# Patient Record
Sex: MALE | Race: WHITE | NOT HISPANIC OR LATINO | Employment: OTHER | URBAN - METROPOLITAN AREA
[De-identification: names, ages, dates, MRNs, and addresses within clinical notes are randomized per-mention and may not be internally consistent; named-entity substitution may affect disease eponyms.]

---

## 2017-05-05 ENCOUNTER — PRE VISIT (OUTPATIENT)
Dept: GASTROENTEROLOGY | Facility: CLINIC | Age: 67
End: 2017-05-05

## 2017-05-05 NOTE — TELEPHONE ENCOUNTER
PREVISIT INFORMATION                                                    Delbert Tilley scheduled for future visit at [facility] specialty clinics.    Patient is scheduled to see Dr. Moyer on 5/12/17  Reason for visit: sudden weight loss   [facility]   Has patient seen previous specialist? No  [facility]     REVIEW                                                      New patient packet mailed to patient: N/A  Medication reconciliation complete: No      PLAN/FOLLOW-UP NEEDED                                                      Patient Reminders Given:    Informed patient to bring an updated list of allergies, medications, pharmacy details and insurance information. Directed patient to come to the 2nd floor, check-in #4 for their appointment. Informed patient to call back if appointment needs to be cancelled or rescheduled at [phone].    Reminded patient to bring any outside records regarding this appointment or have them faxed to clinic at [phone].

## 2017-05-09 ENCOUNTER — OFFICE VISIT (OUTPATIENT)
Dept: SURGERY | Facility: CLINIC | Age: 67
End: 2017-05-09
Payer: COMMERCIAL

## 2017-05-09 VITALS — TEMPERATURE: 97.8 F | SYSTOLIC BLOOD PRESSURE: 119 MMHG | DIASTOLIC BLOOD PRESSURE: 67 MMHG | HEART RATE: 95 BPM

## 2017-05-09 DIAGNOSIS — I65.22 CAROTID STENOSIS, LEFT: Primary | ICD-10-CM

## 2017-05-09 PROCEDURE — 99212 OFFICE O/P EST SF 10 MIN: CPT | Performed by: SURGERY

## 2017-05-09 NOTE — NURSING NOTE
Initial /67 (BP Location: Right arm, Patient Position: Chair, Cuff Size: Adult Regular)  Pulse 95  Temp 97.8  F (36.6  C) Estimated body mass index is 22.78 kg/(m^2) as calculated from the following:    Height as of 3/30/16: 1.829 m (6').    Weight as of 3/30/16: 76.2 kg (168 lb). .

## 2017-05-09 NOTE — PROGRESS NOTES
Here to schedule operation left carotid.  Discussed risks goals and alternatives in detail he appears to understand and wishes to proceed.  Face to face time 10 minutes greater than 50% in consultation.

## 2017-05-12 ENCOUNTER — OFFICE VISIT (OUTPATIENT)
Dept: GASTROENTEROLOGY | Facility: CLINIC | Age: 67
End: 2017-05-12
Payer: COMMERCIAL

## 2017-05-12 VITALS
WEIGHT: 158.7 LBS | HEART RATE: 96 BPM | BODY MASS INDEX: 21.5 KG/M2 | OXYGEN SATURATION: 97 % | HEIGHT: 72 IN | DIASTOLIC BLOOD PRESSURE: 71 MMHG | SYSTOLIC BLOOD PRESSURE: 119 MMHG

## 2017-05-12 DIAGNOSIS — Z87.19 HISTORY OF PANCREATITIS: Primary | ICD-10-CM

## 2017-05-12 PROCEDURE — 99204 OFFICE O/P NEW MOD 45 MIN: CPT | Performed by: INTERNAL MEDICINE

## 2017-05-12 ASSESSMENT — PAIN SCALES - GENERAL: PAINLEVEL: NO PAIN (0)

## 2017-05-12 NOTE — PATIENT INSTRUCTIONS
It was great meeting you today.    Please try taking 1 tablespoon of metamucil daily    Eat smaller, more frequent low fat meals throughout the day    Continue your medications including the pancreas enzymes. Take the pancreas enzymes 2 pills with meals three times per day    Follow up in 6 months or sooner if needs.    Call with questions

## 2017-05-12 NOTE — PROGRESS NOTES
Note dictated. Job code [id number].    Romario Moyer MD    [facility]  Division of Gastroenterology, Hepatology and Nutrition

## 2017-05-12 NOTE — NURSING NOTE
Delbert Tilley's goals for this visit include:   Chief Complaint   Patient presents with     Consult     weight loss, diarrhea, left sided abdominal pain with eating       He requests these members of his care team be copied on today's visit information: YES - PCP     Initial /71 (BP Location: Left arm, Patient Position: Chair, Cuff Size: Adult Regular)  Pulse 96  Ht 1.829 m (6')  Wt 72 kg (158 lb 11.2 oz)  SpO2 97%  BMI 21.52 kg/m2 Estimated body mass index is 21.52 kg/(m^2) as calculated from the following:    Height as of this encounter: 1.829 m (6').    Weight as of this encounter: 72 kg (158 lb 11.2 oz).  BP completed using cuff size: regular

## 2017-05-12 NOTE — MR AVS SNAPSHOT
After Visit Summary   5/12/2017    Delbert Tilley    MRN: [id number]           Patient Information     Date Of Birth          1950        Visit Information        Provider Department      5/12/2017 2:30 PM Romario Moyer MD [facility]        Today's Diagnoses     History of pancreatitis    -  1      Care Instructions    It was great meeting you today.    Please try taking 1 tablespoon of metamucil daily    Eat smaller, more frequent low fat meals throughout the day    Continue your medications including the pancreas enzymes. Take the pancreas enzymes 2 pills with meals three times per day    Follow up in 6 months or sooner if needs.    Call with questions            Follow-ups after your visit        Your next 10 appointments already scheduled     Aug 18, 2017 10:30 AM CDT   [facility] OR with João Turner MD   [facility]    [facility])   [phone]            Aug 18, 2017   Procedure with João Turner MD   [facility] (--)    [address] [address] [phone]            Nov 17, 2017  4:30 PM CST   Return Visit with Romario Moyer MD   [facility] ([facility])    [address] [place] [address] [phone]              Who to contact     If you have questions or need follow up information about today's clinic visit or your schedule please contact [facility] directly at [phone].  Normal or non-critical lab and imaging results will be communicated to you by MyChart, letter or phone within 4 business days after the clinic has received the results. If you do not hear from us within 7 days, please contact the clinic through MyChart or phone. If you have a critical or abnormal lab result, we will  notify you by phone as soon as possible.  Submit refill requests through Datah[employer] or call your pharmacy and they will forward the refill request to us. Please allow 3 business days for your refill to be completed.          Additional Information About Your Visit        Datahug Information     Datahug is an electronic gateway that provides easy, online access to your medical records. With Datahug, you can request a clinic appointment, read your test results, renew a prescription or communicate with your care team.     To sign up for Datahug visit the website at [email or web address]   You will be asked to enter the access code listed below, as well as some personal information. Please follow the directions to create your username and password.     Your access code is: [email or web address]  Expires: 2017  9:55 AM     Your access code will  in 90 days. If you need help or a new code, please contact your [facility] or call [phone] for assistance.        Care EveryWhere ID     This is your Care EveryWhere ID. This could be used by other organizations to access your [place] medical records  [id number]        Your Vitals Were     Pulse Height Pulse Oximetry BMI (Body Mass Index)          96 1.829 m (6') 97% 21.52 kg/m2         Blood Pressure from Last 3 Encounters:   17 119/71   17 119/67   16 (!) 78/55    Weight from Last 3 Encounters:   17 72 kg (158 lb 11.2 oz)   16 76.2 kg (168 lb)   12/08/15 81.6 kg (180 lb)              Today, you had the following     No orders found for display         Today's Medication Changes          These changes are accurate as of: 17  3:06 PM.  If you have any questions, ask your nurse or doctor.               These medicines have changed or have updated prescriptions.        Dose/Directions    amylase-lipase-protease 57971 UNITS Cpep   Commonly known as:  ZENPEP   This may have changed:    - how much to take  -  when to take this   Used for:  History of pancreatitis   Changed by:  Romario Moyer MD        Dose:  2 capsule   Take 2 capsules by mouth 3 times daily (with meals)   Quantity:  270 capsule   Refills:  3            Where to get your medicines      Some of these will need a paper prescription and others can be bought over the counter.  Ask your nurse if you have questions.     Bring a paper prescription for each of these medications     amylase-lipase-protease 89956 UNITS Cpep                Primary Care Provider Fax #    [facility] [phone]       [facility] [address]        Thank you!     Thank you for choosing [facility]  for your care. Our goal is always to provide you with excellent care. Hearing back from our patients is one way we can continue to improve our services. Please take a few minutes to complete the written survey that you may receive in the mail after your visit with us. Thank you!             Your Updated Medication List - Protect others around you: Learn how to safely use, store and throw away your medicines at www.disposemymeds.org.          This list is accurate as of: 5/12/17  3:06 PM.  Always use your most recent med list.                   Brand Name Dispense Instructions for use    amylase-lipase-protease 54411 UNITS Cpep    ZENPEP    270 capsule    Take 2 capsules by mouth 3 times daily (with meals)       aspirin 81 MG EC tablet     90 tablet    Take 81 mg by mouth daily Reported on 5/9/2017       glipiZIDE 10 MG tablet    GLUCOTROL     ONE TABLET DAILY 30 MINUTES BEFORE MEALS       HYDROcodone-acetaminophen 5-325 MG per tablet    NORCO    40 tablet    1-2 tabs PO q 4-6 hour prn pain       insulin glargine 100 UNIT/ML injection    LANTUS     Inject 27 Units Subcutaneous every morning       LIPITOR 80 MG tablet   Generic drug:  atorvastatin     30 tablet    Take 1 tablet by mouth At Bedtime.       lisinopril 5 MG tablet     PRINIVIL/ZESTRIL    30 tablet    Take 0.5 tablets (2.5 mg) by mouth daily       metFORMIN 500 MG 24 hr tablet    GLUCOPHAGE-XR    30 tablet    Take 2 tablets by mouth 2 times daily (with meals).       metoprolol 25 MG 24 hr tablet    TOPROL-XL    90 tablet    Take 0.5 tablets (12.5 mg) by mouth daily       nitroglycerin 0.4 MG sublingual tablet    NITROSTAT    25 tablet    Place 1 tablet (0.4 mg) under the tongue every 5 minutes as needed for chest pain       PROTONIX 40 MG EC tablet   Generic drug:  pantoprazole     90 tablet    Take 40 mg by mouth every morning Take 30-60 minutes before a meal.

## 2017-05-13 NOTE — PROGRESS NOTES
OUTPATIENT GI CONSULT      REFERRING PROVIDER:  The patient's primary care provider, Augustus Mix PA-C, at the [facility].      REASON FOR CONSULTATION:  Weight loss.       CHIEF COMPLAINT:  No complaints, other than occasional bloating with eating.      HISTORY OF PRESENT ILLNESS:  Delbert Tliley is a pleasant 58-year-old gentleman with a history of tobacco abuse, as well as alcohol-induced pancreatitis, who is here today to be seen in GI Clinic.  The patient [facility] seen at the [facility], [facility] [place]  The patient has a long history of smoking and alcohol abuse.  He last drank alcohol in 2010.  Prior to quitting, he did have episodes of recurrent pancreatitis and required hospitalization.  He was referred to the GI Clinic today because many months ago he had weight loss.  He tells me that over the course of several years he was losing weight.  He was seen by his primary care provider, and they got labs that were unremarkable.  They also obtained a CT of the chest and abdomen, which showed some coronary calcifications and a distended stomach with food contents, but otherwise was unremarkable.  The patient was referred to GI for further evaluation.  He could not be seen at the [facility], and so he was sent here.      The patient states that since the referral was placed in November, actually his weight has stabilized.  He reports that his weight has been stable now for the last 8 months, and he is doing quite well.  His only complaint is that after eating fatty or greasy foods, he feels a little bloated and passes some gas.  Usually, this bloating discomfort passes within a few minutes of stopping eating.  He has noted that if he avoids greasy foods, he does quite better.  Also, if he eats smaller foods, he does better as well.  He does have some irregular bowel habits, and he goes from having some looser stools to being more constipated.  He will usually have about 2  stools a day.  He does take his Zenpep 10,000 units once a day, and he maybe take some extra pills if he eats more greasy food.  If he takes these medications more regularly, he does not get that bloating sensation.  He has no blood in his stool.  He has no black or tarry stool.  He has no reflux symptoms.  No odynophagia, no dysphagia, and no vomiting.      REVIEW OF SYSTEMS:  A complete review of systems is performed.  Pertinent positives and negatives are as stated above in the HPI.  The remainder of a complete review of systems is unremarkable.      PAST MEDICAL HISTORY:   1.  Cataracts.   2.  Presbyopia.    3.  Hearing loss.   4.  Tinnitus.   5.  Cardiomyopathy.   6.  Peripheral neuropathy.   7.  History of GERD with Martinez's esophagus.  We do not have details about this.   8.  Atrial flutter.   9.  History of alcohol abuse.   10.  History of non-ST elevation myocardial infarction.   11.  History of retinal detachment.   12.  History of alcohol-induced pancreatitis, per the patient.      PAST SURGICAL HISTORY:   1.  Status post carpal tunnel release.   2.  Status post CABG x4.   3.  Pacer placement.   4.  Tonsillectomy.      ALLERGIES:  Fish.      SOCIAL HISTORY:  He is single.  He lives with his 2 children.  Tobacco history:  He smokes 2 packs per day with a 100+ pack-year history.  History of alcohol abuse, but none since 07/2010.      FAMILY HISTORY:  He is adopted.      MEDICATIONS:   1.  Zenpep 10,000 units once a day to twice a day.   2.  Norco (he is not taking).   3.  Lantus 25 units every morning.   4.  Lisinopril 2.5 mg daily.   5.  Metoprolol 12.5 mg daily.   6.  Nitroglycerin SL.   7.  Atorvastatin.   8.  Metformin.   9.  Pantoprazole 40 mg daily.   10.  Aspirin.   11.  Glipizide 10 mg.      PHYSICAL EXAMINATION:   GENERAL:  He is pleasant, in no acute distress.   VITAL SIGNS:  Reviewed in Epic, stable.  Weight is 158 pounds.  BMI is 21.  Blood pressure 119/71 and pulse is 96.   GENERAL:  He is  pleasant, in no acute distress.   HEENT:  Head is atraumatic, normocephalic.  Sclerae are anicteric without injection.  Oropharynx is clear with moist mucous membranes.   NECK:  Supple, with no lymphadenopathy.   HEART:  Normal rate.   RESPIRATIONS:  Breathing comfortably.   ABDOMEN:  Soft, nontender and nondistended, no rebound or guarding.  No palpable masses.  No hepatosplenomegaly.   EXTREMITIES:  No clubbing, cyanosis or edema.   SKIN:  No evidence of rash.   JOINTS:  No evidence of synovitis.   NEUROLOGIC:  Awake, alert and oriented x3 with no focal deficits.      LABORATORY DATA:  [facility]  He had a normal hemoglobin and white count.  Platelets were mildly decreased at 128.  Albumin was normal at 3.8.  Creatinine was normal.  Glucose elevated at 187.  LFTs were normal.      IMAGING:  CT scan reviewed as above.      ASSESSMENT AND PLAN:  Delbert Tilley is a very pleasant 66-year-old gentleman who is here today to be seen in the GI Clinic.  [facility]     1.  Weight loss.  His weight loss appears to have resolved, and he says it is stable over the last 8 months.  He has multiple reasons that he could have potentially had weight loss.  I recommend that his primary care provider ensure that he is up-to-date on all of his cancer screening.  His recent CT of the chest and abdomen did not show anything concerning.  Certainly, he is at risk for pancreas insufficiency related to chronic pancreatitis in the setting of his extensive smoking and alcohol use history, and his history of recurrent acute pancreatitis (related to alcohol).  He seems to be managing his symptoms well with pancreas enzymes.  I did increase his pancreas enzymes up to 2 pills 3 times a day with eating.  Certainly, he is also at risk for some mesenteric ischemia related to his chronic tobacco use.  He does not have any food fear, and his weight has stabilized, so I do not think we need to do  any further evaluation for this.  There has been no evidence of pancreas cancer on his CT scan.  Given the fact that he has stabilized and he is doing well, I do not think we need to do any further evaluation for this at this time.     2.  Probable pancreas insufficiency with some underlying chronic pancreatitis.  I would like to get [facility] to see if there is any indication of chronic pancreatitis.  He did not have calcific pancreatitis on the CT scan, but given his tobacco history and alcohol history with history of recurrent pancreatitis previously, he certainly could have an element of this.  He should continue the pancreas enzymes as outlined above, and we will get his outside records.     3.  History of Martinez's per primary care note.  He has not had an endoscopy in several years.  We will [facility] to see if they confirm a history of Martinez's.  If they have a history of Martinez's, then we will repeat an EGD.  He can continue his PPI now.     4.  Colon cancer surveillance.  He reports that about 5-7 years ago he had [facility].  He was told to come back in 10 years.  We will get these records, and we will help determine when he should have his followup colonoscopy.      Thank you very much for the opportunity to take part in the care of this patient.  Please do not hesitate to call with questions.      cc: TONJA Hull MD             D: 2017 15:35   T: 2017 10:14   MT: SHA      Name:     CARLIN QUEEN   MRN:      [id number]        Account:      [id number]   :      1950           Service Date: 2017      Document: [id number]

## 2017-05-18 ENCOUNTER — TELEPHONE (OUTPATIENT)
Dept: GASTROENTEROLOGY | Facility: CLINIC | Age: 67
End: 2017-05-18

## 2017-05-18 DIAGNOSIS — K86.89 PANCREATIC INSUFFICIENCY: Primary | ICD-10-CM

## 2017-05-18 NOTE — TELEPHONE ENCOUNTER
Pharmacy called with updates on changes with medication: Amylase-lipas-protease (ZENPEP) 28361 Units CPEP.  Pharmacy switched from ZENPEP to Creon.  Pharmacy needing approval from Dr. Moyer.  If Dr. Moyer okay with switch to Creon, will need a new order supporting this and faxed to pharmacy.      [facility] Fax:  [phone]      Will route to Dr. Moyer to review and advise.

## 2017-05-19 NOTE — TELEPHONE ENCOUNTER
Ok to switch. Order placed.    Romario Moyer MD    [facility]  Division of Gastroenterology, Hepatology and Nutrition

## 2017-05-19 NOTE — TELEPHONE ENCOUNTER
Yanira ordered by Dr. Moyer and faxed to the [facility] at [phone].    Kiley Beckett CMA

## 2017-08-11 ENCOUNTER — OFFICE VISIT (OUTPATIENT)
Dept: FAMILY MEDICINE | Facility: CLINIC | Age: 67
End: 2017-08-11
Payer: COMMERCIAL

## 2017-08-11 VITALS
TEMPERATURE: 96.4 F | WEIGHT: 154.1 LBS | SYSTOLIC BLOOD PRESSURE: 116 MMHG | BODY MASS INDEX: 20.87 KG/M2 | HEIGHT: 72 IN | HEART RATE: 66 BPM | DIASTOLIC BLOOD PRESSURE: 62 MMHG

## 2017-08-11 DIAGNOSIS — Z79.4 TYPE 2 DIABETES MELLITUS WITH COMPLICATION, WITH LONG-TERM CURRENT USE OF INSULIN (H): ICD-10-CM

## 2017-08-11 DIAGNOSIS — R94.31 ABNORMAL ELECTROCARDIOGRAM: ICD-10-CM

## 2017-08-11 DIAGNOSIS — Z01.818 PREOP GENERAL PHYSICAL EXAM: Primary | ICD-10-CM

## 2017-08-11 DIAGNOSIS — E11.8 TYPE 2 DIABETES MELLITUS WITH COMPLICATION, WITH LONG-TERM CURRENT USE OF INSULIN (H): ICD-10-CM

## 2017-08-11 LAB
ANION GAP SERPL CALCULATED.3IONS-SCNC: 3 MMOL/L (ref 3–14)
BASOPHILS # BLD AUTO: 0 10E9/L (ref 0–0.2)
BASOPHILS NFR BLD AUTO: 0.3 %
BUN SERPL-MCNC: 19 MG/DL (ref 7–30)
CALCIUM SERPL-MCNC: 8.8 MG/DL (ref 8.5–10.1)
CHLORIDE SERPL-SCNC: 105 MMOL/L (ref 94–109)
CO2 SERPL-SCNC: 29 MMOL/L (ref 20–32)
CREAT SERPL-MCNC: 0.84 MG/DL (ref 0.66–1.25)
CREAT UR-MCNC: 86 MG/DL
DIFFERENTIAL METHOD BLD: ABNORMAL
EOSINOPHIL # BLD AUTO: 0.3 10E9/L (ref 0–0.7)
EOSINOPHIL NFR BLD AUTO: 3.5 %
ERYTHROCYTE [DISTWIDTH] IN BLOOD BY AUTOMATED COUNT: 13 % (ref 10–15)
GFR SERPL CREATININE-BSD FRML MDRD: ABNORMAL ML/MIN/1.7M2
GLUCOSE SERPL-MCNC: 189 MG/DL (ref 70–99)
HBA1C MFR BLD: 9.9 % (ref 4.3–6)
HCT VFR BLD AUTO: 43.8 % (ref 40–53)
HGB BLD-MCNC: 14.4 G/DL (ref 13.3–17.7)
LYMPHOCYTES # BLD AUTO: 1.7 10E9/L (ref 0.8–5.3)
LYMPHOCYTES NFR BLD AUTO: 18.2 %
MCH RBC QN AUTO: 30.4 PG (ref 26.5–33)
MCHC RBC AUTO-ENTMCNC: 32.9 G/DL (ref 31.5–36.5)
MCV RBC AUTO: 93 FL (ref 78–100)
MICROALBUMIN UR-MCNC: 1420 MG/L
MICROALBUMIN/CREAT UR: 1660.82 MG/G CR (ref 0–17)
MONOCYTES # BLD AUTO: 0.7 10E9/L (ref 0–1.3)
MONOCYTES NFR BLD AUTO: 7.6 %
NEUTROPHILS # BLD AUTO: 6.6 10E9/L (ref 1.6–8.3)
NEUTROPHILS NFR BLD AUTO: 70.4 %
PLATELET # BLD AUTO: 117 10E9/L (ref 150–450)
POTASSIUM SERPL-SCNC: 4.4 MMOL/L (ref 3.4–5.3)
RBC # BLD AUTO: 4.73 10E12/L (ref 4.4–5.9)
SODIUM SERPL-SCNC: 137 MMOL/L (ref 133–144)
WBC # BLD AUTO: 9.3 10E9/L (ref 4–11)

## 2017-08-11 PROCEDURE — 85025 COMPLETE CBC W/AUTO DIFF WBC: CPT | Performed by: NURSE PRACTITIONER

## 2017-08-11 PROCEDURE — 93000 ELECTROCARDIOGRAM COMPLETE: CPT | Performed by: NURSE PRACTITIONER

## 2017-08-11 PROCEDURE — 99215 OFFICE O/P EST HI 40 MIN: CPT | Performed by: NURSE PRACTITIONER

## 2017-08-11 PROCEDURE — 82043 UR ALBUMIN QUANTITATIVE: CPT | Performed by: NURSE PRACTITIONER

## 2017-08-11 PROCEDURE — 80048 BASIC METABOLIC PNL TOTAL CA: CPT | Performed by: NURSE PRACTITIONER

## 2017-08-11 PROCEDURE — 83036 HEMOGLOBIN GLYCOSYLATED A1C: CPT | Performed by: NURSE PRACTITIONER

## 2017-08-11 PROCEDURE — 36415 COLL VENOUS BLD VENIPUNCTURE: CPT | Performed by: NURSE PRACTITIONER

## 2017-08-11 RX ORDER — GLIPIZIDE 10 MG/1
10 TABLET ORAL
Qty: 180 TABLET | Refills: 0 | Status: SHIPPED | OUTPATIENT
Start: 2017-08-11 | End: 2017-08-15

## 2017-08-11 RX ORDER — INSULIN GLARGINE 100 [IU]/ML
INJECTION, SOLUTION SUBCUTANEOUS
Qty: 1 VIAL | Refills: 1 | Status: ON HOLD | OUTPATIENT
Start: 2017-08-11 | End: 2017-09-08

## 2017-08-11 NOTE — LETTER
Delbert SORIA Jarek  [address] [address] [facility] [address]  [place] [address]        August 11, 2017          Dear ,    We are writing to inform you of your test results.  Kidney function and electrolytes are normal, glucose elevated at 189, due to uncontrolled diabetes, recommend to schedule with diabetic educator     Resulted Orders   Hemoglobin A1c   Result Value Ref Range    Hemoglobin A1C 9.9 (H) 4.3 - 6.0 %   Basic metabolic panel   Result Value Ref Range    Sodium 137 133 - 144 mmol/L    Potassium 4.4 3.4 - 5.3 mmol/L    Chloride 105 94 - 109 mmol/L    Carbon Dioxide 29 20 - 32 mmol/L    Anion Gap 3 3 - 14 mmol/L    Glucose 189 (H) 70 - 99 mg/dL    Urea Nitrogen 19 7 - 30 mg/dL    Creatinine 0.84 0.66 - 1.25 mg/dL    GFR Estimate >90  Non  GFR Calc   >60 mL/min/1.7m2    GFR Estimate If Black >90   GFR Calc   >60 mL/min/1.7m2    Calcium 8.8 8.5 - 10.1 mg/dL   CBC with platelets differential   Result Value Ref Range    WBC 9.3 4.0 - 11.0 10e9/L    RBC Count 4.73 4.4 - 5.9 10e12/L    Hemoglobin 14.4 13.3 - 17.7 g/dL    Hematocrit 43.8 40.0 - 53.0 %    MCV 93 78 - 100 fl    MCH 30.4 26.5 - 33.0 pg    MCHC 32.9 31.5 - 36.5 g/dL    RDW 13.0 10.0 - 15.0 %    Platelet Count 117 (L) 150 - 450 10e9/L    Diff Method Automated Method     % Neutrophils 70.4 %    % Lymphocytes 18.2 %    % Monocytes 7.6 %    % Eosinophils 3.5 %    % Basophils 0.3 %    Absolute Neutrophil 6.6 1.6 - 8.3 10e9/L    Absolute Lymphocytes 1.7 0.8 - 5.3 10e9/L    Absolute Monocytes 0.7 0.0 - 1.3 10e9/L    Absolute Eosinophils 0.3 0.0 - 0.7 10e9/L    Absolute Basophils 0.0 0.0 - 0.2 10e9/L       If you have any questions or concerns, please call the clinic at the number listed above.       Sincerely,        Sharee Mckoy, ANA CNP

## 2017-08-11 NOTE — LETTER
Delbert Tilley  [address] [address] [place] [address] [address]  [facility] [address]        August 14, 2017          Dear ,    We are writing to inform you of your test results.    Urine micro albumin level is elevated, this is due to uncontrolled diabetes, recommend to increase Glipizide to 10 mg twice daily, continue Metformin 1000 mg twice daily and increase Lantus to 27 units daily, plus continue to increase by 2 units every 3-4 days until fasting blood sugars will be less then 140 consistently. Schedule with diabetic educator.   Take Lisinopril 2.5 mg daily, this will help to protect kidneys        If you have any questions or concerns, please call the clinic at the number listed above.       Sincerely,        ANA Jane CNP

## 2017-08-11 NOTE — NURSING NOTE
Chief Complaint   Patient presents with     Pre-Op Exam     8/18       Initial /62  Pulse 66  Temp 96.4  F (35.8  C) (Tympanic)  Ht 6' (1.829 m)  Wt 154 lb 1.6 oz (69.9 kg)  BMI 20.9 kg/m2 Estimated body mass index is 20.9 kg/(m^2) as calculated from the following:    Height as of this encounter: 6' (1.829 m).    Weight as of this encounter: 154 lb 1.6 oz (69.9 kg).  Medication Reconciliation: complete

## 2017-08-11 NOTE — LETTER
Delbert SORIA Jarek  [address] [address] [place] [address] [address]  [facility] [address]        August 11, 2017          Dear ,    We are writing to inform you of your test results.    CBC is normal, except slightly low platelets, this is stable   A1C 9.9%, his diabetes is uncontrolled   Recommend increase Lantus inject 27 Units Subcutaneous every morning, increase by 2 units every 3-4 days until fasting blood sugars less then 140 consistently   Increase Glipizide to 10 mg twice daily with meals   [facility]   Schedule with diabetic educator   Reschedule surgery   Recheck A1C in 1 month   Kidney function and electrolytes are normal, glucose elevated at 189, due to uncontrolled diabetes, recommend to schedule with diabetic educator     Resulted Orders   Hemoglobin A1c   Result Value Ref Range    Hemoglobin A1C 9.9 (H) 4.3 - 6.0 %   Basic metabolic panel   Result Value Ref Range    Sodium 137 133 - 144 mmol/L    Potassium 4.4 3.4 - 5.3 mmol/L    Chloride 105 94 - 109 mmol/L    Carbon Dioxide 29 20 - 32 mmol/L    Anion Gap 3 3 - 14 mmol/L    Glucose 189 (H) 70 - 99 mg/dL    Urea Nitrogen 19 7 - 30 mg/dL    Creatinine 0.84 0.66 - 1.25 mg/dL    GFR Estimate >90  Non  GFR Calc   >60 mL/min/1.7m2    GFR Estimate If Black >90   GFR Calc   >60 mL/min/1.7m2    Calcium 8.8 8.5 - 10.1 mg/dL   CBC with platelets differential   Result Value Ref Range    WBC 9.3 4.0 - 11.0 10e9/L    RBC Count 4.73 4.4 - 5.9 10e12/L    Hemoglobin 14.4 13.3 - 17.7 g/dL    Hematocrit 43.8 40.0 - 53.0 %    MCV 93 78 - 100 fl    MCH 30.4 26.5 - 33.0 pg    MCHC 32.9 31.5 - 36.5 g/dL    RDW 13.0 10.0 - 15.0 %    Platelet Count 117 (L) 150 - 450 10e9/L    Diff Method Automated Method     % Neutrophils 70.4 %    % Lymphocytes 18.2 %    % Monocytes 7.6 %    % Eosinophils 3.5 %    % Basophils 0.3 %    Absolute Neutrophil 6.6 1.6 - 8.3 10e9/L    Absolute Lymphocytes 1.7 0.8 - 5.3 10e9/L    Absolute Monocytes 0.7  0.0 - 1.3 10e9/L    Absolute Eosinophils 0.3 0.0 - 0.7 10e9/L    Absolute Basophils 0.0 0.0 - 0.2 10e9/L       If you have any questions or concerns, please call the clinic at the number listed above.       Sincerely,        ANA Jane CNP

## 2017-08-11 NOTE — PATIENT INSTRUCTIONS
Before Your Surgery      Call your surgeon if there is any change in your health. This includes signs of a cold or flu (such as a sore throat, runny nose, cough, rash or fever).    Do not smoke, drink alcohol or take over the counter medicine (unless your surgeon or primary care doctor tells you to) for the 24 hours before and after surgery.    If you take prescribed drugs: Follow your doctor s orders about which medicines to take and which to stop until after surgery.    Eating and drinking prior to surgery: follow the instructions from your surgeon    Take a shower or bath the night before surgery. Use the soap your surgeon gave you to gently clean your skin. If you do not have soap from your surgeon, use your regular soap. Do not shave or scrub the surgery site.  Wear clean pajamas and have clean sheets on your bed.   EKG abnormal  Schedule stress test and with cardiologist   Labs to check diabetes  Reschedule surgery

## 2017-08-11 NOTE — MR AVS SNAPSHOT
After Visit Summary   8/11/2017    Delbert Tilley    MRN: [id number]           Patient Information     Date Of Birth          1950        Visit Information        Provider Department      8/11/2017 6:40 AM Sharee Mckoy APRN [facility]        Today's Diagnoses     Preop general physical exam    -  1    Type 2 diabetes mellitus with complication, with long-term current use of insulin (H)        Abnormal electrocardiogram          Care Instructions      Before Your Surgery      Call your surgeon if there is any change in your health. This includes signs of a cold or flu (such as a sore throat, runny nose, cough, rash or fever).    Do not smoke, drink alcohol or take over the counter medicine (unless your surgeon or primary care doctor tells you to) for the 24 hours before and after surgery.    If you take prescribed drugs: Follow your doctor s orders about which medicines to take and which to stop until after surgery.    Eating and drinking prior to surgery: follow the instructions from your surgeon    Take a shower or bath the night before surgery. Use the soap your surgeon gave you to gently clean your skin. If you do not have soap from your surgeon, use your regular soap. Do not shave or scrub the surgery site.  Wear clean pajamas and have clean sheets on your bed.   EKG abnormal  Schedule stress test and with cardiologist   Labs to check diabetes  Reschedule surgery               Follow-ups after your visit        Additional Services     CARDIOLOGY EVAL ADULT REFERRAL       Your provider has referred you to:  [facility]: [facility] [phone]   [email or web address]    Please be aware that coverage of these services is subject to the terms and limitations of your health insurance plan.  Call member services at your health plan with any benefit or coverage questions.      Type of Referral:   Pre-op evaluation, abnormal EKG    Timeframe requested:  1-3 Days    Please bring the following to your appointment:  >>   Any x-rays, CTs or MRIs which have been performed.  Contact the facility where they were done to arrange for  prior to your scheduled appointment.    >>   List of current medications  >>   This referral request   >>   Any documents/labs given to you for this referral                  Your next 10 appointments already scheduled     Aug 18, 2017 10:45 AM CDT   [facility] OR with João Turner MD   [facility] ([facility]    [facility] ([facility])   [phone]            Aug 18, 2017   Procedure with João Turner MD   [facility] (--)    [address]  [facility] [address]   [phone]            Nov 03, 2017  4:30 PM CDT   Return Visit with Romario Moyer MD   [facility] ([facility])    [address] [place] [id number] [phone]              Future tests that were ordered for you today     Open Future Orders        Priority Expected Expires Ordered    NM Lexiscan stress test Routine  8/11/2018 8/11/2017            Who to contact     If you have questions or need follow up information about today's clinic visit or your schedule please contact [facility] directly at [phone].  Normal or non-critical lab and imaging results will be communicated to you by MyChart, letter or phone within 4 business days after the clinic has received the results. If you do not hear from us within 7 days, please contact the clinic through MyChart or phone. If you have a critical or abnormal lab result, we will notify you by phone as soon as possible.  Submit refill requests through [employer] or call your pharmacy and they will forward the refill request to us. Please allow 3  "business days for your refill to be completed.          Additional Information About Your Visit        MyChart Information     [employer] lets you send messages to your doctor, view your test results, renew your prescriptions, schedule appointments and more. To sign up, go to [email or web address] . Click on \"Log in\" on the left side of the screen, which will take you to the Welcome page. Then click on \"Sign up Now\" on the right side of the page.     You will be asked to enter the access code listed below, as well as some personal information. Please follow the directions to create your username and password.     Your access code is: [email or web address]  Expires: 2017  7:24 AM     Your access code will  in 90 days. If you need help or a new code, please call your [place] clinic or [phone].        Care EveryWhere ID     This is your Care EveryWhere ID. This could be used by other organizations to access your [place] medical records  [id number]        Your Vitals Were     Pulse Temperature Height BMI (Body Mass Index)          66 96.4  F (35.8  C) (Tympanic) 6' (1.829 m) 20.9 kg/m2         Blood Pressure from Last 3 Encounters:   17 116/62   17 119/71   17 119/67    Weight from Last 3 Encounters:   17 154 lb 1.6 oz (69.9 kg)   17 158 lb 11.2 oz (72 kg)   16 168 lb (76.2 kg)              We Performed the Following     Albumin Random Urine Quantitative     Basic metabolic panel     CARDIOLOGY EVAL ADULT REFERRAL     CBC with platelets differential     EKG 12-lead complete w/read - Clinics     Hemoglobin A1c        Primary Care Provider    Physician No Ref-Primary       No address on file        Equal Access to Services     MARITA LAMA : Forest Mondragon, waandrews buenrostro, liang kaalmada minal, gwendolyn sykes. So [facility] [phone].    ATENCIÓN: Si habla español, tiene a frey disposición servicios gratuitos de asistencia " lingüísticaGagandeep Suggs al [phone].    We comply with applicable federal civil rights laws and [place] laws. We do not discriminate on the basis of race, color, national origin, age, disability sex, sexual orientation or gender identity.            Thank you!     Thank you for choosing [facility]  for your care. Our goal is always to provide you with excellent care. Hearing back from our patients is one way we can continue to improve our services. Please take a few minutes to complete the written survey that you may receive in the mail after your visit with us. Thank you!             Your Updated Medication List - Protect others around you: Learn how to safely use, store and throw away your medicines at www.disposemymeds.org.          This list is accurate as of: 8/11/17  7:24 AM.  Always use your most recent med list.                   Brand Name Dispense Instructions for use Diagnosis    amylase-lipase-protease 17678 UNITS Cpep    CREON    270 capsule    Take 2 capsules (24,000 Units) by mouth 3 times daily (with meals)    Pancreatic insufficiency       aspirin 81 MG EC tablet     90 tablet    Take 81 mg by mouth daily Reported on 5/9/2017        glipiZIDE 10 MG tablet    GLUCOTROL     ONE TABLET DAILY 30 MINUTES BEFORE MEALS    Cardiomyopathy (H), Diabetes, Atrial fibrillation/flutter, Hyperlipidaemia LDL goal <100       insulin glargine 100 UNIT/ML injection    LANTUS     Inject 27 Units Subcutaneous every morning        LIPITOR 80 MG tablet   Generic drug:  atorvastatin     30 tablet    Take 1 tablet by mouth At Bedtime.        lisinopril 5 MG tablet    PRINIVIL/ZESTRIL    30 tablet    Take 0.5 tablets (2.5 mg) by mouth daily        metFORMIN 500 MG 24 hr tablet    GLUCOPHAGE-XR    30 tablet    Take 2 tablets by mouth 2 times daily (with meals).        metoprolol 25 MG 24 hr tablet    TOPROL-XL    90 tablet    Take 0.5 tablets (12.5 mg) by mouth daily        nitroGLYcerin 0.4 MG sublingual  tablet    NITROSTAT    25 tablet    Place 1 tablet (0.4 mg) under the tongue every 5 minutes as needed for chest pain    CAD (coronary artery disease)       PROTONIX 40 MG EC tablet   Generic drug:  pantoprazole     90 tablet    Take 40 mg by mouth every morning Take 30-60 minutes before a meal.

## 2017-08-11 NOTE — PROGRESS NOTES
[facility]  [address] [place] [address]  [phone]  Dept: [phone]    PRE-OP EVALUATION:  Today's date: 2017    Delbert Tilley (: 1950) presents for pre-operative evaluation assessment as requested by Dr. Turner.  He requires evaluation and anesthesia risk assessment prior to undergoing surgery/procedure for treatment of Left carotid endarterectomy with eeg .     Date of Surgery/ Procedure:   Time of Surgery/ Procedure: 10:45 A.M.  Hospital/Surgical Facility: [facility]   Primary Physician: No Ref-Primary, Physician  Type of Anesthesia Anticipated: General    Patient has a Health Care Directive or Living Will:  YES Has a living will through the [facility]     1. YES - Do you have a history of heart attack, stroke, stent, bypass or surgery on an artery in the head, neck, heart or legs? Had a heart attack ten years ago  2. NO - Do you ever have any pain or discomfort in your chest?  3. NO - Do you have a history of  Heart Failure?  4. NO - Are you troubled by shortness of breath when: walking on the level, up a slight hill or at night?  5. NO - Do you currently have a cold, bronchitis or other respiratory infection?  6. NO - Do you have a cough, shortness of breath or wheezing?  7. YES - Do you sometimes get pains in the calves of your legs when you walk?  8. NO - Do you or anyone in your family have previous history of blood clots?  9. NO - Do you or does anyone in your family have a serious bleeding problem such as prolonged bleeding following surgeries or cuts?  10. NO - Have you ever had problems with anemia or been told to take iron pills?  11. NO - Have you had any abnormal blood loss such as black, tarry or bloody stools, or abnormal vaginal bleeding?  12. NO - Have you ever had a blood transfusion?  13. NO - Have you or any of your relatives ever had problems with anesthesia?  14. NO - Do you have sleep apnea, excessive snoring or daytime  drowsiness?  15. YES - Do you have any prosthetic heart valves? Has a defibulator   16. NO - Do you have prosthetic joints?  17. NO - Is there any chance that you may be pregnant?        HPI:                                                      Brief HPI related to upcoming procedure: the patient has history of bilateral carotid artery stenosis, had Right carotid CEA in 2015, still smoking, scheduled to have left CEA on 8/18.     DIABETES - Patient has a longstanding history of DiabetesType Type II . Patient is being treated with diet, oral agents and insulin injections and denies significant side effects. Control has been poor. Complicating factors include but are not limited to: cardiac. Recent A1C 8.9%, reports BG's ranging from 189 this AM to 300', recommended to use 27 units of Lantus daily, but currently using 24 units daily, takes 10 mg Glipizide daily and Metformin 1000 mg twice daily.                                                                                                        .  HYPERTENSION - Patient has longstanding history of mod-severe HTN , currently denies any symptoms referable to elevated blood pressure. Specifically denies chest pain, palpitations, dyspnea, orthopnea, PND or peripheral edema. Blood pressure readings have been in normal range. Current medication regimen is as listed below. Patient denies any side effects of medication.                                                                                                                                                                                          .  HYPERLIPIDEMIA - Patient has a long history of significant Hyperlipidemia requiring medication for treatment with recent good control. Patient reports no problems or side effects with the medication.                                                                                                                                                       .  CAD - Patient has a  longstanding history of mod-severe CAD. Patient denies recent chest pain or NTG use, denies exercise induced dyspnea or PND. Last Stress test in 2015 was normal.                                                                                                                             .    MEDICAL HISTORY:                                                    Patient Active Problem List    Diagnosis Date Noted     Carpal tunnel syndrome of right wrist 01/10/2016     Priority: Medium     Carotid stenosis 06/12/2015     Priority: Medium     [facility]       CHF (congestive heart failure) (H) 06/08/2015     Priority: Medium     NSTEMI (non-ST elevated myocardial infarction) (H) 10/21/2014     Priority: Medium     ACS (acute coronary syndrome) (H) 10/20/2014     Priority: Medium     Hyperlipidemia with target LDL less than 100 07/08/2013     Priority: Medium     [facility]  Diagnosis updated by automated process. Provider to review and confirm.       TYPE 2 DIABETES, HBA1C GOAL < 8% 10/31/2010     Priority: Medium     CARDIOVASCULAR SCREENING; LDL GOAL LESS THAN 100 10/31/2010     Priority: Medium     CAD (coronary artery disease) 07/07/2010     Priority: Medium     S/p mi  Bypass x 4 --grafting to the LAD obtuse marginal and PDA.   Angio on 6/10 negative          Alcohol abuse 07/07/2010     Priority: Medium     H/o rehab x 5        GERD (gastroesophageal reflux disease) 07/07/2010     Priority: Medium     Pancreatic disease 07/07/2010     Priority: Medium     H/o pancreatitis --on enzymes        Diabetes mellitus, type 2 (H) 07/07/2010     Priority: Medium     [facility]       Cardiomyopathy (H) 06/15/2010     Priority: Medium     EF 15%.--ICD placement        Atrial fibrillation/flutter 06/15/2010     Priority: Medium     S/p ablation 1June 2010.           Past Medical History:   Diagnosis Date     Acute renal failure (H) 8/26/06 Hosp    secondary to dehydration     Arthritis       CAD (coronary artery disease)     LIMA to the LAD, vein graft to OM and a vein graft to the PDA     Carpal tunnel syndrome      Diabetes (H)      Gastro-oesophageal reflux disease      H/O: alcohol abuse      HTN (hypertension)      Hyperlipidaemia      Hyperlipidaemia LDL goal <100 7/8/2013     Hypokalemia      Pacemaker      Pancreatitis 6/26/06 Hosp     Past Surgical History:   Procedure Laterality Date     BYPASS CARDIOPULMONARY       CATARACT IOL, RT/LT      Cataract IOL RT/LT     ENDARTERECTOMY CAROTID Right 6/12/2015    Procedure: ENDARTERECTOMY CAROTID;  Surgeon: João Turner MD;  Location: [facility] OR     IMPLANT PACEMAKER  6/10/2010     RELEASE CARPAL TUNNEL Right 4/12/2016    Procedure: RELEASE CARPAL TUNNEL;  Surgeon: Lissy Leger MD;  Location: [facility] OR     SURGICAL HISTORY OF -   1998    Laminectomy     SURGICAL HISTORY OF -   10/2003    Bypass grafting     TONSILLECTOMY & ADENOIDECTOMY       Current Outpatient Prescriptions   Medication Sig Dispense Refill     insulin glargine (LANTUS) 100 UNIT/ML injection Inject 27 Units Subcutaneous every morning, increase by 2 units every 3-4 days until fasting blood sugars less then 140 consistently 1 vial 1     glipiZIDE (GLUCOTROL) 10 MG tablet Take 1 tablet (10 mg) by mouth 2 times daily (before meals) 180 tablet 0     amylase-lipase-protease (CREON) 47973 UNITS CPEP Take 2 capsules (24,000 Units) by mouth 3 times daily (with meals) 270 capsule 3     lisinopril (PRINIVIL,ZESTRIL) 5 MG tablet Take 0.5 tablets (2.5 mg) by mouth daily 30 tablet 1     atorvastatin (LIPITOR) 80 MG tablet Take 1 tablet by mouth At Bedtime. 30 tablet 1     metFORMIN (GLUCOPHAGE-XR) 500 MG 24 hr tablet Take 2 tablets by mouth 2 times daily (with meals). 30 tablet 1     pantoprazole (PROTONIX) 40 MG enteric coated tablet Take 40 mg by mouth every morning Take 30-60 minutes before a meal. 90 tablet 1     aspirin 81 MG EC tablet Take 81 mg by mouth daily Reported on 5/9/2017  90 tablet 3     [DISCONTINUED] insulin glargine (LANTUS) 100 UNIT/ML vial Inject 27 Units Subcutaneous every morning       metoprolol (TOPROL-XL) 25 MG 24 hr tablet Take 0.5 tablets (12.5 mg) by mouth daily (Patient not taking: Reported on 8/11/2017) 90 tablet 3     nitroglycerin (NITROSTAT) 0.4 MG SL tablet Place 1 tablet (0.4 mg) under the tongue every 5 minutes as needed for chest pain (Patient not taking: Reported on 5/12/2017) 25 tablet 3     [DISCONTINUED] GLIPIZIDE 10 MG PO TABS ONE TABLET DAILY 30 MINUTES BEFORE MEALS  0     OTC products: None, except as noted above    Allergies   Allergen Reactions     Hydrocodone      Upset stomach     Shellfish Allergy Hives and Rash      Latex Allergy: NO    Social History   Substance Use Topics     Smoking status: Current Every Day Smoker     Packs/day: 2.00     Types: Cigarettes     Smokeless tobacco: Never Used     Alcohol use No     History   Drug Use No       REVIEW OF SYSTEMS:                                                    Constitutional, neuro, ENT, endocrine, pulmonary, cardiac, gastrointestinal, genitourinary, musculoskeletal, integument and psychiatric systems are negative, except as otherwise noted.      EXAM:                                                    /62  Pulse 66  Temp 96.4  F (35.8  C) (Tympanic)  Ht 6' (1.829 m)  Wt 154 lb 1.6 oz (69.9 kg)  BMI 20.9 kg/m2    GENERAL APPEARANCE: healthy, alert and no distress     EYES: EOMI,  PERRL     HENT: ear canals and TM's normal and nose and mouth without ulcers or lesions     NECK: no adenopathy, no asymmetry, masses, or scars and thyroid normal to palpation     RESP: lungs clear to auscultation - no rales, rhonchi or wheezes     CV: regular rates and rhythm, normal S1 S2, no S3 or S4 and no murmur, click or rub     MS: extremities normal- no gross deformities noted, no evidence of inflammation in joints, FROM in all extremities.     SKIN: no suspicious lesions or rashes     NEURO: Normal  strength and tone, sensory exam grossly normal, mentation intact and speech normal     PSYCH: mentation appears normal. and affect normal/bright     LYMPHATICS: No axillary, cervical, or supraclavicular nodes    DIAGNOSTICS:                                                    EKG: Normal Sinus Rhythm, negative T waves, possible anterior ischemia, abnormal EKG   Hemoglobin A1C-9.9%    Recent Labs   Lab Test 10/19/16 10/07/16 09/01/16  06/12/15   0839  06/08/15   0731  10/21/14   0650   10/20/14   0640  07/09/10   0830   HGB   --    --    --    --   15.4  15.8   < >  16.7   --    PLT   --    --    --    --   139*  121*   < >  144*   --    INR   --    --    --    --    --    --    --   0.94  1.7*   NA   --    --    --   137   --   138   --   136   --    POTASSIUM  5.0   --   4.9  4.5  4.4  4.3   --   4.3   --    CR  0.9  0.9  0.9  0.81   --   0.83   --   0.92   --    A1C   --    --   8.9*  7.8*   --    --    < >   --    --     < > = values in this interval not displayed.      IMPRESSION:                                                    Reason for surgery/procedure:  Carotid endarterectomy, left  Diagnosis/reason for consult: pre-op evaluation     The proposed surgical procedure is considered HIGH risk.    REVISED CARDIAC RISK INDEX  The patient has the following serious cardiovascular risks for perioperative complications such as (MI, PE, VFib and 3  AV Block):  Diabetes Mellitus (on Insulin), uncontrolled diabetes, abnormal EKG, will schedule stress test and follow up with endocrinologist     The patient has the following additional risks for perioperative complications:  No identified additional risks      ICD-10-CM    1. Preop general physical exam Z01.818 EKG 12-lead complete w/read - Clinics     Basic metabolic panel     CBC with platelets differential     CARDIOLOGY EVAL ADULT REFERRAL   2. Type 2 diabetes mellitus with complication, with long-term current use of insulin (H) E11.8 Hemoglobin A1c    Z79.4 Basic  metabolic panel     Albumin Random Urine Quantitative     insulin glargine (LANTUS) 100 UNIT/ML injection     glipiZIDE (GLUCOTROL) 10 MG tablet     DIABETES EDUCATOR REFERRAL   3. Abnormal electrocardiogram R94.31 NM Lexiscan stress test     CARDIOLOGY EVAL ADULT REFERRAL       RECOMMENDATIONS:                                                        Surgery is NOT recommended due to uncontrolled diabetes (A1C >8.5%, Glucose >200 mg/dl) and abnormal EKG. Stabilization required prior to elective surgery. Referral to Diabetes Educator (Preoperative Intensive Glucose Management) and cardiologist.      Signed Electronically by: ANA Jane CNP    Copy of this evaluation report is provided to requesting physician.    [facility] Preop Guidelines

## 2017-08-14 ENCOUNTER — PRE VISIT (OUTPATIENT)
Dept: CARDIOLOGY | Facility: CLINIC | Age: 67
End: 2017-08-14

## 2017-08-14 ENCOUNTER — TELEPHONE (OUTPATIENT)
Dept: OTHER | Facility: CLINIC | Age: 67
End: 2017-08-14

## 2017-08-14 DIAGNOSIS — I65.23 BILATERAL CAROTID ARTERY STENOSIS: ICD-10-CM

## 2017-08-14 NOTE — TELEPHONE ENCOUNTER
Patient called, did not pass his preop and the NP told him to cancel his surgery.  Stated he has a cardiac stress test next week.  I checked into the preop results, he needs cardiac clearance, and needs to control his blood sugar - referred to 'preop intense glucose mgmt'.      Pt has cardiology appt for final clearance with Dr Michaels on 8/21/17 8:15am in [place].  Pt will contact the preop intense glucose mgmt coordinator Sharyn at [phone] for meeting with diabetes educator in [place].  She states they should be able to get his blood glucose under 200 and A1C to 8.5 by 8/28/17 if he complies with the plan.      We rescheduled his surgery for 8/28/17 at 12:30 with Dr Turner. Routed this to Dr Castañeda RN for FYI. /faby [id number]

## 2017-08-17 ENCOUNTER — HOSPITAL ENCOUNTER (OUTPATIENT)
Dept: NUCLEAR MEDICINE | Facility: CLINIC | Age: 67
Setting detail: NUCLEAR MEDICINE
Discharge: HOME | End: 2017-08-17
Attending: NURSE PRACTITIONER | Admitting: NURSE PRACTITIONER
Payer: MEDICARE

## 2017-08-17 PROCEDURE — 93017 CV STRESS TEST TRACING ONLY: CPT

## 2017-08-17 PROCEDURE — 93016 CV STRESS TEST SUPVJ ONLY: CPT | Performed by: INTERNAL MEDICINE

## 2017-08-17 PROCEDURE — 78452 HT MUSCLE IMAGE SPECT MULT: CPT | Mod: 26 | Performed by: INTERNAL MEDICINE

## 2017-08-17 PROCEDURE — A9502 TC99M TETROFOSMIN: HCPCS | Performed by: INTERNAL MEDICINE

## 2017-08-17 PROCEDURE — 93018 CV STRESS TEST I&R ONLY: CPT | Performed by: INTERNAL MEDICINE

## 2017-08-17 PROCEDURE — 25000128 H RX IP 250 OP 636: Performed by: INTERNAL MEDICINE

## 2017-08-17 PROCEDURE — 78452 HT MUSCLE IMAGE SPECT MULT: CPT

## 2017-08-17 PROCEDURE — 34300033 ZZH RX 343: Performed by: INTERNAL MEDICINE

## 2017-08-17 RX ORDER — REGADENOSON 0.08 MG/ML
0.4 INJECTION, SOLUTION INTRAVENOUS ONCE
Status: COMPLETED | OUTPATIENT
Start: 2017-08-17 | End: 2017-08-17

## 2017-08-17 RX ADMIN — TETROFOSMIN 9.5 MCI.: 1.38 INJECTION, POWDER, LYOPHILIZED, FOR SOLUTION INTRAVENOUS at 09:30

## 2017-08-17 RX ADMIN — REGADENOSON 0.4 MG: 0.08 INJECTION, SOLUTION INTRAVENOUS at 09:48

## 2017-08-17 RX ADMIN — TETROFOSMIN 31.9 MCI.: 1.38 INJECTION, POWDER, LYOPHILIZED, FOR SOLUTION INTRAVENOUS at 11:32

## 2017-08-21 ENCOUNTER — OFFICE VISIT (OUTPATIENT)
Dept: CARDIOLOGY | Facility: CLINIC | Age: 67
End: 2017-08-21
Attending: NURSE PRACTITIONER
Payer: COMMERCIAL

## 2017-08-21 VITALS
BODY MASS INDEX: 20.77 KG/M2 | SYSTOLIC BLOOD PRESSURE: 98 MMHG | WEIGHT: 153.3 LBS | HEART RATE: 88 BPM | HEIGHT: 72 IN | DIASTOLIC BLOOD PRESSURE: 63 MMHG

## 2017-08-21 DIAGNOSIS — I25.10 CORONARY ARTERY DISEASE INVOLVING NATIVE CORONARY ARTERY OF NATIVE HEART WITHOUT ANGINA PECTORIS: Primary | ICD-10-CM

## 2017-08-21 DIAGNOSIS — E78.2 MIXED HYPERLIPIDEMIA: ICD-10-CM

## 2017-08-21 PROCEDURE — 99215 OFFICE O/P EST HI 40 MIN: CPT | Performed by: INTERNAL MEDICINE

## 2017-08-21 RX ORDER — GLIPIZIDE 10 MG/1
10 TABLET ORAL 2 TIMES DAILY
COMMUNITY
End: 2018-08-20

## 2017-08-21 RX ORDER — METOPROLOL SUCCINATE 25 MG/1
12.5 TABLET, EXTENDED RELEASE ORAL DAILY
Status: ON HOLD | COMMUNITY
End: 2017-09-08

## 2017-08-21 RX ORDER — PANTOPRAZOLE SODIUM 40 MG/1
40 TABLET, DELAYED RELEASE ORAL DAILY
COMMUNITY
End: 2018-08-20

## 2017-08-21 RX ORDER — ATORVASTATIN CALCIUM 80 MG/1
80 TABLET, FILM COATED ORAL AT BEDTIME
Status: ON HOLD | COMMUNITY
End: 2023-01-01

## 2017-08-21 NOTE — LETTER
8/21/2017    Shraee Claribel Billykeo, APRN CNP  [address] [place] [address]    RE: Delbert SORIA Jarek       Dear Colleague,    I had the pleasure of seeing Delbert Tilley in the [facility].  HISTORY OF PRESENT ILLNESS:  Mr. Delbert Tilley is now 67 years old and has coronary disease.  He also has bilateral carotid disease.      I last saw Mr. Tilley more than 2 years ago.  He denies any chest, neck, arm or back discomfort.  He has undergone bypass graft surgery with a LIMA to the LAD, a vein graft to the marginal branch and vein graft to the PDA.     Three years ago, he experienced anginal symptoms and underwent repeat coronary angiography at [facility].  The LIMA graft to the LAD was widely patent with a 30%-40% stenosis in the apical LAD while the vein graft to the RCA was patent to the PDA with a 50% mid-graft stenosis.  The vein graft to the third marginal branch was diffusely diseased, requiring the intervention described and following the intervention, the marginal vessel filled well.  The left main coronary artery was widely patent and the LAD had a mid-vessel stenosis of 60%-70% with a patent stent in the mid-LAD just distal to the LIMA graft anastomosis.  Mr. Tilley's left ventricular ejection fraction (LVEF) was 35%-40% with inferior and inferolateral hypokinesis.       Dr. IFEANYI Turner has previously done a right carotid endarterectomy. Dr. Turner had recommended the contralateral side be done, but Mr. Tilley chose to wait.  It was rescheduled and has been cancelled     A Lexiscan stress nuclear study performed this month demonstrated a large transmural inferior and inferoseptal defect without ischemia. The LVEF was estimated at 28%.  A recent echo has not been done. Mr. Tilley has previously undergone implantation of a cardio-defibrillator but it has not been recently interrogated.     He has undergone an atrial flutter ablation and had followed with Dr. Camacho.   "A recent device interrogation is not noted. In the past, Dr. Camacho had noted only a \"couple\" of 30 second atrial fibrillation episodes. His only palpitations are rare and several second episodes of fast heart rate without other symptoms.     PHYSICAL EXAMINATION:   GENERAL:  This is a man in no apparent distress.  He was alert and oriented to person, place and time.   VITAL SIGNS:  The blood pressure was 98/63 mmHg, heart rate 88 beats per minute and regular, and respiratory rate 16-18 per minute.   CHEST:  Clear to auscultation.   CARDIAC:  On cardiac auscultation, there was an S1 and an S2 without extra sounds and only a grade 1/6 early systolic ejection murmur.      In assessment, Mr. Tilley is doing overall well from a cardiovascular standpoint.  With regard to his ischemic cardiomyopathy, he was on vasodilator therapy with lisinopril daily but continues only on metoprolol XL daily.  He would benefit from reinstitution of vasodilator with losartan 25 to 50 mg daily. I would recommend follow up with the [facility] practice provider.    He can proceed with Vascular Surgery with carotid surgery on the left. He is asymptomatic without angina or heart failure symptoms.     With regard to his coronary disease, Mr. Tilley is asymptomatic and on beta-blockade, statin therapy and aspirin 81 mg p.o. daily.  His last lipids were under excellent control.     He should re-establish with the Device Clinic planned for the AM.  An echo also needs to be repeated.    Smoking cessation with Chantix was strongly recommended and he will consider the recommendation.    Thank you for allowing me to participate in the care of your patient.    Sincerely,     Mimi Michaels MD     [facility]    cc:   [address] [address]    "

## 2017-08-21 NOTE — MR AVS SNAPSHOT
After Visit Summary   8/21/2017    Delbert Tilley    MRN: [id number]           Patient Information     Date Of Birth          1950        Visit Information        Provider Department      8/21/2017 8:15 AM Mimi Michaels MD [facility] PHYS[facility] HE[facility] AT [place]        Today's Diagnoses     Coronary artery disease involving native coronary artery of native heart without angina pectoris    -  1    Mixed hyperlipidemia           Follow-ups after your visit        Additional Services     Follow-Up with Device Clinic           Follow-Up with Cardiologist                 Your next 10 appointments already scheduled     Aug 22, 2017  8:00 AM CDT   Pacemaker Check with Wy Device Rn   [facility] ([facility])    [address] [facility] [address]   [phone]            Aug 22, 2017  9:30 AM CDT   Diabetic Education with [place] [facility] ED RESOURCE   [facility])    [address] [facility] [address]   [phone]            Sep 08, 2017 10:15 AM CDT   [facility] Same Day Surgery with João Turner MD   [facility] ([facility])    [facility] ([facility])   [phone]            Sep 08, 2017   Procedure with João Turner MD   [facility] PeriOP Services (--)    [address]  [facility] [address]   [phone]            Nov 03, 2017  4:30 PM CDT   Return Visit with Romario Moyer MD   [facility] ([facility])    [address] [place] [id number] [phone]              Future tests that were ordered for you today     Open Future Orders        Priority Expected Expires Ordered    Follow-Up with Device Clinic Routine 8/22/2017 8/24/2018 8/21/2017    Echocardiogram Routine 9/19/2017 8/21/2018 8/21/2017    Follow-Up with  "Cardiologist Routine 2017            Who to contact     If you have questions or need follow up information about today's clinic visit or your schedule please contact [facility] PHYSICIANS HEART AT [place] directly at [phone].  Normal or non-critical lab and imaging results will be communicated to you by MyChart, letter or phone within 4 business days after the clinic has received the results. If you do not hear from us within 7 days, please contact the clinic through MyChart or phone. If you have a critical or abnormal lab result, we will notify you by phone as soon as possible.  Submit refill requests through [employer] or call your pharmacy and they will forward the refill request to us. Please allow 3 business days for your refill to be completed.          Additional Information About Your Visit        MyChart Information     [employer] lets you send messages to your doctor, view your test results, renew your prescriptions, schedule appointments and more. To sign up, go to [email or web address] . Click on \"Log in\" on the left side of the screen, which will take you to the Welcome page. Then click on \"Sign up Now\" on the right side of the page.     You will be asked to enter the access code listed below, as well as some personal information. Please follow the directions to create your username and password.     Your access code is: [email or web address]  Expires: 2017  7:24 AM     Your access code will  in 90 days. If you need help or a new code, please call your [place] clinic or [phone].        Care EveryWhere ID     This is your Care EveryWhere ID. This could be used by other organizations to access your [place] medical records  [id number]        Your Vitals Were     Pulse Height BMI (Body Mass Index)             88 1.829 m (6') 20.79 kg/m2          Blood Pressure from Last 3 Encounters:   17 98/63   17 116/62   17 119/71    Weight from Last " 3 Encounters:   08/21/17 69.5 kg (153 lb 4.8 oz)   08/11/17 69.9 kg (154 lb 1.6 oz)   05/12/17 72 kg (158 lb 11.2 oz)               Primary Care Provider Fax #    [facility] [phone]       [address] [facility] [address]        Equal Access to Services     MARITA LAMA : Hadii brett ku hadasho Soomaali, waaxda luqadaha, qaybta kaalmada adesarbjityada, gwendolyn ariaskishanebony sykes. So [facility] [phone].    ATENCIÓN: Si habla español, tiene a frey disposición servicios gratuitos de asistencia lingüística. Seamusame al [phone].    We comply with applicable federal civil rights laws and [place] laws. We do not discriminate on the basis of race, color, national origin, age, disability sex, sexual orientation or gender identity.            Thank you!     Thank you for choosing [facility] PHYSICIANS HEART AT [place]  for your care. Our goal is always to provide you with excellent care. Hearing back from our patients is one way we can continue to improve our services. Please take a few minutes to complete the written survey that you may receive in the mail after your visit with us. Thank you!             Your Updated Medication List - Protect others around you: Learn how to safely use, store and throw away your medicines at www.disposemymeds.org.          This list is accurate as of: 8/21/17  8:43 AM.  Always use your most recent med list.                   Brand Name Dispense Instructions for use Diagnosis    amylase-lipase-protease 04696 UNITS Cpep    ZENPEP     Take 1 capsule by mouth 2 times daily        atorvastatin 80 MG tablet    LIPITOR     Take 80 mg by mouth daily        glipiZIDE 10 MG tablet    GLUCOTROL     Take 10 mg by mouth 2 times daily (before meals)        insulin glargine 100 UNIT/ML injection    LANTUS    1 vial    Inject 27 Units Subcutaneous every morning, increase by 2 units every 3-4 days until fasting blood sugars less then 140 consistently    Type 2  diabetes mellitus with complication, with long-term current use of insulin (H)       metFORMIN 500 MG tablet    GLUCOPHAGE     Take 1,000 mg by mouth 2 times daily (with meals)        metoprolol 25 MG 24 hr tablet    TOPROL-XL     Take 12.5 mg by mouth daily        pantoprazole 40 MG EC tablet    PROTONIX     Take 40 mg by mouth daily

## 2017-08-21 NOTE — PROGRESS NOTES
"HISTORY OF PRESENT ILLNESS:  Mr. Delbert Tilley is now 67 years old and has coronary disease.  He also has bilateral carotid disease.      I last saw Mr. Tilley more than 2 years ago.  He denies any chest, neck, arm or back discomfort.  He has undergone bypass graft surgery with a LIMA to the LAD, a vein graft to the marginal branch and vein graft to the PDA.     Three years ago, he experienced anginal symptoms and underwent repeat coronary angiography at [facility].  The LIMA graft to the LAD was widely patent with a 30%-40% stenosis in the apical LAD while the vein graft to the RCA was patent to the PDA with a 50% mid-graft stenosis.  The vein graft to the third marginal branch was diffusely diseased, requiring the intervention described and following the intervention, the marginal vessel filled well.  The left main coronary artery was widely patent and the LAD had a mid-vessel stenosis of 60%-70% with a patent stent in the mid-LAD just distal to the LIMA graft anastomosis.  Mr. Tilley's left ventricular ejection fraction (LVEF) was 35%-40% with inferior and inferolateral hypokinesis.       Dr. IFEANYI Turner has previously done a right carotid endarterectomy. Dr. Turner had recommended the contralateral side be done, but Mr. Tilley chose to wait.  It was rescheduled and has been cancelled     A Lexiscan stress nuclear study performed this month demonstrated a large transmural inferior and inferoseptal defect without ischemia. The LVEF was estimated at 28%.  A recent echo has not been done. Mr. Tilley has previously undergone implantation of a cardio-defibrillator but it has not been recently interrogated.     He has undergone an atrial flutter ablation and had followed with Dr. Camacho.  A recent device interrogation is not noted. In the past, Dr. Camacho had noted only a \"couple\" of 30 second atrial fibrillation episodes. His only palpitations are rare and several second episodes of fast heart rate without " other symptoms.     PHYSICAL EXAMINATION:   GENERAL:  This is a man in no apparent distress.  He was alert and oriented to person, place and time.   VITAL SIGNS:  The blood pressure was 98/63 mmHg, heart rate 88 beats per minute and regular, and respiratory rate 16-18 per minute.   CHEST:  Clear to auscultation.   CARDIAC:  On cardiac auscultation, there was an S1 and an S2 without extra sounds and only a grade 1/6 early systolic ejection murmur.      In assessment, Mr. Tilley is doing overall well from a cardiovascular standpoint.  With regard to his ischemic cardiomyopathy, he was on vasodilator therapy with lisinopril daily but continues only on metoprolol XL daily.  He would benefit from reinstitution of vasodilator with losartan 25 to 50 mg daily. I would recommend follow up with the [facility] practice provider.    He can proceed with Vascular Surgery with carotid surgery on the left. He is asymptomatic without angina or heart failure symptoms.     With regard to his coronary disease, Mr. Tilley is asymptomatic and on beta-blockade, statin therapy and aspirin 81 mg p.o. daily.  His last lipids were under excellent control.     He should re-establish with the Device Clinic planned for the AM.  An echo also needs to be repeated.    Smoking cessation with Chantix was strongly recommended and he will consider the recommendation.

## 2017-08-22 ENCOUNTER — ALLIED HEALTH/NURSE VISIT (OUTPATIENT)
Dept: EDUCATION SERVICES | Facility: CLINIC | Age: 67
End: 2017-08-22
Payer: COMMERCIAL

## 2017-08-22 ENCOUNTER — HOSPITAL ENCOUNTER (OUTPATIENT)
Dept: CARDIOLOGY | Facility: CLINIC | Age: 67
Discharge: HOME | End: 2017-08-22
Attending: INTERNAL MEDICINE | Admitting: INTERNAL MEDICINE
Payer: MEDICARE

## 2017-08-22 ENCOUNTER — DOCUMENTATION ONLY (OUTPATIENT)
Dept: CARDIOLOGY | Facility: CLINIC | Age: 67
End: 2017-08-22

## 2017-08-22 VITALS — BODY MASS INDEX: 20.76 KG/M2 | WEIGHT: 153.1 LBS

## 2017-08-22 DIAGNOSIS — Z79.4 TYPE 2 DIABETES MELLITUS WITH COMPLICATION, WITH LONG-TERM CURRENT USE OF INSULIN (H): Primary | ICD-10-CM

## 2017-08-22 DIAGNOSIS — E11.8 TYPE 2 DIABETES MELLITUS WITH COMPLICATION, WITH LONG-TERM CURRENT USE OF INSULIN (H): Primary | ICD-10-CM

## 2017-08-22 PROCEDURE — 93288 INTERROG EVL PM/LDLS PM IP: CPT

## 2017-08-22 PROCEDURE — 93288 INTERROG EVL PM/LDLS PM IP: CPT | Mod: 26 | Performed by: INTERNAL MEDICINE

## 2017-08-22 PROCEDURE — G0108 DIAB MANAGE TRN  PER INDIV: HCPCS

## 2017-08-22 NOTE — MR AVS SNAPSHOT
After Visit Summary   8/22/2017    Delbert Tilley    MRN: [id number]           Patient Information     Date Of Birth          1950        Visit Information        Provider Department      8/22/2017 9:30 AM [facility] [facility]        Care Instructions    1 rectangle palomo cracker = 11 grams   1 eggo waffle = 14 grams  1/2 cup grapes = 15 grams  1 cup cheerios = 23 grams     Start carbohydrate counting at meals and see if this helps numbers     Call in blood sugars next Monday     Nora Lozano RD, LD  For Diabetes Education Related Questions call: [phone]           Follow-ups after your visit        Your next 10 appointments already scheduled     Sep 08, 2017 10:15 AM CDT   [facility] Same Day Surgery with João Turner MD   [facility] Surgery [facility] ([facility])    [facility] ([facility])   [phone]            Sep 08, 2017   Procedure with João Turner MD   [facility] PeriOP Services (--)    [address] [place] [address] [place], [address]  [facility] [address]   [phone]            Nov 03, 2017  4:30 PM CDT   Return Visit with Romario Moyer MD   [facility] ([facility])    [address] [place] [address] [phone]            Nov 21, 2017  8:00 AM CST   ICD Check with Wy Device Rn   [facility] ([facility])    [address] [place] [id number] [phone]              Future tests that were ordered for you today     Open Future Orders        Priority Expected Expires Ordered    Follow-Up with Cardiac Advanced Practice Provider Routine 9/20/2017 8/21/2018 8/21/2017    Follow-Up with Device Clinic Routine 8/22/2017 8/24/2018 8/21/2017    Echocardiogram Routine 9/19/2017 8/21/2018 8/21/2017    Follow-Up with Cardiologist Routine 12/19/2017 8/21/2018 8/21/2017        "     Who to contact     If you have questions or need follow up information about today's clinic visit or your schedule please contact [facility] directly at [phone].  Normal or non-critical lab and imaging results will be communicated to you by MyChart, letter or phone within 4 business days after the clinic has received the results. If you do not hear from us within 7 days, please contact the clinic through MyChart or phone. If you have a critical or abnormal lab result, we will notify you by phone as soon as possible.  Submit refill requests through [employer] or call your pharmacy and they will forward the refill request to us. Please allow 3 business days for your refill to be completed.          Additional Information About Your Visit        [employer] Information     [employer] lets you send messages to your doctor, view your test results, renew your prescriptions, schedule appointments and more. To sign up, go to [email or web address] . Click on \"Log in\" on the left side of the screen, which will take you to the Welcome page. Then click on \"Sign up Now\" on the right side of the page.     You will be asked to enter the access code listed below, as well as some personal information. Please follow the directions to create your username and password.     Your access code is: [email or web address]  Expires: 2017  7:24 AM     Your access code will  in 90 days. If you need help or a new code, please call your [place] clinic or [phone].        Care EveryWhere ID     This is your Care EveryWhere ID. This could be used by other organizations to access your [place] medical records  [id number]        Your Vitals Were     BMI (Body Mass Index)                   20.76 kg/m2            Blood Pressure from Last 3 Encounters:   17 98/63   17 116/62   17 119/71    Weight from Last 3 Encounters:   17 69.4 kg (153 lb 1.6 oz)   17 69.5 kg (153 lb 4.8 oz)   17 69.9 kg " (154 lb 1.6 oz)              Today, you had the following     No orders found for display       Primary Care Provider Fax #    [facility] [phone]       [address]  [facility] [address]        Equal Access to Services     FERNANDOSTONEY KELBY : Hadii aad ku hadkai Socb, waaxda luqadaha, qaybta kaalmada minal, gwendolyn holland shawna ariaskishanebony sykes. So [facility] [phone].    ATENCIÓN: Si habla español, tiene a frey disposición servicios gratuitos de asistencia lingüística. Llame al [phone].    We comply with applicable federal civil rights laws and [place] laws. We do not discriminate on the basis of race, color, national origin, age, disability sex, sexual orientation or gender identity.            Thank you!     Thank you for choosing [facility]  for your care. Our goal is always to provide you with excellent care. Hearing back from our patients is one way we can continue to improve our services. Please take a few minutes to complete the written survey that you may receive in the mail after your visit with us. Thank you!             Your Updated Medication List - Protect others around you: Learn how to safely use, store and throw away your medicines at www.disposemymeds.org.          This list is accurate as of: 8/22/17 10:14 AM.  Always use your most recent med list.                   Brand Name Dispense Instructions for use Diagnosis    amylase-lipase-protease 88525 UNITS Cpep    ZENPEP     Take 1 capsule by mouth 2 times daily        atorvastatin 80 MG tablet    LIPITOR     Take 80 mg by mouth daily        glipiZIDE 10 MG tablet    GLUCOTROL     Take 10 mg by mouth 2 times daily (before meals)        insulin glargine 100 UNIT/ML injection    LANTUS    1 vial    Inject 27 Units Subcutaneous every morning, increase by 2 units every 3-4 days until fasting blood sugars less then 140 consistently    Type 2 diabetes mellitus with complication, with long-term current use  of insulin (H)       metFORMIN 500 MG tablet    GLUCOPHAGE     Take 1,000 mg by mouth 2 times daily (with meals)        metoprolol 25 MG 24 hr tablet    TOPROL-XL     Take 12.5 mg by mouth daily        pantoprazole 40 MG EC tablet    PROTONIX     Take 40 mg by mouth daily

## 2017-08-22 NOTE — PATIENT INSTRUCTIONS
1 rectangle palomo cracker = 11 grams   1 eggo waffle = 14 grams  1/2 cup grapes = 15 grams  1 cup cheerios = 23 grams     Start carbohydrate counting at meals and see if this helps numbers     Call in blood sugars next Monday     Nora Lozano RD, LD  For Diabetes Education Related Questions call: [phone]

## 2017-08-22 NOTE — PROGRESS NOTES
[employer] [address] DR ICD Device Check/ [facility] has been lost to follow up since 2013  AP: 28.7 % : 0.2 %  Mode: AAIR/DDDR        Underlying Rhythm: SR  Heart Rate: Histogram shows stable HR with adequate variability/ Optivol shows past episodes of brief elevation in baseline; currently no indication of fluid retention  Sensing: Stable    Pacing Threshold: stable    Impedance: WNL  Battery Status: 2.65V (RRT: 2.63V)  Atrial Arrhythmia: 23 SVT/PAT episodes with longest lasting 4 minutes. Rates 150-160 bpm. Tracings show A=V; no onset to view  Ventricular Arrhythmia: 5 NSVT episodes lasting 1-2 seconds, longest 12 seconds. Rate 180-200 bpm. No therapy  ATP: NONE    Shocks: NONE  Setting Change: NONE    Care Plan: RTC in 3 months. Alert tones demonstrated for patient. He is instructed to call the clinic if he hears alerts before next visit. manny

## 2017-08-22 NOTE — Clinical Note
Delbert Werner came in for pre-op blood glucose review.  He just increased his Lantus & Glipizide this past Sunday.  BG are improved. Fasting BGs are 75% at target, but elevated after dinner.  We identified diet changes (he is eating higher carb meals).  He is hesitant to increase the Lantus as he feels hypoglycemic symptoms at 104mg/dL.  He is going to call in his blood sugars in 5 days again.   Thanks!  Goldie Lozano RD, LD  Diabetes Education

## 2017-08-22 NOTE — ADDENDUM NOTE
Encounter addended by: Yamilet Richardson on: 8/22/2017  1:58 PM<BR>     Actions taken:  activity accessed, Charge Capture section accepted

## 2017-08-22 NOTE — PROGRESS NOTES
Diabetes Self Management Training: Individual Review Visit    Delbert Tilley presents today for evaluation of glucose control related to Type 2 diabetes. He is accompanied by self.  Patient's diabetes management related comments/concerns: pre op surgical blood sugar control.  Patient's emotional response to diabetes: expresses readiness to learn    Patient would like this visit to be focused around the following diabetes-related behaviors and goals: Healthy Eating, Monitoring and Taking Medication    ASSESSMENT:  Patient Problem List and Family Medical History reviewed for relevant medical history, current medical status, and diabetes risk factors.    Current Diabetes Management per Patient:  Taking diabetes medications?   yes:     Diabetes Medication(s)     Biguanides Sig    metFORMIN (GLUCOPHAGE) 500 MG tablet Take 1,000 mg by mouth 2 times daily (with meals)    Insulin Sig    insulin glargine (LANTUS) 100 UNIT/ML injection Inject 27 Units Subcutaneous every morning, increase by 2 units every 3-4 days until fasting blood sugars less then 140 consistently    Sulfonylureas Sig    glipiZIDE (GLUCOTROL) 10 MG tablet Take 10 mg by mouth 2 times daily (before meals)      Metformin - no issues, always takes  Glipizide - 10 BID (second one added on 8/20)   Lantus:  (had been at 25 for 6 month) and increased to 27 units on Sunday morning 27-0-0-0     *Abbreviated insulin dose documentation key: Insulin Trade Name ([id number]-[id number]-bedtime) - i.e. Humalog 5-5-5-0 (Humalog 5 units at breakfast, 5 units at lunch, and 5 units at dinner).    Past Diabetes Education: Yes    Patient glucose self monitoring as follows: four times daily.   BG meter: did not bring meter    BG results:   8/20:   6am 106  6:30pm: 228 (before dinner, but with snack possibly)   Midnight: 228 (3-4 hours after dinner)    8/21:   6am: 113  Noon: 230 (30 minutes after lunch)   5:30 before dinner: 104  (felt shaky and ate)   Midnight: 202    8/22:    5am: 150     BG values are: partially in goal     Patient's most recent   Lab Results   Component Value Date    A1C 9.9 08/11/2017    is not meeting goal of <8.0     Lab Results   Component Value Date    A1C 9.9 08/11/2017    A1C 8.9 09/01/2016    A1C 7.8 06/12/2015    A1C 7.9 10/20/2014    A1C 7.8 06/16/2010       Nutrition:  Patient eats 3 meals per day    Breakfast - 2 raisin toast + bran cereal + milk + juice  OR waffles with poached eggs OR [place] toast w. Sugar free syrup.  Coffee with splenda   Lunch: has a meal; protein/vegetables/carb  Dinner - crock pot (meat/potatoes)  OR spaghetti   Snacks - occasionally     Beverages: water, milk, juice    Physical Activity:    Limitations: difficulty walking     Diabetes Risk Factors:  age over 45 years and overweight/obesity    Diabetes Complications:  Acute Complications: At risk for hypoglycemia? yes  Symptoms of low blood sugar? shaking  Frequency of hypoglycemia: nothing below 100 recently, but feels symptomatic at 100  Patient carries a carbohydrate source with them regularly: Yes   Type of carbohydrate: juice/soda    Vitals:  There were no vitals taken for this visit.  Estimated body mass index is 20.79 kg/(m^2) as calculated from the following:    Height as of 8/21/17: 1.829 m (6').    Weight as of 8/21/17: 69.5 kg (153 lb 4.8 oz).   Last 3 BP:   BP Readings from Last 3 Encounters:   08/21/17 98/63   08/11/17 116/62   05/12/17 119/71       History   Smoking Status     Current Every Day Smoker     Packs/day: 2.00     Types: Cigarettes   Smokeless Tobacco     Never Used       Labs:  Lab Results   Component Value Date    A1C 9.9 08/11/2017     Lab Results   Component Value Date     08/11/2017     Lab Results   Component Value Date    LDL 62 06/12/2015     HDL Cholesterol   Date Value Ref Range Status   09/01/2016 22 (A) 40 - 67 mg/dL Final   ]  GFR Estimate   Date Value Ref Range Status   08/11/2017 >90  Non  GFR Calc   >60 mL/min/1.7m2  Final     GFR Estimate If Black   Date Value Ref Range Status   08/11/2017 >90   GFR Calc   >60 mL/min/1.7m2 Final     Lab Results   Component Value Date    CR 0.84 08/11/2017     No results found for: MICROALBUMIN    Socio/Economic Considerations:  Not assessed     Health Beliefs and Attitudes:   Patient Activation Measure Survey Score:  No flowsheet data found.    Stage of Change: ACTION (Actively working towards change)    Diabetes knowledge and skills assessment:   Patient is knowledgeable in diabetes management concepts related to: Healthy Eating, Being Active, Monitoring, Taking Medication and Problem Solving  Patient needs further education on the following diabetes management concepts: Reducing Risks and Healthy Coping  Barriers to Learning Assessment: No Barriers identified  Based on learning assessment above, most appropriate setting for further diabetes education would be: Individual setting.    INTERVENTION:   Patient just adjusted insulin and Glipizide on Sunday 8/20 to the new doses.  He is hesitant to increase Lantus more with feeling hypoglycemic at 104mg/dL this week. His body may need time to adjust to the lower ranges.  Some of the higher numbers caught were likely due to checking right after a meal or snack.  He will start checking 2 hours after the start of a meal.      For meals he is eating > 75gm of carb at times.  Spent time reviewing carb counting and he grasped this well.  He will start watching labels and measuring out foods.  There likely can be some improvement in numbers with adjusting diet.     Encouraged to call in with new blood sugar readings on 8/28.      Education provided today on:  AADE Self-Care Behaviors:  Healthy Eating: carbohydrate counting, consistency in amount, composition, and timing of food intake, portion control, plate planning method and label reading  Monitoring: log and interpret results, individual blood glucose targets and frequency of  monitoring  Taking Medication: action of prescribed medication, side effects of prescribed medications and when to take medications  Problem Solving: low blood glucose - causes, signs/symptoms, treatment and prevention and carrying a carbohydrate source at all times      Opportunities for ongoing education and support in diabetes-self management were discussed.  Pt verbalized understanding of concepts discussed and recommendations provided today.       Education Materials Provided:  [place] Understanding Diabetes Booklet and BG Log Sheet    PLAN:  See Patient Instructions for co-developed, patient-stated behavior change goals.  AVS printed and provided to patient today.    FOLLOW-UP:  Follow-up planned for BG review by phone/e-mail/[facility].   Chart routed to referring provider.    Ongoing plan for education and support: Follow-up with primary care provider    Nora Lozano RD, LD   Diabetes Education    Time Spent: 40 minutes  Encounter Type: Individual    Any diabetes medication dose changes were made via the CDE Protocol and Collaborative Practice Agreement with the patient's referring provider. A copy of this encounter was shared with the provider.

## 2017-08-23 ENCOUNTER — TELEPHONE (OUTPATIENT)
Dept: EDUCATION SERVICES | Facility: CLINIC | Age: 67
End: 2017-08-23

## 2017-08-23 NOTE — TELEPHONE ENCOUNTER
Called out to Delbert Tilley 1:01 PM 8/23/2017.   Dose increase was through [facility] (uns[facility] of who) and asked patient to go through this doctor for updating the prescription.  Cannot [facility] through [facility] so he will contact the doctor.   He still has 15 tabs left.  As a back up plan could possibly have [facility] referring physician send through the prescription or send to the [facility] pharmacy if they are in agreement.  Patient will call back as needed.     Nora Lozano RD, LD  Diabetes Education

## 2017-08-23 NOTE — TELEPHONE ENCOUNTER
Pt is asking if his med Gl[facility] has [facility] in [facility]. He recently had a dose increase.

## 2017-08-28 ENCOUNTER — TELEPHONE (OUTPATIENT)
Dept: EDUCATION SERVICES | Facility: CLINIC | Age: 67
End: 2017-08-28

## 2017-08-28 NOTE — TELEPHONE ENCOUNTER
BG report    Date BB AB BL AL BD AD Overnight  8-22       3am 79; ate  23 158  181  142 144  24 180  153 238 120  3am 111  25 210  132  91  11pm 155  26 103  265  142  1am 164  27 149 315 241    2am 331  28 200

## 2017-08-29 NOTE — TELEPHONE ENCOUNTER
Diabetes Follow-up  Called out to Delbert Tilley 2:25 PM 8/29/2017 and discussed plan below.     Subjective/Objective:  See BG assessment below.  Spoke with patient, [facility] and will ask them for refills of the increased dose of Glipizide. Patient reports he is taking 10 mg Glipizide with breakfast and about 1 hour after dinner.   He eats dinner at 8pm and goes to bed about an hour later.  On 8/22, 8/24, and 8/25 he felt hypo symptoms, as he feels low with any reading below 120.  Pt is unable to recall meal from 8/27 resulting in a high post meal BG.  He is taking 27 units of Lantus and has not increased this dose as prescribed due to the frequent low blood sugars.    Plan/Response:  Educated patient about action of glipizide and why he is having the overnight lows.  Recommended he take the evening dose of glipizide with his supper or 30 minutes prior if he continues having overnight lows.  Discussed with patient that overall his readings are above goal and that if he feels low at 120 he is trending high.  To improve his healing with surgery I recommended he continue to increase his Lantus, but increase 1 unit every 2-3 days for a more gradual BG decrease.  Instructed patient to bring his readings to [facility] provider on [facility] for continued assessment and medication adjustment.  Pt to call [facility] for glipizide refill if unable to get rx [facility].  Would route to referring provider for this.    Pt verbalized understanding of the treatment plan.    Ladonna Carpenter MS, RD, LD, CDE

## 2017-08-29 NOTE — TELEPHONE ENCOUNTER
Diabetes Follow-up    Subjective/Objective:    Delbert Tilley sent in blood glucose log for review. Last date of communication was: 8/23/17.    Diabetes is being managed with:    yes:     Diabetes Medication(s)     Biguanides Sig    metFORMIN (GLUCOPHAGE) 500 MG tablet Take 1,000 mg by mouth 2 times daily (with meals)    Insulin Sig    insulin glargine (LANTUS) 100 UNIT/ML injection Inject 27 Units Subcutaneous every morning, increase by 2 units every 3-4 days until fasting blood sugars less then 140 consistently    Sulfonylureas Sig    glipiZIDE (GLUCOTROL) 10 MG tablet Take 10 mg by mouth 2 times daily (before meals)        BG/Food Log:   See pts log below    Assessment:  Pt reports 1 episode of low blood sugar overnight which he treated, but overall overnight and fasting readings show a pattern of hyperglycemia.    Fasting blood glucose: 16% in target.  After breakfast glucose: 0% in target.  Before lunch glucose: 0% in target.  After lunch glucose: 0% in target.  Before dinner glucose: 25% in target.  After dinner glucose: 100% in target.  Bedtime glucose: 40% in target.    Plan/Response:  Called patient to discuss blood sugars, current medication doses and timing, and if he was able to get an updated Glipizide prescription.  Left voicemail, will call again later.    Ladonna Carpenter MS, RD, LD, CDE      Any diabetes medication dose changes were made via the CDE Protocol and Collaborative Practice Agreement with the patient's referring provider. A copy of this encounter was shared with the provider.

## 2017-09-01 ENCOUNTER — TELEPHONE (OUTPATIENT)
Dept: FAMILY MEDICINE | Facility: CLINIC | Age: 67
End: 2017-09-01

## 2017-09-01 NOTE — TELEPHONE ENCOUNTER
S-(situation): Isa called from [facility] vascular dept, [phone].  Pt is on the schedule next week for left carotid endart with Dr Turner on 9/8/17.  However, pt was not cleared for surgery at his pre-op on 8/11/17 due to uncontrolled diabetes.    Isa asks if pt will be cleared for surgery?    Recent pre-op guidelines have changed and pt will need another pre-op and labs checked.      B-(background): Per above.  Pt has been working on improved diabetic management.    A-(assessment):  Recent history of uncontrolled diabetes and failed pre-op exam.    R-(recommendations): Needs repeat pre-op and lab update.  Pt informed.  Appt made for 9/6/17.  Surgery is pending 9/8/17.  Michelle informed.  Clearance for surgery to be determined.  Tere Bar RN

## 2017-09-06 ENCOUNTER — TRANSFERRED RECORDS (OUTPATIENT)
Dept: HEALTH INFORMATION MANAGEMENT | Facility: CLINIC | Age: 67
End: 2017-09-06

## 2017-09-06 ENCOUNTER — OFFICE VISIT (OUTPATIENT)
Dept: FAMILY MEDICINE | Facility: CLINIC | Age: 67
End: 2017-09-06
Payer: COMMERCIAL

## 2017-09-06 VITALS
BODY MASS INDEX: 21.44 KG/M2 | TEMPERATURE: 97.1 F | WEIGHT: 158.3 LBS | SYSTOLIC BLOOD PRESSURE: 104 MMHG | DIASTOLIC BLOOD PRESSURE: 65 MMHG | HEIGHT: 72 IN | HEART RATE: 70 BPM

## 2017-09-06 DIAGNOSIS — E11.65 TYPE 2 DIABETES MELLITUS WITH HYPERGLYCEMIA, WITH LONG-TERM CURRENT USE OF INSULIN (H): ICD-10-CM

## 2017-09-06 DIAGNOSIS — Z79.4 TYPE 2 DIABETES MELLITUS WITH HYPERGLYCEMIA, WITH LONG-TERM CURRENT USE OF INSULIN (H): ICD-10-CM

## 2017-09-06 DIAGNOSIS — Z01.818 PREOP GENERAL PHYSICAL EXAM: Primary | ICD-10-CM

## 2017-09-06 LAB
ANION GAP SERPL CALCULATED.3IONS-SCNC: 5 MMOL/L (ref 3–14)
BUN SERPL-MCNC: 17 MG/DL (ref 7–30)
CALCIUM SERPL-MCNC: 8.6 MG/DL (ref 8.5–10.1)
CHLORIDE SERPL-SCNC: 107 MMOL/L (ref 94–109)
CO2 SERPL-SCNC: 25 MMOL/L (ref 20–32)
CREAT SERPL-MCNC: 0.79 MG/DL (ref 0.66–1.25)
GFR SERPL CREATININE-BSD FRML MDRD: >90 ML/MIN/1.7M2
GLUCOSE SERPL-MCNC: 150 MG/DL (ref 70–99)
HBA1C MFR BLD: 9.1 % (ref 4.3–6)
POTASSIUM SERPL-SCNC: 4.4 MMOL/L (ref 3.4–5.3)
SODIUM SERPL-SCNC: 137 MMOL/L (ref 133–144)

## 2017-09-06 PROCEDURE — 99214 OFFICE O/P EST MOD 30 MIN: CPT | Performed by: NURSE PRACTITIONER

## 2017-09-06 PROCEDURE — 80048 BASIC METABOLIC PNL TOTAL CA: CPT | Performed by: NURSE PRACTITIONER

## 2017-09-06 PROCEDURE — 83036 HEMOGLOBIN GLYCOSYLATED A1C: CPT | Performed by: NURSE PRACTITIONER

## 2017-09-06 PROCEDURE — 36415 COLL VENOUS BLD VENIPUNCTURE: CPT | Performed by: NURSE PRACTITIONER

## 2017-09-06 NOTE — PROGRESS NOTES
[facility]  [address] [address]  [phone]  Dept: [phone]    PRE-OP EVALUATION:  Today's date: 2017    Delbert Tilley (: 1950) presents for pre-operative evaluation assessment as requested by Dr. Turner.  He requires evaluation and anesthesia risk assessment prior to undergoing surgery/procedure for treatment of  Left carotid endarterectomy with eeg    Date of Surgery/ Procedure: 17  Time of Surgery/ Procedure: 10:30   Hospital/Surgical Facility: [facility]   Primary Physician: [facility]  Type of Anesthesia Anticipated: General    Patient has a Health Care Directive or Living Will:  [facility]    1. YES - Do you have a history of heart attack, stroke, stent, bypass or surgery on an artery in the head, neck, heart or legs? Has had a heart attack  2. NO - Do you ever have any pain or discomfort in your chest?  3. NO - Do you have a history of  Heart Failure?  4. NO - Are you troubled by shortness of breath when: walking on the level, up a slight hill or at night?  5. NO - Do you currently have a cold, bronchitis or other respiratory infection?  6. NO - Do you have a cough, shortness of breath or wheezing?  7. YES - Do you sometimes get pains in the calves of your legs when you walk?  8. NO - Do you or anyone in your family have previous history of blood clots?  9. NO - Do you or does anyone in your family have a serious bleeding problem such as prolonged bleeding following surgeries or cuts?  10. NO - Have you ever had problems with anemia or been told to take iron pills?  11. NO - Have you had any abnormal blood loss such as black, tarry or bloody stools, or abnormal vaginal bleeding?  12. NO - Have you ever had a blood transfusion?  13. NO - Have you or any of your relatives ever had problems with anesthesia?  14. NO - Do you have sleep apnea, excessive snoring or daytime drowsiness?  15. NO - Do you have any prosthetic heart  valves?  16. NO - Do you have prosthetic joints?  17. NO - Is there any chance that you may be pregnant?    HPI:                                                      Brief HPI related to upcoming procedure: left carotid artery stenosis, the patient was seen last month for pre-op evaluation, but had to cancel surgery due to uncontrolled diabetes, he had BG over 300' and his A1C was 9.9%.  The patient rescheduled surgery for left carotid enterectomy on 9/8/2017.    He saw diabetic educator, his diabetic control improved, his morning blood sugars ranging now from 81 to 156, he had couple 200-210 numbers in the afternoon. He is currently using Lantus 27 units daily morning, Metformin 1000 mg twice daily and Glipizide 10 mg twice daily, he is compliant with his medications, following diabetic diet and check BG regularly.      DIABETES - Patient has a longstanding history of DiabetesType Type II . Patient is being treated with diet, oral agents and insulin injections and denies significant side effects. Control has been fair. Complicating factors include but are not limited to: HTN.                                                                                                             .  HYPERTENSION - Patient has longstanding history of mod-severe HTN , currently denies any symptoms referable to elevated blood pressure. Specifically denies chest pain, palpitations, dyspnea, orthopnea, PND or peripheral edema. Blood pressure readings have been in normal range. Current medication regimen is as listed below. Patient denies any side effects of medication.                                                                                                                                                                                          .  HYPERLIPIDEMIA - Patient has a long history of significant Hyperlipidemia requiring medication for treatment with recent good control. Patient reports no problems or side effects with  the medication.    The patient has history of ischemic cardiomyopathy, he is on vasodilator therapy with Lisinopril and on Metoprolol XL daily. He just saw his cardiologist and per cardiology note he can proceed with vascular surgery. He is currently asymptomatic without angina or heart failure symptoms. Smoking cessation with Chantix was strongly recommended and he will consider the recommendation.                                                                    .  MEDICAL HISTORY:                                                    Patient Active Problem List    Diagnosis Date Noted     Carpal tunnel syndrome of right wrist 01/10/2016     Priority: Medium     Carotid stenosis 06/12/2015     Priority: Medium     Bilateral carotid Disease S/P Right carotid endarterectomy.   [facility]       CHF (congestive heart failure) (H) 06/08/2015     Priority: Medium     NSTEMI (non-ST elevated myocardial infarction) (H) 10/21/2014     Priority: Medium     ACS (acute coronary syndrome) (H) 10/20/2014     Priority: Medium     Mixed hyperlipidemia 07/08/2013     Priority: Medium     [facility]  Diagnosis updated by automated process. Provider to review and confirm.       TYPE 2 DIABETES, HBA1C GOAL < 8% 10/31/2010     Priority: Medium     CARDIOVASCULAR SCREENING; LDL GOAL LESS THAN 100 10/31/2010     Priority: Medium     Coronary artery disease involving native coronary artery of native heart without angina pectoris 07/07/2010     Priority: Medium     S/p mi  Bypass x 4 --grafting to the LAD obtuse marginal and PDA.   Angio on 6/10 negative          Alcohol abuse 07/07/2010     Priority: Medium     H/o rehab x 5        GERD (gastroesophageal reflux disease) 07/07/2010     Priority: Medium     Pancreatic disease 07/07/2010     Priority: Medium     H/o pancreatitis --on enzymes        Diabetes mellitus, type 2 (H) 07/07/2010     Priority: Medium     [facility]       Cardiomyopathy (H)  06/15/2010     Priority: Medium     EF 15%.--ICD placement        Atrial fibrillation/flutter 06/15/2010     Priority: Medium     S/p ablation 1June 2010.           Past Medical History:   Diagnosis Date     Acute renal failure (H) 8/26/06 Hosp    secondary to dehydration     Arthritis      CAD (coronary artery disease)     LIMA to the LAD, vein graft to OM and a vein graft to the PDA     Carpal tunnel syndrome      Diabetes (H)      Gastro-oesophageal reflux disease      H/O: alcohol abuse      HTN (hypertension)      Hyperlipidaemia      Hyperlipidaemia LDL goal <100 7/8/2013     Hypokalemia      Pacemaker      Pancreatitis 6/26/06 Hosp     Past Surgical History:   Procedure Laterality Date     BYPASS CARDIOPULMONARY       CATARACT IOL, RT/LT      Cataract IOL RT/LT     ENDARTERECTOMY CAROTID Right 6/12/2015    Procedure: ENDARTERECTOMY CAROTID;  Surgeon: João Turner MD;  Location: [facility] OR     IMPLANT PACEMAKER  6/10/2010     RELEASE CARPAL TUNNEL Right 4/12/2016    Procedure: RELEASE CARPAL TUNNEL;  Surgeon: Lissy Leger MD;  Location: [facility] OR     SURGICAL HISTORY OF -   1998    Laminectomy     SURGICAL HISTORY OF -   10/2003    Bypass grafting     TONSILLECTOMY & ADENOIDECTOMY       Current Outpatient Prescriptions   Medication Sig Dispense Refill     atorvastatin (LIPITOR) 80 MG tablet Take 80 mg by mouth daily       glipiZIDE (GLUCOTROL) 10 MG tablet Take 10 mg by mouth 2 times daily (before meals)       metFORMIN (GLUCOPHAGE) 500 MG tablet Take 1,000 mg by mouth 2 times daily (with meals)       metoprolol (TOPROL-XL) 25 MG 24 hr tablet Take 12.5 mg by mouth daily       amylase-lipase-protease (ZENPEP) 38557 UNITS CPEP Take 1 capsule by mouth 2 times daily       pantoprazole (PROTONIX) 40 MG EC tablet Take 40 mg by mouth daily       insulin glargine (LANTUS) 100 UNIT/ML injection Inject 27 Units Subcutaneous every morning, increase by 2 units every 3-4 days until fasting blood sugars less then  140 consistently 1 vial 1     [DISCONTINUED] Nitroglycerin (NITROSTAT SL) Place 0.4 mg under the tongue every 5 minutes as needed for chest pain       OTC products: None, except as noted above    Allergies   Allergen Reactions     Hydrocodone      Upset stomach     Shellfish Allergy Hives and Rash      Latex Allergy: NO    Social History   Substance Use Topics     Smoking status: Current Every Day Smoker     Packs/day: 2.00     Types: Cigarettes     Smokeless tobacco: Never Used     Alcohol use No     History   Drug Use No       REVIEW OF SYSTEMS:                                                    Constitutional, neuro, ENT, endocrine, pulmonary, cardiac, gastrointestinal, genitourinary, musculoskeletal, integument and psychiatric systems are negative, except as otherwise noted.      EXAM:                                                    /65  Pulse 70  Temp 97.1  F (36.2  C) (Tympanic)  Ht 6' (1.829 m)  Wt 158 lb 4.8 oz (71.8 kg)  BMI 21.47 kg/m2    GENERAL APPEARANCE: healthy, alert and no distress     EYES: EOMI,  PERRL     HENT: ear canals and TM's normal and nose and mouth without ulcers or lesions     RESP: lungs clear to auscultation - no rales, rhonchi or wheezes     CV: regular rates and rhythm, normal S1 S2, no S3 or S4 and no murmur, click or rub     ABDOMEN: soft, nontender     MS: extremities normal- no gross deformities noted, no evidence of inflammation in joints, FROM in all extremities.     SKIN: no suspicious lesions or rashes     NEURO: Normal strength and tone, sensory exam grossly normal, mentation intact and speech normal     PSYCH: mentation appears normal. and affect normal/bright    DIAGNOSTICS:                                                    EKG done on 8/11-NSR, showed possible anterior ischemia   A Lexiscan stress nuclear study performed on 8/17 demonstrated a large transmural inferior and inferoseptal defect without ischemia. The LVEF was estimated at 28%.      Recent Labs    Lab Test  08/11/17   0725 10/19/16  09/01/16  06/12/15   0839  06/08/15   0731   10/20/14   0640  07/09/10   0830   HGB  14.4   --    --    --    --   15.4   < >  16.7   --    PLT  117*   --    --    --    --   139*   < >  144*   --    INR   --    --    --    --    --    --    --   0.94  1.7*   NA  137   --    --    --   137   --    < >  136   --    POTASSIUM  4.4  5.0   --   4.9  4.5  4.4   < >  4.3   --    CR  0.84  0.9   < >  0.9  0.81   --    < >  0.92   --    A1C  9.9*   --    --   8.9*  7.8*   --    < >   --    --     < > = values in this interval not displayed.      IMPRESSION:                                                    A Lexiscan stress nuclear study performed on 8/17 demonstrated a large transmural inferior and inferoseptal defect without ischemia. The LVEF was estimated at 28%.    Mr. Tilley is doing overall well from a cardiovascular standpoint.  With regard to his ischemic cardiomyopathy, he was on vasodilator therapy with lisinopril daily but continues only on metoprolol XL daily.     Per cardiologist he can proceed with Vascular Surgery with carotid surgery on the left. He is asymptomatic without angina or heart failure symptoms.  Diabetis is better controlled now, average BG ranging from 81 to 156, had couple high readings at 200 and 210, but his BG's overall significantly improved after he increased Lantus to 27 units at the end of August  A1C went down from 9.9 % to 9.1%  BMP-pending    The proposed surgical procedure is considered HIGH risk.    REVISED CARDIAC RISK INDEX  The patient has the following serious cardiovascular risks for perioperative complications such as (MI, PE, VFib and 3  AV Block):  Diabetes Mellitus (on Insulin)  INTERPRETATION: 1 risks: Class II (low risk - 0.9% complication rate)    The patient has the following additional risks for perioperative complications:  No identified additional risks      ICD-10-CM    1. Preop general physical exam Z01.818 Basic metabolic  panel   2. Type 2 diabetes mellitus with hyperglycemia, with long-term current use of insulin (H) E11.65 Hemoglobin A1c    Z79.4 Basic metabolic panel       RECOMMENDATIONS:                                                      --Consult hospital rounder / IM to assist post-op medical management    --Patient is to take all scheduled medications on the day of surgery EXCEPT for modifications listed below.    Diabetes Medication Use  -----Hold usual  oral diabetic meds (e.g. Metformin, Actos, Glipizide) while NPO.       APPROVAL GIVEN to proceed with proposed procedure, without further diagnostic evaluation       Signed Electronically by: ANA Jane CNP    Copy of this evaluation report is provided to requesting physician.    [facility] Preop Guidelines

## 2017-09-06 NOTE — NURSING NOTE
Chief Complaint   Patient presents with     Pre-Op Exam     9/8     Diabetes     Recheck        Initial /65  Pulse 70  Temp 97.1  F (36.2  C) (Tympanic)  Ht 6' (1.829 m)  Wt 158 lb 4.8 oz (71.8 kg)  BMI 21.47 kg/m2 Estimated body mass index is 21.47 kg/(m^2) as calculated from the following:    Height as of this encounter: 6' (1.829 m).    Weight as of this encounter: 158 lb 4.8 oz (71.8 kg).  Medication Reconciliation: complete

## 2017-09-06 NOTE — PATIENT INSTRUCTIONS
Before Your Surgery      Call your surgeon if there is any change in your health. This includes signs of a cold or flu (such as a sore throat, runny nose, cough, rash or fever).    Do not smoke, drink alcohol or take over the counter medicine (unless your surgeon or primary care doctor tells you to) for the 24 hours before and after surgery.    If you take prescribed drugs: Follow your doctor s orders about which medicines to take and which to stop until after surgery.    Eating and drinking prior to surgery: follow the instructions from your surgeon    Take a shower or bath the night before surgery. Use the soap your surgeon gave you to gently clean your skin. If you do not have soap from your surgeon, use your regular soap. Do not shave or scrub the surgery site.  Wear clean pajamas and have clean sheets on your bed.     Hold Lantus, Glipizide and Metformin the morning of surgery     Take Metoprolol in the morning of surgery

## 2017-09-06 NOTE — MR AVS SNAPSHOT
After Visit Summary   9/6/2017    Delbert Tilley    MRN: [id number]           Patient Information     Date Of Birth          1950        Visit Information        Provider Department      9/6/2017 7:20 AM Sharee Mckoy APRN [facility]        Today's Diagnoses     Preop general physical exam    -  1    Type 2 diabetes mellitus with hyperglycemia, with long-term current use of insulin (H)          Care Instructions      Before Your Surgery      Call your surgeon if there is any change in your health. This includes signs of a cold or flu (such as a sore throat, runny nose, cough, rash or fever).    Do not smoke, drink alcohol or take over the counter medicine (unless your surgeon or primary care doctor tells you to) for the 24 hours before and after surgery.    If you take prescribed drugs: Follow your doctor s orders about which medicines to take and which to stop until after surgery.    Eating and drinking prior to surgery: follow the instructions from your surgeon    Take a shower or bath the night before surgery. Use the soap your surgeon gave you to gently clean your skin. If you do not have soap from your surgeon, use your regular soap. Do not shave or scrub the surgery site.  Wear clean pajamas and have clean sheets on your bed.     Hold Lantus, Glipizide and Metformin the morning of surgery     Take Metoprolol in the morning of surgery           Follow-ups after your visit        Your next 10 appointments already scheduled     Sep 08, 2017 10:15 AM CDT   [facility] Same Day Surgery with João Turner MD   [facility] ([facility])    [facility] ([facility])   [phone]            Sep 08, 2017   Procedure with João Turner MD   [facility] (--)    [address]  [facility] [address]   [phone]            Nov 03, 2017   "4:30 PM CDT   Return Visit with Romario Moyer MD   [facility] ([facility])    [address] [place] [address] [phone]            2017  8:00 AM CST   ICD Check with Wy Device Rn   [facility] ([facility])    [address] [facility] [address] [phone]              Who to contact     If you have questions or need follow up information about today's clinic visit or your schedule please contact [facility] directly at [phone].  Normal or non-critical lab and imaging results will be communicated to you by MyChart, letter or phone within 4 business days after the clinic has received the results. If you do not hear from us within 7 days, please contact the clinic through MyChart or phone. If you have a critical or abnormal lab result, we will notify you by phone as soon as possible.  Submit refill requests through [employer] or call your pharmacy and they will forward the refill request to us. Please allow 3 business days for your refill to be completed.          Additional Information About Your Visit        MyChart Information     [employer] lets you send messages to your doctor, view your test results, renew your prescriptions, schedule appointments and more. To sign up, go to [email or web address] . Click on \"Log in\" on the left side of the screen, which will take you to the Welcome page. Then click on \"Sign up Now\" on the right side of the page.     You will be asked to enter the access code listed below, as well as some personal information. Please follow the directions to create your username and password.     Your access code is: [email or web address]  Expires: 2017  7:24 AM     Your access code will  in 90 days. If you need help or a new code, please call your [facility] or [phone].        Care EveryWhere ID     This is your Care EveryWhere ID. This could be used by " other organizations to access your [place] medical records  [id number]        Your Vitals Were     Pulse Temperature Height BMI (Body Mass Index)          70 97.1  F (36.2  C) (Tympanic) 6' (1.829 m) 21.47 kg/m2         Blood Pressure from Last 3 Encounters:   09/06/17 104/65   08/21/17 98/63   08/11/17 116/62    Weight from Last 3 Encounters:   09/06/17 158 lb 4.8 oz (71.8 kg)   08/22/17 153 lb 1.6 oz (69.4 kg)   08/21/17 153 lb 4.8 oz (69.5 kg)              We Performed the Following     Basic metabolic panel     Hemoglobin A1c        Primary Care Provider Fax #    [facility] [phone]       [address] [facility] [address]        Equal Access to Services     MARITA LAMA : [employer], waaxda luqadaha, qaybta kaalmada adeegyada, gwendolyn cosby . So [facility] [phone].    ATENCIÓN: Si habla español, tiene a frey disposición servicios gratuitos de asistencia lingüística. Llame al [phone].    We comply with applicable federal civil rights laws and [place] laws. We do not discriminate on the basis of race, color, national origin, age, disability sex, sexual orientation or gender identity.            Thank you!     Thank you for choosing [facility]  for your care. Our goal is always to provide you with excellent care. Hearing back from our patients is one way we can continue to improve our services. Please take a few minutes to complete the written survey that you may receive in the mail after your visit with us. Thank you!             Your Updated Medication List - Protect others around you: Learn how to safely use, store and throw away your medicines at www.disposemymeds.org.          This list is accurate as of: 9/6/17  7:41 AM.  Always use your most recent med list.                   Brand Name Dispense Instructions for use Diagnosis    amylase-lipase-protease 90400 UNITS Cpep    ZENPEP     Take 1 capsule by mouth 2 times  daily        atorvastatin 80 MG tablet    LIPITOR     Take 80 mg by mouth daily        glipiZIDE 10 MG tablet    GLUCOTROL     Take 10 mg by mouth 2 times daily (before meals)        insulin glargine 100 UNIT/ML injection    LANTUS    1 vial    Inject 27 Units Subcutaneous every morning, increase by 2 units every 3-4 days until fasting blood sugars less then 140 consistently    Type 2 diabetes mellitus with complication, with long-term current use of insulin (H)       metFORMIN 500 MG tablet    GLUCOPHAGE     Take 1,000 mg by mouth 2 times daily (with meals)        metoprolol 25 MG 24 hr tablet    TOPROL-XL     Take 12.5 mg by mouth daily        pantoprazole 40 MG EC tablet    PROTONIX     Take 40 mg by mouth daily

## 2017-09-07 NOTE — H&P (VIEW-ONLY)
[facility]  [address] [address]  [phone]  Dept: [phone]    PRE-OP EVALUATION:  Today's date: 2017    Delbert Tilley (: 1950) presents for pre-operative evaluation assessment as requested by Dr. Turner.  He requires evaluation and anesthesia risk assessment prior to undergoing surgery/procedure for treatment of  Left carotid endarterectomy with eeg    Date of Surgery/ Procedure: 17  Time of Surgery/ Procedure: 10:30   Hospital/Surgical Facility: [facility]   Primary Physician: [facility]  Type of Anesthesia Anticipated: General    Patient has a Health Care Directive or Living Will:  [facility]    1. YES - Do you have a history of heart attack, stroke, stent, bypass or surgery on an artery in the head, neck, heart or legs? Has had a heart attack  2. NO - Do you ever have any pain or discomfort in your chest?  3. NO - Do you have a history of  Heart Failure?  4. NO - Are you troubled by shortness of breath when: walking on the level, up a slight hill or at night?  5. NO - Do you currently have a cold, bronchitis or other respiratory infection?  6. NO - Do you have a cough, shortness of breath or wheezing?  7. YES - Do you sometimes get pains in the calves of your legs when you walk?  8. NO - Do you or anyone in your family have previous history of blood clots?  9. NO - Do you or does anyone in your family have a serious bleeding problem such as prolonged bleeding following surgeries or cuts?  10. NO - Have you ever had problems with anemia or been told to take iron pills?  11. NO - Have you had any abnormal blood loss such as black, tarry or bloody stools, or abnormal vaginal bleeding?  12. NO - Have you ever had a blood transfusion?  13. NO - Have you or any of your relatives ever had problems with anesthesia?  14. NO - Do you have sleep apnea, excessive snoring or daytime drowsiness?  15. NO - Do you have any prosthetic heart  valves?  16. NO - Do you have prosthetic joints?  17. NO - Is there any chance that you may be pregnant?    HPI:                                                      Brief HPI related to upcoming procedure: left carotid artery stenosis, the patient was seen last month for pre-op evaluation, but had to cancel surgery due to uncontrolled diabetes, he had BG over 300' and his A1C was 9.9%.  The patient rescheduled surgery for left carotid enterectomy on 9/8/2017.    He saw diabetic educator, his diabetic control improved, his morning blood sugars ranging now from 81 to 156, he had couple 200-210 numbers in the afternoon. He is currently using Lantus 27 units daily morning, Metformin 1000 mg twice daily and Glipizide 10 mg twice daily, he is compliant with his medications, following diabetic diet and check BG regularly.      DIABETES - Patient has a longstanding history of DiabetesType Type II . Patient is being treated with diet, oral agents and insulin injections and denies significant side effects. Control has been fair. Complicating factors include but are not limited to: HTN.                                                                                                             .  HYPERTENSION - Patient has longstanding history of mod-severe HTN , currently denies any symptoms referable to elevated blood pressure. Specifically denies chest pain, palpitations, dyspnea, orthopnea, PND or peripheral edema. Blood pressure readings have been in normal range. Current medication regimen is as listed below. Patient denies any side effects of medication.                                                                                                                                                                                          .  HYPERLIPIDEMIA - Patient has a long history of significant Hyperlipidemia requiring medication for treatment with recent good control. Patient reports no problems or side effects with  the medication.    The patient has history of ischemic cardiomyopathy, he is on vasodilator therapy with Lisinopril and on Metoprolol XL daily. He just saw his cardiologist and per cardiology note he can proceed with vascular surgery. He is currently asymptomatic without angina or heart failure symptoms. Smoking cessation with Chantix was strongly recommended and he will consider the recommendation.                                                                    .  MEDICAL HISTORY:                                                    Patient Active Problem List    Diagnosis Date Noted     Carpal tunnel syndrome of right wrist 01/10/2016     Priority: Medium     Carotid stenosis 06/12/2015     Priority: Medium     Bilateral carotid Disease S/P Right carotid endarterectomy.   [facility]       CHF (congestive heart failure) (H) 06/08/2015     Priority: Medium     NSTEMI (non-ST elevated myocardial infarction) (H) 10/21/2014     Priority: Medium     ACS (acute coronary syndrome) (H) 10/20/2014     Priority: Medium     Mixed hyperlipidemia 07/08/2013     Priority: Medium     [facility]  Diagnosis updated by automated process. Provider to review and confirm.       TYPE 2 DIABETES, HBA1C GOAL < 8% 10/31/2010     Priority: Medium     CARDIOVASCULAR SCREENING; LDL GOAL LESS THAN 100 10/31/2010     Priority: Medium     Coronary artery disease involving native coronary artery of native heart without angina pectoris 07/07/2010     Priority: Medium     S/p mi  Bypass x 4 --grafting to the LAD obtuse marginal and PDA.   Angio on 6/10 negative          Alcohol abuse 07/07/2010     Priority: Medium     H/o rehab x 5        GERD (gastroesophageal reflux disease) 07/07/2010     Priority: Medium     Pancreatic disease 07/07/2010     Priority: Medium     H/o pancreatitis --on enzymes        Diabetes mellitus, type 2 (H) 07/07/2010     Priority: Medium     [facility]       Cardiomyopathy (H)  06/15/2010     Priority: Medium     EF 15%.--ICD placement        Atrial fibrillation/flutter 06/15/2010     Priority: Medium     S/p ablation 1June 2010.           Past Medical History:   Diagnosis Date     Acute renal failure (H) 8/26/06 Hosp    secondary to dehydration     Arthritis      CAD (coronary artery disease)     LIMA to the LAD, vein graft to OM and a vein graft to the PDA     Carpal tunnel syndrome      Diabetes (H)      Gastro-oesophageal reflux disease      H/O: alcohol abuse      HTN (hypertension)      Hyperlipidaemia      Hyperlipidaemia LDL goal <100 7/8/2013     Hypokalemia      Pacemaker      Pancreatitis 6/26/06 Hosp     Past Surgical History:   Procedure Laterality Date     BYPASS CARDIOPULMONARY       CATARACT IOL, RT/LT      Cataract IOL RT/LT     ENDARTERECTOMY CAROTID Right 6/12/2015    Procedure: ENDARTERECTOMY CAROTID;  Surgeon: João Turner MD;  Location: [facility] OR     IMPLANT PACEMAKER  6/10/2010     RELEASE CARPAL TUNNEL Right 4/12/2016    Procedure: RELEASE CARPAL TUNNEL;  Surgeon: Lissy Leger MD;  Location: [facility] OR     SURGICAL HISTORY OF -   1998    Laminectomy     SURGICAL HISTORY OF -   10/2003    Bypass grafting     TONSILLECTOMY & ADENOIDECTOMY       Current Outpatient Prescriptions   Medication Sig Dispense Refill     atorvastatin (LIPITOR) 80 MG tablet Take 80 mg by mouth daily       glipiZIDE (GLUCOTROL) 10 MG tablet Take 10 mg by mouth 2 times daily (before meals)       metFORMIN (GLUCOPHAGE) 500 MG tablet Take 1,000 mg by mouth 2 times daily (with meals)       metoprolol (TOPROL-XL) 25 MG 24 hr tablet Take 12.5 mg by mouth daily       amylase-lipase-protease (ZENPEP) 33360 UNITS CPEP Take 1 capsule by mouth 2 times daily       pantoprazole (PROTONIX) 40 MG EC tablet Take 40 mg by mouth daily       insulin glargine (LANTUS) 100 UNIT/ML injection Inject 27 Units Subcutaneous every morning, increase by 2 units every 3-4 days until fasting blood sugars less then  140 consistently 1 vial 1     [DISCONTINUED] Nitroglycerin (NITROSTAT SL) Place 0.4 mg under the tongue every 5 minutes as needed for chest pain       OTC products: None, except as noted above    Allergies   Allergen Reactions     Hydrocodone      Upset stomach     Shellfish Allergy Hives and Rash      Latex Allergy: NO    Social History   Substance Use Topics     Smoking status: Current Every Day Smoker     Packs/day: 2.00     Types: Cigarettes     Smokeless tobacco: Never Used     Alcohol use No     History   Drug Use No       REVIEW OF SYSTEMS:                                                    Constitutional, neuro, ENT, endocrine, pulmonary, cardiac, gastrointestinal, genitourinary, musculoskeletal, integument and psychiatric systems are negative, except as otherwise noted.      EXAM:                                                    /65  Pulse 70  Temp 97.1  F (36.2  C) (Tympanic)  Ht 6' (1.829 m)  Wt 158 lb 4.8 oz (71.8 kg)  BMI 21.47 kg/m2    GENERAL APPEARANCE: healthy, alert and no distress     EYES: EOMI,  PERRL     HENT: ear canals and TM's normal and nose and mouth without ulcers or lesions     RESP: lungs clear to auscultation - no rales, rhonchi or wheezes     CV: regular rates and rhythm, normal S1 S2, no S3 or S4 and no murmur, click or rub     ABDOMEN: soft, nontender     MS: extremities normal- no gross deformities noted, no evidence of inflammation in joints, FROM in all extremities.     SKIN: no suspicious lesions or rashes     NEURO: Normal strength and tone, sensory exam grossly normal, mentation intact and speech normal     PSYCH: mentation appears normal. and affect normal/bright    DIAGNOSTICS:                                                    EKG done on 8/11-NS[facility], showed possible anterior ischemia   A Lexiscan stress nuclear study performed on 8/17 demonstrated a large transmural inferior and inferoseptal defect without ischemia. The LVEF was estimated at 28%.      Recent Labs    Lab Test  08/11/17   0725 10/19/16  09/01/16  06/12/15   0839  06/08/15   0731   10/20/14   0640  07/09/10   0830   HGB  14.4   --    --    --    --   15.4   < >  16.7   --    PLT  117*   --    --    --    --   139*   < >  144*   --    INR   --    --    --    --    --    --    --   0.94  1.7*   NA  137   --    --    --   137   --    < >  136   --    POTASSIUM  4.4  5.0   --   4.9  4.5  4.4   < >  4.3   --    CR  0.84  0.9   < >  0.9  0.81   --    < >  0.92   --    A1C  9.9*   --    --   8.9*  7.8*   --    < >   --    --     < > = values in this interval not displayed.      IMPRESSION:                                                    A Lexiscan stress nuclear study performed on 8/17 demonstrated a large transmural inferior and inferoseptal defect without ischemia. The LVEF was estimated at 28%.    Mr. Tilley is doing overall well from a cardiovascular standpoint.  With regard to his ischemic cardiomyopathy, he was on vasodilator therapy with lisinopril daily but continues only on metoprolol XL daily.     Per cardiologist he can proceed with Vascular Surgery with carotid surgery on the left. He is asymptomatic without angina or heart failure symptoms.  Diabetis is better controlled now, average BG ranging from 81 to 156, had couple high readings at 200 and 210, but his BG's overall significantly improved after he increased Lantus to 27 units at the end of August  A1C went down from 9.9 % to 9.1%  BMP-pending    The proposed surgical procedure is considered HIGH risk.    REVISED CARDIAC RISK INDEX  The patient has the following serious cardiovascular risks for perioperative complications such as (MI, PE, VFib and 3  AV Block):  Diabetes Mellitus (on Insulin)  INTERPRETATION: 1 risks: Class II (low risk - 0.9% complication rate)    The patient has the following additional risks for perioperative complications:  No identified additional risks      ICD-10-CM    1. Preop general physical exam Z01.818 Basic metabolic  panel   2. Type 2 diabetes mellitus with hyperglycemia, with long-term current use of insulin (H) E11.65 Hemoglobin A1c    Z79.4 Basic metabolic panel       RECOMMENDATIONS:                                                      --Consult hospital rounder / IM to assist post-op medical management    --Patient is to take all scheduled medications on the day of surgery EXCEPT for modifications listed below.    Diabetes Medication Use  -----Hold usual  oral diabetic meds (e.g. Metformin, Actos, Glipizide) while NPO.       APPROVAL GIVEN to proceed with proposed procedure, without further diagnostic evaluation       Signed Electronically by: ANA Jane CNP    Copy of this evaluation report is provided to requesting physician.    [facility] Preop Guidelines

## 2017-09-08 ENCOUNTER — HOSPITAL ENCOUNTER (INPATIENT)
Facility: CLINIC | Age: 67
LOS: 1 days | Discharge: HOME | DRG: 038 | End: 2017-09-09
Attending: SURGERY | Admitting: SURGERY
Payer: MEDICARE

## 2017-09-08 ENCOUNTER — OFFICE VISIT (OUTPATIENT)
Dept: SURGERY | Facility: PHYSICIAN GROUP | Age: 67
End: 2017-09-08
Payer: COMMERCIAL

## 2017-09-08 ENCOUNTER — ANESTHESIA EVENT (OUTPATIENT)
Dept: SURGERY | Facility: CLINIC | Age: 67
DRG: 038 | End: 2017-09-08
Payer: MEDICARE

## 2017-09-08 ENCOUNTER — ANESTHESIA (OUTPATIENT)
Dept: SURGERY | Facility: CLINIC | Age: 67
DRG: 038 | End: 2017-09-08
Payer: MEDICARE

## 2017-09-08 DIAGNOSIS — I65.22 LEFT CAROTID STENOSIS: Primary | ICD-10-CM

## 2017-09-08 DIAGNOSIS — R13.10 DYSPHAGIA, UNSPECIFIED TYPE: ICD-10-CM

## 2017-09-08 LAB
ABO + RH BLD: NORMAL
ABO + RH BLD: NORMAL
ANION GAP SERPL CALCULATED.3IONS-SCNC: 6 MMOL/L (ref 3–14)
BLD GP AB SCN SERPL QL: NORMAL
BLOOD BANK CMNT PATIENT-IMP: NORMAL
BUN SERPL-MCNC: 17 MG/DL (ref 7–30)
CALCIUM SERPL-MCNC: 8.4 MG/DL (ref 8.5–10.1)
CHLORIDE SERPL-SCNC: 107 MMOL/L (ref 94–109)
CHOLEST SERPL-MCNC: 96 MG/DL
CO2 SERPL-SCNC: 26 MMOL/L (ref 20–32)
CREAT SERPL-MCNC: 0.82 MG/DL (ref 0.66–1.25)
GFR SERPL CREATININE-BSD FRML MDRD: >90 ML/MIN/1.7M2
GLUCOSE BLDC GLUCOMTR-MCNC: 135 MG/DL (ref 70–99)
GLUCOSE BLDC GLUCOMTR-MCNC: 147 MG/DL (ref 70–99)
GLUCOSE BLDC GLUCOMTR-MCNC: 150 MG/DL (ref 70–99)
GLUCOSE BLDC GLUCOMTR-MCNC: 151 MG/DL (ref 70–99)
GLUCOSE BLDC GLUCOMTR-MCNC: 160 MG/DL (ref 70–99)
GLUCOSE BLDC GLUCOMTR-MCNC: 185 MG/DL (ref 70–99)
GLUCOSE BLDC GLUCOMTR-MCNC: 224 MG/DL (ref 70–99)
GLUCOSE SERPL-MCNC: 303 MG/DL (ref 70–99)
HBA1C MFR BLD: 8.6 % (ref 4.3–6)
HDLC SERPL-MCNC: 24 MG/DL
INR PPP: 1.03 (ref 0.86–1.14)
LDLC SERPL CALC-MCNC: 45 MG/DL
NONHDLC SERPL-MCNC: 72 MG/DL
POTASSIUM SERPL-SCNC: 4.4 MMOL/L (ref 3.4–5.3)
SODIUM SERPL-SCNC: 139 MMOL/L (ref 133–144)
SPECIMEN EXP DATE BLD: NORMAL
TRIGL SERPL-MCNC: 136 MG/DL

## 2017-09-08 PROCEDURE — 25000128 H RX IP 250 OP 636: Performed by: SURGERY

## 2017-09-08 PROCEDURE — 03UL0JZ SUPPLEMENT LEFT INTERNAL CAROTID ARTERY WITH SYNTHETIC SUBSTITUTE, OPEN APPROACH: ICD-10-PCS | Performed by: SURGERY

## 2017-09-08 PROCEDURE — 27210794 ZZH OR GENERAL SUPPLY STERILE: Performed by: SURGERY

## 2017-09-08 PROCEDURE — 37000008 ZZH ANESTHESIA TECHNICAL FEE, 1ST 30 MIN: Performed by: SURGERY

## 2017-09-08 PROCEDURE — 36000093 ZZH SURGERY LEVEL 4 1ST 30 MIN: Performed by: SURGERY

## 2017-09-08 PROCEDURE — 80061 LIPID PANEL: CPT | Performed by: SURGERY

## 2017-09-08 PROCEDURE — 00000146 ZZHCL STATISTIC GLUCOSE BY METER IP

## 2017-09-08 PROCEDURE — 25000128 H RX IP 250 OP 636: Performed by: ANESTHESIOLOGY

## 2017-09-08 PROCEDURE — 40000885 ZZH STATISTIC STEP DOWN HRS EVENING

## 2017-09-08 PROCEDURE — A9270 NON-COVERED ITEM OR SERVICE: HCPCS | Mod: GY | Performed by: ANESTHESIOLOGY

## 2017-09-08 PROCEDURE — 25000128 H RX IP 250 OP 636: Performed by: PHYSICIAN ASSISTANT

## 2017-09-08 PROCEDURE — 93005 ELECTROCARDIOGRAM TRACING: CPT

## 2017-09-08 PROCEDURE — A9270 NON-COVERED ITEM OR SERVICE: HCPCS | Mod: GY | Performed by: PHYSICIAN ASSISTANT

## 2017-09-08 PROCEDURE — 40000061 ZZH STATISTIC EEG TIME EA 10 MIN

## 2017-09-08 PROCEDURE — 27210995 ZZH RX 272: Performed by: SURGERY

## 2017-09-08 PROCEDURE — 80048 BASIC METABOLIC PNL TOTAL CA: CPT | Performed by: SURGERY

## 2017-09-08 PROCEDURE — 03UJ0JZ SUPPLEMENT LEFT COMMON CAROTID ARTERY WITH SYNTHETIC SUBSTITUTE, OPEN APPROACH: ICD-10-PCS | Performed by: SURGERY

## 2017-09-08 PROCEDURE — 99207 ZZC NO CHARGE VISIT/PATIENT NOT SEEN: CPT | Performed by: PHYSICIAN ASSISTANT

## 2017-09-08 PROCEDURE — 37000009 ZZH ANESTHESIA TECHNICAL FEE, EACH ADDTL 15 MIN: Performed by: SURGERY

## 2017-09-08 PROCEDURE — 83036 HEMOGLOBIN GLYCOSYLATED A1C: CPT | Performed by: SURGERY

## 2017-09-08 PROCEDURE — 36415 COLL VENOUS BLD VENIPUNCTURE: CPT | Performed by: SURGERY

## 2017-09-08 PROCEDURE — 86900 BLOOD TYPING SEROLOGIC ABO: CPT | Performed by: SURGERY

## 2017-09-08 PROCEDURE — 35301 RECHANNELING OF ARTERY: CPT | Mod: LT | Performed by: SURGERY

## 2017-09-08 PROCEDURE — 35301 RECHANNELING OF ARTERY: CPT | Mod: AS | Performed by: PHYSICIAN ASSISTANT

## 2017-09-08 PROCEDURE — 99223 1ST HOSP IP/OBS HIGH 75: CPT | Performed by: INTERNAL MEDICINE

## 2017-09-08 PROCEDURE — 03CJ0Z6: ICD-10-PCS | Performed by: SURGERY

## 2017-09-08 PROCEDURE — 25000132 ZZH RX MED GY IP 250 OP 250 PS 637: Mod: GY | Performed by: PHYSICIAN ASSISTANT

## 2017-09-08 PROCEDURE — 95955 EEG DURING SURGERY: CPT

## 2017-09-08 PROCEDURE — 25000128 H RX IP 250 OP 636: Performed by: NURSE ANESTHETIST, CERTIFIED REGISTERED

## 2017-09-08 PROCEDURE — 71000014 ZZH RECOVERY PHASE 1 LEVEL 2 FIRST HR: Performed by: SURGERY

## 2017-09-08 PROCEDURE — 36000063 ZZH SURGERY LEVEL 4 EA 15 ADDTL MIN: Performed by: SURGERY

## 2017-09-08 PROCEDURE — 25000566 ZZH SEVOFLURANE, EA 15 MIN: Performed by: SURGERY

## 2017-09-08 PROCEDURE — 21400002 ZZH R&B CCU CICU CRITICAL

## 2017-09-08 PROCEDURE — 25000125 ZZHC RX 250: Performed by: NURSE ANESTHETIST, CERTIFIED REGISTERED

## 2017-09-08 PROCEDURE — 93010 ELECTROCARDIOGRAM REPORT: CPT | Performed by: INTERNAL MEDICINE

## 2017-09-08 PROCEDURE — 85610 PROTHROMBIN TIME: CPT | Performed by: SURGERY

## 2017-09-08 PROCEDURE — 03CN0ZZ EXTIRPATION OF MATTER FROM LEFT EXTERNAL CAROTID ARTERY, OPEN APPROACH: ICD-10-PCS | Performed by: SURGERY

## 2017-09-08 PROCEDURE — 40000171 ZZH STATISTIC PRE-PROCEDURE ASSESSMENT III: Performed by: SURGERY

## 2017-09-08 PROCEDURE — P9041 ALBUMIN (HUMAN),5%, 50ML: HCPCS | Performed by: ANESTHESIOLOGY

## 2017-09-08 PROCEDURE — 25000132 ZZH RX MED GY IP 250 OP 250 PS 637: Mod: GY | Performed by: ANESTHESIOLOGY

## 2017-09-08 PROCEDURE — C1768 GRAFT, VASCULAR: HCPCS | Performed by: SURGERY

## 2017-09-08 PROCEDURE — 71000015 ZZH RECOVERY PHASE 1 LEVEL 2 EA ADDTL HR: Performed by: SURGERY

## 2017-09-08 PROCEDURE — 25000131 ZZH RX MED GY IP 250 OP 636 PS 637: Mod: GY | Performed by: ANESTHESIOLOGY

## 2017-09-08 PROCEDURE — 86850 RBC ANTIBODY SCREEN: CPT | Performed by: SURGERY

## 2017-09-08 PROCEDURE — 86901 BLOOD TYPING SEROLOGIC RH(D): CPT | Performed by: SURGERY

## 2017-09-08 PROCEDURE — 03CL0ZZ EXTIRPATION OF MATTER FROM LEFT INTERNAL CAROTID ARTERY, OPEN APPROACH: ICD-10-PCS | Performed by: SURGERY

## 2017-09-08 DEVICE — GRAFT HEMASHIELD 0.3X3" M002000195090: Type: IMPLANTABLE DEVICE | Site: CAROTID | Status: FUNCTIONAL

## 2017-09-08 RX ORDER — NICOTINE POLACRILEX 4 MG
15-30 LOZENGE BUCCAL
Status: DISCONTINUED | OUTPATIENT
Start: 2017-09-08 | End: 2017-09-09 | Stop reason: HOSPADM

## 2017-09-08 RX ORDER — HEPARIN SODIUM 1000 [USP'U]/ML
INJECTION, SOLUTION INTRAVENOUS; SUBCUTANEOUS PRN
Status: DISCONTINUED | OUTPATIENT
Start: 2017-09-08 | End: 2017-09-08

## 2017-09-08 RX ORDER — FENTANYL CITRATE 50 UG/ML
INJECTION, SOLUTION INTRAMUSCULAR; INTRAVENOUS PRN
Status: DISCONTINUED | OUTPATIENT
Start: 2017-09-08 | End: 2017-09-08

## 2017-09-08 RX ORDER — LABETALOL HYDROCHLORIDE 5 MG/ML
10 INJECTION, SOLUTION INTRAVENOUS EVERY 10 MIN PRN
Status: DISCONTINUED | OUTPATIENT
Start: 2017-09-08 | End: 2017-09-09 | Stop reason: HOSPADM

## 2017-09-08 RX ORDER — ALBUMIN, HUMAN INJ 5% 5 %
12.5 SOLUTION INTRAVENOUS ONCE
Status: COMPLETED | OUTPATIENT
Start: 2017-09-08 | End: 2017-09-08

## 2017-09-08 RX ORDER — SODIUM CHLORIDE, SODIUM LACTATE, POTASSIUM CHLORIDE, CALCIUM CHLORIDE 600; 310; 30; 20 MG/100ML; MG/100ML; MG/100ML; MG/100ML
INJECTION, SOLUTION INTRAVENOUS CONTINUOUS PRN
Status: DISCONTINUED | OUTPATIENT
Start: 2017-09-08 | End: 2017-09-08

## 2017-09-08 RX ORDER — NICOTINE 21 MG/24HR
1 PATCH, TRANSDERMAL 24 HOURS TRANSDERMAL DAILY
Status: DISCONTINUED | OUTPATIENT
Start: 2017-09-08 | End: 2017-09-09 | Stop reason: HOSPADM

## 2017-09-08 RX ORDER — PROCHLORPERAZINE MALEATE 5 MG
5 TABLET ORAL EVERY 6 HOURS PRN
Status: DISCONTINUED | OUTPATIENT
Start: 2017-09-08 | End: 2017-09-09 | Stop reason: HOSPADM

## 2017-09-08 RX ORDER — SODIUM CHLORIDE 9 MG/ML
INJECTION, SOLUTION INTRAVENOUS CONTINUOUS
Status: DISCONTINUED | OUTPATIENT
Start: 2017-09-08 | End: 2017-09-09 | Stop reason: HOSPADM

## 2017-09-08 RX ORDER — LIDOCAINE 40 MG/G
CREAM TOPICAL
Status: DISCONTINUED | OUTPATIENT
Start: 2017-09-08 | End: 2017-09-09 | Stop reason: HOSPADM

## 2017-09-08 RX ORDER — ALBUTEROL SULFATE 0.83 MG/ML
2.5 SOLUTION RESPIRATORY (INHALATION) EVERY 4 HOURS PRN
Status: DISCONTINUED | OUTPATIENT
Start: 2017-09-08 | End: 2017-09-08 | Stop reason: HOSPADM

## 2017-09-08 RX ORDER — ONDANSETRON 4 MG/1
4 TABLET, ORALLY DISINTEGRATING ORAL EVERY 6 HOURS PRN
Status: DISCONTINUED | OUTPATIENT
Start: 2017-09-08 | End: 2017-09-09 | Stop reason: HOSPADM

## 2017-09-08 RX ORDER — CEFAZOLIN SODIUM 2 G/100ML
2 INJECTION, SOLUTION INTRAVENOUS
Status: COMPLETED | OUTPATIENT
Start: 2017-09-08 | End: 2017-09-08

## 2017-09-08 RX ORDER — ASPIRIN 600 MG/1
600 SUPPOSITORY RECTAL ONCE
Status: COMPLETED | OUTPATIENT
Start: 2017-09-08 | End: 2017-09-08

## 2017-09-08 RX ORDER — PANTOPRAZOLE SODIUM 40 MG/1
40 TABLET, DELAYED RELEASE ORAL DAILY
Status: DISCONTINUED | OUTPATIENT
Start: 2017-09-09 | End: 2017-09-09 | Stop reason: HOSPADM

## 2017-09-08 RX ORDER — ATORVASTATIN CALCIUM 20 MG/1
80 TABLET, FILM COATED ORAL AT BEDTIME
Status: DISCONTINUED | OUTPATIENT
Start: 2017-09-08 | End: 2017-09-09 | Stop reason: HOSPADM

## 2017-09-08 RX ORDER — PROPOFOL 10 MG/ML
INJECTION, EMULSION INTRAVENOUS PRN
Status: DISCONTINUED | OUTPATIENT
Start: 2017-09-08 | End: 2017-09-08

## 2017-09-08 RX ORDER — LIDOCAINE HYDROCHLORIDE 20 MG/ML
INJECTION, SOLUTION INFILTRATION; PERINEURAL PRN
Status: DISCONTINUED | OUTPATIENT
Start: 2017-09-08 | End: 2017-09-08

## 2017-09-08 RX ORDER — ACETAMINOPHEN 325 MG/1
975 TABLET ORAL EVERY 8 HOURS
Status: DISCONTINUED | OUTPATIENT
Start: 2017-09-08 | End: 2017-09-09 | Stop reason: HOSPADM

## 2017-09-08 RX ORDER — INSULIN GLARGINE 100 [IU]/ML
30 INJECTION, SOLUTION SUBCUTANEOUS EVERY MORNING
COMMUNITY
End: 2021-11-11

## 2017-09-08 RX ORDER — DEXTROSE MONOHYDRATE 25 G/50ML
25-50 INJECTION, SOLUTION INTRAVENOUS
Status: DISCONTINUED | OUTPATIENT
Start: 2017-09-08 | End: 2017-09-09 | Stop reason: HOSPADM

## 2017-09-08 RX ORDER — NALOXONE HYDROCHLORIDE 0.4 MG/ML
.1-.4 INJECTION, SOLUTION INTRAMUSCULAR; INTRAVENOUS; SUBCUTANEOUS
Status: DISCONTINUED | OUTPATIENT
Start: 2017-09-08 | End: 2017-09-09 | Stop reason: HOSPADM

## 2017-09-08 RX ORDER — ACETAMINOPHEN 325 MG/1
650 TABLET ORAL EVERY 4 HOURS PRN
Status: DISCONTINUED | OUTPATIENT
Start: 2017-09-11 | End: 2017-09-09 | Stop reason: HOSPADM

## 2017-09-08 RX ORDER — ONDANSETRON 2 MG/ML
4 INJECTION INTRAMUSCULAR; INTRAVENOUS EVERY 30 MIN PRN
Status: DISCONTINUED | OUTPATIENT
Start: 2017-09-08 | End: 2017-09-08 | Stop reason: HOSPADM

## 2017-09-08 RX ORDER — ONDANSETRON 4 MG/1
4 TABLET, ORALLY DISINTEGRATING ORAL EVERY 30 MIN PRN
Status: DISCONTINUED | OUTPATIENT
Start: 2017-09-08 | End: 2017-09-08 | Stop reason: HOSPADM

## 2017-09-08 RX ORDER — METOPROLOL TARTRATE 1 MG/ML
5 INJECTION, SOLUTION INTRAVENOUS EVERY 6 HOURS
Status: DISCONTINUED | OUTPATIENT
Start: 2017-09-09 | End: 2017-09-08

## 2017-09-08 RX ORDER — ASPIRIN 81 MG/1
81 TABLET ORAL DAILY
Status: DISCONTINUED | OUTPATIENT
Start: 2017-09-09 | End: 2017-09-09 | Stop reason: HOSPADM

## 2017-09-08 RX ORDER — SODIUM CHLORIDE, SODIUM LACTATE, POTASSIUM CHLORIDE, CALCIUM CHLORIDE 600; 310; 30; 20 MG/100ML; MG/100ML; MG/100ML; MG/100ML
INJECTION, SOLUTION INTRAVENOUS CONTINUOUS
Status: DISCONTINUED | OUTPATIENT
Start: 2017-09-08 | End: 2017-09-08 | Stop reason: HOSPADM

## 2017-09-08 RX ORDER — HYDROMORPHONE HYDROCHLORIDE 1 MG/ML
.3-.5 INJECTION, SOLUTION INTRAMUSCULAR; INTRAVENOUS; SUBCUTANEOUS
Status: DISCONTINUED | OUTPATIENT
Start: 2017-09-08 | End: 2017-09-09 | Stop reason: HOSPADM

## 2017-09-08 RX ORDER — HYDRALAZINE HYDROCHLORIDE 20 MG/ML
2.5-5 INJECTION INTRAMUSCULAR; INTRAVENOUS EVERY 10 MIN PRN
Status: DISCONTINUED | OUTPATIENT
Start: 2017-09-08 | End: 2017-09-08 | Stop reason: HOSPADM

## 2017-09-08 RX ORDER — ONDANSETRON 2 MG/ML
4 INJECTION INTRAMUSCULAR; INTRAVENOUS EVERY 6 HOURS PRN
Status: DISCONTINUED | OUTPATIENT
Start: 2017-09-08 | End: 2017-09-09 | Stop reason: HOSPADM

## 2017-09-08 RX ORDER — FENTANYL CITRATE 50 UG/ML
25-50 INJECTION, SOLUTION INTRAMUSCULAR; INTRAVENOUS
Status: DISCONTINUED | OUTPATIENT
Start: 2017-09-08 | End: 2017-09-08 | Stop reason: HOSPADM

## 2017-09-08 RX ORDER — MAGNESIUM HYDROXIDE 1200 MG/15ML
LIQUID ORAL PRN
Status: DISCONTINUED | OUTPATIENT
Start: 2017-09-08 | End: 2017-09-08 | Stop reason: HOSPADM

## 2017-09-08 RX ORDER — ESMOLOL HYDROCHLORIDE 10 MG/ML
INJECTION INTRAVENOUS PRN
Status: DISCONTINUED | OUTPATIENT
Start: 2017-09-08 | End: 2017-09-08

## 2017-09-08 RX ORDER — OXYCODONE HYDROCHLORIDE 5 MG/1
5-10 TABLET ORAL EVERY 4 HOURS PRN
Status: DISCONTINUED | OUTPATIENT
Start: 2017-09-08 | End: 2017-09-09 | Stop reason: HOSPADM

## 2017-09-08 RX ORDER — NITROGLYCERIN 0.4 MG/1
0.4 TABLET SUBLINGUAL EVERY 5 MIN PRN
Status: DISCONTINUED | OUTPATIENT
Start: 2017-09-08 | End: 2017-09-09 | Stop reason: HOSPADM

## 2017-09-08 RX ORDER — LABETALOL HYDROCHLORIDE 5 MG/ML
10 INJECTION, SOLUTION INTRAVENOUS
Status: DISCONTINUED | OUTPATIENT
Start: 2017-09-08 | End: 2017-09-08 | Stop reason: HOSPADM

## 2017-09-08 RX ORDER — ONDANSETRON 2 MG/ML
INJECTION INTRAMUSCULAR; INTRAVENOUS PRN
Status: DISCONTINUED | OUTPATIENT
Start: 2017-09-08 | End: 2017-09-08

## 2017-09-08 RX ORDER — KETOROLAC TROMETHAMINE 30 MG/ML
15 INJECTION, SOLUTION INTRAMUSCULAR; INTRAVENOUS EVERY 6 HOURS PRN
Status: DISCONTINUED | OUTPATIENT
Start: 2017-09-08 | End: 2017-09-08

## 2017-09-08 RX ORDER — NITROGLYCERIN 0.4 MG/1
0.4 TABLET SUBLINGUAL EVERY 5 MIN PRN
Status: ON HOLD | COMMUNITY
End: 2023-01-01

## 2017-09-08 RX ORDER — NEOSTIGMINE METHYLSULFATE 1 MG/ML
VIAL (ML) INJECTION PRN
Status: DISCONTINUED | OUTPATIENT
Start: 2017-09-08 | End: 2017-09-08

## 2017-09-08 RX ORDER — GLYCOPYRROLATE 0.2 MG/ML
INJECTION, SOLUTION INTRAMUSCULAR; INTRAVENOUS PRN
Status: DISCONTINUED | OUTPATIENT
Start: 2017-09-08 | End: 2017-09-08

## 2017-09-08 RX ORDER — HYDROMORPHONE HYDROCHLORIDE 1 MG/ML
.3-.5 INJECTION, SOLUTION INTRAMUSCULAR; INTRAVENOUS; SUBCUTANEOUS EVERY 5 MIN PRN
Status: DISCONTINUED | OUTPATIENT
Start: 2017-09-08 | End: 2017-09-08 | Stop reason: HOSPADM

## 2017-09-08 RX ADMIN — SODIUM CHLORIDE, POTASSIUM CHLORIDE, SODIUM LACTATE AND CALCIUM CHLORIDE: 600; 310; 30; 20 INJECTION, SOLUTION INTRAVENOUS at 10:36

## 2017-09-08 RX ADMIN — PHENYLEPHRINE HYDROCHLORIDE 50 MCG: 10 INJECTION, SOLUTION INTRAMUSCULAR; INTRAVENOUS; SUBCUTANEOUS at 11:49

## 2017-09-08 RX ADMIN — SODIUM CHLORIDE, POTASSIUM CHLORIDE, SODIUM LACTATE AND CALCIUM CHLORIDE: 600; 310; 30; 20 INJECTION, SOLUTION INTRAVENOUS at 09:07

## 2017-09-08 RX ADMIN — NEOSTIGMINE METHYLSULFATE 1 MG: 1 INJECTION INTRAMUSCULAR; INTRAVENOUS; SUBCUTANEOUS at 13:11

## 2017-09-08 RX ADMIN — PHENYLEPHRINE HYDROCHLORIDE 100 MCG: 10 INJECTION, SOLUTION INTRAMUSCULAR; INTRAVENOUS; SUBCUTANEOUS at 11:12

## 2017-09-08 RX ADMIN — LIDOCAINE HYDROCHLORIDE 60 MG: 20 INJECTION, SOLUTION INFILTRATION; PERINEURAL at 10:41

## 2017-09-08 RX ADMIN — Medication 3000 UNITS: at 11:40

## 2017-09-08 RX ADMIN — PHENYLEPHRINE HYDROCHLORIDE 50 MCG: 10 INJECTION, SOLUTION INTRAMUSCULAR; INTRAVENOUS; SUBCUTANEOUS at 11:27

## 2017-09-08 RX ADMIN — PHENYLEPHRINE HYDROCHLORIDE 50 MCG: 10 INJECTION, SOLUTION INTRAMUSCULAR; INTRAVENOUS; SUBCUTANEOUS at 11:36

## 2017-09-08 RX ADMIN — PROPOFOL 50 MG: 10 INJECTION, EMULSION INTRAVENOUS at 10:42

## 2017-09-08 RX ADMIN — SODIUM CHLORIDE: 9 INJECTION, SOLUTION INTRAVENOUS at 17:28

## 2017-09-08 RX ADMIN — ALBUMIN (HUMAN) 12.5 G: 12.5 SOLUTION INTRAVENOUS at 15:21

## 2017-09-08 RX ADMIN — SODIUM CHLORIDE 2 UNITS: 9 INJECTION, SOLUTION INTRAVENOUS at 14:51

## 2017-09-08 RX ADMIN — OXYCODONE HYDROCHLORIDE 10 MG: 5 TABLET ORAL at 20:03

## 2017-09-08 RX ADMIN — PHENYLEPHRINE HYDROCHLORIDE 50 MCG: 10 INJECTION, SOLUTION INTRAMUSCULAR; INTRAVENOUS; SUBCUTANEOUS at 11:47

## 2017-09-08 RX ADMIN — CEFAZOLIN SODIUM 2 G: 2 INJECTION, SOLUTION INTRAVENOUS at 10:50

## 2017-09-08 RX ADMIN — NICOTINE 1 PATCH: 21 PATCH, EXTENDED RELEASE TRANSDERMAL at 17:27

## 2017-09-08 RX ADMIN — ROCURONIUM BROMIDE 30 MG: 10 INJECTION INTRAVENOUS at 10:59

## 2017-09-08 RX ADMIN — PROPOFOL 50 MG: 10 INJECTION, EMULSION INTRAVENOUS at 10:41

## 2017-09-08 RX ADMIN — NEOSTIGMINE METHYLSULFATE 4 MG: 1 INJECTION INTRAMUSCULAR; INTRAVENOUS; SUBCUTANEOUS at 13:09

## 2017-09-08 RX ADMIN — ROCURONIUM BROMIDE 50 MG: 10 INJECTION INTRAVENOUS at 10:41

## 2017-09-08 RX ADMIN — KETOROLAC TROMETHAMINE 15 MG: 30 INJECTION, SOLUTION INTRAMUSCULAR at 14:56

## 2017-09-08 RX ADMIN — PROPOFOL 10 MG: 10 INJECTION, EMULSION INTRAVENOUS at 10:45

## 2017-09-08 RX ADMIN — DEXMEDETOMIDINE HYDROCHLORIDE 0.5 MCG/KG/HR: 4 INJECTION, SOLUTION INTRAVENOUS at 10:41

## 2017-09-08 RX ADMIN — PROPOFOL 20 MG: 10 INJECTION, EMULSION INTRAVENOUS at 10:43

## 2017-09-08 RX ADMIN — PHENYLEPHRINE HYDROCHLORIDE 100 MCG: 10 INJECTION, SOLUTION INTRAMUSCULAR; INTRAVENOUS; SUBCUTANEOUS at 12:40

## 2017-09-08 RX ADMIN — PHENYLEPHRINE HYDROCHLORIDE 50 MCG: 10 INJECTION, SOLUTION INTRAMUSCULAR; INTRAVENOUS; SUBCUTANEOUS at 11:42

## 2017-09-08 RX ADMIN — FENTANYL CITRATE 50 MCG: 50 INJECTION, SOLUTION INTRAMUSCULAR; INTRAVENOUS at 11:01

## 2017-09-08 RX ADMIN — PHENYLEPHRINE HYDROCHLORIDE 50 MCG: 10 INJECTION, SOLUTION INTRAMUSCULAR; INTRAVENOUS; SUBCUTANEOUS at 11:22

## 2017-09-08 RX ADMIN — ACETAMINOPHEN 975 MG: 325 TABLET, FILM COATED ORAL at 21:22

## 2017-09-08 RX ADMIN — PHENYLEPHRINE HYDROCHLORIDE 50 MCG: 10 INJECTION, SOLUTION INTRAMUSCULAR; INTRAVENOUS; SUBCUTANEOUS at 11:31

## 2017-09-08 RX ADMIN — ESMOLOL HYDROCHLORIDE 30 MG: 10 INJECTION, SOLUTION INTRAVENOUS at 13:16

## 2017-09-08 RX ADMIN — PROPOFOL 20 MG: 10 INJECTION, EMULSION INTRAVENOUS at 10:44

## 2017-09-08 RX ADMIN — PHENYLEPHRINE HYDROCHLORIDE 100 MCG: 10 INJECTION, SOLUTION INTRAMUSCULAR; INTRAVENOUS; SUBCUTANEOUS at 12:15

## 2017-09-08 RX ADMIN — Medication 12.5 MG: at 09:32

## 2017-09-08 RX ADMIN — SODIUM CHLORIDE, POTASSIUM CHLORIDE, SODIUM LACTATE AND CALCIUM CHLORIDE: 600; 310; 30; 20 INJECTION, SOLUTION INTRAVENOUS at 11:48

## 2017-09-08 RX ADMIN — ALBUMIN (HUMAN) 12.5 G: 12.5 SOLUTION INTRAVENOUS at 10:07

## 2017-09-08 RX ADMIN — PHENYLEPHRINE HYDROCHLORIDE 50 MCG: 10 INJECTION, SOLUTION INTRAMUSCULAR; INTRAVENOUS; SUBCUTANEOUS at 11:45

## 2017-09-08 RX ADMIN — ASPIRIN 600 MG: 600 SUPPOSITORY RECTAL at 14:20

## 2017-09-08 RX ADMIN — PHENYLEPHRINE HYDROCHLORIDE 50 MCG: 10 INJECTION, SOLUTION INTRAMUSCULAR; INTRAVENOUS; SUBCUTANEOUS at 11:23

## 2017-09-08 RX ADMIN — HYDROMORPHONE HYDROCHLORIDE 0.3 MG: 1 INJECTION, SOLUTION INTRAMUSCULAR; INTRAVENOUS; SUBCUTANEOUS at 18:51

## 2017-09-08 RX ADMIN — ONDANSETRON 4 MG: 2 INJECTION INTRAMUSCULAR; INTRAVENOUS at 12:55

## 2017-09-08 RX ADMIN — FENTANYL CITRATE 50 MCG: 50 INJECTION, SOLUTION INTRAMUSCULAR; INTRAVENOUS at 11:02

## 2017-09-08 RX ADMIN — GLYCOPYRROLATE 0.8 MG: 0.2 INJECTION, SOLUTION INTRAMUSCULAR; INTRAVENOUS at 13:09

## 2017-09-08 RX ADMIN — ATORVASTATIN CALCIUM 80 MG: 20 TABLET, FILM COATED ORAL at 21:22

## 2017-09-08 RX ADMIN — PHENYLEPHRINE HYDROCHLORIDE 100 MCG: 10 INJECTION, SOLUTION INTRAMUSCULAR; INTRAVENOUS; SUBCUTANEOUS at 12:25

## 2017-09-08 RX ADMIN — PHENYLEPHRINE HYDROCHLORIDE 50 MCG: 10 INJECTION, SOLUTION INTRAMUSCULAR; INTRAVENOUS; SUBCUTANEOUS at 11:40

## 2017-09-08 RX ADMIN — PHENYLEPHRINE HYDROCHLORIDE 0.25 MCG/KG/MIN: 10 INJECTION, SOLUTION INTRAMUSCULAR; INTRAVENOUS; SUBCUTANEOUS at 10:42

## 2017-09-08 RX ADMIN — CEFAZOLIN SODIUM 1 G: 2 INJECTION, SOLUTION INTRAVENOUS at 12:50

## 2017-09-08 RX ADMIN — ROCURONIUM BROMIDE 20 MG: 10 INJECTION INTRAVENOUS at 12:00

## 2017-09-08 ASSESSMENT — COPD QUESTIONNAIRES: COPD: 0

## 2017-09-08 ASSESSMENT — ENCOUNTER SYMPTOMS: SEIZURES: 0

## 2017-09-08 ASSESSMENT — LIFESTYLE VARIABLES: TOBACCO_USE: 1

## 2017-09-08 NOTE — OR NURSING
I  Spoke with Arpit Herbert, tech services for [employer]. Verified patient has an ICD/pacemaker. Using magnet will inhibit shock, removing magnet will return ICD to previous settings.

## 2017-09-08 NOTE — PROGRESS NOTES
Admission medication history interview status for the 9/8/2017  admission is complete. See EPIC admission navigator for prior to admission medications     Medication history source reliability:Good    Medication history interview source(s):Patient    Medication history resources (including written lists, pill bottles, clinic record):None    Primary pharmacy.[facility]    Additional medication history information not noted on PTA med list :None    Time spent in this activity: 45 minutes    Prior to Admission medications    Medication Sig Last Dose Taking? Auth Provider   ASPIRIN PO Take 1,000 mg by mouth every morning (Takes 2 x 500 mg = 1000 mg) 9/7/2017 at am Yes Reported, Patient   Acetaminophen (TYLENOL PO) Take 1,000 mg by mouth every morning (Takes 2 x 500 mg = 1000 mg) 9/7/2017 at am Yes Reported, Patient   insulin glargine (LANTUS) 100 UNIT/ML injection Inject 27 Units Subcutaneous every morning 9/7/2017 at am Yes Reported, Patient   Nitroglycerin (NITROSTAT SL) Place 0.4 mg under the tongue every 5 minutes as needed for chest pain never taken at PRN Yes Reported, Patient   METFORMIN HCL PO Take 1,000 mg by mouth 2 times daily (Takes 2 x 500 mg = 1000 mg dose) 9/7/2017 at PM Yes Reported, Patient   METOPROLOL SUCCINATE ER PO Take 12.5 mg by mouth every morning (Takes 0.5 x 25 mg tablet = 12.5 mg) 9/7/2017 at am Yes Reported, Patient   amylase-lipase-protease (CREON 12) 50607 UNITS CPEP Take 1 capsule by mouth daily 9/8/2017 at 0530 Yes Reported, Patient   atorvastatin (LIPITOR) 80 MG tablet Take 80 mg by mouth At Bedtime  9/7/2017 at hs Yes Reported, Patient   glipiZIDE (GLUCOTROL) 10 MG tablet Take 10 mg by mouth 2 times daily  9/7/2017 at pm Yes Reported, Patient   pantoprazole (PROTONIX) 40 MG EC tablet Take 40 mg by mouth daily 9/8/2017 at 0530 Yes Reported, Patient

## 2017-09-08 NOTE — BRIEF OP NOTE
[facility]  Vascular Surgery Brief Operative Note    Pre-operative diagnosis: LEFT CAROTID STENOSIS   Post-operative diagnosis Same   Procedure: Left Carotid Endarterectomy with patch angioplasty  Continuous Intraoperative EEG Monitoring   Surgeon: João Turner MD   Assistants(s): Artie Lozano PA-C   Anesthesia: General    Estimated blood loss: 50cc   Drains: None       Specimens: None   Findings:    Complications: None       Condition: Stable. Moved all 4 extremities on command post-procedure.  EEG remained stable.  Left tongue deviation 2  to distal retraction.           Artie Lozano

## 2017-09-08 NOTE — CONSULTS
[facility]    Vascular Medicine Consultation     Date of Admission:  9/8/2017  Date of Consult (When I saw the patient): 09/08/17         Physician Supervisory Attestation:   I have reviewed and discussed with the physician assistant their history, physical and plan and independently interviewed and examined Delbert Tilley and agree with the plan as stated in the physician assistant note.    Delbert Tilley is a 67 year old male Life time  2 pack daily Smoker  with a history significant for CAD s/p BARRON 2014, severe cardiomyopathy with an EF of 28%, diabetes, and hyperlipidemia who has also had known carotid stenosis. He underwent a right carotid endarterectomy in 2015 for symptomatic carotid stenosis. He has been followed over the years by Dr. Turner with routine imaging of his carotid arteries. He was noted recently to have progressive left carotid stenosis to > 70% and a prophylactic carotid endarterectomy was requested. He remains neurologically intact. He is followed closely by Dr. Michaels of Cardiology given his lengthy cardiac history. He continues to smoke despite being told on numerous occasions and by different providers that he should quit. He presents today for his left carotid endarterectomy.  He was seen before surgery  He also has HX of short distance claudication less than a block  He is not willing to quit smoking.  Bilateral carotid Bruit present  He also has ICD/pacemekaer in place.    Plan:  Post op cares, antiplatelet meds per vasc surgery  [employer] ICD rep to address pre op  magnet and ICD management   Monitor neuro status  Keep SBP under 140  Consider RAAS blocker if BP permits ( he is not on any currently)  Nicotine 21 mg patch while in the hospital. Educated again .  Poorly controlled DM .High resist insulin SS post op. Continue LA insulin.  Atorvastatin 80 mg daily.  [facility]  He will benefit with enrolling in WEL program , arrange at the time of  d/c  Monitor volume status and avoid or minimize IVF given CHF with EF 28%    Thank you for the cosnult !    CC: YENY Turner MD  primary MD      Romain Lowry MD ,[facility]  Vascular Medicine Service.  9/8/2017      Assessment & Plan   1. Asymptomatic high-grade left carotid artery stenosis undergoing a left carotid endarterectomy 9/8/17     Post-operative cares per Vascular Surgery. He did have a right carotid endarterectomy for symptomatic high-grade stenosis on 6/12/15. He has continued to smoke with poor glycemic control of his diabetes and he already has 50-69% stenosis again on that side. Follow-up will be per Dr. Turner.      2. Ongoing tobacco abuse     The patient continues to smoke at least 2 packs per day. He was strongly encouraged to stop smoking, but he is not interested in quitting at this time and therefore desires no assistance in quitting.      3. Type 2 diabetes     He is on lantus, metformin, and glipizide as an outpatient with an A1C of 8.6% today. Given his CAD and carotid disease, tighter control of his diabetes is desired. He will be placed on a moderate consistent carbohydrate diet. His oral agents will be held while in the hospital and insulin will be provided as needed as well as his scheduled lantus. His sugars will be followed closely. Consider increasing his lantus dosing upon discharge.      4. Hyperlipidemia     The patient's lipids are presently well controlled on Atorvastatin 80 mg daily. Continue the same.      5. CAD s/p CABG 2005 and BARRON in 10/2014     The patient is followed closely by Dr. Michaels. His most recent Lexiscan on 8/17/17 was negative for any ischemia per her report. Medical management will be continued.     6. Severe cardiomyopathy with EF 28%     Provide gentle hydration if needed. Monitor closely. Appears to have been stable in recent past.      7. Hypertension    His blood pressure will be monitored closely. He will be continued on his metoprolol. After his  last carotid surgery, he had hypotension. If his blood pressure runs high this admission, consider adding ACE/ARB.    8. PAD with left lower extremity claudication    He has claudication at 1 block in the left lower leg. No non-healing sores or rest pain. His claudication pain does resolve with rest. [facility] He needs to stop smoking and get his diabetes under better control.       Reason for Consult   Reason for consult: Asked by Dr. Turner to evaluate vascular risk factors and assist with medical management in this 67 year old male smoker vasculopath (PAD, carotid disease, CAD) who presents for a left carotid endarterectomy for asymptomatic high grade left carotid stenosis.     Primary Care Physician   [facility]      History of Present Illness   Delbert Tilley is a 67 year old male smoker with a history significant for CAD s/p BARRON 2014, severe cardiomyopathy with an EF of 28%, diabetes, and hyperlipidemia who has also had known carotid stenosis. He underwent a right carotid endarterectomy in 2015 for symptomatic carotid stenosis. He has been followed over the years by Dr. Turner with routine imaging of his carotid arteries. He was noted recently to have progressive left carotid stenosis to > 70% and a prophylactic carotid endarterectomy was requested. He remains neurologically intact. He is followed closely by Dr. Michaels of Cardiology given his lengthy cardiac history. He continues to smoke despite being told on numerous occasions and by different providers that he should quit. He presents today for his left carotid endarterectomy.    Past Medical History   Past Medical History:   Diagnosis Date     Acute renal failure (H) 8/26/06 Hosp    secondary to dehydration     Arthritis      CAD (coronary artery disease)     LIMA to the LAD, vein graft to OM and a vein graft to the PDA     Carpal tunnel syndrome      Diabetes (H)      Gastro-oesophageal reflux disease       H/O: alcohol abuse      HTN (hypertension)      Hyperlipidaemia      Hyperlipidaemia LDL goal <100 7/8/2013     Hypokalemia      Pacemaker      Pancreatitis 6/26/06 Hosp       Past Surgical History   Past Surgical History:   Procedure Laterality Date     BYPASS CARDIOPULMONARY       CATARACT IOL, RT/LT      Cataract IOL RT/LT     ENDARTERECTOMY CAROTID Right 6/12/2015    Procedure: ENDARTERECTOMY CAROTID;  Surgeon: João Turner MD;  Location: [facility] OR     IMPLANT PACEMAKER  6/10/2010     RELEASE CARPAL TUNNEL Right 4/12/2016    Procedure: RELEASE CARPAL TUNNEL;  Surgeon: Lissy Leger MD;  Location: [facility]     SURGICAL HISTORY OF -   1998    Laminectomy     SURGICAL HISTORY OF -   10/2003    Bypass grafting     TONSILLECTOMY & ADENOIDECTOMY         Prior to Admission Medications   Prior to Admission Medications   Prescriptions Last Dose Informant Patient Reported? Taking?   ASPIRIN PO 9/7/2017 at am Self Yes Yes   Sig: Take 1,000 mg by mouth every morning (Takes 2 x 500 mg = 1000 mg)   Acetaminophen (TYLENOL PO) 9/7/2017 at am Self Yes Yes   Sig: Take 1,000 mg by mouth every morning (Takes 2 x 500 mg = 1000 mg)   METFORMIN HCL PO 9/7/2017 at PM Self Yes Yes   Sig: Take 1,000 mg by mouth 2 times daily (Takes 2 x 500 mg = 1000 mg dose)   METOPROLOL SUCCINATE ER PO 9/7/2017 at am Self Yes Yes   Sig: Take 12.5 mg by mouth every morning (Takes 0.5 x 25 mg tablet = 12.5 mg)   Nitroglycerin (NITROSTAT SL) never taken at PRN Self Yes Yes   Sig: Place 0.4 mg under the tongue every 5 minutes as needed for chest pain   amylase-lipase-protease (CREON 12) 76661 UNITS CPEP 9/8/2017 at 0530 Self Yes Yes   Sig: Take 1 capsule by mouth daily   atorvastatin (LIPITOR) 80 MG tablet 9/7/2017 at hs Self Yes Yes   Sig: Take 80 mg by mouth At Bedtime    glipiZIDE (GLUCOTROL) 10 MG tablet 9/7/2017 at pm Self Yes Yes   Sig: Take 10 mg by mouth 2 times daily    insulin glargine (LANTUS) 100 UNIT/ML injection 9/7/2017 at am  Self Yes Yes   Sig: Inject 27 Units Subcutaneous every morning   pantoprazole (PROTONIX) 40 MG EC tablet 9/8/2017 at 0530 Self Yes Yes   Sig: Take 40 mg by mouth daily      Facility-Administered Medications: None     Allergies   Allergies   Allergen Reactions     Hydrocodone      Upset stomach     Shellfish Allergy Hives and Rash       Social History   Delbert Tilley  reports that he has been smoking Cigarettes.  He has been smoking about 2.00 packs per day. He has never used smokeless tobacco. He reports that he does not drink alcohol or use illicit drugs.    Family History   Family History   Problem Relation Age of Onset     Unknown/Adopted No family hx of        Review of Systems   The 10 point Review of Systems is negative other than noted in the HPI or here.     Physical Exam   Temp: 98  F (36.7  C) Temp src: Temporal BP: 128/71   Heart Rate: 63 Resp: 20 SpO2: 99 %      Vital Signs with Ranges  Temp:  [98  F (36.7  C)] 98  F (36.7  C)  Heart Rate:  [63] 63  Resp:  [20] 20  BP: (128)/(71) 128/71  SpO2:  [99 %] 99 %  155 lbs 0 oz    Constitutional: awake, alert, cooperative, no apparent distress, and appears stated age  Eyes: Lids and lashes normal, pupils equal, round and reactive to light, extra ocular muscles intact, sclera clear, conjunctiva normal  ENT: normocepalic, without obvious abnormality, oropharynx pink and moist  Hematologic / Lymphatic: no lymphadenopathy  Respiratory: No increased work of breathing, good air exchange, clear to auscultation bilaterally, no crackles or wheezing  Cardiovascular: regular rate and rhythm, normal S1 and S2 and no murmur noted  GI: Normal bowel sounds, soft, non-distended, non-tender  Skin: no redness, warmth, or swelling, no rashes and no lesions  Musculoskeletal: There is no redness, warmth, or swelling of the joints.  Full range of motion noted.  Motor strength is 5 out of 5 all extremities bilaterally.  Tone is normal.  Neurologic: Awake, alert, oriented to name,  place and time.  Cranial nerves II-XII are grossly intact.  Motor is 5 out of 5 bilaterally.    Neuropsychiatric:  Normal affect, memory, insight.  Pulses: Palpable right pedal pulses. Unable to palpate left pedal pulses. Bilateral carotid bruits appreciated.     Data   Most Recent 3 CBC's:  Recent Labs   Lab Test  08/11/17   0725  06/08/15   0731  10/21/14   0650   WBC  9.3  11.4*  9.7   HGB  14.4  15.4  15.8   MCV  93  90  88   PLT  117*  139*  121*     Most Recent 3 BMP's:  Recent Labs   Lab Test  09/06/17   0742  08/11/17   0725 10/19/16   06/12/15   0839   10/21/14   0650   NA  137  137   --    --   137   --   138   POTASSIUM  4.4  4.4  5.0   < >  4.5   < >  4.3   CHLORIDE  107  105   --    --   107   --   108   CO2  25  29   --    --   26   --   25   BUN  17  19   --    --   16   --   13   CR  0.79  0.84  0.9   < >  0.81   --   0.83   ANIONGAP  5  3   --    --    --    --   5   MARVA  8.6  8.8   --    --   9.0   --   8.8   GLC  150*  189*  297*   < >  199*   --   196*    < > = values in this interval not displayed.     Most Recent 3 INR's:  Recent Labs   Lab Test  10/20/14   0640  07/09/10   0830  07/02/10   1530   INR  0.94  1.7*  1.6*     Most Recent Cholesterol Panel:  Recent Labs   Lab Test 09/01/16  06/12/15   0839   CHOL  93  110   LDL   --   62   HDL  22*  24*   TRIG  122  122     Most Recent Hemoglobin A1c:  Recent Labs   Lab Test  09/06/17   0742   A1C  9.1*

## 2017-09-08 NOTE — IP AVS SNAPSHOT
MRN:[id number]                      After Visit Summary   9/8/2017    Delbert Tilley    MRN: [id number]           Thank you!     Thank you for choosing [facility] for your care. Our goal is always to provide you with excellent care. Hearing back from our patients is one way we can continue to improve our services. Please take a few minutes to complete the written survey that you may receive in the mail after you visit with us. Thank you!        Patient Information     Date Of Birth          1950        Designated Caregiver       Most Recent Value    Caregiver    Will someone help with your care after discharge? yes    Name of designated caregiver mora  [pt states daughter will be at his house ]    Phone number of caregiver     Caregiver address [id number] [place] [address]      About your hospital stay     You were admitted on:  September 8, 2017 You last received care in the:  [place] [address] [facility]    You were discharged on:  September 9, 2017       Who to Call     For medical emergencies, please call 911.  For non-urgent questions about your medical care, please call your primary care provider or clinic, None  For questions related to your surgery, please call your surgery clinic        Attending Provider     Provider Specialty    João Turner MD Surgery       Primary Care Provider Fax #    [facility] [phone]      After Care Instructions     Activity       Your activity upon discharge: activity as tolerated            Diet       Follow this diet upon discharge: Advance to a regular diet as tolerated--Heavy nectar per speech consult                  Follow-up Appointments     Follow-up and recommended labs and tests        Follow up with Dr. Turner , at (location with clinic name or city) [facility], within 2 weeks  to evaluate after surgery. The following labs/tests are recommended: Carotid Duplex in three months.                   Your next 10 appointments already scheduled     Nov 03, 2017  4:30 PM CDT   Return Visit with Romario Moyer MD   [facility]    [address] [place] [id number] [phone]            Nov 21, 2017  8:00 AM CST   ICD Check with Wy Device Rn   [facility] ([facility])    [address] [facility] [address] [phone]              Further instructions from your care team       Carotid Endartectomy  Post-Operative Instructions    Typical length of stay in the hospital is 1-2 days.  On occasion, an evening in the Intensive Care Unit may be required for blood pressure regulation.    Activity    Most people are able to resume normal activities 1-2 weeks after surgery.  The key is to gradually increase your level of activity as you are able.  It is not uncommon to feel easily fatigued - this will improve with time.      You should avoid heavy lifting more than 30 lbs for 1 week.      You may return to work when you feel able - typically 1-2 weeks after surgery, depending on the type of work you do.      You should not drive a car for 1 week after surgery.  In addition,  you can not be taking prescription strength pain medication and you must feel strong enough to drive safely.    Incision Care    You can shower or bathe 2 days after surgery - avoid rubbing and soaking your incision.      You may have strips of white tape called  steri-strips  across your incision; they should stay on for 5-7 days or until the ends start to curl up.  You can then remove the steri-strips, or we will remove them at your post-operative appointment.      Avoid shaving directly over the incision for 2-3 weeks.    Common Concerns    You may notice mild skin numbness on the side of your surgery in your neck, chin, face, and earlobe.  This is due to nerve  irritation  and will gradually resolve over the next several  months.  Call our office if you experience any short-lasting episode of numbness or weakness affecting one side of your body.      Some bruising and firmness around you incision can be expected.  This will soften and resolve over the next few weeks.  You may notice that some discoloration spreads to an area lower than the incision, such as the shoulder, neck or upper chest.  Likewise, swelling can occur near the incision or below the chin.  Call our office if the swelling or bruising worsens, or if you have difficulty swallowing.    Diet    You may resume a regular diet before you leave the hospital.  You may find that it is best to try smaller, more frequent meals until your appetite returns.    Medications    You may be started on a blood thinner after surgery - typically Aspirin and / or Plavix.      You may be given a prescription for pain medication after surgery.  If you need to have this refilled, please call your pharmacy where you had it filled.  They will then call us for approval.  Please do not call after hours for a refill on pain medication.    Post-Operative Appointments    You need to see your surgeon in the clinic approximately 1-2 weeks after surgery.  Post-operative appointment:   Date: _____________________________  Time: ____________________________  Dr. ______________________________      You will have an ultrasound test and evaluation with your surgeon 90 days (3 months) after surgery.      We will notify you by mail when you are due to schedule further ultrasound testing and evaluation; typically this is done annually.     When You Should Call Our Office    Body aches, chills or temperature greater than 101      Incision redness or drainage      Loss of vision in one eye      Loss of strength / weakness in one side of your body      Severe headache      Difficulty with speech      If you will be having an invasive procedure, such as a colonoscopy or dental work within one year of your  "surgery, you may need to take an antibiotic prior to the procedure if you had a Dacron patch with your surgery; please call us so we can assist you with this.    Call our main number, [phone], for assistance with any concerns or questions.     Revised 2014      Pending Results     Date and Time Order Name Status Description    2017 0836 EKG 12-lead, tracing only Preliminary             Statement of Approval     Ordered          17 1356  I have reviewed and agree with all the recommendations and orders detailed in this document.  EFFECTIVE NOW     Approved and electronically signed by:  Hu Monroe MD             Admission Information     Date & Time Provider Department Dept. Phone    2017 João Turner MD [place] [address] [facility] [phone]      Your Vitals Were     Blood Pressure Pulse Temperature Respirations Height Weight    95/55 (BP Location: Right arm) 80 98.3  F (36.8  C) (Oral) 16 1.829 m (6') 71.3 kg (157 lb 3 oz)    Pulse Oximetry BMI (Body Mass Index)                95% 21.32 kg/m2          [employer] Information     [employer] lets you send messages to your doctor, view your test results, renew your prescriptions, schedule appointments and more. To sign up, go to [email or web address] . Click on \"Log in\" on the left side of the screen, which will take you to the Welcome page. Then click on \"Sign up Now\" on the right side of the page.     You will be asked to enter the access code listed below, as well as some personal information. Please follow the directions to create your username and password.     Your access code is: [email or web address]  Expires: 2017  7:24 AM     Your access code will  in 90 days. If you need help or a new code, please call your [facility] clinic or [phone].        Care EveryWhere ID     This is your Care EveryWhere ID. This could be used by other organizations to access your [facility] medical records  [id number]      "   Equal Access to Services     [facility] Center[facility]CIANO : Hadii aad ku hadjuanmj Mondragon, wasamda luqadaha, qaybta leilamengselena fontaine, gwendolyn sykes. So [facility] [phone].    ATENCIÓN: Si habla vivian, tiene a frey disposición servicios gratuitos de asistencia lingüística. Seamusame al [phone].    We comply with applicable federal civil rights laws and [place] laws. We do not discriminate on the basis of race, color, national origin, age, disability sex, sexual orientation or gender identity.               Review of your medicines      CONTINUE these medicines which may have CHANGED, or have new prescriptions. If we are uncertain of the size of tablets/capsules you have at home, strength may be listed as something that might have changed.        Dose / Directions    aspirin 325 MG tablet   This may have changed:    - medication strength  - how much to take        Dose:  1000 mg   Take 3 tablets (975 mg) by mouth every morning (Takes 2 x 500 mg = 1000 mg)   Quantity:  120 tablet   Refills:  1         CONTINUE these medicines which have NOT CHANGED        Dose / Directions    amylase-lipase-protease 70680 UNITS Cpep   Commonly known as:  CREON 12        Dose:  1 capsule   Take 1 capsule by mouth daily   Refills:  0       atorvastatin 80 MG tablet   Commonly known as:  LIPITOR        Dose:  80 mg   Take 80 mg by mouth At Bedtime   Refills:  0       glipiZIDE 10 MG tablet   Commonly known as:  GLUCOTROL        Dose:  10 mg   Take 10 mg by mouth 2 times daily   Refills:  0       insulin glargine 100 UNIT/ML injection   Commonly known as:  LANTUS        Dose:  27 Units   Inject 27 Units Subcutaneous every morning   Refills:  0       METFORMIN HCL PO        Dose:  1000 mg   Take 1,000 mg by mouth 2 times daily (Takes 2 x 500 mg = 1000 mg dose)   Refills:  0       METOPROLOL SUCCINATE ER PO        Dose:  12.5 mg   Take 12.5 mg by mouth every morning (Takes 0.5 x 25 mg tablet = 12.5 mg)   Refills:  0        NITROSTAT SL        Dose:  0.4 mg   Place 0.4 mg under the tongue every 5 minutes as needed for chest pain   Refills:  0       pantoprazole 40 MG EC tablet   Commonly known as:  PROTONIX        Dose:  40 mg   Take 40 mg by mouth daily   Refills:  0       TYLENOL PO        Dose:  1000 mg   Take 1,000 mg by mouth every morning (Takes 2 x 500 mg = 1000 mg)   Refills:  0            Where to get your medicines      Some of these will need a paper prescription and others can be bought over the counter. Ask your nurse if you have questions.     You don't need a prescription for these medications     aspirin 325 MG tablet                Protect others around you: Learn how to safely use, store and throw away your medicines at www.disposemymeds.org.             Medication List: This is a list of all your medications and when to take them. Check marks below indicate your daily home schedule. Keep this list as a reference.      Medications           Morning Afternoon Evening Bedtime As Needed    amylase-lipase-protease 96593 UNITS Cpep   Commonly known as:  CREON 12   Take 1 capsule by mouth daily   Last time this was given:  12,000 Units on 9/9/2017 11:42 AM                                   aspirin 325 MG tablet   Take 3 tablets (975 mg) by mouth every morning (Takes 2 x 500 mg = 1000 mg)                                   atorvastatin 80 MG tablet   Commonly known as:  LIPITOR   Take 80 mg by mouth At Bedtime   Last time this was given:  80 mg on 9/8/2017  9:22 PM                                   glipiZIDE 10 MG tablet   Commonly known as:  GLUCOTROL   Take 10 mg by mouth 2 times daily                                      insulin glargine 100 UNIT/ML injection   Commonly known as:  LANTUS   Inject 27 Units Subcutaneous every morning   Last time this was given:  27 Units on 9/9/2017  9:12 AM                                   METFORMIN HCL PO   Take 1,000 mg by mouth 2 times daily (Takes 2 x 500 mg = 1000 mg dose)                                       METOPROLOL SUCCINATE ER PO   Take 12.5 mg by mouth every morning (Takes 0.5 x 25 mg tablet = 12.5 mg)   Last time this was given:  12.5 mg on 9/8/2017  9:32 AM                                   NITROSTAT SL   Place 0.4 mg under the tongue every 5 minutes as needed for chest pain                                   pantoprazole 40 MG EC tablet   Commonly known as:  PROTONIX   Take 40 mg by mouth daily                                   TYLENOL PO   Take 1,000 mg by mouth every morning (Takes 2 x 500 mg = 1000 mg)   Last time this was given:  975 mg on 9/9/2017  9:24 AM

## 2017-09-08 NOTE — ANESTHESIA POSTPROCEDURE EVALUATION
Patient: Delbert Tilley    Procedure(s):  LEFT CAROTID ENDARTERECTOMY WITH EEG - Wound Class: I-Clean    Diagnosis:LEFT CAROTID STENOSIS  Diagnosis Additional Information: No value filed.    Anesthesia Type:  General, ETT    Note:  Anesthesia Post Evaluation    Patient location during evaluation: PACU  Patient participation: Able to fully participate in evaluation  Level of consciousness: awake and alert  Pain management: satisfactory to patient  Airway patency: patent  Cardiovascular status: acceptable, stable and hypotensive  Respiratory status: acceptable and nasal cannula  Hydration status: acceptable  PONV: none     Anesthetic complications: None    Comments: Hypotension early this am prior to any sedatives given (BP's with systolics in low 80s). Received 250 ml albumin prior to OR. In PACU, BP was again in the low 80's, albumin given with mild increase in BP. Alert and oriented, no dizziness. Will send him to the floor despite relative hypotension. Would recommend holding am BP medications with careful fluid managment rather than pushing more fluids with his chronic systolic heart failure.         Last vitals:  Vitals:    09/08/17 1450 09/08/17 1455 09/08/17 1500   BP: (!) 76/48 (!) 81/44 (!) 82/42   Resp: 10 15 8   Temp:      SpO2: 98% 95% 96%         Electronically Signed By: Kim Che MD  September 8, 2017  3:24 PM

## 2017-09-08 NOTE — ANESTHESIA CARE TRANSFER NOTE
Patient: Delbert Tilley    Procedure(s):  LEFT CAROTID ENDARTERECTOMY WITH EEG - Wound Class: I-Clean    Diagnosis: LEFT CAROTID STENOSIS  Diagnosis Additional Information: No value filed.    Anesthesia Type:   General, ETT     Note:  Airway :Face Mask  Patient transferred to:PACU  Comments: VSS on arrival to PACU. Alert and talking, on 8L SFM 02. Report given to RN before transfer of patient care.      Vitals: (Last set prior to Anesthesia Care Transfer)    CRNA VITALS  9/8/2017 1250 - 9/8/2017 1325      9/8/2017             Pulse: 95    ART BP: 130/61    ART Mean: 87    SpO2: 100 %    Resp Rate (set): 10                Electronically Signed By: ANA Haider CRNA  September 8, 2017  1:25 PM

## 2017-09-08 NOTE — ANESTHESIA PREPROCEDURE EVALUATION
Procedure: Procedure(s):  ENDARTERECTOMY CAROTID  Preop diagnosis: LEFT CAROTID STENOSIS    Allergies   Allergen Reactions     Hydrocodone      Upset stomach     Shellfish Allergy Hives and Rash     Past Medical History:   Diagnosis Date     Acute renal failure (H) 8/26/06 Hosp    secondary to dehydration     Arthritis      CAD (coronary artery disease)     LIMA to the LAD, vein graft to OM and a vein graft to the PDA     Carpal tunnel syndrome      Diabetes (H)      Gastro-oesophageal reflux disease      H/O: alcohol abuse      HTN (hypertension)      Hyperlipidaemia      Hyperlipidaemia LDL goal <100 7/8/2013     Hypokalemia      Pacemaker      Pancreatitis 6/26/06 Hosp     Past Surgical History:   Procedure Laterality Date     BYPASS CARDIOPULMONARY       CATARACT IOL, RT/LT      Cataract IOL RT/LT     ENDARTERECTOMY CAROTID Right 6/12/2015    Procedure: ENDARTERECTOMY CAROTID;  Surgeon: João Turner MD;  Location: [facility]     IMPLANT PACEMAKER  6/10/2010     RELEASE CARPAL TUNNEL Right 4/12/2016    Procedure: RELEASE CARPAL TUNNEL;  Surgeon: Lissy Leger MD;  Location: [facility]     SURGICAL HISTORY OF -   1998    Laminectomy     SURGICAL HISTORY OF -   10/2003    Bypass grafting     TONSILLECTOMY & ADENOIDECTOMY       Prior to Admission medications    Medication Sig Start Date End Date Taking? Authorizing Provider   ASPIRIN PO Take 1,000 mg by mouth every morning (Takes 2 x 500 mg = 1000 mg)   Yes Reported, Patient   Acetaminophen (TYLENOL PO) Take 1,000 mg by mouth every morning (Takes 2 x 500 mg = 1000 mg)   Yes Reported, Patient   insulin glargine (LANTUS) 100 UNIT/ML injection Inject 27 Units Subcutaneous every morning   Yes Reported, Patient   Nitroglycerin (NITROSTAT SL) Place 0.4 mg under the tongue every 5 minutes as needed for chest pain   Yes Reported, Patient   METFORMIN HCL PO Take 1,000 mg by mouth 2 times daily (Takes 2 x 500 mg = 1000 mg dose)   Yes Reported, Patient   METOPROLOL  SUCCINATE ER PO Take 12.5 mg by mouth every morning (Takes 0.5 x 25 mg tablet = 12.5 mg)   Yes Reported, Patient   amylase-lipase-protease (CREON 12) 39300 UNITS CPEP Take 1 capsule by mouth daily   Yes Reported, Patient   atorvastatin (LIPITOR) 80 MG tablet Take 80 mg by mouth At Bedtime    Yes Reported, Patient   glipiZIDE (GLUCOTROL) 10 MG tablet Take 10 mg by mouth 2 times daily    Yes Reported, Patient   pantoprazole (PROTONIX) 40 MG EC tablet Take 40 mg by mouth daily   Yes Reported, Patient     Current Facility-Administered Medications Ordered in Epic   Medication Dose Route Frequency Last Rate Last Dose     ceFAZolin sodium-dextrose (ANCEF) infusion 2 g  2 g Intravenous Pre-Op/Pre-procedure x 1 dose         lidocaine 1 % 1 mL  1 mL Other Q1H PRN         lactated ringers infusion   Intravenous Continuous 25 mL/hr at 09/08/17 0907       metoprolol (TOPROL-XL) half-tab 12.5 mg  12.5 mg Oral Once         No current [facility]-ordered outpatient prescriptions on file.     Wt Readings from Last 1 Encounters:   09/08/17 70.3 kg (155 lb)     Temp Readings from Last 1 Encounters:   09/08/17 36.7  C (98  F) (Temporal)     BP Readings from Last 6 Encounters:   09/08/17 128/71   09/06/17 104/65   08/21/17 98/63   08/11/17 116/62   05/12/17 119/71   05/09/17 119/67     Pulse Readings from Last 4 Encounters:   09/06/17 70   08/21/17 88   08/11/17 66   05/12/17 96     Resp Readings from Last 1 Encounters:   09/08/17 20     SpO2 Readings from Last 1 Encounters:   09/08/17 99%     Recent Labs   Lab Test  09/06/17   0742  08/11/17   0725   NA  137  137   POTASSIUM  4.4  4.4   CHLORIDE  107  105   CO2  25  29   ANIONGAP  5  3   GLC  150*  189*   BUN  17  19   CR  0.79  0.84   MARVA  8.6  8.8     Recent Labs   Lab Test  08/11/17   0725  06/08/15   0731   WBC  9.3  11.4*   HGB  14.4  15.4   PLT  117*  139*     Recent Labs   Lab Test  10/20/14   0640  07/09/10   0830   INR  0.94  1.7*      RECENT LABS:   ECG: atrial paced rhythm, no st  or twave changes from prior.   Stress 2017: Overall, the study quality is adequate . On the stress images, there  is a large defect involving the inferior inferoseptal and inferoapical  walls. There is a complete absence radiotracer uptake. On the rest  images, no significant changes were seen to this defect . Gated images  demonstrated probable akinesis of the defect. The remaining myocardial  segments appear hypokinetic. Left ventricle appears severely enlarged.  The left ventricular ejection fraction was calculated to be 28%. TID       Anesthesia Evaluation     .             ROS/MED HX    ENT/Pulmonary:     (+)tobacco use, Current use , . .   (-) asthma, COPD and sleep apnea   Neurologic:      (-) seizures, CVA and migraines   Cardiovascular:     (+) Dyslipidemia, hypertension-Peripheral Vascular Disease-- Carotid Stenosis, CAD, -past MI,CABG-. : . CHF Last EF: 28 date: 8/2017 . . pacemaker :ICD . .      (-) angina and angina   METS/Exercise Tolerance:  1 - Eating, dressing   Hematologic:        (-) history of blood clots, anemia and other hematologic disorder   Musculoskeletal:        (-) arthritis   GI/Hepatic:     (+) GERD      (-) liver disease   Renal/Genitourinary:      (-) renal disease   Endo:     (+) type II DM .   (-) Type I DM and thyroid disease   Psychiatric:         Infectious Disease:        (-) Recent Fever   Malignancy:         Other:                     Physical Exam  Normal systems: cardiovascular and pulmonary    Airway   Mallampati: II  TM distance: >3 FB  Neck ROM: full    Dental   (+) chipped and missing  Comment: Poor dentition, many decaying teeth    Cardiovascular   Rhythm and rate: regular and normal      Pulmonary    breath sounds clear to auscultation                    Anesthesia Plan      History & Physical Review      ASA Status:  3 .    NPO Status:  > 8 hours    Plan for General and ETT with Propofol induction. Maintenance will be Balanced.    PONV prophylaxis:  Ondansetron (or  other 5HT-3) and Dexamethasone or Solumedrol  Additional equipment: 2nd IV and Arterial Line Precedex infusion  Phenylephrine infusion ready        Postoperative Care  Postoperative pain management:  Multi-modal analgesia.      Consents  Anesthetic plan, risks, benefits and alternatives discussed with:  Patient..                          .

## 2017-09-08 NOTE — IP AVS SNAPSHOT
[facility]    [address] [place] [address]    Phone:  [phone]                                       After Visit Summary   9/8/2017    Delbert Tilley    MRN: [id number]           After Visit Summary Signature Page     I have received my discharge instructions, and my questions have been answered. I have discussed any challenges I see with this plan with the nurse or doctor.    ..........................................................................................................................................  Patient/Patient Representative Signature      ..........................................................................................................................................  Patient Representative Print Name and Relationship to Patient    ..................................................               ................................................  Date                                            Time    ..........................................................................................................................................  Reviewed by Signature/Title    ...................................................              ..............................................  Date                                                            Time

## 2017-09-08 NOTE — OR NURSING
Dr Che notified of BP's and notified of blood sugar of 185.  He's ok with present BP's 80's/40-50's.  Insulin ordered for blood sugar.

## 2017-09-08 NOTE — PROGRESS NOTES
[facility]    Hospitalist Progress Note    At this time the Vascular Medicine team has performed a consult and addressed all medical issues.  Hospitalist service was consulted for after hours medical cross-cover only.  Formal consult will be deferred, hospitalist will chart-check tomorrow.      Assessment & Plan   Delbert Tilley is a 67 year old male with past medical history of CAD, cardiomyopathy (EF 28%), diabetes, right CEA in 2015 and hyperlipidemia who was admitted on 9/8/2017 following a left carotid endarterectomy.    Summary:    1. Asymptomatic high-grade left carotid artery stenosis, POD #0 left carotid endarterectomy: Post-operative cares per Vascular Surgery. He did have a right carotid endarterectomy for symptomatic high-grade stenosis on 6/12/15. He has continued to smoke with poor glycemic control of his diabetes and he already has 50-69% stenosis again on that side. Follow-up will be per Dr. Turner.       2. Ongoing tobacco abuse: The patient continues to smoke at least 2 packs per day. He was strongly encouraged to stop smoking, but he is not interested in quitting at this time and therefore desires no assistance in quitting.       3. Type 2 diabetes: He is on lantus, metformin, and glipizide as an outpatient with an A1C of 8.6% 9/8/2017. Continue moderate consistent carbohydrate diet. His oral agents will be held while in the hospital and insulin will be provided as needed as well as his scheduled lantus. May need to consider Lantus dose increase.      4. Hyperlipidemia: The patient's lipids are presently well controlled. Continue PTA Atorvastatin 80 mg daily.      5. CAD s/p CABG 2005 and BARRON in 10/2014: The patient is followed closely by Dr. Michaels. His most recent Lexiscan on 8/17/17 was negative for any ischemia per her report. Medical management will be continued.      6. Severe cardiomyopathy with EF 28%/Chronic Systolic Heart Failure: Not in an acute exacerbation.       7.  Hypertension: His blood pressure will be monitored closely. He will be continued on his metoprolol. After his last carotid surgery, he had hypotension. If his blood pressure runs high this admission, consider adding ACE/ARB.     8. PAD with left lower extremity claudication: He has claudication at 1 block in the left lower leg. No non-healing sores or rest pain. His claudication pain does resolve with rest. [facility] Risk factor modification recommended.    DVT Prophylaxis: Defer to primary service  Code Status: Prior    Disposition: Per vascular surgery. No charge for chart check. Patient not seen.    Filomena Segal PA-C        -Data reviewed today: I reviewed all new labs and imaging results over the last 24 hours. I personally reviewed no images or EKG's today.    Physical Exam   Temp: 98.1  F (36.7  C) Temp src: Temporal BP: 100/54   Heart Rate: 60 Resp: 14 SpO2: 99 % O2 Device: Nasal cannula Oxygen Delivery: 2 LPM  Vitals:    09/08/17 0859   Weight: 70.3 kg (155 lb)     Vital Signs with Ranges  Temp:  [98  F (36.7  C)-98.3  F (36.8  C)] 98.1  F (36.7  C)  Heart Rate:  [54-85] 60  Resp:  [8-32] 14  BP: ()/(42-89) 100/54  MAP:  [44 mmHg-101 mmHg] 44 mmHg  Arterial Line BP: ()/(36-61) 55/36  SpO2:  [95 %-100 %] 99 %  I/O last 3 completed shifts:  In: 1900 [I.V.:1900]  Out: 50 [Blood:50]    Patient not seen. Chart Check only    Medications     NaCl       nitroPRUsside (NIPRIDE) IV infusion ADULT/PEDS GREATER than or EQUAL to 45 kg std conc         [START ON 9/9/2017] amylase-lipase-protease  1 capsule Oral Daily     atorvastatin  80 mg Oral At Bedtime     [START ON 9/9/2017] insulin glargine  27 Units Subcutaneous QAM AC     [START ON 9/9/2017] metoprolol (TOPROL-XL) half-tab 12.5 mg  12.5 mg Oral QAM     [START ON 9/9/2017] pantoprazole  40 mg Oral Daily     nicotine   Transdermal Q8H     [START ON 9/9/2017] nicotine   Transdermal Daily     nicotine  1  patch Transdermal Daily     insulin aspart  1-7 Units Subcutaneous TID AC     insulin aspart  1-5 Units Subcutaneous At Bedtime     sodium chloride (PF)  3 mL Intracatheter Q8H     [START ON 9/9/2017] aspirin EC  81 mg Oral Daily     acetaminophen  975 mg Oral Q8H       Data     Recent Labs  Lab 09/08/17  1130 09/08/17  0859 09/06/17  0742   INR 1.03  --   --    NA  --  139 137   POTASSIUM  --  4.4 4.4   CHLORIDE  --  107 107   CO2  --  26 25   BUN  --  17 17   CR  --  0.82 0.79   ANIONGAP  --  6 5   MARVA  --  8.4* 8.6   GLC  --  303* 150*       Imaging:  No results found for this or any previous visit (from the past 24 hour(s)).

## 2017-09-08 NOTE — PLAN OF CARE
"Problem: Goal Outcome Summary  Goal: Goal Outcome Summary  Outcome: Improving  VSS, A&O, denies pain, Neuros intact with exception left tongue deviation, slightly garbled speech, unchanged, speech consult pending, NPO except for ice chips, unable to void, Bladder scan in PACU 327 ml, patient refuses straight cath at this time states \" absolutely not, I will pee\" education provided,  Tele SR, hypotension improving, last BP 98/59.       "

## 2017-09-08 NOTE — PROGRESS NOTES
Left carotid endarterectomy with EEG monitoring #[id number], with Dr. Turner.  No EEG changes with clamping.  Pt woke up ok at end  Of surgery and moved all extremities.

## 2017-09-08 NOTE — OR NURSING
[employer] called about interrogation of pacer, it looked fine, active, no observations. Will fax report.

## 2017-09-09 ENCOUNTER — APPOINTMENT (OUTPATIENT)
Dept: SPEECH THERAPY | Facility: CLINIC | Age: 67
DRG: 038 | End: 2017-09-09
Attending: SURGERY
Payer: MEDICARE

## 2017-09-09 ENCOUNTER — APPOINTMENT (OUTPATIENT)
Dept: GENERAL RADIOLOGY | Facility: CLINIC | Age: 67
DRG: 038 | End: 2017-09-09
Attending: PHYSICIAN ASSISTANT
Payer: MEDICARE

## 2017-09-09 ENCOUNTER — APPOINTMENT (OUTPATIENT)
Dept: SPEECH THERAPY | Facility: CLINIC | Age: 67
DRG: 038 | End: 2017-09-09
Attending: PHYSICIAN ASSISTANT
Payer: MEDICARE

## 2017-09-09 ENCOUNTER — APPOINTMENT (OUTPATIENT)
Dept: OCCUPATIONAL THERAPY | Facility: CLINIC | Age: 67
DRG: 038 | End: 2017-09-09
Attending: PHYSICIAN ASSISTANT
Payer: MEDICARE

## 2017-09-09 VITALS
TEMPERATURE: 98.3 F | OXYGEN SATURATION: 95 % | BODY MASS INDEX: 21.29 KG/M2 | RESPIRATION RATE: 16 BRPM | HEART RATE: 80 BPM | HEIGHT: 72 IN | DIASTOLIC BLOOD PRESSURE: 55 MMHG | SYSTOLIC BLOOD PRESSURE: 95 MMHG | WEIGHT: 157.19 LBS

## 2017-09-09 LAB
GLUCOSE BLDC GLUCOMTR-MCNC: 157 MG/DL (ref 70–99)
GLUCOSE BLDC GLUCOMTR-MCNC: 257 MG/DL (ref 70–99)

## 2017-09-09 PROCEDURE — 99406 BEHAV CHNG SMOKING 3-10 MIN: CPT | Performed by: OCCUPATIONAL THERAPIST

## 2017-09-09 PROCEDURE — A9270 NON-COVERED ITEM OR SERVICE: HCPCS | Mod: GY | Performed by: PHYSICIAN ASSISTANT

## 2017-09-09 PROCEDURE — A9270 NON-COVERED ITEM OR SERVICE: HCPCS | Mod: GY | Performed by: INTERNAL MEDICINE

## 2017-09-09 PROCEDURE — 25000131 ZZH RX MED GY IP 250 OP 636 PS 637: Mod: GY | Performed by: PHYSICIAN ASSISTANT

## 2017-09-09 PROCEDURE — 00000146 ZZHCL STATISTIC GLUCOSE BY METER IP

## 2017-09-09 PROCEDURE — 40000225 ZZH STATISTIC SLP WARD VISIT: Performed by: SPEECH-LANGUAGE PATHOLOGIST

## 2017-09-09 PROCEDURE — 25000132 ZZH RX MED GY IP 250 OP 250 PS 637: Mod: GY | Performed by: PHYSICIAN ASSISTANT

## 2017-09-09 PROCEDURE — 25000132 ZZH RX MED GY IP 250 OP 250 PS 637: Mod: GY | Performed by: INTERNAL MEDICINE

## 2017-09-09 PROCEDURE — 92611 MOTION FLUOROSCOPY/SWALLOW: CPT | Mod: GN | Performed by: SPEECH-LANGUAGE PATHOLOGIST

## 2017-09-09 PROCEDURE — 25000128 H RX IP 250 OP 636: Performed by: PHYSICIAN ASSISTANT

## 2017-09-09 PROCEDURE — 92526 ORAL FUNCTION THERAPY: CPT | Mod: GN | Performed by: SPEECH-LANGUAGE PATHOLOGIST

## 2017-09-09 PROCEDURE — 40000133 ZZH STATISTIC OT WARD VISIT: Performed by: OCCUPATIONAL THERAPIST

## 2017-09-09 PROCEDURE — 74230 X-RAY XM SWLNG FUNCJ C+: CPT

## 2017-09-09 PROCEDURE — 40000884 ZZH STATISTIC STEP DOWN HRS NIGHT

## 2017-09-09 RX ORDER — ASPIRIN 325 MG
1000 TABLET ORAL EVERY MORNING
Qty: 120 TABLET | Refills: 1 | COMMUNITY
Start: 2017-09-09 | End: 2020-11-16

## 2017-09-09 RX ORDER — BARIUM SULFATE 400 MG/ML
SUSPENSION ORAL ONCE
Status: COMPLETED | OUTPATIENT
Start: 2017-09-09 | End: 2017-09-09

## 2017-09-09 RX ORDER — LANOLIN ALCOHOL/MO/W.PET/CERES
3 CREAM (GRAM) TOPICAL
Status: DISCONTINUED | OUTPATIENT
Start: 2017-09-09 | End: 2017-09-09 | Stop reason: HOSPADM

## 2017-09-09 RX ADMIN — INSULIN GLARGINE 27 UNITS: 100 INJECTION, SOLUTION SUBCUTANEOUS at 09:12

## 2017-09-09 RX ADMIN — PANCRELIPASE 12000 UNITS: 60000; 12000; 38000 CAPSULE, DELAYED RELEASE PELLETS ORAL at 11:42

## 2017-09-09 RX ADMIN — BARIUM SULFATE 5 ML: 400 SUSPENSION ORAL at 10:17

## 2017-09-09 RX ADMIN — BARIUM SULFATE 10 ML: 400 SUSPENSION ORAL at 10:16

## 2017-09-09 RX ADMIN — NICOTINE 1 PATCH: 21 PATCH, EXTENDED RELEASE TRANSDERMAL at 09:11

## 2017-09-09 RX ADMIN — ACETAMINOPHEN 975 MG: 325 TABLET, FILM COATED ORAL at 09:24

## 2017-09-09 RX ADMIN — SODIUM CHLORIDE: 9 INJECTION, SOLUTION INTRAVENOUS at 05:19

## 2017-09-09 RX ADMIN — MELATONIN 3 MG: 3 TAB ORAL at 01:23

## 2017-09-09 NOTE — PROGRESS NOTES
" 09/09/17 1434   General Information   Onset Date 09/08/17   Start of Care Date 09/09/17   Referring Physician Artie Lozano PA-C   Patient Profile Review/OT: Additional Occupational Profile Info See Profile for full history and prior level of function   Patient/Family Goals Statement to return home   Swallowing Evaluation Videofluoroscopic evaluation   Behaviorial Observations WFL (within functional limits)   Mode of current nutrition Oral diet   Type of oral diet Dysphagia diet level 1;Nectar - thick liquid   Respiratory Status Room air   Comments SLP: Pt POD 1 following left carotid endarterectomy. He did have a right carotid endarterectomy for symptomatic high-grade stenosis on 6/12/15. Per surgery note, \"We did identify the hypoglossal nerve and preserved it. It was necessary to retract significantly to get to mobilize the internal carotid artery as far as we could almost to the base of skull, again using sharp and blunt dissection.\" Following surgery, RN noted left tongue deviation and was placed NPO except ice chips until ST evaluation. Per surgery note, tongue deviation will improve.    Clinical Swallow Evaluation   Oral Musculature other (see comments)  (Please refer to bedside evaluation)   VFSS Eval: Radiology   Radiologist Dr. Zhao   Views Taken left lateral   Physical Location of Procedure Room 5   VFSS Eval: Thin Liquid Texture Trial   Mode of Presentation, Thin Liquid cup   Order of Presentation 1   Preparatory Phase Poor bolus control   Oral Phase, Thin Liquid Poor AP movement;Premature pharyngeal entry   Pharyngeal Phase, Thin Liquid Delayed swallow reflex;Pharyngeal wall coating;Residue in valleculae   Rosenbek's Penetration Aspiration Scale: Thin Liquid Trial Results 8 - contrast passes glottis, visible subglottic residue remains, absent patient response (aspiration)   Response to Aspiration absent response, silent aspiration   Diagnostic Statement Silent aspiration   VFSS Eval: Thurston " Thick Liquid Texture Trial   Mode of Presentation, Nectar cup   Order of Presentation 2, 4   Preparatory Phase Poor bolus control   Oral Phase, Nectar Poor AP movement;Premature pharyngeal entry   Pharyngeal Phase, Nectar Delayed swallow reflex;Pharyngeal wall coating;Residue in valleculae   Rosenbek's Penetration Aspiration Scale: Nectar-Thick Liquid Trial Results 4 - contrast contacts vocal cords, no residue remains (penetration)   Successful Strategies Trialed During Procedure, Nectar (super supraglottic swallow)   Diagnostic Statement Deep penetration; improving to flash penetration with use of super supraglottic swallow   VFSS Eval: Honey Thick Texture Trial   Mode of Presentation, Honey cup   Order of Presentation 3   Preparatory Phase Poor bolus control   Oral Phase, Honey Poor AP movement;Premature pharyngeal entry   Pharyngeal Phase, Honey Delayed swallow reflex;Pharyngeal wall coating;Residue in valleculae;Residue in pyriform sinus   Rosenbek's Penetration Aspiration Scale: Honey Trial Results 8 - contrast passes glottis, visible subglottic residue remains, absent patient response (aspiration)   Response to Aspiration absent response, silent aspiration   Diagnostic Statement Penetration inititally with silent aspiraiton of residuals   VFSS Eval: Puree Solid Texture Trial   Mode of Presentation, Puree spoon   Order of Presentation 5   Preparatory Phase Poor bolus control   Oral Phase, Puree Poor AP movement;Residue in oral cavity;Premature pharyngeal entry   Pharyngeal Phase, Puree Delayed swallow reflex;Pharyngeal wall coating;Residue in valleculae;Residue in pyriform sinus   Rosenbek's Penetration Aspiration Scale: Puree Food Trial Results 1 - no aspiration, contrast does not enter airway   Diagnostic Statement Oral deficits; no penetration; moderate residue   VFSS Eval: Semisolid Texture Trial   Mode of Presentation, Semisolid spoon   Order of Presentation 6   Preparatory Phase Insufficient  mastication;Poor bolus control   Oral Phase, Semisolid Premature pharyngeal entry;Poor AP movement;Residue in oral cavity   Pharyngeal Phase, Semisolid Delayed swallow reflex;Pharyngeal wall coating;Residue in valleculae;Residue in pyriform sinus   Rosenbek's Penetration Aspiration Scale: Semisolid Food Trial Results 1 - no aspiration, contrast does not enter airway   Diagnostic Statement No penetration; moderate residue   VFSS Eval: Solid Food Texture Trial   Mode of Presentation, Solid self-fed   Order of Presentation 7   Preparatory Phase Holding;Insufficient mastication;Poor bolus control   Oral Phase, Solid Poor AP movement;Residue in oral cavity;Premature pharyngeal entry   Pharyngeal Phase, Solid Delayed swallow reflex;Pharyngeal wall coating;Residue in valleculae;Residue in pyriform sinus   Rosenbek's Penetration Aspiration Scale: Solid Food Trial Results 1 - no aspiration, contrast does not enter airway   Diagnostic Statement Oral deficits; pharyngeal residue   Swallow Compensations   Swallow Compensations Super supraglottic swallow   Results (flash penetration)   Esophageal Phase of Swallow   Patient reports or presents with symptoms of esophageal dysphagia No   General Therapy Interventions   Planned Therapy Interventions Dysphagia Treatment   Dysphagia treatment Oropharyngeal exercise training;Modified diet education;Instruction of safe swallow strategies;Compensatory strategies for swallowing   Intervention Comments OP ST pending DC today   Swallow Eval: Clinical Impressions   Skilled Criteria for Therapy Intervention Skilled criteria met.  Treatment indicated.   Functional Assessment Scale (FAS) 3   Dysphagia Outcome Severity Scale (VANDANA) Level 3 - VANDANA   Treatment Diagnosis Moderate oropharyngeal dysphagia   Diet texture recommendations Dysphagia diet level 2;Nectar thick liquids   Recommended Feeding/Eating Techniques alternate between small bites and sips of food/liquid;check mouth frequently for  "oral residue/pocketing;hard swallow w/ each bite or sip;maintain upright posture during/after eating for 30 mins;no straws;small sips/bites  (super supraglottic swallow)   Therapy Frequency daily   Predicted Duration of Therapy Intervention (days/wks) 2 weeks   Anticipated Discharge Disposition home w/ outpatient services   Risks and Benefits of Treatment have been explained. Yes   Patient, family and/or staff in agreement with Plan of Care Yes   Clinical Impression Comments SLP: Improved vocal quality noted during evaluation compared to earlier in the day. Pt reported voice improving, but, \"not quite right.\" Pt presents with moderate dysphagia on video swallow study characterized by poor oral manipulation of liquids and solids, decreased tongue base retraction, decreased pharyngeal contraction and minimal epiglottic movement with incomplete closure for airway protection. These deficits resulted in piecemeal deglutition, oral residue, premature spillage of all trials to pyriforms, silent aspiration of thin liquids, deep penetration of nectar thick liquids, aspiration of residuals of honey thick liquids and moderate pharyngeal residue. Super supraglottic swallow technique used with nectar thick liquids that improved function with flash new penetration that cleared with cough. No penetration or aspiration noted with solids, however, moderate residue noted and oral deficits increased with textures. Recommended: dysphagia diet level 2 with nectar thick liquids. Extensive education and handouts provided on strategies: upright with all intake, small bites and sips, super supgraglottic swallow  (sip, strong swallow, cough, swallow again) with nectar thick liquids, alternate solids and liquids, no straws. Continued ST in OP setting to target deficits and repeat video swallow study to advance diet/liquids as appropriate. Educations with handouts provided on foods to avoid, thickening liquids at home, pharyngeal exercises, " swallow strategies. LSW notified for OP ST recommendations.    Total Evaluation Time   Total Evaluation Time (Minutes) 30

## 2017-09-09 NOTE — PROVIDER NOTIFICATION
MD Notification    Notified Person:  MD    Notified Persons Name:Elijah Espino    Notification Date/Time:9/9/17 01:10    Notification Interaction:  Talked with Physician    Purpose of Notification: Patient requested seep aid for insomnia    Orders Received:Melatonin 3 mg ordered HS PRN    Comments:

## 2017-09-09 NOTE — PROGRESS NOTES
"   09/09/17 0926   General Information   Onset Date 09/08/17   Start of Care Date 09/09/17   Referring Physician Artie Lozano PA-C   Patient Profile Review/OT: Additional Occupational Profile Info See Profile for full history and prior level of function   Patient/Family Goals Statement to return home   Swallowing Evaluation Bedside swallow evaluation   Behaviorial Observations WFL (within functional limits)   Mode of current nutrition NPO   Respiratory Status Room air   Comments SLP: Pt POD 1 following left carotid endarterectomy. He did have a right carotid endarterectomy for symptomatic high-grade stenosis on 6/12/15. Per surgery note, \"We did identify the hypoglossal nerve and preserved it.  It was necessary to retract significantly to get to mobilize the internal carotid artery as far as we could almost to the base of skull, again using sharp and blunt dissection.\" Following surgery, RN noted left tongue deviation and was placed NPO except ice chips until ST evaluation. Per surgery note, tongue deviation will improve.    Clinical Swallow Evaluation   Oral Musculature anomalies present   Structural Abnormalities none present   Dentition (few missing teeth)   Mucosal Quality good   Mandibular Strength and Mobility intact   Oral Labial Strength and Mobility WFL   Lingual Strength and Mobility impaired protrusion;impaired left lateral movement  (left deviation)   Velar Elevation intact   Buccal Strength and Mobility intact   Laryngeal Function Cough;Throat clear;Swallow;Voicing initiated;Dry swallow palpated   Oral Musculature Comments left tongue deviation; hoarse voice   Additional Documentation Yes   Additional evaluation(s) completed today Yes   Rationale for completing additional evaluation overt s/sx of aspiration on bedside   Clinical Swallow Eval: Thin Liquid Texture Trial   Mode of Presentation, Thin Liquids (ice chip)   Volume of Liquid or Food Presented 1 tsp   Oral Phase of Swallow Poor AP " movement;Premature pharyngeal entry   Pharyngeal Phase of Swallow impaired;coughing/choking;wet vocal quality after swallow   Diagnostic Statement Immediate cough with oral management deficits   Clinical Swallow Eval: Nectar Thick Liquid Texture Trial   Mode of Presentation, Nectar self-fed;spoon;cup   Volume of Nectar Presented tsp and cup   Oral Phase, [place] Poor AP movement   Pharyngeal Phase, Nectar impaired;throat clearing   Diagnostic Statement minimal amounts of throat clearing   Clinical Swallow Eval: Honey Thick Liquid Texture Trial   Mode of Presentation, Honey spoon   Oral Phase, Honey WFL   Pharyngeal Phase, Honey intact   Diagnostic Statement improved oral management with no overt s/sx of aspiration   Clinical Swallow Eval: Puree Solid Texture Trial   Mode of Presentation, Puree spoon   Oral Phase, Puree Poor AP movement;Residue in oral cavity   Oral Residue, Puree mid posterior tongue;left anterior lateral sulci   Pharyngeal Phase, Puree impaired;throat clearing   Diagnostic Statement increased throat clearing that improved with use of strategies   Swallow Compensations   Swallow Compensations Alternate viscosity of consistencies;Effortful swallow;Pacing;Reduce amounts;Multiple swallow   Results Other (see comments)  (improved function)   Esophageal Phase of Swallow   Patient reports or presents with symptoms of esophageal dysphagia No   General Therapy Interventions   Planned Therapy Interventions Dysphagia Treatment   Dysphagia treatment (pending video swallow study )   Intervention Comments video    Swallow Eval: Clinical Impressions   Skilled Criteria for Therapy Intervention Skilled criteria met.  Treatment indicated.   Functional Assessment Scale (FAS) 3   Treatment Diagnosis moderate oral dysphagia   Diet texture recommendations Nectar thick liquids;Dysphagia diet level 1   Recommended Feeding/Eating Techniques alternate between small bites and sips of food/liquid;check mouth frequently for  oral residue/pocketing;hard swallow w/ each bite or sip;no straws;maintain upright posture during/after eating for 30 mins;small sips/bites  (double swallow)   Therapy Frequency daily   Predicted Duration of Therapy Intervention (days/wks) 2 weeks   Anticipated Discharge Disposition home w/ outpatient services   Risks and Benefits of Treatment have been explained. Yes   Patient, family and/or staff in agreement with Plan of Care Yes   Clinical Impression Comments SLP: Pt presents with moderate dysphagia on bedside swallow evaluation. Hoarse voice noted in conversation. Oral motor exam revealed left tongue deviation. Oral difficulty managing PO intake noted. Immediate coughing noted with teaspoon of thin liquids. No overt s/sx of aspiration noted with small sips of honey thick liquids. Pt trialed several sips of nectar thick liquids with occasional throat clearing. Vocal quality remained at baseline. Puree solids trialed with increased throat clearing and suspect pharyngeal retention. Pt educated on swallowing mechanism and cranial nerves involved with tongue, larynx and pharynx. Pt verbalized understanding and is agreeable to cautiously initiate oral diet of nectar thick liquids and dysphagia diet level 1 with strict use of safe swallow strategies. Pt agreeable to video swallow study today at 1000 to further assess pharyngeal phase and determine aspiration risk.    Total Evaluation Time   Total Evaluation Time (Minutes) 25

## 2017-09-09 NOTE — PLAN OF CARE
Problem: Goal Outcome Summary  Goal: Goal Outcome Summary  Discharge Planner SLP   Patient plan for discharge: Discharge today back home  Current status: SLP: Pt presents with moderate dysphagia on bedside swallow evaluation. Hoarse voice noted in conversation. Oral motor exam revealed left tongue deviation. Oral difficulty managing PO intake noted. Immediate coughing noted with teaspoon of thin liquids. No overt s/sx of aspiration noted with small sips of honey thick liquids. Pt trialed several sips of nectar thick liquids with occasional throat clearing. Vocal quality remained at baseline. Puree solids trialed with increased throat clearing and suspect pharyngeal retention. Pt educated on swallowing mechanism and cranial nerves involved with tongue, larynx and pharynx. Pt verbalized understanding and is agreeable to cautiously initiate oral diet of nectar thick liquids and dysphagia diet level 1 with strict use of safe swallow strategies. Pt agreeable to video swallow study today at 1000 to further assess pharyngeal phase and determine aspiration risk.   Barriers to return to prior living situation: Dysphagia/overt s/sx of aspiration on bedside eval  Recommendations for discharge: Pending video swallow study today at 1000  Rationale for recommendations: Pending video       Entered by: Parul Galindo 09/09/2017 9:21 AM

## 2017-09-09 NOTE — PLAN OF CARE
"Problem: Goal Outcome Summary  Goal: Goal Outcome Summary  Discharge Planner SLP   Patient plan for discharge: DC home with OP ST  Current status: SLP: Improved vocal quality noted during evaluation compared to earlier in the day. Pt reported voice improving, but, \"not quite right.\" Pt presents with moderate dysphagia on video swallow study characterized by poor oral manipulation of liquids and solids, decreased tongue base retraction, decreased pharyngeal contraction and minimal epiglottic movement with incomplete closure for airway protection. These deficits resulted in piecemeal deglutition, oral residue, premature spillage of all trials to pyriforms, silent aspiration of thin liquids, deep penetration of nectar thick liquids, aspiration of residuals of honey thick liquids and moderate pharyngeal residue. Super supraglottic swallow technique used with nectar thick liquids that improved function with flash new penetration that cleared with cough. No penetration or aspiration noted with solids, however, moderate residue noted and oral deficits increased with textures. Recommended: dysphagia diet level 2 with nectar thick liquids. Extensive education and handouts provided on strategies: upright with all intake, small bites and sips, super supgraglottic swallow  (sip, strong swallow, cough, swallow again) with nectar thick liquids, alternate solids and liquids, no straws. Continued ST in OP setting to target deficits and repeat video swallow study to advance diet/liquids as appropriate. Educations with handouts provided on foods to avoid, thickening liquids at home, pharyngeal exercises, swallow strategies. LSW notified for OP ST recommendations.   Barriers to return to prior living situation: None  Recommendations for discharge: Continued OP ST  Rationale for recommendations: Pharyngeal exercises, safe swallow strategy training; repeat video swallow to advance diet/liquids as appropriate       Entered by: Parul " Mateo 09/09/2017 2:42 PM

## 2017-09-09 NOTE — PROVIDER NOTIFICATION
Brief update:    Paged re: sleep aid request. No history of delirium of cognitive deficit.    Melatonin 3 mg ordered HS PRN    Elijah Espino MD  1:11 AM

## 2017-09-09 NOTE — OP NOTE
DATE OF PROCEDURE:  9/8/2017      PREOPERATIVE DIAGNOSIS:  High grade stenosis left internal carotid artery.      POSTOPERATIVE DIAGNOSIS:  High grade stenosis left internal carotid artery.      PROCEDURE:  Left carotid endarterectomy with Dacron patch angioplasty (D5).      ASSISTANT:   ERWIN MEJIA PA-C      OPERATIVE PROCEDURE:  Under satisfactory general endotracheal anesthesia, Delbert Tilley's left neck was prepped and draped in routine sterile fashion.  An assistant was required in this case for exposure of vessels and aiding in hemostasis as well as aiding in the Dacron patch angioplasty.  A longitudinal incision was made along the anterior border of the left sternocleidomastoid muscle and the platysma was divided using electrocautery.  Dissection was sharply carried down to the common carotid artery at the level of the omohyoid muscle.  The common carotid sheath was opened and common carotid artery was isolated using sharp and blunt dissection.  Dissection in this area and all throughout the carotid was quite difficult due to the inflammatory reaction around the carotid artery from the patient's atherosclerotic disease.  The patient also had multiple branches off of the internal jugular system coursing across the carotid artery.  Dissection was carried up along the anterior surface of the carotid artery using sharp dissection.  Venous tributaries to internal jugular system were clamped, divided and ligated with interrupted ties of 2-0 and 3-0 silk.  The carotid bifurcation was heavily calcified.  The calcifications extended up on the internal carotid artery for approximately 4-5 cm again, making dissection in this area very difficult.  We did identify the hypoglossal nerve and preserved it.  It was necessary to retract significantly to get to mobilize the internal carotid artery as far as we could almost to the base of skull, again using sharp and blunt dissection.  Superior thyroid and external carotid  arteries had been dissected out using sharp and blunt dissection and encircled with vessel loops.  The patient was systemically heparinized with 3000 units of IV heparin.  I did a test clamping of the internal carotid artery, since disease course was so high I was not sure I could actually place a shunt.  There were no EEG changes with occlusion of the internal carotid artery, therefore, the common and external carotid arteries were also occluded with vascular clamps.  The common carotid artery was carefully opened in longitudinal arteriotomy in a longitudinal fashion using the 11 blade.  The arteriotomy was extended up onto the internal carotid artery for approximately 4.5 cm using sharp dissection.  It was heavily calcified throughout up to the level of our vascular clamp where there was one soft area.  The atheromatous plaque was then carefully endarterectomized to the level of the common carotid artery and transected.  The endarterectomy was carried up to the carotid bifurcation.  The external carotid artery was endarterectomized over the course of approximately a cm using eversion technique and plaque was transected.  The endarterectomy was then carried up along the internal carotid artery to a level that the plaque began to thin out somewhat on the medial side.  At this level the intima was sharply divided.  I was able to carefully tease a tongue of calcification that coursed up the posterior lateral side of the artery out using blunt dissection and elevators.  It came out in a nicely feathered distal edge.  At that level, we transected the intima somewhat proximal to this there was an intimal flap, this was tacked down with interrupted sutures of 6-0 Prolene.  The endarterectomized segment was thoroughly washed with dilute heparinized saline solution and all loose debris was removed.  The arteriotomy was then closed with a Dacron patch.  This was fixed in place circumferentially in the internal carotid artery  with running suture of 7-0 Prolene and on the common carotid artery with running suture of 6-0 Prolene.  Prior to completion of the patch angioplasty, the internal carotid, external carotid and common carotid arteries were all flushed.  The endarterectomized segment was thoroughly washed with dilute heparinized saline solution and closure of patch angioplasty was then completed.  Flow was restored up to through external carotid artery for several beats and then up through the internal carotid artery.  The wound was irrigated out with saline solution and checked for hemostasis.  The wound was then closed in layers with interrupted sutures of 3-0 Vicryl.  Skin was closed with 4-0 subcuticular Vicryl stitch.  Sponge and needle counts were correct at the termination of the procedure.  The patient tolerated the procedure in stable condition.  He was able to move all extremities to command at the termination of the procedure, he did undergo intraoperative EEG monitoring throughout the case and no significant changes were noted.         SHYLA FUNES III, MD             D: 2017 13:46   T: 2017 18:28   MT: [id number]      Name:     CARLIN QUEEN   MRN:      [id number]        Account:        [id number]   :      1950           Procedure Date: 2017      Document: [id number]

## 2017-09-09 NOTE — PLAN OF CARE
Problem: Goal Outcome Summary  Goal: Goal Outcome Summary  Outcome: Adequate for Discharge Date Met:  09/09/17  A&O. VSS except for lower BP. CMS intact. Incision with steristrips CDI. Neuros intact except for tongue deviation to the left- MD aware. Pain controlled with Tylenol. Voiding in urinal. Bladder scanned,  mL- MD aware. Discussed d/c instructions with patient including follow up of Outpatient ST at [facility], verbalized understanding. Pt d/c with all belongings. Transportation provided by [employer] and daughter.

## 2017-09-09 NOTE — PLAN OF CARE
"Problem: Goal Outcome Summary  Goal: Goal Outcome Summary  Outcome: No Change  Alert.  Oriented.  Pt can be disagreeable and noncompliant.  Demanding with his cares.  Pt requires immediate expectation otherwise gets easily agitated.   Afebrile.  Hypotensive.  Will continue to monitor.  Charge RN updated and on coming RN.   Pt c/o of headache.  Dilaudid given by previous RN.  Scheduled tylenol and oxycodone prn given by current RN.  Pt little more relaxed.   neuros intact with slight left tongue deviations.  Slight garbled speech.   Due to void.  Pt refusing RN to bladder scan.  \"I will pee eventually\"  \"you will not scan or put a catheter in me\" Charge RN updated.  Also refusing to wear pneumoboots.  Dressing CDI.        "

## 2017-09-09 NOTE — PROGRESS NOTES
Vascular Surgery Progress Note:  POD#1    S:Comfortable.  Wants to go home.        Does have nocturia at home--  Likely BPH    O: Has been NPO since surgery due to traction on CN XII with some slight tongue deviation--awaiting swallow study this AM   Vitals:  BP  Min: 74/46  Max: 143/89  Temp  Av.2  F (36.8  C)  Min: 97.8  F (36.6  C)  Max: 98.5  F (36.9  C)  Pulse  Av  Min: 88  Max: 88  I/O last 3 completed shifts:  In: 2845 [I.V.:2845]  Out: 250 [Urine:200; Blood:50]    Physical Exam: Alert   Comfortable.  Wd=A with no swelling                            Mild left tongue deviation (due to CN XII traction and will                                           Improve)                             Low voiding volumes      Assessment/Plan:  OK after CEA-  Plan swallow study then advance diet.  Likely home later today.                                    Discussed voiding.  Check residual volume with US prior to discharge.      Wm. Mabel MD

## 2017-09-09 NOTE — DISCHARGE INSTRUCTIONS
Carotid Endartectomy  Post-Operative Instructions    Typical length of stay in the hospital is 1-2 days.  On occasion, an evening in the Intensive Care Unit may be required for blood pressure regulation.    Activity    Most people are able to resume normal activities 1-2 weeks after surgery.  The key is to gradually increase your level of activity as you are able.  It is not uncommon to feel easily fatigued - this will improve with time.      You should avoid heavy lifting more than 30 lbs for 1 week.      You may return to work when you feel able - typically 1-2 weeks after surgery, depending on the type of work you do.      You should not drive a car for 1 week after surgery.  In addition,  you can not be taking prescription strength pain medication and you must feel strong enough to drive safely.    Incision Care    You can shower or bathe 2 days after surgery - avoid rubbing and soaking your incision.      You may have strips of white tape called  steri-strips  across your incision; they should stay on for 5-7 days or until the ends start to curl up.  You can then remove the steri-strips, or we will remove them at your post-operative appointment.      Avoid shaving directly over the incision for 2-3 weeks.    Common Concerns    You may notice mild skin numbness on the side of your surgery in your neck, chin, face, and earlobe.  This is due to nerve  irritation  and will gradually resolve over the next several months.  Call our office if you experience any short-lasting episode of numbness or weakness affecting one side of your body.      Some bruising and firmness around you incision can be expected.  This will soften and resolve over the next few weeks.  You may notice that some discoloration spreads to an area lower than the incision, such as the shoulder, neck or upper chest.  Likewise, swelling can occur near the incision or below the chin.  Call our office if the swelling or bruising worsens, or if you have  difficulty swallowing.    Diet    You may resume a regular diet before you leave the hospital.  You may find that it is best to try smaller, more frequent meals until your appetite returns.    Medications    You may be started on a blood thinner after surgery - typically Aspirin and / or Plavix.      You may be given a prescription for pain medication after surgery.  If you need to have this refilled, please call your pharmacy where you had it filled.  They will then call us for approval.  Please do not call after hours for a refill on pain medication.    Post-Operative Appointments    You need to see your surgeon in the clinic approximately 1-2 weeks after surgery.  Post-operative appointment:   Date: _____________________________  Time: ____________________________  Dr. ______________________________      You will have an ultrasound test and evaluation with your surgeon 90 days (3 months) after surgery.      We will notify you by mail when you are due to schedule further ultrasound testing and evaluation; typically this is done annually.     When You Should Call Our Office    Body aches, chills or temperature greater than 101      Incision redness or drainage      Loss of vision in one eye      Loss of strength / weakness in one side of your body      Severe headache      Difficulty with speech      If you will be having an invasive procedure, such as a colonoscopy or dental work within one year of your surgery, you may need to take an antibiotic prior to the procedure if you had a Dacron patch with your surgery; please call us so we can assist you with this.    Call our main number, [phone], for assistance with any concerns or questions.     Revised 5/2014

## 2017-09-09 NOTE — PLAN OF CARE
Problem: Goal Outcome Summary  Goal: Goal Outcome Summary  Outcome: No Change  Patient is alert and orientated X 4, up with stand by assist, vital signs stable except for BP low 79/49; denies dizziness. Last at 06:00 / 62. Neuro intact except, for tongue slightly deviated to the left. Patient is due to void he refused bladder scan and stated that he, will void. Voided 150 ML. NPO for swallow evaluation today.

## 2017-09-11 ENCOUNTER — TELEPHONE (OUTPATIENT)
Dept: FAMILY MEDICINE | Facility: CLINIC | Age: 67
End: 2017-09-11

## 2017-09-11 ENCOUNTER — TELEPHONE (OUTPATIENT)
Dept: OTHER | Facility: CLINIC | Age: 67
End: 2017-09-11

## 2017-09-11 NOTE — PROCEDURES
CAROTID ENDARTERECTOMY ELECTROENCEPHALOGRAPHIC MONITORING      SURGEON:  João Turner performed surgery on  for carotid stenosis.   [facility] EEG #[id number]   The baseline wake recording and anesthetic patterns were symmetric.  There was no change with occlusion or following restoration of flow.       IMPRESSION:  The study indicates that collateral flow is adequate during the acute period of occlusion for left carotid stenosis.        No shunt was placed during surgery.         AIDAN ELLIS MD, PHD, [facility]             D: 2017 12:22   T: 2017 13:03   MT: [id number]      Name:     CARLIN QUEEN   MRN:      [id number]        Account:        [id number]   :      1950           Procedure Date: 2017      Document: [id number]

## 2017-09-12 LAB — INTERPRETATION ECG - MUSE: NORMAL

## 2017-09-12 NOTE — TELEPHONE ENCOUNTER
"Hospital/TCU/ED for chronic condition Discharge Protocol    \"Hi, my name is Katia Chandler, a registered nurse, and I am calling from [facility].  I am calling to follow up and see how things are going for you after your recent emergency visit/hospital/TCU stay.\"    Tell me how you are doing now that you are home?\" voice is a little hoarse      Discharge Instructions    \"Let's review your discharge instructions.  What is/are the follow-up recommendations?  Pt. Response: all are schedule but I am not home    \"Has an appointment with your primary care provider been scheduled?\"   Yes. (confirm)    \"When you see the provider, I would recommend that you bring your medications with you.\"    Medications    \"Tell me what changed about your medicines when you discharged?\"    Changes to chronic meds?    0-1    \"What questions do you have about your medications?\"    None     New diagnoses of heart failure, COPD, diabetes, or MI?    No                  Medication reconciliation completed? Yes  Was MTM referral placed (*Make sure to put transitions as reason for referral)?   No    Call Summary    \"What questions or concerns do you have about your recent visit and your follow-up care?\"     none    \"If you have questions or things don't continue to improve, we encourage you contact us through the main clinic number (give number).  Even if the clinic is not open, triage nurses are available 24/7 to help you.     We would like you to know that our clinic has extended hours (provide information).  We also have urgent care (provide details on closest location and hours/contact info)\"      \"Thank you for your time and take care!\"         "

## 2017-09-15 NOTE — DISCHARGE SUMMARY
DATE OF ADMISSION:  09/08/2017      DATE OF DISCHARGE:  09/09/2017      HISTORY OF PRESENT ILLNESS:  Delbert Tilley is a 67-year-old patient who was found to have a high-grade asymptomatic left carotid stenosis.  It was felt that surgical treatment was indicated.      HOSPITAL COURSE:  The patient was admitted on 09/08/2017.  Dr. Turner performed a left carotid endarterectomy with Dacron patch angioplasty.  Due to the high bifurcation, it was necessary to apply some traction to the hypoglossal nerve.  The patient tolerated the procedure quite well with no neurological problems.      He was observed in our step-down unit post-procedure.  He has some mild weakness of his tongue from the traction that had improved, though not completely resolved by the following morning.  He did see Speech Pathology who recommended he have thicker nectars until this  problem resolved which may take several days, if not slightly longer and he is aware of this.  He has no trouble swallowing his medications.      The patient did have a history of nocturia prior to his admission.  He did have some troubles emptying his bladder on the first postoperative day.  However, at the time of discharge, he was doing this well with only mild residual volumes being noted.      He is ready for discharge to home with no complications except mild weakness of the left tongue.      The patient was seen by Dr. Lowry of our Vascular Medicine Service who made several recommendations as discussed in his summary.  In particular, the patient was encouraged to quit smoking completely and was placed on a nicotine patch and given appropriate education.      DISCHARGE MEDICATIONS:   1.  Aspirin 81 mg daily.   2.  Lipitor 80 mg daily.     3.  Continue with other home meds as documented.      FINAL DIAGNOSES:   1.  High-grade asymptomatic left carotid stenosis.   a.  Status post left carotid endarterectomy with Dacron patch angioplasties.   b.  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   2.  Ongoing tobacco abuse.  The patient is not interested in quitting smoking, but is strongly encouraged to do so.   3.  Coronary disease, status post coronary bypass graft and drug-eluting stent placement.  Cardiomyopathy with ejection fraction of 28%.  He also has a defibrillator.   4.  Hypertension, well controlled with medications.   5.  Peripheral artery disease.   6.  Hyperlipidemia, well controlled with medications.      PLAN:  The patient will be discharged to home.  He will follow up with Dr. Turner in 2 weeks.  Again, strongly encouraged to quit smoking completely, though unlikely to comply.         VANNESA DUPREE MD             D: 09/15/2017 10:30   T: 09/15/2017 10:55   MT: CASSIA      Name:     CARLIN QUEEN   MRN:      [id number]        Account:        [id number]   :      1950           Admit Date:                                       Discharge Date: 2017      Document: [id number]

## 2017-09-26 ENCOUNTER — OFFICE VISIT (OUTPATIENT)
Dept: VASCULAR SURGERY | Facility: CLINIC | Age: 67
End: 2017-09-26
Payer: COMMERCIAL

## 2017-09-26 VITALS
DIASTOLIC BLOOD PRESSURE: 70 MMHG | HEART RATE: 83 BPM | HEIGHT: 72 IN | SYSTOLIC BLOOD PRESSURE: 117 MMHG | BODY MASS INDEX: 21.26 KG/M2 | TEMPERATURE: 98.2 F | WEIGHT: 157 LBS

## 2017-09-26 DIAGNOSIS — Z09 FOLLOW-UP EXAMINATION FOLLOWING SURGERY: Primary | ICD-10-CM

## 2017-09-26 PROCEDURE — 99024 POSTOP FOLLOW-UP VISIT: CPT | Performed by: SURGERY

## 2017-09-26 ASSESSMENT — PAIN SCALES - GENERAL: PAINLEVEL: MILD PAIN (3)

## 2017-09-26 NOTE — MR AVS SNAPSHOT
"              After Visit Summary   9/26/2017    Delbert Tilley    MRN: [id number]           Patient Information     Date Of Birth          1950        Visit Information        Provider Department      9/26/2017 9:00 AM João Turner MD [facility]        Today's Diagnoses     Follow-up examination following surgery    -  1       Follow-ups after your visit        Your next 10 appointments already scheduled     Nov 03, 2017  4:30 PM CDT   Return Visit with Romario Moyer MD   [facility] ([facility])    [address] [place] [id number] [phone]            Nov 21, 2017  8:00 AM CST   ICD Check with Wy Device Rn   [facility] ([facility])    [address] [facility] [address] [phone]              Who to contact     If you have questions or need follow up information about today's clinic visit or your schedule please contact [facility] directly at [phone].  Normal or non-critical lab and imaging results will be communicated to you by MyChart, letter or phone within 4 business days after the clinic has received the results. If you do not hear from us within 7 days, please contact the clinic through [employer] or phone. If you have a critical or abnormal lab result, we will notify you by phone as soon as possible.  Submit refill requests through [employer] or call your pharmacy and they will forward the refill request to us. Please allow 3 business days for your refill to be completed.          Additional Information About Your Visit        MyChart Information     [employer] lets you send messages to your doctor, view your test results, renew your prescriptions, schedule appointments and more. To sign up, go to [email or web address] . Click on \"Log in\" on the left side of the screen, which will take you to the Welcome page. Then click on \"Sign up Now\" on the right side of " the page.     You will be asked to enter the access code listed below, as well as some personal information. Please follow the directions to create your username and password.     Your access code is: [email or web address]  Expires: 2017  7:24 AM     Your access code will  in 90 days. If you need help or a new code, please call your [place] clinic or [phone].        Care EveryWhere ID     This is your Care EveryWhere ID. This could be used by other organizations to access your [facility] medical records  [id number]        Your Vitals Were     Pulse Temperature Height BMI (Body Mass Index)          83 98.2  F (36.8  C) (Oral) 6' (1.829 m) 21.29 kg/m2         Blood Pressure from Last 3 Encounters:   17 117/70   17 95/55   17 104/65    Weight from Last 3 Encounters:   17 157 lb (71.2 kg)   17 157 lb 3 oz (71.3 kg)   17 158 lb 4.8 oz (71.8 kg)              Today, you had the following     No orders found for display       Primary Care Provider Fax #    [facility] [phone] [address] [facility] [address]        Equal Access to Services     MARITA LAMA : Hadii aad ku hadasho Soomaali, waaxda luqadaha, qaybta kaalmada adeegyada, waxay sanaz sykes. So [facility] [phone].    ATENCIÓN: Si habla español, tiene a frey disposición servicios gratuitos de asistencia lingüística. Llame al [phone].    We comply with applicable federal civil rights laws and [place] laws. We do not discriminate on the basis of race, color, national origin, age, disability sex, sexual orientation or gender identity.            Thank you!     Thank you for choosing [facility]  for your care. Our goal is always to provide you with excellent care. Hearing back from our patients is one way we can continue to improve our services. Please take a few minutes to complete the written survey that you may receive in the mail after your visit with  us. Thank you!             Your Updated Medication List - Protect others around you: Learn how to safely use, store and throw away your medicines at www.disposemymeds.org.          This list is accurate as of: 9/26/17  9:37 AM.  Always use your most recent med list.                   Brand Name Dispense Instructions for use Diagnosis    amylase-lipase-protease 59512 UNITS Cpep    CREON 12     Take 1 capsule by mouth daily        aspirin 325 MG tablet     120 tablet    Take 3 tablets (975 mg) by mouth every morning (Takes 2 x 500 mg = 1000 mg)    Left carotid stenosis       atorvastatin 80 MG tablet    LIPITOR     Take 80 mg by mouth At Bedtime        glipiZIDE 10 MG tablet    GLUCOTROL     Take 10 mg by mouth 2 times daily        insulin glargine 100 UNIT/ML injection    LANTUS     Inject 27 Units Subcutaneous every morning        METFORMIN HCL PO      Take 1,000 mg by mouth 2 times daily (Takes 2 x 500 mg = 1000 mg dose)        METOPROLOL SUCCINATE ER PO      Take 12.5 mg by mouth every morning (Takes 0.5 x 25 mg tablet = 12.5 mg)        NITROSTAT SL      Place 0.4 mg under the tongue every 5 minutes as needed for chest pain        pantoprazole 40 MG EC tablet    PROTONIX     Take 40 mg by mouth daily        TYLENOL PO      Take 1,000 mg by mouth every morning (Takes 2 x 500 mg = 1000 mg)

## 2017-09-26 NOTE — PROGRESS NOTES
Wound healing nicely hyperperfision headache slowly improving.  Hoarseness also much better making good progress. F/U as per protocol.

## 2017-09-26 NOTE — NURSING NOTE
Initial /70 (BP Location: Right arm, Patient Position: Sitting, Cuff Size: Adult Regular)  Pulse 83  Temp 98.2  F (36.8  C) (Oral)  Ht 1.829 m (6')  Wt 71.2 kg (157 lb)  BMI 21.29 kg/m2 Estimated body mass index is 21.29 kg/(m^2) as calculated from the following:    Height as of this encounter: 1.829 m (6').    Weight as of this encounter: 71.2 kg (157 lb). .    Zunilda Rosas CMA

## 2017-09-28 DIAGNOSIS — I65.29 CAROTID STENOSIS: Primary | ICD-10-CM

## 2017-10-03 ENCOUNTER — TELEPHONE (OUTPATIENT)
Dept: OTHER | Facility: CLINIC | Age: 67
End: 2017-10-03

## 2017-10-03 NOTE — TELEPHONE ENCOUNTER
Pt is s/p left CEA 9/8/17    Pt had 1st post-op 9/26/17 and  mention the pt's hyperperfusion HA in his note.      Pt LM on triage line stating that he still has a headache and he is getting tired of it.  Pt reports that he has been taking two extra strength asa at least three times a day and he rates his pain a 2-3/10.  Pt reports that the pain is above his left ear and tends to be worse when he is resting than when he is active.    Asked  for recommendation and he recommends pt only take the asa once a day and try taking ibuprofen 600mg TID.    Informed pt of 's recommendation and he verbalized understanding.    Madeline Ospina, BSN, RN

## 2017-10-31 ENCOUNTER — APPOINTMENT (OUTPATIENT)
Dept: GENERAL RADIOLOGY | Facility: CLINIC | Age: 67
End: 2017-10-31
Attending: PHYSICIAN ASSISTANT
Payer: MEDICARE

## 2017-10-31 ENCOUNTER — HOSPITAL ENCOUNTER (EMERGENCY)
Facility: CLINIC | Age: 67
Discharge: HOME | End: 2017-10-31
Attending: PHYSICIAN ASSISTANT | Admitting: PHYSICIAN ASSISTANT
Payer: MEDICARE

## 2017-10-31 VITALS
DIASTOLIC BLOOD PRESSURE: 74 MMHG | SYSTOLIC BLOOD PRESSURE: 127 MMHG | OXYGEN SATURATION: 97 % | HEART RATE: 90 BPM | RESPIRATION RATE: 20 BRPM | TEMPERATURE: 98.2 F

## 2017-10-31 DIAGNOSIS — J01.90 ACUTE SINUSITIS WITH SYMPTOMS > 10 DAYS: ICD-10-CM

## 2017-10-31 DIAGNOSIS — J20.9 ACUTE BRONCHITIS WITH SYMPTOMS > 10 DAYS: Primary | ICD-10-CM

## 2017-10-31 PROCEDURE — 99213 OFFICE O/P EST LOW 20 MIN: CPT | Mod: 25

## 2017-10-31 PROCEDURE — 99213 OFFICE O/P EST LOW 20 MIN: CPT | Performed by: PHYSICIAN ASSISTANT

## 2017-10-31 PROCEDURE — 71020 XR CHEST 2 VW: CPT

## 2017-10-31 RX ORDER — ALBUTEROL SULFATE 90 UG/1
2 AEROSOL, METERED RESPIRATORY (INHALATION) EVERY 6 HOURS PRN
Qty: 1 INHALER | Refills: 0 | Status: SHIPPED | OUTPATIENT
Start: 2017-10-31 | End: 2018-04-10

## 2017-10-31 RX ORDER — DOXYCYCLINE 100 MG/1
100 CAPSULE ORAL 2 TIMES DAILY
Qty: 14 CAPSULE | Refills: 0 | Status: SHIPPED | OUTPATIENT
Start: 2017-10-31 | End: 2017-11-07

## 2017-10-31 ASSESSMENT — ENCOUNTER SYMPTOMS
CHILLS: 0
WHEEZING: 1
CONSTITUTIONAL NEGATIVE: 1
FEVER: 0
SORE THROAT: 1
SINUS PAIN: 1
MUSCULOSKELETAL NEGATIVE: 1
SINUS PRESSURE: 1
SHORTNESS OF BREATH: 1
NEUROLOGICAL NEGATIVE: 1
COUGH: 1
CARDIOVASCULAR NEGATIVE: 1

## 2017-10-31 NOTE — ED AVS SNAPSHOT
[facility]    [address] [address]    Phone:  [phone]    Fax:  [phone]                                       Delbert Tilley   MRN: [id number]    Department:  [facility]   Date of Visit:  10/31/2017           After Visit Summary Signature Page     I have received my discharge instructions, and my questions have been answered. I have discussed any challenges I see with this plan with the nurse or doctor.    ..........................................................................................................................................  Patient/Patient Representative Signature      ..........................................................................................................................................  Patient Representative Print Name and Relationship to Patient    ..................................................               ................................................  Date                                            Time    ..........................................................................................................................................  Reviewed by Signature/Title    ...................................................              ..............................................  Date                                                            Time

## 2017-10-31 NOTE — DISCHARGE INSTRUCTIONS
Bronchitis with Wheezing (Viral or Bacterial: Adult)    Bronchitis is an infection of the air passages. It often occurs during a cold and is usually caused by a virus. Symptoms include cough with mucus (phlegm) and low-grade fever. This illness is contagious during the first few days and is spread through the air by coughing and sneezing, or by direct contact (touching the sick person and then touching your own eyes, nose, or mouth).  If there is a lot of inflammation, air flow is restricted. The air passages may also go into spasm, especially if you have asthma. This causes wheezing and difficulty breathing even in people who do not have asthma.  Bronchitis usually lasts 7 to 14 days. The wheezing should improve with treatment during the first week. An inhaler is often prescribed to relax the air passages and stop wheezing. Antibiotics will be prescribed if your doctor thinks there is also a secondary bacterial infection.  Home care    If symptoms are severe, rest at home for the first 2 to 3 days. When you go back to your usual activities, don't let yourself get too tired.    Do not smoke. Also avoid being exposed to secondhand smoke.    You may use over-the-counter medicine to control fever or pain, unless another medicine was prescribed. Note: If you have chronic liver or kidney disease or have ever had a stomach ulcer or gastrointestinal bleeding, talk with your healthcare provider before using these medicines. Also talk to your provider if you are taking medicine to prevent blood clots.) Aspirin should never be given to anyone younger than 18 years of age who is ill with a viral infection or fever. It may cause severe liver or brain damage.    Your appetite may be poor, so a light diet is fine. Avoid dehydration by drinking 6 to 8 glasses of fluids per day (such as water, soft drinks, sports drinks, juices, tea, or soup). Extra fluids will help loosen secretions in the nose and lungs.    Over-the-counter  cough, cold, and sore-throat medicines will not shorten the length of the illness, but they may be helpful to reduce symptoms. (Note: Do not use decongestants if you have high blood pressure.)    If you were given an inhaler, use it exactly as directed. If you need to use it more often than prescribed, your condition may be worsening. If this happens, contact your healthcare provider.    If prescribed, finish all antibiotic medicine, even if you are feeling better after only a few days.  Follow-up care  Follow up with your healthcare provider, or as advised. If you had an X-ray or ECG (electrocardiogram), a specialist will review it. You will be notified of any new findings that may affect your care.  Note: If you are age 65 or older, or if you have a chronic lung disease or condition that affects your immune system, or you smoke, talk to your healthcare provider about having a pneumococcal vaccinations and a yearly influenza vaccination (flu shot).  When to seek medical advice  Call your healthcare provider right away if any of these occur:    Fever of 100.4 F (38 C) or higher    Coughing up increasing amounts of colored sputum    Weakness, drowsiness, headache, facial pain, ear pain, or a stiff neck  Call 911, or get immediate medical care  Contact emergency services right away if any of these occur.    Coughing up blood    Worsening weakness, drowsiness, headache, or stiff neck    Increased wheezing not helped with medication, shortness of breath, or pain with breathing  Date Last Reviewed: 9/13/2015 2000-2017 [employer]. [address] [place] [address]. All rights reserved. This information is not intended as a substitute for professional medical care. Always follow your healthcare professional's instructions.          Acute Bacterial Rhinosinusitis (ABRS)    Acute bacterial rhinosinusitis (ABRS) is an infection of your nasal cavity and sinuses. It s caused by bacteria. Acute means  that you ve had symptoms for less than 4 weeks, but possibly up to 12 weeks.  Understanding your sinuses  The nasal cavity is the large air-filled space behind your nose. The sinuses are a group of spaces formed by the bones of your face. They connect with your nasal cavity. ABRS causes the tissue lining these spaces to become inflamed. Mucus may not drain normally. This leads to facial pain and other symptoms.  What causes ABRS?  ABRS most often follows an upper respiratory infection caused by a virus. Bacteria then infect the lining of your nasal cavity and sinuses. But you can also get ABRS if you have:    Nasal allergies    Long-term nasal swelling and congestion not caused by allergies    Blockage in the nose  Symptoms of ABRS  The symptoms of ABRS may be different for each person and include:    Nasal congestion or blockage    Pain or pressure in the face    Thick, colored drainage from the nose  Other symptoms may include:    Runny nose    Fluid draining from the nose down the throat (postnasal drip)    Headache    Cough    Pain    Fever  Diagnosing ABRS  ABRS may be diagnosed if you ve had an upper respiratory infection like a cold and cough for 10 or more days without improvement or with worsening symptoms. Your healthcare provider will ask about your symptoms and your medical history. The provider will check your vital signs, including your temperature. You ll have a physical exam. The healthcare provider will check your ears, nose, and throat. You likely won t need any tests. If ABRS comes back, you may have a culture or other tests.  Treatment for ABRS  Treatment may include:    Antibiotic medicine. This is for symptoms that last for at least 10 to 14 days.    Nasal corticosteroid medicine. Drops or spray used in the nose can lessen swelling and congestion.    Over-the-counter pain medicine. This is to lessen sinus pain and pressure.    Nasal decongestant medicine. Spray or drops may help to lessen  congestion. Do not use them for more than a few days.    Salt wash (saline irrigation). This can help to loosen mucus.  Possible complications of ABRS  ABRS may come back or become long-term (chronic). In rare cases, ABRS may cause complications such as:     Inflamed tissue around the brain and spinal cord (meningitis)    Inflamed tissue around the eyes (orbital cellulitis)    Inflamed bones around the sinuses (osteitis)  These problems may need to be treated in a hospital with intravenous (IV) antibiotic medicine or surgery.  When to call the healthcare provider  Call your healthcare provider if you have any of the following:    Symptoms that don t get better, or get worse    Symptoms that don t get better after 3 to 5 days on antibiotics    Trouble seeing    Swelling around your eyes    Confusion or trouble staying awake   Date Last Reviewed: 5/1/2017 2000-2017 [employer]. [address] [place] [address], [place] [address]. All rights reserved. This information is not intended as a substitute for professional medical care. Always follow your healthcare professional's instructions.

## 2017-10-31 NOTE — ED PROVIDER NOTES
"  History     Chief Complaint   Patient presents with     Cough     HPI  Delbert Tilley is a 67 year old male with hx HTN, CAD, DM, CHF, carotid stenosis who presents with complaints of productive cough for the past 3 weeks.  Patient also c/o associated shortness of breath and a \"gurgling\" in the back of his throat.  Patient states he has also been experiencing sinus pressure, post-nasal drainage, sore throat, and nasal congestion for the same amount of time.  He states that the drainage tastes \"salty like sinus.\"  He notes having a history of pneumonia but states this does not feel consistent with that as he is not having any chest pain.  Denies fevers, chills, or leg pain/swelling.  Pt is a smoker.  He denies hx asthma or underlying lung disease or emphysema.  He has not tried taking anything over the counter for his symptoms.    Problem List:    Patient Active Problem List    Diagnosis Date Noted     Left carotid stenosis 09/08/2017     Priority: Medium     Carpal tunnel syndrome of right wrist 01/10/2016     Priority: Medium     Carotid stenosis 06/12/2015     Priority: Medium     Bilateral carotid Disease S/P Right carotid endarterectomy.   [facility]       CHF (congestive heart failure) (H) 06/08/2015     Priority: Medium     NSTEMI (non-ST elevated myocardial infarction) (H) 10/21/2014     Priority: Medium     ACS (acute coronary syndrome) (H) 10/20/2014     Priority: Medium     Mixed hyperlipidemia 07/08/2013     Priority: Medium     [facility]  Diagnosis updated by automated process. Provider to review and confirm.       TYPE 2 DIABETES, HBA1C GOAL < 8% 10/31/2010     Priority: Medium     CARDIOVASCULAR SCREENING; LDL GOAL LESS THAN 100 10/31/2010     Priority: Medium     Coronary artery disease involving native coronary artery of native heart without angina pectoris 07/07/2010     Priority: Medium     S/p mi  Bypass x 4 --grafting to the LAD obtuse marginal and PDA.   Angio on " 6/10 negative          Alcohol abuse 07/07/2010     Priority: Medium     H/o rehab x 5        GERD (gastroesophageal reflux disease) 07/07/2010     Priority: Medium     Pancreatic disease 07/07/2010     Priority: Medium     H/o pancreatitis --on enzymes        Diabetes mellitus, type 2 (H) 07/07/2010     Priority: Medium     [facility]       Cardiomyopathy (H) 06/15/2010     Priority: Medium     EF 15%.--ICD placement        Atrial fibrillation/flutter 06/15/2010     Priority: Medium     S/p ablation 1June 2010.             Past Medical History:    Past Medical History:   Diagnosis Date     Acute renal failure (H) 8/26/06 Hosp     Arthritis      CAD (coronary artery disease)      Carpal tunnel syndrome      Diabetes (H)      Gastro-oesophageal reflux disease      H/O: alcohol abuse      HTN (hypertension)      Hyperlipidaemia      Hyperlipidaemia LDL goal <100 7/8/2013     Hypokalemia      Pacemaker      Pancreatitis 6/26/06 Hosp       Past Surgical History:    Past Surgical History:   Procedure Laterality Date     BYPASS CARDIOPULMONARY       CATARACT IOL, RT/LT      Cataract IOL RT/LT     ENDARTERECTOMY CAROTID Right 6/12/2015    Procedure: ENDARTERECTOMY CAROTID;  Surgeon: João Turner MD;  Location:  OR     ENDARTERECTOMY CAROTID Left 9/8/2017    Procedure: ENDARTERECTOMY CAROTID;  LEFT CAROTID ENDARTERECTOMY WITH EEG;  Surgeon: João Turner MD;  Location:  OR     IMPLANT PACEMAKER  6/10/2010     RELEASE CARPAL TUNNEL Right 4/12/2016    Procedure: RELEASE CARPAL TUNNEL;  Surgeon: Lissy Leger MD;  Location: [facility] OR     SURGICAL HISTORY OF -   1998    Laminectomy     SURGICAL HISTORY OF -   10/2003    Bypass grafting     TONSILLECTOMY & ADENOIDECTOMY         Family History:    Family History   Problem Relation Age of Onset     Unknown/Adopted No family hx of        Social History:  Marital Status:  Single [1]  Social History   Substance Use Topics     Smoking status:  Current Every Day Smoker     Packs/day: 2.00     Types: Cigarettes     Smokeless tobacco: Never Used     Alcohol use No        Medications:      doxycycline (VIBRAMYCIN) 100 MG capsule   albuterol (PROAIR HFA/PROVENTIL HFA/VENTOLIN HFA) 108 (90 BASE) MCG/ACT Inhaler   aspirin 325 MG tablet   Acetaminophen (TYLENOL PO)   insulin glargine (LANTUS) 100 UNIT/ML injection   Nitroglycerin (NITROSTAT SL)   METFORMIN HCL PO   METOPROLOL SUCCINATE ER PO   amylase-lipase-protease (CREON 12) 14939 UNITS CPEP   atorvastatin (LIPITOR) 80 MG tablet   glipiZIDE (GLUCOTROL) 10 MG tablet   pantoprazole (PROTONIX) 40 MG EC tablet         Review of Systems   Constitutional: Negative.  Negative for chills and fever.   HENT: Positive for congestion, postnasal drip, sinus pain, sinus pressure and sore throat.    Respiratory: Positive for cough, shortness of breath and wheezing.    Cardiovascular: Negative.  Negative for chest pain.   Musculoskeletal: Negative.    Skin: Negative.    Neurological: Negative.    All other systems reviewed and are negative.      Physical Exam   BP: 127/74  Pulse: 90  Temp: 98.2  F (36.8  C)  Resp: 20  SpO2: 97 %      Physical Exam   Constitutional: He is oriented to person, place, and time. He appears well-developed and well-nourished. No distress.   HENT:   Head: Normocephalic and atraumatic.   Right Ear: Tympanic membrane, external ear and ear canal normal.   Left Ear: Tympanic membrane, external ear and ear canal normal.   Nose: Mucosal edema and rhinorrhea present.   Mouth/Throat: Uvula is midline and mucous membranes are normal. No uvula swelling. Posterior oropharyngeal erythema present. No oropharyngeal exudate, posterior oropharyngeal edema or tonsillar abscesses.   Eyes: Conjunctivae and EOM are normal. Pupils are equal, round, and reactive to light.   Neck: Normal range of motion and full passive range of motion without pain. Neck supple. No rigidity. Normal range of motion present.  "  Cardiovascular: Normal rate, regular rhythm and normal heart sounds.    Pulmonary/Chest: Effort normal and breath sounds normal. No respiratory distress. He has no wheezes. He has no rales.   Lymphadenopathy:     He has no cervical adenopathy.   Neurological: He is alert and oriented to person, place, and time.   Skin: Skin is warm and dry. No rash noted.       ED Course     ED Course     Procedures    Results for orders placed or performed during the hospital encounter of 10/31/17   XR Chest 2 Views    Narrative    XR CHEST 2 VW 10/31/2017 3:15 PM    COMPARISON: 10/20/2014    HISTORY: Cough.      Impression    IMPRESSION: 2-lead implanted cardiac pacer/defibrillator remains in  place over the left chest. Cardiac silhouette within normal limits.  Calcified pleural plaques are seen in the right base. No focal  airspace disease, pleural effusion or pneumothorax.    VANNA MARTIN MD       Assessments & Plan (with Medical Decision Making)     Pt is a 67 year old male with hx HTN, CAD, DM, CHF, carotid stenosis who presents with complaints of productive cough for the past 3 weeks.  Patient also c/o associated shortness of breath and a \"gurgling\" in the back of his throat.  Patient states he has also been experiencing sinus pressure, post-nasal drainage, sore throat, and nasal congestion for the same amount of time.  He states that the drainage tastes \"salty like sinus.\"  He notes having a history of pneumonia but states this does not feel consistent with that as he is not having any chest pain.  Denies fevers, chills, or leg pain/swelling.  Pt is a smoker.  He denies hx asthma or underlying lung disease or emphysema.  He has not tried taking anything over the counter for his symptoms.  Pt is afebrile on arrival.  Exam as above.  CXR was negative for pneumonia, pleural effusion, or acute pathology.  Discussed results with patient.  Hand-outs provided.    Patient was sent with Doxycycline and Albuterol inhaler and was " instructed to follow-up with PCP if no improvement in 5-7 days for continued care and management or sooner if new or worsening symptoms.  He is to return to the ED for persistent and/or worsening symptoms.  Patient expressed understanding of the diagnosis and plan and was discharged home in good condition.    I have reviewed the nursing notes.    I have reviewed the findings, diagnosis, plan and need for follow up with the patient.    Discharge Medication List as of 10/31/2017  3:42 PM      START taking these medications    Details   doxycycline (VIBRAMYCIN) 100 MG capsule Take 1 capsule (100 mg) by mouth 2 times daily for 7 days, Disp-14 capsule, R-0, E-Prescribe      albuterol (PROAIR HFA/PROVENTIL HFA/VENTOLIN HFA) 108 (90 BASE) MCG/ACT Inhaler Inhale 2 puffs into the lungs every 6 hours as needed for shortness of breath / dyspnea or wheezing, Disp-1 Inhaler, R-0, E-Prescribe             Final diagnoses:   Acute bronchitis with symptoms > 10 days   Acute sinusitis with symptoms > 10 days       10/31/2017   [facility] EMERGENCY DEPARTMENT     Sara Carmona PA-C  10/31/17 1461

## 2017-10-31 NOTE — ED AVS SNAPSHOT
[facility]    [address] [address]    Phone:  [phone]    Fax:  [phone]                                       Delbert Tilley   MRN: [id number]    Department:  [facility]   Date of Visit:  10/31/2017           Patient Information     Date Of Birth          1950        Your diagnoses for this visit were:     Acute bronchitis with symptoms > 10 days     Acute sinusitis with symptoms > 10 days        You were seen by Sara Carmona PA-C.      Follow-up Information     Follow up with [place]. Call in 5 days.    Why:  As needed, For persistent symptoms    Contact information:    [address] [facility] [address]          Follow up with [facility].    Specialty:  EMERGENCY MEDICINE    Why:  As needed, If symptoms worsen    Contact information:    [address] [place] [id number] [phone]    Additional information:    The medical center is located at   [address] ([address] in [place], four miles north   of [place]).        Discharge Instructions         Bronchitis with Wheezing (Viral or Bacterial: Adult)    Bronchitis is an infection of the air passages. It often occurs during a cold and is usually caused by a virus. Symptoms include cough with mucus (phlegm) and low-grade fever. This illness is contagious during the first few days and is spread through the air by coughing and sneezing, or by direct contact (touching the sick person and then touching your own eyes, nose, or mouth).  If there is a lot of inflammation, air flow is restricted. The air passages may also go into spasm, especially if you have asthma. This causes wheezing and difficulty breathing even in people who do not have asthma.  Bronchitis usually lasts 7 to 14 days. The wheezing should improve with treatment during the first week. An inhaler is often prescribed to  relax the air passages and stop wheezing. Antibiotics will be prescribed if your doctor thinks there is also a secondary bacterial infection.  Home care    If symptoms are severe, rest at home for the first 2 to 3 days. When you go back to your usual activities, don't let yourself get too tired.    Do not smoke. Also avoid being exposed to secondhand smoke.    You may use over-the-counter medicine to control fever or pain, unless another medicine was prescribed. Note: If you have chronic liver or kidney disease or have ever had a stomach ulcer or gastrointestinal bleeding, talk with your healthcare provider before using these medicines. Also talk to your provider if you are taking medicine to prevent blood clots.) Aspirin should never be given to anyone younger than 18 years of age who is ill with a viral infection or fever. It may cause severe liver or brain damage.    Your appetite may be poor, so a light diet is fine. Avoid dehydration by drinking 6 to 8 glasses of fluids per day (such as water, soft drinks, sports drinks, juices, tea, or soup). Extra fluids will help loosen secretions in the nose and lungs.    Over-the-counter cough, cold, and sore-throat medicines will not shorten the length of the illness, but they may be helpful to reduce symptoms. (Note: Do not use decongestants if you have high blood pressure.)    If you were given an inhaler, use it exactly as directed. If you need to use it more often than prescribed, your condition may be worsening. If this happens, contact your healthcare provider.    If prescribed, finish all antibiotic medicine, even if you are feeling better after only a few days.  Follow-up care  Follow up with your healthcare provider, or as advised. If you had an X-ray or ECG (electrocardiogram), a specialist will review it. You will be notified of any new findings that may affect your care.  Note: If you are age 65 or older, or if you have a chronic lung disease or condition that  affects your immune system, or you smoke, talk to your healthcare provider about having a pneumococcal vaccinations and a yearly influenza vaccination (flu shot).  When to seek medical advice  Call your healthcare provider right away if any of these occur:    Fever of 100.4 F (38 C) or higher    Coughing up increasing amounts of colored sputum    Weakness, drowsiness, headache, facial pain, ear pain, or a stiff neck  Call 911, or get immediate medical care  Contact emergency services right away if any of these occur.    Coughing up blood    Worsening weakness, drowsiness, headache, or stiff neck    Increased wheezing not helped with medication, shortness of breath, or pain with breathing  Date Last Reviewed: 9/13/2015 2000-2017 [employer]. [address] [place] [address], [place] [address]. All rights reserved. This information is not intended as a substitute for professional medical care. Always follow your healthcare professional's instructions.          Acute Bacterial Rhinosinusitis (ABRS)    Acute bacterial rhinosinusitis (ABRS) is an infection of your nasal cavity and sinuses. It s caused by bacteria. Acute means that you ve had symptoms for less than 4 weeks, but possibly up to 12 weeks.  Understanding your sinuses  The nasal cavity is the large air-filled space behind your nose. The sinuses are a group of spaces formed by the bones of your face. They connect with your nasal cavity. ABRS causes the tissue lining these spaces to become inflamed. Mucus may not drain normally. This leads to facial pain and other symptoms.  What causes ABRS?  ABRS most often follows an upper respiratory infection caused by a virus. Bacteria then infect the lining of your nasal cavity and sinuses. But you can also get ABRS if you have:    Nasal allergies    Long-term nasal swelling and congestion not caused by allergies    Blockage in the nose  Symptoms of ABRS  The symptoms of ABRS may be different for each person and  include:    Nasal congestion or blockage    Pain or pressure in the face    Thick, colored drainage from the nose  Other symptoms may include:    Runny nose    Fluid draining from the nose down the throat (postnasal drip)    Headache    Cough    Pain    Fever  Diagnosing ABRS  ABRS may be diagnosed if you ve had an upper respiratory infection like a cold and cough for 10 or more days without improvement or with worsening symptoms. Your healthcare provider will ask about your symptoms and your medical history. The provider will check your vital signs, including your temperature. You ll have a physical exam. The healthcare provider will check your ears, nose, and throat. You likely won t need any tests. If ABRS comes back, you may have a culture or other tests.  Treatment for ABRS  Treatment may include:    Antibiotic medicine. This is for symptoms that last for at least 10 to 14 days.    Nasal corticosteroid medicine. Drops or spray used in the nose can lessen swelling and congestion.    Over-the-counter pain medicine. This is to lessen sinus pain and pressure.    Nasal decongestant medicine. Spray or drops may help to lessen congestion. Do not use them for more than a few days.    Salt wash (saline irrigation). This can help to loosen mucus.  Possible complications of ABRS  ABRS may come back or become long-term (chronic). In rare cases, ABRS may cause complications such as:     Inflamed tissue around the brain and spinal cord (meningitis)    Inflamed tissue around the eyes (orbital cellulitis)    Inflamed bones around the sinuses (osteitis)  These problems may need to be treated in a hospital with intravenous (IV) antibiotic medicine or surgery.  When to call the healthcare provider  Call your healthcare provider if you have any of the following:    Symptoms that don t get better, or get worse    Symptoms that don t get better after 3 to 5 days on antibiotics    Trouble seeing    Swelling around your  eyes    Confusion or trouble staying awake   Date Last Reviewed: 5/1/2017 2000-2017 [employer]. [address] [place] [address], [place] [address]. All rights reserved. This information is not intended as a substitute for professional medical care. Always follow your healthcare professional's instructions.          Future Appointments        Provider Department Dept Phone Center    11/3/2017 4:30 PM Romario Moyer MD [facility] [phone] [place]    11/21/2017 8:00 AM [place] Device RN [facility] [phone] [facility]      24 Hour Appointment Hotline       To make an appointment at any [facility], call [phone] ([phone]). If you don't have a family doctor or clinic, we will help you find one. [facility] are conveniently located to serve the needs of you and your family.             Review of your medicines      START taking        Dose / Directions Last dose taken    albuterol 108 (90 BASE) MCG/ACT Inhaler   Commonly known as:  PROAIR HFA/PROVENTIL HFA/VENTOLIN HFA   Dose:  2 puff   Quantity:  1 Inhaler        Inhale 2 puffs into the lungs every 6 hours as needed for shortness of breath / dyspnea or wheezing   Refills:  0        doxycycline 100 MG capsule   Commonly known as:  VIBRAMYCIN   Dose:  100 mg   Quantity:  14 capsule        Take 1 capsule (100 mg) by mouth 2 times daily for 7 days   Refills:  0          Our records show that you are taking the medicines listed below. If these are incorrect, please call your family doctor or clinic.        Dose / Directions Last dose taken    amylase-lipase-protease 40623 UNITS Cpep   Commonly known as:  CREON 12   Dose:  1 capsule        Take 1 capsule by mouth daily   Refills:  0        aspirin 325 MG tablet   Dose:  1000 mg   Quantity:  120 tablet        Take 3 tablets (975 mg) by mouth every morning (Takes 2 x 500 mg = 1000 mg)   Refills:  1        atorvastatin 80 MG tablet    Commonly known as:  LIPITOR   Dose:  80 mg        Take 80 mg by mouth At Bedtime   Refills:  0        glipiZIDE 10 MG tablet   Commonly known as:  GLUCOTROL   Dose:  10 mg        Take 10 mg by mouth 2 times daily   Refills:  0        insulin glargine 100 UNIT/ML injection   Commonly known as:  LANTUS   Dose:  27 Units        Inject 27 Units Subcutaneous every morning   Refills:  0        METFORMIN HCL PO   Dose:  1000 mg        Take 1,000 mg by mouth 2 times daily (Takes 2 x 500 mg = 1000 mg dose)   Refills:  0        METOPROLOL SUCCINATE ER PO   Dose:  12.5 mg        Take 12.5 mg by mouth every morning (Takes 0.5 x 25 mg tablet = 12.5 mg)   Refills:  0        NITROSTAT SL   Dose:  0.4 mg        Place 0.4 mg under the tongue every 5 minutes as needed for chest pain   Refills:  0        pantoprazole 40 MG EC tablet   Commonly known as:  PROTONIX   Dose:  40 mg        Take 40 mg by mouth daily   Refills:  0        TYLENOL PO   Dose:  1000 mg        Take 1,000 mg by mouth every morning (Takes 2 x 500 mg = 1000 mg)   Refills:  0                Prescriptions were sent or printed at these locations (2 Prescriptions)                   [facility] - [place] - [address]   [address], [place] [address]    Telephone:  [phone]   Fax:  [phone]   Hours:  [facility]                E-Prescribed (2 of 2)         doxycycline (VIBRAMYCIN) 100 MG capsule               albuterol (PROAIR HFA/PROVENTIL HFA/VENTOLIN HFA) 108 (90 BASE) MCG/ACT Inhaler                Procedures and tests performed during your visit     XR Chest 2 Views      Orders Needing Specimen Collection     None      Pending Results     No orders found from 10/29/2017 to 11/1/2017.            Pending Culture Results     No orders found from 10/29/2017 to 11/1/2017.            Pending Results Instructions     If you had any lab results that were not finalized at the time of your Discharge, you can  "call the ED Lab Result RN at [phone]. You will be contacted by this team for any positive Lab results or changes in treatment. The nurses are available 7 days a week from 10A to 6:30P.  You can leave a message 24 hours per day and they will return your call.        Test Results From Your Hospital Stay        10/31/2017  3:21 PM      Narrative     XR CHEST 2 VW 10/31/2017 3:15 PM    COMPARISON: 10/20/2014    HISTORY: Cough.        Impression     IMPRESSION: 2-lead implanted cardiac pacer/defibrillator remains in  place over the left chest. Cardiac silhouette within normal limits.  Calcified pleural plaques are seen in the right base. No focal  airspace disease, pleural effusion or pneumothorax.    VANNA MARTIN MD                Thank you for choosing [facility]       Thank you for choosing [facility] for your care. Our goal is always to provide you with excellent care. Hearing back from our patients is one way we can continue to improve our services. Please take a few minutes to complete the written survey that you may receive in the mail after you visit with us. Thank you!        [employer] Information     [employer] lets you send messages to your doctor, view your test results, renew your prescriptions, schedule appointments and more. To sign up, go to [email or web address] . Click on \"Log in\" on the left side of the screen, which will take you to the Welcome page. Then click on \"Sign up Now\" on the right side of the page.     You will be asked to enter the access code listed below, as well as some personal information. Please follow the directions to create your username and password.     Your access code is: [email or web address]  Expires: 2017  7:24 AM     Your access code will  in 90 days. If you need help or a new code, please call your [facility] clinic or [phone].        Care EveryWhere ID     This is your Care EveryWhere ID. This could be used by other organizations to access your [facility] medical " records  [id number]        Equal Access to Services     MARITA LAMA : Forest Mondragon, hemant buenrostro, gwendolyn esteves. So [facility] [phone].    ATENCIÓN: Si habla español, tiene a frey disposición servicios gratuitos de asistencia lingüística. Llame al [phone].    We comply with applicable federal civil rights laws and [place] laws. We do not discriminate on the basis of race, color, national origin, age, disability, sex, sexual orientation, or gender identity.            After Visit Summary       This is your record. Keep this with you and show to your community pharmacist(s) and doctor(s) at your next visit.

## 2017-11-09 ENCOUNTER — OFFICE VISIT (OUTPATIENT)
Dept: OTOLARYNGOLOGY | Facility: CLINIC | Age: 67
End: 2017-11-09
Payer: COMMERCIAL

## 2017-11-09 VITALS
HEIGHT: 72 IN | DIASTOLIC BLOOD PRESSURE: 70 MMHG | BODY MASS INDEX: 22.21 KG/M2 | HEART RATE: 83 BPM | WEIGHT: 164 LBS | TEMPERATURE: 97.7 F | SYSTOLIC BLOOD PRESSURE: 108 MMHG

## 2017-11-09 DIAGNOSIS — R49.0 HOARSENESS: Primary | ICD-10-CM

## 2017-11-09 PROCEDURE — 99204 OFFICE O/P NEW MOD 45 MIN: CPT | Mod: 25 | Performed by: OTOLARYNGOLOGY

## 2017-11-09 PROCEDURE — 31575 DIAGNOSTIC LARYNGOSCOPY: CPT | Performed by: OTOLARYNGOLOGY

## 2017-11-09 ASSESSMENT — PAIN SCALES - GENERAL: PAINLEVEL: NO PAIN (0)

## 2017-11-09 NOTE — LETTER
11/9/2017         RE: Delbert Tilley  [id number] [address] [place] [address]  [facility] [address]        Dear Colleague,    Thank you for referring your patient, Delbert Tilley, to the [facility]. Please see a copy of my visit note below.        History of Present Illness - Delbert Tilley is a 67 year old male who presents with hoarseness since a left carotid endarterectomy on 9/8/2017. He has noticed that his tongue does not move properly since surgery. He also feels that his voice changes with surgery, but has been gradually improving over the past few weeks. Patient reports that he has the sensation that he has mucus running down his throat that he frequently has to cough up. Patient is a daily smoker, he states that he is ready to quit smoking. He has a plan to start this in December.      Past Medical History -   Patient Active Problem List   Diagnosis     Cardiomyopathy (H)     Atrial fibrillation/flutter     Coronary artery disease involving native coronary artery of native heart without angina pectoris     Alcohol abuse     GERD (gastroesophageal reflux disease)     Pancreatic disease     Diabetes mellitus, type 2 (H)     TYPE 2 DIABETES, HBA1C GOAL < 8%     CARDIOVASCULAR SCREENING; LDL GOAL LESS THAN 100     Mixed hyperlipidemia     ACS (acute coronary syndrome) (H)     NSTEMI (non-ST elevated myocardial infarction) (H)     CHF (congestive heart failure) (H)     Carotid stenosis     Carpal tunnel syndrome of right wrist     Left carotid stenosis     Current Medications -   Current Outpatient Prescriptions:      albuterol (PROAIR HFA/PROVENTIL HFA/VENTOLIN HFA) 108 (90 BASE) MCG/ACT Inhaler, Inhale 2 puffs into the lungs every 6 hours as needed for shortness of breath / dyspnea or wheezing, Disp: 1 Inhaler, Rfl: 0     aspirin 325 MG tablet, Take 3 tablets (975 mg) by mouth every morning (Takes 2 x 500 mg = 1000 mg), Disp: 120 tablet, Rfl: 1     Acetaminophen (TYLENOL PO), Take 1,000 mg by mouth every  morning (Takes 2 x 500 mg = 1000 mg), Disp: , Rfl:      insulin glargine (LANTUS) 100 UNIT/ML injection, Inject 27 Units Subcutaneous every morning, Disp: , Rfl:      Nitroglycerin (NITROSTAT SL), Place 0.4 mg under the tongue every 5 minutes as needed for chest pain, Disp: , Rfl:      METFORMIN HCL PO, Take 1,000 mg by mouth 2 times daily (Takes 2 x 500 mg = 1000 mg dose), Disp: , Rfl:      METOPROLOL SUCCINATE ER PO, Take 12.5 mg by mouth every morning (Takes 0.5 x 25 mg tablet = 12.5 mg), Disp: , Rfl:      amylase-lipase-protease (CREON 12) 11087 UNITS CPEP, Take 1 capsule by mouth daily, Disp: , Rfl:      atorvastatin (LIPITOR) 80 MG tablet, Take 80 mg by mouth At Bedtime , Disp: , Rfl:      glipiZIDE (GLUCOTROL) 10 MG tablet, Take 10 mg by mouth 2 times daily , Disp: , Rfl:      pantoprazole (PROTONIX) 40 MG EC tablet, Take 40 mg by mouth daily, Disp: , Rfl:     Allergies -   Allergies   Allergen Reactions     Hydrocodone      Upset stomach     Shellfish Allergy Hives and Rash     Social History -   Social History     Social History     Marital status: Single     Spouse name: N/A     Number of children: N/A     Years of education: N/A     Social History Main Topics     Smoking status: Current Every Day Smoker     Packs/day: 2.00     Types: Cigarettes     Smokeless tobacco: Never Used     Alcohol use No     Drug use: No     Sexual activity: Not Currently     Partners: Female     Other Topics Concern     Parent/Sibling W/ Cabg, Mi Or Angioplasty Before 65f 55m? No     Social History Narrative     Family History -   Family History   Problem Relation Age of Onset     Unknown/Adopted No family hx of        Review of Systems - As per HPI and PMHx, otherwise 7 system review of the head and neck negative. 10+ system review negative.      Physical Exam  /70 (BP Location: Right arm, Patient Position: Sitting, Cuff Size: Adult Regular)  Pulse 83  Temp 97.7  F (36.5  C) (Oral)  Ht 1.829 m (6')  Wt 74.4 kg (164  lb)  BMI 22.24 kg/m2  General - The patient is well nourished and well developed, and appears to have good nutritional status.  Alert and oriented to person and place, answers questions and cooperates with examination appropriately.   Head and Face - Normocephalic and atraumatic, with no gross asymmetry noted of the contour of the facial features.  The facial nerve is intact, with strong symmetric movements.  Voice and Breathing - The patient was breathing comfortably without the use of accessory muscles. There was no wheezing, stridor, or stertor.  The patients voice was clear and strong, and had appropriate pitch and quality.  Ears - Bilateral pinna and EACs with normal appearing overlying skin. Tympanic membrane intact with good mobility on pneumatic otoscopy bilaterally. Bony landmarks of the ossicular chain are normal. The tympanic membranes are normal in appearance. No retraction, perforation, or masses.  No fluid or purulence was seen in the external canal or the middle ear.   Eyes - Extraocular movements intact.  Sclera were not icteric or injected, conjunctiva were pink and moist.  Mouth - Examination of the oral cavity showed pink, healthy oral mucosa. No lesions or ulcerations noted.  The tongue was mobile, and the dentition were in good condition. The tongue goes to the left on protrusion.   Throat - The walls of the oropharynx were smooth, pink, moist, symmetric, and had no lesions or ulcerations.  The tonsillar pillars and soft palate were symmetric.  The uvula was midline on elevation.  Neck - Normal midline excursion of the laryngotracheal complex during swallowing.  Full range of motion on passive movement.  Palpation of the occipital, submental, submandibular, internal jugular chain, and supraclavicular nodes did not demonstrate any abnormal lymph nodes or masses.  The carotid pulse was palpable bilaterally.  Palpation of the thyroid was soft and smooth, with no nodules or goiter appreciated.  The  trachea was mobile and midline.  Nose - External contour is symmetric, no gross deflection or scars.  Nasal mucosa is pink and moist with no abnormal mucus.  The septum was midline and non-obstructive, turbinates of normal size and position.  No polyps, masses, or purulence noted on examination.  Heart:  Irregular rate and regular rhythm, no murmurs.  Lungs:  Chest clear to auscultation bilaterally        Procedure: Flexible Endoscopy  Indication: Vocal Hoarseness     Attempts at mirror laryngoscopy were not possible due to gag reflex.  Therefore I proceeded with a fiberoptic examination.  First I sprayed both sides of the nose with a mixture of lidocaine and neosynephrine.  I then passed the scope through the nasal cavity.  The nasal cavity was unremarkable.  The nasopharynx was mucosally covered and symmetric.  The Eustachian tube openings were unobstructed.  Going further down I had a clear view of the base of tongue which had normal appearing lingual tonsillar tissue.  The base of tongue was free of lesions, and the vallecula was open.  The epiglottis was smooth and mucosally covered.  The supraglottic larynx was then clearly visualized.  The vocal cords moved smoothly and symmetrically, they were pearly white and no lesions were seen.  The pyriform sinuses were open, and the limited view of the postcricoid region did not show any lesions.        Assessment - Delbert Tilley is a 67 year old male with concerns of a hoarse voice since his left carotid endarterectomy on 9/8/17. I reassured patient that his vocal cords are working normally, but he is utilizing supraglottic compression during phonation. I advised patient to complete speech therapy to help with his hoarseness. [facility] I discussed options to help quit smoking. Patient again stated that he will talk to his primary care physician during his next appointment.         This document serves as a  record of the services and decisions personally performed and made by Dr. Abimael Nguyen MD. It was created on his behalf by Emily Duarte, a trained medical scribe. The creation of this document is based the provider's statements to the medical scribe.  Emily Duarte 8:43 AM 11/9/2017    Provider:   The information in this document, created by the medical scribe for me, accurately reflects the services I personally performed and the decisions made by me. I have reviewed and approved this document for accuracy prior to leaving the patient care area.  Dr. Abimael Nguyen MD 8:43 AM 11/9/2017    Dr. Abimael Nguyen MD  Otolaryngology  [facility]        This laryngoscopy scope was  used on this patient.    Flexible    Scope Number: Arnoldo Storz Flexible  #[id number]   Adult   Zunilda Rosas CMA        Again, thank you for allowing me to participate in the care of your patient.        Sincerely,        Abimael Nguyen MD

## 2017-11-09 NOTE — PROGRESS NOTES
History of Present Illness - Delbert Tilley is a 67 year old male who presents with hoarseness since a left carotid endarterectomy on 9/8/2017. He has noticed that his tongue does not move properly since surgery. He also feels that his voice changes with surgery, but has been gradually improving over the past few weeks. Patient reports that he has the sensation that he has mucus running down his throat that he frequently has to cough up. Patient is a daily smoker, he states that he is ready to quit smoking. He has a plan to start this in December.      Past Medical History -   Patient Active Problem List   Diagnosis     Cardiomyopathy (H)     Atrial fibrillation/flutter     Coronary artery disease involving native coronary artery of native heart without angina pectoris     Alcohol abuse     GERD (gastroesophageal reflux disease)     Pancreatic disease     Diabetes mellitus, type 2 (H)     TYPE 2 DIABETES, HBA1C GOAL < 8%     CARDIOVASCULAR SCREENING; LDL GOAL LESS THAN 100     Mixed hyperlipidemia     ACS (acute coronary syndrome) (H)     NSTEMI (non-ST elevated myocardial infarction) (H)     CHF (congestive heart failure) (H)     Carotid stenosis     Carpal tunnel syndrome of right wrist     Left carotid stenosis     Current Medications -   Current Outpatient Prescriptions:      albuterol (PROAIR HFA/PROVENTIL HFA/VENTOLIN HFA) 108 (90 BASE) MCG/ACT Inhaler, Inhale 2 puffs into the lungs every 6 hours as needed for shortness of breath / dyspnea or wheezing, Disp: 1 Inhaler, Rfl: 0     aspirin 325 MG tablet, Take 3 tablets (975 mg) by mouth every morning (Takes 2 x 500 mg = 1000 mg), Disp: 120 tablet, Rfl: 1     Acetaminophen (TYLENOL PO), Take 1,000 mg by mouth every morning (Takes 2 x 500 mg = 1000 mg), Disp: , Rfl:      insulin glargine (LANTUS) 100 UNIT/ML injection, Inject 27 Units Subcutaneous every morning, Disp: , Rfl:      Nitroglycerin (NITROSTAT SL), Place 0.4 mg under the tongue every 5 minutes as  needed for chest pain, Disp: , Rfl:      METFORMIN HCL PO, Take 1,000 mg by mouth 2 times daily (Takes 2 x 500 mg = 1000 mg dose), Disp: , Rfl:      METOPROLOL SUCCINATE ER PO, Take 12.5 mg by mouth every morning (Takes 0.5 x 25 mg tablet = 12.5 mg), Disp: , Rfl:      amylase-lipase-protease (CREON 12) 95148 UNITS CPEP, Take 1 capsule by mouth daily, Disp: , Rfl:      atorvastatin (LIPITOR) 80 MG tablet, Take 80 mg by mouth At Bedtime , Disp: , Rfl:      glipiZIDE (GLUCOTROL) 10 MG tablet, Take 10 mg by mouth 2 times daily , Disp: , Rfl:      pantoprazole (PROTONIX) 40 MG EC tablet, Take 40 mg by mouth daily, Disp: , Rfl:     Allergies -   Allergies   Allergen Reactions     Hydrocodone      Upset stomach     Shellfish Allergy Hives and Rash     Social History -   Social History     Social History     Marital status: Single     Spouse name: N/A     Number of children: N/A     Years of education: N/A     Social History Main Topics     Smoking status: Current Every Day Smoker     Packs/day: 2.00     Types: Cigarettes     Smokeless tobacco: Never Used     Alcohol use No     Drug use: No     Sexual activity: Not Currently     Partners: Female     Other Topics Concern     Parent/Sibling W/ Cabg, Mi Or Angioplasty Before 65f 55m? No     Social History Narrative     Family History -   Family History   Problem Relation Age of Onset     Unknown/Adopted No family hx of        Review of Systems - As per HPI and PMHx, otherwise 7 system review of the head and neck negative. 10+ system review negative.      Physical Exam  /70 (BP Location: Right arm, Patient Position: Sitting, Cuff Size: Adult Regular)  Pulse 83  Temp 97.7  F (36.5  C) (Oral)  Ht 1.829 m (6')  Wt 74.4 kg (164 lb)  BMI 22.24 kg/m2  General - The patient is well nourished and well developed, and appears to have good nutritional status.  Alert and oriented to person and place, answers questions and cooperates with examination appropriately.   Head and  Face - Normocephalic and atraumatic, with no gross asymmetry noted of the contour of the facial features.  The facial nerve is intact, with strong symmetric movements.  Voice and Breathing - The patient was breathing comfortably without the use of accessory muscles. There was no wheezing, stridor, or stertor.  The patients voice was clear and strong, and had appropriate pitch and quality.  Ears - Bilateral pinna and EACs with normal appearing overlying skin. Tympanic membrane intact with good mobility on pneumatic otoscopy bilaterally. Bony landmarks of the ossicular chain are normal. The tympanic membranes are normal in appearance. No retraction, perforation, or masses.  No fluid or purulence was seen in the external canal or the middle ear.   Eyes - Extraocular movements intact.  Sclera were not icteric or injected, conjunctiva were pink and moist.  Mouth - Examination of the oral cavity showed pink, healthy oral mucosa. No lesions or ulcerations noted.  The tongue was mobile, and the dentition were in good condition. The tongue goes to the left on protrusion.   Throat - The walls of the oropharynx were smooth, pink, moist, symmetric, and had no lesions or ulcerations.  The tonsillar pillars and soft palate were symmetric.  The uvula was midline on elevation.  Neck - Normal midline excursion of the laryngotracheal complex during swallowing.  Full range of motion on passive movement.  Palpation of the occipital, submental, submandibular, internal jugular chain, and supraclavicular nodes did not demonstrate any abnormal lymph nodes or masses.  The carotid pulse was palpable bilaterally.  Palpation of the thyroid was soft and smooth, with no nodules or goiter appreciated.  The trachea was mobile and midline.  Nose - External contour is symmetric, no gross deflection or scars.  Nasal mucosa is pink and moist with no abnormal mucus.  The septum was midline and non-obstructive, turbinates of normal size and position.   No polyps, masses, or purulence noted on examination.  Heart:  Irregular rate and regular rhythm, no murmurs.  Lungs:  Chest clear to auscultation bilaterally        Procedure: Flexible Endoscopy  Indication: Vocal Hoarseness     Attempts at mirror laryngoscopy were not possible due to gag reflex.  Therefore I proceeded with a fiberoptic examination.  First I sprayed both sides of the nose with a mixture of lidocaine and neosynephrine.  I then passed the scope through the nasal cavity.  The nasal cavity was unremarkable.  The nasopharynx was mucosally covered and symmetric.  The Eustachian tube openings were unobstructed.  Going further down I had a clear view of the base of tongue which had normal appearing lingual tonsillar tissue.  The base of tongue was free of lesions, and the vallecula was open.  The epiglottis was smooth and mucosally covered.  The supraglottic larynx was then clearly visualized.  The vocal cords moved smoothly and symmetrically, they were pearly white and no lesions were seen.  The pyriform sinuses were open, and the limited view of the postcricoid region did not show any lesions.        Assessment - Delbert Tilley is a 67 year old male with concerns of a hoarse voice since his left carotid endarterectomy on 9/8/17. I reassured patient that his vocal cords are working normally, but he is utilizing supraglottic compression during phonation. I advised patient to complete speech therapy to help with his hoarseness. He will [facility] this [facility]. I discussed options to help quit smoking. Patient again stated that he will talk to his primary care physician during his next appointment.         This document serves as a record of the services and decisions personally performed and made by Dr. Abimael Nguyen MD. It was created on his behalf by Emily Duarte, a trained medical scribe. The creation of this document is based the provider's statements to the medical  bucky.  Emily Bauer 8:43 AM 11/9/2017    Provider:   The information in this document, created by the medical scribe for me, accurately reflects the services I personally performed and the decisions made by me. I have reviewed and approved this document for accuracy prior to leaving the patient care area.  Dr. Abimael Nguyen MD 8:43 AM 11/9/2017    Dr. Abimael Nguyen MD  Otolaryngology  [facility]

## 2017-11-09 NOTE — PROGRESS NOTES
This laryngoscopy scope was  used on this patient.    Flexible    Scope Number: Arnoldo Storz Flexible  #[id number]   Adult   Zunilda Rosas CMA

## 2017-11-09 NOTE — MR AVS SNAPSHOT
"              After Visit Summary   11/9/2017    Delbert Tilley    MRN: [id number]           Patient Information     Date Of Birth          1950        Visit Information        Provider Department      11/9/2017 8:30 AM Abimael Nguyen MD [facility]        Today's Diagnoses     Hoarseness    -  1      Care Instructions    Per Physician's instructions            Follow-ups after your visit        Your next 10 appointments already scheduled     Nov 21, 2017  8:00 AM CST   ICD Check with Wy Device Rn   [facility] ([facility])    [address] [facility] [address] [phone]              Who to contact     If you have questions or need follow up information about today's clinic visit or your schedule please contact [facility] directly at [phone].  Normal or non-critical lab and imaging results will be communicated to you by MyChart, letter or phone within 4 business days after the clinic has received the results. If you do not hear from us within 7 days, please contact the clinic through MyChart or phone. If you have a critical or abnormal lab result, we will notify you by phone as soon as possible.  Submit refill requests through [facility] or call your pharmacy and they will forward the refill request to us. Please allow 3 business days for your refill to be completed.          Additional Information About Your Visit        [employer] [employer] Information     [facility] lets you send messages to your doctor, view your test results, renew your prescriptions, schedule appointments and more. To sign up, go to [email or web address] . Click on \"Log in\" on the left side of the screen, which will take you to the Welcome page. Then click on \"Sign up Now\" on the right side of the page.     You will be asked to enter the access code listed below, as well as some personal information. Please follow the directions to create your username and password.     Your " access code is: [email or web address]  Expires: 2018  2:50 PM     Your access code will  in 90 days. If you need help or a new code, please call your [facility] clinic or [phone].        Care EveryWhere ID     This is your Care EveryWhere ID. This could be used by other organizations to access your [facility] medical records  [id number]        Your Vitals Were     Pulse Temperature Height BMI (Body Mass Index)          83 97.7  F (36.5  C) (Oral) 1.829 m (6') 22.24 kg/m2         Blood Pressure from Last 3 Encounters:   17 108/70   10/31/17 127/74   17 117/70    Weight from Last 3 Encounters:   17 74.4 kg (164 lb)   17 71.2 kg (157 lb)   17 71.3 kg (157 lb 3 oz)              We Performed the Following     LARYNGOSCOPY FLEX FIBEROPTIC, DIAGNOSTIC        Primary Care Provider Fax #    [facility] [phone] [address] [facility] [address]        Equal Access to Services     MARITA LAMA : Hadii aad ku hadasho Soomaali, waaxda luqadaha, qaybta kaalmada adezenia, gwendolyn cosby . So [facility] [phone].    ATENCIÓN: Si habla español, tiene a frey disposición servicios gratuitos de asistencia lingüística. Ifeanyi al [phone].    We comply with applicable federal civil rights laws and [place] laws. We do not discriminate on the basis of race, color, national origin, age, disability, sex, sexual orientation, or gender identity.            Thank you!     Thank you for choosing [facility]  for your care. Our goal is always to provide you with excellent care. Hearing back from our patients is one way we can continue to improve our services. Please take a few minutes to complete the written survey that you may receive in the mail after your visit with us. Thank you!             Your Updated Medication List - Protect others around you: Learn how to safely use, store and throw away your medicines at www.disposemymeds.org.           This list is accurate as of: 11/9/17  2:50 PM.  Always use your most recent med list.                   Brand Name Dispense Instructions for use Diagnosis    albuterol 108 (90 BASE) MCG/ACT Inhaler    PROAIR HFA/PROVENTIL HFA/VENTOLIN HFA    1 Inhaler    Inhale 2 puffs into the lungs every 6 hours as needed for shortness of breath / dyspnea or wheezing        amylase-lipase-protease 85205 UNITS Cpep    CREON 12     Take 1 capsule by mouth daily        aspirin 325 MG tablet     120 tablet    Take 3 tablets (975 mg) by mouth every morning (Takes 2 x 500 mg = 1000 mg)    Left carotid stenosis       atorvastatin 80 MG tablet    LIPITOR     Take 80 mg by mouth At Bedtime        glipiZIDE 10 MG tablet    GLUCOTROL     Take 10 mg by mouth 2 times daily        insulin glargine 100 UNIT/ML injection    LANTUS     Inject 27 Units Subcutaneous every morning        METFORMIN HCL PO      Take 1,000 mg by mouth 2 times daily (Takes 2 x 500 mg = 1000 mg dose)        METOPROLOL SUCCINATE ER PO      Take 12.5 mg by mouth every morning (Takes 0.5 x 25 mg tablet = 12.5 mg)        NITROSTAT SL      Place 0.4 mg under the tongue every 5 minutes as needed for chest pain        pantoprazole 40 MG EC tablet    PROTONIX     Take 40 mg by mouth daily        TYLENOL PO      Take 1,000 mg by mouth every morning (Takes 2 x 500 mg = 1000 mg)

## 2017-11-09 NOTE — NURSING NOTE
Initial /70 (BP Location: Right arm, Patient Position: Sitting, Cuff Size: Adult Regular)  Pulse 83  Temp 97.7  F (36.5  C) (Oral)  Ht 1.829 m (6')  Wt 74.4 kg (164 lb)  BMI 22.24 kg/m2 Estimated body mass index is 22.24 kg/(m^2) as calculated from the following:    Height as of this encounter: 1.829 m (6').    Weight as of this encounter: 74.4 kg (164 lb). .    Zunilda Rosas CMA

## 2017-11-21 ENCOUNTER — HOSPITAL ENCOUNTER (OUTPATIENT)
Dept: CARDIOLOGY | Facility: CLINIC | Age: 67
Discharge: HOME | End: 2017-11-21
Attending: INTERNAL MEDICINE | Admitting: INTERNAL MEDICINE
Payer: MEDICARE

## 2017-11-21 ENCOUNTER — DOCUMENTATION ONLY (OUTPATIENT)
Dept: CARDIOLOGY | Facility: CLINIC | Age: 67
End: 2017-11-21

## 2017-11-21 DIAGNOSIS — I25.10 CORONARY ARTERY DISEASE INVOLVING NATIVE CORONARY ARTERY OF NATIVE HEART WITHOUT ANGINA PECTORIS: ICD-10-CM

## 2017-11-21 PROCEDURE — 93289 INTERROG DEVICE EVAL HEART: CPT

## 2017-11-21 PROCEDURE — 93289 INTERROG DEVICE EVAL HEART: CPT | Mod: 26 | Performed by: INTERNAL MEDICINE

## 2017-11-21 NOTE — ADDENDUM NOTE
Encounter addended by: Yamilet Richardson on: 11/21/2017 12:08 PM<BR>     Actions taken:  activity accessed, Charge Capture section accepted

## 2017-11-21 NOTE — PROGRESS NOTES
[employer] Secura DR ICD Device Check/ [place]  AP: 28 % : <1 %  Mode: AAIR/DDDR        Underlying Rhythm: SR  Heart Rate: Histogram is stable with adequate HR  Sensing: stable    Pacing Threshold: stable    Impedance: WNL  Battery Status: 2.64V (RRT: 2.63V)  Tones demonstrated. Pt will call clinic if he hears this before next appointment  Atrial Arrhythmia: NONE  Ventricular Arrhythmia: NONE  ATP: NONE    Shocks: NONE  Setting Change: NONE    Care Plan: RTC in 3 months. Pt will call if he hears the alert tone for RRT before his next visit. manny

## 2017-11-29 ENCOUNTER — HOSPITAL ENCOUNTER (OUTPATIENT)
Dept: CARDIOLOGY | Facility: CLINIC | Age: 67
Discharge: HOME | End: 2017-11-29
Attending: INTERNAL MEDICINE | Admitting: INTERNAL MEDICINE
Payer: MEDICARE

## 2017-11-29 ENCOUNTER — TELEPHONE (OUTPATIENT)
Dept: CARDIOLOGY | Facility: CLINIC | Age: 67
End: 2017-11-29

## 2017-11-29 DIAGNOSIS — I25.10 CORONARY ARTERY DISEASE INVOLVING NATIVE CORONARY ARTERY OF NATIVE HEART WITHOUT ANGINA PECTORIS: ICD-10-CM

## 2017-11-29 PROCEDURE — 93306 TTE W/DOPPLER COMPLETE: CPT | Mod: 26 | Performed by: INTERNAL MEDICINE

## 2017-11-29 PROCEDURE — 93306 TTE W/DOPPLER COMPLETE: CPT

## 2017-11-29 NOTE — TELEPHONE ENCOUNTER
Echocardiogram   Status:  Edited Result - FINAL   Visible to patient:  No (Not Released) Dx:  Coronary artery disease involving camron... Order: [id number]       Notes Recorded by Marisa Tai RN on 2017 at 12:44 PM  EF 20-25% noted. To be discussed at OV 17 with KARIE Mina. Reviewed last cardiology OV note, 17. Known CM with ICD in place. Nuclear stress done 17 revealing drop in EF. This echo done to confirm. Will message Dr. Michaels for any further orders/recommendations for 17 revisit.         Details        Reading Physician Reading Date Result Priority       Grover Medrano MD 2017            Narrative             [id number]  [id number]  [id number]  [id number]^BRUCE^NORMAN^TORIBIO           [facility]  Echocardiography Laboratory  [address] [place] [address]        Name: CARLIN QUEEN  MRN: [id number]  : 1950  Study Date: 2017 07:57 AM  Age: 67 yrs  Gender: Male  Patient Location: [facility]  Reason For Study: CAD  Ordering Physician: NORMAN MICHAELS  Referring Physician: Glen Good  Performed By: Lila Kelley RDCS     BSA: 2.0 m2  Height: 72 in  Weight: 164 lb  HR: 82  BP: 110/70 mmHg  _____________________________________________________________________________  __        Procedure  Complete Echo Adult. Complete Portable Bubble Echo Adult.  _____________________________________________________________________________  __        Interpretation Summary     The left ventricle is mildly dilated.  There is severe global hypokinesia of the left ventricle. Inferior and  inferolateral akinesis.  Left ventricular systolic function is severely reduced.  The visual ejection fraction is estimated at 20-25%.  The transmitral spectral Doppler flow pattern is suggestive of restrictive  physiology.  There is moderate (2+) mitral regurgitation.  There is mild (1+) tricuspid regurgitation.  Right ventricular systolic pressure is elevated, consistent with  moderate  pulmonary hypertension.  Normal IVC (1.5-2.5cm) with >50% respiratory collapse; right atrial pressure  is estimated at 5-10mmHg.     Since the previous study (1/6/15), the LV function has deteriorated  significantly (based on direct comparison of images). LV size, MR, and RVSP  have increased significantly.  _____________________________________________________________________________  __        Left Ventricle  The left ventricle is mildly dilated. There is normal left ventricular wall  thickness. Left ventricular systolic function is severely reduced. The visual  ejection fraction is estimated at 20-25%. E by E prime ratio is greater than  15, that likely suggests increased left ventricular filling pressures. The  transmitral spectral Doppler flow pattern is suggestive of restrictive  physiology. Inferior and inferolateral akinesis. There is severe global  hypokinesia of the left ventricle.     Right Ventricle  The right ventricle is normal size. The right ventricular systolic function is  normal. There is a pacemaker lead in the right ventricle.     Atria  The left atrium is moderately dilated. The right atrium is mildly dilated.  Intact atrial septum.     Mitral Valve  There is moderate (2+) mitral regurgitation.        Tricuspid Valve  There is mild (1+) tricuspid regurgitation. The right ventricular systolic  pressure is approximated at 44.2 mmHg plus the right atrial pressure. Right  ventricular systolic pressure is elevated, consistent with moderate pulmonary  hypertension. Normal IVC (1.5-2.5cm) with >50% respiratory collapse; right  atrial pressure is estimated at 5-10mmHg.     Aortic Valve  The aortic valve is trileaflet with aortic valve sclerosis. No aortic  regurgitation is present. No aortic stenosis is present.     Pulmonic Valve  The pulmonic valve is not well seen, but is grossly normal. There is trace  pulmonic valvular regurgitation.     Vessels  The aortic root is normal size. The IVC  is normal in size and reactivity with  respiration, suggesting normal central venous pressure.     Pericardium  There is no pericardial effusion.        Rhythm  Sinus rhythm was noted.  _____________________________________________________________________________  __  MMode/2D Measurements & Calculations  IVSd: 1.2 cm     LVIDd: 6.2 cm  LVIDs: 5.4 cm  LVPWd: 1.1 cm  FS: 12.1 %  EDV(Teich): 192.5 ml  ESV(Teich): 143.3 ml  LV mass(C)d: 310.8 grams  LV mass(C)dI: 158.7 grams/m2  Ao root diam: 3.3 cm  LA dimension: 4.4 cm  asc Aorta Diam: 3.2 cm  LA/Ao: 1.3  LA Volume (BP): 85.0 ml  LA Volume Index (BP): 43.4 ml/m2  RWT: 0.37           Doppler Measurements & Calculations  MV E max katie: 100.0 cm/sec  MV A max katie: 65.0 cm/sec  MV E/A: 1.5  MV dec time: 0.12 sec  MR ERO: 0.28 cm2  MR volume: 43.4 ml  TR max katie: 332.3 cm/sec  TR max P.2 mmHg  E/E' av.1  Lateral E/e': 17.5  Medial E/e': 26.6           _____________________________________________________________________________  __           Report approved by: Rufus Jenkins 2017 10:30 AM                 Specimen Collected: 17  7:57 AM Last Resulted: 17  7:57 AM

## 2017-12-01 NOTE — TELEPHONE ENCOUNTER
Basilia, sorry but can't find a completed echo report. Thanks. CD    ADDENDUM: Pt to see Yareli Mina NP on 12/4/17. Edited echo report now available in [facility]. Lissett Sandoval RN Cardiology December 4, 2017, 8:33 AM

## 2017-12-04 ENCOUNTER — OFFICE VISIT (OUTPATIENT)
Dept: CARDIOLOGY | Facility: CLINIC | Age: 67
End: 2017-12-04
Attending: INTERNAL MEDICINE
Payer: COMMERCIAL

## 2017-12-04 ENCOUNTER — TELEPHONE (OUTPATIENT)
Dept: CARDIOLOGY | Facility: CLINIC | Age: 67
End: 2017-12-04

## 2017-12-04 VITALS
SYSTOLIC BLOOD PRESSURE: 120 MMHG | OXYGEN SATURATION: 93 % | WEIGHT: 177 LBS | DIASTOLIC BLOOD PRESSURE: 71 MMHG | BODY MASS INDEX: 24.01 KG/M2 | HEART RATE: 85 BPM

## 2017-12-04 DIAGNOSIS — I50.9 CHF (CONGESTIVE HEART FAILURE) (H): ICD-10-CM

## 2017-12-04 DIAGNOSIS — R06.02 SOB (SHORTNESS OF BREATH): ICD-10-CM

## 2017-12-04 DIAGNOSIS — I48.0 PAROXYSMAL ATRIAL FIBRILLATION (H): ICD-10-CM

## 2017-12-04 DIAGNOSIS — I50.9 CHF (CONGESTIVE HEART FAILURE) (H): Primary | ICD-10-CM

## 2017-12-04 DIAGNOSIS — I25.5 ISCHEMIC CARDIOMYOPATHY: Primary | ICD-10-CM

## 2017-12-04 DIAGNOSIS — I73.9 PVD (PERIPHERAL VASCULAR DISEASE) (H): ICD-10-CM

## 2017-12-04 DIAGNOSIS — I25.10 CORONARY ARTERY DISEASE INVOLVING NATIVE CORONARY ARTERY OF NATIVE HEART WITHOUT ANGINA PECTORIS: ICD-10-CM

## 2017-12-04 LAB — NT-PROBNP SERPL-MCNC: ABNORMAL PG/ML (ref 0–125)

## 2017-12-04 PROCEDURE — 99215 OFFICE O/P EST HI 40 MIN: CPT | Mod: 24 | Performed by: NURSE PRACTITIONER

## 2017-12-04 PROCEDURE — 83880 ASSAY OF NATRIURETIC PEPTIDE: CPT | Performed by: NURSE PRACTITIONER

## 2017-12-04 PROCEDURE — 36415 COLL VENOUS BLD VENIPUNCTURE: CPT | Performed by: NURSE PRACTITIONER

## 2017-12-04 RX ORDER — FUROSEMIDE 20 MG
20 TABLET ORAL DAILY
Qty: 30 TABLET | Refills: 11 | Status: SHIPPED | OUTPATIENT
Start: 2017-12-04 | End: 2018-01-17

## 2017-12-04 RX ORDER — LOSARTAN POTASSIUM 25 MG/1
25 TABLET ORAL DAILY
Qty: 30 TABLET | Refills: 11 | Status: SHIPPED | OUTPATIENT
Start: 2017-12-04 | End: 2017-12-04

## 2017-12-04 RX ORDER — LOSARTAN POTASSIUM 25 MG/1
25 TABLET ORAL DAILY
Qty: 30 TABLET | Refills: 11 | Status: SHIPPED | OUTPATIENT
Start: 2017-12-04 | End: 2018-09-05

## 2017-12-04 RX ORDER — FUROSEMIDE 20 MG
20 TABLET ORAL DAILY
Qty: 30 TABLET | Refills: 0 | Status: SHIPPED | OUTPATIENT
Start: 2017-12-04 | End: 2017-12-04

## 2017-12-04 NOTE — TELEPHONE ENCOUNTER
N-terminal proBNP elevated at 19,801.  Please call patient and have him start Lasix 20 mg daily ×1 week.  He is scheduled for follow-up BMP in one week as he was started on losartan today also.  Can we also add an N-terminal proBNP to his lab work in one week.   Yareli Kim, ANA, CNP     ADDENDUM: Pt notified of results and recommendations. Script sent. Orders placed. Lissett Sandoval RN Cardiology December 4, 2017, 1:31 PM

## 2017-12-04 NOTE — PROGRESS NOTES
Cardiology Clinic Progress Note  Delbert Tilley MRN# [id number]   YOB: 1950 Age: 67 year old     Reason For Visit:  three-month follow-up    Primary Cardiologist:   Dr. Michaels          History of Presenting Illness:    Delbert Tilley is a pleasant 67 year old patient with a past cardiac history significant for a couple episodes of 30 seconds of paroxysmal atrial fibrillation not on anticoagulation, CAD status post CABG (LIMA to LAD, SVG to OM, and SVG to PDA) most recent echocardiogram with patent LIMA, 30-40% stenosis of the apical LAD, SVG to RCA patent, PDA with 50% mid graft stenosis, OM three with intervention, left main patent, and the mid LAD 60-70% stenosis, status post right and left carotid endarterectomy, status post flutter ablation, and ischemic cardiomyopathy with previous LVEF 35-40% now decreased to 20-25% status post ICD.    Pt was last seen by Dr. Michaels on 8/21/2017.  It was noted that the patient followed with vascular surgery regarding his carotid disease.  He had a stress test showing a large transmural inferior and inferoseptal defect without ischemia but LVEF was 28% so echocardiogram was recommended to confirm decreased EF.  The patient had previously had an ICD placed but no recent interrogation.  He described rare palpitations and rare episodes of fast heart rate.  It was noted that he was previously on lisinopril but had now been only on metoprolol with recommendations to start losartan.  However, this was not initiated for unknown reasons.  He had no complaints of heart failure or angina.  Smoking cessation was recommended.        Pt presents today for Three month follow-up. Echocardiogram 11/29/2017 showed LVEF decreased to 20-25% with mildly dilated LV, severe global hypokinesia, inferior and inferolateral akinesia, it was suggestive of restrictive physiology, moderate MR, mild TR, moderate pulmonary hypertension, normal IVC, RV normal size and function, and compared to 2015  LVEF was significantly decreased.  Device check 11/21/2017 showed 28% atrial pacing, 1% ventricular pacing, underlying rhythm sinus rhythm, he was noted to be at RRT on his battery life, he had no atrial or ventricular arrhythmias, ATP, or shocks.  These results were reviewed with him today.    It does appear that the patient's weight is up approximately 24 pounds since last August.  The weight is up 13 pounds in the last month.  He does not currently check daily weights.  We will obtain a BNP today.  On exam, he has elevated JVP to the jaw line and bilateral lower extremity edema 1+ pitting.  He denies any orthopnea or PND.  He does have symptoms of PARDO which he states is not as significant as when he previously needed bypass surgery.  He experiences this with walking longer distances and walking up stairs.  He also has experienced having less energy since he had his left carotid endarterectomy in September 2017.  [facility] and plans of starting this at the beginning of the year.  He has not heard any beeping tones on his ICD.  He is scheduled to follow-up with vascular surgery at the end of December with carotid ultrasound.  He does [facility] but does not currently remember [facility].  He will call my nurse with this information is that we may send his losartan prescription to them.      I will discuss this case with Dr. Michaels to see if she recommends undergoing coronary angiogram given his history of CAD with ischemic cardiomyopathy and worsening LVEF. If Pt needs angiogram, he currently takes daily aspirin, is on metformin which we discussed would need to be held and followed-up with PCP prior to restarting after angiogram, no allergies to iodine, and no significant bleeding problems. Patient reports no chest pain, PND, orthopnea, presyncope, syncope, heart racing, or palpitations.    Risks and benefits of left heart  catheterization and coronary angiogram were discussed with the patient in detail. Including death, MI, stroke, hematoma, possible urgent bypass surgery for failed PCI, use of stents, possible peripheral vascular complications, use of FFR in clinical-decision making, arrhythmia, possible percutaneous coronary intervention, and alternative of medical therapy alone. The risks discussed include risk of heart attack or risk bleeding requiring surgery or transfusion of less than 1%. The risk of stroke or needing emergency surgery is less than 1 in 500. We discussed that contrast is used during the procedure which can potentially damage the kidney. Additionally we discussed the risk of contrast induced allergic reaction, renal dysfunction, and vascular complications. I have discussed the need for DAPT if stenting is required and there are no contraindications for this. No history of bleeding problems or current bleeding, and no scheduled surgeries or procedures in the next year. Patient understands and wishes to proceed with it.      Current Cardiac Medications   Aspirin 1000 mg daily  Tylenol 1000 mg daily  Nitroglycerin p.r.n.  Metoprolol XL 12.5 mg daily  Atorvastatin 80 mg daily                    Assessment and Plan:     Plan  1.  Start losartan 25 mg daily for cardiomyopathy  2.  N-terminal proBNP today, will likely need to start Lasix daily  3.  BMP in one week after starting losartan  4.  Check daily weights and call the clinic if your weight has increased more than 2 lbs in one day or 5 lbs in one week.  5.  2000 mg sodium diet  6.  I will discuss his case with Dr. Michaels to see if she recommends coronary angiogram, given his further decrease in LVEF  7.  Follow-up with MARISOL in two weeks to up titrate cardiomyopathy medications      1. CAD    status post CABG (LIMA to LAD, SVG to OM, and SVG to PDA)    PARDO with walking long distances and going up stairs, not as significant as when he needed bypass    Further decrease  in EF    Continue statin, aspirin, beta blocker, and start ARB      2. Ischemic cardiomyopathy    Status post ICD    LVEF 20-25%, previously 35-40%    Heart failure symptoms of PARDO and JVP with lower extremity edema on exam, denies orthopnea or PND    Continue beta blocker and start ARB    Up titrate cardiomyopathy medications and consider addition of spironolactone    If patient develops left bundle branch block, may consider bi-V ICD upgrade in the future      3. Paroxysmal atrial fibrillation/flutter    status post flutter ablation    History of a couple episodes of 30 seconds of atrial fibrillation    No symptoms of atrial fibrillation and no atrial fibrillation seen on device check    Continue rate control with metoprolol and no current anticoagulation      4. PVD    status post right and left carotid endarterectomy    following up with vascular surgery 12/26/2017 with carotid ultrasound prior        More than half of this visit was spent in face-to-face counseling, 40 minutes      Thank you for allowing me to participate in this delightful patient's care.      This note was completed in part using Dragon voice recognition software. Although reviewed after completion, some word and grammatical errors may occur.    Yareli Kim, APRN, CNP           Data:   All laboratory data reviewed

## 2017-12-04 NOTE — LETTER
12/4/2017    [facility]  [address] [address]    RE: Delbert SORIA Jarek       Dear Colleague,    I had the pleasure of seeing Delbert Tilley in the [facility].    Cardiology Clinic Progress Note  Delbert Tilley MRN# [id number]   YOB: 1950 Age: 67 year old     Reason For Visit:  three-month follow-up    Primary Cardiologist:   Dr. Michaels          History of Presenting Illness:    Delbert Tilley is a pleasant 67 year old patient with a past cardiac history significant for a couple episodes of 30 seconds of paroxysmal atrial fibrillation not on anticoagulation, CAD status post CABG (LIMA to LAD, SVG to OM, and SVG to PDA) most recent echocardiogram with patent LIMA, 30-40% stenosis of the apical LAD, SVG to RCA patent, PDA with 50% mid graft stenosis, OM three with intervention, left main patent, and the mid LAD 60-70% stenosis, status post right and left carotid endarterectomy, status post flutter ablation, and ischemic cardiomyopathy with previous LVEF 35-40% now decreased to 20-25% status post ICD.    Pt was last seen by Dr. Michaels on 8/21/2017.  It was noted that the patient followed with vascular surgery regarding his carotid disease.  He had a stress test showing a large transmural inferior and inferoseptal defect without ischemia but LVEF was 28% so echocardiogram was recommended to confirm decreased EF.  The patient had previously had an ICD placed but no recent interrogation.  He described rare palpitations and rare episodes of fast heart rate.  It was noted that he was previously on lisinopril but had now been only on metoprolol with recommendations to start losartan.  However, this was not initiated for unknown reasons.  He had no complaints of heart failure or angina.  Smoking cessation was recommended.        Pt presents today for Three month follow-up. Echocardiogram 11/29/2017 showed LVEF decreased to 20-25% with mildly dilated LV,  severe global hypokinesia, inferior and inferolateral akinesia, it was suggestive of restrictive physiology, moderate MR, mild TR, moderate pulmonary hypertension, normal IVC, RV normal size and function, and compared to 2015 LVEF was significantly decreased.  Device check 11/21/2017 showed 28% atrial pacing, 1% ventricular pacing, underlying rhythm sinus rhythm, he was noted to be at RRT on his battery life, he had no atrial or ventricular arrhythmias, ATP, or shocks.  These results were reviewed with him today.    It does appear that the patient's weight is up approximately 24 pounds since last August.  The weight is up 13 pounds in the last month.  He does not currently check daily weights.  We will obtain a BNP today.  On exam, he has elevated JVP to the jaw line and bilateral lower extremity edema 1+ pitting.  He denies any orthopnea or PND.  He does have symptoms of PARDO which he states is not as significant as when he previously needed bypass surgery.  He experiences this with walking longer distances and walking up stairs.  He also has experienced having less energy since he had his left carotid endarterectomy in September 2017.  [facility] and [facility] of [facility] this at the beginning of the year.  He has not heard any beeping tones on his ICD.  He is scheduled to follow-up with vascular surgery at the end of December with carotid ultrasound.  He [facility] but does not currently remember the [facility] doctors name.  He will call my nurse with this information is that we may send his losartan prescription to them.      I will discuss this case with Dr. Michaels to see if she recommends undergoing coronary angiogram given his history of CAD with ischemic cardiomyopathy and worsening LVEF. If Pt needs angiogram, he currently takes daily aspirin, is on metformin which we discussed would need to be held and followed-up with PCP prior to  restarting after angiogram, no allergies to iodine, and no significant bleeding problems. Patient reports no chest pain, PND, orthopnea, presyncope, syncope, heart racing, or palpitations.    Risks and benefits of left heart catheterization and coronary angiogram were discussed with the patient in detail. Including death, MI, stroke, hematoma, possible urgent bypass surgery for failed PCI, use of stents, possible peripheral vascular complications, use of FFR in clinical-decision making, arrhythmia, possible percutaneous coronary intervention, and alternative of medical therapy alone. The risks discussed include risk of heart attack or risk bleeding requiring surgery or transfusion of less than 1%. The risk of stroke or needing emergency surgery is less than 1 in 500. We discussed that contrast is used during the procedure which can potentially damage the kidney. Additionally we discussed the risk of contrast induced allergic reaction, renal dysfunction, and vascular complications. I have discussed the need for DAPT if stenting is required and there are no contraindications for this. No history of bleeding problems or current bleeding, and no scheduled surgeries or procedures in the next year. Patient understands and wishes to proceed with it.      Current Cardiac Medications   Aspirin 1000 mg daily  Tylenol 1000 mg daily  Nitroglycerin p.r.n.  Metoprolol XL 12.5 mg daily  Atorvastatin 80 mg daily                    Assessment and Plan:     Plan  1.  Start losartan 25 mg daily for cardiomyopathy  2.  N-terminal proBNP today, will likely need to start Lasix daily  3.  BMP in one week after starting losartan  4.  Check daily weights and call the clinic if your weight has increased more than 2 lbs in one day or 5 lbs in one week.  5.  2000 mg sodium diet  6.  I will discuss his case with Dr. Michaels to see if she recommends coronary angiogram, given his further decrease in LVEF  7.  Follow-up with MARISOL in two weeks to up  titrate cardiomyopathy medications      1. CAD    status post CABG (LIMA to LAD, SVG to OM, and SVG to PDA)    PARDO with walking long distances and going up stairs, not as significant as when he needed bypass    Further decrease in EF    Continue statin, aspirin, beta blocker, and start ARB      2. Ischemic cardiomyopathy    Status post ICD    LVEF 20-25%, previously 35-40%    Heart failure symptoms of PARDO and JVP with lower extremity edema on exam, denies orthopnea or PND    Continue beta blocker and start ARB    Up titrate cardiomyopathy medications and consider addition of spironolactone    If patient develops left bundle branch block, may consider bi-V ICD upgrade in the future      3. Paroxysmal atrial fibrillation/flutter    status post flutter ablation    History of a couple episodes of 30 seconds of atrial fibrillation    No symptoms of atrial fibrillation and no atrial fibrillation seen on device check    Continue rate control with metoprolol and no current anticoagulation      4. PVD    status post right and left carotid endarterectomy    following up with vascular surgery 12/26/2017 with carotid ultrasound prior        More than half of this visit was spent in face-to-face counseling, 40 minutes      Thank you for allowing me to participate in this delightful patient's care.      This note was completed in part using Dragon voice recognition software. Although reviewed after completion, some word and grammatical errors may occur.    Yareli Kim, APRN, CNP           Data:   All laboratory data reviewed        HPI and Plan:   See dictation    Orders Placed This Encounter   Procedures     Basic metabolic panel     N terminal pro BNP outpatient     Follow-Up with Cardiac Advanced Practice Provider       Orders Placed This Encounter   Medications     losartan (COZAAR) 25 MG tablet     Sig: Take 1 tablet (25 mg) by mouth daily     Dispense:  30 tablet     Refill:  11       There are no  discontinued medications.      Encounter Diagnoses   Name Primary?     Coronary artery disease involving native coronary artery of native heart without angina pectoris      Ischemic cardiomyopathy Yes     SOB (shortness of breath)      Paroxysmal atrial fibrillation (H)      PVD (peripheral vascular disease) (H)        CURRENT MEDICATIONS:  Current Outpatient Prescriptions   Medication Sig Dispense Refill     losartan (COZAAR) 25 MG tablet Take 1 tablet (25 mg) by mouth daily 30 tablet 11     albuterol (PROAIR HFA/PROVENTIL HFA/VENTOLIN HFA) 108 (90 BASE) MCG/ACT Inhaler Inhale 2 puffs into the lungs every 6 hours as needed for shortness of breath / dyspnea or wheezing 1 Inhaler 0     aspirin 325 MG tablet Take 3 tablets (975 mg) by mouth every morning (Takes 2 x 500 mg = 1000 mg) 120 tablet 1     Acetaminophen (TYLENOL PO) Take 1,000 mg by mouth every morning (Takes 2 x 500 mg = 1000 mg)       insulin glargine (LANTUS) 100 UNIT/ML injection Inject 27 Units Subcutaneous every morning       Nitroglycerin (NITROSTAT SL) Place 0.4 mg under the tongue every 5 minutes as needed for chest pain       METFORMIN HCL PO Take 1,000 mg by mouth 2 times daily (Takes 2 x 500 mg = 1000 mg dose)       METOPROLOL SUCCINATE ER PO Take 12.5 mg by mouth every morning (Takes 0.5 x 25 mg tablet = 12.5 mg)       amylase-lipase-protease (CREON 12) 37827 UNITS CPEP Take 1 capsule by mouth daily       atorvastatin (LIPITOR) 80 MG tablet Take 80 mg by mouth At Bedtime        glipiZIDE (GLUCOTROL) 10 MG tablet Take 10 mg by mouth 2 times daily        pantoprazole (PROTONIX) 40 MG EC tablet Take 40 mg by mouth daily         ALLERGIES     Allergies   Allergen Reactions     Hydrocodone      Upset stomach     Shellfish Allergy Hives and Rash       PAST MEDICAL HISTORY:  Past Medical History:   Diagnosis Date     Acute renal failure (H) 8/26/06 Hosp    secondary to dehydration     Arthritis      CAD (coronary artery disease)     LIMA to the LAD,  vein graft to OM and a vein graft to the PDA     Carpal tunnel syndrome      Diabetes (H)      Gastro-oesophageal reflux disease      H/O: alcohol abuse      HTN (hypertension)      Hyperlipidaemia      Hyperlipidaemia LDL goal <100 7/8/2013     Hypokalemia      Pacemaker      Pancreatitis 6/26/06 Hosp       PAST SURGICAL HISTORY:  Past Surgical History:   Procedure Laterality Date     BYPASS CARDIOPULMONARY       CATARACT IOL, RT/LT      Cataract IOL RT/LT     ENDARTERECTOMY CAROTID Right 6/12/2015    Procedure: ENDARTERECTOMY CAROTID;  Surgeon: João Turner MD;  Location: [facility] OR     ENDARTERECTOMY CAROTID Left 9/8/2017    Procedure: ENDARTERECTOMY CAROTID;  LEFT CAROTID ENDARTERECTOMY WITH EEG;  Surgeon: João Turner MD;  Location: [facility] OR     IMPLANT PACEMAKER  6/10/2010     RELEASE CARPAL TUNNEL Right 4/12/2016    Procedure: RELEASE CARPAL TUNNEL;  Surgeon: Lissy Leger MD;  Location: [facility] OR     SURGICAL HISTORY OF -   1998    Laminectomy     SURGICAL HISTORY OF -   10/2003    Bypass grafting     TONSILLECTOMY & ADENOIDECTOMY         FAMILY HISTORY:  Family History   Problem Relation Age of Onset     Unknown/Adopted No family hx of        SOCIAL HISTORY:  Social History     Social History     Marital status: Single     Spouse name: N/A     Number of children: N/A     Years of education: N/A     Social History Main Topics     Smoking status: Current Every Day Smoker     Packs/day: 2.00     Types: Cigarettes     Smokeless tobacco: Never Used      Comment: wanting to quit     Alcohol use No     Drug use: No     Sexual activity: Not Currently     Partners: Female     Other Topics Concern     Parent/Sibling W/ Cabg, Mi Or Angioplasty Before 65f 55m? No     Social History Narrative       Review of Systems:  Skin:  Negative       Eyes:  Positive for glasses    ENT:  Negative      Respiratory:  Positive for cough;shortness of breath     Cardiovascular:    Positive for;edema;fatigue     Gastroenterology: Negative      Genitourinary:  Negative      Musculoskeletal:  Positive for joint pain    Neurologic:  Negative numbness or tingling of hands    Psychiatric:  Negative      Heme/Lymph/Imm:  Positive for allergies    Endocrine:  Positive for diabetes      Physical Exam:  Vitals: /71  Pulse 85  Wt 80.3 kg (177 lb)  SpO2 93%  BMI 24.01 kg/m2    Constitutional:  cooperative, alert and oriented, well developed, well nourished, in no acute distress        Skin:  warm and dry to the touch          Head:  normocephalic        Eyes:  sclera white        Lymph:      ENT:  no pallor or cyanosis        Neck:  not assessed this visit   healing left carotid scar    Respiratory:    diminished breath sounds bilaterally       Cardiac: regular rhythm, normal S1/S2, no S3 or S4, apical impulse not displaced, no murmurs, gallops or rubs                                                         GI:  abdomen soft        Extremities and Muscular Skeletal:  no deformities, clubbing, cyanosis, erythema observed;no edema   bilateral LE edema;1+;pitting          Neurological:  affect appropriate        Psych:  Alert and Oriented x 3      Thank you for allowing me to participate in the care of your patient.    Sincerely,     ANA [facility]

## 2017-12-04 NOTE — MR AVS SNAPSHOT
After Visit Summary   12/4/2017    Delbert Tilley    MRN: [id number]           Patient Information     Date Of Birth          1950        Visit Information        Provider Department      12/4/2017 8:30 AM Yareli Mina APRN CNP [facility]        Today's Diagnoses     Ischemic cardiomyopathy    -  1    Coronary artery disease involving native coronary artery of native heart without angina pectoris        SOB (shortness of breath)          Care Instructions    Thank you for your [facility] visit today. Your provider has recommended the following:  Medication Changes:  START losartan 25 mg daily   Recommendations:  1. Call my nurse with [facility] so we can send the prescription to them   2. Check daily weights and call the clinic if your weight has increased more than 2 lbs in one day or 5 lbs in one week.  3. 2000 mg sodium diet   4. I will check with Dr. Michaels regarding if you need an angiogram   Follow-up:  Lab today- we will call with result and if any water pill is needed  nonfasting lab work in 1 week   See Yareli TIWARI for cardiology follow up in 2 weeks.  Call [phone] two months prior to request date to schedule any future appointments.  Reminder:  1. Please bring in all current medications, over the counter supplements and vitamin bottles to your next appointment.               [facility]~[address]~[place], [place]~[address]  Questions about your visit today?   Call your Cardiology Clinic RN's-Basilia Tai and/or Lissett Sandoval at [phone].            Follow-ups after your visit        Additional Services     Follow-Up with Cardiac Advanced Practice Provider                 Your next 10 appointments already scheduled     Dec 26, 2017  8:00 AM CST   US CAROTID LEFT with [email or web address]   [facility] ([facility])    [address]  [facility] [address]   [phone]           Please bring a list of your medicines (including vitamins, minerals and over-the-counter drugs). Also, tell your doctor about any allergies you may have. Wear comfortable clothes and leave your valuables at home.  You do not need to do anything special to prepare for your exam.  Please call the Imaging Department at your exam site with any questions.            Dec 26, 2017  9:00 AM CST   [facility] Visit with [facility]arly Beard MD   [facility]    [address] [facility] [address]   [phone]            Feb 27, 2018  8:00 AM CST   ICD Check with Wy Device Rn   [facility] ([facility])    [address] [address]   [phone]              Future tests that were ordered for you today     Open Future Orders        Priority Expected Expires Ordered    Basic metabolic panel Routine 12/11/2017 12/4/2018 12/4/2017    N terminal pro BNP outpatient Routine 12/4/2017 12/4/2018 12/4/2017    Follow-Up with Cardiac Advanced Practice Provider Routine 12/18/2017 12/4/2019 12/4/2017            Who to contact     If you have questions or need follow up information about today's clinic visit or your schedule please contact [facility] directly at [phone].  Normal or non-critical lab and imaging results will be communicated to you by MyChart, letter or phone within 4 business days after the clinic has received the results. If you do not hear from us within 7 days, please contact the clinic through MyChart or phone. If you have a critical or abnormal lab result, we will notify you by phone as soon as possible.  Submit refill requests through [employer] or call your pharmacy and they will forward the refill request to us. Please allow 3 business days for your refill to be completed.          Additional Information About Your Visit    "     [email or web address] Information     [employer] lets you send messages to your doctor, view your test results, renew your prescriptions, schedule appointments and more. To sign up, go to [email or web address] . Click on \"Log in\" on the left side of the screen, which will take you to the Welcome page. Then click on \"Sign up Now\" on the right side of the page.     You will be asked to enter the access code listed below, as well as some personal information. Please follow the directions to create your username and password.     Your access code is: [email or web address]  Expires: 2018  2:50 PM     Your access code will  in 90 days. If you need help or a new code, please call your [facility] clinic or [phone].        Care EveryWhere ID     This is your Care EveryWhere ID. This could be used by other organizations to access your [facility] medical records  [id number]        Your Vitals Were     Pulse Pulse Oximetry BMI (Body Mass Index)             85 93% 24.01 kg/m2          Blood Pressure from Last 3 Encounters:   17 120/71   17 108/70   10/31/17 127/74    Weight from Last 3 Encounters:   17 80.3 kg (177 lb)   17 74.4 kg (164 lb)   17 71.2 kg (157 lb)              We Performed the Following     Follow-Up with Cardiac Advanced Practice Provider          Today's Medication Changes          These changes are accurate as of: 17  9:11 AM.  If you have any questions, ask your nurse or doctor.               Start taking these medicines.        Dose/Directions    losartan 25 MG tablet   Commonly known as:  COZAAR   Used for:  Ischemic cardiomyopathy   Started by:  Yareli Mina APRN CNP        Dose:  25 mg   Take 1 tablet (25 mg) by mouth daily   Quantity:  30 tablet   Refills:  11            Where to get your medicines      These medications were sent to [facility] [place] - [address]  [address], [place] [address]    Hours:  [facility] [place] [facility] [employer] Phone:  [phone]     losartan 25 MG tablet                Primary Care Provider Fax #    [facility] [phone] [address] [address]        Equal Access to Services     MARITA LAMA : Hadii aad ku hadjuano Sograntali, waaxda luqadaha, qaybta kaalmada adezenia, gwendolyn fernandain hayaaping ariaskishanebony sykes. So [facility] [phone].    ATENCIÓN: Si habla español, tiene a frey disposición servicios gratuitos de asistencia lingüística. Ifeanyi al [phone].    We comply with applicable federal civil rights laws and [place] laws. We do not discriminate on the basis of race, color, national origin, age, disability, sex, sexual orientation, or gender identity.            Thank you!     Thank you for choosing [facility]  for your care. Our goal is always to provide you with excellent care. Hearing back from our patients is one way we can continue to improve our services. Please take a few minutes to complete the written survey that you may receive in the mail after your visit with us. Thank you!             Your Updated Medication List - Protect others around you: Learn how to safely use, store and throw away your medicines at www.disposemymeds.org.          This list is accurate as of: 12/4/17  9:11 AM.  Always use your most recent med list.                   Brand Name Dispense Instructions for use Diagnosis    albuterol 108 (90 BASE) MCG/ACT Inhaler    PROAIR HFA/PROVENTIL HFA/VENTOLIN HFA    1 Inhaler    Inhale 2 puffs into the lungs every 6 hours as needed for shortness of breath / dyspnea or wheezing        amylase-lipase-protease 10619 UNITS Cpep    CREON 12     Take 1 capsule by mouth daily        aspirin 325 MG tablet     120 tablet    Take 3 tablets (975 mg) by mouth every morning (Takes 2 x 500 mg = 1000 mg)    Left carotid stenosis       atorvastatin 80 MG tablet    LIPITOR     Take 80 mg by mouth At Bedtime         glipiZIDE 10 MG tablet    GLUCOTROL     Take 10 mg by mouth 2 times daily        insulin glargine 100 UNIT/ML injection    LANTUS     Inject 27 Units Subcutaneous every morning        losartan 25 MG tablet    COZAAR    30 tablet    Take 1 tablet (25 mg) by mouth daily    Ischemic cardiomyopathy       METFORMIN HCL PO      Take 1,000 mg by mouth 2 times daily (Takes 2 x 500 mg = 1000 mg dose)        METOPROLOL SUCCINATE ER PO      Take 12.5 mg by mouth every morning (Takes 0.5 x 25 mg tablet = 12.5 mg)        NITROSTAT SL      Place 0.4 mg under the tongue every 5 minutes as needed for chest pain        pantoprazole 40 MG EC tablet    PROTONIX     Take 40 mg by mouth daily        TYLENOL PO      Take 1,000 mg by mouth every morning (Takes 2 x 500 mg = 1000 mg)

## 2017-12-04 NOTE — PATIENT INSTRUCTIONS
Thank you for your [facility] visit today. Your provider has recommended the following:  Medication Changes:  START losartan 25 mg daily   Recommendations:  1. Call my nurse with [facility] so we can send the prescription to them   2. Check daily weights and call the clinic if your weight has increased more than 2 lbs in one day or 5 lbs in one week.  3. 2000 mg sodium diet   4. I will check with Dr. Michaels regarding if you need an angiogram   Follow-up:  Lab today- we will call with result and if any water pill is needed  nonfasting lab work in 1 week   See Yareli TIWARI for cardiology follow up in 2 weeks.  Call [phone] two months prior to request date to schedule any future appointments.  Reminder:  1. Please bring in all current medications, over the counter supplements and vitamin bottles to your next appointment.               [facility]  [facility]~[address]~[place], [place]~[address]  Questions about your visit today?   Call your Cardiology Clinic RN's-Basilia Tai and/or Lissett Sandoval at [phone].

## 2017-12-04 NOTE — PROGRESS NOTES
HPI and Plan:   See dictation    Orders Placed This Encounter   Procedures     Basic metabolic panel     N terminal pro BNP outpatient     Follow-Up with Cardiac Advanced Practice Provider       Orders Placed This Encounter   Medications     losartan (COZAAR) 25 MG tablet     Sig: Take 1 tablet (25 mg) by mouth daily     Dispense:  30 tablet     Refill:  11       There are no discontinued medications.      Encounter Diagnoses   Name Primary?     Coronary artery disease involving native coronary artery of native heart without angina pectoris      Ischemic cardiomyopathy Yes     SOB (shortness of breath)      Paroxysmal atrial fibrillation (H)      PVD (peripheral vascular disease) (H)        CURRENT MEDICATIONS:  Current Outpatient Prescriptions   Medication Sig Dispense Refill     losartan (COZAAR) 25 MG tablet Take 1 tablet (25 mg) by mouth daily 30 tablet 11     albuterol (PROAIR HFA/PROVENTIL HFA/VENTOLIN HFA) 108 (90 BASE) MCG/ACT Inhaler Inhale 2 puffs into the lungs every 6 hours as needed for shortness of breath / dyspnea or wheezing 1 Inhaler 0     aspirin 325 MG tablet Take 3 tablets (975 mg) by mouth every morning (Takes 2 x 500 mg = 1000 mg) 120 tablet 1     Acetaminophen (TYLENOL PO) Take 1,000 mg by mouth every morning (Takes 2 x 500 mg = 1000 mg)       insulin glargine (LANTUS) 100 UNIT/ML injection Inject 27 Units Subcutaneous every morning       Nitroglycerin (NITROSTAT SL) Place 0.4 mg under the tongue every 5 minutes as needed for chest pain       METFORMIN HCL PO Take 1,000 mg by mouth 2 times daily (Takes 2 x 500 mg = 1000 mg dose)       METOPROLOL SUCCINATE ER PO Take 12.5 mg by mouth every morning (Takes 0.5 x 25 mg tablet = 12.5 mg)       amylase-lipase-protease (CREON 12) 70799 UNITS CPEP Take 1 capsule by mouth daily       atorvastatin (LIPITOR) 80 MG tablet Take 80 mg by mouth At Bedtime        glipiZIDE (GLUCOTROL) 10 MG tablet Take 10 mg by mouth 2 times daily        pantoprazole  (PROTONIX) 40 MG EC tablet Take 40 mg by mouth daily         ALLERGIES     Allergies   Allergen Reactions     Hydrocodone      Upset stomach     Shellfish Allergy Hives and Rash       PAST MEDICAL HISTORY:  Past Medical History:   Diagnosis Date     Acute renal failure (H) 8/26/06 Hosp    secondary to dehydration     Arthritis      CAD (coronary artery disease)     LIMA to the LAD, vein graft to OM and a vein graft to the PDA     Carpal tunnel syndrome      Diabetes (H)      Gastro-oesophageal reflux disease      H/O: alcohol abuse      HTN (hypertension)      Hyperlipidaemia      Hyperlipidaemia LDL goal <100 7/8/2013     Hypokalemia      Pacemaker      Pancreatitis 6/26/06 Hosp       PAST SURGICAL HISTORY:  Past Surgical History:   Procedure Laterality Date     BYPASS CARDIOPULMONARY       CATARACT IOL, RT/LT      Cataract IOL RT/LT     ENDARTERECTOMY CAROTID Right 6/12/2015    Procedure: ENDARTERECTOMY CAROTID;  Surgeon: João Turner MD;  Location: [facility] OR     ENDARTERECTOMY CAROTID Left 9/8/2017    Procedure: ENDARTERECTOMY CAROTID;  LEFT CAROTID ENDARTERECTOMY WITH EEG;  Surgeon: João Turner MD;  Location:  OR     IMPLANT PACEMAKER  6/10/2010     RELEASE CARPAL TUNNEL Right 4/12/2016    Procedure: RELEASE CARPAL TUNNEL;  Surgeon: Lissy Leger MD;  Location: [facility] OR     SURGICAL HISTORY OF -   1998    Laminectomy     SURGICAL HISTORY OF -   10/2003    Bypass grafting     TONSILLECTOMY & ADENOIDECTOMY         FAMILY HISTORY:  Family History   Problem Relation Age of Onset     Unknown/Adopted No family hx of        SOCIAL HISTORY:  Social History     Social History     Marital status: Single     Spouse name: N/A     Number of children: N/A     Years of education: N/A     Social History Main Topics     Smoking status: Current Every Day Smoker     Packs/day: 2.00     Types: Cigarettes     Smokeless tobacco: Never Used      Comment: wanting to quit     Alcohol use No     Drug use: No      Sexual activity: Not Currently     Partners: Female     Other Topics Concern     Parent/Sibling W/ Cabg, Mi Or Angioplasty Before 65f 55m? No     Social History Narrative       Review of Systems:  Skin:  Negative       Eyes:  Positive for glasses    ENT:  Negative      Respiratory:  Positive for cough;shortness of breath     Cardiovascular:    Positive for;edema;fatigue    Gastroenterology: Negative      Genitourinary:  Negative      Musculoskeletal:  Positive for joint pain    Neurologic:  Negative numbness or tingling of hands    Psychiatric:  Negative      Heme/Lymph/Imm:  Positive for allergies    Endocrine:  Positive for diabetes      Physical Exam:  Vitals: /71  Pulse 85  Wt 80.3 kg (177 lb)  SpO2 93%  BMI 24.01 kg/m2    Constitutional:  cooperative, alert and oriented, well developed, well nourished, in no acute distress        Skin:  warm and dry to the touch          Head:  normocephalic        Eyes:  sclera white        Lymph:      ENT:  no pallor or cyanosis        Neck:  not assessed this visit   healing left carotid scar    Respiratory:    diminished breath sounds bilaterally       Cardiac: regular rhythm, normal S1/S2, no S3 or S4, apical impulse not displaced, no murmurs, gallops or rubs                                                         GI:  abdomen soft        Extremities and Muscular Skeletal:  no deformities, clubbing, cyanosis, erythema observed;no edema   bilateral LE edema;1+;pitting          Neurological:  affect appropriate        Psych:  Alert and Oriented x 3        CC  Mimi Michaels MD  [id number]  [place] [id number]

## 2017-12-11 DIAGNOSIS — I25.5 ISCHEMIC CARDIOMYOPATHY: ICD-10-CM

## 2017-12-11 DIAGNOSIS — I50.9 CHF (CONGESTIVE HEART FAILURE) (H): ICD-10-CM

## 2017-12-11 LAB
ANION GAP SERPL CALCULATED.3IONS-SCNC: 4 MMOL/L (ref 3–14)
BUN SERPL-MCNC: 19 MG/DL (ref 7–30)
CALCIUM SERPL-MCNC: 8.5 MG/DL (ref 8.5–10.1)
CHLORIDE SERPL-SCNC: 104 MMOL/L (ref 94–109)
CO2 SERPL-SCNC: 28 MMOL/L (ref 20–32)
CREAT SERPL-MCNC: 1.04 MG/DL (ref 0.66–1.25)
GFR SERPL CREATININE-BSD FRML MDRD: 71 ML/MIN/1.7M2
GLUCOSE SERPL-MCNC: 240 MG/DL (ref 70–99)
NT-PROBNP SERPL-MCNC: ABNORMAL PG/ML (ref 0–125)
POTASSIUM SERPL-SCNC: 4.8 MMOL/L (ref 3.4–5.3)
SODIUM SERPL-SCNC: 136 MMOL/L (ref 133–144)

## 2017-12-11 PROCEDURE — 83880 ASSAY OF NATRIURETIC PEPTIDE: CPT | Performed by: NURSE PRACTITIONER

## 2017-12-11 PROCEDURE — 80048 BASIC METABOLIC PNL TOTAL CA: CPT | Performed by: NURSE PRACTITIONER

## 2017-12-11 PROCEDURE — 36415 COLL VENOUS BLD VENIPUNCTURE: CPT | Performed by: NURSE PRACTITIONER

## 2017-12-14 ENCOUNTER — TELEPHONE (OUTPATIENT)
Dept: CARDIOLOGY | Facility: CLINIC | Age: 67
End: 2017-12-14

## 2017-12-14 DIAGNOSIS — I25.5 ISCHEMIC CARDIOMYOPATHY: Primary | ICD-10-CM

## 2017-12-14 NOTE — TELEPHONE ENCOUNTER
Discussed this Pt with Dr. Michaels who agrees that the Pt should have angiogram to assess for any blockage that may be contributing to his further decreased EF. I have already discussed risks and benefits at his visit on 12/4/17 which may be used as H&P.     He does have a f/u with me on 12/27 which we can hopefully use as a post angio.     Thanks,   Yareli Kim, APRN, CNP    ADDENDUM: LM for patient to call back to discuss recommendation to have coronary angiogram and schedule it if patient is willing. Lissett Sandoval RN Cardiology December 14, 2017, 2:31 PM

## 2017-12-14 NOTE — TELEPHONE ENCOUNTER
Went over angiogram instructions with patient and sent a copy of patient instructions in the mail. He verbalized understanding.

## 2017-12-14 NOTE — PATIENT INSTRUCTIONS
1. Your coronary angiogram is scheduled to be done at [facility] on Friday December 22, 2017. Please arrive at 8:00am.    If you need to contact [facility] for any reason, please call [phone] option #2  2. Keep your follow up with Yareli Mina NP here in [place] on Wednesday December 27, 2017 at 9:00am    ANGIOGRAM  [facility]   [place], [place]   Contact [facility] scheduling if needed at [phone], Option#2 or [phone].      1. In preparation for your procedure, we require that you do the following:    a. Nothing to eat or drink after midnight if your procedure is before 12 noon.    b. Take your usual morning medications with a small sip of water on the day of your procedure unless you are on the following medications:    Aspirin:  o If you currently take Aspirin, continue taking your same dose.    Diabetic:  o If you take Glucophage (metformin) do not take the morning of the procedure or for 2 days after the procedure. Contact your Primary Care MD regarding glucose control for the days you do not take Glucophage. Check with your family doctor regarding when to start metformin again after your angiogram  o If you are on other oral diabetic medications do not take on the morning of the procedure.  o If you take Insulin contact your Primary Care MD for the recommended dosage to take the morning of the procedure. Talk with your family doctor regarding what to do about your insulin prior to your angiogram    Diuretic (Water Pill):  o If you take a diuretic (water pill), do not take the morning of the procedure.    2. You will be unable to drive after your procedure; please arrange to have someone drive you home the day of your procedure. You will also need to make sure that there is a responsible adult with you for 24 hours after your procedure. Your procedure will be cancelled if you do not have transportation home or someone to stay  "with you for 24 hrs.     3. Your procedure will be done at [facility]. Please park in the  [address]amp  on the west side of [facility] on [address]. Take the skyway over [facility] to the hospital. Please check in on the first floor, \"Skyway Welcome Desk\" which is one floor down from the skyway level.     4. If you have any questions about your upcoming procedure, please contact the nurse at [facility] at [phone] or the nurse at [facility] at [phone], option#2.    "

## 2017-12-20 RX ORDER — SODIUM CHLORIDE 9 MG/ML
INJECTION, SOLUTION INTRAVENOUS CONTINUOUS
Status: CANCELLED | OUTPATIENT
Start: 2017-12-22

## 2017-12-20 RX ORDER — LIDOCAINE 40 MG/G
CREAM TOPICAL
Status: CANCELLED | OUTPATIENT
Start: 2017-12-20

## 2017-12-22 ENCOUNTER — APPOINTMENT (OUTPATIENT)
Dept: CARDIOLOGY | Facility: CLINIC | Age: 67
End: 2017-12-22
Attending: NURSE PRACTITIONER
Payer: MEDICARE

## 2017-12-22 ENCOUNTER — HOSPITAL ENCOUNTER (OUTPATIENT)
Facility: CLINIC | Age: 67
Discharge: HOME | End: 2017-12-22
Attending: INTERNAL MEDICINE | Admitting: INTERNAL MEDICINE
Payer: MEDICARE

## 2017-12-22 VITALS
OXYGEN SATURATION: 94 % | HEART RATE: 99 BPM | SYSTOLIC BLOOD PRESSURE: 93 MMHG | WEIGHT: 168.8 LBS | RESPIRATION RATE: 16 BRPM | BODY MASS INDEX: 22.86 KG/M2 | TEMPERATURE: 98.3 F | DIASTOLIC BLOOD PRESSURE: 63 MMHG | HEIGHT: 72 IN

## 2017-12-22 DIAGNOSIS — I25.5 ISCHEMIC CARDIOMYOPATHY: ICD-10-CM

## 2017-12-22 DIAGNOSIS — I25.10 CORONARY ARTERY DISEASE INVOLVING NATIVE CORONARY ARTERY OF NATIVE HEART WITHOUT ANGINA PECTORIS: Primary | ICD-10-CM

## 2017-12-22 LAB
ANION GAP SERPL CALCULATED.3IONS-SCNC: 5 MMOL/L (ref 3–14)
APTT PPP: 31 SEC (ref 22–37)
BUN SERPL-MCNC: 19 MG/DL (ref 7–30)
CALCIUM SERPL-MCNC: 8.5 MG/DL (ref 8.5–10.1)
CHLORIDE SERPL-SCNC: 105 MMOL/L (ref 94–109)
CO2 SERPL-SCNC: 28 MMOL/L (ref 20–32)
CREAT SERPL-MCNC: 1.02 MG/DL (ref 0.66–1.25)
ERYTHROCYTE [DISTWIDTH] IN BLOOD BY AUTOMATED COUNT: 13.8 % (ref 10–15)
GFR SERPL CREATININE-BSD FRML MDRD: 72 ML/MIN/1.7M2
GLUCOSE SERPL-MCNC: 148 MG/DL (ref 70–99)
HCT VFR BLD AUTO: 43.9 % (ref 40–53)
HGB BLD-MCNC: 14.2 G/DL (ref 13.3–17.7)
INR PPP: 1.02 (ref 0.86–1.14)
MCH RBC QN AUTO: 29.2 PG (ref 26.5–33)
MCHC RBC AUTO-ENTMCNC: 32.3 G/DL (ref 31.5–36.5)
MCV RBC AUTO: 90 FL (ref 78–100)
PLATELET # BLD AUTO: 122 10E9/L (ref 150–450)
POTASSIUM SERPL-SCNC: 4.5 MMOL/L (ref 3.4–5.3)
RBC # BLD AUTO: 4.86 10E12/L (ref 4.4–5.9)
SODIUM SERPL-SCNC: 138 MMOL/L (ref 133–144)
WBC # BLD AUTO: 6.8 10E9/L (ref 4–11)

## 2017-12-22 PROCEDURE — 93005 ELECTROCARDIOGRAM TRACING: CPT

## 2017-12-22 PROCEDURE — C1769 GUIDE WIRE: HCPCS

## 2017-12-22 PROCEDURE — 80048 BASIC METABOLIC PNL TOTAL CA: CPT | Performed by: INTERNAL MEDICINE

## 2017-12-22 PROCEDURE — 99152 MOD SED SAME PHYS/QHP 5/>YRS: CPT | Mod: GC | Performed by: INTERNAL MEDICINE

## 2017-12-22 PROCEDURE — 27210795 ZZH PAD DEFIB QUICK CR4

## 2017-12-22 PROCEDURE — 27210787 ZZH MANIFOLD CR2

## 2017-12-22 PROCEDURE — 27210914 ZZH SHEATH CR8

## 2017-12-22 PROCEDURE — 25000125 ZZHC RX 250: Performed by: INTERNAL MEDICINE

## 2017-12-22 PROCEDURE — 93459 L HRT ART/GRFT ANGIO: CPT | Mod: 26 | Performed by: INTERNAL MEDICINE

## 2017-12-22 PROCEDURE — 36415 COLL VENOUS BLD VENIPUNCTURE: CPT | Performed by: INTERNAL MEDICINE

## 2017-12-22 PROCEDURE — 99153 MOD SED SAME PHYS/QHP EA: CPT

## 2017-12-22 PROCEDURE — 99152 MOD SED SAME PHYS/QHP 5/>YRS: CPT

## 2017-12-22 PROCEDURE — 25000128 H RX IP 250 OP 636: Performed by: INTERNAL MEDICINE

## 2017-12-22 PROCEDURE — 27210946 ZZH KIT HC TOTES DISP CR8

## 2017-12-22 PROCEDURE — 85730 THROMBOPLASTIN TIME PARTIAL: CPT | Performed by: INTERNAL MEDICINE

## 2017-12-22 PROCEDURE — 40000235 ZZH STATISTIC TELEMETRY

## 2017-12-22 PROCEDURE — 85610 PROTHROMBIN TIME: CPT | Performed by: INTERNAL MEDICINE

## 2017-12-22 PROCEDURE — 40000065 ZZH STATISTIC EKG NON-CHARGEABLE

## 2017-12-22 PROCEDURE — 27210856 ZZH ACCESS HEART CATH CR2

## 2017-12-22 PROCEDURE — 85027 COMPLETE CBC AUTOMATED: CPT | Performed by: INTERNAL MEDICINE

## 2017-12-22 PROCEDURE — 93010 ELECTROCARDIOGRAM REPORT: CPT | Mod: 59 | Performed by: INTERNAL MEDICINE

## 2017-12-22 PROCEDURE — 25000128 H RX IP 250 OP 636: Performed by: NURSE PRACTITIONER

## 2017-12-22 PROCEDURE — 27211089 ZZH KIT ACIST INJECTOR CR3

## 2017-12-22 PROCEDURE — 40000852 ZZH STATISTIC HEART CATH LAB OR EP LAB

## 2017-12-22 PROCEDURE — 93459 L HRT ART/GRFT ANGIO: CPT

## 2017-12-22 RX ORDER — VERAPAMIL HYDROCHLORIDE 2.5 MG/ML
1-2.5 INJECTION, SOLUTION INTRAVENOUS
Status: DISCONTINUED | OUTPATIENT
Start: 2017-12-22 | End: 2017-12-22 | Stop reason: HOSPADM

## 2017-12-22 RX ORDER — NITROGLYCERIN 5 MG/ML
100-200 VIAL (ML) INTRAVENOUS
Status: DISCONTINUED | OUTPATIENT
Start: 2017-12-22 | End: 2017-12-22 | Stop reason: HOSPADM

## 2017-12-22 RX ORDER — NITROGLYCERIN 5 MG/ML
100-500 VIAL (ML) INTRAVENOUS
Status: DISCONTINUED | OUTPATIENT
Start: 2017-12-22 | End: 2017-12-22 | Stop reason: HOSPADM

## 2017-12-22 RX ORDER — BUPIVACAINE HYDROCHLORIDE 2.5 MG/ML
1-10 INJECTION, SOLUTION EPIDURAL; INFILTRATION; INTRACAUDAL
Status: DISCONTINUED | OUTPATIENT
Start: 2017-12-22 | End: 2017-12-22 | Stop reason: HOSPADM

## 2017-12-22 RX ORDER — IOPAMIDOL 755 MG/ML
150 INJECTION, SOLUTION INTRAVASCULAR ONCE
Status: COMPLETED | OUTPATIENT
Start: 2017-12-22 | End: 2017-12-22

## 2017-12-22 RX ORDER — ATROPINE SULFATE 0.1 MG/ML
.5-1 INJECTION INTRAVENOUS
Status: DISCONTINUED | OUTPATIENT
Start: 2017-12-22 | End: 2017-12-22 | Stop reason: HOSPADM

## 2017-12-22 RX ORDER — POTASSIUM CHLORIDE 29.8 MG/ML
20 INJECTION INTRAVENOUS
Status: DISCONTINUED | OUTPATIENT
Start: 2017-12-22 | End: 2017-12-22 | Stop reason: HOSPADM

## 2017-12-22 RX ORDER — DOBUTAMINE HYDROCHLORIDE 200 MG/100ML
2-20 INJECTION INTRAVENOUS CONTINUOUS PRN
Status: DISCONTINUED | OUTPATIENT
Start: 2017-12-22 | End: 2017-12-22 | Stop reason: HOSPADM

## 2017-12-22 RX ORDER — NALOXONE HYDROCHLORIDE 0.4 MG/ML
.1-.4 INJECTION, SOLUTION INTRAMUSCULAR; INTRAVENOUS; SUBCUTANEOUS
Status: DISCONTINUED | OUTPATIENT
Start: 2017-12-22 | End: 2017-12-22 | Stop reason: HOSPADM

## 2017-12-22 RX ORDER — NIFEDIPINE 10 MG/1
10 CAPSULE ORAL
Status: DISCONTINUED | OUTPATIENT
Start: 2017-12-22 | End: 2017-12-22 | Stop reason: HOSPADM

## 2017-12-22 RX ORDER — HEPARIN SODIUM 1000 [USP'U]/ML
1000-10000 INJECTION, SOLUTION INTRAVENOUS; SUBCUTANEOUS EVERY 5 MIN PRN
Status: DISCONTINUED | OUTPATIENT
Start: 2017-12-22 | End: 2017-12-22 | Stop reason: HOSPADM

## 2017-12-22 RX ORDER — SODIUM NITROPRUSSIDE 25 MG/ML
100-200 INJECTION INTRAVENOUS
Status: DISCONTINUED | OUTPATIENT
Start: 2017-12-22 | End: 2017-12-22 | Stop reason: HOSPADM

## 2017-12-22 RX ORDER — ONDANSETRON 2 MG/ML
4 INJECTION INTRAMUSCULAR; INTRAVENOUS EVERY 4 HOURS PRN
Status: DISCONTINUED | OUTPATIENT
Start: 2017-12-22 | End: 2017-12-22 | Stop reason: HOSPADM

## 2017-12-22 RX ORDER — ASPIRIN 81 MG/1
81-324 TABLET, CHEWABLE ORAL
Status: DISCONTINUED | OUTPATIENT
Start: 2017-12-22 | End: 2017-12-22 | Stop reason: HOSPADM

## 2017-12-22 RX ORDER — PROMETHAZINE HYDROCHLORIDE 25 MG/ML
6.25-25 INJECTION, SOLUTION INTRAMUSCULAR; INTRAVENOUS EVERY 4 HOURS PRN
Status: DISCONTINUED | OUTPATIENT
Start: 2017-12-22 | End: 2017-12-22 | Stop reason: HOSPADM

## 2017-12-22 RX ORDER — METOPROLOL TARTRATE 1 MG/ML
5 INJECTION, SOLUTION INTRAVENOUS EVERY 5 MIN PRN
Status: DISCONTINUED | OUTPATIENT
Start: 2017-12-22 | End: 2017-12-22 | Stop reason: HOSPADM

## 2017-12-22 RX ORDER — EPINEPHRINE 1 MG/ML
0.3 INJECTION, SOLUTION, CONCENTRATE INTRAVENOUS
Status: DISCONTINUED | OUTPATIENT
Start: 2017-12-22 | End: 2017-12-22 | Stop reason: HOSPADM

## 2017-12-22 RX ORDER — FUROSEMIDE 10 MG/ML
20-100 INJECTION INTRAMUSCULAR; INTRAVENOUS
Status: DISCONTINUED | OUTPATIENT
Start: 2017-12-22 | End: 2017-12-22 | Stop reason: HOSPADM

## 2017-12-22 RX ORDER — LIDOCAINE HYDROCHLORIDE 10 MG/ML
1-10 INJECTION, SOLUTION EPIDURAL; INFILTRATION; INTRACAUDAL; PERINEURAL
Status: DISCONTINUED | OUTPATIENT
Start: 2017-12-22 | End: 2017-12-22 | Stop reason: HOSPADM

## 2017-12-22 RX ORDER — ADENOSINE 3 MG/ML
12-12000 INJECTION, SOLUTION INTRAVENOUS
Status: DISCONTINUED | OUTPATIENT
Start: 2017-12-22 | End: 2017-12-22 | Stop reason: HOSPADM

## 2017-12-22 RX ORDER — SODIUM CHLORIDE 9 MG/ML
INJECTION, SOLUTION INTRAVENOUS CONTINUOUS
Status: DISCONTINUED | OUTPATIENT
Start: 2017-12-22 | End: 2017-12-22 | Stop reason: HOSPADM

## 2017-12-22 RX ORDER — METHYLPREDNISOLONE SODIUM SUCCINATE 125 MG/2ML
125 INJECTION, POWDER, LYOPHILIZED, FOR SOLUTION INTRAMUSCULAR; INTRAVENOUS
Status: DISCONTINUED | OUTPATIENT
Start: 2017-12-22 | End: 2017-12-22 | Stop reason: HOSPADM

## 2017-12-22 RX ORDER — FENTANYL CITRATE 50 UG/ML
25-50 INJECTION, SOLUTION INTRAMUSCULAR; INTRAVENOUS
Status: DISCONTINUED | OUTPATIENT
Start: 2017-12-22 | End: 2017-12-22 | Stop reason: HOSPADM

## 2017-12-22 RX ORDER — DEXTROSE MONOHYDRATE 25 G/50ML
12.5-5 INJECTION, SOLUTION INTRAVENOUS EVERY 30 MIN PRN
Status: DISCONTINUED | OUTPATIENT
Start: 2017-12-22 | End: 2017-12-22 | Stop reason: HOSPADM

## 2017-12-22 RX ORDER — ENALAPRILAT 1.25 MG/ML
1.25-2.5 INJECTION INTRAVENOUS
Status: DISCONTINUED | OUTPATIENT
Start: 2017-12-22 | End: 2017-12-22 | Stop reason: HOSPADM

## 2017-12-22 RX ORDER — FLUMAZENIL 0.1 MG/ML
0.2 INJECTION, SOLUTION INTRAVENOUS
Status: DISCONTINUED | OUTPATIENT
Start: 2017-12-22 | End: 2017-12-22 | Stop reason: HOSPADM

## 2017-12-22 RX ORDER — HYDRALAZINE HYDROCHLORIDE 20 MG/ML
10-20 INJECTION INTRAMUSCULAR; INTRAVENOUS
Status: DISCONTINUED | OUTPATIENT
Start: 2017-12-22 | End: 2017-12-22 | Stop reason: HOSPADM

## 2017-12-22 RX ORDER — LORAZEPAM 2 MG/ML
.5-2 INJECTION INTRAMUSCULAR EVERY 4 HOURS PRN
Status: DISCONTINUED | OUTPATIENT
Start: 2017-12-22 | End: 2017-12-22 | Stop reason: HOSPADM

## 2017-12-22 RX ORDER — DIPHENHYDRAMINE HYDROCHLORIDE 50 MG/ML
25-50 INJECTION INTRAMUSCULAR; INTRAVENOUS
Status: DISCONTINUED | OUTPATIENT
Start: 2017-12-22 | End: 2017-12-22 | Stop reason: HOSPADM

## 2017-12-22 RX ORDER — LIDOCAINE 40 MG/G
CREAM TOPICAL
Status: DISCONTINUED | OUTPATIENT
Start: 2017-12-22 | End: 2017-12-22 | Stop reason: HOSPADM

## 2017-12-22 RX ORDER — PROTAMINE SULFATE 10 MG/ML
1-5 INJECTION, SOLUTION INTRAVENOUS
Status: DISCONTINUED | OUTPATIENT
Start: 2017-12-22 | End: 2017-12-22 | Stop reason: HOSPADM

## 2017-12-22 RX ORDER — DOPAMINE HYDROCHLORIDE 160 MG/100ML
2-20 INJECTION, SOLUTION INTRAVENOUS CONTINUOUS PRN
Status: DISCONTINUED | OUTPATIENT
Start: 2017-12-22 | End: 2017-12-22 | Stop reason: HOSPADM

## 2017-12-22 RX ORDER — ASPIRIN 325 MG
325 TABLET ORAL
Status: DISCONTINUED | OUTPATIENT
Start: 2017-12-22 | End: 2017-12-22 | Stop reason: HOSPADM

## 2017-12-22 RX ORDER — CLOPIDOGREL BISULFATE 75 MG/1
75 TABLET ORAL
Status: DISCONTINUED | OUTPATIENT
Start: 2017-12-22 | End: 2017-12-22 | Stop reason: HOSPADM

## 2017-12-22 RX ORDER — NITROGLYCERIN 0.4 MG/1
0.4 TABLET SUBLINGUAL EVERY 5 MIN PRN
Status: DISCONTINUED | OUTPATIENT
Start: 2017-12-22 | End: 2017-12-22 | Stop reason: HOSPADM

## 2017-12-22 RX ORDER — LIDOCAINE HYDROCHLORIDE 10 MG/ML
1-10 INJECTION, SOLUTION EPIDURAL; INFILTRATION; INTRACAUDAL; PERINEURAL
Status: COMPLETED | OUTPATIENT
Start: 2017-12-22 | End: 2017-12-22

## 2017-12-22 RX ORDER — CLOPIDOGREL 300 MG/1
300-600 TABLET, FILM COATED ORAL
Status: DISCONTINUED | OUTPATIENT
Start: 2017-12-22 | End: 2017-12-22 | Stop reason: HOSPADM

## 2017-12-22 RX ORDER — NALOXONE HYDROCHLORIDE 0.4 MG/ML
.2-.4 INJECTION, SOLUTION INTRAMUSCULAR; INTRAVENOUS; SUBCUTANEOUS
Status: DISCONTINUED | OUTPATIENT
Start: 2017-12-22 | End: 2017-12-22 | Stop reason: HOSPADM

## 2017-12-22 RX ORDER — NALOXONE HYDROCHLORIDE 0.4 MG/ML
0.4 INJECTION, SOLUTION INTRAMUSCULAR; INTRAVENOUS; SUBCUTANEOUS EVERY 5 MIN PRN
Status: DISCONTINUED | OUTPATIENT
Start: 2017-12-22 | End: 2017-12-22 | Stop reason: HOSPADM

## 2017-12-22 RX ORDER — HYDROCODONE BITARTRATE AND ACETAMINOPHEN 5; 325 MG/1; MG/1
1-2 TABLET ORAL EVERY 4 HOURS PRN
Status: DISCONTINUED | OUTPATIENT
Start: 2017-12-22 | End: 2017-12-22 | Stop reason: HOSPADM

## 2017-12-22 RX ORDER — NICARDIPINE HYDROCHLORIDE 2.5 MG/ML
100 INJECTION INTRAVENOUS
Status: DISCONTINUED | OUTPATIENT
Start: 2017-12-22 | End: 2017-12-22 | Stop reason: HOSPADM

## 2017-12-22 RX ORDER — PROTAMINE SULFATE 10 MG/ML
25-100 INJECTION, SOLUTION INTRAVENOUS EVERY 5 MIN PRN
Status: DISCONTINUED | OUTPATIENT
Start: 2017-12-22 | End: 2017-12-22 | Stop reason: HOSPADM

## 2017-12-22 RX ORDER — POTASSIUM CHLORIDE 7.45 MG/ML
10 INJECTION INTRAVENOUS
Status: DISCONTINUED | OUTPATIENT
Start: 2017-12-22 | End: 2017-12-22 | Stop reason: HOSPADM

## 2017-12-22 RX ORDER — LIDOCAINE HYDROCHLORIDE 10 MG/ML
30 INJECTION, SOLUTION EPIDURAL; INFILTRATION; INTRACAUDAL; PERINEURAL
Status: DISCONTINUED | OUTPATIENT
Start: 2017-12-22 | End: 2017-12-22 | Stop reason: HOSPADM

## 2017-12-22 RX ORDER — MORPHINE SULFATE 2 MG/ML
1-2 INJECTION, SOLUTION INTRAMUSCULAR; INTRAVENOUS EVERY 5 MIN PRN
Status: DISCONTINUED | OUTPATIENT
Start: 2017-12-22 | End: 2017-12-22 | Stop reason: HOSPADM

## 2017-12-22 RX ORDER — PHENYLEPHRINE HCL IN 0.9% NACL 1 MG/10 ML
20-100 SYRINGE (ML) INTRAVENOUS
Status: DISCONTINUED | OUTPATIENT
Start: 2017-12-22 | End: 2017-12-22 | Stop reason: HOSPADM

## 2017-12-22 RX ORDER — NITROGLYCERIN 20 MG/100ML
.07-2 INJECTION INTRAVENOUS CONTINUOUS PRN
Status: DISCONTINUED | OUTPATIENT
Start: 2017-12-22 | End: 2017-12-22 | Stop reason: HOSPADM

## 2017-12-22 RX ORDER — ACETAMINOPHEN 325 MG/1
325-650 TABLET ORAL EVERY 4 HOURS PRN
Status: DISCONTINUED | OUTPATIENT
Start: 2017-12-22 | End: 2017-12-22 | Stop reason: HOSPADM

## 2017-12-22 RX ORDER — PRASUGREL 10 MG/1
10-60 TABLET, FILM COATED ORAL
Status: DISCONTINUED | OUTPATIENT
Start: 2017-12-22 | End: 2017-12-22 | Stop reason: HOSPADM

## 2017-12-22 RX ORDER — NITROGLYCERIN 5 MG/ML
100-500 VIAL (ML) INTRAVENOUS
Status: COMPLETED | OUTPATIENT
Start: 2017-12-22 | End: 2017-12-22

## 2017-12-22 RX ORDER — ATROPINE SULFATE 0.1 MG/ML
0.5 INJECTION INTRAVENOUS EVERY 5 MIN PRN
Status: DISCONTINUED | OUTPATIENT
Start: 2017-12-22 | End: 2017-12-22 | Stop reason: HOSPADM

## 2017-12-22 RX ORDER — VERAPAMIL HYDROCHLORIDE 2.5 MG/ML
1-2.5 INJECTION, SOLUTION INTRAVENOUS
Status: COMPLETED | OUTPATIENT
Start: 2017-12-22 | End: 2017-12-22

## 2017-12-22 RX ADMIN — LIDOCAINE HYDROCHLORIDE 10 MG: 10 INJECTION, SOLUTION EPIDURAL; INFILTRATION; INTRACAUDAL; PERINEURAL at 10:03

## 2017-12-22 RX ADMIN — FENTANYL CITRATE 100 MCG: 50 INJECTION, SOLUTION INTRAMUSCULAR; INTRAVENOUS at 10:30

## 2017-12-22 RX ADMIN — NITROGLYCERIN 400 MCG: 5 INJECTION, SOLUTION INTRAVENOUS at 10:08

## 2017-12-22 RX ADMIN — SODIUM CHLORIDE: 9 INJECTION, SOLUTION INTRAVENOUS at 08:42

## 2017-12-22 RX ADMIN — VERAPAMIL HYDROCHLORIDE 2.5 MG: 2.5 INJECTION, SOLUTION INTRAVENOUS at 10:09

## 2017-12-22 RX ADMIN — Medication 5000 UNITS: at 10:08

## 2017-12-22 RX ADMIN — MIDAZOLAM 2 MG: 1 INJECTION INTRAMUSCULAR; INTRAVENOUS at 10:30

## 2017-12-22 RX ADMIN — IOPAMIDOL 150 ML: 755 INJECTION, SOLUTION INTRAVASCULAR at 11:15

## 2017-12-22 NOTE — IP AVS SNAPSHOT
[place] [address]    [place] [address]    Phone:  [phone]                                       After Visit Summary   12/22/2017    Delbert Tilley    MRN: [id number]           After Visit Summary Signature Page     I have received my discharge instructions, and my questions have been answered. I have discussed any challenges I see with this plan with the nurse or doctor.    ..........................................................................................................................................  Patient/Patient Representative Signature      ..........................................................................................................................................  Patient Representative Print Name and Relationship to Patient    ..................................................               ................................................  Date                                            Time    ..........................................................................................................................................  Reviewed by Signature/Title    ...................................................              ..............................................  Date                                                            Time

## 2017-12-22 NOTE — DISCHARGE INSTRUCTIONS
Cardiac Angiogram Discharge Instructions - Radial    After you go home:      Have an adult stay with you until tomorrow.    Drink extra fluids for 2 days.    You may resume your normal diet.    No smoking       For 24 hours - due to the sedation you received:    Relax and take it easy.    Do NOT make any important or legal decisions.    Do NOT drive or operate machines at home or at work.    Do NOT drink alcohol.    Care of Wrist Puncture Site:      For the first 24 hrs - check the puncture site every 1-2 hours while awake.    It is normal to have soreness at the puncture site and mild tingling in your hand for up to 3 days.    Remove the bandaid after 24 hours. If there is minor oozing, apply another bandaid and remove it after 12 hours.    You may shower tomorrow.  Do NOT take a bath, or use a hot tub or pool for at least 3 days. Do NOT scrub the site. Do not use lotion or powder near the puncture site.           Activity:        For 2 days:     do not use your hand or arm to support your weight (such as rising from a chair)     do not bend your wrist (such as lifting a garage door).    do not lift more than 5 pounds or exercise your arm (such as tennis, golf or bowling).    Do NOT do any heavy activity such as exercise, lifting, or straining.     Bleeding:      If you start bleeding from the site in your wrist, sit down and press firmly on/above the site for 10 minutes.     Once bleeding stops, keep arm still for 2 hours.     Call [facility] as soon as you can.       Call 911 right away if you have heavy bleeding or bleeding that does not stop.      Medicines:      If you are taking antiplatelet medications such as Plavix, Brilinta or Effient, do not stop taking it until you talk to your cardiologist.        If you are on Metformin (Glucophage), do not restart it until you have blood tests (within 2 to 3 days after discharge).  After you have your blood drawn, you may restart the Metformin.     Take your  medications, including blood thinners, unless your provider tells you not to.  If you take Coumadin (Warfarin), have your INR checked by your provider in  3-5 days. Call your clinic to schedule this.    If you have stopped any medicines, check with your provider about when to restart them.    Follow Up Appointments:      Follow up with [facility] Nurse Practitioner at [facility] of patient preference in 7-10 days.    Call the clinic if:      You have a large or growing hard lump around the site.    The site is red, swollen, hot or tender.    Blood or fluid is draining from the site.    You have chills or a fever greater than 101 F (38 C).    Your arm turns feels numb, cool or changes color.    You have hives, a rash or unusual itching.    Any questions or concerns.          [facility] [place]:    [phone] [facility] (7 days a week)

## 2017-12-22 NOTE — CONSULTS
I have examined the patient, reviewed the history, medications and pre procedural tests 67 year old man with unexplained decline in LVEF on noninvasive imaging in setting of known CAD ( LIMA to LAD, SVG to Cx marginal, SVG to PDA), ICD, PAF and  ASPVDZ. His managing cardiologist has advised CAG, Graft angiography with possible intervention. I have explained to the patient the risks of death, MI, stroke, hematoma, possible urgent bypass surgery for failed PCI, use of stents, thienopyridine agents, possible peripheral vascular complications, arrhythmia, the use of FFR in clinical decision-making and alternative of medical therapy alone in regards to left heart catheterization, left ventriculography, graft angiography, coronary angiography, and possible percutaneous coronary intervention. The patient voiced understanding and wishes to proceed. The patient has a good left radial pulse, normal ulnar pulse and a normal Jose E's sign.

## 2017-12-22 NOTE — PROGRESS NOTES
Care Suites Discharge Summary    Discharge Criteria:   Discharge Criteria met per MD orders: Yes.   Vital signs stable.     Pt demonstrates ability to ambulate safely: Yes.  (See discharge questionnaire for additional information)    Discharge instructions & education:   Discharge instructions reviewed with patient. Patient verbalizes understanding.   Additional patient education provided:  Cardiac angiogram    Medications:   Patient will be discharging on new medications- No. Patient verbalizes reason for use, start date, and side effects NA.    Items returned to patient:   Home and hospital acquired medications returned to patient NA   Listed belongings gathered and returned to patient: Yes    Patient discharged to home with son.     Elijah Viveros

## 2017-12-22 NOTE — IP AVS SNAPSHOT
MRN:[id number]                      After Visit Summary   12/22/2017    Delbert Tilley    MRN: [id number]           Visit Information        Department      12/22/2017  7:49 AM [facility]          Review of your medicines      CONTINUE these medicines which have NOT CHANGED        Dose / Directions    albuterol 108 (90 BASE) MCG/ACT Inhaler   Commonly known as:  PROAIR HFA/PROVENTIL HFA/VENTOLIN HFA        Dose:  2 puff   Inhale 2 puffs into the lungs every 6 hours as needed for shortness of breath / dyspnea or wheezing   Quantity:  1 Inhaler   Refills:  0       amylase-lipase-protease 02411 UNITS Cpep   Commonly known as:  CREON 12        Dose:  1 capsule   Take 1 capsule by mouth daily   Refills:  0       aspirin 325 MG tablet   Used for:  Left carotid stenosis        Dose:  1000 mg   Take 3 tablets (975 mg) by mouth every morning (Takes 2 x 500 mg = 1000 mg)   Quantity:  120 tablet   Refills:  1       atorvastatin 80 MG tablet   Commonly known as:  LIPITOR        Dose:  80 mg   Take 80 mg by mouth At Bedtime   Refills:  0       furosemide 20 MG tablet   Commonly known as:  LASIX   Used for:  CHF (congestive heart failure) (H)        Dose:  20 mg   Take 1 tablet (20 mg) by mouth daily   Quantity:  30 tablet   Refills:  11       glipiZIDE 10 MG tablet   Commonly known as:  GLUCOTROL        Dose:  10 mg   Take 10 mg by mouth 2 times daily   Refills:  0       insulin glargine 100 UNIT/ML injection   Commonly known as:  LANTUS        Dose:  27 Units   Inject 27 Units Subcutaneous every morning   Refills:  0       losartan 25 MG tablet   Commonly known as:  COZAAR   Used for:  Ischemic cardiomyopathy        Dose:  25 mg   Take 1 tablet (25 mg) by mouth daily   Quantity:  30 tablet   Refills:  11       METFORMIN HCL PO        Dose:  1000 mg   Take 1,000 mg by mouth 2 times daily (Takes 2 x 500 mg = 1000 mg dose)   Refills:  0       METOPROLOL SUCCINATE ER PO        Dose:  12.5 mg    Take 12.5 mg by mouth every morning (Takes 0.5 x 25 mg tablet = 12.5 mg)   Refills:  0       NITROSTAT SL        Dose:  0.4 mg   Place 0.4 mg under the tongue every 5 minutes as needed for chest pain   Refills:  0       pantoprazole 40 MG EC tablet   Commonly known as:  PROTONIX        Dose:  40 mg   Take 40 mg by mouth daily   Refills:  0       TYLENOL PO        Dose:  1000 mg   Take 1,000 mg by mouth every morning (Takes 2 x 500 mg = 1000 mg)   Refills:  0                Protect others around you: Learn how to safely use, store and throw away your medicines at www.disposemymeds.org.         Follow-ups after your visit        Your next 10 appointments already scheduled     Dec 27, 2017  9:00 AM CST   Return Visit with ANA Mullins CNP   [facility] ([facility])    [address] [address]   [phone]            Jan 09, 2018  8:00 AM CST   US CAROTID LEFT with [facility] ([facility])    [address] [address]   [phone]           Please bring a list of your medicines (including vitamins, minerals and over-the-counter drugs). Also, tell your doctor about any allergies you may have. Wear comfortable clothes and leave your valuables at home.  You do not need to do anything special to prepare for your exam.  Please call the Imaging Department at your exam site with any questions.            Jan 09, 2018  9:00 AM CST   New Visit with Charly Beard MD   [facility]    [address] [address]   [phone]            Feb 27, 2018  8:00 AM CST   ICD Check with Wy Device Rn   [facility] ([facility])    [address] [address]   [phone]               Care Instructions        After Care Instructions     Discharge Instructions - IF on Metformin  (Glucophage or Glucovance) or Metformin containing medications       IF on Metformin (Glucophage or Glucovance) or Metformin containing medications , schedule a Basic Metabolic Panel at [facility] or Primary Clinic in 48 - 72 hours post procedure and PRIOR TO resuming the Metformin or Metformin containing medications.  Hold Metformin (Glucophage or Glucovance) or Metformin containing medications until after the Basic Metabolic Panel on the 2nd or 3rd day following the procedure.  May resume after blood draw is complete.                  Further instructions from your care team       Cardiac Angiogram Discharge Instructions - Radial    After you go home:      Have an adult stay with you until tomorrow.    Drink extra fluids for 2 days.    You may resume your normal diet.    No smoking       For 24 hours - due to the sedation you received:    Relax and take it easy.    Do NOT make any important or legal decisions.    Do NOT drive or operate machines at home or at work.    Do NOT drink alcohol.    Care of Wrist Puncture Site:      For the first 24 hrs - check the puncture site every 1-2 hours while awake.    It is normal to have soreness at the puncture site and mild tingling in your hand for up to 3 days.    Remove the bandaid after 24 hours. If there is minor oozing, apply another bandaid and remove it after 12 hours.    You may shower tomorrow.  Do NOT take a bath, or use a hot tub or pool for at least 3 days. Do NOT scrub the site. Do not use lotion or powder near the puncture site.           Activity:        For 2 days:     do not use your hand or arm to support your weight (such as rising from a chair)     do not bend your wrist (such as lifting a garage door).    do not lift more than 5 pounds or exercise your arm (such as tennis, golf or bowling).    Do NOT do any heavy activity such as exercise, lifting, or straining.     Bleeding:      If you start bleeding from the site in your wrist, sit down and press  "firmly on/above the site for 10 minutes.     Once bleeding stops, keep arm still for 2 hours.     Call [facility] as soon as you can.       Call 911 right away if you have heavy bleeding or bleeding that does not stop.      Medicines:      If you are taking antiplatelet medications such as Plavix, Brilinta or Effient, do not stop taking it until you talk to your cardiologist.        If you are on Metformin (Glucophage), do not restart it until you have blood tests (within 2 to 3 days after discharge).  After you have your blood drawn, you may restart the Metformin.     Take your medications, including blood thinners, unless your provider tells you not to.  If you take Coumadin (Warfarin), have your INR checked by your provider in  3-5 days. Call your clinic to schedule this.    If you have stopped any medicines, check with your provider about when to restart them.    Follow Up Appointments:      Follow up with [facility] Nurse Practitioner at [facility] of patient preference in 7-10 days.    Call the clinic if:      You have a large or growing hard lump around the site.    The site is red, swollen, hot or tender.    Blood or fluid is draining from the site.    You have chills or a fever greater than 101 F (38 C).    Your arm turns feels numb, cool or changes color.    You have hives, a rash or unusual itching.    Any questions or concerns.          [facility] [place]:    [phone] [facility] (7 days a week)             Additional Information About Your Visit        [employer] Records & Resources Information     [employer] lets you send messages to your doctor, view your test results, renew your prescriptions, schedule appointments and more. To sign up, go to [email or web address] . Click on \"Log in\" on the left side of the screen, which will take you to the Welcome page. Then click on \"Sign up Now\" on the right side of the page.     You will be asked to enter the access code listed below, as well as some " personal information. Please follow the directions to create your username and password.     Your access code is: [email or web address]  Expires: 2018  2:50 PM     Your access code will  in 90 days. If you need help or a new code, please call your [facility] clinic or [phone].        Care EveryWhere ID     This is your Care EveryWhere ID. This could be used by other organizations to access your [facility] medical records  [id number]        Your Vitals Were     Blood Pressure Pulse Temperature Respirations Height Weight    89/58 99 98.3  F (36.8  C) (Oral) 16 1.829 m (6') 76.6 kg (168 lb 12.8 oz)    Pulse Oximetry BMI (Body Mass Index)                97% 22.89 kg/m2           Primary Care Provider Fax #    [facility] [phone]      Equal Access to Services     MARITA LAMA : Hadii brett nice Socb, waaxda luqadaha, qaybta kaalmada adesarbjityaselena, gwendolyn cosby . So [facility] [phone].    ATENCIÓN: Si habla español, tiene a frey disposición servicios gratuitos de asistencia lingüística. Llame al [phone].    We comply with applicable federal civil rights laws and [place] laws. We do not discriminate on the basis of race, color, national origin, age, disability, sex, sexual orientation, or gender identity.            Thank you!     Thank you for choosing [facility] for your care. Our goal is always to provide you with excellent care. Hearing back from our patients is one way we can continue to improve our services. Please take a few minutes to complete the written survey that you may receive in the mail after you visit with us. Thank you!             Medication List: This is a list of all your medications and when to take them. Check marks below indicate your daily home schedule. Keep this list as a reference.      Medications           Morning Afternoon Evening Bedtime As Needed    albuterol 108 (90 BASE) MCG/ACT Inhaler   Commonly known as:  PROAIR HFA/PROVENTIL  HFA/VENTOLIN HFA   Inhale 2 puffs into the lungs every 6 hours as needed for shortness of breath / dyspnea or wheezing                                amylase-lipase-protease 31896 UNITS Cpep   Commonly known as:  CREON 12   Take 1 capsule by mouth daily                                aspirin 325 MG tablet   Take 3 tablets (975 mg) by mouth every morning (Takes 2 x 500 mg = 1000 mg)                                atorvastatin 80 MG tablet   Commonly known as:  LIPITOR   Take 80 mg by mouth At Bedtime                                furosemide 20 MG tablet   Commonly known as:  LASIX   Take 1 tablet (20 mg) by mouth daily                                glipiZIDE 10 MG tablet   Commonly known as:  GLUCOTROL   Take 10 mg by mouth 2 times daily                                insulin glargine 100 UNIT/ML injection   Commonly known as:  LANTUS   Inject 27 Units Subcutaneous every morning                                losartan 25 MG tablet   Commonly known as:  COZAAR   Take 1 tablet (25 mg) by mouth daily                                METFORMIN HCL PO   Take 1,000 mg by mouth 2 times daily (Takes 2 x 500 mg = 1000 mg dose)                                METOPROLOL SUCCINATE ER PO   Take 12.5 mg by mouth every morning (Takes 0.5 x 25 mg tablet = 12.5 mg)                                NITROSTAT SL   Place 0.4 mg under the tongue every 5 minutes as needed for chest pain                                pantoprazole 40 MG EC tablet   Commonly known as:  PROTONIX   Take 40 mg by mouth daily                                TYLENOL PO   Take 1,000 mg by mouth every morning (Takes 2 x 500 mg = 1000 mg)

## 2017-12-24 NOTE — PROGRESS NOTES
Cardiology Clinic Progress Note  Delbert Tilley MRN# [id number]   YOB: 1950 Age: 67 year old     Reason For Visit:  Angiogram follow-up    Primary Cardiologist:   Dr. Michaels          History of Presenting Illness:    Delbert Tilley is a pleasant 67 year old patient with a past cardiac history significant for a couple episodes of 30 seconds of paroxysmal atrial fibrillation not on anticoagulation, CAD status post CABG (LIMA to LAD, SVG to OM, and SVG to PDA), status post right and left carotid endarterectomy, status post flutter ablation, and ischemic combined with nonischemic cardiomyopathy with previous LVEF 35-40% now decreased to 20-25% and status post ICD.    Pt saw Dr. Michaels on 8/21/2017.  It was noted that the patient followed with vascular surgery regarding his carotid disease.  He had a stress test showing a large transmural inferior and inferoseptal defect without ischemia but LVEF was 28% so echocardiogram was recommended to confirm decreased EF.  The patient had previously had an ICD placed but no recent interrogation.  He described rare palpitations and rare episodes of fast heart rate.  It was noted that he was previously on lisinopril but had now been only on metoprolol with recommendations to start losartan.  However, this was not initiated for unknown reasons.  He had no complaints of heart failure or angina.  Smoking cessation was recommended.        Patient was last seen by me on 12/4/2017. Echocardiogram 11/29/2017 showed LVEF decreased to 20-25% with mildly dilated LV, severe global hypokinesia, inferior and inferolateral akinesia, it was suggestive of restrictive physiology, moderate MR, mild TR, moderate pulmonary hypertension, normal IVC, RV normal size and function, and compared to 2015 LVEF was significantly decreased.  Device check 11/21/2017 showed 28% atrial pacing, 1% ventricular pacing, underlying rhythm sinus rhythm, he was noted to be at RRT on his battery life, he had no  atrial or ventricular arrhythmias, ATP, or shocks.  His weight was up 24 pounds in the last 4 months and 13 pounds in the last 1 month.  He presented with elevated JVP to jawline and bilateral lower extremity edema 1+ pitting.  N-terminal proBNP was 19,800 and he was started on Lasix 20 mg daily.  He was also started on losartan 25 mg daily for cardiomyopathy.  His case was discussed with Dr. Michaels who agreed with undergoing coronary angiogram given his further decreased LVEF.  BMP 12/11/2017 after starting losartan and Lasix shows normal potassium and creatinine.  Repeat BNP showed 17,700 and he was continued on Lasix.     Pt presents today for angiogram follow-up.  BMP 12/22/2017 shows normal creatinine and potassium, after continuing on Lasix.  Coronary angiogram 12/22/2017 showed patent LIMA to LAD and SVG to circumflex grafts, prior stenting to circumflex is patent with no restenosis, Proximal occlusion of the SVG to RCA graft with collaterals to the distal RCA and no lesion amenable to PCI.  Per Dr. Cao, although there is a new occlusion, the degree of LV dysfunction is disproportionate to the degree of CAD and he suspects nonischemic cardiomyopathy may also be contributing, given his diabetes.  Recommendation for possible BiV upgrade may be helpful.    Since he was last seen, he has not been taking losartan as he thought he was not supposed to start this yet.  He has been taking the daily Lasix.  He will check when he gets home to confirm that he is not taking the losartan and then call us and let us know and we will set up a lab appointment.  His weight today in the clinic is down 11 pounds and he reports improved lower extremity edema.  His dry weight at home is approximately 168 pounds currently.  His JVP is mildly elevated.  He has been [facility]  He has restarted metformin, although he was told to hold this.  We will recheck lab work today to  assess renal function.  His left radial angiogram site is without ecchymosis, bleeding, hematoma, or bruit.  Patient reports no chest pain, SOB, PND, orthopnea, presyncope, syncope, heart racing, or palpitations.      Current Cardiac Medications   Aspirin 1000 mg daily  Tylenol 1000 mg daily  Nitroglycerin p.r.n.  Metoprolol XL 12.5 mg daily  Atorvastatin 80 mg daily   Losartan 25 mg daily - Pt not taking   Lasix 20 mg daily                   Assessment and Plan:     Plan  1.  Start losartan 25 mg daily for cardiomyopathy, as previously prescribed  2.  Continue lasix  3.  BMP today and in one week after starting losartan  4.  Check daily weights and call the clinic if your weight has increased more than 2 lbs in one day or 5 lbs in one week.  5.  2000 mg sodium diet  6.  Follow-up with MARISOL in two weeks to up titrate cardiomyopathy medications  7.  Follow-up with vascular surgery 1/9/2018  8.  Device check 2/27/2018      1. CAD    status post CABG (LIMA to LAD, SVG to OM, and SVG to PDA)    PARDO with walking long distances and going up stairs, not as significant as when he needed bypass- stable    Further decrease in EF since 2015    Continue statin, aspirin, ARB, beta blocker      2. Ischemic cardiomyopathy    Status post ICD    LVEF 20-25%, previously 35-40%    Heart failure symptoms of PARDO, JVP, and lower extremity edema all improving, denies orthopnea or PND     Continue beta blocker, ARB, lasix     Up titrate cardiomyopathy medications and consider addition of spironolactone    If patient develops left bundle branch block, may consider bi-V ICD upgrade in the future      3. Paroxysmal atrial fibrillation/flutter    status post flutter ablation    History of a couple episodes of 30 seconds of atrial fibrillation    No symptoms of atrial fibrillation and no atrial fibrillation seen on device check    Continue rate control with metoprolol and no current anticoagulation      4. PVD    status post right and left  carotid endarterectomy    following up with vascular surgery        Thank you for allowing me to participate in this delightful patient's care.      This note was completed in part using Dragon voice recognition software. Although reviewed after completion, some word and grammatical errors may occur.    Yareli Kim, ANA, CNP           Data:   All laboratory data reviewed

## 2017-12-27 ENCOUNTER — OFFICE VISIT (OUTPATIENT)
Dept: CARDIOLOGY | Facility: CLINIC | Age: 67
End: 2017-12-27
Attending: NURSE PRACTITIONER
Payer: COMMERCIAL

## 2017-12-27 VITALS
BODY MASS INDEX: 22.51 KG/M2 | SYSTOLIC BLOOD PRESSURE: 117 MMHG | OXYGEN SATURATION: 96 % | DIASTOLIC BLOOD PRESSURE: 69 MMHG | HEART RATE: 88 BPM | WEIGHT: 166 LBS

## 2017-12-27 DIAGNOSIS — I25.810 CORONARY ARTERY DISEASE INVOLVING CORONARY BYPASS GRAFT OF NATIVE HEART WITHOUT ANGINA PECTORIS: Primary | ICD-10-CM

## 2017-12-27 DIAGNOSIS — I25.5 ISCHEMIC CARDIOMYOPATHY: ICD-10-CM

## 2017-12-27 LAB
ANION GAP SERPL CALCULATED.3IONS-SCNC: 4 MMOL/L (ref 3–14)
BUN SERPL-MCNC: 20 MG/DL (ref 7–30)
CALCIUM SERPL-MCNC: 8.4 MG/DL (ref 8.5–10.1)
CHLORIDE SERPL-SCNC: 103 MMOL/L (ref 94–109)
CO2 SERPL-SCNC: 27 MMOL/L (ref 20–32)
CREAT SERPL-MCNC: 1.14 MG/DL (ref 0.66–1.25)
GFR SERPL CREATININE-BSD FRML MDRD: 64 ML/MIN/1.7M2
GLUCOSE SERPL-MCNC: 255 MG/DL (ref 70–99)
HBA1C MFR BLD: 9.1 % (ref 4.3–6)
POTASSIUM SERPL-SCNC: 4.5 MMOL/L (ref 3.4–5.3)
SODIUM SERPL-SCNC: 134 MMOL/L (ref 133–144)

## 2017-12-27 PROCEDURE — 83036 HEMOGLOBIN GLYCOSYLATED A1C: CPT | Performed by: NURSE PRACTITIONER

## 2017-12-27 PROCEDURE — 99214 OFFICE O/P EST MOD 30 MIN: CPT | Performed by: NURSE PRACTITIONER

## 2017-12-27 PROCEDURE — 80048 BASIC METABOLIC PNL TOTAL CA: CPT | Performed by: NURSE PRACTITIONER

## 2017-12-27 PROCEDURE — 36415 COLL VENOUS BLD VENIPUNCTURE: CPT | Performed by: NURSE PRACTITIONER

## 2017-12-27 NOTE — LETTER
12/27/2017    [facility]  [address] [place] [address]    RE: Delbert Tilley       Dear Colleague,    I had the pleasure of seeing Delbert Tilley in the [facility].    Cardiology Clinic Progress Note  Delbert Tilley MRN# [id number]   YOB: 1950 Age: 67 year old     Reason For Visit:  Angiogram follow-up    Primary Cardiologist:   Dr. Michaels          History of Presenting Illness:    Delbert Tilley is a pleasant 67 year old patient with a past cardiac history significant for a couple episodes of 30 seconds of paroxysmal atrial fibrillation not on anticoagulation, CAD status post CABG (LIMA to LAD, SVG to OM, and SVG to PDA), status post right and left carotid endarterectomy, status post flutter ablation, and ischemic combined with nonischemic cardiomyopathy with previous LVEF 35-40% now decreased to 20-25% and status post ICD.    Pt saw Dr. Michaels on 8/21/2017.  It was noted that the patient followed with vascular surgery regarding his carotid disease.  He had a stress test showing a large transmural inferior and inferoseptal defect without ischemia but LVEF was 28% so echocardiogram was recommended to confirm decreased EF.  The patient had previously had an ICD placed but no recent interrogation.  He described rare palpitations and rare episodes of fast heart rate.  It was noted that he was previously on lisinopril but had now been only on metoprolol with recommendations to start losartan.  However, this was not initiated for unknown reasons.  He had no complaints of heart failure or angina.  Smoking cessation was recommended.        Patient was last seen by me on 12/4/2017. Echocardiogram 11/29/2017 showed LVEF decreased to 20-25% with mildly dilated LV, severe global hypokinesia, inferior and inferolateral akinesia, it was suggestive of restrictive physiology, moderate MR, mild TR, moderate pulmonary hypertension, normal IVC, RV normal size and  function, and compared to 2015 LVEF was significantly decreased.  Device check 11/21/2017 showed 28% atrial pacing, 1% ventricular pacing, underlying rhythm sinus rhythm, he was noted to be at RRT on his battery life, he had no atrial or ventricular arrhythmias, ATP, or shocks.  His weight was up 24 pounds in the last 4 months and 13 pounds in the last 1 month.  He presented with elevated JVP to jawline and bilateral lower extremity edema 1+ pitting.  N-terminal proBNP was 19,800 and he was started on Lasix 20 mg daily.  He was also started on losartan 25 mg daily for cardiomyopathy.  His case was discussed with Dr. Michaels who agreed with undergoing coronary angiogram given his further decreased LVEF.  BMP 12/11/2017 after starting losartan and Lasix shows normal potassium and creatinine.  Repeat BNP showed 17,700 and he was continued on Lasix.     Pt presents today for angiogram follow-up.  BMP 12/22/2017 shows normal creatinine and potassium, after continuing on Lasix.  Coronary angiogram 12/22/2017 showed patent LIMA to LAD and SVG to circumflex grafts, prior stenting to circumflex is patent with no restenosis, Proximal occlusion of the SVG to RCA graft with collaterals to the distal RCA and no lesion amenable to PCI.  Per Dr. Cao, although there is a new occlusion, the degree of LV dysfunction is disproportionate to the degree of CAD and he suspects nonischemic cardiomyopathy may also be contributing, given his diabetes.  Recommendation for possible BiV upgrade may be helpful.    Since he was last seen, he has not been taking losartan as he thought he was not supposed to start this yet.  He has been taking the daily Lasix.  He will check when he gets home to confirm that he is not taking the losartan and then call us and let us know and we will set up a lab appointment.  His weight today in the clinic is down 11 pounds and he reports improved lower extremity edema.  His dry weight at home is approximately 168  pounds currently.  His JVP is mildly elevated.  He has been [facility]  He has restarted metformin, although he was told to hold this.  We will recheck lab work today to assess renal function.  His left radial angiogram site is without ecchymosis, bleeding, hematoma, or bruit.  Patient reports no chest pain, SOB, PND, orthopnea, presyncope, syncope, heart racing, or palpitations.      Current Cardiac Medications   Aspirin 1000 mg daily  Tylenol 1000 mg daily  Nitroglycerin p.r.n.  Metoprolol XL 12.5 mg daily  Atorvastatin 80 mg daily   Losartan 25 mg daily - Pt not taking   Lasix 20 mg daily                   Assessment and Plan:     Plan  1.  Start losartan 25 mg daily for cardiomyopathy, as previously prescribed  2.  Continue lasix  3.  BMP today and in one week after starting losartan  4.  Check daily weights and call the clinic if your weight has increased more than 2 lbs in one day or 5 lbs in one week.  5.  2000 mg sodium diet  6.  Follow-up with MARISOL in two weeks to up titrate cardiomyopathy medications  7.  Follow-up with vascular surgery 1/9/2018  8.  Device check 2/27/2018      1. CAD    status post CABG (LIMA to LAD, SVG to OM, and SVG to PDA)    PARDO with walking long distances and going up stairs, not as significant as when he needed bypass- stable    Further decrease in EF since 2015    Continue statin, aspirin, ARB, beta blocker      2. Ischemic cardiomyopathy    Status post ICD    LVEF 20-25%, previously 35-40%    Heart failure symptoms of PARDO, JVP, and lower extremity edema all improving, denies orthopnea or PND     Continue beta blocker, ARB, lasix     Up titrate cardiomyopathy medications and consider addition of spironolactone    If patient develops left bundle branch block, may consider bi-V ICD upgrade in the future      3. Paroxysmal atrial fibrillation/flutter    status post flutter ablation    History of a couple episodes of 30 seconds of  atrial fibrillation    No symptoms of atrial fibrillation and no atrial fibrillation seen on device check    Continue rate control with metoprolol and no current anticoagulation      4. PVD    status post right and left carotid endarterectomy    following up with vascular surgery        Thank you for allowing me to participate in this delightful patient's care.      This note was completed in part using Dragon voice recognition software. Although reviewed after completion, some word and grammatical errors may occur.    Yareli Kim, APRN, CNP           Data:   All laboratory data reviewed        HPI and Plan:   See dictation    Orders Placed This Encounter   Procedures     Basic metabolic panel     Follow-Up with Cardiac Advanced Practice Provider       No orders of the defined types were placed in this encounter.      There are no discontinued medications.      Encounter Diagnoses   Name Primary?     Ischemic cardiomyopathy      Coronary artery disease involving coronary bypass graft of native heart without angina pectoris Yes       CURRENT MEDICATIONS:  Current Outpatient Prescriptions   Medication Sig Dispense Refill     losartan (COZAAR) 25 MG tablet Take 1 tablet (25 mg) by mouth daily 30 tablet 11     furosemide (LASIX) 20 MG tablet Take 1 tablet (20 mg) by mouth daily 30 tablet 11     albuterol (PROAIR HFA/PROVENTIL HFA/VENTOLIN HFA) 108 (90 BASE) MCG/ACT Inhaler Inhale 2 puffs into the lungs every 6 hours as needed for shortness of breath / dyspnea or wheezing 1 Inhaler 0     aspirin 325 MG tablet Take 3 tablets (975 mg) by mouth every morning (Takes 2 x 500 mg = 1000 mg) 120 tablet 1     Acetaminophen (TYLENOL PO) Take 1,000 mg by mouth every morning (Takes 2 x 500 mg = 1000 mg)       insulin glargine (LANTUS) 100 UNIT/ML injection Inject 27 Units Subcutaneous every morning       Nitroglycerin (NITROSTAT SL) Place 0.4 mg under the tongue every 5 minutes as needed for chest pain        METFORMIN HCL PO Take 1,000 mg by mouth 2 times daily (Takes 2 x 500 mg = 1000 mg dose)       METOPROLOL SUCCINATE ER PO Take 12.5 mg by mouth every morning (Takes 0.5 x 25 mg tablet = 12.5 mg)       amylase-lipase-protease (CREON 12) 17236 UNITS CPEP Take 1 capsule by mouth daily       atorvastatin (LIPITOR) 80 MG tablet Take 80 mg by mouth At Bedtime        glipiZIDE (GLUCOTROL) 10 MG tablet Take 10 mg by mouth 2 times daily        pantoprazole (PROTONIX) 40 MG EC tablet Take 40 mg by mouth daily         ALLERGIES     Allergies   Allergen Reactions     Hydrocodone      Upset stomach     Shellfish Allergy Hives and Rash       PAST MEDICAL HISTORY:  Past Medical History:   Diagnosis Date     Acute renal failure (H) 8/26/06 Hosp    secondary to dehydration     Arthritis      CAD (coronary artery disease)     LIMA to the LAD, vein graft to OM and a vein graft to the PDA     Carpal tunnel syndrome      Diabetes (H)      Gastro-oesophageal reflux disease      H/O: alcohol abuse      HTN (hypertension)      Hyperlipidaemia      Hyperlipidaemia LDL goal <100 7/8/2013     Hypokalemia      Pacemaker      Pancreatitis 6/26/06 Hosp       PAST SURGICAL HISTORY:  Past Surgical History:   Procedure Laterality Date     BYPASS CARDIOPULMONARY       CATARACT IOL, RT/LT      Cataract IOL RT/LT     ENDARTERECTOMY CAROTID Right 6/12/2015    Procedure: ENDARTERECTOMY CAROTID;  Surgeon: Jooã Turner MD;  Location:  OR     ENDARTERECTOMY CAROTID Left 9/8/2017    Procedure: ENDARTERECTOMY CAROTID;  LEFT CAROTID ENDARTERECTOMY WITH EEG;  Surgeon: João Turner MD;  Location:  OR     IMPLANT PACEMAKER  6/10/2010     RELEASE CARPAL TUNNEL Right 4/12/2016    Procedure: RELEASE CARPAL TUNNEL;  Surgeon: Lissy Leger MD;  Location: [facility] OR     SURGICAL HISTORY OF -   1998    Laminectomy     SURGICAL HISTORY OF -   10/2003    Bypass grafting     TONSILLECTOMY & ADENOIDECTOMY         FAMILY HISTORY:  Family History    Problem Relation Age of Onset     Unknown/Adopted No family hx of        SOCIAL HISTORY:  Social History     Social History     Marital status: Single     Spouse name: N/A     Number of children: N/A     Years of education: N/A     Social History Main Topics     Smoking status: Current Every Day Smoker     Packs/day: 2.00     Types: Cigarettes     Smokeless tobacco: Never Used      Comment: wanting to quit     Alcohol use No     Drug use: No     Sexual activity: Not Currently     Partners: Female     Other Topics Concern     Parent/Sibling W/ Cabg, Mi Or Angioplasty Before 65f 55m? No     Social History Narrative       Review of Systems:  Skin:  Negative       Eyes:  Positive for glasses    ENT:  Negative      Respiratory:  Positive for cough;shortness of breath     Cardiovascular:    Positive for;lower extremity symptoms;edema;fatigue    Gastroenterology: Negative      Genitourinary:  Negative      Musculoskeletal:  Positive for joint pain    Neurologic:  Negative      Psychiatric:  Negative      Heme/Lymph/Imm:  Positive for allergies    Endocrine:  Positive for diabetes      Physical Exam:  Vitals: /69 (BP Location: Right arm, Patient Position: Sitting, Cuff Size: Adult Regular)  Pulse 88  Wt 75.3 kg (166 lb)  SpO2 96%  BMI 22.51 kg/m2    Constitutional:  cooperative, alert and oriented, well developed, well nourished, in no acute distress        Skin:  warm and dry to the touch          Head:  normocephalic        Eyes:  sclera white        Lymph:      ENT:  no pallor or cyanosis        Neck:    JVP 8-10      Respiratory:  normal breath sounds, clear to auscultation, normal A-P diameter, normal symmetry, normal respiratory excursion, no use of accessory muscles diminished breath sounds bilaterally       Cardiac: regular rhythm, normal S1/S2, no S3 or S4, apical impulse not displaced, no murmurs, gallops or rubs                                                         GI:  abdomen soft         Extremities and Muscular Skeletal:  no deformities, clubbing, cyanosis, erythema observed     RLE edema;1+        Neurological:  affect appropriate        Psych:  Alert and Oriented x 3      Thank you for allowing me to participate in the care of your patient.    Sincerely,     ANA [facility]ivens [facility]

## 2017-12-27 NOTE — PROGRESS NOTES
HPI and Plan:   See dictation    Orders Placed This Encounter   Procedures     Basic metabolic panel     Follow-Up with Cardiac Advanced Practice Provider       No orders of the defined types were placed in this encounter.      There are no discontinued medications.      Encounter Diagnoses   Name Primary?     Ischemic cardiomyopathy      Coronary artery disease involving coronary bypass graft of native heart without angina pectoris Yes       CURRENT MEDICATIONS:  Current Outpatient Prescriptions   Medication Sig Dispense Refill     losartan (COZAAR) 25 MG tablet Take 1 tablet (25 mg) by mouth daily 30 tablet 11     furosemide (LASIX) 20 MG tablet Take 1 tablet (20 mg) by mouth daily 30 tablet 11     albuterol (PROAIR HFA/PROVENTIL HFA/VENTOLIN HFA) 108 (90 BASE) MCG/ACT Inhaler Inhale 2 puffs into the lungs every 6 hours as needed for shortness of breath / dyspnea or wheezing 1 Inhaler 0     aspirin 325 MG tablet Take 3 tablets (975 mg) by mouth every morning (Takes 2 x 500 mg = 1000 mg) 120 tablet 1     Acetaminophen (TYLENOL PO) Take 1,000 mg by mouth every morning (Takes 2 x 500 mg = 1000 mg)       insulin glargine (LANTUS) 100 UNIT/ML injection Inject 27 Units Subcutaneous every morning       Nitroglycerin (NITROSTAT SL) Place 0.4 mg under the tongue every 5 minutes as needed for chest pain       METFORMIN HCL PO Take 1,000 mg by mouth 2 times daily (Takes 2 x 500 mg = 1000 mg dose)       METOPROLOL SUCCINATE ER PO Take 12.5 mg by mouth every morning (Takes 0.5 x 25 mg tablet = 12.5 mg)       amylase-lipase-protease (CREON 12) 90051 UNITS CPEP Take 1 capsule by mouth daily       atorvastatin (LIPITOR) 80 MG tablet Take 80 mg by mouth At Bedtime        glipiZIDE (GLUCOTROL) 10 MG tablet Take 10 mg by mouth 2 times daily        pantoprazole (PROTONIX) 40 MG EC tablet Take 40 mg by mouth daily         ALLERGIES     Allergies   Allergen Reactions     Hydrocodone      Upset stomach     Shellfish Allergy Hives and  Rash       PAST MEDICAL HISTORY:  Past Medical History:   Diagnosis Date     Acute renal failure (H) 8/26/06 Hosp    secondary to dehydration     Arthritis      CAD (coronary artery disease)     LIMA to the LAD, vein graft to OM and a vein graft to the PDA     Carpal tunnel syndrome      Diabetes (H)      Gastro-oesophageal reflux disease      H/O: alcohol abuse      HTN (hypertension)      Hyperlipidaemia      Hyperlipidaemia LDL goal <100 7/8/2013     Hypokalemia      Pacemaker      Pancreatitis 6/26/06 Hosp       PAST SURGICAL HISTORY:  Past Surgical History:   Procedure Laterality Date     BYPASS CARDIOPULMONARY       CATARACT IOL, RT/LT      Cataract IOL RT/LT     ENDARTERECTOMY CAROTID Right 6/12/2015    Procedure: ENDARTERECTOMY CAROTID;  Surgeon: João Turner MD;  Location: [facility] OR     ENDARTERECTOMY CAROTID Left 9/8/2017    Procedure: ENDARTERECTOMY CAROTID;  LEFT CAROTID ENDARTERECTOMY WITH EEG;  Surgeon: João Turner MD;  Location: [facility] OR     IMPLANT PACEMAKER  6/10/2010     RELEASE CARPAL TUNNEL Right 4/12/2016    Procedure: RELEASE CARPAL TUNNEL;  Surgeon: Lissy Leger MD;  Location: [facility] OR     SURGICAL HISTORY OF -   1998    Laminectomy     SURGICAL HISTORY OF -   10/2003    Bypass grafting     TONSILLECTOMY & ADENOIDECTOMY         FAMILY HISTORY:  Family History   Problem Relation Age of Onset     Unknown/Adopted No family hx of        SOCIAL HISTORY:  Social History     Social History     Marital status: Single     Spouse name: N/A     Number of children: N/A     Years of education: N/A     Social History Main Topics     Smoking status: Current Every Day Smoker     Packs/day: 2.00     Types: Cigarettes     Smokeless tobacco: Never Used      Comment: wanting to quit     Alcohol use No     Drug use: No     Sexual activity: Not Currently     Partners: Female     Other Topics Concern     Parent/Sibling W/ Cabg, Mi Or Angioplasty Before 65f 55m? No     Social History Narrative        Review of Systems:  Skin:  Negative       Eyes:  Positive for glasses    ENT:  Negative      Respiratory:  Positive for cough;shortness of breath     Cardiovascular:    Positive for;lower extremity symptoms;edema;fatigue    Gastroenterology: Negative      Genitourinary:  Negative      Musculoskeletal:  Positive for joint pain    Neurologic:  Negative      Psychiatric:  Negative      Heme/Lymph/Imm:  Positive for allergies    Endocrine:  Positive for diabetes      Physical Exam:  Vitals: /69 (BP Location: Right arm, Patient Position: Sitting, Cuff Size: Adult Regular)  Pulse 88  Wt 75.3 kg (166 lb)  SpO2 96%  BMI 22.51 kg/m2    Constitutional:  cooperative, alert and oriented, well developed, well nourished, in no acute distress        Skin:  warm and dry to the touch          Head:  normocephalic        Eyes:  sclera white        Lymph:      ENT:  no pallor or cyanosis        Neck:    JVP 8-10      Respiratory:  normal breath sounds, clear to auscultation, normal A-P diameter, normal symmetry, normal respiratory excursion, no use of accessory muscles diminished breath sounds bilaterally       Cardiac: regular rhythm, normal S1/S2, no S3 or S4, apical impulse not displaced, no murmurs, gallops or rubs                                                         GI:  abdomen soft        Extremities and Muscular Skeletal:  no deformities, clubbing, cyanosis, erythema observed     RLE edema;1+        Neurological:  affect appropriate        Psych:  Alert and Oriented x 3        CC  Yareli Mina, ANA CNP  [address]  [place] [id number]

## 2017-12-27 NOTE — MR AVS SNAPSHOT
After Visit Summary   12/27/2017    Delbert Tilley    MRN: [id number]           Patient Information     Date Of Birth          1950        Visit Information        Provider Department      12/27/2017 9:00 AM Yareli Mina APRN CNP [facility]        Today's Diagnoses     Ischemic cardiomyopathy          Care Instructions    Thank you for your [facility] visit today. Your provider has recommended the following:  Medication Changes:  No changes   Recommendations:  1. Call my nurse and let us know if you are taking losartan. If not, you should start this medication and then we need to recheck lab work in 1 week.   2. Check daily weights and call the clinic if your weight has increased more than 2 lbs in one day or 5 lbs in one week.  Follow-up:   Lab work today- we will call with results to let you know if kidneys are ok for you to keep taking metformin  See NP or PA; for cardiology follow up in 2 weeks.  Call [phone] two months prior to request date to schedule any future appointments.  Reminder:  1. Please bring in all current medications, over the counter supplements and vitamin bottles to your next appointment.               [facility]~[address]~[place], [place]~[address]  Questions about your visit today?   Call your Cardiology Clinic RN's-Basilia Tai and/or Lissett Sandoval at [phone].            Follow-ups after your visit        Your next 10 appointments already scheduled     Jan 09, 2018  8:00 AM CST   US CAROTID LEFT with [id number]   [facility] ([facility])    [address] [place] [id number] [phone]           Please bring a list of your medicines (including vitamins, minerals and over-the-counter drugs). Also, tell your doctor about any allergies you may have. Wear comfortable clothes and leave your valuables at  "home.  You do not need to do anything special to prepare for your exam.  Please call the Imaging Department at your exam site with any questions.            Jan 09, 2018  9:00 AM CST   New Visit with Charly Beard MD   [facility] ([facility])    [address]  [facility] [address]   [phone]            Feb 27, 2018  8:00 AM CST   ICD Check with Wy Device Rn   [facility] ([facility])    [address] [address]   [phone]              Who to contact     If you have questions or need follow up information about today's clinic visit or your schedule please contact [facility] directly at [phone].  Normal or non-critical lab and imaging results will be communicated to you by [employer], letter or phone within 4 business days after the clinic has received the results. If you do not hear from us within 7 days, please contact the clinic through MyChart or phone. If you have a critical or abnormal lab result, we will notify you by phone as soon as possible.  Submit refill requests through [employer] or call your pharmacy and they will forward the refill request to us. Please allow 3 business days for your refill to be completed.          Additional Information About Your Visit        MyChart Information     [employer] lets you send messages to your doctor, view your test results, renew your prescriptions, schedule appointments and more. To sign up, go to [email or web address] . Click on \"Log in\" on the left side of the screen, which will take you to the Welcome page. Then click on \"Sign up Now\" on the right side of the page.     You will be asked to enter the access code listed below, as well as some personal information. Please follow the directions to create your username and password.     Your access code is: [email or web address]  Expires: 2/7/2018  2:50 PM     Your access code will "  in 90 days. If you need help or a new code, please call your [facility] clinic or [phone].        Care EveryWhere ID     This is your Care EveryWhere ID. This could be used by other organizations to access your [facility] medical records  [id number]        Your Vitals Were     Pulse Pulse Oximetry BMI (Body Mass Index)             88 96% 22.51 kg/m2          Blood Pressure from Last 3 Encounters:   17 117/69   17 93/63   17 120/71    Weight from Last 3 Encounters:   17 75.3 kg (166 lb)   17 76.6 kg (168 lb 12.8 oz)   17 80.3 kg (177 lb)              We Performed the Following     Follow-Up with Cardiac Advanced Practice Provider        Primary Care Provider Fax #    [facility] [phone] [address] [address]        Equal Access to Services     MARITA LAMA : Hadii brett kellero Socb, waaxda luqadaha, qaybta kaalmada adesarbjityada, gwendolyn cosby . So [facility] [phone].    ATENCIÓN: Si habla español, tiene a frey disposición servicios gratuitos de asistencia lingüística. Ifeanyi al [phone].    We comply with applicable federal civil rights laws and [place] laws. We do not discriminate on the basis of race, color, national origin, age, disability, sex, sexual orientation, or gender identity.            Thank you!     Thank you for choosing [facility]  for your care. Our goal is always to provide you with excellent care. Hearing back from our patients is one way we can continue to improve our services. Please take a few minutes to complete the written survey that you may receive in the mail after your visit with us. Thank you!             Your Updated Medication List - Protect others around you: Learn how to safely use, store and throw away your medicines at www.disposemymeds.org.          This list is accurate as of: 17  9:23 AM.  Always use your most  recent med list.                   Brand Name Dispense Instructions for use Diagnosis    albuterol 108 (90 BASE) MCG/ACT Inhaler    PROAIR HFA/PROVENTIL HFA/VENTOLIN HFA    1 Inhaler    Inhale 2 puffs into the lungs every 6 hours as needed for shortness of breath / dyspnea or wheezing        amylase-lipase-protease 62454 UNITS Cpep    CREON 12     Take 1 capsule by mouth daily        aspirin 325 MG tablet     120 tablet    Take 3 tablets (975 mg) by mouth every morning (Takes 2 x 500 mg = 1000 mg)    Left carotid stenosis       atorvastatin 80 MG tablet    LIPITOR     Take 80 mg by mouth At Bedtime        furosemide 20 MG tablet    LASIX    30 tablet    Take 1 tablet (20 mg) by mouth daily    CHF (congestive heart failure) (H)       glipiZIDE 10 MG tablet    GLUCOTROL     Take 10 mg by mouth 2 times daily        insulin glargine 100 UNIT/ML injection    LANTUS     Inject 27 Units Subcutaneous every morning        losartan 25 MG tablet    COZAAR    30 tablet    Take 1 tablet (25 mg) by mouth daily    Ischemic cardiomyopathy       METFORMIN HCL PO      Take 1,000 mg by mouth 2 times daily (Takes 2 x 500 mg = 1000 mg dose)        METOPROLOL SUCCINATE ER PO      Take 12.5 mg by mouth every morning (Takes 0.5 x 25 mg tablet = 12.5 mg)        NITROSTAT SL      Place 0.4 mg under the tongue every 5 minutes as needed for chest pain        pantoprazole 40 MG EC tablet    PROTONIX     Take 40 mg by mouth daily        TYLENOL PO      Take 1,000 mg by mouth every morning (Takes 2 x 500 mg = 1000 mg)

## 2017-12-27 NOTE — PATIENT INSTRUCTIONS
Thank you for your [facility] visit today. Your provider has recommended the following:  Medication Changes:  No changes   Recommendations:  1. Call my nurse and let us know if you are taking losartan. If not, you should start this medication and then we need to recheck lab work in 1 week.   2. Check daily weights and call the clinic if your weight has increased more than 2 lbs in one day or 5 lbs in one week.  Follow-up:   Lab work today- we will call with results to let you know if kidneys are ok for you to keep taking metformin  See NP or PA; for cardiology follow up in 2 weeks.  Call [phone] two months prior to request date to schedule any future appointments.  Reminder:  1. Please bring in all current medications, over the counter supplements and vitamin bottles to your next appointment.               [facility]  [facility]~[address]~[place], [place]~[address]  Questions about your visit today?   Call your Cardiology Clinic RN's-Basilia Tai and/or Lissett Sandoval at [phone].

## 2017-12-28 NOTE — ADDENDUM NOTE
Encounter addended by: Pedro Penny on: 12/27/2017  6:19 PM<BR>     Actions taken: Delete clinical note

## 2017-12-30 LAB — INTERPRETATION ECG - MUSE: NORMAL

## 2018-01-03 ENCOUNTER — TELEPHONE (OUTPATIENT)
Dept: CARDIOLOGY | Facility: CLINIC | Age: 68
End: 2018-01-03

## 2018-01-03 DIAGNOSIS — I25.5 ISCHEMIC CARDIOMYOPATHY: ICD-10-CM

## 2018-01-03 PROCEDURE — 36415 COLL VENOUS BLD VENIPUNCTURE: CPT | Performed by: NURSE PRACTITIONER

## 2018-01-03 PROCEDURE — 80048 BASIC METABOLIC PNL TOTAL CA: CPT | Performed by: NURSE PRACTITIONER

## 2018-01-03 NOTE — TELEPHONE ENCOUNTER
Patient had lab appointment Today, started new med and was told to come for lab draw to check kidneys. We have blood in the lab for testing Today  Need orders ASAP.  Thank you!

## 2018-01-04 LAB
ANION GAP SERPL CALCULATED.3IONS-SCNC: 6 MMOL/L (ref 3–14)
BUN SERPL-MCNC: 19 MG/DL (ref 7–30)
CALCIUM SERPL-MCNC: 8.4 MG/DL (ref 8.5–10.1)
CHLORIDE SERPL-SCNC: 104 MMOL/L (ref 94–109)
CO2 SERPL-SCNC: 28 MMOL/L (ref 20–32)
CREAT SERPL-MCNC: 0.94 MG/DL (ref 0.66–1.25)
GFR SERPL CREATININE-BSD FRML MDRD: 80 ML/MIN/1.7M2
GLUCOSE SERPL-MCNC: 203 MG/DL (ref 70–99)
POTASSIUM SERPL-SCNC: 4.9 MMOL/L (ref 3.4–5.3)
SODIUM SERPL-SCNC: 138 MMOL/L (ref 133–144)

## 2018-01-09 ENCOUNTER — HOSPITAL ENCOUNTER (OUTPATIENT)
Dept: ULTRASOUND IMAGING | Facility: CLINIC | Age: 68
Discharge: HOME | End: 2018-01-09
Attending: SURGERY | Admitting: SURGERY
Payer: MEDICARE

## 2018-01-09 DIAGNOSIS — I65.29 CAROTID STENOSIS: ICD-10-CM

## 2018-01-09 PROCEDURE — 93882 EXTRACRANIAL UNI/LTD STUDY: CPT | Mod: LT

## 2018-01-17 ENCOUNTER — OFFICE VISIT (OUTPATIENT)
Dept: CARDIOLOGY | Facility: CLINIC | Age: 68
End: 2018-01-17
Attending: NURSE PRACTITIONER
Payer: COMMERCIAL

## 2018-01-17 VITALS
BODY MASS INDEX: 24.14 KG/M2 | SYSTOLIC BLOOD PRESSURE: 109 MMHG | HEART RATE: 88 BPM | WEIGHT: 178 LBS | OXYGEN SATURATION: 98 % | DIASTOLIC BLOOD PRESSURE: 71 MMHG

## 2018-01-17 DIAGNOSIS — I50.23 ACUTE ON CHRONIC SYSTOLIC CONGESTIVE HEART FAILURE (H): ICD-10-CM

## 2018-01-17 DIAGNOSIS — I25.5 ISCHEMIC CARDIOMYOPATHY: ICD-10-CM

## 2018-01-17 LAB
ANION GAP SERPL CALCULATED.3IONS-SCNC: 3 MMOL/L (ref 3–14)
BUN SERPL-MCNC: 20 MG/DL (ref 7–30)
CALCIUM SERPL-MCNC: 8.4 MG/DL (ref 8.5–10.1)
CHLORIDE SERPL-SCNC: 107 MMOL/L (ref 94–109)
CO2 SERPL-SCNC: 27 MMOL/L (ref 20–32)
CREAT SERPL-MCNC: 0.98 MG/DL (ref 0.66–1.25)
GFR SERPL CREATININE-BSD FRML MDRD: 75 ML/MIN/1.7M2
GLUCOSE SERPL-MCNC: 141 MG/DL (ref 70–99)
NT-PROBNP SERPL-MCNC: ABNORMAL PG/ML (ref 0–125)
POTASSIUM SERPL-SCNC: 4.3 MMOL/L (ref 3.4–5.3)
SODIUM SERPL-SCNC: 137 MMOL/L (ref 133–144)

## 2018-01-17 PROCEDURE — 83880 ASSAY OF NATRIURETIC PEPTIDE: CPT | Performed by: NURSE PRACTITIONER

## 2018-01-17 PROCEDURE — 99214 OFFICE O/P EST MOD 30 MIN: CPT | Performed by: NURSE PRACTITIONER

## 2018-01-17 PROCEDURE — 36415 COLL VENOUS BLD VENIPUNCTURE: CPT | Performed by: NURSE PRACTITIONER

## 2018-01-17 PROCEDURE — 80048 BASIC METABOLIC PNL TOTAL CA: CPT | Performed by: NURSE PRACTITIONER

## 2018-01-17 RX ORDER — FUROSEMIDE 40 MG
40 TABLET ORAL DAILY
Qty: 30 TABLET | Refills: 11 | Status: SHIPPED | OUTPATIENT
Start: 2018-01-17 | End: 2018-02-16

## 2018-01-17 NOTE — LETTER
1/17/2018    [facility]  [address] [address]    RE: Delbert SORIA Jarek       Dear Colleague,    I had the pleasure of seeing Delbert Tilley in the [facility].    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    2. Coronary artery disease    status post CABG (LIMA to LAD, SVG to OM, and SVG to PDA)    Recent coronary angiography revealed patent LIMA to the LAD and SVG to circumflex grafts.  There is noted to be a proximal occlusion of the SVG to the RCA with collaterals from the distal RCA and no lesion amenable to PCI.  There is also patent start circumflex stent.    Continue statin, aspirin, ARB, beta blocker    3. Paroxysmal atrial fibrillation/flutter    Status post atrial flutter ablation    History of 30 seconds of atrial fibrillation    No symptoms of atrial fibrillation and not seen on his recent device check    Not on anticoagulation other than aspirin and continues on metoprolol.    4. Carotid artery disease    Status post bilateral carotid endarterectomies    Follows with Vascular sugery    5. Tobacco abuse    Sensation highly recommended         History of Presenting Illness:    Delbert Tilley is a very pleasant 67 year old patient of Dr. Michaels who presents today for a  follow up. He has a past medical history of a couple episodes of 30 seconds of paroxysmal atrial fibrillation not on anticoagulation, CAD status post CABG (LIMA to LAD, SVG to OM, and SVG to PDA), status post right and left carotid endarterectomy, status post flutter ablation, and ischemic combined with nonischemic cardiomyopathy with previous LVEF 35-40% now decreased to 20-25% and status post ICD.    He had a stress test that noted a  transmural inferior and inferoseptal defect without ischemia but LVEF was 28%.  An echocardiogram was order for further assessment of his LVEF that confirmed the LVEF had decreased to 20-25% with severe global hypokinesia, inferior inferolateral akinesia that was suggestive of restrictive physiology with moderate MR, mild TR and moderate pulmonary hypertension.  His previous LVEF in 2015 was 35-40%.     In the month of November it was noted that his weight had gone up 24 pounds in the last 4 months with a weight gain of 13 pounds in 1 month with elevated JVP and increased lower extremity edema. His BNP was elevated at 19,800 and was started on 20 mg of Lasix daily as well as losartan 25 mg daily.  A repeat BNP showed had decreased to 17,700 on Lasix.. Dr. Michaels recommended undergoing a coronary angiogram to further assess his decreased LVEF.    The coronary angiogram on 12/22/2017 showed a patent LIMA to the LAD and SVG to circumflex axis.  Prior stenting to the circumflex was patent with no restenosis.  There was a proximal occlusion of the SVG to the RCA graft with collaterals to the distal RCA with no lesion amenable to PCI.  The angiogram was performed by Dr. Cao, and he noted that although there was a new occlusion the degree of LV function was disproportionate to the degree of coronary artery disease.  He was suspicious that the patient had a mixed nonischemic and ischemic cardiomyopathy and that possible by the upgrade may be helpful.    Today in clinic the patient states  he has been taking his losartan.  He has noticed a progressive weight gain in the last 2 weeks and per our scale is up 12 pounds.  He does have increased bilateral lower extremity edema 1-2+ of his knees.  He denies any dyspnea on exertion, PND orthopnea.          This note was completed in part using Dragon voice recognition software. Although reviewed after completion, some word and grammatical errors may occur       Review of Systems:   Review of Systems:  Skin:  Negative bruising   Eyes:  Positive for glasses  ENT:  Negative nasal congestion;sinus trouble  Respiratory:  Positive for cough;shortness of breath  Cardiovascular:    Positive for;lower extremity symptoms;edema;fatigue  Gastroenterology: Negative    Genitourinary:  Negative    Musculoskeletal:  Positive for joint pain  Neurologic:  Negative numbness or tingling of hands  Psychiatric:  Negative    Heme/Lymph/Imm:  Positive for allergies  Endocrine:  Positive for diabetes              Physical Exam:     Vitals: /71  Pulse 88  Wt 80.7 kg (178 lb)  SpO2 98%  BMI 24.14 kg/m2  Constitutional:  cooperative, alert and oriented, well developed, well nourished, in no acute distress        Skin:  warm and dry to the touch        Head:  normocephalic        Eyes:  sclera white        ENT:  no pallor or cyanosis        Neck:  not assessed this visit   healing left carotid scar    Chest:    diminished breath sounds bilaterally      Cardiac: regular rhythm, normal S1/S2, no S3 or S4, apical impulse not displaced, no murmurs, gallops or rubs                  Abdomen:  abdomen soft        Extremities and Back:        Bilateral lower extremity edema 1-2+ pitting up to knees    Neurological:  affect appropriate               Medications:     Current Outpatient Prescriptions   Medication Sig Dispense Refill     furosemide (LASIX) 40 MG tablet Take 1 tablet (40 mg) by mouth daily 30 tablet 11     losartan (COZAAR) 25 MG tablet Take 1 tablet (25 mg) by mouth daily  30 tablet 11     albuterol (PROAIR HFA/PROVENTIL HFA/VENTOLIN HFA) 108 (90 BASE) MCG/ACT Inhaler Inhale 2 puffs into the lungs every 6 hours as needed for shortness of breath / dyspnea or wheezing 1 Inhaler 0     aspirin 325 MG tablet Take 3 tablets (975 mg) by mouth every morning (Takes 2 x 500 mg = 1000 mg) 120 tablet 1     Acetaminophen (TYLENOL PO) Take 1,000 mg by mouth every morning (Takes 2 x 500 mg = 1000 mg)       insulin glargine (LANTUS) 100 UNIT/ML injection Inject 27 Units Subcutaneous every morning       Nitroglycerin (NITROSTAT SL) Place 0.4 mg under the tongue every 5 minutes as needed for chest pain       METFORMIN HCL PO Take 1,000 mg by mouth 2 times daily (Takes 2 x 500 mg = 1000 mg dose)       METOPROLOL SUCCINATE ER PO Take 12.5 mg by mouth every morning (Takes 0.5 x 25 mg tablet = 12.5 mg)       amylase-lipase-protease (CREON 12) 92034 UNITS CPEP Take 1 capsule by mouth daily       atorvastatin (LIPITOR) 80 MG tablet Take 80 mg by mouth At Bedtime        glipiZIDE (GLUCOTROL) 10 MG tablet Take 10 mg by mouth 2 times daily        pantoprazole (PROTONIX) 40 MG EC tablet Take 40 mg by mouth daily       [DISCONTINUED] furosemide (LASIX) 20 MG tablet Take 1 tablet (20 mg) by mouth daily 30 tablet 11           Family History   Problem Relation Age of Onset     Unknown/Adopted No family hx of        Social History     Social History     Marital status: Single     Spouse name: N/A     Number of children: N/A     Years of education: N/A     Occupational History     Not on file.     Social History Main Topics     Smoking status: Current Every Day Smoker     Packs/day: 2.00     Types: Cigarettes     Smokeless tobacco: Never Used      Comment: wanting to quit     Alcohol use No     Drug use: No     Sexual activity: Not Currently     Partners: Female     Other Topics Concern     Parent/Sibling W/ Cabg, Mi Or Angioplasty Before 65f 55m? No     Social History Narrative            Past Medical History:      Past Medical History:   Diagnosis Date     Acute renal failure (H) 8/26/06 Hosp    secondary to dehydration     Arthritis      CAD (coronary artery disease)     LIMA to the LAD, vein graft to OM and a vein graft to the PDA     Carpal tunnel syndrome      Diabetes (H)      Gastro-oesophageal reflux disease      H/O: alcohol abuse      HTN (hypertension)      Hyperlipidaemia      Hyperlipidaemia LDL goal <100 7/8/2013     Hypokalemia      Pacemaker      Pancreatitis 6/26/06 Hosp              Past Surgical History:     Past Surgical History:   Procedure Laterality Date     BYPASS CARDIOPULMONARY       CATARACT IOL, RT/LT      Cataract IOL RT/LT     ENDARTERECTOMY CAROTID Right 6/12/2015    Procedure: ENDARTERECTOMY CAROTID;  Surgeon: João Turner MD;  Location: [facility] OR     ENDARTERECTOMY CAROTID Left 9/8/2017    Procedure: ENDARTERECTOMY CAROTID;  LEFT CAROTID ENDARTERECTOMY WITH EEG;  Surgeon: João Turner MD;  Location:  OR     IMPLANT PACEMAKER  6/10/2010     RELEASE CARPAL TUNNEL Right 4/12/2016    Procedure: RELEASE CARPAL TUNNEL;  Surgeon: Lissy Leger MD;  Location: [facility]     SURGICAL HISTORY OF -   1998    Laminectomy     SURGICAL HISTORY OF -   10/2003    Bypass grafting     TONSILLECTOMY & ADENOIDECTOMY                Allergies:   Hydrocodone and Shellfish allergy       Data:   All laboratory data reviewed:    LAST CHOLESTEROL:  Lab Results   Component Value Date    CHOL 96 09/08/2017     Lab Results   Component Value Date    HDL 24 09/08/2017     Lab Results   Component Value Date    LDL 45 09/08/2017     Lab Results   Component Value Date    TRIG 136 09/08/2017     Lab Results   Component Value Date    CHOLHDLRATIO 4.6 06/12/2015       LAST BMP:  Lab Results   Component Value Date     01/03/2018      Lab Results   Component Value Date    POTASSIUM 4.9 01/03/2018     Lab Results   Component Value Date    CHLORIDE 104 01/03/2018     Lab Results   Component Value Date     MARVA 8.4 01/03/2018     Lab Results   Component Value Date    CO2 28 01/03/2018     Lab Results   Component Value Date    BUN 19 01/03/2018     Lab Results   Component Value Date    CR 0.94 01/03/2018     Lab Results   Component Value Date     01/03/2018       LAST CBC:  Lab Results   Component Value Date    WBC 6.8 12/22/2017     Lab Results   Component Value Date    RBC 4.86 12/22/2017     Lab Results   Component Value Date    HGB 14.2 12/22/2017     Lab Results   Component Value Date    HCT 43.9 12/22/2017     Lab Results   Component Value Date    MCV 90 12/22/2017     Lab Results   Component Value Date    MCH 29.2 12/22/2017     Lab Results   Component Value Date    MCHC 32.3 12/22/2017     Lab Results   Component Value Date    RDW 13.8 12/22/2017     Lab Results   Component Value Date     12/22/2017     Thank you for allowing me to participate in the care of your patient.    Sincerely,     ANA HAWK [facility]

## 2018-01-17 NOTE — MR AVS SNAPSHOT
After Visit Summary   1/17/2018    Delbert Tilley    MRN: [id number]           Patient Information     Date Of Birth          1950        Visit Information        Provider Department      1/17/2018 9:00 AM Hattie Fernandez APRN [facility]        Today's Diagnoses     Ischemic cardiomyopathy        Acute on chronic systolic congestive heart failure (H)          Care Instructions    Thank you for your  Heart Care visit today. Your provider has recommended the following:  Medication Changes:  Increase Lasix to 40 mg daily. Take 2 20 mg tablets.  Recommendations:  Lab work today.  Continue daily weights  Follow-up:  See Hattie Fernandez for cardiology follow up in 1 month.  We kindly ask that you call cardiology scheduling at [phone] three months prior to requested revisit date to schedule future cardiology appointments.  Reminder:  1. Please bring in your current medication list or your medication, over the counter supplements and vitamin bottles as we will review these at each office visit.               [facility]~[address]~[place], [place]~[address]  Questions about your visit today?  Call your Cardiology Clinic RN's-Basilia Tai and/or Lissett Sandoval at [phone].                Follow-ups after your visit        Additional Services     Follow-Up with Cardiac Advanced Practice Provider                 Your next 10 appointments already scheduled     Feb 27, 2018  8:00 AM CST   ICD Check with Wy Device Rn   [facility] ([facility])    [address] [address] [phone]              Future tests that were ordered for you today     Open Future Orders        Priority Expected Expires Ordered    Basic metabolic panel Routine 2/16/2018 1/17/2019 1/17/2018    Follow-Up with Cardiac Advanced Practice Provider Routine 2/16/2018 1/17/2019  "2018    N terminal pro BNP outpatient Routine 2018    Basic metabolic panel Routine 2018            Who to contact     If you have questions or need follow up information about today's clinic visit or your schedule please contact [facility] directly at [phone].  Normal or non-critical lab and imaging results will be communicated to you by [employer], letter or phone within 4 business days after the clinic has received the results. If you do not hear from us within 7 days, please contact the clinic through [employer] or phone. If you have a critical or abnormal lab result, we will notify you by phone as soon as possible.  Submit refill requests through [employer] or call your pharmacy and they will forward the refill request to us. Please allow 3 business days for your refill to be completed.          Additional Information About Your Visit        [employer] Information     [employer] lets you send messages to your doctor, view your test results, renew your prescriptions, schedule appointments and more. To sign up, go to [email or web address] . Click on \"Log in\" on the left side of the screen, which will take you to the Welcome page. Then click on \"Sign up Now\" on the right side of the page.     You will be asked to enter the access code listed below, as well as some personal information. Please follow the directions to create your username and password.     Your access code is: [email or web address]  Expires: 2018  2:50 PM     Your access code will  in 90 days. If you need help or a new code, please call your [facility] clinic or [phone].        Care EveryWhere ID     This is your Care EveryWhere ID. This could be used by other organizations to access your [facility] medical records  [id number]        Your Vitals Were     Pulse Pulse Oximetry BMI (Body Mass Index)             88 98% 24.14 kg/m2          Blood Pressure from " Last 3 Encounters:   01/17/18 109/71   12/27/17 117/69   12/22/17 93/63    Weight from Last 3 Encounters:   01/17/18 80.7 kg (178 lb)   12/27/17 75.3 kg (166 lb)   12/22/17 76.6 kg (168 lb 12.8 oz)              We Performed the Following     Follow-Up with Cardiac Advanced Practice Provider          Today's Medication Changes          These changes are accurate as of: 1/17/18  9:11 AM.  If you have any questions, ask your nurse or doctor.               These medicines have changed or have updated prescriptions.        Dose/Directions    furosemide 40 MG tablet   Commonly known as:  LASIX   This may have changed:    - medication strength  - how much to take   Used for:  Acute on chronic systolic congestive heart failure (H)   Changed by:  Hattie Fernandez APRN CNP        Dose:  40 mg   Take 1 tablet (40 mg) by mouth daily   Quantity:  30 tablet   Refills:  11            Where to get your medicines      Some of these will need a paper prescription and others can be bought over the counter.  Ask your nurse if you have questions.     Bring a paper prescription for each of these medications     furosemide 40 MG tablet                Primary Care Provider Fax #    [facility] [phone]       [facility] [id number]        Equal Access to Services     MARITA LAMA : Forest Mondragon, hemant buenrostro, qajosephta kaalmada minal, gwendolyn sykes. So [facility] [place] [facility] [phone].    ATENCIÓN: Si habla español, tiene a frey disposición servicios gratuitos de asistencia lingüística. Llame al [phone].    We comply with applicable federal civil rights laws and [place] laws. We do not discriminate on the basis of race, color, national origin, age, disability, sex, sexual orientation, or gender identity.            Thank you!     Thank you for choosing [facility]  for your care. Our goal is always to provide you with excellent  care. Hearing back from our patients is one way we can continue to improve our services. Please take a few minutes to complete the written survey that you may receive in the mail after your visit with us. Thank you!             Your Updated Medication List - Protect others around you: Learn how to safely use, store and throw away your medicines at www.disposemymeds.org.          This list is accurate as of: 1/17/18  9:11 AM.  Always use your most recent med list.                   Brand Name Dispense Instructions for use Diagnosis    albuterol 108 (90 BASE) MCG/ACT Inhaler    PROAIR HFA/PROVENTIL HFA/VENTOLIN HFA    1 Inhaler    Inhale 2 puffs into the lungs every 6 hours as needed for shortness of breath / dyspnea or wheezing        amylase-lipase-protease 72858 UNITS Cpep    CREON 12     Take 1 capsule by mouth daily        aspirin 325 MG tablet     120 tablet    Take 3 tablets (975 mg) by mouth every morning (Takes 2 x 500 mg = 1000 mg)    Left carotid stenosis       atorvastatin 80 MG tablet    LIPITOR     Take 80 mg by mouth At Bedtime        furosemide 40 MG tablet    LASIX    30 tablet    Take 1 tablet (40 mg) by mouth daily    Acute on chronic systolic congestive heart failure (H)       glipiZIDE 10 MG tablet    GLUCOTROL     Take 10 mg by mouth 2 times daily        insulin glargine 100 UNIT/ML injection    LANTUS     Inject 27 Units Subcutaneous every morning        losartan 25 MG tablet    COZAAR    30 tablet    Take 1 tablet (25 mg) by mouth daily    Ischemic cardiomyopathy       METFORMIN HCL PO      Take 1,000 mg by mouth 2 times daily (Takes 2 x 500 mg = 1000 mg dose)        METOPROLOL SUCCINATE ER PO      Take 12.5 mg by mouth every morning (Takes 0.5 x 25 mg tablet = 12.5 mg)        NITROSTAT SL      Place 0.4 mg under the tongue every 5 minutes as needed for chest pain        pantoprazole 40 MG EC tablet    PROTONIX     Take 40 mg by mouth daily        TYLENOL PO      Take 1,000 mg by mouth every  morning (Takes 2 x 500 mg = 1000 mg)

## 2018-01-17 NOTE — PATIENT INSTRUCTIONS
Thank you for your [facility] visit today. Your provider has recommended the following:  Medication Changes:  Increase Lasix to 40 mg daily. Take 2 20 mg tablets.  Recommendations:  Lab work today.  Continue daily weights  Follow-up:  See Hattie Fernandez for cardiology follow up in 1 month.  We kindly ask that you call cardiology scheduling at [phone] three months prior to requested revisit date to schedule future cardiology appointments.  Reminder:  1. Please bring in your current medication list or your medication, over the counter supplements and vitamin bottles as we will review these at each office visit.               [facility]  [facility]~[address]~[place], [place]~[address]  Questions about your visit today?  Call your Cardiology Clinic RN's-Basilia Tai and/or Lissett Sandoval at [phone].

## 2018-01-17 NOTE — PROGRESS NOTES
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    2. Coronary artery disease    status post CABG (LIMA to LAD, SVG to OM, and SVG to PDA)    Recent coronary angiography revealed patent LIMA to the LAD and SVG to circumflex grafts.  There is noted to be a proximal occlusion of the SVG to the RCA with collaterals from the distal RCA and no lesion amenable to PCI.  There is also patent start circumflex stent.    Continue statin, aspirin, ARB, beta blocker    3. Paroxysmal atrial fibrillation/flutter    Status post atrial flutter ablation    History of 30 seconds of atrial fibrillation    No symptoms of atrial fibrillation and not seen on his recent device check    Not on anticoagulation other than aspirin and continues on metoprolol.    4. Carotid artery disease    Status post bilateral carotid endarterectomies    Follows with Vascular sugery    5. Tobacco abuse    Sensation highly recommended         History of Presenting Illness:    Delbert Tilley is a very pleasant 67 year old patient of Dr. Michaels who presents today for a follow up. He has a past medical history of a couple episodes of 30 seconds of paroxysmal atrial fibrillation not on anticoagulation, CAD status post CABG (LIMA to LAD, SVG to OM, and SVG to PDA), status post right and left  carotid endarterectomy, status post flutter ablation, and ischemic combined with nonischemic cardiomyopathy with previous LVEF 35-40% now decreased to 20-25% and status post ICD.    He had a stress test that noted a  transmural inferior and inferoseptal defect without ischemia but LVEF was 28%.  An echocardiogram was order for further assessment of his LVEF that confirmed the LVEF had decreased to 20-25% with severe global hypokinesia, inferior inferolateral akinesia that was suggestive of restrictive physiology with moderate MR, mild TR and moderate pulmonary hypertension.  His previous LVEF in 2015 was 35-40%.     In the month of November it was noted that his weight had gone up 24 pounds in the last 4 months with a weight gain of 13 pounds in 1 month with elevated JVP and increased lower extremity edema. His BNP was elevated at 19,800 and was started on 20 mg of Lasix daily as well as losartan 25 mg daily.  A repeat BNP showed had decreased to 17,700 on Lasix.. Dr. Michaels recommended undergoing a coronary angiogram to further assess his decreased LVEF.    The coronary angiogram on 12/22/2017 showed a patent LIMA to the LAD and SVG to circumflex axis.  Prior stenting to the circumflex was patent with no restenosis.  There was a proximal occlusion of the SVG to the RCA graft with collaterals to the distal RCA with no lesion amenable to PCI.  The angiogram was performed by Dr. Cao, and he noted that although there was a new occlusion the degree of LV function was disproportionate to the degree of coronary artery disease.  He was suspicious that the patient had a mixed nonischemic and ischemic cardiomyopathy and that possible by the upgrade may be helpful.    Today in clinic the patient states he has been taking his losartan.  He has noticed a progressive weight gain in the last 2 weeks and per our scale is up 12 pounds.  He does have increased bilateral lower extremity edema 1-2+ of his knees.  He denies any  dyspnea on exertion, PND orthopnea.          This note was completed in part using Dragon voice recognition software. Although reviewed after completion, some word and grammatical errors may occur       Review of Systems:   Review of Systems:  Skin:  Negative bruising   Eyes:  Positive for glasses  ENT:  Negative nasal congestion;sinus trouble  Respiratory:  Positive for cough;shortness of breath  Cardiovascular:    Positive for;lower extremity symptoms;edema;fatigue  Gastroenterology: Negative    Genitourinary:  Negative    Musculoskeletal:  Positive for joint pain  Neurologic:  Negative numbness or tingling of hands  Psychiatric:  Negative    Heme/Lymph/Imm:  Positive for allergies  Endocrine:  Positive for diabetes              Physical Exam:     Vitals: /71  Pulse 88  Wt 80.7 kg (178 lb)  SpO2 98%  BMI 24.14 kg/m2  Constitutional:  cooperative, alert and oriented, well developed, well nourished, in no acute distress        Skin:  warm and dry to the touch        Head:  normocephalic        Eyes:  sclera white        ENT:  no pallor or cyanosis        Neck:  not assessed this visit   healing left carotid scar    Chest:    diminished breath sounds bilaterally      Cardiac: regular rhythm, normal S1/S2, no S3 or S4, apical impulse not displaced, no murmurs, gallops or rubs                  Abdomen:  abdomen soft        Extremities and Back:        Bilateral lower extremity edema 1-2+ pitting up to knees    Neurological:  affect appropriate               Medications:     Current Outpatient Prescriptions   Medication Sig Dispense Refill     furosemide (LASIX) 40 MG tablet Take 1 tablet (40 mg) by mouth daily 30 tablet 11     losartan (COZAAR) 25 MG tablet Take 1 tablet (25 mg) by mouth daily 30 tablet 11     albuterol (PROAIR HFA/PROVENTIL HFA/VENTOLIN HFA) 108 (90 BASE) MCG/ACT Inhaler Inhale 2 puffs into the lungs every 6 hours as needed for shortness of breath / dyspnea or wheezing 1 Inhaler 0      aspirin 325 MG tablet Take 3 tablets (975 mg) by mouth every morning (Takes 2 x 500 mg = 1000 mg) 120 tablet 1     Acetaminophen (TYLENOL PO) Take 1,000 mg by mouth every morning (Takes 2 x 500 mg = 1000 mg)       insulin glargine (LANTUS) 100 UNIT/ML injection Inject 27 Units Subcutaneous every morning       Nitroglycerin (NITROSTAT SL) Place 0.4 mg under the tongue every 5 minutes as needed for chest pain       METFORMIN HCL PO Take 1,000 mg by mouth 2 times daily (Takes 2 x 500 mg = 1000 mg dose)       METOPROLOL SUCCINATE ER PO Take 12.5 mg by mouth every morning (Takes 0.5 x 25 mg tablet = 12.5 mg)       amylase-lipase-protease (CREON 12) 85468 UNITS CPEP Take 1 capsule by mouth daily       atorvastatin (LIPITOR) 80 MG tablet Take 80 mg by mouth At Bedtime        glipiZIDE (GLUCOTROL) 10 MG tablet Take 10 mg by mouth 2 times daily        pantoprazole (PROTONIX) 40 MG EC tablet Take 40 mg by mouth daily       [DISCONTINUED] furosemide (LASIX) 20 MG tablet Take 1 tablet (20 mg) by mouth daily 30 tablet 11           Family History   Problem Relation Age of Onset     Unknown/Adopted No family hx of        Social History     Social History     Marital status: Single     Spouse name: N/A     Number of children: N/A     Years of education: N/A     Occupational History     Not on file.     Social History Main Topics     Smoking status: Current Every Day Smoker     Packs/day: 2.00     Types: Cigarettes     Smokeless tobacco: Never Used      Comment: wanting to quit     Alcohol use No     Drug use: No     Sexual activity: Not Currently     Partners: Female     Other Topics Concern     Parent/Sibling W/ Cabg, Mi Or Angioplasty Before 65f 55m? No     Social History Narrative            Past Medical History:     Past Medical History:   Diagnosis Date     Acute renal failure (H) 8/26/06 Hosp    secondary to dehydration     Arthritis      CAD (coronary artery disease)     LIMA to the LAD, vein graft to OM and a vein graft to  the PDA     Carpal tunnel syndrome      Diabetes (H)      Gastro-oesophageal reflux disease      H/O: alcohol abuse      HTN (hypertension)      Hyperlipidaemia      Hyperlipidaemia LDL goal <100 7/8/2013     Hypokalemia      Pacemaker      Pancreatitis 6/26/06 Hosp              Past Surgical History:     Past Surgical History:   Procedure Laterality Date     BYPASS CARDIOPULMONARY       CATARACT IOL, RT/LT      Cataract IOL RT/LT     ENDARTERECTOMY CAROTID Right 6/12/2015    Procedure: ENDARTERECTOMY CAROTID;  Surgeon: João Turner MD;  Location: [facility] OR     ENDARTERECTOMY CAROTID Left 9/8/2017    Procedure: ENDARTERECTOMY CAROTID;  LEFT CAROTID ENDARTERECTOMY WITH EEG;  Surgeon: João Turner MD;  Location: [facility] OR     IMPLANT PACEMAKER  6/10/2010     RELEASE CARPAL TUNNEL Right 4/12/2016    Procedure: RELEASE CARPAL TUNNEL;  Surgeon: Lissy Leger MD;  Location: [facility] OR     SURGICAL HISTORY OF -   1998    Laminectomy     SURGICAL HISTORY OF -   10/2003    Bypass grafting     TONSILLECTOMY & ADENOIDECTOMY                Allergies:   Hydrocodone and Shellfish allergy       Data:   All laboratory data reviewed:    LAST CHOLESTEROL:  Lab Results   Component Value Date    CHOL 96 09/08/2017     Lab Results   Component Value Date    HDL 24 09/08/2017     Lab Results   Component Value Date    LDL 45 09/08/2017     Lab Results   Component Value Date    TRIG 136 09/08/2017     Lab Results   Component Value Date    CHOLHDLRATIO 4.6 06/12/2015       LAST BMP:  Lab Results   Component Value Date     01/03/2018      Lab Results   Component Value Date    POTASSIUM 4.9 01/03/2018     Lab Results   Component Value Date    CHLORIDE 104 01/03/2018     Lab Results   Component Value Date    MARVA 8.4 01/03/2018     Lab Results   Component Value Date    CO2 28 01/03/2018     Lab Results   Component Value Date    BUN 19 01/03/2018     Lab Results   Component Value Date    CR 0.94 01/03/2018     Lab Results    Component Value Date     01/03/2018       LAST CBC:  Lab Results   Component Value Date    WBC 6.8 12/22/2017     Lab Results   Component Value Date    RBC 4.86 12/22/2017     Lab Results   Component Value Date    HGB 14.2 12/22/2017     Lab Results   Component Value Date    HCT 43.9 12/22/2017     Lab Results   Component Value Date    MCV 90 12/22/2017     Lab Results   Component Value Date    MCH 29.2 12/22/2017     Lab Results   Component Value Date    MCHC 32.3 12/22/2017     Lab Results   Component Value Date    RDW 13.8 12/22/2017     Lab Results   Component Value Date     12/22/2017         TITI JARA, APRN, CNP

## 2018-01-18 ENCOUNTER — TELEPHONE (OUTPATIENT)
Dept: CARDIOLOGY | Facility: CLINIC | Age: 68
End: 2018-01-18

## 2018-01-18 DIAGNOSIS — I25.5 ISCHEMIC CARDIOMYOPATHY: ICD-10-CM

## 2018-01-18 DIAGNOSIS — R60.0 EDEMA OF BOTH LEGS: ICD-10-CM

## 2018-01-18 DIAGNOSIS — I50.23 ACUTE ON CHRONIC SYSTOLIC CONGESTIVE HEART FAILURE (H): Primary | ICD-10-CM

## 2018-01-18 NOTE — TELEPHONE ENCOUNTER
Called patient and left a message informing him Hattie would like him to schedule labs before his 18 OV. Left scheduling's number and team 4.     ADDENDUM: This patient seen in [place], [place]. Call back to RN line received. I contacted patient back and transferred call to lab scheduling at [phone]. Adsvised patient to make appt for 2/15/18 so we have results for 18 appt. States understanding. Marisa Tai, RN Cardiology at [facility] 2018, 3:06 PM

## 2018-01-18 NOTE — TELEPHONE ENCOUNTER
Received result of patients BNP from 1/17/18   Component Value Flag Ref Range Units Status Collected Lab   N-Terminal Pro Bnp 77886 (H) 0 - 125 pg/mL Final 01/17/2018  9:20 AM 59     Pt saw Hattie NP 1/17/18 and pts Lasix was increased to 40 mg daily. Hattie requested that patient get BNP with his BMP prior to his OV 2/16/18.  BNP ordered.     Tried to call pt to get him scheduled for BMP and BNP prior to OV. No answer. Left VM to call team 4 back with direct number.

## 2018-01-25 ENCOUNTER — TELEPHONE (OUTPATIENT)
Dept: CARDIOLOGY | Facility: CLINIC | Age: 68
End: 2018-01-25

## 2018-01-25 DIAGNOSIS — R60.9 EDEMA: Primary | ICD-10-CM

## 2018-01-25 DIAGNOSIS — I25.5 ISCHEMIC CARDIOMYOPATHY: ICD-10-CM

## 2018-01-25 NOTE — TELEPHONE ENCOUNTER
Pt calls in to state that he saw Hattie Fernandez NP on 1/17/18 and she increased his Lasix to 40 mg QD. He takes it at 4pm every day and notes increased urination for 2 hours then back to normal. His weight on 1/18/18 was 178# and his weight this morning was the same. His edema and SOB have not improved at all. Follow up not until 2/16/18. Pt feels he needs something done before then. Will discuss with Hattie for her recommendations. Lissett Sandoval RN Cardiology January 25, 2018, 10:04 AM

## 2018-01-28 NOTE — TELEPHONE ENCOUNTER
Lissett-    Please have him take 2.5 mg of metolazone 30 minutes prior to Lasix Monday and Wednesday. Please check BMP on Thursday. Continue daily weights. Thanks Hattie    ADDENDUM: Discussed above with Delbert per phone on 1/29/18 at 1444. We will change dosing to Tuesday and Thursday with repeat BMP on Friday, 2/2/18. Marisa Tai RN Cardiology at [facility] January 29, 2018, 2:45 PM    ADDENDUM: Pt called in to state that his weight 1/29/18 was 178#. 1/30/18 am 171#. States breathing much better and LE edema much better as well. Will continue with Thursday dose metolazone and BMP Friday 2/2/18. Lissett Sandoval RN Cardiology January 30, 2018, 10:17 AM

## 2018-01-29 RX ORDER — METOLAZONE 2.5 MG/1
TABLET ORAL
Qty: 4 TABLET | Refills: 0 | Status: SHIPPED | OUTPATIENT
Start: 2018-01-29 | End: 2019-02-22

## 2018-02-02 DIAGNOSIS — I50.23 ACUTE ON CHRONIC SYSTOLIC CONGESTIVE HEART FAILURE (H): ICD-10-CM

## 2018-02-02 DIAGNOSIS — I25.5 ISCHEMIC CARDIOMYOPATHY: ICD-10-CM

## 2018-02-02 DIAGNOSIS — R60.9 EDEMA: ICD-10-CM

## 2018-02-02 LAB
ANION GAP SERPL CALCULATED.3IONS-SCNC: 5 MMOL/L (ref 3–14)
BUN SERPL-MCNC: 26 MG/DL (ref 7–30)
CALCIUM SERPL-MCNC: 8.1 MG/DL (ref 8.5–10.1)
CHLORIDE SERPL-SCNC: 99 MMOL/L (ref 94–109)
CO2 SERPL-SCNC: 31 MMOL/L (ref 20–32)
CREAT SERPL-MCNC: 1.21 MG/DL (ref 0.66–1.25)
GFR SERPL CREATININE-BSD FRML MDRD: 59 ML/MIN/1.7M2
GLUCOSE SERPL-MCNC: 237 MG/DL (ref 70–99)
NT-PROBNP SERPL-MCNC: ABNORMAL PG/ML (ref 0–125)
POTASSIUM SERPL-SCNC: 4.3 MMOL/L (ref 3.4–5.3)
SODIUM SERPL-SCNC: 135 MMOL/L (ref 133–144)

## 2018-02-02 PROCEDURE — 36415 COLL VENOUS BLD VENIPUNCTURE: CPT | Performed by: NURSE PRACTITIONER

## 2018-02-02 PROCEDURE — 83880 ASSAY OF NATRIURETIC PEPTIDE: CPT | Performed by: NURSE PRACTITIONER

## 2018-02-02 PROCEDURE — 80048 BASIC METABOLIC PNL TOTAL CA: CPT | Performed by: NURSE PRACTITIONER

## 2018-02-15 DIAGNOSIS — I25.5 ISCHEMIC CARDIOMYOPATHY: ICD-10-CM

## 2018-02-15 DIAGNOSIS — I50.23 ACUTE ON CHRONIC SYSTOLIC CONGESTIVE HEART FAILURE (H): ICD-10-CM

## 2018-02-15 LAB
ANION GAP SERPL CALCULATED.3IONS-SCNC: 2 MMOL/L (ref 3–14)
BUN SERPL-MCNC: 29 MG/DL (ref 7–30)
CALCIUM SERPL-MCNC: 9.1 MG/DL (ref 8.5–10.1)
CHLORIDE SERPL-SCNC: 100 MMOL/L (ref 94–109)
CO2 SERPL-SCNC: 30 MMOL/L (ref 20–32)
CREAT SERPL-MCNC: 1.11 MG/DL (ref 0.66–1.25)
GFR SERPL CREATININE-BSD FRML MDRD: 66 ML/MIN/1.7M2
GLUCOSE SERPL-MCNC: 282 MG/DL (ref 70–99)
POTASSIUM SERPL-SCNC: 4.4 MMOL/L (ref 3.4–5.3)
SODIUM SERPL-SCNC: 132 MMOL/L (ref 133–144)

## 2018-02-15 PROCEDURE — 80048 BASIC METABOLIC PNL TOTAL CA: CPT | Performed by: NURSE PRACTITIONER

## 2018-02-15 PROCEDURE — 36415 COLL VENOUS BLD VENIPUNCTURE: CPT | Performed by: NURSE PRACTITIONER

## 2018-02-16 ENCOUNTER — OFFICE VISIT (OUTPATIENT)
Dept: CARDIOLOGY | Facility: CLINIC | Age: 68
End: 2018-02-16
Attending: NURSE PRACTITIONER
Payer: COMMERCIAL

## 2018-02-16 VITALS
HEART RATE: 89 BPM | WEIGHT: 156.6 LBS | DIASTOLIC BLOOD PRESSURE: 89 MMHG | OXYGEN SATURATION: 97 % | SYSTOLIC BLOOD PRESSURE: 120 MMHG | BODY MASS INDEX: 21.24 KG/M2

## 2018-02-16 DIAGNOSIS — I50.23 ACUTE ON CHRONIC SYSTOLIC CONGESTIVE HEART FAILURE (H): ICD-10-CM

## 2018-02-16 DIAGNOSIS — I25.5 ISCHEMIC CARDIOMYOPATHY: ICD-10-CM

## 2018-02-16 PROCEDURE — 99214 OFFICE O/P EST MOD 30 MIN: CPT | Performed by: NURSE PRACTITIONER

## 2018-02-16 RX ORDER — FUROSEMIDE 20 MG
20 TABLET ORAL DAILY
COMMUNITY
Start: 2018-02-16 | End: 2020-03-13

## 2018-02-16 RX ORDER — FUROSEMIDE 40 MG
20 TABLET ORAL DAILY
Qty: 30 TABLET | Refills: 11 | COMMUNITY
Start: 2018-02-16 | End: 2018-02-16

## 2018-02-16 NOTE — MR AVS SNAPSHOT
After Visit Summary   2/16/2018    Delbert Tilley    MRN: [id number]           Patient Information     Date Of Birth          1950        Visit Information        Provider Department      2/16/2018 9:00 AM Hattie Fernandez APRN CNP [facility]        Today's Diagnoses     Ischemic cardiomyopathy        Acute on chronic systolic congestive heart failure (H)          Care Instructions    [facility]~[address]~[place], [place]~[address]  Thank you for your  Heart Care visit today. If you have questions regarding your visit, please contact your cardiology RN's, Basilia Tai or Lissett Sandoval, at [phone]. Your provider has recommended the following:  Medication Changes:  Decrease Lasix back to 20 mg daily  Recommendations:  Continue daily weights. If gain 2-3 lbs in 1 day or 5 lbs in 1 week, please call.  Goal weight is 165 lbs  Follow-up:  See Hattie or Yareli for cardiology follow up in 3-4 months or sooner if needed.  To schedule a future appointment, we kindly ask that you call cardiology scheduling at [phone] three months prior to requested revisit date.               Reminder:  For your safety, we ask that you bring in your current medication(s) or an updated list of your medications with you to EACH office visit. Include the medication name, dose of pill on bottle and how you are taking it. Include over-the-counter medications or supplements. Your provider will review this at each visit and plan your care based on your current information.               Follow-ups after your visit        Additional Services     Follow-Up with Cardiac Advanced Practice Provider                 Your next 10 appointments already scheduled     Feb 27, 2018  8:00 AM CST   ICD Check with Wy Device Rn   [facility] ([facility])    [address] [address]  "  [phone]              Future tests that were ordered for you today     Open Future Orders        Priority Expected Expires Ordered    Follow-Up with Cardiac Advanced Practice Provider Routine 2018    Basic metabolic panel Routine 2018            Who to contact     If you have questions or need follow up information about today's clinic visit or your schedule please contact [facility] directly at [phone].  Normal or non-critical lab and imaging results will be communicated to you by MyChart, letter or phone within 4 business days after the clinic has received the results. If you do not hear from us within 7 days, please contact the clinic through Social IQ (Social Influence Quotient)t or phone. If you have a critical or abnormal lab result, we will notify you by phone as soon as possible.  Submit refill requests through EXPO or call your pharmacy and they will forward the refill request to us. Please allow 3 business days for your refill to be completed.          Additional Information About Your Visit        [employer] Information     EXPO lets you send messages to your doctor, view your test results, renew your prescriptions, schedule appointments and more. To sign up, go to [email or web address] . Click on \"Log in\" on the left side of the screen, which will take you to the Welcome page. Then click on \"Sign up Now\" on the right side of the page.     You will be asked to enter the access code listed below, as well as some personal information. Please follow the directions to create your username and password.     Your access code is: [email or web address]  Expires: 2018  7:21 AM     Your access code will  in 90 days. If you need help or a new code, please call your [facility] clinic or [phone].        Care EveryWhere ID     This is your Care EveryWhere ID. This could be used by other organizations to access your [facility] medical " records  [id number]        Your Vitals Were     Pulse Pulse Oximetry BMI (Body Mass Index)             89 97% 21.24 kg/m2          Blood Pressure from Last 3 Encounters:   02/16/18 120/89   01/17/18 109/71   12/27/17 117/69    Weight from Last 3 Encounters:   02/16/18 71 kg (156 lb 9.6 oz)   01/17/18 80.7 kg (178 lb)   12/27/17 75.3 kg (166 lb)              We Performed the Following     Follow-Up with Cardiac Advanced Practice Provider          Today's Medication Changes          These changes are accurate as of 2/16/18  9:05 AM.  If you have any questions, ask your nurse or doctor.               Start taking these medicines.        Dose/Directions    furosemide 20 MG tablet   Commonly known as:  LASIX   Used for:  Acute on chronic systolic congestive heart failure (H)   Started by:  Hattie Fernandez APRN CNP        Dose:  20 mg   Take 1 tablet (20 mg) by mouth daily   Refills:  0                Primary Care Provider Fax #    [facility] [phone] [address] [address]        Equal Access to Services     MARITA LAMA : Hadmanjinder nice Socb, waaxda luqadaha, qaybta kaalmaselena fontaine, gwendolyn cosby . So [facility] [place] [facility] [phone].    ATENCIÓN: Si habla español, tiene a frey disposición servicios gratuitos de asistencia lingüística. Llame al [phone].    We comply with applicable federal civil rights laws and [place] laws. We do not discriminate on the basis of race, color, national origin, age, disability, sex, sexual orientation, or gender identity.            Thank you!     Thank you for choosing [facility]  for your care. Our goal is always to provide you with excellent care. Hearing back from our patients is one way we can continue to improve our services. Please take a few minutes to complete the written survey that you may receive in the mail after your visit with us. Thank you!             Your  Updated Medication List - Protect others around you: Learn how to safely use, store and throw away your medicines at www.disposemymeds.org.          This list is accurate as of 2/16/18  9:05 AM.  Always use your most recent med list.                   Brand Name Dispense Instructions for use Diagnosis    albuterol 108 (90 BASE) MCG/ACT Inhaler    PROAIR HFA/PROVENTIL HFA/VENTOLIN HFA    1 Inhaler    Inhale 2 puffs into the lungs every 6 hours as needed for shortness of breath / dyspnea or wheezing        amylase-lipase-protease 65681 UNITS Cpep    CREON 12     Take 1 capsule by mouth daily        aspirin 325 MG tablet     120 tablet    Take 3 tablets (975 mg) by mouth every morning (Takes 2 x 500 mg = 1000 mg)    Left carotid stenosis       atorvastatin 80 MG tablet    LIPITOR     Take 80 mg by mouth At Bedtime        furosemide 20 MG tablet    LASIX     Take 1 tablet (20 mg) by mouth daily    Acute on chronic systolic congestive heart failure (H)       glipiZIDE 10 MG tablet    GLUCOTROL     Take 10 mg by mouth 2 times daily        insulin glargine 100 UNIT/ML injection    LANTUS     Inject 27 Units Subcutaneous every morning        losartan 25 MG tablet    COZAAR    30 tablet    Take 1 tablet (25 mg) by mouth daily    Ischemic cardiomyopathy       METFORMIN HCL PO      Take 1,000 mg by mouth 2 times daily (Takes 2 x 500 mg = 1000 mg dose)        metolazone 2.5 MG tablet    ZAROXOLYN    4 tablet    Take 2.5 mg on Tuesday, 1/30/18 and Thursday 2/1/18. Take this 1/2 hour before taking your Lasix (furosemide)    Edema, Ischemic cardiomyopathy       METOPROLOL SUCCINATE ER PO      Take 12.5 mg by mouth every morning (Takes 0.5 x 25 mg tablet = 12.5 mg)        NITROSTAT SL      Place 0.4 mg under the tongue every 5 minutes as needed for chest pain        pantoprazole 40 MG EC tablet    PROTONIX     Take 40 mg by mouth daily        TYLENOL PO      Take 1,000 mg by mouth every morning (Takes 2 x 500 mg = 1000 mg)

## 2018-02-16 NOTE — PROGRESS NOTES
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    Follow up in 3 months or sooner with [facility]    Follow up with Dr. Michaels in August 2018 for an annual visit    2. Coronary artery disease    status post CABG (LIMA to LAD, SVG to OM, and SVG to PDA)    Recent coronary angiography revealed patent LIMA to the LAD and SVG to circumflex grafts.  There is noted to be a proximal occlusion of the SVG to the RCA with collaterals from the distal RCA and no lesion amenable to PCI.  There is also patent start circumflex stent.    Continue statin, aspirin, ARB, beta blocker    3. Paroxysmal atrial fibrillation/flutter    Status post atrial flutter ablation    History of 30 seconds of atrial fibrillation    No symptoms of atrial fibrillation and not seen on his recent device check    Not on anticoagulation other than aspirin and continues on metoprolol.    4. Carotid artery disease    Status post bilateral carotid endarterectomies    Follows with Vascular sugery    5. Tobacco abuse    Sensation highly recommended         History of Presenting Illness:    Delbert Tilley is a very pleasant 67 year old patient of Dr. Michaels who presents today for a follow up. He has a past medical history of  a couple episodes of 30 seconds of paroxysmal atrial fibrillation not on anticoagulation, CAD status post CABG (LIMA to LAD, SVG to OM, and SVG to PDA), status post right and left carotid endarterectomy, status post flutter ablation, and ischemic combined with nonischemic cardiomyopathy with previous LVEF 35-40% now decreased to 20-25% and status post ICD.    He had a stress test that noted a  transmural inferior and inferoseptal defect without ischemia but LVEF was 28%.  An echocardiogram was order for further assessment of his LVEF that confirmed the LVEF had decreased to 20-25% with severe global hypokinesia, inferior inferolateral akinesia that was suggestive of restrictive physiology with moderate MR, mild TR and moderate pulmonary hypertension.  His previous LVEF in 2015 was 35-40%.     In the month of November it was noted that his weight had gone up 24 pounds in the last 4 months with a weight gain of 13 pounds in 1 month with elevated JVP and increased lower extremity edema. His BNP was elevated at 19,800 and was started on 20 mg of Lasix daily as well as losartan 25 mg daily.  A repeat BNP showed had decreased to 17,700 on Lasix.. Dr. Michaels recommended undergoing a coronary angiogram to further assess his decreased LVEF.    The coronary angiogram on 12/22/2017 showed a patent LIMA to the LAD and SVG to circumflex axis.  Prior stenting to the circumflex was patent with no restenosis.  There was a proximal occlusion of the SVG to the RCA graft with collaterals to the distal RCA with no lesion amenable to PCI.  The angiogram was performed by Dr. Cao, and he noted that although there was a new occlusion the degree of LV function was disproportionate to the degree of coronary artery disease.  He was suspicious that the patient had a mixed nonischemic and ischemic cardiomyopathy and that possible by the upgrade may be helpful.    At her last visit on 1/17/2018 the patient noted an increased weight gain of  approximately 12 pounds with bilateral lower extremity pitting edema.  His Lasix was increased from 20 mg daily to 40 mg daily.  The patient called the clinic stating that he had had no response to the additional Lasix and 2.5 mg of metolazone ×2 doses was added.  Since the up titration of the Lasix and the addition of metolazone the patient has lost approximately 19 pounds.  He is 161 on his home scale.  His dry weight is approximately 165-168 on his home scale.  He states that he is occasionally dizzy and lightheaded and actually previously decreased his Lasix from 40 mg daily to 30 mg daily with continued symptoms.  His bilateral lower extremity edema has extremely improved and he denies any changes in his shortness of breath.          This note was completed in part using Dragon voice recognition software. Although reviewed after completion, some word and grammatical errors may occur       Review of Systems:   Review of Systems:  Skin:  Negative     Eyes:  Positive for glasses  ENT:  Negative    Respiratory:  Negative    Cardiovascular:    Positive for;lower extremity symptoms;edema;fatigue  Gastroenterology: Negative    Genitourinary:  Negative    Musculoskeletal:  Positive for joint pain  Neurologic:  Negative    Psychiatric:  Negative    Heme/Lymph/Imm:  Positive for allergies  Endocrine:  Positive for diabetes              Physical Exam:     Vitals: /89 (BP Location: Right arm, Patient Position: Sitting, Cuff Size: Adult Regular)  Pulse 89  Wt 71 kg (156 lb 9.6 oz)  SpO2 97%  BMI 21.24 kg/m2  Constitutional:  cooperative, alert and oriented, well developed, well nourished, in no acute distress        Skin:  warm and dry to the touch        Head:  normocephalic        Eyes:  sclera white        ENT:  no pallor or cyanosis        Neck:  not assessed this visit;JVP normal   healing left carotid scar    Chest:  normal breath sounds, clear to auscultation, normal A-P diameter, normal symmetry, normal  respiratory excursion, no use of accessory muscles diminished breath sounds bilaterally      Cardiac: regular rhythm, normal S1/S2, no S3 or S4, apical impulse not displaced, no murmurs, gallops or rubs                  Abdomen:  abdomen soft        Extremities and Back:  no deformities, clubbing, cyanosis, erythema observed     Bilateral lower extremity edema trace    Neurological:  affect appropriate               Medications:     Current Outpatient Prescriptions   Medication Sig Dispense Refill     furosemide (LASIX) 20 MG tablet Take 1 tablet (20 mg) by mouth daily       metolazone (ZAROXOLYN) 2.5 MG tablet Take 2.5 mg on Tuesday, 1/30/18 and Thursday 2/1/18. Take this 1/2 hour before taking your Lasix (furosemide) 4 tablet 0     losartan (COZAAR) 25 MG tablet Take 1 tablet (25 mg) by mouth daily 30 tablet 11     albuterol (PROAIR HFA/PROVENTIL HFA/VENTOLIN HFA) 108 (90 BASE) MCG/ACT Inhaler Inhale 2 puffs into the lungs every 6 hours as needed for shortness of breath / dyspnea or wheezing 1 Inhaler 0     aspirin 325 MG tablet Take 3 tablets (975 mg) by mouth every morning (Takes 2 x 500 mg = 1000 mg) 120 tablet 1     Acetaminophen (TYLENOL PO) Take 1,000 mg by mouth every morning (Takes 2 x 500 mg = 1000 mg)       insulin glargine (LANTUS) 100 UNIT/ML injection Inject 27 Units Subcutaneous every morning       Nitroglycerin (NITROSTAT SL) Place 0.4 mg under the tongue every 5 minutes as needed for chest pain       METFORMIN HCL PO Take 1,000 mg by mouth 2 times daily (Takes 2 x 500 mg = 1000 mg dose)       METOPROLOL SUCCINATE ER PO Take 12.5 mg by mouth every morning (Takes 0.5 x 25 mg tablet = 12.5 mg)       amylase-lipase-protease (CREON 12) 59092 UNITS CPEP Take 1 capsule by mouth daily       atorvastatin (LIPITOR) 80 MG tablet Take 80 mg by mouth At Bedtime        glipiZIDE (GLUCOTROL) 10 MG tablet Take 10 mg by mouth 2 times daily        pantoprazole (PROTONIX) 40 MG EC tablet Take 40 mg by mouth daily        [DISCONTINUED] furosemide (LASIX) 40 MG tablet Take 0.5 tablets (20 mg) by mouth daily 30 tablet 11     [DISCONTINUED] furosemide (LASIX) 40 MG tablet Take 1 tablet (40 mg) by mouth daily (Patient taking differently: Take 40 mg by mouth daily 30mg daily) 30 tablet 11           Family History   Problem Relation Age of Onset     Unknown/Adopted No family hx of        Social History     Social History     Marital status: Single     Spouse name: N/A     Number of children: N/A     Years of education: N/A     Occupational History     Not on file.     Social History Main Topics     Smoking status: Current Every Day Smoker     Packs/day: 2.00     Types: Cigarettes     Smokeless tobacco: Never Used      Comment: 1 PPD 2/16/18     Alcohol use No     Drug use: No     Sexual activity: Not Currently     Partners: Female     Other Topics Concern     Parent/Sibling W/ Cabg, Mi Or Angioplasty Before 65f 55m? No     Social History Narrative            Past Medical History:     Past Medical History:   Diagnosis Date     Acute renal failure (H) 8/26/06 Hosp    secondary to dehydration     Arthritis      CAD (coronary artery disease)     LIMA to the LAD, vein graft to OM and a vein graft to the PDA     Carpal tunnel syndrome      Diabetes (H)      Gastro-oesophageal reflux disease      H/O: alcohol abuse      HTN (hypertension)      Hyperlipidaemia      Hyperlipidaemia LDL goal <100 7/8/2013     Hypokalemia      Pacemaker      Pancreatitis 6/26/06 Hosp              Past Surgical History:     Past Surgical History:   Procedure Laterality Date     BYPASS CARDIOPULMONARY       CATARACT IOL, RT/LT      Cataract IOL RT/LT     ENDARTERECTOMY CAROTID Right 6/12/2015    Procedure: ENDARTERECTOMY CAROTID;  Surgeon: João Turner MD;  Location:  OR     ENDARTERECTOMY CAROTID Left 9/8/2017    Procedure: ENDARTERECTOMY CAROTID;  LEFT CAROTID ENDARTERECTOMY WITH EEG;  Surgeon: João Turner MD;  Location:  OR     IMPLANT  PACEMAKER  6/10/2010     RELEASE CARPAL TUNNEL Right 4/12/2016    Procedure: RELEASE CARPAL TUNNEL;  Surgeon: Lissy Leger MD;  Location: [facility] OR     SURGICAL HISTORY OF -   1998    Laminectomy     SURGICAL HISTORY OF -   10/2003    Bypass grafting     TONSILLECTOMY & ADENOIDECTOMY                Allergies:   Hydrocodone and Shellfish allergy       Data:   All laboratory data reviewed:    LAST CHOLESTEROL:  Lab Results   Component Value Date    CHOL 96 09/08/2017     Lab Results   Component Value Date    HDL 24 09/08/2017     Lab Results   Component Value Date    LDL 45 09/08/2017     Lab Results   Component Value Date    TRIG 136 09/08/2017     Lab Results   Component Value Date    CHOLHDLRATIO 4.6 06/12/2015     Last Basic Metabolic Panel:  Lab Results   Component Value Date     02/15/2018      Lab Results   Component Value Date    POTASSIUM 4.4 02/15/2018     Lab Results   Component Value Date    CHLORIDE 100 02/15/2018     Lab Results   Component Value Date    MARVA 9.1 02/15/2018     Lab Results   Component Value Date    CO2 30 02/15/2018     Lab Results   Component Value Date    BUN 29 02/15/2018     Lab Results   Component Value Date    CR 1.11 02/15/2018     Lab Results   Component Value Date     02/15/2018         LAST CBC:  Lab Results   Component Value Date    WBC 6.8 12/22/2017     Lab Results   Component Value Date    RBC 4.86 12/22/2017     Lab Results   Component Value Date    HGB 14.2 12/22/2017     Lab Results   Component Value Date    HCT 43.9 12/22/2017     Lab Results   Component Value Date    MCV 90 12/22/2017     Lab Results   Component Value Date    MCH 29.2 12/22/2017     Lab Results   Component Value Date    MCHC 32.3 12/22/2017     Lab Results   Component Value Date    RDW 13.8 12/22/2017     Lab Results   Component Value Date     12/22/2017         TITI JARA, APRN, CNP

## 2018-02-16 NOTE — LETTER
2/16/2018    [facility]  [address] [address]    RE: Delbert Tilley       Dear Colleague,    I had the pleasure of seeing Delbert Tilley in the [facility].    Cardiology Clinic Progress Note  Delbert Tilley MRN# [id number]   YOB: 1950 Age: 67 year old     Reason for visit: Follow up           Assessment and Plan:     1. Mixed non ischemic and ischemic cardiomyopathy    Status ICD and next device check is 12/27/2018    LVEF 20-25% previously 35-40%    Dry weight of 165-168     Home weight of 161 lbs after an increase of Lasix to 40 mg daily and metolazone 2.5 mg x 2 doses    Patient recently cut his dose to 30 mg daily due to continued weight loss    Decrease dose to 20 mg daily    Call if weight gain of 2-3 lbs in 1 day or 5 lbs in 1 week and will adjust diuretics    May need to consider the addition of spironolactone in the future    If develops LBBB consider upgrade to BiV ICD    Continue losartan 25 mg daily and metoprolol 12.5 mg daily.    Follow up in 3 months or sooner with [facility]    Follow up with Dr. Michaels in August 2018 for an annual visit    2. Coronary artery disease    status post CABG (LIMA to LAD, SVG to OM, and SVG to PDA)    Recent coronary angiography revealed patent LIMA to the LAD and SVG to circumflex grafts.  There is noted to be a proximal occlusion of the SVG to the RCA with collaterals from the distal RCA and no lesion amenable to PCI.  There is also patent start circumflex stent.    Continue statin, aspirin, ARB, beta blocker    3. Paroxysmal atrial fibrillation/flutter    Status post atrial flutter ablation    History of 30 seconds of atrial fibrillation    No symptoms of atrial fibrillation and not seen on his recent device check    Not on anticoagulation other than aspirin and continues on metoprolol.    4. Carotid artery disease    Status post bilateral carotid endarterectomies    Follows with Vascular  anne    5. Tobacco abuse    Sensation highly recommended         History of Presenting Illness:    Delbert Tilley is a very pleasant 67 year old patient of Dr. Michaels who presents today for a follow up. He has a past medical history of a couple episodes of 30 seconds of paroxysmal atrial fibrillation not on anticoagulation, CAD status post CABG (LIMA to LAD, SVG to OM, and SVG to PDA), status post right and left carotid endarterectomy, status post flutter ablation, and ischemic combined with nonischemic cardiomyopathy with previous LVEF 35-40% now decreased to 20-25% and status post ICD.    He had a stress test that noted a  transmural inferior and inferoseptal defect without ischemia but LVEF was 28%.  An echocardiogram was order for further assessment of his LVEF that confirmed the LVEF had decreased to 20-25% with severe global hypokinesia, inferior inferolateral akinesia that was suggestive of restrictive physiology with moderate MR, mild TR and moderate pulmonary hypertension.  His previous LVEF in 2015 was 35-40%.     In the month of November it was noted that his weight had gone up 24 pounds in the last 4 months with a weight gain of 13 pounds in 1 month with elevated JVP and increased lower extremity edema. His BNP was elevated at 19,800 and was started on 20 mg of Lasix daily as well as losartan 25 mg daily.  A repeat BNP showed had decreased to 17,700 on Lasix.. Dr. Michaels recommended undergoing a coronary angiogram to further assess his decreased LVEF.    The coronary angiogram on 12/22/2017 showed a patent LIMA to the LAD and SVG to circumflex axis.  Prior stenting to the circumflex was patent with no restenosis.  There was a proximal occlusion of the SVG to the RCA graft with collaterals to the distal RCA with no lesion amenable to PCI.  The angiogram was performed by Dr. Cao, and he noted that although there was a new occlusion the degree of LV function was disproportionate to the degree of coronary  artery disease.  He was suspicious that the patient had a mixed nonischemic and ischemic cardiomyopathy and that possible by the upgrade may be helpful.    At her last visit on 1/17/2018 the patient noted an increased weight gain of approximately 12 pounds with bilateral lower extremity pitting edema.  His Lasix was increased from 20 mg daily to 40 mg daily.  The patient called the clinic stating that he had had no response to the additional Lasix and 2.5 mg of metolazone ×2 doses was added.  Since the up titration of the Lasix and the addition of metolazone the patient has lost approximately 19 pounds.  He is 161 on his home scale.  His dry weight is approximately 165-168 on his home scale.  He states that he is occasionally dizzy and lightheaded and actually previously decreased his Lasix from 40 mg daily to 30 mg daily with continued symptoms.  His bilateral lower extremity edema has extremely improved and he denies any changes in his shortness of breath.          This note was completed in part using Dragon voice recognition software. Although reviewed after completion, some word and grammatical errors may occur       Review of Systems:   Review of Systems:  Skin:  Negative     Eyes:  Positive for glasses  ENT:  Negative    Respiratory:  Negative    Cardiovascular:    Positive for;lower extremity symptoms;edema;fatigue  Gastroenterology: Negative    Genitourinary:  Negative    Musculoskeletal:  Positive for joint pain  Neurologic:  Negative    Psychiatric:  Negative    Heme/Lymph/Imm:  Positive for allergies  Endocrine:  Positive for diabetes              Physical Exam:     Vitals: /89 (BP Location: Right arm, Patient Position: Sitting, Cuff Size: Adult Regular)  Pulse 89  Wt 71 kg (156 lb 9.6 oz)  SpO2 97%  BMI 21.24 kg/m2  Constitutional:  cooperative, alert and oriented, well developed, well nourished, in no acute distress        Skin:  warm and dry to the touch        Head:  normocephalic         Eyes:  sclera white        ENT:  no pallor or cyanosis        Neck:  not assessed this visit;JVP normal   healing left carotid scar    Chest:  normal breath sounds, clear to auscultation, normal A-P diameter, normal symmetry, normal respiratory excursion, no use of accessory muscles diminished breath sounds bilaterally      Cardiac: regular rhythm, normal S1/S2, no S3 or S4, apical impulse not displaced, no murmurs, gallops or rubs                  Abdomen:  abdomen soft        Extremities and Back:  no deformities, clubbing, cyanosis, erythema observed     Bilateral lower extremity edema trace    Neurological:  affect appropriate               Medications:     Current Outpatient Prescriptions   Medication Sig Dispense Refill     furosemide (LASIX) 20 MG tablet Take 1 tablet (20 mg) by mouth daily       metolazone (ZAROXOLYN) 2.5 MG tablet Take 2.5 mg on Tuesday, 1/30/18 and Thursday 2/1/18. Take this 1/2 hour before taking your Lasix (furosemide) 4 tablet 0     losartan (COZAAR) 25 MG tablet Take 1 tablet (25 mg) by mouth daily 30 tablet 11     albuterol (PROAIR HFA/PROVENTIL HFA/VENTOLIN HFA) 108 (90 BASE) MCG/ACT Inhaler Inhale 2 puffs into the lungs every 6 hours as needed for shortness of breath / dyspnea or wheezing 1 Inhaler 0     aspirin 325 MG tablet Take 3 tablets (975 mg) by mouth every morning (Takes 2 x 500 mg = 1000 mg) 120 tablet 1     Acetaminophen (TYLENOL PO) Take 1,000 mg by mouth every morning (Takes 2 x 500 mg = 1000 mg)       insulin glargine (LANTUS) 100 UNIT/ML injection Inject 27 Units Subcutaneous every morning       Nitroglycerin (NITROSTAT SL) Place 0.4 mg under the tongue every 5 minutes as needed for chest pain       METFORMIN HCL PO Take 1,000 mg by mouth 2 times daily (Takes 2 x 500 mg = 1000 mg dose)       METOPROLOL SUCCINATE ER PO Take 12.5 mg by mouth every morning (Takes 0.5 x 25 mg tablet = 12.5 mg)       amylase-lipase-protease (CREON 12) 87964 UNITS CPEP Take 1 capsule by  mouth daily       atorvastatin (LIPITOR) 80 MG tablet Take 80 mg by mouth At Bedtime        glipiZIDE (GLUCOTROL) 10 MG tablet Take 10 mg by mouth 2 times daily        pantoprazole (PROTONIX) 40 MG EC tablet Take 40 mg by mouth daily       [DISCONTINUED] furosemide (LASIX) 40 MG tablet Take 0.5 tablets (20 mg) by mouth daily 30 tablet 11     [DISCONTINUED] furosemide (LASIX) 40 MG tablet Take 1 tablet (40 mg) by mouth daily (Patient taking differently: Take 40 mg by mouth daily 30mg daily) 30 tablet 11           Family History   Problem Relation Age of Onset     Unknown/Adopted No family hx of        Social History     Social History     Marital status: Single     Spouse name: N/A     Number of children: N/A     Years of education: N/A     Occupational History     Not on file.     Social History Main Topics     Smoking status: Current Every Day Smoker     Packs/day: 2.00     Types: Cigarettes     Smokeless tobacco: Never Used      Comment: 1 PPD 2/16/18     Alcohol use No     Drug use: No     Sexual activity: Not Currently     Partners: Female     Other Topics Concern     Parent/Sibling W/ Cabg, Mi Or Angioplasty Before 65f 55m? No     Social History Narrative            Past Medical History:     Past Medical History:   Diagnosis Date     Acute renal failure (H) 8/26/06 Hosp    secondary to dehydration     Arthritis      CAD (coronary artery disease)     LIMA to the LAD, vein graft to OM and a vein graft to the PDA     Carpal tunnel syndrome      Diabetes (H)      Gastro-oesophageal reflux disease      H/O: alcohol abuse      HTN (hypertension)      Hyperlipidaemia      Hyperlipidaemia LDL goal <100 7/8/2013     Hypokalemia      Pacemaker      Pancreatitis 6/26/06 Hosp              Past Surgical History:     Past Surgical History:   Procedure Laterality Date     BYPASS CARDIOPULMONARY       CATARACT IOL, RT/LT      Cataract IOL RT/LT     ENDARTERECTOMY CAROTID Right 6/12/2015    Procedure: ENDARTERECTOMY CAROTID;   Surgeon: João Turner MD;  Location: [facility] OR     ENDARTERECTOMY CAROTID Left 9/8/2017    Procedure: ENDARTERECTOMY CAROTID;  LEFT CAROTID ENDARTERECTOMY WITH EEG;  Surgeon: João Turner MD;  Location: [facility] OR     IMPLANT PACEMAKER  6/10/2010     RELEASE CARPAL TUNNEL Right 4/12/2016    Procedure: RELEASE CARPAL TUNNEL;  Surgeon: Lissy Leger MD;  Location: [facility] OR     SURGICAL HISTORY OF -   1998    Laminectomy     SURGICAL HISTORY OF -   10/2003    Bypass grafting     TONSILLECTOMY & ADENOIDECTOMY                Allergies:   Hydrocodone and Shellfish allergy       Data:   All laboratory data reviewed:    LAST CHOLESTEROL:  Lab Results   Component Value Date    CHOL 96 09/08/2017     Lab Results   Component Value Date    HDL 24 09/08/2017     Lab Results   Component Value Date    LDL 45 09/08/2017     Lab Results   Component Value Date    TRIG 136 09/08/2017     Lab Results   Component Value Date    CHOLHDLRATIO 4.6 06/12/2015     Last Basic Metabolic Panel:  Lab Results   Component Value Date     02/15/2018      Lab Results   Component Value Date    POTASSIUM 4.4 02/15/2018     Lab Results   Component Value Date    CHLORIDE 100 02/15/2018     Lab Results   Component Value Date    MARVA 9.1 02/15/2018     Lab Results   Component Value Date    CO2 30 02/15/2018     Lab Results   Component Value Date    BUN 29 02/15/2018     Lab Results   Component Value Date    CR 1.11 02/15/2018     Lab Results   Component Value Date     02/15/2018         LAST CBC:  Lab Results   Component Value Date    WBC 6.8 12/22/2017     Lab Results   Component Value Date    RBC 4.86 12/22/2017     Lab Results   Component Value Date    HGB 14.2 12/22/2017     Lab Results   Component Value Date    HCT 43.9 12/22/2017     Lab Results   Component Value Date    MCV 90 12/22/2017     Lab Results   Component Value Date    MCH 29.2 12/22/2017     Lab Results   Component Value Date    MCHC 32.3 12/22/2017     Lab  Results   Component Value Date    RDW 13.8 12/22/2017     Lab Results   Component Value Date     12/22/2017         ANA HAWK, CLARY    Thank you for allowing me to participate in the care of your patient.      Sincerely,     ANA HAWK CNP     [facility]    cc:   ANA Echavarria CNP  [facility]  [address]  [place] [address]

## 2018-02-16 NOTE — PATIENT INSTRUCTIONS
[facility]  [facility]~[address]~[place], [place]~[address]  Thank you for your [facility] visit today. If you have questions regarding your visit, please contact your cardiology RN's, Basilia Tai or Lissett Sandoval, at [phone]. Your provider has recommended the following:  Medication Changes:  Decrease Lasix back to 20 mg daily  Recommendations:  Continue daily weights. If gain 2-3 lbs in 1 day or 5 lbs in 1 week, please call.  Goal weight is 165 lbs  Follow-up:  See Hattie or Yareli for cardiology follow up in 3-4 months or sooner if needed.  To schedule a future appointment, we kindly ask that you call cardiology scheduling at [phone] three months prior to requested revisit date.               Reminder:  For your safety, we ask that you bring in your current medication(s) or an updated list of your medications with you to EACH office visit. Include the medication name, dose of pill on bottle and how you are taking it. Include over-the-counter medications or supplements. Your provider will review this at each visit and plan your care based on your current information.

## 2018-02-16 NOTE — LETTER
2/16/2018    [facility]  [address] [address]    RE: Delbert Tilley       Dear Colleague,    I had the pleasure of seeing Delbert Tilley in the [facility].    Cardiology Clinic Progress Note  Delbert Tilley MRN# [id number]   YOB: 1950 Age: 67 year old     Reason for visit: Follow up           Assessment and Plan:     1. Mixed non ischemic and ischemic cardiomyopathy    Status ICD and next device check is 12/27/2018    LVEF 20-25% previously 35-40%    Dry weight of 165-168     Home weight of 161 lbs after an increase of Lasix to 40 mg daily and metolazone 2.5 mg x 2 doses    Patient recently cut his dose to 30 mg daily due to continued weight loss    Decrease dose to 20 mg daily    Call if weight gain of 2-3 lbs in 1 day or 5 lbs in 1 week and will adjust diuretics    May need to consider the addition of spironolactone in the future    If develops LBBB consider upgrade to BiV ICD    Continue losartan 25 mg daily and metoprolol 12.5 mg daily.    Follow up in 3 months or sooner with [facility]    Follow up with Dr. Michaels in August 2018 for an annual visit    2. Coronary artery disease    status post CABG (LIMA to LAD, SVG to OM, and SVG to PDA)    Recent coronary angiography revealed patent LIMA to the LAD and SVG to circumflex grafts.  There is noted to be a proximal occlusion of the SVG to the RCA with collaterals from the distal RCA and no lesion amenable to PCI.  There is also patent start circumflex stent.    Continue statin, aspirin, ARB, beta blocker    3. Paroxysmal atrial fibrillation/flutter    Status post atrial flutter ablation    History of 30 seconds of atrial fibrillation    No symptoms of atrial fibrillation and not seen on his recent device check    Not on anticoagulation other than aspirin and continues on metoprolol.    4. Carotid artery disease    Status post bilateral carotid endarterectomies    Follows with Vascular  anne    5. Tobacco abuse    Sensation highly recommended         History of Presenting Illness:    Delbert Tilley is a very pleasant 67 year old patient of Dr. Michaels who presents today for a follow up. He has a past medical history of a couple episodes of 30 seconds of paroxysmal atrial fibrillation not on anticoagulation, CAD status post CABG (LIMA to LAD, SVG to OM, and SVG to PDA), status post right and left carotid endarterectomy, status post flutter ablation, and ischemic combined with nonischemic cardiomyopathy with previous LVEF 35-40% now decreased to 20-25% and status post ICD.    He had a stress test that noted a  transmural inferior and inferoseptal defect without ischemia but LVEF was 28%.  An echocardiogram was order for further assessment of his LVEF that confirmed the LVEF had decreased to 20-25% with severe global hypokinesia, inferior inferolateral akinesia that was suggestive of restrictive physiology with moderate MR, mild TR and moderate pulmonary hypertension.  His previous LVEF in 2015 was 35-40%.     In the month of November it was noted that his weight had gone up 24 pounds in the last 4 months with a weight gain of 13 pounds in 1 month with elevated JVP and increased lower extremity edema. His BNP was elevated at 19,800 and was started on 20 mg of Lasix daily as well as losartan 25 mg daily.  A repeat BNP showed had decreased to 17,700 on Lasix.. Dr. Michaels recommended undergoing a coronary angiogram to further assess his decreased LVEF.    The coronary angiogram on 12/22/2017 showed a patent LIMA to the LAD and SVG to circumflex axis.  Prior stenting to the circumflex was patent with no restenosis.  There was a proximal occlusion of the SVG to the RCA graft with collaterals to the distal RCA with no lesion amenable to PCI.  The angiogram was performed by Dr. Cao, and he noted that although there was a new occlusion the degree of LV function was disproportionate to the degree of coronary  artery disease.  He was suspicious that the patient had a mixed nonischemic and ischemic cardiomyopathy and that possible by the upgrade may be helpful.    At her last visit on 1/17/2018 the patient noted an increased weight gain of approximately 12 pounds with bilateral lower extremity pitting edema.  His Lasix was increased from 20 mg daily to 40 mg daily.  The patient called the clinic stating that he had had no response to the additional Lasix and 2.5 mg of metolazone ×2 doses was added.  Since the up titration of the Lasix and the addition of metolazone the patient has lost approximately 19 pounds.  He is 161 on his home scale.  His dry weight is approximately 165-168 on his home scale.  He states that he is occasionally dizzy and lightheaded and actually previously decreased his Lasix from 40 mg daily to 30 mg daily with continued symptoms.  His bilateral lower extremity edema has extremely improved and he denies any changes in his shortness of breath.          This note was completed in part using Dragon voice recognition software. Although reviewed after completion, some word and grammatical errors may occur       Review of Systems:   Review of Systems:  Skin:  Negative     Eyes:  Positive for glasses  ENT:  Negative    Respiratory:  Negative    Cardiovascular:    Positive for;lower extremity symptoms;edema;fatigue  Gastroenterology: Negative    Genitourinary:  Negative    Musculoskeletal:  Positive for joint pain  Neurologic:  Negative    Psychiatric:  Negative    Heme/Lymph/Imm:  Positive for allergies  Endocrine:  Positive for diabetes              Physical Exam:     Vitals: /89 (BP Location: Right arm, Patient Position: Sitting, Cuff Size: Adult Regular)  Pulse 89  Wt 71 kg (156 lb 9.6 oz)  SpO2 97%  BMI 21.24 kg/m2  Constitutional:  cooperative, alert and oriented, well developed, well nourished, in no acute distress        Skin:  warm and dry to the touch        Head:  normocephalic         Eyes:  sclera white        ENT:  no pallor or cyanosis        Neck:  not assessed this visit;JVP normal   healing left carotid scar    Chest:  normal breath sounds, clear to auscultation, normal A-P diameter, normal symmetry, normal respiratory excursion, no use of accessory muscles diminished breath sounds bilaterally      Cardiac: regular rhythm, normal S1/S2, no S3 or S4, apical impulse not displaced, no murmurs, gallops or rubs                  Abdomen:  abdomen soft        Extremities and Back:  no deformities, clubbing, cyanosis, erythema observed     Bilateral lower extremity edema trace    Neurological:  affect appropriate               Medications:     Current Outpatient Prescriptions   Medication Sig Dispense Refill     furosemide (LASIX) 20 MG tablet Take 1 tablet (20 mg) by mouth daily       metolazone (ZAROXOLYN) 2.5 MG tablet Take 2.5 mg on Tuesday, 1/30/18 and Thursday 2/1/18. Take this 1/2 hour before taking your Lasix (furosemide) 4 tablet 0     losartan (COZAAR) 25 MG tablet Take 1 tablet (25 mg) by mouth daily 30 tablet 11     albuterol (PROAIR HFA/PROVENTIL HFA/VENTOLIN HFA) 108 (90 BASE) MCG/ACT Inhaler Inhale 2 puffs into the lungs every 6 hours as needed for shortness of breath / dyspnea or wheezing 1 Inhaler 0     aspirin 325 MG tablet Take 3 tablets (975 mg) by mouth every morning (Takes 2 x 500 mg = 1000 mg) 120 tablet 1     Acetaminophen (TYLENOL PO) Take 1,000 mg by mouth every morning (Takes 2 x 500 mg = 1000 mg)       insulin glargine (LANTUS) 100 UNIT/ML injection Inject 27 Units Subcutaneous every morning       Nitroglycerin (NITROSTAT SL) Place 0.4 mg under the tongue every 5 minutes as needed for chest pain       METFORMIN HCL PO Take 1,000 mg by mouth 2 times daily (Takes 2 x 500 mg = 1000 mg dose)       METOPROLOL SUCCINATE ER PO Take 12.5 mg by mouth every morning (Takes 0.5 x 25 mg tablet = 12.5 mg)       amylase-lipase-protease (CREON 12) 13370 UNITS CPEP Take 1 capsule by  mouth daily       atorvastatin (LIPITOR) 80 MG tablet Take 80 mg by mouth At Bedtime        glipiZIDE (GLUCOTROL) 10 MG tablet Take 10 mg by mouth 2 times daily        pantoprazole (PROTONIX) 40 MG EC tablet Take 40 mg by mouth daily       [DISCONTINUED] furosemide (LASIX) 40 MG tablet Take 0.5 tablets (20 mg) by mouth daily 30 tablet 11     [DISCONTINUED] furosemide (LASIX) 40 MG tablet Take 1 tablet (40 mg) by mouth daily (Patient taking differently: Take 40 mg by mouth daily 30mg daily) 30 tablet 11           Family History   Problem Relation Age of Onset     Unknown/Adopted No family hx of        Social History     Social History     Marital status: Single     Spouse name: N/A     Number of children: N/A     Years of education: N/A     Occupational History     Not on file.     Social History Main Topics     Smoking status: Current Every Day Smoker     Packs/day: 2.00     Types: Cigarettes     Smokeless tobacco: Never Used      Comment: 1 PPD 2/16/18     Alcohol use No     Drug use: No     Sexual activity: Not Currently     Partners: Female     Other Topics Concern     Parent/Sibling W/ Cabg, Mi Or Angioplasty Before 65f 55m? No     Social History Narrative            Past Medical History:     Past Medical History:   Diagnosis Date     Acute renal failure (H) 8/26/06 Hosp    secondary to dehydration     Arthritis      CAD (coronary artery disease)     LIMA to the LAD, vein graft to OM and a vein graft to the PDA     Carpal tunnel syndrome      Diabetes (H)      Gastro-oesophageal reflux disease      H/O: alcohol abuse      HTN (hypertension)      Hyperlipidaemia      Hyperlipidaemia LDL goal <100 7/8/2013     Hypokalemia      Pacemaker      Pancreatitis 6/26/06 Hosp              Past Surgical History:     Past Surgical History:   Procedure Laterality Date     BYPASS CARDIOPULMONARY       CATARACT IOL, RT/LT      Cataract IOL RT/LT     ENDARTERECTOMY CAROTID Right 6/12/2015    Procedure: ENDARTERECTOMY CAROTID;   Surgeon: João Turner MD;  Location: [facility] OR     ENDARTERECTOMY CAROTID Left 9/8/2017    Procedure: ENDARTERECTOMY CAROTID;  LEFT CAROTID ENDARTERECTOMY WITH EEG;  Surgeon: João Turner MD;  Location: [facility] OR     IMPLANT PACEMAKER  6/10/2010     RELEASE CARPAL TUNNEL Right 4/12/2016    Procedure: RELEASE CARPAL TUNNEL;  Surgeon: Lissy Leger MD;  Location: [facility] OR     SURGICAL HISTORY OF -   1998    Laminectomy     SURGICAL HISTORY OF -   10/2003    Bypass grafting     TONSILLECTOMY & ADENOIDECTOMY                Allergies:   Hydrocodone and Shellfish allergy       Data:   All laboratory data reviewed:    LAST CHOLESTEROL:  Lab Results   Component Value Date    CHOL 96 09/08/2017     Lab Results   Component Value Date    HDL 24 09/08/2017     Lab Results   Component Value Date    LDL 45 09/08/2017     Lab Results   Component Value Date    TRIG 136 09/08/2017     Lab Results   Component Value Date    CHOLHDLRATIO 4.6 06/12/2015     Last Basic Metabolic Panel:  Lab Results   Component Value Date     02/15/2018      Lab Results   Component Value Date    POTASSIUM 4.4 02/15/2018     Lab Results   Component Value Date    CHLORIDE 100 02/15/2018     Lab Results   Component Value Date    MARVA 9.1 02/15/2018     Lab Results   Component Value Date    CO2 30 02/15/2018     Lab Results   Component Value Date    BUN 29 02/15/2018     Lab Results   Component Value Date    CR 1.11 02/15/2018     Lab Results   Component Value Date     02/15/2018         LAST CBC:  Lab Results   Component Value Date    WBC 6.8 12/22/2017     Lab Results   Component Value Date    RBC 4.86 12/22/2017     Lab Results   Component Value Date    HGB 14.2 12/22/2017     Lab Results   Component Value Date    HCT 43.9 12/22/2017     Lab Results   Component Value Date    MCV 90 12/22/2017     Lab Results   Component Value Date    MCH 29.2 12/22/2017     Lab Results   Component Value Date    MCHC 32.3 12/22/2017     Lab  Results   Component Value Date    RDW 13.8 12/22/2017     Lab Results   Component Value Date     12/22/2017         Thank you for allowing me to participate in the care of your patient.    Sincerely,     ANA HAWK [facility]

## 2018-02-27 ENCOUNTER — HOSPITAL ENCOUNTER (OUTPATIENT)
Dept: CARDIOLOGY | Facility: CLINIC | Age: 68
Discharge: HOME | End: 2018-02-27
Attending: INTERNAL MEDICINE | Admitting: INTERNAL MEDICINE
Payer: MEDICARE

## 2018-02-27 ENCOUNTER — DOCUMENTATION ONLY (OUTPATIENT)
Dept: CARDIOLOGY | Facility: CLINIC | Age: 68
End: 2018-02-27

## 2018-02-27 PROCEDURE — 93289 INTERROG DEVICE EVAL HEART: CPT

## 2018-02-27 PROCEDURE — 93289 INTERROG DEVICE EVAL HEART: CPT | Mod: 26 | Performed by: INTERNAL MEDICINE

## 2018-02-27 NOTE — PROGRESS NOTES
Med[employer]onic Secura DR ICD Device Check/ [place]  AP: 23 % : 0.2 %  Mode: AAIR/DDDR        Underlying Rhythm: SR-SB in the 50's  Heart Rate: Histogram is stable with adequate HR  Sensing: Stable    Pacing Threshold: Stable    Impedance: WNL  Battery Status: 2.63V (RRT: 2.63V) HAS NOT REACHED OFFICIAL EDGAR   Device Site:No Issues  Atrial Arrhythmia: NONE  Ventricular Arrhythmia: NONE  ATP:     Shocks: NONE  Setting Change: NONE    Care Plan: RTC in 3 months. Alert tone for EDGAR demonstrated- pt instructed to call clinic should he hear this prior to next OV. Recent OV with Hattie; Lasix was decreased from 40mg to 20mg. He states he at times is dizzy and unsteady on his feet. No c/o of SOB. Will forward to Hattie bryant

## 2018-02-27 NOTE — ADDENDUM NOTE
Encounter addended by: Yamilet Richardson on: 2/27/2018  2:13 PM<BR>     Actions taken:  activity accessed, Charge Capture section accepted

## 2018-02-27 NOTE — PROGRESS NOTES
Please have Delbert see EP to discuss potential upgrade to a BiV or just a gen change as he will soon reach EDGAR. Thanks Hattie    ADDENDUM: Disc with patient. Pt saw Dr Camacho in 2013 and prefers to see him again. Appointment made for Tuesday 4/10/18 at 3:00pm. Pt verbalized understanding and agreed with plan. Lissett Sandoval RN Cardiology February 27, 2018, 10:46 AM

## 2018-03-13 DIAGNOSIS — K86.9 PANCREATIC DISEASE: Primary | ICD-10-CM

## 2018-04-10 ENCOUNTER — OFFICE VISIT (OUTPATIENT)
Dept: CARDIOLOGY | Facility: CLINIC | Age: 68
End: 2018-04-10
Payer: COMMERCIAL

## 2018-04-10 ENCOUNTER — HOSPITAL ENCOUNTER (OUTPATIENT)
Dept: CARDIOLOGY | Facility: CLINIC | Age: 68
Discharge: HOME | End: 2018-04-10
Attending: INTERNAL MEDICINE | Admitting: INTERNAL MEDICINE
Payer: MEDICARE

## 2018-04-10 VITALS
HEART RATE: 82 BPM | OXYGEN SATURATION: 97 % | WEIGHT: 163 LBS | DIASTOLIC BLOOD PRESSURE: 65 MMHG | BODY MASS INDEX: 22.11 KG/M2 | SYSTOLIC BLOOD PRESSURE: 112 MMHG

## 2018-04-10 DIAGNOSIS — I25.5 ISCHEMIC CARDIOMYOPATHY: ICD-10-CM

## 2018-04-10 DIAGNOSIS — I25.5 ISCHEMIC CARDIOMYOPATHY: Primary | ICD-10-CM

## 2018-04-10 PROCEDURE — 93010 ELECTROCARDIOGRAM REPORT: CPT | Performed by: INTERNAL MEDICINE

## 2018-04-10 PROCEDURE — 99215 OFFICE O/P EST HI 40 MIN: CPT | Performed by: INTERNAL MEDICINE

## 2018-04-10 PROCEDURE — 93005 ELECTROCARDIOGRAM TRACING: CPT

## 2018-04-10 NOTE — LETTER
4/10/2018    [facility]  [address] [address]    RE: Delbert Tilley       Dear Colleague,    I had the pleasure of seeing Delbert Tilley in the [facility].    HPI and Plan:   See dictation  [id number]  Orders Placed This Encounter   Procedures     EKG 12-LEAD CLINIC READ (Aleksander and Mario)- to be scheduled     ICD/Pacemaker/Loop Recorder Procedure       No orders of the defined types were placed in this encounter.      Medications Discontinued During This Encounter   Medication Reason     albuterol (PROAIR HFA/PROVENTIL HFA/VENTOLIN HFA) 108 (90 BASE) MCG/ACT Inhaler Therapy completed         Encounter Diagnosis   Name Primary?     Ischemic cardiomyopathy Yes       CURRENT MEDICATIONS:  Current Outpatient Prescriptions   Medication Sig Dispense Refill     amylase-lipase-protease (CREON 12) 50656 UNITS CPEP Take 1 capsule (12,000 Units) by mouth daily 120 capsule 0     furosemide (LASIX) 20 MG tablet Take 1 tablet (20 mg) by mouth daily       metolazone (ZAROXOLYN) 2.5 MG tablet Take 2.5 mg on Tuesday, 1/30/18 and Thursday 2/1/18. Take this 1/2 hour before taking your Lasix (furosemide) 4 tablet 0     losartan (COZAAR) 25 MG tablet Take 1 tablet (25 mg) by mouth daily 30 tablet 11     aspirin 325 MG tablet Take 3 tablets (975 mg) by mouth every morning (Takes 2 x 500 mg = 1000 mg) 120 tablet 1     Acetaminophen (TYLENOL PO) Take 1,000 mg by mouth every morning (Takes 2 x 500 mg = 1000 mg)       insulin glargine (LANTUS) 100 UNIT/ML injection Inject 27 Units Subcutaneous every morning       Nitroglycerin (NITROSTAT SL) Place 0.4 mg under the tongue every 5 minutes as needed for chest pain       METFORMIN HCL PO Take 1,000 mg by mouth 2 times daily (Takes 2 x 500 mg = 1000 mg dose)       METOPROLOL SUCCINATE ER PO Take 12.5 mg by mouth every morning (Takes 0.5 x 25 mg tablet = 12.5 mg)       atorvastatin (LIPITOR) 80 MG tablet Take 80 mg by mouth At Bedtime         glipiZIDE (GLUCOTROL) 10 MG tablet Take 10 mg by mouth 2 times daily        pantoprazole (PROTONIX) 40 MG EC tablet Take 40 mg by mouth daily         ALLERGIES     Allergies   Allergen Reactions     Hydrocodone      Upset stomach     Shellfish Allergy Hives and Rash       PAST MEDICAL HISTORY:  Past Medical History:   Diagnosis Date     Acute renal failure (H) 8/26/06 Hosp    secondary to dehydration     Arthritis      CAD (coronary artery disease)     LIMA to the LAD, vein graft to OM and a vein graft to the PDA     Carpal tunnel syndrome      Diabetes (H)      Gastro-oesophageal reflux disease      H/O: alcohol abuse      HTN (hypertension)      Hyperlipidaemia      Hyperlipidaemia LDL goal <100 7/8/2013     Hypokalemia      Pacemaker      Pancreatitis 6/26/06 Hosp       PAST SURGICAL HISTORY:  Past Surgical History:   Procedure Laterality Date     BYPASS CARDIOPULMONARY       CATARACT IOL, RT/LT      Cataract IOL RT/LT     ENDARTERECTOMY CAROTID Right 6/12/2015    Procedure: ENDARTERECTOMY CAROTID;  Surgeon: João Turner MD;  Location: [facility] OR     ENDARTERECTOMY CAROTID Left 9/8/2017    Procedure: ENDARTERECTOMY CAROTID;  LEFT CAROTID ENDARTERECTOMY WITH EEG;  Surgeon: João Turner MD;  Location:  OR     IMPLANT PACEMAKER  6/10/2010     RELEASE CARPAL TUNNEL Right 4/12/2016    Procedure: RELEASE CARPAL TUNNEL;  Surgeon: Lissy Leger MD;  Location: [facility] OR     SURGICAL HISTORY OF -   1998    Laminectomy     SURGICAL HISTORY OF -   10/2003    Bypass grafting     TONSILLECTOMY & ADENOIDECTOMY         FAMILY HISTORY:  Family History   Problem Relation Age of Onset     Unknown/Adopted No family hx of        SOCIAL HISTORY:  Social History     Social History     Marital status: Single     Spouse name: N/A     Number of children: N/A     Years of education: N/A     Social History Main Topics     Smoking status: Current Every Day Smoker     Packs/day: 2.00     Types: Cigarettes      Smokeless tobacco: Never Used      Comment: 1 1/2 PPD 2/16/18     Alcohol use No     Drug use: No     Sexual activity: Not Currently     Partners: Female     Other Topics Concern     Parent/Sibling W/ Cabg, Mi Or Angioplasty Before 65f 55m? No     Social History Narrative       Review of Systems:  Skin:  Negative       Eyes:  Positive for glasses    ENT:  Negative      Respiratory:  Positive for shortness of breath     Cardiovascular:    Positive for;lower extremity symptoms;edema    Gastroenterology: Negative      Genitourinary:  Negative      Musculoskeletal:  Positive for joint pain;joint swelling;joint stiffness    Neurologic:  Positive for numbness or tingling of hands;numbness or tingling of feet    Psychiatric:  Negative      Heme/Lymph/Imm:  Positive for allergies    Endocrine:  Positive for diabetes      Physical Exam:  Vitals: /65 (BP Location: Right arm, Patient Position: Sitting, Cuff Size: Adult Regular)  Pulse 82  Wt 73.9 kg (163 lb)  SpO2 97%  BMI 22.11 kg/m2    Constitutional:  cooperative, alert and oriented, well developed, well nourished, in no acute distress        Skin:  warm and dry to the touch, no apparent skin lesions or masses noted          Head:  normocephalic, no masses or lesions        Eyes:  pupils equal and round, conjunctivae and lids unremarkable, sclera white, no xanthalasma, EOMS intact, no nystagmus        Lymph:No Cervical lymphadenopathy present     ENT:  no pallor or cyanosis, dentition good        Neck:  carotid pulses are full and equal bilaterally, JVP normal, no carotid bruit        Respiratory:  normal breath sounds, clear to auscultation, normal A-P diameter, normal symmetry, normal respiratory excursion, no use of accessory muscles         Cardiac: regular rhythm, normal S1/S2, no S3 or S4, apical impulse not displaced, no murmurs, gallops or rubs                pulses full and equal, no bruits auscultated                                        GI:  abdomen  soft, non-tender, BS normoactive, no mass, no HSM, no bruits        Extremities and Muscular Skeletal:  no deformities, clubbing, cyanosis, erythema observed              Neurological:  no gross motor deficits        Psych:  Alert and Oriented x 3        CC  No referring provider defined for this encounter.                Thank you for allowing me to participate in the care of your patient.      Sincerely,     Aren Baxter MD     [facility]    cc:   No referring provider defined for this encounter.

## 2018-04-10 NOTE — MR AVS SNAPSHOT
"              After Visit Summary   4/10/2018    Delbert Tilley    MRN: [id number]           Patient Information     Date Of Birth          1950        Visit Information        Provider Department      4/10/2018 3:00 PM Aren Camacho MD [facility]        Today's Diagnoses     Ischemic cardiomyopathy    -  1       Follow-ups after your visit        Your next 10 appointments already scheduled     May 15, 2018  8:00 AM CDT   ICD Check with Wy Device Rn   [facility] ([facility])    [address] [address]   [phone]              Future tests that were ordered for you today     Open Future Orders        Priority Expected Expires Ordered    ICD/Pacemaker/Loop Recorder Procedure Routine 4/17/2018 4/10/2019 4/10/2018    EKG 12-LEAD CLINIC READ ([facility])- to be scheduled Routine 4/10/2018 4/10/2019 4/10/2018            Who to contact     If you have questions or need follow up information about today's clinic visit or your schedule please contact [facility] directly at [phone].  Normal or non-critical lab and imaging results will be communicated to you by JumpCamhart, letter or phone within 4 business days after the clinic has received the results. If you do not hear from us within 7 days, please contact the clinic through Funambolt or phone. If you have a critical or abnormal lab result, we will notify you by phone as soon as possible.  Submit refill requests through Contentful or call your pharmacy and they will forward the refill request to us. Please allow 3 business days for your refill to be completed.          Additional Information About Your Visit        [employer] Information     Contentful lets you send messages to your doctor, view your test results, renew your prescriptions, schedule appointments and more. To sign up, go to [email or web address] . Click on \"Log " "in\" on the left side of the screen, which will take you to the Welcome page. Then click on \"Sign up Now\" on the right side of the page.     You will be asked to enter the access code listed below, as well as some personal information. Please follow the directions to create your username and password.     Your access code is: [email or web address]  Expires: 2018  8:21 AM     Your access code will  in 90 days. If you need help or a new code, please call your [facility] clinic or [phone].        Care EveryWhere ID     This is your Care EveryWhere ID. This could be used by other organizations to access your [facility] medical records  [id number]        Your Vitals Were     Pulse Pulse Oximetry BMI (Body Mass Index)             82 97% 22.11 kg/m2          Blood Pressure from Last 3 Encounters:   04/10/18 112/65   18 120/89   18 109/71    Weight from Last 3 Encounters:   04/10/18 73.9 kg (163 lb)   18 71 kg (156 lb 9.6 oz)   18 80.7 kg (178 lb)               Primary Care Provider Fax #    [facility] [phone] [address] [address]        Equal Access to Services     MARITA LAMA : Hadii brett ku hadasho Soomaali, waaxda luqadaha, qaybta kaalmada adeegyada, waxay sanaz sykes. So [facility] [phone].    ATENCIÓN: Si habla español, tiene a frey disposición servicios gratuitos de asistencia lingüística. Llame al [phone].    We comply with applicable federal civil rights laws and [place] laws. We do not discriminate on the basis of race, color, national origin, age, disability, sex, sexual orientation, or gender identity.            Thank you!     Thank you for choosing [facility]  for your care. Our goal is always to provide you with excellent care. Hearing back from our patients is one way we can continue to improve our services. Please take a few minutes to complete the written survey that " you may receive in the mail after your visit with us. Thank you!             Your Updated Medication List - Protect others around you: Learn how to safely use, store and throw away your medicines at www.disposemymeds.org.          This list is accurate as of 4/10/18  4:13 PM.  Always use your most recent med list.                   Brand Name Dispense Instructions for use Diagnosis    amylase-lipase-protease 91354 units Cpep    CREON 12    120 capsule    Take 1 capsule (12,000 Units) by mouth daily    Pancreatic disease       aspirin 325 MG tablet     120 tablet    Take 3 tablets (975 mg) by mouth every morning (Takes 2 x 500 mg = 1000 mg)    Left carotid stenosis       atorvastatin 80 MG tablet    LIPITOR     Take 80 mg by mouth At Bedtime        furosemide 20 MG tablet    LASIX     Take 1 tablet (20 mg) by mouth daily    Acute on chronic systolic congestive heart failure (H)       glipiZIDE 10 MG tablet    GLUCOTROL     Take 10 mg by mouth 2 times daily        insulin glargine 100 UNIT/ML injection    LANTUS     Inject 27 Units Subcutaneous every morning        losartan 25 MG tablet    COZAAR    30 tablet    Take 1 tablet (25 mg) by mouth daily    Ischemic cardiomyopathy       METFORMIN HCL PO      Take 1,000 mg by mouth 2 times daily (Takes 2 x 500 mg = 1000 mg dose)        metolazone 2.5 MG tablet    ZAROXOLYN    4 tablet    Take 2.5 mg on Tuesday, 1/30/18 and Thursday 2/1/18. Take this 1/2 hour before taking your Lasix (furosemide)    Edema, Ischemic cardiomyopathy       METOPROLOL SUCCINATE ER PO      Take 12.5 mg by mouth every morning (Takes 0.5 x 25 mg tablet = 12.5 mg)        NITROSTAT SL      Place 0.4 mg under the tongue every 5 minutes as needed for chest pain        pantoprazole 40 MG EC tablet    PROTONIX     Take 40 mg by mouth daily        TYLENOL PO      Take 1,000 mg by mouth every morning (Takes 2 x 500 mg = 1000 mg)

## 2018-04-10 NOTE — LETTER
4/10/2018      [facility]  [address] [address]      RE: Carlin Queen       Dear Colleague,    I had the pleasure of seeing Carlin Queen in the [facility].    Service Date: 04/10/2018      HISTORY OF PRESENT ILLNESS:  It was my pleasure to see Carlin today for an updated history and physical prior to replacing his ICD.  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  As you may recall, the patient had an ICD implanted for primary prevention and had atrial flutter ablation in the past.  We did not detect any evidence of atrial arrhythmias on his device interrogations.  I stopped his warfarin at that time, and the patient doing well from that perspective without any recurrence of his atrial tachyarrhythmias.      His device is at EDGAR, which is almost 12 years old now.  His LV function is reduced down to 20%-25%.  Unfortunately, the patient does not have a widened QRS width, measured at only 122 milliseconds, and therefore CRT is unlikely to be beneficial for the patient.  I would agree with replacing his dual-chamber ICD generator.  However, given that he is at risk for sudden cardiac death still, even though the patient has not had any ICD therapies since the device was implanted.  I went over the procedure in detail with risks including but not limited to vascular injury, infection.  We will contact the patient to schedule this procedure.         JOSEPHINE BRISCOE MD             D: 04/10/2018   T: 2018   MT: JAY      Name:     CARLIN QUEEN   MRN:      [id number]        Account:      [id number]   :      1950           Service Date: 04/10/2018      Document: [id number]         Outpatient Encounter Prescriptions as of 4/10/2018   Medication Sig Dispense Refill     amylase-lipase-protease (CREON 12)  91635 UNITS CPEP Take 1 capsule (12,000 Units) by mouth daily 120 capsule 0     furosemide (LASIX) 20 MG tablet Take 1 tablet (20 mg) by mouth daily       metolazone (ZAROXOLYN) 2.5 MG tablet Take 2.5 mg on Tuesday, 1/30/18 and Thursday 2/1/18. Take this 1/2 hour before taking your Lasix (furosemide) 4 tablet 0     losartan (COZAAR) 25 MG tablet Take 1 tablet (25 mg) by mouth daily 30 tablet 11     aspirin 325 MG tablet Take 3 tablets (975 mg) by mouth every morning (Takes 2 x 500 mg = 1000 mg) 120 tablet 1     Acetaminophen (TYLENOL PO) Take 1,000 mg by mouth every morning (Takes 2 x 500 mg = 1000 mg)       insulin glargine (LANTUS) 100 UNIT/ML injection Inject 27 Units Subcutaneous every morning       Nitroglycerin (NITROSTAT SL) Place 0.4 mg under the tongue every 5 minutes as needed for chest pain       METFORMIN HCL PO Take 1,000 mg by mouth 2 times daily (Takes 2 x 500 mg = 1000 mg dose)       METOPROLOL SUCCINATE ER PO Take 12.5 mg by mouth every morning (Takes 0.5 x 25 mg tablet = 12.5 mg)       atorvastatin (LIPITOR) 80 MG tablet Take 80 mg by mouth At Bedtime        glipiZIDE (GLUCOTROL) 10 MG tablet Take 10 mg by mouth 2 times daily        pantoprazole (PROTONIX) 40 MG EC tablet Take 40 mg by mouth daily       [DISCONTINUED] albuterol (PROAIR HFA/PROVENTIL HFA/VENTOLIN HFA) 108 (90 BASE) MCG/ACT Inhaler Inhale 2 puffs into the lungs every 6 hours as needed for shortness of breath / dyspnea or wheezing 1 Inhaler 0     No facility-administered encounter medications on file as of 4/10/2018.        Again, thank you for allowing me to participate in the care of your patient.      Sincerely,    Aren Baxter MD     [facility]

## 2018-04-10 NOTE — PROGRESS NOTES
HPI and Plan:   See dictation  [id number]  Orders Placed This Encounter   Procedures     EKG 12-LEAD CLINIC READ ([facility])- to be scheduled     ICD/Pacemaker/Loop Recorder Procedure       No orders of the defined types were placed in this encounter.      Medications Discontinued During This Encounter   Medication Reason     albuterol (PROAIR HFA/PROVENTIL HFA/VENTOLIN HFA) 108 (90 BASE) MCG/ACT Inhaler Therapy completed         Encounter Diagnosis   Name Primary?     Ischemic cardiomyopathy Yes       CURRENT MEDICATIONS:  Current Outpatient Prescriptions   Medication Sig Dispense Refill     amylase-lipase-protease (CREON 12) 47588 UNITS CPEP Take 1 capsule (12,000 Units) by mouth daily 120 capsule 0     furosemide (LASIX) 20 MG tablet Take 1 tablet (20 mg) by mouth daily       metolazone (ZAROXOLYN) 2.5 MG tablet Take 2.5 mg on Tuesday, 1/30/18 and Thursday 2/1/18. Take this 1/2 hour before taking your Lasix (furosemide) 4 tablet 0     losartan (COZAAR) 25 MG tablet Take 1 tablet (25 mg) by mouth daily 30 tablet 11     aspirin 325 MG tablet Take 3 tablets (975 mg) by mouth every morning (Takes 2 x 500 mg = 1000 mg) 120 tablet 1     Acetaminophen (TYLENOL PO) Take 1,000 mg by mouth every morning (Takes 2 x 500 mg = 1000 mg)       insulin glargine (LANTUS) 100 UNIT/ML injection Inject 27 Units Subcutaneous every morning       Nitroglycerin (NITROSTAT SL) Place 0.4 mg under the tongue every 5 minutes as needed for chest pain       METFORMIN HCL PO Take 1,000 mg by mouth 2 times daily (Takes 2 x 500 mg = 1000 mg dose)       METOPROLOL SUCCINATE ER PO Take 12.5 mg by mouth every morning (Takes 0.5 x 25 mg tablet = 12.5 mg)       atorvastatin (LIPITOR) 80 MG tablet Take 80 mg by mouth At Bedtime        glipiZIDE (GLUCOTROL) 10 MG tablet Take 10 mg by mouth 2 times daily        pantoprazole (PROTONIX) 40 MG EC tablet Take 40 mg by mouth daily         ALLERGIES     Allergies   Allergen Reactions     Hydrocodone       Upset stomach     Shellfish Allergy Hives and Rash       PAST MEDICAL HISTORY:  Past Medical History:   Diagnosis Date     Acute renal failure (H) 8/26/06 Hosp    secondary to dehydration     Arthritis      CAD (coronary artery disease)     LIMA to the LAD, vein graft to OM and a vein graft to the PDA     Carpal tunnel syndrome      Diabetes (H)      Gastro-oesophageal reflux disease      H/O: alcohol abuse      HTN (hypertension)      Hyperlipidaemia      Hyperlipidaemia LDL goal <100 7/8/2013     Hypokalemia      Pacemaker      Pancreatitis 6/26/06 Hosp       PAST SURGICAL HISTORY:  Past Surgical History:   Procedure Laterality Date     BYPASS CARDIOPULMONARY       CATARACT IOL, RT/LT      Cataract IOL RT/LT     ENDARTERECTOMY CAROTID Right 6/12/2015    Procedure: ENDARTERECTOMY CAROTID;  Surgeon: João Turner MD;  Location: [facility] OR     ENDARTERECTOMY CAROTID Left 9/8/2017    Procedure: ENDARTERECTOMY CAROTID;  LEFT CAROTID ENDARTERECTOMY WITH EEG;  Surgeon: João Turner MD;  Location: [facility] OR     IMPLANT PACEMAKER  6/10/2010     RELEASE CARPAL TUNNEL Right 4/12/2016    Procedure: RELEASE CARPAL TUNNEL;  Surgeon: Lissy Leger MD;  Location: [facility] OR     SURGICAL HISTORY OF -   1998    Laminectomy     SURGICAL HISTORY OF -   10/2003    Bypass grafting     TONSILLECTOMY & ADENOIDECTOMY         FAMILY HISTORY:  Family History   Problem Relation Age of Onset     Unknown/Adopted No family hx of        SOCIAL HISTORY:  Social History     Social History     Marital status: Single     Spouse name: N/A     Number of children: N/A     Years of education: N/A     Social History Main Topics     Smoking status: Current Every Day Smoker     Packs/day: 2.00     Types: Cigarettes     Smokeless tobacco: Never Used      Comment: 1 1/2 PPD 2/16/18     Alcohol use No     Drug use: No     Sexual activity: Not Currently     Partners: Female     Other Topics Concern     Parent/Sibling W/ Cabg, Mi Or Angioplasty  Before 65f 55m? No     Social History Narrative       Review of Systems:  Skin:  Negative       Eyes:  Positive for glasses    ENT:  Negative      Respiratory:  Positive for shortness of breath     Cardiovascular:    Positive for;lower extremity symptoms;edema    Gastroenterology: Negative      Genitourinary:  Negative      Musculoskeletal:  Positive for joint pain;joint swelling;joint stiffness    Neurologic:  Positive for numbness or tingling of hands;numbness or tingling of feet    Psychiatric:  Negative      Heme/Lymph/Imm:  Positive for allergies    Endocrine:  Positive for diabetes      Physical Exam:  Vitals: /65 (BP Location: Right arm, Patient Position: Sitting, Cuff Size: Adult Regular)  Pulse 82  Wt 73.9 kg (163 lb)  SpO2 97%  BMI 22.11 kg/m2    Constitutional:  cooperative, alert and oriented, well developed, well nourished, in no acute distress        Skin:  warm and dry to the touch, no apparent skin lesions or masses noted          Head:  normocephalic, no masses or lesions        Eyes:  pupils equal and round, conjunctivae and lids unremarkable, sclera white, no xanthalasma, EOMS intact, no nystagmus        Lymph:No Cervical lymphadenopathy present     ENT:  no pallor or cyanosis, dentition good        Neck:  carotid pulses are full and equal bilaterally, JVP normal, no carotid bruit        Respiratory:  normal breath sounds, clear to auscultation, normal A-P diameter, normal symmetry, normal respiratory excursion, no use of accessory muscles         Cardiac: regular rhythm, normal S1/S2, no S3 or S4, apical impulse not displaced, no murmurs, gallops or rubs                pulses full and equal, no bruits auscultated                                        GI:  abdomen soft, non-tender, BS normoactive, no mass, no HSM, no bruits        Extremities and Muscular Skeletal:  no deformities, clubbing, cyanosis, erythema observed              Neurological:  no gross motor deficits        Psych:   Alert and Oriented x 3        CC  No referring provider defined for this encounter.

## 2018-04-11 NOTE — PROGRESS NOTES
Service Date: 04/10/2018      HISTORY OF PRESENT ILLNESS:  It was my pleasure to see Carlin today for an updated history and physical prior to replacing his ICD.  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  As you may recall, the patient had an ICD implanted for primary prevention and had atrial flutter ablation in the past.  We did not detect any evidence of atrial arrhythmias on his device interrogations.  I stopped his warfarin at that time, and the patient doing well from that perspective without any recurrence of his atrial tachyarrhythmias.      His device is at EDGAR, which is almost 12 years old now.  His LV function is reduced down to 20%-25%.  Unfortunately, the patient does not have a widened QRS width, measured at only 122 milliseconds, and therefore CRT is unlikely to be beneficial for the patient.  I would agree with replacing his dual-chamber ICD generator.  However, given that he is at risk for sudden cardiac death still, even though the patient has not had any ICD therapies since the device was implanted.  I went over the procedure in detail with risks including but not limited to vascular injury, infection.  We will contact the patient to schedule this procedure.         JOSEPHINE BRISCOE MD             D: 04/10/2018   T: 2018   MT: JAY      Name:     CARLIN QUEEN   MRN:      [id number]        Account:      [id number]   :      1950           Service Date: 04/10/2018      Document: [id number]

## 2018-04-13 ENCOUNTER — TELEPHONE (OUTPATIENT)
Dept: CARDIOLOGY | Facility: CLINIC | Age: 68
End: 2018-04-13

## 2018-04-13 RX ORDER — CEFAZOLIN SODIUM 2 G/100ML
2 INJECTION, SOLUTION INTRAVENOUS
Status: CANCELLED | OUTPATIENT
Start: 2018-04-13

## 2018-04-13 RX ORDER — LIDOCAINE 40 MG/G
CREAM TOPICAL
Status: CANCELLED | OUTPATIENT
Start: 2018-04-13

## 2018-04-13 RX ORDER — SODIUM CHLORIDE 450 MG/100ML
INJECTION, SOLUTION INTRAVENOUS CONTINUOUS
Status: CANCELLED | OUTPATIENT
Start: 2018-04-18

## 2018-04-13 NOTE — PROGRESS NOTES
Writer attempted to call pt with pre-procedural teaching, but no answer. VM left to return our phone call. ELVIN Van RN.

## 2018-04-17 RX ORDER — LIDOCAINE 40 MG/G
CREAM TOPICAL
Status: CANCELLED | OUTPATIENT
Start: 2018-04-17

## 2018-04-17 RX ORDER — SODIUM CHLORIDE 450 MG/100ML
INJECTION, SOLUTION INTRAVENOUS CONTINUOUS
Status: CANCELLED | OUTPATIENT
Start: 2018-04-17

## 2018-04-17 RX ORDER — CEFAZOLIN SODIUM 2 G/100ML
2 INJECTION, SOLUTION INTRAVENOUS
Status: CANCELLED | OUTPATIENT
Start: 2018-04-17

## 2018-04-17 NOTE — TELEPHONE ENCOUNTER
Called patient with pre-procedure instructions for device implant:     Anticoagulation: none   Oral diabetes meds: Hold  Insulin: Hold  Diuretic: Hold  Contrast allergy: none  Pt informed to be NPO at midnight  (if procedure scheduled 1pm or later, may have clear liquid breakfast before 8am)    Pt has post-procedure transportation and 24 hours monitoring set up.   Pt aware of no driving for 24 hours post procedure due to sedation.     Pt aware of arrival time and location. Pt verbalized understanding of instructions. Esperanza

## 2018-04-18 ENCOUNTER — APPOINTMENT (OUTPATIENT)
Dept: CARDIOLOGY | Facility: CLINIC | Age: 68
End: 2018-04-18
Attending: INTERNAL MEDICINE
Payer: MEDICARE

## 2018-04-18 ENCOUNTER — HOSPITAL ENCOUNTER (OUTPATIENT)
Facility: CLINIC | Age: 68
Discharge: HOME | End: 2018-04-18
Attending: INTERNAL MEDICINE | Admitting: INTERNAL MEDICINE
Payer: MEDICARE

## 2018-04-18 VITALS
HEART RATE: 66 BPM | BODY MASS INDEX: 22.08 KG/M2 | HEIGHT: 72 IN | SYSTOLIC BLOOD PRESSURE: 90 MMHG | DIASTOLIC BLOOD PRESSURE: 56 MMHG | WEIGHT: 163 LBS | OXYGEN SATURATION: 96 % | RESPIRATION RATE: 16 BRPM | TEMPERATURE: 98.3 F

## 2018-04-18 DIAGNOSIS — I25.5 ISCHEMIC CARDIOMYOPATHY: ICD-10-CM

## 2018-04-18 LAB
ANION GAP SERPL CALCULATED.3IONS-SCNC: 7 MMOL/L (ref 3–14)
BUN SERPL-MCNC: 25 MG/DL (ref 7–30)
CALCIUM SERPL-MCNC: 8.4 MG/DL (ref 8.5–10.1)
CHLORIDE SERPL-SCNC: 105 MMOL/L (ref 94–109)
CO2 SERPL-SCNC: 25 MMOL/L (ref 20–32)
CREAT SERPL-MCNC: 1.01 MG/DL (ref 0.66–1.25)
ERYTHROCYTE [DISTWIDTH] IN BLOOD BY AUTOMATED COUNT: 15.4 % (ref 10–15)
GFR SERPL CREATININE-BSD FRML MDRD: 73 ML/MIN/1.7M2
GLUCOSE BLDC GLUCOMTR-MCNC: 146 MG/DL (ref 70–99)
GLUCOSE SERPL-MCNC: 165 MG/DL (ref 70–99)
HCT VFR BLD AUTO: 41 % (ref 40–53)
HGB BLD-MCNC: 13.2 G/DL (ref 13.3–17.7)
MCH RBC QN AUTO: 28.6 PG (ref 26.5–33)
MCHC RBC AUTO-ENTMCNC: 32.2 G/DL (ref 31.5–36.5)
MCV RBC AUTO: 89 FL (ref 78–100)
PLATELET # BLD AUTO: 101 10E9/L (ref 150–450)
POTASSIUM SERPL-SCNC: 4.3 MMOL/L (ref 3.4–5.3)
RBC # BLD AUTO: 4.62 10E12/L (ref 4.4–5.9)
SODIUM SERPL-SCNC: 137 MMOL/L (ref 133–144)
WBC # BLD AUTO: 7.2 10E9/L (ref 4–11)

## 2018-04-18 PROCEDURE — 99153 MOD SED SAME PHYS/QHP EA: CPT

## 2018-04-18 PROCEDURE — 25000125 ZZHC RX 250: Performed by: INTERNAL MEDICINE

## 2018-04-18 PROCEDURE — 36415 COLL VENOUS BLD VENIPUNCTURE: CPT | Performed by: INTERNAL MEDICINE

## 2018-04-18 PROCEDURE — C1721 AICD, DUAL CHAMBER: HCPCS

## 2018-04-18 PROCEDURE — 40000852 ZZH STATISTIC HEART CATH LAB OR EP LAB

## 2018-04-18 PROCEDURE — 40000235 ZZH STATISTIC TELEMETRY

## 2018-04-18 PROCEDURE — 93010 ELECTROCARDIOGRAM REPORT: CPT | Performed by: INTERNAL MEDICINE

## 2018-04-18 PROCEDURE — 40000065 ZZH STATISTIC EKG NON-CHARGEABLE

## 2018-04-18 PROCEDURE — 27210784 ZZH KIT PACEMAKER CR8

## 2018-04-18 PROCEDURE — 85027 COMPLETE CBC AUTOMATED: CPT | Performed by: INTERNAL MEDICINE

## 2018-04-18 PROCEDURE — 80048 BASIC METABOLIC PNL TOTAL CA: CPT | Performed by: INTERNAL MEDICINE

## 2018-04-18 PROCEDURE — 99152 MOD SED SAME PHYS/QHP 5/>YRS: CPT

## 2018-04-18 PROCEDURE — 25000128 H RX IP 250 OP 636: Performed by: INTERNAL MEDICINE

## 2018-04-18 PROCEDURE — 82962 GLUCOSE BLOOD TEST: CPT

## 2018-04-18 PROCEDURE — 33263 RMVL & RPLCMT DFB GEN 2 LEAD: CPT

## 2018-04-18 PROCEDURE — 99152 MOD SED SAME PHYS/QHP 5/>YRS: CPT | Performed by: INTERNAL MEDICINE

## 2018-04-18 PROCEDURE — 27210795 ZZH PAD DEFIB QUICK CR4

## 2018-04-18 PROCEDURE — 93005 ELECTROCARDIOGRAM TRACING: CPT

## 2018-04-18 PROCEDURE — 27210995 ZZH RX 272: Performed by: INTERNAL MEDICINE

## 2018-04-18 PROCEDURE — 33263 RMVL & RPLCMT DFB GEN 2 LEAD: CPT | Performed by: INTERNAL MEDICINE

## 2018-04-18 RX ORDER — BUPIVACAINE HYDROCHLORIDE 2.5 MG/ML
10-30 INJECTION, SOLUTION EPIDURAL; INFILTRATION; INTRACAUDAL
Status: COMPLETED | OUTPATIENT
Start: 2018-04-18 | End: 2018-04-18

## 2018-04-18 RX ORDER — IBUTILIDE FUMARATE 1 MG/10ML
1 INJECTION, SOLUTION INTRAVENOUS
Status: DISCONTINUED | OUTPATIENT
Start: 2018-04-18 | End: 2018-04-18 | Stop reason: HOSPADM

## 2018-04-18 RX ORDER — PROTAMINE SULFATE 10 MG/ML
5-40 INJECTION, SOLUTION INTRAVENOUS EVERY 10 MIN PRN
Status: DISCONTINUED | OUTPATIENT
Start: 2018-04-18 | End: 2018-04-18 | Stop reason: HOSPADM

## 2018-04-18 RX ORDER — PROTAMINE SULFATE 10 MG/ML
1-5 INJECTION, SOLUTION INTRAVENOUS
Status: DISCONTINUED | OUTPATIENT
Start: 2018-04-18 | End: 2018-04-18 | Stop reason: HOSPADM

## 2018-04-18 RX ORDER — IBUTILIDE FUMARATE 1 MG/10ML
0.01 INJECTION, SOLUTION INTRAVENOUS
Status: DISCONTINUED | OUTPATIENT
Start: 2018-04-18 | End: 2018-04-18 | Stop reason: HOSPADM

## 2018-04-18 RX ORDER — CEFAZOLIN SODIUM 2 G/100ML
2 INJECTION, SOLUTION INTRAVENOUS
Status: COMPLETED | OUTPATIENT
Start: 2018-04-18 | End: 2018-04-18

## 2018-04-18 RX ORDER — FUROSEMIDE 10 MG/ML
20-100 INJECTION INTRAMUSCULAR; INTRAVENOUS
Status: DISCONTINUED | OUTPATIENT
Start: 2018-04-18 | End: 2018-04-18 | Stop reason: HOSPADM

## 2018-04-18 RX ORDER — FLUMAZENIL 0.1 MG/ML
0.2 INJECTION, SOLUTION INTRAVENOUS
Status: DISCONTINUED | OUTPATIENT
Start: 2018-04-18 | End: 2018-04-18 | Stop reason: HOSPADM

## 2018-04-18 RX ORDER — ACETAMINOPHEN 325 MG/1
650 TABLET ORAL EVERY 4 HOURS PRN
Status: DISCONTINUED | OUTPATIENT
Start: 2018-04-18 | End: 2018-04-18 | Stop reason: HOSPADM

## 2018-04-18 RX ORDER — HEPARIN SODIUM 1000 [USP'U]/ML
1000-10000 INJECTION, SOLUTION INTRAVENOUS; SUBCUTANEOUS EVERY 5 MIN PRN
Status: DISCONTINUED | OUTPATIENT
Start: 2018-04-18 | End: 2018-04-18 | Stop reason: HOSPADM

## 2018-04-18 RX ORDER — LIDOCAINE HYDROCHLORIDE 10 MG/ML
10-30 INJECTION, SOLUTION EPIDURAL; INFILTRATION; INTRACAUDAL; PERINEURAL
Status: DISCONTINUED | OUTPATIENT
Start: 2018-04-18 | End: 2018-04-18 | Stop reason: HOSPADM

## 2018-04-18 RX ORDER — LIDOCAINE 40 MG/G
CREAM TOPICAL
Status: DISCONTINUED | OUTPATIENT
Start: 2018-04-18 | End: 2018-04-18 | Stop reason: HOSPADM

## 2018-04-18 RX ORDER — NALOXONE HYDROCHLORIDE 0.4 MG/ML
.1-.4 INJECTION, SOLUTION INTRAMUSCULAR; INTRAVENOUS; SUBCUTANEOUS
Status: DISCONTINUED | OUTPATIENT
Start: 2018-04-18 | End: 2018-04-18 | Stop reason: HOSPADM

## 2018-04-18 RX ORDER — NALOXONE HYDROCHLORIDE 0.4 MG/ML
0.4 INJECTION, SOLUTION INTRAMUSCULAR; INTRAVENOUS; SUBCUTANEOUS EVERY 5 MIN PRN
Status: DISCONTINUED | OUTPATIENT
Start: 2018-04-18 | End: 2018-04-18 | Stop reason: HOSPADM

## 2018-04-18 RX ORDER — FENTANYL CITRATE 50 UG/ML
25-50 INJECTION, SOLUTION INTRAMUSCULAR; INTRAVENOUS
Status: DISCONTINUED | OUTPATIENT
Start: 2018-04-18 | End: 2018-04-18 | Stop reason: HOSPADM

## 2018-04-18 RX ORDER — KETOROLAC TROMETHAMINE 30 MG/ML
15 INJECTION, SOLUTION INTRAMUSCULAR; INTRAVENOUS
Status: DISCONTINUED | OUTPATIENT
Start: 2018-04-18 | End: 2018-04-18 | Stop reason: HOSPADM

## 2018-04-18 RX ORDER — LIDOCAINE HYDROCHLORIDE AND EPINEPHRINE 10; 10 MG/ML; UG/ML
10-30 INJECTION, SOLUTION INFILTRATION; PERINEURAL
Status: DISCONTINUED | OUTPATIENT
Start: 2018-04-18 | End: 2018-04-18 | Stop reason: HOSPADM

## 2018-04-18 RX ORDER — DOBUTAMINE HYDROCHLORIDE 200 MG/100ML
5-40 INJECTION INTRAVENOUS CONTINUOUS PRN
Status: DISCONTINUED | OUTPATIENT
Start: 2018-04-18 | End: 2018-04-18 | Stop reason: HOSPADM

## 2018-04-18 RX ORDER — ATROPINE SULFATE 0.1 MG/ML
.5-1 INJECTION INTRAVENOUS
Status: DISCONTINUED | OUTPATIENT
Start: 2018-04-18 | End: 2018-04-18 | Stop reason: HOSPADM

## 2018-04-18 RX ORDER — LORAZEPAM 2 MG/ML
.5-2 INJECTION INTRAMUSCULAR EVERY 10 MIN PRN
Status: DISCONTINUED | OUTPATIENT
Start: 2018-04-18 | End: 2018-04-18 | Stop reason: HOSPADM

## 2018-04-18 RX ORDER — MORPHINE SULFATE 2 MG/ML
1-2 INJECTION, SOLUTION INTRAMUSCULAR; INTRAVENOUS EVERY 5 MIN PRN
Status: DISCONTINUED | OUTPATIENT
Start: 2018-04-18 | End: 2018-04-18 | Stop reason: HOSPADM

## 2018-04-18 RX ORDER — PROMETHAZINE HYDROCHLORIDE 25 MG/ML
6.25-25 INJECTION, SOLUTION INTRAMUSCULAR; INTRAVENOUS EVERY 4 HOURS PRN
Status: DISCONTINUED | OUTPATIENT
Start: 2018-04-18 | End: 2018-04-18 | Stop reason: HOSPADM

## 2018-04-18 RX ORDER — DIPHENHYDRAMINE HYDROCHLORIDE 50 MG/ML
25-50 INJECTION INTRAMUSCULAR; INTRAVENOUS
Status: DISCONTINUED | OUTPATIENT
Start: 2018-04-18 | End: 2018-04-18 | Stop reason: HOSPADM

## 2018-04-18 RX ORDER — BUPIVACAINE HYDROCHLORIDE 2.5 MG/ML
10-30 INJECTION, SOLUTION EPIDURAL; INFILTRATION; INTRACAUDAL
Status: DISCONTINUED | OUTPATIENT
Start: 2018-04-18 | End: 2018-04-18 | Stop reason: HOSPADM

## 2018-04-18 RX ORDER — ONDANSETRON 2 MG/ML
4 INJECTION INTRAMUSCULAR; INTRAVENOUS EVERY 4 HOURS PRN
Status: DISCONTINUED | OUTPATIENT
Start: 2018-04-18 | End: 2018-04-18 | Stop reason: HOSPADM

## 2018-04-18 RX ORDER — SODIUM CHLORIDE 450 MG/100ML
INJECTION, SOLUTION INTRAVENOUS CONTINUOUS
Status: DISCONTINUED | OUTPATIENT
Start: 2018-04-18 | End: 2018-04-18 | Stop reason: HOSPADM

## 2018-04-18 RX ORDER — ADENOSINE 3 MG/ML
6-12 INJECTION, SOLUTION INTRAVENOUS EVERY 5 MIN PRN
Status: DISCONTINUED | OUTPATIENT
Start: 2018-04-18 | End: 2018-04-18 | Stop reason: HOSPADM

## 2018-04-18 RX ORDER — LIDOCAINE HYDROCHLORIDE 10 MG/ML
10-30 INJECTION, SOLUTION EPIDURAL; INFILTRATION; INTRACAUDAL; PERINEURAL
Status: COMPLETED | OUTPATIENT
Start: 2018-04-18 | End: 2018-04-18

## 2018-04-18 RX ADMIN — MIDAZOLAM 1 MG: 1 INJECTION INTRAMUSCULAR; INTRAVENOUS at 11:35

## 2018-04-18 RX ADMIN — SODIUM CHLORIDE: 4.5 INJECTION, SOLUTION INTRAVENOUS at 09:25

## 2018-04-18 RX ADMIN — FENTANYL CITRATE 25 MCG: 50 INJECTION, SOLUTION INTRAMUSCULAR; INTRAVENOUS at 11:35

## 2018-04-18 RX ADMIN — BUPIVACAINE HYDROCHLORIDE 25 MG: 2.5 INJECTION, SOLUTION EPIDURAL; INFILTRATION; INTRACAUDAL at 11:35

## 2018-04-18 RX ADMIN — CEFAZOLIN SODIUM 2 G: 2 INJECTION, SOLUTION INTRAVENOUS at 09:25

## 2018-04-18 RX ADMIN — LIDOCAINE HYDROCHLORIDE 10 ML: 10 INJECTION, SOLUTION EPIDURAL; INFILTRATION; INTRACAUDAL; PERINEURAL at 11:35

## 2018-04-18 RX ADMIN — BACITRACIN 25000 UNITS: 5000 INJECTION, POWDER, FOR SOLUTION INTRAMUSCULAR at 11:35

## 2018-04-18 NOTE — PROGRESS NOTES
Pt admitted for ID replacement, pre procedure instructions reviewed with pt , questions answered and pt states understanding.

## 2018-04-18 NOTE — IP AVS SNAPSHOT
[place] [address]    [place] [address]    Phone:  [phone]                                       After Visit Summary   4/18/2018    Delbert Tilley    MRN: [id number]           After Visit Summary Signature Page     I have received my discharge instructions, and my questions have been answered. I have discussed any challenges I see with this plan with the nurse or doctor.    ..........................................................................................................................................  Patient/Patient Representative Signature      ..........................................................................................................................................  Patient Representative Print Name and Relationship to Patient    ..................................................               ................................................  Date                                            Time    ..........................................................................................................................................  Reviewed by Signature/Title    ...................................................              ..............................................  Date                                                            Time

## 2018-04-18 NOTE — DISCHARGE INSTRUCTIONS
Pacemaker/ICD Generator Change Discharge Instructions    After you go home:      Have an adult stay with you until tomorrow.    You may resume your normal diet.       For 24 hours - due to the sedation you received:    Relax and take it easy.    Do NOT make any important or legal decisions.    Do NOT drive or operate machines at home or at work.    Do NOT drink alcohol.    Care of Chest Incision:      Keep the bandage on at least 3 days. You may remove the dressing on Saturday April 21. Change it only if it gets loose or soaked. If you need to change it, use 4x4-inch gauze and a large clear bandage.     If there is a pressure dressing (gauze & tape) - 24 hours after your procedure you may remove ONLY the top dressing. Leave the bottom dressing on.    Leave the strips of tape on. They will fall off on their own, or we will remove them at your first check-up.    Check your wound daily for signs of infection, such as increased redness, severe swelling or draining. Fever may also be a sign of infection. Call us if you see any of these signs.    If there are no signs of infection, you may shower after the bandage comes off in 3 days. If you take a tub bath, keep the wound dry.    No soaking the incision (swimming pool, bathtub, hot tub) for 2 weeks.    You may have mild to medium pain for 3 to 5 days. Take Acetaminophen (Tylenol) or Ibuprofen (Advil) for the pain. If the pain persists or is severe, call us.    Activity:      You can begin to use your arm as it feels comfortable to you.    No driving for one day & limit to necessary driving for one week.    Bleeding:      If you start bleeding from the incision site, sit down and press firmly on the site for 10 minutes.     Once bleeding stops, call [facility] as soon as you can.       Call 911 right away if you have heavy bleeding or bleeding that does not stop.      Medicines:      Take your medications, including blood thinners, unless your provider tells you not  to.    If you have stopped any other medicines, check with your provider about when to restart them.    Follow Up Appointments:      Follow up with Device Clinic at [facility] of patient preference in 7-10 days.    Call the clinic if:      You have a large or growing hard lump around the site.    The site is red, swollen, hot or tender.    Blood or fluid is draining from the site.    You have chills or a fever greater than 101 F (38 C).    You feel dizzy or light-headed.    Questions or concerns.      Telling others about your device:      Before you leave the hospital, you will receive a temporary ID card. A permanent card will be mailed to you about 6 to 8 weeks later. Always carry the ID card with you. It has important details about your device.    You may also get a Medical Alert bracelet or tag that says you have a pacemaker or a defibrillator (ICD).  Go to [email or web address].     Always tell doctors, dentists and other care providers that you have a device implanted in you.    Let us know before you plan any surgeries. Your care team must take special steps to keep you safe during certain procedures. These steps will depend on the type of device you have. Your provider will need to see your ID card. They may need to call us for instructions.    Device Safety:      Please refer to device  s booklet for further information.        [facility] [place]:    [phone] [facility] (7 days a week)

## 2018-04-18 NOTE — PROGRESS NOTES
Uneventful replacement of a dual chamber ICD generator. Home after recovered from conscious sedation.

## 2018-04-18 NOTE — IP AVS SNAPSHOT
MRN:[id number]                      After Visit Summary   4/18/2018    Delbert Tilley    MRN: [id number]           Visit Information        Department      4/18/2018  8:33 AM [facility]          Review of your medicines      CONTINUE these medicines which have NOT CHANGED        Dose / Directions    amylase-lipase-protease 62780 units Cpep   Commonly known as:  CREON 12   Used for:  Pancreatic disease        Dose:  1 capsule   Take 1 capsule (12,000 Units) by mouth daily   Quantity:  120 capsule   Refills:  0       aspirin 325 MG tablet   Used for:  Left carotid stenosis        Dose:  1000 mg   Take 3 tablets (975 mg) by mouth every morning (Takes 2 x 500 mg = 1000 mg)   Quantity:  120 tablet   Refills:  1       atorvastatin 80 MG tablet   Commonly known as:  LIPITOR        Dose:  80 mg   Take 80 mg by mouth At Bedtime   Refills:  0       furosemide 20 MG tablet   Commonly known as:  LASIX   Used for:  Acute on chronic systolic congestive heart failure (H)        Dose:  20 mg   Take 1 tablet (20 mg) by mouth daily   Refills:  0       glipiZIDE 10 MG tablet   Commonly known as:  GLUCOTROL        Dose:  10 mg   Take 10 mg by mouth 2 times daily   Refills:  0       insulin glargine 100 UNIT/ML injection   Commonly known as:  LANTUS        Dose:  27 Units   Inject 27 Units Subcutaneous every morning   Refills:  0       losartan 25 MG tablet   Commonly known as:  COZAAR   Used for:  Ischemic cardiomyopathy        Dose:  25 mg   Take 1 tablet (25 mg) by mouth daily   Quantity:  30 tablet   Refills:  11       METFORMIN HCL PO        Dose:  1000 mg   Take 1,000 mg by mouth 2 times daily (Takes 2 x 500 mg = 1000 mg dose)   Refills:  0       metolazone 2.5 MG tablet   Commonly known as:  ZAROXOLYN   Used for:  Edema, Ischemic cardiomyopathy        Take 2.5 mg on Tuesday, 1/30/18 and Thursday 2/1/18. Take this 1/2 hour before taking your Lasix (furosemide)   Quantity:  4 tablet   Refills:   0       METOPROLOL SUCCINATE ER PO        Dose:  12.5 mg   Take 12.5 mg by mouth every morning (Takes 0.5 x 25 mg tablet = 12.5 mg)   Refills:  0       NITROSTAT SL        Dose:  0.4 mg   Place 0.4 mg under the tongue every 5 minutes as needed for chest pain   Refills:  0       pantoprazole 40 MG EC tablet   Commonly known as:  PROTONIX        Dose:  40 mg   Take 40 mg by mouth daily   Refills:  0       TYLENOL PO        Dose:  1000 mg   Take 1,000 mg by mouth every morning (Takes 2 x 500 mg = 1000 mg)   Refills:  0                Protect others around you: Learn how to safely use, store and throw away your medicines at www.disposemymeds.org.         Follow-ups after your visit        Your next 10 appointments already scheduled     May 15, 2018  8:00 AM CDT   ICD Check with [facility] Device Rn   [facility] ([facility])    [address] [address]   [phone]               Care Instructions        After Care Instructions     Discharge Instructions - Follow up with Device Check RN        Follow up with Device Check RN in 7-10 days.            Discharge Instructions - Keep incision dry for 72 hours       Keep incision dry for 72 hours (unless Derma Hickman was applied)            Discharge Instructions - No soaking incision for 2 weeks       No soaking incision (swimming pool, bathtub, hot tub) for 2 weeks.                  Further instructions from your care team       Pacemaker/ICD Generator Change Discharge Instructions    After you go home:      Have an adult stay with you until tomorrow.    You may resume your normal diet.       For 24 hours - due to the sedation you received:    Relax and take it easy.    Do NOT make any important or legal decisions.    Do NOT drive or operate machines at home or at work.    Do NOT drink alcohol.    Care of Chest Incision:      Keep the bandage on at least 3 days. You may remove the dressing on Saturday April 21. Change it only if it  gets loose or soaked. If you need to change it, use 4x4-inch gauze and a large clear bandage.     If there is a pressure dressing (gauze & tape) - 24 hours after your procedure you may remove ONLY the top dressing. Leave the bottom dressing on.    Leave the strips of tape on. They will fall off on their own, or we will remove them at your first check-up.    Check your wound daily for signs of infection, such as increased redness, severe swelling or draining. Fever may also be a sign of infection. Call us if you see any of these signs.    If there are no signs of infection, you may shower after the bandage comes off in 3 days. If you take a tub bath, keep the wound dry.    No soaking the incision (swimming pool, bathtub, hot tub) for 2 weeks.    You may have mild to medium pain for 3 to 5 days. Take Acetaminophen (Tylenol) or Ibuprofen (Advil) for the pain. If the pain persists or is severe, call us.    Activity:      You can begin to use your arm as it feels comfortable to you.    No driving for one day & limit to necessary driving for one week.    Bleeding:      If you start bleeding from the incision site, sit down and press firmly on the site for 10 minutes.     Once bleeding stops, call [facility] as soon as you can.       Call 911 right away if you have heavy bleeding or bleeding that does not stop.      Medicines:      Take your medications, including blood thinners, unless your provider tells you not to.    If you have stopped any other medicines, check with your provider about when to restart them.    Follow Up Appointments:      Follow up with Device Clinic at [facility] of patient preference in 7-10 days.    Call the clinic if:      You have a large or growing hard lump around the site.    The site is red, swollen, hot or tender.    Blood or fluid is draining from the site.    You have chills or a fever greater than 101 F (38 C).    You feel dizzy or light-headed.    Questions or  "concerns.      Telling others about your device:      Before you leave the hospital, you will receive a temporary ID card. A permanent card will be mailed to you about 6 to 8 weeks later. Always carry the ID card with you. It has important details about your device.    You may also get a Medical Alert bracelet or tag that says you have a pacemaker or a defibrillator (ICD).  Go to [email or web address].     Always tell doctors, dentists and other care providers that you have a device implanted in you.    Let us know before you plan any surgeries. Your care team must take special steps to keep you safe during certain procedures. These steps will depend on the type of device you have. Your provider will need to see your ID card. They may need to call us for instructions.    Device Safety:      Please refer to device  s booklet for further information.        [facility] [place]:    [phone] UMP (7 days a week)                 Additional Information About Your Visit        MyChart Information     [employer] lets you send messages to your doctor, view your test results, renew your prescriptions, schedule appointments and more. To sign up, go to [email or web address] . Click on \"Log in\" on the left side of the screen, which will take you to the Welcome page. Then click on \"Sign up Now\" on the right side of the page.     You will be asked to enter the access code listed below, as well as some personal information. Please follow the directions to create your username and password.     Your access code is: [email or web address]  Expires: 2018  8:21 AM     Your access code will  in 90 days. If you need help or a new code, please call your [place] clinic or [phone].        Care EveryWhere ID     This is your Care EveryWhere ID. This could be used by other organizations to access your [place] medical records  [id number]        Your Vitals Were     Blood Pressure Pulse " Temperature Respirations Height Weight    93/54 94 98.3  F (36.8  C) (Oral) 16 1.829 m (6') 73.9 kg (163 lb)    Pulse Oximetry BMI (Body Mass Index)                93% 22.11 kg/m2           Primary Care Provider Fax #    [facility] [phone]      Equal Access to Services     FERNANDOSTONEY KELBY : Hadii aad ku hadasho Soomaali, waaxda luqadaha, qaybta kaalmada adeegyada, gwendolyn michaelin hayjosén adesarbjit khjosh laJuanitaaan . So [facility] [phone].    ATENCIÓN: Si habla español, tiene a frey disposición servicios gratuitos de asistencia lingüística. Llame al [phone].    We comply with applicable federal civil rights laws and [place] laws. We do not discriminate on the basis of race, color, national origin, age, disability, sex, sexual orientation, or gender identity.            Thank you!     Thank you for choosing [facility] for your care. Our goal is always to provide you with excellent care. Hearing back from our patients is one way we can continue to improve our services. Please take a few minutes to complete the written survey that you may receive in the mail after you visit with us. Thank you!             Medication List: This is a list of all your medications and when to take them. Check marks below indicate your daily home schedule. Keep this list as a reference.      Medications           Morning Afternoon Evening Bedtime As Needed    amylase-lipase-protease 45523 units Cpep   Commonly known as:  CREON 12   Take 1 capsule (12,000 Units) by mouth daily                                aspirin 325 MG tablet   Take 3 tablets (975 mg) by mouth every morning (Takes 2 x 500 mg = 1000 mg)                                atorvastatin 80 MG tablet   Commonly known as:  LIPITOR   Take 80 mg by mouth At Bedtime                                furosemide 20 MG tablet   Commonly known as:  LASIX   Take 1 tablet (20 mg) by mouth daily                                glipiZIDE 10 MG tablet   Commonly known as:  GLUCOTROL    Take 10 mg by mouth 2 times daily                                insulin glargine 100 UNIT/ML injection   Commonly known as:  LANTUS   Inject 27 Units Subcutaneous every morning                                losartan 25 MG tablet   Commonly known as:  COZAAR   Take 1 tablet (25 mg) by mouth daily                                METFORMIN HCL PO   Take 1,000 mg by mouth 2 times daily (Takes 2 x 500 mg = 1000 mg dose)                                metolazone 2.5 MG tablet   Commonly known as:  ZAROXOLYN   Take 2.5 mg on Tuesday, 1/30/18 and Thursday 2/1/18. Take this 1/2 hour before taking your Lasix (furosemide)                                METOPROLOL SUCCINATE ER PO   Take 12.5 mg by mouth every morning (Takes 0.5 x 25 mg tablet = 12.5 mg)                                NITROSTAT SL   Place 0.4 mg under the tongue every 5 minutes as needed for chest pain                                pantoprazole 40 MG EC tablet   Commonly known as:  PROTONIX   Take 40 mg by mouth daily                                TYLENOL PO   Take 1,000 mg by mouth every morning (Takes 2 x 500 mg = 1000 mg)

## 2018-04-18 NOTE — PROGRESS NOTES
Post procedure instructions reviewed with pt, questions answered and pt states understanding. Daughter, Sara called to come  pt. Pacer rep here to see and instruct pt on new monitor equipment, ICD card and booklet given to pt.

## 2018-04-18 NOTE — PROGRESS NOTES
VSS, pt up ambulating in [place] without difficulty, pacer site CDI, denies pain, pt discharged to home with daughter.

## 2018-04-19 ENCOUNTER — TELEPHONE (OUTPATIENT)
Dept: CARDIOLOGY | Facility: CLINIC | Age: 68
End: 2018-04-19

## 2018-04-19 NOTE — TELEPHONE ENCOUNTER
Patient had dual chamber ICD gen change yesterday.     Attempted to call pt with instructions below, no answer, left message to call Device Clinic to review instructions from yesterday. Awaiting return call:    Post device implant discharge phone call.    Reviewed the following:  Remove outer dressing 3 days after implant. May shower after outer dressing removed. Leave steri-strips in place, will be removed at 1 week device check  No driving for: N/A  Watch for redness, drainage, warmth, or fever. Call device clinic if any signs of infection.     1 week device check scheduled: 5/1/2018, 8:00am, [place]    Pt states understanding of all instructions.     ADDENDUM 2:30PM. Pt returned call. Went through all information above with him, he states understanding. He had a ICD check scheduled on 5/15/2018 in [place], changed it to 5/1/2018 at 8am in [place]. Pt aware of this change. Pt states no questions.

## 2018-04-25 LAB — INTERPRETATION ECG - MUSE: NORMAL

## 2018-05-01 ENCOUNTER — HOSPITAL ENCOUNTER (OUTPATIENT)
Dept: CARDIOLOGY | Facility: CLINIC | Age: 68
Discharge: HOME | End: 2018-05-01
Attending: INTERNAL MEDICINE | Admitting: INTERNAL MEDICINE
Payer: MEDICARE

## 2018-05-01 ENCOUNTER — DOCUMENTATION ONLY (OUTPATIENT)
Dept: CARDIOLOGY | Facility: CLINIC | Age: 68
End: 2018-05-01

## 2018-05-01 PROCEDURE — 93289 INTERROG DEVICE EVAL HEART: CPT

## 2018-05-01 PROCEDURE — 93289 INTERROG DEVICE EVAL HEART: CPT | Mod: 26 | Performed by: INTERNAL MEDICINE

## 2018-05-01 NOTE — ADDENDUM NOTE
Encounter addended by: Yamilet Richardson on: 5/1/2018  9:05 AM<BR>     Actions taken:  activity accessed, Charge Capture section accepted

## 2018-05-01 NOTE — PROGRESS NOTES
"Akash Honeycutt DR 7 Day Post ICD Device Check/[place]  AP: 9% % : 0.1 %  Mode: AAIR/DDDR        Underlying Rhythm: SR  Heart Rate: Histogram initializing/ no data  Sensing: Stable    Pacing Threshold: Stable    Impedance: WNL  Battery Status: 10-11 years estimated longevity  Incision/Complications: Pt removed steri strips \"4 days ago- they were falling off\"  Incision is CDI w/ no evidence of hematoma or infection. Skin fairly tight over device as he is quite thin.   Atrial Arrhythmia: NONE  Ventricular Arrhythmia: NONE  ATP: NONE    Shocks: NONE  Setting Change: V output decreased to 2.5V (3.5V on auto cap) Auto cap programmed off    Care Plan: Begin quarterly remote checks. sml            "

## 2018-07-18 ENCOUNTER — APPOINTMENT (OUTPATIENT)
Dept: GENERAL RADIOLOGY | Facility: CLINIC | Age: 68
End: 2018-07-18
Attending: EMERGENCY MEDICINE
Payer: MEDICARE

## 2018-07-18 ENCOUNTER — HOSPITAL ENCOUNTER (EMERGENCY)
Facility: CLINIC | Age: 68
Discharge: HOME | End: 2018-07-18
Attending: EMERGENCY MEDICINE | Admitting: EMERGENCY MEDICINE
Payer: MEDICARE

## 2018-07-18 VITALS
WEIGHT: 160 LBS | HEART RATE: 75 BPM | BODY MASS INDEX: 22.9 KG/M2 | HEIGHT: 70 IN | RESPIRATION RATE: 24 BRPM | DIASTOLIC BLOOD PRESSURE: 76 MMHG | TEMPERATURE: 97.7 F | OXYGEN SATURATION: 97 % | SYSTOLIC BLOOD PRESSURE: 110 MMHG

## 2018-07-18 DIAGNOSIS — J20.9 ACUTE BRONCHITIS, UNSPECIFIED ORGANISM: ICD-10-CM

## 2018-07-18 DIAGNOSIS — R05.9 COUGH: ICD-10-CM

## 2018-07-18 PROCEDURE — 25000125 ZZHC RX 250: Performed by: EMERGENCY MEDICINE

## 2018-07-18 PROCEDURE — 94640 AIRWAY INHALATION TREATMENT: CPT

## 2018-07-18 PROCEDURE — 99284 EMERGENCY DEPT VISIT MOD MDM: CPT | Mod: Z6 | Performed by: EMERGENCY MEDICINE

## 2018-07-18 PROCEDURE — 71046 X-RAY EXAM CHEST 2 VIEWS: CPT

## 2018-07-18 PROCEDURE — 99283 EMERGENCY DEPT VISIT LOW MDM: CPT | Mod: 25 | Performed by: EMERGENCY MEDICINE

## 2018-07-18 RX ORDER — ALBUTEROL SULFATE 90 UG/1
2 AEROSOL, METERED RESPIRATORY (INHALATION) EVERY 4 HOURS PRN
Qty: 1 INHALER | Refills: 0 | Status: SHIPPED | OUTPATIENT
Start: 2018-07-18

## 2018-07-18 RX ORDER — IPRATROPIUM BROMIDE AND ALBUTEROL SULFATE 2.5; .5 MG/3ML; MG/3ML
3 SOLUTION RESPIRATORY (INHALATION) ONCE
Status: COMPLETED | OUTPATIENT
Start: 2018-07-18 | End: 2018-07-18

## 2018-07-18 RX ORDER — DOXYCYCLINE 100 MG/1
100 CAPSULE ORAL 2 TIMES DAILY
Qty: 14 CAPSULE | Refills: 0 | Status: SHIPPED | OUTPATIENT
Start: 2018-07-18 | End: 2018-08-20

## 2018-07-18 RX ADMIN — IPRATROPIUM BROMIDE AND ALBUTEROL SULFATE 3 ML: .5; 3 SOLUTION RESPIRATORY (INHALATION) at 10:11

## 2018-07-18 ASSESSMENT — ENCOUNTER SYMPTOMS
BACK PAIN: 0
FEVER: 0
HEADACHES: 0
NAUSEA: 0
WHEEZING: 1
ABDOMINAL PAIN: 0
SORE THROAT: 0
VOMITING: 0
APPETITE CHANGE: 0
COUGH: 1
STRIDOR: 0
SHORTNESS OF BREATH: 0
PALPITATIONS: 0
CHILLS: 0
SINUS PRESSURE: 0
CHEST TIGHTNESS: 0
WEAKNESS: 0
DIZZINESS: 0
NUMBNESS: 0
ACTIVITY CHANGE: 1
NECK PAIN: 0
DYSURIA: 0
LIGHT-HEADEDNESS: 0

## 2018-07-18 NOTE — ED AVS SNAPSHOT
[facility]    [address] [address]    Phone:  [phone]    Fax:  [phone]                                       Delbert Tilley   MRN: [id number]    Department:  [facility]   Date of Visit:  7/18/2018           After Visit Summary Signature Page     I have received my discharge instructions, and my questions have been answered. I have discussed any challenges I see with this plan with the nurse or doctor.    ..........................................................................................................................................  Patient/Patient Representative Signature      ..........................................................................................................................................  Patient Representative Print Name and Relationship to Patient    ..................................................               ................................................  Date                                            Time    ..........................................................................................................................................  Reviewed by Signature/Title    ...................................................              ..............................................  Date                                                            Time

## 2018-07-18 NOTE — ED AVS SNAPSHOT
[facility] Emergency Department    [address] [facility] [address]    Phone:  [phone]    Fax:  [phone]                                       Delbert Tilley   MRN: [id number]    Department:  [facility] Emergency Department   Date of Visit:  7/18/2018           Patient Information     Date Of Birth          1950        Your diagnoses for this visit were:     Cough     Acute bronchitis, unspecified organism        You were seen by Delbert Chua MD.      Follow-up Information     Follow up with [facility] [place].    Why:  Later this week for recheck    Contact information:    [address] [facility] [address]          Follow up with [facility] Emergency Department.    Specialty:  EMERGENCY MEDICINE    Why:  If symptoms worsen    Contact information:    [address] [place] [address] [phone]    Additional information:    The medical center is located at   [address] [facility] [facility] (between [address] in [place], [place] miles north   of [place]).        Discharge Instructions       Return if symptoms worsen or new symptoms develop.  Follow-up with your primary care later this week for further evaluation and care.  Take antibiotics as directed use inhaler as needed.  If worsening symptoms including shortness of breath worsening cough fevers any chest pain abdominal pain or other symptoms present please return for recheck.  Viral or Bacterial Bronchitis with Wheezing (Adult)    Bronchitis is an infection of the air passages. It often occurs during a cold and is usually caused by a virus. Symptoms include cough with mucus (phlegm) and low-grade fever. This illness is contagious during the first few days and is spread through the air by coughing and sneezing, or by direct contact (touching the sick person and then touching your own eyes, nose, or mouth).  If there is a lot of inflammation, air flow is restricted. The air passages may  also go into spasm, especially if you have asthma. This causes wheezing and difficulty breathing even in people who do not have asthma.  Bronchitis usually lasts 7 to 14 days. The wheezing should improve with treatment during the first week. An inhaler is often prescribed to relax the air passages and stop wheezing. Antibiotics will be prescribed if your doctor thinks there is also a secondary bacterial infection.  Home care    If symptoms are severe, rest at home for the first 2 to 3 days. When you go back to your usual activities, don't let yourself get too tired.    Do not smoke. Also avoid being exposed to secondhand smoke.    You may use over-the-counter medicine to control fever or pain, unless another medicine was prescribed. Note: If you have chronic liver or kidney disease or have ever had a stomach ulcer or gastrointestinal bleeding, talk with your healthcare provider before using these medicines. Also talk to your provider if you are taking medicine to prevent blood clots.) Aspirin should never be given to anyone younger than 18 years of age who is ill with a viral infection or fever. It may cause severe liver or brain damage.    Your appetite may be poor, so a light diet is fine. Avoid dehydration by drinking 6 to 8 glasses of fluids per day (such as water, soft drinks, sports drinks, juices, tea, or soup). Extra fluids will help loosen secretions in the nose and lungs.    Over-the-counter cough, cold, and sore-throat medicines will not shorten the length of the illness, but they may be helpful to reduce symptoms. (Note: Do not use decongestants if you have high blood pressure.)    If you were given an inhaler, use it exactly as directed. If you need to use it more often than prescribed, your condition may be worsening. If this happens, contact your healthcare provider.    If prescribed, finish all antibiotic medicine, even if you are feeling better after only a few days.  Follow-up care  Follow up with  your healthcare provider, or as advised. If you had an X-ray or ECG (electrocardiogram), a specialist will review it. You will be notified of any new findings that may affect your care.  If you are age 65 or older, or if you have a chronic lung disease or condition that affects your immune system, or you smoke, ask your healthcare provider about getting a pneumococcal vaccine and a yearly flu shot (influenza vaccine).  When to seek medical advice  Call your healthcare provider right away if any of these occur:    Fever of 100.4 F (38 C) or higher, or as directed by your healthcare provider    Coughing up increasing amounts of colored sputum    Weakness, drowsiness, headache, facial pain, ear pain, or a stiff neck  Call 911  Call 911 if any of these occur.    Coughing up blood    Worsening weakness, drowsiness, headache, or stiff neck    Increased wheezing not helped with medication, shortness of breath, or pain with breathing  Date Last Reviewed: 9/13/2015 2000-2017 [employer]. [address] [place] [address]. All rights reserved. This information is not intended as a substitute for professional medical care. Always follow your healthcare professional's instructions.          Your next 10 appointments already scheduled     Aug 17, 2018  4:30 PM CDT   LAB with [facility] LAB   [facility]    [id number] [place] [id number] [phone]           Please do not eat 10-12 hours before your appointment if you are coming in fasting for labs on lipids, cholesterol, or glucose (sugar). This does not apply to pregnant women. Water, hot tea and black coffee (with nothing added) are okay. Do not drink other fluids, diet soda or chew gum.            Aug 20, 2018  3:30 PM CDT   Return Visit with Mimi Michaels MD   [facility] ([facility])    [address] [facility] [address]    [phone]              24 Hour Appointment Hotline       To make an appointment at any [facility], call [phone] ([phone]). If you don't have a family doctor or clinic, we will help you find one. [facility] clinics are conveniently located to serve the needs of you and your family.             Review of your medicines      START taking        Dose / Directions Last dose taken    albuterol 108 (90 Base) MCG/ACT Inhaler   Commonly known as:  PROAIR HFA   Dose:  2 puff   Quantity:  1 Inhaler        Inhale 2 puffs into the lungs every 4 hours as needed for shortness of breath / dyspnea   Refills:  0        doxycycline 100 MG capsule   Commonly known as:  VIBRAMYCIN   Dose:  100 mg   Quantity:  14 capsule        Take 1 capsule (100 mg) by mouth 2 times daily   Refills:  0          Our records show that you are taking the medicines listed below. If these are incorrect, please call your family doctor or clinic.        Dose / Directions Last dose taken    amylase-lipase-protease 97334 units Cpep   Commonly known as:  CREON 12   Dose:  1 capsule   Quantity:  120 capsule        Take 1 capsule (12,000 Units) by mouth daily   Refills:  0        aspirin 325 MG tablet   Dose:  1000 mg   Quantity:  120 tablet        Take 3 tablets (975 mg) by mouth every morning (Takes 2 x 500 mg = 1000 mg)   Refills:  1        atorvastatin 80 MG tablet   Commonly known as:  LIPITOR   Dose:  80 mg        Take 80 mg by mouth At Bedtime   Refills:  0        furosemide 20 MG tablet   Commonly known as:  LASIX   Dose:  20 mg        Take 1 tablet (20 mg) by mouth daily   Refills:  0        glipiZIDE 10 MG tablet   Commonly known as:  GLUCOTROL   Dose:  10 mg        Take 10 mg by mouth 2 times daily   Refills:  0        insulin glargine 100 UNIT/ML injection   Commonly known as:  LANTUS   Dose:  27 Units        Inject 27 Units Subcutaneous every morning   Refills:  0        losartan 25 MG tablet   Commonly known as:  COZAAR   Dose:   25 mg   Quantity:  30 tablet        Take 1 tablet (25 mg) by mouth daily   Refills:  11        METFORMIN HCL PO   Dose:  1000 mg        Take 1,000 mg by mouth 2 times daily (Takes 2 x 500 mg = 1000 mg dose)   Refills:  0        metolazone 2.5 MG tablet   Commonly known as:  ZAROXOLYN   Quantity:  4 tablet        Take 2.5 mg on Tuesday, 1/30/18 and Thursday 2/1/18. Take this 1/2 hour before taking your Lasix (furosemide)   Refills:  0        METOPROLOL SUCCINATE ER PO   Dose:  12.5 mg        Take 12.5 mg by mouth every morning (Takes 0.5 x 25 mg tablet = 12.5 mg)   Refills:  0        NITROSTAT SL   Dose:  0.4 mg        Place 0.4 mg under the tongue every 5 minutes as needed for chest pain   Refills:  0        pantoprazole 40 MG EC tablet   Commonly known as:  PROTONIX   Dose:  40 mg        Take 40 mg by mouth daily   Refills:  0        TYLENOL PO   Dose:  1000 mg        Take 1,000 mg by mouth every morning (Takes 2 x 500 mg = 1000 mg)   Refills:  0                Prescriptions were sent or printed at these locations (2 Prescriptions)                   Other Prescriptions                Printed at Department/Unit printer (2 of 2)         albuterol (PROAIR HFA) 108 (90 Base) MCG/ACT Inhaler               doxycycline (VIBRAMYCIN) 100 MG capsule                Procedures and tests performed during your visit     Chest XR,  PA & LAT      Orders Needing Specimen Collection     None      Pending Results     No orders found from 7/16/2018 to 7/19/2018.            Pending Culture Results     No orders found from 7/16/2018 to 7/19/2018.            Pending Results Instructions     If you had any lab results that were not finalized at the time of your Discharge, you can call the ED Lab Result RN at [phone]. You will be contacted by this team for any positive Lab results or changes in treatment. The nurses are available 7 days a week from 10A to 6:30P.  You can leave a message 24 hours per day and they will return your  "call.        Test Results From Your Hospital Stay        2018 10:21 AM      Narrative     XR CHEST 2 VW 2018 10:04 AM    HISTORY: Short of breath.    COMPARISON: 10/31/2017.        Impression     IMPRESSION: 2 views of the chest show a no acute cardiopulmonary  disease. A small amount interstitial scarring at the lung bases and  the transvenous pacer are stable.    KEVEN SMITH MD                Thank you for choosing [facility]       Thank you for choosing [facility] for your care. Our goal is always to provide you with excellent care. Hearing back from our patients is one way we can continue to improve our services. Please take a few minutes to complete the written survey that you may receive in the mail after you visit with us. Thank you!        [facility] Information     [facility] lets you send messages to your doctor, view your test results, renew your prescriptions, schedule appointments and more. To sign up, go to [email or web address] . Click on \"Log in\" on the left side of the screen, which will take you to the Welcome page. Then click on \"Sign up Now\" on the right side of the page.     You will be asked to enter the access code listed below, as well as some personal information. Please follow the directions to create your username and password.     Your access code is: [email or web address]  Expires: 10/16/2018 10:35 AM     Your access code will  in 90 days. If you need help or a new code, please call your [facility] clinic or [phone].        Care EveryWhere ID     This is your Care EveryWhere ID. This could be used by other organizations to access your [facility] medical records  [id number]        Equal Access to Services     [facility]: Hadii brett Mondragon, waaxda luqadaha, qaybta kaalgwendolyn richardson. So [facility] [phone].    ATENCIÓN: Si habla español, tiene a frey disposición servicios gratuitos de asistencia lingüística. Llame al " [phone].    We comply with applicable federal civil rights laws and [place] laws. We do not discriminate on the basis of race, color, national origin, age, disability, sex, sexual orientation, or gender identity.            After Visit Summary       This is your record. Keep this with you and show to your community pharmacist(s) and doctor(s) at your next visit.

## 2018-07-18 NOTE — DISCHARGE INSTRUCTIONS
Return if symptoms worsen or new symptoms develop.  Follow-up with your primary care later this week for further evaluation and care.  Take antibiotics as directed use inhaler as needed.  If worsening symptoms including shortness of breath worsening cough fevers any chest pain abdominal pain or other symptoms present please return for recheck.  Viral or Bacterial Bronchitis with Wheezing (Adult)    Bronchitis is an infection of the air passages. It often occurs during a cold and is usually caused by a virus. Symptoms include cough with mucus (phlegm) and low-grade fever. This illness is contagious during the first few days and is spread through the air by coughing and sneezing, or by direct contact (touching the sick person and then touching your own eyes, nose, or mouth).  If there is a lot of inflammation, air flow is restricted. The air passages may also go into spasm, especially if you have asthma. This causes wheezing and difficulty breathing even in people who do not have asthma.  Bronchitis usually lasts 7 to 14 days. The wheezing should improve with treatment during the first week. An inhaler is often prescribed to relax the air passages and stop wheezing. Antibiotics will be prescribed if your doctor thinks there is also a secondary bacterial infection.  Home care    If symptoms are severe, rest at home for the first 2 to 3 days. When you go back to your usual activities, don't let yourself get too tired.    Do not smoke. Also avoid being exposed to secondhand smoke.    You may use over-the-counter medicine to control fever or pain, unless another medicine was prescribed. Note: If you have chronic liver or kidney disease or have ever had a stomach ulcer or gastrointestinal bleeding, talk with your healthcare provider before using these medicines. Also talk to your provider if you are taking medicine to prevent blood clots.) Aspirin should never be given to anyone younger than 18 years of age who is ill with  a viral infection or fever. It may cause severe liver or brain damage.    Your appetite may be poor, so a light diet is fine. Avoid dehydration by drinking 6 to 8 glasses of fluids per day (such as water, soft drinks, sports drinks, juices, tea, or soup). Extra fluids will help loosen secretions in the nose and lungs.    Over-the-counter cough, cold, and sore-throat medicines will not shorten the length of the illness, but they may be helpful to reduce symptoms. (Note: Do not use decongestants if you have high blood pressure.)    If you were given an inhaler, use it exactly as directed. If you need to use it more often than prescribed, your condition may be worsening. If this happens, contact your healthcare provider.    If prescribed, finish all antibiotic medicine, even if you are feeling better after only a few days.  Follow-up care  Follow up with your healthcare provider, or as advised. If you had an X-ray or ECG (electrocardiogram), a specialist will review it. You will be notified of any new findings that may affect your care.  If you are age 65 or older, or if you have a chronic lung disease or condition that affects your immune system, or you smoke, ask your healthcare provider about getting a pneumococcal vaccine and a yearly flu shot (influenza vaccine).  When to seek medical advice  Call your healthcare provider right away if any of these occur:    Fever of 100.4 F (38 C) or higher, or as directed by your healthcare provider    Coughing up increasing amounts of colored sputum    Weakness, drowsiness, headache, facial pain, ear pain, or a stiff neck  Call 911  Call 911 if any of these occur.    Coughing up blood    Worsening weakness, drowsiness, headache, or stiff neck    Increased wheezing not helped with medication, shortness of breath, or pain with breathing  Date Last Reviewed: 9/13/2015 2000-2017 [employer]. [address] [place] [address], [place] [address]. All rights reserved. This  information is not intended as a substitute for professional medical care. Always follow your healthcare professional's instructions.

## 2018-07-18 NOTE — ED PROVIDER NOTES
History     Chief Complaint   Patient presents with     Cough     x's 10 days     HPI  Delbert Tilley is a 67 year old male with a history of coronary artery disease and hypertension who presents emergency department complaining of a cough which has been going on for 10 days.  Patient states the cough has gotten a bit worse with production of sputum.  He has trouble getting it up at times but states he is not significantly shortness of breath but just wants to get the cough checked out.  States last time he got an inhaler and it helped significantly.  He denies any fevers or chills denies any chest pain.  He denies any abdominal pain back pain or focal numbness weakness any extremity.    Problem List:    Patient Active Problem List    Diagnosis Date Noted     Left carotid stenosis 09/08/2017     Priority: Medium     Carpal tunnel syndrome of right wrist 01/10/2016     Priority: Medium     Carotid stenosis 06/12/2015     Priority: Medium     Bilateral carotid Disease S/P Right carotid endarterectomy.   [facility]       CHF (congestive heart failure) (H) 06/08/2015     Priority: Medium     NSTEMI (non-ST elevated myocardial infarction) (H) 10/21/2014     Priority: Medium     ACS (acute coronary syndrome) (H) 10/20/2014     Priority: Medium     Mixed hyperlipidemia 07/08/2013     Priority: Medium     [facility]  Diagnosis updated by automated process. Provider to review and confirm.       TYPE 2 DIABETES, HBA1C GOAL < 8% 10/31/2010     Priority: Medium     CARDIOVASCULAR SCREENING; LDL GOAL LESS THAN 100 10/31/2010     Priority: Medium     Coronary artery disease involving native coronary artery of native heart without angina pectoris 07/07/2010     Priority: Medium     S/p mi  Bypass x 4 --grafting to the LAD obtuse marginal and PDA.   Angio on 6/10 negative          Alcohol abuse 07/07/2010     Priority: Medium     H/o rehab x 5        GERD (gastroesophageal reflux disease) 07/07/2010      Priority: Medium     Pancreatic disease 07/07/2010     Priority: Medium     H/o pancreatitis --on enzymes        Diabetes mellitus, type 2 (H) 07/07/2010     Priority: Medium     [facility]       Cardiomyopathy (H) 06/15/2010     Priority: Medium     EF 15%.--ICD placement        Atrial fibrillation/flutter 06/15/2010     Priority: Medium     S/p ablation 1June 2010.             Past Medical History:    Past Medical History:   Diagnosis Date     Acute renal failure (H) 8/26/06 Hosp     Arthritis      CAD (coronary artery disease)      Carpal tunnel syndrome      Diabetes (H)      Gastro-oesophageal reflux disease      H/O: alcohol abuse      HTN (hypertension)      Hyperlipidaemia      Hyperlipidaemia LDL goal <100 7/8/2013     Hypokalemia      Pacemaker      Pancreatitis 6/26/06 Hosp       Past Surgical History:    Past Surgical History:   Procedure Laterality Date     BYPASS CARDIOPULMONARY       CATARACT IOL, RT/LT      Cataract IOL RT/LT     ENDARTERECTOMY CAROTID Right 6/12/2015    Procedure: ENDARTERECTOMY CAROTID;  Surgeon: João Turner MD;  Location: [facility]     ENDARTERECTOMY CAROTID Left 9/8/2017    Procedure: ENDARTERECTOMY CAROTID;  LEFT CAROTID ENDARTERECTOMY WITH EEG;  Surgeon: João Turner MD;  Location: [facility]     IMPLANT PACEMAKER  6/10/2010     RELEASE CARPAL TUNNEL Right 4/12/2016    Procedure: RELEASE CARPAL TUNNEL;  Surgeon: Lissy Leger MD;  Location: [facility]     SURGICAL HISTORY OF -   1998    Laminectomy     SURGICAL HISTORY OF -   10/2003    Bypass grafting     TONSILLECTOMY & ADENOIDECTOMY         Family History:    Family History   Problem Relation Age of Onset     Unknown/Adopted No family hx of        Social History:  Marital Status:  Single [1]  Social History   Substance Use Topics     Smoking status: Current Every Day Smoker     Packs/day: 2.00     Types: Cigarettes     Smokeless tobacco: Never Used      Comment: 1 1/2 PPD 2/16/18     Alcohol use No  "       Medications:      Acetaminophen (TYLENOL PO)   amylase-lipase-protease (CREON 12) 87852 UNITS CPEP   aspirin 325 MG tablet   atorvastatin (LIPITOR) 80 MG tablet   furosemide (LASIX) 20 MG tablet   glipiZIDE (GLUCOTROL) 10 MG tablet   insulin glargine (LANTUS) 100 UNIT/ML injection   losartan (COZAAR) 25 MG tablet   METFORMIN HCL PO   metolazone (ZAROXOLYN) 2.5 MG tablet   METOPROLOL SUCCINATE ER PO   Nitroglycerin (NITROSTAT SL)   pantoprazole (PROTONIX) 40 MG EC tablet         Review of Systems   Constitutional: Positive for activity change. Negative for appetite change, chills and fever.   HENT: Positive for congestion and postnasal drip. Negative for sinus pressure and sore throat.    Eyes: Negative for visual disturbance.   Respiratory: Positive for cough and wheezing. Negative for chest tightness, shortness of breath and stridor.    Cardiovascular: Negative for chest pain, palpitations and leg swelling.   Gastrointestinal: Negative for abdominal pain, nausea and vomiting.   Genitourinary: Negative for decreased urine volume and dysuria.   Musculoskeletal: Negative for back pain and neck pain.   Skin: Negative for rash.   Neurological: Negative for dizziness, weakness, light-headedness, numbness and headaches.       Physical Exam   BP: 110/76  Pulse: 75  Temp: 97.7  F (36.5  C)  Resp: 24  Height: 177.8 cm (5' 10\")  Weight: 72.6 kg (160 lb)  SpO2: 97 %      Physical Exam   Constitutional: He is oriented to person, place, and time. He appears well-developed and well-nourished. No distress.   HENT:   Head: Normocephalic.   Mouth/Throat: Oropharynx is clear and moist.   Eyes: Conjunctivae are normal.   Neck: Normal range of motion. Neck supple.   Cardiovascular: Normal rate, regular rhythm, normal heart sounds and intact distal pulses.    No murmur heard.  Pulmonary/Chest:   Breath sounds decreased at the base with faint expiratory wheeze in upper lung fields   Abdominal: Soft. Bowel sounds are normal. There " is no tenderness.   Musculoskeletal: Normal range of motion. He exhibits no tenderness.   Neurological: He is alert and oriented to person, place, and time. He exhibits normal muscle tone.   Skin: Skin is warm and dry. No rash noted.   Psychiatric: He has a normal mood and affect.   Nursing note and vitals reviewed.      ED Course     ED Course     Procedures               Critical Care time:  none               Results for orders placed or performed during the hospital encounter of 07/18/18 (from the past 24 hour(s))   Chest XR,  PA & LAT    Narrative    XR CHEST 2 VW 7/18/2018 10:04 AM    HISTORY: Short of breath.    COMPARISON: 10/31/2017.      Impression    IMPRESSION: 2 views of the chest show a no acute cardiopulmonary  disease. A small amount interstitial scarring at the lung bases and  the transvenous pacer are stable.    KEVEN SMITH MD       Medications   ipratropium - albuterol 0.5 mg/2.5 mg/3 mL (DUONEB) neb solution 3 mL (3 mLs Nebulization Given 7/18/18 1011)       Assessments & Plan (with Medical Decision Making) records were reviewed.  Patient does not feel this is cardiac in nature and does not feel EKG or lab work is needed.  I discussed this with him about possibilities of these occurring but he does not feel this is cardiac.  Patient was given a DuoNeb.  Chest x-ray was obtained and revealed no obvious infiltrate or other significant abnormality.  Findings were discussed with patient.  Patient is feeling better after the nebulizer treatment.  No wheezing is heard at this time I am going to treat him for bronchitis with doxycycline and give him an inhaler to use as needed.  He understands I cannot rule out a cardiac etiology without further workup and he is comfortable with this.  He will return if symptoms worsen or new symptoms develop.     I have reviewed the nursing notes.    I have reviewed the findings, diagnosis, plan and need for follow up with the patient.       New Prescriptions    No  medications on file       Final diagnoses:   Cough   Acute bronchitis, unspecified organism       7/18/2018   [facility] EMERGENCY DEPARTMENT     Delbert Chua MD  07/18/18 1809

## 2018-08-17 DIAGNOSIS — I50.23 ACUTE ON CHRONIC SYSTOLIC CONGESTIVE HEART FAILURE (H): ICD-10-CM

## 2018-08-17 DIAGNOSIS — I25.5 ISCHEMIC CARDIOMYOPATHY: ICD-10-CM

## 2018-08-17 LAB
ANION GAP SERPL CALCULATED.3IONS-SCNC: 7 MMOL/L (ref 3–14)
BUN SERPL-MCNC: 26 MG/DL (ref 7–30)
CALCIUM SERPL-MCNC: 8.4 MG/DL (ref 8.5–10.1)
CHLORIDE SERPL-SCNC: 105 MMOL/L (ref 94–109)
CO2 SERPL-SCNC: 24 MMOL/L (ref 20–32)
CREAT SERPL-MCNC: 1 MG/DL (ref 0.66–1.25)
GFR SERPL CREATININE-BSD FRML MDRD: 74 ML/MIN/1.7M2
GLUCOSE SERPL-MCNC: 133 MG/DL (ref 70–99)
POTASSIUM SERPL-SCNC: 4.4 MMOL/L (ref 3.4–5.3)
SODIUM SERPL-SCNC: 136 MMOL/L (ref 133–144)

## 2018-08-17 PROCEDURE — 36415 COLL VENOUS BLD VENIPUNCTURE: CPT | Performed by: NURSE PRACTITIONER

## 2018-08-17 PROCEDURE — 80048 BASIC METABOLIC PNL TOTAL CA: CPT | Performed by: NURSE PRACTITIONER

## 2018-08-20 ENCOUNTER — OFFICE VISIT (OUTPATIENT)
Dept: CARDIOLOGY | Facility: CLINIC | Age: 68
End: 2018-08-20
Attending: NURSE PRACTITIONER
Payer: COMMERCIAL

## 2018-08-20 VITALS
SYSTOLIC BLOOD PRESSURE: 122 MMHG | HEART RATE: 72 BPM | BODY MASS INDEX: 23.16 KG/M2 | DIASTOLIC BLOOD PRESSURE: 66 MMHG | OXYGEN SATURATION: 98 % | WEIGHT: 161.4 LBS

## 2018-08-20 DIAGNOSIS — I50.23 ACUTE ON CHRONIC SYSTOLIC CONGESTIVE HEART FAILURE (H): ICD-10-CM

## 2018-08-20 DIAGNOSIS — I25.5 ISCHEMIC CARDIOMYOPATHY: ICD-10-CM

## 2018-08-20 PROCEDURE — 99214 OFFICE O/P EST MOD 30 MIN: CPT | Performed by: INTERNAL MEDICINE

## 2018-08-20 RX ORDER — PEN NEEDLE, DIABETIC 30 GX3/16"
NEEDLE, DISPOSABLE MISCELLANEOUS
COMMUNITY

## 2018-08-20 RX ORDER — PANTOPRAZOLE SODIUM 40 MG/1
40 TABLET, DELAYED RELEASE ORAL 2 TIMES DAILY
Qty: 30 TABLET | COMMUNITY
Start: 2018-08-20

## 2018-08-20 RX ORDER — LOPERAMIDE HCL 2 MG
2 CAPSULE ORAL 4 TIMES DAILY PRN
Status: ON HOLD | COMMUNITY
End: 2023-01-01

## 2018-08-20 NOTE — LETTER
8/20/2018    [facility]  [address] [address]    RE: Delbert Tilley       Dear Colleague,    I had the pleasure of seeing Delbert Tilley in the [facility].    HPI and Plan:     Mr. Delbert Tilley is now 68 years old and is seen at [facility]. He has undergone bypass surgery for  coronary artery disease, has an ischemic cardiomyopathy, an implantable cardio-defibrillator and a history of atrial flutter.  He also has bilateral carotid disease, has diabetes and smokes tobacco.     He has undergone bypass graft surgery with a LIMA to the LAD, a vein graft to the marginal branch and vein graft to the PDA. In 2014 he presented with a NSTEMI, was transferred to [facility] and the vein graft to the marginal branch was stented. The vein graft to the RCA and the LIMA to the LAD were patent in 2014. His LV ejection fraction was noted to have declined to 35-40% with severe inferior and inferolateral hypokinesis on echocardiography.  A follow-up stress study several months later in 2015 demonstrated a large transmural inferior and inferoseptal defect without ischemia as well as a small distal anterior and apical defect.    He did well without angina or heart failure symptoms.  Recommended as a pre-operative assessment for endarterectomy last year, a lexiscan stress nuclear study demonstrated a large transmural inferior and inferoseptal defect without ischemia. The LVEF was slightly lower an estimated at 28%.     Later last year, he presented with heart failure symptoms. Mr. Tilley's left ventricular ejection fraction (LVEF) had declined further to 20-25% on echo.  He underwent repeat coronary angiography at [facility].  The LIMA graft to the LAD was widely patent with a 50% stenosis in the apical LAD, the vein graft to the RCA had occluded.  The vein graft to the third marginal branch was patent  The left main coronary artery was widely  patent.    He denies any chest discomfort, angina, exertional intolerance or palpitations.  He has not experienced any near-syncope or syncope.   He notes that he has a peripheral neuropathy.  He has also developed arthritis in his hands.  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  He continues to smoke tobacco.       Mr. Tilley has previously undergone implantation of a cardio-defibrillator and the generator has been replaced. He has also previously undergone an atrial flutter ablation and had followed with Dr. Camacho. His most recent ICD interrogation indicated 9% atrial pacing without atrial or ventricular arrhythmias.    Exam:  This is a man in no apparent distress.  He was alert and oriented to person, place and time.   Blood pressure 122/66, pulse 72, weight 73.2 kg (161 lb 6.4 oz), SpO2 98 %.  HR is regular, and respiratory rate 16-18 per minute.   CHEST:  Clear to auscultation.   CARDIAC:  On cardiac auscultation, there was an S1 and an S2 without extra sounds and only a grade 1/6 early systolic ejection murmur.     Assessment/Plan:   In assessment, Mr. Tilley is doing well from a symptomatic cardiovascular standpoint.  What is concerning is that with minimal to no symptoms, he has sustained repeated infarcts resulting in a progressive decline in his left ventricular performance.    With regard to his ischemic cardiomyopathy, he is on vasodilator therapy with low dose losartan daily and a trivial dose of 12.5 mg metoprolol XL daily.  He would benefit from an increase of first beta blockade to metoprolol XL 25 mg daily and then of vasodilator therapy. In one month, he should increase losartan 25 to 50 mg daily. I would recommend follow up with myself and then, the advance practice provider. His renal function remains normal on lasix 20 mg daily.  He has prn metolazone. At his next appointment in 3 months, spironolactone 12.5 mg will be added for aldosterone antagonism.    His last lipids were under excellent control    [facility]  [facility]  An echo also needs to be repeated in 3 to 6 months.    In the past, smoking cessation with Chantix was strongly recommended and he considered the recommendation. If he will not stop, dual antiplatelet therapy will be discussed on his return 3 months from now.  A BMP will be done at that time as well.    Medication Changes:  1. Increase the metoprolol to one 25 mg daily.  2. In October, increase the losartan to one full 50 mg tablet daily.  Recommendations:  Call us if you do not feel good - light-headedness or not feeling good.  Follow-up:  See Dr. Michaels for cardiology follow up in 3 - 6 months.    Orders Placed This Encounter   Procedures     Follow-Up with Cardiologist       Orders Placed This Encounter   Medications     pantoprazole (PROTONIX) 40 MG EC tablet     Sig: Take 1 tablet (40 mg) by mouth daily     Dispense:  30 tablet     loperamide (IMODIUM) 2 MG capsule     Sig: Take 2 mg by mouth 4 times daily as needed for diarrhea     Insulin Pen Needle (PEN NEEDLES) 32G X 4 MM MISC       Medications Discontinued During This Encounter   Medication Reason     Acetaminophen (TYLENOL PO)      doxycycline (VIBRAMYCIN) 100 MG capsule      glipiZIDE (GLUCOTROL) 10 MG tablet      METFORMIN HCL PO      pantoprazole (PROTONIX) 40 MG EC tablet          Encounter Diagnoses   Name Primary?     Ischemic cardiomyopathy      Acute on chronic systolic congestive heart failure (H)        CURRENT MEDICATIONS:  Current Outpatient Prescriptions   Medication Sig Dispense Refill     albuterol (PROAIR HFA) 108 (90 Base) MCG/ACT Inhaler Inhale 2 puffs into the lungs every 4 hours as needed for shortness of breath / dyspnea 1 Inhaler 0     amylase-lipase-protease (CREON 12) 52527 UNITS CPEP Take 1 capsule (12,000 Units) by mouth daily 120 capsule 0     aspirin 325 MG tablet Take 3 tablets (975 mg) by mouth every morning (Takes 2 x 500 mg = 1000 mg) 120 tablet 1     atorvastatin (LIPITOR) 80 MG tablet Take  80 mg by mouth At Bedtime        furosemide (LASIX) 20 MG tablet Take 1 tablet (20 mg) by mouth daily       insulin glargine (LANTUS) 100 UNIT/ML injection Inject 27 Units Subcutaneous every morning       Insulin Pen Needle (PEN NEEDLES) 32G X 4 MM MISC        loperamide (IMODIUM) 2 MG capsule Take 2 mg by mouth 4 times daily as needed for diarrhea       losartan (COZAAR) 25 MG tablet Take 1 tablet (25 mg) by mouth daily 30 tablet 11     metolazone (ZAROXOLYN) 2.5 MG tablet Take 2.5 mg on Tuesday, 1/30/18 and Thursday 2/1/18. Take this 1/2 hour before taking your Lasix (furosemide) 4 tablet 0     METOPROLOL SUCCINATE ER PO Take 12.5 mg by mouth every morning (Takes 0.5 x 25 mg tablet = 12.5 mg)       Nitroglycerin (NITROSTAT SL) Place 0.4 mg under the tongue every 5 minutes as needed for chest pain       pantoprazole (PROTONIX) 40 MG EC tablet Take 1 tablet (40 mg) by mouth daily 30 tablet        ALLERGIES     Allergies   Allergen Reactions     Hydrocodone      Upset stomach     Shellfish Allergy Hives and Rash       PAST MEDICAL HISTORY:  Past Medical History:   Diagnosis Date     Acute renal failure (H) 8/26/06 Hosp    secondary to dehydration     Arthritis      CAD (coronary artery disease)     LIMA to the LAD, vein graft to OM and a vein graft to the PDA     Carpal tunnel syndrome      Diabetes (H)      Gastro-oesophageal reflux disease      H/O: alcohol abuse      HTN (hypertension)      Hyperlipidaemia      Hyperlipidaemia LDL goal <100 7/8/2013     Hypokalemia      Pacemaker      Pancreatitis 6/26/06 Hosp       PAST SURGICAL HISTORY:  Past Surgical History:   Procedure Laterality Date     BYPASS CARDIOPULMONARY       CATARACT IOL, RT/LT      Cataract IOL RT/LT     ENDARTERECTOMY CAROTID Right 6/12/2015    Procedure: ENDARTERECTOMY CAROTID;  Surgeon: João Turner MD;  Location: [facility]     ENDARTERECTOMY CAROTID Left 9/8/2017    Procedure: ENDARTERECTOMY CAROTID;  LEFT CAROTID ENDARTERECTOMY WITH EEG;   Surgeon: João Turner MD;  Location: [facility]     IMPLANT PACEMAKER  6/10/2010     RELEASE CARPAL TUNNEL Right 4/12/2016    Procedure: RELEASE CARPAL TUNNEL;  Surgeon: Lissy Leger MD;  Location: [facility]     SURGICAL HISTORY OF -   1998    Laminectomy     SURGICAL HISTORY OF -   10/2003    Bypass grafting     TONSILLECTOMY & ADENOIDECTOMY         FAMILY HISTORY:  Family History   Problem Relation Age of Onset     Unknown/Adopted No family hx of        SOCIAL HISTORY:  Social History     Social History     Marital status: Single     Spouse name: N/A     Number of children: N/A     Years of education: N/A     Social History Main Topics     Smoking status: Current Every Day Smoker     Packs/day: 2.00     Types: Cigarettes     Smokeless tobacco: Never Used      Comment: 1 1/2 PPD 2/16/18     Alcohol use No     Drug use: No     Sexual activity: Not Currently     Partners: Female     Other Topics Concern     Parent/Sibling W/ Cabg, Mi Or Angioplasty Before 65f 55m? No     Social History Narrative       Review of Systems:  Skin:  Negative bruising     Eyes:  Positive for glasses    ENT:  Negative nasal congestion;sinus trouble    Respiratory:  Positive for shortness of breath;cough     Cardiovascular:    Positive for;lower extremity symptoms;edema;palpitations;lightheadedness;dizziness    Gastroenterology: Negative      Genitourinary:  Negative      Musculoskeletal:  Positive for joint pain;joint swelling;joint stiffness    Neurologic:  Positive for numbness or tingling of hands;numbness or tingling of feet    Psychiatric:  Negative      Heme/Lymph/Imm:  Positive for allergies    Endocrine:  Positive for diabetes      Physical Exam:  Vitals: /66  Pulse 72  Wt 73.2 kg (161 lb 6.4 oz)  SpO2 98%  BMI 23.16 kg/m2    Constitutional:  cooperative, alert and oriented, well developed, well nourished, in no acute distress        Skin:  warm and dry to the touch          Head:  normocephalic        Eyes:   sclera white        Lymph:      ENT:           Neck:           Respiratory:  normal respiratory excursion diminished breath sounds bilaterally       Cardiac: regular rhythm, normal S1/S2, no S3 or S4, apical impulse not displaced, no murmurs, gallops or rubs                                                         GI:           Extremities and Muscular Skeletal:  no deformities, clubbing, cyanosis, erythema observed              Neurological:  no gross motor deficits;affect appropriate        Psych:  Alert and Oriented x 3          CC  [facility]  [address]  [place] [address]                    Thank you for allowing me to participate in the care of your patient.      Sincerely,     Mimi Michaels MD     [facility]    cc:   [facility]  [address]  [place] [address]

## 2018-08-20 NOTE — PATIENT INSTRUCTIONS
"[facility]  [facility]~[address]~[place], [place]~[address]  Thank you for your [facility] visit today. If you have questions regarding your visit, please contact your cardiology RN's, Basilia Tai or Lissett Sandoval, at [phone]. Your provider has recommended the following:  Medication Changes:  1. Increase the metoprolol to one 25 mg daily.  2. In October, increase the losartan to one full 50 mg tablet daily.  Call if problems: [phone].  Recommendations:  Call us if you do not feel good - light-headedness or not feeling good.  Follow-up:  See Dr. Michaels for cardiology follow up in 6 months.    To schedule a future appointment, we kindly ask that you call cardiology scheduling at [phone] three months prior to requested revisit date.  [facility] cardiology clinic is staffed with \"Advance Practice Providers\". These are our cardiology Physician Assistants and Nurse Practitioners. Please call cardiology scheduling if you feel you need clinical evaluation with them at any time for any cardiac reason.                 Reminder:  For your safety, we ask that you bring in your current medication(s) or an updated list of your medications with you to EACH office visit. Include the medication name, dose of pill on bottle and how you are taking it. Include over-the-counter medications or supplements. Your provider will review this at each visit and plan your care based on your current information.     "

## 2018-08-20 NOTE — LETTER
8/20/2018    [facility]  [address] [address]    RE: Delbert Tilley       Dear Colleague,    I had the pleasure of seeing Delbert Tilley in the [facility].    HPI and Plan:     Mr. Delbert Tilley is now 68 years old and is seen at [facility]. He has undergone bypass surgery for  coronary artery disease, has an ischemic cardiomyopathy, an implantable cardio-defibrillator and a history of atrial flutter.  He also has bilateral carotid disease, has diabetes and smokes tobacco.     He has undergone bypass graft surgery with a LIMA to the LAD, a vein graft to the marginal branch and vein graft to the PDA. In 2014 he presented with a NSTEMI, was transferred to [facility] and the vein graft to the marginal branch was stented. The vein graft to the RCA and the LIMA to the LAD were patent in 2014. His LV ejection fraction was noted to have declined to 35-40% with severe inferior and inferolateral hypokinesis on echocardiography.  A follow-up stress study several months later in 2015 demonstrated a large transmural inferior and inferoseptal defect without ischemia as well as a small distal anterior and apical defect.    He did well without angina or heart failure symptoms.  Recommended as a pre-operative assessment for endarterectomy last year, a lexiscan stress nuclear study demonstrated a large transmural inferior and inferoseptal defect without ischemia. The LVEF was slightly lower an estimated at 28%.     Later last year, he presented with heart failure symptoms. Mr. Tilley's left ventricular ejection fraction (LVEF) had declined further to 20-25% on echo.  He underwent repeat coronary angiography at [facility].  The LIMA graft to the LAD was widely patent with a 50% stenosis in the apical LAD, the vein graft to the RCA had occluded.  The vein graft to the third marginal branch was patent  The left main coronary artery was widely  patent.    He denies any chest discomfort, angina, exertional intolerance or palpitations.  He has not experienced any near-syncope or syncope.   He notes that he has a peripheral neuropathy.  He has also developed arthritis in his hands.  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  He continues to smoke tobacco.       Mr. Tilley has previously undergone implantation of a cardio-defibrillator and the generator has been replaced. He has also previously undergone an atrial flutter ablation and had followed with Dr. Camacho. His most recent ICD interrogation indicated 9% atrial pacing without atrial or ventricular arrhythmias.    Exam:  This is a man in no apparent distress.  He was alert and oriented to person, place and time.   Blood pressure 122/66, pulse 72, weight 73.2 kg (161 lb 6.4 oz), SpO2 98 %.  HR is regular, and respiratory rate 16-18 per minute.   CHEST:  Clear to auscultation.   CARDIAC:  On cardiac auscultation, there was an S1 and an S2 without extra sounds and only a grade 1/6 early systolic ejection murmur.     Assessment/Plan:   In assessment, Mr. Tilley is doing well from a symptomatic cardiovascular standpoint.  What is concerning is that with minimal to no symptoms, he has sustained repeated infarcts resulting in a progressive decline in his left ventricular performance.    With regard to his ischemic cardiomyopathy, he is on vasodilator therapy with low dose losartan daily and a trivial dose of 12.5 mg metoprolol XL daily.  He would benefit from an increase of first beta blockade to metoprolol XL 25 mg daily and then of vasodilator therapy. In one month, he should increase losartan 25 to 50 mg daily. I would recommend follow up with myself and then, the advance practice provider. His renal function remains normal on lasix 20 mg daily.  He has prn metolazone. At his next appointment in 3 months, spironolactone 12.5 mg will be added for aldosterone antagonism.    His last lipids were under excellent control    [facility] is  [facility].  An echo also needs to be repeated in 3 to 6 months.    In the past, smoking cessation with Chantix was strongly recommended and he considered the recommendation. If he will not stop, dual antiplatelet therapy will be discussed on his return 3 months from now.  A BMP will be done at that time as well.    Medication Changes:  1. Increase the metoprolol to one 25 mg daily.  2. In October, increase the losartan to one full 50 mg tablet daily.  Recommendations:  Call us if you do not feel good - light-headedness or not feeling good.  Follow-up:  See Dr. Michaels for cardiology follow up in 3 - 6 months.    Orders Placed This Encounter   Procedures     Follow-Up with Cardiologist       Orders Placed This Encounter   Medications     pantoprazole (PROTONIX) 40 MG EC tablet     Sig: Take 1 tablet (40 mg) by mouth daily     Dispense:  30 tablet     loperamide (IMODIUM) 2 MG capsule     Sig: Take 2 mg by mouth 4 times daily as needed for diarrhea     Insulin Pen Needle (PEN NEEDLES) 32G X 4 MM MISC       Medications Discontinued During This Encounter   Medication Reason     Acetaminophen (TYLENOL PO)      doxycycline (VIBRAMYCIN) 100 MG capsule      glipiZIDE (GLUCOTROL) 10 MG tablet      METFORMIN HCL PO      pantoprazole (PROTONIX) 40 MG EC tablet          Encounter Diagnoses   Name Primary?     Ischemic cardiomyopathy      Acute on chronic systolic congestive heart failure (H)        CURRENT MEDICATIONS:  Current Outpatient Prescriptions   Medication Sig Dispense Refill     albuterol (PROAIR HFA) 108 (90 Base) MCG/ACT Inhaler Inhale 2 puffs into the lungs every 4 hours as needed for shortness of breath / dyspnea 1 Inhaler 0     amylase-lipase-protease (CREON 12) 54649 UNITS CPEP Take 1 capsule (12,000 Units) by mouth daily 120 capsule 0     aspirin 325 MG tablet Take 3 tablets (975 mg) by mouth every morning (Takes 2 x 500 mg = 1000 mg) 120 tablet 1     atorvastatin (LIPITOR) 80 MG tablet Take  80 mg by mouth At Bedtime        furosemide (LASIX) 20 MG tablet Take 1 tablet (20 mg) by mouth daily       insulin glargine (LANTUS) 100 UNIT/ML injection Inject 27 Units Subcutaneous every morning       Insulin Pen Needle (PEN NEEDLES) 32G X 4 MM MISC        loperamide (IMODIUM) 2 MG capsule Take 2 mg by mouth 4 times daily as needed for diarrhea       losartan (COZAAR) 25 MG tablet Take 1 tablet (25 mg) by mouth daily 30 tablet 11     metolazone (ZAROXOLYN) 2.5 MG tablet Take 2.5 mg on Tuesday, 1/30/18 and Thursday 2/1/18. Take this 1/2 hour before taking your Lasix (furosemide) 4 tablet 0     METOPROLOL SUCCINATE ER PO Take 12.5 mg by mouth every morning (Takes 0.5 x 25 mg tablet = 12.5 mg)       Nitroglycerin (NITROSTAT SL) Place 0.4 mg under the tongue every 5 minutes as needed for chest pain       pantoprazole (PROTONIX) 40 MG EC tablet Take 1 tablet (40 mg) by mouth daily 30 tablet        ALLERGIES     Allergies   Allergen Reactions     Hydrocodone      Upset stomach     Shellfish Allergy Hives and Rash       PAST MEDICAL HISTORY:  Past Medical History:   Diagnosis Date     Acute renal failure (H) 8/26/06 Hosp    secondary to dehydration     Arthritis      CAD (coronary artery disease)     LIMA to the LAD, vein graft to OM and a vein graft to the PDA     Carpal tunnel syndrome      Diabetes (H)      Gastro-oesophageal reflux disease      H/O: alcohol abuse      HTN (hypertension)      Hyperlipidaemia      Hyperlipidaemia LDL goal <100 7/8/2013     Hypokalemia      Pacemaker      Pancreatitis 6/26/06 Hosp       PAST SURGICAL HISTORY:  Past Surgical History:   Procedure Laterality Date     BYPASS CARDIOPULMONARY       CATARACT IOL, RT/LT      Cataract IOL RT/LT     ENDARTERECTOMY CAROTID Right 6/12/2015    Procedure: ENDARTERECTOMY CAROTID;  Surgeon: João Turner MD;  Location: [facility] OR     ENDARTERECTOMY CAROTID Left 9/8/2017    Procedure: ENDARTERECTOMY CAROTID;  LEFT CAROTID ENDARTERECTOMY WITH EEG;   Surgeon: João Turner MD;  Location: [facility] OR     IMPLANT PACEMAKER  6/10/2010     RELEASE CARPAL TUNNEL Right 4/12/2016    Procedure: RELEASE CARPAL TUNNEL;  Surgeon: Lissy Leger MD;  Location: [facility] OR     SURGICAL HISTORY OF -   1998    Laminectomy     SURGICAL HISTORY OF -   10/2003    Bypass grafting     TONSILLECTOMY & ADENOIDECTOMY         FAMILY HISTORY:  Family History   Problem Relation Age of Onset     Unknown/Adopted No family hx of        SOCIAL HISTORY:  Social History     Social History     Marital status: Single     Spouse name: N/A     Number of children: N/A     Years of education: N/A     Social History Main Topics     Smoking status: Current Every Day Smoker     Packs/day: 2.00     Types: Cigarettes     Smokeless tobacco: Never Used      Comment: 1 1/2 PPD 2/16/18     Alcohol use No     Drug use: No     Sexual activity: Not Currently     Partners: Female     Other Topics Concern     Parent/Sibling W/ Cabg, Mi Or Angioplasty Before 65f 55m? No     Social History Narrative       Review of Systems:  Skin:  Negative bruising     Eyes:  Positive for glasses    ENT:  Negative nasal congestion;sinus trouble    Respiratory:  Positive for shortness of breath;cough     Cardiovascular:    Positive for;lower extremity symptoms;edema;palpitations;lightheadedness;dizziness    Gastroenterology: Negative      Genitourinary:  Negative      Musculoskeletal:  Positive for joint pain;joint swelling;joint stiffness    Neurologic:  Positive for numbness or tingling of hands;numbness or tingling of feet    Psychiatric:  Negative      Heme/Lymph/Imm:  Positive for allergies    Endocrine:  Positive for diabetes      Physical Exam:  Vitals: /66  Pulse 72  Wt 73.2 kg (161 lb 6.4 oz)  SpO2 98%  BMI 23.16 kg/m2    Constitutional:  cooperative, alert and oriented, well developed, well nourished, in no acute distress        Skin:  warm and dry to the touch          Head:  normocephalic        Eyes:   sclera white        Lymph:      ENT:           Neck:           Respiratory:  normal respiratory excursion diminished breath sounds bilaterally       Cardiac: regular rhythm, normal S1/S2, no S3 or S4, apical impulse not displaced, no murmurs, gallops or rubs                                                         GI:           Extremities and Muscular Skeletal:  no deformities, clubbing, cyanosis, erythema observed              Neurological:  no gross motor deficits;affect appropriate        Psych:  Alert and Oriented x 3        Thank you for allowing me to participate in the care of your patient.    Sincerely,     Mimi Michaels MD     [facility]

## 2018-08-20 NOTE — MR AVS SNAPSHOT
"              After Visit Summary   8/20/2018    Delbert Tilley    MRN: [id number]           Patient Information     Date Of Birth          1950        Visit Information        Provider Department      8/20/2018 3:30 PM Mimi Michaels MD [facility]        Today's Diagnoses     Ischemic cardiomyopathy        Acute on chronic systolic congestive heart failure (H)          Care Instructions    [facility]~[address]~[place], [place]~[address]  Thank you for your  Heart Care visit today. If you have questions regarding your visit, please contact your cardiology RN's, Basilia Tai or Lissett Sandoval, at [phone]. Your provider has recommended the following:  Medication Changes:  1. Increase the metoprolol to one 25 mg daily.  2. In October, increase the losartan to one full 50 mg tablet daily.  Call if problems: [phone].  Recommendations:  Call us if you do not feel good - light-headedness or not feeling good.  Follow-up:  See Dr. Michaels for cardiology follow up in 6 months.    To schedule a future appointment, we kindly ask that you call cardiology scheduling at [phone] three months prior to requested revisit date.  [facility] cardiology clinic is staffed with \"Advance Practice Providers\". These are our cardiology Physician Assistants and Nurse Practitioners. Please call cardiology scheduling if you feel you need clinical evaluation with them at any time for any cardiac reason.                 Reminder:  For your safety, we ask that you bring in your current medication(s) or an updated list of your medications with you to EACH office visit. Include the medication name, dose of pill on bottle and how you are taking it. Include over-the-counter medications or supplements. Your provider will review this at each visit and plan your care based on your current information.             Follow-ups after " your visit        Additional Services     Follow-Up with Cardiologist                 Your next 10 appointments already scheduled     Aug 21, 2018  4:30 PM CDT   Remote ICD Check with GALAVIZ DCR2   [facility] ([facility])    [address]  [place] [id number] [phone] OPT 2           This appointment is for a remote check of your debrillator.  This is not an appointment at the office.              Future tests that were ordered for you today     Open Future Orders        Priority Expected Expires Ordered    Follow-Up with Cardiologist Routine 11/19/2018 8/20/2019 8/20/2018            Who to contact     If you have questions or need follow up information about today's clinic visit or your schedule please contact [facility] directly at [phone].  Normal or non-critical lab and imaging results will be communicated to you by MyChart, letter or phone within 4 business days after the clinic has received the results. If you do not hear from us within 7 days, please contact the clinic through MyChart or phone. If you have a critical or abnormal lab result, we will notify you by phone as soon as possible.  Submit refill requests through [employer] or call your pharmacy and they will forward the refill request to us. Please allow 3 business days for your refill to be completed.          Additional Information About Your Visit        Care EveryWhere ID     This is your Care EveryWhere ID. This could be used by other organizations to access your [place] medical records  [id number]        Your Vitals Were     Pulse Pulse Oximetry BMI (Body Mass Index)             72 98% 23.16 kg/m2          Blood Pressure from Last 3 Encounters:   08/20/18 122/66   07/18/18 110/76   04/18/18 90/56    Weight from Last 3 Encounters:   08/20/18 73.2 kg (161 lb 6.4 oz)   07/18/18 72.6 kg (160 lb)   04/18/18 73.9 kg (163 lb)               We Performed the Following     Follow-Up with Cardiologist          Today's Medication Changes          These changes are accurate as of 8/20/18  4:23 PM.  If you have any questions, ask your nurse or doctor.               Stop taking these medicines if you haven't already. Please contact your care team if you have questions.     doxycycline 100 MG capsule   Commonly known as:  VIBRAMYCIN   Stopped by:  Mimi Michaels MD           glipiZIDE 10 MG tablet   Commonly known as:  GLUCOTROL   Stopped by:  Mimi Michaels MD           METFORMIN HCL PO   Stopped by:  Mimi Michaels MD           TYLENOL PO   Stopped by:  Mimi Michaels MD                    Primary Care Provider Fax #    [facility] [phone] [address] [address]        Equal Access to Services     MARITA LAMA : Forest Mondragon, waaxda luqadaha, qaybta kaalmada minal, gwendolyn cosby . So [facility] [phone].    ATENCIÓN: Si habla español, tiene a frey disposición servicios gratuitos de asistencia lingüística. Llame al [phone].    We comply with applicable federal civil rights laws and [place] laws. We do not discriminate on the basis of race, color, national origin, age, disability, sex, sexual orientation, or gender identity.            Thank you!     Thank you for choosing [facility]  for your care. Our goal is always to provide you with excellent care. Hearing back from our patients is one way we can continue to improve our services. Please take a few minutes to complete the written survey that you may receive in the mail after your visit with us. Thank you!             Your Updated Medication List - Protect others around you: Learn how to safely use, store and throw away your medicines at www.disposemymeds.org.          This list is accurate as of 8/20/18  4:23 PM.  Always use your most recent med list.                    Brand Name Dispense Instructions for use Diagnosis    albuterol 108 (90 Base) MCG/ACT inhaler    PROAIR HFA    1 Inhaler    Inhale 2 puffs into the lungs every 4 hours as needed for shortness of breath / dyspnea        amylase-lipase-protease 06552 units Cpep    CREON 12    120 capsule    Take 1 capsule (12,000 Units) by mouth daily    Pancreatic disease       aspirin 325 MG tablet     120 tablet    Take 3 tablets (975 mg) by mouth every morning (Takes 2 x 500 mg = 1000 mg)    Left carotid stenosis       atorvastatin 80 MG tablet    LIPITOR     Take 80 mg by mouth At Bedtime        furosemide 20 MG tablet    LASIX     Take 1 tablet (20 mg) by mouth daily    Acute on chronic systolic congestive heart failure (H)       insulin glargine 100 UNIT/ML injection    LANTUS     Inject 27 Units Subcutaneous every morning        loperamide 2 MG capsule    IMODIUM     Take 2 mg by mouth 4 times daily as needed for diarrhea        losartan 25 MG tablet    COZAAR    30 tablet    Take 1 tablet (25 mg) by mouth daily    Ischemic cardiomyopathy       metolazone 2.5 MG tablet    ZAROXOLYN    4 tablet    Take 2.5 mg on Tuesday, 1/30/18 and Thursday 2/1/18. Take this 1/2 hour before taking your Lasix (furosemide)    Edema, Ischemic cardiomyopathy       METOPROLOL SUCCINATE ER PO      Take 12.5 mg by mouth every morning (Takes 0.5 x 25 mg tablet = 12.5 mg)        NITROSTAT SL      Place 0.4 mg under the tongue every 5 minutes as needed for chest pain        pantoprazole 40 MG EC tablet    PROTONIX    30 tablet    Take 1 tablet (40 mg) by mouth daily        Pen Needles 32G X 4 MM Misc

## 2018-08-20 NOTE — PROGRESS NOTES
HPI and Plan:     Mr. Delbert Tilley is now 68 years old and is seen at [facility]. He has undergone bypass surgery for  coronary artery disease, has an ischemic cardiomyopathy, an implantable cardio-defibrillator and a history of atrial flutter.  He also has bilateral carotid disease, has diabetes and smokes tobacco.     He has undergone bypass graft surgery with a LIMA to the LAD, a vein graft to the marginal branch and vein graft to the PDA. In 2014 he presented with a NSTEMI, was transferred to [facility] and the vein graft to the marginal branch was stented. The vein graft to the RCA and the LIMA to the LAD were patent in 2014. His LV ejection fraction was noted to have declined to 35-40% with severe inferior and inferolateral hypokinesis on echocardiography.  A follow-up stress study several months later in 2015 demonstrated a large transmural inferior and inferoseptal defect without ischemia as well as a small distal anterior and apical defect.    He did well without angina or heart failure symptoms.  Recommended as a pre-operative assessment for endarterectomy last year, a lexiscan stress nuclear study demonstrated a large transmural inferior and inferoseptal defect without ischemia. The LVEF was slightly lower an estimated at 28%.     Later last year, he presented with heart failure symptoms. Mr. Tilley's left ventricular ejection fraction (LVEF) had declined further to 20-25% on echo.  He underwent repeat coronary angiography at [facility].  The LIMA graft to the LAD was widely patent with a 50% stenosis in the apical LAD, the vein graft to the RCA had occluded.  The vein graft to the third marginal branch was patent  The left main coronary artery was widely patent.    He denies any chest discomfort, angina, exertional intolerance or palpitations.  He has not experienced any near-syncope or syncope.   He notes that he has a peripheral neuropathy.  He has also developed  arthritis in his hands.  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  He continues to smoke tobacco.       Mr. Tilley has previously undergone implantation of a cardio-defibrillator and the generator has been replaced. He has also previously undergone an atrial flutter ablation and had followed with Dr. Camacho. His most recent ICD interrogation indicated 9% atrial pacing without atrial or ventricular arrhythmias.    Exam:  This is a man in no apparent distress.  He was alert and oriented to person, place and time.   Blood pressure 122/66, pulse 72, weight 73.2 kg (161 lb 6.4 oz), SpO2 98 %.  HR is regular, and respiratory rate 16-18 per minute.   CHEST:  Clear to auscultation.   CARDIAC:  On cardiac auscultation, there was an S1 and an S2 without extra sounds and only a grade 1/6 early systolic ejection murmur.     Assessment/Plan:   In assessment, Mr. Tilley is doing well from a symptomatic cardiovascular standpoint.  What is concerning is that with minimal to no symptoms, he has sustained repeated infarcts resulting in a progressive decline in his left ventricular performance.    With regard to his ischemic cardiomyopathy, he is on vasodilator therapy with low dose losartan daily and a trivial dose of 12.5 mg metoprolol XL daily.  He would benefit from an increase of first beta blockade to metoprolol XL 25 mg daily and then of vasodilator therapy. In one month, he should increase losartan 25 to 50 mg daily. I would recommend follow up with myself and then, the advance practice provider. His renal function remains normal on lasix 20 mg daily.  He has prn metolazone. At his next appointment in 3 months, spironolactone 12.5 mg will be added for aldosterone antagonism.    His last lipids were under excellent control    [facility]  An echo also needs to be repeated in 3 to 6 months.    In the past, smoking cessation with Chantix was strongly recommended and he considered the recommendation. If he will not stop,  dual antiplatelet therapy will be discussed on his return 3 months from now.  A BMP will be done at that time as well.    Medication Changes:  1. Increase the metoprolol to one 25 mg daily.  2. In October, increase the losartan to one full 50 mg tablet daily.  Recommendations:  Call us if you do not feel good - light-headedness or not feeling good.  Follow-up:  See Dr. Michaels for cardiology follow up in 3 - 6 months.    Orders Placed This Encounter   Procedures     Follow-Up with Cardiologist       Orders Placed This Encounter   Medications     pantoprazole (PROTONIX) 40 MG EC tablet     Sig: Take 1 tablet (40 mg) by mouth daily     Dispense:  30 tablet     loperamide (IMODIUM) 2 MG capsule     Sig: Take 2 mg by mouth 4 times daily as needed for diarrhea     Insulin Pen Needle (PEN NEEDLES) 32G X 4 MM MISC       Medications Discontinued During This Encounter   Medication Reason     Acetaminophen (TYLENOL PO)      doxycycline (VIBRAMYCIN) 100 MG capsule      glipiZIDE (GLUCOTROL) 10 MG tablet      METFORMIN HCL PO      pantoprazole (PROTONIX) 40 MG EC tablet          Encounter Diagnoses   Name Primary?     Ischemic cardiomyopathy      Acute on chronic systolic congestive heart failure (H)        CURRENT MEDICATIONS:  Current Outpatient Prescriptions   Medication Sig Dispense Refill     albuterol (PROAIR HFA) 108 (90 Base) MCG/ACT Inhaler Inhale 2 puffs into the lungs every 4 hours as needed for shortness of breath / dyspnea 1 Inhaler 0     amylase-lipase-protease (CREON 12) 97380 UNITS CPEP Take 1 capsule (12,000 Units) by mouth daily 120 capsule 0     aspirin 325 MG tablet Take 3 tablets (975 mg) by mouth every morning (Takes 2 x 500 mg = 1000 mg) 120 tablet 1     atorvastatin (LIPITOR) 80 MG tablet Take 80 mg by mouth At Bedtime        furosemide (LASIX) 20 MG tablet Take 1 tablet (20 mg) by mouth daily       insulin glargine (LANTUS) 100 UNIT/ML injection Inject 27 Units Subcutaneous every morning       Insulin Pen  Needle (PEN NEEDLES) 32G X 4 MM MISC        loperamide (IMODIUM) 2 MG capsule Take 2 mg by mouth 4 times daily as needed for diarrhea       losartan (COZAAR) 25 MG tablet Take 1 tablet (25 mg) by mouth daily 30 tablet 11     metolazone (ZAROXOLYN) 2.5 MG tablet Take 2.5 mg on Tuesday, 1/30/18 and Thursday 2/1/18. Take this 1/2 hour before taking your Lasix (furosemide) 4 tablet 0     METOPROLOL SUCCINATE ER PO Take 12.5 mg by mouth every morning (Takes 0.5 x 25 mg tablet = 12.5 mg)       Nitroglycerin (NITROSTAT SL) Place 0.4 mg under the tongue every 5 minutes as needed for chest pain       pantoprazole (PROTONIX) 40 MG EC tablet Take 1 tablet (40 mg) by mouth daily 30 tablet        ALLERGIES     Allergies   Allergen Reactions     Hydrocodone      Upset stomach     Shellfish Allergy Hives and Rash       PAST MEDICAL HISTORY:  Past Medical History:   Diagnosis Date     Acute renal failure (H) 8/26/06 Hosp    secondary to dehydration     Arthritis      CAD (coronary artery disease)     LIMA to the LAD, vein graft to OM and a vein graft to the PDA     Carpal tunnel syndrome      Diabetes (H)      Gastro-oesophageal reflux disease      H/O: alcohol abuse      HTN (hypertension)      Hyperlipidaemia      Hyperlipidaemia LDL goal <100 7/8/2013     Hypokalemia      Pacemaker      Pancreatitis 6/26/06 Hosp       PAST SURGICAL HISTORY:  Past Surgical History:   Procedure Laterality Date     BYPASS CARDIOPULMONARY       CATARACT IOL, RT/LT      Cataract IOL RT/LT     ENDARTERECTOMY CAROTID Right 6/12/2015    Procedure: ENDARTERECTOMY CAROTID;  Surgeon: João Turner MD;  Location:  OR     ENDARTERECTOMY CAROTID Left 9/8/2017    Procedure: ENDARTERECTOMY CAROTID;  LEFT CAROTID ENDARTERECTOMY WITH EEG;  Surgeon: João Turner MD;  Location:  OR     IMPLANT PACEMAKER  6/10/2010     RELEASE CARPAL TUNNEL Right 4/12/2016    Procedure: RELEASE CARPAL TUNNEL;  Surgeon: Lissy Leger MD;  Location: [facility] OR      SURGICAL HISTORY OF -   1998    Laminectomy     SURGICAL HISTORY OF -   10/2003    Bypass grafting     TONSILLECTOMY & ADENOIDECTOMY         FAMILY HISTORY:  Family History   Problem Relation Age of Onset     Unknown/Adopted No family hx of        SOCIAL HISTORY:  Social History     Social History     Marital status: Single     Spouse name: N/A     Number of children: N/A     Years of education: N/A     Social History Main Topics     Smoking status: Current Every Day Smoker     Packs/day: 2.00     Types: Cigarettes     Smokeless tobacco: Never Used      Comment: 1 1/2 PPD 2/16/18     Alcohol use No     Drug use: No     Sexual activity: Not Currently     Partners: Female     Other Topics Concern     Parent/Sibling W/ Cabg, Mi Or Angioplasty Before 65f 55m? No     Social History Narrative       Review of Systems:  Skin:  Negative bruising     Eyes:  Positive for glasses    ENT:  Negative nasal congestion;sinus trouble    Respiratory:  Positive for shortness of breath;cough     Cardiovascular:    Positive for;lower extremity symptoms;edema;palpitations;lightheadedness;dizziness    Gastroenterology: Negative      Genitourinary:  Negative      Musculoskeletal:  Positive for joint pain;joint swelling;joint stiffness    Neurologic:  Positive for numbness or tingling of hands;numbness or tingling of feet    Psychiatric:  Negative      Heme/Lymph/Imm:  Positive for allergies    Endocrine:  Positive for diabetes      Physical Exam:  Vitals: /66  Pulse 72  Wt 73.2 kg (161 lb 6.4 oz)  SpO2 98%  BMI 23.16 kg/m2    Constitutional:  cooperative, alert and oriented, well developed, well nourished, in no acute distress        Skin:  warm and dry to the touch          Head:  normocephalic        Eyes:  sclera white        Lymph:      ENT:           Neck:           Respiratory:  normal respiratory excursion diminished breath sounds bilaterally       Cardiac: regular rhythm, normal S1/S2, no S3 or S4, apical impulse not  displaced, no murmurs, gallops or rubs                                                         GI:           Extremities and Muscular Skeletal:  no deformities, clubbing, cyanosis, erythema observed              Neurological:  no gross motor deficits;affect appropriate        Psych:  Alert and Oriented x 3          CC  Hattie Fernandez, APRN CNP  [facility]  [address]  [place] [address]

## 2018-08-21 ENCOUNTER — ALLIED HEALTH/NURSE VISIT (OUTPATIENT)
Dept: CARDIOLOGY | Facility: CLINIC | Age: 68
End: 2018-08-21
Payer: COMMERCIAL

## 2018-08-21 DIAGNOSIS — Z95.810 ICD (IMPLANTABLE CARDIOVERTER-DEFIBRILLATOR), DUAL, IN SITU: Primary | ICD-10-CM

## 2018-08-21 PROCEDURE — 93296 REM INTERROG EVL PM/IDS: CPT | Performed by: INTERNAL MEDICINE

## 2018-08-21 PROCEDURE — 93295 DEV INTERROG REMOTE 1/2/MLT: CPT | Performed by: INTERNAL MEDICINE

## 2018-08-21 NOTE — PROGRESS NOTES
Medtronic Evera MRI XT  [id number] ICD Remote Device Check  AP: 17.5 % : <0.1 %  Mode: AAIR-DDDR  bpm        Presenting Rhythm: NSR  Heart Rate: Stable with good variability.  Sensing: WNL    Pacing Threshold: WNL    Impedance: WNL  Battery Status: Approximately 10.2 years longevity.  Atrial Arrhythmia: 0  Ventricular Arrhythmia: 0  ATP: 0    Shocks: 0    Care Plan: Follow up with Q 3 month remote checks. Left message on  of today's results and date of the next remote check. YULIA Santillan

## 2018-08-21 NOTE — MR AVS SNAPSHOT
After Visit Summary   8/21/2018    Delbert Tilley    MRN: [id number]           Patient Information     Date Of Birth          1950        Visit Information        Provider Department      8/21/2018 4:30 PM GALAVIZ DCR2 [facility]        Today's Diagnoses     ICD (implantable cardioverter-defibrillator), dual, in situ    -  1       Follow-ups after your visit        Your next 10 appointments already scheduled     Aug 21, 2018  4:30 PM CDT   Remote ICD Check with GALAVIZ DCR2   [facility] ([facility])    [address] [id number] [phone] OPT [address]           This appointment is for a remote check of your debrillator.  This is not an appointment at the office.            Dec 13, 2018  4:30 PM CST   Remote ICD Check with GALAVIZ DCR2   [facility] ([facility])    [id number]  [facility] [address] [phone] OPT 2           This appointment is for a remote check of your debrillator.  This is not an appointment at the office.              Future tests that were ordered for you today     Open Future Orders        Priority Expected Expires Ordered    Follow-Up with Cardiologist Routine 11/19/2018 8/20/2019 8/20/2018            Who to contact     If you have questions or need follow up information about today's clinic visit or your schedule please contact [facility] directly at [phone].  Normal or non-critical lab and imaging results will be communicated to you by MyChart, letter or phone within 4 business days after the clinic has received the results. If you do not hear from us within 7 days, please contact the clinic through MyChart or phone. If you have a critical or abnormal lab result, we will notify you by phone as soon as possible.  Submit refill requests through [facility] or call your  pharmacy and they will forward the refill request to us. Please allow 3 business days for your refill to be completed.          Additional Information About Your Visit        Care EveryWhere ID     This is your Care EveryWhere ID. This could be used by other organizations to access your [facility] medical records  [id number]         Blood Pressure from Last 3 Encounters:   08/20/18 122/66   07/18/18 110/76   04/18/18 90/56    Weight from Last 3 Encounters:   08/20/18 73.2 kg (161 lb 6.4 oz)   07/18/18 72.6 kg (160 lb)   04/18/18 73.9 kg (163 lb)              We Performed the Following     ICD DEVICE INTERROGAT REMOTE (70422)     INTERROGATION DEVICE EVAL REMOTE, PACER/ICD (71948)        Primary Care Provider Fax #    [facility] [phone]       [facility] [facility] [address]        Equal Access to Services     MARITA LAMA : Hadii aad ku hadasho Sograntali, waaxda luqadaha, qaybta kaalmada adeegyada, waxay idiin hayaan shawna cosby . So [facility] [phone].    ATENCIÓN: Si habla español, tiene a frey disposición servicios gratuitos de asistencia lingüística. Ifeanyi al [phone].    We comply with applicable federal civil rights laws and [place] laws. We do not discriminate on the basis of race, color, national origin, age, disability, sex, sexual orientation, or gender identity.            Thank you!     Thank you for choosing [facility]  for your care. Our goal is always to provide you with excellent care. Hearing back from our patients is one way we can continue to improve our services. Please take a few minutes to complete the written survey that you may receive in the mail after your visit with us. Thank you!             Your Updated Medication List - Protect others around you: Learn how to safely use, store and throw away your medicines at www.disposemymeds.org.          This list is accurate as of 8/21/18  2:45 PM.  Always use your most  recent med list.                   Brand Name Dispense Instructions for use Diagnosis    albuterol 108 (90 Base) MCG/ACT inhaler    PROAIR HFA    1 Inhaler    Inhale 2 puffs into the lungs every 4 hours as needed for shortness of breath / dyspnea        amylase-lipase-protease 98911 units Cpep    CREON 12    120 capsule    Take 1 capsule (12,000 Units) by mouth daily    Pancreatic disease       aspirin 325 MG tablet     120 tablet    Take 3 tablets (975 mg) by mouth every morning (Takes 2 x 500 mg = 1000 mg)    Left carotid stenosis       atorvastatin 80 MG tablet    LIPITOR     Take 80 mg by mouth At Bedtime        furosemide 20 MG tablet    LASIX     Take 1 tablet (20 mg) by mouth daily    Acute on chronic systolic congestive heart failure (H)       insulin glargine 100 UNIT/ML injection    LANTUS     Inject 27 Units Subcutaneous every morning        loperamide 2 MG capsule    IMODIUM     Take 2 mg by mouth 4 times daily as needed for diarrhea        losartan 25 MG tablet    COZAAR    30 tablet    Take 1 tablet (25 mg) by mouth daily    Ischemic cardiomyopathy       metolazone 2.5 MG tablet    ZAROXOLYN    4 tablet    Take 2.5 mg on Tuesday, 1/30/18 and Thursday 2/1/18. Take this 1/2 hour before taking your Lasix (furosemide)    Edema, Ischemic cardiomyopathy       METOPROLOL SUCCINATE ER PO      Take 12.5 mg by mouth every morning (Takes 0.5 x 25 mg tablet = 12.5 mg)        NITROSTAT SL      Place 0.4 mg under the tongue every 5 minutes as needed for chest pain        pantoprazole 40 MG EC tablet    PROTONIX    30 tablet    Take 1 tablet (40 mg) by mouth daily        Pen Needles 32G X 4 MM Misc

## 2018-09-05 DIAGNOSIS — I25.5 ISCHEMIC CARDIOMYOPATHY: ICD-10-CM

## 2018-09-05 DIAGNOSIS — I25.5 ISCHEMIC CARDIOMYOPATHY: Primary | ICD-10-CM

## 2018-09-05 RX ORDER — METOPROLOL SUCCINATE 25 MG/1
25 TABLET, EXTENDED RELEASE ORAL DAILY
Qty: 90 TABLET | Refills: 3 | Status: SHIPPED | OUTPATIENT
Start: 2018-09-05 | End: 2020-05-29

## 2018-09-05 RX ORDER — LOSARTAN POTASSIUM 50 MG/1
50 TABLET ORAL DAILY
Qty: 90 TABLET | Refills: 3 | Status: ON HOLD | OUTPATIENT
Start: 2018-09-20 | End: 2020-10-22

## 2018-10-19 DIAGNOSIS — I65.29 CAROTID STENOSIS: Primary | ICD-10-CM

## 2018-11-01 NOTE — PROGRESS NOTES
Cardiology Clinic Progress Note  Delbert Tilley MRN# [id number]   YOB: 1950 Age: 67 year old     Reason For Visit:  2 month follow-up    Primary Cardiologist:   Dr. Michaels          History of Presenting Illness:    Delbert Tilley is a pleasant 67 year old patient with a past cardiac history significant for a couple episodes of 30 seconds of paroxysmal atrial fibrillation not on anticoagulation, CAD status post CABG (LIMA to LAD, SVG to OM, and SVG to PDA) and PCI to OM3 graft 2014, status post right and left carotid endarterectomy, status post flutter ablation, and ischemic combined with nonischemic cardiomyopathy with previous LVEF 35-40% now decreased to 20-25% and status post ICD. Dry weight 161 pounds.     In 8/2017 he had a stress test showing a large transmural inferior and inferoseptal defect without ischemia but LVEF was 28%. Echocardiogram 11/2017 confirmed LVEF decreased to 20-25% with mildly dilated LV, severe global hypokinesia, inferior and inferolateral akinesia, it was suggestive of restrictive physiology, moderate MR, mild TR, moderate pulmonary hypertension, normal IVC, and RV normal size and function. The patient had previously had an ICD placed. He had heart failure and underwent angiogram.  This showed a widely patent LIMA to LAD with 50% stenosis in the apical LAD, vein graft to the RCA was occluded, vein graft to the OM 3 was patent, and left main coronary artery was patent.  There was recommendation for possible BiV upgrade.     Patient was last seen by Dr. Michaels on 8/20/2018.  He unfortunately continued to smoke and noted that he had peripheral neuropathy and arthritic hands.  He was doing well from a cardiac standpoint.  For his cardiomyopathy, it was recommended that he increase his losartan to 50 mg daily and increase metoprolol to 25 mg daily.  At follow-up, he should be started on spironolactone 12.5 mg daily.  If the patient failed to quit smoking, he should be started on  DAPT.  Echocardiogram in 3-6 months to reassess LVEF.    Pt presents today for 2 month follow-up.  He unfortunately continues smoking.  I have asked him to contact us if we can be of any help.  I have asked him to get a BMP today since this was not checked after previously increasing losartan.  He is agreeable to starting spironolactone for his cardiomyopathy.  I have counseled him to watch for symptoms of hypotension after starting this.  His blood pressure is well-controlled today.  He denies any side effects after previously increasing metoprolol and losartan. I discussed DAPT given his continued smoking.  Unfortunately, he has been taking 1000 mg of aspirin daily for his arthritis.  Therefore, I am hesitant to start him on Plavix in addition to this.  I have asked him to try Tylenol arthritis so that we can decrease his aspirin back to 81 mg daily and then start Plavix. His weight is stable today in the clinic.  He has been checking these at home and occasionally will see a 5 pound weight increase over the course of 2-3 days.  When this happens, he takes an extra Lasix for a couple days and weight comes back down with resolution of his lower extremity edema.  This has happened approximately 3 times in the last 3 months.  He has been wearing compression stockings approximately every other day.  He continues with chronic stable shortness of breath which he believes is more related to lungs.  He has not been formally diagnosed with lung disease.  He has recently been doing yard work and raking leaves and denies any chest discomfort or angina. Patient reports no chest pain, PND, orthopnea, presyncope, syncope, heart racing, or palpitations.      Current Cardiac Medications   Aspirin 1000 mg daily  Tylenol 1000 mg daily  Nitroglycerin p.r.n.  Metoprolol XL 25 mg daily  Atorvastatin 80 mg daily   Losartan 50 mg daily   Lasix 20 mg daily and 40 mg daily PRN Wt gain                      Assessment and Plan:     Plan  1.   Start spironolactone 12.5 mg daily for cardiomyopathy  2.  BMP today after previously increasing losartan  3.  Call if blood pressure is less than 90 on top or less than 100 with lightheadedness.    4.  Check daily weights and call the clinic if your weight has increased more than 2 lbs in one day or 5 lbs in one week.  5.  Follow-up with MARISOL in 1 month with BMP prior after starting spironolactone  6.  Reassess LVEF with echo, 3 months after uptitrating cardiomyopathy medications       1. CAD    CABG (LIMA to LAD, SVG to OM, and SVG to PDA) years ago with PCI to SVG-OM3 2014    PARDO with walking long distances and going up stairs, not as significant as when he needed bypass- stable    Continue statin, aspirin, ARB, beta blocker      2. Ischemic cardiomyopathy    Status post ICD with generator replaced     LVEF 20-25%, previously 35-40%    Occasional LE edema with higher Na intake- resolved with lasix and compression stockings     Continue beta blocker, ARB, lasix, spironolactone     If patient develops left bundle branch block, may consider bi-V ICD upgrade in the future    uptitrate cardiomyopathy medications       3. Paroxysmal atrial fibrillation/flutter    status post flutter ablation    History of a couple episodes of 30 seconds of atrial fibrillation    No symptoms of atrial fibrillation and no atrial fibrillation seen on device checks    Continue rate control with metoprolol and no current anticoagulation      4. PVD    status post right and left carotid endarterectomy    following with vascular surgery        Thank you for allowing me to participate in this delightful patient's care.      This note was completed in part using Dragon voice recognition software. Although reviewed after completion, some word and grammatical errors may occur.    Yareli Kim, APRN, CNP           Data:   All laboratory data reviewed

## 2018-11-02 ENCOUNTER — OFFICE VISIT (OUTPATIENT)
Dept: CARDIOLOGY | Facility: CLINIC | Age: 68
End: 2018-11-02
Attending: INTERNAL MEDICINE
Payer: COMMERCIAL

## 2018-11-02 VITALS
WEIGHT: 161 LBS | BODY MASS INDEX: 23.1 KG/M2 | HEART RATE: 80 BPM | OXYGEN SATURATION: 97 % | DIASTOLIC BLOOD PRESSURE: 65 MMHG | SYSTOLIC BLOOD PRESSURE: 118 MMHG

## 2018-11-02 DIAGNOSIS — I50.22 CHRONIC SYSTOLIC HEART FAILURE (H): ICD-10-CM

## 2018-11-02 DIAGNOSIS — I10 BENIGN ESSENTIAL HYPERTENSION: ICD-10-CM

## 2018-11-02 DIAGNOSIS — I25.5 ISCHEMIC CARDIOMYOPATHY: Primary | ICD-10-CM

## 2018-11-02 DIAGNOSIS — I25.5 ISCHEMIC CARDIOMYOPATHY: ICD-10-CM

## 2018-11-02 DIAGNOSIS — I25.810 CORONARY ARTERY DISEASE INVOLVING CORONARY BYPASS GRAFT OF NATIVE HEART WITHOUT ANGINA PECTORIS: ICD-10-CM

## 2018-11-02 LAB
ANION GAP SERPL CALCULATED.3IONS-SCNC: 5 MMOL/L (ref 3–14)
BUN SERPL-MCNC: 26 MG/DL (ref 7–30)
CALCIUM SERPL-MCNC: 8 MG/DL (ref 8.5–10.1)
CHLORIDE SERPL-SCNC: 105 MMOL/L (ref 94–109)
CO2 SERPL-SCNC: 28 MMOL/L (ref 20–32)
CREAT SERPL-MCNC: 1.08 MG/DL (ref 0.66–1.25)
GFR SERPL CREATININE-BSD FRML MDRD: 68 ML/MIN/1.7M2
GLUCOSE SERPL-MCNC: 261 MG/DL (ref 70–99)
POTASSIUM SERPL-SCNC: 5 MMOL/L (ref 3.4–5.3)
SODIUM SERPL-SCNC: 138 MMOL/L (ref 133–144)

## 2018-11-02 PROCEDURE — 36415 COLL VENOUS BLD VENIPUNCTURE: CPT | Performed by: NURSE PRACTITIONER

## 2018-11-02 PROCEDURE — 99214 OFFICE O/P EST MOD 30 MIN: CPT | Performed by: NURSE PRACTITIONER

## 2018-11-02 PROCEDURE — 80048 BASIC METABOLIC PNL TOTAL CA: CPT | Performed by: NURSE PRACTITIONER

## 2018-11-02 RX ORDER — SPIRONOLACTONE 25 MG/1
12.5 TABLET ORAL DAILY
Qty: 15 TABLET | Refills: 11 | Status: SHIPPED | OUTPATIENT
Start: 2018-11-02 | End: 2018-12-06

## 2018-11-02 NOTE — MR AVS SNAPSHOT
After Visit Summary   11/2/2018    Delbert Tilley    MRN: [id number]           Patient Information     Date Of Birth          1950        Visit Information        Provider Department      11/2/2018 9:00 AM Yareli Mina APRN CNP [facility]        Today's Diagnoses     Ischemic cardiomyopathy    -  1    Acute on chronic systolic congestive heart failure (H)          Care Instructions    Thank you for your [facility] visit today. Your provider has recommended the following:  Medication Changes:  Try Tylenol arthritis   START spironolactone 12.5 mg daily   Recommendations:  1. Call if blood pressure is less than 90 on top or less than 100 with lightheadedness.    2. Get lab work today- we will call with results   3. Can try the compression stockings with zippers   Follow-up:  See Yareli TIWARI for cardiology follow up in 1 month with nonfasting lab work prior in [facility].  Call [phone] two months prior to request date to schedule any future appointments.  Reminder:  1. Please bring in all current medications, over the counter supplements and vitamin bottles to your next appointment.               [facility]~[address]~[place], [place]~[address]  Questions about your visit today?   Call your Cardiology Clinic RN's-Basilia Tai and/or Lissett Sandoval at [phone].            Follow-ups after your visit        Additional Services     Follow-Up with Cardiac Advanced Practice Provider                 Your next 10 appointments already scheduled     Dec 13, 2018  4:30 PM CST   Remote ICD Check with GALAVIZ DCR2   [facility] ([facility])    [address] [place] [id number] [phone] OPT 2           This appointment is for a remote check of your debrillator.  This is not an appointment at the office.              Future  tests that were ordered for you today     Open Future Orders        Priority Expected Expires Ordered    Follow-Up with Cardiac Advanced Practice Provider Routine 12/3/2018 11/1/2020 11/2/2018    Basic metabolic panel Routine 11/23/2018 11/2/2019 11/2/2018    Basic metabolic panel Routine 11/2/2018 11/2/2019 11/2/2018            Who to contact     If you have questions or need follow up information about today's clinic visit or your schedule please contact [facility] directly at [phone].  Normal or non-critical lab and imaging results will be communicated to you by MyChart, letter or phone within 4 business days after the clinic has received the results. If you do not hear from us within 7 days, please contact the clinic through MyChart or phone. If you have a critical or abnormal lab result, we will notify you by phone as soon as possible.  Submit refill requests through [employer] or call your pharmacy and they will forward the refill request to us. Please allow 3 business days for your refill to be completed.          Additional Information About Your Visit        Care EveryWhere ID     This is your Care EveryWhere ID. This could be used by other organizations to access your [facility] medical records  [id number]        Your Vitals Were     Pulse Pulse Oximetry BMI (Body Mass Index)             80 97% 23.1 kg/m2          Blood Pressure from Last 3 Encounters:   11/02/18 118/65   08/20/18 122/66   07/18/18 110/76    Weight from Last 3 Encounters:   11/02/18 73 kg (161 lb)   08/20/18 73.2 kg (161 lb 6.4 oz)   07/18/18 72.6 kg (160 lb)              We Performed the Following     Follow-Up with Cardiologist        Primary Care Provider Fax #    [facility] [phone] [address] [address]        Equal Access to Services     MARITA LAMA : hemant Aguayo qaybta kaalmada adeegyada, waxay idiin hayaan  shawna ariaskishanebony dukesaaping ah. So [facility] [phone].    ATENCIÓN: Si habla vivian, tiene a frey disposición servicios gratuitos de asistencia lingüística. Ifeanyi valverde [phone].    We comply with applicable federal civil rights laws and [place] laws. We do not discriminate on the basis of race, color, national origin, age, disability, sex, sexual orientation, or gender identity.            Thank you!     Thank you for choosing [facility]  for your care. Our goal is always to provide you with excellent care. Hearing back from our patients is one way we can continue to improve our services. Please take a few minutes to complete the written survey that you may receive in the mail after your visit with us. Thank you!             Your Updated Medication List - Protect others around you: Learn how to safely use, store and throw away your medicines at www.disposemymeds.org.          This list is accurate as of 11/2/18  9:33 AM.  Always use your most recent med list.                   Brand Name Dispense Instructions for use Diagnosis    albuterol 108 (90 Base) MCG/ACT inhaler    PROAIR HFA    1 Inhaler    Inhale 2 puffs into the lungs every 4 hours as needed for shortness of breath / dyspnea        amylase-lipase-protease 03988 units Cpep    CREON 12    120 capsule    Take 1 capsule (12,000 Units) by mouth daily    Pancreatic disease       aspirin 325 MG tablet     120 tablet    Take 3 tablets (975 mg) by mouth every morning (Takes 2 x 500 mg = 1000 mg)    Left carotid stenosis       atorvastatin 80 MG tablet    LIPITOR     Take 80 mg by mouth At Bedtime        furosemide 20 MG tablet    LASIX     Take 1 tablet (20 mg) by mouth daily    Acute on chronic systolic congestive heart failure (H)       insulin glargine 100 UNIT/ML injection    LANTUS     Inject 27 Units Subcutaneous every morning        loperamide 2 MG capsule    IMODIUM     Take 2 mg by mouth 4 times daily as needed for  diarrhea        losartan 50 MG tablet    COZAAR    90 tablet    Take 1 tablet (50 mg) by mouth daily    Ischemic cardiomyopathy       metolazone 2.5 MG tablet    ZAROXOLYN    4 tablet    Take 2.5 mg on Tuesday, 1/30/18 and Thursday 2/1/18. Take this 1/2 hour before taking your Lasix (furosemide)    Edema, Ischemic cardiomyopathy       metoprolol succinate 25 MG 24 hr tablet    TOPROL-XL    90 tablet    Take 1 tablet (25 mg) by mouth daily    Ischemic cardiomyopathy       NITROSTAT SL      Place 0.4 mg under the tongue every 5 minutes as needed for chest pain        pantoprazole 40 MG EC tablet    PROTONIX    30 tablet    Take 1 tablet (40 mg) by mouth daily        Pen Needles 32G X 4 MM Misc

## 2018-11-02 NOTE — PROGRESS NOTES
HPI and Plan:   See dictation    Orders Placed This Encounter   Procedures     Basic metabolic panel     Basic metabolic panel     Follow-Up with Cardiac Advanced Practice Provider       Orders Placed This Encounter   Medications     spironolactone (ALDACTONE) 25 MG tablet     Sig: Take 0.5 tablets (12.5 mg) by mouth daily     Dispense:  15 tablet     Refill:  11       There are no discontinued medications.      Encounter Diagnoses   Name Primary?     Ischemic cardiomyopathy Yes     Chronic systolic heart failure (H)      Benign essential hypertension      Coronary artery disease involving coronary bypass graft of native heart without angina pectoris        CURRENT MEDICATIONS:  Current Outpatient Prescriptions   Medication Sig Dispense Refill     albuterol (PROAIR HFA) 108 (90 Base) MCG/ACT Inhaler Inhale 2 puffs into the lungs every 4 hours as needed for shortness of breath / dyspnea 1 Inhaler 0     amylase-lipase-protease (CREON 12) 00171 UNITS CPEP Take 1 capsule (12,000 Units) by mouth daily 120 capsule 0     aspirin 325 MG tablet Take 3 tablets (975 mg) by mouth every morning (Takes 2 x 500 mg = 1000 mg) 120 tablet 1     atorvastatin (LIPITOR) 80 MG tablet Take 80 mg by mouth At Bedtime        furosemide (LASIX) 20 MG tablet Take 1 tablet (20 mg) by mouth daily       insulin glargine (LANTUS) 100 UNIT/ML injection Inject 27 Units Subcutaneous every morning       loperamide (IMODIUM) 2 MG capsule Take 2 mg by mouth 4 times daily as needed for diarrhea       losartan (COZAAR) 50 MG tablet Take 1 tablet (50 mg) by mouth daily 90 tablet 3     metolazone (ZAROXOLYN) 2.5 MG tablet Take 2.5 mg on Tuesday, 1/30/18 and Thursday 2/1/18. Take this 1/2 hour before taking your Lasix (furosemide) 4 tablet 0     metoprolol succinate (TOPROL-XL) 25 MG 24 hr tablet Take 1 tablet (25 mg) by mouth daily 90 tablet 3     Nitroglycerin (NITROSTAT SL) Place 0.4 mg under the tongue every 5 minutes as needed for chest pain        pantoprazole (PROTONIX) 40 MG EC tablet Take 1 tablet (40 mg) by mouth daily 30 tablet      spironolactone (ALDACTONE) 25 MG tablet Take 0.5 tablets (12.5 mg) by mouth daily 15 tablet 11     Insulin Pen Needle (PEN NEEDLES) 32G X 4 MM MISC          ALLERGIES     Allergies   Allergen Reactions     Hydrocodone      Upset stomach     Shellfish Allergy Hives and Rash       PAST MEDICAL HISTORY:  Past Medical History:   Diagnosis Date     Acute renal failure (H) 8/26/06 Hosp    secondary to dehydration     Arthritis      CAD (coronary artery disease)     LIMA to the LAD, vein graft to OM and a vein graft to the PDA     Carpal tunnel syndrome      Diabetes (H)      Gastro-oesophageal reflux disease      H/O: alcohol abuse      HTN (hypertension)      Hyperlipidaemia      Hyperlipidaemia LDL goal <100 7/8/2013     Hypokalemia      Pacemaker      Pancreatitis 6/26/06 Hosp       PAST SURGICAL HISTORY:  Past Surgical History:   Procedure Laterality Date     BYPASS CARDIOPULMONARY       CATARACT IOL, RT/LT      Cataract IOL RT/LT     ENDARTERECTOMY CAROTID Right 6/12/2015    Procedure: ENDARTERECTOMY CAROTID;  Surgeon: João Turner MD;  Location: [facility] OR     ENDARTERECTOMY CAROTID Left 9/8/2017    Procedure: ENDARTERECTOMY CAROTID;  LEFT CAROTID ENDARTERECTOMY WITH EEG;  Surgeon: João Turner MD;  Location: [facility] OR     IMPLANT PACEMAKER  6/10/2010     RELEASE CARPAL TUNNEL Right 4/12/2016    Procedure: RELEASE CARPAL TUNNEL;  Surgeon: Lissy Leger MD;  Location: [facility] OR     SURGICAL HISTORY OF -   1998    Laminectomy     SURGICAL HISTORY OF -   10/2003    Bypass grafting     TONSILLECTOMY & ADENOIDECTOMY         FAMILY HISTORY:  Family History   Problem Relation Age of Onset     Unknown/Adopted No family hx of        SOCIAL HISTORY:  Social History     Social History     Marital status: Single     Spouse name: N/A     Number of children: N/A     Years of education: N/A     Social History Main Topics      Smoking status: Current Every Day Smoker     Packs/day: 2.00     Types: Cigarettes     Smokeless tobacco: Never Used      Comment: 1 1/2 PPD 2/16/18     Alcohol use No     Drug use: No     Sexual activity: Not Currently     Partners: Female     Other Topics Concern     Parent/Sibling W/ Cabg, Mi Or Angioplasty Before 65f 55m? No     Social History Narrative       Review of Systems:  Skin:  Negative       Eyes:  Positive for glasses    ENT:  Negative      Respiratory:  Negative       Cardiovascular:    Positive for;lower extremity symptoms;edema;fatigue;lightheadedness;dizziness    Gastroenterology: Negative      Genitourinary:  Positive for urinary frequency;urgency    Musculoskeletal:  Positive for joint pain;joint swelling;joint stiffness    Neurologic:  Positive for numbness or tingling of hands;numbness or tingling of feet    Psychiatric:  Negative      Heme/Lymph/Imm:  Positive for allergies    Endocrine:  Positive for diabetes      Physical Exam:  Vitals: /65 (BP Location: Right arm, Patient Position: Sitting, Cuff Size: Adult Regular)  Pulse 80  Wt 73 kg (161 lb)  SpO2 97%  BMI 23.1 kg/m2    Constitutional:  cooperative, alert and oriented, well developed, well nourished, in no acute distress        Skin:  warm and dry to the touch          Head:  normocephalic        Eyes:  sclera white        Lymph:      ENT:  no pallor or cyanosis        Neck:  JVP normal        Respiratory:  clear to auscultation;normal symmetry diminished breath sounds bilaterally       Cardiac: regular rhythm, normal S1/S2, no S3 or S4, apical impulse not displaced, no murmurs, gallops or rubs occasional premature beats              pulses full and equal                                        GI:  abdomen soft        Extremities and Muscular Skeletal:      bilateral LE edema;1+;pitting          Neurological:  no gross motor deficits;affect appropriate        Psych:  Alert and Oriented x 3        CC  Mimi Michaels MD  [id number]  [place] [address]

## 2018-11-02 NOTE — PATIENT INSTRUCTIONS
Thank you for your [facility] visit today. Your provider has recommended the following:  Medication Changes:  Try Tylenol arthritis   START spironolactone 12.5 mg daily   Recommendations:  1. Call if blood pressure is less than 90 on top or less than 100 with lightheadedness.    2. Get lab work today- we will call with results   3. Can try the compression stockings with zippers   Follow-up:  See Yareli TIWARI for cardiology follow up in 1 month with nonfasting lab work prior in [facility].  Call [phone] two months prior to request date to schedule any future appointments.  Reminder:  1. Please bring in all current medications, over the counter supplements and vitamin bottles to your next appointment.               [facility]  [facility]~[address]~[place], [place]~[address]  Questions about your visit today?   Call your Cardiology Clinic RN's-Basilia Tai and/or Lissett Sandoval at [phone].

## 2018-11-26 ENCOUNTER — TELEPHONE (OUTPATIENT)
Dept: CARDIOLOGY | Facility: CLINIC | Age: 68
End: 2018-11-26

## 2018-11-26 NOTE — TELEPHONE ENCOUNTER
"Pt calls in to report that he stopped taking the spironolactone. Stated he took it twice and his BP was 87/56 and 92/56. He felt \"like a zombie\" both times. Since he has stopped taking it, he feels back to normal. Will discuss with Yareli Mina NP for further recommendations. Lissett Sandoval RN Cardiology November 26, 2018, 4:28 PM      ADDENDUM:  Ok to continue off of spironolactone. Continue with f/u with me on 12/6 as scheduled but no need to do lab work on 12/4.     Yareli Kim, APRN, CNP    ADDENDUM: Disc with patient. Lab appt cancelled. Lissett Sandoval RN Cardiology November 27, 2018, 8:20 AM    "

## 2018-12-05 NOTE — PROGRESS NOTES
Cardiology Clinic Progress Note  Delbert Tilley MRN# [id number]   YOB: 1950 Age: 67 year old     Reason For Visit:  Cardiomyopathy follow-up    Primary Cardiologist:   Dr. Michaels          History of Presenting Illness:    Delbert Tilley is a pleasant 67 year old patient with a past cardiac history significant for a couple episodes of 30 seconds of paroxysmal atrial fibrillation not on anticoagulation, CAD status post CABG (LIMA to LAD, SVG to OM, and SVG to PDA) and PCI to OM3 graft 2014, status post right and left carotid endarterectomy, status post flutter ablation, and ischemic combined with nonischemic cardiomyopathy with previous LVEF 35-40% now decreased to 20-25% and status post ICD. Dry weight 161-162 pounds.     In 8/2017 he had a stress test showing a large transmural inferior and inferoseptal defect without ischemia but LVEF was 28%. Echocardiogram 11/2017 confirmed LVEF decreased to 20-25% with mildly dilated LV, severe global hypokinesia, inferior and inferolateral akinesia, it was suggestive of restrictive physiology, moderate MR, mild TR, moderate pulmonary hypertension, normal IVC, and RV normal size and function. The patient had previously had an ICD placed. He had heart failure and underwent angiogram.  This showed a widely patent LIMA to LAD with 50% stenosis in the apical LAD, vein graft to the RCA was occluded, vein graft to the OM 3 was patent, and left main coronary artery was patent.  There was recommendation for possible BiV upgrade.     Patient saw Dr. Michaels on 8/20/2018.  He unfortunately continued to smoke and noted that he had peripheral neuropathy and arthritic hands.  He was doing well from a cardiac standpoint.  For his cardiomyopathy, it was recommended that he increase his losartan and metoprolol.  At follow-up, he should be started on spironolactone 12.5 mg daily.  If the patient failed to quit smoking, he should be started on DAPT.  Echocardiogram in 3-6 months to  "reassess LVEF.    Patient was last seen by me on 11/2/2018.  He was started on spironolactone for cardiomyopathy.  He continued smoking and we discussed starting DAPT.  However, he was taking 1000 mg of aspirin daily.  I asked him to decrease this to 81 mg daily and take Tylenol arthritis instead so that we may start Plavix in addition to his aspirin.  He denied any significant heart failure symptoms.    Pt presents today for cardiomyopathy follow-up.  He called on 11/26/2018 reporting hypotension after starting spironolactone.  Blood pressures were 87-92 systolic and felt poorly.  After stopping the medication, he felt back to \"normal\".  I recommended continuing off of the spironolactone.  Blood pressure today in the clinic is 116/67.  Patient tells me that he did try taking spironolactone on another occasion and again experienced hypotension and did not feel well.  His weights at home have been stable at 162 pounds.  They occasionally will go up to 165 pounds but return to his baseline.  Weight today in the clinic is up 4 pounds from his previous visit.  We have discussed that if his weight stays up he will take his PRN Lasix and if it continues, he will call the clinic.  He has been wearing compression stockings.  He continues with chronic stable shortness of breath which is unchanged.  He denies any angina.  Unfortunately, he continues to smoke and does not have plans to quit.  At his last visit we discussed taking Tylenol arthritis so that he can decrease his aspirin to 81 mg daily.  The patient did try Tylenol arthritis but it did not relieve his pain, so he continues taking aspirin 1000 mg daily.  Therefore, I am unable to start Plavix and will have him wait to discuss this with Dr. Michaels. Patient reports no chest pain, PND, orthopnea, presyncope, syncope, heart racing, or palpitations.      Current Cardiac Medications   Aspirin 1000 mg daily  Tylenol 1000 mg daily  Nitroglycerin p.r.n.  Metoprolol XL 25 mg " daily  Atorvastatin 80 mg daily   Losartan 50 mg daily   Lasix 20 mg daily and 40 mg daily PRN Wt gain   Spironolactone 12.5 mg daily- Pt did not tolerate                    Assessment and Plan:     Plan  1.  Continue current medications  2.  Check daily weights and call the clinic if your weight has increased more than 2 lbs in one day or 5 lbs in one week.  3.  Follow-up with Dr. Michaels in 3 months with echocardiogram prior to reassess LVEF      1. CAD    CABG (LIMA to LAD, SVG to OM, and SVG to PDA) years ago with PCI to SVG-OM3 2014    PARDO with walking long distances and going up stairs, not as significant as when he needed bypass- stable    Continue statin, aspirin, ARB, beta blocker      2. Ischemic cardiomyopathy    Status post ICD with generator replaced     LVEF 20-25%, previously 35-40%    Occasional LE edema with higher Na intake- resolved with lasix and compression stockings     Continue beta blocker, ARB, lasix    If patient develops left bundle branch block, may consider bi-V ICD upgrade in the future    Unable to further uptitrate cardiomyopathy medications d/t hypotension    Did not tolerate spironolactone d/t hypotension      3. Paroxysmal atrial fibrillation/flutter    status post flutter ablation    History of a couple episodes of 30 seconds of atrial fibrillation    No symptoms of atrial fibrillation and no atrial fibrillation seen on device checks    Continue rate control with metoprolol and no current anticoagulation      4. PVD    status post right and left carotid endarterectomy    following with vascular surgery        Thank you for allowing me to participate in this delightful patient's care.      This note was completed in part using Dragon voice recognition software. Although reviewed after completion, some word and grammatical errors may occur.    Yareli Kim, APRN, CNP           Data:   All laboratory data reviewed

## 2018-12-06 ENCOUNTER — OFFICE VISIT (OUTPATIENT)
Dept: CARDIOLOGY | Facility: CLINIC | Age: 68
End: 2018-12-06
Attending: NURSE PRACTITIONER
Payer: COMMERCIAL

## 2018-12-06 VITALS
BODY MASS INDEX: 23.79 KG/M2 | WEIGHT: 165.8 LBS | OXYGEN SATURATION: 97 % | DIASTOLIC BLOOD PRESSURE: 67 MMHG | SYSTOLIC BLOOD PRESSURE: 116 MMHG | HEART RATE: 83 BPM

## 2018-12-06 DIAGNOSIS — Z86.79 HISTORY OF ATRIAL FLUTTER: ICD-10-CM

## 2018-12-06 DIAGNOSIS — I25.5 ISCHEMIC CARDIOMYOPATHY: Primary | ICD-10-CM

## 2018-12-06 DIAGNOSIS — I25.810 CORONARY ARTERY DISEASE INVOLVING CORONARY BYPASS GRAFT OF NATIVE HEART WITHOUT ANGINA PECTORIS: ICD-10-CM

## 2018-12-06 PROCEDURE — 99214 OFFICE O/P EST MOD 30 MIN: CPT | Performed by: NURSE PRACTITIONER

## 2018-12-06 NOTE — LETTER
12/6/2018    Formerly [facility]  [address] [address]    RE: Delbert SORIA Jarek       Dear Colleague,    I had the pleasure of seeing Delbert Tilley in the [facility].    Cardiology Clinic Progress Note  Delbert Tilley MRN# [id number]   YOB: 1950 Age: 67 year old     Reason For Visit:  Cardiomyopathy follow-up    Primary Cardiologist:   Dr. Michaels          History of Presenting Illness:    Delbert Tilley is a pleasant 67 year old patient with a past cardiac history significant for a couple episodes of 30 seconds of paroxysmal atrial fibrillation not on anticoagulation, CAD status post CABG (LIMA to LAD, SVG to OM, and SVG to PDA) and PCI to OM3 graft 2014, status post right and left carotid endarterectomy, status post flutter ablation, and ischemic combined with nonischemic cardiomyopathy with previous LVEF 35-40% now decreased to 20-25% and status post ICD. Dry weight 161-162 pounds.     In 8/2017 he had a stress test showing a large transmural inferior and inferoseptal defect without ischemia but LVEF was 28%. Echocardiogram 11/2017 confirmed LVEF decreased to 20-25% with mildly dilated LV, severe global hypokinesia, inferior and inferolateral akinesia, it was suggestive of restrictive physiology, moderate MR, mild TR, moderate pulmonary hypertension, normal IVC, and RV normal size and function. The patient had previously had an ICD placed. He had heart failure and underwent angiogram.  This showed a widely patent LIMA to LAD with 50% stenosis in the apical LAD, vein graft to the RCA was occluded, vein graft to the OM 3 was patent, and left main coronary artery was patent.  There was recommendation for possible BiV upgrade.     Patient saw Dr. Michaels on 8/20/2018.  He unfortunately continued to smoke and noted that he had peripheral neuropathy and arthritic hands.  He was doing well from a cardiac standpoint.  For his cardiomyopathy, it was recommended  "that he increase his losartan and metoprolol.  At follow-up, he should be started on spironolactone 12.5 mg daily.  If the patient failed to quit smoking, he should be started on DAPT.  Echocardiogram in 3-6 months to reassess LVEF.    Patient was last seen by me on 11/2/2018.  He was started on spironolactone for cardiomyopathy.  He continued smoking and we discussed starting DAPT.  However, he was taking 1000 mg of aspirin daily.  I asked him to decrease this to 81 mg daily and take Tylenol arthritis instead so that we may start Plavix in addition to his aspirin.  He denied any significant heart failure symptoms.    Pt presents today for cardiomyopathy follow-up.  He called on 11/26/2018 reporting hypotension after starting spironolactone.  Blood pressures were 87-92 systolic and felt poorly.  After stopping the medication, he felt back to \"normal\".  I recommended continuing off of the spironolactone.  Blood pressure today in the clinic is 116/67.  Patient tells me that he did try taking spironolactone on another occasion and again experienced hypotension and did not feel well.  His weights at home have been stable at 162 pounds.  They occasionally will go up to 165 pounds but return to his baseline.  Weight today in the clinic is up 4 pounds from his previous visit.  We have discussed that if his weight stays up he will take his PRN Lasix and if it continues, he will call the clinic.  He has been wearing compression stockings.  He continues with chronic stable shortness of breath which is unchanged.  He denies any angina.  Unfortunately, he continues to smoke and does not have plans to quit.  At his last visit we discussed taking Tylenol arthritis so that he can decrease his aspirin to 81 mg daily.  The patient did try Tylenol arthritis but it did not relieve his pain, so he continues taking aspirin 1000 mg daily.  Therefore, I am unable to start Plavix and will have him wait to discuss this with Dr. Michaels. " Patient reports no chest pain, PND, orthopnea, presyncope, syncope, heart racing, or palpitations.      Current Cardiac Medications   Aspirin 1000 mg daily  Tylenol 1000 mg daily  Nitroglycerin p.r.n.  Metoprolol XL 25 mg daily  Atorvastatin 80 mg daily   Losartan 50 mg daily   Lasix 20 mg daily and 40 mg daily PRN Wt gain   Spironolactone 12.5 mg daily- Pt did not tolerate                    Assessment and Plan:     Plan  1.  Continue current medications  2.  Check daily weights and call the clinic if your weight has increased more than 2 lbs in one day or 5 lbs in one week.  3.  Follow-up with Dr. Michaels in 3 months with echocardiogram prior to reassess LVEF      1. CAD    CABG (LIMA to LAD, SVG to OM, and SVG to PDA) years ago with PCI to SVG-OM3 2014    PARDO with walking long distances and going up stairs, not as significant as when he needed bypass- stable    Continue statin, aspirin, ARB, beta blocker      2. Ischemic cardiomyopathy    Status post ICD with generator replaced     LVEF 20-25%, previously 35-40%    Occasional LE edema with higher Na intake- resolved with lasix and compression stockings     Continue beta blocker, ARB, lasix    If patient develops left bundle branch block, may consider bi-V ICD upgrade in the future    Unable to further uptitrate cardiomyopathy medications d/t hypotension    Did not tolerate spironolactone d/t hypotension      3. Paroxysmal atrial fibrillation/flutter    status post flutter ablation    History of a couple episodes of 30 seconds of atrial fibrillation    No symptoms of atrial fibrillation and no atrial fibrillation seen on device checks    Continue rate control with metoprolol and no current anticoagulation      4. PVD    status post right and left carotid endarterectomy    following with vascular surgery        Thank you for allowing me to participate in this delightful patient's care.      This note was completed in part using Dragon voice recognition software.  Although reviewed after completion, some word and grammatical errors may occur.    Yareli Kim, APRN, CNP           Data:   All laboratory data reviewed        HPI and Plan:   See dictation    Orders Placed This Encounter   Procedures     Follow-Up with Cardiologist     Echocardiogram Limited       No orders of the defined types were placed in this encounter.      Medications Discontinued During This Encounter   Medication Reason     spironolactone (ALDACTONE) 25 MG tablet Stopped by Patient         Encounter Diagnoses   Name Primary?     Ischemic cardiomyopathy Yes     Coronary artery disease involving coronary bypass graft of native heart without angina pectoris      History of atrial flutter        CURRENT MEDICATIONS:  Current Outpatient Prescriptions   Medication Sig Dispense Refill     albuterol (PROAIR HFA) 108 (90 Base) MCG/ACT Inhaler Inhale 2 puffs into the lungs every 4 hours as needed for shortness of breath / dyspnea 1 Inhaler 0     amylase-lipase-protease (CREON 12) 62309 UNITS CPEP Take 1 capsule (12,000 Units) by mouth daily 120 capsule 0     aspirin 325 MG tablet Take 3 tablets (975 mg) by mouth every morning (Takes 2 x 500 mg = 1000 mg) 120 tablet 1     atorvastatin (LIPITOR) 80 MG tablet Take 80 mg by mouth At Bedtime        furosemide (LASIX) 20 MG tablet Take 1 tablet (20 mg) by mouth daily       insulin glargine (LANTUS) 100 UNIT/ML injection Inject 27 Units Subcutaneous every morning       loperamide (IMODIUM) 2 MG capsule Take 2 mg by mouth 4 times daily as needed for diarrhea       losartan (COZAAR) 50 MG tablet Take 1 tablet (50 mg) by mouth daily 90 tablet 3     metolazone (ZAROXOLYN) 2.5 MG tablet Take 2.5 mg on Tuesday, 1/30/18 and Thursday 2/1/18. Take this 1/2 hour before taking your Lasix (furosemide) 4 tablet 0     metoprolol succinate (TOPROL-XL) 25 MG 24 hr tablet Take 1 tablet (25 mg) by mouth daily 90 tablet 3     Nitroglycerin (NITROSTAT SL) Place 0.4 mg under  the tongue every 5 minutes as needed for chest pain       pantoprazole (PROTONIX) 40 MG EC tablet Take 1 tablet (40 mg) by mouth daily 30 tablet      Insulin Pen Needle (PEN NEEDLES) 32G X 4 MM MISC          ALLERGIES     Allergies   Allergen Reactions     Hydrocodone      Upset stomach     Shellfish Allergy Hives and Rash       PAST MEDICAL HISTORY:  Past Medical History:   Diagnosis Date     Acute renal failure (H) 8/26/06 Hosp    secondary to dehydration     Arthritis      CAD (coronary artery disease)     LIMA to the LAD, vein graft to OM and a vein graft to the PDA     Carpal tunnel syndrome      Diabetes (H)      Gastro-oesophageal reflux disease      H/O: alcohol abuse      HTN (hypertension)      Hyperlipidaemia      Hyperlipidaemia LDL goal <100 7/8/2013     Hypokalemia      Pacemaker      Pancreatitis 6/26/06 Hosp       PAST SURGICAL HISTORY:  Past Surgical History:   Procedure Laterality Date     BYPASS CARDIOPULMONARY       CATARACT IOL, RT/LT      Cataract IOL RT/LT     ENDARTERECTOMY CAROTID Right 6/12/2015    Procedure: ENDARTERECTOMY CAROTID;  Surgeon: João Turner MD;  Location: [facility] OR     ENDARTERECTOMY CAROTID Left 9/8/2017    Procedure: ENDARTERECTOMY CAROTID;  LEFT CAROTID ENDARTERECTOMY WITH EEG;  Surgeon: João Turner MD;  Location: [facility] OR     IMPLANT PACEMAKER  6/10/2010     RELEASE CARPAL TUNNEL Right 4/12/2016    Procedure: RELEASE CARPAL TUNNEL;  Surgeon: Lissy Leger MD;  Location: [facility]     SURGICAL HISTORY OF -   1998    Laminectomy     SURGICAL HISTORY OF -   10/2003    Bypass grafting     TONSILLECTOMY & ADENOIDECTOMY         FAMILY HISTORY:  Family History   Problem Relation Age of Onset     Unknown/Adopted No family hx of        SOCIAL HISTORY:  Social History     Social History     Marital status: Single     Spouse name: N/A     Number of children: N/A     Years of education: N/A     Social History Main Topics     Smoking status: Current Every Day Smoker      Packs/day: 2.00     Types: Cigarettes     Smokeless tobacco: Never Used      Comment: 1 1/2 PPD 2/16/18     Alcohol use No     Drug use: No     Sexual activity: Not Currently     Partners: Female     Other Topics Concern     Parent/Sibling W/ Cabg, Mi Or Angioplasty Before 65f 55m? No     Social History Narrative       Review of Systems:  Skin:  Negative       Eyes:  Positive for glasses    ENT:  Negative      Respiratory:  Negative       Cardiovascular:    Positive for;lower extremity symptoms;edema;fatigue;lightheadedness;dizziness    Gastroenterology: Negative      Genitourinary:  Positive for urinary frequency;urgency    Musculoskeletal:  Positive for joint pain;joint swelling;joint stiffness    Neurologic:  Positive for numbness or tingling of hands;numbness or tingling of feet    Psychiatric:  Negative      Heme/Lymph/Imm:  Positive for allergies    Endocrine:  Positive for diabetes      Physical Exam:  Vitals: /67 (BP Location: Right arm, Patient Position: Sitting, Cuff Size: Adult Regular)  Pulse 83  Wt 75.2 kg (165 lb 12.8 oz)  SpO2 97%  BMI 23.79 kg/m2    Constitutional:  cooperative, alert and oriented, well developed, well nourished, in no acute distress        Skin:  warm and dry to the touch          Head:  normocephalic        Eyes:  sclera white        Lymph:      ENT:  no pallor or cyanosis        Neck:  JVP normal        Respiratory:  clear to auscultation;normal symmetry diminished breath sounds bilaterally       Cardiac: regular rhythm, normal S1/S2, no S3 or S4, apical impulse not displaced, no murmurs, gallops or rubs occasional premature beats              pulses full and equal                                        GI:  abdomen soft        Extremities and Muscular Skeletal:      bilateral LE edema;1+          Neurological:  no gross motor deficits;affect appropriate        Psych:  Alert and Oriented x 3        CC  Yareli Mina, APRN CNP  [address]  [place]  [place] [address]                Thank you for allowing me to participate in the care of your patient.      Sincerely,     ANA Givens CNP     [facility]    cc:   ANA Mullins CNP  [address]  [place] [address]

## 2018-12-06 NOTE — MR AVS SNAPSHOT
After Visit Summary   12/6/2018    Delbert Tilley    MRN: [id number]           Patient Information     Date Of Birth          1950        Visit Information        Provider Department      12/6/2018 9:00 AM Yareli Mina APRN CNP [facility]        Today's Diagnoses     Ischemic cardiomyopathy          Care Instructions    Thank you for your [facility] visit today. Your provider has recommended the following:  Medication Changes:  No changes   Recommendations:  1. Check daily weights and call the clinic if your weight has increased more than 2 lbs in one day or 5 lbs in one week.     Follow-up:  See Dr. Michaels for cardiology follow up in 3 months with echo prior.  Call [phone] two months prior to request date to schedule any future appointments.  Reminder:  1. Please bring in all current medications, over the counter supplements and vitamin bottles to your next appointment.               [facility]~[address]~[place], [place]~[address]  Questions about your visit today?   Call your Cardiology Clinic RN's-Basilia Tai and/or Lissett Sandoval at [phone].            Follow-ups after your visit        Additional Services     Follow-Up with Cardiologist                 Your next 10 appointments already scheduled     Dec 13, 2018 12:00 AM CST   CARDIAC DEVICE CHECK - REMOTE with GALAVIZ DCR2   [facility] ([facility])    [address] [place] [id number] [phone]              Future tests that were ordered for you today     Open Future Orders        Priority Expected Expires Ordered    Echocardiogram Limited Routine 3/6/2019 12/6/2019 12/6/2018    Follow-Up with Cardiologist Routine 3/6/2019 12/5/2020 12/6/2018            Who to contact     If you have questions or need follow up information about today's clinic visit  or your schedule please contact [facility] directly at [phone].  Normal or non-critical lab and imaging results will be communicated to you by MyChart, letter or phone within 4 business days after the clinic has received the results. If you do not hear from us within 7 days, please contact the clinic through MyChart or phone. If you have a critical or abnormal lab result, we will notify you by phone as soon as possible.  Submit refill requests through [employer] or call your pharmacy and they will forward the refill request to us. Please allow 3 business days for your refill to be completed.          Additional Information About Your Visit        Care EveryWhere ID     This is your Care EveryWhere ID. This could be used by other organizations to access your [place] medical records  [id number]        Your Vitals Were     Pulse Pulse Oximetry BMI (Body Mass Index)             83 97% 23.79 kg/m2          Blood Pressure from Last 3 Encounters:   12/06/18 116/67   11/02/18 118/65   08/20/18 122/66    Weight from Last 3 Encounters:   12/06/18 75.2 kg (165 lb 12.8 oz)   11/02/18 73 kg (161 lb)   08/20/18 73.2 kg (161 lb 6.4 oz)              We Performed the Following     Follow-Up with Cardiac Advanced Practice Provider        Primary Care Provider Fax #    [facility] [phone]       [facility] [facility] [address]        Equal Access to Services     MARITA LAMA : Hadmanjinder Mondragon, hemant buenrostro, qajosephta gwendolyn hayden . So [facility] [phone].    ATENCIÓN: Si habla español, tiene a frey disposición servicios gratuitos de asistencia lingüística. Ifeanyi al [phone].    We comply with applicable federal civil rights laws and [place] laws. We do not discriminate on the basis of race, color, national origin, age, disability, sex, sexual orientation, or gender identity.            Thank you!      Thank you for choosing [facility]  for your care. Our goal is always to provide you with excellent care. Hearing back from our patients is one way we can continue to improve our services. Please take a few minutes to complete the written survey that you may receive in the mail after your visit with us. Thank you!             Your Updated Medication List - Protect others around you: Learn how to safely use, store and throw away your medicines at www.disposemymeds.org.          This list is accurate as of 12/6/18  9:20 AM.  Always use your most recent med list.                   Brand Name Dispense Instructions for use Diagnosis    albuterol 108 (90 Base) MCG/ACT inhaler    PROAIR HFA    1 Inhaler    Inhale 2 puffs into the lungs every 4 hours as needed for shortness of breath / dyspnea        amylase-lipase-protease 82454 units Cpep    CREON 12    120 capsule    Take 1 capsule (12,000 Units) by mouth daily    Pancreatic disease       aspirin 325 MG tablet    ASA    120 tablet    Take 3 tablets (975 mg) by mouth every morning (Takes 2 x 500 mg = 1000 mg)    Left carotid stenosis       atorvastatin 80 MG tablet    LIPITOR     Take 80 mg by mouth At Bedtime        furosemide 20 MG tablet    LASIX     Take 1 tablet (20 mg) by mouth daily    Acute on chronic systolic congestive heart failure (H)       insulin glargine 100 UNIT/ML vial    LANTUS VIAL     Inject 27 Units Subcutaneous every morning        loperamide 2 MG capsule    IMODIUM     Take 2 mg by mouth 4 times daily as needed for diarrhea        losartan 50 MG tablet    COZAAR    90 tablet    Take 1 tablet (50 mg) by mouth daily    Ischemic cardiomyopathy       metolazone 2.5 MG tablet    ZAROXOLYN    4 tablet    Take 2.5 mg on Tuesday, 1/30/18 and Thursday 2/1/18. Take this 1/2 hour before taking your Lasix (furosemide)    Edema, Ischemic cardiomyopathy       metoprolol succinate ER 25 MG 24 hr tablet    TOPROL-XL    90  tablet    Take 1 tablet (25 mg) by mouth daily    Ischemic cardiomyopathy       NITROSTAT SL      Place 0.4 mg under the tongue every 5 minutes as needed for chest pain        pantoprazole 40 MG EC tablet    PROTONIX    30 tablet    Take 1 tablet (40 mg) by mouth daily        Pen Needles 32G X 4 MM Misc

## 2018-12-06 NOTE — LETTER
12/6/2018    [facility]  [address] [address]    RE: Delbert SORIA Jarek       Dear Colleague,    I had the pleasure of seeing Delbert Tilley in the [facility].    Cardiology Clinic Progress Note  Delbert Tilley MRN# [id number]   YOB: 1950 Age: 67 year old     Reason For Visit:  Cardiomyopathy follow-up    Primary Cardiologist:   Dr. Michaels          History of Presenting Illness:    Delbert Tilley is a pleasant 67 year old patient with a past cardiac history significant for a couple episodes of 30 seconds of paroxysmal atrial fibrillation not on anticoagulation, CAD status post CABG (LIMA to LAD, SVG to OM, and SVG to PDA) and PCI to OM3 graft 2014, status post right and left carotid endarterectomy, status post flutter ablation, and ischemic combined with nonischemic cardiomyopathy with previous LVEF 35-40% now decreased to 20-25% and status post ICD. Dry weight 161-162 pounds.     In 8/2017 he had a stress test showing a large transmural inferior and inferoseptal defect without ischemia but LVEF was 28%. Echocardiogram 11/2017 confirmed LVEF decreased to 20-25% with mildly dilated LV, severe global hypokinesia, inferior and inferolateral akinesia, it was suggestive of restrictive physiology, moderate MR, mild TR, moderate pulmonary hypertension, normal IVC, and RV normal size and function. The patient had previously had an ICD placed. He had heart failure and underwent angiogram.  This showed a widely patent LIMA to LAD with 50% stenosis in the apical LAD, vein graft to the RCA was occluded, vein graft to the OM 3 was patent, and left main coronary artery was patent.  There was recommendation for possible BiV upgrade.     Patient saw Dr. Michaels on 8/20/2018.  He unfortunately continued to smoke and noted that he had peripheral neuropathy and arthritic hands.  He was doing well from a cardiac standpoint.  For his cardiomyopathy, it was recommended  "that he increase his losartan and metoprolol.  At follow-up, he should be started on spironolactone 12.5 mg daily.  If the patient failed to quit smoking, he should be started on DAPT.  Echocardiogram in 3-6 months to reassess LVEF.    Patient was last seen by me on 11/2/2018.  He was started on spironolactone for cardiomyopathy.  He continued smoking and we discussed starting DAPT.  However, he was taking 1000 mg of aspirin daily.  I asked him to decrease this to 81 mg daily and take Tylenol arthritis instead so that we may start Plavix in addition to his aspirin.  He denied any significant heart failure symptoms.    Pt presents today for cardiomyopathy follow-up.  He called on 11/26/2018 reporting hypotension after starting spironolactone.  Blood pressures were 87-92 systolic and felt poorly.  After stopping the medication, he felt back to \"normal\".  I recommended continuing off of the spironolactone.  Blood pressure today in the clinic is 116/67.  Patient tells me that he did try taking spironolactone on another occasion and again experienced hypotension and did not feel well.  His weights at home have been stable at 162 pounds.  They occasionally will go up to 165 pounds but return to his baseline.  Weight today in the clinic is up 4 pounds from his previous visit.  We have discussed that if his weight stays up he will take his PRN Lasix and if it continues, he will call the clinic.  He has been wearing compression stockings.  He continues with chronic stable shortness of breath which is unchanged.  He denies any angina.  Unfortunately, he continues to smoke and does not have plans to quit.  At his last visit we discussed taking Tylenol arthritis so that he can decrease his aspirin to 81 mg daily.  The patient did try Tylenol arthritis but it did not relieve his pain, so he continues taking aspirin 1000 mg daily.  Therefore, I am unable to start Plavix and will have him wait to discuss this with Dr. Michaels. " Patient reports no chest pain, PND, orthopnea, presyncope, syncope, heart racing, or palpitations.      Current Cardiac Medications   Aspirin 1000 mg daily  Tylenol 1000 mg daily  Nitroglycerin p.r.n.  Metoprolol XL 25 mg daily  Atorvastatin 80 mg daily   Losartan 50 mg daily   Lasix 20 mg daily and 40 mg daily PRN Wt gain   Spironolactone 12.5 mg daily- Pt did not tolerate                    Assessment and Plan:     Plan  1.  Continue current medications  2.  Check daily weights and call the clinic if your weight has increased more than 2 lbs in one day or 5 lbs in one week.  3.  Follow-up with Dr. Michaels in 3 months with echocardiogram prior to reassess LVEF      1. CAD    CABG (LIMA to LAD, SVG to OM, and SVG to PDA) years ago with PCI to SVG-OM3 2014    PARDO with walking long distances and going up stairs, not as significant as when he needed bypass- stable    Continue statin, aspirin, ARB, beta blocker      2. Ischemic cardiomyopathy    Status post ICD with generator replaced     LVEF 20-25%, previously 35-40%    Occasional LE edema with higher Na intake- resolved with lasix and compression stockings     Continue beta blocker, ARB, lasix    If patient develops left bundle branch block, may consider bi-V ICD upgrade in the future    Unable to further uptitrate cardiomyopathy medications d/t hypotension    Did not tolerate spironolactone d/t hypotension      3. Paroxysmal atrial fibrillation/flutter    status post flutter ablation    History of a couple episodes of 30 seconds of atrial fibrillation    No symptoms of atrial fibrillation and no atrial fibrillation seen on device checks    Continue rate control with metoprolol and no current anticoagulation      4. PVD    status post right and left carotid endarterectomy    following with vascular surgery        Thank you for allowing me to participate in this delightful patient's care.      This note was completed in part using Dragon voice recognition software.  Although reviewed after completion, some word and grammatical errors may occur.    Yareli Kim, APRN, CNP           Data:   All laboratory data reviewed        HPI and Plan:   See dictation    Orders Placed This Encounter   Procedures     Follow-Up with Cardiologist     Echocardiogram Limited       No orders of the defined types were placed in this encounter.      Medications Discontinued During This Encounter   Medication Reason     spironolactone (ALDACTONE) 25 MG tablet Stopped by Patient         Encounter Diagnoses   Name Primary?     Ischemic cardiomyopathy Yes     Coronary artery disease involving coronary bypass graft of native heart without angina pectoris      History of atrial flutter        CURRENT MEDICATIONS:  Current Outpatient Prescriptions   Medication Sig Dispense Refill     albuterol (PROAIR HFA) 108 (90 Base) MCG/ACT Inhaler Inhale 2 puffs into the lungs every 4 hours as needed for shortness of breath / dyspnea 1 Inhaler 0     amylase-lipase-protease (CREON 12) 92470 UNITS CPEP Take 1 capsule (12,000 Units) by mouth daily 120 capsule 0     aspirin 325 MG tablet Take 3 tablets (975 mg) by mouth every morning (Takes 2 x 500 mg = 1000 mg) 120 tablet 1     atorvastatin (LIPITOR) 80 MG tablet Take 80 mg by mouth At Bedtime        furosemide (LASIX) 20 MG tablet Take 1 tablet (20 mg) by mouth daily       insulin glargine (LANTUS) 100 UNIT/ML injection Inject 27 Units Subcutaneous every morning       loperamide (IMODIUM) 2 MG capsule Take 2 mg by mouth 4 times daily as needed for diarrhea       losartan (COZAAR) 50 MG tablet Take 1 tablet (50 mg) by mouth daily 90 tablet 3     metolazone (ZAROXOLYN) 2.5 MG tablet Take 2.5 mg on Tuesday, 1/30/18 and Thursday 2/1/18. Take this 1/2 hour before taking your Lasix (furosemide) 4 tablet 0     metoprolol succinate (TOPROL-XL) 25 MG 24 hr tablet Take 1 tablet (25 mg) by mouth daily 90 tablet 3     Nitroglycerin (NITROSTAT SL) Place 0.4 mg under  the tongue every 5 minutes as needed for chest pain       pantoprazole (PROTONIX) 40 MG EC tablet Take 1 tablet (40 mg) by mouth daily 30 tablet      Insulin Pen Needle (PEN NEEDLES) 32G X 4 MM MISC          ALLERGIES     Allergies   Allergen Reactions     Hydrocodone      Upset stomach     Shellfish Allergy Hives and Rash       PAST MEDICAL HISTORY:  Past Medical History:   Diagnosis Date     Acute renal failure (H) 8/26/06 Hosp    secondary to dehydration     Arthritis      CAD (coronary artery disease)     LIMA to the LAD, vein graft to OM and a vein graft to the PDA     Carpal tunnel syndrome      Diabetes (H)      Gastro-oesophageal reflux disease      H/O: alcohol abuse      HTN (hypertension)      Hyperlipidaemia      Hyperlipidaemia LDL goal <100 7/8/2013     Hypokalemia      Pacemaker      Pancreatitis 6/26/06 Hosp       PAST SURGICAL HISTORY:  Past Surgical History:   Procedure Laterality Date     BYPASS CARDIOPULMONARY       CATARACT IOL, RT/LT      Cataract IOL RT/LT     ENDARTERECTOMY CAROTID Right 6/12/2015    Procedure: ENDARTERECTOMY CAROTID;  Surgeon: João Turner MD;  Location: [facility]     ENDARTERECTOMY CAROTID Left 9/8/2017    Procedure: ENDARTERECTOMY CAROTID;  LEFT CAROTID ENDARTERECTOMY WITH EEG;  Surgeon: João Turner MD;  Location: [facility] OR     IMPLANT PACEMAKER  6/10/2010     RELEASE CARPAL TUNNEL Right 4/12/2016    Procedure: RELEASE CARPAL TUNNEL;  Surgeon: Lissy Leger MD;  Location: [facility] OR     SURGICAL HISTORY OF -   1998    Laminectomy     SURGICAL HISTORY OF -   10/2003    Bypass grafting     TONSILLECTOMY & ADENOIDECTOMY         FAMILY HISTORY:  Family History   Problem Relation Age of Onset     Unknown/Adopted No family hx of        SOCIAL HISTORY:  Social History     Social History     Marital status: Single     Spouse name: N/A     Number of children: N/A     Years of education: N/A     Social History Main Topics     Smoking status: Current Every Day Smoker      Packs/day: 2.00     Types: Cigarettes     Smokeless tobacco: Never Used      Comment: 1 1/2 PPD 2/16/18     Alcohol use No     Drug use: No     Sexual activity: Not Currently     Partners: Female     Other Topics Concern     Parent/Sibling W/ Cabg, Mi Or Angioplasty Before 65f 55m? No     Social History Narrative       Review of Systems:  Skin:  Negative       Eyes:  Positive for glasses    ENT:  Negative      Respiratory:  Negative       Cardiovascular:    Positive for;lower extremity symptoms;edema;fatigue;lightheadedness;dizziness    Gastroenterology: Negative      Genitourinary:  Positive for urinary frequency;urgency    Musculoskeletal:  Positive for joint pain;joint swelling;joint stiffness    Neurologic:  Positive for numbness or tingling of hands;numbness or tingling of feet    Psychiatric:  Negative      Heme/Lymph/Imm:  Positive for allergies    Endocrine:  Positive for diabetes      Physical Exam:  Vitals: /67 (BP Location: Right arm, Patient Position: Sitting, Cuff Size: Adult Regular)  Pulse 83  Wt 75.2 kg (165 lb 12.8 oz)  SpO2 97%  BMI 23.79 kg/m2    Constitutional:  cooperative, alert and oriented, well developed, well nourished, in no acute distress        Skin:  warm and dry to the touch          Head:  normocephalic        Eyes:  sclera white        Lymph:      ENT:  no pallor or cyanosis        Neck:  JVP normal        Respiratory:  clear to auscultation;normal symmetry diminished breath sounds bilaterally       Cardiac: regular rhythm, normal S1/S2, no S3 or S4, apical impulse not displaced, no murmurs, gallops or rubs occasional premature beats              pulses full and equal                                        GI:  abdomen soft        Extremities and Muscular Skeletal:      bilateral LE edema;1+          Neurological:  no gross motor deficits;affect appropriate        Psych:  Alert and Oriented x 3          Thank you for allowing me to participate in the care of your  patient.    Sincerely,     ANA Givens CNP     [facility]

## 2018-12-06 NOTE — PATIENT INSTRUCTIONS
Thank you for your [facility] visit today. Your provider has recommended the following:  Medication Changes:  No changes   Recommendations:  1. Check daily weights and call the clinic if your weight has increased more than 2 lbs in one day or 5 lbs in one week.     Follow-up:  See Dr. Michaels for cardiology follow up in 3 months with echo prior.  Call [phone] two months prior to request date to schedule any future appointments.  Reminder:  1. Please bring in all current medications, over the counter supplements and vitamin bottles to your next appointment.               [facility]  [facility]~[address]~[place], [place]~[address]  Questions about your visit today?   Call your Cardiology Clinic RN's-Basilia Tai and/or Lissett Sandoval at [phone].

## 2018-12-06 NOTE — PROGRESS NOTES
HPI and Plan:   See dictation    Orders Placed This Encounter   Procedures     Follow-Up with Cardiologist     Echocardiogram Limited       No orders of the defined types were placed in this encounter.      Medications Discontinued During This Encounter   Medication Reason     spironolactone (ALDACTONE) 25 MG tablet Stopped by Patient         Encounter Diagnoses   Name Primary?     Ischemic cardiomyopathy Yes     Coronary artery disease involving coronary bypass graft of native heart without angina pectoris      History of atrial flutter        CURRENT MEDICATIONS:  Current Outpatient Prescriptions   Medication Sig Dispense Refill     albuterol (PROAIR HFA) 108 (90 Base) MCG/ACT Inhaler Inhale 2 puffs into the lungs every 4 hours as needed for shortness of breath / dyspnea 1 Inhaler 0     amylase-lipase-protease (CREON 12) 96815 UNITS CPEP Take 1 capsule (12,000 Units) by mouth daily 120 capsule 0     aspirin 325 MG tablet Take 3 tablets (975 mg) by mouth every morning (Takes 2 x 500 mg = 1000 mg) 120 tablet 1     atorvastatin (LIPITOR) 80 MG tablet Take 80 mg by mouth At Bedtime        furosemide (LASIX) 20 MG tablet Take 1 tablet (20 mg) by mouth daily       insulin glargine (LANTUS) 100 UNIT/ML injection Inject 27 Units Subcutaneous every morning       loperamide (IMODIUM) 2 MG capsule Take 2 mg by mouth 4 times daily as needed for diarrhea       losartan (COZAAR) 50 MG tablet Take 1 tablet (50 mg) by mouth daily 90 tablet 3     metolazone (ZAROXOLYN) 2.5 MG tablet Take 2.5 mg on Tuesday, 1/30/18 and Thursday 2/1/18. Take this 1/2 hour before taking your Lasix (furosemide) 4 tablet 0     metoprolol succinate (TOPROL-XL) 25 MG 24 hr tablet Take 1 tablet (25 mg) by mouth daily 90 tablet 3     Nitroglycerin (NITROSTAT SL) Place 0.4 mg under the tongue every 5 minutes as needed for chest pain       pantoprazole (PROTONIX) 40 MG EC tablet Take 1 tablet (40 mg) by mouth daily 30 tablet      Insulin Pen Needle (PEN  NEEDLES) 32G X 4 MM MISC          ALLERGIES     Allergies   Allergen Reactions     Hydrocodone      Upset stomach     Shellfish Allergy Hives and Rash       PAST MEDICAL HISTORY:  Past Medical History:   Diagnosis Date     Acute renal failure (H) 8/26/06 Hosp    secondary to dehydration     Arthritis      CAD (coronary artery disease)     LIMA to the LAD, vein graft to OM and a vein graft to the PDA     Carpal tunnel syndrome      Diabetes (H)      Gastro-oesophageal reflux disease      H/O: alcohol abuse      HTN (hypertension)      Hyperlipidaemia      Hyperlipidaemia LDL goal <100 7/8/2013     Hypokalemia      Pacemaker      Pancreatitis 6/26/06 Hosp       PAST SURGICAL HISTORY:  Past Surgical History:   Procedure Laterality Date     BYPASS CARDIOPULMONARY       CATARACT IOL, RT/LT      Cataract IOL RT/LT     ENDARTERECTOMY CAROTID Right 6/12/2015    Procedure: ENDARTERECTOMY CAROTID;  Surgeon: João Turner MD;  Location: [facility]     ENDARTERECTOMY CAROTID Left 9/8/2017    Procedure: ENDARTERECTOMY CAROTID;  LEFT CAROTID ENDARTERECTOMY WITH EEG;  Surgeon: João Turner MD;  Location: [facility]     IMPLANT PACEMAKER  6/10/2010     RELEASE CARPAL TUNNEL Right 4/12/2016    Procedure: RELEASE CARPAL TUNNEL;  Surgeon: Lissy Leger MD;  Location: [facility]     SURGICAL HISTORY OF -   1998    Laminectomy     SURGICAL HISTORY OF -   10/2003    Bypass grafting     TONSILLECTOMY & ADENOIDECTOMY         FAMILY HISTORY:  Family History   Problem Relation Age of Onset     Unknown/Adopted No family hx of        SOCIAL HISTORY:  Social History     Social History     Marital status: Single     Spouse name: N/A     Number of children: N/A     Years of education: N/A     Social History Main Topics     Smoking status: Current Every Day Smoker     Packs/day: 2.00     Types: Cigarettes     Smokeless tobacco: Never Used      Comment: 1 1/2 PPD 2/16/18     Alcohol use No     Drug use: No     Sexual activity: Not  Currently     Partners: Female     Other Topics Concern     Parent/Sibling W/ Cabg, Mi Or Angioplasty Before 65f 55m? No     Social History Narrative       Review of Systems:  Skin:  Negative       Eyes:  Positive for glasses    ENT:  Negative      Respiratory:  Negative       Cardiovascular:    Positive for;lower extremity symptoms;edema;fatigue;lightheadedness;dizziness    Gastroenterology: Negative      Genitourinary:  Positive for urinary frequency;urgency    Musculoskeletal:  Positive for joint pain;joint swelling;joint stiffness    Neurologic:  Positive for numbness or tingling of hands;numbness or tingling of feet    Psychiatric:  Negative      Heme/Lymph/Imm:  Positive for allergies    Endocrine:  Positive for diabetes      Physical Exam:  Vitals: /67 (BP Location: Right arm, Patient Position: Sitting, Cuff Size: Adult Regular)  Pulse 83  Wt 75.2 kg (165 lb 12.8 oz)  SpO2 97%  BMI 23.79 kg/m2    Constitutional:  cooperative, alert and oriented, well developed, well nourished, in no acute distress        Skin:  warm and dry to the touch          Head:  normocephalic        Eyes:  sclera white        Lymph:      ENT:  no pallor or cyanosis        Neck:  JVP normal        Respiratory:  clear to auscultation;normal symmetry diminished breath sounds bilaterally       Cardiac: regular rhythm, normal S1/S2, no S3 or S4, apical impulse not displaced, no murmurs, gallops or rubs occasional premature beats              pulses full and equal                                        GI:  abdomen soft        Extremities and Muscular Skeletal:      bilateral LE edema;1+          Neurological:  no gross motor deficits;affect appropriate        Psych:  Alert and Oriented x 3        CC  Yareli Mina, APRN CNP  [address]  [place] [address]

## 2018-12-13 ENCOUNTER — ANCILLARY PROCEDURE (OUTPATIENT)
Dept: CARDIOLOGY | Facility: CLINIC | Age: 68
End: 2018-12-13
Attending: INTERNAL MEDICINE
Payer: COMMERCIAL

## 2018-12-13 DIAGNOSIS — I42.9 CARDIOMYOPATHY (H): ICD-10-CM

## 2018-12-13 PROCEDURE — 93296 REM INTERROG EVL PM/IDS: CPT | Performed by: INTERNAL MEDICINE

## 2018-12-13 PROCEDURE — 93295 DEV INTERROG REMOTE 1/2/MLT: CPT | Performed by: INTERNAL MEDICINE

## 2018-12-14 LAB
ICDO DEVICE COMMENTS: NORMAL
MDC_IDC_EPISODE_DTM: NORMAL
MDC_IDC_EPISODE_DURATION: 1 S
MDC_IDC_EPISODE_ID: 1
MDC_IDC_EPISODE_TYPE: NORMAL
MDC_IDC_LEAD_IMPLANT_DT: NORMAL
MDC_IDC_LEAD_IMPLANT_DT: NORMAL
MDC_IDC_LEAD_LOCATION: NORMAL
MDC_IDC_LEAD_LOCATION: NORMAL
MDC_IDC_LEAD_LOCATION_DETAIL_1: NORMAL
MDC_IDC_LEAD_LOCATION_DETAIL_1: NORMAL
MDC_IDC_LEAD_MFG: NORMAL
MDC_IDC_LEAD_MFG: NORMAL
MDC_IDC_LEAD_MODEL: NORMAL
MDC_IDC_LEAD_MODEL: NORMAL
MDC_IDC_LEAD_POLARITY_TYPE: NORMAL
MDC_IDC_LEAD_POLARITY_TYPE: NORMAL
MDC_IDC_LEAD_SERIAL: NORMAL
MDC_IDC_LEAD_SERIAL: NORMAL
MDC_IDC_MSMT_BATTERY_DTM: NORMAL
MDC_IDC_MSMT_BATTERY_REMAINING_LONGEVITY: 117 MO
MDC_IDC_MSMT_BATTERY_RRT_TRIGGER: 2.73
MDC_IDC_MSMT_BATTERY_STATUS: NORMAL
MDC_IDC_MSMT_BATTERY_VOLTAGE: 3.03 V
MDC_IDC_MSMT_CAP_CHARGE_DTM: NORMAL
MDC_IDC_MSMT_CAP_CHARGE_ENERGY: 18 J
MDC_IDC_MSMT_CAP_CHARGE_TIME: 4.09
MDC_IDC_MSMT_CAP_CHARGE_TYPE: NORMAL
MDC_IDC_MSMT_LEADCHNL_RA_IMPEDANCE_VALUE: 456 OHM
MDC_IDC_MSMT_LEADCHNL_RA_PACING_THRESHOLD_AMPLITUDE: 0.75 V
MDC_IDC_MSMT_LEADCHNL_RA_PACING_THRESHOLD_PULSEWIDTH: 0.4 MS
MDC_IDC_MSMT_LEADCHNL_RA_SENSING_INTR_AMPL: 3 MV
MDC_IDC_MSMT_LEADCHNL_RA_SENSING_INTR_AMPL: 3 MV
MDC_IDC_MSMT_LEADCHNL_RV_IMPEDANCE_VALUE: 323 OHM
MDC_IDC_MSMT_LEADCHNL_RV_IMPEDANCE_VALUE: 399 OHM
MDC_IDC_MSMT_LEADCHNL_RV_PACING_THRESHOLD_AMPLITUDE: 1.25 V
MDC_IDC_MSMT_LEADCHNL_RV_PACING_THRESHOLD_PULSEWIDTH: 0.4 MS
MDC_IDC_MSMT_LEADCHNL_RV_SENSING_INTR_AMPL: 3.62 MV
MDC_IDC_MSMT_LEADCHNL_RV_SENSING_INTR_AMPL: 3.62 MV
MDC_IDC_PG_IMPLANT_DTM: NORMAL
MDC_IDC_PG_MFG: NORMAL
MDC_IDC_PG_MODEL: NORMAL
MDC_IDC_PG_SERIAL: NORMAL
MDC_IDC_PG_TYPE: NORMAL
MDC_IDC_SESS_CLINIC_NAME: NORMAL
MDC_IDC_SESS_DTM: NORMAL
MDC_IDC_SESS_TYPE: NORMAL
MDC_IDC_SET_BRADY_AT_MODE_SWITCH_RATE: 171 {BEATS}/MIN
MDC_IDC_SET_BRADY_HYSTRATE: NORMAL
MDC_IDC_SET_BRADY_LOWRATE: 55 {BEATS}/MIN
MDC_IDC_SET_BRADY_MAX_SENSOR_RATE: 140 {BEATS}/MIN
MDC_IDC_SET_BRADY_MAX_TRACKING_RATE: 140 {BEATS}/MIN
MDC_IDC_SET_BRADY_MODE: NORMAL
MDC_IDC_SET_BRADY_PAV_DELAY_LOW: 180 MS
MDC_IDC_SET_BRADY_SAV_DELAY_LOW: 150 MS
MDC_IDC_SET_LEADCHNL_RA_PACING_AMPLITUDE: 1.75 V
MDC_IDC_SET_LEADCHNL_RA_PACING_ANODE_ELECTRODE_1: NORMAL
MDC_IDC_SET_LEADCHNL_RA_PACING_ANODE_LOCATION_1: NORMAL
MDC_IDC_SET_LEADCHNL_RA_PACING_CAPTURE_MODE: NORMAL
MDC_IDC_SET_LEADCHNL_RA_PACING_CATHODE_ELECTRODE_1: NORMAL
MDC_IDC_SET_LEADCHNL_RA_PACING_CATHODE_LOCATION_1: NORMAL
MDC_IDC_SET_LEADCHNL_RA_PACING_POLARITY: NORMAL
MDC_IDC_SET_LEADCHNL_RA_PACING_PULSEWIDTH: 0.4 MS
MDC_IDC_SET_LEADCHNL_RA_SENSING_ANODE_ELECTRODE_1: NORMAL
MDC_IDC_SET_LEADCHNL_RA_SENSING_ANODE_LOCATION_1: NORMAL
MDC_IDC_SET_LEADCHNL_RA_SENSING_CATHODE_ELECTRODE_1: NORMAL
MDC_IDC_SET_LEADCHNL_RA_SENSING_CATHODE_LOCATION_1: NORMAL
MDC_IDC_SET_LEADCHNL_RA_SENSING_POLARITY: NORMAL
MDC_IDC_SET_LEADCHNL_RA_SENSING_SENSITIVITY: 0.3 MV
MDC_IDC_SET_LEADCHNL_RV_PACING_AMPLITUDE: 2.5 V
MDC_IDC_SET_LEADCHNL_RV_PACING_ANODE_ELECTRODE_1: NORMAL
MDC_IDC_SET_LEADCHNL_RV_PACING_ANODE_LOCATION_1: NORMAL
MDC_IDC_SET_LEADCHNL_RV_PACING_CAPTURE_MODE: NORMAL
MDC_IDC_SET_LEADCHNL_RV_PACING_CATHODE_ELECTRODE_1: NORMAL
MDC_IDC_SET_LEADCHNL_RV_PACING_CATHODE_LOCATION_1: NORMAL
MDC_IDC_SET_LEADCHNL_RV_PACING_POLARITY: NORMAL
MDC_IDC_SET_LEADCHNL_RV_PACING_PULSEWIDTH: 0.4 MS
MDC_IDC_SET_LEADCHNL_RV_SENSING_ANODE_ELECTRODE_1: NORMAL
MDC_IDC_SET_LEADCHNL_RV_SENSING_ANODE_LOCATION_1: NORMAL
MDC_IDC_SET_LEADCHNL_RV_SENSING_CATHODE_ELECTRODE_1: NORMAL
MDC_IDC_SET_LEADCHNL_RV_SENSING_CATHODE_LOCATION_1: NORMAL
MDC_IDC_SET_LEADCHNL_RV_SENSING_POLARITY: NORMAL
MDC_IDC_SET_LEADCHNL_RV_SENSING_SENSITIVITY: 0.3 MV
MDC_IDC_SET_ZONE_DETECTION_BEATS_DENOMINATOR: 40 {BEATS}
MDC_IDC_SET_ZONE_DETECTION_BEATS_NUMERATOR: 30 {BEATS}
MDC_IDC_SET_ZONE_DETECTION_INTERVAL: 320 MS
MDC_IDC_SET_ZONE_DETECTION_INTERVAL: 350 MS
MDC_IDC_SET_ZONE_DETECTION_INTERVAL: 360 MS
MDC_IDC_SET_ZONE_DETECTION_INTERVAL: 400 MS
MDC_IDC_SET_ZONE_DETECTION_INTERVAL: NORMAL
MDC_IDC_SET_ZONE_TYPE: NORMAL
MDC_IDC_STAT_AT_BURDEN_PERCENT: 0 %
MDC_IDC_STAT_AT_DTM_END: NORMAL
MDC_IDC_STAT_AT_DTM_START: NORMAL
MDC_IDC_STAT_BRADY_AP_VP_PERCENT: 0.18 %
MDC_IDC_STAT_BRADY_AP_VS_PERCENT: 34.32 %
MDC_IDC_STAT_BRADY_AS_VP_PERCENT: 0.03 %
MDC_IDC_STAT_BRADY_AS_VS_PERCENT: 65.47 %
MDC_IDC_STAT_BRADY_DTM_END: NORMAL
MDC_IDC_STAT_BRADY_DTM_START: NORMAL
MDC_IDC_STAT_BRADY_RA_PERCENT_PACED: 34.48 %
MDC_IDC_STAT_BRADY_RV_PERCENT_PACED: 0.22 %
MDC_IDC_STAT_EPISODE_RECENT_COUNT: 0
MDC_IDC_STAT_EPISODE_RECENT_COUNT: 1
MDC_IDC_STAT_EPISODE_RECENT_COUNT_DTM_END: NORMAL
MDC_IDC_STAT_EPISODE_RECENT_COUNT_DTM_START: NORMAL
MDC_IDC_STAT_EPISODE_TOTAL_COUNT: 0
MDC_IDC_STAT_EPISODE_TOTAL_COUNT: 1
MDC_IDC_STAT_EPISODE_TOTAL_COUNT_DTM_END: NORMAL
MDC_IDC_STAT_EPISODE_TOTAL_COUNT_DTM_START: NORMAL
MDC_IDC_STAT_EPISODE_TYPE: NORMAL
MDC_IDC_STAT_TACHYTHERAPY_ATP_DELIVERED_RECENT: 0
MDC_IDC_STAT_TACHYTHERAPY_ATP_DELIVERED_TOTAL: 0
MDC_IDC_STAT_TACHYTHERAPY_RECENT_DTM_END: NORMAL
MDC_IDC_STAT_TACHYTHERAPY_RECENT_DTM_START: NORMAL
MDC_IDC_STAT_TACHYTHERAPY_SHOCKS_ABORTED_RECENT: 0
MDC_IDC_STAT_TACHYTHERAPY_SHOCKS_ABORTED_TOTAL: 0
MDC_IDC_STAT_TACHYTHERAPY_SHOCKS_DELIVERED_RECENT: 0
MDC_IDC_STAT_TACHYTHERAPY_SHOCKS_DELIVERED_TOTAL: 0
MDC_IDC_STAT_TACHYTHERAPY_TOTAL_DTM_END: NORMAL
MDC_IDC_STAT_TACHYTHERAPY_TOTAL_DTM_START: NORMAL

## 2019-02-19 ENCOUNTER — HOSPITAL ENCOUNTER (OUTPATIENT)
Dept: CARDIOLOGY | Facility: CLINIC | Age: 69
Discharge: HOME | End: 2019-02-19
Attending: INTERNAL MEDICINE | Admitting: INTERNAL MEDICINE
Payer: COMMERCIAL

## 2019-02-19 ENCOUNTER — HOSPITAL ENCOUNTER (OUTPATIENT)
Dept: CARDIOLOGY | Facility: CLINIC | Age: 69
Discharge: HOME | End: 2019-02-19
Attending: NURSE PRACTITIONER | Admitting: NURSE PRACTITIONER
Payer: COMMERCIAL

## 2019-02-19 DIAGNOSIS — I25.5 ISCHEMIC CARDIOMYOPATHY: ICD-10-CM

## 2019-02-19 DIAGNOSIS — I42.9 CARDIOMYOPATHY (H): ICD-10-CM

## 2019-02-19 PROCEDURE — 93308 TTE F-UP OR LMTD: CPT | Mod: 26 | Performed by: INTERNAL MEDICINE

## 2019-02-19 PROCEDURE — 93283 PRGRMG EVAL IMPLANTABLE DFB: CPT

## 2019-02-19 PROCEDURE — 93321 DOPPLER ECHO F-UP/LMTD STD: CPT | Mod: 26 | Performed by: INTERNAL MEDICINE

## 2019-02-19 PROCEDURE — 93283 PRGRMG EVAL IMPLANTABLE DFB: CPT | Mod: 26 | Performed by: INTERNAL MEDICINE

## 2019-02-19 PROCEDURE — 93308 TTE F-UP OR LMTD: CPT

## 2019-02-19 PROCEDURE — 93325 DOPPLER ECHO COLOR FLOW MAPG: CPT | Mod: 26 | Performed by: INTERNAL MEDICINE

## 2019-02-22 ENCOUNTER — OFFICE VISIT (OUTPATIENT)
Dept: CARDIOLOGY | Facility: CLINIC | Age: 69
End: 2019-02-22
Attending: NURSE PRACTITIONER
Payer: COMMERCIAL

## 2019-02-22 VITALS
HEART RATE: 79 BPM | BODY MASS INDEX: 23.68 KG/M2 | DIASTOLIC BLOOD PRESSURE: 72 MMHG | OXYGEN SATURATION: 97 % | WEIGHT: 165 LBS | SYSTOLIC BLOOD PRESSURE: 114 MMHG

## 2019-02-22 DIAGNOSIS — I25.5 ISCHEMIC CARDIOMYOPATHY: ICD-10-CM

## 2019-02-22 DIAGNOSIS — I25.5 ISCHEMIC CARDIOMYOPATHY: Primary | ICD-10-CM

## 2019-02-22 LAB
ANION GAP SERPL CALCULATED.3IONS-SCNC: 5 MMOL/L (ref 3–14)
BUN SERPL-MCNC: 31 MG/DL (ref 7–30)
CALCIUM SERPL-MCNC: 8.4 MG/DL (ref 8.5–10.1)
CHLORIDE SERPL-SCNC: 109 MMOL/L (ref 94–109)
CO2 SERPL-SCNC: 27 MMOL/L (ref 20–32)
CREAT SERPL-MCNC: 1.2 MG/DL (ref 0.66–1.25)
GFR SERPL CREATININE-BSD FRML MDRD: 61 ML/MIN/{1.73_M2}
GLUCOSE SERPL-MCNC: 149 MG/DL (ref 70–99)
POTASSIUM SERPL-SCNC: 4.7 MMOL/L (ref 3.4–5.3)
SODIUM SERPL-SCNC: 141 MMOL/L (ref 133–144)

## 2019-02-22 PROCEDURE — 99214 OFFICE O/P EST MOD 30 MIN: CPT | Performed by: INTERNAL MEDICINE

## 2019-02-22 PROCEDURE — 36415 COLL VENOUS BLD VENIPUNCTURE: CPT | Performed by: INTERNAL MEDICINE

## 2019-02-22 PROCEDURE — 80048 BASIC METABOLIC PNL TOTAL CA: CPT | Performed by: INTERNAL MEDICINE

## 2019-02-22 NOTE — PROGRESS NOTES
Service Date: 02/22/2019      HISTORY OF PRESENT ILLNESS:  Mr. Delbert Tilley returns for followup at [facility] in [place].  At 68 years of age, he has a history of an ischemic cardiomyopathy, prior ICD placement, atrial flutter and coronary artery disease.  He has previously undergone coronary artery bypass graft surgery.  He has bilateral carotid disease, diabetes mellitus and does continue to smoke tobacco.      In the past, he underwent coronary artery bypass graft surgery with an LIMA graft to the LAD, vein graft to the circumflex marginal branch and a vein graft to the right PDA.  In 2014, he presented with a non-ST elevation myocardial infarct, was transferred to [facility] and underwent intervention with stenting of the vein graft to the marginal branch.  The vein graft to the RCA and the LIMA graft to the LAD were patent.  His LV ejection fraction at that time was 35%-40% with severe inferior and inferolateral hypokinesis.  A followup stress study done in 2015 demonstrated a large transmural inferior and inferoseptal infarct without ischemia as well as a small distal anterior and apical defect.      He did well without recurrent angina or evidence of heart failure.  As a preoperative assessment for endarterectomy in 2017, he underwent a Lexiscan stress nuclear study.  There was again noted to be the large inferior and inferoseptal defect without ischemia.  The LV ejection fraction was estimated to have fallen to 28%.  Later in 2017, he did present with heart failure symptoms and the ejection fraction was noted to be 20%-25% on echocardiography.  Repeat coronary angiography was recommended and again demonstrated the LIMA graft to the LAD to be patent with a 50% stenosis in the apical LAD, but the vein graft to the right PDA had occluded.  The vein graft to the circumflex marginal branch had remained patent, and the left main coronary artery was widely patent.      In the  interim, Mr. Tilley has done well.  Today he notes that he feels well.  He does not have any angina or shortness of breath.  Sometimes, he awakens and notes that he has retained some fluid, so he will take an extra 20 mg of furosemide and the symptoms immediately resolve.  That occurs about once monthly.  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  He is a retired watts, and his major problem is that he has developed some right hand arthritis.      He does continue to smoke tobacco despite discussions and attempts in the past to discontinue tobacco.  He has previously undergone a cardio-defibrillator implant.  He has undergone an atrial flutter ablation.  His most recent ICD check did not demonstrate any atrial arrhythmias, and there was normal function without ventricular arrhythmias.      PHYSICAL EXAMINATION:   GENERAL:  This is a slender man who appears fit and moves easily.   VITAL SIGNS:  Blood pressure was 114/72 mmHg, heart rate 79 beats per minute and regular, and respiratory rate 14-18 per minute.   CHEST:  Clear to auscultation, though there was a diffuse and mild decrease in breath sounds bilaterally.   CARDIAC:  On cardiac auscultation, there was an S1 and an increased S2, without extra sounds or a murmur.  The rhythm was regular.      ASSESSMENT:  Mr. Tilley is doing well.  With regard to his left ventricular dysfunction, he has previously undergone defibrillator implantation.  He is essentially asymptomatic with New York Heart Association class I-II symptoms.  I am uncertain how much of his exertional limitation could actually be chronic obstructive pulmonary disease as a result of his continued tobacco use.  He is on bronchodilator therapy.  With regard to his coronary artery disease, he continues on an ARB agent, beta blockade, statin  therapy and aspirin.  He uses aspirin for other purposes, and so he is on high-dose aspirin and is taking 1000 mg in the morning and 1000 mg in the evening.  The aspirin is being used for arthritis and is not prescribed at that dose for coronary artery disease.      With regard to his heart failure symptoms, he is on beta blockade and an ARB agent as the vasodilator therapy.  With spironolactone, he noted a precipitous decline in his blood pressure.  He tried it several times and his blood pressures were approximately 80-90 mmHg.  In the future, we may try spironolactone again, in which case Lasix/furosemide would be discontinued and the spironolactone at 12.5 mg daily would replace the Lasix.  I note that his last potassium about 3 months ago was 5 and he has been using a potassium-containing salt substitute.  I recommended he have a basic metabolic panel today.      He will return in approximately 6 months to follow up with the advanced practice provider.  He will see myself at 1 year.  I have recommended a basic metabolic panel and an echocardiogram in 6 months.      With regard to his defibrillator, he will continue to follow in the Device Clinic.         NORMAN BARRERA MD, [facility]             D: 2019   T: 2019   MT: JAY      Name:     CARLIN QUEEN   MRN:      [id number]        Account:      [id number]   :      1950           Service Date: 2019      Document: [id number]

## 2019-02-22 NOTE — LETTER
2/22/2019      [facility]  [address] [id number]      RE: Delbert Tilley       Dear Colleague,    I had the pleasure of seeing Delbert Tilley in the [facility].    Service Date: 02/22/2019      HISTORY OF PRESENT ILLNESS:  Mr. Delbert Tilley returns for followup at [facility] in [place].  At 68 years of age, he has a history of an ischemic cardiomyopathy, prior ICD placement, atrial flutter and coronary artery disease.  He has previously undergone coronary artery bypass graft surgery.  He has bilateral carotid disease, diabetes mellitus and does continue to smoke tobacco.      In the past, he underwent coronary artery bypass graft surgery with an LIMA graft to the LAD, vein graft to the circumflex marginal branch and a vein graft to the right PDA.  In 2014, he presented with a non-ST elevation myocardial infarct, was transferred to [facility] and underwent intervention with stenting of the vein graft to the marginal branch.  The vein graft to the RCA and the LIMA graft to the LAD were patent.  His LV ejection fraction at that time was 35%-40% with severe inferior and inferolateral hypokinesis.  A followup stress study done in 2015 demonstrated a large transmural inferior and inferoseptal infarct without ischemia as well as a small distal anterior and apical defect.      He did well without recurrent angina or evidence of heart failure.  As a preoperative assessment for endarterectomy in 2017, he underwent a Lexiscan stress nuclear study.  There was again noted to be the large inferior and inferoseptal defect without ischemia.  The LV ejection fraction was estimated to have fallen to 28%.  Later in 2017, he did present with heart failure symptoms and the ejection fraction was noted to be 20%-25% on echocardiography.  Repeat coronary angiography was recommended and again demonstrated the LIMA graft to the LAD to be patent with  a 50% stenosis in the apical LAD, but the vein graft to the right PDA had occluded.  The vein graft to the circumflex marginal branch had remained patent, and the left main coronary artery was widely patent.      In the interim, Mr. Tilley has done well.  Today he notes that he feels well.  He does not have any angina or shortness of breath.  Sometimes, he awakens and notes that he has retained some fluid, so he will take an extra 20 mg of furosemide and the symptoms immediately resolve.  That occurs about once monthly.  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  He is a retired watts, and his major problem is that he has developed some right hand arthritis.      He does continue to smoke tobacco despite discussions and attempts in the past to discontinue tobacco.  He has previously undergone a cardio-defibrillator implant.  He has undergone an atrial flutter ablation.  His most recent ICD check did not demonstrate any atrial arrhythmias, and there was normal function without ventricular arrhythmias.      PHYSICAL EXAMINATION:   GENERAL:  This is a slender man who appears fit and moves easily.   VITAL SIGNS:  Blood pressure was 114/72 mmHg, heart rate 79 beats per minute and regular, and respiratory rate 14-18 per minute.   CHEST:  Clear to auscultation, though there was a diffuse and mild decrease in breath sounds bilaterally.   CARDIAC:  On cardiac auscultation, there was an S1 and an increased S2, without extra sounds or a murmur.  The rhythm was regular.      ASSESSMENT:  Mr. Tilley is doing well.  With regard to his left ventricular dysfunction, he has previously undergone defibrillator implantation.  He is essentially asymptomatic with New York Heart Association class I-II symptoms.  I am uncertain how much of his exertional limitation could  actually be chronic obstructive pulmonary disease as a result of his continued tobacco use.  He is on bronchodilator therapy.  With regard to his coronary artery disease, he continues on an ARB agent, beta blockade, statin therapy and aspirin.  He uses aspirin for other purposes, and so he is on high-dose aspirin and is taking 1000 mg in the morning and 1000 mg in the evening.  The aspirin is being used for arthritis and is not prescribed at that dose for coronary artery disease.      With regard to his heart failure symptoms, he is on beta blockade and an ARB agent as the vasodilator therapy.  With spironolactone, he noted a precipitous decline in his blood pressure.  He tried it several times and his blood pressures were approximately 80-90 mmHg.  In the future, we may try spironolactone again, in which case Lasix/furosemide would be discontinued and the spironolactone at 12.5 mg daily would replace the Lasix.  I note that his last potassium about 3 months ago was 5 and he has been using a potassium-containing salt substitute.  I recommended he have a basic metabolic panel today.      He will return in approximately 6 months to follow up with the advanced practice provider.  He will see myself at 1 year.  I have recommended a basic metabolic panel and an echocardiogram in 6 months.      With regard to his defibrillator, he will continue to follow in the [facility].         NORMAN BARRERA MD, [facility]             D: 2019   T: 2019   MT: JAY      Name:     CARLIN QUEEN   MRN:      [id number]        Account:      [id number]   :      1950           Service Date: 2019      Document: [id number]         Outpatient Encounter Medications as of 2019   Medication Sig Dispense Refill     albuterol (PROAIR HFA) 108 (90 Base) MCG/ACT Inhaler Inhale 2 puffs into the lungs every 4 hours as needed for shortness of breath / dyspnea 1 Inhaler 0     amylase-lipase-protease (CREON 12) 66951 UNITS CPEP  Take 1 capsule (12,000 Units) by mouth daily 120 capsule 0     aspirin 325 MG tablet Take 3 tablets (975 mg) by mouth every morning (Takes 2 x 500 mg = 1000 mg) (Patient taking differently: Take 1,000 mg by mouth every morning (Takes 2 x 500 mg = 1000 mg)   Taking 2 in am and 2 in the pm. 2/22/19) 120 tablet 1     atorvastatin (LIPITOR) 80 MG tablet Take 80 mg by mouth At Bedtime        furosemide (LASIX) 20 MG tablet Take 1 tablet (20 mg) by mouth daily       insulin glargine (LANTUS) 100 UNIT/ML injection Inject 27 Units Subcutaneous every morning       Insulin Pen Needle (PEN NEEDLES) 32G X 4 MM MISC        loperamide (IMODIUM) 2 MG capsule Take 2 mg by mouth 4 times daily as needed for diarrhea       losartan (COZAAR) 50 MG tablet Take 1 tablet (50 mg) by mouth daily 90 tablet 3     metoprolol succinate (TOPROL-XL) 25 MG 24 hr tablet Take 1 tablet (25 mg) by mouth daily 90 tablet 3     Nitroglycerin (NITROSTAT SL) Place 0.4 mg under the tongue every 5 minutes as needed for chest pain       pantoprazole (PROTONIX) 40 MG EC tablet Take 1 tablet (40 mg) by mouth daily 30 tablet      [DISCONTINUED] metolazone (ZAROXOLYN) 2.5 MG tablet Take 2.5 mg on Tuesday, 1/30/18 and Thursday 2/1/18. Take this 1/2 hour before taking your Lasix (furosemide) 4 tablet 0     No facility-administered encounter medications on file as of 2/22/2019.        Again, thank you for allowing me to participate in the care of your patient.      Sincerely,    Mimi Michaels MD     [facility]

## 2019-02-22 NOTE — PROGRESS NOTES
HPI and Plan:   See dictation    Orders Placed This Encounter   Procedures     Basic metabolic panel       No orders of the defined types were placed in this encounter.      Medications Discontinued During This Encounter   Medication Reason     metolazone (ZAROXOLYN) 2.5 MG tablet          Encounter Diagnosis   Name Primary?     Ischemic cardiomyopathy        CURRENT MEDICATIONS:  Current Outpatient Medications   Medication Sig Dispense Refill     albuterol (PROAIR HFA) 108 (90 Base) MCG/ACT Inhaler Inhale 2 puffs into the lungs every 4 hours as needed for shortness of breath / dyspnea 1 Inhaler 0     amylase-lipase-protease (CREON 12) 91695 UNITS CPEP Take 1 capsule (12,000 Units) by mouth daily 120 capsule 0     aspirin 325 MG tablet Take 3 tablets (975 mg) by mouth every morning (Takes 2 x 500 mg = 1000 mg) (Patient taking differently: Take 1,000 mg by mouth every morning (Takes 2 x 500 mg = 1000 mg)   Taking 2 in am and 2 in the pm. 2/22/19) 120 tablet 1     atorvastatin (LIPITOR) 80 MG tablet Take 80 mg by mouth At Bedtime        furosemide (LASIX) 20 MG tablet Take 1 tablet (20 mg) by mouth daily       insulin glargine (LANTUS) 100 UNIT/ML injection Inject 27 Units Subcutaneous every morning       Insulin Pen Needle (PEN NEEDLES) 32G X 4 MM MISC        loperamide (IMODIUM) 2 MG capsule Take 2 mg by mouth 4 times daily as needed for diarrhea       losartan (COZAAR) 50 MG tablet Take 1 tablet (50 mg) by mouth daily 90 tablet 3     metoprolol succinate (TOPROL-XL) 25 MG 24 hr tablet Take 1 tablet (25 mg) by mouth daily 90 tablet 3     Nitroglycerin (NITROSTAT SL) Place 0.4 mg under the tongue every 5 minutes as needed for chest pain       pantoprazole (PROTONIX) 40 MG EC tablet Take 1 tablet (40 mg) by mouth daily 30 tablet        ALLERGIES     Allergies   Allergen Reactions     Hydrocodone      Upset stomach     Shellfish Allergy Hives and Rash       PAST MEDICAL HISTORY:  Past Medical History:   Diagnosis Date      Acute renal failure (H) 8/26/06 Hosp    secondary to dehydration     Arthritis      CAD (coronary artery disease)     LIMA to the LAD, vein graft to OM and a vein graft to the PDA     Carpal tunnel syndrome      Diabetes (H)      Gastro-oesophageal reflux disease      H/O: alcohol abuse      HTN (hypertension)      Hyperlipidaemia      Hyperlipidaemia LDL goal <100 7/8/2013     Hypokalemia      Pacemaker      Pancreatitis 6/26/06 Hosp       PAST SURGICAL HISTORY:  Past Surgical History:   Procedure Laterality Date     BYPASS CARDIOPULMONARY       CATARACT IOL, RT/LT      Cataract IOL RT/LT     ENDARTERECTOMY CAROTID Right 6/12/2015    Procedure: ENDARTERECTOMY CAROTID;  Surgeon: João Turner MD;  Location: [facility] OR     ENDARTERECTOMY CAROTID Left 9/8/2017    Procedure: ENDARTERECTOMY CAROTID;  LEFT CAROTID ENDARTERECTOMY WITH EEG;  Surgeon: João Turner MD;  Location: [facility] OR     IMPLANT PACEMAKER  6/10/2010     RELEASE CARPAL TUNNEL Right 4/12/2016    Procedure: RELEASE CARPAL TUNNEL;  Surgeon: Lissy Leger MD;  Location: [facility] OR     SURGICAL HISTORY OF -   1998    Laminectomy     SURGICAL HISTORY OF -   10/2003    Bypass grafting     TONSILLECTOMY & ADENOIDECTOMY         FAMILY HISTORY:  Family History   Problem Relation Age of Onset     Unknown/Adopted No family hx of        SOCIAL HISTORY:  Social History     Socioeconomic History     Marital status: Single     Spouse name: None     Number of children: None     Years of education: None     Highest education level: None   Occupational History     None   Social Needs     Financial resource strain: None     Food insecurity:     Worry: None     Inability: None     Transportation needs:     Medical: None     Non-medical: None   Tobacco Use     Smoking status: Current Every Day Smoker     Packs/day: 2.00     Types: Cigarettes     Smokeless tobacco: Never Used     Tobacco comment: 1 1/2 PPD 2/16/18   Substance and Sexual Activity     Alcohol  use: No     Drug use: No     Sexual activity: Not Currently     Partners: Female   Lifestyle     Physical activity:     Days per week: None     Minutes per session: None     Stress: None   Relationships     Social connections:     Talks on phone: None     Gets together: None     Attends Caoda[employer] service: None     Active member of club or organization: None     Attends meetings of clubs or organizations: None     Relationship status: None     Intimate partner violence:     Fear of current or ex partner: None     Emotionally abused: None     Physically abused: None     Forced sexual activity: None   Other Topics Concern     Parent/sibling w/ CABG, MI or angioplasty before 65F 55M? No   Social History Narrative     None       Review of Systems:  Skin:  Negative bruising     Eyes:  Positive for glasses    ENT:  Negative      Respiratory:  Positive for cough;dyspnea on exertion     Cardiovascular:  Negative for;palpitations;chest pain;syncope or near-syncope Positive for;lower extremity symptoms;edema;fatigue;lightheadedness;dizziness    Gastroenterology: Negative for heartburn;vomiting;nausea    Genitourinary:  Positive for urinary frequency;urgency    Musculoskeletal:  Positive for joint pain;joint swelling;joint stiffness    Neurologic:  Positive for numbness or tingling of hands;numbness or tingling of feet    Psychiatric:  Negative      Heme/Lymph/Imm:  Positive for allergies    Endocrine:  Positive for diabetes      Physical Exam:  Vitals: /72   Pulse 79   Wt 74.8 kg (165 lb)   SpO2 97%   BMI 23.68 kg/m      Constitutional:  cooperative, alert and oriented, well developed, well nourished, in no acute distress        Skin:  warm and dry to the touch          Head:           Eyes:  sclera white        Lymph:      ENT:           Neck:           Respiratory:  clear to auscultation;normal symmetry diminished breath sounds bilaterally       Cardiac: regular rhythm, normal S1/S2, no S3 or S4, apical impulse not  displaced, no murmurs, gallops or rubs                                                         GI:           Extremities and Muscular Skeletal:  no deformities, clubbing, cyanosis, erythema observed;no edema              Neurological:  no gross motor deficits;affect appropriate        Psych:  Alert and Oriented x 3;affect appropriate, oriented to time, person and place          CC  Yareli Mina, APRN CNP  [address]  [place] [address]

## 2019-02-22 NOTE — LETTER
2/22/2019    [facility]  [address] [address]    RE: Delbert Tilley       Dear Colleague,    I had the pleasure of seeing Delbert Tilley in the [facility].    HPI and Plan:   See dictation    Orders Placed This Encounter   Procedures     Basic metabolic panel       No orders of the defined types were placed in this encounter.      Medications Discontinued During This Encounter   Medication Reason     metolazone (ZAROXOLYN) 2.5 MG tablet          Encounter Diagnosis   Name Primary?     Ischemic cardiomyopathy        CURRENT MEDICATIONS:  Current Outpatient Medications   Medication Sig Dispense Refill     albuterol (PROAIR HFA) 108 (90 Base) MCG/ACT Inhaler Inhale 2 puffs into the lungs every 4 hours as needed for shortness of breath / dyspnea 1 Inhaler 0     amylase-lipase-protease (CREON 12) 40041 UNITS CPEP Take 1 capsule (12,000 Units) by mouth daily 120 capsule 0     aspirin 325 MG tablet Take 3 tablets (975 mg) by mouth every morning (Takes 2 x 500 mg = 1000 mg) (Patient taking differently: Take 1,000 mg by mouth every morning (Takes 2 x 500 mg = 1000 mg)   Taking 2 in am and 2 in the pm. 2/22/19) 120 tablet 1     atorvastatin (LIPITOR) 80 MG tablet Take 80 mg by mouth At Bedtime        furosemide (LASIX) 20 MG tablet Take 1 tablet (20 mg) by mouth daily       insulin glargine (LANTUS) 100 UNIT/ML injection Inject 27 Units Subcutaneous every morning       Insulin Pen Needle (PEN NEEDLES) 32G X 4 MM MISC        loperamide (IMODIUM) 2 MG capsule Take 2 mg by mouth 4 times daily as needed for diarrhea       losartan (COZAAR) 50 MG tablet Take 1 tablet (50 mg) by mouth daily 90 tablet 3     metoprolol succinate (TOPROL-XL) 25 MG 24 hr tablet Take 1 tablet (25 mg) by mouth daily 90 tablet 3     Nitroglycerin (NITROSTAT SL) Place 0.4 mg under the tongue every 5 minutes as needed for chest pain       pantoprazole (PROTONIX) 40 MG EC tablet Take 1 tablet (40  mg) by mouth daily 30 tablet        ALLERGIES     Allergies   Allergen Reactions     Hydrocodone      Upset stomach     Shellfish Allergy Hives and Rash       PAST MEDICAL HISTORY:  Past Medical History:   Diagnosis Date     Acute renal failure (H) 8/26/06 Hosp    secondary to dehydration     Arthritis      CAD (coronary artery disease)     LIMA to the LAD, vein graft to OM and a vein graft to the PDA     Carpal tunnel syndrome      Diabetes (H)      Gastro-oesophageal reflux disease      H/O: alcohol abuse      HTN (hypertension)      Hyperlipidaemia      Hyperlipidaemia LDL goal <100 7/8/2013     Hypokalemia      Pacemaker      Pancreatitis 6/26/06 Hosp       PAST SURGICAL HISTORY:  Past Surgical History:   Procedure Laterality Date     BYPASS CARDIOPULMONARY       CATARACT IOL, RT/LT      Cataract IOL RT/LT     ENDARTERECTOMY CAROTID Right 6/12/2015    Procedure: ENDARTERECTOMY CAROTID;  Surgeon: João Turner MD;  Location: [facility] OR     ENDARTERECTOMY CAROTID Left 9/8/2017    Procedure: ENDARTERECTOMY CAROTID;  LEFT CAROTID ENDARTERECTOMY WITH EEG;  Surgeon: João Turner MD;  Location: [facility] OR     IMPLANT PACEMAKER  6/10/2010     RELEASE CARPAL TUNNEL Right 4/12/2016    Procedure: RELEASE CARPAL TUNNEL;  Surgeon: Lissy Leger MD;  Location: [facility] OR     SURGICAL HISTORY OF -   1998    Laminectomy     SURGICAL HISTORY OF -   10/2003    Bypass grafting     TONSILLECTOMY & ADENOIDECTOMY         FAMILY HISTORY:  Family History   Problem Relation Age of Onset     Unknown/Adopted No family hx of        SOCIAL HISTORY:  Social History     Socioeconomic History     Marital status: Single     Spouse name: None     Number of children: None     Years of education: None     Highest education level: None   Occupational History     None   Social Needs     Financial resource strain: None     Food insecurity:     Worry: None     Inability: None     Transportation needs:     Medical: None     Non-medical: None    Tobacco Use     Smoking status: Current Every Day Smoker     Packs/day: 2.00     Types: Cigarettes     Smokeless tobacco: Never Used     Tobacco comment: 1 1/2 PPD 2/16/18   Substance and Sexual Activity     Alcohol use: No     Drug use: No     Sexual activity: Not Currently     Partners: Female   Lifestyle     Physical activity:     Days per week: None     Minutes per session: None     Stress: None   Relationships     Social connections:     Talks on phone: None     Gets together: None     Attends Voodoo service: None     Active member of club or organization: None     Attends meetings of clubs or organizations: None     Relationship status: None     Intimate partner violence:     Fear of current or ex partner: None     Emotionally abused: None     Physically abused: None     Forced sexual activity: None   Other Topics Concern     Parent/sibling w/ CABG, MI or angioplasty before 65F 55M? No   Social History Narrative     None       Review of Systems:  Skin:  Negative bruising     Eyes:  Positive for glasses    ENT:  Negative      Respiratory:  Positive for cough;dyspnea on exertion     Cardiovascular:  Negative for;palpitations;chest pain;syncope or near-syncope Positive for;lower extremity symptoms;edema;fatigue;lightheadedness;dizziness    Gastroenterology: Negative for heartburn;vomiting;nausea    Genitourinary:  Positive for urinary frequency;urgency    Musculoskeletal:  Positive for joint pain;joint swelling;joint stiffness    Neurologic:  Positive for numbness or tingling of hands;numbness or tingling of feet    Psychiatric:  Negative      Heme/Lymph/Imm:  Positive for allergies    Endocrine:  Positive for diabetes      Physical Exam:  Vitals: /72   Pulse 79   Wt 74.8 kg (165 lb)   SpO2 97%   BMI 23.68 kg/m       Constitutional:  cooperative, alert and oriented, well developed, well nourished, in no acute distress        Skin:  warm and dry to the touch          Head:           Eyes:  sclera  white        Lymph:      ENT:           Neck:           Respiratory:  clear to auscultation;normal symmetry diminished breath sounds bilaterally       Cardiac: regular rhythm, normal S1/S2, no S3 or S4, apical impulse not displaced, no murmurs, gallops or rubs                                                         GI:           Extremities and Muscular Skeletal:  no deformities, clubbing, cyanosis, erythema observed;no edema              Neurological:  no gross motor deficits;affect appropriate        Psych:  Alert and Oriented x 3;affect appropriate, oriented to time, person and place          CC  Yareli Mina, APRN CNP  [address]  [place] [address]                    Service Date: 02/22/2019      HISTORY OF PRESENT ILLNESS:  Mr. Delbert Tilley returns for followup at [facility] in [place].  At 68 years of age, he has a history of an ischemic cardiomyopathy, prior ICD placement, atrial flutter and coronary artery disease.  He has previously undergone coronary artery bypass graft surgery.  He has bilateral carotid disease, diabetes mellitus and does continue to smoke tobacco.      In the past, he underwent coronary artery bypass graft surgery with an LIMA graft to the LAD, vein graft to the circumflex marginal branch and a vein graft to the right PDA.  In 2014, he presented with a non-ST elevation myocardial infarct, was transferred to [facility] and underwent intervention with stenting of the vein graft to the marginal branch.  The vein graft to the RCA and the LIMA graft to the LAD were patent.  His LV ejection fraction at that time was 35%-40% with severe inferior and inferolateral hypokinesis.  A followup stress study done in 2015 demonstrated a large transmural inferior and inferoseptal infarct without ischemia as well as a small distal anterior and apical defect.      He did well without recurrent angina or evidence of heart failure.  As a preoperative  assessment for endarterectomy in 2017, he underwent a Lexiscan stress nuclear study.  There was again noted to be the large inferior and inferoseptal defect without ischemia.  The LV ejection fraction was estimated to have fallen to 28%.  Later in 2017, he did present with heart failure symptoms and the ejection fraction was noted to be 20%-25% on echocardiography.  Repeat coronary angiography was recommended and again demonstrated the LIMA graft to the LAD to be patent with a 50% stenosis in the apical LAD, but the vein graft to the right PDA had occluded.  The vein graft to the circumflex marginal branch had remained patent, and the left main coronary artery was widely patent.      In the interim, Mr. Tilley has done well.  Today he notes that he feels well.  He does not have any angina or shortness of breath.  Sometimes, he awakens and notes that he has retained some fluid, so he will take an extra 20 mg of furosemide and the symptoms immediately resolve.  That occurs about once monthly.  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  He is a retired watts, and his major problem is that he has developed some right hand arthritis.      He does continue to smoke tobacco despite discussions and attempts in the past to discontinue tobacco.  He has previously undergone a cardio-defibrillator implant.  He has undergone an atrial flutter ablation.  His most recent ICD check did not demonstrate any atrial arrhythmias, and there was normal function without ventricular arrhythmias.      PHYSICAL EXAMINATION:   GENERAL:  This is a slender man who appears fit and moves easily.   VITAL SIGNS:  Blood pressure was 114/72 mmHg, heart rate 79 beats per minute and regular, and respiratory rate 14-18 per minute.   CHEST:  Clear to auscultation, though there was a diffuse  and mild decrease in breath sounds bilaterally.   CARDIAC:  On cardiac auscultation, there was an S1 and an increased S2, without extra sounds or a murmur.  The rhythm was regular.      ASSESSMENT:  Mr. Tilley is doing well.  With regard to his left ventricular dysfunction, he has previously undergone defibrillator implantation.  He is essentially asymptomatic with New York Heart Association class I-II symptoms.  I am uncertain how much of his exertional limitation could actually be chronic obstructive pulmonary disease as a result of his continued tobacco use.  He is on bronchodilator therapy.  With regard to his coronary artery disease, he continues on an ARB agent, beta blockade, statin therapy and aspirin.  He uses aspirin for other purposes, and so he is on high-dose aspirin and is taking 1000 mg in the morning and 1000 mg in the evening.  The aspirin is being used for arthritis and is not prescribed at that dose for coronary artery disease.      With regard to his heart failure symptoms, he is on beta blockade and an ARB agent as the vasodilator therapy.  With spironolactone, he noted a precipitous decline in his blood pressure.  He tried it several times and his blood pressures were approximately 80-90 mmHg.  In the future, we may try spironolactone again, in which case Lasix/furosemide would be discontinued and the spironolactone at 12.5 mg daily would replace the Lasix.  I note that his last potassium about 3 months ago was 5 and he has been using a potassium-containing salt substitute.  I recommended he have a basic metabolic panel today.      He will return in approximately 6 months to follow up with the advanced practice provider.  He will see myself at 1 year.  I have recommended a basic metabolic panel and an echocardiogram in 6 months.      With regard to his defibrillator, he will continue to follow in the [facility].         NORMAN BARRERA MD, FACC             D: 02/22/2019   T: 02/22/2019   MT:  LJ      Name:     CARLIN QUEEN   MRN:      [id number]        Account:      [id number]   :      1950           Service Date: 2019      Document: [id number]       Thank you for allowing me to participate in the care of your patient.      Sincerely,     Mimi Michaels MD     [facility]    cc:   Yareli Mina, APRN CNP  [address]  [place] [id number]

## 2019-02-23 ENCOUNTER — HOSPITAL ENCOUNTER (EMERGENCY)
Facility: CLINIC | Age: 69
Discharge: HOME | End: 2019-02-23
Attending: PHYSICIAN ASSISTANT | Admitting: PHYSICIAN ASSISTANT
Payer: COMMERCIAL

## 2019-02-23 VITALS
OXYGEN SATURATION: 97 % | RESPIRATION RATE: 16 BRPM | DIASTOLIC BLOOD PRESSURE: 73 MMHG | SYSTOLIC BLOOD PRESSURE: 110 MMHG | TEMPERATURE: 98.6 F | HEART RATE: 87 BPM

## 2019-02-23 DIAGNOSIS — L03.019 PARONYCHIA OF FINGER: ICD-10-CM

## 2019-02-23 PROCEDURE — 99214 OFFICE O/P EST MOD 30 MIN: CPT | Mod: 25 | Performed by: PHYSICIAN ASSISTANT

## 2019-02-23 PROCEDURE — G0463 HOSPITAL OUTPT CLINIC VISIT: HCPCS | Mod: 25 | Performed by: PHYSICIAN ASSISTANT

## 2019-02-23 PROCEDURE — 10060 I&D ABSCESS SIMPLE/SINGLE: CPT | Performed by: PHYSICIAN ASSISTANT

## 2019-02-23 PROCEDURE — 10060 I&D ABSCESS SIMPLE/SINGLE: CPT | Mod: Z6 | Performed by: PHYSICIAN ASSISTANT

## 2019-02-23 RX ORDER — CEPHALEXIN 500 MG/1
500 CAPSULE ORAL 4 TIMES DAILY
Qty: 28 CAPSULE | Refills: 0 | Status: SHIPPED | OUTPATIENT
Start: 2019-02-23 | End: 2019-03-05

## 2019-02-23 NOTE — ED AVS SNAPSHOT
[facility] Emergency Department  [address] [address]  Phone:  [phone]  Fax:  [phone]                                    Delbert Tilley   MRN: [id number]    Department:  [facility] Emergency Department   Date of Visit:  2/23/2019           After Visit Summary Signature Page    I have received my discharge instructions, and my questions have been answered. I have discussed any challenges I see with this plan with the nurse or doctor.    ..........................................................................................................................................  Patient/Patient Representative Signature      ..........................................................................................................................................  Patient Representative Print Name and Relationship to Patient    ..................................................               ................................................  Date                                   Time    ..........................................................................................................................................  Reviewed by Signature/Title    ...................................................              ..............................................  Date                                               Time          22EPIC Rev 08/18

## 2019-02-23 NOTE — ED PROVIDER NOTES
History     Chief Complaint   Patient presents with     Hand Pain     HPI  Delbert Tilley is a 68 year old male who presents to the urgent care with concern over right index finger pain, swelling, erythema which is been present for approximately last month.  Patient denies any instigating injury or trauma however states concerned that there is a foreign body embedded in his finger.  He does not remember ever sustaining a sliver puncture wound.  He has not had any fever, chills, myalgias, cough, dyspnea, wheezing or distal numbness or paresthesias.  He denies any prior history of MRSA.  He has not attempted any OTC treatments directly for the lesion.     Allergies:  Allergies   Allergen Reactions     Hydrocodone      Upset stomach     Shellfish Allergy Hives and Rash     Problem List:    Patient Active Problem List    Diagnosis Date Noted     Left carotid stenosis 09/08/2017     Priority: Medium     Carpal tunnel syndrome of right wrist 01/10/2016     Priority: Medium     Carotid stenosis 06/12/2015     Priority: Medium     Bilateral carotid Disease S/P Right carotid endarterectomy.   [facility]       CHF (congestive heart failure) (H) 06/08/2015     Priority: Medium     NSTEMI (non-ST elevated myocardial infarction) (H) 10/21/2014     Priority: Medium     ACS (acute coronary syndrome) (H) 10/20/2014     Priority: Medium     Mixed hyperlipidemia 07/08/2013     Priority: Medium     [facility]  Diagnosis updated by automated process. Provider to review and confirm.       TYPE 2 DIABETES, HBA1C GOAL < 8% 10/31/2010     Priority: Medium     CARDIOVASCULAR SCREENING; LDL GOAL LESS THAN 100 10/31/2010     Priority: Medium     Coronary artery disease involving native coronary artery of native heart without angina pectoris 07/07/2010     Priority: Medium     S/p mi  Bypass x 4 --grafting to the LAD obtuse marginal and PDA.   Angio on 6/10 negative          Alcohol abuse 07/07/2010     Priority:  Medium     H/o rehab x 5        GERD (gastroesophageal reflux disease) 07/07/2010     Priority: Medium     Pancreatic disease 07/07/2010     Priority: Medium     H/o pancreatitis --on enzymes        Diabetes mellitus, type 2 (H) 07/07/2010     Priority: Medium     [facility]       Cardiomyopathy (H) 06/15/2010     Priority: Medium     EF 15%.--ICD placement        Atrial fibrillation/flutter 06/15/2010     Priority: Medium     S/p ablation 1June 2010.           Past Medical History:    Past Medical History:   Diagnosis Date     Acute renal failure (H) 8/26/06 Hosp     Arthritis      CAD (coronary artery disease)      Carpal tunnel syndrome      Diabetes (H)      Gastro-oesophageal reflux disease      H/O: alcohol abuse      HTN (hypertension)      Hyperlipidaemia      Hyperlipidaemia LDL goal <100 7/8/2013     Hypokalemia      Pacemaker      Pancreatitis 6/26/06 Hosp       Past Surgical History:    Past Surgical History:   Procedure Laterality Date     BYPASS CARDIOPULMONARY       CATARACT IOL, RT/LT      Cataract IOL RT/LT     ENDARTERECTOMY CAROTID Right 6/12/2015    Procedure: ENDARTERECTOMY CAROTID;  Surgeon: João Turner MD;  Location:  OR     ENDARTERECTOMY CAROTID Left 9/8/2017    Procedure: ENDARTERECTOMY CAROTID;  LEFT CAROTID ENDARTERECTOMY WITH EEG;  Surgeon: João Turner MD;  Location:  OR     IMPLANT PACEMAKER  6/10/2010     RELEASE CARPAL TUNNEL Right 4/12/2016    Procedure: RELEASE CARPAL TUNNEL;  Surgeon: Lissy Leger MD;  Location: [facility] OR     SURGICAL HISTORY OF -   1998    Laminectomy     SURGICAL HISTORY OF -   10/2003    Bypass grafting     TONSILLECTOMY & ADENOIDECTOMY         Family History:    Family History   Problem Relation Age of Onset     Unknown/Adopted No family hx of        Social History:  Marital Status:  Single [1]  Social History     Tobacco Use     Smoking status: Current Every Day Smoker     Packs/day: 2.00     Types: Cigarettes      Smokeless tobacco: Never Used     Tobacco comment: 1 1/2 PPD 2/16/18   Substance Use Topics     Alcohol use: No     Drug use: No        Medications:      cephALEXin (KEFLEX) 500 MG capsule   albuterol (PROAIR HFA) 108 (90 Base) MCG/ACT Inhaler   amylase-lipase-protease (CREON 12) 74278 UNITS CPEP   aspirin 325 MG tablet   atorvastatin (LIPITOR) 80 MG tablet   furosemide (LASIX) 20 MG tablet   insulin glargine (LANTUS) 100 UNIT/ML injection   Insulin Pen Needle (PEN NEEDLES) 32G X 4 MM MISC   loperamide (IMODIUM) 2 MG capsule   losartan (COZAAR) 50 MG tablet   metoprolol succinate (TOPROL-XL) 25 MG 24 hr tablet   Nitroglycerin (NITROSTAT SL)   pantoprazole (PROTONIX) 40 MG EC tablet     Review of Systems  CONSTITUTIONAL:NEGATIVE for fever, chills, change in weight  INTEGUMENTARY/SKIN: POSITIVE for erythema of right index finger  RESP:NEGATIVE for significant cough or SOB  MUSCULOSKELETAL: POSITIVE  for right index finger pain, swelling  NEURO: NEGATIVE for numbness, paresthesias   Physical Exam   BP: 110/73  Pulse: 87  Temp: 98.6  F (37  C)  Resp: 16  SpO2: 97 %  Physical Exam   Constitutional: He appears well-developed and well-nourished. No distress.   Cardiovascular:   Pulses:       Radial pulses are 2+ on the right side.   Musculoskeletal:        Right wrist: Normal.        Right hand: He exhibits tenderness and swelling. He exhibits normal range of motion, normal capillary refill, no deformity and no laceration. Normal sensation noted.        Hands:  Skin: Skin is warm and dry. There is erythema. No pallor.     ED Course        Incision + drainage  Date/Time: 2/24/2019 12:08 PM  Performed by: Georgia Fierro PA-C  Authorized by: Georgia Fierro PA-C     Consent:     Consent obtained:  Verbal    Consent given by:  Patient    Risks discussed:  Pain, infection and incomplete drainage  Location:     Indications for incision and drainage: paronychia.    Location:  Upper extremity    Upper extremity location:   Finger    Finger location:  R index finger  Pre-procedure details:     Procedure prep: alchol swab.  Anesthesia (see MAR for exact dosages):     Anesthesia method:  None  Procedure type:     Complexity:  Simple  Procedure details:     Incision type: 18 gauge needle.    Drainage:  Bloody    Drainage amount:  Scant    Packing materials:  None  Post-procedure details:     Patient tolerance of procedure:  Tolerated well, no immediate complications              Critical Care time:  none        No results found for this or any previous visit (from the past 24 hour(s)).    Medications - No data to display    Assessments & Plan (with Medical Decision Making)     I have reviewed the nursing notes.  I have reviewed the findings, diagnosis, plan and need for follow up with the patient.          Medication List      Started    cephALEXin 500 MG capsule  Commonly known as:  KEFLEX  500 mg, Oral, 4 TIMES DAILY          Final diagnoses:   Paronychia of finger     68-year-old male presents to the urgent care with concern over 1 month history of right index finger pain, swelling, erythema.  He had stable vital signs upon arrival.  Physical exam findings as described above appear most consistent with a paronychia,   Differential would also include cellulitis, tophi formation, chronic arthritis.  There is no evidence of felon at this time.  I did discuss her/benefits of attempting incision and drainage with 18-gauge needle and patient initially declined however later agreed to proceed.  He tolerated puncture of area with 18 gauge needle with minimal amount of bloody discharge, no purulent material obtained.  He was discharged home stable with prescription for Keflex, instructions to continue symptomatic treatment with warm compresses.  Follow-up with PCP or ortho if no improvement within the next 3-5 days.  Worrisome reasons to return to ER/UC sooner discussed.      Disclaimer: This note consists of symbols derived from keyboarding,  dictation, and/or voice recognition software. As a result, there may be errors in the script that have gone undetected.  Please consider this when interpreting information found in the chart.    2/23/2019   [facility] EMERGENCY DEPARTMENT     Georgia Fierro PA-C  02/24/19 [id number]

## 2019-03-05 ENCOUNTER — ANCILLARY PROCEDURE (OUTPATIENT)
Dept: GENERAL RADIOLOGY | Facility: CLINIC | Age: 69
End: 2019-03-05
Attending: FAMILY MEDICINE
Payer: COMMERCIAL

## 2019-03-05 ENCOUNTER — OFFICE VISIT (OUTPATIENT)
Dept: ORTHOPEDICS | Facility: CLINIC | Age: 69
End: 2019-03-05
Payer: COMMERCIAL

## 2019-03-05 VITALS
BODY MASS INDEX: 24.2 KG/M2 | WEIGHT: 169 LBS | SYSTOLIC BLOOD PRESSURE: 114 MMHG | DIASTOLIC BLOOD PRESSURE: 64 MMHG | HEIGHT: 70 IN

## 2019-03-05 DIAGNOSIS — M79.644 CHRONIC PAIN OF RIGHT THUMB: Primary | ICD-10-CM

## 2019-03-05 DIAGNOSIS — G89.29 CHRONIC PAIN OF RIGHT THUMB: ICD-10-CM

## 2019-03-05 DIAGNOSIS — M79.644 CHRONIC PAIN OF RIGHT THUMB: ICD-10-CM

## 2019-03-05 DIAGNOSIS — G89.29 CHRONIC PAIN OF RIGHT THUMB: Primary | ICD-10-CM

## 2019-03-05 DIAGNOSIS — M18.11 PRIMARY OSTEOARTHRITIS OF FIRST CARPOMETACARPAL JOINT OF RIGHT HAND: ICD-10-CM

## 2019-03-05 PROCEDURE — 99213 OFFICE O/P EST LOW 20 MIN: CPT | Mod: 25 | Performed by: FAMILY MEDICINE

## 2019-03-05 PROCEDURE — 20604 DRAIN/INJ JOINT/BURSA W/US: CPT | Mod: RT | Performed by: FAMILY MEDICINE

## 2019-03-05 PROCEDURE — 73140 X-RAY EXAM OF FINGER(S): CPT | Mod: RT

## 2019-03-05 RX ORDER — ROPIVACAINE HYDROCHLORIDE 5 MG/ML
0.5 INJECTION, SOLUTION EPIDURAL; INFILTRATION; PERINEURAL
Status: DISCONTINUED | OUTPATIENT
Start: 2019-03-05 | End: 2019-08-20 | Stop reason: ALTCHOICE

## 2019-03-05 RX ADMIN — ROPIVACAINE HYDROCHLORIDE 0.5 ML: 5 INJECTION, SOLUTION EPIDURAL; INFILTRATION; PERINEURAL at 09:35

## 2019-03-05 ASSESSMENT — MIFFLIN-ST. JEOR: SCORE: 1532.83

## 2019-03-05 NOTE — PROGRESS NOTES
Delbert Tilley  :  1950  DOS: 3/5/2019  MRN: [id number]    Sports Medicine Clinic Visit    PCP: [place], [facility]    Delbert Tilley is a 68 year old Right hand dominant male who is seen as a self referral presenting with chronic right thumb pain.    Injury: Gradual onset of chronic right thumb pain over the last ~ 5 years, pain worse over the last 6 months.  Pain located over right thumb CMC and MCP joints, nonradiating.  Additional Features:  Positive: swelling, grinding and weakness.  Symptoms are better with Rest.  Symptoms are worse with: gripping/grasping, thumb opposition.  Other evaluation and/or treatments so far consists of: Tylenol and Rest.  Recent imaging completed: No recent imaging completed.  Prior History of related problems: H/o s/p right CT release in  by Dr Leger @ [facility], noted some pain prior to surgery.  Steroid injection had been discussed with Dr Leger at that time.    Social History: retired    Review of Systems  Musculoskeletal: as above  Remainder of review of systems is negative including constitutional, CV, pulmonary, GI, Skin and Neurologic except as noted in HPI or medical history.    Past Medical History:   Diagnosis Date     Acute renal failure (H) 06 Hosp    secondary to dehydration     Arthritis      CAD (coronary artery disease)     LIMA to the LAD, vein graft to OM and a vein graft to the PDA     Carpal tunnel syndrome      Diabetes (H)      Gastro-oesophageal reflux disease      H/O: alcohol abuse      HTN (hypertension)      Hyperlipidaemia      Hyperlipidaemia LDL goal <100 2013     Hypokalemia      Pacemaker      Pancreatitis 06 Hosp     Past Surgical History:   Procedure Laterality Date     BYPASS CARDIOPULMONARY       CATARACT IOL, RT/LT      Cataract IOL RT/LT     ENDARTERECTOMY CAROTID Right 2015    Procedure: ENDARTERECTOMY CAROTID;  Surgeon: João Turner MD;  Location:  OR     ENDARTERECTOMY CAROTID Left 2017     "Procedure: ENDARTERECTOMY CAROTID;  LEFT CAROTID ENDARTERECTOMY WITH EEG;  Surgeon: João Turner MD;  Location: [facility] OR     IMPLANT PACEMAKER  6/10/2010     RELEASE CARPAL TUNNEL Right 4/12/2016    Procedure: RELEASE CARPAL TUNNEL;  Surgeon: Lissy Leger MD;  Location: [facility] OR     SURGICAL HISTORY OF -   1998    Laminectomy     SURGICAL HISTORY OF -   10/2003    Bypass grafting     TONSILLECTOMY & ADENOIDECTOMY         Objective  /64   Ht 1.778 m (5' 10\")   Wt 76.7 kg (169 lb)   BMI 24.25 kg/m      General: healthy, alert and in no distress    HEENT: no scleral icterus or conjunctival erythema   Skin: no suspicious lesions or rash. No jaundice.   CV: regular rhythm by palpation, 2+ distal pulses, no pedal edema    Resp: normal respiratory effort without conversational dyspnea   Psych: normal mood and affect    Gait: nonantalgic, appropriate coordination and balance   Neuro: normal light touch sensory exam of the extremities. Motor strength as noted below     Right Wrist and Hand exam    Inspection:       No swelling, bruising or deformity bilateral and prominence of the right 1st CMC joint likely due to osteophytes +/- subluxation    Tender:       CMC and MCP joint of 1st digit(s)  right    Non Tender:       Remainder of the Wrist and Hand bilateral    ROM:       Decreased active and passive ROM of the 1st CMC with flexion, extension and opposition right    Strength:       5/5 strength in the muscles of the hand, wrist and forearm right    Special Tests:        neg (-) Tinel's test bilateral,       neg (-) Phalen's test bilateral,       neg (-) TFCC compression test bilateral and       neg (-) Finkelstein's maneuver bilateral    Neurovascular:       2+ radial pulses bilaterally with brisk capillary refill and      normal sensation to light touch in the radial, median and ulnar nerve distributions      Radiology:  Recent Results (from the past 744 hour(s))   XR Finger Right G/E 2 Views    " Narrative    FINGER RIGHT TWO OR MORE VIEWS   3/5/2019 9:13 AM     HISTORY: Evaluate for CMC and MCP joint osteoarthritis. Chronic pain  of right thumb.     COMPARISON: None.      Impression    IMPRESSION: Three views of the thumb. Advanced first carpometacarpal  degenerative changes. MCP joint appears fairly well-preserved. No  evidence of acute fracture.         Hand / Upper Extremity Injection/Arthrocentesis: R thumb CMC  Date/Time: 3/5/2019 9:35 AM  Performed by: Leonardo Le DO  Authorized by: Leonardo Le DO     Indications:  Pain  Needle Size:  25 G  Guidance: ultrasound    Approach:  Radial  Condition: osteoarthritis    Location:  Thumb  Site:  R thumb CMC  Medications:  32 mg triamcinolone acetonide 32 MG; 0.5 mL ropivacaine 5 MG/ML  Outcome:  Tolerated well, no immediate complications  Procedure discussed: discussed risks, benefits, and alternatives    Consent Given by:  Patient  Prep: patient was prepped and draped in usual sterile fashion          Assessment:  1. Chronic pain of right thumb    2. Primary osteoarthritis of first carpometacarpal joint of right hand        Plan:  Discussed the assessment with the patient.  Follow up: prn  Suspected CMC OA, confirmed with XR today, no other acute findings,   XR images independently visualized and reviewed with patient today in clinic  US guided 1st CMC injection today  Provided thumb spica brace and reviewed use strategies  Expectations and goals of CSI reviewed  Often 2-3 days for steroid effect, and can take up to two weeks for maximum effect  We discussed modified progressive pain-free activity as tolerated  Do not overuse in first two weeks if feeling better due to concern for vulnerability while steroid is working  Supportive care reviewed  All questions were answered today  Contact us with additional questions or concerns  Signs and sx of concern reviewed      Leonardo Le DO, CABRITTNI  Primary Care Sports Medicine  [facility]               Disclaimer: This note consists of symbols derived from keyboarding, dictation and/or voice recognition software. As a result, there may be errors in the script that have gone undetected. Please consider this when interpreting information found in this chart.

## 2019-03-05 NOTE — LETTER
3/5/2019         RE: Delbert Tilley  [address]  [place] [id number]        Dear Colleague,    Thank you for referring your patient, Delbert Tilley, to the [facility]. Please see a copy of my visit note below.    Delbert Tilley  :  1950  DOS: 3/5/2019  MRN: [id number]    Sports Medicine Clinic Visit    PCP: [facility]    Delbert Tilley is a 68 year old Right hand dominant male who is seen as a self referral presenting with chronic right thumb pain.    Injury: Gradual onset of chronic right thumb pain over the last ~ 5 years, pain worse over the last 6 months.  Pain located over right thumb CMC and MCP joints, nonradiating.  Additional Features:  Positive: swelling, grinding and weakness.  Symptoms are better with Rest.  Symptoms are worse with: gripping/grasping, thumb opposition.  Other evaluation and/or treatments so far consists of: Tylenol and Rest.  Recent imaging completed: No recent imaging completed.  Prior History of related problems: H/o s/p right CT release in  by Dr Leger @ [facility], noted some pain prior to surgery.  Steroid injection had been discussed with Dr Leger at that time.    Social History: retired    Review of Systems  Musculoskeletal: as above  Remainder of review of systems is negative including constitutional, CV, pulmonary, GI, Skin and Neurologic except as noted in HPI or medical history.    Past Medical History:   Diagnosis Date     Acute renal failure (H) 06 Hosp    secondary to dehydration     Arthritis      CAD (coronary artery disease)     LIMA to the LAD, vein graft to OM and a vein graft to the PDA     Carpal tunnel syndrome      Diabetes (H)      Gastro-oesophageal reflux disease      H/O: alcohol abuse      HTN (hypertension)      Hyperlipidaemia      Hyperlipidaemia LDL goal <100 2013     Hypokalemia      Pacemaker      Pancreatitis 06 Hosp     Past Surgical History:   Procedure Laterality  "Date     BYPASS CARDIOPULMONARY       CATARACT IOL, RT/LT      Cataract IOL RT/LT     ENDARTERECTOMY CAROTID Right 6/12/2015    Procedure: ENDARTERECTOMY CAROTID;  Surgeon: João Turner MD;  Location: [facility] OR     ENDARTERECTOMY CAROTID Left 9/8/2017    Procedure: ENDARTERECTOMY CAROTID;  LEFT CAROTID ENDARTERECTOMY WITH EEG;  Surgeon: João Turner MD;  Location: [facility] OR     IMPLANT PACEMAKER  6/10/2010     RELEASE CARPAL TUNNEL Right 4/12/2016    Procedure: RELEASE CARPAL TUNNEL;  Surgeon: Lissy Leger MD;  Location: [facility] OR     SURGICAL HISTORY OF -   1998    Laminectomy     SURGICAL HISTORY OF -   10/2003    Bypass grafting     TONSILLECTOMY & ADENOIDECTOMY         Objective  /64   Ht 1.778 m (5' 10\")   Wt 76.7 kg (169 lb)   BMI 24.25 kg/m       General: healthy, alert and in no distress    HEENT: no scleral icterus or conjunctival erythema   Skin: no suspicious lesions or rash. No jaundice.   CV: regular rhythm by palpation, 2+ distal pulses, no pedal edema    Resp: normal respiratory effort without conversational dyspnea   Psych: normal mood and affect    Gait: nonantalgic, appropriate coordination and balance   Neuro: normal light touch sensory exam of the extremities. Motor strength as noted below     Right Wrist and Hand exam    Inspection:       No swelling, bruising or deformity bilateral and prominence of the right 1st CMC joint likely due to osteophytes +/- subluxation    Tender:       CMC and MCP joint of 1st digit(s)  right    Non Tender:       Remainder of the Wrist and Hand bilateral    ROM:       Decreased active and passive ROM of the 1st CMC with flexion, extension and opposition right    Strength:       5/5 strength in the muscles of the hand, wrist and forearm right    Special Tests:        neg (-) Tinel's test bilateral,       neg (-) Phalen's test bilateral,       neg (-) TFCC compression test bilateral and       neg (-) Finkelstein's maneuver " bilateral    Neurovascular:       2+ radial pulses bilaterally with brisk capillary refill and      normal sensation to light touch in the radial, median and ulnar nerve distributions      Radiology:  Recent Results (from the past 744 hour(s))   XR Finger Right G/E 2 Views    Narrative    FINGER RIGHT TWO OR MORE VIEWS   3/5/2019 9:13 AM     HISTORY: Evaluate for CMC and MCP joint osteoarthritis. Chronic pain  of right thumb.     COMPARISON: None.      Impression    IMPRESSION: Three views of the thumb. Advanced first carpometacarpal  degenerative changes. MCP joint appears fairly well-preserved. No  evidence of acute fracture.         Hand / Upper Extremity Injection/Arthrocentesis: R thumb CMC  Date/Time: 3/5/2019 9:35 AM  Performed by: Leonardo Le DO  Authorized by: Leonardo Le DO     Indications:  Pain  Needle Size:  25 G  Guidance: ultrasound    Approach:  Radial  Condition: osteoarthritis    Location:  Thumb  Site:  R thumb CMC  Medications:  32 mg triamcinolone acetonide 32 MG; 0.5 mL ropivacaine 5 MG/ML  Outcome:  Tolerated well, no immediate complications  Procedure discussed: discussed risks, benefits, and alternatives    Consent Given by:  Patient  Prep: patient was prepped and draped in usual sterile fashion          Assessment:  1. Chronic pain of right thumb    2. Primary osteoarthritis of first carpometacarpal joint of right hand        Plan:  Discussed the assessment with the patient.  Follow up: prn  Suspected CMC OA, confirmed with XR today, no other acute findings,   XR images independently visualized and reviewed with patient today in clinic  US guided 1st CMC injection today  Provided thumb spica brace and reviewed use strategies  Expectations and goals of CSI reviewed  Often 2-3 days for steroid effect, and can take up to two weeks for maximum effect  We discussed modified progressive pain-free activity as tolerated  Do not overuse in first two weeks if feeling better  due to concern for vulnerability while steroid is working  Supportive care reviewed  All questions were answered today  Contact us with additional questions or concerns  Signs and sx of concern reviewed      Leonardo Le DO, MIGUEL  Primary Care Sports Medicine  [facility]               Disclaimer: This note consists of symbols derived from keyboarding, dictation and/or voice recognition software. As a result, there may be errors in the script that have gone undetected. Please consider this when interpreting information found in this chart.    Again, thank you for allowing me to participate in the care of your patient.        Sincerely,        Leonardo Le DO

## 2019-05-28 ENCOUNTER — OFFICE VISIT (OUTPATIENT)
Dept: VASCULAR SURGERY | Facility: CLINIC | Age: 69
End: 2019-05-28
Attending: SURGERY
Payer: COMMERCIAL

## 2019-05-28 ENCOUNTER — HOSPITAL ENCOUNTER (OUTPATIENT)
Dept: ULTRASOUND IMAGING | Facility: CLINIC | Age: 69
Discharge: HOME | End: 2019-05-28
Attending: SURGERY | Admitting: SURGERY
Payer: COMMERCIAL

## 2019-05-28 VITALS — RESPIRATION RATE: 16 BRPM | SYSTOLIC BLOOD PRESSURE: 104 MMHG | DIASTOLIC BLOOD PRESSURE: 67 MMHG | HEART RATE: 94 BPM

## 2019-05-28 DIAGNOSIS — I65.23 BILATERAL CAROTID ARTERY STENOSIS: Primary | ICD-10-CM

## 2019-05-28 DIAGNOSIS — Z72.0 TOBACCO USE: ICD-10-CM

## 2019-05-28 DIAGNOSIS — I65.29 CAROTID STENOSIS: ICD-10-CM

## 2019-05-28 PROCEDURE — 99213 OFFICE O/P EST LOW 20 MIN: CPT | Performed by: SURGERY

## 2019-05-28 PROCEDURE — 93880 EXTRACRANIAL BILAT STUDY: CPT

## 2019-05-28 NOTE — PROGRESS NOTES
Vascular Surgery Clinic note    Delbert is here today for follow up regarding his carotid disease and he is s/p right CEA about 10 years ago and left CEA within the last 2-3 years.  He has done well post-op with no symptoms of TIA or stroke.  We did discuss these symptoms in detail today in clinic.  I also reviewed his ultrasound imaging today and it appears the right ICA is unchanged with < 50% stenosis and on the left 50-69% stenosis which is unchanged relative to his last duplex 1 year ago.  He continues to smoke and I've counseled him on quitting tobacco use today and he is in agreement that he needs to stop smoking.  He has Chantix available and I've also recommended consideration of Nicotine replacement patches to help with cravings.  Overall, he is happy with his progress and no acute changes in his health at the current time.  We will see him back in clinic here in [place] in 1 year with a repeat carotid ultrasound to monitor for disease recurrence.  I spent 15 minutes of face-to-face time, > 50% spent counseling and coordinating care.     Charly Beard MD  Vascular Surgery

## 2019-05-28 NOTE — NURSING NOTE
"Initial /67 (BP Location: Right arm, Patient Position: Chair, Cuff Size: Adult Regular)   Pulse 94   Resp 16  Estimated body mass index is 24.25 kg/m  as calculated from the following:    Height as of 3/5/19: 1.778 m (5' 10\").    Weight as of 3/5/19: 76.7 kg (169 lb). .    Patient is here for a recheck.  inessa du LPN    "

## 2019-05-30 ENCOUNTER — ANCILLARY PROCEDURE (OUTPATIENT)
Dept: CARDIOLOGY | Facility: CLINIC | Age: 69
End: 2019-05-30
Attending: INTERNAL MEDICINE
Payer: COMMERCIAL

## 2019-05-30 DIAGNOSIS — Z95.810 ICD (IMPLANTABLE CARDIOVERTER-DEFIBRILLATOR) IN PLACE: ICD-10-CM

## 2019-05-30 PROCEDURE — 93296 REM INTERROG EVL PM/IDS: CPT | Performed by: INTERNAL MEDICINE

## 2019-05-30 PROCEDURE — 93295 DEV INTERROG REMOTE 1/2/MLT: CPT | Performed by: INTERNAL MEDICINE

## 2019-06-04 LAB
MDC_IDC_LEAD_IMPLANT_DT: NORMAL
MDC_IDC_LEAD_IMPLANT_DT: NORMAL
MDC_IDC_LEAD_LOCATION: NORMAL
MDC_IDC_LEAD_LOCATION: NORMAL
MDC_IDC_LEAD_LOCATION_DETAIL_1: NORMAL
MDC_IDC_LEAD_LOCATION_DETAIL_1: NORMAL
MDC_IDC_LEAD_MFG: NORMAL
MDC_IDC_LEAD_MFG: NORMAL
MDC_IDC_LEAD_MODEL: NORMAL
MDC_IDC_LEAD_MODEL: NORMAL
MDC_IDC_LEAD_POLARITY_TYPE: NORMAL
MDC_IDC_LEAD_POLARITY_TYPE: NORMAL
MDC_IDC_LEAD_SERIAL: NORMAL
MDC_IDC_LEAD_SERIAL: NORMAL
MDC_IDC_MSMT_BATTERY_DTM: NORMAL
MDC_IDC_MSMT_BATTERY_REMAINING_LONGEVITY: 111 MO
MDC_IDC_MSMT_BATTERY_RRT_TRIGGER: 2.73
MDC_IDC_MSMT_BATTERY_STATUS: NORMAL
MDC_IDC_MSMT_BATTERY_VOLTAGE: 3.01 V
MDC_IDC_MSMT_CAP_CHARGE_DTM: NORMAL
MDC_IDC_MSMT_CAP_CHARGE_ENERGY: 18 J
MDC_IDC_MSMT_CAP_CHARGE_TIME: 3.97
MDC_IDC_MSMT_CAP_CHARGE_TYPE: NORMAL
MDC_IDC_MSMT_LEADCHNL_RA_IMPEDANCE_VALUE: 456 OHM
MDC_IDC_MSMT_LEADCHNL_RA_PACING_THRESHOLD_AMPLITUDE: 0.88 V
MDC_IDC_MSMT_LEADCHNL_RA_PACING_THRESHOLD_PULSEWIDTH: 0.4 MS
MDC_IDC_MSMT_LEADCHNL_RA_SENSING_INTR_AMPL: 3.38 MV
MDC_IDC_MSMT_LEADCHNL_RA_SENSING_INTR_AMPL: 3.38 MV
MDC_IDC_MSMT_LEADCHNL_RV_IMPEDANCE_VALUE: 285 OHM
MDC_IDC_MSMT_LEADCHNL_RV_IMPEDANCE_VALUE: 380 OHM
MDC_IDC_MSMT_LEADCHNL_RV_PACING_THRESHOLD_AMPLITUDE: 1.38 V
MDC_IDC_MSMT_LEADCHNL_RV_PACING_THRESHOLD_PULSEWIDTH: 0.4 MS
MDC_IDC_MSMT_LEADCHNL_RV_SENSING_INTR_AMPL: 3.5 MV
MDC_IDC_MSMT_LEADCHNL_RV_SENSING_INTR_AMPL: 3.5 MV
MDC_IDC_PG_IMPLANT_DTM: NORMAL
MDC_IDC_PG_MFG: NORMAL
MDC_IDC_PG_MODEL: NORMAL
MDC_IDC_PG_SERIAL: NORMAL
MDC_IDC_PG_TYPE: NORMAL
MDC_IDC_SESS_CLINIC_NAME: NORMAL
MDC_IDC_SESS_DTM: NORMAL
MDC_IDC_SESS_TYPE: NORMAL
MDC_IDC_SET_BRADY_AT_MODE_SWITCH_RATE: 171 {BEATS}/MIN
MDC_IDC_SET_BRADY_HYSTRATE: NORMAL
MDC_IDC_SET_BRADY_LOWRATE: 55 {BEATS}/MIN
MDC_IDC_SET_BRADY_MAX_SENSOR_RATE: 140 {BEATS}/MIN
MDC_IDC_SET_BRADY_MAX_TRACKING_RATE: 140 {BEATS}/MIN
MDC_IDC_SET_BRADY_MODE: NORMAL
MDC_IDC_SET_BRADY_PAV_DELAY_LOW: 180 MS
MDC_IDC_SET_BRADY_SAV_DELAY_LOW: 150 MS
MDC_IDC_SET_LEADCHNL_RA_PACING_AMPLITUDE: 1.75 V
MDC_IDC_SET_LEADCHNL_RA_PACING_ANODE_ELECTRODE_1: NORMAL
MDC_IDC_SET_LEADCHNL_RA_PACING_ANODE_LOCATION_1: NORMAL
MDC_IDC_SET_LEADCHNL_RA_PACING_CAPTURE_MODE: NORMAL
MDC_IDC_SET_LEADCHNL_RA_PACING_CATHODE_ELECTRODE_1: NORMAL
MDC_IDC_SET_LEADCHNL_RA_PACING_CATHODE_LOCATION_1: NORMAL
MDC_IDC_SET_LEADCHNL_RA_PACING_POLARITY: NORMAL
MDC_IDC_SET_LEADCHNL_RA_PACING_PULSEWIDTH: 0.4 MS
MDC_IDC_SET_LEADCHNL_RA_SENSING_ANODE_ELECTRODE_1: NORMAL
MDC_IDC_SET_LEADCHNL_RA_SENSING_ANODE_LOCATION_1: NORMAL
MDC_IDC_SET_LEADCHNL_RA_SENSING_CATHODE_ELECTRODE_1: NORMAL
MDC_IDC_SET_LEADCHNL_RA_SENSING_CATHODE_LOCATION_1: NORMAL
MDC_IDC_SET_LEADCHNL_RA_SENSING_POLARITY: NORMAL
MDC_IDC_SET_LEADCHNL_RA_SENSING_SENSITIVITY: 0.3 MV
MDC_IDC_SET_LEADCHNL_RV_PACING_AMPLITUDE: 2.5 V
MDC_IDC_SET_LEADCHNL_RV_PACING_ANODE_ELECTRODE_1: NORMAL
MDC_IDC_SET_LEADCHNL_RV_PACING_ANODE_LOCATION_1: NORMAL
MDC_IDC_SET_LEADCHNL_RV_PACING_CAPTURE_MODE: NORMAL
MDC_IDC_SET_LEADCHNL_RV_PACING_CATHODE_ELECTRODE_1: NORMAL
MDC_IDC_SET_LEADCHNL_RV_PACING_CATHODE_LOCATION_1: NORMAL
MDC_IDC_SET_LEADCHNL_RV_PACING_POLARITY: NORMAL
MDC_IDC_SET_LEADCHNL_RV_PACING_PULSEWIDTH: 0.4 MS
MDC_IDC_SET_LEADCHNL_RV_SENSING_ANODE_ELECTRODE_1: NORMAL
MDC_IDC_SET_LEADCHNL_RV_SENSING_ANODE_LOCATION_1: NORMAL
MDC_IDC_SET_LEADCHNL_RV_SENSING_CATHODE_ELECTRODE_1: NORMAL
MDC_IDC_SET_LEADCHNL_RV_SENSING_CATHODE_LOCATION_1: NORMAL
MDC_IDC_SET_LEADCHNL_RV_SENSING_POLARITY: NORMAL
MDC_IDC_SET_LEADCHNL_RV_SENSING_SENSITIVITY: 0.3 MV
MDC_IDC_SET_ZONE_DETECTION_BEATS_DENOMINATOR: 40 {BEATS}
MDC_IDC_SET_ZONE_DETECTION_BEATS_NUMERATOR: 30 {BEATS}
MDC_IDC_SET_ZONE_DETECTION_INTERVAL: 320 MS
MDC_IDC_SET_ZONE_DETECTION_INTERVAL: 350 MS
MDC_IDC_SET_ZONE_DETECTION_INTERVAL: 360 MS
MDC_IDC_SET_ZONE_DETECTION_INTERVAL: 400 MS
MDC_IDC_SET_ZONE_DETECTION_INTERVAL: NORMAL
MDC_IDC_SET_ZONE_TYPE: NORMAL
MDC_IDC_STAT_AT_BURDEN_PERCENT: 0 %
MDC_IDC_STAT_AT_DTM_END: NORMAL
MDC_IDC_STAT_AT_DTM_START: NORMAL
MDC_IDC_STAT_BRADY_AP_VP_PERCENT: 0.14 %
MDC_IDC_STAT_BRADY_AP_VS_PERCENT: 30.27 %
MDC_IDC_STAT_BRADY_AS_VP_PERCENT: 0.03 %
MDC_IDC_STAT_BRADY_AS_VS_PERCENT: 69.56 %
MDC_IDC_STAT_BRADY_DTM_END: NORMAL
MDC_IDC_STAT_BRADY_DTM_START: NORMAL
MDC_IDC_STAT_BRADY_RA_PERCENT_PACED: 30.4 %
MDC_IDC_STAT_BRADY_RV_PERCENT_PACED: 0.17 %
MDC_IDC_STAT_EPISODE_RECENT_COUNT: 0
MDC_IDC_STAT_EPISODE_RECENT_COUNT_DTM_END: NORMAL
MDC_IDC_STAT_EPISODE_RECENT_COUNT_DTM_START: NORMAL
MDC_IDC_STAT_EPISODE_TOTAL_COUNT: 0
MDC_IDC_STAT_EPISODE_TOTAL_COUNT: 1
MDC_IDC_STAT_EPISODE_TOTAL_COUNT_DTM_END: NORMAL
MDC_IDC_STAT_EPISODE_TOTAL_COUNT_DTM_START: NORMAL
MDC_IDC_STAT_EPISODE_TYPE: NORMAL
MDC_IDC_STAT_TACHYTHERAPY_ATP_DELIVERED_RECENT: 0
MDC_IDC_STAT_TACHYTHERAPY_ATP_DELIVERED_TOTAL: 0
MDC_IDC_STAT_TACHYTHERAPY_RECENT_DTM_END: NORMAL
MDC_IDC_STAT_TACHYTHERAPY_RECENT_DTM_START: NORMAL
MDC_IDC_STAT_TACHYTHERAPY_SHOCKS_ABORTED_RECENT: 0
MDC_IDC_STAT_TACHYTHERAPY_SHOCKS_ABORTED_TOTAL: 0
MDC_IDC_STAT_TACHYTHERAPY_SHOCKS_DELIVERED_RECENT: 0
MDC_IDC_STAT_TACHYTHERAPY_SHOCKS_DELIVERED_TOTAL: 0
MDC_IDC_STAT_TACHYTHERAPY_TOTAL_DTM_END: NORMAL
MDC_IDC_STAT_TACHYTHERAPY_TOTAL_DTM_START: NORMAL

## 2019-07-10 LAB
MDC_IDC_LEAD_IMPLANT_DT: NORMAL
MDC_IDC_LEAD_IMPLANT_DT: NORMAL
MDC_IDC_LEAD_LOCATION: NORMAL
MDC_IDC_LEAD_LOCATION: NORMAL
MDC_IDC_LEAD_LOCATION_DETAIL_1: NORMAL
MDC_IDC_LEAD_LOCATION_DETAIL_1: NORMAL
MDC_IDC_LEAD_MFG: NORMAL
MDC_IDC_LEAD_MFG: NORMAL
MDC_IDC_LEAD_MODEL: NORMAL
MDC_IDC_LEAD_MODEL: NORMAL
MDC_IDC_LEAD_POLARITY_TYPE: NORMAL
MDC_IDC_LEAD_POLARITY_TYPE: NORMAL
MDC_IDC_LEAD_SERIAL: NORMAL
MDC_IDC_LEAD_SERIAL: NORMAL
MDC_IDC_MSMT_BATTERY_DTM: NORMAL
MDC_IDC_MSMT_BATTERY_REMAINING_LONGEVITY: 115 MO
MDC_IDC_MSMT_BATTERY_RRT_TRIGGER: 2.73
MDC_IDC_MSMT_BATTERY_STATUS: NORMAL
MDC_IDC_MSMT_BATTERY_VOLTAGE: 3.01 V
MDC_IDC_MSMT_CAP_CHARGE_DTM: NORMAL
MDC_IDC_MSMT_CAP_CHARGE_ENERGY: 18 J
MDC_IDC_MSMT_CAP_CHARGE_TIME: 4
MDC_IDC_MSMT_CAP_CHARGE_TYPE: NORMAL
MDC_IDC_MSMT_LEADCHNL_RA_IMPEDANCE_VALUE: 456 OHM
MDC_IDC_MSMT_LEADCHNL_RA_PACING_THRESHOLD_AMPLITUDE: 0.75 V
MDC_IDC_MSMT_LEADCHNL_RA_PACING_THRESHOLD_PULSEWIDTH: 0.4 MS
MDC_IDC_MSMT_LEADCHNL_RA_SENSING_INTR_AMPL: 3.25 MV
MDC_IDC_MSMT_LEADCHNL_RA_SENSING_INTR_AMPL: 3.88 MV
MDC_IDC_MSMT_LEADCHNL_RV_IMPEDANCE_VALUE: 285 OHM
MDC_IDC_MSMT_LEADCHNL_RV_IMPEDANCE_VALUE: 399 OHM
MDC_IDC_MSMT_LEADCHNL_RV_PACING_THRESHOLD_AMPLITUDE: 1.25 V
MDC_IDC_MSMT_LEADCHNL_RV_PACING_THRESHOLD_PULSEWIDTH: 0.4 MS
MDC_IDC_MSMT_LEADCHNL_RV_SENSING_INTR_AMPL: 3.62 MV
MDC_IDC_MSMT_LEADCHNL_RV_SENSING_INTR_AMPL: 3.88 MV
MDC_IDC_PG_IMPLANT_DTM: NORMAL
MDC_IDC_PG_MFG: NORMAL
MDC_IDC_PG_MODEL: NORMAL
MDC_IDC_PG_SERIAL: NORMAL
MDC_IDC_PG_TYPE: NORMAL
MDC_IDC_SESS_CLINIC_NAME: NORMAL
MDC_IDC_SESS_DTM: NORMAL
MDC_IDC_SESS_TYPE: NORMAL
MDC_IDC_SET_BRADY_AT_MODE_SWITCH_RATE: 171 {BEATS}/MIN
MDC_IDC_SET_BRADY_HYSTRATE: NORMAL
MDC_IDC_SET_BRADY_LOWRATE: 55 {BEATS}/MIN
MDC_IDC_SET_BRADY_MAX_SENSOR_RATE: 140 {BEATS}/MIN
MDC_IDC_SET_BRADY_MAX_TRACKING_RATE: 140 {BEATS}/MIN
MDC_IDC_SET_BRADY_MODE: NORMAL
MDC_IDC_SET_BRADY_PAV_DELAY_LOW: 180 MS
MDC_IDC_SET_BRADY_SAV_DELAY_LOW: 150 MS
MDC_IDC_SET_LEADCHNL_RA_PACING_AMPLITUDE: 1.5 V
MDC_IDC_SET_LEADCHNL_RA_PACING_ANODE_ELECTRODE_1: NORMAL
MDC_IDC_SET_LEADCHNL_RA_PACING_ANODE_LOCATION_1: NORMAL
MDC_IDC_SET_LEADCHNL_RA_PACING_CAPTURE_MODE: NORMAL
MDC_IDC_SET_LEADCHNL_RA_PACING_CATHODE_ELECTRODE_1: NORMAL
MDC_IDC_SET_LEADCHNL_RA_PACING_CATHODE_LOCATION_1: NORMAL
MDC_IDC_SET_LEADCHNL_RA_PACING_POLARITY: NORMAL
MDC_IDC_SET_LEADCHNL_RA_PACING_PULSEWIDTH: 0.4 MS
MDC_IDC_SET_LEADCHNL_RA_SENSING_ANODE_ELECTRODE_1: NORMAL
MDC_IDC_SET_LEADCHNL_RA_SENSING_ANODE_LOCATION_1: NORMAL
MDC_IDC_SET_LEADCHNL_RA_SENSING_CATHODE_ELECTRODE_1: NORMAL
MDC_IDC_SET_LEADCHNL_RA_SENSING_CATHODE_LOCATION_1: NORMAL
MDC_IDC_SET_LEADCHNL_RA_SENSING_POLARITY: NORMAL
MDC_IDC_SET_LEADCHNL_RA_SENSING_SENSITIVITY: 0.3 MV
MDC_IDC_SET_LEADCHNL_RV_PACING_AMPLITUDE: 2.5 V
MDC_IDC_SET_LEADCHNL_RV_PACING_ANODE_ELECTRODE_1: NORMAL
MDC_IDC_SET_LEADCHNL_RV_PACING_ANODE_LOCATION_1: NORMAL
MDC_IDC_SET_LEADCHNL_RV_PACING_CAPTURE_MODE: NORMAL
MDC_IDC_SET_LEADCHNL_RV_PACING_CATHODE_ELECTRODE_1: NORMAL
MDC_IDC_SET_LEADCHNL_RV_PACING_CATHODE_LOCATION_1: NORMAL
MDC_IDC_SET_LEADCHNL_RV_PACING_POLARITY: NORMAL
MDC_IDC_SET_LEADCHNL_RV_PACING_PULSEWIDTH: 0.4 MS
MDC_IDC_SET_LEADCHNL_RV_SENSING_ANODE_ELECTRODE_1: NORMAL
MDC_IDC_SET_LEADCHNL_RV_SENSING_ANODE_LOCATION_1: NORMAL
MDC_IDC_SET_LEADCHNL_RV_SENSING_CATHODE_ELECTRODE_1: NORMAL
MDC_IDC_SET_LEADCHNL_RV_SENSING_CATHODE_LOCATION_1: NORMAL
MDC_IDC_SET_LEADCHNL_RV_SENSING_POLARITY: NORMAL
MDC_IDC_SET_LEADCHNL_RV_SENSING_SENSITIVITY: 0.3 MV
MDC_IDC_SET_ZONE_DETECTION_BEATS_DENOMINATOR: 40 {BEATS}
MDC_IDC_SET_ZONE_DETECTION_BEATS_NUMERATOR: 30 {BEATS}
MDC_IDC_SET_ZONE_DETECTION_INTERVAL: 320 MS
MDC_IDC_SET_ZONE_DETECTION_INTERVAL: 350 MS
MDC_IDC_SET_ZONE_DETECTION_INTERVAL: 360 MS
MDC_IDC_SET_ZONE_DETECTION_INTERVAL: 400 MS
MDC_IDC_SET_ZONE_DETECTION_INTERVAL: NORMAL
MDC_IDC_SET_ZONE_TYPE: NORMAL
MDC_IDC_STAT_AT_BURDEN_PERCENT: 0 %
MDC_IDC_STAT_AT_DTM_END: NORMAL
MDC_IDC_STAT_AT_DTM_START: NORMAL
MDC_IDC_STAT_BRADY_AP_VP_PERCENT: 0.16 %
MDC_IDC_STAT_BRADY_AP_VS_PERCENT: 27.57 %
MDC_IDC_STAT_BRADY_AS_VP_PERCENT: 0.04 %
MDC_IDC_STAT_BRADY_AS_VS_PERCENT: 72.23 %
MDC_IDC_STAT_BRADY_DTM_END: NORMAL
MDC_IDC_STAT_BRADY_DTM_START: NORMAL
MDC_IDC_STAT_BRADY_RA_PERCENT_PACED: 27.71 %
MDC_IDC_STAT_BRADY_RV_PERCENT_PACED: 0.21 %
MDC_IDC_STAT_EPISODE_RECENT_COUNT: 0
MDC_IDC_STAT_EPISODE_RECENT_COUNT: 1
MDC_IDC_STAT_EPISODE_RECENT_COUNT_DTM_END: NORMAL
MDC_IDC_STAT_EPISODE_RECENT_COUNT_DTM_START: NORMAL
MDC_IDC_STAT_EPISODE_TOTAL_COUNT: 0
MDC_IDC_STAT_EPISODE_TOTAL_COUNT: 1
MDC_IDC_STAT_EPISODE_TOTAL_COUNT_DTM_END: NORMAL
MDC_IDC_STAT_EPISODE_TOTAL_COUNT_DTM_START: NORMAL
MDC_IDC_STAT_EPISODE_TYPE: NORMAL
MDC_IDC_STAT_TACHYTHERAPY_ATP_DELIVERED_RECENT: 0
MDC_IDC_STAT_TACHYTHERAPY_ATP_DELIVERED_TOTAL: 0
MDC_IDC_STAT_TACHYTHERAPY_RECENT_DTM_END: NORMAL
MDC_IDC_STAT_TACHYTHERAPY_RECENT_DTM_START: NORMAL
MDC_IDC_STAT_TACHYTHERAPY_SHOCKS_ABORTED_RECENT: 0
MDC_IDC_STAT_TACHYTHERAPY_SHOCKS_ABORTED_TOTAL: 0
MDC_IDC_STAT_TACHYTHERAPY_SHOCKS_DELIVERED_RECENT: 0
MDC_IDC_STAT_TACHYTHERAPY_SHOCKS_DELIVERED_TOTAL: 0
MDC_IDC_STAT_TACHYTHERAPY_TOTAL_DTM_END: NORMAL
MDC_IDC_STAT_TACHYTHERAPY_TOTAL_DTM_START: NORMAL

## 2019-08-20 ENCOUNTER — OFFICE VISIT (OUTPATIENT)
Dept: ORTHOPEDICS | Facility: CLINIC | Age: 69
End: 2019-08-20
Payer: COMMERCIAL

## 2019-08-20 VITALS
WEIGHT: 165 LBS | HEIGHT: 70 IN | BODY MASS INDEX: 23.62 KG/M2 | DIASTOLIC BLOOD PRESSURE: 65 MMHG | SYSTOLIC BLOOD PRESSURE: 118 MMHG

## 2019-08-20 DIAGNOSIS — G89.29 CHRONIC PAIN OF RIGHT THUMB: Primary | ICD-10-CM

## 2019-08-20 DIAGNOSIS — M18.11 PRIMARY OSTEOARTHRITIS OF FIRST CARPOMETACARPAL JOINT OF RIGHT HAND: ICD-10-CM

## 2019-08-20 DIAGNOSIS — M79.644 CHRONIC PAIN OF RIGHT THUMB: Primary | ICD-10-CM

## 2019-08-20 PROCEDURE — 20604 DRAIN/INJ JOINT/BURSA W/US: CPT | Mod: RT | Performed by: FAMILY MEDICINE

## 2019-08-20 PROCEDURE — 99213 OFFICE O/P EST LOW 20 MIN: CPT | Mod: 25 | Performed by: FAMILY MEDICINE

## 2019-08-20 RX ADMIN — ROPIVACAINE HYDROCHLORIDE 0.5 ML: 5 INJECTION, SOLUTION EPIDURAL; INFILTRATION; PERINEURAL at 16:45

## 2019-08-20 RX ADMIN — TRIAMCINOLONE ACETONIDE 20 MG: 40 INJECTION, SUSPENSION INTRA-ARTICULAR; INTRAMUSCULAR at 16:45

## 2019-08-20 ASSESSMENT — MIFFLIN-ST. JEOR: SCORE: 1509.69

## 2019-08-20 NOTE — PROGRESS NOTES
Delbert Tilley  :  1950  DOS: 19  MRN: [id number]    Sports Medicine Clinic Visit    PCP: [place], [facility]    Delbert Tilley is a 68 year old Right hand dominant male who is seen as a self referral presenting with chronic right thumb pain.    Injury: Gradual onset of chronic right thumb pain over the last ~ 5 years, pain worse over the last 6 months.  Pain located over right thumb CMC and MCP joints, nonradiating.  Additional Features:  Positive: swelling, grinding and weakness.  Symptoms are better with Rest.  Symptoms are worse with: gripping/grasping, thumb opposition.  Other evaluation and/or treatments so far consists of: Tylenol and Rest.  Recent imaging completed: No recent imaging completed.  Prior History of related problems: H/o s/p right CT release in  by Dr Leger @ [facility], noted some pain prior to surgery.  Steroid injection had been discussed with Dr Leger at that time.    Social History: retired    Interim History - 2019  Since last visit on 3/5/2019 patient has moderate right thumb, CMC joint pain over the last ~ 4 - 6 weeks.  Right thumb CMC joint injection completed on 3/5 provided good relief for ~ 4 - 4.5 months.  No new injury in the interim.      Review of Systems  Musculoskeletal: as above  Remainder of review of systems is negative including constitutional, CV, pulmonary, GI, Skin and Neurologic except as noted in HPI or medical history.    Past Medical History:   Diagnosis Date     Acute renal failure (H) 06 Hosp    secondary to dehydration     Arthritis      CAD (coronary artery disease)     LIMA to the LAD, vein graft to OM and a vein graft to the PDA     Carpal tunnel syndrome      Diabetes (H)      Gastro-oesophageal reflux disease      H/O: alcohol abuse      HTN (hypertension)      Hyperlipidaemia      Hyperlipidaemia LDL goal <100 2013     Hypokalemia      Pacemaker      Pancreatitis 06 Hosp     Past Surgical History:   Procedure  "Laterality Date     BYPASS CARDIOPULMONARY       CATARACT IOL, RT/LT      Cataract IOL RT/LT     ENDARTERECTOMY CAROTID Right 6/12/2015    Procedure: ENDARTERECTOMY CAROTID;  Surgeon: João Turner MD;  Location: [facility] OR     ENDARTERECTOMY CAROTID Left 9/8/2017    Procedure: ENDARTERECTOMY CAROTID;  LEFT CAROTID ENDARTERECTOMY WITH EEG;  Surgeon: João Turner MD;  Location: [facility] OR     IMPLANT PACEMAKER  6/10/2010     RELEASE CARPAL TUNNEL Right 4/12/2016    Procedure: RELEASE CARPAL TUNNEL;  Surgeon: Lissy Leger MD;  Location: [facility] OR     SURGICAL HISTORY OF -   1998    Laminectomy     SURGICAL HISTORY OF -   10/2003    Bypass grafting     TONSILLECTOMY & ADENOIDECTOMY         Objective  /65   Ht 1.778 m (5' 10\")   Wt 74.8 kg (165 lb)   BMI 23.68 kg/m      General: healthy, alert and in no distress    HEENT: no scleral icterus or conjunctival erythema   Skin: no suspicious lesions or rash. No jaundice.   CV: regular rhythm by palpation, 2+ distal pulses, no pedal edema    Resp: normal respiratory effort without conversational dyspnea   Psych: normal mood and affect    Gait: nonantalgic, appropriate coordination and balance   Neuro: normal light touch sensory exam of the extremities. Motor strength as noted below     Right Wrist and Hand exam    Inspection:       No swelling, bruising or deformity bilateral and prominence of the right 1st CMC joint likely due to osteophytes +/- subluxation    Tender:       CMC and MCP joint of 1st digit(s)  right    Non Tender:       Remainder of the Wrist and Hand bilateral    ROM:       Decreased active and passive ROM of the 1st CMC with flexion, extension and opposition right    Strength:       5/5 strength in the muscles of the hand, wrist and forearm right    Special Tests:        neg (-) Tinel's test bilateral,       neg (-) Phalen's test bilateral,       neg (-) TFCC compression test bilateral and       neg (-) Finkelstein's maneuver " bilateral    Neurovascular:       2+ radial pulses bilaterally with brisk capillary refill and      normal sensation to light touch in the radial, median and ulnar nerve distributions      Radiology:  Recent Results (from the past 744 hour(s))   XR Finger Right G/E 2 Views    Narrative    FINGER RIGHT TWO OR MORE VIEWS   3/5/2019 9:13 AM     HISTORY: Evaluate for CMC and MCP joint osteoarthritis. Chronic pain  of right thumb.     COMPARISON: None.      Impression    IMPRESSION: Three views of the thumb. Advanced first carpometacarpal  degenerative changes. MCP joint appears fairly well-preserved. No  evidence of acute fracture.         Hand / Upper Extremity Injection/Arthrocentesis: R thumb CMC  Date/Time: 8/20/2019 4:45 PM  Performed by: Leonardo Le DO  Authorized by: Leonardo Le DO     Indications:  Pain  Needle Size:  25 G  Guidance: ultrasound    Approach:  Radial  Condition: osteoarthritis    Location:  Thumb  Site:  R thumb CMC    Medications:  20 mg triamcinolone 40 MG/ML; 0.5 mL ropivacaine 5 MG/ML  Outcome:  Tolerated well, no immediate complications  Procedure discussed: discussed risks, benefits, and alternatives    Consent Given by:  Patient  Timeout: timeout called immediately prior to procedure    Prep: patient was prepped and draped in usual sterile fashion        Assessment:  1. Chronic pain of right thumb    2. Primary osteoarthritis of first carpometacarpal joint of right hand        Plan:  Discussed the assessment with the patient.  Follow up: prn  Known CMC OA, confirmed with XR, no other acute findings  XR images independently visualized and reviewed with patient today in clinic  US guided 1st CMC injection repeated today, limitations reviewed  Provided thumb spica brace previously, reviewed use strategies  Expectations and goals of CSI reviewed  Often 2-3 days for steroid effect, and can take up to two weeks for maximum effect  We discussed modified progressive  pain-free activity as tolerated  Do not overuse in first two weeks if feeling better due to concern for vulnerability while steroid is working  Supportive care reviewed  All questions were answered today  Contact us with additional questions or concerns  Signs and sx of concern reviewed      Leonardo Le DO, MIGUEL  Primary Care Sports Medicine  [facility]               Disclaimer: This note consists of symbols derived from keyboarding, dictation and/or voice recognition software. As a result, there may be errors in the script that have gone undetected. Please consider this when interpreting information found in this chart.

## 2019-08-20 NOTE — LETTER
2019         RE: Delbert Tilley  [address]  [facility] [address]        Dear Colleague,    Thank you for referring your patient, Delbert Tilley, to the [facility]. Please see a copy of my visit note below.    Delbert Tilley  :  1950  DOS: 19  MRN: [id number]    Sports Medicine Clinic Visit    PCP: [place], [facility]    Delbert Tilley is a 68 year old Right hand dominant male who is seen as a self referral presenting with chronic right thumb pain.    Injury: Gradual onset of chronic right thumb pain over the last ~ 5 years, pain worse over the last 6 months.  Pain located over right thumb CMC and MCP joints, nonradiating.  Additional Features:  Positive: swelling, grinding and weakness.  Symptoms are better with Rest.  Symptoms are worse with: gripping/grasping, thumb opposition.  Other evaluation and/or treatments so far consists of: Tylenol and Rest.  Recent imaging completed: No recent imaging completed.  Prior History of related problems: H/o s/p right CT release in  by Dr Leger @ [facility], noted some pain prior to surgery.  Steroid injection had been discussed with Dr Leger at that time.    Social History: retired    Interim History - 2019  Since last visit on 3/5/2019 patient has moderate right thumb, CMC joint pain over the last ~ 4 - 6 weeks.  Right thumb CMC joint injection completed on 3/5 provided good relief for ~ 4 - 4.5 months.  No new injury in the interim.      Review of Systems  Musculoskeletal: as above  Remainder of review of systems is negative including constitutional, CV, pulmonary, GI, Skin and Neurologic except as noted in HPI or medical history.    Past Medical History:   Diagnosis Date     Acute renal failure (H) 06 Hosp    secondary to dehydration     Arthritis      CAD (coronary artery disease)     LIMA to the LAD, vein graft to OM and a vein graft to the PDA     Carpal tunnel syndrome       "Diabetes (H)      Gastro-oesophageal reflux disease      H/O: alcohol abuse      HTN (hypertension)      Hyperlipidaemia      Hyperlipidaemia LDL goal <100 7/8/2013     Hypokalemia      Pacemaker      Pancreatitis 6/26/06 Hosp     Past Surgical History:   Procedure Laterality Date     BYPASS CARDIOPULMONARY       CATARACT IOL, RT/LT      Cataract IOL RT/LT     ENDARTERECTOMY CAROTID Right 6/12/2015    Procedure: ENDARTERECTOMY CAROTID;  Surgeon: João Turner MD;  Location: [facility]     ENDARTERECTOMY CAROTID Left 9/8/2017    Procedure: ENDARTERECTOMY CAROTID;  LEFT CAROTID ENDARTERECTOMY WITH EEG;  Surgeon: João Turner MD;  Location: [facility] OR     IMPLANT PACEMAKER  6/10/2010     RELEASE CARPAL TUNNEL Right 4/12/2016    Procedure: RELEASE CARPAL TUNNEL;  Surgeon: Lissy Leger MD;  Location: [facility] OR     SURGICAL HISTORY OF -   1998    Laminectomy     SURGICAL HISTORY OF -   10/2003    Bypass grafting     TONSILLECTOMY & ADENOIDECTOMY         Objective  /65   Ht 1.778 m (5' 10\")   Wt 74.8 kg (165 lb)   BMI 23.68 kg/m       General: healthy, alert and in no distress    HEENT: no scleral icterus or conjunctival erythema   Skin: no suspicious lesions or rash. No jaundice.   CV: regular rhythm by palpation, 2+ distal pulses, no pedal edema    Resp: normal respiratory effort without conversational dyspnea   Psych: normal mood and affect    Gait: nonantalgic, appropriate coordination and balance   Neuro: normal light touch sensory exam of the extremities. Motor strength as noted below     Right Wrist and Hand exam    Inspection:       No swelling, bruising or deformity bilateral and prominence of the right 1st CMC joint likely due to osteophytes +/- subluxation    Tender:       CMC and MCP joint of 1st digit(s)  right    Non Tender:       Remainder of the Wrist and Hand bilateral    ROM:       Decreased active and passive ROM of the 1st CMC with flexion, extension and opposition " right    Strength:       5/5 strength in the muscles of the hand, wrist and forearm right    Special Tests:        neg (-) Tinel's test bilateral,       neg (-) Phalen's test bilateral,       neg (-) TFCC compression test bilateral and       neg (-) Finkelstein's maneuver bilateral    Neurovascular:       2+ radial pulses bilaterally with brisk capillary refill and      normal sensation to light touch in the radial, median and ulnar nerve distributions      Radiology:  Recent Results (from the past 744 hour(s))   XR Finger Right G/E 2 Views    Narrative    FINGER RIGHT TWO OR MORE VIEWS   3/5/2019 9:13 AM     HISTORY: Evaluate for CMC and MCP joint osteoarthritis. Chronic pain  of right thumb.     COMPARISON: None.      Impression    IMPRESSION: Three views of the thumb. Advanced first carpometacarpal  degenerative changes. MCP joint appears fairly well-preserved. No  evidence of acute fracture.         Hand / Upper Extremity Injection/Arthrocentesis: R thumb CMC  Date/Time: 8/20/2019 4:45 PM  Performed by: Leonardo Le DO  Authorized by: Leonardo Le DO     Indications:  Pain  Needle Size:  25 G  Guidance: ultrasound    Approach:  Radial  Condition: osteoarthritis    Location:  Thumb  Site:  R thumb CMC    Medications:  20 mg triamcinolone 40 MG/ML; 0.5 mL ropivacaine 5 MG/ML  Outcome:  Tolerated well, no immediate complications  Procedure discussed: discussed risks, benefits, and alternatives    Consent Given by:  Patient  Timeout: timeout called immediately prior to procedure    Prep: patient was prepped and draped in usual sterile fashion        Assessment:  1. Chronic pain of right thumb    2. Primary osteoarthritis of first carpometacarpal joint of right hand        Plan:  Discussed the assessment with the patient.  Follow up: prn  Known CMC OA, confirmed with XR, no other acute findings  XR images independently visualized and reviewed with patient today in clinic  US guided 1st CMC  injection repeated today, limitations reviewed  Provided thumb spica brace previously, reviewed use strategies  Expectations and goals of CSI reviewed  Often 2-3 days for steroid effect, and can take up to two weeks for maximum effect  We discussed modified progressive pain-free activity as tolerated  Do not overuse in first two weeks if feeling better due to concern for vulnerability while steroid is working  Supportive care reviewed  All questions were answered today  Contact us with additional questions or concerns  Signs and sx of concern reviewed      Leonardo Le DO, MIGUEL  Primary Care Sports Medicine  [facility]               Disclaimer: This note consists of symbols derived from keyboarding, dictation and/or voice recognition software. As a result, there may be errors in the script that have gone undetected. Please consider this when interpreting information found in this chart.    Again, thank you for allowing me to participate in the care of your patient.        Sincerely,        Leonardo Le DO

## 2019-09-02 RX ORDER — ROPIVACAINE HYDROCHLORIDE 5 MG/ML
0.5 INJECTION, SOLUTION EPIDURAL; INFILTRATION; PERINEURAL
Status: DISCONTINUED | OUTPATIENT
Start: 2019-08-20 | End: 2019-12-17 | Stop reason: ALTCHOICE

## 2019-09-02 RX ORDER — TRIAMCINOLONE ACETONIDE 40 MG/ML
20 INJECTION, SUSPENSION INTRA-ARTICULAR; INTRAMUSCULAR
Status: DISCONTINUED | OUTPATIENT
Start: 2019-08-20 | End: 2019-12-17 | Stop reason: ALTCHOICE

## 2019-09-12 ENCOUNTER — ANCILLARY PROCEDURE (OUTPATIENT)
Dept: CARDIOLOGY | Facility: CLINIC | Age: 69
End: 2019-09-12
Attending: INTERNAL MEDICINE
Payer: COMMERCIAL

## 2019-09-12 DIAGNOSIS — Z95.810 ICD (IMPLANTABLE CARDIOVERTER-DEFIBRILLATOR) IN PLACE: ICD-10-CM

## 2019-09-12 PROCEDURE — 93295 DEV INTERROG REMOTE 1/2/MLT: CPT | Performed by: INTERNAL MEDICINE

## 2019-09-12 PROCEDURE — 93296 REM INTERROG EVL PM/IDS: CPT | Performed by: INTERNAL MEDICINE

## 2019-10-01 LAB
MDC_IDC_EPISODE_DTM: NORMAL
MDC_IDC_EPISODE_DURATION: 5 S
MDC_IDC_EPISODE_ID: 2
MDC_IDC_EPISODE_TYPE: NORMAL
MDC_IDC_LEAD_IMPLANT_DT: NORMAL
MDC_IDC_LEAD_IMPLANT_DT: NORMAL
MDC_IDC_LEAD_LOCATION: NORMAL
MDC_IDC_LEAD_LOCATION: NORMAL
MDC_IDC_LEAD_LOCATION_DETAIL_1: NORMAL
MDC_IDC_LEAD_LOCATION_DETAIL_1: NORMAL
MDC_IDC_LEAD_MFG: NORMAL
MDC_IDC_LEAD_MFG: NORMAL
MDC_IDC_LEAD_MODEL: NORMAL
MDC_IDC_LEAD_MODEL: NORMAL
MDC_IDC_LEAD_POLARITY_TYPE: NORMAL
MDC_IDC_LEAD_POLARITY_TYPE: NORMAL
MDC_IDC_LEAD_SERIAL: NORMAL
MDC_IDC_LEAD_SERIAL: NORMAL
MDC_IDC_MSMT_BATTERY_DTM: NORMAL
MDC_IDC_MSMT_BATTERY_REMAINING_LONGEVITY: 109 MO
MDC_IDC_MSMT_BATTERY_RRT_TRIGGER: 2.73
MDC_IDC_MSMT_BATTERY_STATUS: NORMAL
MDC_IDC_MSMT_BATTERY_VOLTAGE: 3.01 V
MDC_IDC_MSMT_CAP_CHARGE_DTM: NORMAL
MDC_IDC_MSMT_CAP_CHARGE_ENERGY: 18 J
MDC_IDC_MSMT_CAP_CHARGE_TIME: 3.92
MDC_IDC_MSMT_CAP_CHARGE_TYPE: NORMAL
MDC_IDC_MSMT_LEADCHNL_RA_IMPEDANCE_VALUE: 494 OHM
MDC_IDC_MSMT_LEADCHNL_RA_PACING_THRESHOLD_AMPLITUDE: 0.75 V
MDC_IDC_MSMT_LEADCHNL_RA_PACING_THRESHOLD_PULSEWIDTH: 0.4 MS
MDC_IDC_MSMT_LEADCHNL_RA_SENSING_INTR_AMPL: 3.12 MV
MDC_IDC_MSMT_LEADCHNL_RA_SENSING_INTR_AMPL: 3.12 MV
MDC_IDC_MSMT_LEADCHNL_RV_IMPEDANCE_VALUE: 323 OHM
MDC_IDC_MSMT_LEADCHNL_RV_IMPEDANCE_VALUE: 437 OHM
MDC_IDC_MSMT_LEADCHNL_RV_PACING_THRESHOLD_AMPLITUDE: 1.25 V
MDC_IDC_MSMT_LEADCHNL_RV_PACING_THRESHOLD_PULSEWIDTH: 0.4 MS
MDC_IDC_MSMT_LEADCHNL_RV_SENSING_INTR_AMPL: 3.62 MV
MDC_IDC_MSMT_LEADCHNL_RV_SENSING_INTR_AMPL: 3.62 MV
MDC_IDC_PG_IMPLANT_DTM: NORMAL
MDC_IDC_PG_MFG: NORMAL
MDC_IDC_PG_MODEL: NORMAL
MDC_IDC_PG_SERIAL: NORMAL
MDC_IDC_PG_TYPE: NORMAL
MDC_IDC_SESS_CLINIC_NAME: NORMAL
MDC_IDC_SESS_DTM: NORMAL
MDC_IDC_SESS_TYPE: NORMAL
MDC_IDC_SET_BRADY_AT_MODE_SWITCH_RATE: 171 {BEATS}/MIN
MDC_IDC_SET_BRADY_HYSTRATE: NORMAL
MDC_IDC_SET_BRADY_LOWRATE: 55 {BEATS}/MIN
MDC_IDC_SET_BRADY_MAX_SENSOR_RATE: 140 {BEATS}/MIN
MDC_IDC_SET_BRADY_MAX_TRACKING_RATE: 140 {BEATS}/MIN
MDC_IDC_SET_BRADY_MODE: NORMAL
MDC_IDC_SET_BRADY_PAV_DELAY_LOW: 180 MS
MDC_IDC_SET_BRADY_SAV_DELAY_LOW: 150 MS
MDC_IDC_SET_LEADCHNL_RA_PACING_AMPLITUDE: 1.5 V
MDC_IDC_SET_LEADCHNL_RA_PACING_ANODE_ELECTRODE_1: NORMAL
MDC_IDC_SET_LEADCHNL_RA_PACING_ANODE_LOCATION_1: NORMAL
MDC_IDC_SET_LEADCHNL_RA_PACING_CAPTURE_MODE: NORMAL
MDC_IDC_SET_LEADCHNL_RA_PACING_CATHODE_ELECTRODE_1: NORMAL
MDC_IDC_SET_LEADCHNL_RA_PACING_CATHODE_LOCATION_1: NORMAL
MDC_IDC_SET_LEADCHNL_RA_PACING_POLARITY: NORMAL
MDC_IDC_SET_LEADCHNL_RA_PACING_PULSEWIDTH: 0.4 MS
MDC_IDC_SET_LEADCHNL_RA_SENSING_ANODE_ELECTRODE_1: NORMAL
MDC_IDC_SET_LEADCHNL_RA_SENSING_ANODE_LOCATION_1: NORMAL
MDC_IDC_SET_LEADCHNL_RA_SENSING_CATHODE_ELECTRODE_1: NORMAL
MDC_IDC_SET_LEADCHNL_RA_SENSING_CATHODE_LOCATION_1: NORMAL
MDC_IDC_SET_LEADCHNL_RA_SENSING_POLARITY: NORMAL
MDC_IDC_SET_LEADCHNL_RA_SENSING_SENSITIVITY: 0.3 MV
MDC_IDC_SET_LEADCHNL_RV_PACING_AMPLITUDE: 2.5 V
MDC_IDC_SET_LEADCHNL_RV_PACING_ANODE_ELECTRODE_1: NORMAL
MDC_IDC_SET_LEADCHNL_RV_PACING_ANODE_LOCATION_1: NORMAL
MDC_IDC_SET_LEADCHNL_RV_PACING_CAPTURE_MODE: NORMAL
MDC_IDC_SET_LEADCHNL_RV_PACING_CATHODE_ELECTRODE_1: NORMAL
MDC_IDC_SET_LEADCHNL_RV_PACING_CATHODE_LOCATION_1: NORMAL
MDC_IDC_SET_LEADCHNL_RV_PACING_POLARITY: NORMAL
MDC_IDC_SET_LEADCHNL_RV_PACING_PULSEWIDTH: 0.4 MS
MDC_IDC_SET_LEADCHNL_RV_SENSING_ANODE_ELECTRODE_1: NORMAL
MDC_IDC_SET_LEADCHNL_RV_SENSING_ANODE_LOCATION_1: NORMAL
MDC_IDC_SET_LEADCHNL_RV_SENSING_CATHODE_ELECTRODE_1: NORMAL
MDC_IDC_SET_LEADCHNL_RV_SENSING_CATHODE_LOCATION_1: NORMAL
MDC_IDC_SET_LEADCHNL_RV_SENSING_POLARITY: NORMAL
MDC_IDC_SET_LEADCHNL_RV_SENSING_SENSITIVITY: 0.3 MV
MDC_IDC_SET_ZONE_DETECTION_BEATS_DENOMINATOR: 40 {BEATS}
MDC_IDC_SET_ZONE_DETECTION_BEATS_NUMERATOR: 30 {BEATS}
MDC_IDC_SET_ZONE_DETECTION_INTERVAL: 320 MS
MDC_IDC_SET_ZONE_DETECTION_INTERVAL: 350 MS
MDC_IDC_SET_ZONE_DETECTION_INTERVAL: 360 MS
MDC_IDC_SET_ZONE_DETECTION_INTERVAL: 400 MS
MDC_IDC_SET_ZONE_DETECTION_INTERVAL: NORMAL
MDC_IDC_SET_ZONE_TYPE: NORMAL
MDC_IDC_STAT_AT_BURDEN_PERCENT: 0 %
MDC_IDC_STAT_AT_DTM_END: NORMAL
MDC_IDC_STAT_AT_DTM_START: NORMAL
MDC_IDC_STAT_BRADY_AP_VP_PERCENT: 0.12 %
MDC_IDC_STAT_BRADY_AP_VS_PERCENT: 28.89 %
MDC_IDC_STAT_BRADY_AS_VP_PERCENT: 0.04 %
MDC_IDC_STAT_BRADY_AS_VS_PERCENT: 70.95 %
MDC_IDC_STAT_BRADY_DTM_END: NORMAL
MDC_IDC_STAT_BRADY_DTM_START: NORMAL
MDC_IDC_STAT_BRADY_RA_PERCENT_PACED: 29.02 %
MDC_IDC_STAT_BRADY_RV_PERCENT_PACED: 0.17 %
MDC_IDC_STAT_EPISODE_RECENT_COUNT: 0
MDC_IDC_STAT_EPISODE_RECENT_COUNT: 1
MDC_IDC_STAT_EPISODE_RECENT_COUNT_DTM_END: NORMAL
MDC_IDC_STAT_EPISODE_RECENT_COUNT_DTM_START: NORMAL
MDC_IDC_STAT_EPISODE_TOTAL_COUNT: 0
MDC_IDC_STAT_EPISODE_TOTAL_COUNT: 2
MDC_IDC_STAT_EPISODE_TOTAL_COUNT_DTM_END: NORMAL
MDC_IDC_STAT_EPISODE_TOTAL_COUNT_DTM_START: NORMAL
MDC_IDC_STAT_EPISODE_TYPE: NORMAL
MDC_IDC_STAT_TACHYTHERAPY_ATP_DELIVERED_RECENT: 0
MDC_IDC_STAT_TACHYTHERAPY_ATP_DELIVERED_TOTAL: 0
MDC_IDC_STAT_TACHYTHERAPY_RECENT_DTM_END: NORMAL
MDC_IDC_STAT_TACHYTHERAPY_RECENT_DTM_START: NORMAL
MDC_IDC_STAT_TACHYTHERAPY_SHOCKS_ABORTED_RECENT: 0
MDC_IDC_STAT_TACHYTHERAPY_SHOCKS_ABORTED_TOTAL: 0
MDC_IDC_STAT_TACHYTHERAPY_SHOCKS_DELIVERED_RECENT: 0
MDC_IDC_STAT_TACHYTHERAPY_SHOCKS_DELIVERED_TOTAL: 0
MDC_IDC_STAT_TACHYTHERAPY_TOTAL_DTM_END: NORMAL
MDC_IDC_STAT_TACHYTHERAPY_TOTAL_DTM_START: NORMAL

## 2019-10-29 ENCOUNTER — HOSPITAL ENCOUNTER (OUTPATIENT)
Dept: CARDIOLOGY | Facility: CLINIC | Age: 69
Discharge: HOME | End: 2019-10-29
Attending: INTERNAL MEDICINE | Admitting: INTERNAL MEDICINE
Payer: COMMERCIAL

## 2019-10-29 LAB
ANION GAP SERPL CALCULATED.3IONS-SCNC: 3 MMOL/L (ref 3–14)
BUN SERPL-MCNC: 33 MG/DL (ref 7–30)
CALCIUM SERPL-MCNC: 8.5 MG/DL (ref 8.5–10.1)
CHLORIDE SERPL-SCNC: 105 MMOL/L (ref 94–109)
CO2 SERPL-SCNC: 31 MMOL/L (ref 20–32)
CREAT SERPL-MCNC: 1.17 MG/DL (ref 0.66–1.25)
GFR SERPL CREATININE-BSD FRML MDRD: 62 ML/MIN/{1.73_M2}
GLUCOSE SERPL-MCNC: 168 MG/DL (ref 70–99)
POTASSIUM SERPL-SCNC: 4.4 MMOL/L (ref 3.4–5.3)
SODIUM SERPL-SCNC: 139 MMOL/L (ref 133–144)

## 2019-10-29 PROCEDURE — 80048 BASIC METABOLIC PNL TOTAL CA: CPT | Performed by: INTERNAL MEDICINE

## 2019-10-29 PROCEDURE — 36415 COLL VENOUS BLD VENIPUNCTURE: CPT | Performed by: INTERNAL MEDICINE

## 2019-10-29 PROCEDURE — 93306 TTE W/DOPPLER COMPLETE: CPT

## 2019-10-29 PROCEDURE — 93306 TTE W/DOPPLER COMPLETE: CPT | Mod: 26 | Performed by: INTERNAL MEDICINE

## 2019-11-01 ENCOUNTER — OFFICE VISIT (OUTPATIENT)
Dept: CARDIOLOGY | Facility: CLINIC | Age: 69
End: 2019-11-01
Attending: INTERNAL MEDICINE
Payer: COMMERCIAL

## 2019-11-01 VITALS
HEIGHT: 70 IN | OXYGEN SATURATION: 97 % | BODY MASS INDEX: 24.05 KG/M2 | HEART RATE: 84 BPM | SYSTOLIC BLOOD PRESSURE: 116 MMHG | WEIGHT: 168 LBS | DIASTOLIC BLOOD PRESSURE: 68 MMHG

## 2019-11-01 DIAGNOSIS — I65.22 LEFT CAROTID STENOSIS: ICD-10-CM

## 2019-11-01 DIAGNOSIS — R06.09 DOE (DYSPNEA ON EXERTION): ICD-10-CM

## 2019-11-01 DIAGNOSIS — I50.22 CHRONIC SYSTOLIC HEART FAILURE (H): ICD-10-CM

## 2019-11-01 DIAGNOSIS — I25.5 ISCHEMIC CARDIOMYOPATHY: ICD-10-CM

## 2019-11-01 DIAGNOSIS — E78.5 HYPERLIPIDEMIA LDL GOAL <70: ICD-10-CM

## 2019-11-01 DIAGNOSIS — I48.0 PAROXYSMAL ATRIAL FIBRILLATION (H): ICD-10-CM

## 2019-11-01 DIAGNOSIS — I25.810 CORONARY ARTERY DISEASE INVOLVING CORONARY BYPASS GRAFT OF NATIVE HEART WITHOUT ANGINA PECTORIS: Primary | ICD-10-CM

## 2019-11-01 DIAGNOSIS — I47.29 NSVT (NONSUSTAINED VENTRICULAR TACHYCARDIA) (H): ICD-10-CM

## 2019-11-01 PROCEDURE — 99215 OFFICE O/P EST HI 40 MIN: CPT | Performed by: NURSE PRACTITIONER

## 2019-11-01 ASSESSMENT — MIFFLIN-ST. JEOR: SCORE: 1523.29

## 2019-11-01 NOTE — LETTER
11/1/2019    [facility]  [address] [facility] [address]    RE: Delbert SORIA Jarek       Dear Colleague,    I had the pleasure of seeing Delbert Tilley in the [facility].    Cardiology Clinic Progress Note  Delbert Tilley MRN# [id number]   YOB: 1950 Age: 67 year old     Reason For Visit:  6 month follow-up    Primary Cardiologist:   Dr. Michaels          History of Presenting Illness:    Delbert Tilley is a pleasant 67 year old patient with a past cardiac history significant for paroxysmal atrial fibrillation, CAD status post CABG (LIMA to LAD, SVG to OM, and SVG to PDA) and PCI to OM3 graft 2014, s/p right and left carotid endarterectomy, s/p flutter ablation, and ischemic combined with nonischemic cardiomyopathy with previous LVEF s/p ICD. Past medical history significant for diabetes mellitus and tobacco abuse.    In 2014 he had NSTEMI with stent to the SVG to OM.  EF at that time was 35-40%.  In 2017 he underwent preoperative assessment for carotid endarterectomy.  Stress test was negative for ischemia but EF had fallen.   He presented with heart failure symptoms later in 2017 and EF was noted to be 20-25% on echocardiogram.  Repeat coronary angiogram demonstrated 50% stenosis in the apical LAD and SVG to the RPDA had occluded with other grafts patent.    Patient was last seen by Dr. Michaels in February 2019.   He noted occasional volume overload which was resolved with taking an extra Lasix 20 mg, occurring monthly.  He remained active with housework and yardwork.    Pt presents today for 6 month follow-up. BMP 10/29/2019 showing normal electrolytes, creatinine, GFR, and mildly elevated BUN. Echocardiogram 10/30/2019 with EF 20-25% similar to 2017, no WMA, moderate RV dilattion with moderately decreased function, severely dilated LA, mild to moderate TR,  and increased LV filling pressures. Results were reviewed with him today. Device check September 2019  showing 29% atrial paced, no atrial arrhythmias, one episode of 21 beat NSVT up to 200 bpm, no ATP and no shocks.    Blood pressure today is well controlled.  Weight is stable at 168 pounds. He continues checking weight each day.  Two times a week he will take an extra dose of Lasix 20 mg for increased weight and fluid retention.  His lower extremity edema usually does pretty well when he is wearing his compression stockings. He continues with chronic PARDO, going up a flight of stairs, which has been unchanged and does not improve when he takes an extra dose of Lasix. He has a significant smoking history. He currently follows with the  and reports that they will not order PFTs until he quits smoking.  I've asked him to follow up with a PCP here at [facility] to further discuss PFTs.  He is currently prescribed only albuterol inhaler and I believe he would likely benefit from further treatment, if found to have abnormal PFTs. He denies any anginal symptoms.  He was asymptomatic with his episode of NSVT but continues with some lightheadedness with position changes, that resolves in a few seconds. Patient reports no chest pain, PND, orthopnea, presyncope, syncope, heart racing, or palpitations.      Current Cardiac Medications   Aspirin 1000 mg BID   Atorvastatin 80 mg daily  Lasix 20 mg daily  Losartan 50 mg daily   metoprolol XL 25 mg daily  Nitroglycerin p.r.n.                   Assessment and Plan:     Plan  1. Follow-up with PCP regarding PARDO and PFTs  2. follow-up Dr. Michaels in six months      1. CAD    CABG (LIMA to LAD, SVG to OM, and SVG to PDA) years ago     2014 NSTEMI PCI to SVG-OM3     Angio 2017 without intervention, 50% stenosis in the apical LAD and SVG to the RPDA had occluded    No angina     Continue statin, aspirin, ARB, beta blocker      2. Ischemic cardiomyopathy    Status post ICD with generator replaced     LVEF stable 20-25% 2019, 20-25% 2017, previously 35-40%    Dry wt: 165      Occasional LE edema with higher Na intake- resolved with lasix and compression stockings     Continue beta blocker, ARB, lasix    If patient develops left bundle branch block, may consider bi-V ICD upgrade in the future    Unable to further uptitrate cardiomyopathy medications d/t hypotension    Did not tolerate spironolactone d/t hypotension      3. Paroxysmal atrial fibrillation/flutter    status post flutter ablation    History of a couple episodes of 30 seconds of atrial fibrillation    No symptoms of atrial fibrillation and no atrial fibrillation seen on device checks    Continue rate control with metoprolol and no current anticoagulation      4. PVD    status post right and left carotid endarterectomy    following with vascular surgery       5.  NSVT    21 beat episode on recent device check, asymptomatic. Did not require any ATP or shocks    Continue metoprolol    Patient has soft blood pressures and prefers not to increase metoprolol    We will continue to monitor on device checks and, if needed, can consider decreasing losartan to increase metoprolol           Greater than 50% of visit spent in face-to-face counseling, 40 mins.       Thank you for allowing me to participate in this delightful patient's care.      This note was completed in part using Dragon voice recognition software. Although reviewed after completion, some word and grammatical errors may occur.    Yareli Kim, APRN, CNP           Data:   All laboratory data reviewed        HPI and Plan:   See dictation    Orders Placed This Encounter   Procedures     Follow-Up with Cardiologist       No orders of the defined types were placed in this encounter.      There are no discontinued medications.      Encounter Diagnoses   Name Primary?     Left carotid stenosis      Coronary artery disease involving coronary bypass graft of native heart without angina pectoris Yes     Hyperlipidemia LDL goal <70      Paroxysmal atrial  fibrillation (H)      Ischemic cardiomyopathy      Chronic systolic heart failure (H)      NSVT (nonsustained ventricular tachycardia) (H)      PARDO (dyspnea on exertion)        CURRENT MEDICATIONS:  Current Outpatient Medications   Medication Sig Dispense Refill     albuterol (PROAIR HFA) 108 (90 Base) MCG/ACT Inhaler Inhale 2 puffs into the lungs every 4 hours as needed for shortness of breath / dyspnea 1 Inhaler 0     amylase-lipase-protease (CREON 12) 74656 UNITS CPEP Take 1 capsule (12,000 Units) by mouth daily 120 capsule 0     aspirin 325 MG tablet Take 3 tablets (975 mg) by mouth every morning (Takes 2 x 500 mg = 1000 mg) (Patient taking differently: Take 1,000 mg by mouth every morning (Takes 2 x 500 mg = 1000 mg)   Taking 2 in am and 2 in the pm. 2/22/19) 120 tablet 1     atorvastatin (LIPITOR) 80 MG tablet Take 80 mg by mouth At Bedtime        furosemide (LASIX) 20 MG tablet Take 1 tablet (20 mg) by mouth daily       insulin glargine (LANTUS) 100 UNIT/ML injection Inject 27 Units Subcutaneous every morning       Insulin Pen Needle (PEN NEEDLES) 32G X 4 MM MISC        loperamide (IMODIUM) 2 MG capsule Take 2 mg by mouth 4 times daily as needed for diarrhea       losartan (COZAAR) 50 MG tablet Take 1 tablet (50 mg) by mouth daily 90 tablet 3     metoprolol succinate (TOPROL-XL) 25 MG 24 hr tablet Take 1 tablet (25 mg) by mouth daily 90 tablet 3     Nitroglycerin (NITROSTAT SL) Place 0.4 mg under the tongue every 5 minutes as needed for chest pain       pantoprazole (PROTONIX) 40 MG EC tablet Take 1 tablet (40 mg) by mouth daily 30 tablet        ALLERGIES     Allergies   Allergen Reactions     Hydrocodone      Upset stomach     Shellfish Allergy Hives and Rash       PAST MEDICAL HISTORY:  Past Medical History:   Diagnosis Date     Acute renal failure (H) 8/26/06 Hosp    secondary to dehydration     Arthritis      CAD (coronary artery disease)     LIMA to the LAD, vein graft to OM and a vein graft to the PDA      Carpal tunnel syndrome      Diabetes (H)      Gastro-oesophageal reflux disease      H/O: alcohol abuse      HTN (hypertension)      Hyperlipidaemia      Hyperlipidaemia LDL goal <100 7/8/2013     Hypokalemia      Pacemaker      Pancreatitis 6/26/06 Hosp       PAST SURGICAL HISTORY:  Past Surgical History:   Procedure Laterality Date     BYPASS CARDIOPULMONARY       CATARACT IOL, RT/LT      Cataract IOL RT/LT     ENDARTERECTOMY CAROTID Right 6/12/2015    Procedure: ENDARTERECTOMY CAROTID;  Surgeon: João Turner MD;  Location: [facility] OR     ENDARTERECTOMY CAROTID Left 9/8/2017    Procedure: ENDARTERECTOMY CAROTID;  LEFT CAROTID ENDARTERECTOMY WITH EEG;  Surgeon: João Turner MD;  Location: [facility] OR     IMPLANT PACEMAKER  6/10/2010     RELEASE CARPAL TUNNEL Right 4/12/2016    Procedure: RELEASE CARPAL TUNNEL;  Surgeon: Lissy Leger MD;  Location: [facility] OR     SURGICAL HISTORY OF -   1998    Laminectomy     SURGICAL HISTORY OF -   10/2003    Bypass grafting     TONSILLECTOMY & ADENOIDECTOMY         FAMILY HISTORY:  Family History   Problem Relation Age of Onset     Unknown/Adopted No family hx of        SOCIAL HISTORY:  Social History     Socioeconomic History     Marital status: Single     Spouse name: None     Number of children: None     Years of education: None     Highest education level: None   Occupational History     None   Social Needs     Financial resource strain: None     Food insecurity:     Worry: None     Inability: None     Transportation needs:     Medical: None     Non-medical: None   Tobacco Use     Smoking status: Current Every Day Smoker     Packs/day: 2.00     Types: Cigarettes     Smokeless tobacco: Never Used     Tobacco comment: 1 1/2 PPD 2/16/18   Substance and Sexual Activity     Alcohol use: No     Drug use: No     Sexual activity: Not Currently     Partners: Female   Lifestyle     Physical activity:     Days per week: None     Minutes per session: None     Stress: None  "  Relationships     Social connections:     Talks on phone: None     Gets together: None     Attends Faith service: None     Active member of club or organization: None     Attends meetings of clubs or organizations: None     Relationship status: None     Intimate partner violence:     Fear of current or ex partner: None     Emotionally abused: None     Physically abused: None     Forced sexual activity: None   Other Topics Concern     Parent/sibling w/ CABG, MI or angioplasty before 65F 55M? No   Social History Narrative     None       Review of Systems:  Skin:  Negative       Eyes:  Positive for glasses    ENT:  Negative      Respiratory:  Positive for cough;dyspnea on exertion     Cardiovascular:    Positive for;lower extremity symptoms;edema    Gastroenterology: Negative      Genitourinary:  not assessed      Musculoskeletal:  Positive for joint pain;joint swelling;joint stiffness;foot pain    Neurologic:  Positive for numbness or tingling of hands;numbness or tingling of feet    Psychiatric:  Negative      Heme/Lymph/Imm:  Positive for allergies    Endocrine:  Positive for diabetes      Physical Exam:  Vitals: /68   Pulse 84   Ht 1.778 m (5' 10\")   Wt 76.2 kg (168 lb)   SpO2 97%   BMI 24.11 kg/m       Constitutional:  cooperative, alert and oriented, well developed, well nourished, in no acute distress        Skin:  warm and dry to the touch          Head:  normocephalic        Eyes:  sclera white        Lymph:      ENT:  no pallor or cyanosis        Neck:  no stiffness        Respiratory:  clear to auscultation;normal symmetry diminished breath sounds bilaterally       Cardiac: regular rhythm;normal S1 and S2                pulses full and equal                                        GI:  abdomen soft        Extremities and Muscular Skeletal:      bilateral LE edema;1+;pitting(above socks )          Neurological:  no gross motor deficits;affect appropriate        Psych:  Alert and Oriented x " 3;affect appropriate, oriented to time, person and place            Thank you for allowing me to participate in the care of your patient.    Sincerely,     ANA [facility]

## 2019-11-01 NOTE — PATIENT INSTRUCTIONS
"[facility]  [facility]~[address]~[place], [place]~[address]  Thank you for your  Heart Care visit today. If you have questions regarding your visit, please contact your cardiology RN's, Lissett Sandoval, at [phone]. Your provider has recommended the following:  Medication Changes:  No changes   Recommendations:  1. Follow-up with primary provider regarding shortness of breath and lung function testing   Follow-up:  See Dr. Michaels for cardiology follow up at [facility]: May 2020  Call in Feb, to schedule the appointment.  To schedule a future appointment, we kindly ask that you call cardiology scheduling at [phone] three months prior to requested revisit date.      [facility] cardiology clinic is staffed with \"Advance Practice Providers\". These are our cardiology Physician Assistants and Nurse Practitioners.   Please call cardiology scheduling if you feel you need clinical evaluation with them at any time for any cardiac reason.   Reminder:  For your safety, we ask that you bring in your current medication(s) or an updated list of your medications with you to EACH office visit. Include the medication name, dose of pill on bottle and how you are taking it. Include over-the-counter medications or supplements. Your provider will review this at each visit and plan your care based on your current information.   ~~~~~~~~~~~~~~~~~~~~~~~~~~~~~~~~~~~~~~~  \"[facility]\" campus telephone numbers for reference:  Cardiology Scheduling~[phone]  Diagnostic Imaging Scheduling~[phone]  Lab Scheduling~[phone]  Anticoagulation Clinic~[phone]  Cardiac Rehabilitation~[phone]  [facility] Clinic RN's~[phone] (at [facility])  Cardiology Clinic RN's~[phone] (Lissett Sandoval, RN)  ~~~~~~~~~~~~~~~~~~~~~~~~~~~~~~~~~~~~~~~~    "

## 2019-11-01 NOTE — PROGRESS NOTES
Cardiology Clinic Progress Note  Delbert Tilley MRN# [id number]   YOB: 1950 Age: 67 year old     Reason For Visit:  6 month follow-up    Primary Cardiologist:   Dr. Michaels          History of Presenting Illness:    Delbert Tilley is a pleasant 67 year old patient with a past cardiac history significant for paroxysmal atrial fibrillation, CAD status post CABG (LIMA to LAD, SVG to OM, and SVG to PDA) and PCI to OM3 graft 2014, s/p right and left carotid endarterectomy, s/p flutter ablation, and ischemic combined with nonischemic cardiomyopathy with previous LVEF s/p ICD. Past medical history significant for diabetes mellitus and tobacco abuse.    In 2014 he had NSTEMI with stent to the SVG to OM.  EF at that time was 35-40%.  In 2017 he underwent preoperative assessment for carotid endarterectomy.  Stress test was negative for ischemia but EF had fallen.   He presented with heart failure symptoms later in 2017 and EF was noted to be 20-25% on echocardiogram.  Repeat coronary angiogram demonstrated 50% stenosis in the apical LAD and SVG to the RPDA had occluded with other grafts patent.    Patient was last seen by Dr. Michaels in February 2019.   He noted occasional volume overload which was resolved with taking an extra Lasix 20 mg, occurring monthly.  He remained active with housework and yardwork.    Pt presents today for 6 month follow-up. BMP 10/29/2019 showing normal electrolytes, creatinine, GFR, and mildly elevated BUN. Echocardiogram 10/30/2019 with EF 20-25% similar to 2017, no WMA, moderate RV dilattion with moderately decreased function, severely dilated LA, mild to moderate TR,  and increased LV filling pressures. Results were reviewed with him today. Device check September 2019 showing 29% atrial paced, no atrial arrhythmias, one episode of 21 beat NSVT up to 200 bpm, no ATP and no shocks.    Blood pressure today is well controlled.  Weight is stable at 168 pounds. He continues checking weight each  day.  Two times a week he will take an extra dose of Lasix 20 mg for increased weight and fluid retention.  His lower extremity edema usually does pretty well when he is wearing his compression stockings. He continues with chronic PARDO, going up a flight of stairs, which has been unchanged and does not improve when he takes an extra dose of Lasix. He has a significant smoking history. He currently follows with the [facility] and reports that they will not order PFTs until he quits smoking.  I've asked him to follow up with a PCP here at [place] to further discuss PFTs.  He is currently prescribed only albuterol inhaler and I believe he would likely benefit from further treatment, if found to have abnormal PFTs. He denies any anginal symptoms.  He was asymptomatic with his episode of NSVT but continues with some lightheadedness with position changes, that resolves in a few seconds. Patient reports no chest pain, PND, orthopnea, presyncope, syncope, heart racing, or palpitations.      Current Cardiac Medications   Aspirin 1000 mg BID   Atorvastatin 80 mg daily  Lasix 20 mg daily  Losartan 50 mg daily   metoprolol XL 25 mg daily  Nitroglycerin p.r.n.                   Assessment and Plan:     Plan  1. Follow-up with PCP regarding PARDO and PFTs  2. follow-up Dr. Michaels in six months      1. CAD    CABG (LIMA to LAD, SVG to OM, and SVG to PDA) years ago     2014 NSTEMI PCI to SVG-OM3     Angio 2017 without intervention, 50% stenosis in the apical LAD and SVG to the RPDA had occluded    No angina     Continue statin, aspirin, ARB, beta blocker      2. Ischemic cardiomyopathy    Status post ICD with generator replaced     LVEF stable 20-25% 2019, 20-25% 2017, previously 35-40%    Dry wt: 165     Occasional LE edema with higher Na intake- resolved with lasix and compression stockings     Continue beta blocker, ARB, lasix    If patient develops left bundle branch block, may consider bi-V ICD upgrade in the  future    Unable to further uptitrate cardiomyopathy medications d/t hypotension    Did not tolerate spironolactone d/t hypotension      3. Paroxysmal atrial fibrillation/flutter    status post flutter ablation    History of a couple episodes of 30 seconds of atrial fibrillation    No symptoms of atrial fibrillation and no atrial fibrillation seen on device checks    Continue rate control with metoprolol and no current anticoagulation      4. PVD    status post right and left carotid endarterectomy    following with vascular surgery       5.  NSVT    21 beat episode on recent device check, asymptomatic. Did not require any ATP or shocks    Continue metoprolol    Patient has soft blood pressures and prefers not to increase metoprolol    We will continue to monitor on device checks and, if needed, can consider decreasing losartan to increase metoprolol           Greater than 50% of visit spent in face-to-face counseling, 40 mins.       Thank you for allowing me to participate in this delightful patient's care.      This note was completed in part using Dragon voice recognition software. Although reviewed after completion, some word and grammatical errors may occur.    Yareli Kim, APRN, CNP           Data:   All laboratory data reviewed        HPI and Plan:   See dictation    Orders Placed This Encounter   Procedures     Follow-Up with Cardiologist       No orders of the defined types were placed in this encounter.      There are no discontinued medications.      Encounter Diagnoses   Name Primary?     Left carotid stenosis      Coronary artery disease involving coronary bypass graft of native heart without angina pectoris Yes     Hyperlipidemia LDL goal <70      Paroxysmal atrial fibrillation (H)      Ischemic cardiomyopathy      Chronic systolic heart failure (H)      NSVT (nonsustained ventricular tachycardia) (H)      PARDO (dyspnea on exertion)        CURRENT MEDICATIONS:  Current Outpatient  Medications   Medication Sig Dispense Refill     albuterol (PROAIR HFA) 108 (90 Base) MCG/ACT Inhaler Inhale 2 puffs into the lungs every 4 hours as needed for shortness of breath / dyspnea 1 Inhaler 0     amylase-lipase-protease (CREON 12) 31452 UNITS CPEP Take 1 capsule (12,000 Units) by mouth daily 120 capsule 0     aspirin 325 MG tablet Take 3 tablets (975 mg) by mouth every morning (Takes 2 x 500 mg = 1000 mg) (Patient taking differently: Take 1,000 mg by mouth every morning (Takes 2 x 500 mg = 1000 mg)   Taking 2 in am and 2 in the pm. 2/22/19) 120 tablet 1     atorvastatin (LIPITOR) 80 MG tablet Take 80 mg by mouth At Bedtime        furosemide (LASIX) 20 MG tablet Take 1 tablet (20 mg) by mouth daily       insulin glargine (LANTUS) 100 UNIT/ML injection Inject 27 Units Subcutaneous every morning       Insulin Pen Needle (PEN NEEDLES) 32G X 4 MM MISC        loperamide (IMODIUM) 2 MG capsule Take 2 mg by mouth 4 times daily as needed for diarrhea       losartan (COZAAR) 50 MG tablet Take 1 tablet (50 mg) by mouth daily 90 tablet 3     metoprolol succinate (TOPROL-XL) 25 MG 24 hr tablet Take 1 tablet (25 mg) by mouth daily 90 tablet 3     Nitroglycerin (NITROSTAT SL) Place 0.4 mg under the tongue every 5 minutes as needed for chest pain       pantoprazole (PROTONIX) 40 MG EC tablet Take 1 tablet (40 mg) by mouth daily 30 tablet        ALLERGIES     Allergies   Allergen Reactions     Hydrocodone      Upset stomach     Shellfish Allergy Hives and Rash       PAST MEDICAL HISTORY:  Past Medical History:   Diagnosis Date     Acute renal failure (H) 8/26/06 Hosp    secondary to dehydration     Arthritis      CAD (coronary artery disease)     LIMA to the LAD, vein graft to OM and a vein graft to the PDA     Carpal tunnel syndrome      Diabetes (H)      Gastro-oesophageal reflux disease      H/O: alcohol abuse      HTN (hypertension)      Hyperlipidaemia      Hyperlipidaemia LDL goal <100 7/8/2013     Hypokalemia       Pacemaker      Pancreatitis 6/26/06 Hosp       PAST SURGICAL HISTORY:  Past Surgical History:   Procedure Laterality Date     BYPASS CARDIOPULMONARY       CATARACT IOL, RT/LT      Cataract IOL RT/LT     ENDARTERECTOMY CAROTID Right 6/12/2015    Procedure: ENDARTERECTOMY CAROTID;  Surgeon: João Turner MD;  Location: [facility]     ENDARTERECTOMY CAROTID Left 9/8/2017    Procedure: ENDARTERECTOMY CAROTID;  LEFT CAROTID ENDARTERECTOMY WITH EEG;  Surgeon: João Turner MD;  Location: [facility]     IMPLANT PACEMAKER  6/10/2010     RELEASE CARPAL TUNNEL Right 4/12/2016    Procedure: RELEASE CARPAL TUNNEL;  Surgeon: Lissy Leger MD;  Location: [facility]     SURGICAL HISTORY OF -   1998    Laminectomy     SURGICAL HISTORY OF -   10/2003    Bypass grafting     TONSILLECTOMY & ADENOIDECTOMY         FAMILY HISTORY:  Family History   Problem Relation Age of Onset     Unknown/Adopted No family hx of        SOCIAL HISTORY:  Social History     Socioeconomic History     Marital status: Single     Spouse name: None     Number of children: None     Years of education: None     Highest education level: None   Occupational History     None   Social Needs     Financial resource strain: None     Food insecurity:     Worry: None     Inability: None     Transportation needs:     Medical: None     Non-medical: None   Tobacco Use     Smoking status: Current Every Day Smoker     Packs/day: 2.00     Types: Cigarettes     Smokeless tobacco: Never Used     Tobacco comment: 1 1/2 PPD 2/16/18   Substance and Sexual Activity     Alcohol use: No     Drug use: No     Sexual activity: Not Currently     Partners: Female   Lifestyle     Physical activity:     Days per week: None     Minutes per session: None     Stress: None   Relationships     Social connections:     Talks on phone: None     Gets together: None     Attends Taoism service: None     Active member of club or organization: None     Attends meetings of clubs or  "organizations: None     Relationship status: None     Intimate partner violence:     Fear of current or ex partner: None     Emotionally abused: None     Physically abused: None     Forced sexual activity: None   Other Topics Concern     Parent/sibling w/ CABG, MI or angioplasty before 65F 55M? No   Social History Narrative     None       Review of Systems:  Skin:  Negative       Eyes:  Positive for glasses    ENT:  Negative      Respiratory:  Positive for cough;dyspnea on exertion     Cardiovascular:    Positive for;lower extremity symptoms;edema    Gastroenterology: Negative      Genitourinary:  not assessed      Musculoskeletal:  Positive for joint pain;joint swelling;joint stiffness;foot pain    Neurologic:  Positive for numbness or tingling of hands;numbness or tingling of feet    Psychiatric:  Negative      Heme/Lymph/Imm:  Positive for allergies    Endocrine:  Positive for diabetes      Physical Exam:  Vitals: /68   Pulse 84   Ht 1.778 m (5' 10\")   Wt 76.2 kg (168 lb)   SpO2 97%   BMI 24.11 kg/m      Constitutional:  cooperative, alert and oriented, well developed, well nourished, in no acute distress        Skin:  warm and dry to the touch          Head:  normocephalic        Eyes:  sclera white        Lymph:      ENT:  no pallor or cyanosis        Neck:  no stiffness        Respiratory:  clear to auscultation;normal symmetry diminished breath sounds bilaterally       Cardiac: regular rhythm;normal S1 and S2                pulses full and equal                                        GI:  abdomen soft        Extremities and Muscular Skeletal:      bilateral LE edema;1+;pitting(above socks )          Neurological:  no gross motor deficits;affect appropriate        Psych:  Alert and Oriented x 3;affect appropriate, oriented to time, person and place        CC  Mimi Michaels MD  [address]  [place] [id number]              "

## 2019-11-04 ENCOUNTER — OFFICE VISIT (OUTPATIENT)
Dept: FAMILY MEDICINE | Facility: CLINIC | Age: 69
End: 2019-11-04
Payer: COMMERCIAL

## 2019-11-04 VITALS
DIASTOLIC BLOOD PRESSURE: 70 MMHG | SYSTOLIC BLOOD PRESSURE: 102 MMHG | HEART RATE: 72 BPM | WEIGHT: 171 LBS | OXYGEN SATURATION: 96 % | RESPIRATION RATE: 14 BRPM | TEMPERATURE: 97.1 F | BODY MASS INDEX: 24.54 KG/M2

## 2019-11-04 DIAGNOSIS — I25.10 CORONARY ARTERY DISEASE INVOLVING NATIVE CORONARY ARTERY OF NATIVE HEART WITHOUT ANGINA PECTORIS: ICD-10-CM

## 2019-11-04 DIAGNOSIS — Z87.891 PERSONAL HISTORY OF TOBACCO USE, PRESENTING HAZARDS TO HEALTH: Primary | ICD-10-CM

## 2019-11-04 DIAGNOSIS — R06.09 DOE (DYSPNEA ON EXERTION): ICD-10-CM

## 2019-11-04 PROCEDURE — 99214 OFFICE O/P EST MOD 30 MIN: CPT | Performed by: NURSE PRACTITIONER

## 2019-11-04 NOTE — PROGRESS NOTES
"Subjective     Delbert Tilley is a 69 year old male who presents to clinic today for the following health issues:    HPI   [facility], [facility] but also establish with [facility] for quick care. He see's [facility], Dr Michaels who suggested he get a PCP .     C/O SOA and cough for a year or more, has tried Albuterol 3-4 times a day which loosens up his phlegm. Cards suggested he get a \"breathing test\".  Patient is a current smoker  1 1/2 PPD X 50+ yrs.     Cardiology noted 11/1/19:    Delbert Tilley is a pleasant 67 year old patient with a past cardiac history significant for paroxysmal atrial fibrillation, CAD status post CABG (LIMA to LAD, SVG to OM, and SVG to PDA) and PCI to OM3 graft 2014, s/p right and left carotid endarterectomy, s/p flutter ablation, and ischemic combined with nonischemic cardiomyopathy with previous LVEF s/p ICD. Past medical history significant for diabetes mellitus and tobacco abuse.     In 2014 he had NSTEMI with stent to the SVG to OM.  EF at that time was 35-40%.  In 2017 he underwent preoperative assessment for carotid endarterectomy.  Stress test was negative for ischemia but EF had fallen.   He presented with heart failure symptoms later in 2017 and EF was noted to be 20-25% on echocardiogram.  Repeat coronary angiogram demonstrated 50% stenosis in the apical LAD and SVG to the RPDA had occluded with other grafts patent.     Patient was last seen by Dr. Michaels in February 2019.   He noted occasional volume overload which was resolved with taking an extra Lasix 20 mg, occurring monthly.  He remained active with housework and yardwork.     Pt presents today for 6 month follow-up. BMP 10/29/2019 showing normal electrolytes, creatinine, GFR, and mildly elevated BUN. Echocardiogram 10/30/2019 with EF 20-25% similar to 2017, no WMA, moderate RV dilattion with moderately decreased function, severely dilated LA, mild to moderate TR,  and " increased LV filling pressures. Results were reviewed with him today. Device check September 2019 showing 29% atrial paced, no atrial arrhythmias, one episode of 21 beat NSVT up to 200 bpm, no ATP and no shocks.     Blood pressure today is well controlled.  Weight is stable at 168 pounds. He continues checking weight each day.  Two times a week he will take an extra dose of Lasix 20 mg for increased weight and fluid retention.  His lower extremity edema usually does pretty well when he is wearing his compression stockings. He continues with chronic PARDO, going up a flight of stairs, which has been unchanged and does not improve when he takes an extra dose of Lasix. He has a significant smoking history. He currently follows with the [facility] and reports that they will not order PFTs until he quits smoking.  I've asked him to follow up with a PCP here at [place] to further discuss PFTs.  He is currently prescribed only albuterol inhaler and I believe he would likely benefit from further treatment, if found to have abnormal PFTs. He denies any anginal symptoms.  He was asymptomatic with his episode of NSVT but continues with some lightheadedness with position changes, that resolves in a few seconds. Patient reports no chest pain, PND, orthopnea, presyncope, syncope, heart racing, or palpitations.          Patient Active Problem List   Diagnosis     Cardiomyopathy (H)     Atrial fibrillation/flutter     Coronary artery disease involving native coronary artery of native heart without angina pectoris     Alcohol abuse     GERD (gastroesophageal reflux disease)     Pancreatic disease     Diabetes mellitus, type 2 (H)     TYPE 2 DIABETES, HBA1C GOAL < 8%     CARDIOVASCULAR SCREENING; LDL GOAL LESS THAN 100     Hyperlipidemia LDL goal <70     ACS (acute coronary syndrome) (H)     NSTEMI (non-ST elevated myocardial infarction) (H)     CHF (congestive heart failure) (H)     Carotid stenosis     Carpal tunnel syndrome  of right wrist     Left carotid stenosis     Tobacco use     NSVT (nonsustained ventricular tachycardia) (H)     Chronic systolic heart failure (H)     Paroxysmal atrial fibrillation (H)     Past Surgical History:   Procedure Laterality Date     BYPASS CARDIOPULMONARY       CATARACT IOL, RT/LT      Cataract IOL RT/LT     ENDARTERECTOMY CAROTID Right 6/12/2015    Procedure: ENDARTERECTOMY CAROTID;  Surgeon: João Turner MD;  Location: [facility]     ENDARTERECTOMY CAROTID Left 9/8/2017    Procedure: ENDARTERECTOMY CAROTID;  LEFT CAROTID ENDARTERECTOMY WITH EEG;  Surgeon: João Turner MD;  Location: [facility]     IMPLANT PACEMAKER  6/10/2010     RELEASE CARPAL TUNNEL Right 4/12/2016    Procedure: RELEASE CARPAL TUNNEL;  Surgeon: Lissy Leger MD;  Location: [facility]     SURGICAL HISTORY OF -   1998    Laminectomy     SURGICAL HISTORY OF -   10/2003    Bypass grafting     TONSILLECTOMY & ADENOIDECTOMY         Social History     Tobacco Use     Smoking status: Current Every Day Smoker     Packs/day: 2.00     Types: Cigarettes     Smokeless tobacco: Never Used     Tobacco comment: 1 1/2 PPD 2/16/18   Substance Use Topics     Alcohol use: No     Family History   Problem Relation Age of Onset     Unknown/Adopted No family hx of            -------------------------------------  Reviewed and updated as needed this visit by Provider         Review of Systems   ROS COMP: Constitutional, HEENT, cardiovascular, pulmonary, GI, , musculoskeletal, neuro, skin, endocrine and psych systems are negative, except as otherwise noted.      Objective    There were no vitals taken for this visit.  There is no height or weight on file to calculate BMI.  Physical Exam   GENERAL: healthy, alert and no distress  RESP: lungs clear to auscultation - no rales, rhonchi or wheezes  CV: regular rate and rhythm, normal S1 S2, no S3 or S4, no murmur, click or rub, no peripheral edema and peripheral pulses strong  MS: no gross  musculoskeletal defects noted, no edema    Diagnostic Test Results:  Labs reviewed in Epic        Assessment & Plan     1. PARDO (dyspnea on exertion)  - patient is a current smoker.   ? COPD- patient currently over using his Albuterol- I discussed with patient that albuterol can cause rebound affects of shortness of breath/pardo. Consider long term corticoid steroid.   - General PFT Lab (Please always keep checked); Future  - Pulmonary Function Test; Future    2. Personal history of tobacco use, presenting hazards to health    - General PFT Lab (Please always keep checked); Future  - Pulmonary Function Test; Future    3. Coronary artery disease involving native coronary artery of native heart without angina pectoris  Stable- followed by cardiology       Tobacco Cessation:   reports that he has been smoking cigarettes. He has been smoking about 2.00 packs per day. He has never used smokeless tobacco.  Tobacco Cessation Action Plan: Information offered: Patient not interested at this time        Follow up to discuss results     No follow-ups on file.    ANA Diaz [facility]

## 2019-11-04 NOTE — PATIENT INSTRUCTIONS
Thank you for choosing [facility].  You may be receiving an email and/or telephone survey request from [facility] Customer [employer] regarding your visit today.  Please take a few minutes to respond to the survey to let us know how we are doing.      If you have questions or concerns, please contact us via [facility] or you can contact your care team at [phone].    Our Clinic hours are:  Monday 6:40 am  to 7:00 pm  Tuesday -Friday 6:40 am to 5:00 pm    The [place] outpatient lab hours are:  Monday - Friday 6:10 am to 4:45 pm  Saturdays 7:00 am to 11:00 am  Appointments are required, call [phone]    If you have clinical questions after hours or would like to schedule an appointment,  call the clinic at [phone].

## 2019-11-11 ENCOUNTER — HOSPITAL ENCOUNTER (OUTPATIENT)
Dept: RESPIRATORY THERAPY | Facility: CLINIC | Age: 69
Discharge: HOME | End: 2019-11-11
Admitting: NURSE PRACTITIONER
Payer: COMMERCIAL

## 2019-11-11 DIAGNOSIS — Z87.891 PERSONAL HISTORY OF TOBACCO USE, PRESENTING HAZARDS TO HEALTH: ICD-10-CM

## 2019-11-11 DIAGNOSIS — R06.09 DOE (DYSPNEA ON EXERTION): ICD-10-CM

## 2019-11-11 PROCEDURE — 25000125 ZZHC RX 250: Performed by: NURSE PRACTITIONER

## 2019-11-11 PROCEDURE — 94729 DIFFUSING CAPACITY: CPT

## 2019-11-11 PROCEDURE — 94729 DIFFUSING CAPACITY: CPT | Mod: 26 | Performed by: INTERNAL MEDICINE

## 2019-11-11 PROCEDURE — 94060 EVALUATION OF WHEEZING: CPT | Mod: 26 | Performed by: INTERNAL MEDICINE

## 2019-11-11 PROCEDURE — 94060 EVALUATION OF WHEEZING: CPT

## 2019-11-11 PROCEDURE — 94726 PLETHYSMOGRAPHY LUNG VOLUMES: CPT | Mod: 26 | Performed by: INTERNAL MEDICINE

## 2019-11-11 PROCEDURE — 94726 PLETHYSMOGRAPHY LUNG VOLUMES: CPT

## 2019-11-11 RX ORDER — ALBUTEROL SULFATE 0.83 MG/ML
2.5 SOLUTION RESPIRATORY (INHALATION) ONCE
Status: COMPLETED | OUTPATIENT
Start: 2019-11-11 | End: 2019-11-11

## 2019-11-11 RX ADMIN — ALBUTEROL SULFATE 2.5 MG: 2.5 SOLUTION RESPIRATORY (INHALATION) at 16:55

## 2019-11-11 NOTE — LETTER
November 21, 2019      Delbert Tilley  [id number]  [place] [address]        Dear ,    We are writing to inform you of your test results.  PFT shows moderate airflow obstruction.   Continue to use albuterol inhaler as needed.  I have sent in a new prescription to [facility].  Use this daily to help with breathing.    Resulted Orders   General PFT Lab (Please always keep checked)   Result Value Ref Range    FVC-Pred 4.21 L    FVC-Pre 4.10 L    FVC-%Pred-Pre 97 %    FEV1-Pre 2.11 L    FEV1-%Pred-Pre 66 %    FEV1FVC-Pred 76 %    FEV1FVC-Pre 51 %    FEFMax-Pred 8.26 L/sec    FEFMax-Pre 5.39 L/sec    FEFMax-%Pred-Pre 65 %    FEF2575-Pred 2.40 L/sec    FEF2575-Pre 0.82 L/sec    KYI7187-%Pred-Pre 33 %    FEF2575-Post 0.96 L/sec    HQR6708-%Pred-Post 39 %    ExpTime-Pre 8.67 sec    FIFMax-Pre 4.87 L/sec    VC-Pred 4.74 L    VC-Pre 4.32 L    VC-%Pred-Pre 91 %    IC-Pred 3.52 L    IC-Pre 2.67 L    IC-%Pred-Pre 75 %    ERV-Pred 1.22 L    ERV-Pre 1.49 L    ERV-%Pred-Pre 121 %    FEV1FEV6-Pred 78 %    FEV1FEV6-Pre 55 %    FRCPleth-Pred 3.71 L    FRCPleth-Pre 5.48 L    FRCPleth-%Pred-Pre 147 %    RVPleth-Pred 2.66 L    RVPleth-Pre 3.83 L    RVPleth-%Pred-Pre 143 %    TLCPleth-Pred 7.13 L    TLCPleth-Pre 8.15 L    TLCPleth-%Pred-Pre 114 %    DLCOunc-Pred 25.76 ml/min/mmHg    DLCOunc-Pre 13.53 ml/min/mmHg    DLCOunc-%Pred-Pre 52 %    VA-Pre 6.50 L    VA-%Pred-Pre 101 %    FEV1SVC-Pred 67 %    FEV1SVC-Pre 49 %    Narrative    The FEV1 and FEV1/FVC ratio are reduced.   The inspiratory flow rates are within normal limits.  While the TLC is within normal limits, the FRC and RV are increased.  Following administration of bronchodilators, there is no significant response.  The   diffusing capacity is reduced. However, the diffusing capacity was not corrected for the patient's hemoglobin.  Airway obstruction is present.  A clinical trial of bronchodilators may be beneficial in view of the airway  obstruction.  IMPRESSION:  Mild-moderate Airflow Obstruction   Moderate reduction in DLCO  ____________________________________________M.D.    This interpretation has been electronically signed:  Dariusz Mei 11/17/2019  02:11:21 PM         If you have any questions or concerns, please call the clinic at the number listed above.       Sincerely,        Elizabeth Collado, ANA, CNP/bmc

## 2019-11-17 LAB
DLCOUNC-%PRED-PRE: 52 %
DLCOUNC-PRE: 13.53 ML/MIN/MMHG
DLCOUNC-PRED: 25.76 ML/MIN/MMHG
ERV-%PRED-PRE: 121 %
ERV-PRE: 1.49 L
ERV-PRED: 1.22 L
EXPTIME-PRE: 8.67 SEC
FEF2575-%PRED-POST: 39 %
FEF2575-%PRED-PRE: 33 %
FEF2575-POST: 0.96 L/SEC
FEF2575-PRE: 0.82 L/SEC
FEF2575-PRED: 2.4 L/SEC
FEFMAX-%PRED-PRE: 65 %
FEFMAX-PRE: 5.39 L/SEC
FEFMAX-PRED: 8.26 L/SEC
FEV1-%PRED-PRE: 66 %
FEV1-PRE: 2.11 L
FEV1FEV6-PRE: 55 %
FEV1FEV6-PRED: 78 %
FEV1FVC-PRE: 51 %
FEV1FVC-PRED: 76 %
FEV1SVC-PRE: 49 %
FEV1SVC-PRED: 67 %
FIFMAX-PRE: 4.87 L/SEC
FRCPLETH-%PRED-PRE: 147 %
FRCPLETH-PRE: 5.48 L
FRCPLETH-PRED: 3.71 L
FVC-%PRED-PRE: 97 %
FVC-PRE: 4.1 L
FVC-PRED: 4.21 L
IC-%PRED-PRE: 75 %
IC-PRE: 2.67 L
IC-PRED: 3.52 L
RVPLETH-%PRED-PRE: 143 %
RVPLETH-PRE: 3.83 L
RVPLETH-PRED: 2.66 L
TLCPLETH-%PRED-PRE: 114 %
TLCPLETH-PRE: 8.15 L
TLCPLETH-PRED: 7.13 L
VA-%PRED-PRE: 101 %
VA-PRE: 6.5 L
VC-%PRED-PRE: 91 %
VC-PRE: 4.32 L
VC-PRED: 4.74 L

## 2019-11-19 DIAGNOSIS — J44.9 CHRONIC OBSTRUCTIVE PULMONARY DISEASE, UNSPECIFIED COPD TYPE (H): Primary | ICD-10-CM

## 2019-11-21 ENCOUNTER — TELEPHONE (OUTPATIENT)
Dept: FAMILY MEDICINE | Facility: CLINIC | Age: 69
End: 2019-11-21

## 2019-11-21 DIAGNOSIS — J44.9 CHRONIC OBSTRUCTIVE PULMONARY DISEASE, UNSPECIFIED COPD TYPE (H): ICD-10-CM

## 2019-11-21 NOTE — TELEPHONE ENCOUNTER
Reason for Call:   prescription    Detailed comments: Patient has prescription for Advair that was sent ot [facility].  It was too expensive and [facility] [place] [facility]    Dr. Mix [phone] ext [phone]  Fax - [phone]     Phone Number Patient can be reached at: Home number on file [phone] (home)    Best Time: Any    Can we leave a detailed message on this number? YES    Call taken on 11/21/2019 at 2:35 PM by Nivia Moon

## 2019-12-12 ENCOUNTER — ANCILLARY PROCEDURE (OUTPATIENT)
Dept: CARDIOLOGY | Facility: CLINIC | Age: 69
End: 2019-12-12
Attending: INTERNAL MEDICINE
Payer: COMMERCIAL

## 2019-12-12 DIAGNOSIS — Z95.810 ICD (IMPLANTABLE CARDIOVERTER-DEFIBRILLATOR) IN PLACE: ICD-10-CM

## 2019-12-12 PROCEDURE — 93296 REM INTERROG EVL PM/IDS: CPT | Performed by: INTERNAL MEDICINE

## 2019-12-12 PROCEDURE — 93295 DEV INTERROG REMOTE 1/2/MLT: CPT | Performed by: INTERNAL MEDICINE

## 2019-12-17 ENCOUNTER — OFFICE VISIT (OUTPATIENT)
Dept: ORTHOPEDICS | Facility: CLINIC | Age: 69
End: 2019-12-17
Payer: COMMERCIAL

## 2019-12-17 VITALS
DIASTOLIC BLOOD PRESSURE: 83 MMHG | SYSTOLIC BLOOD PRESSURE: 133 MMHG | HEIGHT: 70 IN | WEIGHT: 173 LBS | BODY MASS INDEX: 24.77 KG/M2

## 2019-12-17 DIAGNOSIS — M79.644 CHRONIC PAIN OF RIGHT THUMB: Primary | ICD-10-CM

## 2019-12-17 DIAGNOSIS — G89.29 CHRONIC PAIN OF RIGHT THUMB: Primary | ICD-10-CM

## 2019-12-17 DIAGNOSIS — M18.11 PRIMARY OSTEOARTHRITIS OF FIRST CARPOMETACARPAL JOINT OF RIGHT HAND: ICD-10-CM

## 2019-12-17 PROCEDURE — 20604 DRAIN/INJ JOINT/BURSA W/US: CPT | Mod: RT | Performed by: FAMILY MEDICINE

## 2019-12-17 PROCEDURE — 99213 OFFICE O/P EST LOW 20 MIN: CPT | Mod: 25 | Performed by: FAMILY MEDICINE

## 2019-12-17 RX ORDER — ROPIVACAINE HYDROCHLORIDE 5 MG/ML
0.5 INJECTION, SOLUTION EPIDURAL; INFILTRATION; PERINEURAL
Status: DISCONTINUED | OUTPATIENT
Start: 2019-12-17 | End: 2020-03-13

## 2019-12-17 RX ORDER — TRIAMCINOLONE ACETONIDE 40 MG/ML
20 INJECTION, SUSPENSION INTRA-ARTICULAR; INTRAMUSCULAR
Status: DISCONTINUED | OUTPATIENT
Start: 2019-12-17 | End: 2020-03-13

## 2019-12-17 RX ADMIN — TRIAMCINOLONE ACETONIDE 20 MG: 40 INJECTION, SUSPENSION INTRA-ARTICULAR; INTRAMUSCULAR at 08:30

## 2019-12-17 RX ADMIN — ROPIVACAINE HYDROCHLORIDE 0.5 ML: 5 INJECTION, SOLUTION EPIDURAL; INFILTRATION; PERINEURAL at 08:30

## 2019-12-17 ASSESSMENT — MIFFLIN-ST. JEOR: SCORE: 1545.97

## 2019-12-17 NOTE — PROGRESS NOTES
Delbert Tilley  :  1950  DOS: 19  MRN: [id number]    Sports Medicine Clinic Visit    PCP: [place], [facility]    Delbert Tilley is a 68 year old Right hand dominant male who is seen as a self referral presenting with chronic right thumb pain.    Injury: Gradual onset of chronic right thumb pain over the last ~ 5 years, pain worse over the last 6 months.  Pain located over right thumb CMC and MCP joints, nonradiating.  Additional Features:  Positive: swelling, grinding and weakness.  Symptoms are better with Rest.  Symptoms are worse with: gripping/grasping, thumb opposition.  Other evaluation and/or treatments so far consists of: Tylenol and Rest.  Recent imaging completed: No recent imaging completed.  Prior History of related problems: H/o s/p right CT release in  by Dr Leger @ [facility], noted some pain prior to surgery.  Steroid injection had been discussed with Dr Leger at that time.    Social History: retired    Interim History - 2019  Since last visit on 3/5/2019 patient has moderate right thumb, CMC joint pain over the last ~ 4 - 6 weeks.  Right thumb CMC joint injection completed on 3/5 provided good relief for ~ 4 - 4.5 months.  No new injury in the interim.    Interim History - 2019  Since last visit on 2019 patient has moderate right thumb and wrist pain.  Right thumb CMC joint injection completed on  provided good relief for ~ 3.5 months.  Patient notes worsening pain over the last ~ 1 - 2 weeks.  No new injury in the interim.    Review of Systems  Musculoskeletal: as above  Remainder of review of systems is negative including constitutional, CV, pulmonary, GI, Skin and Neurologic except as noted in HPI or medical history.    Past Medical History:   Diagnosis Date     Acute renal failure (H) 06 Hosp    secondary to dehydration     Arthritis      CAD (coronary artery disease)     LIMA to the LAD, vein graft to OM and a vein graft to the PDA      "Carpal tunnel syndrome      Diabetes (H)      Gastro-oesophageal reflux disease      H/O: alcohol abuse      HTN (hypertension)      Hyperlipidaemia      Hyperlipidaemia LDL goal <100 7/8/2013     Hypokalemia      Pacemaker      Pancreatitis 6/26/06 Hosp     Past Surgical History:   Procedure Laterality Date     BYPASS CARDIOPULMONARY       CATARACT IOL, RT/LT      Cataract IOL RT/LT     ENDARTERECTOMY CAROTID Right 6/12/2015    Procedure: ENDARTERECTOMY CAROTID;  Surgeon: João Turner MD;  Location: [facility] OR     ENDARTERECTOMY CAROTID Left 9/8/2017    Procedure: ENDARTERECTOMY CAROTID;  LEFT CAROTID ENDARTERECTOMY WITH EEG;  Surgeon: João Turner MD;  Location: [facility] OR     IMPLANT PACEMAKER  6/10/2010     RELEASE CARPAL TUNNEL Right 4/12/2016    Procedure: RELEASE CARPAL TUNNEL;  Surgeon: Lissy Leger MD;  Location: [facility] OR     SURGICAL HISTORY OF -   1998    Laminectomy     SURGICAL HISTORY OF -   10/2003    Bypass grafting     TONSILLECTOMY & ADENOIDECTOMY         Objective  /83   Ht 1.778 m (5' 10\")   Wt 78.5 kg (173 lb)   BMI 24.82 kg/m      General: healthy, alert and in no distress    HEENT: no scleral icterus or conjunctival erythema   Skin: no suspicious lesions or rash. No jaundice.   CV: regular rhythm by palpation, 2+ distal pulses, no pedal edema    Resp: normal respiratory effort without conversational dyspnea   Psych: normal mood and affect    Gait: nonantalgic, appropriate coordination and balance   Neuro: normal light touch sensory exam of the extremities. Motor strength as noted below     Right Wrist and Hand exam    Inspection:       No swelling, bruising or deformity bilateral and prominence of the right 1st CMC joint likely due to osteophytes +/- subluxation    Tender:       CMC and MCP joint of 1st digit(s)  right    Non Tender:       Remainder of the Wrist and Hand bilateral    ROM:       Decreased active and passive ROM of the 1st CMC with flexion, extension and " opposition right    Strength:       5/5 strength in the muscles of the hand, wrist and forearm right    Special Tests:        neg (-) Tinel's test bilateral,       neg (-) Phalen's test bilateral,       neg (-) TFCC compression test bilateral and       neg (-) Finkelstein's maneuver bilateral    Neurovascular:       2+ radial pulses bilaterally with brisk capillary refill and      normal sensation to light touch in the radial, median and ulnar nerve distributions      Radiology:  Recent Results (from the past 744 hour(s))   XR Finger Right G/E 2 Views    Narrative    FINGER RIGHT TWO OR MORE VIEWS   3/5/2019 9:13 AM     HISTORY: Evaluate for CMC and MCP joint osteoarthritis. Chronic pain  of right thumb.     COMPARISON: None.      Impression    IMPRESSION: Three views of the thumb. Advanced first carpometacarpal  degenerative changes. MCP joint appears fairly well-preserved. No  evidence of acute fracture.       Hand / Upper Extremity Injection/Arthrocentesis: R thumb CMC  Date/Time: 12/17/2019 8:30 AM  Performed by: Leonardo Le DO  Authorized by: Leonardo Le DO     Indications:  Pain  Needle Size:  25 G  Guidance: ultrasound    Approach:  Radial  Condition: osteoarthritis    Location:  Thumb  Site:  R thumb CMC    Medications:  0.5 mL ropivacaine 5 MG/ML; 20 mg triamcinolone 40 MG/ML  Outcome:  Tolerated well, no immediate complications  Procedure discussed: discussed risks, benefits, and alternatives    Consent Given by:  Patient  Timeout: timeout called immediately prior to procedure    Prep: patient was prepped and draped in usual sterile fashion        Assessment:  1. Chronic pain of right thumb    2. Primary osteoarthritis of first carpometacarpal joint of right hand        Plan:  Discussed the assessment with the patient.  Follow up: prn  Known CMC OA, confirmed with XR, no other acute findings  XR images independently visualized and reviewed with patient today in clinic  US guided  1st CMC injection repeated today, limitations reviewed  Provided thumb spica brace previously, reviewed use strategies  Offered referral to Dr Martin in the future if desired to discuss surgical options  Expectations and goals of CSI reviewed  Often 2-3 days for steroid effect, and can take up to two weeks for maximum effect  We discussed modified progressive pain-free activity as tolerated  Do not overuse in first two weeks if feeling better due to concern for vulnerability while steroid is working  Supportive care reviewed  All questions were answered today  Contact us with additional questions or concerns  Signs and sx of concern reviewed      Leonardo Le DO, MIGUEL  Primary Care Sports Medicine  [facility]               Disclaimer: This note consists of symbols derived from keyboarding, dictation and/or voice recognition software. As a result, there may be errors in the script that have gone undetected. Please consider this when interpreting information found in this chart.

## 2019-12-17 NOTE — LETTER
2019         RE: Delbert Tilley  [address]  [place] [address]        Dear Colleague,    Thank you for referring your patient, Delbert Tilley, to the [facility]. Please see a copy of my visit note below.    Delbert Tilley  :  1950  DOS: 19  MRN: [id number]    Sports Medicine Clinic Visit    PCP: [facility]    Delbert Tilley is a 68 year old Right hand dominant male who is seen as a self referral presenting with chronic right thumb pain.    Injury: Gradual onset of chronic right thumb pain over the last ~ 5 years, pain worse over the last 6 months.  Pain located over right thumb CMC and MCP joints, nonradiating.  Additional Features:  Positive: swelling, grinding and weakness.  Symptoms are better with Rest.  Symptoms are worse with: gripping/grasping, thumb opposition.  Other evaluation and/or treatments so far consists of: Tylenol and Rest.  Recent imaging completed: No recent imaging completed.  Prior History of related problems: H/o s/p right CT release in  by Dr Leger @ [facility], noted some pain prior to surgery.  Steroid injection had been discussed with Dr Leger at that time.    Social History: retired    Interim History - 2019  Since last visit on 3/5/2019 patient has moderate right thumb, CMC joint pain over the last ~ 4 - 6 weeks.  Right thumb CMC joint injection completed on 3/5 provided good relief for ~ 4 - 4.5 months.  No new injury in the interim.    Interim History - 2019  Since last visit on 2019 patient has moderate right thumb and wrist pain.  Right thumb CMC joint injection completed on  provided good relief for ~ 3.5 months.  Patient notes worsening pain over the last ~ 1 - 2 weeks.  No new injury in the interim.    Review of Systems  Musculoskeletal: as above  Remainder of review of systems is negative including constitutional, CV, pulmonary, GI, Skin and Neurologic except as  "noted in HPI or medical history.    Past Medical History:   Diagnosis Date     Acute renal failure (H) 8/26/06 Hosp    secondary to dehydration     Arthritis      CAD (coronary artery disease)     LIMA to the LAD, vein graft to OM and a vein graft to the PDA     Carpal tunnel syndrome      Diabetes (H)      Gastro-oesophageal reflux disease      H/O: alcohol abuse      HTN (hypertension)      Hyperlipidaemia      Hyperlipidaemia LDL goal <100 7/8/2013     Hypokalemia      Pacemaker      Pancreatitis 6/26/06 Hosp     Past Surgical History:   Procedure Laterality Date     BYPASS CARDIOPULMONARY       CATARACT IOL, RT/LT      Cataract IOL RT/LT     ENDARTERECTOMY CAROTID Right 6/12/2015    Procedure: ENDARTERECTOMY CAROTID;  Surgeon: João Turner MD;  Location: [facility]     ENDARTERECTOMY CAROTID Left 9/8/2017    Procedure: ENDARTERECTOMY CAROTID;  LEFT CAROTID ENDARTERECTOMY WITH EEG;  Surgeon: João Turner MD;  Location: [facility]     IMPLANT PACEMAKER  6/10/2010     RELEASE CARPAL TUNNEL Right 4/12/2016    Procedure: RELEASE CARPAL TUNNEL;  Surgeon: Lissy Leger MD;  Location: [facility] OR     SURGICAL HISTORY OF -   1998    Laminectomy     SURGICAL HISTORY OF -   10/2003    Bypass grafting     TONSILLECTOMY & ADENOIDECTOMY         Objective  /83   Ht 1.778 m (5' 10\")   Wt 78.5 kg (173 lb)   BMI 24.82 kg/m       General: healthy, alert and in no distress    HEENT: no scleral icterus or conjunctival erythema   Skin: no suspicious lesions or rash. No jaundice.   CV: regular rhythm by palpation, 2+ distal pulses, no pedal edema    Resp: normal respiratory effort without conversational dyspnea   Psych: normal mood and affect    Gait: nonantalgic, appropriate coordination and balance   Neuro: normal light touch sensory exam of the extremities. Motor strength as noted below     Right Wrist and Hand exam    Inspection:       No swelling, bruising or deformity bilateral and prominence of the right 1st " CMC joint likely due to osteophytes +/- subluxation    Tender:       CMC and MCP joint of 1st digit(s)  right    Non Tender:       Remainder of the Wrist and Hand bilateral    ROM:       Decreased active and passive ROM of the 1st CMC with flexion, extension and opposition right    Strength:       5/5 strength in the muscles of the hand, wrist and forearm right    Special Tests:        neg (-) Tinel's test bilateral,       neg (-) Phalen's test bilateral,       neg (-) TFCC compression test bilateral and       neg (-) Finkelstein's maneuver bilateral    Neurovascular:       2+ radial pulses bilaterally with brisk capillary refill and      normal sensation to light touch in the radial, median and ulnar nerve distributions      Radiology:  Recent Results (from the past 744 hour(s))   XR Finger Right G/E 2 Views    Narrative    FINGER RIGHT TWO OR MORE VIEWS   3/5/2019 9:13 AM     HISTORY: Evaluate for CMC and MCP joint osteoarthritis. Chronic pain  of right thumb.     COMPARISON: None.      Impression    IMPRESSION: Three views of the thumb. Advanced first carpometacarpal  degenerative changes. MCP joint appears fairly well-preserved. No  evidence of acute fracture.       Hand / Upper Extremity Injection/Arthrocentesis: R thumb CMC  Date/Time: 12/17/2019 8:30 AM  Performed by: Leonardo Le DO  Authorized by: Leonardo Le DO     Indications:  Pain  Needle Size:  25 G  Guidance: ultrasound    Approach:  Radial  Condition: osteoarthritis    Location:  Thumb  Site:  R thumb CMC    Medications:  0.5 mL ropivacaine 5 MG/ML; 20 mg triamcinolone 40 MG/ML  Outcome:  Tolerated well, no immediate complications  Procedure discussed: discussed risks, benefits, and alternatives    Consent Given by:  Patient  Timeout: timeout called immediately prior to procedure    Prep: patient was prepped and draped in usual sterile fashion        Assessment:  1. Chronic pain of right thumb    2. Primary osteoarthritis of  first carpometacarpal joint of right hand        Plan:  Discussed the assessment with the patient.  Follow up: prn  Known CMC OA, confirmed with XR, no other acute findings  XR images independently visualized and reviewed with patient today in clinic  US guided 1st CMC injection repeated today, limitations reviewed  Provided thumb spica brace previously, reviewed use strategies  Offered referral to Dr Martin in the future if desired to discuss surgical options  Expectations and goals of CSI reviewed  Often 2-3 days for steroid effect, and can take up to two weeks for maximum effect  We discussed modified progressive pain-free activity as tolerated  Do not overuse in first two weeks if feeling better due to concern for vulnerability while steroid is working  Supportive care reviewed  All questions were answered today  Contact us with additional questions or concerns  Signs and sx of concern reviewed      Leonardo Le DO, MIGUEL  Primary Care Sports Medicine  [facility]               Disclaimer: This note consists of symbols derived from keyboarding, dictation and/or voice recognition software. As a result, there may be errors in the script that have gone undetected. Please consider this when interpreting information found in this chart.    Again, thank you for allowing me to participate in the care of your patient.        Sincerely,        Leonardo Le DO

## 2020-01-03 LAB
MDC_IDC_LEAD_IMPLANT_DT: NORMAL
MDC_IDC_LEAD_IMPLANT_DT: NORMAL
MDC_IDC_LEAD_LOCATION: NORMAL
MDC_IDC_LEAD_LOCATION: NORMAL
MDC_IDC_LEAD_LOCATION_DETAIL_1: NORMAL
MDC_IDC_LEAD_LOCATION_DETAIL_1: NORMAL
MDC_IDC_LEAD_MFG: NORMAL
MDC_IDC_LEAD_MFG: NORMAL
MDC_IDC_LEAD_MODEL: NORMAL
MDC_IDC_LEAD_MODEL: NORMAL
MDC_IDC_LEAD_POLARITY_TYPE: NORMAL
MDC_IDC_LEAD_POLARITY_TYPE: NORMAL
MDC_IDC_LEAD_SERIAL: NORMAL
MDC_IDC_LEAD_SERIAL: NORMAL
MDC_IDC_MSMT_BATTERY_DTM: NORMAL
MDC_IDC_MSMT_BATTERY_REMAINING_LONGEVITY: 105 MO
MDC_IDC_MSMT_BATTERY_RRT_TRIGGER: 2.73
MDC_IDC_MSMT_BATTERY_STATUS: NORMAL
MDC_IDC_MSMT_BATTERY_VOLTAGE: 3.01 V
MDC_IDC_MSMT_CAP_CHARGE_DTM: NORMAL
MDC_IDC_MSMT_CAP_CHARGE_ENERGY: 18 J
MDC_IDC_MSMT_CAP_CHARGE_TIME: 3.9
MDC_IDC_MSMT_CAP_CHARGE_TYPE: NORMAL
MDC_IDC_MSMT_LEADCHNL_RA_IMPEDANCE_VALUE: 456 OHM
MDC_IDC_MSMT_LEADCHNL_RA_PACING_THRESHOLD_AMPLITUDE: 0.88 V
MDC_IDC_MSMT_LEADCHNL_RA_PACING_THRESHOLD_PULSEWIDTH: 0.4 MS
MDC_IDC_MSMT_LEADCHNL_RA_SENSING_INTR_AMPL: 3.38 MV
MDC_IDC_MSMT_LEADCHNL_RA_SENSING_INTR_AMPL: 3.38 MV
MDC_IDC_MSMT_LEADCHNL_RV_IMPEDANCE_VALUE: 323 OHM
MDC_IDC_MSMT_LEADCHNL_RV_IMPEDANCE_VALUE: 399 OHM
MDC_IDC_MSMT_LEADCHNL_RV_PACING_THRESHOLD_AMPLITUDE: 1.12 V
MDC_IDC_MSMT_LEADCHNL_RV_PACING_THRESHOLD_PULSEWIDTH: 0.4 MS
MDC_IDC_MSMT_LEADCHNL_RV_SENSING_INTR_AMPL: 4 MV
MDC_IDC_MSMT_LEADCHNL_RV_SENSING_INTR_AMPL: 4 MV
MDC_IDC_PG_IMPLANT_DTM: NORMAL
MDC_IDC_PG_MFG: NORMAL
MDC_IDC_PG_MODEL: NORMAL
MDC_IDC_PG_SERIAL: NORMAL
MDC_IDC_PG_TYPE: NORMAL
MDC_IDC_SESS_CLINIC_NAME: NORMAL
MDC_IDC_SESS_DTM: NORMAL
MDC_IDC_SESS_TYPE: NORMAL
MDC_IDC_SET_BRADY_AT_MODE_SWITCH_RATE: 171 {BEATS}/MIN
MDC_IDC_SET_BRADY_HYSTRATE: NORMAL
MDC_IDC_SET_BRADY_LOWRATE: 55 {BEATS}/MIN
MDC_IDC_SET_BRADY_MAX_SENSOR_RATE: 140 {BEATS}/MIN
MDC_IDC_SET_BRADY_MAX_TRACKING_RATE: 140 {BEATS}/MIN
MDC_IDC_SET_BRADY_MODE: NORMAL
MDC_IDC_SET_BRADY_PAV_DELAY_LOW: 180 MS
MDC_IDC_SET_BRADY_SAV_DELAY_LOW: 150 MS
MDC_IDC_SET_LEADCHNL_RA_PACING_AMPLITUDE: 1.75 V
MDC_IDC_SET_LEADCHNL_RA_PACING_ANODE_ELECTRODE_1: NORMAL
MDC_IDC_SET_LEADCHNL_RA_PACING_ANODE_LOCATION_1: NORMAL
MDC_IDC_SET_LEADCHNL_RA_PACING_CAPTURE_MODE: NORMAL
MDC_IDC_SET_LEADCHNL_RA_PACING_CATHODE_ELECTRODE_1: NORMAL
MDC_IDC_SET_LEADCHNL_RA_PACING_CATHODE_LOCATION_1: NORMAL
MDC_IDC_SET_LEADCHNL_RA_PACING_POLARITY: NORMAL
MDC_IDC_SET_LEADCHNL_RA_PACING_PULSEWIDTH: 0.4 MS
MDC_IDC_SET_LEADCHNL_RA_SENSING_ANODE_ELECTRODE_1: NORMAL
MDC_IDC_SET_LEADCHNL_RA_SENSING_ANODE_LOCATION_1: NORMAL
MDC_IDC_SET_LEADCHNL_RA_SENSING_CATHODE_ELECTRODE_1: NORMAL
MDC_IDC_SET_LEADCHNL_RA_SENSING_CATHODE_LOCATION_1: NORMAL
MDC_IDC_SET_LEADCHNL_RA_SENSING_POLARITY: NORMAL
MDC_IDC_SET_LEADCHNL_RA_SENSING_SENSITIVITY: 0.3 MV
MDC_IDC_SET_LEADCHNL_RV_PACING_AMPLITUDE: 2.5 V
MDC_IDC_SET_LEADCHNL_RV_PACING_ANODE_ELECTRODE_1: NORMAL
MDC_IDC_SET_LEADCHNL_RV_PACING_ANODE_LOCATION_1: NORMAL
MDC_IDC_SET_LEADCHNL_RV_PACING_CAPTURE_MODE: NORMAL
MDC_IDC_SET_LEADCHNL_RV_PACING_CATHODE_ELECTRODE_1: NORMAL
MDC_IDC_SET_LEADCHNL_RV_PACING_CATHODE_LOCATION_1: NORMAL
MDC_IDC_SET_LEADCHNL_RV_PACING_POLARITY: NORMAL
MDC_IDC_SET_LEADCHNL_RV_PACING_PULSEWIDTH: 0.4 MS
MDC_IDC_SET_LEADCHNL_RV_SENSING_ANODE_ELECTRODE_1: NORMAL
MDC_IDC_SET_LEADCHNL_RV_SENSING_ANODE_LOCATION_1: NORMAL
MDC_IDC_SET_LEADCHNL_RV_SENSING_CATHODE_ELECTRODE_1: NORMAL
MDC_IDC_SET_LEADCHNL_RV_SENSING_CATHODE_LOCATION_1: NORMAL
MDC_IDC_SET_LEADCHNL_RV_SENSING_POLARITY: NORMAL
MDC_IDC_SET_LEADCHNL_RV_SENSING_SENSITIVITY: 0.3 MV
MDC_IDC_SET_ZONE_DETECTION_BEATS_DENOMINATOR: 40 {BEATS}
MDC_IDC_SET_ZONE_DETECTION_BEATS_NUMERATOR: 30 {BEATS}
MDC_IDC_SET_ZONE_DETECTION_INTERVAL: 320 MS
MDC_IDC_SET_ZONE_DETECTION_INTERVAL: 350 MS
MDC_IDC_SET_ZONE_DETECTION_INTERVAL: 360 MS
MDC_IDC_SET_ZONE_DETECTION_INTERVAL: 400 MS
MDC_IDC_SET_ZONE_DETECTION_INTERVAL: NORMAL
MDC_IDC_SET_ZONE_TYPE: NORMAL
MDC_IDC_STAT_AT_BURDEN_PERCENT: 0 %
MDC_IDC_STAT_AT_DTM_END: NORMAL
MDC_IDC_STAT_AT_DTM_START: NORMAL
MDC_IDC_STAT_BRADY_AP_VP_PERCENT: 0.1 %
MDC_IDC_STAT_BRADY_AP_VS_PERCENT: 26.88 %
MDC_IDC_STAT_BRADY_AS_VP_PERCENT: 0.04 %
MDC_IDC_STAT_BRADY_AS_VS_PERCENT: 72.98 %
MDC_IDC_STAT_BRADY_DTM_END: NORMAL
MDC_IDC_STAT_BRADY_DTM_START: NORMAL
MDC_IDC_STAT_BRADY_RA_PERCENT_PACED: 26.98 %
MDC_IDC_STAT_BRADY_RV_PERCENT_PACED: 0.14 %
MDC_IDC_STAT_EPISODE_RECENT_COUNT: 0
MDC_IDC_STAT_EPISODE_RECENT_COUNT_DTM_END: NORMAL
MDC_IDC_STAT_EPISODE_RECENT_COUNT_DTM_START: NORMAL
MDC_IDC_STAT_EPISODE_TOTAL_COUNT: 0
MDC_IDC_STAT_EPISODE_TOTAL_COUNT: 2
MDC_IDC_STAT_EPISODE_TOTAL_COUNT_DTM_END: NORMAL
MDC_IDC_STAT_EPISODE_TOTAL_COUNT_DTM_START: NORMAL
MDC_IDC_STAT_EPISODE_TYPE: NORMAL
MDC_IDC_STAT_TACHYTHERAPY_ATP_DELIVERED_RECENT: 0
MDC_IDC_STAT_TACHYTHERAPY_ATP_DELIVERED_TOTAL: 0
MDC_IDC_STAT_TACHYTHERAPY_RECENT_DTM_END: NORMAL
MDC_IDC_STAT_TACHYTHERAPY_RECENT_DTM_START: NORMAL
MDC_IDC_STAT_TACHYTHERAPY_SHOCKS_ABORTED_RECENT: 0
MDC_IDC_STAT_TACHYTHERAPY_SHOCKS_ABORTED_TOTAL: 0
MDC_IDC_STAT_TACHYTHERAPY_SHOCKS_DELIVERED_RECENT: 0
MDC_IDC_STAT_TACHYTHERAPY_SHOCKS_DELIVERED_TOTAL: 0
MDC_IDC_STAT_TACHYTHERAPY_TOTAL_DTM_END: NORMAL
MDC_IDC_STAT_TACHYTHERAPY_TOTAL_DTM_START: NORMAL

## 2020-02-06 ENCOUNTER — TELEPHONE (OUTPATIENT)
Dept: CARDIOLOGY | Facility: CLINIC | Age: 70
End: 2020-02-06

## 2020-02-06 DIAGNOSIS — I25.5 ISCHEMIC CARDIOMYOPATHY: Primary | ICD-10-CM

## 2020-02-06 RX ORDER — SPIRONOLACTONE 25 MG/1
12.5 TABLET ORAL DAILY
COMMUNITY
Start: 2020-02-06 | End: 2020-03-13

## 2020-02-06 NOTE — TELEPHONE ENCOUNTER
"Pt called stating he has increased weight and swelling in ankles for 1 week.  Chronic SOB (pt is smoker).  Using compression stockings and then edema is in upper legs.  Has been taking Lasix 20mg daily and also a spirinolactone 25mg that he states Dr Michaels told him to take when he needs it (?).  He has taken that for 2 days.  Baseline Wt 162lbs current wt up to 175lbs.    PMH: CAD s/p CABG, Ischemic cardiomyopathy s/p ICD    Will route to Yareli Mina NP for recommendations.  ALEXANDRA DIA RN on 2/6/2020 at 8:45 AM      ADDENDUM:   Have him increase lasix to 40 mg daily. Recheck BMP in 1 week. His spironolactone was previously discontinued d/t hypotension. If BP tolerates it now, he can take that daily also. Call if Wts are not improving.   ANA Givens CNP    ADDENDUM:  Discussed med changes.Pt verbalized understanding. Pt will monitor BP at home, call if not improving. Lab appt made for 2/13/20.    ALEXANDRA DIA RN on 2/6/2020 at 1:50 PM    ADDENDUM: Pt notified of normal lab results and enc to continue to keep well hydrated. His BP is \"normal\"--he is out in his shop and doesn't have access to the numbers--and his weight is back down to 162#. Will discuss with Yareli Mina NP if pt to stay on lasix 40 mg every day. If so, he will [facility] with [facility]. Lissett Sandoval RN Cardiology February 13, 2020, 11:00 AM    ADDENDUM:   Let's return to Lasix 20 mg daily and have him continue to monitor weight at home.  Strict low-sodium 2000 mg diet. Check daily weights and call the clinic if your weight has increased more than 2 lbs in one day or 5 lbs in one week.  ANA Givens CNP    ADDENDUM:  Spoke with  Pt, he verbalized understanding.  ALEXANDRA DIA RN on 2/13/2020 at 4:00 PM            "

## 2020-02-13 DIAGNOSIS — I25.5 ISCHEMIC CARDIOMYOPATHY: ICD-10-CM

## 2020-02-13 LAB
ANION GAP SERPL CALCULATED.3IONS-SCNC: 2 MMOL/L (ref 3–14)
BUN SERPL-MCNC: 33 MG/DL (ref 7–30)
CALCIUM SERPL-MCNC: 8.6 MG/DL (ref 8.5–10.1)
CHLORIDE SERPL-SCNC: 103 MMOL/L (ref 94–109)
CO2 SERPL-SCNC: 31 MMOL/L (ref 20–32)
CREAT SERPL-MCNC: 1.11 MG/DL (ref 0.66–1.25)
GFR SERPL CREATININE-BSD FRML MDRD: 66 ML/MIN/{1.73_M2}
GLUCOSE SERPL-MCNC: 278 MG/DL (ref 70–99)
POTASSIUM SERPL-SCNC: 4.5 MMOL/L (ref 3.4–5.3)
SODIUM SERPL-SCNC: 136 MMOL/L (ref 133–144)

## 2020-02-13 PROCEDURE — 36415 COLL VENOUS BLD VENIPUNCTURE: CPT | Performed by: NURSE PRACTITIONER

## 2020-02-13 PROCEDURE — 80048 BASIC METABOLIC PNL TOTAL CA: CPT | Performed by: NURSE PRACTITIONER

## 2020-03-03 ENCOUNTER — OFFICE VISIT (OUTPATIENT)
Dept: FAMILY MEDICINE | Facility: CLINIC | Age: 70
End: 2020-03-03
Payer: COMMERCIAL

## 2020-03-03 ENCOUNTER — TELEPHONE (OUTPATIENT)
Dept: FAMILY MEDICINE | Facility: CLINIC | Age: 70
End: 2020-03-03

## 2020-03-03 VITALS
HEART RATE: 79 BPM | RESPIRATION RATE: 16 BRPM | OXYGEN SATURATION: 96 % | HEIGHT: 70 IN | WEIGHT: 162.6 LBS | TEMPERATURE: 97.3 F | BODY MASS INDEX: 23.28 KG/M2 | SYSTOLIC BLOOD PRESSURE: 108 MMHG | DIASTOLIC BLOOD PRESSURE: 70 MMHG

## 2020-03-03 DIAGNOSIS — Z72.0 TOBACCO USE: ICD-10-CM

## 2020-03-03 DIAGNOSIS — D64.9 ANEMIA, UNSPECIFIED TYPE: ICD-10-CM

## 2020-03-03 DIAGNOSIS — Z01.818 PREOP GENERAL PHYSICAL EXAM: Primary | ICD-10-CM

## 2020-03-03 DIAGNOSIS — I48.0 PAROXYSMAL ATRIAL FIBRILLATION (H): ICD-10-CM

## 2020-03-03 DIAGNOSIS — D69.6 THROMBOCYTOPENIA (H): ICD-10-CM

## 2020-03-03 DIAGNOSIS — I50.22 CHRONIC SYSTOLIC HEART FAILURE (H): ICD-10-CM

## 2020-03-03 DIAGNOSIS — I25.10 CORONARY ARTERY DISEASE INVOLVING NATIVE CORONARY ARTERY OF NATIVE HEART WITHOUT ANGINA PECTORIS: ICD-10-CM

## 2020-03-03 DIAGNOSIS — J44.9 CHRONIC OBSTRUCTIVE PULMONARY DISEASE, UNSPECIFIED COPD TYPE (H): ICD-10-CM

## 2020-03-03 DIAGNOSIS — Z95.810 ICD (IMPLANTABLE CARDIOVERTER-DEFIBRILLATOR) IN PLACE: ICD-10-CM

## 2020-03-03 DIAGNOSIS — E78.2 MIXED HYPERLIPIDEMIA: ICD-10-CM

## 2020-03-03 LAB
ANION GAP SERPL CALCULATED.3IONS-SCNC: <1 MMOL/L (ref 3–14)
BUN SERPL-MCNC: 34 MG/DL (ref 7–30)
CALCIUM SERPL-MCNC: 7.8 MG/DL (ref 8.5–10.1)
CHLORIDE SERPL-SCNC: 105 MMOL/L (ref 94–109)
CO2 SERPL-SCNC: 31 MMOL/L (ref 20–32)
CREAT SERPL-MCNC: 1.22 MG/DL (ref 0.66–1.25)
ERYTHROCYTE [DISTWIDTH] IN BLOOD BY AUTOMATED COUNT: 14.8 % (ref 10–15)
GFR SERPL CREATININE-BSD FRML MDRD: 59 ML/MIN/{1.73_M2}
GLUCOSE SERPL-MCNC: 145 MG/DL (ref 70–99)
HBA1C MFR BLD: 11.5 % (ref 0–5.6)
HCT VFR BLD AUTO: 43.2 % (ref 40–53)
HGB BLD-MCNC: 14.1 G/DL (ref 13.3–17.7)
MCH RBC QN AUTO: 30.5 PG (ref 26.5–33)
MCHC RBC AUTO-ENTMCNC: 32.6 G/DL (ref 31.5–36.5)
MCV RBC AUTO: 94 FL (ref 78–100)
PLATELET # BLD AUTO: 100 10E9/L (ref 150–450)
POTASSIUM SERPL-SCNC: 4.6 MMOL/L (ref 3.4–5.3)
RBC # BLD AUTO: 4.62 10E12/L (ref 4.4–5.9)
SODIUM SERPL-SCNC: 136 MMOL/L (ref 133–144)
WBC # BLD AUTO: 6.2 10E9/L (ref 4–11)

## 2020-03-03 PROCEDURE — 93000 ELECTROCARDIOGRAM COMPLETE: CPT | Performed by: FAMILY MEDICINE

## 2020-03-03 PROCEDURE — 80048 BASIC METABOLIC PNL TOTAL CA: CPT | Performed by: FAMILY MEDICINE

## 2020-03-03 PROCEDURE — 85027 COMPLETE CBC AUTOMATED: CPT | Performed by: FAMILY MEDICINE

## 2020-03-03 PROCEDURE — 99215 OFFICE O/P EST HI 40 MIN: CPT | Performed by: FAMILY MEDICINE

## 2020-03-03 PROCEDURE — 83036 HEMOGLOBIN GLYCOSYLATED A1C: CPT | Performed by: FAMILY MEDICINE

## 2020-03-03 PROCEDURE — 36415 COLL VENOUS BLD VENIPUNCTURE: CPT | Performed by: FAMILY MEDICINE

## 2020-03-03 ASSESSMENT — MIFFLIN-ST. JEOR: SCORE: 1498.8

## 2020-03-03 NOTE — LETTER
March 5, 2020      Delbert Tilley  [address]  [place] [address]        Dear ,    We are writing to inform you of your test results.    Hemoglobin is now normal.   Platelet count is stable.   Continue all other recommendations: cardiology consult and diabetes educator consult.    Resulted Orders   Hemoglobin A1c   Result Value Ref Range    Hemoglobin A1C 11.5 (H) 0 - 5.6 %      Comment:      Normal <5.7% Prediabetes 5.7-6.4%  Diabetes 6.5% or higher - adopted from ADA   consensus guidelines.     Basic metabolic panel   Result Value Ref Range    Sodium 136 133 - 144 mmol/L    Potassium 4.6 3.4 - 5.3 mmol/L    Chloride 105 94 - 109 mmol/L    Carbon Dioxide 31 20 - 32 mmol/L    Anion Gap <1 (L) 3 - 14 mmol/L    Glucose 145 (H) 70 - 99 mg/dL    Urea Nitrogen 34 (H) 7 - 30 mg/dL    Creatinine 1.22 0.66 - 1.25 mg/dL    GFR Estimate 59 (L) >60 mL/min/[1.73_m2]      Comment:      Non  GFR Calc  Starting 12/18/2018, serum creatinine based estimated GFR (eGFR) will be   calculated using the Chronic Kidney Disease Epidemiology Collaboration   (CKD-EPI) equation.      GFR Estimate If Black 68 >60 mL/min/[1.73_m2]      Comment:       GFR Calc  Starting 12/18/2018, serum creatinine based estimated GFR (eGFR) will be   calculated using the Chronic Kidney Disease Epidemiology Collaboration   (CKD-EPI) equation.      Calcium 7.8 (L) 8.5 - 10.1 mg/dL   CBC with platelets   Result Value Ref Range    WBC 6.2 4.0 - 11.0 10e9/L    RBC Count 4.62 4.4 - 5.9 10e12/L    Hemoglobin 14.1 13.3 - 17.7 g/dL    Hematocrit 43.2 40.0 - 53.0 %    MCV 94 78 - 100 fl    MCH 30.5 26.5 - 33.0 pg    MCHC 32.6 31.5 - 36.5 g/dL    RDW 14.8 10.0 - 15.0 %    Platelet Count 100 (L) 150 - 450 10e9/L       If you have any questions or concerns, please call the clinic at the number listed above.       Sincerely,        Lalit Berger MD

## 2020-03-03 NOTE — PATIENT INSTRUCTIONS
Go to lab now for blood tests.  You will be contacted in 1-2 days for results of your lab tests.    On morning of surgery, you may take:  Metoprolol  XL And Losratan  ONLY 22 UNITS OF LANTUS  Morning dose of advair    On morning of surgery, you may hold taking:  All other scheduled medications    Stop Aspirin 5 days before the date of surgery. Resume this day after surgery.    Resume medications as scheduled day after surgery.    Before Your Surgery      Call your surgeon if there is any change in your health. This includes signs of a cold or flu (such as a sore throat, runny nose, cough, rash or fever).    Do not smoke, drink alcohol or take over the counter medicine (unless your surgeon or primary care doctor tells you to) for the 24 hours before and after surgery.    If you take prescribed drugs: Follow your doctor s orders about which medicines to take and which to stop until after surgery.    Eating and drinking prior to surgery: follow the instructions from your surgeon    Take a shower or bath the night before surgery. Use the soap your surgeon gave you to gently clean your skin. If you do not have soap from your surgeon, use your regular soap. Do not shave or scrub the surgery site.  Wear clean pajamas and have clean sheets on your bed.

## 2020-03-03 NOTE — PROGRESS NOTES
"[facility]  [address] [address]  [phone]  Dept: [phone]    PRE-OP EVALUATION:  Today's date: 3/3/2020    Delbert Tilley (: 1950) presents for pre-operative evaluation assessment as requested by Dr. Gong.  He requires evaluation and anesthesia risk assessment prior to undergoing surgery/procedure for treatment of right thumb .    Proposed Surgery/ Procedure: Right thumb LRTI  Date of Surgery/ Procedure: 3/20/20  Time of Surgery/ Procedure: unknown  Hospital/Surgical Facility: [facility]  Fax number for surgical facility: [phone]  Primary Physician: Sánchez, [facility] Arzate  Type of Anesthesia Anticipated: to be determined    Patient has a Health Care Directive or Living Will:  [facility]    Patient was told surgery procedure itself would be about one hour.    1. YES - DO YOU HAVE A HISTORY OF HEART ATTACK, STROKE, STENT, BYPASS OR SURGERY ON AN ARTERY IN THE HEAD, NECK, HEART OR LEG? Pt had quadruple bypass 13 yrs ago.  2. NO - Do you ever have any pain or discomfort in your chest? Denies use of nitroglycerin in the last few yars  3. YES - DO YOU HAVE A HISTORY OF HEART FAILURE   4. YES - ARE YOUR TROUBLED BY SHORTNESS OF BREATH WHEN WALKING ON THE LEVEL, UP A SLIGHT HILL OR AT NIGHT? Gets sob when walking up a hill, possibly due to smoking.  Patient reports it is at baseline - going up a flight of stairs is \"normally difficult\" for im.  6. YES - DO YOU HAVE A COUGH, SHORTNESS OF BREATH OR WHEEZING? Patient has COPD and is on inhalers. Patient reports has baseline exertional dyspnea.  6. NO - Do you have a cough, shortness of breath or wheezing?  7. YES - DO YOU SOMETIMES GET PAINS IN THE CALVES OF YOUR LEGS WHEN YOU WALK? Patient  has history of neuropathy in legs. Denies calf swelling and pain.  8. NO - Do you or anyone in your family have previous history of blood clots?  9. NO - Do you or does anyone in your family have a " serious bleeding problem such as prolonged bleeding following surgeries or cuts?  10. NO - Have you ever had problems with anemia or been told to take iron pills?  11. NO - Have you had any abnormal blood loss such as black, tarry or bloody stools, or abnormal vaginal bleeding?  12. NO - Have you ever had a blood transfusion?  13. NO - Have you or any of your relatives ever had problems with anesthesia?  14. NO - Do you have sleep apnea, excessive snoring or daytime drowsiness?  15. NO - Do you have any prosthetic heart valves?  16. NO - Do you have prosthetic joints?  17. NO - Is there any chance that you may be pregnant?      HPI:     HPI related to upcoming procedure: Patient has right thumb osteoarthritis causing pain refractory to less invasive treatments. Hence, planned surgery. Reviewed above with patient.      A-FIB - Patient has a longstanding history of chronic A-fib currently on rate control. Current treatment regimen includes Aspirin for stroke prevention and denies significant symptoms of lightheadedness, palpitations or dyspnea.     CAD - Patient has a longstanding history of moderate-severe CAD. Patient denies recent chest pain or NTG use, denies exercise induced dyspnea or PND. Last Stress test none recently but had an ehocardiogram 10/2019 showing:  Interpretation Summary     1. Left ventricular systolic function is severely reduced. The visual ejection  fraction is estimated at 20-25%.  2. No regional wall motion abnormalities noted.  3. Moderate RV dilatation with moderately decreased right ventricular systolic  function.  4. The left atrium is severely dilated.  5. Mild to moderate tricuspid regurgitation.  6. The right ventricular systolic pressure is approximated at 40mmHg plus the  right atrial pressure. IVC diameter and respiratory changes fall into an  intermediate range suggesting an RA pressure of 8 mmHg.  7. Diastolic Doppler findings (E/E' ratio and/or other parameters) suggest  left  ventricular filling pressures are increased.    Patient has an ICD in place with recent normal interrogation per cardiology.     CHF - Patient has a longstanding history of moderate-severe CHF. Exacerbating conditions include ischemic heart disease, hypertension, COPD and atrial fibrilation. Currently the patient's condition is same. Current treatment regimen includes Angiotensin 2 receptor blocker, beta blocker, diuretic and spirolactone. The patient denies chest pain, edema, orthopnea, SOB or recent weight gain. Last Echocardiogram see above.     COPD - Patient has a longstanding history of moderate-severe COPD . Patient has been doing well overall noting PARDO at baseline per pt and continues on medication regimen consisting of ADvair BID  without adverse reactions or side effects.    DIABETES - Patient has a longstanding history of DiabetesType Type II . Patient is being treated with insulin injections and denies significant side effects. Control has been poor. Complicating factors include but are not limited to: hypertension, hyperlipidemia and CAD.     HYPERLIPIDEMIA - Patient has a long history of significant Hyperlipidemia requiring medication for treatment with recent unable to assess control. Patient reports no problems or side effects with the medication.     HYPERTENSION - Patient has longstanding history of HTN , currently denies any symptoms referable to elevated blood pressure. Specifically denies chest pain, palpitations, dyspnea, orthopnea, PND or peripheral edema. Blood pressure readings have been in normal range. Current medication regimen is as listed below. Patient denies any side effects of medication.     Patient reports he was told by ortho the procedure would just be about an hour long.    MEDICAL HISTORY:     Patient Active Problem List    Diagnosis Date Noted     NSVT (nonsustained ventricular tachycardia) (H) 11/01/2019     Priority: Medium     Chronic systolic heart failure (H) 11/01/2019      Priority: Medium     Paroxysmal atrial fibrillation (H) 11/01/2019     Priority: Medium     Tobacco use 05/28/2019     Priority: Medium     Left carotid stenosis 09/08/2017     Priority: Medium     Carpal tunnel syndrome of right wrist 01/10/2016     Priority: Medium     Carotid stenosis 06/12/2015     Priority: Medium     Bilateral carotid Disease S/P Right carotid endarterectomy.   [facility]       CHF (congestive heart failure) (H) 06/08/2015     Priority: Medium     NSTEMI (non-ST elevated myocardial infarction) (H) 10/21/2014     Priority: Medium     ACS (acute coronary syndrome) (H) 10/20/2014     Priority: Medium     Hyperlipidemia LDL goal <70 07/08/2013     Priority: Medium     [facility]  Diagnosis updated by automated process. Provider to review and confirm.       TYPE 2 DIABETES, HBA1C GOAL < 8% 10/31/2010     Priority: Medium     CARDIOVASCULAR SCREENING; LDL GOAL LESS THAN 100 10/31/2010     Priority: Medium     Coronary artery disease involving native coronary artery of native heart without angina pectoris 07/07/2010     Priority: Medium     S/p mi  Bypass x 4 --grafting to the LAD obtuse marginal and PDA.   Angio on 6/10 negative          Alcohol abuse 07/07/2010     Priority: Medium     H/o rehab x 5        GERD (gastroesophageal reflux disease) 07/07/2010     Priority: Medium     Pancreatic disease 07/07/2010     Priority: Medium     H/o pancreatitis --on enzymes        Diabetes mellitus, type 2 (H) 07/07/2010     Priority: Medium     [facility]       Cardiomyopathy (H) 06/15/2010     Priority: Medium     EF 15%.--ICD placement        Atrial fibrillation/flutter 06/15/2010     Priority: Medium     S/p ablation 1June 2010.           Past Medical History:   Diagnosis Date     Acute renal failure (H) 8/26/06 Hosp    secondary to dehydration     Arthritis      CAD (coronary artery disease)     LIMA to the LAD, vein graft to OM and a vein graft to the PDA      Carpal tunnel syndrome      Diabetes (H)      Gastro-oesophageal reflux disease      H/O: alcohol abuse      HTN (hypertension)      Hyperlipidaemia      Hyperlipidaemia LDL goal <100 7/8/2013     Hypokalemia      Pacemaker      Pancreatitis 6/26/06 Hosp     Past Surgical History:   Procedure Laterality Date     BYPASS CARDIOPULMONARY       CATARACT IOL, RT/LT      Cataract IOL RT/LT     ENDARTERECTOMY CAROTID Right 6/12/2015    Procedure: ENDARTERECTOMY CAROTID;  Surgeon: João Turner MD;  Location: [facility] OR     ENDARTERECTOMY CAROTID Left 9/8/2017    Procedure: ENDARTERECTOMY CAROTID;  LEFT CAROTID ENDARTERECTOMY WITH EEG;  Surgeon: João Turner MD;  Location: [facility] OR     IMPLANT PACEMAKER  6/10/2010     RELEASE CARPAL TUNNEL Right 4/12/2016    Procedure: RELEASE CARPAL TUNNEL;  Surgeon: Lissy Leger MD;  Location: [facility]     SURGICAL HISTORY OF -   1998    Laminectomy     SURGICAL HISTORY OF -   10/2003    Bypass grafting     TONSILLECTOMY & ADENOIDECTOMY       Current Outpatient Medications   Medication Sig Dispense Refill     albuterol (PROAIR HFA) 108 (90 Base) MCG/ACT Inhaler Inhale 2 puffs into the lungs every 4 hours as needed for shortness of breath / dyspnea 1 Inhaler 0     amylase-lipase-protease (CREON 12) 44967 UNITS CPEP Take 1 capsule (12,000 Units) by mouth daily 120 capsule 0     aspirin 325 MG tablet Take 3 tablets (975 mg) by mouth every morning (Takes 2 x 500 mg = 1000 mg) (Patient taking differently: Take 1,000 mg by mouth every morning (Takes 2 x 500 mg = 1000 mg)   Taking 2 in am and 2 in the pm. 2/22/19) 120 tablet 1     atorvastatin (LIPITOR) 80 MG tablet Take 80 mg by mouth At Bedtime        fluticasone-salmeterol (ADVAIR DISKUS) 250-50 MCG/DOSE inhaler Inhale 1 puff into the lungs every 12 hours 1 Inhaler 1     furosemide (LASIX) 20 MG tablet Take 1 tablet (20 mg) by mouth daily       insulin glargine (LANTUS) 100 UNIT/ML injection Inject 38 Units Subcutaneous  every morning       loperamide (IMODIUM) 2 MG capsule Take 2 mg by mouth 4 times daily as needed for diarrhea       losartan (COZAAR) 50 MG tablet Take 1 tablet (50 mg) by mouth daily 90 tablet 3     metoprolol succinate (TOPROL-XL) 25 MG 24 hr tablet Take 1 tablet (25 mg) by mouth daily 90 tablet 3     pantoprazole (PROTONIX) 40 MG EC tablet Take 1 tablet (40 mg) by mouth daily 30 tablet      spironolactone (ALDACTONE) 25 MG tablet Take 0.5 tablets (12.5 mg) by mouth daily       Insulin Pen Needle (PEN NEEDLES) 32G X 4 MM MISC        Nitroglycerin (NITROSTAT SL) Place 0.4 mg under the tongue every 5 minutes as needed for chest pain       OTC products: None, except as noted above    Allergies   Allergen Reactions     Hydrocodone      Upset stomach     Shellfish Allergy Hives and Rash      Latex Allergy: NO    Social History     Tobacco Use     Smoking status: Current Every Day Smoker     Packs/day: 2.00     Years: 50.00     Pack years: 100.00     Types: Cigarettes     Smokeless tobacco: Never Used     Tobacco comment: 1 1/2 PPD 2/16/18   Substance Use Topics     Alcohol use: No     History   Drug Use No       REVIEW OF SYSTEMS:   CONSTITUTIONAL: NEGATIVE for fever, chills, change in weight  INTEGUMENTARY/SKIN: NEGATIVE for worrisome rashes, moles or lesions  EYES: NEGATIVE for vision changes or irritation  ENT/MOUTH: NEGATIVE for ear, mouth and throat problems  RESP:as above  CV: NEGATIVE for chest pain, palpitations or peripheral edema  GI: NEGATIVE for nausea, abdominal pain, heartburn, or change in bowel habits  : NEGATIVE for frequency, dysuria, or hematuria  MUSCULOSKELETAL: NEGATIVE for significant arthralgias or myalgia  NEURO: NEGATIVE for weakness, dizziness or paresthesias  ENDOCRINE: NEGATIVE for temperature intolerance, skin/hair changes  HEME: NEGATIVE for bleeding problems  PSYCHIATRIC: NEGATIVE for changes in mood or affect    EXAM:   /70   Pulse 79   Temp 97.3  F (36.3  C) (Tympanic)    "Resp 16   Ht 1.778 m (5' 10\")   Wt 73.8 kg (162 lb 9.6 oz)   SpO2 96%   BMI 23.33 kg/m    GENERAL APPEARANCE: alert and no distress; ambulatory w/o assist  EYES: pink conj, no icterus, PERRL, EOMI  HENT: ear canals and TM's normal, nose and mouth without ulcers or lesions, oropharynx clear and oral mucous membranes moist  NECK: no adenopathy, no asymmetry, masses, or scars and thyroid normal to palpation  RESP: lungs clear to auscultation - no rales, rhonchi or wheezes  CV: regular rates and rhythm, normal S1 S2, no S3 or S4, no murmur, click or rub, no peripheral edema and peripheral pulses strong  ABDOMEN: soft, nontender, no hepatosplenomegaly, no masses and bowel sounds normal  MS: no musculoskeletal defects are noted and gait is age appropriate without ataxia  SKIN: good turgor, no rash/jaundice/ecchymosis  NEURO: Normal strength and tone, sensory exam grossly normal, mentation intact and speech normal    DIAGNOSTICS:     EKG: sinus rhythm, right axis, negative T waves with were present in previous EKGs, no ST-T changes suspect for acute ischemia  Labs Resulted Today:   Results for orders placed or performed in visit on 03/03/20   Hemoglobin A1c     Status: Abnormal   Result Value Ref Range    Hemoglobin A1C 11.5 (H) 0 - 5.6 %   Basic metabolic panel     Status: Abnormal   Result Value Ref Range    Sodium 136 133 - 144 mmol/L    Potassium 4.6 3.4 - 5.3 mmol/L    Chloride 105 94 - 109 mmol/L    Carbon Dioxide 31 20 - 32 mmol/L    Anion Gap <1 (L) 3 - 14 mmol/L    Glucose 145 (H) 70 - 99 mg/dL    Urea Nitrogen 34 (H) 7 - 30 mg/dL    Creatinine 1.22 0.66 - 1.25 mg/dL    GFR Estimate 59 (L) >60 mL/min/[1.73_m2]    GFR Estimate If Black 68 >60 mL/min/[1.73_m2]    Calcium 7.8 (L) 8.5 - 10.1 mg/dL   CBC with platelets     Status: Abnormal   Result Value Ref Range    WBC 6.2 4.0 - 11.0 10e9/L    RBC Count 4.62 4.4 - 5.9 10e12/L    Hemoglobin 14.1 13.3 - 17.7 g/dL    Hematocrit 43.2 40.0 - 53.0 %    MCV 94 78 - 100 " fl    MCH 30.5 26.5 - 33.0 pg    MCHC 32.6 31.5 - 36.5 g/dL    RDW 14.8 10.0 - 15.0 %    Platelet Count 100 (L) 150 - 450 10e9/L       Recent Labs   Lab Test 02/13/20  0748 10/29/19  1536  04/18/18  0906  12/27/17  0938  12/22/17  0827  09/08/17  1130 09/08/17  0859   HGB  --   --   --  13.2*  --   --   --  14.2  --   --   --    PLT  --   --   --  101*  --   --   --  122*  --   --   --    INR  --   --   --   --   --   --   --  1.02  --  1.03  --     139   < > 137   < >  --    < > 138   < >  --  139   POTASSIUM 4.5 4.4   < > 4.3   < >  --    < > 4.5   < >  --  4.4   CR 1.11 1.17   < > 1.01   < >  --    < > 1.02   < >  --  0.82   A1C  --   --   --   --   --  9.1*  --   --   --   --  8.6*    < > = values in this interval not displayed.        IMPRESSION:   Reason for surgery/procedure: right thumb osteoarthritis  Diagnosis/reason for consult: preop evaluation, multiple cardiac conditions, uncontrolled type 2 diabetes on insulin, thrombocytopenia    The proposed surgical procedure is considered INTERMEDIATE risk.    REVISED CARDIAC RISK INDEX  The patient has the following serious cardiovascular risks for perioperative complications such as (MI, PE, VFib and 3  AV Block):  Coronary Artery Disease (MI, positive stress test, angina, Qs on EKG)  Congestive Heart Failure (pulmonary edema, PND, s3 amada, CXR with pulmonary congestion, basilar rales)  Diabetes Mellitus (on Insulin)  INTERPRETATION: 3 risks: Class IV (high risk - >11% complication rate)    The patient has the following additional risks for perioperative complications:  The ASCVD Risk score (Carline PKIE Jr., et al., 2013) failed to calculate for the following reasons:    The patient has a prior MI or stroke diagnosis      ICD-10-CM    1. Preop general physical exam Z01.818 EKG 12-lead complete w/read - Clinics     Hemoglobin A1c     Basic metabolic panel     AMBULATORY ADULT DIABETES EDUCATOR REFERRAL     CBC with platelets     CARDIOLOGY EVAL ADULT REFERRAL    2. Coronary artery disease involving native coronary artery of native heart without angina pectoris I25.10 EKG 12-lead complete w/read - Clinics     CARDIOLOGY EVAL ADULT REFERRAL   3. Chronic systolic heart failure (H) I50.22 CARDIOLOGY EVAL ADULT REFERRAL   4. Paroxysmal atrial fibrillation (H) I48.0 EKG 12-lead complete w/read - Clinics     CARDIOLOGY EVAL ADULT REFERRAL   5. ICD (implantable cardioverter-defibrillator) in place Z95.810 EKG 12-lead complete w/read - Clinics     CARDIOLOGY EVAL ADULT REFERRAL   6. Mixed hyperlipidemia E78.2 EKG 12-lead complete w/read - Clinics   7. Chronic obstructive pulmonary disease, unspecified COPD type (H) J44.9    8. Uncontrolled type 2 diabetes mellitus with complication (H) E11.8 Hemoglobin A1c    E11.65 Basic metabolic panel     AMBULATORY ADULT DIABETES EDUCATOR REFERRAL   9. Tobacco use Z72.0 EKG 12-lead complete w/read - Clinics     CARDIOLOGY EVAL ADULT REFERRAL   10. Thrombocytopenia (H) D69.6 CBC with platelets   11. Anemia, unspecified type D64.9 CBC with platelets       RECOMMENDATIONS:     --Consult hospital rounder / IM to assist post-op medical management    Cardiovascular Risk  Patient is already on a Beta Blocker. Continue Betablocker therapy after surgery, using Beta blocker order set as necessary for NPO status.  EKG showed right axis but otherwise not much different from previous tracings.  Due to multiple serious cardiac conditions, and uncertain nature of anesthesia planned for surgery, will refer to cardiology for preoperative cardiovascular clearance.        Anemia  No anemia today. Monitor hgb if with heavy blood loss.      --Patient is to take all scheduled medications on the day of surgery EXCEPT for modifications listed below.    Diabetes Medication Use  -----Take 80% of long acting insulin (e.g. Lantus, NPH) while NPO (fasting)  Patient has severely uncontrolled diabetes.   Literature shows better outcomes if glucose is optimally managed,  preferrably below 200.  Referred to DM education for perioperative glucose optimization.  Defer surgery until documented glucose is adequate based on guidelines.  For now, increase lantus to 42 units with breakfast.  Monitor home glucose TID.      Anticoagulant or Antiplatelet Medication Use  ASPIRIN: Patient is at increased risk of thrombosis (e.g. MI, CVA) and aspirin 81 mg daily should be continued in the perioperative period unless allowed to be held by cardiology.        ACE Inhibitor or Angiotensin Receptor Blocker (ARB) Use  Ace inhibitor or Angiotensin Receptor Blocker (ARB) and will continue this medication due to the higher risk of uncontrolled perioperative hypertension (e.g. neurosurgical procedure)      Surgery is NOT recommended due to uncontrolled diabetes (A1C >8.5%, Glucose >200 mg/dl). Stabilization required prior to elective surgery. Referral to Diabetes Educator (Preoperative Intensive Glucose Management)        Signed Electronically by: Lalit Berger MD    Copy of this evaluation report is provided to requesting physician.    [place] Preop Guidelines    Revised Cardiac Risk Index

## 2020-03-03 NOTE — TELEPHONE ENCOUNTER
Patient was seen today for preop evaluation. Please call patient for further recommendations:    After patient left, his labs are reviewed, and his cardiovascular history is reviewed, it is determined that he is considered high risk patient for surgery.    Due to this, he has been referred to diabetes education for preoperative glucose control optimization. See result notes for his labs.    He has also been referred to cardiology for preoperative cardiovascular clearance due to his high cardiovascular risk (hx of CAD, CHF, uncontrolled diabetes on insulin). Urgent referral has bee placed to expedite scheduling; please assist patient in scheduling.    Please notify ortho care team regarding the above as his scheduled surgery may need to be postponed until the above are addressed.

## 2020-03-04 ENCOUNTER — TELEPHONE (OUTPATIENT)
Dept: FAMILY MEDICINE | Facility: CLINIC | Age: 70
End: 2020-03-04

## 2020-03-04 NOTE — TELEPHONE ENCOUNTER
Diabetes Education Scheduling Outreach #1:    Call to patient to schedule. Patient declined to schedule.    Kourtney Nick  [facility]  Diabetes and Nutrition Scheduling

## 2020-03-04 NOTE — TELEPHONE ENCOUNTER
Appreciate the call to patient.  Noted his reaction to the recommendations.    Patient will still need to see diabetes education for further optimization of glucose control.  The referral has been placed for this. Please make sure patient is scheduled.  [facility] a [facility] manner to do this, and unsure if they have a team that can do preoperative optimization of glucose control.

## 2020-03-04 NOTE — TELEPHONE ENCOUNTER
Noted that patient has refused appointment.  Care team was advised to try to call patient again and explain importance of getting his glucose down to get approved for surgery.

## 2020-03-04 NOTE — TELEPHONE ENCOUNTER
"Tried to reason with pt re diabetes education appt, talking about how the CDE and MD work together to bring down his A1c so he can have the surgery. Pt refused to schedule [facility]. He said \"if I didn't have insurance you wouldn't even be calling me\" and hung up.    Sharyn Rodney  Admin for Adult Ambulatory Diabetes Education  [phone]  "

## 2020-03-04 NOTE — TELEPHONE ENCOUNTER
"Patient notified. He did not understand why he need further exams. I tried to explain that further about his high risk of surgery. [facility] and that his insulin was just increased 2 weeks ago to 38 units daily.    Huddled with cardiology RN to assist in scheduling.     3/13/20 0930 Dr Nagel- check in 10 minutes early  Sent message to Diabetic care team    Patient was called. He responds: \"any more doctors I have to see to noah up the price?\".     I also gave him the recommendations on his labs and he responds \"yeah, whatever. If I start getting lows I am lowering it myself. Lows in the night are dangerous you know.\" I did advise to let us know if he is getting low readings and he responds \"yeah your right and then hangs up the phone\".      FELIPE HauserN, RN    "

## 2020-03-05 NOTE — TELEPHONE ENCOUNTER
"Spoke with Juliette with Diabetic Ed. Patient was called and refused appointment with Diabetic Educator. He told them [facility] and stated \"if he didn't have insurance we wouldn't be contacting him\" and hung up on the staff that attempted to schedule an appointment.      FELIPE HauserN, RN    "

## 2020-03-05 NOTE — TELEPHONE ENCOUNTER
Sent a message through [employer] yesterday but did not get any response. I called and left a message with Diabetic Education to return call to clinic.      FELIPE HauserN, RN

## 2020-03-06 NOTE — TELEPHONE ENCOUNTER
Noted updates from clinic staff. Patient will not be approved for surgery until his risk factors are optimally controlled or managed.

## 2020-03-09 PROBLEM — J44.9 CHRONIC OBSTRUCTIVE PULMONARY DISEASE, UNSPECIFIED COPD TYPE (H): Status: ACTIVE | Noted: 2020-03-09

## 2020-03-12 ENCOUNTER — HOSPITAL ENCOUNTER (OUTPATIENT)
Facility: CLINIC | Age: 70
End: 2020-03-12
Attending: ORTHOPAEDIC SURGERY | Admitting: ORTHOPAEDIC SURGERY
Payer: COMMERCIAL

## 2020-03-13 ENCOUNTER — OFFICE VISIT (OUTPATIENT)
Dept: CARDIOLOGY | Facility: CLINIC | Age: 70
End: 2020-03-13
Payer: COMMERCIAL

## 2020-03-13 VITALS
SYSTOLIC BLOOD PRESSURE: 117 MMHG | WEIGHT: 166 LBS | BODY MASS INDEX: 23.82 KG/M2 | HEART RATE: 92 BPM | DIASTOLIC BLOOD PRESSURE: 76 MMHG | OXYGEN SATURATION: 97 %

## 2020-03-13 DIAGNOSIS — I48.0 PAROXYSMAL ATRIAL FIBRILLATION (H): ICD-10-CM

## 2020-03-13 DIAGNOSIS — I50.22 CHRONIC SYSTOLIC HEART FAILURE (H): ICD-10-CM

## 2020-03-13 DIAGNOSIS — Z72.0 TOBACCO USE: ICD-10-CM

## 2020-03-13 DIAGNOSIS — Z95.810 ICD (IMPLANTABLE CARDIOVERTER-DEFIBRILLATOR) IN PLACE: ICD-10-CM

## 2020-03-13 DIAGNOSIS — Z01.818 PREOP GENERAL PHYSICAL EXAM: ICD-10-CM

## 2020-03-13 DIAGNOSIS — I25.10 CORONARY ARTERY DISEASE INVOLVING NATIVE CORONARY ARTERY OF NATIVE HEART WITHOUT ANGINA PECTORIS: ICD-10-CM

## 2020-03-13 DIAGNOSIS — I50.23 ACUTE ON CHRONIC SYSTOLIC CONGESTIVE HEART FAILURE (H): ICD-10-CM

## 2020-03-13 PROCEDURE — 99214 OFFICE O/P EST MOD 30 MIN: CPT | Performed by: INTERNAL MEDICINE

## 2020-03-13 RX ORDER — FUROSEMIDE 20 MG
20 TABLET ORAL 2 TIMES DAILY
Qty: 180 TABLET | Refills: 3 | Status: SHIPPED | OUTPATIENT
Start: 2020-03-13 | End: 2020-05-29

## 2020-03-13 NOTE — PROGRESS NOTES
REASON FOR CONSULTATION:  Ischemic cardiomyopathy      I had the pleasure seeing Mr. Tilley in consultation at the [facility] today.  He is a very pleasant 69-year-old gentleman who has a significant ischemic cardiomyopathy and is status post coronary artery bypass grafting in the remote past at which point he underwent a LIMA to the LAD, as well as saphenous venous grafting to the circumflex and PDA.  He last underwent coronary angiography in 2017 at which point he was found to have severe native three-vessel disease with a patent LIMA to the LAD and a patent saphenous venous graft to the obtuse marginal.      He is known to have severely reduced left ventricular systolic function with an echocardiogram in October 2019 demonstrating an estimated LVEF 20 to 25%.  Other previous cardiac issues have included atrial flutter for which he is undergone ablation.  He has also undergone ICD implantation for primary prevention given his severe ischemic cardiomyopathy.  Other comorbidities include peripheral vascular disease (he has undergone bilateral carotid endarterectomies), diabetes (poorly  Controlled, recent A1C 11.5), hypertension, dyslipidemia, COPD and active tobacco abuse.    Most recently he was seen by Dr. Berger for a preoperative risk assessment prior to undergoing right thumb LRTI which is scheduled for 3/20/2020.  This procedure is scheduled at the [facility].  After review of the patient's multiple medical issues, Dr. Aiken did not recommend surgery until his diabetic control was optimized.  In addition, cardiac consultation was requested for cardiac reassessment prior to surgery.    From a cardiac standpoint, his symptoms appear to be stable.  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  He denies any exertional chest discomfort but does  experience dyspnea upon walking uphill.  He also has lower extremity edema that is well controlled with the use of furosemide.    He was placed on spironolactone at his last office visit but only took this for a week before discontinuing it due to symptoms of generalized fatigue and lack of motivation.  He has been compliant with all his other cardiac medications.      Past medical history, family history social history allergies and medications reviewed in my epic note.    Physical exam as dictated below.    EKG on 3/3/2020 demonstrated normal sinus rhythm with an intraventricular conduction delay and nonspecific T wave abnormalities.  This appears similar to his previous EKG from 4/18/2018.    Device interrogation on 12/12/2019 demonstrated 27% atrial pacing with less than 1% ventricular pacing.  Underlying rhythm was sinus.  No atrial arrhythmias were documented.    The patient has also undergone pulmonary function testing in November 2019 which demonstrated mild to moderate airflow obstruction and moderate reduction in DLCO.      IMPRESSION:    1) CAD s/p CABG and severe ischemic cardiomyopathy as described above.  2) PVD s/p bilateral CEAs as described  3) COPD  4) DM, poorly controlled  5) Active tobacco abuse  6) HTN    PLAN  -The patient presents for a preoperative risk assessment prior to undergoing thumb surgery.  From his description this procedure will require general anesthesia but is performed on an outpatient basis.    -Based on both the ACS-NQIP (8%) and the RCRI (15%) the patient is a moderate to high risk of cardiac complications for this procedure, but this risk is derived from his chronic comorbidities as opposed to acute issues such as active angina or congestive heart failure.  At this point in time, I think it be reasonable to rule out significant underlying ischemia with a stress nuclear perfusion study particularly since his functional status is hard to assess given his limited  mobility.    -If the stress test findings are unchanged compared to his previous Lexiscan from 2017, then surgery can be performed with the understanding that his baseline risk will still be elevated as described above.  A postoperative EKG should be obtained and there should be a low threshold for obtaining cardiac troponins if he experiences postoperative chest discomfort.    -Regarding his medical therapy, this is appropriate.  I think that spironolactone would be beneficial in the long-term but he appears not to have tolerated it previously.  This should be readdressed in the outpatient setting after his surgery.    -Tobacco cessation was described.  This is previously been recommended by Dr. Michaels as well, but the patient is not willing to stop at this point.    -It was a pleasure seeing him in follow-up.          CURRENT MEDICATIONS:  Current Outpatient Medications   Medication Sig Dispense Refill     albuterol (PROAIR HFA) 108 (90 Base) MCG/ACT Inhaler Inhale 2 puffs into the lungs every 4 hours as needed for shortness of breath / dyspnea 1 Inhaler 0     amylase-lipase-protease (CREON 12) 48155 UNITS CPEP Take 1 capsule (12,000 Units) by mouth daily 120 capsule 0     aspirin 325 MG tablet Take 3 tablets (975 mg) by mouth every morning (Takes 2 x 500 mg = 1000 mg) (Patient taking differently: Take 1,000 mg by mouth every morning (Takes 2 x 500 mg = 1000 mg)   Taking 2 in am and 2 in the pm. 2/22/19) 120 tablet 1     atorvastatin (LIPITOR) 80 MG tablet Take 80 mg by mouth At Bedtime        fluticasone-salmeterol (ADVAIR DISKUS) 250-50 MCG/DOSE inhaler Inhale 1 puff into the lungs every 12 hours 1 Inhaler 1     furosemide (LASIX) 20 MG tablet Take 1 tablet (20 mg) by mouth daily       insulin glargine (LANTUS) 100 UNIT/ML injection Inject 38 Units Subcutaneous every morning       Insulin Pen Needle (PEN NEEDLES) 32G X 4 MM MISC        loperamide (IMODIUM) 2 MG capsule Take 2 mg by mouth 4 times daily as needed  for diarrhea       losartan (COZAAR) 50 MG tablet Take 1 tablet (50 mg) by mouth daily 90 tablet 3     metoprolol succinate (TOPROL-XL) 25 MG 24 hr tablet Take 1 tablet (25 mg) by mouth daily 90 tablet 3     Nitroglycerin (NITROSTAT SL) Place 0.4 mg under the tongue every 5 minutes as needed for chest pain       pantoprazole (PROTONIX) 40 MG EC tablet Take 1 tablet (40 mg) by mouth daily 30 tablet      spironolactone (ALDACTONE) 25 MG tablet Take 0.5 tablets (12.5 mg) by mouth daily         ALLERGIES     Allergies   Allergen Reactions     Hydrocodone      Upset stomach     Shellfish Allergy Hives and Rash       PAST MEDICAL HISTORY:  Past Medical History:   Diagnosis Date     Acute renal failure (H) 8/26/06 Hosp    secondary to dehydration     Arthritis      CAD (coronary artery disease)     LIMA to the LAD, vein graft to OM and a vein graft to the PDA     Carpal tunnel syndrome      Diabetes (H)      Gastro-oesophageal reflux disease      H/O: alcohol abuse      HTN (hypertension)      Hyperlipidaemia      Hyperlipidaemia LDL goal <100 7/8/2013     Hypokalemia      Pacemaker      Pancreatitis 6/26/06 Hosp       PAST SURGICAL HISTORY:  Past Surgical History:   Procedure Laterality Date     BYPASS CARDIOPULMONARY       CATARACT IOL, RT/LT      Cataract IOL RT/LT     ENDARTERECTOMY CAROTID Right 6/12/2015    Procedure: ENDARTERECTOMY CAROTID;  Surgeon: João Turner MD;  Location:  OR     ENDARTERECTOMY CAROTID Left 9/8/2017    Procedure: ENDARTERECTOMY CAROTID;  LEFT CAROTID ENDARTERECTOMY WITH EEG;  Surgeon: João Turner MD;  Location:  OR     IMPLANT PACEMAKER  6/10/2010     RELEASE CARPAL TUNNEL Right 4/12/2016    Procedure: RELEASE CARPAL TUNNEL;  Surgeon: Lissy Leger MD;  Location: [facility] OR     SURGICAL HISTORY OF -   1998    Laminectomy     SURGICAL HISTORY OF -   10/2003    Bypass grafting     TONSILLECTOMY & ADENOIDECTOMY         FAMILY HISTORY:  Family History   Problem Relation  Age of Onset     Unknown/Adopted No family hx of        SOCIAL HISTORY:  Social History     Socioeconomic History     Marital status: Single     Spouse name: Not on file     Number of children: Not on file     Years of education: Not on file     Highest education level: Not on file   Occupational History     Not on file   Social Needs     Financial resource strain: Not on file     Food insecurity     Worry: Not on file     Inability: Not on file     Transportation needs     Medical: Not on file     Non-medical: Not on file   Tobacco Use     Smoking status: Current Every Day Smoker     Packs/day: 2.00     Years: 50.00     Pack years: 100.00     Types: Cigarettes     Smokeless tobacco: Never Used     Tobacco comment: 1 1/2 PPD 2/16/18   Substance and Sexual Activity     Alcohol use: No     Drug use: No     Sexual activity: Not Currently     Partners: Female   Lifestyle     Physical activity     Days per week: Not on file     Minutes per session: Not on file     Stress: Not on file   Relationships     Social connections     Talks on phone: Not on file     Gets together: Not on file     Attends Cheondoism service: Not on file     Active member of club or organization: Not on file     Attends meetings of clubs or organizations: Not on file     Relationship status: Not on file     Intimate partner violence     Fear of current or ex partner: Not on file     Emotionally abused: Not on file     Physically abused: Not on file     Forced sexual activity: Not on file   Other Topics Concern     Parent/sibling w/ CABG, MI or angioplasty before 65F 55M? No   Social History Narrative     Not on file       Review of Systems:  Skin:          Eyes:         ENT:         Respiratory:          Cardiovascular:         Gastroenterology:        Genitourinary:         Musculoskeletal:         Neurologic:         Psychiatric:         Heme/Lymph/Imm:         Endocrine:           Physical Exam:  Vitals: There were no vitals taken for this  visit.    Constitutional:  cooperative, alert and oriented, well developed, well nourished, in no acute distress        Skin:  warm and dry to the touch, no apparent skin lesions or masses noted          Head:  normocephalic, no masses or lesions        Eyes:  pupils equal and round        Lymph:No Cervical lymphadenopathy present     ENT:  no pallor or cyanosis, dentition good        Neck:  carotid pulses are full and equal bilaterally        Respiratory:  clear to auscultation         Cardiac: regular rhythm                pulses full and equal, no bruits auscultated                                        GI:  abdomen soft        Extremities and Muscular Skeletal:  no deformities, clubbing, cyanosis, erythema observed              Neurological:  no gross motor deficits        Psych:  Alert and Oriented x 3        CC  Lalit Berger MD  [address] [place] [address]

## 2020-03-13 NOTE — LETTER
3/13/2020    [facility]  [address] [address]    RE: Delbert Tilley       Dear Colleague,    I had the pleasure of seeing Delbert Tilley in the [facility].    REASON FOR CONSULTATION:  Ischemic cardiomyopathy      I had the pleasure seeing Mr. Tilley in consultation at the [facility] today.  He is a very pleasant 69-year-old gentleman who has a significant ischemic cardiomyopathy and is status post coronary artery bypass grafting in the remote past at which point he underwent a LIMA to the LAD, as well as saphenous venous grafting to the circumflex and PDA.  He last underwent coronary angiography in 2017 at which point he was found to have severe native three-vessel disease with a patent LIMA to the LAD and a patent saphenous venous graft to the obtuse marginal.      He is known to have severely reduced left ventricular systolic function with an echocardiogram in October 2019 demonstrating an estimated LVEF 20 to 25%.  Other previous cardiac issues have included atrial flutter for which he is undergone ablation.  He has also undergone ICD implantation for primary prevention given his severe ischemic cardiomyopathy.  Other comorbidities include peripheral vascular disease (he has undergone bilateral carotid endarterectomies), diabetes (poorly  Controlled, recent A1C 11.5), hypertension, dyslipidemia, COPD and active tobacco abuse.    Most recently he was seen by Dr. Berger for a preoperative risk assessment prior to undergoing right thumb LRTI which is scheduled for 3/20/2020.  This procedure is scheduled at the [place] [facility] [place] [facility].  After review of the patient's multiple medical issues, Dr. Aiken did not recommend surgery until his diabetic control was optimized.  In addition, cardiac consultation was requested for cardiac reassessment prior to surgery.    From a cardiac standpoint, his symptoms appear to be stable.  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  He denies any exertional chest discomfort but does experience dyspnea upon walking uphill.  He also has lower extremity edema that is well controlled with the use of furosemide.    He was placed on spironolactone at his last office visit but only took this for a week before discontinuing it due to symptoms of generalized fatigue and lack of motivation.  He has been compliant with all his other cardiac medications.      Past medical history, family history social history allergies and medications reviewed in my epic note.    Physical exam as dictated below.    EKG on 3/3/2020 demonstrated normal sinus rhythm with an intraventricular conduction delay and nonspecific T wave abnormalities.  This appears similar to his previous EKG from 4/18/2018.    Device interrogation on 12/12/2019 demonstrated 27% atrial pacing with less than 1% ventricular pacing.  Underlying rhythm was sinus.  No atrial arrhythmias were documented.    The patient has also undergone pulmonary function testing in November 2019 which demonstrated mild to moderate airflow obstruction and moderate reduction in DLCO.      IMPRESSION:    1) CAD s/p CABG and severe ischemic cardiomyopathy as described above.  2) PVD s/p bilateral CEAs as described  3) COPD  4) DM, poorly controlled  5) Active tobacco abuse  6) HTN    PLAN  -The patient presents for a preoperative risk assessment prior to undergoing thumb surgery.  From his description this procedure will require general anesthesia but is performed on an outpatient basis.    -Based on both the ACS-NQIP (8%) and the RCRI (15%) the patient is a moderate to high risk of cardiac complications for this procedure, but this risk is derived from his chronic comorbidities as opposed to acute issues such as active angina or congestive heart failure.  At  this point in time, I think it be reasonable to rule out significant underlying ischemia with a stress nuclear perfusion study particularly since his functional status is hard to assess given his limited mobility.    -If the stress test findings are unchanged compared to his previous Lexiscan from 2017, then surgery can be performed with the understanding that his baseline risk will still be elevated as described above.  A postoperative EKG should be obtained and there should be a low threshold for obtaining cardiac troponins if he experiences postoperative chest discomfort.    -Regarding his medical therapy, this is appropriate.  I think that spironolactone would be beneficial in the long-term but he appears not to have tolerated it previously.  This should be readdressed in the outpatient setting after his surgery.    -Tobacco cessation was described.  This is previously been recommended by Dr. Michaels as well, but the patient is not willing to stop at this point.    -It was a pleasure seeing him in follow-up.          CURRENT MEDICATIONS:  Current Outpatient Medications   Medication Sig Dispense Refill     albuterol (PROAIR HFA) 108 (90 Base) MCG/ACT Inhaler Inhale 2 puffs into the lungs every 4 hours as needed for shortness of breath / dyspnea 1 Inhaler 0     amylase-lipase-protease (CREON 12) 68629 UNITS CPEP Take 1 capsule (12,000 Units) by mouth daily 120 capsule 0     aspirin 325 MG tablet Take 3 tablets (975 mg) by mouth every morning (Takes 2 x 500 mg = 1000 mg) (Patient taking differently: Take 1,000 mg by mouth every morning (Takes 2 x 500 mg = 1000 mg)   Taking 2 in am and 2 in the pm. 2/22/19) 120 tablet 1     atorvastatin (LIPITOR) 80 MG tablet Take 80 mg by mouth At Bedtime        fluticasone-salmeterol (ADVAIR DISKUS) 250-50 MCG/DOSE inhaler Inhale 1 puff into the lungs every 12 hours 1 Inhaler 1     furosemide (LASIX) 20 MG tablet Take 1 tablet (20 mg) by mouth daily       insulin glargine (LANTUS)  100 UNIT/ML injection Inject 38 Units Subcutaneous every morning       Insulin Pen Needle (PEN NEEDLES) 32G X 4 MM MISC        loperamide (IMODIUM) 2 MG capsule Take 2 mg by mouth 4 times daily as needed for diarrhea       losartan (COZAAR) 50 MG tablet Take 1 tablet (50 mg) by mouth daily 90 tablet 3     metoprolol succinate (TOPROL-XL) 25 MG 24 hr tablet Take 1 tablet (25 mg) by mouth daily 90 tablet 3     Nitroglycerin (NITROSTAT SL) Place 0.4 mg under the tongue every 5 minutes as needed for chest pain       pantoprazole (PROTONIX) 40 MG EC tablet Take 1 tablet (40 mg) by mouth daily 30 tablet      spironolactone (ALDACTONE) 25 MG tablet Take 0.5 tablets (12.5 mg) by mouth daily         ALLERGIES     Allergies   Allergen Reactions     Hydrocodone      Upset stomach     Shellfish Allergy Hives and Rash       PAST MEDICAL HISTORY:  Past Medical History:   Diagnosis Date     Acute renal failure (H) 8/26/06 Hosp    secondary to dehydration     Arthritis      CAD (coronary artery disease)     LIMA to the LAD, vein graft to OM and a vein graft to the PDA     Carpal tunnel syndrome      Diabetes (H)      Gastro-oesophageal reflux disease      H/O: alcohol abuse      HTN (hypertension)      Hyperlipidaemia      Hyperlipidaemia LDL goal <100 7/8/2013     Hypokalemia      Pacemaker      Pancreatitis 6/26/06 Hosp       PAST SURGICAL HISTORY:  Past Surgical History:   Procedure Laterality Date     BYPASS CARDIOPULMONARY       CATARACT IOL, RT/LT      Cataract IOL RT/LT     ENDARTERECTOMY CAROTID Right 6/12/2015    Procedure: ENDARTERECTOMY CAROTID;  Surgeon: João Turner MD;  Location:  OR     ENDARTERECTOMY CAROTID Left 9/8/2017    Procedure: ENDARTERECTOMY CAROTID;  LEFT CAROTID ENDARTERECTOMY WITH EEG;  Surgeon: João Turner MD;  Location:  OR     IMPLANT PACEMAKER  6/10/2010     RELEASE CARPAL TUNNEL Right 4/12/2016    Procedure: RELEASE CARPAL TUNNEL;  Surgeon: Lissy Leger MD;  Location:  [facility] OR     SURGICAL HISTORY OF -   1998    Laminectomy     SURGICAL HISTORY OF -   10/2003    Bypass grafting     TONSILLECTOMY & ADENOIDECTOMY         FAMILY HISTORY:  Family History   Problem Relation Age of Onset     Unknown/Adopted No family hx of        SOCIAL HISTORY:  Social History     Socioeconomic History     Marital status: Single     Spouse name: Not on file     Number of children: Not on file     Years of education: Not on file     Highest education level: Not on file   Occupational History     Not on file   Social Needs     Financial resource strain: Not on file     Food insecurity     Worry: Not on file     Inability: Not on file     Transportation needs     Medical: Not on file     Non-medical: Not on file   Tobacco Use     Smoking status: Current Every Day Smoker     Packs/day: 2.00     Years: 50.00     Pack years: 100.00     Types: Cigarettes     Smokeless tobacco: Never Used     Tobacco comment: 1 1/2 PPD 2/16/18   Substance and Sexual Activity     Alcohol use: No     Drug use: No     Sexual activity: Not Currently     Partners: Female   Lifestyle     Physical activity     Days per week: Not on file     Minutes per session: Not on file     Stress: Not on file   Relationships     Social connections     Talks on phone: Not on file     Gets together: Not on file     Attends Episcopal service: Not on file     Active member of club or organization: Not on file     Attends meetings of clubs or organizations: Not on file     Relationship status: Not on file     Intimate partner violence     Fear of current or ex partner: Not on file     Emotionally abused: Not on file     Physically abused: Not on file     Forced sexual activity: Not on file   Other Topics Concern     Parent/sibling w/ CABG, MI or angioplasty before 65F 55M? No   Social History Narrative     Not on file       Review of Systems:  Skin:          Eyes:         ENT:         Respiratory:          Cardiovascular:         Gastroenterology:         Genitourinary:         Musculoskeletal:         Neurologic:         Psychiatric:         Heme/Lymph/Imm:         Endocrine:           Physical Exam:  Vitals: There were no vitals taken for this visit.    Constitutional:  cooperative, alert and oriented, well developed, well nourished, in no acute distress        Skin:  warm and dry to the touch, no apparent skin lesions or masses noted          Head:  normocephalic, no masses or lesions        Eyes:  pupils equal and round        Lymph:No Cervical lymphadenopathy present     ENT:  no pallor or cyanosis, dentition good        Neck:  carotid pulses are full and equal bilaterally        Respiratory:  clear to auscultation         Cardiac: regular rhythm                pulses full and equal, no bruits auscultated                                        GI:  abdomen soft        Extremities and Muscular Skeletal:  no deformities, clubbing, cyanosis, erythema observed              Neurological:  no gross motor deficits        Psych:  Alert and Oriented x 3          Thank you for allowing me to participate in the care of your patient.    Sincerely,     Ileana Nagel MD     [facility]    cc:   Lalit Berger MD  [address] [place] [address]

## 2020-03-13 NOTE — PATIENT INSTRUCTIONS
"[facility]~[address]~[place], [place]~[address]  Thank you for your  Heart Care visit today. If you have questions regarding your visit, please contact your cardiology RN, Lissett Xavier, at [phone].    Please arrive 15 minutes early to all cardiology appointments.    To schedule a future appointment, we kindly ask that you call cardiology scheduling at [phone] three months prior to requested revisit date.  [facility] cardiology clinic is staffed with \"Advance Practice Providers\". These are our cardiology Physician Assistants and Nurse Practitioners.   Please call cardiology scheduling if you feel you need clinical evaluation with them at any time for any cardiac reason.     Reminder:  For your safety, we ask that you bring in your current medication(s) or an updated list of your medications with you to EACH office visit. Include the medication name, dose of pill on bottle and how you are taking it. Include over-the-counter medications or supplements. Your provider will review this at each visit and plan your care based on your current information.   ~~~~~~~~~~~~~~~~~~~~~~~~~~~~~~~~~~~~~~~  \"[facility]\" [place] telephone numbers for reference:  Cardiology Scheduling~[phone]  Diagnostic Imaging Scheduling~[phone]  Lab Scheduling~[phone]  Anticoagulation Clinic~[phone]  Cardiac Rehabilitation~[phone]  CORE Clinic RN's~[phone] (at [facility])  Congential Team [phone] (at [facility])  Cardiology Clinic RN's~[phone] (Lissett Xavier, RN)  ~~~~~~~~~~~~~~~~~~~~~~~~~~~~~~~~~~~~~~~~        "

## 2020-03-16 ENCOUNTER — HOSPITAL ENCOUNTER (OUTPATIENT)
Dept: NUCLEAR MEDICINE | Facility: CLINIC | Age: 70
Setting detail: NUCLEAR MEDICINE
Discharge: HOME | End: 2020-03-16
Attending: INTERNAL MEDICINE | Admitting: INTERNAL MEDICINE
Payer: COMMERCIAL

## 2020-03-16 ENCOUNTER — TELEPHONE (OUTPATIENT)
Dept: CARDIOLOGY | Facility: CLINIC | Age: 70
End: 2020-03-16

## 2020-03-16 VITALS — HEIGHT: 70 IN | BODY MASS INDEX: 23.77 KG/M2 | WEIGHT: 166 LBS

## 2020-03-16 DIAGNOSIS — I25.10 CORONARY ARTERY DISEASE INVOLVING NATIVE CORONARY ARTERY OF NATIVE HEART WITHOUT ANGINA PECTORIS: ICD-10-CM

## 2020-03-16 LAB
CV BLOOD PRESSURE: 15 %
CV STRESS MAX HR HE: 76
NUC STRESS EJECTION FRACTION: 20 %
RATE PRESSURE PRODUCT: 7904
STRESS ECHO BASELINE DIASTOLIC HE: 60
STRESS ECHO BASELINE HR: 63
STRESS ECHO BASELINE SYSTOLIC BP: 110
STRESS ECHO CALCULATED PERCENT HR: 51 %
STRESS ECHO LAST STRESS DIASTOLIC BP: 60
STRESS ECHO LAST STRESS SYSTOLIC BP: 104
STRESS ECHO TARGET HR: 149
STRESS/REST PERFUSION RATIO: 1.21

## 2020-03-16 PROCEDURE — 93016 CV STRESS TEST SUPVJ ONLY: CPT | Performed by: INTERNAL MEDICINE

## 2020-03-16 PROCEDURE — 93018 CV STRESS TEST I&R ONLY: CPT | Performed by: INTERNAL MEDICINE

## 2020-03-16 PROCEDURE — 93017 CV STRESS TEST TRACING ONLY: CPT

## 2020-03-16 PROCEDURE — 25000128 H RX IP 250 OP 636: Performed by: INTERNAL MEDICINE

## 2020-03-16 PROCEDURE — A9502 TC99M TETROFOSMIN: HCPCS | Performed by: INTERNAL MEDICINE

## 2020-03-16 PROCEDURE — 78452 HT MUSCLE IMAGE SPECT MULT: CPT | Mod: 26 | Performed by: INTERNAL MEDICINE

## 2020-03-16 PROCEDURE — 78452 HT MUSCLE IMAGE SPECT MULT: CPT

## 2020-03-16 PROCEDURE — 34300033 ZZH RX 343: Performed by: INTERNAL MEDICINE

## 2020-03-16 RX ORDER — REGADENOSON 0.08 MG/ML
0.4 INJECTION, SOLUTION INTRAVENOUS ONCE
Status: COMPLETED | OUTPATIENT
Start: 2020-03-16 | End: 2020-03-16

## 2020-03-16 RX ADMIN — REGADENOSON 0.4 MG: 0.08 INJECTION, SOLUTION INTRAVENOUS at 09:28

## 2020-03-16 RX ADMIN — TETROFOSMIN 10.7 MCI.: 1.38 INJECTION, POWDER, LYOPHILIZED, FOR SOLUTION INTRAVENOUS at 07:45

## 2020-03-16 RX ADMIN — TETROFOSMIN 31.9 MCI.: 1.38 INJECTION, POWDER, LYOPHILIZED, FOR SOLUTION INTRAVENOUS at 09:30

## 2020-03-16 ASSESSMENT — MIFFLIN-ST. JEOR: SCORE: 1514.22

## 2020-03-16 NOTE — RESULT ENCOUNTER NOTE
Results noted: two areas of infarction; EF 15-20%; no significant change from previous per reader. Will discuss with Dr Nagel for official clearance for surgery.

## 2020-03-16 NOTE — TELEPHONE ENCOUNTER
Call to pt to cancel OV tomorrow AM for an Office ppm check and reschedule as a home transmission d/t COVID19. Message left for him to call the clinic with questions.   Remote transmission date mailed to pt.     [email or web address]

## 2020-03-23 ENCOUNTER — ANCILLARY PROCEDURE (OUTPATIENT)
Dept: CARDIOLOGY | Facility: CLINIC | Age: 70
End: 2020-03-23
Attending: INTERNAL MEDICINE
Payer: COMMERCIAL

## 2020-03-23 DIAGNOSIS — Z95.810 ICD (IMPLANTABLE CARDIOVERTER-DEFIBRILLATOR) IN PLACE: ICD-10-CM

## 2020-03-23 PROCEDURE — 93294 REM INTERROG EVL PM/LDLS PM: CPT | Performed by: INTERNAL MEDICINE

## 2020-03-23 PROCEDURE — 93296 REM INTERROG EVL PM/IDS: CPT | Performed by: INTERNAL MEDICINE

## 2020-03-30 ENCOUNTER — TELEPHONE (OUTPATIENT)
Dept: CARDIOLOGY | Facility: CLINIC | Age: 70
End: 2020-03-30

## 2020-03-30 NOTE — TELEPHONE ENCOUNTER
ADDENDUM:  Spoke with pt.  He states surgery was cancelled d/t COVID-19.  Advised to let us know if he needs anything from us going forward.  ALEXANDRA DIA RN on 3/30/2020 at 11:04 AM      ----- Message from Ileana Nagel MD sent at 3/30/2020 10:35 AM CDT -----  Regarding: RE: Stress Test Result  OK to proceed with surgery. Thanks.     ----- Message -----  From: Alexandra Dia RN  Sent: 3/30/2020  To: Ileana Nagel MD  Subject: Stress Test Result                               Please review stress test.  Pt's surgery has been postponed until 4/7/20.  ALEXANDRA DIA RN on 3/23/2020 at 4:16 PM

## 2020-04-03 LAB
MDC_IDC_LEAD_IMPLANT_DT: NORMAL
MDC_IDC_LEAD_IMPLANT_DT: NORMAL
MDC_IDC_LEAD_LOCATION: NORMAL
MDC_IDC_LEAD_LOCATION: NORMAL
MDC_IDC_LEAD_LOCATION_DETAIL_1: NORMAL
MDC_IDC_LEAD_LOCATION_DETAIL_1: NORMAL
MDC_IDC_LEAD_MFG: NORMAL
MDC_IDC_LEAD_MFG: NORMAL
MDC_IDC_LEAD_MODEL: NORMAL
MDC_IDC_LEAD_MODEL: NORMAL
MDC_IDC_LEAD_POLARITY_TYPE: NORMAL
MDC_IDC_LEAD_POLARITY_TYPE: NORMAL
MDC_IDC_LEAD_SERIAL: NORMAL
MDC_IDC_LEAD_SERIAL: NORMAL
MDC_IDC_MSMT_BATTERY_DTM: NORMAL
MDC_IDC_MSMT_BATTERY_REMAINING_LONGEVITY: 103 MO
MDC_IDC_MSMT_BATTERY_RRT_TRIGGER: 2.73
MDC_IDC_MSMT_BATTERY_STATUS: NORMAL
MDC_IDC_MSMT_BATTERY_VOLTAGE: 3.01 V
MDC_IDC_MSMT_CAP_CHARGE_DTM: NORMAL
MDC_IDC_MSMT_CAP_CHARGE_ENERGY: 18 J
MDC_IDC_MSMT_CAP_CHARGE_TIME: 3.9
MDC_IDC_MSMT_CAP_CHARGE_TYPE: NORMAL
MDC_IDC_MSMT_LEADCHNL_RA_IMPEDANCE_VALUE: 456 OHM
MDC_IDC_MSMT_LEADCHNL_RA_PACING_THRESHOLD_AMPLITUDE: 0.75 V
MDC_IDC_MSMT_LEADCHNL_RA_PACING_THRESHOLD_PULSEWIDTH: 0.4 MS
MDC_IDC_MSMT_LEADCHNL_RA_SENSING_INTR_AMPL: 3.5 MV
MDC_IDC_MSMT_LEADCHNL_RA_SENSING_INTR_AMPL: 3.5 MV
MDC_IDC_MSMT_LEADCHNL_RV_IMPEDANCE_VALUE: 323 OHM
MDC_IDC_MSMT_LEADCHNL_RV_IMPEDANCE_VALUE: 399 OHM
MDC_IDC_MSMT_LEADCHNL_RV_PACING_THRESHOLD_AMPLITUDE: 1 V
MDC_IDC_MSMT_LEADCHNL_RV_PACING_THRESHOLD_PULSEWIDTH: 0.4 MS
MDC_IDC_MSMT_LEADCHNL_RV_SENSING_INTR_AMPL: 4.38 MV
MDC_IDC_MSMT_LEADCHNL_RV_SENSING_INTR_AMPL: 4.38 MV
MDC_IDC_PG_IMPLANT_DTM: NORMAL
MDC_IDC_PG_MFG: NORMAL
MDC_IDC_PG_MODEL: NORMAL
MDC_IDC_PG_SERIAL: NORMAL
MDC_IDC_PG_TYPE: NORMAL
MDC_IDC_SESS_CLINIC_NAME: NORMAL
MDC_IDC_SESS_DTM: NORMAL
MDC_IDC_SESS_TYPE: NORMAL
MDC_IDC_SET_BRADY_AT_MODE_SWITCH_RATE: 171 {BEATS}/MIN
MDC_IDC_SET_BRADY_HYSTRATE: NORMAL
MDC_IDC_SET_BRADY_LOWRATE: 55 {BEATS}/MIN
MDC_IDC_SET_BRADY_MAX_SENSOR_RATE: 140 {BEATS}/MIN
MDC_IDC_SET_BRADY_MAX_TRACKING_RATE: 140 {BEATS}/MIN
MDC_IDC_SET_BRADY_MODE: NORMAL
MDC_IDC_SET_BRADY_PAV_DELAY_LOW: 180 MS
MDC_IDC_SET_BRADY_SAV_DELAY_LOW: 150 MS
MDC_IDC_SET_LEADCHNL_RA_PACING_AMPLITUDE: 1.5 V
MDC_IDC_SET_LEADCHNL_RA_PACING_ANODE_ELECTRODE_1: NORMAL
MDC_IDC_SET_LEADCHNL_RA_PACING_ANODE_LOCATION_1: NORMAL
MDC_IDC_SET_LEADCHNL_RA_PACING_CAPTURE_MODE: NORMAL
MDC_IDC_SET_LEADCHNL_RA_PACING_CATHODE_ELECTRODE_1: NORMAL
MDC_IDC_SET_LEADCHNL_RA_PACING_CATHODE_LOCATION_1: NORMAL
MDC_IDC_SET_LEADCHNL_RA_PACING_POLARITY: NORMAL
MDC_IDC_SET_LEADCHNL_RA_PACING_PULSEWIDTH: 0.4 MS
MDC_IDC_SET_LEADCHNL_RA_SENSING_ANODE_ELECTRODE_1: NORMAL
MDC_IDC_SET_LEADCHNL_RA_SENSING_ANODE_LOCATION_1: NORMAL
MDC_IDC_SET_LEADCHNL_RA_SENSING_CATHODE_ELECTRODE_1: NORMAL
MDC_IDC_SET_LEADCHNL_RA_SENSING_CATHODE_LOCATION_1: NORMAL
MDC_IDC_SET_LEADCHNL_RA_SENSING_POLARITY: NORMAL
MDC_IDC_SET_LEADCHNL_RA_SENSING_SENSITIVITY: 0.3 MV
MDC_IDC_SET_LEADCHNL_RV_PACING_AMPLITUDE: 2.5 V
MDC_IDC_SET_LEADCHNL_RV_PACING_ANODE_ELECTRODE_1: NORMAL
MDC_IDC_SET_LEADCHNL_RV_PACING_ANODE_LOCATION_1: NORMAL
MDC_IDC_SET_LEADCHNL_RV_PACING_CAPTURE_MODE: NORMAL
MDC_IDC_SET_LEADCHNL_RV_PACING_CATHODE_ELECTRODE_1: NORMAL
MDC_IDC_SET_LEADCHNL_RV_PACING_CATHODE_LOCATION_1: NORMAL
MDC_IDC_SET_LEADCHNL_RV_PACING_POLARITY: NORMAL
MDC_IDC_SET_LEADCHNL_RV_PACING_PULSEWIDTH: 0.4 MS
MDC_IDC_SET_LEADCHNL_RV_SENSING_ANODE_ELECTRODE_1: NORMAL
MDC_IDC_SET_LEADCHNL_RV_SENSING_ANODE_LOCATION_1: NORMAL
MDC_IDC_SET_LEADCHNL_RV_SENSING_CATHODE_ELECTRODE_1: NORMAL
MDC_IDC_SET_LEADCHNL_RV_SENSING_CATHODE_LOCATION_1: NORMAL
MDC_IDC_SET_LEADCHNL_RV_SENSING_POLARITY: NORMAL
MDC_IDC_SET_LEADCHNL_RV_SENSING_SENSITIVITY: 0.3 MV
MDC_IDC_SET_ZONE_DETECTION_BEATS_DENOMINATOR: 40 {BEATS}
MDC_IDC_SET_ZONE_DETECTION_BEATS_NUMERATOR: 30 {BEATS}
MDC_IDC_SET_ZONE_DETECTION_INTERVAL: 320 MS
MDC_IDC_SET_ZONE_DETECTION_INTERVAL: 350 MS
MDC_IDC_SET_ZONE_DETECTION_INTERVAL: 360 MS
MDC_IDC_SET_ZONE_DETECTION_INTERVAL: 400 MS
MDC_IDC_SET_ZONE_DETECTION_INTERVAL: NORMAL
MDC_IDC_SET_ZONE_TYPE: NORMAL
MDC_IDC_STAT_AT_BURDEN_PERCENT: 0 %
MDC_IDC_STAT_AT_DTM_END: NORMAL
MDC_IDC_STAT_AT_DTM_START: NORMAL
MDC_IDC_STAT_BRADY_AP_VP_PERCENT: 0.1 %
MDC_IDC_STAT_BRADY_AP_VS_PERCENT: 27.5 %
MDC_IDC_STAT_BRADY_AS_VP_PERCENT: 0.03 %
MDC_IDC_STAT_BRADY_AS_VS_PERCENT: 72.36 %
MDC_IDC_STAT_BRADY_DTM_END: NORMAL
MDC_IDC_STAT_BRADY_DTM_START: NORMAL
MDC_IDC_STAT_BRADY_RA_PERCENT_PACED: 27.59 %
MDC_IDC_STAT_BRADY_RV_PERCENT_PACED: 0.14 %
MDC_IDC_STAT_EPISODE_RECENT_COUNT: 0
MDC_IDC_STAT_EPISODE_RECENT_COUNT_DTM_END: NORMAL
MDC_IDC_STAT_EPISODE_RECENT_COUNT_DTM_START: NORMAL
MDC_IDC_STAT_EPISODE_TOTAL_COUNT: 0
MDC_IDC_STAT_EPISODE_TOTAL_COUNT: 2
MDC_IDC_STAT_EPISODE_TOTAL_COUNT_DTM_END: NORMAL
MDC_IDC_STAT_EPISODE_TOTAL_COUNT_DTM_START: NORMAL
MDC_IDC_STAT_EPISODE_TYPE: NORMAL
MDC_IDC_STAT_TACHYTHERAPY_ATP_DELIVERED_RECENT: 0
MDC_IDC_STAT_TACHYTHERAPY_ATP_DELIVERED_TOTAL: 0
MDC_IDC_STAT_TACHYTHERAPY_RECENT_DTM_END: NORMAL
MDC_IDC_STAT_TACHYTHERAPY_RECENT_DTM_START: NORMAL
MDC_IDC_STAT_TACHYTHERAPY_SHOCKS_ABORTED_RECENT: 0
MDC_IDC_STAT_TACHYTHERAPY_SHOCKS_ABORTED_TOTAL: 0
MDC_IDC_STAT_TACHYTHERAPY_SHOCKS_DELIVERED_RECENT: 0
MDC_IDC_STAT_TACHYTHERAPY_SHOCKS_DELIVERED_TOTAL: 0
MDC_IDC_STAT_TACHYTHERAPY_TOTAL_DTM_END: NORMAL
MDC_IDC_STAT_TACHYTHERAPY_TOTAL_DTM_START: NORMAL

## 2020-04-17 DIAGNOSIS — I65.23 BILATERAL CAROTID ARTERY STENOSIS: Primary | ICD-10-CM

## 2020-04-21 ENCOUNTER — OFFICE VISIT (OUTPATIENT)
Dept: ORTHOPEDICS | Facility: CLINIC | Age: 70
End: 2020-04-21
Payer: COMMERCIAL

## 2020-04-21 VITALS
SYSTOLIC BLOOD PRESSURE: 122 MMHG | BODY MASS INDEX: 23.62 KG/M2 | WEIGHT: 165 LBS | DIASTOLIC BLOOD PRESSURE: 76 MMHG | HEIGHT: 70 IN

## 2020-04-21 DIAGNOSIS — M79.644 CHRONIC PAIN OF RIGHT THUMB: Primary | ICD-10-CM

## 2020-04-21 DIAGNOSIS — M18.11 PRIMARY OSTEOARTHRITIS OF FIRST CARPOMETACARPAL JOINT OF RIGHT HAND: ICD-10-CM

## 2020-04-21 DIAGNOSIS — G89.29 CHRONIC PAIN OF RIGHT THUMB: Primary | ICD-10-CM

## 2020-04-21 PROCEDURE — 20604 DRAIN/INJ JOINT/BURSA W/US: CPT | Mod: RT | Performed by: FAMILY MEDICINE

## 2020-04-21 RX ORDER — ROPIVACAINE HYDROCHLORIDE 5 MG/ML
0.5 INJECTION, SOLUTION EPIDURAL; INFILTRATION; PERINEURAL
Status: DISCONTINUED | OUTPATIENT
Start: 2020-04-21 | End: 2020-10-22

## 2020-04-21 RX ORDER — TRIAMCINOLONE ACETONIDE 40 MG/ML
20 INJECTION, SUSPENSION INTRA-ARTICULAR; INTRAMUSCULAR
Status: DISCONTINUED | OUTPATIENT
Start: 2020-04-21 | End: 2020-10-22

## 2020-04-21 RX ADMIN — TRIAMCINOLONE ACETONIDE 20 MG: 40 INJECTION, SUSPENSION INTRA-ARTICULAR; INTRAMUSCULAR at 09:30

## 2020-04-21 RX ADMIN — ROPIVACAINE HYDROCHLORIDE 0.5 ML: 5 INJECTION, SOLUTION EPIDURAL; INFILTRATION; PERINEURAL at 09:30

## 2020-04-21 ASSESSMENT — MIFFLIN-ST. JEOR: SCORE: 1509.69

## 2020-04-21 NOTE — PROGRESS NOTES
Delbert ESTELLA Tilley  :  1950  DOS: 2020  MRN: [id number]    [facility] Clinic Procedure    Ultrasound Guided Right Intra-Articular Thumb CMC Joint Injection    Clinical History: Patient presents for repeat right thumb CMC joint injection.  Last CMC joint injection was last completed on 19 that provided good relief for ~ 6 - 8 weeks.  Patient was scheduled for right thumb CMC joint arthrodesis surgery on , this was delayed due to COVID 19 protocols.  Patient understands that repeat injection would delay procedure and would like to proceed.    Diagnosis:   1. Chronic pain of right thumb    2. Primary osteoarthritis of first carpometacarpal joint of right hand      Referring Physician: Bethel Martin MD  Hand / Upper Extremity Injection/Arthrocentesis: R thumb CMC    Date/Time: 2020 9:30 AM  Performed by: Leonardo Le DO  Authorized by: Leonardo Le DO     Indications:  Pain  Needle Size:  25 G  Guidance: ultrasound    Approach:  Radial  Condition: osteoarthritis    Location:  Thumb  Site:  R thumb CMC    Medications:  20 mg triamcinolone 40 MG/ML; 0.5 mL ropivacaine 5 MG/ML  Outcome:  Tolerated well, no immediate complications  Procedure discussed: discussed risks, benefits, and alternatives    Consent Given by:  Patient  Timeout: timeout called immediately prior to procedure    Prep: patient was prepped and draped in usual sterile fashion          Impression:  Successful Right CMC joint injection.    Plan:  Follow up with Dr Martin as directed  Expectations and goals of CSI reviewed, including impact on blood sugars and risks during COVID-19 pandemic  Often 2-3 days for steroid effect, and can take up to two weeks for maximum effect  We discussed modified progressive pain-free activity as tolerated  Do not overuse in first two weeks if feeling better due to concern for vulnerability while steroid is working  Supportive care reviewed  All questions were  answered today  Contact us with additional questions or concerns  Signs and sx of concern reviewed         Leonardo Le DO, MIGUEL  Primary Care Sports Medicine  [facility]

## 2020-04-21 NOTE — LETTER
2020         RE: Delbert Tilley  [id number]  [facility] [address]        Dear Colleague,    Thank you for referring your patient, Delbert Tilley, to the [facility]. Please see a copy of my visit note below.    Delbert Tilley  :  1950  DOS: 2020  MRN: [id number]    Sports Medicine Clinic Procedure    Ultrasound Guided Right Intra-Articular Thumb CMC Joint Injection    Clinical History: Patient presents for repeat right thumb CMC joint injection.  Last CMC joint injection was last completed on 19 that provided good relief for ~ 6 - 8 weeks.  Patient was scheduled for right thumb CMC joint arthrodesis surgery on , this was delayed due to COVID 19 protocols.  Patient understands that repeat injection would delay procedure and would like to proceed.    Diagnosis:   1. Chronic pain of right thumb    2. Primary osteoarthritis of first carpometacarpal joint of right hand      Referring Physician: Bethel Martin MD  Hand / Upper Extremity Injection/Arthrocentesis: R thumb CMC    Date/Time: 2020 9:30 AM  Performed by: Leonardo Le DO  Authorized by: Leonardo Le DO     Indications:  Pain  Needle Size:  25 G  Guidance: ultrasound    Approach:  Radial  Condition: osteoarthritis    Location:  Thumb  Site:  R thumb CMC    Medications:  20 mg triamcinolone 40 MG/ML; 0.5 mL ropivacaine 5 MG/ML  Outcome:  Tolerated well, no immediate complications  Procedure discussed: discussed risks, benefits, and alternatives    Consent Given by:  Patient  Timeout: timeout called immediately prior to procedure    Prep: patient was prepped and draped in usual sterile fashion          Impression:  Successful Right CMC joint injection.    Plan:  Follow up with Dr Martin as directed  Expectations and goals of CSI reviewed, including impact on blood sugars and risks during COVID-19 pandemic  Often 2-3 days for steroid effect, and can take  up to two weeks for maximum effect  We discussed modified progressive pain-free activity as tolerated  Do not overuse in first two weeks if feeling better due to concern for vulnerability while steroid is working  Supportive care reviewed  All questions were answered today  Contact us with additional questions or concerns  Signs and sx of concern reviewed         Leonardo Le DO, CAQ  Primary Care Sports Medicine  [facility]           Again, thank you for allowing me to participate in the care of your patient.        Sincerely,        Leonardo Le DO

## 2020-05-14 ENCOUNTER — HOSPITAL ENCOUNTER (OUTPATIENT)
Facility: CLINIC | Age: 70
End: 2020-05-14
Attending: ORTHOPAEDIC SURGERY | Admitting: ORTHOPAEDIC SURGERY
Payer: COMMERCIAL

## 2020-05-14 DIAGNOSIS — Z11.59 ENCOUNTER FOR SCREENING FOR OTHER VIRAL DISEASES: Primary | ICD-10-CM

## 2020-05-19 DIAGNOSIS — Z95.810 ICD (IMPLANTABLE CARDIOVERTER-DEFIBRILLATOR) IN PLACE: Primary | ICD-10-CM

## 2020-05-26 ENCOUNTER — PATIENT OUTREACH (OUTPATIENT)
Dept: EDUCATION SERVICES | Facility: CLINIC | Age: 70
End: 2020-05-26

## 2020-05-26 ENCOUNTER — OFFICE VISIT (OUTPATIENT)
Dept: FAMILY MEDICINE | Facility: CLINIC | Age: 70
End: 2020-05-26
Payer: COMMERCIAL

## 2020-05-26 ENCOUNTER — TELEPHONE (OUTPATIENT)
Dept: FAMILY MEDICINE | Facility: CLINIC | Age: 70
End: 2020-05-26

## 2020-05-26 VITALS
SYSTOLIC BLOOD PRESSURE: 116 MMHG | DIASTOLIC BLOOD PRESSURE: 74 MMHG | OXYGEN SATURATION: 97 % | HEART RATE: 73 BPM | BODY MASS INDEX: 23.82 KG/M2 | TEMPERATURE: 97.7 F | RESPIRATION RATE: 16 BRPM | HEIGHT: 70 IN | WEIGHT: 166.4 LBS

## 2020-05-26 DIAGNOSIS — M18.11 OSTEOARTHRITIS OF RIGHT THUMB: ICD-10-CM

## 2020-05-26 DIAGNOSIS — E11.69 TYPE 2 DIABETES MELLITUS WITH OTHER SPECIFIED COMPLICATION, WITH LONG-TERM CURRENT USE OF INSULIN (H): ICD-10-CM

## 2020-05-26 DIAGNOSIS — I48.0 PAROXYSMAL ATRIAL FIBRILLATION (H): ICD-10-CM

## 2020-05-26 DIAGNOSIS — D64.9 ANEMIA, UNSPECIFIED TYPE: ICD-10-CM

## 2020-05-26 DIAGNOSIS — Z72.0 TOBACCO USE: ICD-10-CM

## 2020-05-26 DIAGNOSIS — Z79.4 TYPE 2 DIABETES MELLITUS WITH OTHER SPECIFIED COMPLICATION, WITH LONG-TERM CURRENT USE OF INSULIN (H): ICD-10-CM

## 2020-05-26 DIAGNOSIS — J44.9 CHRONIC OBSTRUCTIVE PULMONARY DISEASE, UNSPECIFIED COPD TYPE (H): ICD-10-CM

## 2020-05-26 DIAGNOSIS — I50.22 CHRONIC SYSTOLIC HEART FAILURE (H): ICD-10-CM

## 2020-05-26 DIAGNOSIS — I25.10 CORONARY ARTERY DISEASE INVOLVING NATIVE CORONARY ARTERY OF NATIVE HEART WITHOUT ANGINA PECTORIS: ICD-10-CM

## 2020-05-26 DIAGNOSIS — Z01.818 PREOP GENERAL PHYSICAL EXAM: Primary | ICD-10-CM

## 2020-05-26 DIAGNOSIS — I65.22 LEFT CAROTID STENOSIS: ICD-10-CM

## 2020-05-26 LAB
ANION GAP SERPL CALCULATED.3IONS-SCNC: 4 MMOL/L (ref 3–14)
BUN SERPL-MCNC: 36 MG/DL (ref 7–30)
CALCIUM SERPL-MCNC: 8.1 MG/DL (ref 8.5–10.1)
CHLORIDE SERPL-SCNC: 102 MMOL/L (ref 94–109)
CO2 SERPL-SCNC: 30 MMOL/L (ref 20–32)
CREAT SERPL-MCNC: 1.09 MG/DL (ref 0.66–1.25)
GFR SERPL CREATININE-BSD FRML MDRD: 68 ML/MIN/{1.73_M2}
GLUCOSE SERPL-MCNC: 198 MG/DL (ref 70–99)
HBA1C MFR BLD: 9.4 % (ref 0–5.6)
HGB BLD-MCNC: 14.3 G/DL (ref 13.3–17.7)
POTASSIUM SERPL-SCNC: 4.2 MMOL/L (ref 3.4–5.3)
SODIUM SERPL-SCNC: 136 MMOL/L (ref 133–144)

## 2020-05-26 PROCEDURE — 93000 ELECTROCARDIOGRAM COMPLETE: CPT | Performed by: FAMILY MEDICINE

## 2020-05-26 PROCEDURE — 36415 COLL VENOUS BLD VENIPUNCTURE: CPT | Performed by: FAMILY MEDICINE

## 2020-05-26 PROCEDURE — 85018 HEMOGLOBIN: CPT | Performed by: FAMILY MEDICINE

## 2020-05-26 PROCEDURE — 99215 OFFICE O/P EST HI 40 MIN: CPT | Performed by: FAMILY MEDICINE

## 2020-05-26 PROCEDURE — 83036 HEMOGLOBIN GLYCOSYLATED A1C: CPT | Performed by: FAMILY MEDICINE

## 2020-05-26 PROCEDURE — 80048 BASIC METABOLIC PNL TOTAL CA: CPT | Performed by: FAMILY MEDICINE

## 2020-05-26 ASSESSMENT — MIFFLIN-ST. JEOR: SCORE: 1516.04

## 2020-05-26 NOTE — PROGRESS NOTES
Delbert called the diabetes scheduling line today. He is scheduled for surgery on 6/2 but A1C drawn this AM was 9.4%.   He says A1C must be at 9% for surgery.     Note that Dr. Berger referred Delbert for diabetes ed on 3/3/20. Delbert was called on 3/4/20 to schedule diabetes care and refused to schedule despite encouragement that it was in preparation for surgery (seem EMR records) PCP was informed.      I called Delbert back today, advised that I can work with him to get BG's down in a month, but he cannot get numbers to goal in 1 week, would need insulin adjustment or med change/addition. Reminded the A1C is a 3 month average.     He says he works with a CDE in [place] [facility] and got A1C down for 13% in Feb. I praised these efforts. Explained most surgeons will not do surgery with A1C >8% do to the very high risk of infection and complications.     He says he will not reschedule the surgery again, it has already been rescheduled.   I said that's fine, he will need to discuss with the surgeon.   Reminded that he declined diabetes care in March and we can't go back, but I am happy to help him move forward in hopes of surgery in 1 month. He says he has a CDE in [place] and doesn't need 2 and hung up.     Candice Valdes RD, LD, CDE

## 2020-05-26 NOTE — TELEPHONE ENCOUNTER
Noted update about patient.  Surgeon has been notified of the plans re: DM optimization. - copied the preop note to surgeon today.  Patient  is not approved to proceed with surgery until his glucose levels are optimally controlled, and we hear back from cardiology.

## 2020-05-26 NOTE — Clinical Note
Good afternoon, Dr. Martin!    This is an update on Delbert. He came in today for his preop evaluation.  We repeated his EKG And labs.      His EKG is similar to his last. I am asking  cardiology if their recommendations in March can still be used for this time. I am waiting for his reply.    Delbert's A1c is 9.4 today. Better than one in march 2020 but still above recommended perioperative range. I have referred him to DM education team for optimization of his glucose measurements.With his extensive cardiac history, his diabetes and current smoking, he is at increased risk for surgery, so I recommend optimizing his treatments as much as we could.      I am hoping he schedules with DM educators soon, and cardiology replies soon.  I have not approved him yet to proceed with the procedure.  Hope this helps in your planning!    Yours truly,  Lalit Berger M.D.

## 2020-05-26 NOTE — PATIENT INSTRUCTIONS
You may take metoprolol and losartan on morning of surgery if they are scheduled to be taken then. Take only with a small sip of water.  All other scheduled medication taken by mouth may be held on morning of surgery, and resumed when you are allowed to eat.     On morning of surgery, take only 27 units of lantus by injection.  Resume your regular dose the next day when allowed to eat.    Continue aspirin, but reduce it to 325 mg daily only, unless requested by general surgeon to stop the medication few days before surgery.    Final instructions to be given to you once results of tests are available, and cardiology team has replied to message to be sent to them today.      Before Your Surgery      Call your surgeon if there is any change in your health. This includes signs of a cold or flu (such as a sore throat, runny nose, cough, rash or fever).    Do not smoke, drink alcohol or take over the counter medicine (unless your surgeon or primary care doctor tells you to) for the 24 hours before and after surgery.    If you take prescribed drugs: Follow your doctor s orders about which medicines to take and which to stop until after surgery.    Eating and drinking prior to surgery: follow the instructions from your surgeon    Take a shower or bath the night before surgery. Use the soap your surgeon gave you to gently clean your skin. If you do not have soap from your surgeon, use your regular soap. Do not shave or scrub the surgery site.  Wear clean pajamas and have clean sheets on your bed.

## 2020-05-26 NOTE — Clinical Note
DR. Eliot Morrow saw you in March 2020 for preop cardiac exam/eval prior to his surgery that has since been postponed. You gave approval to proceed with surgery with advice to recognize his increased risk. He came in today to update his preop eval. He has not reported interval change in health, and his EKG today looks similar to his last. My question is if your recommendation then can still hold for his upcoming surgery? Thanks for your help and input!

## 2020-05-26 NOTE — RESULT ENCOUNTER NOTE
Already discussed on encounter today.  Unchanged from previous EKG in march 2020.  Patient has had cardiology evaluation in March 2020.

## 2020-05-26 NOTE — PROGRESS NOTES
[facility]  [address] [address]  [phone]  Dept: [phone]    PRE-OP EVALUATION:  Today's date: 2020    Delbert Tilley (: 1950) presents for pre-operative evaluation assessment as requested by Dr. Gong.  He requires evaluation and anesthesia risk assessment prior to undergoing surgery/procedure for treatment of right thumb .    Proposed Surgery/ Procedure: Thumb Ligament Reconstruction Tendon Interposition with Extensor Pollicis Brevis to Abductor Poillicis Longus Tendon Transfer   Date of Surgery/ Procedure: 20  Time of Surgery/ Procedure: 1:00  Hospital/Surgical Facility: [facility]  Fax number for surgical facility: [facility]  Primary Physician: Sánchez Va Miller Arzate  Type of Anesthesia Anticipated: General    Patient has a Health Care Directive or Living Will:  YES on file    1. YES - DO YOU HAVE A HISTORY OF HEART ATTACK, STROKE, STENT, BYPASS OR SURGERY ON AN ARTERY IN THE HEAD, NECK, HEART OR LEG? Pt has history of quadruple bypass.  2. NO - Do you ever have any pain or discomfort in your chest?  3. YES - DO YOU HAVE A HISTORY OF HEART FAILURE see above  4. YES - ARE YOUR TROUBLED BY SHORTNESS OF BREATH WHEN WALKING ON THE LEVEL, UP A SLIGHT HILL OR AT NIGHT? Due to history of smoking  5. NO - DO YOU CURRENTLY HAVE A COLD, BRONCHITIS OR OTHER RESPIRATORY INFECTION?   6. NO - Do you have a cough, shortness of breath or wheezing?  7. YES - DO YOU SOMETIMES GET PAINS IN THE CALVES OF YOUR LEGS WHEN YOU WALK? Pt gets pain in legs from neuropathy.  8. NO - Do you or anyone in your family have previous history of blood clots?  9. NO - Do you or does anyone in your family have a serious bleeding problem such as prolonged bleeding following surgeries or cuts?  10. NO - Have you ever had problems with anemia or been told to take iron pills?  11. NO - Have you had any abnormal blood loss such as black, tarry or bloody stools, or abnormal vaginal  bleeding?  12. NO - Have you ever had a blood transfusion?  13. NO - Have you or any of your relatives ever had problems with anesthesia?  14. NO - Do you have sleep apnea, excessive snoring or daytime drowsiness?  15. YES - DO YOU HAVE ANY PROSTHETIC HEART VALVES? Quadruple bypass.  16. NO - Do you have prosthetic joints?  17. NO - Is there any chance that you may be pregnant?      HPI:     HPI related to upcoming procedure: Patient has right thumb DJD causing hand dysfunction. Hence, planned surgery. Reviewed above with patient.      A-FIB - Patient has a longstanding history of paroxysmal A-fib currently in sinus. Current treatment regimen includes Aspirin for stroke prevention and denies significant symptoms of lightheadedness, palpitations or dyspnea.     CAD - Patient has a longstanding history of moderate-severe CAD. Patient denies recent chest pain or NTG use, denies exercise induced dyspnea or PND. Last Stress test 2 months ago - see below, EKG last done 2 months ago - see below.     COPD - patient reports no recent flares or ER visits. Denies side effects from his meds.    DIABETES - Patient has a longstanding history of DiabetesType Type II . Patient is being treated with insulin injections and denies significant side effects. Control has been poor. Complicating factors include but are not limited to: hypertension, hyperlipidemia, CAD/PVD, tobacco use and alcohol abuse.   Patient states home BG run 's, highest in last month was 200 - working with DM educator.    HYPERLIPIDEMIA - Patient has a long history of significant Hyperlipidemia requiring medication for treatment with recent fair control. Patient reports no problems or side effects with the medication.     HYPERTENSION - Patient has longstanding history of HTN , currently denies any symptoms referable to elevated blood pressure. Specifically denies chest pain, palpitations, dyspnea, orthopnea, PND or peripheral edema. Blood pressure readings  "have been in normal range. Current medication regimen is as listed below. Patient denies any side effects of medication.     Per cardiology in previous preop cardiology evaluation for the same surgery:  \" -Based on both the ACS-NQIP (8%) and the RCRI (15%) the patient is a moderate to high risk of cardiac complications for this procedure, but this risk is derived from his chronic comorbidities as opposed to acute issues such as active angina or congestive heart failure.  At this point in time, I think it be reasonable to rule out significant underlying ischemia with a stress nuclear perfusion study particularly since his functional status is hard to assess given his limited mobility.     -If the stress test findings are unchanged compared to his previous Lexiscan from 2017, then surgery can be performed with the understanding that his baseline risk will still be elevated as described above.  A postoperative EKG should be obtained and there should be a low threshold for obtaining cardiac troponins if he experiences postoperative chest discomfort.\"    \"----- Message from Ileana Nagel MD sent at 3/30/2020 10:35 AM CDT -----  Regarding: RE: Stress Test Result  OK to proceed with surgery. Thanks.      ----- Message -----  From: Alexandra Xavier, RN  Sent: 3/30/2020  To: Ileana Nagel MD  Subject: Stress Test Result                                Please review stress test.  Pt's surgery has been postponed until 4/7/20.  ALEXANDRA XAVIER RN on 3/23/2020 at 4:16 PM\"      MEDICAL HISTORY:     Patient Active Problem List    Diagnosis Date Noted     Chronic obstructive pulmonary disease, unspecified COPD type (H) 03/09/2020     Priority: Medium     NSVT (nonsustained ventricular tachycardia) (H) 11/01/2019     Priority: Medium     Chronic systolic heart failure (H) 11/01/2019     Priority: Medium     Paroxysmal atrial fibrillation (H) 11/01/2019     Priority: Medium     Tobacco use 05/28/2019     Priority: Medium     Left " carotid stenosis 09/08/2017     Priority: Medium     Carpal tunnel syndrome of right wrist 01/10/2016     Priority: Medium     Carotid stenosis 06/12/2015     Priority: Medium     Bilateral carotid Disease S/P Right carotid endarterectomy.   [facility]       CHF (congestive heart failure) (H) 06/08/2015     Priority: Medium     NSTEMI (non-ST elevated myocardial infarction) (H) 10/21/2014     Priority: Medium     ACS (acute coronary syndrome) (H) 10/20/2014     Priority: Medium     Hyperlipidemia LDL goal <70 07/08/2013     Priority: Medium     [facility]  Diagnosis updated by automated process. Provider to review and confirm.       TYPE 2 DIABETES, HBA1C GOAL < 8% 10/31/2010     Priority: Medium     CARDIOVASCULAR SCREENING; LDL GOAL LESS THAN 100 10/31/2010     Priority: Medium     Coronary artery disease involving native coronary artery of native heart without angina pectoris 07/07/2010     Priority: Medium     S/p mi  Bypass x 4 --grafting to the LAD obtuse marginal and PDA.   Angio on 6/10 negative          Alcohol abuse 07/07/2010     Priority: Medium     H/o rehab x 5        GERD (gastroesophageal reflux disease) 07/07/2010     Priority: Medium     Pancreatic disease 07/07/2010     Priority: Medium     H/o pancreatitis --on enzymes        Diabetes mellitus, type 2 (H) 07/07/2010     Priority: Medium     [facility]       Cardiomyopathy (H) 06/15/2010     Priority: Medium     EF 15%.--ICD placement        Atrial fibrillation/flutter 06/15/2010     Priority: Medium     S/p ablation 1June 2010.           Past Medical History:   Diagnosis Date     Acute renal failure (H) 8/26/06 Hosp    secondary to dehydration     Arthritis      CAD (coronary artery disease)     LIMA to the LAD, vein graft to OM and a vein graft to the PDA     Carpal tunnel syndrome      Diabetes (H)      Gastro-oesophageal reflux disease      H/O: alcohol abuse      HTN (hypertension)       Hyperlipidaemia      Hyperlipidaemia LDL goal <100 7/8/2013     Hypokalemia      Pacemaker      Pancreatitis 6/26/06 Hosp     Past Surgical History:   Procedure Laterality Date     BYPASS CARDIOPULMONARY       CATARACT IOL, RT/LT      Cataract IOL RT/LT     ENDARTERECTOMY CAROTID Right 6/12/2015    Procedure: ENDARTERECTOMY CAROTID;  Surgeon: João Turner MD;  Location: [facility] OR     ENDARTERECTOMY CAROTID Left 9/8/2017    Procedure: ENDARTERECTOMY CAROTID;  LEFT CAROTID ENDARTERECTOMY WITH EEG;  Surgeon: João Turner MD;  Location: [facility] OR     IMPLANT PACEMAKER  6/10/2010     RELEASE CARPAL TUNNEL Right 4/12/2016    Procedure: RELEASE CARPAL TUNNEL;  Surgeon: Lissy Leger MD;  Location: [facility] OR     SURGICAL HISTORY OF -   1998    Laminectomy     SURGICAL HISTORY OF -   10/2003    Bypass grafting     TONSILLECTOMY & ADENOIDECTOMY       Current Outpatient Medications   Medication Sig Dispense Refill     albuterol (PROAIR HFA) 108 (90 Base) MCG/ACT Inhaler Inhale 2 puffs into the lungs every 4 hours as needed for shortness of breath / dyspnea 1 Inhaler 0     amylase-lipase-protease (CREON 12) 47277 UNITS CPEP Take 1 capsule (12,000 Units) by mouth daily 120 capsule 0     aspirin 325 MG tablet Take 3 tablets (975 mg) by mouth every morning (Takes 2 x 500 mg = 1000 mg) (Patient taking differently: Take 1,000 mg by mouth every morning (Takes 2 x 500 mg = 1000 mg)   Taking 2 in am and 2 in the pm. 2/22/19) 120 tablet 1     atorvastatin (LIPITOR) 80 MG tablet Take 80 mg by mouth At Bedtime        fluticasone-salmeterol (ADVAIR DISKUS) 250-50 MCG/DOSE inhaler Inhale 1 puff into the lungs every 12 hours 1 Inhaler 1     furosemide (LASIX) 20 MG tablet Take 1 tablet (20 mg) by mouth 2 times daily 180 tablet 3     insulin glargine (LANTUS) 100 UNIT/ML injection Inject 38 Units Subcutaneous every morning       loperamide (IMODIUM) 2 MG capsule Take 2 mg by mouth 4 times daily as needed for diarrhea        "losartan (COZAAR) 50 MG tablet Take 1 tablet (50 mg) by mouth daily 90 tablet 3     metoprolol succinate (TOPROL-XL) 25 MG 24 hr tablet Take 1 tablet (25 mg) by mouth daily 90 tablet 3     pantoprazole (PROTONIX) 40 MG EC tablet Take 1 tablet (40 mg) by mouth daily 30 tablet      Insulin Pen Needle (PEN NEEDLES) 32G X 4 MM MISC        Nitroglycerin (NITROSTAT SL) Place 0.4 mg under the tongue every 5 minutes as needed for chest pain       OTC products: Aspirin    Allergies   Allergen Reactions     Hydrocodone      Upset stomach     Shellfish Allergy Hives and Rash      Latex Allergy: NO    Social History     Tobacco Use     Smoking status: Current Every Day Smoker     Packs/day: 2.00     Years: 50.00     Pack years: 100.00     Types: Cigarettes     Smokeless tobacco: Never Used     Tobacco comment: 1 1/2 PPD 2/16/18   Substance Use Topics     Alcohol use: No     History   Drug Use No       REVIEW OF SYSTEMS:   CONSTITUTIONAL: NEGATIVE for fever, chills, change in weight  INTEGUMENTARY/SKIN: NEGATIVE for worrisome rashes, moles or lesions  EYES: NEGATIVE for vision changes or irritation  ENT/MOUTH: NEGATIVE for ear, mouth and throat problems  RESP: NEGATIVE for significant cough or SOB  CV: NEGATIVE for chest pain, palpitations or peripheral edema  GI: NEGATIVE for nausea, abdominal pain, heartburn, or change in bowel habits  : NEGATIVE for frequency, dysuria, or hematuria  MUSCULOSKELETAL: NEGATIVE for significant arthralgias or myalgia  NEURO: NEGATIVE for weakness, dizziness or paresthesias  ENDOCRINE: NEGATIVE for temperature intolerance, skin/hair changes  HEME: NEGATIVE for bleeding problems  PSYCHIATRIC: NEGATIVE for changes in mood or affect    EXAM:   /74   Pulse 73   Temp 97.7  F (36.5  C) (Tympanic)   Resp 16   Ht 1.778 m (5' 10\")   Wt 75.5 kg (166 lb 6.4 oz)   SpO2 97%   BMI 23.88 kg/m    GENERAL APPEARANCE: healthy, alert and no distress; ambulatory w/o assist  EYES: pink conj, no icterus, " PERRL, EOMI  HENT: ear canals and TM's normal, nose and mouth without ulcers or lesions, oropharynx clear and oral mucous membranes moist  NECK: no adenopathy, no asymmetry, masses, or scars and thyroid normal to palpation  RESP: lungs clear to auscultation - no rales, rhonchi or wheezes  CV: regular rates and rhythm, normal S1 S2, no S3 or S4, no murmur, click or rub, no peripheral edema and peripheral pulses strong  ABDOMEN: soft, nontender, no hepatosplenomegaly, no masses and bowel sounds normal  MS: no musculoskeletal defects are noted and gait is age appropriate without ataxia  SKIN: good turgor, no rash/jaundice/ecchymosis  NEURO: Normal strength and tone, sensory exam grossly normal, mentation intact and speech normal    DIAGNOSTICS:     EKG today:  Sinus  Rhythm   Low voltage in limb leads.    -Left atrial enlargement.    -Right sided conduction defect and right axis -possible right ventricular hypertrophy or posterior fascicular block.    -  Diffuse nonspecific T-abnormality.   When overlapped with previous tracing, no change in QR morphologies.    Labs Resulted Today:   Results for orders placed or performed in visit on 05/26/20   Hemoglobin A1c     Status: Abnormal   Result Value Ref Range    Hemoglobin A1C 9.4 (H) 0 - 5.6 %   Basic metabolic panel     Status: Abnormal   Result Value Ref Range    Sodium 136 133 - 144 mmol/L    Potassium 4.2 3.4 - 5.3 mmol/L    Chloride 102 94 - 109 mmol/L    Carbon Dioxide 30 20 - 32 mmol/L    Anion Gap 4 3 - 14 mmol/L    Glucose 198 (H) 70 - 99 mg/dL    Urea Nitrogen 36 (H) 7 - 30 mg/dL    Creatinine 1.09 0.66 - 1.25 mg/dL    GFR Estimate 68 >60 mL/min/[1.73_m2]    GFR Estimate If Black 78 >60 mL/min/[1.73_m2]    Calcium 8.1 (L) 8.5 - 10.1 mg/dL   Hemoglobin     Status: None   Result Value Ref Range    Hemoglobin 14.3 13.3 - 17.7 g/dL       Recent Labs   Lab Test 03/03/20  0836 02/13/20  0748  04/18/18  0906  12/27/17  0938  12/22/17  0827  09/08/17  1130   HGB 14.1  --    --  13.2*  --   --   --  14.2  --   --    *  --   --  101*  --   --   --  122*  --   --    INR  --   --   --   --   --   --   --  1.02  --  1.03    136   < > 137   < >  --    < > 138   < >  --    POTASSIUM 4.6 4.5   < > 4.3   < >  --    < > 4.5   < >  --    CR 1.22 1.11   < > 1.01   < >  --    < > 1.02   < >  --    A1C 11.5*  --   --   --   --  9.1*  --   --   --   --     < > = values in this interval not displayed.        IMPRESSION:   Reason for surgery/procedure: right thumb DJD  Diagnosis/reason for consult: preop evaluation, multiple cardiac diseases., DM, hyperlipidemia, tobacco use disorder, hx of alcohol use, COPD    The proposed surgical procedure is considered INTERMEDIATE risk.    REVISED CARDIAC RISK INDEX  The patient has the following serious cardiovascular risks for perioperative complications such as (MI, PE, VFib and 3  AV Block):  Coronary Artery Disease (MI, positive stress test, angina, Qs on EKG)  Diabetes Mellitus (on Insulin)  INTERPRETATION: 2 risks: Class III (moderate risk - 6.6% complication rate)    The patient has the following additional risks for perioperative complications:  The ASCVD Risk score (Emeighmartha PIKE Jr., et al., 2013) failed to calculate for the following reasons:    The patient has a prior MI or stroke diagnosis  Alcohol abuse with risk of withdrawal      ICD-10-CM    1. Preop general physical exam  Z01.818 Hemoglobin A1c     Basic metabolic panel     Hemoglobin     EKG 12-lead complete w/read - Clinics     AMBULATORY ADULT DIABETES EDUCATOR REFERRAL   2. Osteoarthritis of right thumb  M18.11    3. Coronary artery disease involving native coronary artery of native heart without angina pectoris  I25.10 Hemoglobin     EKG 12-lead complete w/read - Clinics   4. Paroxysmal atrial fibrillation (H)  I48.0 EKG 12-lead complete w/read - Clinics   5. Type 2 diabetes mellitus with other specified complication, with long-term current use of insulin (H)  E11.69 Hemoglobin A1c     Z79.4 Basic metabolic panel     AMBULATORY ADULT DIABETES EDUCATOR REFERRAL   6. Chronic systolic heart failure (H)  I50.22 EKG 12-lead complete w/read - Clinics   7. Left carotid stenosis  I65.22    8. Chronic obstructive pulmonary disease, unspecified COPD type (H)  J44.9    9. Tobacco use  Z72.0 Hemoglobin   10. Anemia, unspecified type  D64.9 Hemoglobin       RECOMMENDATIONS:     --Consult hospital rounder / IM to assist post-op medical management    Cardiovascular Risk  Performs 4 METs exercise without symptoms (Light housework (dusting, washing dishes) and Climb a flight of stairs) .   Patient is already on a Beta Blocker. Continue Betablocker therapy after surgery, using Beta blocker order set as necessary for NPO status.  Patient had a full cardiology eval 2 months ago and was assessed to be an increased risk surgery candidate. However, cardiologist did approve patient to proceed with surgery after stress testing had been completed with consideration of his increased risk perioperatively.  Will CC to cardiologist re: that recommendation to determine if those recommendations remain considering patient denies interval change in his health since March 2020.      Pulmonary Risk  Incentive spirometry post op  Respiratory Therapy (Respiratory Care IP Consult)  post op  NG tube decompression if abdominal distension or significant vomiting   Advised smoking cessation.       --Patient is to take all scheduled medications on the day of surgery EXCEPT for modifications listed below.    Diabetes Medication Use  -----Take 80% of long acting insulin (e.g. Lantus, NPH) while NPO (fasting)      Anticoagulant or Antiplatelet Medication Use  ASPIRIN: Patient is at increased risk of thrombosis (e.g. MI, CVA) and aspirin 81 mg daily should be continued in the perioperative period  NSAIDS: avoid use        ACE Inhibitor or Angiotensin Receptor Blocker (ARB) Use  Ace inhibitor or Angiotensin Receptor Blocker (ARB) and will  continue this medication due to the higher risk of uncontrolled perioperative hypertension (e.g. neurosurgical procedure)      Surgery is NOT recommended due to uncontrolled diabetes (A1C >8.5%, Glucose >200 mg/dl). Stabilization required prior to elective surgery. Referral to Diabetes Educator (Preoperative Intensive Glucose Management)        Signed Electronically by: Lalit Berger MD    Copy of this evaluation report is provided to requesting physician.    [facility] Preop Guidelines    Revised Cardiac Risk Index

## 2020-05-26 NOTE — H&P (VIEW-ONLY)
[facility]  [address] [address]  [phone]  Dept: [phone]    PRE-OP EVALUATION:  Today's date: 2020    Delbert Tilley (: 1950) presents for pre-operative evaluation assessment as requested by Dr. Gong.  He requires evaluation and anesthesia risk assessment prior to undergoing surgery/procedure for treatment of right thumb .    Proposed Surgery/ Procedure: Thumb Ligament Reconstruction Tendon Interposition with Extensor Pollicis Brevis to Abductor Poillicis Longus Tendon Transfer   Date of Surgery/ Procedure: 20  Time of Surgery/ Procedure: 1:00  Hospital/Surgical Facility: [facility]  Fax number for surgical facility: [facility]  Primary Physician: Sánchez Va Miller Arzate  Type of Anesthesia Anticipated: General    Patient has a Health Care Directive or Living Will:  YES on file    1. YES - DO YOU HAVE A HISTORY OF HEART ATTACK, STROKE, STENT, BYPASS OR SURGERY ON AN ARTERY IN THE HEAD, NECK, HEART OR LEG? Pt has history of quadruple bypass.  2. NO - Do you ever have any pain or discomfort in your chest?  3. YES - DO YOU HAVE A HISTORY OF HEART FAILURE see above  4. YES - ARE YOUR TROUBLED BY SHORTNESS OF BREATH WHEN WALKING ON THE LEVEL, UP A SLIGHT HILL OR AT NIGHT? Due to history of smoking  5. NO - DO YOU CURRENTLY HAVE A COLD, BRONCHITIS OR OTHER RESPIRATORY INFECTION?   6. NO - Do you have a cough, shortness of breath or wheezing?  7. YES - DO YOU SOMETIMES GET PAINS IN THE CALVES OF YOUR LEGS WHEN YOU WALK? Pt gets pain in legs from neuropathy.  8. NO - Do you or anyone in your family have previous history of blood clots?  9. NO - Do you or does anyone in your family have a serious bleeding problem such as prolonged bleeding following surgeries or cuts?  10. NO - Have you ever had problems with anemia or been told to take iron pills?  11. NO - Have you had any abnormal blood loss such as black, tarry or bloody stools, or abnormal vaginal  bleeding?  12. NO - Have you ever had a blood transfusion?  13. NO - Have you or any of your relatives ever had problems with anesthesia?  14. NO - Do you have sleep apnea, excessive snoring or daytime drowsiness?  15. YES - DO YOU HAVE ANY PROSTHETIC HEART VALVES? Quadruple bypass.  16. NO - Do you have prosthetic joints?  17. NO - Is there any chance that you may be pregnant?      HPI:     HPI related to upcoming procedure: Patient has right thumb DJD causing hand dysfunction. Hence, planned surgery. Reviewed above with patient.      A-FIB - Patient has a longstanding history of paroxysmal A-fib currently in sinus. Current treatment regimen includes Aspirin for stroke prevention and denies significant symptoms of lightheadedness, palpitations or dyspnea.     CAD - Patient has a longstanding history of moderate-severe CAD. Patient denies recent chest pain or NTG use, denies exercise induced dyspnea or PND. Last Stress test 2 months ago - see below, EKG last done 2 months ago - see below.     COPD - patient reports no recent flares or ER visits. Denies side effects from his meds.    DIABETES - Patient has a longstanding history of DiabetesType Type II . Patient is being treated with insulin injections and denies significant side effects. Control has been poor. Complicating factors include but are not limited to: hypertension, hyperlipidemia, CAD/PVD, tobacco use and alcohol abuse.   Patient states home BG run 's, highest in last month was 200 - working with DM educator.    HYPERLIPIDEMIA - Patient has a long history of significant Hyperlipidemia requiring medication for treatment with recent fair control. Patient reports no problems or side effects with the medication.     HYPERTENSION - Patient has longstanding history of HTN , currently denies any symptoms referable to elevated blood pressure. Specifically denies chest pain, palpitations, dyspnea, orthopnea, PND or peripheral edema. Blood pressure readings  "have been in normal range. Current medication regimen is as listed below. Patient denies any side effects of medication.     Per cardiology in previous preop cardiology evaluation for the same surgery:  \" -Based on both the ACS-NQIP (8%) and the RCRI (15%) the patient is a moderate to high risk of cardiac complications for this procedure, but this risk is derived from his chronic comorbidities as opposed to acute issues such as active angina or congestive heart failure.  At this point in time, I think it be reasonable to rule out significant underlying ischemia with a stress nuclear perfusion study particularly since his functional status is hard to assess given his limited mobility.     -If the stress test findings are unchanged compared to his previous Lexiscan from 2017, then surgery can be performed with the understanding that his baseline risk will still be elevated as described above.  A postoperative EKG should be obtained and there should be a low threshold for obtaining cardiac troponins if he experiences postoperative chest discomfort.\"    \"----- Message from Ileana Nagel MD sent at 3/30/2020 10:35 AM CDT -----  Regarding: RE: Stress Test Result  OK to proceed with surgery. Thanks.      ----- Message -----  From: Alexandra Xavier, RN  Sent: 3/30/2020  To: Ileana Nagel MD  Subject: Stress Test Result                                Please review stress test.  Pt's surgery has been postponed until 4/7/20.  ALEXANDRA XAVIER RN on 3/23/2020 at 4:16 PM\"      MEDICAL HISTORY:     Patient Active Problem List    Diagnosis Date Noted     Chronic obstructive pulmonary disease, unspecified COPD type (H) 03/09/2020     Priority: Medium     NSVT (nonsustained ventricular tachycardia) (H) 11/01/2019     Priority: Medium     Chronic systolic heart failure (H) 11/01/2019     Priority: Medium     Paroxysmal atrial fibrillation (H) 11/01/2019     Priority: Medium     Tobacco use 05/28/2019     Priority: Medium     Left " carotid stenosis 09/08/2017     Priority: Medium     Carpal tunnel syndrome of right wrist 01/10/2016     Priority: Medium     Carotid stenosis 06/12/2015     Priority: Medium     Bilateral carotid Disease S/P Right carotid endarterectomy.   [facility]       CHF (congestive heart failure) (H) 06/08/2015     Priority: Medium     NSTEMI (non-ST elevated myocardial infarction) (H) 10/21/2014     Priority: Medium     ACS (acute coronary syndrome) (H) 10/20/2014     Priority: Medium     Hyperlipidemia LDL goal <70 07/08/2013     Priority: Medium     [facility]  Diagnosis updated by automated process. Provider to review and confirm.       TYPE 2 DIABETES, HBA1C GOAL < 8% 10/31/2010     Priority: Medium     CARDIOVASCULAR SCREENING; LDL GOAL LESS THAN 100 10/31/2010     Priority: Medium     Coronary artery disease involving native coronary artery of native heart without angina pectoris 07/07/2010     Priority: Medium     S/p mi  Bypass x 4 --grafting to the LAD obtuse marginal and PDA.   Angio on 6/10 negative          Alcohol abuse 07/07/2010     Priority: Medium     H/o rehab x 5        GERD (gastroesophageal reflux disease) 07/07/2010     Priority: Medium     Pancreatic disease 07/07/2010     Priority: Medium     H/o pancreatitis --on enzymes        Diabetes mellitus, type 2 (H) 07/07/2010     Priority: Medium     [facility]       Cardiomyopathy (H) 06/15/2010     Priority: Medium     EF 15%.--ICD placement        Atrial fibrillation/flutter 06/15/2010     Priority: Medium     S/p ablation 1June 2010.           Past Medical History:   Diagnosis Date     Acute renal failure (H) 8/26/06 Hosp    secondary to dehydration     Arthritis      CAD (coronary artery disease)     LIMA to the LAD, vein graft to OM and a vein graft to the PDA     Carpal tunnel syndrome      Diabetes (H)      Gastro-oesophageal reflux disease      H/O: alcohol abuse      HTN (hypertension)       Hyperlipidaemia      Hyperlipidaemia LDL goal <100 7/8/2013     Hypokalemia      Pacemaker      Pancreatitis 6/26/06 Hosp     Past Surgical History:   Procedure Laterality Date     BYPASS CARDIOPULMONARY       CATARACT IOL, RT/LT      Cataract IOL RT/LT     ENDARTERECTOMY CAROTID Right 6/12/2015    Procedure: ENDARTERECTOMY CAROTID;  Surgeon: João Turner MD;  Location: [facility] OR     ENDARTERECTOMY CAROTID Left 9/8/2017    Procedure: ENDARTERECTOMY CAROTID;  LEFT CAROTID ENDARTERECTOMY WITH EEG;  Surgeon: João Turner MD;  Location: [facility] OR     IMPLANT PACEMAKER  6/10/2010     RELEASE CARPAL TUNNEL Right 4/12/2016    Procedure: RELEASE CARPAL TUNNEL;  Surgeon: Lissy Leger MD;  Location: [facility] OR     SURGICAL HISTORY OF -   1998    Laminectomy     SURGICAL HISTORY OF -   10/2003    Bypass grafting     TONSILLECTOMY & ADENOIDECTOMY       Current Outpatient Medications   Medication Sig Dispense Refill     albuterol (PROAIR HFA) 108 (90 Base) MCG/ACT Inhaler Inhale 2 puffs into the lungs every 4 hours as needed for shortness of breath / dyspnea 1 Inhaler 0     amylase-lipase-protease (CREON 12) 24879 UNITS CPEP Take 1 capsule (12,000 Units) by mouth daily 120 capsule 0     aspirin 325 MG tablet Take 3 tablets (975 mg) by mouth every morning (Takes 2 x 500 mg = 1000 mg) (Patient taking differently: Take 1,000 mg by mouth every morning (Takes 2 x 500 mg = 1000 mg)   Taking 2 in am and 2 in the pm. 2/22/19) 120 tablet 1     atorvastatin (LIPITOR) 80 MG tablet Take 80 mg by mouth At Bedtime        fluticasone-salmeterol (ADVAIR DISKUS) 250-50 MCG/DOSE inhaler Inhale 1 puff into the lungs every 12 hours 1 Inhaler 1     furosemide (LASIX) 20 MG tablet Take 1 tablet (20 mg) by mouth 2 times daily 180 tablet 3     insulin glargine (LANTUS) 100 UNIT/ML injection Inject 38 Units Subcutaneous every morning       loperamide (IMODIUM) 2 MG capsule Take 2 mg by mouth 4 times daily as needed for diarrhea        "losartan (COZAAR) 50 MG tablet Take 1 tablet (50 mg) by mouth daily 90 tablet 3     metoprolol succinate (TOPROL-XL) 25 MG 24 hr tablet Take 1 tablet (25 mg) by mouth daily 90 tablet 3     pantoprazole (PROTONIX) 40 MG EC tablet Take 1 tablet (40 mg) by mouth daily 30 tablet      Insulin Pen Needle (PEN NEEDLES) 32G X 4 MM MISC        Nitroglycerin (NITROSTAT SL) Place 0.4 mg under the tongue every 5 minutes as needed for chest pain       OTC products: Aspirin    Allergies   Allergen Reactions     Hydrocodone      Upset stomach     Shellfish Allergy Hives and Rash      Latex Allergy: NO    Social History     Tobacco Use     Smoking status: Current Every Day Smoker     Packs/day: 2.00     Years: 50.00     Pack years: 100.00     Types: Cigarettes     Smokeless tobacco: Never Used     Tobacco comment: 1 1/2 PPD 2/16/18   Substance Use Topics     Alcohol use: No     History   Drug Use No       REVIEW OF SYSTEMS:   CONSTITUTIONAL: NEGATIVE for fever, chills, change in weight  INTEGUMENTARY/SKIN: NEGATIVE for worrisome rashes, moles or lesions  EYES: NEGATIVE for vision changes or irritation  ENT/MOUTH: NEGATIVE for ear, mouth and throat problems  RESP: NEGATIVE for significant cough or SOB  CV: NEGATIVE for chest pain, palpitations or peripheral edema  GI: NEGATIVE for nausea, abdominal pain, heartburn, or change in bowel habits  : NEGATIVE for frequency, dysuria, or hematuria  MUSCULOSKELETAL: NEGATIVE for significant arthralgias or myalgia  NEURO: NEGATIVE for weakness, dizziness or paresthesias  ENDOCRINE: NEGATIVE for temperature intolerance, skin/hair changes  HEME: NEGATIVE for bleeding problems  PSYCHIATRIC: NEGATIVE for changes in mood or affect    EXAM:   /74   Pulse 73   Temp 97.7  F (36.5  C) (Tympanic)   Resp 16   Ht 1.778 m (5' 10\")   Wt 75.5 kg (166 lb 6.4 oz)   SpO2 97%   BMI 23.88 kg/m    GENERAL APPEARANCE: healthy, alert and no distress; ambulatory w/o assist  EYES: pink conj, no icterus, " PERRL, EOMI  HENT: ear canals and TM's normal, nose and mouth without ulcers or lesions, oropharynx clear and oral mucous membranes moist  NECK: no adenopathy, no asymmetry, masses, or scars and thyroid normal to palpation  RESP: lungs clear to auscultation - no rales, rhonchi or wheezes  CV: regular rates and rhythm, normal S1 S2, no S3 or S4, no murmur, click or rub, no peripheral edema and peripheral pulses strong  ABDOMEN: soft, nontender, no hepatosplenomegaly, no masses and bowel sounds normal  MS: no musculoskeletal defects are noted and gait is age appropriate without ataxia  SKIN: good turgor, no rash/jaundice/ecchymosis  NEURO: Normal strength and tone, sensory exam grossly normal, mentation intact and speech normal    DIAGNOSTICS:     EKG today:  Sinus  Rhythm   Low voltage in limb leads.    -Left atrial enlargement.    -Right sided conduction defect and right axis -possible right ventricular hypertrophy or posterior fascicular block.    -  Diffuse nonspecific T-abnormality.   When overlapped with previous tracing, no change in QR morphologies.    Labs Resulted Today:   Results for orders placed or performed in visit on 05/26/20   Hemoglobin A1c     Status: Abnormal   Result Value Ref Range    Hemoglobin A1C 9.4 (H) 0 - 5.6 %   Basic metabolic panel     Status: Abnormal   Result Value Ref Range    Sodium 136 133 - 144 mmol/L    Potassium 4.2 3.4 - 5.3 mmol/L    Chloride 102 94 - 109 mmol/L    Carbon Dioxide 30 20 - 32 mmol/L    Anion Gap 4 3 - 14 mmol/L    Glucose 198 (H) 70 - 99 mg/dL    Urea Nitrogen 36 (H) 7 - 30 mg/dL    Creatinine 1.09 0.66 - 1.25 mg/dL    GFR Estimate 68 >60 mL/min/[1.73_m2]    GFR Estimate If Black 78 >60 mL/min/[1.73_m2]    Calcium 8.1 (L) 8.5 - 10.1 mg/dL   Hemoglobin     Status: None   Result Value Ref Range    Hemoglobin 14.3 13.3 - 17.7 g/dL       Recent Labs   Lab Test 03/03/20  0836 02/13/20  0748  04/18/18  0906  12/27/17  0938  12/22/17  0827  09/08/17  1130   HGB 14.1  --    --  13.2*  --   --   --  14.2  --   --    *  --   --  101*  --   --   --  122*  --   --    INR  --   --   --   --   --   --   --  1.02  --  1.03    136   < > 137   < >  --    < > 138   < >  --    POTASSIUM 4.6 4.5   < > 4.3   < >  --    < > 4.5   < >  --    CR 1.22 1.11   < > 1.01   < >  --    < > 1.02   < >  --    A1C 11.5*  --   --   --   --  9.1*  --   --   --   --     < > = values in this interval not displayed.        IMPRESSION:   Reason for surgery/procedure: right thumb DJD  Diagnosis/reason for consult: preop evaluation, multiple cardiac diseases., DM, hyperlipidemia, tobacco use disorder, hx of alcohol use, COPD    The proposed surgical procedure is considered INTERMEDIATE risk.    REVISED CARDIAC RISK INDEX  The patient has the following serious cardiovascular risks for perioperative complications such as (MI, PE, VFib and 3  AV Block):  Coronary Artery Disease (MI, positive stress test, angina, Qs on EKG)  Diabetes Mellitus (on Insulin)  INTERPRETATION: 2 risks: Class III (moderate risk - 6.6% complication rate)    The patient has the following additional risks for perioperative complications:  The ASCVD Risk score (Mindenmartha PIKE Jr., et al., 2013) failed to calculate for the following reasons:    The patient has a prior MI or stroke diagnosis  Alcohol abuse with risk of withdrawal      ICD-10-CM    1. Preop general physical exam  Z01.818 Hemoglobin A1c     Basic metabolic panel     Hemoglobin     EKG 12-lead complete w/read - Clinics     AMBULATORY ADULT DIABETES EDUCATOR REFERRAL   2. Osteoarthritis of right thumb  M18.11    3. Coronary artery disease involving native coronary artery of native heart without angina pectoris  I25.10 Hemoglobin     EKG 12-lead complete w/read - Clinics   4. Paroxysmal atrial fibrillation (H)  I48.0 EKG 12-lead complete w/read - Clinics   5. Type 2 diabetes mellitus with other specified complication, with long-term current use of insulin (H)  E11.69 Hemoglobin A1c     Z79.4 Basic metabolic panel     AMBULATORY ADULT DIABETES EDUCATOR REFERRAL   6. Chronic systolic heart failure (H)  I50.22 EKG 12-lead complete w/read - Clinics   7. Left carotid stenosis  I65.22    8. Chronic obstructive pulmonary disease, unspecified COPD type (H)  J44.9    9. Tobacco use  Z72.0 Hemoglobin   10. Anemia, unspecified type  D64.9 Hemoglobin       RECOMMENDATIONS:     --Consult hospital rounder / IM to assist post-op medical management    Cardiovascular Risk  Performs 4 METs exercise without symptoms (Light housework (dusting, washing dishes) and Climb a flight of stairs) .   Patient is already on a Beta Blocker. Continue Betablocker therapy after surgery, using Beta blocker order set as necessary for NPO status.  Patient had a full cardiology eval 2 months ago and was assessed to be an increased risk surgery candidate. However, cardiologist did approve patient to proceed with surgery after stress testing had been completed with consideration of his increased risk perioperatively.  Will CC to cardiologist re: that recommendation to determine if those recommendations remain considering patient denies interval change in his health since March 2020.      Pulmonary Risk  Incentive spirometry post op  Respiratory Therapy (Respiratory Care IP Consult)  post op  NG tube decompression if abdominal distension or significant vomiting   Advised smoking cessation.       --Patient is to take all scheduled medications on the day of surgery EXCEPT for modifications listed below.    Diabetes Medication Use  -----Take 80% of long acting insulin (e.g. Lantus, NPH) while NPO (fasting)      Anticoagulant or Antiplatelet Medication Use  ASPIRIN: Patient is at increased risk of thrombosis (e.g. MI, CVA) and aspirin 81 mg daily should be continued in the perioperative period  NSAIDS: avoid use        ACE Inhibitor or Angiotensin Receptor Blocker (ARB) Use  Ace inhibitor or Angiotensin Receptor Blocker (ARB) and will  continue this medication due to the higher risk of uncontrolled perioperative hypertension (e.g. neurosurgical procedure)      Surgery is NOT recommended due to uncontrolled diabetes (A1C >8.5%, Glucose >200 mg/dl). Stabilization required prior to elective surgery. Referral to Diabetes Educator (Preoperative Intensive Glucose Management)        Signed Electronically by: Lalit Berger MD    Copy of this evaluation report is provided to requesting physician.    [facility] Preop Guidelines    Revised Cardiac Risk Index

## 2020-05-27 ENCOUNTER — TELEPHONE (OUTPATIENT)
Dept: CARDIOLOGY | Facility: CLINIC | Age: 70
End: 2020-05-27

## 2020-05-27 NOTE — TELEPHONE ENCOUNTER
Pt called stating he has been having to take lasix more often.     His wt was up >10 pounds and he increased his Lasix dose on own.  He was taking two in the morning and one at night.  Currently wt is back to baseline at check yesterday.  He is now taking oneLasix in morning and one at night.  [facility]  Pt is due for 6 month follow up with Dr Michaels.    Scheduled for phone visit 5/29/20 with Dr Michaels. BMP done 5/26/20. Will fax note for refill after visit.  [facility] - Dr Ady DIA, RN on 5/27/2020 at 2:12 PM

## 2020-05-28 ENCOUNTER — TELEPHONE (OUTPATIENT)
Dept: FAMILY MEDICINE | Facility: CLINIC | Age: 70
End: 2020-05-28

## 2020-05-28 NOTE — TELEPHONE ENCOUNTER
See note below from \[facility]\"", patient was then given instructions that stated   You may take metoprolol and losartan on morning of surgery if they are scheduled to be taken then. Take only with a small sip of water.  All other scheduled medication taken by mouth may be held on morning of surgery, and resumed when you are allowed to eat.      On morning of surgery, take only 27 units of lantus by injection.  Resume your regular dose the next day when allowed to eat.     Continue aspirin, but reduce it to 325 mg daily only, unless requested by general surgeon to stop the medication few days before surgery.     Final instructions to be given to you once results of tests are available, and cardiology team has replied to message to be sent to them today.    Is patient cleared for surgery or not?     FELIPE HauserN, RN

## 2020-05-28 NOTE — TELEPHONE ENCOUNTER
"Reason for Call:  Surgery Issues    Detailed comments: [facility] called clinic stating that patient is upset because he was under the impression that his surgery would still be on 06/02 and not postponed due to his pre-op with Dr. Berger.  Patient saw Dr. Berger on 05/26.  Per Dr. Berger notes \"Surgery is NOT recommended due to uncontrolled diabetes (A1C >8.5%, Glucose >200 mg/dl). Stabilization required prior to elective surgery. Referral to Diabetes Educator (Preoperative Intensive Glucose Management)\"  [facility] is requesting that Dr. Berger's office call the patient and discuss this further, they di not feel that the patient was given clear understanding of the surgery being postponed.      Phone Number Patient can be reached at: Home number on file [phone] (home)  [phone] - [facility] manager    Best Time: Any    Can we leave a detailed message on this number? YES    Call taken on 5/28/2020 at 8:27 AM by Niiva Moon    "

## 2020-05-28 NOTE — TELEPHONE ENCOUNTER
"Called patient and notified him that Dr. Berger is out of the clinic but we are aware his surgery was postponed. Patient states \"I have a blood test on Monday\". It appears this is for another A1C.     Routing to PCP only. Patient was frustrated that his surgery was going to be postponed once again.      FELIPE HauserN, RN    "

## 2020-05-28 NOTE — TELEPHONE ENCOUNTER
Patient states that he spoke with Dr. Mills about his A1C and said that Dr. Mills was ok with his current number.  Asking if Dr. Berger will still approve him for surgery?

## 2020-05-29 ENCOUNTER — VIRTUAL VISIT (OUTPATIENT)
Dept: CARDIOLOGY | Facility: CLINIC | Age: 70
End: 2020-05-29
Payer: COMMERCIAL

## 2020-05-29 VITALS — WEIGHT: 168 LBS | BODY MASS INDEX: 24.11 KG/M2

## 2020-05-29 DIAGNOSIS — I50.23 ACUTE ON CHRONIC SYSTOLIC CONGESTIVE HEART FAILURE (H): ICD-10-CM

## 2020-05-29 DIAGNOSIS — I50.22 CHRONIC SYSTOLIC HEART FAILURE (H): Primary | ICD-10-CM

## 2020-05-29 PROCEDURE — 99214 OFFICE O/P EST MOD 30 MIN: CPT | Mod: 95 | Performed by: INTERNAL MEDICINE

## 2020-05-29 RX ORDER — CARVEDILOL 6.25 MG/1
6.25 TABLET ORAL 2 TIMES DAILY WITH MEALS
Qty: 180 TABLET | Refills: 3 | Status: SHIPPED | OUTPATIENT
Start: 2020-05-29 | End: 2020-06-02

## 2020-05-29 RX ORDER — FUROSEMIDE 20 MG
20 TABLET ORAL 2 TIMES DAILY
Qty: 180 TABLET | Refills: 3 | Status: SHIPPED | OUTPATIENT
Start: 2020-05-29 | End: 2020-06-02

## 2020-05-29 NOTE — TELEPHONE ENCOUNTER
Preoperative guidelines and recommendations will be followed regarding this.  His diabetes not being in optimal control increases risk for perioperative complications. Hence, documentation of optimization of glucose control is needed prior to surgery approval.  Furthermore, this provider is still awaiting reply from cardiology regarding his previous cardiovascular preoperative evaluation.

## 2020-05-29 NOTE — TELEPHONE ENCOUNTER
Attempted to contact patient, no answer, left voice message to call back.    [facility] [place] okay to deliver message.

## 2020-05-29 NOTE — LETTER
"5/29/2020    [facility]  [address] [facility] [address]    RE: Delbert Tilley       Dear Colleague,    I had the pleasure of seeing Delbert Tilley in the [facility].    Delbert Tilley is a 69 year old male who is being evaluated via a billable telephone visit.      Mr. Delbert Tilley is 69 years of age and is followed for a history of an ischemic cardiomyopathy, prior ICD placement, atrial flutter and coronary artery disease.  He has previously undergone coronary artery bypass graft surgery.  He has bilateral carotid disease, diabetes mellitus and does continue to smoke tobacco.     He is \"not feeling bad\".  He has exertional dyspnea but it has not changed noticeably and certainly not in the last year.  He denies any chest, neck, arm or back discomfort.  Over the last month, he has built a landing and ramps working in 1/2-hour intervals with rests in between.  He is now able to use the ramps he built as opposed to stairs.  Over the last month, he has also noted worsening leg edema.  He wears compression stockings during the day but then his thighs swell during the day. At night, the fluid returns to his ankles.  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  He was 175 lbs and he should be about 165 lbs. given his usual weight.  Mr. Tilley increased his Lasix to 40 mg twice daily and the edema improved and returned to baseline.  His weight decreased to 167 or 168 pounds.  He is back to 20 mg twice daily of the Lasix.  He notes that his blood pressure has been 116/74 mmHg.  A BMP 3 days ago indicated his creatinine to be 1.09 (GFR 68) and the potassium to be 4.2.     He has a history of coronary artery bypass graft surgery with an LIMA graft to the LAD, vein graft to the circumflex marginal branch and a vein graft to the right PDA.  In 2014, he presented with a non-ST elevation myocardial " infarct, was transferred to [facility] and underwent intervention with stenting of the vein graft to the marginal branch.  The vein graft to the RCA and the LIMA graft to the LAD were patent.  His LV ejection fraction at that time was 35%-40% with severe inferior and inferolateral hypokinesis.  A 2015 Lexiscan nuclear stress study demonstrated a large transmural inferior and inferoseptal infarct without ischemia as well as a small distal anterior and apical defect.  His Lexiscan stress nuclear study just completed 2 months ago was essentially unchanged and demonstrated an inferior and inferoseptal transmural infarct with a small distal apical nontransmural infarct but no ischemia.    His last defibrillator check during March 2020 demonstrated 28% atrial pacing, essentially no ventricular pacing and no detected atrial or ventricular arrhythmias.  An EKG from March 2020 demonstrated an interventricular conduction delay with a QRS duration of 116 ms.     Following his surgery, he did well without recurrent angina or evidence of heart failure.  As a preoperative assessment for endarterectomy in 2017, a Lexiscan stress nuclear study estimated to have fallen to 28%.  He did develop heart failure symptoms and the ejection fraction was noted to be 20%-25% on echocardiography.  Repeat coronary angiography was recommended and again demonstrated the LIMA graft to the LAD to be patent with a 50% stenosis in the apical LAD, but the vein graft to the right PDA had occluded.  The vein graft to the circumflex marginal branch had remained patent, and the left main coronary artery was widely patent.  A repeat echocardiogram during October 2019 demonstrated the left ventricular ejection fraction to be 20 to 25% with a moderate decrease in right ventricular systolic performance and mild to moderate tricuspid insufficiency with the right ventricular systolic pressure estimated at 40 mmHg plus right atrial pressure.  He  continues on beta-blockade and vasodilator therapy with an ARB agent.  Hypotension prevented the use of an aldosterone antagonist.     In the interim, Mr. Tilley has clinically done well.  In the past, he has noted intermittent increased leg edema and would take an extra 20 mg of furosemide with resolution of the symptom.  That occurs about once monthly.  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  He is a retired watts, and his major problem is that he has developed some right hand arthritis for which she takes high-dose aspirin.      He does continue to smoke tobacco despite discussions and attempts in the past to discontinue tobacco.  He has previously undergone implantation of a cardiodefibrillator for primary prevention of sudden cardiac death.  He has has a history of prior atrial flutter ablation.  Diabetic control remains a problem with his last hemoglobin A1c being 9.4.  He does have normal renal function with a normal GFR.  His medical therapy remains 25 mg of metoprolol succinate, 50 mg of losartan, 20 mg twice daily of furosemide, statin therapy and aspirin.  He is taking very high dose aspirin prescribed for his arthritic symptoms.     Exam:  He was alert and oriented to person place and time.    Assessment/Plan:  With regard to his cardiomyopathy, his clinical/functional status is probably stable with New York Heart Association class II symptoms.  With regard to his left ventricular dysfunction, he has previously undergone defibrillator implantation.  I reviewed his echocardiograms today and he does not have dyssynchronous septal motion.  The left ventricular dysfunction appears global and over time, the left ventricle has dilated.  The prior estimate of LV function in 2015 of 35 to 40% may have been somewhat of an overestimate.  Nonetheless, there has  been a deterioration in function.  This was discussed with Mr. Tilley today.    As such, I recommended changing his metoprolol succinate to carvedilol at 6.25 mg twice daily.  I have arranged for a follow-up with the advanced practice provider in 1 month.  If he does not feel well with carvedilol, he will contact us and I would recommend decreasing the dose to 3.25 mg twice daily.  With his next appointment, I am hopeful that Entresto 24-26 mg can be substituted for losartan with a follow-up basic metabolic panel.  He may also benefit from a higher dose of diuretic (Lasix 30mg BID) as both the left and right ventricles seem to have dilated with time and his renal function remains normal to improved on higher dose Lasix.   Eventually, he may benefit from cardiac MRI (his defibrillator is MRI compatible).    I am uncertain how much of his exertional dysnea could actually be chronic obstructive pulmonary disease as a result of his continued tobacco use.  He is on bronchodilator therapy.      With regard to his coronary artery disease, he continues on an ARB agent, beta blockade, statin therapy and aspirin.  He uses aspirin for other purposes, and so he is on high-dose aspirin and is taking 1000 mg in the morning and 1000 mg in the evening.  The aspirin is being used for arthritis and is not prescribed at that dose for coronary artery disease.  He just had a repeat stress nuclear study which was unchanged without evidence of ischemia.    With spironolactone (for aldosterone antagonism), he noted a precipitous decline in his blood pressure.  He tried it several times and his blood pressures were approximately 80-90 mmHg.  In the future, we may try spironolactone again, in which case Lasix/furosemide would be discontinued/decreased and the spironolactone at 12.5 mg daily would replace the Lasix.  Potassium had previously risen on a salt substitute and his last and recent potassium was normal at 4.2      With regard to his  defibrillator, he will continue to follow in the Device Clinic.  As noted, he does not have significant ventricular pacing.  I have arranged for follow-up with myself in 2 months.      Thank you for allowing me to participate in the care of your patient.    Sincerely,     Mimi Michaels MD     [facility]

## 2020-05-29 NOTE — PROGRESS NOTES
"Delbert Tilley is a 69 year old male who is being evaluated via a billable telephone visit.      The patient has been notified of following:     \"This telephone visit will be conducted via a call between you and your physician/provider. We have found that certain health care needs can be provided without the need for a physical exam.  This service lets us provide the care you need with a short phone conversation.  If a prescription is necessary we can send it directly to your pharmacy.  If lab work is needed we can place an order for that and you can then stop by our lab to have the test done at a later time.    Telephone visits are billed at different rates depending on your insurance coverage. During this emergency period, for some insurers they may be billed the same as an in-person visit.  Please reach out to your insurance provider with any questions.    If during the course of the call the physician/provider feels a telephone visit is not appropriate, you will not be charged for this service.\"    Patient has given verbal consent for Telephone visit?  Yes    What phone number would you like to be contacted at? [phone]    How would you like to obtain your AVS? Mail a copy    Phone call duration: 22 minutes    Mr. Delbert Tilley is 69 years of age and is followed for a history of an ischemic cardiomyopathy, prior ICD placement, atrial flutter and coronary artery disease.  He has previously undergone coronary artery bypass graft surgery.  He has bilateral carotid disease, diabetes mellitus and does continue to smoke tobacco.     He is \"not feeling bad\".  He has exertional dyspnea but it has not changed noticeably and certainly not in the last year.  He denies any chest, neck, arm or back discomfort.  Over the last month, he has built a landing and ramps working in 1/2-hour intervals with rests in between.  He is now able to use the ramps he built as opposed to stairs.  Over the last month, he has also noted " worsening leg edema.  He wears compression stockings during the day but then his thighs swell during the day. At night, the fluid returns to his ankles.  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  He was 175 lbs and he should be about 165 lbs. given his usual weight.  Mr. Tilley increased his Lasix to 40 mg twice daily and the edema improved and returned to baseline.  His weight decreased to 167 or 168 pounds.  He is back to 20 mg twice daily of the Lasix.  He notes that his blood pressure has been 116/74 mmHg.  A BMP 3 days ago indicated his creatinine to be 1.09 (GFR 68) and the potassium to be 4.2.     He has a history of coronary artery bypass graft surgery with an LIMA graft to the LAD, vein graft to the circumflex marginal branch and a vein graft to the right PDA.  In 2014, he presented with a non-ST elevation myocardial infarct, was transferred to [facility] and underwent intervention with stenting of the vein graft to the marginal branch.  The vein graft to the RCA and the LIMA graft to the LAD were patent.  His LV ejection fraction at that time was 35%-40% with severe inferior and inferolateral hypokinesis.  A 2015 Lexiscan nuclear stress study demonstrated a large transmural inferior and inferoseptal infarct without ischemia as well as a small distal anterior and apical defect.  His Lexiscan stress nuclear study just completed 2 months ago was essentially unchanged and demonstrated an inferior and inferoseptal transmural infarct with a small distal apical nontransmural infarct but no ischemia.    His last defibrillator check during March 2020 demonstrated 28% atrial pacing, essentially no ventricular pacing and no detected atrial or ventricular arrhythmias.  An EKG from March 2020 demonstrated an interventricular conduction delay with a QRS duration of 116 ms.     Following his surgery, he did well without  recurrent angina or evidence of heart failure.  As a preoperative assessment for endarterectomy in 2017, a Lexiscan stress nuclear study estimated to have fallen to 28%.  He did develop heart failure symptoms and the ejection fraction was noted to be 20%-25% on echocardiography.  Repeat coronary angiography was recommended and again demonstrated the LIMA graft to the LAD to be patent with a 50% stenosis in the apical LAD, but the vein graft to the right PDA had occluded.  The vein graft to the circumflex marginal branch had remained patent, and the left main coronary artery was widely patent.  A repeat echocardiogram during October 2019 demonstrated the left ventricular ejection fraction to be 20 to 25% with a moderate decrease in right ventricular systolic performance and mild to moderate tricuspid insufficiency with the right ventricular systolic pressure estimated at 40 mmHg plus right atrial pressure.  He continues on beta-blockade and vasodilator therapy with an ARB agent.  Hypotension prevented the use of an aldosterone antagonist.     In the interim, Mr. Tilley has clinically done well.  In the past, he has noted intermittent increased leg edema and would take an extra 20 mg of furosemide with resolution of the symptom.  That occurs about once monthly.  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  He is a retired watts, and his major problem is that he has developed some right hand arthritis for which she takes high-dose aspirin.      He does continue to smoke tobacco despite discussions and attempts in the past to discontinue tobacco.  He has previously undergone implantation of a cardiodefibrillator for primary prevention of sudden cardiac death.  He has has a history of prior atrial flutter ablation.  Diabetic control remains a problem with his last  hemoglobin A1c being 9.4.  He does have normal renal function with a normal GFR.  His medical therapy remains 25 mg of metoprolol succinate, 50 mg of losartan, 20 mg twice daily of furosemide, statin therapy and aspirin.  He is taking very high dose aspirin prescribed for his arthritic symptoms.     Exam:  He was alert and oriented to person place and time.    Assessment/Plan:  With regard to his cardiomyopathy, his clinical/functional status is probably stable with New York Heart Association class II symptoms.  With regard to his left ventricular dysfunction, he has previously undergone defibrillator implantation.  I reviewed his echocardiograms today and he does not have dyssynchronous septal motion.  The left ventricular dysfunction appears global and over time, the left ventricle has dilated.  The prior estimate of LV function in 2015 of 35 to 40% may have been somewhat of an overestimate.  Nonetheless, there has been a deterioration in function.  This was discussed with Mr. Tilley today.    As such, I recommended changing his metoprolol succinate to carvedilol at 6.25 mg twice daily.  I have arranged for a follow-up with the advanced practice provider in 1 month.  If he does not feel well with carvedilol, he will contact us and I would recommend decreasing the dose to 3.25 mg twice daily.  With his next appointment, I am hopeful that Entresto 24-26 mg can be substituted for losartan with a follow-up basic metabolic panel.  He may also benefit from a higher dose of diuretic (Lasix 30mg BID) as both the left and right ventricles seem to have dilated with time and his renal function remains normal to improved on higher dose Lasix.   Eventually, he may benefit from cardiac MRI (his defibrillator is MRI compatible).    I am uncertain how much of his exertional dysnea could actually be chronic obstructive pulmonary disease as a result of his continued tobacco use.  He is on bronchodilator therapy.      With regard to  his coronary artery disease, he continues on an ARB agent, beta blockade, statin therapy and aspirin.  He uses aspirin for other purposes, and so he is on high-dose aspirin and is taking 1000 mg in the morning and 1000 mg in the evening.  The aspirin is being used for arthritis and is not prescribed at that dose for coronary artery disease.  He just had a repeat stress nuclear study which was unchanged without evidence of ischemia.    With spironolactone (for aldosterone antagonism), he noted a precipitous decline in his blood pressure.  He tried it several times and his blood pressures were approximately 80-90 mmHg.  In the future, we may try spironolactone again, in which case Lasix/furosemide would be discontinued/decreased and the spironolactone at 12.5 mg daily would replace the Lasix.  Potassium had previously risen on a salt substitute and his last and recent potassium was normal at 4.2      With regard to his defibrillator, he will continue to follow in the Device Clinic.  As noted, he does not have significant ventricular pacing.  I have arranged for follow-up with myself in 2 months.     Mimi Michaels MD

## 2020-05-29 NOTE — TELEPHONE ENCOUNTER
Please review Cardiology note from 5/29/20.  Pt would like a call back ASAP regarding approval for Surgery.  Surgery is scheduled for Tuesday 6/2/20.  Informed pt to call on Monday 6/1/20 to check if he will be having surgery.  Advise.    Denys

## 2020-05-30 DIAGNOSIS — Z11.59 ENCOUNTER FOR SCREENING FOR OTHER VIRAL DISEASES: ICD-10-CM

## 2020-05-30 PROCEDURE — 99000 SPECIMEN HANDLING OFFICE-LAB: CPT | Performed by: ORTHOPAEDIC SURGERY

## 2020-05-30 PROCEDURE — 99207 ZZC NO CHARGE NURSE ONLY: CPT

## 2020-05-30 PROCEDURE — 87635 SARS-COV-2 COVID-19 AMP PRB: CPT | Performed by: ORTHOPAEDIC SURGERY

## 2020-05-31 LAB
SARS-COV-2 RNA SPEC QL NAA+PROBE: NOT DETECTED
SPECIMEN SOURCE: NORMAL

## 2020-05-31 NOTE — TELEPHONE ENCOUNTER
The cardiologist did not comment on proceeding with surgery; it was not a preoperative exam visit. His preop note was CCd to a different cardiologist (the one he saw in March 2020, but that provider has not messaged me back if that evaluation and recommendation to proceed with recognition of his higher risk perioperatively.    Furthermore, as this provider has stated, per anesthesia and preoperative guidelines, it is recommended that perioperaitve diabetes management optimization be undertaken before he is approved to proceed. He has declined this consultation and referral to the diabetes educator - see entry from DM education team dated 5/26/2020.

## 2020-06-01 ENCOUNTER — TELEPHONE (OUTPATIENT)
Dept: FAMILY MEDICINE | Facility: CLINIC | Age: 70
End: 2020-06-01

## 2020-06-01 ENCOUNTER — APPOINTMENT (OUTPATIENT)
Dept: LAB | Facility: CLINIC | Age: 70
End: 2020-06-01
Payer: COMMERCIAL

## 2020-06-01 ENCOUNTER — ANESTHESIA EVENT (OUTPATIENT)
Dept: SURGERY | Facility: CLINIC | Age: 70
End: 2020-06-01

## 2020-06-01 RX ORDER — ACETAMINOPHEN 325 MG/1
975 TABLET ORAL ONCE
Status: CANCELLED | OUTPATIENT
Start: 2020-06-01 | End: 2020-06-01

## 2020-06-01 RX ORDER — LIDOCAINE 40 MG/G
CREAM TOPICAL
Status: CANCELLED | OUTPATIENT
Start: 2020-06-01

## 2020-06-01 RX ORDER — SODIUM CHLORIDE, SODIUM LACTATE, POTASSIUM CHLORIDE, CALCIUM CHLORIDE 600; 310; 30; 20 MG/100ML; MG/100ML; MG/100ML; MG/100ML
INJECTION, SOLUTION INTRAVENOUS CONTINUOUS
Status: CANCELLED | OUTPATIENT
Start: 2020-06-01

## 2020-06-01 RX ORDER — CELECOXIB 200 MG/1
200 CAPSULE ORAL ONCE
Status: CANCELLED | OUTPATIENT
Start: 2020-06-01 | End: 2020-06-01

## 2020-06-01 ASSESSMENT — LIFESTYLE VARIABLES: TOBACCO_USE: 1

## 2020-06-01 ASSESSMENT — ENCOUNTER SYMPTOMS: DYSRHYTHMIAS: 1

## 2020-06-01 NOTE — TELEPHONE ENCOUNTER
"Dr. Berger,  The patient called back and was read your message and explained that surgery tomorrow is not approved, the patient said \"Well I have been waiting to have this surgery 2 times now, so I suppose I am not going to need my blood work then. When your doctors decide what I should, I will call you back!\" The patient then hung up on this nurse and could not discuss any further.  Please advise.     YULIA Garner        "

## 2020-06-01 NOTE — TELEPHONE ENCOUNTER
Patient was called and read Dr. Berger's message to follow up with DM Educator preoperatively and then hung up on nurse. Will close this encounter as PCP has no further recommendation.    YULIA Garner

## 2020-06-01 NOTE — ANESTHESIA PREPROCEDURE EVALUATION
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]    Procedure(s):  Thumb Ligament Reconstruction Tendon Interposition with Extensor Pollicis Brevis to Abductor Poillicis Longus Tendon Transfer    Past Medical History:   Diagnosis Date     Acute renal failure (H) 06 Hosp    secondary to dehydration     Arthritis      CAD (coronary artery disease)     LIMA to the LAD, vein graft to OM and a vein graft to the PDA     Carpal tunnel syndrome      Diabetes (H)      Gastro-oesophageal reflux disease      H/O: alcohol abuse      HTN (hypertension)      Hyperlipidaemia      Hyperlipidaemia LDL goal <100 2013     Hypokalemia      Pacemaker      Pancreatitis 06 Hosp     Past Surgical History:   Procedure Laterality Date     BYPASS CARDIOPULMONARY       CATARACT IOL, RT/LT      Cataract IOL RT/LT     ENDARTERECTOMY CAROTID Right 2015    Procedure: ENDARTERECTOMY CAROTID;  Surgeon: João Turner MD;  Location: [facility] OR     ENDARTERECTOMY CAROTID Left 2017    Procedure: ENDARTERECTOMY CAROTID;  LEFT CAROTID ENDARTERECTOMY WITH EEG;  Surgeon: João Turner MD;  Location: [facility] OR     IMPLANT PACEMAKER  6/10/2010     RELEASE CARPAL TUNNEL Right 2016    Procedure: RELEASE CARPAL TUNNEL;  Surgeon: Lissy Leger MD;  Location: [facility]     SURGICAL HISTORY OF -       Laminectomy     SURGICAL HISTORY OF -   10/2003    Bypass grafting     TONSILLECTOMY & ADENOIDECTOMY         Anesthesia Evaluation     . Pt has had prior anesthetic.            ROS/MED HX    ENT/Pulmonary:     (+)tobacco use, Current use , . .    Neurologic:       Cardiovascular:     (+) Dyslipidemia, hypertension----. : . CHF . . pacemaker :. dysrhythmias a-fib and a-flutter, .       METS/Exercise Tolerance:     Hematologic:         Musculoskeletal:   (+) arthritis,  -       GI/Hepatic:     (+) GERD      "  Renal/Genitourinary:     (+) chronic renal disease,       Endo:     (+) type I DM, .      Psychiatric:         Infectious Disease:         Malignancy:         Other:                                 Lab Results   Component Value Date    WBC 6.2 03/03/2020    HGB 14.3 05/26/2020    HCT 43.2 03/03/2020     (L) 03/03/2020     05/26/2020    POTASSIUM 4.2 05/26/2020    CHLORIDE 102 05/26/2020    CO2 30 05/26/2020    BUN 36 (H) 05/26/2020    CR 1.09 05/26/2020     (H) 05/26/2020    MARVA 8.1 (L) 05/26/2020    PHOS 3.5 05/31/2010    MAG 2.2 05/31/2010    ALBUMIN 4.0 10/20/2014    PROTTOTAL 7.5 10/20/2014    ALT 32 10/19/2016    AST 21 10/19/2016    ALKPHOS 98 10/20/2014    BILITOTAL 0.4 10/20/2014    LIPASE Quantity not sufficient 05/29/2010    AMYLASE 126 (H) 02/17/2010    PTT 31 12/22/2017    INR 1.02 12/22/2017    TSH 3.84 10/19/2016       Preop Vitals  BP Readings from Last 3 Encounters:   05/26/20 116/74   04/21/20 122/76   03/13/20 117/76    Pulse Readings from Last 3 Encounters:   05/26/20 73   03/13/20 92   03/03/20 79      Resp Readings from Last 3 Encounters:   05/26/20 16   03/03/20 16   11/04/19 14    SpO2 Readings from Last 3 Encounters:   05/26/20 97%   03/13/20 97%   03/03/20 96%      Temp Readings from Last 1 Encounters:   05/26/20 36.5  C (97.7  F) (Tympanic)    Ht Readings from Last 1 Encounters:   05/26/20 1.778 m (5' 10\")      Wt Readings from Last 1 Encounters:   05/29/20 76.2 kg (168 lb)    Estimated body mass index is 24.11 kg/m  as calculated from the following:    Height as of 5/26/20: 1.778 m (5' 10\").    Weight as of 5/29/20: 76.2 kg (168 lb).                   Dedra Dullinger Brown, APRN CRNA  "

## 2020-06-01 NOTE — PROGRESS NOTES
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     This provider has not had any reply back yet from cardiologist who did patient's preop eval back in March 2020. After that evaluation, that cardiologist approved patient to proceed with surgery with recognition that patient is at increased risk for cardiovascular events.    Cardiology follow up on 5/29/2020 notes state theh following:  \"Assessment/Plan:  With regard to his cardiomyopathy, his clinical/functional status is probably stable with New York Heart Association class II symptoms.  With regard to his left ventricular dysfunction, he has previously undergone defibrillator implantation.  I reviewed his echocardiograms today and he does not have dyssynchronous septal motion.  The left ventricular dysfunction appears global and over time, the left ventricle has dilated.  The prior estimate of LV function in 2015 of 35 to 40% may have been somewhat of an overestimate.  Nonetheless, there has been a deterioration in function.  This was discussed with Mr. Tilley today.\"    Hence, if patient does proceed with surgery, he should be considered with increased risk for perioperative cardiovascular events.    "

## 2020-06-01 NOTE — TELEPHONE ENCOUNTER
Surgery is calling asking if he is not addendum the pre op and not signing off on surgery or if he is cleared.  Please call *[phone]     Lorena Redd on 6/1/2020 at 1:17 PM

## 2020-06-01 NOTE — TELEPHONE ENCOUNTER
Cleveland Artis, Dr. Gong's clinical nurse, called today and says Dr. Nielsen was ok with the patient's A1C down to 9.4 from about 12. Cleveland (call back number is [phone]) says that the patient has [facility] to [facility] and Dr. Nielsen would like the patient to proceed with surgery tomorrow. Read to Cleveland your notes from telephone encounter on 5-, they are wondering if you would addend the Preop and clear the patient for surgery tomorrow. Did read the notes about patient needing to be seen by DM. Please advise.    YULIA Garner

## 2020-06-01 NOTE — TELEPHONE ENCOUNTER
Addendum note entered stating that orthopedics team okayed proceeding with surgery at current status of diabetes control. Patient is considered also at higher cardiovascular risk for surgery based on his previous preop evaluation from cardiology in March 2020.

## 2020-06-01 NOTE — TELEPHONE ENCOUNTER
As in previous entries, patient's diabetes management need to be optimized preoperatively. He is still advised to see DM education so this can be done. Patient has declined this multiple times. Not sure who ordered his additional lab tests he has been referring to.    This provider has no further recommendation other than what has been stated in the preop note, and subsequent telephone messages.

## 2020-06-01 NOTE — TELEPHONE ENCOUNTER
Spoke with surgery assistant Nomi and she is informed Dr. Berger is not clearing the patient for surgery tomorrow, informed Nomi the patient hangs up on this nurse but patient has been notified that not clearing patient till seen by DM Educator to manage diabetes.  Nomi will ask OR charge nurse to call the patient.    YULIA Garner

## 2020-06-02 ENCOUNTER — ANESTHESIA (OUTPATIENT)
Dept: SURGERY | Facility: CLINIC | Age: 70
End: 2020-06-02

## 2020-06-02 DIAGNOSIS — Z11.59 ENCOUNTER FOR SCREENING FOR OTHER VIRAL DISEASES: Primary | ICD-10-CM

## 2020-06-02 RX ORDER — FUROSEMIDE 20 MG
20 TABLET ORAL 2 TIMES DAILY
Qty: 180 TABLET | Refills: 3 | Status: SHIPPED | OUTPATIENT
Start: 2020-06-02 | End: 2020-06-25 | Stop reason: ALTCHOICE

## 2020-06-02 RX ORDER — CARVEDILOL 6.25 MG/1
6.25 TABLET ORAL 2 TIMES DAILY WITH MEALS
Qty: 180 TABLET | Refills: 3 | Status: SHIPPED | OUTPATIENT
Start: 2020-06-02 | End: 2021-03-11

## 2020-06-04 NOTE — OR NURSING
crna gdb,ky,cj, aware and updated on pt coming in for surgery with increased cardio-pulmonary risk as stated on h/p with addendum from 5/26.No new orders at this time.

## 2020-06-06 DIAGNOSIS — Z11.59 ENCOUNTER FOR SCREENING FOR OTHER VIRAL DISEASES: ICD-10-CM

## 2020-06-06 PROCEDURE — 99000 SPECIMEN HANDLING OFFICE-LAB: CPT | Performed by: ORTHOPAEDIC SURGERY

## 2020-06-06 PROCEDURE — 87635 SARS-COV-2 COVID-19 AMP PRB: CPT | Performed by: ORTHOPAEDIC SURGERY

## 2020-06-07 LAB
SARS-COV-2 RNA SPEC QL NAA+PROBE: NOT DETECTED
SPECIMEN SOURCE: NORMAL

## 2020-06-08 ENCOUNTER — ANESTHESIA EVENT (OUTPATIENT)
Dept: SURGERY | Facility: CLINIC | Age: 70
End: 2020-06-08
Payer: COMMERCIAL

## 2020-06-09 ENCOUNTER — ANESTHESIA (OUTPATIENT)
Dept: SURGERY | Facility: CLINIC | Age: 70
End: 2020-06-09
Payer: COMMERCIAL

## 2020-06-09 ENCOUNTER — HOSPITAL ENCOUNTER (OUTPATIENT)
Facility: CLINIC | Age: 70
Discharge: HOME | End: 2020-06-09
Attending: ORTHOPAEDIC SURGERY | Admitting: ORTHOPAEDIC SURGERY
Payer: COMMERCIAL

## 2020-06-09 VITALS
TEMPERATURE: 98 F | OXYGEN SATURATION: 95 % | DIASTOLIC BLOOD PRESSURE: 72 MMHG | BODY MASS INDEX: 22.35 KG/M2 | RESPIRATION RATE: 16 BRPM | HEIGHT: 72 IN | SYSTOLIC BLOOD PRESSURE: 117 MMHG | HEART RATE: 80 BPM | WEIGHT: 165 LBS

## 2020-06-09 DIAGNOSIS — M18.12 ARTHRITIS OF CARPOMETACARPAL (CMC) JOINT OF LEFT THUMB: Primary | ICD-10-CM

## 2020-06-09 LAB
GLUCOSE BLDC GLUCOMTR-MCNC: 105 MG/DL (ref 70–99)
GLUCOSE BLDC GLUCOMTR-MCNC: 110 MG/DL (ref 70–99)
GLUCOSE BLDC GLUCOMTR-MCNC: 86 MG/DL (ref 70–99)
GLUCOSE BLDC GLUCOMTR-MCNC: 97 MG/DL (ref 70–99)

## 2020-06-09 PROCEDURE — 27210794 ZZH OR GENERAL SUPPLY STERILE: Performed by: ORTHOPAEDIC SURGERY

## 2020-06-09 PROCEDURE — 25000132 ZZH RX MED GY IP 250 OP 250 PS 637: Performed by: PHYSICIAN ASSISTANT

## 2020-06-09 PROCEDURE — 37000009 ZZH ANESTHESIA TECHNICAL FEE, EACH ADDTL 15 MIN: Performed by: ORTHOPAEDIC SURGERY

## 2020-06-09 PROCEDURE — 25000132 ZZH RX MED GY IP 250 OP 250 PS 637: Performed by: ORTHOPAEDIC SURGERY

## 2020-06-09 PROCEDURE — 25000125 ZZHC RX 250: Performed by: NURSE ANESTHETIST, CERTIFIED REGISTERED

## 2020-06-09 PROCEDURE — 25800025 ZZH RX 258: Performed by: NURSE ANESTHETIST, CERTIFIED REGISTERED

## 2020-06-09 PROCEDURE — 25000128 H RX IP 250 OP 636: Performed by: NURSE ANESTHETIST, CERTIFIED REGISTERED

## 2020-06-09 PROCEDURE — 71000027 ZZH RECOVERY PHASE 2 EACH 15 MINS: Performed by: ORTHOPAEDIC SURGERY

## 2020-06-09 PROCEDURE — 25000128 H RX IP 250 OP 636: Performed by: PHYSICIAN ASSISTANT

## 2020-06-09 PROCEDURE — 25000132 ZZH RX MED GY IP 250 OP 250 PS 637: Performed by: NURSE ANESTHETIST, CERTIFIED REGISTERED

## 2020-06-09 PROCEDURE — 36000058 ZZH SURGERY LEVEL 3 EA 15 ADDTL MIN: Performed by: ORTHOPAEDIC SURGERY

## 2020-06-09 PROCEDURE — 36000056 ZZH SURGERY LEVEL 3 1ST 30 MIN: Performed by: ORTHOPAEDIC SURGERY

## 2020-06-09 PROCEDURE — 37000008 ZZH ANESTHESIA TECHNICAL FEE, 1ST 30 MIN: Performed by: ORTHOPAEDIC SURGERY

## 2020-06-09 PROCEDURE — 40000305 ZZH STATISTIC PRE PROC ASSESS I: Performed by: ORTHOPAEDIC SURGERY

## 2020-06-09 PROCEDURE — 82962 GLUCOSE BLOOD TEST: CPT | Mod: 91

## 2020-06-09 PROCEDURE — 25000566 ZZH SEVOFLURANE, EA 15 MIN: Performed by: ORTHOPAEDIC SURGERY

## 2020-06-09 PROCEDURE — 71000012 ZZH RECOVERY PHASE 1 LEVEL 1 FIRST HR: Performed by: ORTHOPAEDIC SURGERY

## 2020-06-09 PROCEDURE — C1713 ANCHOR/SCREW BN/BN,TIS/BN: HCPCS | Performed by: ORTHOPAEDIC SURGERY

## 2020-06-09 PROCEDURE — 25800030 ZZH RX IP 258 OP 636: Performed by: NURSE ANESTHETIST, CERTIFIED REGISTERED

## 2020-06-09 PROCEDURE — 25000125 ZZHC RX 250: Performed by: ORTHOPAEDIC SURGERY

## 2020-06-09 DEVICE — IMP SCR ARTHREX BIO-TENODESIS BIOCOMP 4X10MM AR-1540BC: Type: IMPLANTABLE DEVICE | Site: HAND | Status: FUNCTIONAL

## 2020-06-09 RX ORDER — PROPOFOL 10 MG/ML
INJECTION, EMULSION INTRAVENOUS PRN
Status: DISCONTINUED | OUTPATIENT
Start: 2020-06-09 | End: 2020-06-09

## 2020-06-09 RX ORDER — MAGNESIUM HYDROXIDE 1200 MG/15ML
LIQUID ORAL PRN
Status: DISCONTINUED | OUTPATIENT
Start: 2020-06-09 | End: 2020-06-09 | Stop reason: HOSPADM

## 2020-06-09 RX ORDER — DEXTROSE MONOHYDRATE 25 G/50ML
25 INJECTION, SOLUTION INTRAVENOUS ONCE
Status: COMPLETED | OUTPATIENT
Start: 2020-06-09 | End: 2020-06-09

## 2020-06-09 RX ORDER — MAGNESIUM SULFATE HEPTAHYDRATE 40 MG/ML
2 INJECTION, SOLUTION INTRAVENOUS ONCE
Status: COMPLETED | OUTPATIENT
Start: 2020-06-09 | End: 2020-06-09

## 2020-06-09 RX ORDER — LIDOCAINE 40 MG/G
CREAM TOPICAL
Status: DISCONTINUED | OUTPATIENT
Start: 2020-06-09 | End: 2020-06-09 | Stop reason: HOSPADM

## 2020-06-09 RX ORDER — NALOXONE HYDROCHLORIDE 0.4 MG/ML
.1-.4 INJECTION, SOLUTION INTRAMUSCULAR; INTRAVENOUS; SUBCUTANEOUS
Status: DISCONTINUED | OUTPATIENT
Start: 2020-06-09 | End: 2020-06-09 | Stop reason: HOSPADM

## 2020-06-09 RX ORDER — SODIUM CHLORIDE, SODIUM LACTATE, POTASSIUM CHLORIDE, CALCIUM CHLORIDE 600; 310; 30; 20 MG/100ML; MG/100ML; MG/100ML; MG/100ML
INJECTION, SOLUTION INTRAVENOUS CONTINUOUS
Status: DISCONTINUED | OUTPATIENT
Start: 2020-06-09 | End: 2020-06-09 | Stop reason: HOSPADM

## 2020-06-09 RX ORDER — CELECOXIB 200 MG/1
200 CAPSULE ORAL ONCE
Status: COMPLETED | OUTPATIENT
Start: 2020-06-09 | End: 2020-06-09

## 2020-06-09 RX ORDER — BUPIVACAINE HYDROCHLORIDE 5 MG/ML
INJECTION, SOLUTION PERINEURAL PRN
Status: DISCONTINUED | OUTPATIENT
Start: 2020-06-09 | End: 2020-06-09 | Stop reason: HOSPADM

## 2020-06-09 RX ORDER — ACETAMINOPHEN 325 MG/1
975 TABLET ORAL ONCE
Status: COMPLETED | OUTPATIENT
Start: 2020-06-09 | End: 2020-06-09

## 2020-06-09 RX ORDER — ONDANSETRON 2 MG/ML
INJECTION INTRAMUSCULAR; INTRAVENOUS PRN
Status: DISCONTINUED | OUTPATIENT
Start: 2020-06-09 | End: 2020-06-09

## 2020-06-09 RX ORDER — FENTANYL CITRATE 50 UG/ML
25-50 INJECTION, SOLUTION INTRAMUSCULAR; INTRAVENOUS
Status: DISCONTINUED | OUTPATIENT
Start: 2020-06-09 | End: 2020-06-09 | Stop reason: HOSPADM

## 2020-06-09 RX ORDER — FENTANYL CITRATE 50 UG/ML
INJECTION, SOLUTION INTRAMUSCULAR; INTRAVENOUS PRN
Status: DISCONTINUED | OUTPATIENT
Start: 2020-06-09 | End: 2020-06-09

## 2020-06-09 RX ORDER — MEPERIDINE HYDROCHLORIDE 25 MG/ML
12.5 INJECTION INTRAMUSCULAR; INTRAVENOUS; SUBCUTANEOUS
Status: DISCONTINUED | OUTPATIENT
Start: 2020-06-09 | End: 2020-06-09 | Stop reason: HOSPADM

## 2020-06-09 RX ORDER — LIDOCAINE HYDROCHLORIDE 10 MG/ML
INJECTION, SOLUTION INFILTRATION; PERINEURAL PRN
Status: DISCONTINUED | OUTPATIENT
Start: 2020-06-09 | End: 2020-06-09

## 2020-06-09 RX ORDER — PHENYLEPHRINE HYDROCHLORIDE 10 MG/ML
INJECTION INTRAVENOUS PRN
Status: DISCONTINUED | OUTPATIENT
Start: 2020-06-09 | End: 2020-06-09

## 2020-06-09 RX ORDER — OXYCODONE HYDROCHLORIDE 5 MG/1
5 TABLET ORAL
Status: COMPLETED | OUTPATIENT
Start: 2020-06-09 | End: 2020-06-09

## 2020-06-09 RX ORDER — LIDOCAINE HYDROCHLORIDE 10 MG/ML
INJECTION, SOLUTION INFILTRATION; PERINEURAL PRN
Status: DISCONTINUED | OUTPATIENT
Start: 2020-06-09 | End: 2020-06-09 | Stop reason: HOSPADM

## 2020-06-09 RX ORDER — OXYCODONE HYDROCHLORIDE 5 MG/1
5-10 TABLET ORAL EVERY 4 HOURS PRN
Qty: 25 TABLET | Refills: 0
Start: 2020-06-09 | End: 2020-11-16

## 2020-06-09 RX ORDER — ONDANSETRON 4 MG/1
4 TABLET, ORALLY DISINTEGRATING ORAL EVERY 30 MIN PRN
Status: DISCONTINUED | OUTPATIENT
Start: 2020-06-09 | End: 2020-06-09 | Stop reason: HOSPADM

## 2020-06-09 RX ORDER — NEOSTIGMINE METHYLSULFATE 1 MG/ML
VIAL (ML) INJECTION PRN
Status: DISCONTINUED | OUTPATIENT
Start: 2020-06-09 | End: 2020-06-09

## 2020-06-09 RX ORDER — CEFAZOLIN SODIUM 2 G/100ML
2 INJECTION, SOLUTION INTRAVENOUS
Status: COMPLETED | OUTPATIENT
Start: 2020-06-09 | End: 2020-06-09

## 2020-06-09 RX ORDER — DEXAMETHASONE SODIUM PHOSPHATE 4 MG/ML
INJECTION, SOLUTION INTRA-ARTICULAR; INTRALESIONAL; INTRAMUSCULAR; INTRAVENOUS; SOFT TISSUE PRN
Status: DISCONTINUED | OUTPATIENT
Start: 2020-06-09 | End: 2020-06-09

## 2020-06-09 RX ORDER — HYDROXYZINE HYDROCHLORIDE 25 MG/1
25 TABLET, FILM COATED ORAL EVERY 6 HOURS PRN
Status: DISCONTINUED | OUTPATIENT
Start: 2020-06-09 | End: 2020-06-09 | Stop reason: HOSPADM

## 2020-06-09 RX ORDER — EPHEDRINE SULFATE 50 MG/ML
INJECTION, SOLUTION INTRAVENOUS PRN
Status: DISCONTINUED | OUTPATIENT
Start: 2020-06-09 | End: 2020-06-09

## 2020-06-09 RX ORDER — CEFAZOLIN SODIUM 1 G/50ML
1 INJECTION, SOLUTION INTRAVENOUS SEE ADMIN INSTRUCTIONS
Status: DISCONTINUED | OUTPATIENT
Start: 2020-06-09 | End: 2020-06-09 | Stop reason: HOSPADM

## 2020-06-09 RX ORDER — ALBUTEROL SULFATE 90 UG/1
AEROSOL, METERED RESPIRATORY (INHALATION) PRN
Status: DISCONTINUED | OUTPATIENT
Start: 2020-06-09 | End: 2020-06-09

## 2020-06-09 RX ORDER — ACETAMINOPHEN 325 MG/1
975 TABLET ORAL ONCE
Status: DISCONTINUED | OUTPATIENT
Start: 2020-06-09 | End: 2020-06-09 | Stop reason: HOSPADM

## 2020-06-09 RX ORDER — CELECOXIB 200 MG/1
200 CAPSULE ORAL
Status: DISCONTINUED | OUTPATIENT
Start: 2020-06-09 | End: 2020-06-09 | Stop reason: HOSPADM

## 2020-06-09 RX ORDER — HYDROXYZINE HYDROCHLORIDE 50 MG/1
50 TABLET, FILM COATED ORAL EVERY 6 HOURS PRN
Status: DISCONTINUED | OUTPATIENT
Start: 2020-06-09 | End: 2020-06-09 | Stop reason: HOSPADM

## 2020-06-09 RX ORDER — ONDANSETRON 2 MG/ML
4 INJECTION INTRAMUSCULAR; INTRAVENOUS EVERY 30 MIN PRN
Status: DISCONTINUED | OUTPATIENT
Start: 2020-06-09 | End: 2020-06-09 | Stop reason: HOSPADM

## 2020-06-09 RX ORDER — GABAPENTIN 300 MG/1
300 CAPSULE ORAL
Status: COMPLETED | OUTPATIENT
Start: 2020-06-09 | End: 2020-06-09

## 2020-06-09 RX ORDER — GLYCOPYRROLATE 0.2 MG/ML
INJECTION, SOLUTION INTRAMUSCULAR; INTRAVENOUS PRN
Status: DISCONTINUED | OUTPATIENT
Start: 2020-06-09 | End: 2020-06-09

## 2020-06-09 RX ADMIN — ACETAMINOPHEN 975 MG: 325 TABLET, FILM COATED ORAL at 12:33

## 2020-06-09 RX ADMIN — FENTANYL CITRATE 100 MCG: 50 INJECTION, SOLUTION INTRAMUSCULAR; INTRAVENOUS at 14:40

## 2020-06-09 RX ADMIN — DEXMEDETOMIDINE HYDROCHLORIDE 20 MCG: 100 INJECTION, SOLUTION INTRAVENOUS at 14:40

## 2020-06-09 RX ADMIN — EPHEDRINE SULFATE 10 MG: 50 INJECTION, SOLUTION INTRAVENOUS at 14:53

## 2020-06-09 RX ADMIN — LIDOCAINE HYDROCHLORIDE 50 MG: 10 INJECTION, SOLUTION INFILTRATION; PERINEURAL at 14:40

## 2020-06-09 RX ADMIN — SODIUM CHLORIDE, POTASSIUM CHLORIDE, SODIUM LACTATE AND CALCIUM CHLORIDE: 600; 310; 30; 20 INJECTION, SOLUTION INTRAVENOUS at 12:48

## 2020-06-09 RX ADMIN — PHENYLEPHRINE HYDROCHLORIDE 100 MCG: 10 INJECTION INTRAVENOUS at 14:43

## 2020-06-09 RX ADMIN — ONDANSETRON 4 MG: 2 INJECTION INTRAMUSCULAR; INTRAVENOUS at 15:28

## 2020-06-09 RX ADMIN — OXYCODONE HYDROCHLORIDE 5 MG: 5 TABLET ORAL at 17:32

## 2020-06-09 RX ADMIN — ALBUTEROL SULFATE 6 PUFF: 90 AEROSOL, METERED RESPIRATORY (INHALATION) at 14:53

## 2020-06-09 RX ADMIN — PHENYLEPHRINE HYDROCHLORIDE 150 MCG: 10 INJECTION INTRAVENOUS at 15:24

## 2020-06-09 RX ADMIN — MAGNESIUM SULFATE IN WATER 2 G: 40 INJECTION, SOLUTION INTRAVENOUS at 14:32

## 2020-06-09 RX ADMIN — ROCURONIUM BROMIDE 25 MG: 10 INJECTION INTRAVENOUS at 14:40

## 2020-06-09 RX ADMIN — PHENYLEPHRINE HYDROCHLORIDE 150 MCG: 10 INJECTION INTRAVENOUS at 14:59

## 2020-06-09 RX ADMIN — Medication 3 MG: at 15:40

## 2020-06-09 RX ADMIN — EPHEDRINE SULFATE 5 MG: 50 INJECTION, SOLUTION INTRAVENOUS at 15:04

## 2020-06-09 RX ADMIN — PROPOFOL 100 MG: 10 INJECTION, EMULSION INTRAVENOUS at 14:40

## 2020-06-09 RX ADMIN — CEFAZOLIN SODIUM 2 G: 2 INJECTION, SOLUTION INTRAVENOUS at 14:47

## 2020-06-09 RX ADMIN — ALBUTEROL SULFATE 6 PUFF: 90 AEROSOL, METERED RESPIRATORY (INHALATION) at 15:45

## 2020-06-09 RX ADMIN — GLYCOPYRROLATE 0.1 MG: 0.2 INJECTION, SOLUTION INTRAMUSCULAR; INTRAVENOUS at 14:40

## 2020-06-09 RX ADMIN — PHENYLEPHRINE HYDROCHLORIDE 200 MCG: 10 INJECTION INTRAVENOUS at 15:21

## 2020-06-09 RX ADMIN — HYDROMORPHONE HYDROCHLORIDE 0.5 MG: 1 INJECTION, SOLUTION INTRAMUSCULAR; INTRAVENOUS; SUBCUTANEOUS at 14:58

## 2020-06-09 RX ADMIN — PHENYLEPHRINE HYDROCHLORIDE 150 MCG: 10 INJECTION INTRAVENOUS at 15:18

## 2020-06-09 RX ADMIN — DEXTROSE MONOHYDRATE 25 ML: 500 INJECTION PARENTERAL at 14:30

## 2020-06-09 RX ADMIN — PHENYLEPHRINE HYDROCHLORIDE 150 MCG: 10 INJECTION INTRAVENOUS at 15:04

## 2020-06-09 RX ADMIN — GLYCOPYRROLATE 0.4 MG: 0.2 INJECTION, SOLUTION INTRAMUSCULAR; INTRAVENOUS at 15:40

## 2020-06-09 RX ADMIN — DEXAMETHASONE SODIUM PHOSPHATE 8 MG: 4 INJECTION, SOLUTION INTRA-ARTICULAR; INTRALESIONAL; INTRAMUSCULAR; INTRAVENOUS; SOFT TISSUE at 14:40

## 2020-06-09 RX ADMIN — HYDROMORPHONE HYDROCHLORIDE 0.5 MG: 1 INJECTION, SOLUTION INTRAMUSCULAR; INTRAVENOUS; SUBCUTANEOUS at 15:28

## 2020-06-09 RX ADMIN — GABAPENTIN 300 MG: 300 CAPSULE ORAL at 12:34

## 2020-06-09 RX ADMIN — EPHEDRINE SULFATE 10 MG: 50 INJECTION, SOLUTION INTRAVENOUS at 14:59

## 2020-06-09 RX ADMIN — PHENYLEPHRINE HYDROCHLORIDE 100 MCG: 10 INJECTION INTRAVENOUS at 14:48

## 2020-06-09 RX ADMIN — CELECOXIB 200 MG: 200 CAPSULE ORAL at 12:34

## 2020-06-09 RX ADMIN — EPHEDRINE SULFATE 10 MG: 50 INJECTION, SOLUTION INTRAVENOUS at 15:33

## 2020-06-09 ASSESSMENT — MIFFLIN-ST. JEOR: SCORE: 1541.44

## 2020-06-09 ASSESSMENT — LIFESTYLE VARIABLES: TOBACCO_USE: 1

## 2020-06-09 ASSESSMENT — COPD QUESTIONNAIRES: COPD: 1

## 2020-06-09 ASSESSMENT — ENCOUNTER SYMPTOMS: DYSRHYTHMIAS: 1

## 2020-06-09 NOTE — OP NOTE
[facility]   Operative Note    SURGEON:  Bethel Martin M.D.    ASSISTANT:  Elizabeth Yepez PA-C  A first assistant was necessary in this case to provide traction and retraction, to assure safe and smooth progression throughout the case.    PREOPERATIVE DIAGNOSIS  right thumb carpometacarpal joint arthritis    POSTOPERATIVE DIAGNOSIS  right thumb carpometacarpal joint arthritis    OPERATIONS  1.  right wrist trapeziectomy.  2.  right thumb tendon interposition arthroplasty with flexor carpi radialis tendon transfer  3.  right extensor pollicis brevis to abductor pollicis longus tendon transfer    ANESTHESIA  General    SPECIMENS  Trapezium    DRAINS  None.    COMPLICATIONS  None.    ESTIMATED BLOOD LOSS  * No values recorded between 6/9/2020  3:00 PM and 6/9/2020  3:56 PM *    PROCEDURE  After discussing the risks, benefits, and alternatives to the procedure with the patient, the patient consented to proceed with the surgery as described below.  Patient understands the potential for neurovascular injury, infection, wound healing problems, loss of motion particularly with extension of the CMC joint, instability, residual pain, loss of pinch strength, and a decreased quality of life.  Patient consented to proceed.    The patient was brought to the operating room and was placed on the operating table in a supine position.  Anesthesia was administered by the anesthesiology staff.  A tourniquet was applied to the right upper extremity, the extremity was prepped with Hibiclens and draped in the usual sterile manner.  The right upper extremity was exsanguinated and the tourniquet was elevated to 250 mmHg for the duration of the case.    A Barnes incision was made at the wrist.  The subcutaneous tissue was bluntly dissected and the lateral antebrachial cutaneous and superficial radial nerve branches were carefully protected.  A longitudinal incision was made through the joint capsule and the  trapezium.  The trapezium was subperiosteally isolated.  The trapezium was removed in a piecemeal fashion taking care to protect the underlying flexor carpi radialis tendon.    A 4-mm drill was used to drill a hole perpendicular to the plane of the patient s thumbnail at the base of the thumb metacarpal exiting near the insertion point of the volar oblique ligament.  A 2-0 fiberwire suture was then inserted into the deep joint capsule and the suture ends were set side for later use.    Two 1-cm transverse incision were made 5 and 10cm proximal to the right wrist flexion crease over the flexor carpi radialis tendon.  The subcutaneous tissue was bluntly dissected and 50% of the flexor carpi radialis tendon was isolated and was transected proximally and left attached distally.  The flexor carpi radialis tendon was then pulled out through the distal wrist incision and passed from volar to dorsal through the hole at the base of the patient s thumb metacarpal. An Arthrex 4x10mm biocomposite tenodesis screw was placed in the metacarpal with the thumb under traction, suspending the thumb. The FCR tendon was sutured to the periosteum where it exited the metacarpal, then passed through an incision in the dorsal capsule back into the trapezial space and sutured to its insertion tightly with 3-0 fiberwire. The remaining flexor carpi radialis tendon was then rolled into a ball (i.e. anchovy) and secured with the 2-0 fiberwire suture previously placed in the deep capsule creating an interposition arthroplasty.     The incision was thoroughly irrigated and the joint capsule was reapproximated tightly with interrupted 3-0 fiberwire suture, followed by repairing the abductor pollicis brevis similarly with 4-0 fiberwire.  The extensor pollicis brevis tendon was incised and sutured to the abductor pollicis longus tendon with 4-0 fiberwire in a mattress fashion.  The skin was closed with 4-0 nylon in a horizontal mattress fashion.  A  dressing consisting of adaptic gauze, 4x4 fluffs, webril and a thumb-spica plaster splint followed by Ace bandages was applied. The tourniquet was let down for a tourniquet time of 57 minutes.    The patient will return in 7-14 days for dressing removal and a thumb sized cast. The next follow up will be at 4 weeks postop with cast removal and radiography.

## 2020-06-09 NOTE — INTERVAL H&P NOTE
The History and Physical has been reviewed, the patient has been examined and no changes have occurred in the patient's condition since the H & P was completed.     Discussed A1C w/ surgeon (9.4%).

## 2020-06-09 NOTE — ANESTHESIA PROCEDURE NOTES
Airway   Date/Time: 6/9/2020 2:43 PM   Patient location during procedure: OR    General Information and Staff   Performed: CRNA     Consent for Airway   Urgency: elective        Indications and Patient Condition  Indications for airway management: asa-procedural and airway protection  Induction type:intravenous  Mask difficulty assessment: easy    Final Airway Details  Final airway type: endotracheal airway  Successful airway:ETT  ETT size (mm): 7.5 Cuffed: yes   Blade: C-Mac  Blade size: #3  Successful intubation technique: asleep  Facilitating devices/methods: C-Mac  Endotracheal tube insertion site: left side of mouth   Measured from: teeth  ETT to teeth (cm): 23  Grade View of Cords: 3 (difficult to view cords; could visualize arytenoids)Number of attempts at approach: 1  Number of other approaches attempted: 0    Secured with:tape  Ease of procedure: easy  Dentition: Intact

## 2020-06-09 NOTE — ANESTHESIA PREPROCEDURE EVALUATION
Anesthesia Pre-Procedure Evaluation    Patient: Delbert Tilley   MRN: [id number] : 1950          Preoperative Diagnosis: Arthritis [M19.90]    Procedure(s):  Right thumb ligament reconstruction tendon interposition with extensor pollicis brevis to abductor pollicis tendon transfer    Past Medical History:   Diagnosis Date     Acute renal failure (H) 06 Hosp    secondary to dehydration     Arthritis      CAD (coronary artery disease)     LIMA to the LAD, vein graft to OM and a vein graft to the PDA     Carpal tunnel syndrome      Diabetes (H)      Gastro-oesophageal reflux disease      H/O: alcohol abuse      HTN (hypertension)      Hyperlipidaemia      Hyperlipidaemia LDL goal <100 2013     Hypokalemia      Pacemaker      Pancreatitis 06 Hosp     Past Surgical History:   Procedure Laterality Date     BYPASS CARDIOPULMONARY       CATARACT IOL, RT/LT      Cataract IOL RT/LT     ENDARTERECTOMY CAROTID Right 2015    Procedure: ENDARTERECTOMY CAROTID;  Surgeon: João Turner MD;  Location: [facility] OR     ENDARTERECTOMY CAROTID Left 2017    Procedure: ENDARTERECTOMY CAROTID;  LEFT CAROTID ENDARTERECTOMY WITH EEG;  Surgeon: oJão Turner MD;  Location: [facility] OR     IMPLANT PACEMAKER  6/10/2010     RELEASE CARPAL TUNNEL Right 2016    Procedure: RELEASE CARPAL TUNNEL;  Surgeon: Lissy Leger MD;  Location: [facility] OR     SURGICAL HISTORY OF -       Laminectomy     SURGICAL HISTORY OF -   10/2003    Bypass grafting     TONSILLECTOMY & ADENOIDECTOMY         Anesthesia Evaluation     . Pt has had prior anesthetic. Type: General and MAC           ROS/MED HX    ENT/Pulmonary:     (+)tobacco use, Current use 2 packs/day  Treatment: Inhaler prn and Inhaler daily,  COPD, , . .    Neurologic:  - neg neurologic ROS     Cardiovascular: Comment: Cardiomyopathy  Left carotid endarterectomy 17  Right carotid endarterectomy 6/12/15    (+) Dyslipidemia, hypertension-Peripheral Vascular  Disease-- Carotid Stenosis, CAD, -past MI,CABG-date: x4 7/7/10, . : . CHF etiology: Chronic systolic . PARDO, . pacemaker :. dysrhythmias a-fib and a-flutter, valvular problems/murmurs . Previous cardiac testing Echodate:10/29/19results:Interpretation Summary     1. Left ventricular systolic function is severely reduced. The visual ejection  fraction is estimated at 20-25%.  2. No regional wall motion abnormalities noted.  3. Moderate RV dilatation with moderately decreased right ventricular systolic  function.  4. The left atrium is severely dilated.  5. Mild to moderate tricuspid regurgitation.  6. The right ventricular systolic pressure is approximated at 40mmHg plus the  right atrial pressure. IVC diameter and respiratory changes fall into an  intermediate range suggesting an RA pressure of 8 mmHg.  7. Diastolic Doppler findings (E/E' ratio and/or other parameters) suggest  left ventricular filling pressures are increased.  _____________________________________________________________________________  __        Left Ventricle  The left ventricle is mildly dilated. Left ventricular systolic function is  severely reduced. The visual ejection fraction is estimated at 20-25%.  Diastolic Doppler findings (E/E' ratio and/or other parameters) suggest left  ventricular filling pressures are increased. No regional wall motion  abnormalities noted.     Right Ventricle  The right ventricle is moderately dilated. There is a catheter/pacemaker lead  seen in the right ventricle. Moderately decreased right ventricular systolic  function.     Atria  The left atrium is severely dilated. The right atrium is mildly dilated.     Mitral Valve  The mitral valve is normal in structure and function. There is mild (1+)  mitral regurgitation.        Tricuspid Valve  The tricuspid valve is normal in structure and function. There is mild to  moderate (1-2+) tricuspid regurgitation. The right ventricular systolic  pressure is approximated at  40mmHg plus the right atrial pressure.     Aortic Valve  Normal tricuspid aortic valve.     Pulmonic Valve  The pulmonic valve is normal in structure and function. There is trace  pulmonic valvular regurgitation.     Vessels  The ascending aorta is Mildly dilated. IVC diameter and respiratory changes  fall into an intermediate range suggesting an RA pressure of 8 mmHg.     Pericardium  There is no pericardial effusion.        Rhythm  Sinus rhythm was noted.date: results:ECG reviewed date:5/26/2020 results:Sinus Rhythm   Low voltage in limb leads.    -Left atrial enlargement.    -Right sided conduction defect and right axis -possible right ventricular hypertrophy or posterior fascicular block.    - Diffuse nonspecific T-abnormality. date: results:          METS/Exercise Tolerance:     Hematologic:  - neg hematologic  ROS       Musculoskeletal:   (+) arthritis,  other musculoskeletal- DJD      GI/Hepatic:     (+) GERD liver disease, Other GI/Hepatic Pancreatitis; ETOH abuse      Renal/Genitourinary:     (+) chronic renal disease, type: ARF,       Endo:     (+) type II DM Last HgA1c: 9.4 date: 5/26/2020 Using insulin Diabetic complications: neuropathy, .      Psychiatric:  - neg psychiatric ROS       Infectious Disease:  - neg infectious disease ROS       Malignancy:      - no malignancy   Other:    - neg other ROS                      Physical Exam  Normal systems: cardiovascular, pulmonary and dental    Airway   Mallampati: II  TM distance: >3 FB  Neck ROM: full    Dental     Cardiovascular       Pulmonary             Lab Results   Component Value Date    WBC 6.2 03/03/2020    HGB 14.3 05/26/2020    HCT 43.2 03/03/2020     (L) 03/03/2020     05/26/2020    POTASSIUM 4.2 05/26/2020    CHLORIDE 102 05/26/2020    CO2 30 05/26/2020    BUN 36 (H) 05/26/2020    CR 1.09 05/26/2020     (H) 05/26/2020    MARVA 8.1 (L) 05/26/2020    PHOS 3.5 05/31/2010    MAG 2.2 05/31/2010    ALBUMIN 4.0 10/20/2014     "PROTTOTAL 7.5 10/20/2014    ALT 32 10/19/2016    AST 21 10/19/2016    ALKPHOS 98 10/20/2014    BILITOTAL 0.4 10/20/2014    LIPASE Quantity not sufficient 05/29/2010    AMYLASE 126 (H) 02/17/2010    PTT 31 12/22/2017    INR 1.02 12/22/2017    TSH 3.84 10/19/2016       Preop Vitals  BP Readings from Last 3 Encounters:   05/26/20 116/74   04/21/20 122/76   03/13/20 117/76    Pulse Readings from Last 3 Encounters:   05/26/20 73   03/13/20 92   03/03/20 79      Resp Readings from Last 3 Encounters:   05/26/20 16   03/03/20 16   11/04/19 14    SpO2 Readings from Last 3 Encounters:   05/26/20 97%   03/13/20 97%   03/03/20 96%      Temp Readings from Last 1 Encounters:   05/26/20 36.5  C (97.7  F) (Tympanic)    Ht Readings from Last 1 Encounters:   05/26/20 1.778 m (5' 10\")      Wt Readings from Last 1 Encounters:   05/29/20 76.2 kg (168 lb)    Estimated body mass index is 24.11 kg/m  as calculated from the following:    Height as of 5/26/20: 1.778 m (5' 10\").    Weight as of 5/29/20: 76.2 kg (168 lb).       Anesthesia Plan      History & Physical Review  History and physical reviewed and following examination; no interval change.    ASA Status:  4 .    NPO Status:  > 6 hours    Plan for General with Intravenous and Propofol induction. Maintenance will be Balanced.    PONV prophylaxis:  Ondansetron (or other 5HT-3) and Dexamethasone or Solumedrol  Additional equipment: Videolaryngoscope        Postoperative Care  Postoperative pain management:  IV analgesics, Oral pain medications and Multi-modal analgesia.      Consents  Anesthetic plan, risks, benefits and alternatives discussed with:  Patient..                 ANA Perla CRNA  "

## 2020-06-09 NOTE — DISCHARGE INSTRUCTIONS
Same Day Surgery Discharge Instructions  Special Precautions After Surgery - Adult    1. It is not unusual to feel lightheaded or faint, up to 24 hours after surgery or while taking pain medication.  If you have these symptoms; sit for a few minutes before standing and have someone assist you when getting up.  2. You should rest and relax for the next 24 hours and must have someone stay with you for at least 24 hours after your discharge.  3. DO NOT DRIVE any vehicle or operate mechanical equipment for 24 hours following the end of your surgery.  DO NOT DRIVE while taking narcotic pain medications that have been prescribed by your physician.  If you had a limb operated on, you must be able to use it fully to drive.  4. DO NOT drink alcoholic beverages for 24 hours following surgery or while taking prescription pain medication.  5. Drink clear liquids (apple juice, ginger ale, broth, 7-Up, etc.).  Progress to your regular diet as you feel able.  6. Any questions call your physician and do not make important decisions for 24 hours.    __________________________________________________________________________________________________________________________________  IMPORTANT NUMBERS:    [facility] Main Number:  [phone], [phone]  Pharmacy:  [phone]  Same Day Surgery:  [phone], Monday - Friday until 8:30 p.m.  [facility]:  [phone]

## 2020-06-09 NOTE — ANESTHESIA POSTPROCEDURE EVALUATION
Patient: Delbert Tilley    Procedure(s):  Right thumb ligament reconstruction tendon interposition with extensor pollicis brevis to abductor pollicis tendon transfer    Diagnosis:Arthritis [M19.90]  Diagnosis Additional Information: No value filed.    Anesthesia Type:  General    Note:  Anesthesia Post Evaluation    Patient location during evaluation: Phase 2 and Bedside  Patient participation: Able to participate in evaluation but full recovery from regional anesthesia has not yet ocurrred but is anticipated to occur within 48 hours  Level of consciousness: awake and alert  Pain management: adequate  Airway patency: patent  Cardiovascular status: acceptable  Respiratory status: acceptable  Hydration status: acceptable  PONV: none     Anesthetic complications: None          Last vitals:  Vitals:    06/09/20 1630 06/09/20 1645 06/09/20 1700   BP: 101/70 113/77 116/79   Pulse: 80 80    Resp: 28 25 12   Temp:  36.5  C (97.7  F)    SpO2: 92% 97% 97%         Electronically Signed By: Deyvi Escudero CRNA, APRN CRNA  June 9, 2020  5:23 PM

## 2020-06-09 NOTE — ANESTHESIA CARE TRANSFER NOTE
Patient: Delbert Tilley    Procedure(s):  Right thumb ligament reconstruction tendon interposition with extensor pollicis brevis to abductor pollicis tendon transfer    Diagnosis: Arthritis [M19.90]  Diagnosis Additional Information: No value filed.    Anesthesia Type:   General     Note:  Airway :Face Mask  Patient transferred to:PACU  Handoff Report: Identifed the Patient, Identified the Reponsible Provider, Reviewed the pertinent medical history, Discussed the surgical course, Reviewed Intra-OP anesthesia mangement and issues during anesthesia, Set expectations for post-procedure period and Allowed opportunity for questions and acknowledgement of understanding      Vitals: (Last set prior to Anesthesia Care Transfer)    CRNA VITALS  6/9/2020 1535 - 6/9/2020 1605      6/9/2020             Pulse:  81    SpO2:  100 %    Resp Rate (observed):  10                Electronically Signed By: ANA Perla CRNA  June 9, 2020  4:05 PM

## 2020-06-10 ENCOUNTER — TELEPHONE (OUTPATIENT)
Dept: CARDIOLOGY | Facility: CLINIC | Age: 70
End: 2020-06-10

## 2020-06-10 NOTE — TELEPHONE ENCOUNTER
Patient called device clinic back and confirmed that his in-clinic device check is canceled and was agreeable to doing remote device check from home instead.

## 2020-06-19 ENCOUNTER — TRANSFERRED RECORDS (OUTPATIENT)
Dept: HEALTH INFORMATION MANAGEMENT | Facility: CLINIC | Age: 70
End: 2020-06-19

## 2020-06-23 ENCOUNTER — ANCILLARY PROCEDURE (OUTPATIENT)
Dept: CARDIOLOGY | Facility: CLINIC | Age: 70
End: 2020-06-23
Attending: INTERNAL MEDICINE
Payer: COMMERCIAL

## 2020-06-23 ENCOUNTER — TELEPHONE (OUTPATIENT)
Dept: CARDIOLOGY | Facility: CLINIC | Age: 70
End: 2020-06-23

## 2020-06-23 DIAGNOSIS — Z95.810 ICD (IMPLANTABLE CARDIOVERTER-DEFIBRILLATOR) IN PLACE: ICD-10-CM

## 2020-06-23 DIAGNOSIS — I25.5 ISCHEMIC CARDIOMYOPATHY: Primary | ICD-10-CM

## 2020-06-23 PROCEDURE — 93295 DEV INTERROG REMOTE 1/2/MLT: CPT | Performed by: INTERNAL MEDICINE

## 2020-06-23 PROCEDURE — 93296 REM INTERROG EVL PM/IDS: CPT | Performed by: INTERNAL MEDICINE

## 2020-06-23 NOTE — TELEPHONE ENCOUNTER
Pt called stating recent med changes.      Swelling has not improved. Bilat LE, left worse than right.  Wearing compression stocking.  SOB with walking  For the last 4-5 days wt up ~13#, avg wt 165 - currently 178.    Lasix 20mg  - took 3 this AM and 3 yesterday  Not urinating any more than normal.    Pt has not checked BP.    Per Dr Michaels 5/29/20:  With spironolactone (for aldosterone antagonism), he noted a precipitous decline in his blood pressure.  He tried it several times and his blood pressures were approximately 80-90 mmHg.  In the future, we may try spironolactone again, in which case Lasix/furosemide would be discontinued/decreased and the spironolactone at 12.5 mg daily would replace the Lasix.  Potassium had previously risen on a salt substitute and his last and recent potassium was normal at 4.2     Advised pt to hold lasix in AM and would call with recommendations.  ALEXANDRA DIA, RN on 6/23/2020 at 4:03 PM    ADDENDUM:  Pt called this AM, states last night  /74, 105/72 and this morning 101/78.  States he was not clear on what BP meds he should be taking.  Per last OV with Dr Michaels 5/27/20 pt was to stop metoprolol and start coreg.  Pt state he was taking both meds.  He will stop metoprolol.    Awaiting recommendation for diuretic from Yareli Mina.  ALEXANDRA DIA, RN on 6/24/2020 at 9:43 AM      ADDENDUM:   1. INCREASE lasix to 40 mg BID  2. BMP mon 6/29  3. Check daily weights and call the clinic if your weight has increased more than 2 lbs in one day or 5 lbs in one week.  4. 2000 mg Na diet   5. ED if SOB or edema significantly worsen   6. CORE consult next available   7. Have him schedule Dr. Michaels's appt for July as prev recommended.   8. Cancel my appt for tomorrow (has not been on appropriate meds so would be difficult to assess if changes are needed.)   9. Call if blood pressure is less than 90 on top or less than 100 with lightheadedness.     Yareli Kim, ANA  CNP    ADDENDUM:  Pt has already been taking Lasix 40mg twice a day for the last few days with no improvement.  Do you want to adjust the dose? Or start new med?  ALEXANDRA DIA RN on 6/24/2020 at 11:34 AM    ADDENDUM:  Scheduled for labs/CORE.  Per Yareli Mina - Start Torsemide 40mg daily.   Take only 1/2 tablet today as pt has already taken Lasix.  If wt still increasing call clinic to increase dose.  Limit fluid intake to 6 glasses per day. PT verbalized understanding.  ALEXANDRA DIA RN on 6/24/2020 at 12:41 PM

## 2020-06-24 RX ORDER — TORSEMIDE 20 MG/1
40 TABLET ORAL DAILY
Qty: 30 TABLET | Refills: 0 | Status: SHIPPED | OUTPATIENT
Start: 2020-06-24 | End: 2020-07-06

## 2020-06-29 ENCOUNTER — VIRTUAL VISIT (OUTPATIENT)
Dept: CARDIOLOGY | Facility: CLINIC | Age: 70
End: 2020-06-29
Payer: COMMERCIAL

## 2020-06-29 ENCOUNTER — CARE COORDINATION (OUTPATIENT)
Dept: CARDIOLOGY | Facility: CLINIC | Age: 70
End: 2020-06-29

## 2020-06-29 VITALS — BODY MASS INDEX: 22.38 KG/M2 | SYSTOLIC BLOOD PRESSURE: 107 MMHG | WEIGHT: 165 LBS | DIASTOLIC BLOOD PRESSURE: 70 MMHG

## 2020-06-29 DIAGNOSIS — I25.5 ISCHEMIC CARDIOMYOPATHY: Primary | ICD-10-CM

## 2020-06-29 DIAGNOSIS — I25.5 ISCHEMIC CARDIOMYOPATHY: ICD-10-CM

## 2020-06-29 LAB
ANION GAP SERPL CALCULATED.3IONS-SCNC: 3 MMOL/L (ref 3–14)
BUN SERPL-MCNC: 39 MG/DL (ref 7–30)
CALCIUM SERPL-MCNC: 7.7 MG/DL (ref 8.5–10.1)
CHLORIDE SERPL-SCNC: 105 MMOL/L (ref 94–109)
CO2 SERPL-SCNC: 30 MMOL/L (ref 20–32)
CREAT SERPL-MCNC: 1.13 MG/DL (ref 0.66–1.25)
GFR SERPL CREATININE-BSD FRML MDRD: 65 ML/MIN/{1.73_M2}
GLUCOSE SERPL-MCNC: 184 MG/DL (ref 70–99)
POTASSIUM SERPL-SCNC: 4.2 MMOL/L (ref 3.4–5.3)
SODIUM SERPL-SCNC: 138 MMOL/L (ref 133–144)

## 2020-06-29 PROCEDURE — 99213 OFFICE O/P EST LOW 20 MIN: CPT | Mod: 95 | Performed by: PHYSICIAN ASSISTANT

## 2020-06-29 PROCEDURE — 80048 BASIC METABOLIC PNL TOTAL CA: CPT | Performed by: NURSE PRACTITIONER

## 2020-06-29 PROCEDURE — 36415 COLL VENOUS BLD VENIPUNCTURE: CPT | Performed by: NURSE PRACTITIONER

## 2020-06-29 NOTE — PROGRESS NOTES
[facility]    Parul Badillo PA-C [facility] Core Nurse               Hi! Can we look into the cost of Entresto for Delbert. I would probably start him on the low dose. [facility] - I don't know if that makes a difference. Thank you! Hansa SCOTT for Entresto needed - will route to PA team.     Rebecca Yancey, BSN, RN, PHN, HNB-BC   6/29/2020 at 10:56 AM

## 2020-06-29 NOTE — PROGRESS NOTES
"Delbert Tilley is a 69 year old male who is being evaluated via a billable telephone visit.      The patient has been notified of following:     \"This telephone visit will be conducted via a call between you and your physician/provider. We have found that certain health care needs can be provided without the need for a physical exam.  This service lets us provide the care you need with a short phone conversation.  If a prescription is necessary we can send it directly to your pharmacy.  If lab work is needed we can place an order for that and you can then stop by our lab to have the test done at a later time.    Telephone visits are billed at different rates depending on your insurance coverage. During this emergency period, for some insurers they may be billed the same as an in-person visit.  Please reach out to your insurance provider with any questions.    If during the course of the call the physician/provider feels a telephone visit is not appropriate, you will not be charged for this service.\"    Patient has given verbal consent for Telephone visit?  Yes    What phone number would you like to be contacted at? [phone]    How would you like to obtain your AVS? Mail a copy     Provider notes:  Delbert Tilley is a pleasant 69-year-old male with a history of an ischemic cardiomyopathy with an EF of around 20 to 25%, coronary artery disease with prior coronary artery bypass grafting in the remote past with a LIMA to the LAD and saphenous vein graft to the circumflex and PDA. Additionally, he has a history of atrial flutter for which she has undergone ablation.  He has an ICD implanted for primary prevention.  He also has comorbidities of peripheral vascular disease with prior bilateral carotid endarterectomies, poorly controlled diabetes mellitus, COPD, and ongoing tobacco use.    In 2014, he suffered a non-ST elevation myocardial infarction and received stents to his SVG to OM graft at that time.  His EF was 35 to " 40% plan.  In 2017, he was scheduled to undergo carotid endarterectomy.  He had a stress test done which showed no ischemia but his ejection fraction had fallen.  He subsequently presented with heart failure symptoms and was noted to have an EF of 20 to 25% on echocardiogram.  His angiogram at that time showed his LIMA graft to the LAD was patent with a 50% stenosis in the apical LAD.  The vein graft to the RPDA had occluded.  His vein graft to the OM was patent.    Historically, he has been maintained on beta-blockers and vasodilator therapy with an ARB.  It appears he previously was on spironolactone although had market hypotension with this.    This spring, he was seen in clinic by Dr. Nagel for cardiac clearance prior to right thumb LRTI.  He had a stress test done which did not appear changed from prior.  There is an inferior and inferior septal transmural infarct with a small distal apical nontransmural infarct with no ischemia.  Ultimately, his surgery was postponed due to COVID-19 and was rescheduled for earlier this month.    He had a visit with Dr. Michaels prior to this on 5/29/2020.  This was a virtual visit.  He had noted some increasing leg edema and was taking an extra 20 mg of Lasix as needed with improvement.  This typically was about once a month.  Dr. Michaels recommended changing his metoprolol succinate to carvedilol.  She recommended further up titration of this and potentially switching to Entresto.  He was to follow-up in 1 month.    Recently, he called the clinic reporting ongoing lower extremity edema and a weight gain of 13 pounds in 4 to 5 days.  His weight typically has been running around 165 pounds and was up to 178 pounds.  He had taken extra Lasix without any improvement.  Additionally, he had been taking both metoprolol and carvedilol.  His metoprolol was stopped and he was switched from Lasix to torsemide 40 mg daily.  [facility] clinic enrollment was recommended.    I spoke with Delbert today for  [facility] clinic enrollment. Fortunately, he has responded very well to the Torsemide. In the last few days he lost about 3 lbs a day and is now back to his estimated dry weight of 165 lbs. His worsening heart failure symptoms have essentially resolved. He has some chronic dyspnea on exertion which is now back to baseline. No orthopnea, PND, or chest discomfort. He continues to have some mild peripheral edema but this is a chronic issues and has also improved. He has been wearing compression stockings to help with this.     Assessment/plan:  Delbert Tilley is a pleasant 69 year old male with history of coronary artery disease with remote CABG and subsequent prior interventions as noted above, a longstanding ischemic cardiomyopathy with a LVEF of 20 - 25%, ICD implantation, and atrial flutter s/p ablation among multiple other co-morbidities as mentioned above.     It appears he has done relatively well from a cardiac perspective but recently developed worsening heart failure symptoms and a prompt weight gain of 10 - 15 pounds in a few days. Lasix was switched to Torsemide and fortunately he responded very well and appears to be back to his baseline. The cause for his recent decompensation is not entirely clear. ? Related to his recent surgery.     I suggested he continue Torsemide 40 mg daily at this point. If his weight drops to the low 160s (<162 lbs) we discussed cutting back to 20 mg and increasing when his weight goes back up. Additionally, I asked him to call us if his weight is > 168 lbs so we can discuss further adjustments if needed.  A basic metabolic panel is pending at the time of our visit today. Pending these results, we will certainly make any necessary changes if needed.    In regards to his heart failure therapy, he has is currently tolerating carvedilol 6.25 mg twice daily as well as losartan 50 mg daily. BP appears stable. I suggested we consider switching him from losartan to Entresto. We reviewed the  benefits of Entresto. He is open to considering this at this time if it would be affordable, and we will look into that. I would likely start him on 24/26 mg twice daily.     I will follow up with Delbert in 1 - 2 months. He'll contact us sooner with any concerns.     Phone call duration: 15 minutes    Parlu Badillo PA-C

## 2020-06-29 NOTE — TELEPHONE ENCOUNTER
PA Not Needed will attempt Tier Exception     PA Initiation    Medication: Entresto 24-26MG -   Insurance Company: [employer] - Phone [phone] Fax [phone]  Pharmacy Filling the Rx:  NO RX LISTED ON SCRIPT  Filling Pharmacy Phone:    Filling Pharmacy Fax:    Start Date: 6/29/2020

## 2020-06-29 NOTE — LETTER
6/29/2020    [facility]  [address] [id number]    RE: Delbert Tilley       Dear Colleague,    I had the pleasure of seeing Delbert Tilley in the [facility].    Delbert Tilley is a 69 year old male who is being evaluated via a billable telephone visit.        Provider notes:  Delbert Tilley is a pleasant 69-year-old male with a history of an ischemic cardiomyopathy with an EF of around 20 to 25%, coronary artery disease with prior coronary artery bypass grafting in the remote past with a LIMA to the LAD and saphenous vein graft to the circumflex and PDA. Additionally, he has a history of atrial flutter for which she has undergone ablation.  He has an ICD implanted for primary prevention.  He also has comorbidities of peripheral vascular disease with prior bilateral carotid endarterectomies, poorly controlled diabetes mellitus, COPD, and ongoing tobacco use.    In 2014, he suffered a non-ST elevation myocardial infarction and received stents to his SVG to OM graft at that time.  His EF was 35 to 40% plan.  In 2017, he was scheduled to undergo carotid endarterectomy.  He had a stress test done which showed no ischemia but his ejection fraction had fallen.  He subsequently presented with heart failure symptoms and was noted to have an EF of 20 to 25% on echocardiogram.  His angiogram at that time showed his LIMA graft to the LAD was patent with a 50% stenosis in the apical LAD.  The vein graft to the RPDA had occluded.  His vein graft to the OM was patent.    Historically, he has been maintained on beta-blockers and vasodilator therapy with an ARB.  It appears he previously was on spironolactone although had market hypotension with this.    This spring, he was seen in clinic by Dr. Nagel for cardiac clearance prior to right thumb LRTI.  He had a stress test done which did not appear changed from prior.  There is an inferior and inferior septal transmural infarct  with a small distal apical nontransmural infarct with no ischemia.  Ultimately, his surgery was postponed due to COVID-19 and was rescheduled for earlier this month.    He had a visit with Dr. Michaels prior to this on 5/29/2020.  This was a virtual visit.  He had noted some increasing leg edema and was taking an extra 20 mg of Lasix as needed with improvement.  This typically was about once a month.  Dr. Michaels recommended changing his metoprolol succinate to carvedilol.  She recommended further up titration of this and potentially switching to Entresto.  He was to follow-up in 1 month.    Recently, he called the clinic reporting ongoing lower extremity edema and a weight gain of 13 pounds in 4 to 5 days.  His weight typically has been running around 165 pounds and was up to 178 pounds.  He had taken extra Lasix without any improvement.  Additionally, he had been taking both metoprolol and carvedilol.  His metoprolol was stopped and he was switched from Lasix to torsemide 40 mg daily.  [facility] clinic enrollment was recommended.    I spoke with Delbert today for [facility] clinic enrollment. Fortunately, he has responded very well to the Torsemide. In the last few days he lost about 3 lbs a day and is now back to his estimated dry weight of 165 lbs. His worsening heart failure symptoms have essentially resolved. He has some chronic dyspnea on exertion which is now back to baseline. No orthopnea, PND, or chest discomfort. He continues to have some mild peripheral edema but this is a chronic issues and has also improved. He has been wearing compression stockings to help with this.     Assessment/plan:  Delbert Tilley is a pleasant 69 year old male with history of coronary artery disease with remote CABG and subsequent prior interventions as noted above, a longstanding ischemic cardiomyopathy with a LVEF of 20 - 25%, ICD implantation, and atrial flutter s/p ablation among multiple other co-morbidities as mentioned above.     It appears he  has done relatively well from a cardiac perspective but recently developed worsening heart failure symptoms and a prompt weight gain of 10 - 15 pounds in a few days. Lasix was switched to Torsemide and fortunately he responded very well and appears to be back to his baseline. The cause for his recent decompensation is not entirely clear. ? Related to his recent surgery.     I suggested he continue Torsemide 40 mg daily at this point. If his weight drops to the low 160s (<162 lbs) we discussed cutting back to 20 mg and increasing when his weight goes back up. Additionally, I asked him to call us if his weight is > 168 lbs so we can discuss further adjustments if needed.  A basic metabolic panel is pending at the time of our visit today. Pending these results, we will certainly make any necessary changes if needed.    In regards to his heart failure therapy, he has is currently tolerating carvedilol 6.25 mg twice daily as well as losartan 50 mg daily. BP appears stable. I suggested we consider switching him from losartan to Entresto. We reviewed the benefits of Entresto. He is open to considering this at this time if it would be affordable, and we will look into that. I would likely start him on 24/26 mg twice daily.     I will follow up with Delbert in 1 - 2 months. He'll contact us sooner with any concerns.     Phone call duration: 15 minutes    Thank you for allowing me to participate in the care of your patient.    Sincerely,     Parul Badillo PA-C     [facility]

## 2020-06-29 NOTE — PATIENT INSTRUCTIONS
Call [facility] nurse for any questions or concerns Mon-Fri 8am-4pm:                                                 #[phone]                                       For concerns after hours:                                               #[phone], option 2     1: Medication changes: **  2: Plan from today: **  3: Lab results: see attached; **

## 2020-06-29 NOTE — RESULT ENCOUNTER NOTE
Results noted: electrolytes and kidney function WNL; calcium slightly low. To be discussed at [facility] with Hansa SCOTT today.

## 2020-06-30 ENCOUNTER — HOSPITAL ENCOUNTER (OUTPATIENT)
Dept: ULTRASOUND IMAGING | Facility: CLINIC | Age: 70
End: 2020-06-30
Attending: SURGERY
Payer: COMMERCIAL

## 2020-06-30 ENCOUNTER — OFFICE VISIT (OUTPATIENT)
Dept: OTHER | Facility: CLINIC | Age: 70
End: 2020-06-30
Attending: SURGERY
Payer: COMMERCIAL

## 2020-06-30 VITALS — DIASTOLIC BLOOD PRESSURE: 66 MMHG | RESPIRATION RATE: 16 BRPM | SYSTOLIC BLOOD PRESSURE: 102 MMHG | HEART RATE: 78 BPM

## 2020-06-30 DIAGNOSIS — I70.211 ATHEROSCLEROSIS OF NATIVE ARTERIES OF EXTREMITIES WITH INTERMITTENT CLAUDICATION, RIGHT LEG (H): ICD-10-CM

## 2020-06-30 DIAGNOSIS — I70.219 ATHEROSCLEROSIS OF ARTERY OF EXTREMITY WITH INTERMITTENT CLAUDICATION (H): Primary | ICD-10-CM

## 2020-06-30 DIAGNOSIS — I65.23 BILATERAL CAROTID ARTERY STENOSIS: ICD-10-CM

## 2020-06-30 PROCEDURE — 93880 EXTRACRANIAL BILAT STUDY: CPT

## 2020-06-30 PROCEDURE — G0463 HOSPITAL OUTPT CLINIC VISIT: HCPCS | Mod: 25

## 2020-06-30 PROCEDURE — 99213 OFFICE O/P EST LOW 20 MIN: CPT | Mod: ZP | Performed by: SURGERY

## 2020-06-30 NOTE — PROGRESS NOTES
"[facility] Clinic     Delbert Tilley MRN# [id number]   YOB: 1950 Age: 69 year old        Reason for Clinic Visit: Follow-up of carotid stenosis              History of Present Illness:   Delbert Tilley is a 69 year old male who presents for evaluation of carotid stenosis.     He has history of R CEA done 2015 and left CEA 2017, both done by Dr. Turner, for asymptomatic high-grade carotid stenosis. In the interim from his last clinic visit, he reports that he is generally doing reasonably well.     He has history of lower leg edema and neuropathy, and his feet ache when he walks more than 1/2 block. He occasionally gets cramping in his calves at this distance as well, L > R. He uses a walker to navigate through \"big box stores\", and this makes it easier. He has been wearing compression stockings and taking torsemide for the edema, with some improvement. He has not developed foot wounds. He is able to mow his lawn, but has pain in the legs by the end of the day; if he is sitting or doing sedentary tasks, he does not have pain or discomfort.     He has had episodes of blurry vision. He does not see a double image and does not describe blindness in either eye. He does not have lightheadedness when this happens. He will have transient 20-second spells of blurred vision that will spontaneously resolve, and these occur as frequently as once a month. He has noticed them when sitting to watch TV and occasionally when reading; he has not identified any provocative factors for them.     He has not had any numbness or weakness in either arms or legs; he did have surgery on his right arm 2 weeks ago, and this remains in a cast. He has not had difficulties with speech or pronunciation.               Past Medical History:   I have personally reviewed the following:   Past Medical History:   Diagnosis Date     Acute renal failure (H) 8/26/06 Hosp    secondary to dehydration     Arthritis      CAD (coronary artery " disease)     LIMA to the LAD, vein graft to OM and a vein graft to the PDA     Carpal tunnel syndrome      Diabetes (H)      Gastro-oesophageal reflux disease      H/O: alcohol abuse      HTN (hypertension)      Hyperlipidaemia LDL goal <100 7/8/2013     Hypokalemia      Pacemaker      Pancreatitis 6/26/06 Hosp   ICM with CHF          Past Surgical History:   I have personally reviewed the following:   Past Surgical History:   Procedure Laterality Date     BYPASS CARDIOPULMONARY       CATARACT IOL, RT/LT      Cataract IOL RT/LT     ENDARTERECTOMY CAROTID Right 6/12/2015    Procedure: ENDARTERECTOMY CAROTID;  Surgeon: João Turner MD;  Location: [facility] OR     ENDARTERECTOMY CAROTID Left 9/8/2017    Procedure: ENDARTERECTOMY CAROTID;  LEFT CAROTID ENDARTERECTOMY WITH EEG;  Surgeon: oJão Turner MD;  Location:  OR     IMPLANT PACEMAKER  6/10/2010     INTERPOSITION TENDON HAND N/A 6/9/2020    Procedure: Right thumb ligament reconstruction tendon interposition with extensor pollicis brevis to abductor pollicis tendon transfer;  Surgeon: Bethel Martin MD;  Location: [facility] OR     RELEASE CARPAL TUNNEL Right 4/12/2016    Procedure: RELEASE CARPAL TUNNEL;  Surgeon: Lissy Leger MD;  Location: [facility] OR     SURGICAL HISTORY OF -   1998    Laminectomy     SURGICAL HISTORY OF -   10/2003    Bypass grafting     TONSILLECTOMY & ADENOIDECTOMY     CABG x 3  Ablation of atrial flutter  R thumb trapeziectomy and thumb tendon interposition         Social History:   I have personally reviewed the following:   Social History     Tobacco Use     Smoking status: Current Every Day Smoker     Packs/day: 2.00     Years: 50.00     Pack years: 100.00     Types: Cigarettes     Smokeless tobacco: Never Used     Tobacco comment: 1 1/2 PPD 2/16/18   Substance Use Topics     Alcohol use: No   He smokes 1 1/2 ppd, and has for decades. No alcohol use and denies illicit drug use. [employer] by trade, now retired. 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           Family History:     Family History   Problem Relation Age of Onset     Unknown/Adopted No family hx of              Allergies:     Allergies   Allergen Reactions     Hydrocodone      Upset stomach     Shellfish Allergy Hives and Rash             Medications:     Current Outpatient Medications Ordered in Epic   Medication     albuterol (PROAIR HFA) 108 (90 Base) MCG/ACT Inhaler     amylase-lipase-protease (CREON 12) 80095 UNITS CPEP     aspirin 325 MG tablet     atorvastatin (LIPITOR) 80 MG tablet     carvedilol (COREG) 6.25 MG tablet     fluticasone-salmeterol (ADVAIR DISKUS) 250-50 MCG/DOSE inhaler     insulin glargine (LANTUS) 100 UNIT/ML injection     Insulin Pen Needle (PEN NEEDLES) 32G X 4 MM MISC     loperamide (IMODIUM) 2 MG capsule     losartan (COZAAR) 50 MG tablet     Nitroglycerin (NITROSTAT SL)     oxyCODONE (ROXICODONE) 5 MG tablet     pantoprazole (PROTONIX) 40 MG EC tablet     sacubitril-valsartan (ENTRESTO) 24-26 MG per tablet     torsemide (DEMADEX) 20 MG tablet     Current Facility-Administered Medications Ordered in Epic   Medication Dose Route Frequency Last Rate Last Dose     ropivacaine (NAROPIN) injection 0.5 mL  0.5 mL     0.5 mL at 04/21/20 0930     triamcinolone (KENALOG-40) injection 20 mg  20 mg     20 mg at 04/21/20 0930             Review of Systems:   The 10 point Review of Systems is negative other than noted in the HPI          Physical Exam:   Vitals were reviewed      BP: 102/66 Pulse: 78   Resp: 16          General: sitting comfortably in chair, NAD. Thin.   Neuro/Psych: A&O x 4, pleasantly conversant   HENT: EOMI and conjugate. Facial sensation and muscle movement grossly symmetric BL. Hearing intact to normal speaking tone. Tongue protrudes midline. Moist mucous membranes. Wearing a face mask (covid-19 pandemic). Wears glasses.   Cardiac: Regular rhythm and rate. Murmur present.   Pulm: Lungs CTAB, no  "w/r/r.  Abd: Soft, non-distended, no tenderness to palpation.  Extrem: Grossly normal and symmetric ROM in all four extremities. BLE edema; legs in compression stockings (these were not removed and feet were not examined at today's visit).  Right arm in orange cast; finger strength grossly symmetric compared to left hand.  Skin: Clean, dry, no rashes or wounds on visible skin.  Vasc: No carotid bruits. Palpable BL radial pulses.           Data:   Labs:       Lab Results   Component Value Date     06/29/2020    Lab Results   Component Value Date    CHLORIDE 105 06/29/2020    Lab Results   Component Value Date    BUN 39 06/29/2020      Lab Results   Component Value Date    POTASSIUM 4.2 06/29/2020    Lab Results   Component Value Date    CO2 30 06/29/2020    Lab Results   Component Value Date    CR 1.13 06/29/2020        Lab Results   Component Value Date    WBC 6.2 03/03/2020    HGB 14.3 05/26/2020    HCT 43.2 03/03/2020    MCV 94 03/03/2020     (L) 03/03/2020     HgA1c (5/26/20): 9.4%    NM Stress Echo (3/13/20): \"The nuclear stress test is abnormal. There is transmural infarction in the inferior and inferoseptal segment(s) of the left ventricle. There is a small area of nontransmural infarction in the distal apical segment(s) of the left ventricle. Left ventricular function is severely reduced. The left ventricular ejection fraction at rest is 15%.  The left ventricular ejection fraction at stress is 20%.  Severe left ventricular enlargement is noted. Stress to rest cavity ratio is 1.21.  A prior study was conducted on 8/11/2017.  This study has no significant change when compared with the prior study.\"    I have reviewed the following images:  Duplex Carotids (6/30/20): \"1. Less than 50% diameter stenosis of the right internal carotid artery relative to the distal internal carotid artery diameter. 2. Less than 50% diameter stenosis of the left internal carotid artery relative to the distal internal " "carotid artery diameter.\"  Left ICA PSV:  190  cm/sec.  Left ICA EDV:  19 cm/sec.  Left ICA/CCA PSV Ratio:  1.05.     Duplex Carotids (5/28/19-prior year): \"IMPRESSION: Less than 50% diameter stenosis of the origins of the internal carotid arteries on both sides based upon ICA/CCA ratios. However, there are dampened waveforms throughout the mid and distal right common carotid artery and right internal carotid artery raising the question of high-grade narrowing of the proximal right common carotid artery. On the left, the internal carotid artery/CCA ratio is within normal limits, but the peak systolic velocity of the left internal carotid artery raises the question of stenosis. Waveforms throughout the left internal carotid artery are also mildly dampened raising the question of proximal left internal carotid artery stenosis. Given this complex ultrasonographic picture of the carotid arteries, CT angiogram of the neck is recommended for further Evaluation.\"   Left ICA PSV:  181  cm/sec.  Left ICA EDV:  35 cm/sec.  Left ICA/CCA PSV Ratio:  1.1             Assessment and Plan:   Mr. Tilley is a 69M with history of CAD, s/p CABG x 3 and PCI with CHFrEF of 15-20%; T2DM, poorly controlled; HTN; DL; paroxysmal a-fib not on anticoagulation; COPD, non-O2 dependent; and PAD with prior BL carotid endarterectomies for high-grade asymptomatic stenosis.       Encouraged him to follow up with Ophthalmology for his blurry vision complaints [facility] not [facility] this [facility])    Duplex US with no progression of stenosis in bilateral carotid arteries    He is on aspirin and high-dose atorvastatin: adequate medical management of PAD    His leg symptoms are likely multifactorial, and PAD is likely contributory. Will arrange for outpatient PAD rehab for him. Discussed this and he is eager to participated.    We discussed smoking cessation and I spent time counseling him on ways to quit. He is still not ready " to stop smoking.     I will see him back in a year for repeat carotid duplex, and will also obtain exercise ABIs at that time. In all honesty, his heart failure makes him very high risk for any vascular intervention at this point, although he is fairly young.     He will call the clinic if any questions or concerns, or new symptoms, arise.     Nicole Reyes MD

## 2020-06-30 NOTE — PROGRESS NOTES
Delbert Tilley is a 69 year old male who presents for:  Chief Complaint   Patient presents with     RECHECK     BILAT CAROTID US ([facility] 10:30; Pending sale to [facility] 11:30) History of carotid stenosis; 1 year follow up to 5/28/19 appointment with Dr. Beard.        Vitals:    Vitals:    06/30/20 1055   BP: 102/66   BP Location: Left arm   Patient Position: Chair   Cuff Size: Adult Regular   Pulse: 78   Resp: 16       BMI:  Estimated body mass index is 22.38 kg/m  as calculated from the following:    Height as of 6/9/20: 6' (1.829 m).    Weight as of 6/29/20: 165 lb (74.8 kg).    Pain Score:  Data Unavailable        Kory Grullon, CMA

## 2020-07-01 ENCOUNTER — CARE COORDINATION (OUTPATIENT)
Dept: CARDIOLOGY | Facility: CLINIC | Age: 70
End: 2020-07-01

## 2020-07-01 DIAGNOSIS — I25.5 ISCHEMIC CARDIOMYOPATHY: ICD-10-CM

## 2020-07-01 NOTE — PROGRESS NOTES
[facility] - CORE Clinic    Incoming call from patient requesting lab results that were done on 6/29:  Component      Latest Ref Rng & Units 6/29/2020   Sodium      133 - 144 mmol/L 138   Potassium      3.4 - 5.3 mmol/L 4.2   Chloride      94 - 109 mmol/L 105   Carbon Dioxide      20 - 32 mmol/L 30   Anion Gap      3 - 14 mmol/L 3   Glucose      70 - 99 mg/dL 184 (H)   Urea Nitrogen      7 - 30 mg/dL 39 (H)   Creatinine      0.66 - 1.25 mg/dL 1.13   GFR Estimate      >60 mL/min/1.73:m2 65   GFR Estimate If Black      >60 mL/min/1.73:m2 75   Calcium      8.5 - 10.1 mg/dL 7.7 (L)     Patient also asking is he is to continue taking torsemide. He had virtual appt w/Hansa Badillo also on 6/29, but labs not resulted at the time of his appt. Per her clinic notes:  I suggested he continue Torsemide 40 mg daily at this point. If his weight drops to the low 160s (<162 lbs) we discussed cutting back to 20 mg and increasing when his weight goes back up. Additionally, I asked him to call us if his weight is > 168 lbs so we can discuss further adjustments if needed.  A basic metabolic panel is pending at the time of our visit today. Pending these results, we will certainly make any necessary changes if needed    She estimated his dry weight ~165#. I reviewed all of this with patient including torsemide dosing instructions. Patient stated his weight has been 168 today and yesterday. He could not remember any other weights as he does record them. He was not interested in instructions to record his daily weights. He stated his breathing is at his baseline and his LE swelling is actually getting better.     I will forward to Hansa for any additional recommendations. He will also need a torsemide refill that needs to go to the [facility]. Reminder sent to board.  Teodora De Jesus RN on 7/1/2020 at 4:38 PM

## 2020-07-06 ENCOUNTER — DOCUMENTATION ONLY (OUTPATIENT)
Dept: CARDIOLOGY | Facility: CLINIC | Age: 70
End: 2020-07-06

## 2020-07-06 DIAGNOSIS — I25.5 ISCHEMIC CARDIOMYOPATHY: ICD-10-CM

## 2020-07-06 RX ORDER — TORSEMIDE 20 MG/1
40 TABLET ORAL DAILY
Qty: 30 TABLET | Refills: 0 | Status: SHIPPED | OUTPATIENT
Start: 2020-07-06 | End: 2020-07-06

## 2020-07-06 RX ORDER — TORSEMIDE 20 MG/1
20 TABLET ORAL 2 TIMES DAILY
Qty: 30 TABLET | Refills: 4 | COMMUNITY
Start: 2020-07-06 | End: 2020-07-08

## 2020-07-06 NOTE — PROGRESS NOTES
Patient has a deductible of $325 to meet with his prescription benefits.     Entresto coverage:   1st month: $372 ($325 deductible + $47 monthly copay)   After that: $47 monthly.     These benefits reset at the beginning of the new year, and the patient will have an opportunity to sign up for a new/different plan during Open Enrollment in the fall.    Kristen Cavanaugh, [facility]  Discharge Pharmacy Liaison  Liaison Cell: [phone]

## 2020-07-06 NOTE — PROGRESS NOTES
[facility]-CORE Clinic    Sent message to Kristen Benjamin, pharmacy advocate, requesting Entresto pricing info.     Rebecca Yancey BSN, RN, PHN, HNB-BC   7/6/2020 at 8:37 AM

## 2020-07-06 NOTE — PROGRESS NOTES
[facility]    Reviewed Entresto pricing with pt. Pt would like to think about it. He will call us when he decides.     Also notes that he decreased torsemide from 40 QAM to 20 mg QAM d/t dizziness. States dizziness has resolved on lower dose. Wt is stable at 165#. Notes some peripheral swelling, but just a little bit. Has SOB at baseline, cannot tell if better or worse than usual.     Suggested that pt try to spread out torsemide doses - 20 mg QAM, 20 mg at noon. Pt agrees to try this and call us back if dizziness returns.     FYI to Hansa.     Rebecca Yancey, BSN, RN, PHN, HNB-BC   7/6/2020 at 12:52 PM

## 2020-07-06 NOTE — PROGRESS NOTES
[facility] - CORE Clinic    Incoming message from patient requesting a torsemide refill. [facility] [place] Will send paperwork on Wed(7/8) when in clinic - reminder sent to board. Will resend 15 day supply to pts pharmacy in [place].  LM wthis information and for call back if he had other questions/concerns.  Teodora De Jesus, RN on 7/6/2020 at 10:08 AM

## 2020-07-07 LAB
MDC_IDC_LEAD_IMPLANT_DT: NORMAL
MDC_IDC_LEAD_IMPLANT_DT: NORMAL
MDC_IDC_LEAD_LOCATION: NORMAL
MDC_IDC_LEAD_LOCATION: NORMAL
MDC_IDC_LEAD_LOCATION_DETAIL_1: NORMAL
MDC_IDC_LEAD_LOCATION_DETAIL_1: NORMAL
MDC_IDC_LEAD_MFG: NORMAL
MDC_IDC_LEAD_MFG: NORMAL
MDC_IDC_LEAD_MODEL: NORMAL
MDC_IDC_LEAD_MODEL: NORMAL
MDC_IDC_LEAD_POLARITY_TYPE: NORMAL
MDC_IDC_LEAD_POLARITY_TYPE: NORMAL
MDC_IDC_LEAD_SERIAL: NORMAL
MDC_IDC_LEAD_SERIAL: NORMAL
MDC_IDC_MSMT_BATTERY_DTM: NORMAL
MDC_IDC_MSMT_BATTERY_REMAINING_LONGEVITY: 98 MO
MDC_IDC_MSMT_BATTERY_RRT_TRIGGER: 2.73
MDC_IDC_MSMT_BATTERY_STATUS: NORMAL
MDC_IDC_MSMT_BATTERY_VOLTAGE: 3 V
MDC_IDC_MSMT_CAP_CHARGE_DTM: NORMAL
MDC_IDC_MSMT_CAP_CHARGE_ENERGY: 18 J
MDC_IDC_MSMT_CAP_CHARGE_TIME: 3.85
MDC_IDC_MSMT_CAP_CHARGE_TYPE: NORMAL
MDC_IDC_MSMT_LEADCHNL_RA_IMPEDANCE_VALUE: 456 OHM
MDC_IDC_MSMT_LEADCHNL_RA_PACING_THRESHOLD_AMPLITUDE: 0.88 V
MDC_IDC_MSMT_LEADCHNL_RA_PACING_THRESHOLD_PULSEWIDTH: 0.4 MS
MDC_IDC_MSMT_LEADCHNL_RA_SENSING_INTR_AMPL: 3.25 MV
MDC_IDC_MSMT_LEADCHNL_RA_SENSING_INTR_AMPL: 3.25 MV
MDC_IDC_MSMT_LEADCHNL_RV_IMPEDANCE_VALUE: 323 OHM
MDC_IDC_MSMT_LEADCHNL_RV_IMPEDANCE_VALUE: 399 OHM
MDC_IDC_MSMT_LEADCHNL_RV_PACING_THRESHOLD_AMPLITUDE: 1.12 V
MDC_IDC_MSMT_LEADCHNL_RV_PACING_THRESHOLD_PULSEWIDTH: 0.4 MS
MDC_IDC_MSMT_LEADCHNL_RV_SENSING_INTR_AMPL: 5.12 MV
MDC_IDC_MSMT_LEADCHNL_RV_SENSING_INTR_AMPL: 5.12 MV
MDC_IDC_PG_IMPLANT_DTM: NORMAL
MDC_IDC_PG_MFG: NORMAL
MDC_IDC_PG_MODEL: NORMAL
MDC_IDC_PG_SERIAL: NORMAL
MDC_IDC_PG_TYPE: NORMAL
MDC_IDC_SESS_CLINIC_NAME: NORMAL
MDC_IDC_SESS_DTM: NORMAL
MDC_IDC_SESS_TYPE: NORMAL
MDC_IDC_SET_BRADY_AT_MODE_SWITCH_RATE: 171 {BEATS}/MIN
MDC_IDC_SET_BRADY_HYSTRATE: NORMAL
MDC_IDC_SET_BRADY_LOWRATE: 55 {BEATS}/MIN
MDC_IDC_SET_BRADY_MAX_SENSOR_RATE: 140 {BEATS}/MIN
MDC_IDC_SET_BRADY_MAX_TRACKING_RATE: 140 {BEATS}/MIN
MDC_IDC_SET_BRADY_MODE: NORMAL
MDC_IDC_SET_BRADY_PAV_DELAY_LOW: 180 MS
MDC_IDC_SET_BRADY_SAV_DELAY_LOW: 150 MS
MDC_IDC_SET_LEADCHNL_RA_PACING_AMPLITUDE: 1.75 V
MDC_IDC_SET_LEADCHNL_RA_PACING_ANODE_ELECTRODE_1: NORMAL
MDC_IDC_SET_LEADCHNL_RA_PACING_ANODE_LOCATION_1: NORMAL
MDC_IDC_SET_LEADCHNL_RA_PACING_CAPTURE_MODE: NORMAL
MDC_IDC_SET_LEADCHNL_RA_PACING_CATHODE_ELECTRODE_1: NORMAL
MDC_IDC_SET_LEADCHNL_RA_PACING_CATHODE_LOCATION_1: NORMAL
MDC_IDC_SET_LEADCHNL_RA_PACING_POLARITY: NORMAL
MDC_IDC_SET_LEADCHNL_RA_PACING_PULSEWIDTH: 0.4 MS
MDC_IDC_SET_LEADCHNL_RA_SENSING_ANODE_ELECTRODE_1: NORMAL
MDC_IDC_SET_LEADCHNL_RA_SENSING_ANODE_LOCATION_1: NORMAL
MDC_IDC_SET_LEADCHNL_RA_SENSING_CATHODE_ELECTRODE_1: NORMAL
MDC_IDC_SET_LEADCHNL_RA_SENSING_CATHODE_LOCATION_1: NORMAL
MDC_IDC_SET_LEADCHNL_RA_SENSING_POLARITY: NORMAL
MDC_IDC_SET_LEADCHNL_RA_SENSING_SENSITIVITY: 0.3 MV
MDC_IDC_SET_LEADCHNL_RV_PACING_AMPLITUDE: 2.5 V
MDC_IDC_SET_LEADCHNL_RV_PACING_ANODE_ELECTRODE_1: NORMAL
MDC_IDC_SET_LEADCHNL_RV_PACING_ANODE_LOCATION_1: NORMAL
MDC_IDC_SET_LEADCHNL_RV_PACING_CAPTURE_MODE: NORMAL
MDC_IDC_SET_LEADCHNL_RV_PACING_CATHODE_ELECTRODE_1: NORMAL
MDC_IDC_SET_LEADCHNL_RV_PACING_CATHODE_LOCATION_1: NORMAL
MDC_IDC_SET_LEADCHNL_RV_PACING_POLARITY: NORMAL
MDC_IDC_SET_LEADCHNL_RV_PACING_PULSEWIDTH: 0.4 MS
MDC_IDC_SET_LEADCHNL_RV_SENSING_ANODE_ELECTRODE_1: NORMAL
MDC_IDC_SET_LEADCHNL_RV_SENSING_ANODE_LOCATION_1: NORMAL
MDC_IDC_SET_LEADCHNL_RV_SENSING_CATHODE_ELECTRODE_1: NORMAL
MDC_IDC_SET_LEADCHNL_RV_SENSING_CATHODE_LOCATION_1: NORMAL
MDC_IDC_SET_LEADCHNL_RV_SENSING_POLARITY: NORMAL
MDC_IDC_SET_LEADCHNL_RV_SENSING_SENSITIVITY: 0.3 MV
MDC_IDC_SET_ZONE_DETECTION_BEATS_DENOMINATOR: 40 {BEATS}
MDC_IDC_SET_ZONE_DETECTION_BEATS_NUMERATOR: 30 {BEATS}
MDC_IDC_SET_ZONE_DETECTION_INTERVAL: 320 MS
MDC_IDC_SET_ZONE_DETECTION_INTERVAL: 350 MS
MDC_IDC_SET_ZONE_DETECTION_INTERVAL: 360 MS
MDC_IDC_SET_ZONE_DETECTION_INTERVAL: 400 MS
MDC_IDC_SET_ZONE_DETECTION_INTERVAL: NORMAL
MDC_IDC_SET_ZONE_TYPE: NORMAL
MDC_IDC_STAT_AT_BURDEN_PERCENT: 0 %
MDC_IDC_STAT_AT_DTM_END: NORMAL
MDC_IDC_STAT_AT_DTM_START: NORMAL
MDC_IDC_STAT_BRADY_AP_VP_PERCENT: 0.1 %
MDC_IDC_STAT_BRADY_AP_VS_PERCENT: 27.01 %
MDC_IDC_STAT_BRADY_AS_VP_PERCENT: 0.03 %
MDC_IDC_STAT_BRADY_AS_VS_PERCENT: 72.85 %
MDC_IDC_STAT_BRADY_DTM_END: NORMAL
MDC_IDC_STAT_BRADY_DTM_START: NORMAL
MDC_IDC_STAT_BRADY_RA_PERCENT_PACED: 27.1 %
MDC_IDC_STAT_BRADY_RV_PERCENT_PACED: 0.14 %
MDC_IDC_STAT_EPISODE_RECENT_COUNT: 0
MDC_IDC_STAT_EPISODE_RECENT_COUNT_DTM_END: NORMAL
MDC_IDC_STAT_EPISODE_RECENT_COUNT_DTM_START: NORMAL
MDC_IDC_STAT_EPISODE_TOTAL_COUNT: 0
MDC_IDC_STAT_EPISODE_TOTAL_COUNT: 2
MDC_IDC_STAT_EPISODE_TOTAL_COUNT_DTM_END: NORMAL
MDC_IDC_STAT_EPISODE_TOTAL_COUNT_DTM_START: NORMAL
MDC_IDC_STAT_EPISODE_TYPE: NORMAL
MDC_IDC_STAT_TACHYTHERAPY_ATP_DELIVERED_RECENT: 0
MDC_IDC_STAT_TACHYTHERAPY_ATP_DELIVERED_TOTAL: 0
MDC_IDC_STAT_TACHYTHERAPY_RECENT_DTM_END: NORMAL
MDC_IDC_STAT_TACHYTHERAPY_RECENT_DTM_START: NORMAL
MDC_IDC_STAT_TACHYTHERAPY_SHOCKS_ABORTED_RECENT: 0
MDC_IDC_STAT_TACHYTHERAPY_SHOCKS_ABORTED_TOTAL: 0
MDC_IDC_STAT_TACHYTHERAPY_SHOCKS_DELIVERED_RECENT: 0
MDC_IDC_STAT_TACHYTHERAPY_SHOCKS_DELIVERED_TOTAL: 0
MDC_IDC_STAT_TACHYTHERAPY_TOTAL_DTM_END: NORMAL
MDC_IDC_STAT_TACHYTHERAPY_TOTAL_DTM_START: NORMAL

## 2020-07-08 ENCOUNTER — CARE COORDINATION (OUTPATIENT)
Dept: CARDIOLOGY | Facility: CLINIC | Age: 70
End: 2020-07-08

## 2020-07-08 DIAGNOSIS — I25.5 ISCHEMIC CARDIOMYOPATHY: ICD-10-CM

## 2020-07-08 RX ORDER — TORSEMIDE 20 MG/1
20 TABLET ORAL 2 TIMES DAILY
Qty: 30 TABLET | Refills: 4 | Status: SHIPPED | OUTPATIENT
Start: 2020-07-08 | End: 2020-07-24

## 2020-07-08 NOTE — PROGRESS NOTES
[facility] - CORE Clinic    Torsemide refill and required documentation faxed to [facility].  Teodora De Jesus RN on 7/8/2020 at 9:48 AM

## 2020-07-10 ENCOUNTER — TRANSFERRED RECORDS (OUTPATIENT)
Dept: HEALTH INFORMATION MANAGEMENT | Facility: CLINIC | Age: 70
End: 2020-07-10

## 2020-07-24 DIAGNOSIS — I25.5 ISCHEMIC CARDIOMYOPATHY: ICD-10-CM

## 2020-07-24 RX ORDER — TORSEMIDE 20 MG/1
20 TABLET ORAL 2 TIMES DAILY
Qty: 30 TABLET | Refills: 4 | Status: SHIPPED | OUTPATIENT
Start: 2020-07-24 | End: 2020-08-10

## 2020-07-24 NOTE — TELEPHONE ENCOUNTER
Pt called stating he is almost of of Torsemide.  He needs med through [facility] which he states takes about 10 days. Confirmed pt is taking 20mg BID.  Rx sent to [facility] for 30 day supply.  Also faxed Rx to [facility] - Dr Garsia.  Fax #[phone]  ALEXANDRA DIA RN on 7/24/2020 at 10:25 AM

## 2020-08-07 ENCOUNTER — PRE VISIT (OUTPATIENT)
Dept: CARDIOLOGY | Facility: CLINIC | Age: 70
End: 2020-08-07

## 2020-08-07 NOTE — TELEPHONE ENCOUNTER
Attempted to contact patient to do wellness screen.  No answer. The screening was done on the day of the appointment.

## 2020-08-10 ENCOUNTER — OFFICE VISIT (OUTPATIENT)
Dept: CARDIOLOGY | Facility: CLINIC | Age: 70
End: 2020-08-10
Attending: PHYSICIAN ASSISTANT
Payer: COMMERCIAL

## 2020-08-10 ENCOUNTER — TELEPHONE (OUTPATIENT)
Dept: CARDIOLOGY | Facility: CLINIC | Age: 70
End: 2020-08-10

## 2020-08-10 VITALS
WEIGHT: 160 LBS | OXYGEN SATURATION: 97 % | HEART RATE: 98 BPM | DIASTOLIC BLOOD PRESSURE: 72 MMHG | SYSTOLIC BLOOD PRESSURE: 101 MMHG | BODY MASS INDEX: 21.7 KG/M2

## 2020-08-10 DIAGNOSIS — I25.5 ISCHEMIC CARDIOMYOPATHY: ICD-10-CM

## 2020-08-10 LAB
ANION GAP SERPL CALCULATED.3IONS-SCNC: 6 MMOL/L (ref 3–14)
BUN SERPL-MCNC: 41 MG/DL (ref 7–30)
CALCIUM SERPL-MCNC: 7.1 MG/DL (ref 8.5–10.1)
CHLORIDE SERPL-SCNC: 102 MMOL/L (ref 94–109)
CO2 SERPL-SCNC: 29 MMOL/L (ref 20–32)
CREAT SERPL-MCNC: 1.26 MG/DL (ref 0.66–1.25)
GFR SERPL CREATININE-BSD FRML MDRD: 57 ML/MIN/{1.73_M2}
GLUCOSE SERPL-MCNC: 162 MG/DL (ref 70–99)
POTASSIUM SERPL-SCNC: 4.5 MMOL/L (ref 3.4–5.3)
SODIUM SERPL-SCNC: 137 MMOL/L (ref 133–144)

## 2020-08-10 PROCEDURE — 80048 BASIC METABOLIC PNL TOTAL CA: CPT | Performed by: PHYSICIAN ASSISTANT

## 2020-08-10 PROCEDURE — 36415 COLL VENOUS BLD VENIPUNCTURE: CPT | Performed by: PHYSICIAN ASSISTANT

## 2020-08-10 PROCEDURE — 99213 OFFICE O/P EST LOW 20 MIN: CPT | Performed by: PHYSICIAN ASSISTANT

## 2020-08-10 RX ORDER — TORSEMIDE 20 MG/1
TABLET ORAL
Qty: 30 TABLET | Refills: 4 | COMMUNITY
Start: 2020-08-10 | End: 2020-08-11

## 2020-08-10 NOTE — PATIENT INSTRUCTIONS
Please call CORE nurse for any questions or concerns Mon-Fri 8am-4pm:   [phone]    Today, we discussed the following:   - Results: we'll call you with your results from today.   - Medication changes:  no medication changes.   - Follow up: follow up with Dr. Michaels in two months.        *If you need to speak with [facility] Scheduling, please call:   Cardiology [phone]  Diagnostic Imaging [phone]  Lab [phone]

## 2020-08-10 NOTE — LETTER
8/10/2020    [facility]  [address] [address]    RE: Delbert Tilley       Dear Colleague,    I had the pleasure of seeing Delbert Tilley in the [facility].      Cardiology Progress Note    Date of Service: 08/10/2020  Patient seen today in follow up of: CORE follow up  Primary cardiologist: Dr. Michaels    HPI:  Delbert Tilley is a very pleasant 69 year old male with a history of an ischemic cardiomyopathy with an EF of around 20 to 25%, coronary artery disease with prior coronary artery bypass grafting in the remote past with a LIMA to the LAD and saphenous vein graft to the circumflex and PDA. Additionally, he has a history of atrial flutter for which she has undergone ablation.  He has an ICD implanted for primary prevention.  He also has comorbidities of peripheral vascular disease with prior bilateral carotid endarterectomies, poorly controlled diabetes mellitus, COPD, and ongoing tobacco use.     In 2014, he suffered a non-ST elevation myocardial infarction and received stents to his SVG to OM graft at that time.  His EF was 35 to 40% plan.  In 2017, he was scheduled to undergo carotid endarterectomy.  He had a stress test done which showed no ischemia but his ejection fraction had fallen.  He subsequently presented with heart failure symptoms and was noted to have an EF of 20 to 25% on echocardiogram.  His angiogram at that time showed his LIMA graft to the LAD was patent with a 50% stenosis in the apical LAD.  The vein graft to the RPDA had occluded.  His vein graft to the OM was patent.     Historically, he has been maintained on beta-blockers and vasodilator therapy with an ARB.  It appears he previously was on spironolactone although had market hypotension with this.     He had a stress test done this spring prior to surgery on his thumb which showed evidence of a prior infarct but no ischemia. Ultimately, his surgery was postponed due to COVID-19.  He had a virtual visit with Dr. Michaels in May prior to his reschedule surgery.  He had some increasing leg edema and had been taking an extra 20 mg of Lasix with improvement.  He was only needing to do this in frequently about once a month.  He was switched from metoprolol XL to carvedilol at that time and was referred to the core clinic.    He called the clinic at the end of June noting he had gained about 13 pounds in 4 to 5 days.  His weight had gone up from his baseline of around 165 pounds up to 178 pounds.  He did take an increasing doses of Lasix without any improvement and it was recommended he switch to torsemide 40 mg daily.    I spoke with Delbert shortly after that switch was made on 6/29/2020.  He fortunately responded very well to the torsemide and was back to his dry weight of 165 pounds.  His heart failure symptoms had essentially resolved.  He has chronic exertional dyspnea in the setting of his COPD but this is back to baseline.  I suggested he continue to take 40 mg of torsemide and decrease this to 20 mg if his weight dropped below 162 pounds.  We also discussed switching to Entresto and plan to look into the cost of that.    Delbert is here today for routine follow-up.  He notes overall he continues to feel very well.  He tells me he feels 100% better compared to June when he was struggling with heart failure.  His weights have remained stable and have trended down.  In the last 4 to 5 days he is only been taking 20 mg of torsemide as his weight has been around 160 pounds.  His dyspnea on exertion is at baseline and he is not having any apnea or PND.  He has some mild peripheral edema and typically wears compression stockings which helped quite a bit.    ASSESSMENT/PLAN:  Delbert Tilley is a very pleasant 69-year-old male with a history of coronary artery disease as noted above, a chronic ischemic cardiomyopathy with an EF around 20 to 25%, atrial flutter status post ablation, an ICD in place, and  multiple medical comorbidities as noted above.      He fortunately at this point appears to be quite stable from a cardiac perspective.  He had some worsening heart failure symptoms in June but has been stable on that times and switching to torsemide.  He has chronic exertional dyspnea in the setting of his COPD and ongoing tobacco use but this is unchanged. He has NYHA class II symptoms.    At this point, he is tolerating carvedilol 6.25 mg twice daily as well as losartan 50 mg daily.  I was hopeful to switch him to Entresto but unfortunately this will be quite costly for him.  At this point, as he has been clinically doing well over the last month and a half we elected to continue his current regimen unchanged.  We can always consider this down the road pending his course and insurance coverage.  His blood pressure today in clinic is 101/72 which he tells me is typically where he runs.  I made no changes to his cardiomyopathy medications today.    I suggested at this point we have him take 20 mg of torsemide daily.  If his weight goes above 165 pounds he will increase this to 40 mg daily.  If his weight continues to rise her symptoms are resolved and asked him to call the core nurses for further recommendations.  He does have some mild swelling on exam today but tells me this typically is very well controlled with his compression stockings which he wears most of the time.     He had a basic metabolic panel drawn today prior to our visit but unfortunately this is pending at this time.  We will call him with these results and any changes if needed.    His last device check on 6/23 showed no evidence of arrhythmias and was otherwise stable.    I will have him follow-up with Dr. Michaels or myself in about 2 months.  He will contact us sooner with any issues.    Addendum: Labs reviewed. Creatinine slightly elevated from prior at 1.2. BUN is elevated in the 40s. Given his weight loss and significant clinical improvement,  will adjust his diuretic regimen and have him take 10 mg of Torsemide for a weight of 165 lbs or less and 20 mg for a weight greater than 165 lbs. If his weight is increasing after cutting back to the 10 mg daily, I asked that he call us so we can adjust his diuretic plan.     REX Badillo PA-C 8:54 AM 8/11/2020       Orders this Visit:  No orders of the defined types were placed in this encounter.    Orders Placed This Encounter   Medications     torsemide (DEMADEX) 20 MG tablet     Sig: For a weight of 165 lbs or less take 20 mg (1 tablet)  For a weight of greater than 165 lbs, take 40 mg (2 tablets)     Dispense:  30 tablet     Refill:  4     Medications Discontinued During This Encounter   Medication Reason     sacubitril-valsartan (ENTRESTO) 24-26 MG per tablet      torsemide (DEMADEX) 20 MG tablet Reorder       CURRENT MEDICATIONS:  Current Outpatient Medications   Medication Sig Dispense Refill     albuterol (PROAIR HFA) 108 (90 Base) MCG/ACT Inhaler Inhale 2 puffs into the lungs every 4 hours as needed for shortness of breath / dyspnea 1 Inhaler 0     amylase-lipase-protease (CREON 12) 84258 UNITS CPEP Take 1 capsule (12,000 Units) by mouth daily 120 capsule 0     aspirin 325 MG tablet Take 3 tablets (975 mg) by mouth every morning (Takes 2 x 500 mg = 1000 mg) (Patient taking differently: Take 1,000 mg by mouth every morning (Takes 2 x 500 mg = 1000 mg)   Taking 2 in am and 2 in the pm. 2/22/19) 120 tablet 1     atorvastatin (LIPITOR) 80 MG tablet Take 80 mg by mouth At Bedtime        carvedilol (COREG) 6.25 MG tablet Take 1 tablet (6.25 mg) by mouth 2 times daily (with meals) 180 tablet 3     fluticasone-salmeterol (ADVAIR DISKUS) 250-50 MCG/DOSE inhaler Inhale 1 puff into the lungs every 12 hours 1 Inhaler 1     insulin glargine (LANTUS) 100 UNIT/ML injection Inject 38 Units Subcutaneous every morning       Insulin Pen Needle (PEN NEEDLES) 32G X 4 MM MISC        loperamide (IMODIUM) 2 MG capsule Take 2 mg by  mouth 4 times daily as needed for diarrhea       losartan (COZAAR) 50 MG tablet Take 1 tablet (50 mg) by mouth daily 90 tablet 3     Nitroglycerin (NITROSTAT SL) Place 0.4 mg under the tongue every 5 minutes as needed for chest pain       oxyCODONE (ROXICODONE) 5 MG tablet Take 1-2 tablets (5-10 mg) by mouth every 4 hours as needed for moderate to severe pain 25 tablet 0     pantoprazole (PROTONIX) 40 MG EC tablet Take 1 tablet (40 mg) by mouth daily 30 tablet      torsemide (DEMADEX) 20 MG tablet For a weight of 165 lbs or less take 20 mg (1 tablet)  For a weight of greater than 165 lbs, take 40 mg (2 tablets) 30 tablet 4     ALLERGIES  Allergies   Allergen Reactions     Hydrocodone      Upset stomach     Shellfish Allergy Hives and Rash     PAST MEDICAL HISTORY:  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%     Diabetes (H)      Gastro-oesophageal reflux disease      H/O: alcohol abuse      HTN (hypertension)      Hyperlipidaemia LDL goal <100 7/8/2013     Hypokalemia      Pacemaker      Pancreatitis 6/26/06 Hosp     PAST SURGICAL HISTORY:  Past Surgical History:   Procedure Laterality Date     C CABG, ARTERIAL, THREE       CATARACT IOL, RT/LT      Cataract IOL RT/LT     ENDARTERECTOMY CAROTID Right 6/12/2015    Procedure: ENDARTERECTOMY CAROTID;  Surgeon: João Turner MD;  Location:  OR     ENDARTERECTOMY CAROTID Left 9/8/2017    Procedure: ENDARTERECTOMY CAROTID;  LEFT CAROTID ENDARTERECTOMY WITH EEG;  Surgeon: João Turner MD;  Location: [facility] OR     IMPLANT PACEMAKER  6/10/2010     INTERPOSITION TENDON HAND N/A 6/9/2020    Procedure: Right thumb ligament reconstruction tendon interposition  with extensor pollicis brevis to abductor pollicis tendon transfer;  Surgeon: Bethel Martin MD;  Location: [facility] OR     RELEASE CARPAL TUNNEL Right 4/12/2016    Procedure: RELEASE CARPAL TUNNEL;  Surgeon: Lissy Leger MD;  Location: [facility] OR     SURGICAL HISTORY OF -   1998    Laminectomy     TONSILLECTOMY & ADENOIDECTOMY       FAMILY HISTORY:  Family History   Problem Relation Age of Onset     Unknown/Adopted No family hx of      SOCIAL HISTORY:  Social History     Socioeconomic History     Marital status: Single     Spouse name: None     Number of children: None     Years of education: None     Highest education level: None   Occupational History     None   Social Needs     Financial resource strain: None     Food insecurity     Worry: None     Inability: None     Transportation needs     Medical: None     Non-medical: None   Tobacco Use     Smoking status: Current Every Day Smoker     Packs/day: 2.00     Years: 50.00     Pack years: 100.00     Types: Cigarettes     Smokeless tobacco: Never Used     Tobacco comment: 1 1/2 PPD 2/16/18   Substance and Sexual Activity     Alcohol use: No     Drug use: No     Sexual activity: Not Currently     Partners: Female   Lifestyle     Physical activity     Days per week: None     Minutes per session: None     Stress: None   Relationships     Social connections     Talks on phone: None     Gets together: None     Attends Qu[employer] service: None     Active member of club or organization: None     Attends meetings of clubs or organizations: None     Relationship status: None     Intimate partner violence     Fear of current or ex partner: None     Emotionally abused: None     Physically abused: None     Forced sexual activity: None   Other Topics Concern     Parent/sibling w/ CABG, MI or angioplasty before 65F 55M? No   Social History Narrative     None     Review of Systems:  Skin:  Negative bruising   Eyes:  Positive for glasses  ENT:  Positive for sinus trouble;nasal  congestion  Respiratory:  Negative    Cardiovascular:    Positive for;fatigue;lightheadedness;dizziness  Gastroenterology: Negative heartburn;vomiting;nausea  Genitourinary:  not assessed urinary frequency;urgency  Musculoskeletal:  Positive for joint pain;joint swelling;joint stiffness;foot pain  Neurologic:  Positive for numbness or tingling of hands;numbness or tingling of feet  Psychiatric:  Negative    Heme/Lymph/Imm:  Positive for allergies  Endocrine:  Positive for diabetes     Physical Exam:  Vitals: /72   Pulse 98   Wt 72.6 kg (160 lb)   SpO2 97%   BMI 21.70 kg/m     Wt Readings from Last 4 Encounters:   08/10/20 72.6 kg (160 lb)   06/29/20 74.8 kg (165 lb)   06/09/20 74.8 kg (165 lb)   05/29/20 76.2 kg (168 lb)     GEN: well nourished, in no acute distress.  HEENT:  Pupils equal, round. Sclerae nonicteric.   C/V:  Regular rate and rhythm, no murmur.  RESP: Respirations are unlabored. Clear to auscultation bilaterally without wheezing, rales, or rhonchi.  GI: Abdomen soft, nontender.  EXTREM: Trace bilateral LE edema.  NEURO: Alert and oriented, cooperative.  SKIN: Warm and dry.     Recent Lab Results:  LIPID RESULTS:  Lab Results   Component Value Date    CHOL 96 09/08/2017    HDL 24 (L) 09/08/2017    LDL 45 09/08/2017    TRIG 136 09/08/2017    CHOLHDLRATIO 4.6 06/12/2015     LIVER ENZYME RESULTS:  Lab Results   Component Value Date    AST 21 10/19/2016    ALT 32 10/19/2016     CBC RESULTS:  Lab Results   Component Value Date    WBC 6.2 03/03/2020    RBC 4.62 03/03/2020    HGB 14.3 05/26/2020    HCT 43.2 03/03/2020    MCV 94 03/03/2020    MCH 30.5 03/03/2020    MCHC 32.6 03/03/2020    RDW 14.8 03/03/2020     (L) 03/03/2020     BMP RESULTS:  Lab Results   Component Value Date     06/29/2020    POTASSIUM 4.2 06/29/2020    CHLORIDE 105 06/29/2020    CO2 30 06/29/2020    ANIONGAP 3 06/29/2020     (H) 06/29/2020    BUN 39 (H) 06/29/2020    CR 1.13 06/29/2020    GFRESTIMATED 65  06/29/2020    GFRESTBLACK 75 06/29/2020    MARVA 7.7 (L) 06/29/2020      A1C RESULTS:  Lab Results   Component Value Date    A1C 9.4 (H) 05/26/2020     INR RESULTS:  Lab Results   Component Value Date    INR 1.02 12/22/2017    INR 1.03 09/08/2017       New/Pertinent imaging results since last visit:  None    Parul Badillo PA-C  [facility]    Thank you for allowing me to participate in the care of your patient.      Sincerely,     Parul Badillo PA-C     [facility]    cc:   Parul Badillo PA-C  [address]  [place] [address]

## 2020-08-10 NOTE — LETTER
8/10/2020    [facility]  [address] [facility] [address]    RE: Delbert Tilley       Dear Colleague,    I had the pleasure of seeing Delbert Tilley in the [facility].      Cardiology Progress Note    Date of Service: 08/10/2020  Patient seen today in follow up of: CORE follow up  Primary cardiologist: Dr. Michaels    HPI:  Delbert Tilley is a very pleasant 69 year old male with a history of an ischemic cardiomyopathy with an EF of around 20 to 25%, coronary artery disease with prior coronary artery bypass grafting in the remote past with a LIMA to the LAD and saphenous vein graft to the circumflex and PDA. Additionally, he has a history of atrial flutter for which she has undergone ablation.  He has an ICD implanted for primary prevention.  He also has comorbidities of peripheral vascular disease with prior bilateral carotid endarterectomies, poorly controlled diabetes mellitus, COPD, and ongoing tobacco use.     In 2014, he suffered a non-ST elevation myocardial infarction and received stents to his SVG to OM graft at that time.  His EF was 35 to 40% plan.  In 2017, he was scheduled to undergo carotid endarterectomy.  He had a stress test done which showed no ischemia but his ejection fraction had fallen.  He subsequently presented with heart failure symptoms and was noted to have an EF of 20 to 25% on echocardiogram.  His angiogram at that time showed his LIMA graft to the LAD was patent with a 50% stenosis in the apical LAD.  The vein graft to the RPDA had occluded.  His vein graft to the OM was patent.     Historically, he has been maintained on beta-blockers and vasodilator therapy with an ARB.  It appears he previously was on spironolactone although had market hypotension with this.     He had a stress test done this spring prior to surgery on his thumb which showed evidence of a prior infarct but no ischemia. Ultimately, his surgery was postponed due to COVID-19.  He had a virtual visit with Dr. Michaels in May prior to his reschedule surgery.  He had some increasing leg edema and had been taking an extra 20 mg of Lasix with improvement.  He was only needing to do this in frequently about once a month.  He was switched from metoprolol XL to carvedilol at that time and was referred to the core clinic.    He called the clinic at the end of June noting he had gained about 13 pounds in 4 to 5 days.  His weight had gone up from his baseline of around 165 pounds up to 178 pounds.  He did take an increasing doses of Lasix without any improvement and it was recommended he switch to torsemide 40 mg daily.    I spoke with Delbert shortly after that switch was made on 6/29/2020.  He fortunately responded very well to the torsemide and was back to his dry weight of 165 pounds.  His heart failure symptoms had essentially resolved.  He has chronic exertional dyspnea in the setting of his COPD but this is back to baseline.  I suggested he continue to take 40 mg of torsemide and decrease this to 20 mg if his weight dropped below 162 pounds.  We also discussed switching to Entresto and plan to look into the cost of that.    Delbert is here today for routine follow-up.  He notes overall he continues to feel very well.  He tells me he feels 100% better compared to June when he was struggling with heart failure.  His weights have remained stable and have trended down.  In the last 4 to 5 days he is only been taking 20 mg of torsemide as his weight has been around 160 pounds.  His dyspnea on exertion is at baseline and he is not having any apnea or PND.  He has some mild peripheral edema and typically wears compression stockings which helped quite a bit.    ASSESSMENT/PLAN:  Delbert Tilley is a very pleasant 69-year-old male with a history of coronary artery disease as noted above, a chronic ischemic cardiomyopathy with an EF around 20 to 25%, atrial flutter status post ablation, an ICD in place, and  multiple medical comorbidities as noted above.      He fortunately at this point appears to be quite stable from a cardiac perspective.  He had some worsening heart failure symptoms in June but has been stable on that times and switching to torsemide.  He has chronic exertional dyspnea in the setting of his COPD and ongoing tobacco use but this is unchanged. He has NYHA class II symptoms.    At this point, he is tolerating carvedilol 6.25 mg twice daily as well as losartan 50 mg daily.  I was hopeful to switch him to Entresto but unfortunately this will be quite costly for him.  At this point, as he has been clinically doing well over the last month and a half we elected to continue his current regimen unchanged.  We can always consider this down the road pending his course and insurance coverage.  His blood pressure today in clinic is 101/72 which he tells me is typically where he runs.  I made no changes to his cardiomyopathy medications today.    I suggested at this point we have him take 20 mg of torsemide daily.  If his weight goes above 165 pounds he will increase this to 40 mg daily.  If his weight continues to rise her symptoms are resolved and asked him to call the core nurses for further recommendations.  He does have some mild swelling on exam today but tells me this typically is very well controlled with his compression stockings which he wears most of the time.     He had a basic metabolic panel drawn today prior to our visit but unfortunately this is pending at this time.  We will call him with these results and any changes if needed.    His last device check on 6/23 showed no evidence of arrhythmias and was otherwise stable.    I will have him follow-up with Dr. Michaels or myself in about 2 months.  He will contact us sooner with any issues.    Addendum: Labs reviewed. Creatinine slightly elevated from prior at 1.2. BUN is elevated in the 40s. Given his weight loss and significant clinical improvement,  will adjust his diuretic regimen and have him take 10 mg of Torsemide for a weight of 165 lbs or less and 20 mg for a weight greater than 165 lbs. If his weight is increasing after cutting back to the 10 mg daily, I asked that he call us so we can adjust his diuretic plan.     REX Badillo PA-C 8:54 AM 8/11/2020       Orders this Visit:  No orders of the defined types were placed in this encounter.    Orders Placed This Encounter   Medications     torsemide (DEMADEX) 20 MG tablet     Sig: For a weight of 165 lbs or less take 20 mg (1 tablet)  For a weight of greater than 165 lbs, take 40 mg (2 tablets)     Dispense:  30 tablet     Refill:  4     Medications Discontinued During This Encounter   Medication Reason     sacubitril-valsartan (ENTRESTO) 24-26 MG per tablet      torsemide (DEMADEX) 20 MG tablet Reorder       CURRENT MEDICATIONS:  Current Outpatient Medications   Medication Sig Dispense Refill     albuterol (PROAIR HFA) 108 (90 Base) MCG/ACT Inhaler Inhale 2 puffs into the lungs every 4 hours as needed for shortness of breath / dyspnea 1 Inhaler 0     amylase-lipase-protease (CREON 12) 82971 UNITS CPEP Take 1 capsule (12,000 Units) by mouth daily 120 capsule 0     aspirin 325 MG tablet Take 3 tablets (975 mg) by mouth every morning (Takes 2 x 500 mg = 1000 mg) (Patient taking differently: Take 1,000 mg by mouth every morning (Takes 2 x 500 mg = 1000 mg)   Taking 2 in am and 2 in the pm. 2/22/19) 120 tablet 1     atorvastatin (LIPITOR) 80 MG tablet Take 80 mg by mouth At Bedtime        carvedilol (COREG) 6.25 MG tablet Take 1 tablet (6.25 mg) by mouth 2 times daily (with meals) 180 tablet 3     fluticasone-salmeterol (ADVAIR DISKUS) 250-50 MCG/DOSE inhaler Inhale 1 puff into the lungs every 12 hours 1 Inhaler 1     insulin glargine (LANTUS) 100 UNIT/ML injection Inject 38 Units Subcutaneous every morning       Insulin Pen Needle (PEN NEEDLES) 32G X 4 MM MISC        loperamide (IMODIUM) 2 MG capsule Take 2 mg by  mouth 4 times daily as needed for diarrhea       losartan (COZAAR) 50 MG tablet Take 1 tablet (50 mg) by mouth daily 90 tablet 3     Nitroglycerin (NITROSTAT SL) Place 0.4 mg under the tongue every 5 minutes as needed for chest pain       oxyCODONE (ROXICODONE) 5 MG tablet Take 1-2 tablets (5-10 mg) by mouth every 4 hours as needed for moderate to severe pain 25 tablet 0     pantoprazole (PROTONIX) 40 MG EC tablet Take 1 tablet (40 mg) by mouth daily 30 tablet      torsemide (DEMADEX) 20 MG tablet For a weight of 165 lbs or less take 20 mg (1 tablet)  For a weight of greater than 165 lbs, take 40 mg (2 tablets) 30 tablet 4     ALLERGIES  Allergies   Allergen Reactions     Hydrocodone      Upset stomach     Shellfish Allergy Hives and Rash     PAST MEDICAL HISTORY:  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%     Diabetes (H)      Gastro-oesophageal reflux disease      H/O: alcohol abuse      HTN (hypertension)      Hyperlipidaemia LDL goal <100 7/8/2013     Hypokalemia      Pacemaker      Pancreatitis 6/26/06 Hosp     PAST SURGICAL HISTORY:  Past Surgical History:   Procedure Laterality Date     C CABG, ARTERIAL, THREE       CATARACT IOL, RT/LT      Cataract IOL RT/LT     ENDARTERECTOMY CAROTID Right 6/12/2015    Procedure: ENDARTERECTOMY CAROTID;  Surgeon: João Turner MD;  Location:  OR     ENDARTERECTOMY CAROTID Left 9/8/2017    Procedure: ENDARTERECTOMY CAROTID;  LEFT CAROTID ENDARTERECTOMY WITH EEG;  Surgeon: João Turner MD;  Location: [facility] OR     IMPLANT PACEMAKER  6/10/2010     INTERPOSITION TENDON HAND N/A 6/9/2020    Procedure: Right thumb ligament reconstruction tendon interposition  with extensor pollicis brevis to abductor pollicis tendon transfer;  Surgeon: Bethel Martin MD;  Location: [facility] OR     RELEASE CARPAL TUNNEL Right 4/12/2016    Procedure: RELEASE CARPAL TUNNEL;  Surgeon: Lissy Leger MD;  Location: [facility] OR     SURGICAL HISTORY OF -   1998    Laminectomy     TONSILLECTOMY & ADENOIDECTOMY       FAMILY HISTORY:  Family History   Problem Relation Age of Onset     Unknown/Adopted No family hx of      SOCIAL HISTORY:  Social History     Socioeconomic History     Marital status: Single     Spouse name: None     Number of children: None     Years of education: None     Highest education level: None   Occupational History     None   Social Needs     Financial resource strain: None     Food insecurity     Worry: None     Inability: None     Transportation needs     Medical: None     Non-medical: None   Tobacco Use     Smoking status: Current Every Day Smoker     Packs/day: 2.00     Years: 50.00     Pack years: 100.00     Types: Cigarettes     Smokeless tobacco: Never Used     Tobacco comment: 1 1/2 PPD 2/16/18   Substance and Sexual Activity     Alcohol use: No     Drug use: No     Sexual activity: Not Currently     Partners: Female   Lifestyle     Physical activity     Days per week: None     Minutes per session: None     Stress: None   Relationships     Social connections     Talks on phone: None     Gets together: None     Attends L[employer] day service: None     Active member of club or organization: None     Attends meetings of clubs or organizations: None     Relationship status: None     Intimate partner violence     Fear of current or ex partner: None     Emotionally abused: None     Physically abused: None     Forced sexual activity: None   Other Topics Concern     Parent/sibling w/ CABG, MI or angioplasty before 65F 55M? No   Social History Narrative     None     Review of Systems:  Skin:  Negative bruising   Eyes:  Positive for glasses  ENT:  Positive for sinus trouble;nasal  congestion  Respiratory:  Negative    Cardiovascular:    Positive for;fatigue;lightheadedness;dizziness  Gastroenterology: Negative heartburn;vomiting;nausea  Genitourinary:  not assessed urinary frequency;urgency  Musculoskeletal:  Positive for joint pain;joint swelling;joint stiffness;foot pain  Neurologic:  Positive for numbness or tingling of hands;numbness or tingling of feet  Psychiatric:  Negative    Heme/Lymph/Imm:  Positive for allergies  Endocrine:  Positive for diabetes     Physical Exam:  Vitals: /72   Pulse 98   Wt 72.6 kg (160 lb)   SpO2 97%   BMI 21.70 kg/m     Wt Readings from Last 4 Encounters:   08/10/20 72.6 kg (160 lb)   06/29/20 74.8 kg (165 lb)   06/09/20 74.8 kg (165 lb)   05/29/20 76.2 kg (168 lb)     GEN: well nourished, in no acute distress.  HEENT:  Pupils equal, round. Sclerae nonicteric.   C/V:  Regular rate and rhythm, no murmur.  RESP: Respirations are unlabored. Clear to auscultation bilaterally without wheezing, rales, or rhonchi.  GI: Abdomen soft, nontender.  EXTREM: Trace bilateral LE edema.  NEURO: Alert and oriented, cooperative.  SKIN: Warm and dry.     Recent Lab Results:  LIPID RESULTS:  Lab Results   Component Value Date    CHOL 96 09/08/2017    HDL 24 (L) 09/08/2017    LDL 45 09/08/2017    TRIG 136 09/08/2017    CHOLHDLRATIO 4.6 06/12/2015     LIVER ENZYME RESULTS:  Lab Results   Component Value Date    AST 21 10/19/2016    ALT 32 10/19/2016     CBC RESULTS:  Lab Results   Component Value Date    WBC 6.2 03/03/2020    RBC 4.62 03/03/2020    HGB 14.3 05/26/2020    HCT 43.2 03/03/2020    MCV 94 03/03/2020    MCH 30.5 03/03/2020    MCHC 32.6 03/03/2020    RDW 14.8 03/03/2020     (L) 03/03/2020     BMP RESULTS:  Lab Results   Component Value Date     06/29/2020    POTASSIUM 4.2 06/29/2020    CHLORIDE 105 06/29/2020    CO2 30 06/29/2020    ANIONGAP 3 06/29/2020     (H) 06/29/2020    BUN 39 (H) 06/29/2020    CR 1.13 06/29/2020    GFRESTIMATED 65  06/29/2020    GFRESTBLACK 75 06/29/2020    MARVA 7.7 (L) 06/29/2020      A1C RESULTS:  Lab Results   Component Value Date    A1C 9.4 (H) 05/26/2020     INR RESULTS:  Lab Results   Component Value Date    INR 1.02 12/22/2017    INR 1.03 09/08/2017       New/Pertinent imaging results since last visit:  None        Thank you for allowing me to participate in the care of your patient.    Sincerely,     Parul Badillo PA-C     [facility]

## 2020-08-10 NOTE — PROGRESS NOTES
Cardiology Progress Note    Date of Service: 08/10/2020  Patient seen today in follow up of: CORE follow up  Primary cardiologist: Dr. Michaels    HPI:  Delbert Tilley is a very pleasant 69 year old male with a history of an ischemic cardiomyopathy with an EF of around 20 to 25%, coronary artery disease with prior coronary artery bypass grafting in the remote past with a LIMA to the LAD and saphenous vein graft to the circumflex and PDA. Additionally, he has a history of atrial flutter for which she has undergone ablation.  He has an ICD implanted for primary prevention.  He also has comorbidities of peripheral vascular disease with prior bilateral carotid endarterectomies, poorly controlled diabetes mellitus, COPD, and ongoing tobacco use.     In 2014, he suffered a non-ST elevation myocardial infarction and received stents to his SVG to OM graft at that time.  His EF was 35 to 40% plan.  In 2017, he was scheduled to undergo carotid endarterectomy.  He had a stress test done which showed no ischemia but his ejection fraction had fallen.  He subsequently presented with heart failure symptoms and was noted to have an EF of 20 to 25% on echocardiogram.  His angiogram at that time showed his LIMA graft to the LAD was patent with a 50% stenosis in the apical LAD.  The vein graft to the RPDA had occluded.  His vein graft to the OM was patent.     Historically, he has been maintained on beta-blockers and vasodilator therapy with an ARB.  It appears he previously was on spironolactone although had market hypotension with this.     He had a stress test done this spring prior to surgery on his thumb which showed evidence of a prior infarct but no ischemia. Ultimately, his surgery was postponed due to COVID-19. He had a virtual visit with Dr. Michaels in May prior to his reschedule surgery.  He had some increasing leg edema and had been taking an extra 20 mg of Lasix with improvement.  He was only needing to do this in frequently  about once a month.  He was switched from metoprolol XL to carvedilol at that time and was referred to the core clinic.    He called the clinic at the end of June noting he had gained about 13 pounds in 4 to 5 days.  His weight had gone up from his baseline of around 165 pounds up to 178 pounds.  He did take an increasing doses of Lasix without any improvement and it was recommended he switch to torsemide 40 mg daily.    I spoke with Delbert shortly after that switch was made on 6/29/2020.  He fortunately responded very well to the torsemide and was back to his dry weight of 165 pounds.  His heart failure symptoms had essentially resolved.  He has chronic exertional dyspnea in the setting of his COPD but this is back to baseline.  I suggested he continue to take 40 mg of torsemide and decrease this to 20 mg if his weight dropped below 162 pounds.  We also discussed switching to Entresto and plan to look into the cost of that.    Delbert is here today for routine follow-up.  He notes overall he continues to feel very well.  He tells me he feels 100% better compared to June when he was struggling with heart failure.  His weights have remained stable and have trended down.  In the last 4 to 5 days he is only been taking 20 mg of torsemide as his weight has been around 160 pounds.  His dyspnea on exertion is at baseline and he is not having any apnea or PND.  He has some mild peripheral edema and typically wears compression stockings which helped quite a bit.    ASSESSMENT/PLAN:  Delbert Tilley is a very pleasant 69-year-old male with a history of coronary artery disease as noted above, a chronic ischemic cardiomyopathy with an EF around 20 to 25%, atrial flutter status post ablation, an ICD in place, and multiple medical comorbidities as noted above.      He fortunately at this point appears to be quite stable from a cardiac perspective.  He had some worsening heart failure symptoms in June but has been stable on that times  and switching to torsemide.  He has chronic exertional dyspnea in the setting of his COPD and ongoing tobacco use but this is unchanged. He has NYHA class II symptoms.    At this point, he is tolerating carvedilol 6.25 mg twice daily as well as losartan 50 mg daily.  I was hopeful to switch him to Entresto but unfortunately this will be quite costly for him.  At this point, as he has been clinically doing well over the last month and a half we elected to continue his current regimen unchanged.  We can always consider this down the road pending his course and insurance coverage.  His blood pressure today in clinic is 101/72 which he tells me is typically where he runs.  I made no changes to his cardiomyopathy medications today.    I suggested at this point we have him take 20 mg of torsemide daily.  If his weight goes above 165 pounds he will increase this to 40 mg daily.  If his weight continues to rise her symptoms are resolved and asked him to call the core nurses for further recommendations.  He does have some mild swelling on exam today but tells me this typically is very well controlled with his compression stockings which he wears most of the time.     He had a basic metabolic panel drawn today prior to our visit but unfortunately this is pending at this time.  We will call him with these results and any changes if needed.    His last device check on 6/23 showed no evidence of arrhythmias and was otherwise stable.    I will have him follow-up with Dr. Michaels or myself in about 2 months.  He will contact us sooner with any issues.    Addendum: Labs reviewed. Creatinine slightly elevated from prior at 1.2. BUN is elevated in the 40s. Given his weight loss and significant clinical improvement, will adjust his diuretic regimen and have him take 10 mg of Torsemide for a weight of 165 lbs or less and 20 mg for a weight greater than 165 lbs. If his weight is increasing after cutting back to the 10 mg daily, I asked  that he call us so we can adjust his diuretic plan.     REX Badillo PA-C 8:54 AM 8/11/2020       Orders this Visit:  No orders of the defined types were placed in this encounter.    Orders Placed This Encounter   Medications     torsemide (DEMADEX) 20 MG tablet     Sig: For a weight of 165 lbs or less take 20 mg (1 tablet)  For a weight of greater than 165 lbs, take 40 mg (2 tablets)     Dispense:  30 tablet     Refill:  4     Medications Discontinued During This Encounter   Medication Reason     sacubitril-valsartan (ENTRESTO) 24-26 MG per tablet      torsemide (DEMADEX) 20 MG tablet Reorder       CURRENT MEDICATIONS:  Current Outpatient Medications   Medication Sig Dispense Refill     albuterol (PROAIR HFA) 108 (90 Base) MCG/ACT Inhaler Inhale 2 puffs into the lungs every 4 hours as needed for shortness of breath / dyspnea 1 Inhaler 0     amylase-lipase-protease (CREON 12) 83387 UNITS CPEP Take 1 capsule (12,000 Units) by mouth daily 120 capsule 0     aspirin 325 MG tablet Take 3 tablets (975 mg) by mouth every morning (Takes 2 x 500 mg = 1000 mg) (Patient taking differently: Take 1,000 mg by mouth every morning (Takes 2 x 500 mg = 1000 mg)   Taking 2 in am and 2 in the pm. 2/22/19) 120 tablet 1     atorvastatin (LIPITOR) 80 MG tablet Take 80 mg by mouth At Bedtime        carvedilol (COREG) 6.25 MG tablet Take 1 tablet (6.25 mg) by mouth 2 times daily (with meals) 180 tablet 3     fluticasone-salmeterol (ADVAIR DISKUS) 250-50 MCG/DOSE inhaler Inhale 1 puff into the lungs every 12 hours 1 Inhaler 1     insulin glargine (LANTUS) 100 UNIT/ML injection Inject 38 Units Subcutaneous every morning       Insulin Pen Needle (PEN NEEDLES) 32G X 4 MM MISC        loperamide (IMODIUM) 2 MG capsule Take 2 mg by mouth 4 times daily as needed for diarrhea       losartan (COZAAR) 50 MG tablet Take 1 tablet (50 mg) by mouth daily 90 tablet 3     Nitroglycerin (NITROSTAT SL) Place 0.4 mg under the tongue every 5 minutes as needed for  chest pain       oxyCODONE (ROXICODONE) 5 MG tablet Take 1-2 tablets (5-10 mg) by mouth every 4 hours as needed for moderate to severe pain 25 tablet 0     pantoprazole (PROTONIX) 40 MG EC tablet Take 1 tablet (40 mg) by mouth daily 30 tablet      torsemide (DEMADEX) 20 MG tablet For a weight of 165 lbs or less take 20 mg (1 tablet)  For a weight of greater than 165 lbs, take 40 mg (2 tablets) 30 tablet 4     ALLERGIES  Allergies   Allergen Reactions     Hydrocodone      Upset stomach     Shellfish Allergy Hives and Rash     PAST MEDICAL HISTORY:  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%     Diabetes (H)      Gastro-oesophageal reflux disease      H/O: alcohol abuse      HTN (hypertension)      Hyperlipidaemia LDL goal <100 7/8/2013     Hypokalemia      Pacemaker      Pancreatitis 6/26/06 Hosp     PAST SURGICAL HISTORY:  Past Surgical History:   Procedure Laterality Date     C CABG, ARTERIAL, THREE       CATARACT IOL, RT/LT      Cataract IOL RT/LT     ENDARTERECTOMY CAROTID Right 6/12/2015    Procedure: ENDARTERECTOMY CAROTID;  Surgeon: João Turner MD;  Location:  OR     ENDARTERECTOMY CAROTID Left 9/8/2017    Procedure: ENDARTERECTOMY CAROTID;  LEFT CAROTID ENDARTERECTOMY WITH EEG;  Surgeon: João Turner MD;  Location:  OR     IMPLANT PACEMAKER  6/10/2010     INTERPOSITION TENDON HAND N/A 6/9/2020    Procedure: Right thumb ligament reconstruction tendon interposition with extensor pollicis brevis to abductor pollicis tendon transfer;  Surgeon: Bethel Martin MD;  Location: [facility] OR     RELEASE CARPAL TUNNEL Right 4/12/2016    Procedure: RELEASE CARPAL TUNNEL;  Surgeon: [place]  Lissy Amezquita MD;  Location: [facility] OR     SURGICAL HISTORY OF -   1998    Laminectomy     TONSILLECTOMY & ADENOIDECTOMY       FAMILY HISTORY:  Family History   Problem Relation Age of Onset     Unknown/Adopted No family hx of      SOCIAL HISTORY:  Social History     Socioeconomic History     Marital status: Single     Spouse name: None     Number of children: None     Years of education: None     Highest education level: None   Occupational History     None   Social Needs     Financial resource strain: None     Food insecurity     Worry: None     Inability: None     Transportation needs     Medical: None     Non-medical: None   Tobacco Use     Smoking status: Current Every Day Smoker     Packs/day: 2.00     Years: 50.00     Pack years: 100.00     Types: Cigarettes     Smokeless tobacco: Never Used     Tobacco comment: 1 1/2 PPD 2/16/18   Substance and Sexual Activity     Alcohol use: No     Drug use: No     Sexual activity: Not Currently     Partners: Female   Lifestyle     Physical activity     Days per week: None     Minutes per session: None     Stress: None   Relationships     Social connections     Talks on phone: None     Gets together: None     Attends Synagogue service: None     Active member of club or organization: None     Attends meetings of clubs or organizations: None     Relationship status: None     Intimate partner violence     Fear of current or ex partner: None     Emotionally abused: None     Physically abused: None     Forced sexual activity: None   Other Topics Concern     Parent/sibling w/ CABG, MI or angioplasty before 65F 55M? No   Social History Narrative     None     Review of Systems:  Skin:  Negative bruising   Eyes:  Positive for glasses  ENT:  Positive for sinus trouble;nasal congestion  Respiratory:  Negative    Cardiovascular:    Positive for;fatigue;lightheadedness;dizziness  Gastroenterology: Negative heartburn;vomiting;nausea  Genitourinary:  not assessed urinary  frequency;urgency  Musculoskeletal:  Positive for joint pain;joint swelling;joint stiffness;foot pain  Neurologic:  Positive for numbness or tingling of hands;numbness or tingling of feet  Psychiatric:  Negative    Heme/Lymph/Imm:  Positive for allergies  Endocrine:  Positive for diabetes     Physical Exam:  Vitals: /72   Pulse 98   Wt 72.6 kg (160 lb)   SpO2 97%   BMI 21.70 kg/m     Wt Readings from Last 4 Encounters:   08/10/20 72.6 kg (160 lb)   06/29/20 74.8 kg (165 lb)   06/09/20 74.8 kg (165 lb)   05/29/20 76.2 kg (168 lb)     GEN: well nourished, in no acute distress.  HEENT:  Pupils equal, round. Sclerae nonicteric.   C/V:  Regular rate and rhythm, no murmur.  RESP: Respirations are unlabored. Clear to auscultation bilaterally without wheezing, rales, or rhonchi.  GI: Abdomen soft, nontender.  EXTREM: Trace bilateral LE edema.  NEURO: Alert and oriented, cooperative.  SKIN: Warm and dry.     Recent Lab Results:  LIPID RESULTS:  Lab Results   Component Value Date    CHOL 96 09/08/2017    HDL 24 (L) 09/08/2017    LDL 45 09/08/2017    TRIG 136 09/08/2017    CHOLHDLRATIO 4.6 06/12/2015     LIVER ENZYME RESULTS:  Lab Results   Component Value Date    AST 21 10/19/2016    ALT 32 10/19/2016     CBC RESULTS:  Lab Results   Component Value Date    WBC 6.2 03/03/2020    RBC 4.62 03/03/2020    HGB 14.3 05/26/2020    HCT 43.2 03/03/2020    MCV 94 03/03/2020    MCH 30.5 03/03/2020    MCHC 32.6 03/03/2020    RDW 14.8 03/03/2020     (L) 03/03/2020     BMP RESULTS:  Lab Results   Component Value Date     06/29/2020    POTASSIUM 4.2 06/29/2020    CHLORIDE 105 06/29/2020    CO2 30 06/29/2020    ANIONGAP 3 06/29/2020     (H) 06/29/2020    BUN 39 (H) 06/29/2020    CR 1.13 06/29/2020    GFRESTIMATED 65 06/29/2020    GFRESTBLACK 75 06/29/2020    MARVA 7.7 (L) 06/29/2020      A1C RESULTS:  Lab Results   Component Value Date    A1C 9.4 (H) 05/26/2020     INR RESULTS:  Lab Results   Component Value Date     INR 1.02 12/22/2017    INR 1.03 09/08/2017       New/Pertinent imaging results since last visit:  None    Parul Badillo PA-C  [facility]

## 2020-08-10 NOTE — TELEPHONE ENCOUNTER
Wellness Screening Tool    Symptom Screening:    Do you have a:    Fever?   No     Cough?  No     Shortness of breath?   No (if yes, is this a change?)    Skin rash?  No       Within the past 14 days, have you been in contact with someone who:    Is currently being ruled out for COVID-19 (novel coronavirus)?  No     Has tested positive for COVID-19? No     Has symptoms of a respiratory illness (fever, cough, shortness of breath)?  No     Have you or someone you have been in contact with traveled to an area with COVID-19:    Refer to the CDC Coronavirus webpage for COVID-19 areas:  No  (if yes, what country?)    Within the past 3 weeks, have you been exposed to the following:      Pertussis?  No     Chicken pox?  No     Measles? No     Patient's appointment status:   patient will be seen in clinic as scheduled, appointment converted to phone visit, appointment rescheduled to a later date.  9:36 AM 08/10/20 WILLIAN Hatch [facility]

## 2020-08-11 ENCOUNTER — CARE COORDINATION (OUTPATIENT)
Dept: CARDIOLOGY | Facility: CLINIC | Age: 70
End: 2020-08-11

## 2020-08-11 ENCOUNTER — TRANSFERRED RECORDS (OUTPATIENT)
Dept: HEALTH INFORMATION MANAGEMENT | Facility: CLINIC | Age: 70
End: 2020-08-11

## 2020-08-11 DIAGNOSIS — I50.9 CHF (CONGESTIVE HEART FAILURE) (H): Primary | ICD-10-CM

## 2020-08-11 RX ORDER — TORSEMIDE 20 MG/1
TABLET ORAL
Qty: 30 TABLET | Refills: 4 | COMMUNITY
Start: 2020-08-11 | End: 2020-11-18

## 2020-08-11 NOTE — PROGRESS NOTES
[facility]        Called pt, no answer. No consent to communicate on file.     Left non detailed VM requesting call back.    Melina Barnes RN BSN   3:19 PM 08/11/20      Pt left VM returning my call. Will attempt to contact him tomorrow.    Melina Barnes RN BSN   4:22 PM 08/11/20        Called pt to review above. Left VM requesting call back.  Melina Barnes RN BSN   2:01 PM 08/12/20      Third attempt to reach pt to review recs above. No answer-left VM requesting call back and if he consents for us to leave a detailed VM with recs.     Also note that Hansa SCOTT rec'd follow-up with Dr. Michaels in 2 months--placed order for such.    Melina Barnes RN BSN   4:13 PM 08/18/20

## 2020-08-19 NOTE — PROGRESS NOTES
[facility]    Pt returned my call and left VM providing his consent to leave detailed VM PRN.    Called pt and left detailed VM reviewing Hansa Badillo PAC's msg below. In addn, I asked him to call with worsening SOB, PARDO, swelling, questions, concerns and noted that our office will call him to arrange repeat labs and visit with Dr. Michaels ~10/2020. Also advised that updated torsemide rx sent to pharmacy. Requested that pt call back to confirm receipt of instructions.      Parul Badillo PA-C   8/11/2020  8:53 AM       Please let him know his labs overall look okay. His creatinine is up slightly from before and BUN remains elevated. He was feeling great at his appointment yesterday. I'd suggest as his weight is overall down that we could try to decrease his Torsemide a bit further to 10 mg daily. Let's have him take 10 mg if his weight is 165 lbs or less and 20 mg for a weight > 165 lbs. If he promptly gains weight after decreasing his Torsemide to 10 mg daily then please have him call and we can readjust our plan. Thanks. Alba Barnes RN BSN   2:24 PM 08/19/20

## 2020-09-23 ENCOUNTER — ANCILLARY PROCEDURE (OUTPATIENT)
Dept: CARDIOLOGY | Facility: CLINIC | Age: 70
End: 2020-09-23
Attending: INTERNAL MEDICINE
Payer: COMMERCIAL

## 2020-09-23 DIAGNOSIS — I25.5 ISCHEMIC CARDIOMYOPATHY: ICD-10-CM

## 2020-09-23 DIAGNOSIS — Z95.810 ICD (IMPLANTABLE CARDIOVERTER-DEFIBRILLATOR) IN PLACE: ICD-10-CM

## 2020-09-23 PROCEDURE — 93295 DEV INTERROG REMOTE 1/2/MLT: CPT | Performed by: INTERNAL MEDICINE

## 2020-09-23 PROCEDURE — 93296 REM INTERROG EVL PM/IDS: CPT | Performed by: INTERNAL MEDICINE

## 2020-10-01 LAB
MDC_IDC_LEAD_IMPLANT_DT: NORMAL
MDC_IDC_LEAD_IMPLANT_DT: NORMAL
MDC_IDC_LEAD_LOCATION: NORMAL
MDC_IDC_LEAD_LOCATION: NORMAL
MDC_IDC_LEAD_LOCATION_DETAIL_1: NORMAL
MDC_IDC_LEAD_LOCATION_DETAIL_1: NORMAL
MDC_IDC_LEAD_MFG: NORMAL
MDC_IDC_LEAD_MFG: NORMAL
MDC_IDC_LEAD_MODEL: NORMAL
MDC_IDC_LEAD_MODEL: NORMAL
MDC_IDC_LEAD_POLARITY_TYPE: NORMAL
MDC_IDC_LEAD_POLARITY_TYPE: NORMAL
MDC_IDC_LEAD_SERIAL: NORMAL
MDC_IDC_LEAD_SERIAL: NORMAL
MDC_IDC_MSMT_BATTERY_DTM: NORMAL
MDC_IDC_MSMT_BATTERY_REMAINING_LONGEVITY: 95 MO
MDC_IDC_MSMT_BATTERY_RRT_TRIGGER: 2.73
MDC_IDC_MSMT_BATTERY_STATUS: NORMAL
MDC_IDC_MSMT_BATTERY_VOLTAGE: 3 V
MDC_IDC_MSMT_CAP_CHARGE_DTM: NORMAL
MDC_IDC_MSMT_CAP_CHARGE_ENERGY: 18 J
MDC_IDC_MSMT_CAP_CHARGE_TIME: 3.93
MDC_IDC_MSMT_CAP_CHARGE_TYPE: NORMAL
MDC_IDC_MSMT_LEADCHNL_RA_IMPEDANCE_VALUE: 437 OHM
MDC_IDC_MSMT_LEADCHNL_RA_PACING_THRESHOLD_AMPLITUDE: 0.75 V
MDC_IDC_MSMT_LEADCHNL_RA_PACING_THRESHOLD_PULSEWIDTH: 0.4 MS
MDC_IDC_MSMT_LEADCHNL_RA_SENSING_INTR_AMPL: 2.75 MV
MDC_IDC_MSMT_LEADCHNL_RA_SENSING_INTR_AMPL: 2.75 MV
MDC_IDC_MSMT_LEADCHNL_RV_IMPEDANCE_VALUE: 323 OHM
MDC_IDC_MSMT_LEADCHNL_RV_IMPEDANCE_VALUE: 380 OHM
MDC_IDC_MSMT_LEADCHNL_RV_PACING_THRESHOLD_AMPLITUDE: 1.12 V
MDC_IDC_MSMT_LEADCHNL_RV_PACING_THRESHOLD_PULSEWIDTH: 0.4 MS
MDC_IDC_MSMT_LEADCHNL_RV_SENSING_INTR_AMPL: 5.75 MV
MDC_IDC_MSMT_LEADCHNL_RV_SENSING_INTR_AMPL: 5.75 MV
MDC_IDC_PG_IMPLANT_DTM: NORMAL
MDC_IDC_PG_MFG: NORMAL
MDC_IDC_PG_MODEL: NORMAL
MDC_IDC_PG_SERIAL: NORMAL
MDC_IDC_PG_TYPE: NORMAL
MDC_IDC_SESS_CLINIC_NAME: NORMAL
MDC_IDC_SESS_DTM: NORMAL
MDC_IDC_SESS_TYPE: NORMAL
MDC_IDC_SET_BRADY_AT_MODE_SWITCH_RATE: 171 {BEATS}/MIN
MDC_IDC_SET_BRADY_HYSTRATE: NORMAL
MDC_IDC_SET_BRADY_LOWRATE: 55 {BEATS}/MIN
MDC_IDC_SET_BRADY_MAX_SENSOR_RATE: 140 {BEATS}/MIN
MDC_IDC_SET_BRADY_MAX_TRACKING_RATE: 140 {BEATS}/MIN
MDC_IDC_SET_BRADY_MODE: NORMAL
MDC_IDC_SET_BRADY_PAV_DELAY_LOW: 180 MS
MDC_IDC_SET_BRADY_SAV_DELAY_LOW: 150 MS
MDC_IDC_SET_LEADCHNL_RA_PACING_AMPLITUDE: 1.5 V
MDC_IDC_SET_LEADCHNL_RA_PACING_ANODE_ELECTRODE_1: NORMAL
MDC_IDC_SET_LEADCHNL_RA_PACING_ANODE_LOCATION_1: NORMAL
MDC_IDC_SET_LEADCHNL_RA_PACING_CAPTURE_MODE: NORMAL
MDC_IDC_SET_LEADCHNL_RA_PACING_CATHODE_ELECTRODE_1: NORMAL
MDC_IDC_SET_LEADCHNL_RA_PACING_CATHODE_LOCATION_1: NORMAL
MDC_IDC_SET_LEADCHNL_RA_PACING_POLARITY: NORMAL
MDC_IDC_SET_LEADCHNL_RA_PACING_PULSEWIDTH: 0.4 MS
MDC_IDC_SET_LEADCHNL_RA_SENSING_ANODE_ELECTRODE_1: NORMAL
MDC_IDC_SET_LEADCHNL_RA_SENSING_ANODE_LOCATION_1: NORMAL
MDC_IDC_SET_LEADCHNL_RA_SENSING_CATHODE_ELECTRODE_1: NORMAL
MDC_IDC_SET_LEADCHNL_RA_SENSING_CATHODE_LOCATION_1: NORMAL
MDC_IDC_SET_LEADCHNL_RA_SENSING_POLARITY: NORMAL
MDC_IDC_SET_LEADCHNL_RA_SENSING_SENSITIVITY: 0.3 MV
MDC_IDC_SET_LEADCHNL_RV_PACING_AMPLITUDE: 2.5 V
MDC_IDC_SET_LEADCHNL_RV_PACING_ANODE_ELECTRODE_1: NORMAL
MDC_IDC_SET_LEADCHNL_RV_PACING_ANODE_LOCATION_1: NORMAL
MDC_IDC_SET_LEADCHNL_RV_PACING_CAPTURE_MODE: NORMAL
MDC_IDC_SET_LEADCHNL_RV_PACING_CATHODE_ELECTRODE_1: NORMAL
MDC_IDC_SET_LEADCHNL_RV_PACING_CATHODE_LOCATION_1: NORMAL
MDC_IDC_SET_LEADCHNL_RV_PACING_POLARITY: NORMAL
MDC_IDC_SET_LEADCHNL_RV_PACING_PULSEWIDTH: 0.4 MS
MDC_IDC_SET_LEADCHNL_RV_SENSING_ANODE_ELECTRODE_1: NORMAL
MDC_IDC_SET_LEADCHNL_RV_SENSING_ANODE_LOCATION_1: NORMAL
MDC_IDC_SET_LEADCHNL_RV_SENSING_CATHODE_ELECTRODE_1: NORMAL
MDC_IDC_SET_LEADCHNL_RV_SENSING_CATHODE_LOCATION_1: NORMAL
MDC_IDC_SET_LEADCHNL_RV_SENSING_POLARITY: NORMAL
MDC_IDC_SET_LEADCHNL_RV_SENSING_SENSITIVITY: 0.3 MV
MDC_IDC_SET_ZONE_DETECTION_BEATS_DENOMINATOR: 40 {BEATS}
MDC_IDC_SET_ZONE_DETECTION_BEATS_NUMERATOR: 30 {BEATS}
MDC_IDC_SET_ZONE_DETECTION_INTERVAL: 320 MS
MDC_IDC_SET_ZONE_DETECTION_INTERVAL: 350 MS
MDC_IDC_SET_ZONE_DETECTION_INTERVAL: 360 MS
MDC_IDC_SET_ZONE_DETECTION_INTERVAL: 400 MS
MDC_IDC_SET_ZONE_DETECTION_INTERVAL: NORMAL
MDC_IDC_SET_ZONE_TYPE: NORMAL
MDC_IDC_STAT_AT_BURDEN_PERCENT: 0 %
MDC_IDC_STAT_AT_DTM_END: NORMAL
MDC_IDC_STAT_AT_DTM_START: NORMAL
MDC_IDC_STAT_BRADY_AP_VP_PERCENT: 0.27 %
MDC_IDC_STAT_BRADY_AP_VS_PERCENT: 28.52 %
MDC_IDC_STAT_BRADY_AS_VP_PERCENT: 0.04 %
MDC_IDC_STAT_BRADY_AS_VS_PERCENT: 71.17 %
MDC_IDC_STAT_BRADY_DTM_END: NORMAL
MDC_IDC_STAT_BRADY_DTM_START: NORMAL
MDC_IDC_STAT_BRADY_RA_PERCENT_PACED: 28.79 %
MDC_IDC_STAT_BRADY_RV_PERCENT_PACED: 0.32 %
MDC_IDC_STAT_EPISODE_RECENT_COUNT: 0
MDC_IDC_STAT_EPISODE_RECENT_COUNT_DTM_END: NORMAL
MDC_IDC_STAT_EPISODE_RECENT_COUNT_DTM_START: NORMAL
MDC_IDC_STAT_EPISODE_TOTAL_COUNT: 0
MDC_IDC_STAT_EPISODE_TOTAL_COUNT: 2
MDC_IDC_STAT_EPISODE_TOTAL_COUNT_DTM_END: NORMAL
MDC_IDC_STAT_EPISODE_TOTAL_COUNT_DTM_START: NORMAL
MDC_IDC_STAT_EPISODE_TYPE: NORMAL
MDC_IDC_STAT_TACHYTHERAPY_ATP_DELIVERED_RECENT: 0
MDC_IDC_STAT_TACHYTHERAPY_ATP_DELIVERED_TOTAL: 0
MDC_IDC_STAT_TACHYTHERAPY_RECENT_DTM_END: NORMAL
MDC_IDC_STAT_TACHYTHERAPY_RECENT_DTM_START: NORMAL
MDC_IDC_STAT_TACHYTHERAPY_SHOCKS_ABORTED_RECENT: 0
MDC_IDC_STAT_TACHYTHERAPY_SHOCKS_ABORTED_TOTAL: 0
MDC_IDC_STAT_TACHYTHERAPY_SHOCKS_DELIVERED_RECENT: 0
MDC_IDC_STAT_TACHYTHERAPY_SHOCKS_DELIVERED_TOTAL: 0
MDC_IDC_STAT_TACHYTHERAPY_TOTAL_DTM_END: NORMAL
MDC_IDC_STAT_TACHYTHERAPY_TOTAL_DTM_START: NORMAL

## 2020-10-21 ENCOUNTER — APPOINTMENT (OUTPATIENT)
Dept: CT IMAGING | Facility: CLINIC | Age: 70
End: 2020-10-21
Attending: FAMILY MEDICINE
Payer: COMMERCIAL

## 2020-10-21 ENCOUNTER — HOSPITAL ENCOUNTER (OUTPATIENT)
Facility: CLINIC | Age: 70
Setting detail: OBSERVATION
Discharge: HOME | End: 2020-10-22
Attending: FAMILY MEDICINE | Admitting: FAMILY MEDICINE
Payer: COMMERCIAL

## 2020-10-21 ENCOUNTER — ANCILLARY PROCEDURE (OUTPATIENT)
Dept: ULTRASOUND IMAGING | Facility: CLINIC | Age: 70
End: 2020-10-21
Attending: FAMILY MEDICINE
Payer: COMMERCIAL

## 2020-10-21 ENCOUNTER — APPOINTMENT (OUTPATIENT)
Dept: GENERAL RADIOLOGY | Facility: CLINIC | Age: 70
End: 2020-10-21
Attending: FAMILY MEDICINE
Payer: COMMERCIAL

## 2020-10-21 DIAGNOSIS — F17.210 CIGARETTE SMOKER: ICD-10-CM

## 2020-10-21 DIAGNOSIS — M18.11 PRIMARY OSTEOARTHRITIS OF FIRST CARPOMETACARPAL JOINT OF RIGHT HAND: ICD-10-CM

## 2020-10-21 DIAGNOSIS — I50.9 ACUTE ON CHRONIC CONGESTIVE HEART FAILURE, UNSPECIFIED HEART FAILURE TYPE (H): ICD-10-CM

## 2020-10-21 DIAGNOSIS — J18.9 PNEUMONIA, UNSPECIFIED ORGANISM: ICD-10-CM

## 2020-10-21 DIAGNOSIS — J18.9 PNEUMONIA OF LEFT UPPER LOBE DUE TO INFECTIOUS ORGANISM: ICD-10-CM

## 2020-10-21 DIAGNOSIS — J44.1 COPD EXACERBATION (H): Primary | ICD-10-CM

## 2020-10-21 DIAGNOSIS — M79.644 CHRONIC PAIN OF RIGHT THUMB: ICD-10-CM

## 2020-10-21 DIAGNOSIS — Z20.828 EXPOSURE TO SARS-ASSOCIATED CORONAVIRUS: ICD-10-CM

## 2020-10-21 DIAGNOSIS — G89.29 CHRONIC PAIN OF RIGHT THUMB: ICD-10-CM

## 2020-10-21 DIAGNOSIS — I95.9 HYPOTENSION, UNSPECIFIED HYPOTENSION TYPE: ICD-10-CM

## 2020-10-21 DIAGNOSIS — I50.9 HEART FAILURE, UNSPECIFIED HF CHRONICITY, UNSPECIFIED HEART FAILURE TYPE (H): ICD-10-CM

## 2020-10-21 PROBLEM — I48.0 PAROXYSMAL ATRIAL FIBRILLATION (H): Status: ACTIVE | Noted: 2019-11-01

## 2020-10-21 LAB
ALBUMIN SERPL-MCNC: 2.5 G/DL (ref 3.4–5)
ALP SERPL-CCNC: 335 U/L (ref 40–150)
ALT SERPL W P-5'-P-CCNC: 27 U/L (ref 0–70)
ANION GAP SERPL CALCULATED.3IONS-SCNC: 7 MMOL/L (ref 3–14)
AST SERPL W P-5'-P-CCNC: 36 U/L (ref 0–45)
BASOPHILS # BLD AUTO: 0.1 10E9/L (ref 0–0.2)
BASOPHILS NFR BLD AUTO: 0.6 %
BILIRUB DIRECT SERPL-MCNC: 0.3 MG/DL (ref 0–0.2)
BILIRUB SERPL-MCNC: 0.6 MG/DL (ref 0.2–1.3)
BUN SERPL-MCNC: 48 MG/DL (ref 7–30)
CALCIUM SERPL-MCNC: 6.2 MG/DL (ref 8.5–10.1)
CHLORIDE SERPL-SCNC: 108 MMOL/L (ref 94–109)
CO2 SERPL-SCNC: 26 MMOL/L (ref 20–32)
CREAT SERPL-MCNC: 1.49 MG/DL (ref 0.66–1.25)
D DIMER PPP FEU-MCNC: 3.4 UG/ML FEU (ref 0–0.5)
DIFFERENTIAL METHOD BLD: ABNORMAL
EOSINOPHIL # BLD AUTO: 0.2 10E9/L (ref 0–0.7)
EOSINOPHIL NFR BLD AUTO: 2.2 %
ERYTHROCYTE [DISTWIDTH] IN BLOOD BY AUTOMATED COUNT: 13.7 % (ref 10–15)
GFR SERPL CREATININE-BSD FRML MDRD: 46 ML/MIN/{1.73_M2}
GLUCOSE BLDC GLUCOMTR-MCNC: 176 MG/DL (ref 70–99)
GLUCOSE BLDC GLUCOMTR-MCNC: 234 MG/DL (ref 70–99)
GLUCOSE SERPL-MCNC: 180 MG/DL (ref 70–99)
HBA1C MFR BLD: 7.8 % (ref 0–5.6)
HCT VFR BLD AUTO: 39.4 % (ref 40–53)
HGB BLD-MCNC: 12.5 G/DL (ref 13.3–17.7)
IMM GRANULOCYTES # BLD: 0 10E9/L (ref 0–0.4)
IMM GRANULOCYTES NFR BLD: 0.5 %
LABORATORY COMMENT REPORT: NORMAL
LACTATE BLD-SCNC: 1.6 MMOL/L (ref 0.7–2)
LYMPHOCYTES # BLD AUTO: 0.9 10E9/L (ref 0.8–5.3)
LYMPHOCYTES NFR BLD AUTO: 10.9 %
MCH RBC QN AUTO: 31.7 PG (ref 26.5–33)
MCHC RBC AUTO-ENTMCNC: 31.7 G/DL (ref 31.5–36.5)
MCV RBC AUTO: 100 FL (ref 78–100)
MONOCYTES # BLD AUTO: 0.6 10E9/L (ref 0–1.3)
MONOCYTES NFR BLD AUTO: 7.6 %
NEUTROPHILS # BLD AUTO: 6.1 10E9/L (ref 1.6–8.3)
NEUTROPHILS NFR BLD AUTO: 78.2 %
NRBC # BLD AUTO: 0 10*3/UL
NRBC BLD AUTO-RTO: 0 /100
NT-PROBNP SERPL-MCNC: ABNORMAL PG/ML (ref 0–900)
PLATELET # BLD AUTO: 134 10E9/L (ref 150–450)
POTASSIUM SERPL-SCNC: 4.3 MMOL/L (ref 3.4–5.3)
PROCALCITONIN SERPL-MCNC: <0.05 NG/ML
PROT SERPL-MCNC: 6.6 G/DL (ref 6.8–8.8)
RBC # BLD AUTO: 3.94 10E12/L (ref 4.4–5.9)
SARS-COV-2 RNA SPEC QL NAA+PROBE: NEGATIVE
SARS-COV-2 RNA SPEC QL NAA+PROBE: NORMAL
SODIUM SERPL-SCNC: 141 MMOL/L (ref 133–144)
SPECIMEN SOURCE: NORMAL
SPECIMEN SOURCE: NORMAL
TROPONIN I SERPL-MCNC: 0.04 UG/L (ref 0–0.04)
WBC # BLD AUTO: 7.8 10E9/L (ref 4–11)

## 2020-10-21 PROCEDURE — 99285 EMERGENCY DEPT VISIT HI MDM: CPT | Mod: 25 | Performed by: FAMILY MEDICINE

## 2020-10-21 PROCEDURE — 84145 PROCALCITONIN (PCT): CPT | Performed by: FAMILY MEDICINE

## 2020-10-21 PROCEDURE — 80048 BASIC METABOLIC PNL TOTAL CA: CPT | Performed by: FAMILY MEDICINE

## 2020-10-21 PROCEDURE — 93308 TTE F-UP OR LMTD: CPT | Mod: 59 | Performed by: FAMILY MEDICINE

## 2020-10-21 PROCEDURE — 96372 THER/PROPH/DIAG INJ SC/IM: CPT | Performed by: FAMILY MEDICINE

## 2020-10-21 PROCEDURE — G0378 HOSPITAL OBSERVATION PER HR: HCPCS

## 2020-10-21 PROCEDURE — 250N000013 HC RX MED GY IP 250 OP 250 PS 637: Performed by: FAMILY MEDICINE

## 2020-10-21 PROCEDURE — 80076 HEPATIC FUNCTION PANEL: CPT | Performed by: FAMILY MEDICINE

## 2020-10-21 PROCEDURE — 250N000011 HC RX IP 250 OP 636: Performed by: FAMILY MEDICINE

## 2020-10-21 PROCEDURE — 83605 ASSAY OF LACTIC ACID: CPT | Performed by: FAMILY MEDICINE

## 2020-10-21 PROCEDURE — 258N000003 HC RX IP 258 OP 636: Performed by: FAMILY MEDICINE

## 2020-10-21 PROCEDURE — 250N000012 HC RX MED GY IP 250 OP 636 PS 637: Performed by: FAMILY MEDICINE

## 2020-10-21 PROCEDURE — 999N001017 HC STATISTIC GLUCOSE BY METER IP

## 2020-10-21 PROCEDURE — 250N000009 HC RX 250: Performed by: FAMILY MEDICINE

## 2020-10-21 PROCEDURE — 96372 THER/PROPH/DIAG INJ SC/IM: CPT | Mod: XS

## 2020-10-21 PROCEDURE — C9803 HOPD COVID-19 SPEC COLLECT: HCPCS

## 2020-10-21 PROCEDURE — 71275 CT ANGIOGRAPHY CHEST: CPT

## 2020-10-21 PROCEDURE — 93005 ELECTROCARDIOGRAM TRACING: CPT | Mod: 59 | Performed by: FAMILY MEDICINE

## 2020-10-21 PROCEDURE — 83036 HEMOGLOBIN GLYCOSYLATED A1C: CPT | Performed by: FAMILY MEDICINE

## 2020-10-21 PROCEDURE — 99291 CRITICAL CARE FIRST HOUR: CPT | Performed by: FAMILY MEDICINE

## 2020-10-21 PROCEDURE — 85379 FIBRIN DEGRADATION QUANT: CPT | Performed by: FAMILY MEDICINE

## 2020-10-21 PROCEDURE — 93308 TTE F-UP OR LMTD: CPT | Performed by: FAMILY MEDICINE

## 2020-10-21 PROCEDURE — 96365 THER/PROPH/DIAG IV INF INIT: CPT | Mod: 59 | Performed by: FAMILY MEDICINE

## 2020-10-21 PROCEDURE — 84484 ASSAY OF TROPONIN QUANT: CPT | Performed by: FAMILY MEDICINE

## 2020-10-21 PROCEDURE — 96367 TX/PROPH/DG ADDL SEQ IV INF: CPT | Performed by: FAMILY MEDICINE

## 2020-10-21 PROCEDURE — 71045 X-RAY EXAM CHEST 1 VIEW: CPT

## 2020-10-21 PROCEDURE — 85025 COMPLETE CBC W/AUTO DIFF WBC: CPT | Performed by: FAMILY MEDICINE

## 2020-10-21 PROCEDURE — 76604 US EXAM CHEST: CPT | Mod: 59 | Performed by: FAMILY MEDICINE

## 2020-10-21 PROCEDURE — U0003 INFECTIOUS AGENT DETECTION BY NUCLEIC ACID (DNA OR RNA); SEVERE ACUTE RESPIRATORY SYNDROME CORONAVIRUS 2 (SARS-COV-2) (CORONAVIRUS DISEASE [COVID-19]), AMPLIFIED PROBE TECHNIQUE, MAKING USE OF HIGH THROUGHPUT TECHNOLOGIES AS DESCRIBED BY CMS-2020-01-R: HCPCS | Performed by: FAMILY MEDICINE

## 2020-10-21 PROCEDURE — 93010 ELECTROCARDIOGRAM REPORT: CPT | Performed by: FAMILY MEDICINE

## 2020-10-21 PROCEDURE — 83880 ASSAY OF NATRIURETIC PEPTIDE: CPT | Performed by: FAMILY MEDICINE

## 2020-10-21 PROCEDURE — 96361 HYDRATE IV INFUSION ADD-ON: CPT | Performed by: FAMILY MEDICINE

## 2020-10-21 RX ORDER — DEXTROSE MONOHYDRATE 25 G/50ML
25-50 INJECTION, SOLUTION INTRAVENOUS
Status: DISCONTINUED | OUTPATIENT
Start: 2020-10-21 | End: 2020-10-22 | Stop reason: HOSPADM

## 2020-10-21 RX ORDER — NICOTINE 21 MG/24HR
1 PATCH, TRANSDERMAL 24 HOURS TRANSDERMAL DAILY
Status: DISCONTINUED | OUTPATIENT
Start: 2020-10-21 | End: 2020-10-22 | Stop reason: HOSPADM

## 2020-10-21 RX ORDER — PREDNISONE 20 MG/1
40 TABLET ORAL DAILY
Status: DISCONTINUED | OUTPATIENT
Start: 2020-10-21 | End: 2020-10-22 | Stop reason: HOSPADM

## 2020-10-21 RX ORDER — CEFTRIAXONE SODIUM 2 G/50ML
2 INJECTION, SOLUTION INTRAVENOUS ONCE
Status: COMPLETED | OUTPATIENT
Start: 2020-10-21 | End: 2020-10-21

## 2020-10-21 RX ORDER — SODIUM CHLORIDE 9 MG/ML
1000 INJECTION, SOLUTION INTRAVENOUS CONTINUOUS
Status: DISCONTINUED | OUTPATIENT
Start: 2020-10-21 | End: 2020-10-21

## 2020-10-21 RX ORDER — ASPIRIN 325 MG
1000 TABLET ORAL EVERY MORNING
Status: DISCONTINUED | OUTPATIENT
Start: 2020-10-22 | End: 2020-10-22 | Stop reason: HOSPADM

## 2020-10-21 RX ORDER — ALBUTEROL SULFATE 90 UG/1
2 AEROSOL, METERED RESPIRATORY (INHALATION)
Status: DISCONTINUED | OUTPATIENT
Start: 2020-10-21 | End: 2020-10-22 | Stop reason: HOSPADM

## 2020-10-21 RX ORDER — CEFTRIAXONE SODIUM 2 G/50ML
2 INJECTION, SOLUTION INTRAVENOUS EVERY 24 HOURS
Status: DISCONTINUED | OUTPATIENT
Start: 2020-10-22 | End: 2020-10-22 | Stop reason: HOSPADM

## 2020-10-21 RX ORDER — INSULIN GLARGINE 100 [IU]/ML
38 INJECTION, SOLUTION SUBCUTANEOUS
Status: DISCONTINUED | OUTPATIENT
Start: 2020-10-22 | End: 2020-10-22 | Stop reason: HOSPADM

## 2020-10-21 RX ORDER — NICOTINE POLACRILEX 4 MG
15-30 LOZENGE BUCCAL
Status: DISCONTINUED | OUTPATIENT
Start: 2020-10-21 | End: 2020-10-22 | Stop reason: HOSPADM

## 2020-10-21 RX ORDER — NALOXONE HYDROCHLORIDE 0.4 MG/ML
.1-.4 INJECTION, SOLUTION INTRAMUSCULAR; INTRAVENOUS; SUBCUTANEOUS
Status: DISCONTINUED | OUTPATIENT
Start: 2020-10-21 | End: 2020-10-22 | Stop reason: HOSPADM

## 2020-10-21 RX ORDER — OXYCODONE HYDROCHLORIDE 5 MG/1
5 TABLET ORAL EVERY 6 HOURS PRN
Status: DISCONTINUED | OUTPATIENT
Start: 2020-10-21 | End: 2020-10-22 | Stop reason: HOSPADM

## 2020-10-21 RX ORDER — LOPERAMIDE HCL 2 MG
2 CAPSULE ORAL 4 TIMES DAILY PRN
Status: DISCONTINUED | OUTPATIENT
Start: 2020-10-21 | End: 2020-10-22 | Stop reason: HOSPADM

## 2020-10-21 RX ORDER — ONDANSETRON 2 MG/ML
4 INJECTION INTRAMUSCULAR; INTRAVENOUS EVERY 6 HOURS PRN
Status: DISCONTINUED | OUTPATIENT
Start: 2020-10-21 | End: 2020-10-22 | Stop reason: HOSPADM

## 2020-10-21 RX ORDER — AZITHROMYCIN 250 MG/1
250 TABLET, FILM COATED ORAL DAILY
Status: DISCONTINUED | OUTPATIENT
Start: 2020-10-22 | End: 2020-10-22 | Stop reason: HOSPADM

## 2020-10-21 RX ORDER — ONDANSETRON 4 MG/1
4 TABLET, ORALLY DISINTEGRATING ORAL EVERY 6 HOURS PRN
Status: DISCONTINUED | OUTPATIENT
Start: 2020-10-21 | End: 2020-10-22 | Stop reason: HOSPADM

## 2020-10-21 RX ORDER — IOPAMIDOL 755 MG/ML
72 INJECTION, SOLUTION INTRAVASCULAR ONCE
Status: COMPLETED | OUTPATIENT
Start: 2020-10-21 | End: 2020-10-21

## 2020-10-21 RX ORDER — POLYETHYLENE GLYCOL 3350 17 G/17G
17 POWDER, FOR SOLUTION ORAL DAILY PRN
Status: DISCONTINUED | OUTPATIENT
Start: 2020-10-21 | End: 2020-10-22 | Stop reason: HOSPADM

## 2020-10-21 RX ORDER — AMOXICILLIN 250 MG
2 CAPSULE ORAL 2 TIMES DAILY PRN
Status: DISCONTINUED | OUTPATIENT
Start: 2020-10-21 | End: 2020-10-22 | Stop reason: HOSPADM

## 2020-10-21 RX ORDER — BISACODYL 10 MG
10 SUPPOSITORY, RECTAL RECTAL DAILY PRN
Status: DISCONTINUED | OUTPATIENT
Start: 2020-10-21 | End: 2020-10-22 | Stop reason: HOSPADM

## 2020-10-21 RX ORDER — AMOXICILLIN 250 MG
1 CAPSULE ORAL 2 TIMES DAILY PRN
Status: DISCONTINUED | OUTPATIENT
Start: 2020-10-21 | End: 2020-10-22 | Stop reason: HOSPADM

## 2020-10-21 RX ORDER — AZITHROMYCIN 250 MG/1
500 TABLET, FILM COATED ORAL ONCE
Status: DISCONTINUED | OUTPATIENT
Start: 2020-10-22 | End: 2020-10-21 | Stop reason: DRUGHIGH

## 2020-10-21 RX ORDER — PANTOPRAZOLE SODIUM 40 MG/1
40 TABLET, DELAYED RELEASE ORAL EVERY EVENING
Status: DISCONTINUED | OUTPATIENT
Start: 2020-10-21 | End: 2020-10-22 | Stop reason: HOSPADM

## 2020-10-21 RX ORDER — ATORVASTATIN CALCIUM 80 MG/1
80 TABLET, FILM COATED ORAL AT BEDTIME
Status: DISCONTINUED | OUTPATIENT
Start: 2020-10-21 | End: 2020-10-22 | Stop reason: HOSPADM

## 2020-10-21 RX ORDER — LIDOCAINE 40 MG/G
CREAM TOPICAL
Status: DISCONTINUED | OUTPATIENT
Start: 2020-10-21 | End: 2020-10-22 | Stop reason: HOSPADM

## 2020-10-21 RX ADMIN — CEFTRIAXONE SODIUM 2 G: 2 INJECTION, SOLUTION INTRAVENOUS at 11:32

## 2020-10-21 RX ADMIN — IOPAMIDOL 72 ML: 755 INJECTION, SOLUTION INTRAVENOUS at 10:34

## 2020-10-21 RX ADMIN — SODIUM CHLORIDE 1000 ML: 9 INJECTION, SOLUTION INTRAVENOUS at 13:46

## 2020-10-21 RX ADMIN — INSULIN ASPART 3 UNITS: 100 INJECTION, SOLUTION INTRAVENOUS; SUBCUTANEOUS at 18:20

## 2020-10-21 RX ADMIN — ALBUTEROL SULFATE 2 PUFF: 90 AEROSOL, METERED RESPIRATORY (INHALATION) at 18:19

## 2020-10-21 RX ADMIN — PREDNISONE 40 MG: 20 TABLET ORAL at 18:20

## 2020-10-21 RX ADMIN — NICOTINE 1 PATCH: 21 PATCH, EXTENDED RELEASE TRANSDERMAL at 18:19

## 2020-10-21 RX ADMIN — SODIUM CHLORIDE 98 ML: 9 INJECTION, SOLUTION INTRAVENOUS at 10:34

## 2020-10-21 RX ADMIN — ALBUTEROL SULFATE 2 PUFF: 90 AEROSOL, METERED RESPIRATORY (INHALATION) at 22:08

## 2020-10-21 RX ADMIN — SODIUM CHLORIDE 500 ML: 9 INJECTION, SOLUTION INTRAVENOUS at 11:51

## 2020-10-21 RX ADMIN — SENNOSIDES AND DOCUSATE SODIUM 1 TABLET: 8.6; 5 TABLET ORAL at 18:20

## 2020-10-21 RX ADMIN — ATORVASTATIN CALCIUM 80 MG: 80 TABLET, FILM COATED ORAL at 22:09

## 2020-10-21 RX ADMIN — ENOXAPARIN SODIUM 40 MG: 40 INJECTION SUBCUTANEOUS at 18:20

## 2020-10-21 RX ADMIN — AZITHROMYCIN 500 MG: 500 INJECTION, POWDER, LYOPHILIZED, FOR SOLUTION INTRAVENOUS at 12:46

## 2020-10-21 RX ADMIN — FLUTICASONE FUROATE AND VILANTEROL TRIFENATATE 1 PUFF: 100; 25 POWDER RESPIRATORY (INHALATION) at 18:22

## 2020-10-21 RX ADMIN — PANTOPRAZOLE SODIUM 40 MG: 40 TABLET, DELAYED RELEASE ORAL at 18:20

## 2020-10-21 ASSESSMENT — ENCOUNTER SYMPTOMS
NAUSEA: 0
COUGH: 1
CHILLS: 0
BLOOD IN STOOL: 0
DYSURIA: 0
FEVER: 0
ABDOMINAL PAIN: 0
HEADACHES: 0
FREQUENCY: 0
WHEEZING: 0
PALPITATIONS: 0
CONSTIPATION: 0
DIAPHORESIS: 0
SINUS PRESSURE: 0
FATIGUE: 1
VOMITING: 0
SHORTNESS OF BREATH: 1
SORE THROAT: 0
DIARRHEA: 0

## 2020-10-21 ASSESSMENT — MIFFLIN-ST. JEOR: SCORE: 1549

## 2020-10-21 NOTE — ED NOTES
Patient has primary [facility], [facility] and [facility], given an approved referral by, Flo officer of the day at [facility]. [facility] [phone]

## 2020-10-21 NOTE — ED NOTES
Patient sitting up to eat lunch, having about 85% of tray.  BP reassessed after lunch finished, 84/60.  Patient is resting in bed, has no complaints.  Heart rate and oxygen saturations WNL.  BP checked on both arms and SBP in the 80s.

## 2020-10-21 NOTE — ED PROVIDER NOTES
"  History     Chief Complaint   Patient presents with     Shortness of Breath     3 days of increased shortness of breath, states \"lungs are filling up with fluid\"     HPI  Delbert Tilley is a 70 year old male who presents with a history of cardiomyopathy, abuse, coronary artery disease and status post bypass surgery, prior NSTEMI congestive heart failure, nonsustained ventricular tachycardia and pacer/defibrillator in place.  Atrial fibrillation.  Arrives here with approximately 1 to 2 weeks of increasing shortness of breath worse over the last 3 days.  Feels as if his \"l chronic difficulty with mobility related to neuropathy but notes that he is now more limited in terms of ADLs due to shortness of breath.  He arrived here with concerns of pneumonia and wonders if he just needs an antibiotic.  He has had a cough for 2 weeks that has been variably productive.  No nasal drainage.  No associated wheezing.  Notes that his weights have been stable.  Also describes right-sided chest pain.      Wt Readings from Last 5 Encounters:   10/21/20 73.5 kg (162 lb)   08/10/20 72.6 kg (160 lb)   06/29/20 74.8 kg (165 lb)   06/09/20 74.8 kg (165 lb)   05/29/20 76.2 kg (168 lb)       Allergies:  Allergies   Allergen Reactions     Hydrocodone      Upset stomach     Shellfish Allergy Hives and Rash       Problem List:    Patient Active Problem List    Diagnosis Date Noted     Chronic obstructive pulmonary disease, unspecified COPD type (H) 03/09/2020     Priority: Medium     NSVT (nonsustained ventricular tachycardia) (H) 11/01/2019     Priority: Medium     Chronic systolic heart failure (H) 11/01/2019     Priority: Medium     Paroxysmal atrial fibrillation (H) 11/01/2019     Priority: Medium     Tobacco use 05/28/2019     Priority: Medium     Left carotid stenosis 09/08/2017     Priority: Medium     Carpal tunnel syndrome of right wrist 01/10/2016     Priority: Medium     Carotid stenosis 06/12/2015     Priority: Medium     Bilateral " carotid Disease S/P Right carotid endarterectomy.   [facility]       CHF (congestive heart failure) (H) 06/08/2015     Priority: Medium     NSTEMI (non-ST elevated myocardial infarction) (H) 10/21/2014     Priority: Medium     ACS (acute coronary syndrome) (H) 10/20/2014     Priority: Medium     Hyperlipidemia LDL goal <70 07/08/2013     Priority: Medium     [facility]  Diagnosis updated by automated process. Provider to review and confirm.       TYPE 2 DIABETES, HBA1C GOAL < 8% 10/31/2010     Priority: Medium     CARDIOVASCULAR SCREENING; LDL GOAL LESS THAN 100 10/31/2010     Priority: Medium     Coronary artery disease involving native coronary artery of native heart without angina pectoris 07/07/2010     Priority: Medium     S/p mi  Bypass x 4 --grafting to the LAD obtuse marginal and PDA.   Angio on 6/10 negative          Alcohol abuse 07/07/2010     Priority: Medium     H/o rehab x 5        GERD (gastroesophageal reflux disease) 07/07/2010     Priority: Medium     Pancreatic disease 07/07/2010     Priority: Medium     H/o pancreatitis --on enzymes        Diabetes mellitus, type 2 (H) 07/07/2010     Priority: Medium     [facility]       Cardiomyopathy (H) 06/15/2010     Priority: Medium     EF 15%.--ICD placement        Atrial fibrillation/flutter 06/15/2010     Priority: Medium     S/p ablation 1June 2010.             Past Medical History:    Past Medical History:   Diagnosis Date     Acute renal failure (H) 8/26/06 Hosp     Arthritis      CAD (coronary artery disease)      Carpal tunnel syndrome      CHF (congestive heart failure) (H)      Diabetes (H)      Gastro-oesophageal reflux disease      H/O: alcohol abuse      HTN (hypertension)      Hyperlipidaemia LDL goal <100 7/8/2013     Hypokalemia      Pacemaker      Pancreatitis 6/26/06 Hosp       Past Surgical History:    Past Surgical History:   Procedure Laterality Date     C CABG, ARTERIAL, THREE       CATARACT  IOL, RT/LT      Cataract IOL RT/LT     ENDARTERECTOMY CAROTID Right 6/12/2015    Procedure: ENDARTERECTOMY CAROTID;  Surgeon: João Turner MD;  Location: [facility]     ENDARTERECTOMY CAROTID Left 9/8/2017    Procedure: ENDARTERECTOMY CAROTID;  LEFT CAROTID ENDARTERECTOMY WITH EEG;  Surgeon: João Turner MD;  Location: [facility] OR     IMPLANT PACEMAKER  6/10/2010     INTERPOSITION TENDON HAND N/A 6/9/2020    Procedure: Right thumb ligament reconstruction tendon interposition with extensor pollicis brevis to abductor pollicis tendon transfer;  Surgeon: Bethel Martin MD;  Location: [facility]     RELEASE CARPAL TUNNEL Right 4/12/2016    Procedure: RELEASE CARPAL TUNNEL;  Surgeon: Lissy Leger MD;  Location: [facility]     SURGICAL HISTORY OF -   1998    Laminectomy     TONSILLECTOMY & ADENOIDECTOMY         Family History:    Family History   Problem Relation Age of Onset     Unknown/Adopted No family hx of        Social History:  Marital Status:  Single [1]  Social History     Tobacco Use     Smoking status: Current Every Day Smoker     Packs/day: 2.00     Years: 50.00     Pack years: 100.00     Types: Cigarettes     Smokeless tobacco: Never Used     Tobacco comment: 1 1/2 PPD 2/16/18   Substance Use Topics     Alcohol use: No     Drug use: No        Medications:         albuterol (PROAIR HFA) 108 (90 Base) MCG/ACT Inhaler       amylase-lipase-protease (CREON 12) 33042 UNITS CPEP       aspirin 325 MG tablet       atorvastatin (LIPITOR) 80 MG tablet       carvedilol (COREG) 6.25 MG tablet       fluticasone-salmeterol (ADVAIR DISKUS) 250-50 MCG/DOSE inhaler       insulin glargine (LANTUS) 100 UNIT/ML injection       Insulin Pen Needle (PEN NEEDLES) 32G X 4 MM MISC       loperamide (IMODIUM) 2 MG capsule       losartan (COZAAR) 50 MG tablet       Nitroglycerin (NITROSTAT SL)       oxyCODONE (ROXICODONE) 5 MG tablet       pantoprazole (PROTONIX) 40 MG EC tablet       torsemide (DEMADEX) 20 MG  tablet          Review of Systems   Constitutional: Positive for fatigue. Negative for chills, diaphoresis and fever.   HENT: Negative for ear pain, sinus pressure and sore throat.    Eyes: Negative for visual disturbance.   Respiratory: Positive for cough and shortness of breath. Negative for wheezing.    Cardiovascular: Positive for chest pain. Negative for palpitations.   Gastrointestinal: Negative for abdominal pain, blood in stool, constipation, diarrhea, nausea and vomiting.   Genitourinary: Negative for dysuria, frequency and urgency.   Skin: Negative for rash.   Neurological: Negative for headaches.   All other systems reviewed and are negative.      Physical Exam   BP: 107/64  Pulse: 83  Temp: 98.1  F (36.7  C)  Resp: 20  Weight: 73.5 kg (162 lb)  SpO2: 96 %      Physical Exam  Constitutional:       General: He is in acute distress.      Appearance: He is not diaphoretic.   Eyes:      Conjunctiva/sclera: Conjunctivae normal.   Neck:      Musculoskeletal: Neck supple.   Cardiovascular:      Rate and Rhythm: Normal rate and regular rhythm.      Heart sounds: No murmur.   Pulmonary:      Effort: No respiratory distress.      Breath sounds: No stridor. No wheezing, rhonchi or rales.   Abdominal:      General: Abdomen is flat. Bowel sounds are normal. There is no distension.      Palpations: There is no mass.      Tenderness: There is no abdominal tenderness. There is no guarding.   Musculoskeletal:      Right lower leg: No edema.      Left lower leg: No edema.   Skin:     Coloration: Skin is not pale.      Findings: No rash.   Neurological:      General: No focal deficit present.      Mental Status: He is alert.         ED Course        Procedures                 EKG Interpretation:      Interpreted by Leonardo Curtis MD  EKG done at 0827 hrs. demonstrates a probable sinus rhythm although P waves are not very prominent but do appear to be present.  There is a normal axis.  No ST change.  T wave flattening  overall especially inferiorly and laterally.  There is a poor R progression V1 through V3.  No ectopy.  RSR prime lead V1.  Normal intervals otherwise other than long SD and a borderline QRS of 110.    Pression sinus rhythm 77 bpm without acute ischemic change.  Likely prior anterior septal MI.  T wave flattening inferior laterally.  No significant acute changes compared to EKG done in May 2020.    Critical Care time:  none               Results for orders placed or performed during the hospital encounter of 10/21/20 (from the past 24 hour(s))   CBC with platelets differential   Result Value Ref Range    WBC 7.8 4.0 - 11.0 10e9/L    RBC Count 3.94 (L) 4.4 - 5.9 10e12/L    Hemoglobin 12.5 (L) 13.3 - 17.7 g/dL    Hematocrit 39.4 (L) 40.0 - 53.0 %     78 - 100 fl    MCH 31.7 26.5 - 33.0 pg    MCHC 31.7 31.5 - 36.5 g/dL    RDW 13.7 10.0 - 15.0 %    Platelet Count 134 (L) 150 - 450 10e9/L    Diff Method Automated Method     % Neutrophils 78.2 %    % Lymphocytes 10.9 %    % Monocytes 7.6 %    % Eosinophils 2.2 %    % Basophils 0.6 %    % Immature Granulocytes 0.5 %    Nucleated RBCs 0 0 /100    Absolute Neutrophil 6.1 1.6 - 8.3 10e9/L    Absolute Lymphocytes 0.9 0.8 - 5.3 10e9/L    Absolute Monocytes 0.6 0.0 - 1.3 10e9/L    Absolute Eosinophils 0.2 0.0 - 0.7 10e9/L    Absolute Basophils 0.1 0.0 - 0.2 10e9/L    Abs Immature Granulocytes 0.0 0 - 0.4 10e9/L    Absolute Nucleated RBC 0.0    Basic metabolic panel   Result Value Ref Range    Sodium 141 133 - 144 mmol/L    Potassium 4.3 3.4 - 5.3 mmol/L    Chloride 108 94 - 109 mmol/L    Carbon Dioxide 26 20 - 32 mmol/L    Anion Gap 7 3 - 14 mmol/L    Glucose 180 (H) 70 - 99 mg/dL    Urea Nitrogen 48 (H) 7 - 30 mg/dL    Creatinine 1.49 (H) 0.66 - 1.25 mg/dL    GFR Estimate 46 (L) >60 mL/min/[1.73_m2]    GFR Estimate If Black 54 (L) >60 mL/min/[1.73_m2]    Calcium 6.2 (L) 8.5 - 10.1 mg/dL   Troponin I   Result Value Ref Range    Troponin I ES 0.039 0.000 - 0.045 ug/L    Hepatic panel   Result Value Ref Range    Bilirubin Direct 0.3 (H) 0.0 - 0.2 mg/dL    Bilirubin Total 0.6 0.2 - 1.3 mg/dL    Albumin 2.5 (L) 3.4 - 5.0 g/dL    Protein Total 6.6 (L) 6.8 - 8.8 g/dL    Alkaline Phosphatase 335 (H) 40 - 150 U/L    ALT 27 0 - 70 U/L    AST 36 0 - 45 U/L   NT pro BNP   Result Value Ref Range    N-Terminal Pro BNP Inpatient 16,665 (H) 0 - 900 pg/mL   D dimer quantitative   Result Value Ref Range    D Dimer 3.4 (H) 0.0 - 0.50 ug/ml FEU   POC US CHEST B-SCAN    Impression    [facility] Procedure Note      Limited Bedside ED Cardiac Ultrasound:    PROCEDURE: PERFORMED BY: Dr. Leonardo Curtis MD  INDICATIONS/SYMPTOM:  Shortness of Breath  PROBE: Cardiac phased array probe and High frequency linear probe  BODY LOCATION: Chest  FINDINGS:   The ultrasound was performed utilizing the subcostal, parasternal long axis, parasternal short axis and apical 4 chamber views.  Cardiac contractility:  Present  Gross estimation of cardiac kinesis: depressed - ?focal right side  Pericardial Effusion:  None  RV:LV ratio: LV > RV  IVC:    Diameter:  IVC diameter expiration (IVCe) 2-3 cm                                                   IVC diameter inspiration (IVCi) <2 cm                                                       Collapsibility:  IVC collapses < 50% with inspiration  INTERPRETATION:    Chamber size and motion were grossly normal with LV > RV, hypokinetic.  No pericardial effusion was found.  IVC visualized and findings indicate possible hypervolemia.  IMAGE DOCUMENTATION: Images were archived to PACs system.        [facility] Procedure Note      Limited Bedside ED Ultrasound of Thorax:    PROCEDURE: PERFORMED BY: Dr. Leonardo Curtis MD  INDICATIONS/SYMPTOM:  shortness of breath  PROBE: High frequency linear probe  BODY LOCATION: Chest  FINDINGS:  Images of both lung hemithoracies taken in 2D in multiple rib spaces        Right side:  Lung sliding artifact  Present     Comet tail  artifacts  Present   Left side:  Lung sliding artifact  Present     Comet tail artifacts  Absent   Hemothorax: Right side Absent     Left side Absent   Pleural effusion: Right side Absent      Left side Absent    INTERPRETATION: The exam was normal. There was no free fluid identified in the chest cavity. No evidence of pneumothorax, hemothorax or pleural effusion.  IMAGE DOCUMENTATION: Images were archived to PACs system.       XR Chest Port 1 View    Narrative    XR CHEST PORT 1 VW 10/21/2020 9:09 AM    HISTORY: Dyspnea.    COMPARISON: July 18, 2018    FINDINGS: Cardiac pacemaker. Midline sternotomy. Mild cardiomegaly.  Diffuse bilateral interstitial infiltrates suggesting underlying  pulmonary fibrosis. No pleural effusions. Calcified pleural plaques.    PLACIDO KENNEDY MD   CT Chest Pulmonary Embolism w Contrast    Narrative    CT CHEST PULMONARY EMBOLISM WITH CONTRAST 10/21/2020 10:48 AM    CLINICAL HISTORY: Dyspnea, right chest pain - evaluate for PE.    TECHNIQUE: CT angiogram chest during arterial phase injection IV  contrast. 2D and 3D MIP reconstructions were performed by the CT  technologist. Dose reduction techniques were used.     CONTRAST: 72 mL Isovue 370    COMPARISON: CT chest 10/20/2014.    FINDINGS:  ANGIOGRAM CHEST: Pulmonary arteries are normal caliber and negative  for pulmonary emboli. Thoracic aorta is negative for dissection.  Coronary artery calcifications diffusely.    LUNGS AND PLEURA: New region of moderate consolidation posterior right  upper lobe that is approximately 4.9 cm series 7 image 84. There are  bibasilar areas of peribronchial wall thickening and small mucus  impaction noted as well. Small right pleural fluid. 0.6 cm right upper  lobe nodule, previously 0.4 cm series 7 image 62. There are several  other bilateral pulmonary nodules again identified without significant  change. Emphysematous changes.    MEDIASTINUM/AXILLAE: Mild multichamber cardiomegaly. Sternotomy. There  are  several mildly more prominent lymph nodes in the mediastinum. An  example at the right paratracheal location is 1.1 cm, previously 0.4  cm series 4 image 61. There are other examples.    UPPER ABDOMEN: Bilateral perirenal stranding and subcutaneous edema  appears increased.    MUSCULOSKELETAL: No convincing acute abnormality.      Impression    IMPRESSION:  1.  No evidence for pulmonary embolism.  2.  Patchy prominent area of consolidation posterior right upper lobe  worrisome for lobar pneumonia. There are also areas of peribronchial  wall thickening and mucus impaction at the lower lungs consistent with  an infectious etiology.  3.  Small right pleural fluid.  4.  Diffuse coronary artery calcifications and cardiomegaly.  5.  Several mildly more prominent mediastinal lymph nodes that are  indeterminate.  6.  Bilateral several pulmonary nodules. While the majority are  stable, one of these is slightly larger at the right upper lobe.  Recommend follow-up CT chest in 6 months.  7.  Upper abdomen images shows soft tissue edema that may represent  anasarca.  8.  Emphysema.       Medications - No data to display    Assessments & Plan (with Medical Decision Making)     MDM: Delbert ESTELLA Tilley is a 70 year old male who presents with onset of shortness of breath over the last 2 weeks.  Mild cough.  No associated fever.  No congestion or other upper respiratory symptoms.  Decreased walk distance and decreased ADL capacity at home due to shortness of breath.  Arrives here afebrile nontoxic in appearance with no obvious rales on my lung exam although nurses had noted some.  Bedside ultrasound reveals some reduced contractility that may be more focal especially on the right side.  Also some irregularity around the left valves.  The imaging results are excellent and will ask for cardiology to take a look at some of these atypical findings and consider whether echocardiogram is performed.  Differential diagnosis certainly would  include with his chest pain acute coronary syndrome, pulmonary embolism, pneumonia.  Covid is certainly not excluded.      Chest CT is consistent with right upper lobe pneumonia that is lobar and extensive.  Covid testing is pending.  The patient has no hypoxia but he does have blood pressures that are running now in the 80 to 90 mmHg range and he has findings on bedside ultrasound suggestive of congestive heart failure he also has some acute kidney injury.  I am more suspicious that his current episode is consistent with acute on chronic heart failure but also with a superimposed pneumonia resulting in hypotension and therefore small fluid bolus 500 cc and then maintenance.  I had to spend time with the patient convincing him to at least stay for the time being.  He wanted to return home which is why Rocephin IV had been given prior to discharge and then when pressures began to drop into the 80 range lactic acid was obtained blood cultures were not done therefore before the Rocephin dose had been given as it had been planned for discharge per patient preference.  He however has relented to stay understands that he can become quite ill with this.  Discussed his failure status with cardiology Dr. Whyte.  Will obtain echocardiogram in the hospital.   We will not be giving 30 cc/kg of IV fluid due to his IVC being dilated and him demonstrating a BNP of 16,000.      Discussed with Dr. Edwards who accepts for admission.  The patient is likely only willing to remain here for observation.    I have reviewed the nursing notes.    I have reviewed the findings, diagnosis, plan and need for follow up with the patient.       ED to Inpatient Handoff:    Discussed with Dr. Velazco  Patient accepted for Observation Stay - patienty will not stay longer  Pending studies include none  Code Status: Not Addressed           New Prescriptions    No medications on file       Final diagnoses:   Pneumonia of left upper lobe due to infectious  organism   Acute on chronic congestive heart failure, unspecified heart failure type (H)   Hypotension, unspecified hypotension type       10/21/2020   [facility] EMERGENCY DEPT     Leonardo Curtis MD  10/21/20 [id number]

## 2020-10-21 NOTE — PROGRESS NOTES
[facility] ADMISSION NOTE    Patient admitted to room 1005 at approximately 1600 via cart from emergency room. Patient was accompanied by nurse.     Verbal SBAR report received from ED RN X 2, prior to patient arrival.     Patient ambulated to bed with stand-by assist. Patient alert and oriented X 3. Pain is controlled without any medications.  . Admission vital signs: Blood pressure (!) 89/66, pulse 74, temperature 98.7  F (37.1  C), temperature source Oral, resp. rate 16, height 1.829 m (6'), weight 76.1 kg (167 lb 12.3 oz), SpO2 95 %. Patient was oriented to plan of care, call light, bed controls, tv, telephone, bathroom and visiting hours.     Risk Assessment    The following safety risks were identified during admission: fall. Yellow risk band applied: YES.     Skin Initial Assessment    This writer admitted this patient and completed a full skin assessment and Geo score in the Adult PCS flowsheet. Appropriate interventions initiated as needed.     Secondary skin check completed by deferred by patient, none noted per this writer.    Geo Risk Assessment  Sensory Perception: 4-->no impairment  Moisture: 4-->rarely moist  Activity: 3-->walks occasionally  Mobility: 3-->slightly limited  Nutrition: 3-->adequate  Friction and Shear: 3-->no apparent problem  Geo Score: 20  Bed Support Surface: Atmos Air mattress    Education    Patient has a [place] to Observation order: No  Observation education completed and documented: N/A      Cindy Johanson, RN

## 2020-10-21 NOTE — H&P
"[facility] History and Physical    Delbert Tilley MRN# [id number]   Age: 70 year old YOB: 1950     Date of Admission:  10/21/2020      Primary care provider: [facility]           Assessment and Plan:   Assessment & Plan       Hypotension   10/21/2020 -- Suspect due to pneumonia with possible sepsis.  Improved after 2 500cc boluses, but want to be very careful with IVF given heart failure as below.  Taking orals.  Will saline lock for now. Goal systolic blood pressure > 85.  If he becomes hypotensive again as long as respiratory status stable would give up to 1L more, but after that would likely lean toward placing PICC and starting pressors.  Holding home torsemide, coreg and cozaar due to this.  Reassess tomorrow.        Pneumonia of left upper lobe due to infectious organism  10/21/2020 -- R upper lobe infiltrate, pleuritic pain due to this, already improving.  Continue azithromycin and rocephin started in ER.  Procalcitonin pending.        Chronic obstructive pulmonary disease, unspecified COPD type (H) with exacerbation   10/21/2020 -- continue home advair, schedule albuterol inhaler every 4 hours while awake.  Start prednisone 40 mg daily.         Chronic systolic CHF (congestive heart failure) (H)   10/21/2020 -- last echo from 2/2019 showed LVEF 20-25%.   weight appears stable.  Follow weight and I/O's.  Repeat echo.       RADHA  10/21/2020 -- creatinine mildly up, gave IVF as above, follow.        History of paroxysmal Atrial fibrillation/flutter (H)  10/21/2020 -- s/p ablation 6/1/2010.  Holding coreg for now as above, follow heart rate.         Coronary artery disease involving native coronary artery of native heart without angina pectoris  Appears stable.  Follow.        Prior history of Alcohol abuse  Reports being sober for \"a long time\".        GERD (gastroesophageal reflux disease)  Continue home protonix       Diabetes mellitus, type 2 (H)  A1c pending.   continue " "home lantus 38u every morning.  Added medium dose sliding scale insulin, anticipate running higher on prednisone.      Pulmonary nodules  Seen on CT chest - majority stable, but one is larger - per radiology recommend repeat imaging in 6 months.        Tobacco use  Nicotine patch ordered.         COVID pending - low risk, if negative can remove precautions.       Prophylaxis  Lovenox, on PPI    Lines  PIV       Disposition  Admit to observation ICU given hypotension.                Chief Complaint:   Dyspnea, right sided pleuritic pain  History is obtained from the patient          History of Present Illness:   This patient is a 70 year old  male with a significant past medical history of COPD, cardiomyopathy, CAD s/p 4 vessel bypass, pacemaker/defib, atrial fibrillation s/p ablation and GERD who presents with dyspnea.  Has baseline poor mobility related to his neuropathy but now over the past few days has felt increasingly short of breath.  Wondering if he has pneumonia. Some pleuritic pain on the right side of the chest, just with deep breaths.  Much improved already since arrival at ER.  \"not bad\" now.  Mild cough x about 2 weeks prior to worsening dyspnea, somewhat productive. Nausea o fever or chills. No rhinorrhea or sore throat.  No headache.  No loss of taste/smell.  Has felt wheezy, especially today.   Does use inhalers \"sometimes\" at baseline.    Thinks weight has been stable.      Smokes 1 ppd, denies any alcohol or illicit drug use.     No recent medication changes.               Past Medical History:   I have reviewed this patient's past medical history.  Patient Active Problem List    Diagnosis Date Noted     Hypotension, unspecified hypotension type 10/21/2020     Priority: Medium     Pneumonia of left upper lobe due to infectious organism 10/21/2020     Priority: Medium     Chronic obstructive pulmonary disease, unspecified COPD type (H) 03/09/2020     Priority: Medium     NSVT (nonsustained " ventricular tachycardia) (H) 11/01/2019     Priority: Medium     Chronic systolic heart failure (H) 11/01/2019     Priority: Medium     Paroxysmal atrial fibrillation (H) 11/01/2019     Priority: Medium     Tobacco use 05/28/2019     Priority: Medium     Left carotid stenosis 09/08/2017     Priority: Medium     Carpal tunnel syndrome of right wrist 01/10/2016     Priority: Medium     Carotid stenosis 06/12/2015     Priority: Medium     Bilateral carotid Disease S/P Right carotid endarterectomy.   [facility]       CHF (congestive heart failure) (H) 06/08/2015     Priority: Medium     NSTEMI (non-ST elevated myocardial infarction) (H) 10/21/2014     Priority: Medium     ACS (acute coronary syndrome) (H) 10/20/2014     Priority: Medium     Hyperlipidemia LDL goal <70 07/08/2013     Priority: Medium     [facility]  Diagnosis updated by automated process. Provider to review and confirm.       TYPE 2 DIABETES, HBA1C GOAL < 8% 10/31/2010     Priority: Medium     CARDIOVASCULAR SCREENING; LDL GOAL LESS THAN 100 10/31/2010     Priority: Medium     Coronary artery disease involving native coronary artery of native heart without angina pectoris 07/07/2010     Priority: Medium     S/p mi  Bypass x 4 --grafting to the LAD obtuse marginal and PDA.   Angio on 6/10 negative          Alcohol abuse 07/07/2010     Priority: Medium     H/o rehab x 5        GERD (gastroesophageal reflux disease) 07/07/2010     Priority: Medium     Pancreatic disease 07/07/2010     Priority: Medium     H/o pancreatitis --on enzymes        Diabetes mellitus, type 2 (H) 07/07/2010     Priority: Medium     [facility]       Cardiomyopathy (H) 06/15/2010     Priority: Medium     EF 15%.--ICD placement        Atrial fibrillation/flutter (H) 06/15/2010     Priority: Medium     S/p ablation 1June 2010.                   Past Surgical History:   I have reviewed this patient's past surgical history   Past Surgical History:    Procedure Laterality Date     C CABG, ARTERIAL, THREE       CATARACT IOL, RT/LT      Cataract IOL RT/LT     ENDARTERECTOMY CAROTID Right 6/12/2015    Procedure: ENDARTERECTOMY CAROTID;  Surgeon: João Turner MD;  Location:  OR     ENDARTERECTOMY CAROTID Left 9/8/2017    Procedure: ENDARTERECTOMY CAROTID;  LEFT CAROTID ENDARTERECTOMY WITH EEG;  Surgeon: João Turner MD;  Location: [facility] OR     IMPLANT PACEMAKER  6/10/2010     INTERPOSITION TENDON HAND N/A 6/9/2020    Procedure: Right thumb ligament reconstruction tendon interposition with extensor pollicis brevis to abductor pollicis tendon transfer;  Surgeon: Bethel Martin MD;  Location: [facility] OR     RELEASE CARPAL TUNNEL Right 4/12/2016    Procedure: RELEASE CARPAL TUNNEL;  Surgeon: Lissy Leger MD;  Location: [facility] OR     SURGICAL HISTORY OF -   1998    Laminectomy     TONSILLECTOMY & ADENOIDECTOMY               Social History:   I have reviewed this patient's social history   Social History     Tobacco Use     Smoking status: Current Every Day Smoker     Packs/day: 1.00     Years: 50.00     Pack years: 100.00     Types: Cigarettes     Smokeless tobacco: Never Used     Tobacco comment: 1 1/2 PPD 2/16/18   Substance Use Topics     Alcohol use: No             Family History:   I have reviewed this patient's family history   Family History   Problem Relation Age of Onset     Unknown/Adopted No family hx of              Immunizations:   Immunizations are current          Allergies:     Allergies   Allergen Reactions     Hydrocodone      Upset stomach     Shellfish Allergy Hives and Rash             Medications:     Facility-Administered Medications Prior to Admission   Medication Dose Route Frequency Provider Last Rate Last Dose     ropivacaine (NAROPIN) injection 0.5 mL  0.5 mL   Leonardo Le DO   0.5 mL at 04/21/20 0930     triamcinolone (KENALOG-40) injection 20 mg  20 mg   Leonardo Le DO   20 mg at 04/21/20 0930      Medications Prior to Admission   Medication Sig Dispense Refill Last Dose     albuterol (PROAIR HFA) 108 (90 Base) MCG/ACT Inhaler Inhale 2 puffs into the lungs every 4 hours as needed for shortness of breath / dyspnea 1 Inhaler 0      amylase-lipase-protease (CREON 12) 82548 UNITS CPEP Take 1 capsule (12,000 Units) by mouth daily 120 capsule 0      aspirin 325 MG tablet Take 3 tablets (975 mg) by mouth every morning (Takes 2 x 500 mg = 1000 mg) (Patient taking differently: Take 1,000 mg by mouth every morning (Takes 2 x 500 mg = 1000 mg)   Taking 2 in am and 2 in the pm. 2/22/19) 120 tablet 1      atorvastatin (LIPITOR) 80 MG tablet Take 80 mg by mouth At Bedtime         carvedilol (COREG) 6.25 MG tablet Take 1 tablet (6.25 mg) by mouth 2 times daily (with meals) 180 tablet 3      fluticasone-salmeterol (ADVAIR DISKUS) 250-50 MCG/DOSE inhaler Inhale 1 puff into the lungs every 12 hours 1 Inhaler 1      insulin glargine (LANTUS) 100 UNIT/ML injection Inject 38 Units Subcutaneous every morning        Insulin Pen Needle (PEN NEEDLES) 32G X 4 MM MISC         loperamide (IMODIUM) 2 MG capsule Take 2 mg by mouth 4 times daily as needed for diarrhea        losartan (COZAAR) 50 MG tablet Take 1 tablet (50 mg) by mouth daily 90 tablet 3      Nitroglycerin (NITROSTAT SL) Place 0.4 mg under the tongue every 5 minutes as needed for chest pain        oxyCODONE (ROXICODONE) 5 MG tablet Take 1-2 tablets (5-10 mg) by mouth every 4 hours as needed for moderate to severe pain 25 tablet 0      pantoprazole (PROTONIX) 40 MG EC tablet Take 1 tablet (40 mg) by mouth daily 30 tablet       torsemide (DEMADEX) 20 MG tablet For a weight of 165 lbs or less take 10 mg (1/2 tablet)  For a weight of greater than 165 lbs, take 20 mg (1 tablet) 30 tablet 4              Review of Systems:    ROS: 10 point ROS neg other than the symptoms noted above in the HPI.           Physical Exam:   Blood pressure 90/64, pulse 72, temperature (P) 98.7  F  (37.1  C), resp. rate (!) 31, weight 73.5 kg (162 lb), SpO2 94 %.  Temperatures:  Current - Temp: 98.1  F (36.7  C); Max - Temp  Av.1  F (36.7  C)  Min: 98.1  F (36.7  C)  Max: 98.1  F (36.7  C)  Respiration range: Resp  Av.9  Min: 15  Max: 29  Pulse range: Pulse  Av.1  Min: 63  Max: 83  Blood pressure range: Systolic (24hrs), Av , Min:81 , Max:107   ; Diastolic (24hrs), Av, Min:51, Max:67    Pulse oximetry range: SpO2  Av.5 %  Min: 92 %  Max: 96 %  No intake or output data in the 24 hours ending 10/21/20 6111  EXAM:  General: awake and alert, NAD, oriented x 3  Head: normocephalic  Neck: unremarkable, no lymphadenopathy   HEENT: oropharynx pink and moist    Heart: Regular rate and rhythm, no murmurs, rubs, or gallops  Lungs: somewhat decreased air movement throughout, expiratory wheezing throughout, no notable crackles.  No distress.    Abdomen: soft, non-tender, no masses or organomegaly  Extremities: no edema in lower extremities   Skin unremarkable.               Data:     Results for orders placed or performed during the hospital encounter of 10/21/20 (from the past 24 hour(s))   CBC with platelets differential   Result Value Ref Range    WBC 7.8 4.0 - 11.0 10e9/L    RBC Count 3.94 (L) 4.4 - 5.9 10e12/L    Hemoglobin 12.5 (L) 13.3 - 17.7 g/dL    Hematocrit 39.4 (L) 40.0 - 53.0 %     78 - 100 fl    MCH 31.7 26.5 - 33.0 pg    MCHC 31.7 31.5 - 36.5 g/dL    RDW 13.7 10.0 - 15.0 %    Platelet Count 134 (L) 150 - 450 10e9/L    Diff Method Automated Method     % Neutrophils 78.2 %    % Lymphocytes 10.9 %    % Monocytes 7.6 %    % Eosinophils 2.2 %    % Basophils 0.6 %    % Immature Granulocytes 0.5 %    Nucleated RBCs 0 0 /100    Absolute Neutrophil 6.1 1.6 - 8.3 10e9/L    Absolute Lymphocytes 0.9 0.8 - 5.3 10e9/L    Absolute Monocytes 0.6 0.0 - 1.3 10e9/L    Absolute Eosinophils 0.2 0.0 - 0.7 10e9/L    Absolute Basophils 0.1 0.0 - 0.2 10e9/L    Abs Immature Granulocytes 0.0 0 - 0.4  10e9/L    Absolute Nucleated RBC 0.0    Basic metabolic panel   Result Value Ref Range    Sodium 141 133 - 144 mmol/L    Potassium 4.3 3.4 - 5.3 mmol/L    Chloride 108 94 - 109 mmol/L    Carbon Dioxide 26 20 - 32 mmol/L    Anion Gap 7 3 - 14 mmol/L    Glucose 180 (H) 70 - 99 mg/dL    Urea Nitrogen 48 (H) 7 - 30 mg/dL    Creatinine 1.49 (H) 0.66 - 1.25 mg/dL    GFR Estimate 46 (L) >60 mL/min/[1.73_m2]    GFR Estimate If Black 54 (L) >60 mL/min/[1.73_m2]    Calcium 6.2 (L) 8.5 - 10.1 mg/dL   Troponin I   Result Value Ref Range    Troponin I ES 0.039 0.000 - 0.045 ug/L   Hepatic panel   Result Value Ref Range    Bilirubin Direct 0.3 (H) 0.0 - 0.2 mg/dL    Bilirubin Total 0.6 0.2 - 1.3 mg/dL    Albumin 2.5 (L) 3.4 - 5.0 g/dL    Protein Total 6.6 (L) 6.8 - 8.8 g/dL    Alkaline Phosphatase 335 (H) 40 - 150 U/L    ALT 27 0 - 70 U/L    AST 36 0 - 45 U/L   NT pro BNP   Result Value Ref Range    N-Terminal Pro BNP Inpatient 16,665 (H) 0 - 900 pg/mL   D dimer quantitative   Result Value Ref Range    D Dimer 3.4 (H) 0.0 - 0.50 ug/ml FEU   POC US CHEST B-SCAN    Impression    [facility] Procedure Note      Limited Bedside ED Cardiac Ultrasound:    PROCEDURE: PERFORMED BY: Dr. Leonardo Curtis MD  INDICATIONS/SYMPTOM:  Shortness of Breath  PROBE: Cardiac phased array probe and High frequency linear probe  BODY LOCATION: Chest  FINDINGS:   The ultrasound was performed utilizing the subcostal, parasternal long axis, parasternal short axis and apical 4 chamber views.  Cardiac contractility:  Present  Gross estimation of cardiac kinesis: depressed - ?focal right side  Pericardial Effusion:  None  RV:LV ratio: LV > RV  IVC:    Diameter:  IVC diameter expiration (IVCe) 2-3 cm                                                   IVC diameter inspiration (IVCi) <2 cm                                                       Collapsibility:  IVC collapses < 50% with inspiration  INTERPRETATION:    Chamber size and motion were grossly  normal with LV > RV, hypokinetic.  No pericardial effusion was found.  IVC visualized and findings indicate possible hypervolemia.  IMAGE DOCUMENTATION: Images were archived to PACs system.        [facility] Procedure Note      Limited Bedside ED Ultrasound of Thorax:    PROCEDURE: PERFORMED BY: Dr. Leonardo Curtis MD  INDICATIONS/SYMPTOM:  shortness of breath  PROBE: High frequency linear probe  BODY LOCATION: Chest  FINDINGS:  Images of both lung hemithoracies taken in 2D in multiple rib spaces        Right side:  Lung sliding artifact  Present     Comet tail artifacts  Present   Left side:  Lung sliding artifact  Present     Comet tail artifacts  Absent   Hemothorax: Right side Absent     Left side Absent   Pleural effusion: Right side Absent      Left side Absent    INTERPRETATION: The exam was normal. There was no free fluid identified in the chest cavity. No evidence of pneumothorax, hemothorax or pleural effusion.  IMAGE DOCUMENTATION: Images were archived to PACs system.       XR Chest Port 1 View    Narrative    XR CHEST PORT 1 VW 10/21/2020 9:09 AM    HISTORY: Dyspnea.    COMPARISON: July 18, 2018    FINDINGS: Cardiac pacemaker. Midline sternotomy. Mild cardiomegaly.  Diffuse bilateral interstitial infiltrates suggesting underlying  pulmonary fibrosis. No pleural effusions. Calcified pleural plaques.    PLACIDO KENNEDY MD   Symptomatic COVID-19 Virus (Coronavirus) by PCR    Specimen: Nasopharyngeal   Result Value Ref Range    COVID-19 Virus PCR to [facility] - Source Nasopharyngeal     COVID-19 Virus PCR to [facility] - Result       Test received-See reflex to IDDL test SARS CoV2 (COVID-19) Virus RT-PCR   CT Chest Pulmonary Embolism w Contrast    Narrative    CT CHEST PULMONARY EMBOLISM WITH CONTRAST 10/21/2020 10:48 AM    CLINICAL HISTORY: Dyspnea, right chest pain - evaluate for PE.    TECHNIQUE: CT angiogram chest during arterial phase injection IV  contrast. 2D and 3D MIP reconstructions were performed  by the CT  technologist. Dose reduction techniques were used.     CONTRAST: 72 mL Isovue 370    COMPARISON: CT chest 10/20/2014.    FINDINGS:  ANGIOGRAM CHEST: Pulmonary arteries are normal caliber and negative  for pulmonary emboli. Thoracic aorta is negative for dissection.  Coronary artery calcifications diffusely.    LUNGS AND PLEURA: New region of moderate consolidation posterior right  upper lobe that is approximately 4.9 cm series 7 image 84. There are  bibasilar areas of peribronchial wall thickening and small mucus  impaction noted as well. Small right pleural fluid. 0.6 cm right upper  lobe nodule, previously 0.4 cm series 7 image 62. There are several  other bilateral pulmonary nodules again identified without significant  change. Emphysematous changes.    MEDIASTINUM/AXILLAE: Mild multichamber cardiomegaly. Sternotomy. There  are several mildly more prominent lymph nodes in the mediastinum. An  example at the right paratracheal location is 1.1 cm, previously 0.4  cm series 4 image 61. There are other examples.    UPPER ABDOMEN: Bilateral perirenal stranding and subcutaneous edema  appears increased.    MUSCULOSKELETAL: No convincing acute abnormality.      Impression    IMPRESSION:  1.  No evidence for pulmonary embolism.  2.  Patchy prominent area of consolidation posterior right upper lobe  worrisome for lobar pneumonia. There are also areas of peribronchial  wall thickening and mucus impaction at the lower lungs consistent with  an infectious etiology.  3.  Small right pleural fluid.  4.  Diffuse coronary artery calcifications and cardiomegaly.  5.  Several mildly more prominent mediastinal lymph nodes that are  indeterminate.  6.  Bilateral several pulmonary nodules. While the majority are  stable, one of these is slightly larger at the right upper lobe.  Recommend follow-up CT chest in 6 months.  7.  Upper abdomen images shows soft tissue edema that may represent  anasarca.  8.  Emphysema.    BUTCH  MD KOLTON   Lactic acid whole blood   Result Value Ref Range    Lactic Acid 1.6 0.7 - 2.0 mmol/L     All cardiac studies reviewed by me.  All imaging studies reviewed by me.    Attestation:  I have reviewed today's vital signs, notes, medications, labs and imaging.  Amount of time performed on this history and physical providing critical care: 70 minutes.        Jose Manuel Velazco MD

## 2020-10-21 NOTE — ED TRIAGE NOTES
"Pt here with increased shortness of breath over the past 3 days, states \"I get fluid in my lungs, I just need some antibiotics\". Only leaves his house to go to the grocery store. No change in weight. Using his inhaler but doesn't feel that it helps.   "

## 2020-10-22 VITALS
HEIGHT: 72 IN | RESPIRATION RATE: 20 BRPM | DIASTOLIC BLOOD PRESSURE: 67 MMHG | SYSTOLIC BLOOD PRESSURE: 94 MMHG | BODY MASS INDEX: 22.72 KG/M2 | TEMPERATURE: 98.5 F | OXYGEN SATURATION: 97 % | HEART RATE: 74 BPM | WEIGHT: 167.77 LBS

## 2020-10-22 LAB
ANION GAP SERPL CALCULATED.3IONS-SCNC: 6 MMOL/L (ref 3–14)
BUN SERPL-MCNC: 45 MG/DL (ref 7–30)
CALCIUM SERPL-MCNC: 6.3 MG/DL (ref 8.5–10.1)
CHLORIDE SERPL-SCNC: 108 MMOL/L (ref 94–109)
CO2 SERPL-SCNC: 27 MMOL/L (ref 20–32)
CREAT SERPL-MCNC: 1.7 MG/DL (ref 0.66–1.25)
ERYTHROCYTE [DISTWIDTH] IN BLOOD BY AUTOMATED COUNT: 13.8 % (ref 10–15)
GFR SERPL CREATININE-BSD FRML MDRD: 39 ML/MIN/{1.73_M2}
GLUCOSE BLDC GLUCOMTR-MCNC: 162 MG/DL (ref 70–99)
GLUCOSE SERPL-MCNC: 164 MG/DL (ref 70–99)
HCT VFR BLD AUTO: 42.1 % (ref 40–53)
HGB BLD-MCNC: 13.2 G/DL (ref 13.3–17.7)
MCH RBC QN AUTO: 31 PG (ref 26.5–33)
MCHC RBC AUTO-ENTMCNC: 31.4 G/DL (ref 31.5–36.5)
MCV RBC AUTO: 99 FL (ref 78–100)
PLATELET # BLD AUTO: 151 10E9/L (ref 150–450)
POTASSIUM SERPL-SCNC: 4.3 MMOL/L (ref 3.4–5.3)
RBC # BLD AUTO: 4.26 10E12/L (ref 4.4–5.9)
SODIUM SERPL-SCNC: 141 MMOL/L (ref 133–144)
WBC # BLD AUTO: 6.6 10E9/L (ref 4–11)

## 2020-10-22 PROCEDURE — 85027 COMPLETE CBC AUTOMATED: CPT | Performed by: FAMILY MEDICINE

## 2020-10-22 PROCEDURE — G0378 HOSPITAL OBSERVATION PER HR: HCPCS

## 2020-10-22 PROCEDURE — 96372 THER/PROPH/DIAG INJ SC/IM: CPT | Performed by: FAMILY MEDICINE

## 2020-10-22 PROCEDURE — 99217 PR OBSERVATION CARE DISCHARGE: CPT | Performed by: FAMILY MEDICINE

## 2020-10-22 PROCEDURE — 250N000013 HC RX MED GY IP 250 OP 250 PS 637: Performed by: FAMILY MEDICINE

## 2020-10-22 PROCEDURE — 96372 THER/PROPH/DIAG INJ SC/IM: CPT

## 2020-10-22 PROCEDURE — 999N001017 HC STATISTIC GLUCOSE BY METER IP

## 2020-10-22 PROCEDURE — 250N000012 HC RX MED GY IP 250 OP 636 PS 637: Performed by: FAMILY MEDICINE

## 2020-10-22 PROCEDURE — 80048 BASIC METABOLIC PNL TOTAL CA: CPT | Performed by: FAMILY MEDICINE

## 2020-10-22 PROCEDURE — 36415 COLL VENOUS BLD VENIPUNCTURE: CPT | Performed by: FAMILY MEDICINE

## 2020-10-22 RX ORDER — PREDNISONE 20 MG/1
40 TABLET ORAL DAILY
Qty: 6 TABLET | Refills: 0 | Status: SHIPPED | OUTPATIENT
Start: 2020-10-23 | End: 2020-10-26

## 2020-10-22 RX ORDER — GABAPENTIN 100 MG/1
100 CAPSULE ORAL 3 TIMES DAILY
Status: DISCONTINUED | OUTPATIENT
Start: 2020-10-22 | End: 2020-10-22 | Stop reason: HOSPADM

## 2020-10-22 RX ORDER — DOXYCYCLINE 100 MG/1
100 CAPSULE ORAL 2 TIMES DAILY
Qty: 10 CAPSULE | Refills: 0 | Status: SHIPPED | OUTPATIENT
Start: 2020-10-22 | End: 2020-10-27

## 2020-10-22 RX ADMIN — INSULIN ASPART 1 UNITS: 100 INJECTION, SOLUTION INTRAVENOUS; SUBCUTANEOUS at 07:48

## 2020-10-22 RX ADMIN — INSULIN GLARGINE 32 UNITS: 100 INJECTION, SOLUTION SUBCUTANEOUS at 07:54

## 2020-10-22 RX ADMIN — PREDNISONE 40 MG: 20 TABLET ORAL at 07:45

## 2020-10-22 RX ADMIN — GABAPENTIN 100 MG: 100 CAPSULE ORAL at 07:58

## 2020-10-22 RX ADMIN — AZITHROMYCIN MONOHYDRATE 250 MG: 250 TABLET ORAL at 07:45

## 2020-10-22 RX ADMIN — NICOTINE 1 PATCH: 21 PATCH, EXTENDED RELEASE TRANSDERMAL at 07:47

## 2020-10-22 RX ADMIN — FLUTICASONE FUROATE AND VILANTEROL TRIFENATATE 1 PUFF: 100; 25 POWDER RESPIRATORY (INHALATION) at 07:48

## 2020-10-22 RX ADMIN — PANCRELIPASE 12000 UNITS: 30000; 6000; 19000 CAPSULE, DELAYED RELEASE PELLETS ORAL at 07:48

## 2020-10-22 RX ADMIN — ASPIRIN 325 MG ORAL TABLET 975 MG: 325 PILL ORAL at 07:45

## 2020-10-22 RX ADMIN — ALBUTEROL SULFATE 2 PUFF: 90 AEROSOL, METERED RESPIRATORY (INHALATION) at 05:32

## 2020-10-22 NOTE — PROGRESS NOTES
"Pt's SARS-COV2 Virus Specimen came back as negative, Susie Renee PA-C was notifies, pt is now able to come out of the \"Special\" precaution and now is in \"Droplet\" precaution for his pneumonia.  Will continue to monitor close for changes.  "

## 2020-10-22 NOTE — DISCHARGE SUMMARY
[facility] Discharge Summary    Delbert Tilley MRN# [id number]   Age: 70 year old YOB: 1950     Date of Admission:  10/21/2020  Date of Discharge::  10/22/2020  Admitting Physician:  Jose Manuel Velazco MD  Discharge Physician:  Jose Manuel Velazco MD, MD             Admission Diagnoses:   Hypotension, unspecified hypotension type [I95.9]  Pneumonia of left upper lobe due to infectious organism [J18.9]  Acute on chronic congestive heart failure, unspecified heart failure type (H) [I50.9]          Principle Discharge Diagnosis:         Chronic obstructive pulmonary disease, unspecified COPD type (H) with exacerbation     See hospital course for further active diagnoses addressed during this admission.            Procedures:   No procedures performed during this admission          Medications Prior to Admission:     Facility-Administered Medications Prior to Admission   Medication Dose Route Frequency Provider Last Rate Last Dose     [DISCONTINUED] ropivacaine (NAROPIN) injection 0.5 mL  0.5 mL   Leonardo Le DO   0.5 mL at 04/21/20 0930     [DISCONTINUED] triamcinolone (KENALOG-40) injection 20 mg  20 mg   Leonardo Le DO   20 mg at 04/21/20 0930     Medications Prior to Admission   Medication Sig Dispense Refill Last Dose     albuterol (PROAIR HFA) 108 (90 Base) MCG/ACT Inhaler Inhale 2 puffs into the lungs every 4 hours as needed for shortness of breath / dyspnea 1 Inhaler 0 10/21/2020 at 0600     amylase-lipase-protease (CREON 12) 92755 UNITS CPEP Take 1 capsule (12,000 Units) by mouth daily 120 capsule 0 10/21/2020 at 0600     aspirin 325 MG tablet Take 3 tablets (975 mg) by mouth every morning (Takes 2 x 500 mg = 1000 mg) (Patient taking differently: Take 1,000 mg by mouth every morning (Takes 2 x 500 mg = 1000 mg)   Taking 2 in am and 2 in the pm. 2/22/19) 120 tablet 1 10/21/2020 at 0600     atorvastatin (LIPITOR) 80 MG tablet Take 80 mg by mouth At Bedtime    10/20/2020 at  2100     carvedilol (COREG) 6.25 MG tablet Take 1 tablet (6.25 mg) by mouth 2 times daily (with meals) 180 tablet 3 10/21/2020 at 0600     fluticasone-salmeterol (ADVAIR DISKUS) 250-50 MCG/DOSE inhaler Inhale 1 puff into the lungs every 12 hours 1 Inhaler 1 10/21/2020 at 0600     insulin glargine (LANTUS) 100 UNIT/ML injection Inject 38 Units Subcutaneous every morning   10/21/2020 at 0600     loperamide (IMODIUM) 2 MG capsule Take 2 mg by mouth 4 times daily as needed for diarrhea   Past Month at Unknown time     mometasone (ASMANEX TWISTHALER) 220 MCG/INH inhaler Inhale 2 puffs into the lungs daily   10/21/2020 at 0600     pantoprazole (PROTONIX) 40 MG EC tablet Take 1 tablet (40 mg) by mouth daily 30 tablet  10/20/2020 at 1600     torsemide (DEMADEX) 20 MG tablet For a weight of 165 lbs or less take 10 mg (1/2 tablet)  For a weight of greater than 165 lbs, take 20 mg (1 tablet) 30 tablet 4 10/21/2020 at 0600     Insulin Pen Needle (PEN NEEDLES) 32G X 4 MM MISC    More than a month at Unknown time     Nitroglycerin (NITROSTAT SL) Place 0.4 mg under the tongue every 5 minutes as needed for chest pain   More than a month at Unknown time     oxyCODONE (ROXICODONE) 5 MG tablet Take 1-2 tablets (5-10 mg) by mouth every 4 hours as needed for moderate to severe pain 25 tablet 0 More than a month at Unknown time     [DISCONTINUED] losartan (COZAAR) 50 MG tablet Take 1 tablet (50 mg) by mouth daily 90 tablet 3 10/21/2020 at 0600             Discharge Medications:     Current Discharge Medication List      START taking these medications    Details   doxycycline hyclate (VIBRAMYCIN) 100 MG capsule Take 1 capsule (100 mg) by mouth 2 times daily for 5 days  Qty: 10 capsule, Refills: 0    Associated Diagnoses: Pneumonia, unspecified organism      predniSONE (DELTASONE) 20 MG tablet Take 2 tablets (40 mg) by mouth daily for 3 days  Qty: 6 tablet, Refills: 0    Associated Diagnoses: COPD exacerbation (H)         CONTINUE these  medications which have NOT CHANGED    Details   albuterol (PROAIR HFA) 108 (90 Base) MCG/ACT Inhaler Inhale 2 puffs into the lungs every 4 hours as needed for shortness of breath / dyspnea  Qty: 1 Inhaler, Refills: 0      amylase-lipase-protease (CREON 12) 65174 UNITS CPEP Take 1 capsule (12,000 Units) by mouth daily  Qty: 120 capsule, Refills: 0    Comments: Pt needs to schedule follow up appointment with Dr. Moyer for additional refills.  Associated Diagnoses: Pancreatic disease      aspirin 325 MG tablet Take 3 tablets (975 mg) by mouth every morning (Takes 2 x 500 mg = 1000 mg)  Qty: 120 tablet, Refills: 1    Associated Diagnoses: Left carotid stenosis      atorvastatin (LIPITOR) 80 MG tablet Take 80 mg by mouth At Bedtime       carvedilol (COREG) 6.25 MG tablet Take 1 tablet (6.25 mg) by mouth 2 times daily (with meals)  Qty: 180 tablet, Refills: 3    Associated Diagnoses: Chronic systolic heart failure (H); Acute on chronic systolic congestive heart failure (H)      fluticasone-salmeterol (ADVAIR DISKUS) 250-50 MCG/DOSE inhaler Inhale 1 puff into the lungs every 12 hours  Qty: 1 Inhaler, Refills: 1    Associated Diagnoses: Chronic obstructive pulmonary disease, unspecified COPD type (H)      insulin glargine (LANTUS) 100 UNIT/ML injection Inject 38 Units Subcutaneous every morning      loperamide (IMODIUM) 2 MG capsule Take 2 mg by mouth 4 times daily as needed for diarrhea      mometasone (ASMANEX TWISTHALER) 220 MCG/INH inhaler Inhale 2 puffs into the lungs daily      pantoprazole (PROTONIX) 40 MG EC tablet Take 1 tablet (40 mg) by mouth daily  Qty: 30 tablet      torsemide (DEMADEX) 20 MG tablet For a weight of 165 lbs or less take 10 mg (1/2 tablet)  For a weight of greater than 165 lbs, take 20 mg (1 tablet)  Qty: 30 tablet, Refills: 4    Associated Diagnoses: Ischemic cardiomyopathy      Insulin Pen Needle (PEN NEEDLES) 32G X 4 MM MISC       Nitroglycerin (NITROSTAT SL) Place 0.4 mg under the tongue  "every 5 minutes as needed for chest pain      oxyCODONE (ROXICODONE) 5 MG tablet Take 1-2 tablets (5-10 mg) by mouth every 4 hours as needed for moderate to severe pain  Qty: 25 tablet, Refills: 0    Associated Diagnoses: Arthritis of carpometacarpal (CMC) joint of left thumb         STOP taking these medications       losartan (COZAAR) 50 MG tablet Comments:   Reason for Stopping:                          Brief History of Illness:     From Admission H+P:   This patient is a 70 year old  male with a significant past medical history of COPD, cardiomyopathy, CAD s/p 4 vessel bypass, pacemaker/defib, atrial fibrillation s/p ablation and GERD who presents with dyspnea.  Has baseline poor mobility related to his neuropathy but now over the past few days has felt increasingly short of breath.  Wondering if he has pneumonia. Some pleuritic pain on the right side of the chest, just with deep breaths.  Much improved already since arrival at ER.  \"not bad\" now.  Mild cough x about 2 weeks prior to worsening dyspnea, somewhat productive. Nausea o fever or chills. No rhinorrhea or sore throat.  No headache.  No loss of taste/smell.  Has felt wheezy, especially today.   Does use inhalers \"sometimes\" at baseline.    Thinks weight has been stable.       Smokes 1 ppd, denies any alcohol or illicit drug use.      No recent medication changes.                   TODAY:     Subjective:  Doing well, breathing pretty much back to normal from being \"0%\" of normal on admission.  Pleuritic pain resolved. No fever or chills. Patient says he feels good and is strongly requesting discharge this AM.  Does not want to wait for echo to be done later today.    No other pain.       ROS:   ROS: 10 point ROS neg other than the symptoms noted above in the HPI. .  /82   Pulse 80   Temp 98.5  F (36.9  C) (Oral)   Resp 20   Ht 1.829 m (6')   Wt 76.1 kg (167 lb 12.3 oz)   SpO2 98%   BMI 22.75 kg/m     EXAM:  General: awake and alert, " NAD, oriented x 3  Head: normocephalic  Neck: unremarkable, no lymphadenopathy   HEENT: oropharynx pink and moist    Heart: Regular rate and rhythm, no murmurs, rubs, or gallops  Lungs: clear to auscultation bilaterally with good air movement throughout, just minimal wheezing today - markedly improved from yesterday.    Abdomen: soft, non-tender, no masses or organomegaly  Extremities: no edema in lower extremities   Skin unremarkable.            Hospital Course:       Hypotension   10/21/2020 -- Suspect due to pneumonia with possible sepsis.  Improved after 2 500cc boluses, but want to be very careful with IVF given heart failure as below.  Taking orals.  Will saline lock for now. Goal systolic blood pressure > 85.  If he becomes hypotensive again as long as respiratory status stable would give up to 1L more, but after that would likely lean toward placing PICC and starting pressors.  Holding home torsemide, coreg and cozaar due to this.  Reassess tomorrow.    10/22/2020 -- blood pressure normalized - 90's-110, which patient says is baseline for him.  Asymptomatic including with ambulation.  Ok for discharge home today, resume home medications tomorrow but will remain off cozaar until follow-up with primary care provider, resume cozaar at that time if blood pressure stable.        Pneumonia of left upper lobe due to infectious organism  10/21/2020 -- R upper lobe infiltrate, pleuritic pain due to this, already improving.  Continue azithromycin and rocephin started in ER.   10/22/2020 -- much improved.  Procalcitonin low risk, but significant infiltrate on chest x-ray.  I think discharge to complete 5 more days of doxycycline is reasonable here. Could consider repeat chest x-ray to assess for improvement/resolution in 1-2 weeks with primary care provider.         Chronic obstructive pulmonary disease, unspecified COPD type (H) with exacerbation   10/21/2020 -- continue home advair, schedule albuterol inhaler every 4  "hours while awake.  Start prednisone 40 mg daily.     10/22/2020 -- much improved, continue home daily controlled inhaler and albuterol prn along with completion of prednisone burst.          Chronic systolic CHF (congestive heart failure) (H)   10/21/2020 -- last echo from 2/2019 showed LVEF 20-25%.   weight appears stable.  Follow weight and I/O's.   10/22/2020 -- weight slightly up but no clinical evidence of fluid overload.  Ok for discharge on prior to admission medications.  Follow-up with primary care provider within 1 week.       RADHA  creatinine mildly up, 1.7 on discharge, gave IVF as above, intake improving, ok for discharge as patient is requesting but recommend recheck of creatinine at follow-up with primary care provider.         History of paroxysmal Atrial fibrillation/flutter (H)  10/21/2020 -- s/p ablation 6/1/2010.  Holding coreg for now as above, follow heart rate.     10/22/2020 -- with blood pressure now back to what he reports is baseline, ok to resume coreg tomorrow - recommend stopping this if he feels light-headed or has an blood pressures < 90 systolic.  Reassess at follow-up with primary care provider.        Coronary artery disease involving native coronary artery of native heart without angina pectoris  Appears stable.  Follow.         Prior history of Alcohol abuse  Reports being sober for \"a long time\".  no evidence of withdrawal this admission.         GERD (gastroesophageal reflux disease)  Continue home protonix        Diabetes mellitus, type 2 (H)  A1c 7.8.  continue home lantus 38u every morning.  Added medium dose sliding scale insulin, while here, ok to discharge on home regimen and follow-up with primary care provider.       Pulmonary nodules  Seen on CT chest - majority stable, but one is larger - per radiology recommend repeat imaging in 6 months, primary care provider to order this.         Tobacco use  Nicotine patch ordered.            COVID screen negative   "      Prophylaxis  Lovenox, on PPI     Lines  PIV               Discharge Instructions and Follow-Up:     Discharge diet: Orders Placed This Encounter      High Consistent CHO Diet       Discharge activity: Activity as tolerated   Discharge follow-up: Follow-up with your primary care provider within 1 week. Recheck blood pressure, consider resuming cozaar, creatinine, and follow-up on respiratory status and weight at that time. Consider a repeat chest x-ray in 1-2 weeks. Primary care provider also to order a follow-up CT scan of your chest in about 6 months to assess the pulmonary nodule as we discussed.              Discharge Disposition:     Discharged to home      Attestation:  I have reviewed today's vital signs, notes, medications, labs and imaging.  Amount of time performed on this discharge summary: 45 minutes.    Jose Manuel Velazco MD, MD

## 2020-10-22 NOTE — PROGRESS NOTES
Pt has been on RA t/o the night with his sat's usually at %, lung sounds are CTA and slightly diminished t/o.  Pt has been getting up in his room independently, he is steady when up.  He does c/o of being SOA with activity only.  Will continue to monitor close for changes.

## 2020-10-22 NOTE — PROGRESS NOTES
[facility] DISCHARGE NOTE    Patient discharged to home at 9:17 AM via wheel chair. Accompanied by staff. Discharge instructions reviewed with patient, opportunity offered to ask questions. Prescriptions sent to patients preferred pharmacy. All belongings sent with patient.    Nichole C. Bushweiler, RN

## 2020-11-12 ENCOUNTER — HOSPITAL ENCOUNTER (EMERGENCY)
Facility: CLINIC | Age: 70
Discharge: HOME | End: 2020-11-12
Attending: FAMILY MEDICINE | Admitting: FAMILY MEDICINE
Payer: COMMERCIAL

## 2020-11-12 VITALS
OXYGEN SATURATION: 95 % | BODY MASS INDEX: 22.35 KG/M2 | TEMPERATURE: 97.8 F | DIASTOLIC BLOOD PRESSURE: 84 MMHG | WEIGHT: 165 LBS | RESPIRATION RATE: 11 BRPM | SYSTOLIC BLOOD PRESSURE: 121 MMHG | HEART RATE: 70 BPM | HEIGHT: 72 IN

## 2020-11-12 DIAGNOSIS — E83.51 HYPOCALCEMIA: ICD-10-CM

## 2020-11-12 DIAGNOSIS — E83.42 HYPOMAGNESEMIA: ICD-10-CM

## 2020-11-12 LAB
ANION GAP SERPL CALCULATED.3IONS-SCNC: 2 MMOL/L (ref 3–14)
BUN SERPL-MCNC: 44 MG/DL (ref 7–30)
CA-I SERPL ISE-MCNC: 2.8 MG/DL (ref 4.4–5.2)
CALCIUM SERPL-MCNC: 6 MG/DL (ref 8.5–10.1)
CHLORIDE SERPL-SCNC: 104 MMOL/L (ref 94–109)
CO2 SERPL-SCNC: 34 MMOL/L (ref 20–32)
CREAT SERPL-MCNC: 1.4 MG/DL (ref 0.66–1.25)
GFR SERPL CREATININE-BSD FRML MDRD: 50 ML/MIN/{1.73_M2}
GLUCOSE SERPL-MCNC: 252 MG/DL (ref 70–99)
MAGNESIUM SERPL-MCNC: 0.6 MG/DL (ref 1.6–2.3)
PHOSPHATE SERPL-MCNC: 4.9 MG/DL (ref 2.5–4.5)
POTASSIUM SERPL-SCNC: 4.5 MMOL/L (ref 3.4–5.3)
PTH-INTACT SERPL-MCNC: 149 PG/ML (ref 18–80)
SODIUM SERPL-SCNC: 140 MMOL/L (ref 133–144)

## 2020-11-12 PROCEDURE — 258N000003 HC RX IP 258 OP 636: Performed by: FAMILY MEDICINE

## 2020-11-12 PROCEDURE — 99285 EMERGENCY DEPT VISIT HI MDM: CPT | Mod: 25 | Performed by: FAMILY MEDICINE

## 2020-11-12 PROCEDURE — 82330 ASSAY OF CALCIUM: CPT | Performed by: FAMILY MEDICINE

## 2020-11-12 PROCEDURE — 80048 BASIC METABOLIC PNL TOTAL CA: CPT | Performed by: FAMILY MEDICINE

## 2020-11-12 PROCEDURE — 83735 ASSAY OF MAGNESIUM: CPT | Performed by: FAMILY MEDICINE

## 2020-11-12 PROCEDURE — 93005 ELECTROCARDIOGRAM TRACING: CPT | Performed by: FAMILY MEDICINE

## 2020-11-12 PROCEDURE — 84100 ASSAY OF PHOSPHORUS: CPT | Performed by: FAMILY MEDICINE

## 2020-11-12 PROCEDURE — 96366 THER/PROPH/DIAG IV INF ADDON: CPT | Performed by: FAMILY MEDICINE

## 2020-11-12 PROCEDURE — 250N000011 HC RX IP 250 OP 636: Performed by: FAMILY MEDICINE

## 2020-11-12 PROCEDURE — 96365 THER/PROPH/DIAG IV INF INIT: CPT | Performed by: FAMILY MEDICINE

## 2020-11-12 PROCEDURE — 82306 VITAMIN D 25 HYDROXY: CPT | Performed by: FAMILY MEDICINE

## 2020-11-12 PROCEDURE — 83970 ASSAY OF PARATHORMONE: CPT | Performed by: FAMILY MEDICINE

## 2020-11-12 PROCEDURE — 99284 EMERGENCY DEPT VISIT MOD MDM: CPT | Mod: 25 | Performed by: FAMILY MEDICINE

## 2020-11-12 PROCEDURE — 93010 ELECTROCARDIOGRAM REPORT: CPT | Mod: 76 | Performed by: FAMILY MEDICINE

## 2020-11-12 PROCEDURE — 96368 THER/DIAG CONCURRENT INF: CPT | Performed by: FAMILY MEDICINE

## 2020-11-12 RX ORDER — MAGNESIUM SULFATE 1 G/100ML
1 INJECTION INTRAVENOUS ONCE
Status: COMPLETED | OUTPATIENT
Start: 2020-11-12 | End: 2020-11-12

## 2020-11-12 RX ORDER — MAGNESIUM SULFATE HEPTAHYDRATE 500 MG/ML
1 INJECTION, SOLUTION INTRAMUSCULAR; INTRAVENOUS ONCE
Status: DISCONTINUED | OUTPATIENT
Start: 2020-11-12 | End: 2020-11-12

## 2020-11-12 RX ADMIN — MAGNESIUM SULFATE HEPTAHYDRATE 1 G: 1 INJECTION, SOLUTION INTRAVENOUS at 19:05

## 2020-11-12 RX ADMIN — CALCIUM GLUCONATE 1 G: 98 INJECTION, SOLUTION INTRAVENOUS at 18:37

## 2020-11-12 RX ADMIN — MAGNESIUM SULFATE HEPTAHYDRATE 1 G: 1 INJECTION, SOLUTION INTRAVENOUS at 19:54

## 2020-11-12 RX ADMIN — CALCIUM GLUCONATE 1 G: 98 INJECTION, SOLUTION INTRAVENOUS at 19:45

## 2020-11-12 ASSESSMENT — ENCOUNTER SYMPTOMS
DIARRHEA: 0
SHORTNESS OF BREATH: 1
FREQUENCY: 0
BLOOD IN STOOL: 0
HEADACHES: 0
DIAPHORESIS: 0
VOMITING: 0
SINUS PRESSURE: 0
ABDOMINAL PAIN: 0
CHILLS: 0
WHEEZING: 0
DYSURIA: 0
COUGH: 1
CONSTIPATION: 0
DIZZINESS: 1
SORE THROAT: 0
FEVER: 0
NAUSEA: 0
PALPITATIONS: 0

## 2020-11-12 ASSESSMENT — MIFFLIN-ST. JEOR: SCORE: 1536.44

## 2020-11-12 NOTE — ED PROVIDER NOTES
History     Chief Complaint   Patient presents with     Abnormal Labs     [facility] labs, abnormal calcium     HPI  Delbert Tilley is a 70 year old male who [facility] - drawn today.   admitted here 2 weeks ago for COPD exacerbation,.   nio muscle spasm.  No obvious muscle weakness. No palpitations.  no chest pain     maliase and fatigue in ams that he attributes to medications - present since last hospitralization.  sense of dizziness postural.   was at [facility] for eval and labs.  chronic cough aswsoc with copd - using alb inhaler.   feels that thighs are more swollen bilaterally - no ankle swe;lling.    months of weakness in rising from chair    [facility] and these are [facility] to me.  His creatinine was 1.5 BUN 43.  His calcium was 6.1.  Sodium 141 potassium 4.6.      Allergies:  Allergies   Allergen Reactions     Hydrocodone      Upset stomach     Shellfish Allergy Hives and Rash       Problem List:    Patient Active Problem List    Diagnosis Date Noted     Hypotension, unspecified hypotension type 10/21/2020     Priority: Medium     Pneumonia of left upper lobe due to infectious organism 10/21/2020     Priority: Medium     Chronic obstructive pulmonary disease, unspecified COPD type (H) 03/09/2020     Priority: Medium     NSVT (nonsustained ventricular tachycardia) (H) 11/01/2019     Priority: Medium     Chronic systolic heart failure (H) 11/01/2019     Priority: Medium     Paroxysmal atrial fibrillation (H) 11/01/2019     Priority: Medium     Tobacco use 05/28/2019     Priority: Medium     Left carotid stenosis 09/08/2017     Priority: Medium     Carpal tunnel syndrome of right wrist 01/10/2016     Priority: Medium     Carotid stenosis 06/12/2015     Priority: Medium     Bilateral carotid Disease S/P Right carotid endarterectomy.   [facility]       CHF (congestive heart failure) (H) 06/08/2015     Priority: Medium      NSTEMI (non-ST elevated myocardial infarction) (H) 10/21/2014     Priority: Medium     ACS (acute coronary syndrome) (H) 10/20/2014     Priority: Medium     Hyperlipidemia LDL goal <70 07/08/2013     Priority: Medium     [facility]  Diagnosis updated by automated process. Provider to review and confirm.       TYPE 2 DIABETES, HBA1C GOAL < 8% 10/31/2010     Priority: Medium     CARDIOVASCULAR SCREENING; LDL GOAL LESS THAN 100 10/31/2010     Priority: Medium     Coronary artery disease involving native coronary artery of native heart without angina pectoris 07/07/2010     Priority: Medium     S/p mi  Bypass x 4 --grafting to the LAD obtuse marginal and PDA.   Angio on 6/10 negative          Alcohol abuse 07/07/2010     Priority: Medium     H/o rehab x 5        GERD (gastroesophageal reflux disease) 07/07/2010     Priority: Medium     Pancreatic disease 07/07/2010     Priority: Medium     H/o pancreatitis --on enzymes        Diabetes mellitus, type 2 (H) 07/07/2010     Priority: Medium     [facility]       Cardiomyopathy (H) 06/15/2010     Priority: Medium     EF 15%.--ICD placement        Atrial fibrillation/flutter (H) 06/15/2010     Priority: Medium     S/p ablation 1June 2010.             Past Medical History:    Past Medical History:   Diagnosis Date     Acute renal failure (H) 8/26/06 Hosp     Arthritis      CAD (coronary artery disease)      Carpal tunnel syndrome      CHF (congestive heart failure) (H)      Diabetes (H)      Gastro-oesophageal reflux disease      H/O: alcohol abuse      HTN (hypertension)      Hyperlipidaemia LDL goal <100 7/8/2013     Hypokalemia      Pacemaker      Pancreatitis 6/26/06 Hosp       Past Surgical History:    Past Surgical History:   Procedure Laterality Date     C CABG, ARTERIAL, THREE       CATARACT IOL, RT/LT      Cataract IOL RT/LT     ENDARTERECTOMY CAROTID Right 6/12/2015    Procedure: ENDARTERECTOMY CAROTID;  Surgeon: João Turner MD;   Location: [facility] OR     ENDARTERECTOMY CAROTID Left 9/8/2017    Procedure: ENDARTERECTOMY CAROTID;  LEFT CAROTID ENDARTERECTOMY WITH EEG;  Surgeon: João Turner MD;  Location: [facility] OR     IMPLANT PACEMAKER  6/10/2010     INTERPOSITION TENDON HAND N/A 6/9/2020    Procedure: Right thumb ligament reconstruction tendon interposition with extensor pollicis brevis to abductor pollicis tendon transfer;  Surgeon: Bethel Martin MD;  Location: [facility] OR     RELEASE CARPAL TUNNEL Right 4/12/2016    Procedure: RELEASE CARPAL TUNNEL;  Surgeon: Lissy Leger MD;  Location: [facility] OR     SURGICAL HISTORY OF -   1998    Laminectomy     TONSILLECTOMY & ADENOIDECTOMY         Family History:    Family History   Problem Relation Age of Onset     Unknown/Adopted No family hx of        Social History:  Marital Status:  Single [1]  Social History     Tobacco Use     Smoking status: Current Every Day Smoker     Packs/day: 2.00     Years: 50.00     Pack years: 100.00     Types: Cigarettes     Smokeless tobacco: Never Used     Tobacco comment: 1 1/2 PPD 2/16/18   Substance Use Topics     Alcohol use: No     Drug use: No        Medications:         albuterol (PROAIR HFA) 108 (90 Base) MCG/ACT Inhaler       amylase-lipase-protease (CREON 12) 59201 UNITS CPEP       aspirin 325 MG tablet       atorvastatin (LIPITOR) 80 MG tablet       carvedilol (COREG) 6.25 MG tablet       fluticasone-salmeterol (ADVAIR DISKUS) 250-50 MCG/DOSE inhaler       insulin glargine (LANTUS) 100 UNIT/ML injection       Insulin Pen Needle (PEN NEEDLES) 32G X 4 MM MISC       loperamide (IMODIUM) 2 MG capsule       mometasone (ASMANEX TWISTHALER) 220 MCG/INH inhaler       Nitroglycerin (NITROSTAT SL)       oxyCODONE (ROXICODONE) 5 MG tablet       pantoprazole (PROTONIX) 40 MG EC tablet       torsemide (DEMADEX) 20 MG tablet          Review of Systems   Constitutional: Negative for chills, diaphoresis and fever.   HENT: Negative for ear pain, sinus pressure and  sore throat.    Eyes: Negative for visual disturbance.   Respiratory: Positive for cough and shortness of breath (chronic). Negative for wheezing.    Cardiovascular: Negative for chest pain and palpitations.   Gastrointestinal: Negative for abdominal pain, blood in stool, constipation, diarrhea, nausea and vomiting.   Genitourinary: Negative for dysuria, frequency and urgency.   Skin: Negative for rash.   Neurological: Positive for dizziness. Negative for headaches.   All other systems reviewed and are negative.      Physical Exam   BP: 95/66  Pulse: 72  Temp: 97.8  F (36.6  C)  Resp: 18  Height: 182.9 cm (6')  Weight: 74.8 kg (165 lb)  SpO2: 95 %      Physical Exam  Constitutional:       General: He is not in acute distress.     Appearance: He is not ill-appearing, toxic-appearing or diaphoretic.   Eyes:      Conjunctiva/sclera: Conjunctivae normal.   Neck:      Musculoskeletal: Neck supple.   Cardiovascular:      Rate and Rhythm: Normal rate and regular rhythm.      Heart sounds: No murmur.   Pulmonary:      Effort: No respiratory distress.      Breath sounds: No stridor. No wheezing or rhonchi.   Abdominal:      General: Bowel sounds are normal. There is no distension.      Palpations: Abdomen is soft. There is no mass.      Tenderness: There is no abdominal tenderness. There is no guarding.   Musculoskeletal:      Right lower leg: No edema.      Left lower leg: No edema.   Skin:     Coloration: Skin is not pale.      Findings: No rash.   Neurological:      General: No focal deficit present.      Mental Status: He is alert.      Motor: No weakness.      Deep Tendon Reflexes: Reflexes abnormal.     hyporeflexia.    ED Course        Procedures                    EKG Interpretation:      Interpreted by Leonardo Curtis MD  Time reviewed:1637   Symptoms at time of EKG: none   Rhythm: normal sinus   Rate: normal  Axis: NORMAL  Ectopy: none  Conduction: normal  ST Segments/ T Waves: No ST-T wave changes other than  lateral and inferior T wave flattening  Q Waves: none  Comparison to prior: Unchanged from october except for T flattening    Clinical Impression: normal EKG             Critical Care time:  none               Results for orders placed or performed during the hospital encounter of 11/12/20 (from the past 24 hour(s))   Magnesium   Result Value Ref Range    Magnesium 0.6 (LL) 1.6 - 2.3 mg/dL   Phosphorus   Result Value Ref Range    Phosphorus 4.9 (H) 2.5 - 4.5 mg/dL   Basic metabolic panel   Result Value Ref Range    Sodium 140 133 - 144 mmol/L    Potassium 4.5 3.4 - 5.3 mmol/L    Chloride 104 94 - 109 mmol/L    Carbon Dioxide 34 (H) 20 - 32 mmol/L    Anion Gap 2 (L) 3 - 14 mmol/L    Glucose 252 (H) 70 - 99 mg/dL    Urea Nitrogen 44 (H) 7 - 30 mg/dL    Creatinine 1.40 (H) 0.66 - 1.25 mg/dL    GFR Estimate 50 (L) >60 mL/min/[1.73_m2]    GFR Estimate If Black 58 (L) >60 mL/min/[1.73_m2]    Calcium 6.0 (L) 8.5 - 10.1 mg/dL   Calcium ionized   Result Value Ref Range    Calcium Ionized 2.8 (LL) 4.4 - 5.2 mg/dL       Medications   calcium gluconate 1 g in D5W 100 mL intermittent infusion (0 g Intravenous Stopped 11/12/20 2108)   magnesium sulfate 1 gm in 100mL D5W intermittent infusion (0 g Intravenous Stopped 11/12/20 2108)   magnesium sulfate 1 gm in 100mL D5W intermittent infusion (0 g Intravenous Stopped 11/12/20 2000)       Assessments & Plan (with Medical Decision Making)     NMDM:Delbert Tilley is a 70 year old male who presents with longstanding hypocalcemia that is confirmed today with an ionized calcium of 2.8.  He has no significant progressive symptoms related to this and I suspect this is more long-term but he does have T wave flattening in his inferior lateral leads which is different than his prior EKG he does have hyporeflexia on his exam although difficult to tell if this is somewhat voluntary and resisting the reflexes.  But he has no obvious significant weakness there are no other findings such as  carpopedal spasm or muscular weakness on exam.    I spoke with nephrology at [facility] Dr. Maxwell who agrees with the plan of obtaining PTH, magnesium phosphorus and vitamin D.  He agrees also with administering calcium gluconate.  I have placed 1 g IV order each over an hour.  I have also given magnesium because this was significantly low at 0.6 mg we will give him 2 g IV.  We will also start him on oral Tums 1000 mg orally twice daily and have him follow-up in clinic closely.         I have reviewed the nursing notes.    I have reviewed the findings, diagnosis, plan and need for follow up with the patient.       New Prescriptions    No medications on file       Final diagnoses:   Hypocalcemia - Take tums 1000 mg twice daily and follow-up by early next week with primary provider for recheck   Hypomagnesemia - this was replaced here.  I suspect the low m,agnesium and calcium dago related to demadex.  you need follow-up in clinic.  recheck labs next week. return for worsening.       11/12/2020   [facility] EMERGENCY DEPT     Leonardo Curtis MD  11/13/20 1469

## 2020-11-12 NOTE — ED AVS SNAPSHOT
[facility] Emergency Dept  [address] [address]  Phone: [phone]  Fax: [phone]                                    Delbert Tilley   MRN: [id number]    Department: [facility] Emergency Dept   Date of Visit: 11/12/2020           After Visit Summary Signature Page    I have received my discharge instructions, and my questions have been answered. I have discussed any challenges I see with this plan with the nurse or doctor.    ..........................................................................................................................................  Patient/Patient Representative Signature      ..........................................................................................................................................  Patient Representative Print Name and Relationship to Patient    ..................................................               ................................................  Date                                   Time    ..........................................................................................................................................  Reviewed by Signature/Title    ...................................................              ..............................................  Date                                               Time          22EPIC Rev 08/18       
Detail Level: Detailed

## 2020-11-13 LAB — DEPRECATED CALCIDIOL+CALCIFEROL SERPL-MC: 13 UG/L (ref 20–75)

## 2020-11-13 NOTE — DISCHARGE INSTRUCTIONS
ICD-10-CM    1. Hypocalcemia  E83.51     Take tums 1000 mg twice daily and follow-up by early next week with primary provider for recheck   2. Hypomagnesemia  E83.42     this was replaced here.  I suspect the low m,agnesium and calcium dago related to demadex.  you need follow-up in clinic.  recheck labs next week. return for worsening.

## 2020-11-13 NOTE — ED NOTES
DATE:  11/12/2020   TIME OF RECEIPT FROM LAB:  1826  LAB TEST:  magnesium  LAB VALUE:  0.6  RESULTS GIVEN WITH READ-BACK TO (PROVIDER):  Dr. sherwood  TIME LAB VALUE REPORTED TO PROVIDER:   1826

## 2020-11-13 NOTE — ED NOTES
[facility] for [facility] that were low.  Pt denies any pain or discomfort at this time

## 2020-11-14 ENCOUNTER — TRANSFERRED RECORDS (OUTPATIENT)
Dept: HEALTH INFORMATION MANAGEMENT | Facility: CLINIC | Age: 70
End: 2020-11-14

## 2020-11-16 ENCOUNTER — TELEPHONE (OUTPATIENT)
Dept: CARDIOLOGY | Facility: CLINIC | Age: 70
End: 2020-11-16

## 2020-11-16 ENCOUNTER — HOSPITAL ENCOUNTER (EMERGENCY)
Facility: CLINIC | Age: 70
Discharge: HOME | End: 2020-11-16
Attending: EMERGENCY MEDICINE | Admitting: EMERGENCY MEDICINE
Payer: COMMERCIAL

## 2020-11-16 VITALS
DIASTOLIC BLOOD PRESSURE: 90 MMHG | OXYGEN SATURATION: 97 % | SYSTOLIC BLOOD PRESSURE: 133 MMHG | TEMPERATURE: 98.8 F | WEIGHT: 170 LBS | BODY MASS INDEX: 23.06 KG/M2 | HEART RATE: 78 BPM | RESPIRATION RATE: 28 BRPM

## 2020-11-16 DIAGNOSIS — E83.42 HYPOMAGNESEMIA: ICD-10-CM

## 2020-11-16 DIAGNOSIS — E21.3 HYPERPARATHYROIDISM (H): ICD-10-CM

## 2020-11-16 DIAGNOSIS — E83.51 HYPOCALCEMIA: ICD-10-CM

## 2020-11-16 LAB
ALBUMIN SERPL-MCNC: 2.9 G/DL (ref 3.4–5)
ALP SERPL-CCNC: 263 U/L (ref 40–150)
ALT SERPL W P-5'-P-CCNC: 41 U/L (ref 0–70)
ANION GAP SERPL CALCULATED.3IONS-SCNC: 4 MMOL/L (ref 3–14)
AST SERPL W P-5'-P-CCNC: 57 U/L (ref 0–45)
BASOPHILS # BLD AUTO: 0 10E9/L (ref 0–0.2)
BASOPHILS NFR BLD AUTO: 0.7 %
BILIRUB SERPL-MCNC: 0.7 MG/DL (ref 0.2–1.3)
BUN SERPL-MCNC: 42 MG/DL (ref 7–30)
CA-I SERPL ISE-MCNC: 3.3 MG/DL (ref 4.4–5.2)
CALCIUM SERPL-MCNC: 6.7 MG/DL (ref 8.5–10.1)
CHLORIDE SERPL-SCNC: 107 MMOL/L (ref 94–109)
CO2 SERPL-SCNC: 29 MMOL/L (ref 20–32)
CREAT SERPL-MCNC: 1.29 MG/DL (ref 0.66–1.25)
DIFFERENTIAL METHOD BLD: ABNORMAL
EOSINOPHIL # BLD AUTO: 0.1 10E9/L (ref 0–0.7)
EOSINOPHIL NFR BLD AUTO: 1.9 %
ERYTHROCYTE [DISTWIDTH] IN BLOOD BY AUTOMATED COUNT: 14.5 % (ref 10–15)
GFR SERPL CREATININE-BSD FRML MDRD: 55 ML/MIN/{1.73_M2}
GLUCOSE SERPL-MCNC: 227 MG/DL (ref 70–99)
HCT VFR BLD AUTO: 41.8 % (ref 40–53)
HGB BLD-MCNC: 13.2 G/DL (ref 13.3–17.7)
IMM GRANULOCYTES # BLD: 0 10E9/L (ref 0–0.4)
IMM GRANULOCYTES NFR BLD: 0.3 %
LYMPHOCYTES # BLD AUTO: 0.8 10E9/L (ref 0.8–5.3)
LYMPHOCYTES NFR BLD AUTO: 12.7 %
MAGNESIUM SERPL-MCNC: 0.9 MG/DL (ref 1.6–2.3)
MCH RBC QN AUTO: 31.6 PG (ref 26.5–33)
MCHC RBC AUTO-ENTMCNC: 31.6 G/DL (ref 31.5–36.5)
MCV RBC AUTO: 100 FL (ref 78–100)
MONOCYTES # BLD AUTO: 0.5 10E9/L (ref 0–1.3)
MONOCYTES NFR BLD AUTO: 8 %
NEUTROPHILS # BLD AUTO: 4.5 10E9/L (ref 1.6–8.3)
NEUTROPHILS NFR BLD AUTO: 76.4 %
NRBC # BLD AUTO: 0 10*3/UL
NRBC BLD AUTO-RTO: 0 /100
PLATELET # BLD AUTO: 111 10E9/L (ref 150–450)
POTASSIUM SERPL-SCNC: 4.7 MMOL/L (ref 3.4–5.3)
PROT SERPL-MCNC: 6.9 G/DL (ref 6.8–8.8)
RBC # BLD AUTO: 4.18 10E12/L (ref 4.4–5.9)
SODIUM SERPL-SCNC: 140 MMOL/L (ref 133–144)
WBC # BLD AUTO: 5.9 10E9/L (ref 4–11)

## 2020-11-16 PROCEDURE — 82330 ASSAY OF CALCIUM: CPT | Performed by: EMERGENCY MEDICINE

## 2020-11-16 PROCEDURE — 250N000011 HC RX IP 250 OP 636: Performed by: EMERGENCY MEDICINE

## 2020-11-16 PROCEDURE — 96365 THER/PROPH/DIAG IV INF INIT: CPT | Performed by: EMERGENCY MEDICINE

## 2020-11-16 PROCEDURE — 82542 COL CHROMOTOGRAPHY QUAL/QUAN: CPT | Performed by: EMERGENCY MEDICINE

## 2020-11-16 PROCEDURE — 99284 EMERGENCY DEPT VISIT MOD MDM: CPT | Mod: 25 | Performed by: EMERGENCY MEDICINE

## 2020-11-16 PROCEDURE — 96367 TX/PROPH/DG ADDL SEQ IV INF: CPT | Performed by: EMERGENCY MEDICINE

## 2020-11-16 PROCEDURE — 83735 ASSAY OF MAGNESIUM: CPT | Performed by: EMERGENCY MEDICINE

## 2020-11-16 PROCEDURE — 258N000003 HC RX IP 258 OP 636: Performed by: EMERGENCY MEDICINE

## 2020-11-16 PROCEDURE — 85025 COMPLETE CBC W/AUTO DIFF WBC: CPT | Performed by: EMERGENCY MEDICINE

## 2020-11-16 PROCEDURE — 80053 COMPREHEN METABOLIC PANEL: CPT | Performed by: EMERGENCY MEDICINE

## 2020-11-16 PROCEDURE — 93005 ELECTROCARDIOGRAM TRACING: CPT | Performed by: EMERGENCY MEDICINE

## 2020-11-16 PROCEDURE — 93010 ELECTROCARDIOGRAM REPORT: CPT | Performed by: EMERGENCY MEDICINE

## 2020-11-16 RX ORDER — MAGNESIUM SULFATE HEPTAHYDRATE 500 MG/ML
1 INJECTION, SOLUTION INTRAMUSCULAR; INTRAVENOUS ONCE
Status: DISCONTINUED | OUTPATIENT
Start: 2020-11-16 | End: 2020-11-16 | Stop reason: CLARIF

## 2020-11-16 RX ORDER — MAGNESIUM SULFATE 1 G/100ML
1 INJECTION INTRAVENOUS ONCE
Status: COMPLETED | OUTPATIENT
Start: 2020-11-16 | End: 2020-11-16

## 2020-11-16 RX ORDER — ASPIRIN 325 MG
325 TABLET ORAL 2 TIMES DAILY
Status: ON HOLD | COMMUNITY
End: 2022-04-08

## 2020-11-16 RX ORDER — INFLUENZA A VIRUS A/MICHIGAN/45/2015 X-275 (H1N1) ANTIGEN (FORMALDEHYDE INACTIVATED), INFLUENZA A VIRUS A/SINGAPORE/INFIMH-16-0019/2016 IVR-186 (H3N2) ANTIGEN (FORMALDEHYDE INACTIVATED), INFLUENZA B VIRUS B/PHUKET/3073/2013 ANTIGEN (FORMALDEHYDE INACTIVATED), AND INFLUENZA B VIRUS B/MARYLAND/15/2016 BX-69A ANTIGEN (FORMALDEHYDE INACTIVATED) 60; 60; 60; 60 UG/.7ML; UG/.7ML; UG/.7ML; UG/.7ML
INJECTION, SUSPENSION INTRAMUSCULAR SEE ADMIN INSTRUCTIONS
COMMUNITY
Start: 2020-09-11 | End: 2020-11-19

## 2020-11-16 RX ADMIN — MAGNESIUM SULFATE HEPTAHYDRATE 1 G: 1 INJECTION, SOLUTION INTRAVENOUS at 16:03

## 2020-11-16 RX ADMIN — CALCIUM GLUCONATE 1 G/HR: 98 INJECTION, SOLUTION INTRAVENOUS at 17:06

## 2020-11-16 NOTE — ED AVS SNAPSHOT
[facility] Emergency Dept  [address] [address]  Phone: [phone]  Fax: [phone]                                    Delbert Tilley   MRN: [id number]    Department: [facility] Emergency Dept   Date of Visit: 11/16/2020           After Visit Summary Signature Page    I have received my discharge instructions, and my questions have been answered. I have discussed any challenges I see with this plan with the nurse or doctor.    ..........................................................................................................................................  Patient/Patient Representative Signature      ..........................................................................................................................................  Patient Representative Print Name and Relationship to Patient    ..................................................               ................................................  Date                                   Time    ..........................................................................................................................................  Reviewed by Signature/Title    ...................................................              ..............................................  Date                                               Time          22EPIC Rev 08/18

## 2020-11-16 NOTE — TELEPHONE ENCOUNTER
Do we know which medications he's concerned about? Torsemide? I see he's in the ED so will await the results of that visit and can review after that any changes needed until his visit with Dr. Michaels later this week. Aliegl    ADDENDUM: The ED doc believes due to torsemide. Started TUMS 100 mg BID and ordered PTH (in process). Will discuss with [facility]. Lissett Sandoval RN Cardiology November 17, 2020, 7:43 AM

## 2020-11-16 NOTE — TELEPHONE ENCOUNTER
Pt went to ED 11/12/20 [facility]. Rec calcium and magnesium supplementation in ED. [facility] and he just got a call that his calcium and magnesium are low again and that he needs to go to the ED again. He was also asked to talk with Yareli Mina NP regarding his medications as one of his meds is causing this problem. Will discuss with Yareli Mina NP for recommendations. Pt instructed to go to ED again and I will contact him with recommendations. Lissett Sandoval RN Cardiology November 16, 2020, 11:09 AM    ADDENDUM: This should actually go to [facility] as pt is part of [facility]. Lissett Sandoval RN Cardiology November 16, 2020, 1:59 PM

## 2020-11-17 NOTE — ED PROVIDER NOTES
History     Chief Complaint   Patient presents with     Abnormal Labs     low calcium and magnisium continue.      HPI  Delbert Tilley is a 70 year old male with past medical history significant for coronary disease, CHF, diabetes, hypertension, hyperlipidemia and hypokalemia who presents to the emergency department complaining of abnormal labs.  Patient was recently seen in the emergency department secondary to hypocalcemia hypomagnesemia..  He was given IV medications and started on oral supplements.  He had a cough on Saturday and was started on antibiotics for a possible bronchitis/pneumonia.  He states his cough is improved and is feeling better but he had labs drawn on Saturday and he was noted today that his calcium was significantly low.  He was called and asked to come in for further evaluation and care.  Patient states he actually has no real major symptoms he is actually feeling better than he did when he was seen in the ED he is not having as much fatigue and weakness as he did and his cough is now improved with the antibiotics.  He denies any fevers or chills has not had any headache or visual changes he has not had any chest pain or shortness of breath.  He denies any abdominal pain or back pain.  He has not had any focal numbness weakness any extremity at this time.    Allergies:  Allergies   Allergen Reactions     Hydrocodone GI Disturbance     Upset stomach     Shellfish Allergy Hives and Rash       Problem List:    Patient Active Problem List    Diagnosis Date Noted     Hypotension, unspecified hypotension type 10/21/2020     Priority: Medium     Pneumonia of left upper lobe due to infectious organism 10/21/2020     Priority: Medium     Chronic obstructive pulmonary disease, unspecified COPD type (H) 03/09/2020     Priority: Medium     NSVT (nonsustained ventricular tachycardia) (H) 11/01/2019     Priority: Medium     Chronic systolic heart failure (H) 11/01/2019     Priority: Medium      Paroxysmal atrial fibrillation (H) 11/01/2019     Priority: Medium     Tobacco use 05/28/2019     Priority: Medium     Left carotid stenosis 09/08/2017     Priority: Medium     Carpal tunnel syndrome of right wrist 01/10/2016     Priority: Medium     Carotid stenosis 06/12/2015     Priority: Medium     Bilateral carotid Disease S/P Right carotid endarterectomy.   [facility]       CHF (congestive heart failure) (H) 06/08/2015     Priority: Medium     NSTEMI (non-ST elevated myocardial infarction) (H) 10/21/2014     Priority: Medium     ACS (acute coronary syndrome) (H) 10/20/2014     Priority: Medium     Hyperlipidemia LDL goal <70 07/08/2013     Priority: Medium     [facility]  Diagnosis updated by automated process. Provider to review and confirm.       TYPE 2 DIABETES, HBA1C GOAL < 8% 10/31/2010     Priority: Medium     CARDIOVASCULAR SCREENING; LDL GOAL LESS THAN 100 10/31/2010     Priority: Medium     Coronary artery disease involving native coronary artery of native heart without angina pectoris 07/07/2010     Priority: Medium     S/p mi  Bypass x 4 --grafting to the LAD obtuse marginal and PDA.   Angio on 6/10 negative          Alcohol abuse 07/07/2010     Priority: Medium     H/o rehab x 5        GERD (gastroesophageal reflux disease) 07/07/2010     Priority: Medium     Pancreatic disease 07/07/2010     Priority: Medium     H/o pancreatitis --on enzymes        Diabetes mellitus, type 2 (H) 07/07/2010     Priority: Medium     [facility]       Cardiomyopathy (H) 06/15/2010     Priority: Medium     EF 15%.--ICD placement        Atrial fibrillation/flutter (H) 06/15/2010     Priority: Medium     S/p ablation 1June 2010.             Past Medical History:    Past Medical History:   Diagnosis Date     Acute renal failure (H) 8/26/06 Hosp     Arthritis      CAD (coronary artery disease)      Carpal tunnel syndrome      CHF (congestive heart failure) (H)      Diabetes (H)       Gastro-oesophageal reflux disease      H/O: alcohol abuse      HTN (hypertension)      Hyperlipidaemia LDL goal <100 7/8/2013     Hypokalemia      Pacemaker      Pancreatitis 6/26/06 Hosp       Past Surgical History:    Past Surgical History:   Procedure Laterality Date     C CABG, ARTERIAL, THREE       CATARACT IOL, RT/LT      Cataract IOL RT/LT     ENDARTERECTOMY CAROTID Right 6/12/2015    Procedure: ENDARTERECTOMY CAROTID;  Surgeon: João Turner MD;  Location: [facility]     ENDARTERECTOMY CAROTID Left 9/8/2017    Procedure: ENDARTERECTOMY CAROTID;  LEFT CAROTID ENDARTERECTOMY WITH EEG;  Surgeon: João Turner MD;  Location: [facility] OR     IMPLANT PACEMAKER  6/10/2010     INTERPOSITION TENDON HAND N/A 6/9/2020    Procedure: Right thumb ligament reconstruction tendon interposition with extensor pollicis brevis to abductor pollicis tendon transfer;  Surgeon: Bethel Martin MD;  Location: [facility]     RELEASE CARPAL TUNNEL Right 4/12/2016    Procedure: RELEASE CARPAL TUNNEL;  Surgeon: Lissy Leger MD;  Location: [facility]     SURGICAL HISTORY OF -   1998    Laminectomy     TONSILLECTOMY & ADENOIDECTOMY         Family History:    Family History   Problem Relation Age of Onset     Unknown/Adopted No family hx of        Social History:  Marital Status:  Single [1]  Social History     Tobacco Use     Smoking status: Current Every Day Smoker     Packs/day: 2.00     Years: 50.00     Pack years: 100.00     Types: Cigarettes     Smokeless tobacco: Never Used     Tobacco comment: 1 1/2 PPD 2/16/18   Substance Use Topics     Alcohol use: No     Drug use: No        Medications:         albuterol (PROAIR HFA) 108 (90 Base) MCG/ACT Inhaler       amylase-lipase-protease (CREON 12) 58098 UNITS CPEP       aspirin (ASA) 500 MG EC tablet       atorvastatin (LIPITOR) 80 MG tablet       carvedilol (COREG) 6.25 MG tablet       fluticasone-salmeterol (ADVAIR DISKUS) 250-50 MCG/DOSE inhaler       FLUZONE HIGH-DOSE  QUADRIVALENT 0.7 ML SHARA injection       insulin glargine (LANTUS) 100 UNIT/ML injection       Insulin Pen Needle (PEN NEEDLES) 32G X 4 MM MISC       loperamide (IMODIUM) 2 MG capsule       mometasone (ASMANEX TWISTHALER) 220 MCG/INH inhaler       Nitroglycerin (NITROSTAT SL)       pantoprazole (PROTONIX) 40 MG EC tablet       torsemide (DEMADEX) 20 MG tablet          Review of Systems  All systems reviewed and other than pertinent positives and negatives in HPI all other systems are negative.  Physical Exam   BP: 113/68  Pulse: 91  Temp: 98.8  F (37.1  C)  Resp: 16  Weight: 77.1 kg (170 lb)  SpO2: 95 %      Physical Exam  Vitals signs and nursing note reviewed.   Constitutional:       General: He is not in acute distress.     Appearance: He is not ill-appearing, toxic-appearing or diaphoretic.      Comments: Thin elderly appearing male   HENT:      Head: Normocephalic and atraumatic.      Nose: Nose normal.      Mouth/Throat:      Mouth: Mucous membranes are moist.      Pharynx: Oropharynx is clear.   Eyes:      Conjunctiva/sclera: Conjunctivae normal.   Neck:      Musculoskeletal: Normal range of motion and neck supple.      Vascular: No carotid bruit.   Cardiovascular:      Rate and Rhythm: Regular rhythm.      Pulses: Normal pulses.      Heart sounds: Normal heart sounds. No murmur.   Pulmonary:      Effort: Pulmonary effort is normal.      Breath sounds: Normal breath sounds. No wheezing or rhonchi.   Abdominal:      General: Abdomen is flat. Bowel sounds are normal.      Palpations: Abdomen is soft.      Tenderness: There is no left CVA tenderness.   Musculoskeletal: Normal range of motion.         General: No swelling, tenderness or deformity.      Right lower leg: No edema.      Left lower leg: No edema.   Lymphadenopathy:      Cervical: No cervical adenopathy.   Skin:     General: Skin is warm and dry.      Findings: No rash.   Neurological:      General: No focal deficit present.      Mental Status: He is  alert and oriented to person, place, and time.      Sensory: No sensory deficit.      Motor: No weakness.      Coordination: Coordination normal.      Deep Tendon Reflexes: Reflexes normal.   Psychiatric:         Mood and Affect: Mood normal.         ED Course        Procedures               EKG Interpretation:      Interpreted by Delbert Chua MD  Rhythm: normal sinus   Rate: Normal  Axis: Right Axis Deviation  Ectopy: none  Conduction: nonspecific interventricular conduction block  ST Segments/ T Waves: Non-specific ST-T wave changes  Q Waves: none  Comparison to prior: Unchanged from 11/12/2020    Clinical Impression: Normal sinus rhythm with right axis and possible right posterior block and nonspecific ST-T wave changes.    Critical Care time:  none               Results for orders placed or performed during the hospital encounter of 11/16/20 (from the past 24 hour(s))   CBC with platelets differential   Result Value Ref Range    WBC 5.9 4.0 - 11.0 10e9/L    RBC Count 4.18 (L) 4.4 - 5.9 10e12/L    Hemoglobin 13.2 (L) 13.3 - 17.7 g/dL    Hematocrit 41.8 40.0 - 53.0 %     78 - 100 fl    MCH 31.6 26.5 - 33.0 pg    MCHC 31.6 31.5 - 36.5 g/dL    RDW 14.5 10.0 - 15.0 %    Platelet Count 111 (L) 150 - 450 10e9/L    Diff Method Automated Method     % Neutrophils 76.4 %    % Lymphocytes 12.7 %    % Monocytes 8.0 %    % Eosinophils 1.9 %    % Basophils 0.7 %    % Immature Granulocytes 0.3 %    Nucleated RBCs 0 0 /100    Absolute Neutrophil 4.5 1.6 - 8.3 10e9/L    Absolute Lymphocytes 0.8 0.8 - 5.3 10e9/L    Absolute Monocytes 0.5 0.0 - 1.3 10e9/L    Absolute Eosinophils 0.1 0.0 - 0.7 10e9/L    Absolute Basophils 0.0 0.0 - 0.2 10e9/L    Abs Immature Granulocytes 0.0 0 - 0.4 10e9/L    Absolute Nucleated RBC 0.0    Comprehensive metabolic panel   Result Value Ref Range    Sodium 140 133 - 144 mmol/L    Potassium 4.7 3.4 - 5.3 mmol/L    Chloride 107 94 - 109 mmol/L    Carbon Dioxide 29 20 - 32 mmol/L    Anion  Gap 4 3 - 14 mmol/L    Glucose 227 (H) 70 - 99 mg/dL    Urea Nitrogen 42 (H) 7 - 30 mg/dL    Creatinine 1.29 (H) 0.66 - 1.25 mg/dL    GFR Estimate 55 (L) >60 mL/min/[1.73_m2]    GFR Estimate If Black 64 >60 mL/min/[1.73_m2]    Calcium 6.7 (L) 8.5 - 10.1 mg/dL    Bilirubin Total 0.7 0.2 - 1.3 mg/dL    Albumin 2.9 (L) 3.4 - 5.0 g/dL    Protein Total 6.9 6.8 - 8.8 g/dL    Alkaline Phosphatase 263 (H) 40 - 150 U/L    ALT 41 0 - 70 U/L    AST 57 (H) 0 - 45 U/L   Magnesium   Result Value Ref Range    Magnesium 0.9 (LL) 1.6 - 2.3 mg/dL   Calcium ionized   Result Value Ref Range    Calcium Ionized 3.3 (L) 4.4 - 5.2 mg/dL       Medications   calcium gluconate 1 g in D5W 100 mL infusion (0 g/hr Intravenous Stopped 11/16/20 1749)   magnesium sulfate 1 gm in 100mL D5W intermittent infusion (0 g Intravenous Stopped 11/16/20 1702)       Assessments & Plan (with Medical Decision Making) records were reviewed.  Labs were obtained.  [facility] unfortunately [facility] and were having trouble getting them sent.  Patient is upset that he had to come back to the emergency department and has not been contacted by his physician.  He does not want significant work-up done other than getting the IV medications he needs in getting follow-up as soon as possible.  Patient's white count was 5.9 hemoglobin 13.2.  Platelet count was 111.  Comprehensive metabolic panel significant for glucose of 227.  BUN was 42 with a creatinine of 1.29 this is improved from previous.  Patient's calcium was 6.7 which is an improvement from the 12th.  Ionized calcium was also improved at 3.3 compared to 2.8.  Magnesium which was 0.6 earlier in the week is now 0.9.  Findings discussed with patient.  I did give the patient another dose of IV calcium gluconate and a dose of IV magnesium.  He tolerated these well with no arrhythmias on monitor.  His PTH is elevated and will need to be looked into further.  I tried to [facility] but was unable to but did make a virtual visit appointment Wednesday with his PA.  He can go over medications at this time I have also noted that patient does have a cardiology appointment on Friday.  Patient again did not want any further work-up done and will follow up as scheduled.  He will continue the oral medications he has been taking since last visit will return if symptoms worsen or new symptoms develop.     I have reviewed the nursing notes.    I have reviewed the findings, diagnosis, plan and need for follow up with the patient.       Discharge Medication List as of 11/16/2020  6:11 PM          Final diagnoses:   Hyperparathyroidism (H)   Hypocalcemia   Hypomagnesemia       11/16/2020   [facility] EMERGENCY DEPT     Delbert Chua MD  11/17/20 7633

## 2020-11-17 NOTE — DISCHARGE INSTRUCTIONS
Return if symptoms worsen or new symptoms develop.  Follow-up with primary care physician on Wednesday for a telemedicine visit.  Drink plenty of fluids.  Follow-up with your cardiologist on Friday as scheduled.  Continue medications for hypocalcemia and hypomagnesemia has directed.  If any chest pain shortness of breath weakness or other symptoms present please return for further evaluation and care.

## 2020-11-17 NOTE — TELEPHONE ENCOUNTER
He can hold his Torsemide until follow up and watch for weight gain and HF symptoms. Do you know why his appointment on Friday with Dr. Michaels got cancelled? Thanks. Aliegl    ADDENDUM: LM with pt regarding holding torsemide and watching for heart failure symptoms. Also noted that appointment on 11/20/20 with Dr Michaels was cancelled by a [facility]  and rescheduled to a phone visit with Hansa on 11/18/20. Hansa notified. Lissett Sandoval RN Cardiology November 17, 2020, 8:30 AM

## 2020-11-18 ENCOUNTER — VIRTUAL VISIT (OUTPATIENT)
Dept: CARDIOLOGY | Facility: CLINIC | Age: 70
End: 2020-11-18
Attending: PHYSICIAN ASSISTANT
Payer: COMMERCIAL

## 2020-11-18 DIAGNOSIS — I50.22 CHRONIC SYSTOLIC CONGESTIVE HEART FAILURE (H): ICD-10-CM

## 2020-11-18 DIAGNOSIS — E55.9 HYPOVITAMINOSIS D: Primary | ICD-10-CM

## 2020-11-18 DIAGNOSIS — N18.30 STAGE 3 CHRONIC KIDNEY DISEASE, UNSPECIFIED WHETHER STAGE 3A OR 3B CKD (H): ICD-10-CM

## 2020-11-18 DIAGNOSIS — I25.5 ISCHEMIC CARDIOMYOPATHY: Primary | ICD-10-CM

## 2020-11-18 PROCEDURE — 99214 OFFICE O/P EST MOD 30 MIN: CPT | Mod: 95 | Performed by: PHYSICIAN ASSISTANT

## 2020-11-18 RX ORDER — LOSARTAN POTASSIUM 100 MG/1
50 TABLET ORAL DAILY
Status: ON HOLD | COMMUNITY
End: 2022-04-09

## 2020-11-18 RX ORDER — TORSEMIDE 20 MG/1
20 TABLET ORAL DAILY
Qty: 30 TABLET | Refills: 4 | COMMUNITY
Start: 2020-11-18 | End: 2020-11-30

## 2020-11-18 RX ORDER — DOXYCYCLINE 100 MG/1
100 CAPSULE ORAL 2 TIMES DAILY
COMMUNITY
End: 2020-12-09

## 2020-11-18 RX ORDER — ERGOCALCIFEROL 1.25 MG/1
1 CAPSULE, LIQUID FILLED ORAL WEEKLY
Qty: 12 CAPSULE | Refills: 0 | Status: CANCELLED | OUTPATIENT
Start: 2020-11-18 | End: 2021-02-04

## 2020-11-18 NOTE — PATIENT INSTRUCTIONS
Please call [facility] nurse for any questions or concerns Mon-Fri 8am-4pm:   [phone]    Today, we discussed the following:   - Medication changes:    - Follow up:      Please, remember to continue the followin.  Weigh yourself daily. Call if your weight is up > than 2 pounds in one day, or 5 pounds in one week; if you feel more short of breath or have worsening swelling in your legs or abdomen.  2.  Eat a low sodium diet (less than 2,000mg or 2g daily). If you eat less salt, you should retain less fluid.   3.  Avoid alcohol, as this can worsen heart failure.   4.  Avoid NSAIDs as able (For example, Ibuprofen / aleve / advil / naprosen / diclofenac).     *If you need to speak with [facility] Scheduling, please call:   Cardiology [phone]  Diagnostic Imaging [phone]  Lab [phone]

## 2020-11-18 NOTE — PROGRESS NOTES
"  Delbert Tilley is a 70 year old male who is being evaluated via a billable telephone visit.      The patient has been notified of following:     \"This telephone visit will be conducted via a call between you and your physician/provider. We have found that certain health care needs can be provided without the need for a physical exam.  This service lets us provide the care you need with a short phone conversation.  If a prescription is necessary we can send it directly to your pharmacy.  If lab work is needed we can place an order for that and you can then stop by our lab to have the test done at a later time.    Telephone visits are billed at different rates depending on your insurance coverage. During this emergency period, for some insurers they may be billed the same as an in-person visit.  Please reach out to your insurance provider with any questions.    If during the course of the call the physician/provider feels a telephone visit is not appropriate, you will not be charged for this service.\"    Patient has given verbal consent for Telephone visit?  Yes    What phone number would you like to be contacted at? [phone]    How would you like to obtain your AVS? Mail a copy     Provider notes:  Delbert Tilley is a very pleasant 69 year old male with a history of an ischemic cardiomyopathy with an EF of around 20 to 25%, coronary artery disease with prior coronary artery bypass grafting in the remote past with a LIMA to the LAD and saphenous vein graft to the circumflex and PDA. Additionally, he has a history of atrial flutter for which she has undergone ablation.  He has an ICD implanted for primary prevention.  He also has comorbidities of peripheral vascular disease with prior bilateral carotid endarterectomies, poorly controlled diabetes mellitus, COPD, and ongoing tobacco use.    In 2014, he suffered a non-ST elevation myocardial infarction and received stents to his SVG to OM graft at that time.  His EF " was 35 to 40% plan.  In 2017, he was scheduled to undergo carotid endarterectomy.  He had a stress test done which showed no ischemia but his ejection fraction had fallen.  He subsequently presented with heart failure symptoms and was noted to have an EF of 20 to 25% on echocardiogram.  His angiogram at that time showed his LIMA graft to the LAD was patent with a 50% stenosis in the apical LAD.  The vein graft to the RPDA had occluded.  His vein graft to the OM was patent.     Historically, he has been maintained on beta-blockers and vasodilator therapy with an ARB.  It appears he previously was on spironolactone although had market hypotension with this.     He had a stress test done this spring prior to surgery on his thumb which showed evidence of a prior infarct but no ischemia. Ultimately, his surgery was postponed due to COVID-19. He had a virtual visit with Dr. Michaels in May prior to his reschedule surgery.  He had some increasing leg edema and had been taking an extra 20 mg of Lasix with improvement.  He was only needing to do this in frequently about once a month.  He was switched from metoprolol XL to carvedilol at that time and was referred to the [facility].    He called the clinic at the end of June noting he had gained about 13 pounds in 4 to 5 days.  His weight had gone up from his baseline of around 165 pounds up to 178 pounds.  He did take an increasing doses of Lasix without any improvement and it was recommended he switch to torsemide 40 mg daily.     I spoke with Delbert shortly after that switch was made on 6/29/2020.  He fortunately responded very well to the torsemide and was back to his dry weight of 165 pounds.  His heart failure symptoms had essentially resolved.  He has chronic exertional dyspnea in the setting of his COPD but this is back to baseline.  I suggested he continue to take 40 mg of torsemide and decrease this to 20 mg if his weight dropped below 162 pounds which it did. We  discussed switching to Entresto but at this point that is cost prohibitive.    I had a visit with Delbert on 8/10/20. He was doing well at that time without congestive heart failure symptoms. Labs after his appointment showed an elevated BUN and mildly elevated creatinine of 1.2. I suggested cutting back his Torsemide to 10 mg daily if weight less than 165 lbs and taking 20 mg of Torsemide for a weight greater than 165 lbs.    Delbert was recently admitted at [facility] overnight on 10/21/20 with worsening shortness of breath. CXR showed a RUL infiltrate. He was treated with antibiotics as well as a prednisone burst given his COPD. HE was hypotensive and treated with IVF. His Torsemide, coreg, and cozaar were held initially although Torsemide and carvedilol were resumed. He was instructed to hold his cozaar until follow up with his PCP. His renal function was worse at 1.7 on discharge.     He since has been in the ED twice with electrolyte abnormalities. He was sent for significant hypocalcemia and [facility]agn[facility]. On 11/12, his ionized calcium was 2.8. He had no significant symptoms. The case was discussed with nephrology at the [facility] who recommended labs including a PTH, mag, phosphorus, and Vit D. He was treated with calcium gluconate and was discharged on oral TUMS. He was told this was likely related to his Torsemide thus he called the clinic requesting an appointment to discuss. He was then in the ED two days ago with lab abnormalities. His ionized calcium was 3.3. Mag was 0.9. He was given more IV doses of calcium and magnesium and discharged home.    I do note that his PTH was high and his Vit D level was low. It appears Dr. Maxwell from the [facility] has made some recommendations regarding starting ergocalciferol and cholecalciferol to his PCP.     I spoke with Delbert today for follow up. I did have him hold his Torsemide yesterday. He notes overall he feels okay  although he lacks ambition and is tired. He has ongoing dyspnea on exertion which is worse than his baseline. Additionally, he notes increased peripheral edema. He tells me his thighs are swollen. If he wears his compression stockings he has minimal pretibial or pedal edema but tells me that all of that fluid gets pushed up his thighs. His weight has trended up to 176 lbs today. He previously had been very stable in the low to mid 160s. He weighs regularly but does not record his weights. He believes his weight has increased in the last 1 - 2 weeks. He reports he had follow up after his hospitalization and a follow up CXR was abnormal thus he remains on antibiotics for pneumonia. He does not have any follow up with his PCP. He had a telephone visit scheduled on Friday with Dr. Michaels but for some reason that was cancelled.    Of note, I reviewed his medications with him in detail today. Despite being instructed to hold his cozaar he has been taking that since his hospital discharge. Additionally, at his last appointment I recommended cutting his Torsemide back to 10 mg daily but he has continued to take 40 mg daily up until yesterday.     Assessment/plan:  Delbert Tilley is a pleasant 70 year old male with a history of coronary artery disease as noted above with remote CABG, an ischemic cardiomyopathy with a LVEF of 20 - 25% on last assessment with chronic systolic heart failure, atrial flutter s/p ablation, an ICD in place and multiple co-morbidities as noted above.     As noted, earlier this summer Delbert developed worsening congestive heart failure symptoms. He was switched from lasix to Torsemide with resolution of his heart failure symptoms and from a heart failure perspective has done well on that dose. As noted above, there was some confusion about his Torsemide dose and he has continued to take 40 mg of Torsemide daily rather than 10 mg as I had discussed with him at his last appointment. Regardless, he was  recently hospitalized with pneumonia and hypotension and now has been in the ED twice with electrolyte abnormalities including significant hypomagnesemia and hypocalcemia. He received IV supplemental while in the ED and was started on TUMS. Further work up is notable for a low Vit D and a high PTH level for which recommendations were made by Dr. Maxwell who was consulted while in the ED. He does not have any primary care follow up and I stressed the importance of that today. He will call and make an appointment with Dr. Berger's office as soon as possible.     I'm concerned about his weight gain of at least 10 lbs over the last week. I reviewed his recent ED notes and it does not appear based on documentation that he appeared significantly volume overloaded. Understanding diuretics may be contributing to his electrolyte abnormalities, I am concerned about stopping them entirely as I worry he will have progressive congestive heart failure symptoms. We agreed to have him take 20 mg of Torsemide daily for now which is half the dose he had been taking. I asked him to closely monitor and write down his weights in the coming week and to call us if his weight is going up or he has worsening heart failure symptoms.    His heart failure therapy at this point includes carvedilol 6.25 mg twice daily and losartan 50 mg daily. I did no change those today. Entresto was cost prohibitive when we last discussed. His blood pressure seems to be stable. As above, he had a visit with Dr. Michaels that was cancelled. I suggested we arrange for close follow up with Dr. Michaels or with us in the [facility]. We will call to arrange that. He should also have an echocardiogram as it he has been over a year since his last one. I have ordered that as well.     Phone call duration: 30 minutes    Parul Badillo PA-C      Vital signs reported by patient  BP. 106/68  P. 70  Wt. 176 lb Patient stated he has gained 11 pounds in the past week  Ht.  6ft  Review Of Systems  Skin: NEGATIVE  Eyes:Ears/Nose/Throat: NEGATIVE  Respiratory: sob on exertion   Cardiovascular: lightheadedness, edema in thighs and ankles  Gastrointestinal: NEGATIVE  Genitourinary:NEGATIVE   Musculoskeletal: NEGATIVE  Neurologic: NEGATIVE  Psychiatric: NEGATIVE  Hematologic/Lymphatic/Immunologic: NEGATIVE  Endocrine:  Diabetes   Dunia Macedo Lpn

## 2020-11-18 NOTE — PROGRESS NOTES
Subjective     Delbert Tilley is a 70 year old male who presents to clinic today for the following health issues:    HPI         ED/UC Followup:    Facility:  [facility]  Date of visit: 11/16/2020  Reason for visit:   Hyperparathyroidism (H)     Hypocalcemia     Hypomagnesemia    HPI  Delbert Tilley is a 70 year old male with past medical history significant for coronary disease, CHF, diabetes, hypertension, hyperlipidemia and hypokalemia who presents to the emergency department complaining of abnormal labs.  Patient was recently seen in the emergency department secondary to hypocalcemia hypomagnesemia..  He was given IV medications and started on oral supplements.  He had a cough on Saturday and was started on antibiotics for a possible bronchitis/pneumonia.  He states his cough is improved and is feeling better but he had labs drawn on Saturday and he was noted today that his calcium was significantly low.  He was called and asked to come in for further evaluation and care.  Patient states he actually has no real major symptoms he is actually feeling better than he did when he was seen in the ED he is not having as much fatigue and weakness as he did and his cough is now improved with the antibiotics.  He denies any fevers or chills has not had any headache or visual changes he has not had any chest pain or shortness of breath.  He denies any abdominal pain or back pain.  He has not had any focal numbness weakness any extremity at this time.  Current Status: Patient would like to discuss Torsemide. Patient is only taking 10 mg daily. Patient feels no different and has no new symptoms.        [facility] UPPER LOBE pneumonia and sepsis.  Discharged on doxycycline.  They told him his pneumonia was not gone, they also found the low calcium and magnesium and told him to follow up at the closest clinic - he went to the ER in [place]. Doxycycline 100 mg  "twice daily was prescribed yet again.  Apparently a xray was done.   Currently sats are good here in clinic.  He did have abnormalities on his CT scan that is requiring a follow up CT Scan in 6 months.      Hypocalcemia was noted while inpatient as well.  Apparently this got to a critical level and he has now had to ER visits for IV calcium.  There was a phone consultation with nephrology who made recommendations and then recommended ergocalciferol and cholecalciferol and close follow up.     Patient has seen cardiology and there is some dispute about his torsemide dosing.  He apparently was taking 40 mg, though now tells me he's taking 10 mg.  \"half of what I was on\".  He's unclear on what dose this is, I did give him the recommendation for 20 mg from cardiology.  He's a bit concerned about weight gain, but this has not been rapid weight gain from what I can ascertain. I did give him the [facility] number as was recommended by cardiology at his apt yesterday.  He will need close follow up.     PTH elevated at 149.  PTHrP is still pending  Vitamin D low at 13.         Review of Systems   Constitutional, HEENT, cardiovascular, pulmonary, gi and gu systems are negative, except as otherwise noted.      Objective    /60   Pulse 78   Temp 96.2  F (35.7  C) (Tympanic)   Resp 22   Ht 1.778 m (5' 10\")   Wt 78.9 kg (174 lb)   SpO2 96%   BMI 24.97 kg/m    Body mass index is 24.97 kg/m .  Physical Exam   GENERAL: healthy, alert and no distress  NECK: no adenopathy, no asymmetry, masses, or scars and thyroid normal to palpation  RESP: lungs clear to auscultation - no rales, rhonchi or wheezes  CV: regular rate and rhythm, normal S1 S2, no S3 or S4, no murmur, click or rub, no peripheral edema and peripheral pulses strong  ABDOMEN: soft, nontender, no hepatosplenomegaly, no masses and bowel sounds normal  MS: no gross musculoskeletal defects noted, trace-1+ edema  Wearing compression stockings    Labs reviewed.    "     Assessment & Plan     Hypocalcemia   instructed to increase TUMS (Calcium carbonate) to 1000 mg three times daily   He's been doing 500 mg twice daily at this time.   Follow closely.   - Basic metabolic panel    Hyperparathyroidism (H)       Hypomagnesemia     - Magnesium    Vitamin D deficiency     - vitamin D2 (ERGOCALCIFEROL) 12353 units (1250 mcg) capsule; Take 1 capsule (50,000 Units) by mouth once a week for 12 days  - cholecalciferol 125 MCG (5000 UT) CAPS; Take 1 capsule (5,000 Units) by mouth daily  - Basic metabolic panel    Patient Instructions   Torsemide - take 20 mg daily per the cardiology PA    Ergocalciferol 50,000 international unit(s) once a week x 12 weeks.  After the 12 weeks you will just be on the Cholecalciferol.     Cholecalciferol -125 mcg (5000 units) daily - ongoing    Increase your Calcium (TUMS) to 2 tablets three times a day (500 mg tablets)     You need a chest CT in 6 months to follow up on lung nodules.  This will be in April.        Information from cardiology yesterday:    Please call [facility] nurse for any questions or concerns Mon-Fri 8am-4pm:   [phone]     Today, we discussed the following:   - Medication changes:    - Follow up:      Please, remember to continue the followin.  Weigh yourself daily. Call if your weight is up > than 2 pounds in one day, or 5 pounds in one week; if you feel more short of breath or have worsening swelling in your legs or abdomen.  2.  Eat a low sodium diet (less than 2,000mg or 2g daily). If you eat less salt, you should retain less fluid.   3.  Avoid alcohol, as this can worsen heart failure.   4.  Avoid NSAIDs as able (For example, Ibuprofen / aleve / advil / naprosen / diclofenac).     *If you need to speak with [facility] Scheduling, please call:   Cardiology [phone]  Diagnostic Imaging [phone]  Lab [phone]            Tobacco Cessation:   reports that he has been smoking  cigarettes. He has a 100.00 pack-year smoking history. He has never used smokeless tobacco.               No follow-ups on file.    Danii Hernández MD  [facility]

## 2020-11-18 NOTE — LETTER
"11/18/2020    [facility]  [address] [address]    RE: Delbert Tilley       Dear Colleague,    I had the pleasure of seeing Delbert Tilley in the [facility].      Delbert Tilley is a 70 year old male who is being evaluated via a billable telephone visit.      The patient has been notified of following:     \"This telephone visit will be conducted via a call between you and your physician/provider. We have found that certain health care needs can be provided without the need for a physical exam.  This service lets us provide the care you need with a short phone conversation.  If a prescription is necessary we can send it directly to your pharmacy.  If lab work is needed we can place an order for that and you can then stop by our lab to have the test done at a later time.    Telephone visits are billed at different rates depending on your insurance coverage. During this emergency period, for some insurers they may be billed the same as an in-person visit.  Please reach out to your insurance provider with any questions.    If during the course of the call the physician/provider feels a telephone visit is not appropriate, you will not be charged for this service.\"    Patient has given verbal consent for Telephone visit?  Yes    What phone number would you like to be contacted at? [phone]    How would you like to obtain your AVS? Mail a copy     Provider notes:  Delbert Tilley is a very pleasant 69 year old male with a history of an ischemic cardiomyopathy with an EF of around 20 to 25%, coronary artery disease with prior coronary artery bypass grafting in the remote past with a LIMA to the LAD and saphenous vein graft to the circumflex and PDA. Additionally, he has a history of atrial flutter for which she has undergone ablation.  He has an ICD implanted for primary prevention.  He also has comorbidities of peripheral vascular disease with prior bilateral " carotid endarterectomies, poorly controlled diabetes mellitus, COPD, and ongoing tobacco use.    In 2014, he suffered a non-ST elevation myocardial infarction and received stents to his SVG to OM graft at that time.  His EF was 35 to 40% plan.  In 2017, he was scheduled to undergo carotid endarterectomy.  He had a stress test done which showed no ischemia but his ejection fraction had fallen.  He subsequently presented with heart failure symptoms and was noted to have an EF of 20 to 25% on echocardiogram.  His angiogram at that time showed his LIMA graft to the LAD was patent with a 50% stenosis in the apical LAD.  The vein graft to the RPDA had occluded.  His vein graft to the OM was patent.     Historically, he has been maintained on beta-blockers and vasodilator therapy with an ARB.  It appears he previously was on spironolactone although had market hypotension with this.     He had a stress test done this spring prior to surgery on his thumb which showed evidence of a prior infarct but no ischemia. Ultimately, his surgery was postponed due to COVID-19. He had a virtual visit with Dr. Michaels in May prior to his reschedule surgery.  He had some increasing leg edema and had been taking an extra 20 mg of Lasix with improvement.  He was only needing to do this in frequently about once a month.  He was switched from metoprolol XL to carvedilol at that time and was referred to the [facility] clinic.    He called the clinic at the end of June noting he had gained about 13 pounds in 4 to 5 days.  His weight had gone up from his baseline of around 165 pounds up to 178 pounds.  He did take an increasing doses of Lasix without any improvement and it was recommended he switch to torsemide 40 mg daily.     I spoke with Delbert shortly after that switch was made on 6/29/2020.  He fortunately responded very well to the torsemide and was back to his dry weight of 165 pounds.  His heart failure symptoms had essentially resolved.  He has  chronic exertional dyspnea in the setting of his COPD but this is back to baseline.  I suggested he continue to take 40 mg of torsemide and decrease this to 20 mg if his weight dropped below 162 pounds which it did. We discussed switching to Entresto but at this point that is cost prohibitive.    I had a visit with Delbert on 8/10/20. He was doing well at that time without congestive heart failure symptoms. Labs after his appointment showed an elevated BUN and mildly elevated creatinine of 1.2. I suggested cutting back his Torsemide to 10 mg daily if weight less than 165 lbs and taking 20 mg of Torsemide for a weight greater than 165 lbs.    Delbert was recently admitted at [facility] overnight on 10/21/20 with worsening shortness of breath. CXR showed a RUL infiltrate. He was treated with antibiotics as well as a prednisone burst given his COPD. HE was hypotensive and treated with IVF. His Torsemide, coreg, and cozaar were held initially although Torsemide and carvedilol were resumed. He was instructed to hold his cozaar until follow up with his PCP. His renal function was worse at 1.7 on discharge.     He since has been in the ED twice with electrolyte abnormalities. He was sent for significant hypocalcemia and [facility]agnesemia [facility]. On 11/12, his ionized calcium was 2.8. He had no significant symptoms. The case was discussed with nephrology at the [facility] who recommended labs including a PTH, mag, phosphorus, and Vit D. He was treated with calcium gluconate and was discharged on oral TUMS. He was told this was likely related to his Torsemide thus he called the clinic requesting an appointment to discuss. He was then in the ED two days ago with lab abnormalities. His ionized calcium was 3.3. Mag was 0.9. He was given more IV doses of calcium and magnesium and discharged home.    I do note that his PTH was high and his Vit D level was low. It appears Dr. Maxwell from the [facility] has  made some recommendations regarding starting ergocalciferol and cholecalciferol to his PCP.     I spoke with Delbert today for follow up. I did have him hold his Torsemide yesterday. He notes overall he feels okay although he lacks ambition and is tired. He has ongoing dyspnea on exertion which is worse than his baseline. Additionally, he notes increased peripheral edema. He tells me his thighs are swollen. If he wears his compression stockings he has minimal pretibial or pedal edema but tells me that all of that fluid gets pushed up his thighs. His weight has trended up to 176 lbs today. He previously had been very stable in the low to mid 160s. He weighs regularly but does not record his weights. He believes his weight has increased in the last 1 - 2 weeks. He reports he had follow up after his hospitalization and a follow up CXR was abnormal thus he remains on antibiotics for pneumonia. He does not have any follow up with his PCP. He had a telephone visit scheduled on Friday with Dr. Michaels but for some reason that was cancelled.    Of note, I reviewed his medications with him in detail today. Despite being instructed to hold his cozaar he has been taking that since his hospital discharge. Additionally, at his last appointment I recommended cutting his Torsemide back to 10 mg daily but he has continued to take 40 mg daily up until yesterday.     Assessment/plan:  Delbert Tilley is a pleasant 70 year old male with a history of coronary artery disease as noted above with remote CABG, an ischemic cardiomyopathy with a LVEF of 20 - 25% on last assessment with chronic systolic heart failure, atrial flutter s/p ablation, an ICD in place and multiple co-morbidities as noted above.     As noted, earlier this summer Delbert developed worsening congestive heart failure symptoms. He was switched from lasix to Torsemide with resolution of his heart failure symptoms and from a heart failure perspective has done well on that dose. As  noted above, there was some confusion about his Torsemide dose and he has continued to take 40 mg of Torsemide daily rather than 10 mg as I had discussed with him at his last appointment. Regardless, he was recently hospitalized with pneumonia and hypotension and now has been in the ED twice with electrolyte abnormalities including significant hypomagnesemia and hypocalcemia. He received IV supplemental while in the ED and was started on TUMS. Further work up is notable for a low Vit D and a high PTH level for which recommendations were made by Dr. Maxwell who was consulted while in the ED. He does not have any primary care follow up and I stressed the importance of that today. He will call and make an appointment with Dr. Berger's office as soon as possible.     I'm concerned about his weight gain of at least 10 lbs over the last week. I reviewed his recent ED notes and it does not appear based on documentation that he appeared significantly volume overloaded. Understanding diuretics may be contributing to his electrolyte abnormalities, I am concerned about stopping them entirely as I worry he will have progressive congestive heart failure symptoms. We agreed to have him take 20 mg of Torsemide daily for now which is half the dose he had been taking. I asked him to closely monitor and write down his weights in the coming week and to call us if his weight is going up or he has worsening heart failure symptoms.    His heart failure therapy at this point includes carvedilol 6.25 mg twice daily and losartan 50 mg daily. I did no change those today. Entresto was cost prohibitive when we last discussed. His blood pressure seems to be stable. As above, he had a visit with Dr. Michaels that was cancelled. I suggested we arrange for close follow up with Dr. Michaels or with us in the [facility] clinic. We will call to arrange that. He should also have an echocardiogram as it he has been over a year since his last one. I have ordered that  as well.     Phone call duration: 30 minutes    Parul Badillo PA-C      Vital signs reported by patient  BP. 106/68  P. 70  Wt. 176 lb Patient stated he has gained 11 pounds in the past week  Ht. 6ft  Review Of Systems  Skin: NEGATIVE  Eyes:Ears/Nose/Throat: NEGATIVE  Respiratory: sob on exertion   Cardiovascular: lightheadedness, edema in thighs and ankles  Gastrointestinal: NEGATIVE  Genitourinary:NEGATIVE   Musculoskeletal: NEGATIVE  Neurologic: NEGATIVE  Psychiatric: NEGATIVE  Hematologic/Lymphatic/Immunologic: NEGATIVE  Endocrine:  Diabetes   Dunia Macedo Lpn      Thank you for allowing me to participate in the care of your patient.    Sincerely,     Parul Badillo PA-C     [facility]

## 2020-11-19 ENCOUNTER — OFFICE VISIT (OUTPATIENT)
Dept: FAMILY MEDICINE | Facility: CLINIC | Age: 70
End: 2020-11-19
Payer: COMMERCIAL

## 2020-11-19 VITALS
HEIGHT: 70 IN | HEART RATE: 78 BPM | WEIGHT: 174 LBS | SYSTOLIC BLOOD PRESSURE: 100 MMHG | BODY MASS INDEX: 24.91 KG/M2 | OXYGEN SATURATION: 96 % | TEMPERATURE: 96.2 F | RESPIRATION RATE: 22 BRPM | DIASTOLIC BLOOD PRESSURE: 60 MMHG

## 2020-11-19 DIAGNOSIS — E55.9 VITAMIN D DEFICIENCY: ICD-10-CM

## 2020-11-19 DIAGNOSIS — E83.51 HYPOCALCEMIA: Primary | ICD-10-CM

## 2020-11-19 DIAGNOSIS — E83.42 HYPOMAGNESEMIA: ICD-10-CM

## 2020-11-19 DIAGNOSIS — E21.3 HYPERPARATHYROIDISM (H): ICD-10-CM

## 2020-11-19 LAB
ANION GAP SERPL CALCULATED.3IONS-SCNC: 8 MMOL/L (ref 3–14)
BUN SERPL-MCNC: 35 MG/DL (ref 7–30)
CALCIUM SERPL-MCNC: 8.4 MG/DL (ref 8.5–10.1)
CHLORIDE SERPL-SCNC: 105 MMOL/L (ref 94–109)
CO2 SERPL-SCNC: 25 MMOL/L (ref 20–32)
CREAT SERPL-MCNC: 1.47 MG/DL (ref 0.66–1.25)
GFR SERPL CREATININE-BSD FRML MDRD: 47 ML/MIN/{1.73_M2}
GLUCOSE SERPL-MCNC: 118 MG/DL (ref 70–99)
MAGNESIUM SERPL-MCNC: 1 MG/DL (ref 1.6–2.3)
POTASSIUM SERPL-SCNC: 4.4 MMOL/L (ref 3.4–5.3)
SODIUM SERPL-SCNC: 138 MMOL/L (ref 133–144)

## 2020-11-19 PROCEDURE — 99214 OFFICE O/P EST MOD 30 MIN: CPT | Performed by: FAMILY MEDICINE

## 2020-11-19 PROCEDURE — 83735 ASSAY OF MAGNESIUM: CPT | Performed by: FAMILY MEDICINE

## 2020-11-19 PROCEDURE — 80048 BASIC METABOLIC PNL TOTAL CA: CPT | Performed by: FAMILY MEDICINE

## 2020-11-19 PROCEDURE — 36415 COLL VENOUS BLD VENIPUNCTURE: CPT | Performed by: FAMILY MEDICINE

## 2020-11-19 RX ORDER — ERGOCALCIFEROL 1.25 MG/1
50000 CAPSULE, LIQUID FILLED ORAL WEEKLY
Qty: 12 CAPSULE | Refills: 0 | Status: SHIPPED | OUTPATIENT
Start: 2020-11-19 | End: 2020-12-01

## 2020-11-19 ASSESSMENT — PAIN SCALES - GENERAL: PAINLEVEL: MODERATE PAIN (5)

## 2020-11-19 ASSESSMENT — MIFFLIN-ST. JEOR: SCORE: 1545.51

## 2020-11-19 NOTE — PATIENT INSTRUCTIONS
Torsemide - take 20 mg daily per the cardiology PA    Ergocalciferol 50,000 international unit(s) once a week x 12 weeks.  After the 12 weeks you will just be on the Cholecalciferol.     Cholecalciferol -125 mcg (5000 units) daily - ongoing    Increase your Calcium (TUMS) to 2 tablets three times a day (500 mg tablets)     You need a chest CT in 6 months to follow up on lung nodules.  This will be in April.        Information from cardiology yesterday:    Please call [facility] nurse for any questions or concerns Mon-Fri 8am-4pm:   [phone]     Today, we discussed the following:   - Medication changes:    - Follow up:      Please, remember to continue the followin.  Weigh yourself daily. Call if your weight is up > than 2 pounds in one day, or 5 pounds in one week; if you feel more short of breath or have worsening swelling in your legs or abdomen.  2.  Eat a low sodium diet (less than 2,000mg or 2g daily). If you eat less salt, you should retain less fluid.   3.  Avoid alcohol, as this can worsen heart failure.   4.  Avoid NSAIDs as able (For example, Ibuprofen / aleve / advil / naprosen / diclofenac).     *If you need to speak with [place] [facility] Scheduling, please call:   Cardiology [phone]  Diagnostic Imaging [phone]  Lab [phone]

## 2020-11-20 RX ORDER — MAGNESIUM CHLORIDE 71.5 G/G
1 TABLET ORAL 2 TIMES DAILY
Qty: 60 TABLET | Refills: 3 | Status: SHIPPED | OUTPATIENT
Start: 2020-11-20 | End: 2020-12-16

## 2020-11-22 LAB — PTH RELATED PROT SERPL-SCNC: 3.2 PMOL/L (ref 0–2.3)

## 2020-11-23 ENCOUNTER — TELEPHONE (OUTPATIENT)
Dept: CARDIOLOGY | Facility: CLINIC | Age: 70
End: 2020-11-23

## 2020-11-23 ENCOUNTER — HOSPITAL ENCOUNTER (OUTPATIENT)
Dept: CARDIOLOGY | Facility: CLINIC | Age: 70
Discharge: HOME | End: 2020-11-23
Attending: PHYSICIAN ASSISTANT | Admitting: PHYSICIAN ASSISTANT
Payer: COMMERCIAL

## 2020-11-23 ENCOUNTER — TELEPHONE (OUTPATIENT)
Dept: FAMILY MEDICINE | Facility: CLINIC | Age: 70
End: 2020-11-23

## 2020-11-23 DIAGNOSIS — I25.5 ISCHEMIC CARDIOMYOPATHY: ICD-10-CM

## 2020-11-23 PROCEDURE — 93306 TTE W/DOPPLER COMPLETE: CPT

## 2020-11-23 PROCEDURE — 93306 TTE W/DOPPLER COMPLETE: CPT | Mod: 26 | Performed by: INTERNAL MEDICINE

## 2020-11-23 NOTE — TELEPHONE ENCOUNTER
Reason for call:  Patient reporting a symptom    Symptom or request: Pt continues to gain weight and have a lot edema in his legs.  Please call patient and advise.    Duration (how long have symptoms been present): ongoing    Have you been treated for this before? Yes    Additional comments:     Phone Number patient can be reached at:  Home number on file [phone] (home)    Best Time:  any    Can we leave a detailed message on this number:  YES    Call taken on 11/23/2020 at 11:26 AM by Nora Bueno

## 2020-11-23 NOTE — TELEPHONE ENCOUNTER
Received call from RN [place] [facility].  Pt reporting wt gain and does not have follow up scheduled with cards.    Spoke with pt - he states his wt is up 10 pounds.  He has bilat leg swelling and is SOB (is also a smoker).  States he is taking torsemide 20mg daily - has been reduced recently.  Weight today is 178#.    PMH:  ED visit 11/12/20 hypocalcemia (Ca Ionized 2.8) IV Ca Gluc/Mag given   Hospital 10/21/20 SOB/PNX   Last device check 9/23/20   Last [facility] visit 8/10/20-changed to 10 mg of Torsemide for a weight of 165 lbs or less and 20 mg for a weight greater than 165 lbs   PMH: COPD, cardiomyopathy, CAD s/p 4 vessel bypass, pacemaker/defib, atrial fibrillation s/p ablation and GERD     Last visit 11/18/20 with Hansa Badillo.    Echo done today 11/23/20    Next available appt in [place] is 12/4/20 with Dr Michaels (virtual).     Will route to [facility] for recommendations.  ALEXANDRA DIA RN on 11/23/2020 at 11:52 AM

## 2020-11-23 NOTE — TELEPHONE ENCOUNTER
Spoke with Cardiology RN and she will call pt to schedule V.V with Dr. Michaels for next week, 12/4/20. Samuel

## 2020-11-30 ENCOUNTER — VIRTUAL VISIT (OUTPATIENT)
Dept: CARDIOLOGY | Facility: CLINIC | Age: 70
End: 2020-11-30
Payer: COMMERCIAL

## 2020-11-30 VITALS — WEIGHT: 169 LBS | BODY MASS INDEX: 24.25 KG/M2 | DIASTOLIC BLOOD PRESSURE: 76 MMHG | SYSTOLIC BLOOD PRESSURE: 115 MMHG

## 2020-11-30 DIAGNOSIS — E83.51 HYPOCALCEMIA: Primary | ICD-10-CM

## 2020-11-30 DIAGNOSIS — E83.42 HYPOMAGNESEMIA: ICD-10-CM

## 2020-11-30 DIAGNOSIS — I50.22 CHRONIC SYSTOLIC HEART FAILURE (H): ICD-10-CM

## 2020-11-30 DIAGNOSIS — I25.5 ISCHEMIC CARDIOMYOPATHY: ICD-10-CM

## 2020-11-30 PROCEDURE — 99213 OFFICE O/P EST LOW 20 MIN: CPT | Mod: 95 | Performed by: PHYSICIAN ASSISTANT

## 2020-11-30 RX ORDER — TORSEMIDE 20 MG/1
40 TABLET ORAL DAILY
Qty: 30 TABLET | Refills: 4 | Status: ON HOLD | COMMUNITY
Start: 2020-11-30 | End: 2022-04-09

## 2020-11-30 NOTE — PATIENT INSTRUCTIONS
Today, we discussed the following:   - Medication changes:     Please reduce the torsemide to 30 mg (1 and 1/2 tabs) daily.    Please take the magnesium 2 tablets twice daily.   - Follow up:    I have ordered a referral to nephrology (kidney doctor) for assessment of your abnormal electrolytes.    I will get something set up with Dr. Michaels within the next month. My schedulers will call you to arrange this.    Your next device check is end of December.     Please, remember to continue the followin.  Weigh yourself daily. Call if your weight is up > than 2 pounds in one day, or 5 pounds in one week; if you feel more short of breath or have worsening swelling in your legs or abdomen.  2.  Eat a low sodium diet (less than 2,000mg or 2g daily). If you eat less salt, you will retain less fluid.   3.  Avoid alcohol, as this can worsen heart failure.   4.  Avoid NSAIDs as able (For example, Ibuprofen / aleve / advil / naprosen / diclofenac).    Please call [facility] nurse for any questions or concerns Mon-Fri 8am-4pm:   [phone]  For concerns after hours:   [phone] option 2   Scheduling phone number:   [phone]    Thank you for visiting with us today.   Kristen Rice PA-C  ______________________________________________________________

## 2020-11-30 NOTE — PROGRESS NOTES
"Delbert Tilley is a 70 year old male who is being evaluated via a billable telephone visit.      The patient has been notified of following:     \"This telephone visit will be conducted via a call between you and your physician/provider. We have found that certain health care needs can be provided without the need for a physical exam.  This service lets us provide the care you need with a short phone conversation.  If a prescription is necessary we can send it directly to your pharmacy.  If lab work is needed we can place an order for that and you can then stop by our lab to have the test done at a later time.    Telephone visits are billed at different rates depending on your insurance coverage. During this emergency period, for some insurers they may be billed the same as an in-person visit.  Please reach out to your insurance provider with any questions.    If during the course of the call the physician/provider feels a telephone visit is not appropriate, you will not be charged for this service.\"    Patient has given verbal consent for Telephone visit?  Yes    What phone number would you like to be contacted at? [phone]    How would you like to obtain your AVS? Mail a copy    [facility] for CORE follow-ups:  - Any difficulty breathing, including when trying to lie in bed at night? no  - Any leg swelling  (?compression or wraps), abdominal bloating, reduction in appetite? no  - Any chest pain or palpitations? no  - Weight trend over the past week? Going down  - O2 or CPAP use? no  - Do they have a pulse oximeter they can use to check O2 sat/HR? no  Today's Home Vitals: 169lb 115/76  _________________________________________  I have reviewed and updated the patient's Past Medical History, Social History, Family History and Medication List.    ALLERGIES  Hydrocodone and Shellfish allergy    MEDICATIONS  Current Outpatient Medications   Medication Sig Dispense Refill     albuterol (PROAIR HFA) 108 (90 Base) MCG/ACT " Inhaler Inhale 2 puffs into the lungs every 4 hours as needed for shortness of breath / dyspnea 1 Inhaler 0     amylase-lipase-protease (CREON 12) 06036 UNITS CPEP Take 1 capsule (12,000 Units) by mouth daily 120 capsule 0     aspirin (ASA) 500 MG EC tablet Take 1,000 mg by mouth daily       atorvastatin (LIPITOR) 80 MG tablet Take 80 mg by mouth At Bedtime        carvedilol (COREG) 6.25 MG tablet Take 1 tablet (6.25 mg) by mouth 2 times daily (with meals) 180 tablet 3     cholecalciferol 125 MCG (5000 UT) CAPS Take 1 capsule (5,000 Units) by mouth daily 90 capsule 1     doxycycline hyclate (VIBRAMYCIN) 100 MG capsule Take 100 mg by mouth 2 times daily Take twice a day for 10 days       fluticasone-salmeterol (ADVAIR DISKUS) 250-50 MCG/DOSE inhaler Inhale 1 puff into the lungs every 12 hours 1 Inhaler 1     GABAPENTIN PO Take 100 mg by mouth as needed       insulin glargine (LANTUS) 100 UNIT/ML injection Inject 32 Units Subcutaneous every morning        Insulin Pen Needle (PEN NEEDLES) 32G X 4 MM MISC        loperamide (IMODIUM) 2 MG capsule Take 2 mg by mouth 4 times daily as needed for diarrhea       losartan (COZAAR) 100 MG tablet Take 50 mg by mouth daily       Magnesium Cl-Calcium Carbonate (SLOW-MAG) 71.5-119 MG TBEC Take 1 tablet by mouth 2 times daily 60 tablet 3     mometasone (ASMANEX TWISTHALER) 220 MCG/INH inhaler Inhale 2 puffs into the lungs daily       Nitroglycerin (NITROSTAT SL) Place 0.4 mg under the tongue every 5 minutes as needed for chest pain       pantoprazole (PROTONIX) 40 MG EC tablet Take 1 tablet (40 mg) by mouth daily 30 tablet      torsemide (DEMADEX) 20 MG tablet Take 20 mg by mouth daily 11/23 Take 40mg BID for 3 days 30 tablet 4     vitamin D2 (ERGOCALCIFEROL) 93412 units (1250 mcg) capsule Take 1 capsule (50,000 Units) by mouth once a week for 12 days 12 capsule 0       HPI: (Please see Hansa Badillo's note from 11/18/2020 for the patient's detailed history)  The patient has a  pertinent cardiac history of the following -   1.  Ischemic cardiomyopathy, with an LVEF which fell from 35-40% to 20-25% in 2017. He has a history of significant hypotension in the past with addition of spironolactone, and Entresto has been cost prohibitive.  2.  CAD with history of CABG remotely, LIMA-LAD, SVG-LCx-PDA.  NSTEMI status post PCI of SVG-OM graft in 2014.  Most recent angiogram in 2017 showed his RPDA has occluded, and there is a 50% apical LAD lesion.  Stress test in spring 2020 showed no ischemia.  3.  History of atrial flutter status post ablation  4.  Status post ICD for primary prevention of sudden cardiac death.  5.  Peripheral vascular disease with prior bilateral carotid endarterectomies  6.  Poorly controlled diabetes mellitus  7.  COPD with ongoing tobacco use.  8.  Hx of ETOH dependence.  9.  Arthritis, on chronic high-dose aspirin.      In brief, Delbert has been following in the core clinic with my colleague Hansa.  He has required adjustments in his diuretic therapy, specifically switching his Lasix to torsemide this summer, with up titration of his dose in the fall to maintain a weight around 165 pounds.  Unfortunately, he was admitted to [facility] end of October with a right upper lobe pneumonia, and was also noted to be hypotensive and in acute renal failure for which he was given IVF and his PTA Cozaar was held.  Since then, he was sent back to the ER twice after electrolyte abnormalities were noted [facility]  These included asymptomatic hypocalcemia and hypomagnesemia felt in part related to his torsemide therapy, but also his PTH level was high and vitamin D level was low, and he is supposed to follow-up with his PCP for ongoing management of this.     On November 18, Delbert had a telephone visit with Hansa, and revealed to her that he had continued taking his Cozaar as well as his torsemide and a higher than his prescribed dose of 40 mg  "daily (he been instructed to reduce this to 10 mg daily back in August).  Despite this, he complained of progressive exertional dyspnea, peripheral edema, and a 10 pound weight gain over the prior week.  Of note, he remained on antibiotic therapy for a persistent right upper lobe pneumonia at that time.  Hansa asked him to reduce his torsemide dose to 20 mg daily, and to closely monitor his home weights and call us with any further gain.  His other medications were left alone.      The next day, he had labs drawn with his PCP Dr. Hernández, which showed a creatinine of 1.47, BUN of 35, potassium of 4.4, and magnesium level of 1.  It looks to me like his baseline creatinine is around 1-1.2.  She started supplemental magnesium.      On November 23, Delbert had an echo which showed a stable EF of 20-25%.  However, given some evidence of volume overload, his torsemide was increased to 20 mg twice daily for the following 3 days, then reduced back to 20 mg daily.    Today, Delbert tells me that his dyspnea and edema have improved, but haven't yet returned to baseline. He tells me he \"always coughs,\" but that has also improved. He is still taking torsemide at 20 mg BID. Over the past week his weight has come down from 180 lbs --> 170 lbs at home. He picked up supplemental magnesium chloride 64 meq OTC from the pharmacy but isn't sure how much he's supposed to take. For the past week he's just been taking 1 tab twice daily. He also tells me that he had no idea he was supposed to see nephrology. He states he's staying away from high-sodium foods.       Of note, his last ICD check was performed on September 23, and showed minimal ventricular pacing with a 29% atrial paced, adequate heart rate variability for his activity level (estimated at only 2 hours/day), stable OptiVol fluid status, 8 years remaining on his battery life, and no arrhythmias.    Assessment/Plan:  1. HFrEF, chronic ischemic CMP, LVEF 20-25%, with recent volume " overload, improved with torsemide up-titration.    This is a challenging situation, as I am not familiar with this patient, and am attempting to assess him over the phone. His recent management has been complicated by pneumonia, medication errors/noncompliance, and RADHA with electrolyte dyscrasias including severe hypomagnesemia and hypocalcemia, which his diuretic therapy is likely exacerbating.  This is particularly concerning in a patient with a reduced LVEF already at higher risk of malignant arrhythmias, although thankfully an ICD is in place.   Per review of PCP notes, he is supposed to be following closely with nephrology for this, however per the patient he was not aware of this.  From a cardiac standpoint, his symptoms have improved with a 10-lb wt loss after increasing the torsemide to 20 mg BID, however ongoing precipitous weight loss would be dangerous.  I have therefore asked him to reduce his torsemide from 40 to 30 mg daily. I do not think a sliding scale regimen will be feasible for him, and instead have asked him to notify us with rapid weight fluctuations (5+ lbs up or down over a week's span).  I will need him to follow-up with nephrology for workup and management of his electrolyte dyscrasias and have placed a referral order today. Ideally he will be seen and have labs repeated within the next couple of weeks, and I will ask my nursing team to follow up on this. For now, I have advised him to take 2 tabs of magnesium 64 BID instead of 1 tab BID as I do not think that will be enough to supplement him adequately.    He will return to clinic for a routine ICD check later this month, and I will also get him scheduled to see Dr. Michaels around that time. Again, he was encouraged to notify us in the meantime with weight fluctuations or recurrence of progressive CHF symptoms. In this case, instead of increasing his torsemide, I would advise revisiting switching his Losartan to Entresto (which previously was  assessed to cost about $50/month after deductible is met, and pt therefore deferred).    Phone call duration: 22 minutes    Kristen Rice PA-C  [facility] - [facility]  Pager: [phone]  Text Page  (7:30am - 4pm M-F)

## 2020-11-30 NOTE — LETTER
"11/30/2020    [facility]  [address] [address]    RE: Delbert Tilley       Dear Colleague,    I had the pleasure of seeing Delbert Tilley in the [facility].    Delbert Tilley is a 70 year old male who is being evaluated via a billable telephone visit.      The patient has been notified of following:     \"This telephone visit will be conducted via a call between you and your physician/provider. We have found that certain health care needs can be provided without the need for a physical exam.  This service lets us provide the care you need with a short phone conversation.  If a prescription is necessary we can send it directly to your pharmacy.  If lab work is needed we can place an order for that and you can then stop by our lab to have the test done at a later time.    Telephone visits are billed at different rates depending on your insurance coverage. During this emergency period, for some insurers they may be billed the same as an in-person visit.  Please reach out to your insurance provider with any questions.    If during the course of the call the physician/provider feels a telephone visit is not appropriate, you will not be charged for this service.\"    Patient has given verbal consent for Telephone visit?  Yes    What phone number would you like to be contacted at? [phone]    How would you like to obtain your AVS? Mail a copy    [facility] for CORE follow-ups:  - Any difficulty breathing, including when trying to lie in bed at night? no  - Any leg swelling  (?compression or wraps), abdominal bloating, reduction in appetite? no  - Any chest pain or palpitations? no  - Weight trend over the past week? Going down  - O2 or CPAP use? no  - Do they have a pulse oximeter they can use to check O2 sat/HR? no  Today's Home Vitals: 169lb 115/76  _________________________________________  I have reviewed and updated the patient's Past Medical History, Social " History, Family History and Medication List.    ALLERGIES  Hydrocodone and Shellfish allergy    MEDICATIONS  Current Outpatient Medications   Medication Sig Dispense Refill     albuterol (PROAIR HFA) 108 (90 Base) MCG/ACT Inhaler Inhale 2 puffs into the lungs every 4 hours as needed for shortness of breath / dyspnea 1 Inhaler 0     amylase-lipase-protease (CREON 12) 05726 UNITS CPEP Take 1 capsule (12,000 Units) by mouth daily 120 capsule 0     aspirin (ASA) 500 MG EC tablet Take 1,000 mg by mouth daily       atorvastatin (LIPITOR) 80 MG tablet Take 80 mg by mouth At Bedtime        carvedilol (COREG) 6.25 MG tablet Take 1 tablet (6.25 mg) by mouth 2 times daily (with meals) 180 tablet 3     cholecalciferol 125 MCG (5000 UT) CAPS Take 1 capsule (5,000 Units) by mouth daily 90 capsule 1     doxycycline hyclate (VIBRAMYCIN) 100 MG capsule Take 100 mg by mouth 2 times daily Take twice a day for 10 days       fluticasone-salmeterol (ADVAIR DISKUS) 250-50 MCG/DOSE inhaler Inhale 1 puff into the lungs every 12 hours 1 Inhaler 1     GABAPENTIN PO Take 100 mg by mouth as needed       insulin glargine (LANTUS) 100 UNIT/ML injection Inject 32 Units Subcutaneous every morning        Insulin Pen Needle (PEN NEEDLES) 32G X 4 MM MISC        loperamide (IMODIUM) 2 MG capsule Take 2 mg by mouth 4 times daily as needed for diarrhea       losartan (COZAAR) 100 MG tablet Take 50 mg by mouth daily       Magnesium Cl-Calcium Carbonate (SLOW-MAG) 71.5-119 MG TBEC Take 1 tablet by mouth 2 times daily 60 tablet 3     mometasone (ASMANEX TWISTHALER) 220 MCG/INH inhaler Inhale 2 puffs into the lungs daily       Nitroglycerin (NITROSTAT SL) Place 0.4 mg under the tongue every 5 minutes as needed for chest pain       pantoprazole (PROTONIX) 40 MG EC tablet Take 1 tablet (40 mg) by mouth daily 30 tablet      torsemide (DEMADEX) 20 MG tablet Take 20 mg by mouth daily 11/23 Take 40mg BID for 3 days 30 tablet 4     vitamin D2 (ERGOCALCIFEROL) 82126  units (1250 mcg) capsule Take 1 capsule (50,000 Units) by mouth once a week for 12 days 12 capsule 0       HPI: (Please see Hansa Badillo's note from 11/18/2020 for the patient's detailed history)  The patient has a pertinent cardiac history of the following -   1.  Ischemic cardiomyopathy, with an LVEF which fell from 35-40% to 20-25% in 2017. He has a history of significant hypotension in the past with addition of spironolactone, and Entresto has been cost prohibitive.  2.  CAD with history of CABG remotely, LIMA-LAD, SVG-LCx-PDA.  NSTEMI status post PCI of SVG-OM graft in 2014.  Most recent angiogram in 2017 showed his RPDA has occluded, and there is a 50% apical LAD lesion.  Stress test in spring 2020 showed no ischemia.  3.  History of atrial flutter status post ablation  4.  Status post ICD for primary prevention of sudden cardiac death.  5.  Peripheral vascular disease with prior bilateral carotid endarterectomies  6.  Poorly controlled diabetes mellitus  7.  COPD with ongoing tobacco use.  8.  Hx of ETOH dependence.  9.  Arthritis, on chronic high-dose aspirin.      In brief, Delbert has been following in the core clinic with my colleague Hanas.  He has required adjustments in his diuretic therapy, specifically switching his Lasix to torsemide this summer, with up titration of his dose in the fall to maintain a weight around 165 pounds.  Unfortunately, he was admitted to [facility] end of October with a right upper lobe pneumonia, and was also noted to be hypotensive and in acute renal failure for which he was given IVF and his PTA Cozaar was held.  Since then, he was sent back to the ER twice after electrolyte abnormalities were noted on [facility]  These included asymptomatic hypocalcemia and hypomagnesemia felt in part related to his torsemide therapy, but also his PTH level was high and vitamin D level was low, and he is supposed to follow-up with his PCP for ongoing  "management of this.     On November 18, Delbert had a telephone visit with Hansa, and revealed to her that he had continued taking his Cozaar as well as his torsemide and a higher than his prescribed dose of 40 mg daily (he been instructed to reduce this to 10 mg daily back in August).  Despite this, he complained of progressive exertional dyspnea, peripheral edema, and a 10 pound weight gain over the prior week.  Of note, he remained on antibiotic therapy for a persistent right upper lobe pneumonia at that time.  Hansa asked him to reduce his torsemide dose to 20 mg daily, and to closely monitor his home weights and call us with any further gain.  His other medications were left alone.      The next day, he had labs drawn with his PCP Dr. Hernández, which showed a creatinine of 1.47, BUN of 35, potassium of 4.4, and magnesium level of 1.  It looks to me like his baseline creatinine is around 1-1.2.  She started supplemental magnesium.      On November 23, Delbert had an echo which showed a stable EF of 20-25%.  However, given some evidence of volume overload, his torsemide was increased to 20 mg twice daily for the following 3 days, then reduced back to 20 mg daily.    Today, Delbert tells me that his dyspnea and edema have improved, but haven't yet returned to baseline. He tells me he \"always coughs,\" but that has also improved. He is still taking torsemide at 20 mg BID. Over the past week his weight has come down from 180 lbs --> 170 lbs at home. He picked up supplemental magnesium chloride 64 meq OTC from the pharmacy but isn't sure how much he's supposed to take. For the past week he's just been taking 1 tab twice daily. He also tells me that he had no idea he was supposed to see nephrology. He states he's staying away from high-sodium foods.       Of note, his last ICD check was performed on September 23, and showed minimal ventricular pacing with a 29% atrial paced, adequate heart rate variability for his activity " level (estimated at only 2 hours/day), stable OptiVol fluid status, 8 years remaining on his battery life, and no arrhythmias.    Assessment/Plan:  1. HFrEF, chronic ischemic CMP, LVEF 20-25%, with recent volume overload, improved with torsemide up-titration.    This is a challenging situation, as I am not familiar with this patient, and am attempting to assess him over the phone. His recent management has been complicated by pneumonia, medication errors/noncompliance, and RADHA with electrolyte dyscrasias including severe hypomagnesemia and hypocalcemia, which his diuretic therapy is likely exacerbating.  This is particularly concerning in a patient with a reduced LVEF already at higher risk of malignant arrhythmias, although thankfully an ICD is in place.   Per review of PCP notes, he is supposed to be following closely with nephrology for this, however per the patient he was not aware of this.  From a cardiac standpoint, his symptoms have improved with a 10-lb wt loss after increasing the torsemide to 20 mg BID, however ongoing precipitous weight loss would be dangerous.  I have therefore asked him to reduce his torsemide from 40 to 30 mg daily. I do not think a sliding scale regimen will be feasible for him, and instead have asked him to notify us with rapid weight fluctuations (5+ lbs up or down over a week's span).  I will need him to follow-up with nephrology for ongoing management of his diuretic therapy and have placed a referral order today, ideally he will be seen and have labs repeated within the next couple of weeks, and I will ask my nursing team to follow up on this. For now, I have advised him to take 2 tabs of magnesium 64 BID instead of 1 tab BID.     He will return to clinic for a routine ICD check later this month, and I will also get him scheduled to see Dr. Michaels around that time. Again, he was encouraged to notify us in the meantime with weight fluctuations or recurrence of progressive CHF  symptoms. In this case, instead of increasing his torsemide, I would advise revisiting switching his Losartan to Entresto (which previously was assessed to cost about $50/month after deductible is met, and pt therefore deferred).    Phone call duration: 22 minutes    Kristen Rice PA-C  [facility] - Heart Clinic  Pager: [phone]  Text Page  (7:30am - 4pm M-F)       Thank you for allowing me to participate in the care of your patient.    Sincerely,     Kristen Rice PA-C     [facility]

## 2020-12-07 ENCOUNTER — TELEPHONE (OUTPATIENT)
Dept: FAMILY MEDICINE | Facility: CLINIC | Age: 70
End: 2020-12-07

## 2020-12-07 DIAGNOSIS — E83.42 HYPOMAGNESEMIA: ICD-10-CM

## 2020-12-07 DIAGNOSIS — E21.3 HYPERPARATHYROIDISM (H): Primary | ICD-10-CM

## 2020-12-07 DIAGNOSIS — I50.22 CHRONIC SYSTOLIC HEART FAILURE (H): ICD-10-CM

## 2020-12-07 DIAGNOSIS — E83.51 HYPOCALCEMIA: ICD-10-CM

## 2020-12-07 DIAGNOSIS — E55.9 HYPOVITAMINOSIS D: ICD-10-CM

## 2020-12-07 DIAGNOSIS — E83.42 HYPOMAGNESEMIA: Primary | ICD-10-CM

## 2020-12-07 LAB
ALBUMIN SERPL-MCNC: 3 G/DL (ref 3.4–5)
ALP SERPL-CCNC: 223 U/L (ref 40–150)
ALT SERPL W P-5'-P-CCNC: 28 U/L (ref 0–70)
ANION GAP SERPL CALCULATED.3IONS-SCNC: 5 MMOL/L (ref 3–14)
AST SERPL W P-5'-P-CCNC: 38 U/L (ref 0–45)
BILIRUB SERPL-MCNC: 0.8 MG/DL (ref 0.2–1.3)
BUN SERPL-MCNC: 38 MG/DL (ref 7–30)
CALCIUM SERPL-MCNC: 9 MG/DL (ref 8.5–10.1)
CHLORIDE SERPL-SCNC: 102 MMOL/L (ref 94–109)
CO2 SERPL-SCNC: 29 MMOL/L (ref 20–32)
CREAT SERPL-MCNC: 1.2 MG/DL (ref 0.66–1.25)
GFR SERPL CREATININE-BSD FRML MDRD: 60 ML/MIN/{1.73_M2}
GLUCOSE SERPL-MCNC: 236 MG/DL (ref 70–99)
MAGNESIUM SERPL-MCNC: 0.7 MG/DL (ref 1.6–2.3)
POTASSIUM SERPL-SCNC: 4.4 MMOL/L (ref 3.4–5.3)
PROT SERPL-MCNC: 6.9 G/DL (ref 6.8–8.8)
PTH-INTACT SERPL-MCNC: 86 PG/ML (ref 18–80)
SODIUM SERPL-SCNC: 136 MMOL/L (ref 133–144)

## 2020-12-07 PROCEDURE — 83970 ASSAY OF PARATHORMONE: CPT | Performed by: PHYSICIAN ASSISTANT

## 2020-12-07 PROCEDURE — 80053 COMPREHEN METABOLIC PANEL: CPT | Performed by: PHYSICIAN ASSISTANT

## 2020-12-07 PROCEDURE — 82306 VITAMIN D 25 HYDROXY: CPT | Performed by: PHYSICIAN ASSISTANT

## 2020-12-07 PROCEDURE — 83735 ASSAY OF MAGNESIUM: CPT | Performed by: PHYSICIAN ASSISTANT

## 2020-12-07 PROCEDURE — 36415 COLL VENOUS BLD VENIPUNCTURE: CPT | Performed by: PHYSICIAN ASSISTANT

## 2020-12-07 NOTE — TELEPHONE ENCOUNTER
Patient is called and told to increase his magnesium and repeat labs in 2 days.  S&S to go to ER were gone over with the patient. Connected to appts. Chary DORSEY RN

## 2020-12-07 NOTE — TELEPHONE ENCOUNTER
Please call patient and ask if he has any new symptoms since seeing Dr. Hernández.  If not having any new or concerning symptoms, increase magnesium pills to 1 tablet 4 x a day.  Repeat magnesium and BMP on visit in 2 days.    Patient should be brought to the ER if he develops tremor, muscle spasms, convulsions, progressive weakness, apathy, confusion, or loss of consciousness.

## 2020-12-07 NOTE — TELEPHONE ENCOUNTER
Dr. Berger,    Patient scheduled appts but you never ordered the BMP, magnesium.  Pended for your approval. Chary DORSEY RN

## 2020-12-08 LAB — DEPRECATED CALCIDIOL+CALCIFEROL SERPL-MC: 30 UG/L (ref 20–75)

## 2020-12-09 ENCOUNTER — VIRTUAL VISIT (OUTPATIENT)
Dept: FAMILY MEDICINE | Facility: CLINIC | Age: 70
End: 2020-12-09
Payer: COMMERCIAL

## 2020-12-09 DIAGNOSIS — E55.9 VITAMIN D DEFICIENCY: Primary | ICD-10-CM

## 2020-12-09 PROCEDURE — 99213 OFFICE O/P EST LOW 20 MIN: CPT | Mod: 95 | Performed by: FAMILY MEDICINE

## 2020-12-09 NOTE — PROGRESS NOTES
"Delbert Tilley is a 70 year old male who is being evaluated via a billable telephone visit.      The patient has been notified of following:     \"This telephone visit will be conducted via a call between you and your physician/provider. We have found that certain health care needs can be provided without the need for a physical exam.  This service lets us provide the care you need with a short phone conversation.  If a prescription is necessary we can send it directly to your pharmacy.  If lab work is needed we can place an order for that and you can then stop by our lab to have the test done at a later time.    Telephone visits are billed at different rates depending on your insurance coverage. During this emergency period, for some insurers they may be billed the same as an in-person visit.  Please reach out to your insurance provider with any questions.    If during the course of the call the physician/provider feels a telephone visit is not appropriate, you will not be charged for this service.\"    Patient has given verbal consent for Telephone visit?  Yes    What phone number would you like to be contacted at? [phone]    How would you like to obtain your AVS? Mail a copy    Subjective     Delbert Tilley is a 70 year old male who presents via phone visit today for the following health issues:    HPI     Chief Complaint   Patient presents with     Results     Follow up on vitamin D lab results.         Found to have low vitamin D level in the hospital recently. Started on replacement.  Had repeat level 2 days ago - normal.  Patient denies bone pains or pathologic fracture history.    Patient is due for repeat Mg, PTH and BMP tomorrow.    Patient Active Problem List   Diagnosis     Cardiomyopathy (H)     Atrial fibrillation/flutter (H)     Coronary artery disease involving native coronary artery of native heart without angina pectoris     Alcohol abuse     GERD (gastroesophageal reflux disease)     Pancreatic " disease     Diabetes mellitus, type 2 (H)     TYPE 2 DIABETES, HBA1C GOAL < 8%     CARDIOVASCULAR SCREENING; LDL GOAL LESS THAN 100     Hyperlipidemia LDL goal <70     ACS (acute coronary syndrome) (H)     NSTEMI (non-ST elevated myocardial infarction) (H)     CHF (congestive heart failure) (H)     Carotid stenosis     Carpal tunnel syndrome of right wrist     Left carotid stenosis     Tobacco use     NSVT (nonsustained ventricular tachycardia) (H)     Chronic systolic heart failure (H)     Paroxysmal atrial fibrillation (H)     Chronic obstructive pulmonary disease, unspecified COPD type (H)     Hypotension, unspecified hypotension type     Pneumonia of left upper lobe due to infectious organism     Chronic kidney disease, stage 3     Past Surgical History:   Procedure Laterality Date     C CABG, ARTERIAL, THREE       CATARACT IOL, RT/LT      Cataract IOL RT/LT     ENDARTERECTOMY CAROTID Right 6/12/2015    Procedure: ENDARTERECTOMY CAROTID;  Surgeon: João Turner MD;  Location:  OR     ENDARTERECTOMY CAROTID Left 9/8/2017    Procedure: ENDARTERECTOMY CAROTID;  LEFT CAROTID ENDARTERECTOMY WITH EEG;  Surgeon: João Turner MD;  Location:  OR     IMPLANT PACEMAKER  6/10/2010     INTERPOSITION TENDON HAND N/A 6/9/2020    Procedure: Right thumb ligament reconstruction tendon interposition with extensor pollicis brevis to abductor pollicis tendon transfer;  Surgeon: Bethel Martin MD;  Location: [facility]     RELEASE CARPAL TUNNEL Right 4/12/2016    Procedure: RELEASE CARPAL TUNNEL;  Surgeon: Lissy Leger MD;  Location: [facility]     SURGICAL HISTORY OF -   1998    Laminectomy     TONSILLECTOMY & ADENOIDECTOMY         Social History     Tobacco Use     Smoking status: Current Every Day Smoker     Packs/day: 1.50     Years: 50.00     Pack years: 75.00     Types: Cigarettes     Smokeless tobacco: Never Used     Tobacco comment: 1 1/2 PPD 2/16/18   Substance Use Topics     Alcohol use: No      Family History   Adopted: Yes   Problem Relation Age of Onset     Unknown/Adopted No family hx of          Current Outpatient Medications   Medication Sig Dispense Refill     albuterol (PROAIR HFA) 108 (90 Base) MCG/ACT Inhaler Inhale 2 puffs into the lungs every 4 hours as needed for shortness of breath / dyspnea 1 Inhaler 0     amylase-lipase-protease (CREON 12) 75241 UNITS CPEP Take 1 capsule (12,000 Units) by mouth daily 120 capsule 0     aspirin (ASA) 500 MG EC tablet Take 1,000 mg by mouth daily       atorvastatin (LIPITOR) 80 MG tablet Take 80 mg by mouth At Bedtime        carvedilol (COREG) 6.25 MG tablet Take 1 tablet (6.25 mg) by mouth 2 times daily (with meals) 180 tablet 3     cholecalciferol 125 MCG (5000 UT) CAPS Take 1 capsule (5,000 Units) by mouth daily 90 capsule 1     fluticasone-salmeterol (ADVAIR DISKUS) 250-50 MCG/DOSE inhaler Inhale 1 puff into the lungs every 12 hours 1 Inhaler 1     GABAPENTIN PO Take 100 mg by mouth as needed       insulin glargine (LANTUS) 100 UNIT/ML injection Inject 32 Units Subcutaneous every morning        Insulin Pen Needle (PEN NEEDLES) 32G X 4 MM MISC        loperamide (IMODIUM) 2 MG capsule Take 2 mg by mouth 4 times daily as needed for diarrhea       losartan (COZAAR) 100 MG tablet Take 50 mg by mouth daily       Magnesium Cl-Calcium Carbonate (SLOW-MAG) 71.5-119 MG TBEC Take 1 tablet by mouth 2 times daily 60 tablet 3     mometasone (ASMANEX TWISTHALER) 220 MCG/INH inhaler Inhale 2 puffs into the lungs daily       Nitroglycerin (NITROSTAT SL) Place 0.4 mg under the tongue every 5 minutes as needed for chest pain       pantoprazole (PROTONIX) 40 MG EC tablet Take 1 tablet (40 mg) by mouth daily 30 tablet      torsemide (DEMADEX) 20 MG tablet Take 30 mg (1 and 1/2 tab) daily. 30 tablet 4     Allergies   Allergen Reactions     Hydrocodone GI Disturbance     Upset stomach     Shellfish Allergy Hives and Rash       Review of Systems   CONSTITUTIONAL: NEGATIVE for  fever, chills, change in weight  ENT/MOUTH: NEGATIVE for ear, mouth and throat problems  RESP: NEGATIVE for significant cough or SOB  CV: NEGATIVE for chest pain, palpitations or peripheral edema  MUSCULOSKELETAL: NEGATIVE for significant arthralgias or myalgia       Objective          Vitals:  No vitals were obtained today due to virtual visit.    alert and no distress  PSYCH: Alert and oriented times 3; coherent speech, normal   rate and volume, able to articulate logical thoughts, able   to abstract reason, no tangential thoughts, no hallucinations   or delusions  His affect is normal  RESP: No cough, no audible wheezing, able to talk in full sentences  Remainder of exam unable to be completed due to telephone visits    No results found for this or any previous visit (from the past 24 hour(s)).        Assessment/Plan:    Assessment & Plan     Delbert was seen today for results.    Diagnoses and all orders for this visit:    Vitamin D deficiency  -     Vitamin D Deficiency; Future        Patient Instructions   Your vitamin D level on 12/7/2020 is in normal range.  Keep taking the vitamin D replacement.  Schedule repeat blood test for vitamin D level in February 2021.        Return in about 2 months (around 2/9/2021) for Lab Test.    Lalit Berger MD  [facility]    Phone call duration:  6 minutes

## 2020-12-10 DIAGNOSIS — E83.51 HYPOCALCEMIA: ICD-10-CM

## 2020-12-10 DIAGNOSIS — R79.89 ELEVATED SERUM CREATININE: ICD-10-CM

## 2020-12-10 DIAGNOSIS — E55.9 VITAMIN D DEFICIENCY: ICD-10-CM

## 2020-12-10 DIAGNOSIS — E83.42 HYPOMAGNESEMIA: ICD-10-CM

## 2020-12-10 DIAGNOSIS — E83.42 HYPOMAGNESEMIA: Primary | ICD-10-CM

## 2020-12-10 DIAGNOSIS — E21.3 HYPERPARATHYROIDISM (H): Primary | ICD-10-CM

## 2020-12-10 DIAGNOSIS — E21.3 HYPERPARATHYROIDISM (H): ICD-10-CM

## 2020-12-10 LAB
ANION GAP SERPL CALCULATED.3IONS-SCNC: 5 MMOL/L (ref 3–14)
BUN SERPL-MCNC: 41 MG/DL (ref 7–30)
CALCIUM SERPL-MCNC: 9 MG/DL (ref 8.5–10.1)
CHLORIDE SERPL-SCNC: 98 MMOL/L (ref 94–109)
CO2 SERPL-SCNC: 32 MMOL/L (ref 20–32)
CREAT SERPL-MCNC: 1.46 MG/DL (ref 0.66–1.25)
GFR SERPL CREATININE-BSD FRML MDRD: 47 ML/MIN/{1.73_M2}
GLUCOSE SERPL-MCNC: 184 MG/DL (ref 70–99)
MAGNESIUM SERPL-MCNC: 1 MG/DL (ref 1.6–2.3)
POTASSIUM SERPL-SCNC: 4.7 MMOL/L (ref 3.4–5.3)
PTH-INTACT SERPL-MCNC: 87 PG/ML (ref 18–80)
SODIUM SERPL-SCNC: 135 MMOL/L (ref 133–144)

## 2020-12-10 PROCEDURE — 83970 ASSAY OF PARATHORMONE: CPT | Performed by: FAMILY MEDICINE

## 2020-12-10 PROCEDURE — 36415 COLL VENOUS BLD VENIPUNCTURE: CPT | Performed by: FAMILY MEDICINE

## 2020-12-10 PROCEDURE — 80048 BASIC METABOLIC PNL TOTAL CA: CPT | Performed by: FAMILY MEDICINE

## 2020-12-10 PROCEDURE — 83735 ASSAY OF MAGNESIUM: CPT | Performed by: FAMILY MEDICINE

## 2020-12-10 NOTE — PATIENT INSTRUCTIONS
Your vitamin D level on 12/7/2020 is in normal range.  Keep taking the vitamin D replacement.  Schedule repeat blood test for vitamin D level in February 2021.

## 2020-12-11 ENCOUNTER — TELEPHONE (OUTPATIENT)
Dept: FAMILY MEDICINE | Facility: CLINIC | Age: 70
End: 2020-12-11

## 2020-12-11 DIAGNOSIS — E55.9 VITAMIN D DEFICIENCY: Primary | ICD-10-CM

## 2020-12-11 NOTE — TELEPHONE ENCOUNTER
Patient was called, he says he is currently taking Magnesium Cl which has 64 mg Magnesium per tablet, if he takes as he says per below, this would be 256 mg total. Patient is wondering if this enough. Patient is to take 4 tablets Magnesium supplement per day per Dr. Berger per result yesterday.    Spoke to pharmacy at [facility], they say the patient purchased OTC Magnesium and it is 64 mg per tablet.     Routing to Out of office provider pool to advise, is it ok if patient takes OTC Magnesium 64 mg per tablet, one in morning, 2 at lunch and one at night?         YULIA Garner

## 2020-12-11 NOTE — TELEPHONE ENCOUNTER
Reason for Call:  Other prescription    Detailed comments: Pt wants dose of magnesium clarified, pt has been taking one in the morning, two at lunch and one at night totaling 256mg, is this correct? See Lab result note from 12/11    Phone Number Patient can be reached at: Home number on file [phone] (home)    Best Time: any    Can we leave a detailed message on this number? YES    Call taken on 12/11/2020 at 3:31 PM by Melanie Duke

## 2020-12-14 ENCOUNTER — TELEPHONE (OUTPATIENT)
Dept: CARDIOLOGY | Facility: CLINIC | Age: 70
End: 2020-12-14

## 2020-12-14 NOTE — TELEPHONE ENCOUNTER
----- Message from Tamra Sandoval RN sent at 12/14/2020  9:53 AM CST -----  Regarding: Pt CONRAD on 12/11/20 stating he had 18# weight gain--please call

## 2020-12-14 NOTE — TELEPHONE ENCOUNTER
[facility]  Received message from nurse that pt left voicemail on 12/11 that he had a 18# weight gain.     Pt had 11/30 virtual visit with TYLER Demarco. Weight was 169#. Dyspnea and edema improved but not yet back to baseline after a 10# weight loss. Torsemide decreased to 30mg daily (from 40mg). Nephrology referral placed and ordered follow up with Dr. Michaels within the next month as well.     First available Nephrology visit 1/15/20 with Dr. Cueva. Pt saw PCP last week 12/9, Magnesium increased. Pt is scheduled for virtual visit with Dr. Michaels on Friday 12/18.     Called pt for update, no answer. Left message asking him to call back to discuss. Asked him to call back with exact weight, if he is having any symptoms, specifically increased SOB or swelling, and what he has been taking for Torsemide.     Future Appointments   Date Time Provider Department Center   12/16/2020  7:20 AM Danii Hernández MD [facility]   12/18/2020  8:00 AM Mimi Michaels MD [facility]   12/18/2020  3:30 PM [facility]   12/29/2020  1:40 PM [facility] RN [facility]     Divine Chapa RN, BSN, CHFN  12/14/20 at 10:16 AM

## 2020-12-14 NOTE — TELEPHONE ENCOUNTER
[facility]  Received VM from pt. He reports weight 171# (wt was 169# 2 weeks ago at visit with TYLER Demarco), but reports weight is usually 160# and still not back to his baseline. Pt states he is still taking 1.5 tablets or 30mg of Torsemide.     Returned call to pt, no answer. LVM asking for call back with update on symptoms and if he is more SOB than he was when he saw TYLER Demarco 2 weeks ago, or if more swelling, as weight only 2# higher than when he saw her. Asked for call back. Also reviewed he has telephone visit with Dr. Michaels on Friday.     Divine Chapa, RN, BSN, CHFN  12/14/20 at 12:04 PM

## 2020-12-16 ENCOUNTER — NURSE TRIAGE (OUTPATIENT)
Dept: NURSING | Facility: CLINIC | Age: 70
End: 2020-12-16

## 2020-12-16 ENCOUNTER — TRANSFERRED RECORDS (OUTPATIENT)
Dept: HEALTH INFORMATION MANAGEMENT | Facility: CLINIC | Age: 70
End: 2020-12-16

## 2020-12-16 ENCOUNTER — OFFICE VISIT (OUTPATIENT)
Dept: FAMILY MEDICINE | Facility: CLINIC | Age: 70
End: 2020-12-16
Payer: COMMERCIAL

## 2020-12-16 VITALS
RESPIRATION RATE: 16 BRPM | SYSTOLIC BLOOD PRESSURE: 114 MMHG | HEIGHT: 70 IN | OXYGEN SATURATION: 96 % | DIASTOLIC BLOOD PRESSURE: 68 MMHG | BODY MASS INDEX: 23.48 KG/M2 | WEIGHT: 164 LBS | TEMPERATURE: 96.6 F | HEART RATE: 69 BPM

## 2020-12-16 DIAGNOSIS — N18.32 STAGE 3B CHRONIC KIDNEY DISEASE (H): ICD-10-CM

## 2020-12-16 DIAGNOSIS — E83.42 HYPOMAGNESEMIA: ICD-10-CM

## 2020-12-16 DIAGNOSIS — E11.9 TYPE 2 DIABETES, HBA1C GOAL < 8% (H): ICD-10-CM

## 2020-12-16 DIAGNOSIS — F10.11 ALCOHOL ABUSE, IN REMISSION: ICD-10-CM

## 2020-12-16 DIAGNOSIS — E55.9 VITAMIN D DEFICIENCY: ICD-10-CM

## 2020-12-16 DIAGNOSIS — E83.51 HYPOCALCEMIA: Primary | ICD-10-CM

## 2020-12-16 DIAGNOSIS — K86.9 PANCREATIC DISEASE: ICD-10-CM

## 2020-12-16 DIAGNOSIS — E78.5 HYPERLIPIDEMIA LDL GOAL <70: ICD-10-CM

## 2020-12-16 PROBLEM — J18.9 PNEUMONIA OF LEFT UPPER LOBE DUE TO INFECTIOUS ORGANISM: Status: RESOLVED | Noted: 2020-10-21 | Resolved: 2020-12-16

## 2020-12-16 LAB
ANION GAP SERPL CALCULATED.3IONS-SCNC: 3 MMOL/L (ref 3–14)
BUN SERPL-MCNC: 35 MG/DL (ref 7–30)
CALCIUM SERPL-MCNC: 8.9 MG/DL (ref 8.5–10.1)
CHLORIDE SERPL-SCNC: 100 MMOL/L (ref 94–109)
CHOLEST SERPL-MCNC: 95 MG/DL
CO2 SERPL-SCNC: 31 MMOL/L (ref 20–32)
CREAT SERPL-MCNC: 1.42 MG/DL (ref 0.66–1.25)
CREAT SERPL-MCNC: 1.6 MG/DL (ref 0.5–1.5)
CREAT UR-MCNC: 19 MG/DL
DEPRECATED CALCIDIOL+CALCIFEROL SERPL-MC: NORMAL UG/L (ref 20–75)
GFR SERPL CREATININE-BSD FRML MDRD: 42 ML/MIN
GFR SERPL CREATININE-BSD FRML MDRD: 49 ML/MIN/{1.73_M2}
GLUCOSE SERPL-MCNC: 262 MG/DL (ref 70–99)
HBA1C MFR BLD: 9.6 % (ref 0–5.6)
HDLC SERPL-MCNC: 28 MG/DL
LDLC SERPL CALC-MCNC: 44 MG/DL
MAGNESIUM SERPL-MCNC: 0.9 MG/DL (ref 1.6–2.3)
MICROALBUMIN UR-MCNC: 273 MG/L
MICROALBUMIN/CREAT UR: 1452.13 MG/G CR (ref 0–17)
NONHDLC SERPL-MCNC: 67 MG/DL
POTASSIUM SERPL-SCNC: 4.6 MMOL/L (ref 3.5–5)
POTASSIUM SERPL-SCNC: 4.7 MMOL/L (ref 3.4–5.3)
SODIUM SERPL-SCNC: 134 MMOL/L (ref 133–144)
TRIGL SERPL-MCNC: 115 MG/DL

## 2020-12-16 PROCEDURE — 83735 ASSAY OF MAGNESIUM: CPT | Performed by: FAMILY MEDICINE

## 2020-12-16 PROCEDURE — 83036 HEMOGLOBIN GLYCOSYLATED A1C: CPT | Performed by: FAMILY MEDICINE

## 2020-12-16 PROCEDURE — 36415 COLL VENOUS BLD VENIPUNCTURE: CPT | Performed by: FAMILY MEDICINE

## 2020-12-16 PROCEDURE — 99214 OFFICE O/P EST MOD 30 MIN: CPT | Performed by: FAMILY MEDICINE

## 2020-12-16 PROCEDURE — 82043 UR ALBUMIN QUANTITATIVE: CPT | Performed by: FAMILY MEDICINE

## 2020-12-16 PROCEDURE — 80048 BASIC METABOLIC PNL TOTAL CA: CPT | Performed by: FAMILY MEDICINE

## 2020-12-16 PROCEDURE — 80061 LIPID PANEL: CPT | Performed by: FAMILY MEDICINE

## 2020-12-16 RX ORDER — ERGOCALCIFEROL 1.25 MG/1
50000 CAPSULE, LIQUID FILLED ORAL WEEKLY
Qty: 1 CAPSULE | Refills: 0 | COMMUNITY
Start: 2020-12-16 | End: 2021-10-19

## 2020-12-16 RX ORDER — GABAPENTIN 100 MG/1
300 CAPSULE ORAL AT BEDTIME
Qty: 1 CAPSULE | Refills: 0 | COMMUNITY
Start: 2020-12-16

## 2020-12-16 RX ORDER — CALCIUM CARBONATE 500(1250)
2 TABLET ORAL 2 TIMES DAILY
Qty: 360 TABLET | Refills: 1 | Status: ON HOLD | OUTPATIENT
Start: 2020-12-16 | End: 2022-02-09

## 2020-12-16 RX ORDER — METFORMIN HCL 500 MG
500 TABLET, EXTENDED RELEASE 24 HR ORAL 2 TIMES DAILY WITH MEALS
Status: ON HOLD | COMMUNITY
Start: 2020-10-21 | End: 2023-01-01

## 2020-12-16 RX ORDER — ELECTROLYTES/DEXTROSE
1 SOLUTION, ORAL ORAL 4 TIMES DAILY
Qty: 360 TABLET | Refills: 3 | Status: SHIPPED | OUTPATIENT
Start: 2020-12-16 | End: 2020-12-17 | Stop reason: DRUGHIGH

## 2020-12-16 ASSESSMENT — PAIN SCALES - GENERAL: PAINLEVEL: NO PAIN (0)

## 2020-12-16 ASSESSMENT — MIFFLIN-ST. JEOR: SCORE: 1500.15

## 2020-12-16 NOTE — PROGRESS NOTES
"Subjective     Delbert Tilley is a 70 year old male who presents to clinic today for the following health issues:    HPI       Chief Complaint   Patient presents with     Recheck Medication     Patient and daughter would like to discuss all medication. No specific concerns regarding medication. Patients daughter feels there is a lot of medication and would like to address what is needed.      Medication Request     Patient would like a calcium rx verses him taking tums. The tums make his blood sugar rise.      Patient here with his daughter.  He would like to get health care locally with me as his PCP but also [facility]      He and his daughter are concerned about the number of prescriptions he takes and would like to go through his medications.  This was done - [facility]         Morning blood sugar is usually 120-130.  Will sometimes check later in the day and it will be higher, 150-180 range.  It did run high with his pneumonia/copd exacerbation recently while he was on steroids.      Patient was in the ER with hypocalcemia/hypomagnesemia 11/16/2020. Magnesium has continued to be low.      He has intermittent diarrhea for which he takes immodium.  He is on Creon regularly for h/o chronic pancreatitis.  Doesn't see a GI specialist that he is aware of.  [facility]. Unsure why this was done.     He also has a h/o CHF, COPD (current smoker), type 2 diabetes with stage III CKD, h/o alcoholism (sober x 10 years) and chronic pancreatitis in the past (on Creon), CAD, cardiomyopathy, a fib.      Review of Systems   Constitutional, HEENT, cardiovascular, pulmonary, gi and gu systems are negative, except as otherwise noted.      Objective    /68   Pulse 69   Temp 96.6  F (35.9  C) (Tympanic)   Resp 16   Ht 1.778 m (5' 10\")   Wt 74.4 kg (164 lb)   SpO2 96%   BMI 23.53 kg/m    Body mass index is 23.53 " kg/m .  Physical Exam   GENERAL APPEARANCE: healthy, alert and no distress  EYES: conjunctivae and sclerae normal  MS: pitting 2+ lower extremity edema bilaterally -above his calf length socks  PSYCH: mentation appears normal and affect normal/bright    Results for orders placed or performed in visit on 12/16/20 (from the past 24 hour(s))   Hemoglobin A1c   Result Value Ref Range    Hemoglobin A1C 9.6 (H) 0 - 5.6 %           Assessment & Plan     Hypocalcemia   improved on the calcium and vitamin D.  Recheck calcium today in his BMP  - calcium carbonate (OS-MARVA) 500 MG tablet; Take 2 tablets (1,000 mg) by mouth 2 times daily  - Basic metabolic panel    Vitamin D deficiency  Continue vitamin D.  Is also on 50,000 units vit D2 weekly x 8 weeks - has a few weeks left of that.   - cholecalciferol 125 MCG (5000 UT) CAPS; Take 1 capsule (5,000 Units) by mouth daily    Hypomagnesemia  Is taking oral magnesium supplement.  Has been low, recheck today.   - Magnesium Chloride 64 MG TABS; Take 1 tablet by mouth 4 times daily  - Magnesium    Stage 3b chronic kidney disease   has nephrology visit in January  - Basic metabolic panel    TYPE 2 DIABETES, HBA1C GOAL < 8%   uncontrolled  - Hemoglobin A1c  - Albumin Random Urine Quantitative with Creat Ratio    Hyperlipidemia LDL goal <70     - Lipid panel reflex to direct LDL Fasting    Alcohol abuse, in remission  Sober x 10 years    Pancreatic disease     - amylase-lipase-protease (CREON 12) 98994-89[address]-71251 units CPEP; Take 1 capsule by mouth 2 times daily     Tobacco Cessation:   reports that he has been smoking cigarettes. He has a 75.00 pack-year smoking history. He has never used smokeless tobacco.           MEDICATIONS:  Continue current medications without change    No follow-ups on file.    Danii Hernández MD  [facility]

## 2020-12-16 NOTE — PATIENT INSTRUCTIONS
Our Clinic hours are:  Mondays    7:20 am - 7 pm  Tues -  Fri  7:20 am - 5 pm    Clinic Phone: [phone]    The clinic lab opens at 7:30 am Mon - Fri and appointments are required.    [facility]. [phone]  Monday  8 am - 7pm  Tues - Fri 8 am - 5:30 pm

## 2020-12-17 ENCOUNTER — MYC MEDICAL ADVICE (OUTPATIENT)
Dept: FAMILY MEDICINE | Facility: CLINIC | Age: 70
End: 2020-12-17

## 2020-12-17 VITALS — WEIGHT: 159 LBS | BODY MASS INDEX: 22.81 KG/M2

## 2020-12-17 DIAGNOSIS — E83.42 HYPOMAGNESEMIA: Primary | ICD-10-CM

## 2020-12-17 NOTE — PROGRESS NOTES
" Delbert Tilley is a 70 year old male who is being evaluated via a billable telephone visit.      The patient has been notified of following:     \"This telephone visit will be conducted via a call between you and your physician/provider. We have found that certain health care needs can be provided without the need for a physical exam.  This service lets us provide the care you need with a short phone conversation.  If a prescription is necessary we can send it directly to your pharmacy.  If lab work is needed we can place an order for that and you can then stop by our lab to have the test done at a later time.    Telephone visits are billed at different rates depending on your insurance coverage. During this emergency period, for some insurers they may be billed the same as an in-person visit.  Please reach out to your insurance provider with any questions.    If during the course of the call the physician/provider feels a telephone visit is not appropriate, you will not be charged for this service.\"    Patient has given verbal consent for Telephone visit?  Yes    What phone number would you like to be contacted at? [phone]    How would you like to obtain your AVS? [email or web address]    Phone call duration: 21 minutes    Mr. Delbert Tilley is 69 years of age and is followed for a history of an ischemic cardiomyopathy, prior ICD placement, atrial flutter and coronary artery disease.  He has previously undergone coronary artery bypass graft surgery.  He has bilateral carotid disease, diabetes mellitus and does continue to smoke tobacco.     He is usually 160-165 pounds, this AM it is 169 pounds.  He is on torsemide 30 mg daily. His breathing is not any different than a year ago - can walk 2 blocks.  The swelling is up his legs but he wears his stockings.  Started with pneumonia/COPD exacerbation of October when Cr was 1.7 on leaving hospital.  No chest pain, remains sedentary and does not always elevate legs.    He is \"not " "feeling bad\".  He has exertional dyspnea but it has not changed noticeably and certainly not in the last year.  He denies any chest, neck, arm or back discomfort.  Over the last month, he has built a landing and ramps working in 1/2-hour intervals with rests in between.  He is now able to use the ramps he built as opposed to stairs.  Over the last month, he has also noted worsening leg edema.  He wears compression stockings during the day but then his thighs swell during the day. At night, the fluid returns to his ankles.  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  He was 175 lbs and he should be about 165 lbs. given his usual weight.  Mr. Tilley increased his Lasix to 40 mg twice daily and the edema improved and returned to baseline.  His weight decreased to 167 or 168 pounds.  He is back to 20 mg twice daily of the Lasix.  He notes that his blood pressure has been 116/74 mmHg.        He has a history of coronary artery bypass graft surgery with an LIMA graft to the LAD, vein graft to the circumflex marginal branch and a vein graft to the right PDA.  In 2014, he presented with a non-ST elevation myocardial infarct, was transferred to [facility] and underwent intervention with stenting of the vein graft to the marginal branch.  The vein graft to the RCA and the LIMA graft to the LAD were patent.  His LV ejection fraction at that time was 35%-40% with severe inferior and inferolateral hypokinesis.  A 2015 Lexiscan nuclear stress study demonstrated a large transmural inferior and inferoseptal infarct without ischemia as well as a small distal anterior and apical defect.  His Lexiscan stress nuclear study just completed 2 months ago was essentially unchanged and demonstrated an inferior and inferoseptal transmural infarct with a small distal apical nontransmural infarct but no ischemia.    His last defibrillator check " during March 2020 demonstrated 28% atrial pacing, essentially no ventricular pacing and no detected atrial or ventricular arrhythmias.  An EKG from March 2020 demonstrated an interventricular conduction delay with a QRS duration of 116 ms.     Following his surgery, he did well without recurrent angina or evidence of heart failure.  As a preoperative assessment for endarterectomy in 2017, a Lexiscan stress nuclear study estimated to have fallen to 28%.  He did develop heart failure symptoms and the ejection fraction was noted to be 20%-25% on echocardiography.  Repeat coronary angiography was recommended and again demonstrated the LIMA graft to the LAD to be patent with a 50% stenosis in the apical LAD, but the vein graft to the right PDA had occluded.  The vein graft to the circumflex marginal branch had remained patent, and the left main coronary artery was widely patent.  A repeat echocardiogram during October 2019 demonstrated the left ventricular ejection fraction to be 20 to 25% with a moderate decrease in right ventricular systolic performance and mild to moderate tricuspid insufficiency with the right ventricular systolic pressure estimated at 40 mmHg plus right atrial pressure.  He continues on beta-blockade and vasodilator therapy with an ARB agent.  Hypotension prevented the use of an aldosterone antagonist.     In the interim, Mr. Tilley has clinically done well.  In the past, he has noted intermittent increased leg edema and would take an extra 20 mg of furosemide with resolution of the symptom.  That occurs about once monthly.  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  He is a retired watts, and his major problem is that he has developed some right hand arthritis for which she takes high-dose aspirin.      He does continue to smoke tobacco despite  discussions and attempts in the past to discontinue tobacco.  He has previously undergone implantation of a cardiodefibrillator for primary prevention of sudden cardiac death.  He has has a history of prior atrial flutter ablation.  Diabetic control remains a problem with his last hemoglobin A1c being 9.4.  He does have normal renal function with a normal GFR.  His medical therapy remains 25 mg of metoprolol succinate, 50 mg of losartan, 20 mg twice daily of furosemide, statin therapy and aspirin.  He is taking very high dose aspirin prescribed for his arthritic symptoms.     Exam:  He was alert and oriented to person place and time.    Assessment/Plan:  With regard to his cardiomyopathy, his clinical/functional status is probably stable with New York Heart Association class II symptoms.  With regard to his left ventricular dysfunction, he has previously undergone defibrillator implantation.  I reviewed his echocardiograms today and he does not have dyssynchronous septal motion.  The left ventricular dysfunction appears global and over time, the left ventricle has dilated.  The prior estimate of LV function in 2015 of 35 to 40% may have been somewhat of an overestimate.  Nonetheless, there has been a deterioration in function.  This was discussed with Mr. Tilley today.    As such, I recommended changing his metoprolol succinate to carvedilol at 6.25 mg twice daily.  I have arranged for a follow-up with the advanced practice provider in 1 month.  If he does not feel well with carvedilol, he will contact us and I would recommend decreasing the dose to 3.25 mg twice daily.  With his next appointment, I am hopeful that Entresto 24-26 mg can be substituted for losartan with a follow-up basic metabolic panel.  He may also benefit from a higher dose of diuretic (Lasix 30mg BID) as both the left and right ventricles seem to have dilated with time and his renal function remains normal to improved on higher dose Lasix.    Eventually, he may benefit from cardiac MRI (his defibrillator is MRI compatible).    I am uncertain how much of his exertional dysnea could actually be chronic obstructive pulmonary disease as a result of his continued tobacco use.  He is on bronchodilator therapy.      With regard to his coronary artery disease, he continues on an ARB agent, beta blockade, statin therapy and aspirin.  He uses aspirin for other purposes, and so he is on high-dose aspirin and is taking 1000 mg in the morning and 1000 mg in the evening.  The aspirin is being used for arthritis and is not prescribed at that dose for coronary artery disease.  He just had a repeat stress nuclear study which was unchanged without evidence of ischemia.    With spironolactone (for aldosterone antagonism), he noted a precipitous decline in his blood pressure.  He tried it several times and his blood pressures were approximately 80-90 mmHg.  In the future, we may try spironolactone again, in which case Lasix/furosemide would be discontinued/decreased and the spironolactone at 12.5 mg daily would replace the Lasix.  Potassium had previously risen on a salt substitute and his last and recent potassium was normal at 4.2      With regard to his defibrillator, he will continue to follow in the [facility].  As noted, he does not have significant ventricular pacing.  I have arranged for follow-up with myself in 2 months.    1.  Increase torsemide to 20 mg BID for 4 days.  2.  BMP on Day 5.  3.  Venous competency exam; vein clinic.  4.  May need IV diuresis.  5.  F/U next week.    Mimi Michaels MD

## 2020-12-17 NOTE — TELEPHONE ENCOUNTER
Talked with daughter. Currently patient has been getting mg chloride over the counter with 64 mg of elemental magnesium and taking 4/day.  He continues to have hypomag.      Reviewing other options and elemental mag doses, will change him to magnesium oxide which has 241 mg of elemental mag/dose.  Will have him take this three times daily and recheck Mg next week (lab only on Tuesday).    Daughter verbalizes understanding and will relay message to her father.     Danii Hernández M.D.

## 2020-12-17 NOTE — TELEPHONE ENCOUNTER
Call from Dr. Mitchell @  [facility]; requesting information form patient's EMR in regards to critical low  Magnesium level.   Inquiring  When he awas seen and what was ordered regrding  Magnesium level   Advised that patient seen today by Dr. eHrnández and magnesium level ordered and prescribed;     Magnesium Chloride 64 MG TABS 360 tablet 3 12/16/2020  --   Sig - Route: Take 1 tablet by mouth 4 times daily - Oral   Sent to pharmacy as: Magnesium Chloride 64 MG Oral Tablet   Class: E-Prescribe   Order: [id number]   E-Prescribing Status: Sent to pharmacy (12/16/2020  7:51 AM CST     Unable to determine if this is new dose  as  o previous order in EMR   Dr. Arvizu requesting  FNA contact patient and  request he call office on Thursday and talk to Dr. Hernández about  Dose of magnesium to take   Contacted patient  but went straight to .   Contacted  Daughter  Sara who accompanied patient today. She reports that magnesium RX is his current  dose and not an increase.   She will  Call Dr. Hernández tomorrow to discuss.     Will route  Triage note to PCP an clinical team   Rosemary Monk RN  FNA       Additional Information    Lab result questions    Nursing judgment    Protocols used: INFORMATION ONLY CALL-A-, NO GUIDELINE OR REFERENCE [id number]

## 2020-12-17 NOTE — TELEPHONE ENCOUNTER
Talked with daughter on the phone regarding results.  He does have nephrology visit in 4 weeks.   Had urine protein of 1,600 3 years ago - has never seen a nephrologist.    Danii Hernández M.D.

## 2020-12-17 NOTE — TELEPHONE ENCOUNTER
Patient does not have an appointment yet with the nephrologist. Routed to provider.    Component      Latest Ref Rng & Units 12/16/2020   Creatinine Urine      mg/dL 19   Albumin Urine mg/L      mg/L 273   Albumin Urine mg/g Cr      0 - 17 mg/g Cr 1,452.13 (H)     Sravanthi Springer RN

## 2020-12-18 ENCOUNTER — MYC MEDICAL ADVICE (OUTPATIENT)
Dept: CARDIOLOGY | Facility: CLINIC | Age: 70
End: 2020-12-18

## 2020-12-18 ENCOUNTER — VIRTUAL VISIT (OUTPATIENT)
Dept: CARDIOLOGY | Facility: CLINIC | Age: 70
End: 2020-12-18
Attending: PHYSICIAN ASSISTANT
Payer: COMMERCIAL

## 2020-12-18 DIAGNOSIS — I50.22 CHRONIC SYSTOLIC HEART FAILURE (H): ICD-10-CM

## 2020-12-18 DIAGNOSIS — R60.0 LOCALIZED EDEMA: ICD-10-CM

## 2020-12-18 DIAGNOSIS — I25.5 ISCHEMIC CARDIOMYOPATHY: Primary | ICD-10-CM

## 2020-12-18 DIAGNOSIS — I50.22 CHRONIC SYSTOLIC CONGESTIVE HEART FAILURE (H): Primary | ICD-10-CM

## 2020-12-18 PROCEDURE — 99214 OFFICE O/P EST MOD 30 MIN: CPT | Mod: 95 | Performed by: INTERNAL MEDICINE

## 2020-12-18 NOTE — LETTER
"12/18/2020    Danii Hernández MD  [id number]  [facility] [address]    RE: Delbert Tilley       Dear Colleague,    I had the pleasure of seeing Delbert Tilley in the [facility].     Delbert Tilley is a 70 year old male who is being evaluated via a billable telephone visit.      The patient has been notified of following:     \"This telephone visit will be conducted via a call between you and your physician/provider. We have found that certain health care needs can be provided without the need for a physical exam.  This service lets us provide the care you need with a short phone conversation.  If a prescription is necessary we can send it directly to your pharmacy.  If lab work is needed we can place an order for that and you can then stop by our lab to have the test done at a later time.    Telephone visits are billed at different rates depending on your insurance coverage. During this emergency period, for some insurers they may be billed the same as an in-person visit.  Please reach out to your insurance provider with any questions.    If during the course of the call the physician/provider feels a telephone visit is not appropriate, you will not be charged for this service.\"    Patient has given verbal consent for Telephone visit?  Yes    What phone number would you like to be contacted at? [phone]    How would you like to obtain your AVS? [employer]    Phone call duration: 21 minutes    Mr. Delbert Tilley is 69 years of age and is followed for a history of an ischemic cardiomyopathy, prior ICD placement, atrial flutter and coronary artery disease.  He has previously undergone coronary artery bypass graft surgery.  He has bilateral carotid disease, diabetes mellitus and does continue to smoke tobacco.     He is usually 160-165 pounds, this AM it is 169 pounds.  He is on torsemide 30 mg daily. His breathing is not any different than a year ago - can walk 2 blocks.  The swelling " "is up his legs but he wears his stockings.  Started with pneumonia/COPD exacerbation of October when Cr was 1.7 on leaving hospital.  No chest pain, remains sedentary and does not always elevate legs.    He is \"not feeling bad\".  He has exertional dyspnea but it has not changed noticeably and certainly not in the last year.  He denies any chest, neck, arm or back discomfort.  Over the last month, he has built a landing and ramps working in 1/2-hour intervals with rests in between.  He is now able to use the ramps he built as opposed to stairs.  Over the last month, he has also noted worsening leg edema.  He wears compression stockings during the day but then his thighs swell during the day. At night, the fluid returns to his ankles.  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  He was 175 lbs and he should be about 165 lbs. given his usual weight.  Mr. Tilley increased his Lasix to 40 mg twice daily and the edema improved and returned to baseline.  His weight decreased to 167 or 168 pounds.  He is back to 20 mg twice daily of the Lasix.  He notes that his blood pressure has been 116/74 mmHg.        He has a history of coronary artery bypass graft surgery with an LIMA graft to the LAD, vein graft to the circumflex marginal branch and a vein graft to the right PDA.  In 2014, he presented with a non-ST elevation myocardial infarct, was transferred to [facility] and underwent intervention with stenting of the vein graft to the marginal branch.  The vein graft to the RCA and the LIMA graft to the LAD were patent.  His LV ejection fraction at that time was 35%-40% with severe inferior and inferolateral hypokinesis.  A 2015 Lexiscan nuclear stress study demonstrated a large transmural inferior and inferoseptal infarct without ischemia as well as a small distal anterior and apical defect.  His Lexiscan stress nuclear study just " completed 2 months ago was essentially unchanged and demonstrated an inferior and inferoseptal transmural infarct with a small distal apical nontransmural infarct but no ischemia.    His last defibrillator check during March 2020 demonstrated 28% atrial pacing, essentially no ventricular pacing and no detected atrial or ventricular arrhythmias.  An EKG from March 2020 demonstrated an interventricular conduction delay with a QRS duration of 116 ms.     Following his surgery, he did well without recurrent angina or evidence of heart failure.  As a preoperative assessment for endarterectomy in 2017, a Lexiscan stress nuclear study estimated to have fallen to 28%.  He did develop heart failure symptoms and the ejection fraction was noted to be 20%-25% on echocardiography.  Repeat coronary angiography was recommended and again demonstrated the LIMA graft to the LAD to be patent with a 50% stenosis in the apical LAD, but the vein graft to the right PDA had occluded.  The vein graft to the circumflex marginal branch had remained patent, and the left main coronary artery was widely patent.  A repeat echocardiogram during October 2019 demonstrated the left ventricular ejection fraction to be 20 to 25% with a moderate decrease in right ventricular systolic performance and mild to moderate tricuspid insufficiency with the right ventricular systolic pressure estimated at 40 mmHg plus right atrial pressure.  He continues on beta-blockade and vasodilator therapy with an ARB agent.  Hypotension prevented the use of an aldosterone antagonist.     In the interim, Mr. Tilley has clinically done well.  In the past, he has noted intermittent increased leg edema and would take an extra 20 mg of furosemide with resolution of the symptom.  That occurs about once monthly.  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  He is a retired watts, and his major problem is that he has developed some right hand arthritis for which she takes high-dose aspirin.      He does continue to smoke tobacco despite discussions and attempts in the past to discontinue tobacco.  He has previously undergone implantation of a cardiodefibrillator for primary prevention of sudden cardiac death.  He has has a history of prior atrial flutter ablation.  Diabetic control remains a problem with his last hemoglobin A1c being 9.4.  He does have normal renal function with a normal GFR.  His medical therapy remains 25 mg of metoprolol succinate, 50 mg of losartan, 20 mg twice daily of furosemide, statin therapy and aspirin.  He is taking very high dose aspirin prescribed for his arthritic symptoms.     Exam:  He was alert and oriented to person place and time.    Assessment/Plan:  With regard to his cardiomyopathy, his clinical/functional status is probably stable with New York Heart Association class II symptoms.  With regard to his left ventricular dysfunction, he has previously undergone defibrillator implantation.  I reviewed his echocardiograms today and he does not have dyssynchronous septal motion.  The left ventricular dysfunction appears global and over time, the left ventricle has dilated.  The prior estimate of LV function in 2015 of 35 to 40% may have been somewhat of an overestimate.  Nonetheless, there has been a deterioration in function.  This was discussed with Mr. Tilley today.    As such, I recommended changing his metoprolol succinate to carvedilol at 6.25 mg twice daily.  I have arranged for a follow-up with the advanced practice provider in 1 month.  If he does not feel well with carvedilol, he will contact us and I would recommend decreasing the dose to 3.25 mg twice daily.  With his next appointment, I am hopeful that Entresto 24-26 mg can be substituted for losartan with a follow-up basic metabolic panel.  He  may also benefit from a higher dose of diuretic (Lasix 30mg BID) as both the left and right ventricles seem to have dilated with time and his renal function remains normal to improved on higher dose Lasix.   Eventually, he may benefit from cardiac MRI (his defibrillator is MRI compatible).    I am uncertain how much of his exertional dysnea could actually be chronic obstructive pulmonary disease as a result of his continued tobacco use.  He is on bronchodilator therapy.      With regard to his coronary artery disease, he continues on an ARB agent, beta blockade, statin therapy and aspirin.  He uses aspirin for other purposes, and so he is on high-dose aspirin and is taking 1000 mg in the morning and 1000 mg in the evening.  The aspirin is being used for arthritis and is not prescribed at that dose for coronary artery disease.  He just had a repeat stress nuclear study which was unchanged without evidence of ischemia.    With spironolactone (for aldosterone antagonism), he noted a precipitous decline in his blood pressure.  He tried it several times and his blood pressures were approximately 80-90 mmHg.  In the future, we may try spironolactone again, in which case Lasix/furosemide would be discontinued/decreased and the spironolactone at 12.5 mg daily would replace the Lasix.  Potassium had previously risen on a salt substitute and his last and recent potassium was normal at 4.2      With regard to his defibrillator, he will continue to follow in the [facility].  As noted, he does not have significant ventricular pacing.  I have arranged for follow-up with myself in 2 months.    1.  Increase torsemide to 20 mg BID for 4 days.  2.  BMP on Day 5.  3.  Venous competency exam; vein clinic.  4.  May need IV diuresis.  5.  F/U next week.        Thank you for allowing me to participate in the care of your patient.    Sincerely,     Mimi Michaels MD     [facility]

## 2020-12-22 ENCOUNTER — TELEPHONE (OUTPATIENT)
Dept: CARDIOLOGY | Facility: CLINIC | Age: 70
End: 2020-12-22

## 2020-12-22 ENCOUNTER — TELEPHONE (OUTPATIENT)
Dept: FAMILY MEDICINE | Facility: CLINIC | Age: 70
End: 2020-12-22

## 2020-12-22 DIAGNOSIS — I25.5 ISCHEMIC CARDIOMYOPATHY: Primary | ICD-10-CM

## 2020-12-22 DIAGNOSIS — E83.42 HYPOMAGNESEMIA: ICD-10-CM

## 2020-12-22 DIAGNOSIS — I25.5 ISCHEMIC CARDIOMYOPATHY: ICD-10-CM

## 2020-12-22 DIAGNOSIS — I50.22 CHRONIC SYSTOLIC HEART FAILURE (H): ICD-10-CM

## 2020-12-22 LAB
ANION GAP SERPL CALCULATED.3IONS-SCNC: 3 MMOL/L (ref 3–14)
BUN SERPL-MCNC: 34 MG/DL (ref 7–30)
CALCIUM SERPL-MCNC: 9 MG/DL (ref 8.5–10.1)
CHLORIDE SERPL-SCNC: 99 MMOL/L (ref 94–109)
CO2 SERPL-SCNC: 34 MMOL/L (ref 20–32)
CREAT SERPL-MCNC: 1.32 MG/DL (ref 0.66–1.25)
GFR SERPL CREATININE-BSD FRML MDRD: 53 ML/MIN/{1.73_M2}
GLUCOSE SERPL-MCNC: 213 MG/DL (ref 70–99)
MAGNESIUM SERPL-MCNC: 1 MG/DL (ref 1.6–2.3)
POTASSIUM SERPL-SCNC: 4.3 MMOL/L (ref 3.4–5.3)
SODIUM SERPL-SCNC: 136 MMOL/L (ref 133–144)

## 2020-12-22 PROCEDURE — 83735 ASSAY OF MAGNESIUM: CPT | Performed by: FAMILY MEDICINE

## 2020-12-22 PROCEDURE — 80048 BASIC METABOLIC PNL TOTAL CA: CPT | Performed by: INTERNAL MEDICINE

## 2020-12-22 PROCEDURE — 36415 COLL VENOUS BLD VENIPUNCTURE: CPT | Performed by: FAMILY MEDICINE

## 2020-12-22 NOTE — TELEPHONE ENCOUNTER
Swelling is improved and he stopped the lasix.    His blood sugars were up in the 300s and he adjusted his insulin dose yesterday to 40 units and his blood sugar was 180.  He is happy his numbers are going down.  CARLOS Schaffer RN

## 2020-12-22 NOTE — TELEPHONE ENCOUNTER
Pt called stating his swelling is much improved.  Weight is back to baseline- 162#. Labs done today.    Creat still elevated but improving. Mag noted to be low-will notify PCP.    Scheduled 1 month follow up with Dr Michaels 1/22/21.    Results for CARLIN QUEEN (MRN [id number]) as of 12/22/2020 16:30   Ref. Range 12/22/2020 10:09   Sodium Latest Ref Range: 133 - 144 mmol/L 136   Potassium Latest Ref Range: 3.4 - 5.3 mmol/L 4.3   Chloride Latest Ref Range: 94 - 109 mmol/L 99   Carbon Dioxide Latest Ref Range: 20 - 32 mmol/L 34 (H)   Urea Nitrogen Latest Ref Range: 7 - 30 mg/dL 34 (H)   Creatinine Latest Ref Range: 0.66 - 1.25 mg/dL 1.32 (H)   GFR Estimate Latest Ref Range: >60 mL/min/1.73_m2 53 (L)   GFR Estimate If Black Latest Ref Range: >60 mL/min/1.73_m2 62   Calcium Latest Ref Range: 8.5 - 10.1 mg/dL 9.0   Anion Gap Latest Ref Range: 3 - 14 mmol/L 3   Magnesium Latest Ref Range: 1.6 - 2.3 mg/dL 1.0 (L)     ALEXANDRA DIA RN on 12/22/2020 at 4:31 PM

## 2020-12-22 NOTE — TELEPHONE ENCOUNTER
"Reason for call:  Patient reporting a symptom    Symptom or request: Pt states that his \"water weight\" went down, so he dropped his Lasix dose.  Pt calling as an FYI to Dr. Hernández.  He also increased his insulin dose and blood sugars being so high and those #s have come down too.  Please call patient and advise.      Duration (how long have symptoms been present): ongoing    Have you been treated for this before? Yes    Additional comments:     Phone Number patient can be reached at:  Home number on file [phone] (home)    Best Time:  any    Can we leave a detailed message on this number:  YES    Call taken on 12/22/2020 at 11:18 AM by Nora Bueno    "

## 2020-12-23 DIAGNOSIS — E83.42 HYPOMAGNESEMIA: ICD-10-CM

## 2020-12-29 ENCOUNTER — HOSPITAL ENCOUNTER (OUTPATIENT)
Dept: CARDIOLOGY | Facility: CLINIC | Age: 70
Discharge: HOME | End: 2020-12-29
Attending: INTERNAL MEDICINE | Admitting: INTERNAL MEDICINE
Payer: COMMERCIAL

## 2020-12-29 DIAGNOSIS — Z95.810 ICD (IMPLANTABLE CARDIOVERTER-DEFIBRILLATOR) IN PLACE: ICD-10-CM

## 2020-12-29 DIAGNOSIS — I42.9 CARDIOMYOPATHY (H): Primary | ICD-10-CM

## 2020-12-29 PROCEDURE — 93283 PRGRMG EVAL IMPLANTABLE DFB: CPT

## 2020-12-29 PROCEDURE — 93283 PRGRMG EVAL IMPLANTABLE DFB: CPT | Mod: 26 | Performed by: INTERNAL MEDICINE

## 2020-12-30 ENCOUNTER — MYC MEDICAL ADVICE (OUTPATIENT)
Dept: FAMILY MEDICINE | Facility: CLINIC | Age: 70
End: 2020-12-30

## 2020-12-30 DIAGNOSIS — J44.9 CHRONIC OBSTRUCTIVE PULMONARY DISEASE, UNSPECIFIED COPD TYPE (H): Primary | ICD-10-CM

## 2020-12-30 DIAGNOSIS — J92.9 PLEURAL PLAQUE: ICD-10-CM

## 2020-12-31 PROBLEM — J92.9 PLEURAL PLAQUE: Status: ACTIVE | Noted: 2020-12-31

## 2021-01-06 LAB
MDC_IDC_LEAD_IMPLANT_DT: NORMAL
MDC_IDC_LEAD_IMPLANT_DT: NORMAL
MDC_IDC_LEAD_LOCATION: NORMAL
MDC_IDC_LEAD_LOCATION: NORMAL
MDC_IDC_LEAD_LOCATION_DETAIL_1: NORMAL
MDC_IDC_LEAD_LOCATION_DETAIL_1: NORMAL
MDC_IDC_LEAD_MFG: NORMAL
MDC_IDC_LEAD_MFG: NORMAL
MDC_IDC_LEAD_MODEL: NORMAL
MDC_IDC_LEAD_MODEL: NORMAL
MDC_IDC_LEAD_POLARITY_TYPE: NORMAL
MDC_IDC_LEAD_POLARITY_TYPE: NORMAL
MDC_IDC_LEAD_SERIAL: NORMAL
MDC_IDC_LEAD_SERIAL: NORMAL
MDC_IDC_MSMT_BATTERY_DTM: NORMAL
MDC_IDC_MSMT_BATTERY_REMAINING_LONGEVITY: 92 MO
MDC_IDC_MSMT_BATTERY_RRT_TRIGGER: 2.73
MDC_IDC_MSMT_BATTERY_STATUS: NORMAL
MDC_IDC_MSMT_BATTERY_VOLTAGE: 3 V
MDC_IDC_MSMT_CAP_CHARGE_DTM: NORMAL
MDC_IDC_MSMT_CAP_CHARGE_ENERGY: 18 J
MDC_IDC_MSMT_CAP_CHARGE_TIME: 3.9
MDC_IDC_MSMT_CAP_CHARGE_TYPE: NORMAL
MDC_IDC_MSMT_LEADCHNL_RA_IMPEDANCE_VALUE: 456 OHM
MDC_IDC_MSMT_LEADCHNL_RA_PACING_THRESHOLD_AMPLITUDE: 0.62 V
MDC_IDC_MSMT_LEADCHNL_RA_PACING_THRESHOLD_PULSEWIDTH: 0.4 MS
MDC_IDC_MSMT_LEADCHNL_RA_SENSING_INTR_AMPL: 3.25 MV
MDC_IDC_MSMT_LEADCHNL_RA_SENSING_INTR_AMPL: 4.12 MV
MDC_IDC_MSMT_LEADCHNL_RV_IMPEDANCE_VALUE: 323 OHM
MDC_IDC_MSMT_LEADCHNL_RV_IMPEDANCE_VALUE: 399 OHM
MDC_IDC_MSMT_LEADCHNL_RV_PACING_THRESHOLD_AMPLITUDE: 1 V
MDC_IDC_MSMT_LEADCHNL_RV_PACING_THRESHOLD_PULSEWIDTH: 0.4 MS
MDC_IDC_MSMT_LEADCHNL_RV_SENSING_INTR_AMPL: 4.25 MV
MDC_IDC_MSMT_LEADCHNL_RV_SENSING_INTR_AMPL: 4.62 MV
MDC_IDC_PG_IMPLANT_DTM: NORMAL
MDC_IDC_PG_MFG: NORMAL
MDC_IDC_PG_MODEL: NORMAL
MDC_IDC_PG_SERIAL: NORMAL
MDC_IDC_PG_TYPE: NORMAL
MDC_IDC_SESS_CLINIC_NAME: NORMAL
MDC_IDC_SESS_DTM: NORMAL
MDC_IDC_SESS_TYPE: NORMAL
MDC_IDC_SET_BRADY_AT_MODE_SWITCH_RATE: 171 {BEATS}/MIN
MDC_IDC_SET_BRADY_HYSTRATE: NORMAL
MDC_IDC_SET_BRADY_LOWRATE: 55 {BEATS}/MIN
MDC_IDC_SET_BRADY_MAX_SENSOR_RATE: 140 {BEATS}/MIN
MDC_IDC_SET_BRADY_MAX_TRACKING_RATE: 140 {BEATS}/MIN
MDC_IDC_SET_BRADY_MODE: NORMAL
MDC_IDC_SET_BRADY_PAV_DELAY_LOW: 180 MS
MDC_IDC_SET_BRADY_SAV_DELAY_LOW: 150 MS
MDC_IDC_SET_LEADCHNL_RA_PACING_AMPLITUDE: 1.5 V
MDC_IDC_SET_LEADCHNL_RA_PACING_ANODE_ELECTRODE_1: NORMAL
MDC_IDC_SET_LEADCHNL_RA_PACING_ANODE_LOCATION_1: NORMAL
MDC_IDC_SET_LEADCHNL_RA_PACING_CAPTURE_MODE: NORMAL
MDC_IDC_SET_LEADCHNL_RA_PACING_CATHODE_ELECTRODE_1: NORMAL
MDC_IDC_SET_LEADCHNL_RA_PACING_CATHODE_LOCATION_1: NORMAL
MDC_IDC_SET_LEADCHNL_RA_PACING_POLARITY: NORMAL
MDC_IDC_SET_LEADCHNL_RA_PACING_PULSEWIDTH: 0.4 MS
MDC_IDC_SET_LEADCHNL_RA_SENSING_ANODE_ELECTRODE_1: NORMAL
MDC_IDC_SET_LEADCHNL_RA_SENSING_ANODE_LOCATION_1: NORMAL
MDC_IDC_SET_LEADCHNL_RA_SENSING_CATHODE_ELECTRODE_1: NORMAL
MDC_IDC_SET_LEADCHNL_RA_SENSING_CATHODE_LOCATION_1: NORMAL
MDC_IDC_SET_LEADCHNL_RA_SENSING_POLARITY: NORMAL
MDC_IDC_SET_LEADCHNL_RA_SENSING_SENSITIVITY: 0.3 MV
MDC_IDC_SET_LEADCHNL_RV_PACING_AMPLITUDE: 2 V
MDC_IDC_SET_LEADCHNL_RV_PACING_ANODE_ELECTRODE_1: NORMAL
MDC_IDC_SET_LEADCHNL_RV_PACING_ANODE_LOCATION_1: NORMAL
MDC_IDC_SET_LEADCHNL_RV_PACING_CAPTURE_MODE: NORMAL
MDC_IDC_SET_LEADCHNL_RV_PACING_CATHODE_ELECTRODE_1: NORMAL
MDC_IDC_SET_LEADCHNL_RV_PACING_CATHODE_LOCATION_1: NORMAL
MDC_IDC_SET_LEADCHNL_RV_PACING_POLARITY: NORMAL
MDC_IDC_SET_LEADCHNL_RV_PACING_PULSEWIDTH: 0.4 MS
MDC_IDC_SET_LEADCHNL_RV_SENSING_ANODE_ELECTRODE_1: NORMAL
MDC_IDC_SET_LEADCHNL_RV_SENSING_ANODE_LOCATION_1: NORMAL
MDC_IDC_SET_LEADCHNL_RV_SENSING_CATHODE_ELECTRODE_1: NORMAL
MDC_IDC_SET_LEADCHNL_RV_SENSING_CATHODE_LOCATION_1: NORMAL
MDC_IDC_SET_LEADCHNL_RV_SENSING_POLARITY: NORMAL
MDC_IDC_SET_LEADCHNL_RV_SENSING_SENSITIVITY: 0.3 MV
MDC_IDC_SET_ZONE_DETECTION_BEATS_DENOMINATOR: 40 {BEATS}
MDC_IDC_SET_ZONE_DETECTION_BEATS_NUMERATOR: 30 {BEATS}
MDC_IDC_SET_ZONE_DETECTION_INTERVAL: 320 MS
MDC_IDC_SET_ZONE_DETECTION_INTERVAL: 350 MS
MDC_IDC_SET_ZONE_DETECTION_INTERVAL: 360 MS
MDC_IDC_SET_ZONE_DETECTION_INTERVAL: 400 MS
MDC_IDC_SET_ZONE_DETECTION_INTERVAL: NORMAL
MDC_IDC_SET_ZONE_TYPE: NORMAL
MDC_IDC_STAT_AT_BURDEN_PERCENT: 0 %
MDC_IDC_STAT_AT_DTM_END: NORMAL
MDC_IDC_STAT_AT_DTM_START: NORMAL
MDC_IDC_STAT_BRADY_AP_VP_PERCENT: 0.14 %
MDC_IDC_STAT_BRADY_AP_VS_PERCENT: 26.99 %
MDC_IDC_STAT_BRADY_AS_VP_PERCENT: 0.04 %
MDC_IDC_STAT_BRADY_AS_VS_PERCENT: 72.83 %
MDC_IDC_STAT_BRADY_DTM_END: NORMAL
MDC_IDC_STAT_BRADY_DTM_START: NORMAL
MDC_IDC_STAT_BRADY_RA_PERCENT_PACED: 27.13 %
MDC_IDC_STAT_BRADY_RV_PERCENT_PACED: 0.18 %
MDC_IDC_STAT_EPISODE_RECENT_COUNT: 0
MDC_IDC_STAT_EPISODE_RECENT_COUNT: 1
MDC_IDC_STAT_EPISODE_RECENT_COUNT_DTM_END: NORMAL
MDC_IDC_STAT_EPISODE_RECENT_COUNT_DTM_START: NORMAL
MDC_IDC_STAT_EPISODE_TOTAL_COUNT: 0
MDC_IDC_STAT_EPISODE_TOTAL_COUNT: 2
MDC_IDC_STAT_EPISODE_TOTAL_COUNT_DTM_END: NORMAL
MDC_IDC_STAT_EPISODE_TOTAL_COUNT_DTM_START: NORMAL
MDC_IDC_STAT_EPISODE_TYPE: NORMAL
MDC_IDC_STAT_TACHYTHERAPY_ATP_DELIVERED_RECENT: 0
MDC_IDC_STAT_TACHYTHERAPY_ATP_DELIVERED_TOTAL: 0
MDC_IDC_STAT_TACHYTHERAPY_RECENT_DTM_END: NORMAL
MDC_IDC_STAT_TACHYTHERAPY_RECENT_DTM_START: NORMAL
MDC_IDC_STAT_TACHYTHERAPY_SHOCKS_ABORTED_RECENT: 0
MDC_IDC_STAT_TACHYTHERAPY_SHOCKS_ABORTED_TOTAL: 0
MDC_IDC_STAT_TACHYTHERAPY_SHOCKS_DELIVERED_RECENT: 0
MDC_IDC_STAT_TACHYTHERAPY_SHOCKS_DELIVERED_TOTAL: 0
MDC_IDC_STAT_TACHYTHERAPY_TOTAL_DTM_END: NORMAL
MDC_IDC_STAT_TACHYTHERAPY_TOTAL_DTM_START: NORMAL

## 2021-01-11 ENCOUNTER — TELEPHONE (OUTPATIENT)
Dept: FAMILY MEDICINE | Facility: CLINIC | Age: 71
End: 2021-01-11

## 2021-01-11 DIAGNOSIS — E83.42 HYPOMAGNESEMIA: ICD-10-CM

## 2021-01-11 NOTE — TELEPHONE ENCOUNTER
Reason for Call:  Medication or medication refill:    Do you use a [facility]?  Name of the pharmacy and phone number for the current request:  [facility]    Name of the medication requested:  Magnesium Oxide [facility]  Pt is taking 1 Tablet 400mg 4 x daily.  Please Resend to them.    Can we leave a detailed message on this number? YES    Phone number patient can be reached at: Home number on file [phone] (home)    Best Time: Any Time      Call taken on 1/11/2021 at 4:06 PM by Elizabeth Ramos

## 2021-01-15 ENCOUNTER — TRANSFERRED RECORDS (OUTPATIENT)
Dept: HEALTH INFORMATION MANAGEMENT | Facility: CLINIC | Age: 71
End: 2021-01-15

## 2021-01-15 ENCOUNTER — HEALTH MAINTENANCE LETTER (OUTPATIENT)
Age: 71
End: 2021-01-15

## 2021-01-15 ENCOUNTER — DOCUMENTATION ONLY (OUTPATIENT)
Dept: CARDIOLOGY | Facility: CLINIC | Age: 71
End: 2021-01-15

## 2021-01-18 DIAGNOSIS — E55.9 VITAMIN D DEFICIENCY: ICD-10-CM

## 2021-01-18 DIAGNOSIS — E83.42 MAGNESIUM DEFICIENCY SYNDROME: ICD-10-CM

## 2021-01-18 DIAGNOSIS — D63.1 ANEMIA IN CHRONIC RENAL DISEASE: ICD-10-CM

## 2021-01-18 DIAGNOSIS — N18.32 STAGE 3B CHRONIC KIDNEY DISEASE (H): Primary | ICD-10-CM

## 2021-01-18 DIAGNOSIS — N18.9 ANEMIA IN CHRONIC RENAL DISEASE: ICD-10-CM

## 2021-01-21 ENCOUNTER — HOSPITAL ENCOUNTER (OUTPATIENT)
Dept: ULTRASOUND IMAGING | Facility: CLINIC | Age: 71
Discharge: HOME | End: 2021-01-21
Attending: INTERNAL MEDICINE | Admitting: INTERNAL MEDICINE
Payer: COMMERCIAL

## 2021-01-21 VITALS — DIASTOLIC BLOOD PRESSURE: 70 MMHG | SYSTOLIC BLOOD PRESSURE: 115 MMHG | WEIGHT: 160 LBS | BODY MASS INDEX: 22.96 KG/M2

## 2021-01-21 DIAGNOSIS — R80.8 OTHER PROTEINURIA: ICD-10-CM

## 2021-01-21 DIAGNOSIS — D63.1 ANEMIA IN CHRONIC RENAL DISEASE: ICD-10-CM

## 2021-01-21 DIAGNOSIS — N18.32 CHRONIC KIDNEY DISEASE (CKD) STAGE G3B/A1, MODERATELY DECREASED GLOMERULAR FILTRATION RATE (GFR) BETWEEN 30-44 ML/MIN/1.73 SQUARE METER AND ALBUMINURIA CREATININE RATIO LESS THAN 30 MG/G (H): ICD-10-CM

## 2021-01-21 DIAGNOSIS — N18.9 ANEMIA IN CHRONIC RENAL DISEASE: ICD-10-CM

## 2021-01-21 DIAGNOSIS — E55.9 VITAMIN D DEFICIENCY: ICD-10-CM

## 2021-01-21 DIAGNOSIS — E83.42 MAGNESIUM DEFICIENCY SYNDROME: ICD-10-CM

## 2021-01-21 DIAGNOSIS — N18.32 STAGE 3B CHRONIC KIDNEY DISEASE (H): ICD-10-CM

## 2021-01-21 LAB
ALBUMIN SERPL-MCNC: 3.1 G/DL (ref 3.4–5)
ALBUMIN UR-MCNC: 100 MG/DL
ANION GAP SERPL CALCULATED.3IONS-SCNC: 3 MMOL/L (ref 3–14)
APPEARANCE UR: CLEAR
BASOPHILS # BLD AUTO: 0 10E9/L (ref 0–0.2)
BASOPHILS NFR BLD AUTO: 0.5 %
BILIRUB UR QL STRIP: NEGATIVE
BUN SERPL-MCNC: 27 MG/DL (ref 7–30)
CA-I SERPL ISE-MCNC: 4.8 MG/DL (ref 4.4–5.2)
CALCIUM SERPL-MCNC: 9.4 MG/DL (ref 8.5–10.1)
CHLORIDE SERPL-SCNC: 102 MMOL/L (ref 94–109)
CO2 SERPL-SCNC: 30 MMOL/L (ref 20–32)
COLOR UR AUTO: YELLOW
CREAT SERPL-MCNC: 1.13 MG/DL (ref 0.66–1.25)
DEPRECATED CALCIDIOL+CALCIFEROL SERPL-MC: 62 UG/L (ref 20–75)
DIFFERENTIAL METHOD BLD: ABNORMAL
EOSINOPHIL # BLD AUTO: 0.2 10E9/L (ref 0–0.7)
EOSINOPHIL NFR BLD AUTO: 3.8 %
ERYTHROCYTE [DISTWIDTH] IN BLOOD BY AUTOMATED COUNT: 13.1 % (ref 10–15)
FERRITIN SERPL-MCNC: 30 NG/ML (ref 26–388)
GFR SERPL CREATININE-BSD FRML MDRD: 64 ML/MIN/{1.73_M2}
GLUCOSE SERPL-MCNC: 211 MG/DL (ref 70–99)
GLUCOSE UR STRIP-MCNC: 100 MG/DL
HCT VFR BLD AUTO: 46.2 % (ref 40–53)
HGB BLD-MCNC: 15 G/DL (ref 13.3–17.7)
HGB UR QL STRIP: ABNORMAL
IRON SATN MFR SERPL: 31 % (ref 15–46)
IRON SERPL-MCNC: 85 UG/DL (ref 35–180)
KETONES UR STRIP-MCNC: NEGATIVE MG/DL
LEUKOCYTE ESTERASE UR QL STRIP: NEGATIVE
LYMPHOCYTES # BLD AUTO: 1.1 10E9/L (ref 0.8–5.3)
LYMPHOCYTES NFR BLD AUTO: 18 %
MCH RBC QN AUTO: 31.2 PG (ref 26.5–33)
MCHC RBC AUTO-ENTMCNC: 32.5 G/DL (ref 31.5–36.5)
MCV RBC AUTO: 96 FL (ref 78–100)
MONOCYTES # BLD AUTO: 0.6 10E9/L (ref 0–1.3)
MONOCYTES NFR BLD AUTO: 10.1 %
NEUTROPHILS # BLD AUTO: 4.3 10E9/L (ref 1.6–8.3)
NEUTROPHILS NFR BLD AUTO: 67.6 %
NITRATE UR QL: NEGATIVE
PH UR STRIP: 6.5 PH (ref 5–7)
PHOSPHATE SERPL-MCNC: 3.5 MG/DL (ref 2.5–4.5)
PLATELET # BLD AUTO: 117 10E9/L (ref 150–450)
POTASSIUM SERPL-SCNC: 4.7 MMOL/L (ref 3.4–5.3)
PTH-INTACT SERPL-MCNC: 61 PG/ML (ref 18–80)
RBC # BLD AUTO: 4.81 10E12/L (ref 4.4–5.9)
RBC #/AREA URNS AUTO: ABNORMAL /HPF
SODIUM SERPL-SCNC: 135 MMOL/L (ref 133–144)
SOURCE: ABNORMAL
SP GR UR STRIP: 1.02 (ref 1–1.03)
TIBC SERPL-MCNC: 270 UG/DL (ref 240–430)
UROBILINOGEN UR STRIP-ACNC: 0.2 EU/DL (ref 0.2–1)
WBC # BLD AUTO: 6.3 10E9/L (ref 4–11)
WBC #/AREA URNS AUTO: ABNORMAL /HPF

## 2021-01-21 PROCEDURE — 83970 ASSAY OF PARATHORMONE: CPT | Performed by: INTERNAL MEDICINE

## 2021-01-21 PROCEDURE — 76770 US EXAM ABDO BACK WALL COMP: CPT

## 2021-01-21 PROCEDURE — 99N1036 PR STATISTIC TOTAL PROTEIN: Performed by: INTERNAL MEDICINE

## 2021-01-21 PROCEDURE — 83550 IRON BINDING TEST: CPT | Performed by: INTERNAL MEDICINE

## 2021-01-21 PROCEDURE — 84165 PROTEIN E-PHORESIS SERUM: CPT | Performed by: PATHOLOGY

## 2021-01-21 PROCEDURE — 82306 VITAMIN D 25 HYDROXY: CPT | Performed by: INTERNAL MEDICINE

## 2021-01-21 PROCEDURE — 82330 ASSAY OF CALCIUM: CPT | Performed by: INTERNAL MEDICINE

## 2021-01-21 PROCEDURE — 82728 ASSAY OF FERRITIN: CPT | Performed by: INTERNAL MEDICINE

## 2021-01-21 PROCEDURE — 80069 RENAL FUNCTION PANEL: CPT | Performed by: INTERNAL MEDICINE

## 2021-01-21 PROCEDURE — 83540 ASSAY OF IRON: CPT | Performed by: INTERNAL MEDICINE

## 2021-01-21 PROCEDURE — 81001 URINALYSIS AUTO W/SCOPE: CPT | Performed by: INTERNAL MEDICINE

## 2021-01-21 PROCEDURE — 36415 COLL VENOUS BLD VENIPUNCTURE: CPT | Performed by: INTERNAL MEDICINE

## 2021-01-21 PROCEDURE — 85025 COMPLETE CBC W/AUTO DIFF WBC: CPT | Performed by: INTERNAL MEDICINE

## 2021-01-21 NOTE — PROGRESS NOTES
Delbert Tilley is a 70 year old man who is being evaluated via a billable telephone visit.      What phone number would you like to be contacted at? [phone]  How would you like to obtain your AVS? [facility]  HPI and Plan:    Mr. Delbert Tilley is 70 years old and has a history of an ischemic cardiomyopathy, prior ICD placement, atrial flutter and coronary artery disease.  He has previously undergone coronary artery bypass graft surgery.  He has bilateral carotid disease, diabetes mellitus and does continue to smoke tobacco.      In the past, he underwent coronary artery bypass graft surgery with an LIMA graft to the LAD, vein graft to the circumflex marginal branch and a vein graft to the right PDA.  In 2014, he presented with a non-ST elevation myocardial infarct, was transferred to [facility] and underwent intervention with stenting of the vein graft to the marginal branch.  The vein graft to the RCA and the LIMA graft to the LAD were patent.  His LV ejection fraction at that time was 35%-40% with severe inferior and inferolateral hypokinesis.  A followup stress study done in 2015 demonstrated a large transmural inferior and inferoseptal infarct without ischemia as well as a small distal anterior and apical defect.      He did well without recurrent angina or evidence of heart failure.  As a preoperative assessment for endarterectomy in 2017, he underwent a Lexiscan stress nuclear study.  There was again noted to be the large inferior and inferoseptal defect without ischemia.  The LV ejection fraction was estimated to have fallen to 28%.  Later in 2017, he presented with heart failure symptoms and the ejection fraction was noted to be 20%-25% on echocardiography.  Repeat coronary angiography was recommended and again demonstrated the LIMA graft to the LAD to be patent with a 50% stenosis in the apical LAD, but the vein graft to the right PDA was occluded.  The vein graft to the circumflex marginal  branch had remained patent and the left main coronary artery was widely patent.      Mr. Tilley feels well and denies angina or new shortness of breath. He notes that he always has some dyspnea but blames that on his COPD and smoking.   Sometimes if he has retained some fluid, he will take an extra 10 mg of torsemide and the symptoms immediately resolve. He did that 4 days in a row and has not had any recurrence for what sounds like several weeks.   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      He does continue to smoke tobacco despite discussions and attempts in the past to discontinue tobacco.  He was hospitalized with pneumonia during October 2020. An echocardiogram during November 2020 again demonstrated an LV ejection fraction of 20-25% and appeared unchanged from his prior study.  He is on carvedilol 6.25 mg twice daily. His losartan was stopped during his recent hospitalization due to an acute kidney injury but he is again using 50 mg daily.  He is also on torsemide 20 mg daily for his heart failure symptoms.    He has now seen [facility] and yesterday, his GFR was 64.  He has previously undergone a cardio-defibrillator implant.  He has undergone an atrial flutter ablation.  His most recent ICD check did not demonstrate any atrial or ventricular arrhythmias and there 27% atrial pacing.  On atorvastatin 80 mg, his recent HDL was 28 mg/dl and the LDL was 44 mg/dl.  His weight is stable now at 160 pounds.     Exam:.   Blood pressure was 115/70 mmHg.        Assessment/Plan:  Mr. Tilley is doing well.  With regard to his left ventricular dysfunction, he has previously undergone defibrillator implantation.  He is essentially asymptomatic with New York Heart Association class I-II symptoms.  With regard to his coronary artery disease, he continues on an ARB agent, beta blockade, statin therapy with  good lipid control and aspirin.  He uses aspirin for other purposes and so he is on high-dose aspirin taking 1000 mg daily.  The aspirin is being used for arthritis and is not prescribed at that dose for coronary artery disease.      With regard to his heart failure symptoms, he is on beta blockade and an ARB agent as the vasodilator therapy.  With spironolactone, he noted a precipitous decline in his blood pressure.  He tried it several times and his blood pressures were approximately 80-90 mmHg.  In the future, spironolactone may be attempted once again and used to replace torsemide..     He will return in approximately 2 months to follow up with the [facility] advanced practice provider.  He will see myself at 4 months.       With regard to his defibrillator, he will continue to follow in the Device Clinic.         NORMAN BARRERA MD     Telephone Time: 19 minutes.                                           Phone call duration: 19 minutes

## 2021-01-22 ENCOUNTER — VIRTUAL VISIT (OUTPATIENT)
Dept: CARDIOLOGY | Facility: CLINIC | Age: 71
End: 2021-01-22
Payer: COMMERCIAL

## 2021-01-22 DIAGNOSIS — I70.219 ATHEROSCLEROSIS OF ARTERY OF EXTREMITY WITH INTERMITTENT CLAUDICATION (H): ICD-10-CM

## 2021-01-22 DIAGNOSIS — E21.3 HYPERPARATHYROIDISM (H): ICD-10-CM

## 2021-01-22 DIAGNOSIS — D69.6 THROMBOCYTOPENIA (H): ICD-10-CM

## 2021-01-22 DIAGNOSIS — J44.1 COPD EXACERBATION (H): ICD-10-CM

## 2021-01-22 DIAGNOSIS — I48.3 TYPICAL ATRIAL FLUTTER (H): ICD-10-CM

## 2021-01-22 DIAGNOSIS — I25.5 ISCHEMIC CARDIOMYOPATHY: ICD-10-CM

## 2021-01-22 DIAGNOSIS — I50.22 CHRONIC SYSTOLIC HEART FAILURE (H): ICD-10-CM

## 2021-01-22 DIAGNOSIS — E11.69 TYPE 2 DIABETES MELLITUS WITH OTHER SPECIFIED COMPLICATION, WITH LONG-TERM CURRENT USE OF INSULIN (H): ICD-10-CM

## 2021-01-22 DIAGNOSIS — Z79.4 TYPE 2 DIABETES MELLITUS WITH OTHER SPECIFIED COMPLICATION, WITH LONG-TERM CURRENT USE OF INSULIN (H): ICD-10-CM

## 2021-01-22 LAB
ALBUMIN SERPL ELPH-MCNC: 3.6 G/DL (ref 3.7–5.1)
ALPHA1 GLOB SERPL ELPH-MCNC: 0.3 G/DL (ref 0.2–0.4)
ALPHA2 GLOB SERPL ELPH-MCNC: 0.8 G/DL (ref 0.5–0.9)
B-GLOBULIN SERPL ELPH-MCNC: 0.8 G/DL (ref 0.6–1)
GAMMA GLOB SERPL ELPH-MCNC: 1.2 G/DL (ref 0.7–1.6)
M PROTEIN SERPL ELPH-MCNC: 0 G/DL
PROT PATTERN SERPL ELPH-IMP: ABNORMAL

## 2021-01-22 PROCEDURE — 99213 OFFICE O/P EST LOW 20 MIN: CPT | Mod: 95 | Performed by: INTERNAL MEDICINE

## 2021-01-22 NOTE — LETTER
1/22/2021    Danii Hernández MD  [id number]  [facility] [address]    RE: Delbert Tilley       Dear Colleague,    I had the pleasure of seeing Delbert Tilley in the [facility].    Delbert Tilley is a 70 year old man who is being evaluated via a billable telephone visit.      What phone number would you like to be contacted at? [phone]  How would you like to obtain your AVS? MyChart  HPI and Plan:    Mr. Delbert Tilley is 70 years old and has a history of an ischemic cardiomyopathy, prior ICD placement, atrial flutter and coronary artery disease.  He has previously undergone coronary artery bypass graft surgery.  He has bilateral carotid disease, diabetes mellitus and does continue to smoke tobacco.      In the past, he underwent coronary artery bypass graft surgery with an LIMA graft to the LAD, vein graft to the circumflex marginal branch and a vein graft to the right PDA.  In 2014, he presented with a non-ST elevation myocardial infarct, was transferred to [facility] and underwent intervention with stenting of the vein graft to the marginal branch.  The vein graft to the RCA and the LIMA graft to the LAD were patent.  His LV ejection fraction at that time was 35%-40% with severe inferior and inferolateral hypokinesis.  A followup stress study done in 2015 demonstrated a large transmural inferior and inferoseptal infarct without ischemia as well as a small distal anterior and apical defect.      He did well without recurrent angina or evidence of heart failure.  As a preoperative assessment for endarterectomy in 2017, he underwent a Lexiscan stress nuclear study.  There was again noted to be the large inferior and inferoseptal defect without ischemia.  The LV ejection fraction was estimated to have fallen to 28%.  Later in 2017, he presented with heart failure symptoms and the ejection fraction was noted to be 20%-25% on echocardiography.  Repeat coronary  angiography was recommended and again demonstrated the LIMA graft to the LAD to be patent with a 50% stenosis in the apical LAD, but the vein graft to the right PDA was occluded.  The vein graft to the circumflex marginal branch had remained patent and the left main coronary artery was widely patent.      Mr. Tilley feels well and denies angina or new shortness of breath. He notes that he always has some dyspnea but blames that on his COPD and smoking.   Sometimes if he has retained some fluid, he will take an extra 10 mg of torsemide and the symptoms immediately resolve. He did that 4 days in a row and has not had any recurrence for what sounds like several weeks.   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      He does continue to smoke tobacco despite discussions and attempts in the past to discontinue tobacco.  He was hospitalized with pneumonia during October 2020. An echocardiogram during November 2020 again demonstrated an LV ejection fraction of 20-25% and appeared unchanged from his prior study.  He is on carvedilol 6.25 mg twice daily. His losartan was stopped during his recent hospitalization due to an acute kidney injury but he is again using 50 mg daily.  He is also on torsemide 20 mg daily for his heart failure symptoms.    He has now seen [facility] and yesterday, his GFR was 64.  He has previously undergone a cardio-defibrillator implant.  He has undergone an atrial flutter ablation.  His most recent ICD check did not demonstrate any atrial or ventricular arrhythmias and there 27% atrial pacing.  On atorvastatin 80 mg, his recent HDL was 28 mg/dl and the LDL was 44 mg/dl.  His weight is stable now at 160 pounds.     Exam:.   Blood pressure was 115/70 mmHg.        Assessment/Plan:  Mr. Tilley is doing well.  With regard to his left ventricular dysfunction, he has previously undergone  defibrillator implantation.  He is essentially asymptomatic with New York Heart Association class I-II symptoms.  With regard to his coronary artery disease, he continues on an ARB agent, beta blockade, statin therapy with good lipid control and aspirin.  He uses aspirin for other purposes and so he is on high-dose aspirin taking 1000 mg daily.  The aspirin is being used for arthritis and is not prescribed at that dose for coronary artery disease.      With regard to his heart failure symptoms, he is on beta blockade and an ARB agent as the vasodilator therapy.  With spironolactone, he noted a precipitous decline in his blood pressure.  He tried it several times and his blood pressures were approximately 80-90 mmHg.  In the future, spironolactone may be attempted once again and used to replace torsemide..     He will return in approximately 2 months to follow up with the [facility] advanced practice provider.  He will see myself at 4 months.       With regard to his defibrillator, he will continue to follow in the Device Clinic.         NORMAN MICHAELS MD     Telephone Time: 19 minutes.                                           Phone call duration: 19 minutes        Thank you for allowing me to participate in the care of your patient.    Sincerely,     Norman Michaels MD     [facility]

## 2021-01-25 DIAGNOSIS — N18.32 STAGE 3B CHRONIC KIDNEY DISEASE (H): ICD-10-CM

## 2021-01-25 PROCEDURE — 84166 PROTEIN E-PHORESIS/URINE/CSF: CPT | Performed by: PATHOLOGY

## 2021-01-26 LAB
ALBUMIN MFR UR ELPH: 81.9 %
ALPHA1 GLOB MFR UR ELPH: 4.5 %
ALPHA2 GLOB MFR UR ELPH: 2.5 %
B-GLOBULIN MFR UR ELPH: 3.9 %
GAMMA GLOB MFR UR ELPH: 7.2 %
M PROTEIN MFR UR ELPH: 0 %
PROT PATTERN UR ELPH-IMP: ABNORMAL

## 2021-03-04 ENCOUNTER — HOSPITAL ENCOUNTER (OUTPATIENT)
Dept: CARDIOLOGY | Facility: CLINIC | Age: 71
Discharge: HOME | End: 2021-03-04
Attending: INTERNAL MEDICINE | Admitting: INTERNAL MEDICINE
Payer: COMMERCIAL

## 2021-03-04 DIAGNOSIS — R60.0 LOCALIZED EDEMA: ICD-10-CM

## 2021-03-04 PROCEDURE — 93970 EXTREMITY STUDY: CPT | Mod: 26 | Performed by: INTERNAL MEDICINE

## 2021-03-04 PROCEDURE — 93970 EXTREMITY STUDY: CPT

## 2021-03-10 NOTE — PROGRESS NOTES
HPI and Plan:    Mr. Delbert Tilley is 70 years old and has a history of an ischemic cardiomyopathy, prior ICD placement, atrial flutter and coronary artery disease.  He has previously undergone coronary artery bypass graft surgery.  He has bilateral carotid disease, diabetes mellitus and does continue to smoke tobacco.  His daughter accompanied him today.    He was hospitalized with pneumonia during October 2020.  An echocardiogram during November 2020 again demonstrated an LV ejection fraction of 20-25% and appeared unchanged from his prior study.  He is on carvedilol 6.25 mg twice daily. Losartan was stopped due to an acute kidney injury but he is again using 50 mg daily.  He is also on torsemide 30 mg daily for his heart failure symptoms.  He follows with Dr. Cueva of Nephrology and during January 2021, the GFR was 64.  He had evidence of volume overload and leg edema with that event and that has improved markedly.  He notes that he feels well overall and denies any chest discomfort, new shortness of breath, decrease in exertional tolerance or worsening leg edema.  He wears support hose and had minimal edema today.  His daughter agreed that his exertional tolerance has improved.  He notes that he always has some dyspnea but blames that on his COPD and smoking.   Sometimes if he has retained some fluid, he will take an extra 10 mg of torsemide and the symptoms immediately resolve.  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     He has had coronary artery bypass graft surgery with a LIMA graft to the LAD, vein graft to the circumflex marginal branch and a vein graft to the right PDA.  In 2014, he had a non-ST elevation myocardial infarct and underwent stenting of the vein graft to the marginal branch.  The vein graft to the RCA and the LIMA graft to the LAD were patent.  His LV ejection fraction at that time  was 35%-40% with severe inferior and inferolateral hypokinesis.  A followup stress study done in 2015 demonstrated a large transmural inferior and inferoseptal infarct without ischemia as well as a small distal anterior and apical defect.      He did well without recurrent angina or evidence of heart failure.  As a preoperative assessment for endarterectomy in 2017, he underwent a Lexiscan stress nuclear study.  There was again noted to be the large inferior and inferoseptal defect without ischemia but the LV ejection fraction was estimated to have fallen to 28%.  Later in 2017, he presented with heart failure symptoms and the ejection fraction was 20%-25% on echocardiography.  Repeat coronary angiography was recommended and again demonstrated the LIMA graft to the LAD to be patent with a 50% stenosis in the apical LAD, but the vein graft to the right PDA was occluded.  The vein graft to the circumflex marginal branch had remained patent and the left main coronary artery was widely patent.  Medical therapy has been pursued.     He does continue to smoke tobacco despite discussions and attempts in the past to discontinue tobacco. He has previously undergone a cardio-defibrillator implant.  He has undergone an atrial flutter ablation.  During December 2020, the ICD check did not demonstrate any atrial or ventricular arrhythmias and there 27% atrial pacing.  On atorvastatin 80 mg, his recent HDL was 28 mg/dl and the LDL was 44 mg/dl.  His weight has remained stable at 160 pounds.     Exam:.   Blood pressure was 109/66 mmHg.   This is a man in no apparent distress.  On chest examination, there were bilateral wheezes until he coughed and the wheezes cleared.  There is a mild to moderate and diffuse decrease in breath sounds.  On cardiac auscultation, there was an S1 and an S2 without extra sounds or a murmur.     Assessment/Plan:  Mr. Tilley is overall doing well. With regard to his ischemic cardiomyopathy, he is on beta  blockade and an ARB agent as the vasodilator therapy.  With spironolactone, he noted a precipitous decline in his blood pressure.  He tried it several times and his blood pressures were approximately 80-90 mmHg.  In the future, spironolactone may be attempted once again and used to replace torsemide.  H  This is why it takes to get him e has previously undergone defibrillator implantation.  He is essentially asymptomatic with New York Heart Association class I-II symptoms.  I have recommended trying to increase his dose of carvedilol to see if there is any effect on his left ventricular dysfunction.  He will increase carvedilol to 6.25 mg in the morning and 12.5 mg in the evening.  After 5 days, he will increase the carvedilol to 12.5 mg twice daily.  He will contact us if he does not feel well and will return to his prior dose of 6.25 mg twice daily.  A prescription was sent to the [facility].  He would like to start the medication change immediately and so a 30-day prescription was sent to [facility] pharmacy.  I have arranged for follow-up with the advanced practice provider in 3 months with a basic metabolic panel to assess his response to the change in therapy.    With regard to his coronary artery disease, he continues on an ARB agent, beta blockade, statin therapy with good lipid control and aspirin.  He uses aspirin for other purposes and so he is on high-dose aspirin taking 1000 mg daily.  The aspirin is being used for arthritis and is not prescribed at that dose for coronary artery disease.      With regard to his defibrillator, he will continue to be followed in the device clinic.    Once again, we discussed his tobacco use.  He thinks about stopping every day but he has had great difficulty.  We discussed [employer] and his daughter wrote down the information.    Finally, I would recommend follow-up with myself or one of my partners in approximately 6 months.  If I am not available, I  have recommended Dr. Bello.  An echocardiogram will be repeated at that time.

## 2021-03-11 ENCOUNTER — OFFICE VISIT (OUTPATIENT)
Dept: CARDIOLOGY | Facility: CLINIC | Age: 71
End: 2021-03-11
Attending: INTERNAL MEDICINE
Payer: COMMERCIAL

## 2021-03-11 VITALS
OXYGEN SATURATION: 96 % | HEART RATE: 73 BPM | BODY MASS INDEX: 22.9 KG/M2 | WEIGHT: 159.6 LBS | TEMPERATURE: 97.2 F | DIASTOLIC BLOOD PRESSURE: 66 MMHG | SYSTOLIC BLOOD PRESSURE: 109 MMHG

## 2021-03-11 DIAGNOSIS — I25.5 ISCHEMIC CARDIOMYOPATHY: ICD-10-CM

## 2021-03-11 DIAGNOSIS — I50.23 ACUTE ON CHRONIC SYSTOLIC CONGESTIVE HEART FAILURE (H): ICD-10-CM

## 2021-03-11 DIAGNOSIS — R60.0 LOCALIZED EDEMA: ICD-10-CM

## 2021-03-11 DIAGNOSIS — I50.22 CHRONIC SYSTOLIC CONGESTIVE HEART FAILURE (H): ICD-10-CM

## 2021-03-11 DIAGNOSIS — I50.22 CHRONIC SYSTOLIC HEART FAILURE (H): ICD-10-CM

## 2021-03-11 PROCEDURE — 99214 OFFICE O/P EST MOD 30 MIN: CPT | Performed by: INTERNAL MEDICINE

## 2021-03-11 RX ORDER — CARVEDILOL 12.5 MG/1
12.5 TABLET ORAL 2 TIMES DAILY WITH MEALS
Qty: 60 TABLET | Refills: 0 | Status: SHIPPED | OUTPATIENT
Start: 2021-03-11 | End: 2021-06-10

## 2021-03-11 RX ORDER — CARVEDILOL 6.25 MG/1
12.5 TABLET ORAL 2 TIMES DAILY WITH MEALS
Qty: 180 TABLET | Refills: 3 | Status: SHIPPED | OUTPATIENT
Start: 2021-03-11 | End: 2022-01-01

## 2021-03-11 NOTE — LETTER
3/11/2021    Danii Hernández MD  [address]  [facility] [address]    RE: Delbert Tilley       Dear Colleague,    I had the pleasure of seeing Delbert Tilley in the [facility].      HPI and Plan:    Mr. Delbert Tilley is 70 years old and has a history of an ischemic cardiomyopathy, prior ICD placement, atrial flutter and coronary artery disease.  He has previously undergone coronary artery bypass graft surgery.  He has bilateral carotid disease, diabetes mellitus and does continue to smoke tobacco.  His daughter accompanied him today.    He was hospitalized with pneumonia during October 2020.  An echocardiogram during November 2020 again demonstrated an LV ejection fraction of 20-25% and appeared unchanged from his prior study.  He is on carvedilol 6.25 mg twice daily. Losartan was stopped due to an acute kidney injury but he is again using 50 mg daily.  He is also on torsemide 30 mg daily for his heart failure symptoms.  He follows with Dr. Cueva of Nephrology and during January 2021, the GFR was 64.  He had evidence of volume overload and leg edema with that event and that has improved markedly.  He notes that he feels well overall and denies any chest discomfort, new shortness of breath, decrease in exertional tolerance or worsening leg edema.  He wears support hose and had minimal edema today.  His daughter agreed that his exertional tolerance has improved.  He notes that he always has some dyspnea but blames that on his COPD and smoking.   Sometimes if he has retained some fluid, he will take an extra 10 mg of torsemide and the symptoms immediately resolve.  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     He has had coronary artery bypass graft surgery with a LIMA graft to the LAD, vein graft to the circumflex marginal branch and a vein graft to the  right PDA.  In 2014, he had a non-ST elevation myocardial infarct and underwent stenting of the vein graft to the marginal branch.  The vein graft to the RCA and the LIMA graft to the LAD were patent.  His LV ejection fraction at that time was 35%-40% with severe inferior and inferolateral hypokinesis.  A followup stress study done in 2015 demonstrated a large transmural inferior and inferoseptal infarct without ischemia as well as a small distal anterior and apical defect.      He did well without recurrent angina or evidence of heart failure.  As a preoperative assessment for endarterectomy in 2017, he underwent a Lexiscan stress nuclear study.  There was again noted to be the large inferior and inferoseptal defect without ischemia but the LV ejection fraction was estimated to have fallen to 28%.  Later in 2017, he presented with heart failure symptoms and the ejection fraction was 20%-25% on echocardiography.  Repeat coronary angiography was recommended and again demonstrated the LIMA graft to the LAD to be patent with a 50% stenosis in the apical LAD, but the vein graft to the right PDA was occluded.  The vein graft to the circumflex marginal branch had remained patent and the left main coronary artery was widely patent.  Medical therapy has been pursued.     He does continue to smoke tobacco despite discussions and attempts in the past to discontinue tobacco. He has previously undergone a cardio-defibrillator implant.  He has undergone an atrial flutter ablation.  During December 2020, the ICD check did not demonstrate any atrial or ventricular arrhythmias and there 27% atrial pacing.  On atorvastatin 80 mg, his recent HDL was 28 mg/dl and the LDL was 44 mg/dl.  His weight has remained stable at 160 pounds.     Exam:.   Blood pressure was 109/66 mmHg.   This is a man in no apparent distress.  On chest examination, there were bilateral wheezes until he coughed and the wheezes cleared.  There is a mild to  moderate and diffuse decrease in breath sounds.  On cardiac auscultation, there was an S1 and an S2 without extra sounds or a murmur.     Assessment/Plan:  Mr. Tilley is overall doing well. With regard to his ischemic cardiomyopathy, he is on beta blockade and an ARB agent as the vasodilator therapy.  With spironolactone, he noted a precipitous decline in his blood pressure.  He tried it several times and his blood pressures were approximately 80-90 mmHg.  In the future, spironolactone may be attempted once again and used to replace torsemide.  H  This is why it takes to get him e has previously undergone defibrillator implantation.  He is essentially asymptomatic with New York Heart Association class I-II symptoms.  I have recommended trying to increase his dose of carvedilol to see if there is any effect on his left ventricular dysfunction.  He will increase carvedilol to 6.25 mg in the morning and 12.5 mg in the evening.  After 5 days, he will increase the carvedilol to 12.5 mg twice daily.  He will contact us if he does not feel well and will return to his prior dose of 6.25 mg twice daily.  A prescription was sent to the [facility].  He would like to start the medication change immediately and so a 30-day prescription was sent to [facility] pharmacy.  I have arranged for follow-up with the advanced practice provider in 3 months with a basic metabolic panel to assess his response to the change in therapy.    With regard to his coronary artery disease, he continues on an ARB agent, beta blockade, statin therapy with good lipid control and aspirin.  He uses aspirin for other purposes and so he is on high-dose aspirin taking 1000 mg daily.  The aspirin is being used for arthritis and is not prescribed at that dose for coronary artery disease.      With regard to his defibrillator, he will continue to be followed in the device clinic.    Once again, we discussed his tobacco use.  He thinks about stopping  every day but he has had great difficulty.  We discussed [employer] [place] and his daughter wrote down the information.    Finally, I would recommend follow-up with myself or one of my partners in approximately 6 months.  If I am not available, I have recommended Dr. Bello.  An echocardiogram will be repeated at that time.         Thank you for allowing me to participate in the care of your patient.      Sincerely,     Mimi Michaels MD     [facility]    cc:   Mimi Michaels MD  [address] [facility] [address] [address]

## 2021-03-25 ENCOUNTER — DOCUMENTATION ONLY (OUTPATIENT)
Dept: OTHER | Facility: CLINIC | Age: 71
End: 2021-03-25

## 2021-03-30 ENCOUNTER — ANCILLARY PROCEDURE (OUTPATIENT)
Dept: CARDIOLOGY | Facility: CLINIC | Age: 71
End: 2021-03-30
Attending: INTERNAL MEDICINE
Payer: COMMERCIAL

## 2021-03-30 DIAGNOSIS — Z95.810 ICD (IMPLANTABLE CARDIOVERTER-DEFIBRILLATOR) IN PLACE: ICD-10-CM

## 2021-03-30 DIAGNOSIS — I42.9 CARDIOMYOPATHY (H): ICD-10-CM

## 2021-03-30 PROCEDURE — 93295 DEV INTERROG REMOTE 1/2/MLT: CPT | Performed by: INTERNAL MEDICINE

## 2021-03-30 PROCEDURE — 93296 REM INTERROG EVL PM/IDS: CPT | Performed by: INTERNAL MEDICINE

## 2021-04-13 LAB
MDC_IDC_LEAD_IMPLANT_DT: NORMAL
MDC_IDC_LEAD_IMPLANT_DT: NORMAL
MDC_IDC_LEAD_LOCATION: NORMAL
MDC_IDC_LEAD_LOCATION: NORMAL
MDC_IDC_LEAD_LOCATION_DETAIL_1: NORMAL
MDC_IDC_LEAD_LOCATION_DETAIL_1: NORMAL
MDC_IDC_LEAD_MFG: NORMAL
MDC_IDC_LEAD_MFG: NORMAL
MDC_IDC_LEAD_MODEL: NORMAL
MDC_IDC_LEAD_MODEL: NORMAL
MDC_IDC_LEAD_POLARITY_TYPE: NORMAL
MDC_IDC_LEAD_POLARITY_TYPE: NORMAL
MDC_IDC_LEAD_SERIAL: NORMAL
MDC_IDC_LEAD_SERIAL: NORMAL
MDC_IDC_MSMT_BATTERY_DTM: NORMAL
MDC_IDC_MSMT_BATTERY_REMAINING_LONGEVITY: 88 MO
MDC_IDC_MSMT_BATTERY_RRT_TRIGGER: 2.73
MDC_IDC_MSMT_BATTERY_STATUS: NORMAL
MDC_IDC_MSMT_BATTERY_VOLTAGE: 3 V
MDC_IDC_MSMT_CAP_CHARGE_DTM: NORMAL
MDC_IDC_MSMT_CAP_CHARGE_ENERGY: 18 J
MDC_IDC_MSMT_CAP_CHARGE_TIME: 3.84
MDC_IDC_MSMT_CAP_CHARGE_TYPE: NORMAL
MDC_IDC_MSMT_LEADCHNL_RA_IMPEDANCE_VALUE: 437 OHM
MDC_IDC_MSMT_LEADCHNL_RA_PACING_THRESHOLD_AMPLITUDE: 0.75 V
MDC_IDC_MSMT_LEADCHNL_RA_PACING_THRESHOLD_PULSEWIDTH: 0.4 MS
MDC_IDC_MSMT_LEADCHNL_RA_SENSING_INTR_AMPL: 4.12 MV
MDC_IDC_MSMT_LEADCHNL_RA_SENSING_INTR_AMPL: 4.12 MV
MDC_IDC_MSMT_LEADCHNL_RV_IMPEDANCE_VALUE: 323 OHM
MDC_IDC_MSMT_LEADCHNL_RV_IMPEDANCE_VALUE: 380 OHM
MDC_IDC_MSMT_LEADCHNL_RV_PACING_THRESHOLD_AMPLITUDE: 1 V
MDC_IDC_MSMT_LEADCHNL_RV_PACING_THRESHOLD_PULSEWIDTH: 0.4 MS
MDC_IDC_MSMT_LEADCHNL_RV_SENSING_INTR_AMPL: 5.5 MV
MDC_IDC_MSMT_LEADCHNL_RV_SENSING_INTR_AMPL: 5.5 MV
MDC_IDC_PG_IMPLANT_DTM: NORMAL
MDC_IDC_PG_MFG: NORMAL
MDC_IDC_PG_MODEL: NORMAL
MDC_IDC_PG_SERIAL: NORMAL
MDC_IDC_PG_TYPE: NORMAL
MDC_IDC_SESS_CLINIC_NAME: NORMAL
MDC_IDC_SESS_DTM: NORMAL
MDC_IDC_SESS_TYPE: NORMAL
MDC_IDC_SET_BRADY_AT_MODE_SWITCH_RATE: 171 {BEATS}/MIN
MDC_IDC_SET_BRADY_HYSTRATE: NORMAL
MDC_IDC_SET_BRADY_LOWRATE: 55 {BEATS}/MIN
MDC_IDC_SET_BRADY_MAX_SENSOR_RATE: 140 {BEATS}/MIN
MDC_IDC_SET_BRADY_MAX_TRACKING_RATE: 140 {BEATS}/MIN
MDC_IDC_SET_BRADY_MODE: NORMAL
MDC_IDC_SET_BRADY_PAV_DELAY_LOW: 180 MS
MDC_IDC_SET_BRADY_SAV_DELAY_LOW: 150 MS
MDC_IDC_SET_LEADCHNL_RA_PACING_AMPLITUDE: 1.5 V
MDC_IDC_SET_LEADCHNL_RA_PACING_ANODE_ELECTRODE_1: NORMAL
MDC_IDC_SET_LEADCHNL_RA_PACING_ANODE_LOCATION_1: NORMAL
MDC_IDC_SET_LEADCHNL_RA_PACING_CAPTURE_MODE: NORMAL
MDC_IDC_SET_LEADCHNL_RA_PACING_CATHODE_ELECTRODE_1: NORMAL
MDC_IDC_SET_LEADCHNL_RA_PACING_CATHODE_LOCATION_1: NORMAL
MDC_IDC_SET_LEADCHNL_RA_PACING_POLARITY: NORMAL
MDC_IDC_SET_LEADCHNL_RA_PACING_PULSEWIDTH: 0.4 MS
MDC_IDC_SET_LEADCHNL_RA_SENSING_ANODE_ELECTRODE_1: NORMAL
MDC_IDC_SET_LEADCHNL_RA_SENSING_ANODE_LOCATION_1: NORMAL
MDC_IDC_SET_LEADCHNL_RA_SENSING_CATHODE_ELECTRODE_1: NORMAL
MDC_IDC_SET_LEADCHNL_RA_SENSING_CATHODE_LOCATION_1: NORMAL
MDC_IDC_SET_LEADCHNL_RA_SENSING_POLARITY: NORMAL
MDC_IDC_SET_LEADCHNL_RA_SENSING_SENSITIVITY: 0.3 MV
MDC_IDC_SET_LEADCHNL_RV_PACING_AMPLITUDE: 2.25 V
MDC_IDC_SET_LEADCHNL_RV_PACING_ANODE_ELECTRODE_1: NORMAL
MDC_IDC_SET_LEADCHNL_RV_PACING_ANODE_LOCATION_1: NORMAL
MDC_IDC_SET_LEADCHNL_RV_PACING_CAPTURE_MODE: NORMAL
MDC_IDC_SET_LEADCHNL_RV_PACING_CATHODE_ELECTRODE_1: NORMAL
MDC_IDC_SET_LEADCHNL_RV_PACING_CATHODE_LOCATION_1: NORMAL
MDC_IDC_SET_LEADCHNL_RV_PACING_POLARITY: NORMAL
MDC_IDC_SET_LEADCHNL_RV_PACING_PULSEWIDTH: 0.4 MS
MDC_IDC_SET_LEADCHNL_RV_SENSING_ANODE_ELECTRODE_1: NORMAL
MDC_IDC_SET_LEADCHNL_RV_SENSING_ANODE_LOCATION_1: NORMAL
MDC_IDC_SET_LEADCHNL_RV_SENSING_CATHODE_ELECTRODE_1: NORMAL
MDC_IDC_SET_LEADCHNL_RV_SENSING_CATHODE_LOCATION_1: NORMAL
MDC_IDC_SET_LEADCHNL_RV_SENSING_POLARITY: NORMAL
MDC_IDC_SET_LEADCHNL_RV_SENSING_SENSITIVITY: 0.3 MV
MDC_IDC_SET_ZONE_DETECTION_BEATS_DENOMINATOR: 40 {BEATS}
MDC_IDC_SET_ZONE_DETECTION_BEATS_NUMERATOR: 30 {BEATS}
MDC_IDC_SET_ZONE_DETECTION_INTERVAL: 320 MS
MDC_IDC_SET_ZONE_DETECTION_INTERVAL: 350 MS
MDC_IDC_SET_ZONE_DETECTION_INTERVAL: 360 MS
MDC_IDC_SET_ZONE_DETECTION_INTERVAL: 400 MS
MDC_IDC_SET_ZONE_DETECTION_INTERVAL: NORMAL
MDC_IDC_SET_ZONE_TYPE: NORMAL
MDC_IDC_STAT_AT_BURDEN_PERCENT: 0 %
MDC_IDC_STAT_AT_DTM_END: NORMAL
MDC_IDC_STAT_AT_DTM_START: NORMAL
MDC_IDC_STAT_BRADY_AP_VP_PERCENT: 0.61 %
MDC_IDC_STAT_BRADY_AP_VS_PERCENT: 30.27 %
MDC_IDC_STAT_BRADY_AS_VP_PERCENT: 0.04 %
MDC_IDC_STAT_BRADY_AS_VS_PERCENT: 69.08 %
MDC_IDC_STAT_BRADY_DTM_END: NORMAL
MDC_IDC_STAT_BRADY_DTM_START: NORMAL
MDC_IDC_STAT_BRADY_RA_PERCENT_PACED: 30.91 %
MDC_IDC_STAT_BRADY_RV_PERCENT_PACED: 0.67 %
MDC_IDC_STAT_EPISODE_RECENT_COUNT: 0
MDC_IDC_STAT_EPISODE_RECENT_COUNT_DTM_END: NORMAL
MDC_IDC_STAT_EPISODE_RECENT_COUNT_DTM_START: NORMAL
MDC_IDC_STAT_EPISODE_TOTAL_COUNT: 0
MDC_IDC_STAT_EPISODE_TOTAL_COUNT: 2
MDC_IDC_STAT_EPISODE_TOTAL_COUNT_DTM_END: NORMAL
MDC_IDC_STAT_EPISODE_TOTAL_COUNT_DTM_START: NORMAL
MDC_IDC_STAT_EPISODE_TYPE: NORMAL
MDC_IDC_STAT_TACHYTHERAPY_ATP_DELIVERED_RECENT: 0
MDC_IDC_STAT_TACHYTHERAPY_ATP_DELIVERED_TOTAL: 0
MDC_IDC_STAT_TACHYTHERAPY_RECENT_DTM_END: NORMAL
MDC_IDC_STAT_TACHYTHERAPY_RECENT_DTM_START: NORMAL
MDC_IDC_STAT_TACHYTHERAPY_SHOCKS_ABORTED_RECENT: 0
MDC_IDC_STAT_TACHYTHERAPY_SHOCKS_ABORTED_TOTAL: 0
MDC_IDC_STAT_TACHYTHERAPY_SHOCKS_DELIVERED_RECENT: 0
MDC_IDC_STAT_TACHYTHERAPY_SHOCKS_DELIVERED_TOTAL: 0
MDC_IDC_STAT_TACHYTHERAPY_TOTAL_DTM_END: NORMAL
MDC_IDC_STAT_TACHYTHERAPY_TOTAL_DTM_START: NORMAL

## 2021-05-21 ENCOUNTER — TELEPHONE (OUTPATIENT)
Dept: OTHER | Facility: CLINIC | Age: 71
End: 2021-05-21

## 2021-05-21 NOTE — TELEPHONE ENCOUNTER
Patient called and stated that he received letter for F/u with Dr. Reyes. He is requesting to changed his care of vascular needs to [facility] due to patient lives in [place], [place].     Informed patient will send message to nurse to review and advise.       Teodora SUGGS

## 2021-05-21 NOTE — TELEPHONE ENCOUNTER
Patient would like to transfer care to [place].    Routing to [place] [facility] team    Helene Barajas RN BSN  [facility]  [phone]

## 2021-06-04 ENCOUNTER — TRANSFERRED RECORDS (OUTPATIENT)
Dept: HEALTH INFORMATION MANAGEMENT | Facility: CLINIC | Age: 71
End: 2021-06-04

## 2021-06-10 ENCOUNTER — OFFICE VISIT (OUTPATIENT)
Dept: CARDIOLOGY | Facility: CLINIC | Age: 71
End: 2021-06-10
Attending: INTERNAL MEDICINE
Payer: COMMERCIAL

## 2021-06-10 VITALS
DIASTOLIC BLOOD PRESSURE: 65 MMHG | WEIGHT: 153 LBS | SYSTOLIC BLOOD PRESSURE: 115 MMHG | HEART RATE: 83 BPM | OXYGEN SATURATION: 97 % | BODY MASS INDEX: 21.95 KG/M2

## 2021-06-10 DIAGNOSIS — I73.9 PVD (PERIPHERAL VASCULAR DISEASE) (H): ICD-10-CM

## 2021-06-10 DIAGNOSIS — I48.3 TYPICAL ATRIAL FLUTTER (H): ICD-10-CM

## 2021-06-10 DIAGNOSIS — I50.22 CHRONIC SYSTOLIC HEART FAILURE (H): ICD-10-CM

## 2021-06-10 DIAGNOSIS — I25.5 ISCHEMIC CARDIOMYOPATHY: Primary | ICD-10-CM

## 2021-06-10 DIAGNOSIS — I50.22 CHRONIC SYSTOLIC CONGESTIVE HEART FAILURE (H): ICD-10-CM

## 2021-06-10 DIAGNOSIS — I47.29 NSVT (NONSUSTAINED VENTRICULAR TACHYCARDIA) (H): ICD-10-CM

## 2021-06-10 LAB
ANION GAP SERPL CALCULATED.3IONS-SCNC: 5 MMOL/L (ref 3–14)
BUN SERPL-MCNC: 34 MG/DL (ref 7–30)
CALCIUM SERPL-MCNC: 8.7 MG/DL (ref 8.5–10.1)
CHLORIDE SERPL-SCNC: 105 MMOL/L (ref 94–109)
CO2 SERPL-SCNC: 30 MMOL/L (ref 20–32)
CREAT SERPL-MCNC: 1.17 MG/DL (ref 0.66–1.25)
GFR SERPL CREATININE-BSD FRML MDRD: 62 ML/MIN/{1.73_M2}
GLUCOSE SERPL-MCNC: 209 MG/DL (ref 70–99)
POTASSIUM SERPL-SCNC: 4.6 MMOL/L (ref 3.4–5.3)
SODIUM SERPL-SCNC: 140 MMOL/L (ref 133–144)

## 2021-06-10 PROCEDURE — 80048 BASIC METABOLIC PNL TOTAL CA: CPT | Performed by: INTERNAL MEDICINE

## 2021-06-10 PROCEDURE — 36415 COLL VENOUS BLD VENIPUNCTURE: CPT | Performed by: INTERNAL MEDICINE

## 2021-06-10 PROCEDURE — 99214 OFFICE O/P EST MOD 30 MIN: CPT | Performed by: NURSE PRACTITIONER

## 2021-06-10 RX ORDER — CARVEDILOL 6.25 MG/1
6.25 TABLET ORAL 2 TIMES DAILY WITH MEALS
Qty: 180 TABLET | Refills: 3 | Status: SHIPPED | OUTPATIENT
Start: 2021-06-10 | End: 2021-06-22

## 2021-06-10 NOTE — PATIENT INSTRUCTIONS
Medication Changes:  None     Recommendations:  1. Check daily weights and call the clinic if your weight has increased more than 2 lbs in one day or 5 lbs in one week; if you feel more short of breath or have worsening swelling in your legs or abdomen.    Follow-up:  See Dr. Bello for cardiology follow up at [facility]: October 2021 with echo prior to reassess EF. Call in July or Aug. to schedule.     Cardiology Scheduling~[phone]  Cardiology Clinic RNs~[phone] (Chrissy Xavier RN and Lissett Sandoval RN)

## 2021-06-10 NOTE — PROGRESS NOTES
Cardiology Clinic Progress Note  Delbert Tilley MRN# [id number]   YOB: 1950 Age: 67 year old         Primary Cardiologist:   Dr. Michaels          History of Presenting Illness:    Delbert Tilley is a pleasant 67 year old patient with a past cardiac history significant for   1. paroxysmal atrial fibrillation,   2. CAD status post CABG (LIMA to LAD, SVG to OM, and SVG to PDA) and PCI to OM3 graft 2014,   3. s/p right and left carotid endarterectomy,   4. s/p flutter ablation,   5. ischemic combined with nonischemic cardiomyopathy s/p ICD.   Past medical history significant for diabetes mellitus and tobacco abuse.  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    In 2014 he had NSTEMI with stent to the SVG to OM.  EF at that time was 35-40%.  In 2017 he underwent preoperative assessment for carotid endarterectomy.  Stress test was negative for ischemia but EF had fallen.   He presented with heart failure symptoms later in 2017 and EF was noted to be 20-25% on echocardiogram.  Repeat coronary angiogram demonstrated 50% stenosis in the apical LAD and SVG to the RPDA had occluded with other grafts patent.    Echocardiogram November 2020 continue to show EF 20 to 25% unchanged from his prior study.  Losartan had been stopped due to RADHA and then had been restarted.  He continues to follow with Dr. Cueva in nephrology.  Overall, his leg edema and volume overload had significantly improved.  He continued wearing compression stockings with only minimal edema.  It was noted that his exertional tolerance had improved.  He continued with chronic dyspnea due to COPD and smoking that was stable.  He reported taking an extra 10 mg of torsemide if retaining fluid, which would resolve symptoms. She recommended increasing carvedilol to see if there is any improvement in EF.    Pt presents today for 3 month follow-up.  Device check 3/30/2021 showing 31% atrial paced, 0.7% ventricular paced, 7 years of battery, no arrhythmias, ATP, or shocks.  BMP  today showing normal creatinine, GFR and electrolytes with mildly elevated BUN at 34.  Results reviewed today.     After increasing carvedilol, he had less ambition and more fatigue so returned to his original dose of 6.25 mg twice daily.  Blood pressure today is controlled at 115/65 with a heart rate of 83.  Weight today in the clinic is down 6 pounds in the last 3 months.  He continues with 1+ pitting edema MCC up the calf.  He occasionally uses compression stockings and rarely will take an extra dose of torsemide for leg edema.  He denies any significant episodes of heart failure.  He unfortunately continues smoking.  His main goal is to stay out of assisted living/nursing home.  He recently followed with nephrology and was found to have low magnesium.  He is having this rechecked on Friday.  He continues with chronic dyspnea which is stable and unchanged.  He has upcoming carotid artery ultrasound and exercise ABIs scheduled.  Patient reports no chest pain, PND, orthopnea, presyncope, syncope, heart racing, or palpitations.      Current Cardiac Medications   Aspirin 1000 mg daily -for arthritis  Atorvastatin 80 mg daily  Carvedilol 12.5 mg BID - taking 6.25 BID   Losartan 50 mg daily   Nitroglycerin p.r.n.  Torsemide 30 mg daily                    Assessment and Plan:     Plan  Patient Instructions   Medication Changes:  None     Recommendations:  1. Check daily weights and call the clinic if your weight has increased more than 2 lbs in one day or 5 lbs in one week; if you feel more short of breath or have worsening swelling in your legs or abdomen.    Follow-up:  See Dr. Bello for cardiology follow up at [facility]: October 2021 with echo prior to reassess EF. Call in July or Aug. to schedule.     Cardiology Scheduling~[phone]  Cardiology Clinic RNs~[phone] (Chrissy Xavier RN and Lissett Sandoval RN)          1. CAD    CABG (LIMA to LAD, SVG to OM, and SVG to PDA) years ago     2014 NSTEMI PCI to  SVG-OM3     Angio 2017 without intervention, 50% stenosis in the apical LAD and SVG to the RPDA had occluded    No angina     Continue statin, aspirin, ARB, beta blocker      2. Ischemic cardiomyopathy with chronic systolic    Status post ICD with generator replaced     LVEF stable 20-25% since 2017, previously 35-40%     Minimal lower extremity edema    NYHA class II    Continue beta blocker, ARB, torsemide     If patient develops left bundle branch block, may consider bi-V ICD upgrade in the future    Unable to increase beta-blocker due to side effect of fatigue and unable to increase ARB due to previous RADHA    Did not tolerate spironolactone d/t hypotension      3. Paroxysmal atrial fibrillation/flutter    status post flutter ablation    History of a couple episodes of 30 seconds of atrial fibrillation    No symptoms of atrial fibrillation and no atrial fibrillation seen on device checks    Continue rate control with beta-blocker and no current anticoagulation      4. PVD    status post right and left carotid endarterectomy    following with vascular surgery       5.  NSVT    Seen on device checks, asymptomatic    Continue beta-blocker    We will continue to monitor on device checks and, if needed, can consider decreasing losartan to increase beta-blocker          Total time spent today was 34 mins, reviewing labs, testing, notes, documenting notes, and seeing patient.         Thank you for allowing me to participate in this delightful patient's care.      This note was completed in part using Dragon voice recognition software. Although reviewed after completion, some word and grammatical errors may occur.    Yareli Kim, APRN, CNP           Data:   All laboratory data reviewed         Constitutional:  cooperative, alert and oriented, well developed, well nourished, in no acute distress    Skin:  warm and dry to the touch         Head:  normocephalic       Eyes:  pupils equal and round        ENT:  no pallor or cyanosis       Neck:  no stiffness       Respiratory:  clear to auscultation; normal symmetry        Cardiac: regular rate and rhythm; normal S1 and S2                 pulses full and equal      GI:  abdomen soft, nondistended     Extremities and Muscular Skeletal:   1+ pitting edema bilaterally to midshin       Neurological:  affect appropriate; no gross motor deficits       Psych:  Alert and Oriented x 3 , appropriate affact

## 2021-06-10 NOTE — LETTER
6/10/2021    Danii Hernández MD  [address]  [facility] [address]    RE: Delbert Tilley       Dear Colleague,    I had the pleasure of seeing Delbert Tilley in the [facility].    Cardiology Clinic Progress Note  Delbert Tilley MRN# [id number]   YOB: 1950 Age: 67 year old         Primary Cardiologist:   Dr. Michaels          History of Presenting Illness:    Delbert Tilley is a pleasant 67 year old patient with a past cardiac history significant for   1. paroxysmal atrial fibrillation,   2. CAD status post CABG (LIMA to LAD, SVG to OM, and SVG to PDA) and PCI to OM3 graft 2014,   3. s/p right and left carotid endarterectomy,   4. s/p flutter ablation,   5. ischemic combined with nonischemic cardiomyopathy s/p ICD.   Past medical history significant for diabetes mellitus and tobacco abuse.  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    In 2014 he had NSTEMI with stent to the SVG to OM.  EF at that time was 35-40%.  In 2017 he underwent preoperative assessment for carotid endarterectomy.  Stress test was negative for ischemia but EF had fallen.   He presented with heart failure symptoms later in 2017 and EF was noted to be 20-25% on echocardiogram.  Repeat coronary angiogram demonstrated 50% stenosis in the apical LAD and SVG to the RPDA had occluded with other grafts patent.    Echocardiogram November 2020 continue to show EF 20 to 25% unchanged from his prior study.  Losartan had been stopped due to RADHA and then had been restarted.  He continues to follow with Dr. Cueva in nephrology.  Overall, his leg edema and volume overload had significantly improved.  He continued wearing compression stockings with only minimal edema.  It was noted that his exertional tolerance had improved.  He continued with chronic dyspnea due to COPD and smoking that was stable.  He reported taking an extra 10 mg of torsemide if retaining fluid, which would resolve symptoms. She  recommended increasing carvedilol to see if there is any improvement in EF.    Pt presents today for 3 month follow-up.  Device check 3/30/2021 showing 31% atrial paced, 0.7% ventricular paced, 7 years of battery, no arrhythmias, ATP, or shocks.  BMP today showing normal creatinine, GFR and electrolytes with mildly elevated BUN at 34.  Results reviewed today.     After increasing carvedilol, he had less ambition and more fatigue so returned to his original dose of 6.25 mg twice daily.  Blood pressure today is controlled at 115/65 with a heart rate of 83.  Weight today in the clinic is down 6 pounds in the last 3 months.  He continues with 1+ pitting edema FDC up the calf.  He occasionally uses compression stockings and rarely will take an extra dose of torsemide for leg edema.  He denies any significant episodes of heart failure.  He unfortunately continues smoking.  His main goal is to stay out of assisted living/nursing home.  He recently followed with nephrology and was found to have low magnesium.  He is having this rechecked on Friday.  He continues with chronic dyspnea which is stable and unchanged.  He has upcoming carotid artery ultrasound and exercise ABIs scheduled.  Patient reports no chest pain, PND, orthopnea, presyncope, syncope, heart racing, or palpitations.      Current Cardiac Medications   Aspirin 1000 mg daily -for arthritis  Atorvastatin 80 mg daily  Carvedilol 12.5 mg BID - taking 6.25 BID   Losartan 50 mg daily   Nitroglycerin p.r.n.  Torsemide 30 mg daily                    Assessment and Plan:     Plan  Patient Instructions   Medication Changes:  None     Recommendations:  1. Check daily weights and call the clinic if your weight has increased more than 2 lbs in one day or 5 lbs in one week; if you feel more short of breath or have worsening swelling in your legs or abdomen.    Follow-up:  See Dr. Bello for cardiology follow up at [facility]: October 2021 with echo prior to reassess  EF. Call in July or Aug. to schedule.     Cardiology Scheduling~[phone]  Cardiology Clinic RNs~[phone] (Chrissy Xavier RN and Lissett Sandoval RN)          1. CAD    CABG (LIMA to LAD, SVG to OM, and SVG to PDA) years ago     2014 NSTEMI PCI to SVG-OM3     Angio 2017 without intervention, 50% stenosis in the apical LAD and SVG to the RPDA had occluded    No angina     Continue statin, aspirin, ARB, beta blocker      2. Ischemic cardiomyopathy with chronic systolic    Status post ICD with generator replaced     LVEF stable 20-25% since 2017, previously 35-40%     Minimal lower extremity edema    NYHA class II    Continue beta blocker, ARB, torsemide     If patient develops left bundle branch block, may consider bi-V ICD upgrade in the future    Unable to increase beta-blocker due to side effect of fatigue and unable to increase ARB due to previous RADHA    Did not tolerate spironolactone d/t hypotension      3. Paroxysmal atrial fibrillation/flutter    status post flutter ablation    History of a couple episodes of 30 seconds of atrial fibrillation    No symptoms of atrial fibrillation and no atrial fibrillation seen on device checks    Continue rate control with beta-blocker and no current anticoagulation      4. PVD    status post right and left carotid endarterectomy    following with vascular surgery       5.  NSVT    Seen on device checks, asymptomatic    Continue beta-blocker    We will continue to monitor on device checks and, if needed, can consider decreasing losartan to increase beta-blocker          Total time spent today was 34 mins, reviewing labs, testing, notes, documenting notes, and seeing patient.         Thank you for allowing me to participate in this delightful patient's care.      This note was completed in part using Dragon voice recognition software. Although reviewed after completion, some word and grammatical errors may occur.    Yareli Kim, APRN, CNP           Data:    All laboratory data reviewed         Constitutional:  cooperative, alert and oriented, well developed, well nourished, in no acute distress    Skin:  warm and dry to the touch         Head:  normocephalic       Eyes:  pupils equal and round       ENT:  no pallor or cyanosis       Neck:  no stiffness       Respiratory:  clear to auscultation; normal symmetry        Cardiac: regular rate and rhythm; normal S1 and S2                 pulses full and equal      GI:  abdomen soft, nondistended     Extremities and Muscular Skeletal:   1+ pitting edema bilaterally to midshin       Neurological:  affect appropriate; no gross motor deficits       Psych:  Alert and Oriented x 3 , appropriate affact    Thank you for allowing me to participate in the care of your patient.      Sincerely,     ANA [facility]    cc:   Mimi Michaels MD  [address] [place] [id number]

## 2021-06-22 ENCOUNTER — OFFICE VISIT (OUTPATIENT)
Dept: VASCULAR SURGERY | Facility: CLINIC | Age: 71
End: 2021-06-22
Attending: SURGERY
Payer: COMMERCIAL

## 2021-06-22 ENCOUNTER — HOSPITAL ENCOUNTER (OUTPATIENT)
Dept: ULTRASOUND IMAGING | Facility: CLINIC | Age: 71
Discharge: HOME | End: 2021-06-22
Attending: SURGERY | Admitting: SURGERY
Payer: COMMERCIAL

## 2021-06-22 VITALS — RESPIRATION RATE: 16 BRPM | DIASTOLIC BLOOD PRESSURE: 71 MMHG | HEART RATE: 72 BPM | SYSTOLIC BLOOD PRESSURE: 127 MMHG

## 2021-06-22 DIAGNOSIS — I70.212 ATHEROSCLEROSIS OF NATIVE ARTERIES OF EXTREMITIES WITH INTERMITTENT CLAUDICATION, LEFT LEG (H): ICD-10-CM

## 2021-06-22 DIAGNOSIS — I70.219 ATHEROSCLEROSIS OF ARTERY OF EXTREMITY WITH INTERMITTENT CLAUDICATION (H): Primary | ICD-10-CM

## 2021-06-22 DIAGNOSIS — I65.23 BILATERAL CAROTID ARTERY STENOSIS: ICD-10-CM

## 2021-06-22 DIAGNOSIS — I70.211 ATHEROSCLEROSIS OF NATIVE ARTERIES OF EXTREMITIES WITH INTERMITTENT CLAUDICATION, RIGHT LEG (H): ICD-10-CM

## 2021-06-22 DIAGNOSIS — I70.219 ATHEROSCLEROSIS OF ARTERY OF EXTREMITY WITH INTERMITTENT CLAUDICATION (H): ICD-10-CM

## 2021-06-22 DIAGNOSIS — I65.22 LEFT CAROTID STENOSIS: ICD-10-CM

## 2021-06-22 PROBLEM — G90.09 OTHER IDIOPATHIC PERIPHERAL AUTONOMIC NEUROPATHY: Status: ACTIVE | Noted: 2021-06-22

## 2021-06-22 PROBLEM — M62.81 GENERALIZED MUSCLE WEAKNESS: Status: ACTIVE | Noted: 2021-06-22

## 2021-06-22 PROBLEM — L60.3 NAIL DYSTROPHY: Status: ACTIVE | Noted: 2021-06-22

## 2021-06-22 PROBLEM — L57.0 ACTINIC KERATOSIS: Status: ACTIVE | Noted: 2021-06-22

## 2021-06-22 PROBLEM — I87.2 VENOUS INSUFFICIENCY (CHRONIC) (PERIPHERAL): Status: ACTIVE | Noted: 2021-06-22

## 2021-06-22 PROCEDURE — 93924 LWR XTR VASC STDY BILAT: CPT

## 2021-06-22 PROCEDURE — 99214 OFFICE O/P EST MOD 30 MIN: CPT | Performed by: SURGERY

## 2021-06-22 PROCEDURE — 93880 EXTRACRANIAL BILAT STUDY: CPT

## 2021-06-22 NOTE — NURSING NOTE
"Initial /71 (BP Location: Right arm, Patient Position: Chair, Cuff Size: Adult Regular)   Pulse 72   Resp 16  Estimated body mass index is 21.95 kg/m  as calculated from the following:    Height as of 12/16/20: 1.778 m (5' 10\").    Weight as of 6/10/21: 69.4 kg (153 lb). .    Patient is in clinic today to see MD Remi Chau.      Patient is here for a follow up to discuss PAD.    The patient does smoke.    Pt is currently taking ASA and statin.    The patient states he/she are NOT wearing compression .    Pt dialyzes days: na    Swelling is observed in bilateral legs.    Pt rates pain 6/10 located at bottom of  Both feet.    Pts symptoms include leg cramps, swelling, numbness or weakness and pain with walking at a distance of  1.5 blocks in Bilateral  extremity.    Patients condition is worse.    The provider has been notified that the patient is complaining of tingling in right leg , pain  on the bottom of both feet.     At the end of the visit the patient was provided with education both verbally and on their AVS inessa du LPN        "

## 2021-06-22 NOTE — PROGRESS NOTES
VASCULAR SURGERY PROGRESS NOTE   VASCULAR SURGEON: Ric Chau MD, RPVI     LOCATION:  [facility]     Delbert Tilley   Medical Record #:  [id number]  YOB: 1950  Age:  70 year old     Date of Service: 6/22/2021    PRIMARY CARE PROVIDER: Danii Hernández      Reason for visit: Follow-up    IMPRESSION: 70-year-old male who is well-known to vascular surgery service.  Patient is status post bilateral carotid artery endarterectomies. Today's ultrasound did show mild to moderate elevation of peak systolic velocities bilaterally but it is relatively stable compared to previous ultrasound. ABIs did show moderate peripheral arterial disease, right is worse than left. Toe pressures are suboptimal for wound healing potential. Patient is symptomatic and complains of lifestyle limiting claudication claudication but it is not lifestyle limiting.  Patient states is relatively stable.  He is complaining of new numbness on the bottom of his feet which is probably related to combination of both diabetic neuropathy and PAD.  Patient is currently on best medical management therapy.    RECOMMENDATION/RISKS: Continue best medical management therapy including baby aspirin and high-dose atorvastatin. Given lack of patient symptoms I would not recommend any intervention for peripheral arterial disease. Continue best medical management therapy.    HPI:  Delbert Tilley is a 70 year old male who was seen today for follow-up.  Patient is doing overall well.  Denies any new complaints.    REVIEW OF SYSTEMS:    A 12 point ROS was reviewed and is negative except for bilateral lower extremity claudication.    PHH:    Past Medical History:   Diagnosis Date     Acute renal failure (H) 8/26/06 Hosp    secondary to dehydration     Arthritis      CAD (coronary artery disease)     LIMA to the LAD, vein graft to OM and a vein graft to the PDA     Carpal tunnel syndrome      CHF (congestive heart failure) (H)     Ischemic  and nonischemic cardiomyopathy; 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%     Diabetes (H)      Gastro-oesophageal reflux disease      H/O: alcohol abuse      HTN (hypertension)      Hyperlipidaemia LDL goal <100 7/8/2013     Hypokalemia      Pacemaker      Pancreatitis 6/26/06 Hosp          Past Surgical History:   Procedure Laterality Date     C CABG, ARTERIAL, THREE       CATARACT IOL, RT/LT      Cataract IOL RT/LT     ENDARTERECTOMY CAROTID Right 6/12/2015    Procedure: ENDARTERECTOMY CAROTID;  Surgeon: João Turner MD;  Location: [facility] OR     ENDARTERECTOMY CAROTID Left 9/8/2017    Procedure: ENDARTERECTOMY CAROTID;  LEFT CAROTID ENDARTERECTOMY WITH EEG;  Surgeon: João Turner MD;  Location: [facility] OR     IMPLANT PACEMAKER  6/10/2010     INTERPOSITION TENDON HAND N/A 6/9/2020    Procedure: Right thumb ligament reconstruction tendon interposition with extensor pollicis brevis to abductor pollicis tendon transfer;  Surgeon: Bethel Martin MD;  Location: [facility] OR     RELEASE CARPAL TUNNEL Right 4/12/2016    Procedure: RELEASE CARPAL TUNNEL;  Surgeon: Lissy Leger MD;  Location: [facility] OR     SURGICAL HISTORY OF -   1998    Laminectomy     TONSILLECTOMY & ADENOIDECTOMY         ALLERGIES:  Hydrocodone and Shellfish allergy    MEDS:    Current Outpatient Medications:      albuterol (PROAIR HFA) 108 (90 Base) MCG/ACT Inhaler, Inhale 2 puffs into the lungs every 4 hours as needed for shortness of breath / dyspnea, Disp: 1 Inhaler, Rfl: 0     amylase-lipase-protease (CREON 12) 07434-19156-28632 units CPEP, Take 1 capsule by mouth 2 times daily, Disp: 120 capsule, Rfl: 0     aspirin (ASA) 500 MG EC tablet, Take 1,000 mg by mouth daily, Disp: , Rfl:      atorvastatin (LIPITOR) 80 MG tablet, Take 80 mg by mouth At Bedtime , Disp: , Rfl:      calcium carbonate (OS-MARVA) 500 MG tablet, Take 2 tablets (1,000 mg) by mouth 2 times daily, Disp: 360 tablet, Rfl: 1     carvedilol (COREG) 6.25 MG tablet, Take 1 tablet (6.25 mg) by mouth  2 times daily (with meals), Disp: 180 tablet, Rfl: 3     carvedilol (COREG) 6.25 MG tablet, Take 2 tablets (12.5 mg) by mouth 2 times daily (with meals), Disp: 180 tablet, Rfl: 3     cholecalciferol 125 MCG (5000 UT) CAPS, Take 1 capsule (5,000 Units) by mouth daily, Disp: 90 capsule, Rfl: 1     gabapentin (NEURONTIN) 100 MG capsule, Take 1 capsule (100 mg) by mouth 2 times daily, Disp: 1 capsule, Rfl: 0     insulin glargine (LANTUS) 100 UNIT/ML injection, Inject 32 Units Subcutaneous every morning , Disp: , Rfl:      Insulin Pen Needle (PEN NEEDLES) 32G X 4 MM MISC, , Disp: , Rfl:      loperamide (IMODIUM) 2 MG capsule, Take 2 mg by mouth 4 times daily as needed for diarrhea, Disp: , Rfl:      losartan (COZAAR) 100 MG tablet, Take 50 mg by mouth daily, Disp: , Rfl:      magnesium oxide (MAG-OX) 400 (241.3 Mg) MG tablet, Take 1 tablet (400 mg) by mouth 4 times daily, Disp: 120 tablet, Rfl: 1     metFORMIN (GLUCOPHAGE-XR) 500 MG 24 hr tablet, TAKE ONE TABLET BY MOUTH EVERY MORNING AND TAKE TWO TABLETS EVERY EVENING WITH MEALS FOR DIABETES, Disp: , Rfl:      mometasone (ASMANEX TWISTHALER) 220 MCG/INH inhaler, Inhale 2 puffs into the lungs daily, Disp: , Rfl:      Nitroglycerin (NITROSTAT SL), Place 0.4 mg under the tongue every 5 minutes as needed for chest pain, Disp: , Rfl:      pantoprazole (PROTONIX) 40 MG EC tablet, Take 1 tablet (40 mg) by mouth daily, Disp: 30 tablet, Rfl:      torsemide (DEMADEX) 20 MG tablet, Take 30 mg (1 and 1/2 tab) daily., Disp: 30 tablet, Rfl: 4     vitamin D2 (ERGOCALCIFEROL) 39601 units (1250 mcg) capsule, Take 1 capsule (50,000 Units) by mouth once a week, Disp: 1 capsule, Rfl: 0    SOCIAL HABITS:    History   Smoking Status     Current Every Day Smoker     Packs/day: 1.50     Years: 50.00     Types: Cigarettes   Smokeless Tobacco     Never Used     Comment: 1 1/2-2 PPD 3/11/21     Social History    Substance and Sexual Activity      Alcohol use: No      History   Drug Use No        FAMILY HISTORY:    Family History   Adopted: Yes   Problem Relation Age of Onset     Unknown/Adopted No family hx of        PE:  There were no vitals taken for this visit.  Wt Readings from Last 1 Encounters:   06/10/21 69.4 kg (153 lb)     There is no height or weight on file to calculate BMI.    EXAM:  GENERAL: This is a well-developed 70 year old male who appears his stated age  EYES: Grossly normal.  MOUTH: Buccal mucosa normal   CARDIAC: Normal   CHEST/LUNG: Clear to auscultation bilaterally  GASTROINTESINAL soft nontender nondistended  MUSCULOSKELETAL: Grossly normal and both lower extremities are intact.  HEME/LYMPH: No lymphedema  NEUROLOGIC: Focally intact, Alert and oriented x 3.   PSYCH: appropriate affect  INTEGUMENT: No open lesions or ulcers  Pulse Exam: Monophasic signals present bilaterally. No wounds.          DIAGNOSTIC STUDIES:     Images:  No results found.    I personally reviewed the images and my interpretation is relatively stable carotid ultrasound. ABIs did show moderate peripheral arterial disease. Waveforms are biphasic to monophasic.    LABS:      Sodium   Date Value Ref Range Status   06/10/2021 140 133 - 144 mmol/L Final   01/21/2021 135 133 - 144 mmol/L Final   12/22/2020 136 133 - 144 mmol/L Final     Urea Nitrogen   Date Value Ref Range Status   06/10/2021 34 (H) 7 - 30 mg/dL Final   01/21/2021 27 7 - 30 mg/dL Final   12/22/2020 34 (H) 7 - 30 mg/dL Final     Hemoglobin   Date Value Ref Range Status   01/21/2021 15.0 13.3 - 17.7 g/dL Final   11/16/2020 13.2 (L) 13.3 - 17.7 g/dL Final   10/22/2020 13.2 (L) 13.3 - 17.7 g/dL Final     Platelet Count   Date Value Ref Range Status   01/21/2021 117 (L) 150 - 450 10e9/L Final   11/16/2020 111 (L) 150 - 450 10e9/L Final   10/22/2020 151 150 - 450 10e9/L Final     BNP   Date Value Ref Range Status   11/22/2004 200 (H) 5 - 100 pg/mL Final   07/21/2004 131 (H) 5 - 100 pg/mL Final   07/16/2004 283 (H) 5 - 100 pg/mL Final     INR   Date  Value Ref Range Status   12/22/2017 1.02 0.86 - 1.14 Final   09/08/2017 1.03 0.86 - 1.14 Final   10/20/2014 0.94 0.86 - 1.14 Final       Total time spent 30minutes face to face with patient with more than 50% time spent in counseling and coordination of care.    Ric Chau MD, MD, [facility]  VASCULAR SURGERY

## 2021-06-22 NOTE — PATIENT INSTRUCTIONS
Carotid Duplex    Steps for the test are:    You will be asked to lie on a stretcher. It will be okay for you to wear your street clothes. In some situations, you may be asked to change into a gown.      Ultrasound gel, usually warmed for your comfort, will be placed on either side of your neck.      Through the gel, the technician will apply to your neck a small hand-held device that emits sound waves.     When the test is completed, the technician will remove excess gel from your neck. The gel is water-soluble and will not stain your skin or clothes.    How to Prepare:    Eat and take medications as usual.     Wear minimal or no jewelry to ease application and removal of gel.    Risks  There are typically no side effects or complications associated with a carotid duplex ultrasound examination.    What Can I Expect After the Test?  The technician will send the ultrasound images to your vascular surgeon for evaluation. Typically, a report is available in 2-3 days. If anything critical is found, it is standard practice to notify the vascular surgeon immediately.  Reference: https://vascular.org/patient-resources/vascular-tests    Ankle-Brachial Index (LITA) or Physiologic Test    Description  An ankle-brachial index test is relatively pain free. Blood pressure cuffs of various sizes are placed on your thigh, calf, foot and toes.  Similar to having your blood pressure checked with an arm cuff, as the technician inflates the cuffs, they progressively tighten and are then quickly released.  You may feel some discomfort, but generally for less than 60 seconds for each measurement. You will be asked to remove your socks and shoes and possibly your pants or shorts. Gowns will be provided. It usually takes about 30-60 minutes.   Depending on the initial readings and patient symptoms, you may be asked to perform a light walk on a treadmill.  The technician will apply ultrasound gel, usually warmed for your comfort, to your  ankles and wrists. Through the gel, the technician will use a small hand-held device that emits sound waves.  Risks  There are typically no side effects or complications associated with a physiologic study.  How to Prepare  Eat and take medications as usual.  There is no preparation required for an ankle-brachial index (LITA) or physiologic exam.  What Can I Expect After the Test?  The technician will send the ultrasound images to your vascular surgeon for evaluation. Typically, a report is available in 2-3 days. If anything critical is found, it is standard practice to notify the vascular surgeon immediately.  Reference: https://vascular.org/patient-resources/vascular-tests   yes

## 2021-06-29 ENCOUNTER — ANCILLARY PROCEDURE (OUTPATIENT)
Dept: CARDIOLOGY | Facility: CLINIC | Age: 71
End: 2021-06-29
Attending: INTERNAL MEDICINE
Payer: COMMERCIAL

## 2021-06-29 DIAGNOSIS — I42.9 CARDIOMYOPATHY (H): ICD-10-CM

## 2021-06-29 DIAGNOSIS — Z95.810 ICD (IMPLANTABLE CARDIOVERTER-DEFIBRILLATOR) IN PLACE: ICD-10-CM

## 2021-06-29 PROCEDURE — 93296 REM INTERROG EVL PM/IDS: CPT | Performed by: INTERNAL MEDICINE

## 2021-06-29 PROCEDURE — 93295 DEV INTERROG REMOTE 1/2/MLT: CPT | Performed by: INTERNAL MEDICINE

## 2021-07-09 LAB
MDC_IDC_LEAD_IMPLANT_DT: NORMAL
MDC_IDC_LEAD_IMPLANT_DT: NORMAL
MDC_IDC_LEAD_LOCATION: NORMAL
MDC_IDC_LEAD_LOCATION: NORMAL
MDC_IDC_LEAD_LOCATION_DETAIL_1: NORMAL
MDC_IDC_LEAD_LOCATION_DETAIL_1: NORMAL
MDC_IDC_LEAD_MFG: NORMAL
MDC_IDC_LEAD_MFG: NORMAL
MDC_IDC_LEAD_MODEL: NORMAL
MDC_IDC_LEAD_MODEL: NORMAL
MDC_IDC_LEAD_POLARITY_TYPE: NORMAL
MDC_IDC_LEAD_POLARITY_TYPE: NORMAL
MDC_IDC_LEAD_SERIAL: NORMAL
MDC_IDC_LEAD_SERIAL: NORMAL
MDC_IDC_MSMT_BATTERY_DTM: NORMAL
MDC_IDC_MSMT_BATTERY_REMAINING_LONGEVITY: 83 MO
MDC_IDC_MSMT_BATTERY_RRT_TRIGGER: 2.73
MDC_IDC_MSMT_BATTERY_STATUS: NORMAL
MDC_IDC_MSMT_BATTERY_VOLTAGE: 2.99 V
MDC_IDC_MSMT_CAP_CHARGE_DTM: NORMAL
MDC_IDC_MSMT_CAP_CHARGE_ENERGY: 18 J
MDC_IDC_MSMT_CAP_CHARGE_TIME: 3.95
MDC_IDC_MSMT_CAP_CHARGE_TYPE: NORMAL
MDC_IDC_MSMT_LEADCHNL_RA_IMPEDANCE_VALUE: 456 OHM
MDC_IDC_MSMT_LEADCHNL_RA_PACING_THRESHOLD_AMPLITUDE: 0.75 V
MDC_IDC_MSMT_LEADCHNL_RA_PACING_THRESHOLD_PULSEWIDTH: 0.4 MS
MDC_IDC_MSMT_LEADCHNL_RA_SENSING_INTR_AMPL: 3.38 MV
MDC_IDC_MSMT_LEADCHNL_RA_SENSING_INTR_AMPL: 3.38 MV
MDC_IDC_MSMT_LEADCHNL_RV_IMPEDANCE_VALUE: 285 OHM
MDC_IDC_MSMT_LEADCHNL_RV_IMPEDANCE_VALUE: 380 OHM
MDC_IDC_MSMT_LEADCHNL_RV_PACING_THRESHOLD_AMPLITUDE: 1.12 V
MDC_IDC_MSMT_LEADCHNL_RV_PACING_THRESHOLD_PULSEWIDTH: 0.4 MS
MDC_IDC_MSMT_LEADCHNL_RV_SENSING_INTR_AMPL: 4.62 MV
MDC_IDC_MSMT_LEADCHNL_RV_SENSING_INTR_AMPL: 4.62 MV
MDC_IDC_PG_IMPLANT_DTM: NORMAL
MDC_IDC_PG_MFG: NORMAL
MDC_IDC_PG_MODEL: NORMAL
MDC_IDC_PG_SERIAL: NORMAL
MDC_IDC_PG_TYPE: NORMAL
MDC_IDC_SESS_CLINIC_NAME: NORMAL
MDC_IDC_SESS_DTM: NORMAL
MDC_IDC_SESS_TYPE: NORMAL
MDC_IDC_SET_BRADY_AT_MODE_SWITCH_RATE: 171 {BEATS}/MIN
MDC_IDC_SET_BRADY_HYSTRATE: NORMAL
MDC_IDC_SET_BRADY_LOWRATE: 55 {BEATS}/MIN
MDC_IDC_SET_BRADY_MAX_SENSOR_RATE: 140 {BEATS}/MIN
MDC_IDC_SET_BRADY_MAX_TRACKING_RATE: 140 {BEATS}/MIN
MDC_IDC_SET_BRADY_MODE: NORMAL
MDC_IDC_SET_BRADY_PAV_DELAY_LOW: 180 MS
MDC_IDC_SET_BRADY_SAV_DELAY_LOW: 150 MS
MDC_IDC_SET_LEADCHNL_RA_PACING_AMPLITUDE: 1.5 V
MDC_IDC_SET_LEADCHNL_RA_PACING_ANODE_ELECTRODE_1: NORMAL
MDC_IDC_SET_LEADCHNL_RA_PACING_ANODE_LOCATION_1: NORMAL
MDC_IDC_SET_LEADCHNL_RA_PACING_CAPTURE_MODE: NORMAL
MDC_IDC_SET_LEADCHNL_RA_PACING_CATHODE_ELECTRODE_1: NORMAL
MDC_IDC_SET_LEADCHNL_RA_PACING_CATHODE_LOCATION_1: NORMAL
MDC_IDC_SET_LEADCHNL_RA_PACING_POLARITY: NORMAL
MDC_IDC_SET_LEADCHNL_RA_PACING_PULSEWIDTH: 0.4 MS
MDC_IDC_SET_LEADCHNL_RA_SENSING_ANODE_ELECTRODE_1: NORMAL
MDC_IDC_SET_LEADCHNL_RA_SENSING_ANODE_LOCATION_1: NORMAL
MDC_IDC_SET_LEADCHNL_RA_SENSING_CATHODE_ELECTRODE_1: NORMAL
MDC_IDC_SET_LEADCHNL_RA_SENSING_CATHODE_LOCATION_1: NORMAL
MDC_IDC_SET_LEADCHNL_RA_SENSING_POLARITY: NORMAL
MDC_IDC_SET_LEADCHNL_RA_SENSING_SENSITIVITY: 0.3 MV
MDC_IDC_SET_LEADCHNL_RV_PACING_AMPLITUDE: 2.25 V
MDC_IDC_SET_LEADCHNL_RV_PACING_ANODE_ELECTRODE_1: NORMAL
MDC_IDC_SET_LEADCHNL_RV_PACING_ANODE_LOCATION_1: NORMAL
MDC_IDC_SET_LEADCHNL_RV_PACING_CAPTURE_MODE: NORMAL
MDC_IDC_SET_LEADCHNL_RV_PACING_CATHODE_ELECTRODE_1: NORMAL
MDC_IDC_SET_LEADCHNL_RV_PACING_CATHODE_LOCATION_1: NORMAL
MDC_IDC_SET_LEADCHNL_RV_PACING_POLARITY: NORMAL
MDC_IDC_SET_LEADCHNL_RV_PACING_PULSEWIDTH: 0.4 MS
MDC_IDC_SET_LEADCHNL_RV_SENSING_ANODE_ELECTRODE_1: NORMAL
MDC_IDC_SET_LEADCHNL_RV_SENSING_ANODE_LOCATION_1: NORMAL
MDC_IDC_SET_LEADCHNL_RV_SENSING_CATHODE_ELECTRODE_1: NORMAL
MDC_IDC_SET_LEADCHNL_RV_SENSING_CATHODE_LOCATION_1: NORMAL
MDC_IDC_SET_LEADCHNL_RV_SENSING_POLARITY: NORMAL
MDC_IDC_SET_LEADCHNL_RV_SENSING_SENSITIVITY: 0.3 MV
MDC_IDC_SET_ZONE_DETECTION_BEATS_DENOMINATOR: 40 {BEATS}
MDC_IDC_SET_ZONE_DETECTION_BEATS_NUMERATOR: 30 {BEATS}
MDC_IDC_SET_ZONE_DETECTION_INTERVAL: 320 MS
MDC_IDC_SET_ZONE_DETECTION_INTERVAL: 350 MS
MDC_IDC_SET_ZONE_DETECTION_INTERVAL: 360 MS
MDC_IDC_SET_ZONE_DETECTION_INTERVAL: 400 MS
MDC_IDC_SET_ZONE_DETECTION_INTERVAL: NORMAL
MDC_IDC_SET_ZONE_TYPE: NORMAL
MDC_IDC_STAT_AT_BURDEN_PERCENT: 0 %
MDC_IDC_STAT_AT_DTM_END: NORMAL
MDC_IDC_STAT_AT_DTM_START: NORMAL
MDC_IDC_STAT_BRADY_AP_VP_PERCENT: 0.62 %
MDC_IDC_STAT_BRADY_AP_VS_PERCENT: 40.55 %
MDC_IDC_STAT_BRADY_AS_VP_PERCENT: 0.03 %
MDC_IDC_STAT_BRADY_AS_VS_PERCENT: 58.8 %
MDC_IDC_STAT_BRADY_DTM_END: NORMAL
MDC_IDC_STAT_BRADY_DTM_START: NORMAL
MDC_IDC_STAT_BRADY_RA_PERCENT_PACED: 41.17 %
MDC_IDC_STAT_BRADY_RV_PERCENT_PACED: 0.68 %
MDC_IDC_STAT_EPISODE_RECENT_COUNT: 0
MDC_IDC_STAT_EPISODE_RECENT_COUNT_DTM_END: NORMAL
MDC_IDC_STAT_EPISODE_RECENT_COUNT_DTM_START: NORMAL
MDC_IDC_STAT_EPISODE_TOTAL_COUNT: 0
MDC_IDC_STAT_EPISODE_TOTAL_COUNT: 2
MDC_IDC_STAT_EPISODE_TOTAL_COUNT_DTM_END: NORMAL
MDC_IDC_STAT_EPISODE_TOTAL_COUNT_DTM_START: NORMAL
MDC_IDC_STAT_EPISODE_TYPE: NORMAL
MDC_IDC_STAT_TACHYTHERAPY_ATP_DELIVERED_RECENT: 0
MDC_IDC_STAT_TACHYTHERAPY_ATP_DELIVERED_TOTAL: 0
MDC_IDC_STAT_TACHYTHERAPY_RECENT_DTM_END: NORMAL
MDC_IDC_STAT_TACHYTHERAPY_RECENT_DTM_START: NORMAL
MDC_IDC_STAT_TACHYTHERAPY_SHOCKS_ABORTED_RECENT: 0
MDC_IDC_STAT_TACHYTHERAPY_SHOCKS_ABORTED_TOTAL: 0
MDC_IDC_STAT_TACHYTHERAPY_SHOCKS_DELIVERED_RECENT: 0
MDC_IDC_STAT_TACHYTHERAPY_SHOCKS_DELIVERED_TOTAL: 0
MDC_IDC_STAT_TACHYTHERAPY_TOTAL_DTM_END: NORMAL
MDC_IDC_STAT_TACHYTHERAPY_TOTAL_DTM_START: NORMAL

## 2021-07-22 ENCOUNTER — OFFICE VISIT (OUTPATIENT)
Dept: FAMILY MEDICINE | Facility: CLINIC | Age: 71
End: 2021-07-22
Payer: COMMERCIAL

## 2021-07-22 VITALS
DIASTOLIC BLOOD PRESSURE: 64 MMHG | HEIGHT: 70 IN | OXYGEN SATURATION: 97 % | SYSTOLIC BLOOD PRESSURE: 116 MMHG | TEMPERATURE: 97.9 F | HEART RATE: 69 BPM | RESPIRATION RATE: 18 BRPM | WEIGHT: 155 LBS | BODY MASS INDEX: 22.19 KG/M2

## 2021-07-22 DIAGNOSIS — R29.898 WEAKNESS OF BOTH LOWER EXTREMITIES: ICD-10-CM

## 2021-07-22 DIAGNOSIS — E83.42 HYPOMAGNESEMIA: ICD-10-CM

## 2021-07-22 DIAGNOSIS — E83.39 HYPOPHOSPHATASIA: ICD-10-CM

## 2021-07-22 DIAGNOSIS — Z79.4 TYPE 2 DIABETES MELLITUS WITH OTHER SPECIFIED COMPLICATION, WITH LONG-TERM CURRENT USE OF INSULIN (H): ICD-10-CM

## 2021-07-22 DIAGNOSIS — E11.69 TYPE 2 DIABETES MELLITUS WITH OTHER SPECIFIED COMPLICATION, WITH LONG-TERM CURRENT USE OF INSULIN (H): ICD-10-CM

## 2021-07-22 DIAGNOSIS — M62.81 PROXIMAL MUSCLE WEAKNESS: Primary | ICD-10-CM

## 2021-07-22 LAB
ALBUMIN SERPL-MCNC: 3 G/DL (ref 3.4–5)
ALP SERPL-CCNC: 145 U/L (ref 40–150)
ALT SERPL W P-5'-P-CCNC: 32 U/L (ref 0–70)
ANION GAP SERPL CALCULATED.3IONS-SCNC: 3 MMOL/L (ref 3–14)
AST SERPL W P-5'-P-CCNC: 35 U/L (ref 0–45)
BASOPHILS # BLD AUTO: 0 10E3/UL (ref 0–0.2)
BASOPHILS NFR BLD AUTO: 0 %
BILIRUB SERPL-MCNC: 0.4 MG/DL (ref 0.2–1.3)
BUN SERPL-MCNC: 35 MG/DL (ref 7–30)
CALCIUM SERPL-MCNC: 8.6 MG/DL (ref 8.5–10.1)
CHLORIDE BLD-SCNC: 101 MMOL/L (ref 94–109)
CO2 SERPL-SCNC: 29 MMOL/L (ref 20–32)
CREAT SERPL-MCNC: 1.33 MG/DL (ref 0.66–1.25)
CRP SERPL-MCNC: 3.2 MG/L (ref 0–8)
EOSINOPHIL # BLD AUTO: 0.2 10E3/UL (ref 0–0.7)
EOSINOPHIL NFR BLD AUTO: 2 %
ERYTHROCYTE [DISTWIDTH] IN BLOOD BY AUTOMATED COUNT: 13.9 % (ref 10–15)
ERYTHROCYTE [SEDIMENTATION RATE] IN BLOOD BY WESTERGREN METHOD: 9 MM/HR (ref 0–20)
GFR SERPL CREATININE-BSD FRML MDRD: 53 ML/MIN/1.73M2
GLUCOSE BLD-MCNC: 342 MG/DL (ref 70–99)
HBA1C MFR BLD: 9.3 % (ref 0–5.6)
HCT VFR BLD AUTO: 44 % (ref 40–53)
HGB BLD-MCNC: 14.1 G/DL (ref 13.3–17.7)
LYMPHOCYTES # BLD AUTO: 1.2 10E3/UL (ref 0.8–5.3)
LYMPHOCYTES NFR BLD AUTO: 18 %
MAGNESIUM SERPL-MCNC: 1.5 MG/DL (ref 1.6–2.3)
MCH RBC QN AUTO: 29.8 PG (ref 26.5–33)
MCHC RBC AUTO-ENTMCNC: 32 G/DL (ref 31.5–36.5)
MCV RBC AUTO: 93 FL (ref 78–100)
MONOCYTES # BLD AUTO: 0.6 10E3/UL (ref 0–1.3)
MONOCYTES NFR BLD AUTO: 9 %
NEUTROPHILS # BLD AUTO: 4.7 10E3/UL (ref 1.6–8.3)
NEUTROPHILS NFR BLD AUTO: 70 %
PHOSPHATE SERPL-MCNC: 3.7 MG/DL (ref 2.5–4.5)
PLATELET # BLD AUTO: 144 10E3/UL (ref 150–450)
POTASSIUM BLD-SCNC: 5.4 MMOL/L (ref 3.4–5.3)
PROT SERPL-MCNC: 6.6 G/DL (ref 6.8–8.8)
RBC # BLD AUTO: 4.73 10E6/UL (ref 4.4–5.9)
SODIUM SERPL-SCNC: 133 MMOL/L (ref 133–144)
WBC # BLD AUTO: 6.7 10E3/UL (ref 4–11)

## 2021-07-22 PROCEDURE — 86140 C-REACTIVE PROTEIN: CPT | Performed by: FAMILY MEDICINE

## 2021-07-22 PROCEDURE — 85025 COMPLETE CBC W/AUTO DIFF WBC: CPT | Performed by: FAMILY MEDICINE

## 2021-07-22 PROCEDURE — 83735 ASSAY OF MAGNESIUM: CPT | Performed by: FAMILY MEDICINE

## 2021-07-22 PROCEDURE — 83036 HEMOGLOBIN GLYCOSYLATED A1C: CPT | Performed by: FAMILY MEDICINE

## 2021-07-22 PROCEDURE — 99214 OFFICE O/P EST MOD 30 MIN: CPT | Performed by: FAMILY MEDICINE

## 2021-07-22 PROCEDURE — 80053 COMPREHEN METABOLIC PANEL: CPT | Performed by: FAMILY MEDICINE

## 2021-07-22 PROCEDURE — 36415 COLL VENOUS BLD VENIPUNCTURE: CPT | Performed by: FAMILY MEDICINE

## 2021-07-22 PROCEDURE — 85652 RBC SED RATE AUTOMATED: CPT | Performed by: FAMILY MEDICINE

## 2021-07-22 PROCEDURE — 84100 ASSAY OF PHOSPHORUS: CPT | Performed by: FAMILY MEDICINE

## 2021-07-22 ASSESSMENT — PAIN SCALES - GENERAL: PAINLEVEL: SEVERE PAIN (6)

## 2021-07-22 ASSESSMENT — MIFFLIN-ST. JEOR: SCORE: 1459.33

## 2021-07-22 NOTE — PROGRESS NOTES
Assessment & Plan     Proximal muscle weakness  Etiology uncertain.  R/o PMR, electrolyte abnormalities, thyroid issues, anemia, etc.  - CBC with platelets and differential; Future  - Comprehensive metabolic panel (BMP + Alb, Alk Phos, ALT, AST, Total. Bili, TP); Future  - Hemoglobin A1c; Future  - ESR: Erythrocyte sedimentation rate; Future  - CRP, inflammation; Future  - CBC with platelets and differential  - Comprehensive metabolic panel (BMP + Alb, Alk Phos, ALT, AST, Total. Bili, TP)  - Hemoglobin A1c  - ESR: Erythrocyte sedimentation rate  - CRP, inflammation    Weakness of both lower extremities     - CBC with platelets and differential; Future  - Comprehensive metabolic panel (BMP + Alb, Alk Phos, ALT, AST, Total. Bili, TP); Future  - Hemoglobin A1c; Future  - ESR: Erythrocyte sedimentation rate; Future  - CRP, inflammation; Future  - CBC with platelets and differential  - Comprehensive metabolic panel (BMP + Alb, Alk Phos, ALT, AST, Total. Bili, TP)  - Hemoglobin A1c  - ESR: Erythrocyte sedimentation rate  - CRP, inflammation    Hypomagnesemia     - Magnesium; Future  - Magnesium    Hypophosphatasia     - Phosphorus; Future  - Phosphorus    Type 2 diabetes mellitus with other specified complication, with long-term current use of insulin (H)   still quite high at 9.3%  [facility]  Has had some low blood sugars (70s) overnight  Consider checking when legs are feeling weak    - Hemoglobin A1c; Future  - Hemoglobin A1c             Tobacco Cessation:   reports that he has been smoking cigarettes. He has a 75.00 pack-year smoking history. He has never used smokeless tobacco.  Tobacco Cessation Action Plan: Information offered: Patient not interested at this time        No follow-ups on file.    Danii Hernández MD  [facility]   Delbert is a 70 year old who presents for the following health issues     HPI     Concern - patient has been having more falls  "due to knees giving out. Patient describes it as \" If someone kicks me in the back of the knee\" Patients right leg has tingling.  Onset: 2 days ago  Description: knees and legs  Intensity: severe  Progression of Symptoms:  worsening  Accompanying Signs & Symptoms: falling and cut face on his glasses  Previous history of similar problem: no  Precipitating factors:        Worsened by: random times of knees/legs giving out - often after he's been on his feet a lot of doing more than usual.  Usually he feels better in the morning and weaker as the day goes on.   Alleviating factors:        Improved by: sitting  Therapies tried and outcome:  none         Review of Systems   Constitutional, HEENT, cardiovascular, pulmonary, gi and gu systems are negative, except as otherwise noted.      Objective    /64   Pulse 69   Temp 97.9  F (36.6  C) (Tympanic)   Resp 18   Ht 1.778 m (5' 10\")   Wt 70.3 kg (155 lb)   SpO2 97%   BMI 22.24 kg/m    Body mass index is 22.24 kg/m .  Physical Exam   GENERAL: healthy, alert and no distress  NECK: no adenopathy, no asymmetry, masses, or scars and thyroid normal to palpation  RESP: lungs clear to auscultation - no rales, rhonchi or wheezes  CV: regular rate and rhythm, normal S1 S2, no S3 or S4, no murmur, click or rub, no peripheral edema and peripheral pulses strong  ABDOMEN: soft, nontender, no hepatosplenomegaly, no masses and bowel sounds normal  MS: no gross musculoskeletal defects noted, no edema  NEURO: strength in hip flexors is 4+/5  Leg extension 5/5  Dorsi/plantar flexion 5/5   Abduction/adduction of hips 5/5    Upper body strength 5/5 except shoulder abduction 4-/5    Results for orders placed or performed in visit on 07/22/21 (from the past 24 hour(s))   CBC with platelets and differential    Narrative    The following orders were created for panel order CBC with platelets and differential.  Procedure                               Abnormality         Status           "           ---------                               -----------         ------                     CBC with platelets and d...[[id number]]  Abnormal            Final result                 Please view results for these tests on the individual orders.   Hemoglobin A1c   Result Value Ref Range    Hemoglobin A1C 9.3 (H) 0.0 - 5.6 %   ESR: Erythrocyte sedimentation rate   Result Value Ref Range    Erythrocyte Sedimentation Rate 9 0 - 20 mm/hr   CBC with platelets and differential   Result Value Ref Range    WBC Count 6.7 4.0 - 11.0 10e3/uL    RBC Count 4.73 4.40 - 5.90 10e6/uL    Hemoglobin 14.1 13.3 - 17.7 g/dL    Hematocrit 44.0 40.0 - 53.0 %    MCV 93 78 - 100 fL    MCH 29.8 26.5 - 33.0 pg    MCHC 32.0 31.5 - 36.5 g/dL    RDW 13.9 10.0 - 15.0 %    Platelet Count 144 (L) 150 - 450 10e3/uL    % Neutrophils 70 %    % Lymphocytes 18 %    % Monocytes 9 %    % Eosinophils 2 %    % Basophils 0 %    Absolute Neutrophils 4.7 1.6 - 8.3 10e3/uL    Absolute Lymphocytes 1.2 0.8 - 5.3 10e3/uL    Absolute Monocytes 0.6 0.0 - 1.3 10e3/uL    Absolute Eosinophils 0.2 0.0 - 0.7 10e3/uL    Absolute Basophils 0.0 0.0 - 0.2 10e3/uL

## 2021-07-22 NOTE — LETTER
July 23, 2021      Delbert Tilley  [id number]  [place] [address]        Dear ,    We are writing to inform you of your test results.    Magnesium is acceptable. Phosphorus is normal.     CRP and sed rate (looking for inflammation) are both normal.     Kidney function is about stable from previous values.     Blood sugar still high with a non-fasting blood sugar of 342 yesterday and HgbA1c of 9.2%. [facility].     Potassium is mildly elevated at 5.4     Insulin usually causes potassium to go into cells, so better control if the diabetes will help lower this value.  Not high enough to need additional therapy at this time.       No particular cause found for the leg weakness.  Follow up if there are ongoing problems or call for referral to physical therapy for strengthening.       Resulted Orders   Comprehensive metabolic panel (BMP + Alb, Alk Phos, ALT, AST, Total. Bili, TP)   Result Value Ref Range    Sodium 133 133 - 144 mmol/L    Potassium 5.4 (H) 3.4 - 5.3 mmol/L    Chloride 101 94 - 109 mmol/L    Carbon Dioxide (CO2) 29 20 - 32 mmol/L    Anion Gap 3 3 - 14 mmol/L    Urea Nitrogen 35 (H) 7 - 30 mg/dL    Creatinine 1.33 (H) 0.66 - 1.25 mg/dL    Calcium 8.6 8.5 - 10.1 mg/dL    Glucose 342 (H) 70 - 99 mg/dL    Alkaline Phosphatase 145 40 - 150 U/L    AST 35 0 - 45 U/L    ALT 32 0 - 70 U/L    Protein Total 6.6 (L) 6.8 - 8.8 g/dL    Albumin 3.0 (L) 3.4 - 5.0 g/dL    Bilirubin Total 0.4 0.2 - 1.3 mg/dL    GFR Estimate 53 (L) >60 mL/min/1.73m2      Comment:      As of July 11, 2021, eGFR is calculated by the CKD-EPI creatinine equation, without race adjustment. eGFR can be influenced by muscle mass, exercise, and diet. The reported eGFR is an estimation only and is only applicable if the renal function is stable.   Hemoglobin A1c   Result Value Ref Range    Hemoglobin A1C 9.3 (H) 0.0 - 5.6 %      Comment:      Normal <5.7%   Prediabetes 5.7-6.4%     Diabetes 6.5% or higher     Note: Adopted from ADA consensus guidelines.   ESR: Erythrocyte sedimentation rate   Result Value Ref Range    Erythrocyte Sedimentation Rate 9 0 - 20 mm/hr   CRP, inflammation   Result Value Ref Range    CRP Inflammation 3.2 0.0 - 8.0 mg/L   Magnesium   Result Value Ref Range    Magnesium 1.5 (L) 1.6 - 2.3 mg/dL   Phosphorus   Result Value Ref Range    Phosphorus 3.7 2.5 - 4.5 mg/dL   CBC with platelets and differential   Result Value Ref Range    WBC Count 6.7 4.0 - 11.0 10e3/uL    RBC Count 4.73 4.40 - 5.90 10e6/uL    Hemoglobin 14.1 13.3 - 17.7 g/dL    Hematocrit 44.0 40.0 - 53.0 %    MCV 93 78 - 100 fL    MCH 29.8 26.5 - 33.0 pg    MCHC 32.0 31.5 - 36.5 g/dL    RDW 13.9 10.0 - 15.0 %    Platelet Count 144 (L) 150 - 450 10e3/uL    % Neutrophils 70 %    % Lymphocytes 18 %    % Monocytes 9 %    % Eosinophils 2 %    % Basophils 0 %    Absolute Neutrophils 4.7 1.6 - 8.3 10e3/uL    Absolute Lymphocytes 1.2 0.8 - 5.3 10e3/uL    Absolute Monocytes 0.6 0.0 - 1.3 10e3/uL    Absolute Eosinophils 0.2 0.0 - 0.7 10e3/uL    Absolute Basophils 0.0 0.0 - 0.2 10e3/uL       If you have any questions or concerns, please call the clinic at the number listed above.       Sincerely,      Danii Hernández MD/[facility]

## 2021-07-22 NOTE — PATIENT INSTRUCTIONS
Our Clinic hours are:  Mondays    7:20 am - 7 pm  Tues -  Fri  7:20 am - 5 pm    Clinic Phone: [phone]    The clinic lab opens at 7:30 am Mon - Fri and appointments are required.    [facility]. [phone]  Monday  8 am - 7pm  Tues - Fri 8 am - 5:30 pm

## 2021-08-09 ENCOUNTER — ALLIED HEALTH/NURSE VISIT (OUTPATIENT)
Dept: FAMILY MEDICINE | Facility: CLINIC | Age: 71
End: 2021-08-09
Payer: COMMERCIAL
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
## 2021-08-09 PROCEDURE — 99207 PR NO CHARGE NURSE ONLY: CPT

## 2021-08-09 NOTE — PROGRESS NOTES
Pt fell 1 week ago outside on flower pot hitting ribs.  Pt feels he has a broken rib, asking for CXR.  No SOB, having some pain if he coughs.  No appts available @ this time.  Recommended ER for evaluation.  Pt refused and wanted appt for tomorrow.  Scheduled appt for tomorrow @ 1 pm, KARIE Jimenez.  Pt agreed to go to ER for any difficulty breathing, increased pain or spitting up blood.  KPavelRN

## 2021-08-10 ENCOUNTER — OFFICE VISIT (OUTPATIENT)
Dept: FAMILY MEDICINE | Facility: CLINIC | Age: 71
End: 2021-08-10
Payer: COMMERCIAL

## 2021-08-10 ENCOUNTER — ANCILLARY PROCEDURE (OUTPATIENT)
Dept: GENERAL RADIOLOGY | Facility: CLINIC | Age: 71
End: 2021-08-10
Attending: NURSE PRACTITIONER
Payer: COMMERCIAL

## 2021-08-10 VITALS
BODY MASS INDEX: 22.19 KG/M2 | DIASTOLIC BLOOD PRESSURE: 74 MMHG | SYSTOLIC BLOOD PRESSURE: 136 MMHG | HEART RATE: 65 BPM | HEIGHT: 70 IN | RESPIRATION RATE: 16 BRPM | TEMPERATURE: 97.5 F | OXYGEN SATURATION: 97 % | WEIGHT: 155 LBS

## 2021-08-10 DIAGNOSIS — R07.81 RIB PAIN ON LEFT SIDE: ICD-10-CM

## 2021-08-10 DIAGNOSIS — R07.81 RIB PAIN ON LEFT SIDE: Primary | ICD-10-CM

## 2021-08-10 PROCEDURE — 71101 X-RAY EXAM UNILAT RIBS/CHEST: CPT | Mod: LT | Performed by: RADIOLOGY

## 2021-08-10 PROCEDURE — 99213 OFFICE O/P EST LOW 20 MIN: CPT | Performed by: NURSE PRACTITIONER

## 2021-08-10 ASSESSMENT — MIFFLIN-ST. JEOR: SCORE: 1459.33

## 2021-08-10 ASSESSMENT — PAIN SCALES - GENERAL: PAINLEVEL: SEVERE PAIN (7)

## 2021-08-10 NOTE — PROGRESS NOTES
Assessment & Plan     Rib pain on left side  X-ray completed today and personally reviewed by myself.  X-ray does not show an acute fracture.  Lungs are well-inflated.  Discussed with patient that symptoms are consistent with a contusion and to continue with symptomatic management utilizing ice heat and rest as well as bracing with a pillow as needed.  - XR Ribs & Chest Left G/E 3 Views; Future             Patient Instructions   Ice and Heat pack for pain as needed. Stretching as able. Use a pillow to brace when changing positions.     Patient Education     Chest Bruise (Contusion)    The chest wall runs from the shoulders to the diaphragm or bottom of the ribs. It includes the front and back of the rib cage. It also includes the breastbone, shoulders, and collarbones. A blunt trauma such as during a car accident or fall can injure the chest wall. This injury is called a chest wall bruise (contusion).   Injury to the chest wall may result in pain, tenderness, bruising, and swelling. It may also result in broken ribs and injured muscles. These cause pain, often during breathing. If one or more ribs are broken in several areas, the chest wall may become unstable and painful. This may cause serious breathing trouble.   In the emergency room or urgent care center, any broken bones or other injuries will be assessed. You will likely be given medicine for pain. Broken ribs usually heal without further treatment. A broken shoulder or collarbone may be taped or supported with a sling.   Home care  Follow these guidelines when caring for yourself at home:    Rest. Don t do any heavy lifting or strenuous activity. Don t do any activity that causes pain.    Put an ice pack on the injured area. Do this for 20 minutes every 1 to 2 hours the first day. You can make an ice pack by wrapping a plastic bag of ice cubes in a thin towel. Continue to use the ice pack 3 to 4 times a day for the next 2 days. Then use the ice pack as  needed to ease pain and swelling.    After 1 to 2 days you may put a warm compress on the area. Do this for 10 minutes several times a day. A warm compress is a clean cloth that s damp with warm water.    Hold a pillow to the affected area when you cough. This will help ease pain.    You may use over-the-counter pain medicine such as acetaminophen or ibuprofen to control pain, unless another pain medicine was prescribed. If you have chronic liver or kidney disease, talk with your healthcare provider before using these medicines. Also talk with your provider if you ve had a stomach ulcer or gastrointestinal bleeding.  Follow-up care  Follow up with your healthcare provider, or as advised.  When to seek medical advice  Call your healthcare provider right away if any of these occur:    New abdominal pain or abdominal pain that gets worse    Fever of 100.4 F (38 C) or higher, or as directed by your healthcare provider  Call 911  Call 911 if any of these occur:      Dizziness, weakness, or fainting    Shortness of breath, trouble breathing, or breathing fast    Chest pain gets worse when you breathe    Severe pain that comes on suddenly or lasts more than an hour  [employer] last reviewed this educational content on 11/1/2019 2000-2021 [employer]. All rights reserved. This information is not intended as a substitute for professional medical care. Always follow your healthcare professional's instructions.               Return in about 5 days (around 8/15/2021) for ongoing symptoms if not improving.    ANA Stevens CNP  [facility]    Payam Mandujano is a 70 year old who presents for the following health issues     HPI     Chief Complaint   Patient presents with     Rib Pain     pt has been having left side rib pain x 1 week after falling on a big ceramic flower pot      He fell 1 week ago when working in his flower garden over a ceramic flowerpot.  He had pain in the  "front of his left side of his rib.  He did not want to go to the hospital for further evaluation and wished to be seen in clinic.  Injury occurred 1 week ago.  He denies any other injuries or hitting his head.  He reports that aspirin has been able to improve his pain overall. He would like to make sure that he does not have a fracture or collapsed lung.  He denies any change in his breathing or chest pain specifically.  Pain has been occurring when he is changing positions.  He denies any external skin changes or bruising that he has noticed.  No abdominal pain.  Bowel and bladder function has been within normal limits.  No changes in mental status.          Review of Systems   Constitutional, HEENT, cardiovascular, pulmonary, gi and gu systems are negative, except as otherwise noted.      Objective    /74 (BP Location: Right arm, Patient Position: Chair, Cuff Size: Adult Small)   Pulse 65   Temp 97.5  F (36.4  C) (Tympanic)   Resp 16   Ht 1.778 m (5' 10\")   Wt 70.3 kg (155 lb)   SpO2 97%   BMI 22.24 kg/m    Body mass index is 22.24 kg/m .     Physical Exam   GENERAL: healthy, alert and no distress  NECK: no adenopathy, no asymmetry, masses, or scars and thyroid normal to palpation  RESP: lungs clear to auscultation - no rales, rhonchi or wheezes  CV: regular rate and rhythm, normal S1 S2, no S3 or S4, no murmur, click or rub, no peripheral edema and peripheral pulses strong  ABDOMEN: soft, nontender, no hepatosplenomegaly, no masses and bowel sounds normal  MS: no gross musculoskeletal defects noted, no edema  CHEST WALL: Chest wall tenderness left anterior chest to palpation.  No crepitus.    CXR - Reviewed and interpreted by me Normal- no infiltrates, effusions, pneumothoraces, cardiomegaly or masses  XR Ribs & Chest Left G/E 3 Views    Result Date: 8/10/2021  XR RIBS & CHEST LT 3VW 8/10/2021 1:35 PM HISTORY: Rib pain on left side     IMPRESSION: Right hemidiaphragmatic calcified pleural plaque, " unchanged from 10/21/2020. No apparent left rib displaced fracture. The lungs appear grossly clear. No apparent pneumothorax or pleural effusion. Left chest wall battery pack intracardiac leads are noted. NANCY RODRIGUEZ MD   SYSTEM ID:  [id number]

## 2021-08-10 NOTE — PATIENT INSTRUCTIONS
Ice and Heat pack for pain as needed. Stretching as able. Use a pillow to brace when changing positions.     Patient Education     Chest Bruise (Contusion)    The chest wall runs from the shoulders to the diaphragm or bottom of the ribs. It includes the front and back of the rib cage. It also includes the breastbone, shoulders, and collarbones. A blunt trauma such as during a car accident or fall can injure the chest wall. This injury is called a chest wall bruise (contusion).   Injury to the chest wall may result in pain, tenderness, bruising, and swelling. It may also result in broken ribs and injured muscles. These cause pain, often during breathing. If one or more ribs are broken in several areas, the chest wall may become unstable and painful. This may cause serious breathing trouble.   In the emergency room or urgent care center, any broken bones or other injuries will be assessed. You will likely be given medicine for pain. Broken ribs usually heal without further treatment. A broken shoulder or collarbone may be taped or supported with a sling.   Home care  Follow these guidelines when caring for yourself at home:    Rest. Don t do any heavy lifting or strenuous activity. Don t do any activity that causes pain.    Put an ice pack on the injured area. Do this for 20 minutes every 1 to 2 hours the first day. You can make an ice pack by wrapping a plastic bag of ice cubes in a thin towel. Continue to use the ice pack 3 to 4 times a day for the next 2 days. Then use the ice pack as needed to ease pain and swelling.    After 1 to 2 days you may put a warm compress on the area. Do this for 10 minutes several times a day. A warm compress is a clean cloth that s damp with warm water.    Hold a pillow to the affected area when you cough. This will help ease pain.    You may use over-the-counter pain medicine such as acetaminophen or ibuprofen to control pain, unless another pain medicine was prescribed. If you have  chronic liver or kidney disease, talk with your healthcare provider before using these medicines. Also talk with your provider if you ve had a stomach ulcer or gastrointestinal bleeding.  Follow-up care  Follow up with your healthcare provider, or as advised.  When to seek medical advice  Call your healthcare provider right away if any of these occur:    New abdominal pain or abdominal pain that gets worse    Fever of 100.4 F (38 C) or higher, or as directed by your healthcare provider  Call 911  Call 911 if any of these occur:      Dizziness, weakness, or fainting    Shortness of breath, trouble breathing, or breathing fast    Chest pain gets worse when you breathe    Severe pain that comes on suddenly or lasts more than an hour  [employer] last reviewed this educational content on 11/1/2019 2000-2021 [employer]. All rights reserved. This information is not intended as a substitute for professional medical care. Always follow your healthcare professional's instructions.

## 2021-09-18 ENCOUNTER — HEALTH MAINTENANCE LETTER (OUTPATIENT)
Age: 71
End: 2021-09-18

## 2021-09-28 ENCOUNTER — ANCILLARY PROCEDURE (OUTPATIENT)
Dept: CARDIOLOGY | Facility: CLINIC | Age: 71
End: 2021-09-28
Attending: INTERNAL MEDICINE
Payer: COMMERCIAL

## 2021-09-28 ENCOUNTER — MYC MEDICAL ADVICE (OUTPATIENT)
Dept: FAMILY MEDICINE | Facility: CLINIC | Age: 71
End: 2021-09-28

## 2021-09-28 ENCOUNTER — TELEPHONE (OUTPATIENT)
Dept: VASCULAR SURGERY | Facility: CLINIC | Age: 71
End: 2021-09-28

## 2021-09-28 DIAGNOSIS — I42.9 CARDIOMYOPATHY (H): ICD-10-CM

## 2021-09-28 DIAGNOSIS — I73.9 PAD (PERIPHERAL ARTERY DISEASE) (H): ICD-10-CM

## 2021-09-28 DIAGNOSIS — I70.219 ATHEROSCLEROSIS OF ARTERY OF EXTREMITY WITH INTERMITTENT CLAUDICATION (H): Primary | ICD-10-CM

## 2021-09-28 DIAGNOSIS — Z95.810 ICD (IMPLANTABLE CARDIOVERTER-DEFIBRILLATOR) IN PLACE: ICD-10-CM

## 2021-09-28 PROCEDURE — 93296 REM INTERROG EVL PM/IDS: CPT | Performed by: INTERNAL MEDICINE

## 2021-09-28 PROCEDURE — 93295 DEV INTERROG REMOTE 1/2/MLT: CPT | Performed by: INTERNAL MEDICINE

## 2021-09-28 NOTE — TELEPHONE ENCOUNTER
Spoke to patient. He states that his claudication in right calf is coming on at about 1 block. He is still smoking. His last A1C is 9. He is expecting a call from his diabetic dr today. He continues to have diabetic neuropathy and is on gabapentin. Based on his last visit, he stated that his claudication was around 1.5 blocks. Patient states that distance is different every day depending on how he is doing. Writer offered if he wanted to be seen again sooner or wait a little longer. He wanted to wait 1 month. Advised him to talk to his dr about managing his diabetes better, stop smoking, continue to walk every day, and will schedule him to see Dr. Chau with repeat ABIs with exercise prior. Patient agreeable to this plan.

## 2021-09-28 NOTE — TELEPHONE ENCOUNTER
Patient calling to schedule a follow up appointment with Dr. Chau. He states that he has no circulation in his legs, he cannot feel his toes, and his balance is off. Appropriate to schedule and studies needed? Last seen in June of this year for US/LITA.  To discuss/schedule pt requesting a call back after 3PM.

## 2021-09-30 LAB
MDC_IDC_LEAD_IMPLANT_DT: NORMAL
MDC_IDC_LEAD_IMPLANT_DT: NORMAL
MDC_IDC_LEAD_LOCATION: NORMAL
MDC_IDC_LEAD_LOCATION: NORMAL
MDC_IDC_LEAD_LOCATION_DETAIL_1: NORMAL
MDC_IDC_LEAD_LOCATION_DETAIL_1: NORMAL
MDC_IDC_LEAD_MFG: NORMAL
MDC_IDC_LEAD_MFG: NORMAL
MDC_IDC_LEAD_MODEL: NORMAL
MDC_IDC_LEAD_MODEL: NORMAL
MDC_IDC_LEAD_POLARITY_TYPE: NORMAL
MDC_IDC_LEAD_POLARITY_TYPE: NORMAL
MDC_IDC_LEAD_SERIAL: NORMAL
MDC_IDC_LEAD_SERIAL: NORMAL
MDC_IDC_MSMT_BATTERY_DTM: NORMAL
MDC_IDC_MSMT_BATTERY_REMAINING_LONGEVITY: 77 MO
MDC_IDC_MSMT_BATTERY_RRT_TRIGGER: 2.73
MDC_IDC_MSMT_BATTERY_STATUS: NORMAL
MDC_IDC_MSMT_BATTERY_VOLTAGE: 2.99 V
MDC_IDC_MSMT_CAP_CHARGE_DTM: NORMAL
MDC_IDC_MSMT_CAP_CHARGE_ENERGY: 18 J
MDC_IDC_MSMT_CAP_CHARGE_TIME: 3.89
MDC_IDC_MSMT_CAP_CHARGE_TYPE: NORMAL
MDC_IDC_MSMT_LEADCHNL_RA_IMPEDANCE_VALUE: 456 OHM
MDC_IDC_MSMT_LEADCHNL_RA_PACING_THRESHOLD_AMPLITUDE: 0.62 V
MDC_IDC_MSMT_LEADCHNL_RA_PACING_THRESHOLD_PULSEWIDTH: 0.4 MS
MDC_IDC_MSMT_LEADCHNL_RA_SENSING_INTR_AMPL: 4 MV
MDC_IDC_MSMT_LEADCHNL_RA_SENSING_INTR_AMPL: 4 MV
MDC_IDC_MSMT_LEADCHNL_RV_IMPEDANCE_VALUE: 323 OHM
MDC_IDC_MSMT_LEADCHNL_RV_IMPEDANCE_VALUE: 380 OHM
MDC_IDC_MSMT_LEADCHNL_RV_PACING_THRESHOLD_AMPLITUDE: 1 V
MDC_IDC_MSMT_LEADCHNL_RV_PACING_THRESHOLD_PULSEWIDTH: 0.4 MS
MDC_IDC_MSMT_LEADCHNL_RV_SENSING_INTR_AMPL: 4.38 MV
MDC_IDC_MSMT_LEADCHNL_RV_SENSING_INTR_AMPL: 4.38 MV
MDC_IDC_PG_IMPLANT_DTM: NORMAL
MDC_IDC_PG_MFG: NORMAL
MDC_IDC_PG_MODEL: NORMAL
MDC_IDC_PG_SERIAL: NORMAL
MDC_IDC_PG_TYPE: NORMAL
MDC_IDC_SESS_CLINIC_NAME: NORMAL
MDC_IDC_SESS_DTM: NORMAL
MDC_IDC_SESS_TYPE: NORMAL
MDC_IDC_SET_BRADY_AT_MODE_SWITCH_RATE: 171 {BEATS}/MIN
MDC_IDC_SET_BRADY_HYSTRATE: NORMAL
MDC_IDC_SET_BRADY_LOWRATE: 55 {BEATS}/MIN
MDC_IDC_SET_BRADY_MAX_SENSOR_RATE: 140 {BEATS}/MIN
MDC_IDC_SET_BRADY_MAX_TRACKING_RATE: 140 {BEATS}/MIN
MDC_IDC_SET_BRADY_MODE: NORMAL
MDC_IDC_SET_BRADY_PAV_DELAY_LOW: 180 MS
MDC_IDC_SET_BRADY_SAV_DELAY_LOW: 150 MS
MDC_IDC_SET_LEADCHNL_RA_PACING_AMPLITUDE: 1.5 V
MDC_IDC_SET_LEADCHNL_RA_PACING_ANODE_ELECTRODE_1: NORMAL
MDC_IDC_SET_LEADCHNL_RA_PACING_ANODE_LOCATION_1: NORMAL
MDC_IDC_SET_LEADCHNL_RA_PACING_CAPTURE_MODE: NORMAL
MDC_IDC_SET_LEADCHNL_RA_PACING_CATHODE_ELECTRODE_1: NORMAL
MDC_IDC_SET_LEADCHNL_RA_PACING_CATHODE_LOCATION_1: NORMAL
MDC_IDC_SET_LEADCHNL_RA_PACING_POLARITY: NORMAL
MDC_IDC_SET_LEADCHNL_RA_PACING_PULSEWIDTH: 0.4 MS
MDC_IDC_SET_LEADCHNL_RA_SENSING_ANODE_ELECTRODE_1: NORMAL
MDC_IDC_SET_LEADCHNL_RA_SENSING_ANODE_LOCATION_1: NORMAL
MDC_IDC_SET_LEADCHNL_RA_SENSING_CATHODE_ELECTRODE_1: NORMAL
MDC_IDC_SET_LEADCHNL_RA_SENSING_CATHODE_LOCATION_1: NORMAL
MDC_IDC_SET_LEADCHNL_RA_SENSING_POLARITY: NORMAL
MDC_IDC_SET_LEADCHNL_RA_SENSING_SENSITIVITY: 0.3 MV
MDC_IDC_SET_LEADCHNL_RV_PACING_AMPLITUDE: 2 V
MDC_IDC_SET_LEADCHNL_RV_PACING_ANODE_ELECTRODE_1: NORMAL
MDC_IDC_SET_LEADCHNL_RV_PACING_ANODE_LOCATION_1: NORMAL
MDC_IDC_SET_LEADCHNL_RV_PACING_CAPTURE_MODE: NORMAL
MDC_IDC_SET_LEADCHNL_RV_PACING_CATHODE_ELECTRODE_1: NORMAL
MDC_IDC_SET_LEADCHNL_RV_PACING_CATHODE_LOCATION_1: NORMAL
MDC_IDC_SET_LEADCHNL_RV_PACING_POLARITY: NORMAL
MDC_IDC_SET_LEADCHNL_RV_PACING_PULSEWIDTH: 0.4 MS
MDC_IDC_SET_LEADCHNL_RV_SENSING_ANODE_ELECTRODE_1: NORMAL
MDC_IDC_SET_LEADCHNL_RV_SENSING_ANODE_LOCATION_1: NORMAL
MDC_IDC_SET_LEADCHNL_RV_SENSING_CATHODE_ELECTRODE_1: NORMAL
MDC_IDC_SET_LEADCHNL_RV_SENSING_CATHODE_LOCATION_1: NORMAL
MDC_IDC_SET_LEADCHNL_RV_SENSING_POLARITY: NORMAL
MDC_IDC_SET_LEADCHNL_RV_SENSING_SENSITIVITY: 0.3 MV
MDC_IDC_SET_ZONE_DETECTION_BEATS_DENOMINATOR: 40 {BEATS}
MDC_IDC_SET_ZONE_DETECTION_BEATS_NUMERATOR: 30 {BEATS}
MDC_IDC_SET_ZONE_DETECTION_INTERVAL: 320 MS
MDC_IDC_SET_ZONE_DETECTION_INTERVAL: 350 MS
MDC_IDC_SET_ZONE_DETECTION_INTERVAL: 360 MS
MDC_IDC_SET_ZONE_DETECTION_INTERVAL: 400 MS
MDC_IDC_SET_ZONE_DETECTION_INTERVAL: NORMAL
MDC_IDC_SET_ZONE_TYPE: NORMAL
MDC_IDC_STAT_AT_BURDEN_PERCENT: 0 %
MDC_IDC_STAT_AT_DTM_END: NORMAL
MDC_IDC_STAT_AT_DTM_START: NORMAL
MDC_IDC_STAT_BRADY_AP_VP_PERCENT: 0.45 %
MDC_IDC_STAT_BRADY_AP_VS_PERCENT: 38.24 %
MDC_IDC_STAT_BRADY_AS_VP_PERCENT: 0.03 %
MDC_IDC_STAT_BRADY_AS_VS_PERCENT: 61.27 %
MDC_IDC_STAT_BRADY_DTM_END: NORMAL
MDC_IDC_STAT_BRADY_DTM_START: NORMAL
MDC_IDC_STAT_BRADY_RA_PERCENT_PACED: 38.7 %
MDC_IDC_STAT_BRADY_RV_PERCENT_PACED: 0.51 %
MDC_IDC_STAT_EPISODE_RECENT_COUNT: 0
MDC_IDC_STAT_EPISODE_RECENT_COUNT_DTM_END: NORMAL
MDC_IDC_STAT_EPISODE_RECENT_COUNT_DTM_START: NORMAL
MDC_IDC_STAT_EPISODE_TOTAL_COUNT: 0
MDC_IDC_STAT_EPISODE_TOTAL_COUNT: 2
MDC_IDC_STAT_EPISODE_TOTAL_COUNT_DTM_END: NORMAL
MDC_IDC_STAT_EPISODE_TOTAL_COUNT_DTM_START: NORMAL
MDC_IDC_STAT_EPISODE_TYPE: NORMAL
MDC_IDC_STAT_TACHYTHERAPY_ATP_DELIVERED_RECENT: 0
MDC_IDC_STAT_TACHYTHERAPY_ATP_DELIVERED_TOTAL: 0
MDC_IDC_STAT_TACHYTHERAPY_RECENT_DTM_END: NORMAL
MDC_IDC_STAT_TACHYTHERAPY_RECENT_DTM_START: NORMAL
MDC_IDC_STAT_TACHYTHERAPY_SHOCKS_ABORTED_RECENT: 0
MDC_IDC_STAT_TACHYTHERAPY_SHOCKS_ABORTED_TOTAL: 0
MDC_IDC_STAT_TACHYTHERAPY_SHOCKS_DELIVERED_RECENT: 0
MDC_IDC_STAT_TACHYTHERAPY_SHOCKS_DELIVERED_TOTAL: 0
MDC_IDC_STAT_TACHYTHERAPY_TOTAL_DTM_END: NORMAL
MDC_IDC_STAT_TACHYTHERAPY_TOTAL_DTM_START: NORMAL

## 2021-10-12 ENCOUNTER — HOSPITAL ENCOUNTER (OUTPATIENT)
Dept: CARDIOLOGY | Facility: CLINIC | Age: 71
Discharge: HOME | End: 2021-10-12
Attending: INTERNAL MEDICINE | Admitting: INTERNAL MEDICINE
Payer: COMMERCIAL

## 2021-10-12 DIAGNOSIS — I50.22 CHRONIC SYSTOLIC CONGESTIVE HEART FAILURE (H): ICD-10-CM

## 2021-10-12 LAB — LVEF ECHO: NORMAL

## 2021-10-12 PROCEDURE — 93306 TTE W/DOPPLER COMPLETE: CPT

## 2021-10-12 PROCEDURE — 93306 TTE W/DOPPLER COMPLETE: CPT | Mod: 26 | Performed by: INTERNAL MEDICINE

## 2021-10-19 ENCOUNTER — OFFICE VISIT (OUTPATIENT)
Dept: CARDIOLOGY | Facility: CLINIC | Age: 71
End: 2021-10-19
Attending: INTERNAL MEDICINE
Payer: COMMERCIAL

## 2021-10-19 VITALS
OXYGEN SATURATION: 98 % | DIASTOLIC BLOOD PRESSURE: 64 MMHG | SYSTOLIC BLOOD PRESSURE: 113 MMHG | WEIGHT: 160.2 LBS | BODY MASS INDEX: 22.99 KG/M2 | HEART RATE: 58 BPM

## 2021-10-19 DIAGNOSIS — I50.22 CHRONIC SYSTOLIC CONGESTIVE HEART FAILURE (H): ICD-10-CM

## 2021-10-19 PROCEDURE — 99214 OFFICE O/P EST MOD 30 MIN: CPT | Performed by: INTERNAL MEDICINE

## 2021-10-19 NOTE — LETTER
10/19/2021    Danii Hernández MD  [id number]  [facility] [address]    RE: Delbert Tilley       Dear Colleague,    I had the pleasure of seeing Delbert Tilley in the [facility].      Cardiology Consultation       Assessment & Plan     1.  Cardiomyopathy with mixed etiology per chart review.  Ischemic component with multivessel CABG performed in the past  2.  Status post ICD placement for primary prevention  3.  Coronary artery disease status post multivessel CABG 2014 consisting of LIMA to LAD, saphenous vein graft to OM and a saphenous vein graft to PDA, PCI to OM graft 2014  4.  Most recent angiogram from 2017 as listed below  5.  Extensive PAD with right and left carotid endarterectomy and intermittent claudication with abnormal ABIs.  Established vascular surgery patient  6.  History of atrial flutter status post ablation  7.  Paroxysmal atrial fibrillation  8.  COPD  9.  Chronic kidney disease  10.  Hyperlipidemia  11.  DM      Recommendations    1.  Ischemic cardiomyopathy: Stable appears to be compensated at present.  On a multidrug regimen and his heart failure class remains the same.  Let us continue with his current medical therapy and we reviewed his echocardiogram.  Continue with diuretic therapy.  He is responded well.  2.  Atherosclerotic coronary artery disease: Stable continue with current medical therapy no symptoms.  Perfusion study as noted as below from 2020  3.  Hyperlipidemia:  Continue statin.  We will need updated labs next year  4.  Went over his device check which was stable as well as his echocardiogram with no significant changes above his baseline.  No changes in medical therapy were made.  5.  Return to clinic in 6 months time we will try to keep him on a spring and fall schedule to mitigate unnecessary winter travel.  Preclinic lab work namely lipids will be ordered.    Thank you kindly for consult      Buddy Mckenzie  MD Ace      HPI:    Patient is a 71-year-old gentleman who I am meeting for the first time.  Previously has been followed by my colleague Dr. Michaels who has since retired.  He has a notable history as described above.  Pertinent history includes cardiomyopathy which has been deemed mixed in etiology however there is no ischemic component.  He has had multivessel CABG performed in 2014 and an angiogram performed in 2017.  He has significantly reduced LV systolic function for which he had an ICD placed in the past.  He has been followed in the core clinic at [facility] as well.    He is accompanied by his daughter to our visit today.  Overall patient is feeling well.  He states that his lower extremity edema is at baseline.  Energy level is the same.  Reports of no chest pain or any other significant cardiac symptoms.  Echocardiogram was ordered prior to today's visit demonstrating no significant change above his baseline.  He does have an LITA scheduled for intermittent claudication.  Again he is an established vascular surgery patient and they will be addressing this.                                                            Echo 2021  Left ventricular systolic function is severely reduced.The visual ejection  fraction is 20-25%.The left ventricle is mildly dilated.  Moderately decreased right ventricular systolic function  The left atrium is severely dilated.  There is mild (1+) mitral regurgitation.  There is mild (1+) tricuspid regurgitation.  Pulmonary hypertension- RVSP 54 mm hg +RA.     Compared to echo dated 11/23/2020 no significant changes.      MPI 2020       The nuclear stress test is abnormal.     There is transmural infarction in the inferior and inferoseptal segment(s) of the left ventricle.     There is a small area of nontransmural infarction in the distal apical segment(s) of the left ventricle.     Left ventricular function is severely reduced.     The left ventricular ejection fraction at rest is  15%.  The left ventricular ejection fraction at stress is 20%.     Severe left ventricular enlargement is noted.     Stress to rest cavity ratio is 1.21.     A prior study was conducted on 8/11/2017.  This study has no significant change when compared with the prior study.      Cardiac Cath 2017  CORONARY ANGIOGRAM:   1. Both coronary arteries arise from their respective cusps.  2. Dominance: Right  3. The LM is without angiographic evidence of disease.   4. LAD: Type 3 [LAD supplies the entire apex].. The LAD is diffusely  diseased gives rise to septal perforators, D1 and D2. There is 90%  stenosis of the proximal LAD just proximal to the D1 branch. Distally  it is supplied by the LIMA, which has competitive flow to the mid  segment.  There is 50% stenosis of distal and apical LAD.  5. LCX gives rise to small size OM1 and OM2 vessels in the proximal  segment and small size OM4 in the distal segment. It also gives rise  to medium size OM3 which is 100% occluded in the ostium. There is also  90% stenosis of the ostium of OM2. There is diffuse mild disease in  the circumflex artery. There are collaterals supplying the PL   6. RCA gives rise to PL branches and supplies PDA. There is diffuse  moderate disease in the proximal to mid portion and 100% occluded in  the mid RCA. Distally the PL and PDA are supplied by collaterals from  the left system.       CORONARY ARTERY BYPASS ANGIOGRAPHY:  Coronary bypass angiography was performed on the following.  LIMA to LAD: The ostium, body and anastomosis site are free of  significant disease  SVG to OM:  The ostium and anastomosis site are free of disease. There  is patent stent noted in the proximal SVG. There is 20% stenosis of  the proximal SVG, and 20% stenosis of the mid SVG.  SVG to RCA: it is 100% occluded at the ostium        Primary Care Physician   Danii Hernández        Patient Active Problem List   Diagnosis     Cardiomyopathy (H)     Atrial fibrillation/flutter      Coronary artery disease involving native coronary artery of native heart without angina pectoris     Alcohol abuse, in remission     GERD (gastroesophageal reflux disease)     Alcohol-induced chronic pancreatitis (H)     Diabetes mellitus, type 2 (H)     Type 2 diabetes mellitus with other specified complication, with long-term current use of insulin (H)     CARDIOVASCULAR SCREENING; LDL GOAL LESS THAN 100     Hyperlipidemia LDL goal <70     ACS (acute coronary syndrome) (H)     NSTEMI (non-ST elevated myocardial infarction) (H)     Carotid stenosis     Carpal tunnel syndrome of right wrist     Left carotid stenosis     Tobacco use     NSVT (nonsustained ventricular tachycardia) (H)     Chronic systolic heart failure (H)     Paroxysmal atrial fibrillation (H)     Chronic obstructive pulmonary disease, unspecified COPD type (H)     Hypotension, unspecified hypotension type     Chronic kidney disease, stage 3 (H)     Pleural plaque     Atherosclerosis of artery of extremity with intermittent claudication (H)     Hyperparathyroidism (H)     Thrombocytopenia (H)     Other idiopathic peripheral autonomic neuropathy     Venous insufficiency (chronic) (peripheral)     Actinic keratosis     Nail dystrophy     Generalized muscle weakness       Past Medical History   I have reviewed this patient's medical history and updated it with pertinent information if needed.   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%     Diabetes (H)      Gastro-oesophageal reflux disease      H/O: alcohol abuse      HTN (hypertension)      Hyperlipidaemia LDL goal <100 7/8/2013     Hypokalemia      Pacemaker      Pancreatitis 6/26/06 Hosp        Past Surgical History   I have reviewed this patient's surgical history and updated it with pertinent information if needed.  Past Surgical History:   Procedure Laterality Date     C CABG, ARTERIAL, THREE       CATARACT IOL, RT/LT      Cataract IOL RT/LT     ENDARTERECTOMY CAROTID Right 6/12/2015    Procedure: ENDARTERECTOMY CAROTID;  Surgeon: João Turner MD;  Location:  OR     ENDARTERECTOMY CAROTID Left 9/8/2017    Procedure: ENDARTERECTOMY CAROTID;  LEFT CAROTID ENDARTERECTOMY WITH EEG;  Surgeon: João Turner MD;  Location:  OR     IMPLANT PACEMAKER  6/10/2010     INTERPOSITION TENDON HAND N/A 6/9/2020    Procedure: Right thumb ligament reconstruction tendon interposition with extensor pollicis brevis to abductor pollicis tendon transfer;  Surgeon: Bethel Martin MD;  Location: [facility] OR     RELEASE CARPAL TUNNEL Right 4/12/2016    Procedure: RELEASE CARPAL TUNNEL;  Surgeon: Lissy Leger MD;  Location: [facility] OR     SURGICAL HISTORY OF -   1998    Laminectomy     TONSILLECTOMY & ADENOIDECTOMY         Prior to Admission Medications   Cannot display prior to admission medications because the patient has not been admitted in this contact.     [unfilled]  [unfilled]  Allergies   Allergies   Allergen Reactions     Hydrocodone GI Disturbance     Upset stomach     Shellfish Allergy Hives and Rash       Social History    reports that he has been smoking cigarettes. He has a 75.00 pack-year smoking history. He has never used smokeless tobacco. He reports that he does not drink alcohol and does not use drugs.    Family History   Family History   Adopted: Yes   Problem Relation Age of Onset     Unknown/Adopted No family hx of        Review of Systems   The comprehensive 10 point Review of Systems is negative other than noted in the HPI or here.     Physical Exam   Vital Signs with Ranges     Wt Readings from Last 4 Encounters:   08/10/21 70.3 kg (155 lb)   07/22/21 70.3 kg (155 lb)    06/10/21 69.4 kg (153 lb)   03/11/21 72.4 kg (159 lb 9.6 oz)     [unfilled]      Vitals: There were no vitals taken for this visit.    GENERAL: Healthy, alert and no distress  EYES: Eyes grossly normal to inspection.  No discharge or erythema, or obvious scleral/conjunctival abnormalities.  RESP: No audible wheeze, cough, or visible cyanosis.  No visible retractions or increased work of breathing.    SKIN: Visible skin clear. No significant rash, abnormal pigmentation or lesions.  NEURO: Cranial nerves grossly intact.  Mentation and speech appropriate for age.  PSYCH: Mentation appears normal, affect normal/bright, judgement and insight intact, normal speech and appearance well-groomed.      Thank you for allowing me to participate in the care of your patient.      Sincerely,     Buddy Bello MD     [facility]  cc:   Mimi Michaels MD  [address]  [place] [address]

## 2021-10-19 NOTE — PROGRESS NOTES
Cardiology Consultation       Assessment & Plan     1.  Cardiomyopathy with mixed etiology per chart review.  Ischemic component with multivessel CABG performed in the past  2.  Status post ICD placement for primary prevention  3.  Coronary artery disease status post multivessel CABG 2014 consisting of LIMA to LAD, saphenous vein graft to OM and a saphenous vein graft to PDA, PCI to OM graft 2014  4.  Most recent angiogram from 2017 as listed below  5.  Extensive PAD with right and left carotid endarterectomy and intermittent claudication with abnormal ABIs.  Established vascular surgery patient  6.  History of atrial flutter status post ablation  7.  Paroxysmal atrial fibrillation  8.  COPD  9.  Chronic kidney disease  10.  Hyperlipidemia  11.  DM      Recommendations    1.  Ischemic cardiomyopathy: Stable appears to be compensated at present.  On a multidrug regimen and his heart failure class remains the same.  Let us continue with his current medical therapy and we reviewed his echocardiogram.  Continue with diuretic therapy.  He is responded well.  2.  Atherosclerotic coronary artery disease: Stable continue with current medical therapy no symptoms.  Perfusion study as noted as below from 2020  3.  Hyperlipidemia:  Continue statin.  We will need updated labs next year  4.  Went over his device check which was stable as well as his echocardiogram with no significant changes above his baseline.  No changes in medical therapy were made.  5.  Return to clinic in 6 months time we will try to keep him on a spring and fall schedule to mitigate unnecessary winter travel.  Preclinic lab work namely lipids will be ordered.    Thank you kindly for consult      Buddy Bello MD      HPI:    Patient is a 71-year-old gentleman who I am meeting for the first time.  Previously has been followed by my colleague Dr. Michaels who has since retired.  He has a notable history as described above.  Pertinent history includes  cardiomyopathy which has been deemed mixed in etiology however there is no ischemic component.  He has had multivessel CABG performed in 2014 and an angiogram performed in 2017.  He has significantly reduced LV systolic function for which he had an ICD placed in the past.  He has been followed in the core clinic at [facility] as well.    He is accompanied by his daughter to our visit today.  Overall patient is feeling well.  He states that his lower extremity edema is at baseline.  Energy level is the same.  Reports of no chest pain or any other significant cardiac symptoms.  Echocardiogram was ordered prior to today's visit demonstrating no significant change above his baseline.  He does have an LITA scheduled for intermittent claudication.  Again he is an established vascular surgery patient and they will be addressing this.                                                            Echo 2021  Left ventricular systolic function is severely reduced.The visual ejection  fraction is 20-25%.The left ventricle is mildly dilated.  Moderately decreased right ventricular systolic function  The left atrium is severely dilated.  There is mild (1+) mitral regurgitation.  There is mild (1+) tricuspid regurgitation.  Pulmonary hypertension- RVSP 54 mm hg +RA.     Compared to echo dated 11/23/2020 no significant changes.      MPI 2020       The nuclear stress test is abnormal.     There is transmural infarction in the inferior and inferoseptal segment(s) of the left ventricle.     There is a small area of nontransmural infarction in the distal apical segment(s) of the left ventricle.     Left ventricular function is severely reduced.     The left ventricular ejection fraction at rest is 15%.  The left ventricular ejection fraction at stress is 20%.     Severe left ventricular enlargement is noted.     Stress to rest cavity ratio is 1.21.     A prior study was conducted on 8/11/2017.  This study has no significant change when compared  with the prior study.      Cardiac Cath 2017  CORONARY ANGIOGRAM:   1. Both coronary arteries arise from their respective cusps.  2. Dominance: Right  3. The LM is without angiographic evidence of disease.   4. LAD: Type 3 [LAD supplies the entire apex].. The LAD is diffusely  diseased gives rise to septal perforators, D1 and D2. There is 90%  stenosis of the proximal LAD just proximal to the D1 branch. Distally  it is supplied by the LIMA, which has competitive flow to the mid  segment.  There is 50% stenosis of distal and apical LAD.  5. LCX gives rise to small size OM1 and OM2 vessels in the proximal  segment and small size OM4 in the distal segment. It also gives rise  to medium size OM3 which is 100% occluded in the ostium. There is also  90% stenosis of the ostium of OM2. There is diffuse mild disease in  the circumflex artery. There are collaterals supplying the PL   6. RCA gives rise to PL branches and supplies PDA. There is diffuse  moderate disease in the proximal to mid portion and 100% occluded in  the mid RCA. Distally the PL and PDA are supplied by collaterals from  the left system.       CORONARY ARTERY BYPASS ANGIOGRAPHY:  Coronary bypass angiography was performed on the following.  LIMA to LAD: The ostium, body and anastomosis site are free of  significant disease  SVG to OM:  The ostium and anastomosis site are free of disease. There  is patent stent noted in the proximal SVG. There is 20% stenosis of  the proximal SVG, and 20% stenosis of the mid SVG.  SVG to RCA: it is 100% occluded at the ostium        Primary Care Physician   Danii Hernández        Patient Active Problem List   Diagnosis     Cardiomyopathy (H)     Atrial fibrillation/flutter     Coronary artery disease involving native coronary artery of native heart without angina pectoris     Alcohol abuse, in remission     GERD (gastroesophageal reflux disease)     Alcohol-induced chronic pancreatitis (H)     Diabetes mellitus, type 2 (H)      Type 2 diabetes mellitus with other specified complication, with long-term current use of insulin (H)     CARDIOVASCULAR SCREENING; LDL GOAL LESS THAN 100     Hyperlipidemia LDL goal <70     ACS (acute coronary syndrome) (H)     NSTEMI (non-ST elevated myocardial infarction) (H)     Carotid stenosis     Carpal tunnel syndrome of right wrist     Left carotid stenosis     Tobacco use     NSVT (nonsustained ventricular tachycardia) (H)     Chronic systolic heart failure (H)     Paroxysmal atrial fibrillation (H)     Chronic obstructive pulmonary disease, unspecified COPD type (H)     Hypotension, unspecified hypotension type     Chronic kidney disease, stage 3 (H)     Pleural plaque     Atherosclerosis of artery of extremity with intermittent claudication (H)     Hyperparathyroidism (H)     Thrombocytopenia (H)     Other idiopathic peripheral autonomic neuropathy     Venous insufficiency (chronic) (peripheral)     Actinic keratosis     Nail dystrophy     Generalized muscle weakness       Past Medical History   I have reviewed this patient's medical history and updated it with pertinent information if needed.   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%     Diabetes (H)      Gastro-oesophageal reflux disease      H/O: alcohol abuse      HTN (hypertension)      Hyperlipidaemia LDL goal <100 7/8/2013     Hypokalemia      Pacemaker      Pancreatitis 6/26/06 Hosp       Past Surgical History   I have reviewed this patient's surgical history and updated it with pertinent information if needed.  Past Surgical History:   Procedure Laterality Date     C CABG, ARTERIAL, THREE       CATARACT IOL, RT/LT      Cataract IOL  RT/LT     ENDARTERECTOMY CAROTID Right 6/12/2015    Procedure: ENDARTERECTOMY CAROTID;  Surgeon: João Turner MD;  Location:  OR     ENDARTERECTOMY CAROTID Left 9/8/2017    Procedure: ENDARTERECTOMY CAROTID;  LEFT CAROTID ENDARTERECTOMY WITH EEG;  Surgeon: João Turner MD;  Location:  OR     IMPLANT PACEMAKER  6/10/2010     INTERPOSITION TENDON HAND N/A 6/9/2020    Procedure: Right thumb ligament reconstruction tendon interposition with extensor pollicis brevis to abductor pollicis tendon transfer;  Surgeon: Bethel Martin MD;  Location: [facility] OR     RELEASE CARPAL TUNNEL Right 4/12/2016    Procedure: RELEASE CARPAL TUNNEL;  Surgeon: Lissy Leger MD;  Location: [facility]     SURGICAL HISTORY OF -   1998    Laminectomy     TONSILLECTOMY & ADENOIDECTOMY         Prior to Admission Medications   Cannot display prior to admission medications because the patient has not been admitted in this contact.     [unfilled]  [unfilled]  Allergies   Allergies   Allergen Reactions     Hydrocodone GI Disturbance     Upset stomach     Shellfish Allergy Hives and Rash       Social History    reports that he has been smoking cigarettes. He has a 75.00 pack-year smoking history. He has never used smokeless tobacco. He reports that he does not drink alcohol and does not use drugs.    Family History   Family History   Adopted: Yes   Problem Relation Age of Onset     Unknown/Adopted No family hx of        Review of Systems   The comprehensive 10 point Review of Systems is negative other than noted in the HPI or here.     Physical Exam   Vital Signs with Ranges     Wt Readings from Last 4 Encounters:   08/10/21 70.3 kg (155 lb)   07/22/21 70.3 kg (155 lb)   06/10/21 69.4 kg (153 lb)   03/11/21 72.4 kg (159 lb 9.6 oz)     [unfilled]      Vitals: There were no vitals taken for this visit.    GENERAL: Healthy, alert and no distress  EYES: Eyes grossly normal to inspection.  No discharge or erythema, or  obvious scleral/conjunctival abnormalities.  RESP: No audible wheeze, cough, or visible cyanosis.  No visible retractions or increased work of breathing.    SKIN: Visible skin clear. No significant rash, abnormal pigmentation or lesions.  NEURO: Cranial nerves grossly intact.  Mentation and speech appropriate for age.  PSYCH: Mentation appears normal, affect normal/bright, judgement and insight intact, normal speech and appearance well-groomed.

## 2021-11-02 ENCOUNTER — HOSPITAL ENCOUNTER (OUTPATIENT)
Dept: ULTRASOUND IMAGING | Facility: CLINIC | Age: 71
End: 2021-11-02
Attending: SURGERY
Payer: COMMERCIAL

## 2021-11-02 DIAGNOSIS — I73.9 PAD (PERIPHERAL ARTERY DISEASE) (H): ICD-10-CM

## 2021-11-02 DIAGNOSIS — I73.9 PAD (PERIPHERAL ARTERY DISEASE) (H): Primary | ICD-10-CM

## 2021-11-02 DIAGNOSIS — I70.219 ATHEROSCLEROSIS OF ARTERY OF EXTREMITY WITH INTERMITTENT CLAUDICATION (H): ICD-10-CM

## 2021-11-02 PROBLEM — N18.9 ANEMIA IN CHRONIC KIDNEY DISEASE: Status: ACTIVE | Noted: 2021-01-15

## 2021-11-02 PROBLEM — D63.1 ANEMIA IN CHRONIC KIDNEY DISEASE: Status: ACTIVE | Noted: 2021-01-15

## 2021-11-02 PROBLEM — E83.42 HYPOMAGNESEMIA: Status: ACTIVE | Noted: 2021-01-15

## 2021-11-02 PROBLEM — I10 ESSENTIAL HYPERTENSION: Status: ACTIVE | Noted: 2021-01-15

## 2021-11-02 PROBLEM — E11.21 DIABETIC NEPHROPATHY ASSOCIATED WITH TYPE 2 DIABETES MELLITUS (H): Status: ACTIVE | Noted: 2021-01-15

## 2021-11-02 PROBLEM — E83.51 HYPOCALCEMIA: Status: ACTIVE | Noted: 2021-01-15

## 2021-11-02 PROBLEM — R80.8 OTHER PROTEINURIA: Status: ACTIVE | Noted: 2021-01-15

## 2021-11-02 PROBLEM — E55.9 VITAMIN D DEFICIENCY: Status: ACTIVE | Noted: 2021-01-15

## 2021-11-02 PROCEDURE — 93924 LWR XTR VASC STDY BILAT: CPT

## 2021-11-02 PROCEDURE — 93925 LOWER EXTREMITY STUDY: CPT

## 2021-11-05 ENCOUNTER — TRANSFERRED RECORDS (OUTPATIENT)
Dept: HEALTH INFORMATION MANAGEMENT | Facility: CLINIC | Age: 71
End: 2021-11-05
Payer: COMMERCIAL

## 2021-11-09 ENCOUNTER — OFFICE VISIT (OUTPATIENT)
Dept: VASCULAR SURGERY | Facility: CLINIC | Age: 71
End: 2021-11-09
Payer: COMMERCIAL

## 2021-11-09 ENCOUNTER — HOSPITAL ENCOUNTER (OUTPATIENT)
Dept: CT IMAGING | Facility: CLINIC | Age: 71
Discharge: HOME | End: 2021-11-09
Attending: SURGERY | Admitting: SURGERY
Payer: COMMERCIAL

## 2021-11-09 VITALS
HEIGHT: 70 IN | WEIGHT: 160 LBS | OXYGEN SATURATION: 97 % | TEMPERATURE: 96.5 F | HEART RATE: 62 BPM | DIASTOLIC BLOOD PRESSURE: 63 MMHG | SYSTOLIC BLOOD PRESSURE: 127 MMHG | BODY MASS INDEX: 22.9 KG/M2

## 2021-11-09 DIAGNOSIS — I73.9 PAD (PERIPHERAL ARTERY DISEASE) (H): Primary | ICD-10-CM

## 2021-11-09 DIAGNOSIS — I73.9 PAD (PERIPHERAL ARTERY DISEASE) (H): ICD-10-CM

## 2021-11-09 LAB
CREAT BLD-MCNC: 1.4 MG/DL (ref 0.7–1.3)
GFR SERPL CREATININE-BSD FRML MDRD: 49 ML/MIN/1.73M2

## 2021-11-09 PROCEDURE — 250N000009 HC RX 250: Performed by: SURGERY

## 2021-11-09 PROCEDURE — 99215 OFFICE O/P EST HI 40 MIN: CPT | Performed by: SURGERY

## 2021-11-09 PROCEDURE — 250N000011 HC RX IP 250 OP 636: Performed by: SURGERY

## 2021-11-09 PROCEDURE — 82565 ASSAY OF CREATININE: CPT

## 2021-11-09 PROCEDURE — 75635 CT ANGIO ABDOMINAL ARTERIES: CPT

## 2021-11-09 RX ORDER — CEFAZOLIN SODIUM 2 G/100ML
2 INJECTION, SOLUTION INTRAVENOUS SEE ADMIN INSTRUCTIONS
Status: CANCELLED | OUTPATIENT
Start: 2022-02-09

## 2021-11-09 RX ORDER — IOPAMIDOL 755 MG/ML
100 INJECTION, SOLUTION INTRAVASCULAR ONCE
Status: COMPLETED | OUTPATIENT
Start: 2021-11-09 | End: 2021-11-09

## 2021-11-09 RX ORDER — CEFAZOLIN SODIUM 2 G/100ML
2 INJECTION, SOLUTION INTRAVENOUS
Status: CANCELLED | OUTPATIENT
Start: 2022-02-09

## 2021-11-09 RX ADMIN — IOPAMIDOL 100 ML: 755 INJECTION, SOLUTION INTRAVENOUS at 13:24

## 2021-11-09 RX ADMIN — SODIUM CHLORIDE 100 ML: 9 INJECTION, SOLUTION INTRAVENOUS at 13:24

## 2021-11-09 ASSESSMENT — MIFFLIN-ST. JEOR: SCORE: 1477.01

## 2021-11-09 NOTE — NURSING NOTE
"Initial /63 (BP Location: Right arm, Patient Position: Sitting, Cuff Size: Adult Regular)   Pulse 62   Temp (!) 96.5  F (35.8  C) (Tympanic)   Ht 1.778 m (5' 10\")   Wt 72.6 kg (160 lb)   SpO2 97%   BMI 22.96 kg/m   Estimated body mass index is 22.96 kg/m  as calculated from the following:    Height as of this encounter: 1.778 m (5' 10\").    Weight as of this encounter: 72.6 kg (160 lb). .    Constance Rangel LPN on 11/9/2021 at 1:48 PM    "

## 2021-11-09 NOTE — PROGRESS NOTES
VASCULAR SURGERY PROGRESS NOTE   VASCULAR SURGEON: Ric Chau MD, RPVI     LOCATION:  [facility]     Delbert Tilley   Medical Record #:  [id number]  YOB: 1950  Age:  71 year old     Date of Service: 11/9/2021    PRIMARY CARE PROVIDER: Danii Hernández      Reason for visit: Follow-up    IMPRESSION: 70-year-old male with known history of uncontrolled diabetes and peripheral artery disease, ischemic cardiomyopathy with ejection fraction of 20 to 25% comes to vascular surgery clinic with worsening of right lower extremity lifestyle limiting claudication and intermittent rest pain.  Most recent ABIs are consistent with moderate to severe peripheral artery disease, right is worse than left.  CT scan did show severe bilateral common femoral artery disease with right common femoral artery being completely occluded.  There is a right external iliac artery occlusion and severe stenosis in the left external iliac artery.  There is bilateral common iliac artery disease but without hemodynamic significance.  There is a significant disease in bilateral SFAs.    RECOMMENDATION/RISKS: Patient would benefit from bilateral iliofemoral endarterectomies with retrograde iliac intervention, possible femoral to femoral bypass to prove the inflow and decrease patient symptoms.  Risk and benefits of this operation were explained.  I would like to reach out to Dr. Hernández, his primary care physician, and Dr. Bello, from cardiology, for clearance.  Patient would like to proceed with operation after 1 January.    HPI:  Delbert Tilley is a 71 year old male who was seen today for follow-up.  Patient is complaining of severe left limiting claudication in the right lower extremity alongside with significant neuropathy.  Patient denies any nonhealing wounds but admits to intermittent pain at rest in the right lower extremity.    REVIEW OF SYSTEMS:    A 12 point ROS was reviewed and is negative except for right  lower extremity pain    PHH:    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%     Diabetes (H)      Gastro-oesophageal reflux disease      H/O: alcohol abuse      HTN (hypertension)      Hyperlipidaemia LDL goal <100 7/8/2013     Hypokalemia      Pacemaker      Pancreatitis 6/26/06 Hosp          Past Surgical History:   Procedure Laterality Date     C CABG, ARTERIAL, THREE       CATARACT IOL, RT/LT      Cataract IOL RT/LT     ENDARTERECTOMY CAROTID Right 6/12/2015    Procedure: ENDARTERECTOMY CAROTID;  Surgeon: João Turner MD;  Location:  OR     ENDARTERECTOMY CAROTID Left 9/8/2017    Procedure: ENDARTERECTOMY CAROTID;  LEFT CAROTID ENDARTERECTOMY WITH EEG;  Surgeon: João Turner MD;  Location:  OR     IMPLANT PACEMAKER  6/10/2010     INTERPOSITION TENDON HAND N/A 6/9/2020    Procedure: Right thumb ligament reconstruction tendon interposition with extensor pollicis brevis to abductor pollicis tendon transfer;  Surgeon: Bethel Martin MD;  Location: [facility] OR     RELEASE CARPAL TUNNEL Right 4/12/2016    Procedure: RELEASE CARPAL TUNNEL;  Surgeon: Lissy Leger MD;  Location: [facility] OR     SURGICAL HISTORY OF -   1998    Laminectomy     TONSILLECTOMY & ADENOIDECTOMY         ALLERGIES:  Hydrocodone and Shellfish allergy    MEDS:    Current Outpatient Medications:      albuterol (PROAIR HFA) 108 (90 Base) MCG/ACT Inhaler, Inhale 2 puffs into the lungs every 4 hours as needed for shortness of breath / dyspnea, Disp: 1 Inhaler, Rfl: 0     amylase-lipase-protease (CREON 12) 02771-51640-09210 units CPEP, Take 1 capsule by mouth 2 times daily, Disp: 120 capsule, Rfl: 0     aspirin (ASA) 500 MG  EC tablet, Take 1,000 mg by mouth daily, Disp: , Rfl:      atorvastatin (LIPITOR) 80 MG tablet, Take 80 mg by mouth At Bedtime , Disp: , Rfl:      calcium carbonate (OS-MARVA) 500 MG tablet, Take 2 tablets (1,000 mg) by mouth 2 times daily, Disp: 360 tablet, Rfl: 1     carvedilol (COREG) 6.25 MG tablet, Take 2 tablets (12.5 mg) by mouth 2 times daily (with meals), Disp: 180 tablet, Rfl: 3     cholecalciferol 125 MCG (5000 UT) CAPS, Take 1 capsule (5,000 Units) by mouth daily, Disp: 90 capsule, Rfl: 1     gabapentin (NEURONTIN) 100 MG capsule, Take 1 capsule (100 mg) by mouth 2 times daily, Disp: 1 capsule, Rfl: 0     insulin aspart (NOVOLOG PEN) 100 UNIT/ML pen, Inject 3 Units Subcutaneous 2 times daily (with meals) , Disp: , Rfl:      insulin glargine (LANTUS) 100 UNIT/ML injection, Inject 30 Units Subcutaneous every morning , Disp: , Rfl:      loperamide (IMODIUM) 2 MG capsule, Take 2 mg by mouth 4 times daily as needed for diarrhea, Disp: , Rfl:      losartan (COZAAR) 100 MG tablet, Take 50 mg by mouth daily, Disp: , Rfl:      magnesium oxide (MAG-OX) 400 (241.3 Mg) MG tablet, Take 1 tablet (400 mg) by mouth 4 times daily, Disp: 120 tablet, Rfl: 1     metFORMIN (GLUCOPHAGE-XR) 500 MG 24 hr tablet, TAKE ONE TABLET BY MOUTH EVERY MORNING AND TAKE TWO TABLETS EVERY EVENING WITH MEALS FOR DIABETES, Disp: , Rfl:      mometasone (ASMANEX TWISTHALER) 220 MCG/INH inhaler, Inhale 2 puffs into the lungs daily, Disp: , Rfl:      pantoprazole (PROTONIX) 40 MG EC tablet, Take 1 tablet (40 mg) by mouth daily, Disp: 30 tablet, Rfl:      torsemide (DEMADEX) 20 MG tablet, Take 30 mg (1 and 1/2 tab) daily., Disp: 30 tablet, Rfl: 4     Insulin Pen Needle (PEN NEEDLES) 32G X 4 MM MISC, , Disp: , Rfl:      Nitroglycerin (NITROSTAT SL), Place 0.4 mg under the tongue every 5 minutes as needed for chest pain (Patient not taking: Reported on 11/9/2021), Disp: , Rfl:   No current facility-administered medications for this visit.    SOCIAL  "HABITS:    History   Smoking Status     Current Every Day Smoker     Packs/day: 1.50     Years: 50.00     Types: Cigarettes   Smokeless Tobacco     Never Used     Comment: 1 1/2-2 PPD 3/11/21     Social History    Substance and Sexual Activity      Alcohol use: No      History   Drug Use No       FAMILY HISTORY:    Family History   Adopted: Yes   Problem Relation Age of Onset     Unknown/Adopted No family hx of        PE:  /63 (BP Location: Right arm, Patient Position: Sitting, Cuff Size: Adult Regular)   Pulse 62   Temp (!) 96.5  F (35.8  C) (Tympanic)   Ht 5' 10\" (1.778 m)   Wt 160 lb (72.6 kg)   SpO2 97%   BMI 22.96 kg/m    Wt Readings from Last 1 Encounters:   11/09/21 160 lb (72.6 kg)     Body mass index is 22.96 kg/m .    EXAM:  GENERAL: This is a well-developed 71 year old male who appears his stated age  EYES: Grossly normal.  MOUTH: Buccal mucosa normal   CARDIAC: Normal   CHEST/LUNG: Clear to auscultation bilaterally  GASTROINTESINAL soft nontender nondistended  MUSCULOSKELETAL: Grossly normal and both lower extremities are intact.  HEME/LYMPH: No lymphedema  NEUROLOGIC: Focally intact, Alert and oriented x 3.   PSYCH: appropriate affect  INTEGUMENT: No open lesions or ulcers  Pulse Exam: Normal femoral because he did have any pulse is also his arteries not to the present dressing make sure there is no monophasic sounds present bilaterally.  Femoral pulses are absent          DIAGNOSTIC STUDIES:     Images:  US LITA with PPG with Exercise    Result Date: 11/2/2021  ULTRASOUND ANKLE-BRACHIAL INDEX WITH PPG WITH EXERCISE   11/2/2021 11:28 AM HISTORY: Atherosclerosis of artery of extremity with intermittent claudication (H). PAD (peripheral artery disease) (H). COMPARISON: June 22, 2021 FINDINGS: Right LITA: PT: 50, index of 0.48, previously 0.5. DP: 61, index of 0.58, previously 0.55. Left LITA: PT: 62, index of 0.59, previously 0.6. DP: 53, index of 0.5, previously 0.6. Right Digital brachial " index: 23, with index of 0.22, previously 0.42. Left Digital Brachial index: 28 with index of 0.27, previously 0.5. Waveforms: Distal posterior tibial and dorsalis pedis waveforms are monophasic, unchanged. First digital waveforms of both feet significantly reduced in amplitude and morphology, worsened in appearance since previous exam. Exercise: The patient was exercised on a treadmill at 1.5 mph at a 10% incline for 5 minutes total. Right exercise LITA: 0.37, previously 0.42. Left exercise LITA: 0.42, previously 0.51.     IMPRESSION: Moderate to severe bilateral arterial insufficiency with post exercise values and toe-brachial indices slightly worsened since previous exam. LITA CRITERIA: >1.4 NC 0.95-1.4 Normal 0.90 - 0.94 Mild 0.5 - 0.89 Moderate 0.2 - 0.49 Severe <0.2 Critical PHU POWELL MD   SYSTEM ID:  [id number]    Echocardiogram Complete    Result Date: 10/12/2021  [id number]^BRUCE^NORMAN^TORIBIO  [facility] [address] [facility] [place] [address]  Name: CARLIN QUEEN MRN: [id number] : 1950 Study Date: 10/12/2021 07:51 AM Age: 71 yrs Gender: Male Patient Location: [facility] Reason For Study: Chronic systolic congestive heart failure (H) Ordering Physician: NORMAN BARRERA Referring Physician: Danii Hernández Performed By: Lila Kelley RDCS  BSA: 1.9 m2 Height: 70 in Weight: 155 lb HR: 64 BP: 81/52 mmHg ______________________________________________________________________________ Procedure Complete Echo Adult. ______________________________________________________________________________ Interpretation Summary  Left ventricular systolic function is severely reduced.The visual ejection fraction is 20-25%.The left ventricle is mildly dilated. Moderately decreased right ventricular systolic function The left atrium is severely dilated. There is mild (1+) mitral regurgitation. There is mild (1+) tricuspid regurgitation. Pulmonary hypertension-  RVSP 54 mm hg +RA.  Compared to echo dated 11/23/2020 no significant changes. ______________________________________________________________________________ Left Ventricle The left ventricle is mildly dilated. There is severe eccentric left ventricular hypertrophy. Left ventricular systolic function is severely reduced. The visual ejection fraction is 20-25%. Diastolic Doppler findings (E/E' ratio and/or other parameters) suggest left ventricular filling pressures are increased. There is severe global hypokinesia of the left ventricle.  Right Ventricle The right ventricle is normal size. Moderately decreased right ventricular systolic function. There is a pacemaker lead in the right ventricle.  Atria The left atrium is severely dilated. Pacer wire in right atrium. The right atrium is mildly dilated. There is no color Doppler evidence of an atrial shunt.  Mitral Valve There is mild (1+) mitral regurgitation.  Tricuspid Valve There is mild (1+) tricuspid regurgitation. The right ventricular systolic pressure is approximated at 54.0 mmHg plus the right atrial pressure. Pulmonary hypertension.  Aortic Valve The aortic valve is trileaflet with aortic valve sclerosis. No aortic regurgitation is present. No aortic stenosis is present.  Pulmonic Valve There is trace pulmonic valvular regurgitation. There is no pulmonic valvular stenosis.  Vessels The aortic root is normal size. Normal size ascending aorta. The inferior vena cava was normal in size with preserved respiratory variability.  Pericardium There is no pericardial effusion.  Rhythm The rhythm was sinus bradycardia. ______________________________________________________________________________ MMode/2D Measurements & Calculations IVSd: 1.2 cm  LVIDd: 6.0 cm LVIDs: 5.1 cm LVPWd: 1.2 cm FS: 15.1 % LV mass(C)d: 322.9 grams LV mass(C)dI: 172.4 grams/m2 Ao root diam: 3.3 cm LA dimension: 4.4 cm asc Aorta Diam: 3.6 cm LA/Ao: 1.3 LA Volume Index (BP): 57.9 ml/m2 RWT: 0.41  TAPSE: 1.3 cm  Doppler Measurements & Calculations MV E max katie: 94.5 cm/sec MV A max katie: 58.9 cm/sec MV E/A: 1.6 MV dec slope: 557.2 cm/sec2 MV dec time: 0.17 sec PA acc time: 0.10 sec TR max katie: 367.3 cm/sec TR max P.0 mmHg E/E' av.2 Lateral E/e': 21.3 Medial E/e': 41.0  ______________________________________________________________________________ Report approved by: Rufus Valdovinos 10/12/2021 01:07 PM       US Lower Extremity Arterial Duplex Bilateral    Result Date: 2021   LOWER EXTREMITY ARTERIAL DUPLEX BILATERAL 2021 10:37 AM HISTORY: 71-year-old patient with history of peripheral arterial disease. COMPARISON: None TECHNIQUE: Color Doppler and spectral waveform analysis performed throughout the arteries of both lower extremities. FINDINGS: Right lower extremity: The arteries are diffusely diseased with severe amount of atherosclerotic plaque. All waveforms are monophasic. Velocity in the common femoral artery is 40/5 cm/second. There is occlusion of the proximal superficial femoral artery with a small patent lumen in the mid SFA and velocity of 25/3 cm/second. The distal SFA is 82/8 cm/second, likely collateralized. The popliteal, posterior tibial, and peroneal arteries have monophasic waveforms. Anterior tibial artery not assessed. Left lower extremity: Arteries are diffusely diseased with severe amount of calcific atherosclerotic plaque. Velocities are diffusely diminished throughout the arteries of the left lower extremity. There may be a small focal occlusion in the left proximal superficial femoral artery, or this area may be obscured by atherosclerotic plaque. The distal posterior tibial and peroneal arteries are patent with monophasic waveforms.     IMPRESSION: 1. Severe degree of diffuse atherosclerotic plaque throughout all visible arteries. Diffusely monophasic waveforms likely due to multifocal disease distribution, including inflow, as well as throughout the lower  extremities. 2. Suspect occlusions of both proximal superficial femoral arteries, though segments are difficult to visualize given posterior shadowing related to atherosclerotic calcification. PHU POWELL MD   SYSTEM ID:  [id number]      I personally reviewed the images and my interpretation is Bilateral iliofemoral disease    LABS:      Sodium   Date Value Ref Range Status   07/22/2021 133 133 - 144 mmol/L Final   06/10/2021 140 133 - 144 mmol/L Final   01/21/2021 135 133 - 144 mmol/L Final   12/22/2020 136 133 - 144 mmol/L Final     Urea Nitrogen   Date Value Ref Range Status   07/22/2021 35 (H) 7 - 30 mg/dL Final   06/10/2021 34 (H) 7 - 30 mg/dL Final   01/21/2021 27 7 - 30 mg/dL Final   12/22/2020 34 (H) 7 - 30 mg/dL Final     Hemoglobin   Date Value Ref Range Status   07/22/2021 14.1 13.3 - 17.7 g/dL Final   01/21/2021 15.0 13.3 - 17.7 g/dL Final   11/16/2020 13.2 (L) 13.3 - 17.7 g/dL Final   10/22/2020 13.2 (L) 13.3 - 17.7 g/dL Final     Platelet Count   Date Value Ref Range Status   07/22/2021 144 (L) 150 - 450 10e3/uL Final   01/21/2021 117 (L) 150 - 450 10e9/L Final   11/16/2020 111 (L) 150 - 450 10e9/L Final   10/22/2020 151 150 - 450 10e9/L Final     BNP   Date Value Ref Range Status   11/22/2004 200 (H) 5 - 100 pg/mL Final   07/21/2004 131 (H) 5 - 100 pg/mL Final   07/16/2004 283 (H) 5 - 100 pg/mL Final     INR   Date Value Ref Range Status   12/22/2017 1.02 0.86 - 1.14 Final   09/08/2017 1.03 0.86 - 1.14 Final   10/20/2014 0.94 0.86 - 1.14 Final       Total time spent 45 minutes face to face with patient with more than 50% time spent in counseling and coordination of care.    Ric Chau MD, MD, [facility]  VASCULAR SURGERY

## 2021-11-09 NOTE — PROGRESS NOTES
Patient is in clinic today to see MD Remi Chau.      Patient is here for a follow up to discuss CT results.    The patient does smoke.    Pt is currently taking ASA and statin.    The patient states he/she are wearing compression.    Swelling is observed in bilateral legs.    Pt rates pain 5/10 located at feet.    Pts symptoms include Rest Pain, leg cramps, swelling, numbness or weakness, pain with walking at a distance of 1 block and shade or floaters in eyes in Bilateral  extremity.    Patients condition is stable.    The provider has been notified that the patient is complaining of artery blockage in legs.

## 2021-11-10 ENCOUNTER — TELEPHONE (OUTPATIENT)
Dept: FAMILY MEDICINE | Facility: CLINIC | Age: 71
End: 2021-11-10
Payer: COMMERCIAL

## 2021-11-10 NOTE — TELEPHONE ENCOUNTER
Message received from vascular surgeon regarding upcoming procedure (January) and need for optimization of his other health concerns (diabetes, etc).     Please get patient in ASAP to be seen for diabetes management.      Can use same day or hospital follow up spot if needed.    Danii Hernández M.D.

## 2021-11-11 ENCOUNTER — OFFICE VISIT (OUTPATIENT)
Dept: FAMILY MEDICINE | Facility: CLINIC | Age: 71
End: 2021-11-11
Payer: COMMERCIAL

## 2021-11-11 VITALS
RESPIRATION RATE: 18 BRPM | TEMPERATURE: 96.6 F | OXYGEN SATURATION: 98 % | BODY MASS INDEX: 23.26 KG/M2 | HEART RATE: 57 BPM | SYSTOLIC BLOOD PRESSURE: 122 MMHG | DIASTOLIC BLOOD PRESSURE: 58 MMHG | HEIGHT: 70 IN | WEIGHT: 162.5 LBS

## 2021-11-11 DIAGNOSIS — Z23 HIGH PRIORITY FOR 2019-NCOV VACCINE: ICD-10-CM

## 2021-11-11 DIAGNOSIS — E78.5 HYPERLIPIDEMIA LDL GOAL <70: ICD-10-CM

## 2021-11-11 DIAGNOSIS — I70.219 ATHEROSCLEROSIS OF ARTERY OF EXTREMITY WITH INTERMITTENT CLAUDICATION (H): ICD-10-CM

## 2021-11-11 DIAGNOSIS — E11.69 TYPE 2 DIABETES MELLITUS WITH OTHER SPECIFIED COMPLICATION, WITH LONG-TERM CURRENT USE OF INSULIN (H): Primary | ICD-10-CM

## 2021-11-11 DIAGNOSIS — Z12.11 COLON CANCER SCREENING: ICD-10-CM

## 2021-11-11 DIAGNOSIS — N18.32 STAGE 3B CHRONIC KIDNEY DISEASE (H): ICD-10-CM

## 2021-11-11 DIAGNOSIS — Z79.4 TYPE 2 DIABETES MELLITUS WITH OTHER SPECIFIED COMPLICATION, WITH LONG-TERM CURRENT USE OF INSULIN (H): Primary | ICD-10-CM

## 2021-11-11 DIAGNOSIS — Z72.0 TOBACCO USE: ICD-10-CM

## 2021-11-11 LAB
ANION GAP SERPL CALCULATED.3IONS-SCNC: 2 MMOL/L (ref 3–14)
BUN SERPL-MCNC: 37 MG/DL (ref 7–30)
CALCIUM SERPL-MCNC: 8.7 MG/DL (ref 8.5–10.1)
CHLORIDE BLD-SCNC: 105 MMOL/L (ref 94–109)
CHOLEST SERPL-MCNC: 100 MG/DL
CO2 SERPL-SCNC: 30 MMOL/L (ref 20–32)
CREAT SERPL-MCNC: 1.39 MG/DL (ref 0.66–1.25)
FASTING STATUS PATIENT QL REPORTED: NO
GFR SERPL CREATININE-BSD FRML MDRD: 50 ML/MIN/1.73M2
GLUCOSE BLD-MCNC: 220 MG/DL (ref 70–99)
HBA1C MFR BLD: 9.6 % (ref 0–5.6)
HDLC SERPL-MCNC: 30 MG/DL
LDLC SERPL CALC-MCNC: 42 MG/DL
NONHDLC SERPL-MCNC: 70 MG/DL
POTASSIUM BLD-SCNC: 4.9 MMOL/L (ref 3.4–5.3)
SODIUM SERPL-SCNC: 137 MMOL/L (ref 133–144)
TRIGL SERPL-MCNC: 141 MG/DL

## 2021-11-11 PROCEDURE — 83036 HEMOGLOBIN GLYCOSYLATED A1C: CPT | Performed by: FAMILY MEDICINE

## 2021-11-11 PROCEDURE — 80048 BASIC METABOLIC PNL TOTAL CA: CPT | Performed by: FAMILY MEDICINE

## 2021-11-11 PROCEDURE — 80061 LIPID PANEL: CPT | Performed by: FAMILY MEDICINE

## 2021-11-11 PROCEDURE — 36415 COLL VENOUS BLD VENIPUNCTURE: CPT | Performed by: FAMILY MEDICINE

## 2021-11-11 PROCEDURE — 99214 OFFICE O/P EST MOD 30 MIN: CPT | Mod: 25 | Performed by: FAMILY MEDICINE

## 2021-11-11 PROCEDURE — 0004A COVID-19,PF,PFIZER (12+ YRS): CPT | Performed by: FAMILY MEDICINE

## 2021-11-11 PROCEDURE — 91300 COVID-19,PF,PFIZER (12+ YRS): CPT | Performed by: FAMILY MEDICINE

## 2021-11-11 ASSESSMENT — MIFFLIN-ST. JEOR: SCORE: 1488.35

## 2021-11-11 ASSESSMENT — PAIN SCALES - GENERAL: PAINLEVEL: NO PAIN (0)

## 2021-11-11 NOTE — PROGRESS NOTES
Assessment & Plan     Type 2 diabetes mellitus with other specified complication, with long-term current use of insulin (H)  Uncontrolled  Patient was not doing Novolog except once a day in the morning.  Will increase that to three times daily with meals.   Increase Lantus to 32 units.    Unfortunately I think he's so afraid of hypoglycemia overnight that he's overcorrecting for blood sugars that are not low () and eating overnight frequently to avoid those lows.     Encouraged to check blood sugars more often during the day.      - Hemoglobin A1c; Future  - Lipid panel reflex to direct LDL Fasting; Future  - Hemoglobin A1c  - Lipid panel reflex to direct LDL Fasting  - insulin aspart (NOVOLOG PEN) 100 UNIT/ML pen; Inject 3 Units Subcutaneous 3 times daily (with meals)  - insulin glargine (LANTUS PEN) 100 UNIT/ML pen; Inject 32 Units Subcutaneous every morning    Atherosclerosis of artery of extremity with intermittent claudication (H)   having bilateral iliofemoral endarterectomies with retrograde iliac intervention, possible femoral to femoral bypass to prove the inflow     This is planned after the first of the year  Goal is to try to get blood sugars under better control    Stage 3b chronic kidney disease (H)   continue to work with nephrologist  - Basic metabolic panel  (Ca, Cl, CO2, Creat, Gluc, K, Na, BUN); Future  - Basic metabolic panel  (Ca, Cl, CO2, Creat, Gluc, K, Na, BUN)    Colon cancer screening     - EMILY([employer])    Hyperlipidemia LDL goal <70     - Lipid panel reflex to direct LDL Fasting; Future  - Lipid panel reflex to direct LDL Fasting    High priority for 2019-nCoV vaccine     - COVID-19,PF,PFIZER (12+ Yrs PURPLE LABEL)    Tobacco use  Encouraged cessation      No follow-ups on file.    Danii Hernández MD  [facility]   Delbert is a 71 year old who presents for the following health issues     HPI   Chief Complaint   Patient presents  "with     Diabetes     Health Maintenance     tushar     Imm/Inj     COVID-19 VACCINE       Diabetes Follow-up    How often are you checking your blood sugar? Two times daily    First thing in the morning 133 today      At 5 am it was 180  1 am it was 240    Eats dinner at 930pm    He eats a larger meal and is afraid to have blood sugars go low overnight \"I'm going to make damn sure I don't go low at night\".    Has been down into the 80s at night recently but hasn't had a severe hypoglycemic episode for several years.     He feels having his HgbA1c at 7% is \"never going to happen\".      Never really checking blood sugars in the middle of the day.     He has never taken the Novolog three times daily with meals as would be customary.  He takes it once a day in the morning with breakfast.      Blood sugar testing frequency justification:  Patient modifying lifestyle changes (diet, exercise) with blood sugars  What time of day are you checking your blood sugars (select all that apply)?  Before meals  Have you had any blood sugars above 200?  Yes after eating  Have you had any blood sugars below 70?  No    What symptoms do you notice when your blood sugar is low?  Shaky    What concerns do you have today about your diabetes? None     Do you have any of these symptoms? (Select all that apply)  Numbness in feet and Burning in feet    Have you had a diabetic eye exam in the last 12 months? [facility]          Hyperlipidemia Follow-Up      Are you regularly taking any medication or supplement to lower your cholesterol?   Yes- lipitor    Are you having muscle aches or other side effects that you think could be caused by your cholesterol lowering medication?  No    Hypertension Follow-up      Do you check your blood pressure regularly outside of the clinic? No     Are you following a low salt diet? Yes    Are your blood pressures ever more than 140 on the top number (systolic) OR more   than 90 on the bottom " "number (diastolic), for example 140/90? No    BP Readings from Last 2 Encounters:   11/11/21 122/58   11/09/21 127/63     Hemoglobin A1C (%)   Date Value   11/11/2021 9.6 (H)   07/22/2021 9.3 (H)   12/16/2020 9.6 (H)   10/21/2020 7.8 (H)     LDL Cholesterol Calculated (mg/dL)   Date Value   12/16/2020 44   09/08/2017 45         How many servings of fruits and vegetables do you eat daily?  0-1    On average, how many sweetened beverages do you drink each day (Examples: soda, juice, sweet tea, etc.  Do NOT count diet or artificially sweetened beverages)?   1 4-8 oz glass of orange juice    How many days per week do you exercise enough to make your heart beat faster? Walking as much as he is able    How many minutes a day do you exercise enough to make your heart beat faster? Depends on day    How many days per week do you miss taking your medication? 0        Review of Systems   Constitutional, HEENT, cardiovascular, pulmonary, gi and gu systems are negative, except as otherwise noted.      Objective    /58   Pulse 57   Temp (!) 96.6  F (35.9  C) (Tympanic)   Resp 18   Ht 1.778 m (5' 10\")   Wt 73.7 kg (162 lb 8 oz)   SpO2 98%   BMI 23.32 kg/m    Body mass index is 23.32 kg/m .  Physical Exam   GENERAL: healthy, alert and no distress  NECK: no adenopathy, no asymmetry, masses, or scars and thyroid normal to palpation  RESP: lungs clear to auscultation - no rales, rhonchi or wheezes  CV: regular rate and rhythm, normal S1 S2, no S3 or S4, no murmur, click or rub, no peripheral edema and peripheral pulses strong  ABDOMEN: soft, nontender, no hepatosplenomegaly, no masses and bowel sounds normal  MS: no gross musculoskeletal defects noted, no edema    Results for orders placed or performed in visit on 11/11/21 (from the past 24 hour(s))   Hemoglobin A1c   Result Value Ref Range    Hemoglobin A1C 9.6 (H) 0.0 - 5.6 %               "

## 2021-11-11 NOTE — PATIENT INSTRUCTIONS
Health Maintenance reviewed and plan for update discussed.  complete cologuard     Increase Novolog to 3 units three times daily with meals    Increase Lantus to 32 units once a day    Recheck HgbA1c in 3 months.        Our Clinic hours are:  Mondays    7:20 am - 7 pm  Tues -  Fri  7:20 am - 5 pm    Clinic Phone: [phone]    The clinic lab opens at 7:30 am Mon - Fri and appointments are required.    [facility]  Ph. [phone]  Monday  8 am - 7pm  Tues - Fri 8 am - 5:30 pm

## 2021-11-17 ENCOUNTER — ALLIED HEALTH/NURSE VISIT (OUTPATIENT)
Dept: FAMILY MEDICINE | Facility: CLINIC | Age: 71
End: 2021-11-17
Payer: COMMERCIAL

## 2021-11-17 DIAGNOSIS — E11.69 TYPE 2 DIABETES MELLITUS WITH OTHER SPECIFIED COMPLICATION, WITH LONG-TERM CURRENT USE OF INSULIN (H): Primary | ICD-10-CM

## 2021-11-17 DIAGNOSIS — Z79.4 TYPE 2 DIABETES MELLITUS WITH OTHER SPECIFIED COMPLICATION, WITH LONG-TERM CURRENT USE OF INSULIN (H): Primary | ICD-10-CM

## 2021-11-17 PROCEDURE — 99207 PR NO CHARGE NURSE ONLY: CPT

## 2021-11-17 NOTE — PROGRESS NOTES
Delbert arrived to clinic with questions about [facility] formulary [facility].  His nephrologist wants to change his metformin.  [facility] the [facility] but was told they need the prescription to run through.  I checked the [facility] formulary online and the cost of jardiance is $11 for [facility].    Patient will discuss further with nephrology.    Mercy Schaffer RN

## 2021-11-29 LAB — COLOGUARD-ABSTRACT: POSITIVE

## 2021-12-08 DIAGNOSIS — R19.5 POSITIVE COLORECTAL CANCER SCREENING USING COLOGUARD TEST: Primary | ICD-10-CM

## 2021-12-09 ENCOUNTER — TELEPHONE (OUTPATIENT)
Dept: VASCULAR SURGERY | Facility: CLINIC | Age: 71
End: 2021-12-09
Payer: COMMERCIAL

## 2021-12-09 NOTE — TELEPHONE ENCOUNTER
Caller: Delbert    Provider: MD Ric Chau    Detailed reason for call: He would like to move forward with scheduling surgery as soon as possible.     Best phone number to contact: [phone]    Best time to contact: any    Ok to leave a detailed message: Yes

## 2021-12-11 DIAGNOSIS — Z11.59 ENCOUNTER FOR SCREENING FOR OTHER VIRAL DISEASES: ICD-10-CM

## 2021-12-13 NOTE — TELEPHONE ENCOUNTER
Spoke with Dr. Chau. He states he has sent 2 messages to Dr. Nation but has not had a response for cardiac clearance yet. Writer called Dr. Nation's office and asked clinical staff to notify Dr. Nation if patient can be cleared for surgery or if patient needs another appointment with him. He was last seen by Dr. Nation in Oct 2021.  Will wait for response.

## 2021-12-20 ENCOUNTER — TELEPHONE (OUTPATIENT)
Dept: CARDIOLOGY | Facility: CLINIC | Age: 71
End: 2021-12-20
Payer: COMMERCIAL

## 2021-12-20 NOTE — TELEPHONE ENCOUNTER
Dr. Bello had received call from Dr. Chau, vascular surgery wondering if patient OK to proceed with vascular surgery.    Dr. Bello responded to Dr. Chau by a staff msg stating OK to proceed.  I did inform the patient of this.  He will contact Dr. Chau.

## 2021-12-21 DIAGNOSIS — I73.9 PAD (PERIPHERAL ARTERY DISEASE) (H): Primary | ICD-10-CM

## 2021-12-21 DIAGNOSIS — I70.219 ATHEROSCLEROSIS OF ARTERY OF EXTREMITY WITH INTERMITTENT CLAUDICATION (H): ICD-10-CM

## 2021-12-21 DIAGNOSIS — I70.212 ATHEROSCLEROSIS OF NATIVE ARTERIES OF EXTREMITIES WITH INTERMITTENT CLAUDICATION, LEFT LEG (H): ICD-10-CM

## 2021-12-21 NOTE — LETTER
December 21, 2021      Delbert Tilley  [id number]    [facility] [id number]        Delbert,    Your visit to [facility] for your surgery is coming soon and we look forward to seeing you! This friendly reminder and pre-procedure checklist will help to ensure your procedure/surgery goes smoothly and meets your expectations. At [facility], our goal is to provide you with a great patient experience and to deliver genuine, professional care to every patient.     Please complete all the steps in advance of your visit. If you have any questions about the items listed below, please give our office a call. We can be reached at [phone] or visit our website at [email or web address]  for more information.     Procedure Date :  02/09/2022    Arrival Time: surgery nurse will call to notify you 1-2 days before surgery.     Covid Test: 02/07/2022    Post Operative Appointment: 03/01/2022    Admission Type: Inpatient    Surgeon: MD Ric Chau    Procedure: Bilateral ENDARTERECTOMY, FEMORAL with retrograde iliac intervention    Procedure Location: [facility]:  [address] [place] [id number] (phone: [phone], Fax: [phone])  If you take blood thinners: SEE SPECIFIC INSTRUCTIONS BELOW   *PLEASE DO NOT STOP YOUR ASPIRIN OR PLAVIX UNLESS SPECIFICALLY DIRECTED BY THE VASCULAR SURGEON TO STOP!  - In most cases Vascular surgeons want you to continue these. This is different from most NON vascular surgeries and may not be well known by your Primary Care Provider    IF YOU NEED TO RESCHEDULE OR CANCEL YOUR PROCEDURE FOR ANY REASON PLEASE CALL THE CLINIC AS SOON AS POSSIBLE -[phone].    Complete the following checklist before your procedure/surgery  []  You will need a preop physical within 30 days before surgery with your primary care provider. This exam is required by the anesthesiologist to ensure a safe  "surgical experience.    Failure  to obtain your pre-op physical will lead to cancellation of your surgery  Please contact your primary to schedule. Please ensure your Preoperative Physical is faxed to the Hospital (numbers listed above)    [] Contact your insurance regarding coverage    If you would like a [facility] [place] Estimate for your upcoming service/procedure at [facility] Location contact Cost of Care Estimates at [phone], advocates are available Monday through Friday 8am - 5pm.   You may also submit a request online at [email or web address]   - complete the secure online form found under \"Services and Procedure Pricing\"     If your procedure is at [facility] please contact the numbers below for Cost of Care Estimates.   Facility Charge: [phone]    Anesthesiology charge:  [phone]        [] Preoperative Medication Instructions    Stop Ibuprofen 1 week prior to surgery.      Contact your prescribing provider for instructions before discontinuing any medications including anticoagulants. (Examples: Coumadin, Heparin, Xarelto, Eliquis, Pradaxa, Effiient, Briliant) We would like you stop them five days before surgery HOWEVER, please follow the advice of your primary care provider. Most surgeons will have you remain on your aspirin and Plavix.    Hold Herbal Supplements and Vitamin E 7 days before    Stop Cialis, Levitra and Viagra 2-3 days before surgery                [] Fasting Requirements    Do not eat anything after 11:00 pm. If your surgery is scheduled later in the day, fasting instructions may be modified.     You may have clear liquids up to two hours before your arrival time (coffee, tea, water, or Gatorade. No cream or milk)    If your primary care provider has instructed you to continue oral medications, you may take them on the morning of surgery with a small sip of water.      No alcohol or smoking after 12:00am the day of surgery    Day Before " Surgery    [] A surgery nurse will call you 2 -3 days prior to your surgery to review your health history and provide detailed instructions. You will be given an arrival time based on your      scheduled surgery.    [] STOP Smoking and Drinking: It is important to stop smoking and drinking at least 24 hours before surgery. Smoking and drinking may cause complications in your recovery from anesthesia and may lengthen the healing process.    [] Pack for your hospital stay: If it will be required for you to stay at the hospital after surgery, bring personal items such as a robe, slippers, pajamas, additional clothing and toiletries. Don't forget:    List of medication    Eyeglass case or contact case with solution. You cannot wear contacts during surgery    Copies of your physical exam , lab results and EKG    Copy of Health Care Directives, Living Will or Power of     Insurance Cards    Photo ID    CPAP machine    [] NOTHING BY MOUTH 8 HOURS BEFORE. This includes gum, hard candy, water and mints. The only exception is if you have been instructed by your doctor to take your medications with sips of water. You may rinse your mouth or brush your teeth, but do not swallow water.        Day of Surgery    [] Medications- Take as indicated with sips of water     [] Wear comfortable loose fitting clothes. Wear your glasses-Not contacts. Do not wear jewelry and remove body piercing's. Surgery may be cancelled if they are not removed.     [] If same day surgery-Have a someone come with you to surgery that can help you understand the surgeon's instructions, drive you home and stay with you overnight the first night.   A nurse will call you at home within 72 hours following surgery to see how you are doing. Our nurses and doctors will discuss recovery with you.    [] DO NOT BRING FMLA WITH TO SURGERY.  These should be sent to the provider's office by fax to [phone]        Notify our office right away, if you have any  changes in your health status, or if you develop a cold, flu, diarrhea, infection, fever or sore throat before your scheduled surgery date. We can be reached at [phone] Monday-Friday 8 am-4:30 pm if you have any questions.   Thank you for choosing [facility]  If you have any questions or need to reschedule your appointment, please call [phone]        Before Your Procedure or Hospital Admission  Testing for COVID-19 (Coronavirus)    Thank you for choosing [facility] for your health care needs. The COVID-19 pandemic is a very challenging time for everyone. Our goal is to keep you and our team here at [facility] safe and healthy.       Before you come in, All patients must get tested for COVID-19. Your test needs to happen 2 to 4 days before you check in to the hospital or surgery site.    A clinic scheduler will call about a week in advance to set up a testing time at one of our labs. We ll take a swab of your nose or throat.    Note: If you go to a clinic or pharmacy like [facility] or [facility] for your test, make sure you get a test inside the nose. Tests inside the nose are:  - A naso-pharyngeal (NP) RT-PCR test  - An anterior nares RT-PCR test    Do NOT get a  rapid  test or a saliva (spit) test.    After the test, please stay at home and stay out of contact with other people. It will be harder for you to recover if you get COVID-19 before your treatment.    Please follow all current safety guidelines, including:    Limit trips outside your home.    Limit the number of people you see.    Always wear a mask outside your home.    Use social distancing. Stay 6 feet away from others whenever you can.    Wash your hands often.    If your test shows you have COVID-19  If your test is positive, we ll let you know. A positive test means that you have the virus.  We ll probably have to postpone your admission, surgery or procedure. Your doctor will discuss this with you. After that, we ll  let you know what to do and when you can re-schedule.  We may need to cancel your treatment on short notice for other reasons, too.    If your test shows you DON T have COVID-19  Even if your test is negative, you can still get COVID-19. It s rare but, sometimes, the test result is wrong. You could also catch the virus after taking the test.  There s a very small chance that you could catch COVID-19 in the hospital or surgery center.    Day of your surgery or procedure    Please come wearing a face covering that covers both your nose and mouth.    When you arrive, we ll ask you some questions to find out if you have any signs or symptoms of COVID-19.    Ask your care team if you can have visitors. All visitors must wear face coverings and will be screened for signs of COVID-19.    Even if no visitors are allowed, you can still have with you:  - Your legal guardian or legal decision maker  - A parent and one other visitor, if you are  younger than 18 years old  - A partner and a , if you are in labor    We might need to teach you about taking care of yourself after surgery. If so, a visitor can come into the hospital to learn about it, too.    The rules for visitors change often, depending on how much the virus is spreading. To learn more, see Visiting a Loved One in the Hospital during the COVID-19 Outbreak. Please call your care team, hospital or surgery center if you have any questions. We thank you for your understanding and for choosing [facility] for your care.    Questions and Answers  Does it matter where I get tested for COVID-19?  Yes. We urge you to get tested at one of our [facility] COVID-19 testing sites. We process these tests in our lab and can get the results quickly. Your [facility] care team needs to get your results before you check in.    What should I do if I can t get tested at [facility]?  You can get tested somewhere else, but you ll need to take these extra  steps:  1. Contact your family doctor or clinic to arrange your test.  2. Take the test within 4 days of your surgery or procedure. We can t accept tests older than 4 days.  3. Make sure you re getting a test inside the nose.  Tests inside the nose are:  - A naso-pharyngeal (NP) RT-PCR test  - An anterior nares RT-PCR test  -Many pharmacies use  rapid  tests or saliva (spit) tests. We do NOT accept those tests before surgery or procedures. Tests from inside the nose are the most accurate tests.  4. Make sure your doctor or clinic faxes your results to [facility] at [phone].    If we don t get your results in time, we may have to  delay or cancel your treatment.      For informational purposes only. Not to replace the advice of your health care provider. Copyright   2020 [employer]. All rights reserved. Clinically reviewed by In[facility] and the [facility] COVID-19 Clinical Team.  [employer] 565652 - Rev 09/09/21.         Understanding Femoral Endarterectomy     The femoral artery is a large blood vessel in the groin area. You have two femoral arteries, one on each side of the body. Each carries blood into one of the legs. Femoral endarterectomy is a procedure to clear a blockage from the femoral artery.  How to say it  [id number]-[id number]-EK-ca-larry  Why a femoral endarterectomy is done  Arteries throughout the body can become blocked by fatty deposits called plaque. Plaque in a femoral artery can slow the flow of blood to your legs. The result may be aching pain during walking. If blood flow is completely blocked, tissue can die. This can be very serious. Endarterectomy clears plaque from the artery so blood can flow freely again.  How a femoral endarterectomy is done    An IV (intravenous) line will be put into a vein in your arm or hand. This allows fluids and medicines to be given.    You will be given medicine so you don t feel pain. You will likely get general anesthesia. This  puts you into a state like deep sleep during the procedure.    The surgeon makes a cut (incision) in the skin over the site of the blocked artery. He or she exposes the artery.    The surgeon puts clamps on the artery above and below the blockage. This temporarily stops blood flow. He or she then makes an incision in the artery itself.    The surgeon removes plaque from the artery.    The surgeon closes the artery incision using stitches or a patch. He or she then removes the clamps from the artery.    The skin incision is closed. A tube or drain may be put into the incision to drain fluids from area for a time after the surgery.    Risks of a femoral endarterectomy  Possible risks include:    Bleeding    Infection    Blood clots    Plaque coming back, needing another surgery    Unable to remove the plaque    More disease, needing a larger operation    Plaque breaking free and flowing to other parts of the leg, causing other blockages, tissue death, and loss of limb  Kathy last reviewed this educational content on 12/1/2019 2000-2021 [employer]. All rights reserved. This information is not intended as a substitute for professional medical care. Always follow your healthcare professional's instructions.    Post Procedure Care     Your incision site may have some bruising and minor swelling.     You will have a dressing covering your incision site. You may remove the dressing after 24 hours and shower. You can let the warm soapy water run over it and be sure to dry the incision site well, and keep it dry. DO NOT IMMERSE THE INCISION IN A TUB/POOL/etc. UNTIL HEALED.  Restrictions    Limit your activity for the first 48 hours. You may walk and go up and down steps.     No driving for 2 weeks or while on narcotics.   Follow-Up    If a follow up appointment was not scheduled prior to your procedure please call [phone] if you are not contacted within 3 business days following your procedure to  schedule one. Follow up appointments are scheduled with either your Physician or Nurse Practitioner.     Risks and Possible Complications    Excessive Swelling - After blood flow is improved you may notice increased swelling in the lower legs - a normal response. This usually depends on the amount of blockages in the leg, how long they have been there prior to your procedure, and how much blood flow was restored. Elevating your legs will help to improve this. Please notify our office if the swelling does not go away after lying in bed overnight.    Infection/ Drainage/Bleeding - Drainage or bleeding from the incision site should be minimal. If you have excessive bleeding or drainage call our office right away.     Pain - You may experience pain or soreness at your incision site. If your pain does not improve, please contact our office.

## 2021-12-21 NOTE — PROGRESS NOTES
Surgery Scheduled      Surgery/Procedure: Bilateral ENDARTERECTOMY, FEMORAL with retrograde iliac intervention    Special Equipment: C-arm    Location: [facility]    Date: 02/09/2022    Time: 1:00PM     Inpatient    Surgeon: Dr. Chau    OR Confirmed/ :  Yes with Lisa on 12/21/21    Orders In:  Yes    Entered on [employer] / [email or web address] Calendar:  Yes    Post Op: 3/1/22    Covid Scheduled: 2/7/22    Wound Vac Needed: TBD    Home Care Needed: TBD    Blood Thinners Addressed:  Yes- confirmed ok to stay on Aspirin.         Called and spoke to patient. Went over prep instructions. Patient understands to get pre-op physical within 30 days and covid test within 4 days of surgery date. He understands that if hospital is at full capacity or due to Covid restrictions, his surgery may be postponed. Instructions and appointments sent via [facility] per patient request.

## 2022-01-01 ENCOUNTER — CARE COORDINATION (OUTPATIENT)
Dept: CARDIOLOGY | Facility: CLINIC | Age: 72
End: 2022-01-01

## 2022-01-01 ENCOUNTER — APPOINTMENT (OUTPATIENT)
Dept: GENERAL RADIOLOGY | Facility: CLINIC | Age: 72
End: 2022-01-01
Attending: FAMILY MEDICINE
Payer: COMMERCIAL

## 2022-01-01 ENCOUNTER — APPOINTMENT (OUTPATIENT)
Dept: GENERAL RADIOLOGY | Facility: CLINIC | Age: 72
DRG: 291 | End: 2022-01-01
Attending: INTERNAL MEDICINE
Payer: COMMERCIAL

## 2022-01-01 ENCOUNTER — OFFICE VISIT (OUTPATIENT)
Dept: CARDIOLOGY | Facility: CLINIC | Age: 72
End: 2022-01-01
Payer: COMMERCIAL

## 2022-01-01 ENCOUNTER — ANCILLARY PROCEDURE (OUTPATIENT)
Dept: CARDIOLOGY | Facility: CLINIC | Age: 72
End: 2022-01-01
Attending: INTERNAL MEDICINE
Payer: COMMERCIAL

## 2022-01-01 ENCOUNTER — APPOINTMENT (OUTPATIENT)
Dept: SPEECH THERAPY | Facility: CLINIC | Age: 72
DRG: 291 | End: 2022-01-01
Attending: INTERNAL MEDICINE
Payer: COMMERCIAL

## 2022-01-01 ENCOUNTER — TRANSFERRED RECORDS (OUTPATIENT)
Dept: MULTI SPECIALTY CLINIC | Facility: CLINIC | Age: 72
End: 2022-01-01

## 2022-01-01 ENCOUNTER — MYC MEDICAL ADVICE (OUTPATIENT)
Dept: FAMILY MEDICINE | Facility: CLINIC | Age: 72
End: 2022-01-01

## 2022-01-01 ENCOUNTER — HOSPITAL ENCOUNTER (INPATIENT)
Facility: CLINIC | Age: 72
LOS: 3 days | Discharge: LEFT BEFORE DISCHARGE | DRG: 291 | End: 2022-06-23
Attending: INTERNAL MEDICINE | Admitting: INTERNAL MEDICINE
Payer: COMMERCIAL

## 2022-01-01 ENCOUNTER — TELEPHONE (OUTPATIENT)
Dept: CARDIOLOGY | Facility: CLINIC | Age: 72
End: 2022-01-01

## 2022-01-01 ENCOUNTER — PATIENT OUTREACH (OUTPATIENT)
Dept: CARE COORDINATION | Facility: CLINIC | Age: 72
End: 2022-01-01

## 2022-01-01 ENCOUNTER — OFFICE VISIT (OUTPATIENT)
Dept: CARDIOLOGY | Facility: CLINIC | Age: 72
End: 2022-01-01
Attending: PHYSICIAN ASSISTANT
Payer: COMMERCIAL

## 2022-01-01 ENCOUNTER — LAB (OUTPATIENT)
Dept: LAB | Facility: CLINIC | Age: 72
End: 2022-01-01
Payer: COMMERCIAL

## 2022-01-01 ENCOUNTER — APPOINTMENT (OUTPATIENT)
Dept: OCCUPATIONAL THERAPY | Facility: CLINIC | Age: 72
DRG: 291 | End: 2022-01-01
Attending: INTERNAL MEDICINE
Payer: COMMERCIAL

## 2022-01-01 ENCOUNTER — MYC MEDICAL ADVICE (OUTPATIENT)
Dept: CARDIOLOGY | Facility: CLINIC | Age: 72
End: 2022-01-01

## 2022-01-01 ENCOUNTER — TRANSFERRED RECORDS (OUTPATIENT)
Dept: FAMILY MEDICINE | Facility: CLINIC | Age: 72
End: 2022-01-01

## 2022-01-01 ENCOUNTER — OFFICE VISIT (OUTPATIENT)
Dept: CARDIOLOGY | Facility: CLINIC | Age: 72
End: 2022-01-01
Attending: PHYSICIAN ASSISTANT

## 2022-01-01 ENCOUNTER — OFFICE VISIT (OUTPATIENT)
Dept: CARDIOLOGY | Facility: CLINIC | Age: 72
End: 2022-01-01
Attending: NURSE PRACTITIONER
Payer: COMMERCIAL

## 2022-01-01 ENCOUNTER — HOSPITAL ENCOUNTER (EMERGENCY)
Facility: CLINIC | Age: 72
Discharge: ANOTHER HOSPITAL | End: 2022-06-20
Attending: FAMILY MEDICINE | Admitting: FAMILY MEDICINE
Payer: COMMERCIAL

## 2022-01-01 ENCOUNTER — TELEPHONE (OUTPATIENT)
Dept: VASCULAR SURGERY | Facility: CLINIC | Age: 72
End: 2022-01-01

## 2022-01-01 ENCOUNTER — OFFICE VISIT (OUTPATIENT)
Dept: FAMILY MEDICINE | Facility: CLINIC | Age: 72
End: 2022-01-01
Payer: COMMERCIAL

## 2022-01-01 ENCOUNTER — APPOINTMENT (OUTPATIENT)
Dept: CARDIOLOGY | Facility: CLINIC | Age: 72
DRG: 291 | End: 2022-01-01
Attending: INTERNAL MEDICINE
Payer: COMMERCIAL

## 2022-01-01 VITALS
OXYGEN SATURATION: 99 % | BODY MASS INDEX: 21.42 KG/M2 | HEART RATE: 77 BPM | DIASTOLIC BLOOD PRESSURE: 54 MMHG | SYSTOLIC BLOOD PRESSURE: 100 MMHG | HEIGHT: 71 IN | WEIGHT: 153 LBS

## 2022-01-01 VITALS
WEIGHT: 153 LBS | SYSTOLIC BLOOD PRESSURE: 96 MMHG | OXYGEN SATURATION: 96 % | HEART RATE: 66 BPM | BODY MASS INDEX: 20.75 KG/M2 | DIASTOLIC BLOOD PRESSURE: 48 MMHG | RESPIRATION RATE: 16 BRPM

## 2022-01-01 VITALS
HEART RATE: 64 BPM | OXYGEN SATURATION: 97 % | TEMPERATURE: 98 F | WEIGHT: 152.8 LBS | BODY MASS INDEX: 20.72 KG/M2 | DIASTOLIC BLOOD PRESSURE: 58 MMHG | SYSTOLIC BLOOD PRESSURE: 103 MMHG

## 2022-01-01 VITALS
BODY MASS INDEX: 21.94 KG/M2 | DIASTOLIC BLOOD PRESSURE: 73 MMHG | HEART RATE: 75 BPM | WEIGHT: 162 LBS | OXYGEN SATURATION: 91 % | TEMPERATURE: 97.3 F | RESPIRATION RATE: 20 BRPM | SYSTOLIC BLOOD PRESSURE: 121 MMHG | HEIGHT: 72 IN

## 2022-01-01 VITALS
DIASTOLIC BLOOD PRESSURE: 58 MMHG | WEIGHT: 153.2 LBS | TEMPERATURE: 97.7 F | HEART RATE: 62 BPM | SYSTOLIC BLOOD PRESSURE: 116 MMHG | BODY MASS INDEX: 20.78 KG/M2 | OXYGEN SATURATION: 96 %

## 2022-01-01 VITALS
DIASTOLIC BLOOD PRESSURE: 45 MMHG | WEIGHT: 153.6 LBS | RESPIRATION RATE: 12 BRPM | BODY MASS INDEX: 20.83 KG/M2 | HEART RATE: 66 BPM | OXYGEN SATURATION: 98 % | SYSTOLIC BLOOD PRESSURE: 95 MMHG

## 2022-01-01 VITALS
HEART RATE: 68 BPM | OXYGEN SATURATION: 97 % | SYSTOLIC BLOOD PRESSURE: 107 MMHG | RESPIRATION RATE: 24 BRPM | BODY MASS INDEX: 20.89 KG/M2 | WEIGHT: 154 LBS | DIASTOLIC BLOOD PRESSURE: 59 MMHG

## 2022-01-01 VITALS
BODY MASS INDEX: 20.83 KG/M2 | HEART RATE: 58 BPM | DIASTOLIC BLOOD PRESSURE: 56 MMHG | OXYGEN SATURATION: 100 % | RESPIRATION RATE: 20 BRPM | WEIGHT: 153.6 LBS | SYSTOLIC BLOOD PRESSURE: 102 MMHG

## 2022-01-01 VITALS
OXYGEN SATURATION: 98 % | TEMPERATURE: 98.3 F | WEIGHT: 163.14 LBS | HEART RATE: 78 BPM | BODY MASS INDEX: 22.13 KG/M2 | SYSTOLIC BLOOD PRESSURE: 112 MMHG | DIASTOLIC BLOOD PRESSURE: 71 MMHG | RESPIRATION RATE: 19 BRPM

## 2022-01-01 VITALS
WEIGHT: 154 LBS | BODY MASS INDEX: 20.86 KG/M2 | OXYGEN SATURATION: 96 % | HEART RATE: 66 BPM | RESPIRATION RATE: 22 BRPM | TEMPERATURE: 98 F | SYSTOLIC BLOOD PRESSURE: 88 MMHG | DIASTOLIC BLOOD PRESSURE: 52 MMHG | HEIGHT: 72 IN

## 2022-01-01 DIAGNOSIS — I25.5 ISCHEMIC CARDIOMYOPATHY: ICD-10-CM

## 2022-01-01 DIAGNOSIS — Z71.89 OTHER SPECIFIED COUNSELING: ICD-10-CM

## 2022-01-01 DIAGNOSIS — I50.23 ACUTE ON CHRONIC SYSTOLIC CONGESTIVE HEART FAILURE (H): ICD-10-CM

## 2022-01-01 DIAGNOSIS — R57.9 SHOCK CIRCULATORY (H): Primary | ICD-10-CM

## 2022-01-01 DIAGNOSIS — I50.84 END STAGE CONGESTIVE HEART FAILURE (H): ICD-10-CM

## 2022-01-01 DIAGNOSIS — I42.9 CARDIOMYOPATHY, UNSPECIFIED TYPE (H): ICD-10-CM

## 2022-01-01 DIAGNOSIS — E21.3 HYPERPARATHYROIDISM (H): ICD-10-CM

## 2022-01-01 DIAGNOSIS — Z95.810 ICD (IMPLANTABLE CARDIOVERTER-DEFIBRILLATOR) IN PLACE: ICD-10-CM

## 2022-01-01 DIAGNOSIS — M25.552 HIP PAIN, LEFT: ICD-10-CM

## 2022-01-01 DIAGNOSIS — K86.0 ALCOHOL-INDUCED CHRONIC PANCREATITIS (H): ICD-10-CM

## 2022-01-01 DIAGNOSIS — I50.22 CHRONIC SYSTOLIC HEART FAILURE (H): ICD-10-CM

## 2022-01-01 DIAGNOSIS — I95.9 HYPOTENSION, UNSPECIFIED HYPOTENSION TYPE: ICD-10-CM

## 2022-01-01 DIAGNOSIS — I50.84 END STAGE CONGESTIVE HEART FAILURE (H): Primary | ICD-10-CM

## 2022-01-01 DIAGNOSIS — I42.9 CARDIOMYOPATHY (H): ICD-10-CM

## 2022-01-01 DIAGNOSIS — Z95.810 CARDIAC DEFIBRILLATOR IN PLACE: ICD-10-CM

## 2022-01-01 DIAGNOSIS — R79.89 ELEVATED TROPONIN: ICD-10-CM

## 2022-01-01 DIAGNOSIS — I50.22 CHRONIC SYSTOLIC HEART FAILURE (H): Primary | ICD-10-CM

## 2022-01-01 DIAGNOSIS — S70.02XA CONTUSION OF LEFT HIP AND THIGH, INITIAL ENCOUNTER: ICD-10-CM

## 2022-01-01 DIAGNOSIS — I25.5 ISCHEMIC CARDIOMYOPATHY: Primary | ICD-10-CM

## 2022-01-01 DIAGNOSIS — D69.6 THROMBOCYTOPENIA (H): ICD-10-CM

## 2022-01-01 DIAGNOSIS — I13.0 CARDIORENAL SYNDROME WITH RENAL FAILURE, STAGE 1-4 OR UNSPECIFIED CHRONIC KIDNEY DISEASE, WITH HEART FAILURE (H): ICD-10-CM

## 2022-01-01 DIAGNOSIS — I25.810 CORONARY ARTERY DISEASE INVOLVING CORONARY BYPASS GRAFT OF NATIVE HEART WITHOUT ANGINA PECTORIS: ICD-10-CM

## 2022-01-01 DIAGNOSIS — N18.32 CHRONIC KIDNEY DISEASE (CKD) STAGE G3B/A1, MODERATELY DECREASED GLOMERULAR FILTRATION RATE (GFR) BETWEEN 30-44 ML/MIN/1.73 SQUARE METER AND ALBUMINURIA CREATININE RATIO LESS THAN 30 MG/G (H): ICD-10-CM

## 2022-01-01 DIAGNOSIS — I50.9 CONGESTIVE HEART FAILURE, UNSPECIFIED HF CHRONICITY, UNSPECIFIED HEART FAILURE TYPE (H): ICD-10-CM

## 2022-01-01 DIAGNOSIS — I50.9 ACUTE ON CHRONIC CONGESTIVE HEART FAILURE, UNSPECIFIED HEART FAILURE TYPE (H): ICD-10-CM

## 2022-01-01 DIAGNOSIS — S70.12XA CONTUSION OF LEFT HIP AND THIGH, INITIAL ENCOUNTER: ICD-10-CM

## 2022-01-01 DIAGNOSIS — N18.32 CHRONIC KIDNEY DISEASE (CKD) STAGE G3B/A1, MODERATELY DECREASED GLOMERULAR FILTRATION RATE (GFR) BETWEEN 30-44 ML/MIN/1.73 SQUARE METER AND ALBUMINURIA CREATININE RATIO LESS THAN 30 MG/G (H): Primary | ICD-10-CM

## 2022-01-01 DIAGNOSIS — I73.9 PAD (PERIPHERAL ARTERY DISEASE) (H): ICD-10-CM
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
## 2022-01-01 LAB
ALBUMIN SERPL BCG-MCNC: 3.1 G/DL (ref 3.5–5.2)
ALBUMIN SERPL BCG-MCNC: 3.2 G/DL (ref 3.5–5.2)
ALBUMIN SERPL BCG-MCNC: 3.3 G/DL (ref 3.5–5.2)
ALBUMIN SERPL BCG-MCNC: 3.3 G/DL (ref 3.5–5.2)
ALBUMIN SERPL BCG-MCNC: 3.4 G/DL (ref 3.5–5.2)
ALBUMIN SERPL BCG-MCNC: 3.4 G/DL (ref 3.5–5.2)
ALBUMIN SERPL-MCNC: 2.6 G/DL (ref 3.4–5)
ALBUMIN SERPL-MCNC: 2.7 G/DL (ref 3.4–5)
ALBUMIN SERPL-MCNC: 2.9 G/DL (ref 3.4–5)
ALBUMIN SERPL-MCNC: 3 G/DL (ref 3.4–5)
ALBUMIN SERPL-MCNC: 3.1 G/DL (ref 3.4–5)
ALBUMIN SERPL-MCNC: 3.1 G/DL (ref 3.4–5)
ALBUMIN SERPL-MCNC: 3.3 G/DL (ref 3.4–5)
ALP SERPL-CCNC: 126 U/L (ref 40–129)
ALP SERPL-CCNC: 132 U/L (ref 40–129)
ALP SERPL-CCNC: 138 U/L (ref 40–150)
ALP SERPL-CCNC: 142 U/L (ref 40–129)
ALP SERPL-CCNC: 143 U/L (ref 40–150)
ALP SERPL-CCNC: 144 U/L (ref 40–150)
ALP SERPL-CCNC: 149 U/L (ref 40–129)
ALP SERPL-CCNC: 149 U/L (ref 40–150)
ALP SERPL-CCNC: 156 U/L (ref 40–129)
ALP SERPL-CCNC: 156 U/L (ref 40–129)
ALT SERPL W P-5'-P-CCNC: 20 U/L (ref 10–50)
ALT SERPL W P-5'-P-CCNC: 22 U/L (ref 10–50)
ALT SERPL W P-5'-P-CCNC: 24 U/L (ref 10–50)
ALT SERPL W P-5'-P-CCNC: 24 U/L (ref 10–50)
ALT SERPL W P-5'-P-CCNC: 26 U/L (ref 0–70)
ALT SERPL W P-5'-P-CCNC: 27 U/L (ref 0–70)
ALT SERPL W P-5'-P-CCNC: 27 U/L (ref 0–70)
ALT SERPL W P-5'-P-CCNC: 27 U/L (ref 10–50)
ALT SERPL W P-5'-P-CCNC: 29 U/L (ref 0–70)
ALT SERPL W P-5'-P-CCNC: 31 U/L (ref 0–70)
ALT SERPL W P-5'-P-CCNC: 31 U/L (ref 0–70)
ALT SERPL W P-5'-P-CCNC: 31 U/L (ref 10–50)
ANION GAP SERPL CALCULATED.3IONS-SCNC: 1 MMOL/L (ref 3–14)
ANION GAP SERPL CALCULATED.3IONS-SCNC: 1 MMOL/L (ref 3–14)
ANION GAP SERPL CALCULATED.3IONS-SCNC: 10 MMOL/L (ref 3–14)
ANION GAP SERPL CALCULATED.3IONS-SCNC: 10 MMOL/L (ref 7–15)
ANION GAP SERPL CALCULATED.3IONS-SCNC: 10 MMOL/L (ref 7–15)
ANION GAP SERPL CALCULATED.3IONS-SCNC: 11 MMOL/L (ref 3–14)
ANION GAP SERPL CALCULATED.3IONS-SCNC: 11 MMOL/L (ref 3–14)
ANION GAP SERPL CALCULATED.3IONS-SCNC: 11 MMOL/L (ref 7–15)
ANION GAP SERPL CALCULATED.3IONS-SCNC: 11 MMOL/L (ref 7–15)
ANION GAP SERPL CALCULATED.3IONS-SCNC: 3 MMOL/L (ref 3–14)
ANION GAP SERPL CALCULATED.3IONS-SCNC: 3 MMOL/L (ref 7–15)
ANION GAP SERPL CALCULATED.3IONS-SCNC: 4 MMOL/L (ref 3–14)
ANION GAP SERPL CALCULATED.3IONS-SCNC: 7 MMOL/L (ref 3–14)
ANION GAP SERPL CALCULATED.3IONS-SCNC: 7 MMOL/L (ref 7–15)
ANION GAP SERPL CALCULATED.3IONS-SCNC: 8 MMOL/L (ref 3–14)
ANION GAP SERPL CALCULATED.3IONS-SCNC: 8 MMOL/L (ref 7–15)
ANION GAP SERPL CALCULATED.3IONS-SCNC: 8 MMOL/L (ref 7–15)
ANION GAP SERPL CALCULATED.3IONS-SCNC: 9 MMOL/L (ref 3–14)
ANION GAP SERPL CALCULATED.3IONS-SCNC: 9 MMOL/L (ref 7–15)
ANION GAP SERPL CALCULATED.3IONS-SCNC: 9 MMOL/L (ref 7–15)
AST SERPL W P-5'-P-CCNC: 28 U/L (ref 10–50)
AST SERPL W P-5'-P-CCNC: 32 U/L (ref 0–45)
AST SERPL W P-5'-P-CCNC: 33 U/L (ref 0–45)
AST SERPL W P-5'-P-CCNC: 34 U/L (ref 10–50)
AST SERPL W P-5'-P-CCNC: 36 U/L (ref 10–50)
AST SERPL W P-5'-P-CCNC: 37 U/L (ref 0–45)
AST SERPL W P-5'-P-CCNC: 37 U/L (ref 10–50)
AST SERPL W P-5'-P-CCNC: 38 U/L (ref 0–45)
AST SERPL W P-5'-P-CCNC: 42 U/L (ref 10–50)
AST SERPL W P-5'-P-CCNC: 46 U/L (ref 10–50)
ATRIAL RATE - MUSE: 78 BPM
BACTERIA BLD CULT: NO GROWTH
BACTERIA SPEC CULT: NORMAL
BACTERIA UR CULT: NO GROWTH
BASE EXCESS BLDV CALC-SCNC: 1 MMOL/L (ref -7.7–1.9)
BASE EXCESS BLDV CALC-SCNC: 3.1 MMOL/L (ref -7.7–1.9)
BASE EXCESS BLDV CALC-SCNC: 3.7 MMOL/L (ref -7.7–1.9)
BASE EXCESS BLDV CALC-SCNC: 3.9 MMOL/L (ref -7.7–1.9)
BASE EXCESS BLDV CALC-SCNC: 4.7 MMOL/L (ref -7.7–1.9)
BASE EXCESS BLDV CALC-SCNC: 5 MMOL/L (ref -7.7–1.9)
BASE EXCESS BLDV CALC-SCNC: 5.1 MMOL/L (ref -7.7–1.9)
BASE EXCESS BLDV CALC-SCNC: 5.1 MMOL/L (ref -7.7–1.9)
BASE EXCESS BLDV CALC-SCNC: 5.4 MMOL/L (ref -7.7–1.9)
BASOPHILS # BLD AUTO: 0 10E3/UL (ref 0–0.2)
BASOPHILS NFR BLD AUTO: 0 %
BASOPHILS NFR BLD AUTO: 1 %
BASOPHILS NFR BLD AUTO: 1 %
BILIRUB DIRECT SERPL-MCNC: 0.2 MG/DL (ref 0–0.2)
BILIRUB DIRECT SERPL-MCNC: 0.2 MG/DL (ref 0–0.2)
BILIRUB DIRECT SERPL-MCNC: 0.21 MG/DL (ref 0–0.3)
BILIRUB DIRECT SERPL-MCNC: 0.21 MG/DL (ref 0–0.3)
BILIRUB DIRECT SERPL-MCNC: 0.23 MG/DL (ref 0–0.3)
BILIRUB DIRECT SERPL-MCNC: 0.3 MG/DL (ref 0–0.2)
BILIRUB DIRECT SERPL-MCNC: <0.2 MG/DL (ref 0–0.3)
BILIRUB SERPL-MCNC: 0.4 MG/DL
BILIRUB SERPL-MCNC: 0.4 MG/DL
BILIRUB SERPL-MCNC: 0.5 MG/DL
BILIRUB SERPL-MCNC: 0.5 MG/DL
BILIRUB SERPL-MCNC: 0.6 MG/DL
BILIRUB SERPL-MCNC: 0.6 MG/DL (ref 0.2–1.3)
BILIRUB SERPL-MCNC: 0.7 MG/DL
BILIRUB SERPL-MCNC: 0.7 MG/DL (ref 0.2–1.3)
BILIRUB SERPL-MCNC: 1 MG/DL (ref 0.2–1.3)
BILIRUB SERPL-MCNC: 1 MG/DL (ref 0.2–1.3)
BUN SERPL-MCNC: 39.3 MG/DL (ref 8–23)
BUN SERPL-MCNC: 42.3 MG/DL (ref 8–23)
BUN SERPL-MCNC: 43.5 MG/DL (ref 8–23)
BUN SERPL-MCNC: 44.6 MG/DL (ref 8–23)
BUN SERPL-MCNC: 47.3 MG/DL (ref 8–23)
BUN SERPL-MCNC: 47.9 MG/DL (ref 8–23)
BUN SERPL-MCNC: 48.2 MG/DL (ref 8–23)
BUN SERPL-MCNC: 49.1 MG/DL (ref 8–23)
BUN SERPL-MCNC: 50 MG/DL (ref 7–30)
BUN SERPL-MCNC: 50 MG/DL (ref 7–30)
BUN SERPL-MCNC: 54 MG/DL (ref 7–30)
BUN SERPL-MCNC: 55 MG/DL (ref 7–30)
BUN SERPL-MCNC: 61.3 MG/DL (ref 8–23)
BUN SERPL-MCNC: 62.6 MG/DL (ref 8–23)
BUN SERPL-MCNC: 63 MG/DL (ref 7–30)
BUN SERPL-MCNC: 64 MG/DL (ref 7–30)
BUN SERPL-MCNC: 65 MG/DL (ref 7–30)
BUN SERPL-MCNC: 65 MG/DL (ref 7–30)
BUN SERPL-MCNC: 67 MG/DL (ref 7–30)
BUN SERPL-MCNC: 68 MG/DL (ref 7–30)
CALCIUM SERPL-MCNC: 6.2 MG/DL (ref 8.5–10.1)
CALCIUM SERPL-MCNC: 6.3 MG/DL (ref 8.5–10.1)
CALCIUM SERPL-MCNC: 6.4 MG/DL (ref 8.5–10.1)
CALCIUM SERPL-MCNC: 6.6 MG/DL (ref 8.5–10.1)
CALCIUM SERPL-MCNC: 6.9 MG/DL (ref 8.8–10.2)
CALCIUM SERPL-MCNC: 7 MG/DL (ref 8.5–10.1)
CALCIUM SERPL-MCNC: 7 MG/DL (ref 8.5–10.1)
CALCIUM SERPL-MCNC: 7.2 MG/DL (ref 8.8–10.2)
CALCIUM SERPL-MCNC: 7.9 MG/DL (ref 8.8–10.2)
CALCIUM SERPL-MCNC: 8.3 MG/DL (ref 8.5–10.1)
CALCIUM SERPL-MCNC: 8.5 MG/DL (ref 8.5–10.1)
CALCIUM SERPL-MCNC: 8.5 MG/DL (ref 8.8–10.2)
CALCIUM SERPL-MCNC: 8.6 MG/DL (ref 8.8–10.2)
CALCIUM SERPL-MCNC: 8.6 MG/DL (ref 8.8–10.2)
CALCIUM SERPL-MCNC: 9 MG/DL (ref 8.8–10.2)
CALCIUM SERPL-MCNC: 9.1 MG/DL (ref 8.8–10.2)
CALCIUM SERPL-MCNC: 9.1 MG/DL (ref 8.8–10.2)
CALCIUM SERPL-MCNC: 9.3 MG/DL (ref 8.5–10.1)
CALCIUM SERPL-MCNC: 9.3 MG/DL (ref 8.5–10.1)
CALCIUM SERPL-MCNC: 9.5 MG/DL (ref 8.8–10.2)
CHLORIDE BLD-SCNC: 100 MMOL/L (ref 94–109)
CHLORIDE BLD-SCNC: 101 MMOL/L (ref 94–109)
CHLORIDE BLD-SCNC: 103 MMOL/L (ref 94–109)
CHLORIDE BLD-SCNC: 104 MMOL/L (ref 94–109)
CHLORIDE BLD-SCNC: 98 MMOL/L (ref 94–109)
CHLORIDE SERPL-SCNC: 101 MMOL/L (ref 98–107)
CHLORIDE SERPL-SCNC: 101 MMOL/L (ref 98–107)
CHLORIDE SERPL-SCNC: 102 MMOL/L (ref 98–107)
CHLORIDE SERPL-SCNC: 103 MMOL/L (ref 98–107)
CHLORIDE SERPL-SCNC: 107 MMOL/L (ref 98–107)
CHLORIDE SERPL-SCNC: 95 MMOL/L (ref 98–107)
CHLORIDE SERPL-SCNC: 96 MMOL/L (ref 98–107)
CHLORIDE SERPL-SCNC: 97 MMOL/L (ref 98–107)
CHOLEST SERPL-MCNC: 66 MG/DL
CO2 SERPL-SCNC: 22 MMOL/L (ref 20–32)
CO2 SERPL-SCNC: 24 MMOL/L (ref 20–32)
CO2 SERPL-SCNC: 26 MMOL/L (ref 20–32)
CO2 SERPL-SCNC: 28 MMOL/L (ref 20–32)
CO2 SERPL-SCNC: 29 MMOL/L (ref 20–32)
CO2 SERPL-SCNC: 31 MMOL/L (ref 20–32)
CREAT SERPL-MCNC: 1.14 MG/DL (ref 0.67–1.17)
CREAT SERPL-MCNC: 1.24 MG/DL (ref 0.67–1.17)
CREAT SERPL-MCNC: 1.25 MG/DL (ref 0.67–1.17)
CREAT SERPL-MCNC: 1.33 MG/DL (ref 0.66–1.25)
CREAT SERPL-MCNC: 1.41 MG/DL (ref 0.67–1.17)
CREAT SERPL-MCNC: 1.47 MG/DL (ref 0.67–1.17)
CREAT SERPL-MCNC: 1.5 MG/DL (ref 0.66–1.25)
CREAT SERPL-MCNC: 1.56 MG/DL (ref 0.67–1.17)
CREAT SERPL-MCNC: 1.57 MG/DL (ref 0.67–1.17)
CREAT SERPL-MCNC: 1.6 MG/DL (ref 0.67–1.17)
CREAT SERPL-MCNC: 1.72 MG/DL (ref 0.67–1.17)
CREAT SERPL-MCNC: 1.73 MG/DL (ref 0.67–1.17)
CREAT SERPL-MCNC: 1.76 MG/DL (ref 0.66–1.25)
CREAT SERPL-MCNC: 1.76 MG/DL (ref 0.66–1.25)
CREAT SERPL-MCNC: 1.81 MG/DL (ref 0.66–1.25)
CREAT SERPL-MCNC: 1.83 MG/DL (ref 0.66–1.25)
CREAT SERPL-MCNC: 1.85 MG/DL (ref 0.66–1.25)
CREAT SERPL-MCNC: 1.91 MG/DL (ref 0.66–1.25)
CREAT SERPL-MCNC: 2.01 MG/DL (ref 0.66–1.25)
CREAT SERPL-MCNC: 2.07 MG/DL (ref 0.66–1.25)
CRP SERPL-MCNC: 57.2 MG/L (ref 0–8)
DEPRECATED HCO3 PLAS-SCNC: 25 MMOL/L (ref 22–29)
DEPRECATED HCO3 PLAS-SCNC: 25 MMOL/L (ref 22–29)
DEPRECATED HCO3 PLAS-SCNC: 27 MMOL/L (ref 22–29)
DEPRECATED HCO3 PLAS-SCNC: 27 MMOL/L (ref 22–29)
DEPRECATED HCO3 PLAS-SCNC: 28 MMOL/L (ref 22–29)
DEPRECATED HCO3 PLAS-SCNC: 30 MMOL/L (ref 22–29)
DIASTOLIC BLOOD PRESSURE - MUSE: NORMAL MMHG
EOSINOPHIL # BLD AUTO: 0 10E3/UL (ref 0–0.7)
EOSINOPHIL # BLD AUTO: 0 10E3/UL (ref 0–0.7)
EOSINOPHIL # BLD AUTO: 0.1 10E3/UL (ref 0–0.7)
EOSINOPHIL # BLD AUTO: 0.1 10E3/UL (ref 0–0.7)
EOSINOPHIL # BLD AUTO: 0.2 10E3/UL (ref 0–0.7)
EOSINOPHIL NFR BLD AUTO: 0 %
EOSINOPHIL NFR BLD AUTO: 0 %
EOSINOPHIL NFR BLD AUTO: 1 %
EOSINOPHIL NFR BLD AUTO: 1 %
EOSINOPHIL NFR BLD AUTO: 2 %
ERYTHROCYTE [DISTWIDTH] IN BLOOD BY AUTOMATED COUNT: 18 % (ref 10–15)
ERYTHROCYTE [DISTWIDTH] IN BLOOD BY AUTOMATED COUNT: 18.1 % (ref 10–15)
ERYTHROCYTE [DISTWIDTH] IN BLOOD BY AUTOMATED COUNT: 18.1 % (ref 10–15)
ERYTHROCYTE [DISTWIDTH] IN BLOOD BY AUTOMATED COUNT: 18.3 % (ref 10–15)
ERYTHROCYTE [DISTWIDTH] IN BLOOD BY AUTOMATED COUNT: 18.4 % (ref 10–15)
FLUAV RNA SPEC QL NAA+PROBE: NEGATIVE
FLUBV RNA RESP QL NAA+PROBE: NEGATIVE
GFR SERPL CREATININE-BSD FRML MDRD: 33 ML/MIN/1.73M2
GFR SERPL CREATININE-BSD FRML MDRD: 34 ML/MIN/1.73M2
GFR SERPL CREATININE-BSD FRML MDRD: 37 ML/MIN/1.73M2
GFR SERPL CREATININE-BSD FRML MDRD: 38 ML/MIN/1.73M2
GFR SERPL CREATININE-BSD FRML MDRD: 38 ML/MIN/1.73M2
GFR SERPL CREATININE-BSD FRML MDRD: 39 ML/MIN/1.73M2
GFR SERPL CREATININE-BSD FRML MDRD: 40 ML/MIN/1.73M2
GFR SERPL CREATININE-BSD FRML MDRD: 40 ML/MIN/1.73M2
GFR SERPL CREATININE-BSD FRML MDRD: 41 ML/MIN/1.73M2
GFR SERPL CREATININE-BSD FRML MDRD: 41 ML/MIN/1.73M2
GFR SERPL CREATININE-BSD FRML MDRD: 45 ML/MIN/1.73M2
GFR SERPL CREATININE-BSD FRML MDRD: 46 ML/MIN/1.73M2
GFR SERPL CREATININE-BSD FRML MDRD: 46 ML/MIN/1.73M2
GFR SERPL CREATININE-BSD FRML MDRD: 49 ML/MIN/1.73M2
GFR SERPL CREATININE-BSD FRML MDRD: 50 ML/MIN/1.73M2
GFR SERPL CREATININE-BSD FRML MDRD: 53 ML/MIN/1.73M2
GFR SERPL CREATININE-BSD FRML MDRD: 56 ML/MIN/1.73M2
GFR SERPL CREATININE-BSD FRML MDRD: 60 ML/MIN/1.73M2
GFR SERPL CREATININE-BSD FRML MDRD: 61 ML/MIN/1.73M2
GFR SERPL CREATININE-BSD FRML MDRD: 68 ML/MIN/1.73M2
GLUCOSE BLD-MCNC: 131 MG/DL (ref 70–99)
GLUCOSE BLD-MCNC: 133 MG/DL (ref 70–99)
GLUCOSE BLD-MCNC: 162 MG/DL (ref 70–99)
GLUCOSE BLD-MCNC: 162 MG/DL (ref 70–99)
GLUCOSE BLD-MCNC: 192 MG/DL (ref 70–99)
GLUCOSE BLD-MCNC: 194 MG/DL (ref 70–99)
GLUCOSE BLD-MCNC: 385 MG/DL (ref 70–99)
GLUCOSE BLD-MCNC: 419 MG/DL (ref 70–99)
GLUCOSE BLD-MCNC: 71 MG/DL (ref 70–99)
GLUCOSE BLD-MCNC: 93 MG/DL (ref 70–99)
GLUCOSE BLDC GLUCOMTR-MCNC: 109 MG/DL (ref 70–99)
GLUCOSE BLDC GLUCOMTR-MCNC: 109 MG/DL (ref 70–99)
GLUCOSE BLDC GLUCOMTR-MCNC: 113 MG/DL (ref 70–99)
GLUCOSE BLDC GLUCOMTR-MCNC: 114 MG/DL (ref 70–99)
GLUCOSE BLDC GLUCOMTR-MCNC: 127 MG/DL (ref 70–99)
GLUCOSE BLDC GLUCOMTR-MCNC: 133 MG/DL (ref 70–99)
GLUCOSE BLDC GLUCOMTR-MCNC: 135 MG/DL (ref 70–99)
GLUCOSE BLDC GLUCOMTR-MCNC: 137 MG/DL (ref 70–99)
GLUCOSE BLDC GLUCOMTR-MCNC: 153 MG/DL (ref 70–99)
GLUCOSE BLDC GLUCOMTR-MCNC: 154 MG/DL (ref 70–99)
GLUCOSE BLDC GLUCOMTR-MCNC: 185 MG/DL (ref 70–99)
GLUCOSE BLDC GLUCOMTR-MCNC: 188 MG/DL (ref 70–99)
GLUCOSE BLDC GLUCOMTR-MCNC: 194 MG/DL (ref 70–99)
GLUCOSE BLDC GLUCOMTR-MCNC: 200 MG/DL (ref 70–99)
GLUCOSE BLDC GLUCOMTR-MCNC: 215 MG/DL (ref 70–99)
GLUCOSE BLDC GLUCOMTR-MCNC: 226 MG/DL (ref 70–99)
GLUCOSE BLDC GLUCOMTR-MCNC: 238 MG/DL (ref 70–99)
GLUCOSE BLDC GLUCOMTR-MCNC: 244 MG/DL (ref 70–99)
GLUCOSE BLDC GLUCOMTR-MCNC: 249 MG/DL (ref 70–99)
GLUCOSE BLDC GLUCOMTR-MCNC: 261 MG/DL (ref 70–99)
GLUCOSE BLDC GLUCOMTR-MCNC: 266 MG/DL (ref 70–99)
GLUCOSE BLDC GLUCOMTR-MCNC: 285 MG/DL (ref 70–99)
GLUCOSE BLDC GLUCOMTR-MCNC: 294 MG/DL (ref 70–99)
GLUCOSE BLDC GLUCOMTR-MCNC: 298 MG/DL (ref 70–99)
GLUCOSE BLDC GLUCOMTR-MCNC: 311 MG/DL (ref 70–99)
GLUCOSE BLDC GLUCOMTR-MCNC: 340 MG/DL (ref 70–99)
GLUCOSE BLDC GLUCOMTR-MCNC: 345 MG/DL (ref 70–99)
GLUCOSE BLDC GLUCOMTR-MCNC: 352 MG/DL (ref 70–99)
GLUCOSE BLDC GLUCOMTR-MCNC: 389 MG/DL (ref 70–99)
GLUCOSE BLDC GLUCOMTR-MCNC: 426 MG/DL (ref 70–99)
GLUCOSE BLDC GLUCOMTR-MCNC: 427 MG/DL (ref 70–99)
GLUCOSE BLDC GLUCOMTR-MCNC: 62 MG/DL (ref 70–99)
GLUCOSE BLDC GLUCOMTR-MCNC: 73 MG/DL (ref 70–99)
GLUCOSE BLDC GLUCOMTR-MCNC: 74 MG/DL (ref 70–99)
GLUCOSE BLDC GLUCOMTR-MCNC: 86 MG/DL (ref 70–99)
GLUCOSE SERPL-MCNC: 108 MG/DL (ref 70–99)
GLUCOSE SERPL-MCNC: 116 MG/DL (ref 70–99)
GLUCOSE SERPL-MCNC: 129 MG/DL (ref 70–99)
GLUCOSE SERPL-MCNC: 190 MG/DL (ref 70–99)
GLUCOSE SERPL-MCNC: 218 MG/DL (ref 70–99)
GLUCOSE SERPL-MCNC: 252 MG/DL (ref 70–99)
GLUCOSE SERPL-MCNC: 364 MG/DL (ref 70–99)
GLUCOSE SERPL-MCNC: 71 MG/DL (ref 70–99)
GLUCOSE SERPL-MCNC: 77 MG/DL (ref 70–99)
GLUCOSE SERPL-MCNC: 95 MG/DL (ref 70–99)
GRAM STAIN RESULT: NORMAL
HBA1C MFR BLD: 7.8 % (ref 0–5.6)
HCO3 BLDV-SCNC: 28 MMOL/L (ref 21–28)
HCO3 BLDV-SCNC: 29 MMOL/L (ref 21–28)
HCO3 BLDV-SCNC: 30 MMOL/L (ref 21–28)
HCO3 BLDV-SCNC: 31 MMOL/L (ref 21–28)
HCO3 BLDV-SCNC: 31 MMOL/L (ref 21–28)
HCO3 BLDV-SCNC: 32 MMOL/L (ref 21–28)
HCO3 BLDV-SCNC: 32 MMOL/L (ref 21–28)
HCT VFR BLD AUTO: 31.2 % (ref 40–53)
HCT VFR BLD AUTO: 33.6 % (ref 40–53)
HCT VFR BLD AUTO: 33.9 % (ref 40–53)
HCT VFR BLD AUTO: 36.5 % (ref 40–53)
HCT VFR BLD AUTO: 36.6 % (ref 40–53)
HDLC SERPL-MCNC: 30 MG/DL
HGB BLD-MCNC: 10 G/DL (ref 13.3–17.7)
HGB BLD-MCNC: 10 G/DL (ref 13.3–17.7)
HGB BLD-MCNC: 10.4 G/DL (ref 13.3–17.7)
HGB BLD-MCNC: 10.6 G/DL (ref 13.3–17.7)
HGB BLD-MCNC: 10.8 G/DL (ref 13.3–17.7)
HGB BLD-MCNC: 11.9 G/DL (ref 13.3–17.7)
HGB BLD-MCNC: 9.4 G/DL (ref 13.3–17.7)
IMM GRANULOCYTES # BLD: 0 10E3/UL
IMM GRANULOCYTES # BLD: 0.1 10E3/UL
IMM GRANULOCYTES # BLD: 0.1 10E3/UL
IMM GRANULOCYTES NFR BLD: 0 %
IMM GRANULOCYTES NFR BLD: 1 %
IMM GRANULOCYTES NFR BLD: 2 %
INTERPRETATION ECG - MUSE: NORMAL
KETONES BLD-SCNC: 0.1 MMOL/L (ref 0–0.6)
LACTATE SERPL-SCNC: 0.7 MMOL/L (ref 0.7–2)
LACTATE SERPL-SCNC: 1.1 MMOL/L (ref 0.7–2)
LACTATE SERPL-SCNC: 1.2 MMOL/L (ref 0.7–2)
LACTATE SERPL-SCNC: 1.4 MMOL/L (ref 0.7–2)
LACTATE SERPL-SCNC: 1.7 MMOL/L (ref 0.7–2)
LACTATE SERPL-SCNC: 2.2 MMOL/L (ref 0.7–2)
LACTATE SERPL-SCNC: 2.5 MMOL/L (ref 0.7–2)
LACTATE SERPL-SCNC: 2.5 MMOL/L (ref 0.7–2)
LACTATE SERPL-SCNC: 3.1 MMOL/L (ref 0.7–2)
LDH SERPL L TO P-CCNC: 224 U/L (ref 85–227)
LDLC SERPL CALC-MCNC: 28 MG/DL
LIPASE SERPL-CCNC: 16 U/L (ref 73–393)
LVEF ECHO: NORMAL
LYMPHOCYTES # BLD AUTO: 0.4 10E3/UL (ref 0.8–5.3)
LYMPHOCYTES # BLD AUTO: 0.8 10E3/UL (ref 0.8–5.3)
LYMPHOCYTES # BLD AUTO: 1.1 10E3/UL (ref 0.8–5.3)
LYMPHOCYTES # BLD AUTO: 1.1 10E3/UL (ref 0.8–5.3)
LYMPHOCYTES # BLD AUTO: 1.2 10E3/UL (ref 0.8–5.3)
LYMPHOCYTES NFR BLD AUTO: 12 %
LYMPHOCYTES NFR BLD AUTO: 12 %
LYMPHOCYTES NFR BLD AUTO: 13 %
LYMPHOCYTES NFR BLD AUTO: 5 %
LYMPHOCYTES NFR BLD AUTO: 6 %
MAGNESIUM SERPL-MCNC: 0.5 MG/DL (ref 1.6–2.3)
MAGNESIUM SERPL-MCNC: 1.8 MG/DL (ref 1.7–2.3)
MAGNESIUM SERPL-MCNC: 1.8 MG/DL (ref 1.7–2.3)
MAGNESIUM SERPL-MCNC: 2 MG/DL (ref 1.6–2.3)
MAGNESIUM SERPL-MCNC: 2.1 MG/DL (ref 1.6–2.3)
MAGNESIUM SERPL-MCNC: 2.2 MG/DL (ref 1.6–2.3)
MAGNESIUM SERPL-MCNC: 2.3 MG/DL (ref 1.6–2.3)
MCH RBC QN AUTO: 25.3 PG (ref 26.5–33)
MCH RBC QN AUTO: 25.7 PG (ref 26.5–33)
MCH RBC QN AUTO: 25.8 PG (ref 26.5–33)
MCH RBC QN AUTO: 25.9 PG (ref 26.5–33)
MCH RBC QN AUTO: 26.7 PG (ref 26.5–33)
MCHC RBC AUTO-ENTMCNC: 29 G/DL (ref 31.5–36.5)
MCHC RBC AUTO-ENTMCNC: 29.5 G/DL (ref 31.5–36.5)
MCHC RBC AUTO-ENTMCNC: 29.5 G/DL (ref 31.5–36.5)
MCHC RBC AUTO-ENTMCNC: 29.8 G/DL (ref 31.5–36.5)
MCHC RBC AUTO-ENTMCNC: 30.1 G/DL (ref 31.5–36.5)
MCV RBC AUTO: 86 FL (ref 78–100)
MCV RBC AUTO: 87 FL (ref 78–100)
MCV RBC AUTO: 87 FL (ref 78–100)
MCV RBC AUTO: 89 FL (ref 78–100)
MCV RBC AUTO: 89 FL (ref 78–100)
MDC_IDC_LEAD_IMPLANT_DT: NORMAL
MDC_IDC_LEAD_LOCATION: NORMAL
MDC_IDC_LEAD_LOCATION_DETAIL_1: NORMAL
MDC_IDC_LEAD_MFG: NORMAL
MDC_IDC_LEAD_MODEL: NORMAL
MDC_IDC_LEAD_POLARITY_TYPE: NORMAL
MDC_IDC_LEAD_SERIAL: NORMAL
MDC_IDC_MSMT_BATTERY_DTM: NORMAL
MDC_IDC_MSMT_BATTERY_DTM: NORMAL
MDC_IDC_MSMT_BATTERY_REMAINING_LONGEVITY: 63 MO
MDC_IDC_MSMT_BATTERY_REMAINING_LONGEVITY: 69 MO
MDC_IDC_MSMT_BATTERY_RRT_TRIGGER: 2.73
MDC_IDC_MSMT_BATTERY_RRT_TRIGGER: 2.73
MDC_IDC_MSMT_BATTERY_STATUS: NORMAL
MDC_IDC_MSMT_BATTERY_STATUS: NORMAL
MDC_IDC_MSMT_BATTERY_VOLTAGE: 2.98 V
MDC_IDC_MSMT_BATTERY_VOLTAGE: 2.98 V
MDC_IDC_MSMT_CAP_CHARGE_DTM: NORMAL
MDC_IDC_MSMT_CAP_CHARGE_DTM: NORMAL
MDC_IDC_MSMT_CAP_CHARGE_ENERGY: 18 J
MDC_IDC_MSMT_CAP_CHARGE_ENERGY: 18 J
MDC_IDC_MSMT_CAP_CHARGE_TIME: 3.93
MDC_IDC_MSMT_CAP_CHARGE_TIME: 3.99
MDC_IDC_MSMT_CAP_CHARGE_TYPE: NORMAL
MDC_IDC_MSMT_CAP_CHARGE_TYPE: NORMAL
MDC_IDC_MSMT_LEADCHNL_RA_IMPEDANCE_VALUE: 437 OHM
MDC_IDC_MSMT_LEADCHNL_RA_IMPEDANCE_VALUE: 456 OHM
MDC_IDC_MSMT_LEADCHNL_RA_PACING_THRESHOLD_AMPLITUDE: 0.75 V
MDC_IDC_MSMT_LEADCHNL_RA_PACING_THRESHOLD_AMPLITUDE: 0.75 V
MDC_IDC_MSMT_LEADCHNL_RA_PACING_THRESHOLD_PULSEWIDTH: 0.4 MS
MDC_IDC_MSMT_LEADCHNL_RA_PACING_THRESHOLD_PULSEWIDTH: 0.4 MS
MDC_IDC_MSMT_LEADCHNL_RA_SENSING_INTR_AMPL: 3.38 MV
MDC_IDC_MSMT_LEADCHNL_RA_SENSING_INTR_AMPL: 3.38 MV
MDC_IDC_MSMT_LEADCHNL_RA_SENSING_INTR_AMPL: 3.62 MV
MDC_IDC_MSMT_LEADCHNL_RA_SENSING_INTR_AMPL: 3.62 MV
MDC_IDC_MSMT_LEADCHNL_RV_IMPEDANCE_VALUE: 323 OHM
MDC_IDC_MSMT_LEADCHNL_RV_IMPEDANCE_VALUE: 323 OHM
MDC_IDC_MSMT_LEADCHNL_RV_IMPEDANCE_VALUE: 551 OHM
MDC_IDC_MSMT_LEADCHNL_RV_IMPEDANCE_VALUE: 570 OHM
MDC_IDC_MSMT_LEADCHNL_RV_PACING_THRESHOLD_AMPLITUDE: 0.62 V
MDC_IDC_MSMT_LEADCHNL_RV_PACING_THRESHOLD_AMPLITUDE: 0.75 V
MDC_IDC_MSMT_LEADCHNL_RV_PACING_THRESHOLD_PULSEWIDTH: 0.4 MS
MDC_IDC_MSMT_LEADCHNL_RV_PACING_THRESHOLD_PULSEWIDTH: 0.4 MS
MDC_IDC_MSMT_LEADCHNL_RV_SENSING_INTR_AMPL: 6.88 MV
MDC_IDC_MSMT_LEADCHNL_RV_SENSING_INTR_AMPL: 6.88 MV
MDC_IDC_MSMT_LEADCHNL_RV_SENSING_INTR_AMPL: 9.62 MV
MDC_IDC_MSMT_LEADCHNL_RV_SENSING_INTR_AMPL: 9.62 MV
MDC_IDC_PG_IMPLANT_DTM: NORMAL
MDC_IDC_PG_IMPLANT_DTM: NORMAL
MDC_IDC_PG_MFG: NORMAL
MDC_IDC_PG_MFG: NORMAL
MDC_IDC_PG_MODEL: NORMAL
MDC_IDC_PG_MODEL: NORMAL
MDC_IDC_PG_SERIAL: NORMAL
MDC_IDC_PG_SERIAL: NORMAL
MDC_IDC_PG_TYPE: NORMAL
MDC_IDC_PG_TYPE: NORMAL
MDC_IDC_SESS_CLINIC_NAME: NORMAL
MDC_IDC_SESS_CLINIC_NAME: NORMAL
MDC_IDC_SESS_DTM: NORMAL
MDC_IDC_SESS_DTM: NORMAL
MDC_IDC_SESS_TYPE: NORMAL
MDC_IDC_SESS_TYPE: NORMAL
MDC_IDC_SET_BRADY_AT_MODE_SWITCH_RATE: 171 {BEATS}/MIN
MDC_IDC_SET_BRADY_AT_MODE_SWITCH_RATE: 171 {BEATS}/MIN
MDC_IDC_SET_BRADY_HYSTRATE: NORMAL
MDC_IDC_SET_BRADY_HYSTRATE: NORMAL
MDC_IDC_SET_BRADY_LOWRATE: 55 {BEATS}/MIN
MDC_IDC_SET_BRADY_LOWRATE: 55 {BEATS}/MIN
MDC_IDC_SET_BRADY_MAX_SENSOR_RATE: 140 {BEATS}/MIN
MDC_IDC_SET_BRADY_MAX_SENSOR_RATE: 140 {BEATS}/MIN
MDC_IDC_SET_BRADY_MAX_TRACKING_RATE: 140 {BEATS}/MIN
MDC_IDC_SET_BRADY_MAX_TRACKING_RATE: 140 {BEATS}/MIN
MDC_IDC_SET_BRADY_MODE: NORMAL
MDC_IDC_SET_BRADY_MODE: NORMAL
MDC_IDC_SET_BRADY_PAV_DELAY_LOW: 180 MS
MDC_IDC_SET_BRADY_PAV_DELAY_LOW: 180 MS
MDC_IDC_SET_BRADY_SAV_DELAY_LOW: 150 MS
MDC_IDC_SET_BRADY_SAV_DELAY_LOW: 150 MS
MDC_IDC_SET_LEADCHNL_RA_PACING_AMPLITUDE: 1.5 V
MDC_IDC_SET_LEADCHNL_RA_PACING_AMPLITUDE: 1.5 V
MDC_IDC_SET_LEADCHNL_RA_PACING_ANODE_ELECTRODE_1: NORMAL
MDC_IDC_SET_LEADCHNL_RA_PACING_ANODE_ELECTRODE_1: NORMAL
MDC_IDC_SET_LEADCHNL_RA_PACING_ANODE_LOCATION_1: NORMAL
MDC_IDC_SET_LEADCHNL_RA_PACING_ANODE_LOCATION_1: NORMAL
MDC_IDC_SET_LEADCHNL_RA_PACING_CAPTURE_MODE: NORMAL
MDC_IDC_SET_LEADCHNL_RA_PACING_CAPTURE_MODE: NORMAL
MDC_IDC_SET_LEADCHNL_RA_PACING_CATHODE_ELECTRODE_1: NORMAL
MDC_IDC_SET_LEADCHNL_RA_PACING_CATHODE_ELECTRODE_1: NORMAL
MDC_IDC_SET_LEADCHNL_RA_PACING_CATHODE_LOCATION_1: NORMAL
MDC_IDC_SET_LEADCHNL_RA_PACING_CATHODE_LOCATION_1: NORMAL
MDC_IDC_SET_LEADCHNL_RA_PACING_POLARITY: NORMAL
MDC_IDC_SET_LEADCHNL_RA_PACING_POLARITY: NORMAL
MDC_IDC_SET_LEADCHNL_RA_PACING_PULSEWIDTH: 0.4 MS
MDC_IDC_SET_LEADCHNL_RA_PACING_PULSEWIDTH: 0.4 MS
MDC_IDC_SET_LEADCHNL_RA_SENSING_ANODE_ELECTRODE_1: NORMAL
MDC_IDC_SET_LEADCHNL_RA_SENSING_ANODE_ELECTRODE_1: NORMAL
MDC_IDC_SET_LEADCHNL_RA_SENSING_ANODE_LOCATION_1: NORMAL
MDC_IDC_SET_LEADCHNL_RA_SENSING_ANODE_LOCATION_1: NORMAL
MDC_IDC_SET_LEADCHNL_RA_SENSING_CATHODE_ELECTRODE_1: NORMAL
MDC_IDC_SET_LEADCHNL_RA_SENSING_CATHODE_ELECTRODE_1: NORMAL
MDC_IDC_SET_LEADCHNL_RA_SENSING_CATHODE_LOCATION_1: NORMAL
MDC_IDC_SET_LEADCHNL_RA_SENSING_CATHODE_LOCATION_1: NORMAL
MDC_IDC_SET_LEADCHNL_RA_SENSING_POLARITY: NORMAL
MDC_IDC_SET_LEADCHNL_RA_SENSING_POLARITY: NORMAL
MDC_IDC_SET_LEADCHNL_RA_SENSING_SENSITIVITY: 0.3 MV
MDC_IDC_SET_LEADCHNL_RA_SENSING_SENSITIVITY: 0.3 MV
MDC_IDC_SET_LEADCHNL_RV_PACING_AMPLITUDE: 2 V
MDC_IDC_SET_LEADCHNL_RV_PACING_AMPLITUDE: 2 V
MDC_IDC_SET_LEADCHNL_RV_PACING_ANODE_ELECTRODE_1: NORMAL
MDC_IDC_SET_LEADCHNL_RV_PACING_ANODE_ELECTRODE_1: NORMAL
MDC_IDC_SET_LEADCHNL_RV_PACING_ANODE_LOCATION_1: NORMAL
MDC_IDC_SET_LEADCHNL_RV_PACING_ANODE_LOCATION_1: NORMAL
MDC_IDC_SET_LEADCHNL_RV_PACING_CAPTURE_MODE: NORMAL
MDC_IDC_SET_LEADCHNL_RV_PACING_CAPTURE_MODE: NORMAL
MDC_IDC_SET_LEADCHNL_RV_PACING_CATHODE_ELECTRODE_1: NORMAL
MDC_IDC_SET_LEADCHNL_RV_PACING_CATHODE_ELECTRODE_1: NORMAL
MDC_IDC_SET_LEADCHNL_RV_PACING_CATHODE_LOCATION_1: NORMAL
MDC_IDC_SET_LEADCHNL_RV_PACING_CATHODE_LOCATION_1: NORMAL
MDC_IDC_SET_LEADCHNL_RV_PACING_POLARITY: NORMAL
MDC_IDC_SET_LEADCHNL_RV_PACING_POLARITY: NORMAL
MDC_IDC_SET_LEADCHNL_RV_PACING_PULSEWIDTH: 0.4 MS
MDC_IDC_SET_LEADCHNL_RV_PACING_PULSEWIDTH: 0.4 MS
MDC_IDC_SET_LEADCHNL_RV_SENSING_ANODE_ELECTRODE_1: NORMAL
MDC_IDC_SET_LEADCHNL_RV_SENSING_ANODE_ELECTRODE_1: NORMAL
MDC_IDC_SET_LEADCHNL_RV_SENSING_ANODE_LOCATION_1: NORMAL
MDC_IDC_SET_LEADCHNL_RV_SENSING_ANODE_LOCATION_1: NORMAL
MDC_IDC_SET_LEADCHNL_RV_SENSING_CATHODE_ELECTRODE_1: NORMAL
MDC_IDC_SET_LEADCHNL_RV_SENSING_CATHODE_ELECTRODE_1: NORMAL
MDC_IDC_SET_LEADCHNL_RV_SENSING_CATHODE_LOCATION_1: NORMAL
MDC_IDC_SET_LEADCHNL_RV_SENSING_CATHODE_LOCATION_1: NORMAL
MDC_IDC_SET_LEADCHNL_RV_SENSING_POLARITY: NORMAL
MDC_IDC_SET_LEADCHNL_RV_SENSING_POLARITY: NORMAL
MDC_IDC_SET_LEADCHNL_RV_SENSING_SENSITIVITY: 0.3 MV
MDC_IDC_SET_LEADCHNL_RV_SENSING_SENSITIVITY: 0.3 MV
MDC_IDC_SET_ZONE_DETECTION_BEATS_DENOMINATOR: 40 {BEATS}
MDC_IDC_SET_ZONE_DETECTION_BEATS_DENOMINATOR: 40 {BEATS}
MDC_IDC_SET_ZONE_DETECTION_BEATS_NUMERATOR: 30 {BEATS}
MDC_IDC_SET_ZONE_DETECTION_BEATS_NUMERATOR: 30 {BEATS}
MDC_IDC_SET_ZONE_DETECTION_INTERVAL: 320 MS
MDC_IDC_SET_ZONE_DETECTION_INTERVAL: 320 MS
MDC_IDC_SET_ZONE_DETECTION_INTERVAL: 350 MS
MDC_IDC_SET_ZONE_DETECTION_INTERVAL: 350 MS
MDC_IDC_SET_ZONE_DETECTION_INTERVAL: 360 MS
MDC_IDC_SET_ZONE_DETECTION_INTERVAL: 360 MS
MDC_IDC_SET_ZONE_DETECTION_INTERVAL: 400 MS
MDC_IDC_SET_ZONE_DETECTION_INTERVAL: 400 MS
MDC_IDC_SET_ZONE_DETECTION_INTERVAL: NORMAL
MDC_IDC_SET_ZONE_DETECTION_INTERVAL: NORMAL
MDC_IDC_SET_ZONE_TYPE: NORMAL
MDC_IDC_STAT_AT_BURDEN_PERCENT: 0 %
MDC_IDC_STAT_AT_BURDEN_PERCENT: 0 %
MDC_IDC_STAT_AT_DTM_END: NORMAL
MDC_IDC_STAT_AT_DTM_END: NORMAL
MDC_IDC_STAT_AT_DTM_START: NORMAL
MDC_IDC_STAT_AT_DTM_START: NORMAL
MDC_IDC_STAT_BRADY_AP_VP_PERCENT: 0.33 %
MDC_IDC_STAT_BRADY_AP_VP_PERCENT: 0.43 %
MDC_IDC_STAT_BRADY_AP_VS_PERCENT: 18.57 %
MDC_IDC_STAT_BRADY_AP_VS_PERCENT: 24.13 %
MDC_IDC_STAT_BRADY_AS_VP_PERCENT: 0.03 %
MDC_IDC_STAT_BRADY_AS_VP_PERCENT: 0.04 %
MDC_IDC_STAT_BRADY_AS_VS_PERCENT: 75.4 %
MDC_IDC_STAT_BRADY_AS_VS_PERCENT: 81.07 %
MDC_IDC_STAT_BRADY_DTM_END: NORMAL
MDC_IDC_STAT_BRADY_DTM_END: NORMAL
MDC_IDC_STAT_BRADY_DTM_START: NORMAL
MDC_IDC_STAT_BRADY_DTM_START: NORMAL
MDC_IDC_STAT_BRADY_RA_PERCENT_PACED: 18.9 %
MDC_IDC_STAT_BRADY_RA_PERCENT_PACED: 24.58 %
MDC_IDC_STAT_BRADY_RV_PERCENT_PACED: 0.38 %
MDC_IDC_STAT_BRADY_RV_PERCENT_PACED: 0.48 %
MDC_IDC_STAT_EPISODE_RECENT_COUNT: 0
MDC_IDC_STAT_EPISODE_RECENT_COUNT_DTM_END: NORMAL
MDC_IDC_STAT_EPISODE_RECENT_COUNT_DTM_START: NORMAL
MDC_IDC_STAT_EPISODE_TOTAL_COUNT: 0
MDC_IDC_STAT_EPISODE_TOTAL_COUNT: 2
MDC_IDC_STAT_EPISODE_TOTAL_COUNT: 2
MDC_IDC_STAT_EPISODE_TOTAL_COUNT_DTM_END: NORMAL
MDC_IDC_STAT_EPISODE_TOTAL_COUNT_DTM_START: NORMAL
MDC_IDC_STAT_EPISODE_TYPE: NORMAL
MDC_IDC_STAT_TACHYTHERAPY_ATP_DELIVERED_RECENT: 0
MDC_IDC_STAT_TACHYTHERAPY_ATP_DELIVERED_RECENT: 0
MDC_IDC_STAT_TACHYTHERAPY_ATP_DELIVERED_TOTAL: 0
MDC_IDC_STAT_TACHYTHERAPY_ATP_DELIVERED_TOTAL: 0
MDC_IDC_STAT_TACHYTHERAPY_RECENT_DTM_END: NORMAL
MDC_IDC_STAT_TACHYTHERAPY_RECENT_DTM_END: NORMAL
MDC_IDC_STAT_TACHYTHERAPY_RECENT_DTM_START: NORMAL
MDC_IDC_STAT_TACHYTHERAPY_RECENT_DTM_START: NORMAL
MDC_IDC_STAT_TACHYTHERAPY_SHOCKS_ABORTED_RECENT: 0
MDC_IDC_STAT_TACHYTHERAPY_SHOCKS_ABORTED_RECENT: 0
MDC_IDC_STAT_TACHYTHERAPY_SHOCKS_ABORTED_TOTAL: 0
MDC_IDC_STAT_TACHYTHERAPY_SHOCKS_ABORTED_TOTAL: 0
MDC_IDC_STAT_TACHYTHERAPY_SHOCKS_DELIVERED_RECENT: 0
MDC_IDC_STAT_TACHYTHERAPY_SHOCKS_DELIVERED_RECENT: 0
MDC_IDC_STAT_TACHYTHERAPY_SHOCKS_DELIVERED_TOTAL: 0
MDC_IDC_STAT_TACHYTHERAPY_SHOCKS_DELIVERED_TOTAL: 0
MDC_IDC_STAT_TACHYTHERAPY_TOTAL_DTM_END: NORMAL
MDC_IDC_STAT_TACHYTHERAPY_TOTAL_DTM_END: NORMAL
MDC_IDC_STAT_TACHYTHERAPY_TOTAL_DTM_START: NORMAL
MDC_IDC_STAT_TACHYTHERAPY_TOTAL_DTM_START: NORMAL
MONOCYTES # BLD AUTO: 0.1 10E3/UL (ref 0–1.3)
MONOCYTES # BLD AUTO: 0.7 10E3/UL (ref 0–1.3)
MONOCYTES # BLD AUTO: 0.8 10E3/UL (ref 0–1.3)
MONOCYTES # BLD AUTO: 0.9 10E3/UL (ref 0–1.3)
MONOCYTES # BLD AUTO: 1.1 10E3/UL (ref 0–1.3)
MONOCYTES NFR BLD AUTO: 1 %
MONOCYTES NFR BLD AUTO: 10 %
MONOCYTES NFR BLD AUTO: 10 %
MONOCYTES NFR BLD AUTO: 8 %
MONOCYTES NFR BLD AUTO: 9 %
MRSA DNA SPEC QL NAA+PROBE: NEGATIVE
NEUTROPHILS # BLD AUTO: 11.3 10E3/UL (ref 1.6–8.3)
NEUTROPHILS # BLD AUTO: 6.6 10E3/UL (ref 1.6–8.3)
NEUTROPHILS # BLD AUTO: 6.6 10E3/UL (ref 1.6–8.3)
NEUTROPHILS # BLD AUTO: 6.8 10E3/UL (ref 1.6–8.3)
NEUTROPHILS # BLD AUTO: 7.1 10E3/UL (ref 1.6–8.3)
NEUTROPHILS NFR BLD AUTO: 73 %
NEUTROPHILS NFR BLD AUTO: 76 %
NEUTROPHILS NFR BLD AUTO: 77 %
NEUTROPHILS NFR BLD AUTO: 85 %
NEUTROPHILS NFR BLD AUTO: 94 %
NONHDLC SERPL-MCNC: 36 MG/DL
NRBC # BLD AUTO: 0 10E3/UL
NRBC BLD AUTO-RTO: 0 /100
NT-PROBNP SERPL-MCNC: ABNORMAL PG/ML (ref 0–125)
NT-PROBNP SERPL-MCNC: ABNORMAL PG/ML (ref 0–900)
NT-PROBNP SERPL-MCNC: ABNORMAL PG/ML (ref 0–900)
O2/TOTAL GAS SETTING VFR VENT: 0 %
O2/TOTAL GAS SETTING VFR VENT: 12 %
O2/TOTAL GAS SETTING VFR VENT: 21 %
OXYHGB MFR BLDV: 40 % (ref 70–75)
OXYHGB MFR BLDV: 50 % (ref 70–75)
OXYHGB MFR BLDV: 50 % (ref 70–75)
OXYHGB MFR BLDV: 56 % (ref 70–75)
OXYHGB MFR BLDV: 57 % (ref 70–75)
OXYHGB MFR BLDV: 59 % (ref 70–75)
OXYHGB MFR BLDV: 61 % (ref 70–75)
OXYHGB MFR BLDV: 67 % (ref 70–75)
P AXIS - MUSE: 62 DEGREES
PCO2 BLDV: 49 MM HG (ref 40–50)
PCO2 BLDV: 50 MM HG (ref 40–50)
PCO2 BLDV: 51 MM HG (ref 40–50)
PCO2 BLDV: 53 MM HG (ref 40–50)
PCO2 BLDV: 53 MM HG (ref 40–50)
PCO2 BLDV: 54 MM HG (ref 40–50)
PCO2 BLDV: 56 MM HG (ref 40–50)
PH BLDV: 7.32 [PH] (ref 7.32–7.43)
PH BLDV: 7.36 [PH] (ref 7.32–7.43)
PH BLDV: 7.36 [PH] (ref 7.32–7.43)
PH BLDV: 7.37 [PH] (ref 7.32–7.43)
PH BLDV: 7.38 [PH] (ref 7.32–7.43)
PH BLDV: 7.4 [PH] (ref 7.32–7.43)
PHOSPHATE SERPL-MCNC: 3.8 MG/DL (ref 2.5–4.5)
PLATELET # BLD AUTO: 124 10E3/UL (ref 150–450)
PLATELET # BLD AUTO: 126 10E3/UL (ref 150–450)
PLATELET # BLD AUTO: 132 10E3/UL (ref 150–450)
PLATELET # BLD AUTO: 141 10E3/UL (ref 150–450)
PLATELET # BLD AUTO: 159 10E3/UL (ref 150–450)
PO2 BLDV: 19 MM HG (ref 25–47)
PO2 BLDV: 28 MM HG (ref 25–47)
PO2 BLDV: 31 MM HG (ref 25–47)
PO2 BLDV: 31 MM HG (ref 25–47)
PO2 BLDV: 34 MM HG (ref 25–47)
PO2 BLDV: 34 MM HG (ref 25–47)
PO2 BLDV: 35 MM HG (ref 25–47)
PO2 BLDV: 36 MM HG (ref 25–47)
PO2 BLDV: 41 MM HG (ref 25–47)
POTASSIUM BLD-SCNC: 4 MMOL/L (ref 3.4–5.3)
POTASSIUM BLD-SCNC: 4.2 MMOL/L (ref 3.4–5.3)
POTASSIUM BLD-SCNC: 4.4 MMOL/L (ref 3.4–5.3)
POTASSIUM BLD-SCNC: 4.6 MMOL/L (ref 3.4–5.3)
POTASSIUM BLD-SCNC: 4.6 MMOL/L (ref 3.4–5.3)
POTASSIUM BLD-SCNC: 4.7 MMOL/L (ref 3.4–5.3)
POTASSIUM BLD-SCNC: 4.9 MMOL/L (ref 3.4–5.3)
POTASSIUM BLD-SCNC: 5 MMOL/L (ref 3.4–5.3)
POTASSIUM BLD-SCNC: 5.6 MMOL/L (ref 3.4–5.3)
POTASSIUM BLD-SCNC: 5.6 MMOL/L (ref 3.4–5.3)
POTASSIUM SERPL-SCNC: 4.1 MMOL/L (ref 3.4–4.5)
POTASSIUM SERPL-SCNC: 4.2 MMOL/L (ref 3.4–4.5)
POTASSIUM SERPL-SCNC: 4.4 MMOL/L (ref 3.4–4.5)
POTASSIUM SERPL-SCNC: 4.5 MMOL/L (ref 3.4–4.5)
POTASSIUM SERPL-SCNC: 4.5 MMOL/L (ref 3.4–5.3)
POTASSIUM SERPL-SCNC: 4.6 MMOL/L (ref 3.4–4.5)
POTASSIUM SERPL-SCNC: 5.1 MMOL/L (ref 3.4–5.3)
POTASSIUM SERPL-SCNC: 5.1 MMOL/L (ref 3.4–5.3)
POTASSIUM SERPL-SCNC: 5.2 MMOL/L (ref 3.4–5.3)
POTASSIUM SERPL-SCNC: 5.4 MMOL/L (ref 3.4–5.3)
PR INTERVAL - MUSE: 200 MS
PROCALCITONIN SERPL-MCNC: 0.11 NG/ML
PROT SERPL-MCNC: 5.7 G/DL (ref 6.4–8.3)
PROT SERPL-MCNC: 6 G/DL (ref 6.4–8.3)
PROT SERPL-MCNC: 6.1 G/DL (ref 6.4–8.3)
PROT SERPL-MCNC: 6.3 G/DL (ref 6.4–8.3)
PROT SERPL-MCNC: 6.3 G/DL (ref 6.8–8.8)
PROT SERPL-MCNC: 6.4 G/DL (ref 6.4–8.3)
PROT SERPL-MCNC: 6.4 G/DL (ref 6.8–8.8)
PROT SERPL-MCNC: 6.5 G/DL (ref 6.4–8.3)
PROT SERPL-MCNC: 6.6 G/DL (ref 6.8–8.8)
PROT SERPL-MCNC: 6.7 G/DL (ref 6.8–8.8)
PROT SERPL-MCNC: 7 G/DL (ref 6.8–8.8)
PROT SERPL-MCNC: 7 G/DL (ref 6.8–8.8)
QRS DURATION - MUSE: 118 MS
QT - MUSE: 446 MS
QTC - MUSE: 508 MS
R AXIS - MUSE: 138 DEGREES
RBC # BLD AUTO: 3.52 10E6/UL (ref 4.4–5.9)
RBC # BLD AUTO: 3.87 10E6/UL (ref 4.4–5.9)
RBC # BLD AUTO: 3.96 10E6/UL (ref 4.4–5.9)
RBC # BLD AUTO: 4.1 10E6/UL (ref 4.4–5.9)
RBC # BLD AUTO: 4.2 10E6/UL (ref 4.4–5.9)
RETINOPATHY: NORMAL
SA TARGET DNA: NEGATIVE
SARS-COV-2 RNA RESP QL NAA+PROBE: NEGATIVE
SODIUM SERPL-SCNC: 133 MMOL/L (ref 133–144)
SODIUM SERPL-SCNC: 133 MMOL/L (ref 136–145)
SODIUM SERPL-SCNC: 134 MMOL/L (ref 136–145)
SODIUM SERPL-SCNC: 135 MMOL/L (ref 133–144)
SODIUM SERPL-SCNC: 135 MMOL/L (ref 136–145)
SODIUM SERPL-SCNC: 136 MMOL/L (ref 133–144)
SODIUM SERPL-SCNC: 136 MMOL/L (ref 136–145)
SODIUM SERPL-SCNC: 137 MMOL/L (ref 133–144)
SODIUM SERPL-SCNC: 137 MMOL/L (ref 136–145)
SODIUM SERPL-SCNC: 138 MMOL/L (ref 133–144)
SODIUM SERPL-SCNC: 138 MMOL/L (ref 133–144)
SODIUM SERPL-SCNC: 138 MMOL/L (ref 136–145)
SODIUM SERPL-SCNC: 139 MMOL/L (ref 136–145)
SODIUM SERPL-SCNC: 140 MMOL/L (ref 136–145)
SYSTOLIC BLOOD PRESSURE - MUSE: NORMAL MMHG
T AXIS - MUSE: 241 DEGREES
TRIGL SERPL-MCNC: 40 MG/DL
TROPONIN I SERPL HS-MCNC: 1403 NG/L
TROPONIN I SERPL HS-MCNC: 989 NG/L
VENTRICULAR RATE- MUSE: 78 BPM
WBC # BLD AUTO: 13.3 10E3/UL (ref 4–11)
WBC # BLD AUTO: 7.6 10E3/UL (ref 4–11)
WBC # BLD AUTO: 8.5 10E3/UL (ref 4–11)
WBC # BLD AUTO: 8.7 10E3/UL (ref 4–11)
WBC # BLD AUTO: 9.3 10E3/UL (ref 4–11)

## 2022-01-01 PROCEDURE — 93005 ELECTROCARDIOGRAM TRACING: CPT

## 2022-01-01 PROCEDURE — 83605 ASSAY OF LACTIC ACID: CPT

## 2022-01-01 PROCEDURE — 93296 REM INTERROG EVL PM/IDS: CPT | Performed by: INTERNAL MEDICINE

## 2022-01-01 PROCEDURE — 84155 ASSAY OF PROTEIN SERUM: CPT

## 2022-01-01 PROCEDURE — 85014 HEMATOCRIT: CPT | Performed by: INTERNAL MEDICINE

## 2022-01-01 PROCEDURE — 83615 LACTATE (LD) (LDH) ENZYME: CPT

## 2022-01-01 PROCEDURE — 80053 COMPREHEN METABOLIC PANEL: CPT | Performed by: INTERNAL MEDICINE

## 2022-01-01 PROCEDURE — 99215 OFFICE O/P EST HI 40 MIN: CPT | Performed by: INTERNAL MEDICINE

## 2022-01-01 PROCEDURE — 36415 COLL VENOUS BLD VENIPUNCTURE: CPT | Performed by: NURSE PRACTITIONER

## 2022-01-01 PROCEDURE — 80053 COMPREHEN METABOLIC PANEL: CPT | Performed by: NURSE PRACTITIONER

## 2022-01-01 PROCEDURE — 80069 RENAL FUNCTION PANEL: CPT | Performed by: PHYSICIAN ASSISTANT

## 2022-01-01 PROCEDURE — 87205 SMEAR GRAM STAIN: CPT | Performed by: INTERNAL MEDICINE

## 2022-01-01 PROCEDURE — 99285 EMERGENCY DEPT VISIT HI MDM: CPT | Mod: 25 | Performed by: FAMILY MEDICINE

## 2022-01-01 PROCEDURE — 97530 THERAPEUTIC ACTIVITIES: CPT | Mod: GO | Performed by: OCCUPATIONAL THERAPIST

## 2022-01-01 PROCEDURE — 250N000011 HC RX IP 250 OP 636

## 2022-01-01 PROCEDURE — 82805 BLOOD GASES W/O2 SATURATION: CPT

## 2022-01-01 PROCEDURE — 272N000473 HC KIT, VPS RHYTHM STYLET

## 2022-01-01 PROCEDURE — 83735 ASSAY OF MAGNESIUM: CPT | Performed by: INTERNAL MEDICINE

## 2022-01-01 PROCEDURE — 83036 HEMOGLOBIN GLYCOSYLATED A1C: CPT | Performed by: INTERNAL MEDICINE

## 2022-01-01 PROCEDURE — 83605 ASSAY OF LACTIC ACID: CPT | Performed by: NURSE PRACTITIONER

## 2022-01-01 PROCEDURE — 99238 HOSP IP/OBS DSCHRG MGMT 30/<: CPT

## 2022-01-01 PROCEDURE — 87086 URINE CULTURE/COLONY COUNT: CPT

## 2022-01-01 PROCEDURE — 250N000009 HC RX 250

## 2022-01-01 PROCEDURE — 84450 TRANSFERASE (AST) (SGOT): CPT | Performed by: INTERNAL MEDICINE

## 2022-01-01 PROCEDURE — 93295 DEV INTERROG REMOTE 1/2/MLT: CPT | Performed by: INTERNAL MEDICINE

## 2022-01-01 PROCEDURE — 82010 KETONE BODYS QUAN: CPT | Performed by: NURSE PRACTITIONER

## 2022-01-01 PROCEDURE — 99215 OFFICE O/P EST HI 40 MIN: CPT | Performed by: PHYSICIAN ASSISTANT

## 2022-01-01 PROCEDURE — 83880 ASSAY OF NATRIURETIC PEPTIDE: CPT

## 2022-01-01 PROCEDURE — 83605 ASSAY OF LACTIC ACID: CPT | Performed by: FAMILY MEDICINE

## 2022-01-01 PROCEDURE — C9803 HOPD COVID-19 SPEC COLLECT: HCPCS

## 2022-01-01 PROCEDURE — 80053 COMPREHEN METABOLIC PANEL: CPT

## 2022-01-01 PROCEDURE — 250N000011 HC RX IP 250 OP 636: Performed by: INTERNAL MEDICINE

## 2022-01-01 PROCEDURE — 36415 COLL VENOUS BLD VENIPUNCTURE: CPT | Performed by: PHYSICIAN ASSISTANT

## 2022-01-01 PROCEDURE — 272N000452 HC KIT SHRLOCK 5FR POWER PICC TRIPLE LUMEN

## 2022-01-01 PROCEDURE — 71045 X-RAY EXAM CHEST 1 VIEW: CPT

## 2022-01-01 PROCEDURE — 82248 BILIRUBIN DIRECT: CPT | Performed by: INTERNAL MEDICINE

## 2022-01-01 PROCEDURE — 36415 COLL VENOUS BLD VENIPUNCTURE: CPT

## 2022-01-01 PROCEDURE — 80048 BASIC METABOLIC PNL TOTAL CA: CPT | Performed by: PHYSICIAN ASSISTANT

## 2022-01-01 PROCEDURE — 250N000013 HC RX MED GY IP 250 OP 250 PS 637: Performed by: INTERNAL MEDICINE

## 2022-01-01 PROCEDURE — 83880 ASSAY OF NATRIURETIC PEPTIDE: CPT | Performed by: INTERNAL MEDICINE

## 2022-01-01 PROCEDURE — 999N000157 HC STATISTIC RCP TIME EA 10 MIN

## 2022-01-01 PROCEDURE — 120N000002 HC R&B MED SURG/OB UMMC

## 2022-01-01 PROCEDURE — 93010 ELECTROCARDIOGRAM REPORT: CPT | Performed by: INTERNAL MEDICINE

## 2022-01-01 PROCEDURE — 94640 AIRWAY INHALATION TREATMENT: CPT

## 2022-01-01 PROCEDURE — 82248 BILIRUBIN DIRECT: CPT

## 2022-01-01 PROCEDURE — 84450 TRANSFERASE (AST) (SGOT): CPT

## 2022-01-01 PROCEDURE — 99214 OFFICE O/P EST MOD 30 MIN: CPT | Performed by: FAMILY MEDICINE

## 2022-01-01 PROCEDURE — 99291 CRITICAL CARE FIRST HOUR: CPT | Mod: FS | Performed by: STUDENT IN AN ORGANIZED HEALTH CARE EDUCATION/TRAINING PROGRAM

## 2022-01-01 PROCEDURE — 250N000009 HC RX 250: Performed by: FAMILY MEDICINE

## 2022-01-01 PROCEDURE — 80048 BASIC METABOLIC PNL TOTAL CA: CPT | Performed by: INTERNAL MEDICINE

## 2022-01-01 PROCEDURE — 84484 ASSAY OF TROPONIN QUANT: CPT | Performed by: INTERNAL MEDICINE

## 2022-01-01 PROCEDURE — 85018 HEMOGLOBIN: CPT

## 2022-01-01 PROCEDURE — 87040 BLOOD CULTURE FOR BACTERIA: CPT | Performed by: INTERNAL MEDICINE

## 2022-01-01 PROCEDURE — 83735 ASSAY OF MAGNESIUM: CPT | Performed by: NURSE PRACTITIONER

## 2022-01-01 PROCEDURE — 93321 DOPPLER ECHO F-UP/LMTD STD: CPT | Mod: 26 | Performed by: INTERNAL MEDICINE

## 2022-01-01 PROCEDURE — 83735 ASSAY OF MAGNESIUM: CPT | Performed by: PHYSICIAN ASSISTANT

## 2022-01-01 PROCEDURE — 80053 COMPREHEN METABOLIC PANEL: CPT | Performed by: FAMILY MEDICINE

## 2022-01-01 PROCEDURE — 96365 THER/PROPH/DIAG IV INF INIT: CPT

## 2022-01-01 PROCEDURE — 99214 OFFICE O/P EST MOD 30 MIN: CPT | Performed by: PHYSICIAN ASSISTANT

## 2022-01-01 PROCEDURE — 85025 COMPLETE CBC W/AUTO DIFF WBC: CPT | Performed by: INTERNAL MEDICINE

## 2022-01-01 PROCEDURE — 84155 ASSAY OF PROTEIN SERUM: CPT | Performed by: INTERNAL MEDICINE

## 2022-01-01 PROCEDURE — 36415 COLL VENOUS BLD VENIPUNCTURE: CPT | Performed by: INTERNAL MEDICINE

## 2022-01-01 PROCEDURE — 73562 X-RAY EXAM OF KNEE 3: CPT | Mod: LT

## 2022-01-01 PROCEDURE — 258N000003 HC RX IP 258 OP 636: Performed by: FAMILY MEDICINE

## 2022-01-01 PROCEDURE — 36569 INSJ PICC 5 YR+ W/O IMAGING: CPT

## 2022-01-01 PROCEDURE — 97165 OT EVAL LOW COMPLEX 30 MIN: CPT | Mod: GO | Performed by: OCCUPATIONAL THERAPIST

## 2022-01-01 PROCEDURE — 93325 DOPPLER ECHO COLOR FLOW MAPG: CPT

## 2022-01-01 PROCEDURE — 999N000065 XR CHEST PORT 1 VIEW

## 2022-01-01 PROCEDURE — 96375 TX/PRO/DX INJ NEW DRUG ADDON: CPT

## 2022-01-01 PROCEDURE — 92610 EVALUATE SWALLOWING FUNCTION: CPT | Mod: GN

## 2022-01-01 PROCEDURE — 250N000011 HC RX IP 250 OP 636: Performed by: FAMILY MEDICINE

## 2022-01-01 PROCEDURE — 80048 BASIC METABOLIC PNL TOTAL CA: CPT

## 2022-01-01 PROCEDURE — 71045 X-RAY EXAM CHEST 1 VIEW: CPT | Mod: 26 | Performed by: RADIOLOGY

## 2022-01-01 PROCEDURE — 250N000009 HC RX 250: Performed by: INTERNAL MEDICINE

## 2022-01-01 PROCEDURE — 84145 PROCALCITONIN (PCT): CPT | Performed by: NURSE PRACTITIONER

## 2022-01-01 PROCEDURE — 80061 LIPID PANEL: CPT | Performed by: INTERNAL MEDICINE

## 2022-01-01 PROCEDURE — 86140 C-REACTIVE PROTEIN: CPT | Performed by: FAMILY MEDICINE

## 2022-01-01 PROCEDURE — 99285 EMERGENCY DEPT VISIT HI MDM: CPT | Mod: 25

## 2022-01-01 PROCEDURE — 93325 DOPPLER ECHO COLOR FLOW MAPG: CPT | Mod: 26 | Performed by: INTERNAL MEDICINE

## 2022-01-01 PROCEDURE — 85025 COMPLETE CBC W/AUTO DIFF WBC: CPT | Performed by: FAMILY MEDICINE

## 2022-01-01 PROCEDURE — 83880 ASSAY OF NATRIURETIC PEPTIDE: CPT | Performed by: FAMILY MEDICINE

## 2022-01-01 PROCEDURE — 82805 BLOOD GASES W/O2 SATURATION: CPT | Performed by: INTERNAL MEDICINE

## 2022-01-01 PROCEDURE — 250N000011 HC RX IP 250 OP 636: Performed by: NURSE PRACTITIONER

## 2022-01-01 PROCEDURE — 93308 TTE F-UP OR LMTD: CPT | Mod: 26 | Performed by: INTERNAL MEDICINE

## 2022-01-01 PROCEDURE — 87641 MR-STAPH DNA AMP PROBE: CPT | Performed by: INTERNAL MEDICINE

## 2022-01-01 PROCEDURE — 85018 HEMOGLOBIN: CPT | Performed by: PHYSICIAN ASSISTANT

## 2022-01-01 PROCEDURE — 83690 ASSAY OF LIPASE: CPT | Performed by: FAMILY MEDICINE

## 2022-01-01 PROCEDURE — 250N000012 HC RX MED GY IP 250 OP 636 PS 637: Performed by: INTERNAL MEDICINE

## 2022-01-01 PROCEDURE — 71046 X-RAY EXAM CHEST 2 VIEWS: CPT

## 2022-01-01 PROCEDURE — 36415 COLL VENOUS BLD VENIPUNCTURE: CPT | Performed by: FAMILY MEDICINE

## 2022-01-01 PROCEDURE — 83605 ASSAY OF LACTIC ACID: CPT | Performed by: INTERNAL MEDICINE

## 2022-01-01 PROCEDURE — 73502 X-RAY EXAM HIP UNI 2-3 VIEWS: CPT

## 2022-01-01 PROCEDURE — 87636 SARSCOV2 & INF A&B AMP PRB: CPT | Performed by: FAMILY MEDICINE

## 2022-01-01 PROCEDURE — 93010 ELECTROCARDIOGRAM REPORT: CPT | Performed by: FAMILY MEDICINE

## 2022-01-01 PROCEDURE — 84484 ASSAY OF TROPONIN QUANT: CPT | Performed by: FAMILY MEDICINE

## 2022-01-01 PROCEDURE — 96367 TX/PROPH/DG ADDL SEQ IV INF: CPT

## 2022-01-01 PROCEDURE — P9047 ALBUMIN (HUMAN), 25%, 50ML: HCPCS | Performed by: FAMILY MEDICINE

## 2022-01-01 PROCEDURE — 82803 BLOOD GASES ANY COMBINATION: CPT | Performed by: FAMILY MEDICINE

## 2022-01-01 RX ORDER — IPRATROPIUM BROMIDE AND ALBUTEROL SULFATE 2.5; .5 MG/3ML; MG/3ML
3 SOLUTION RESPIRATORY (INHALATION) ONCE
Status: COMPLETED | OUTPATIENT
Start: 2022-01-01 | End: 2022-01-01

## 2022-01-01 RX ORDER — ASPIRIN 81 MG/1
81 TABLET, CHEWABLE ORAL DAILY
COMMUNITY
End: 2022-01-01

## 2022-01-01 RX ORDER — DEXTROSE MONOHYDRATE 25 G/50ML
25-50 INJECTION, SOLUTION INTRAVENOUS
Status: DISCONTINUED | OUTPATIENT
Start: 2022-01-01 | End: 2022-01-01 | Stop reason: HOSPADM

## 2022-01-01 RX ORDER — ASPIRIN 81 MG/1
81 TABLET, CHEWABLE ORAL DAILY
Status: DISCONTINUED | OUTPATIENT
Start: 2022-01-01 | End: 2022-01-01 | Stop reason: HOSPADM

## 2022-01-01 RX ORDER — PANTOPRAZOLE SODIUM 40 MG/1
40 TABLET, DELAYED RELEASE ORAL 2 TIMES DAILY
Status: DISCONTINUED | OUTPATIENT
Start: 2022-01-01 | End: 2022-01-01 | Stop reason: HOSPADM

## 2022-01-01 RX ORDER — MAGNESIUM OXIDE 400 MG/1
2520 TABLET ORAL DAILY
Status: ON HOLD | COMMUNITY
Start: 2022-01-01 | End: 2023-01-01

## 2022-01-01 RX ORDER — NICOTINE POLACRILEX 4 MG
15-30 LOZENGE BUCCAL
Status: DISCONTINUED | OUTPATIENT
Start: 2022-01-01 | End: 2022-01-01 | Stop reason: HOSPADM

## 2022-01-01 RX ORDER — CHLOROTHIAZIDE SODIUM 500 MG/1
500 INJECTION INTRAVENOUS ONCE
Status: DISCONTINUED | OUTPATIENT
Start: 2022-01-01 | End: 2022-01-01

## 2022-01-01 RX ORDER — FUROSEMIDE 10 MG/ML
80 INJECTION INTRAMUSCULAR; INTRAVENOUS ONCE
Status: COMPLETED | OUTPATIENT
Start: 2022-01-01 | End: 2022-01-01

## 2022-01-01 RX ORDER — METHYLPREDNISOLONE SODIUM SUCCINATE 125 MG/2ML
125 INJECTION, POWDER, LYOPHILIZED, FOR SOLUTION INTRAMUSCULAR; INTRAVENOUS ONCE
Status: COMPLETED | OUTPATIENT
Start: 2022-01-01 | End: 2022-01-01

## 2022-01-01 RX ORDER — SACUBITRIL AND VALSARTAN 24; 26 MG/1; MG/1
1 TABLET, FILM COATED ORAL 2 TIMES DAILY
Qty: 180 TABLET | Refills: 3 | COMMUNITY
Start: 2022-01-01 | End: 2022-01-01

## 2022-01-01 RX ORDER — CLOPIDOGREL BISULFATE 75 MG/1
75 TABLET ORAL DAILY
Status: DISCONTINUED | OUTPATIENT
Start: 2022-01-01 | End: 2022-01-01 | Stop reason: HOSPADM

## 2022-01-01 RX ORDER — TORSEMIDE 20 MG/1
20 TABLET ORAL DAILY
Qty: 90 TABLET | Refills: 4 | COMMUNITY
Start: 2022-01-01 | End: 2022-01-01

## 2022-01-01 RX ORDER — TORSEMIDE 20 MG/1
20 TABLET ORAL DAILY
Qty: 100 TABLET | Refills: 3
Start: 2022-01-01 | End: 2023-01-01

## 2022-01-01 RX ORDER — GABAPENTIN 100 MG/1
200 CAPSULE ORAL AT BEDTIME
Status: DISCONTINUED | OUTPATIENT
Start: 2022-01-01 | End: 2022-01-01 | Stop reason: HOSPADM

## 2022-01-01 RX ORDER — CHLOROTHIAZIDE SODIUM 500 MG/1
1000 INJECTION INTRAVENOUS ONCE
Status: COMPLETED | OUTPATIENT
Start: 2022-01-01 | End: 2022-01-01

## 2022-01-01 RX ORDER — NOREPINEPHRINE BITARTRATE 0.06 MG/ML
.01-.6 INJECTION, SOLUTION INTRAVENOUS CONTINUOUS
Status: DISCONTINUED | OUTPATIENT
Start: 2022-01-01 | End: 2022-01-01 | Stop reason: HOSPADM

## 2022-01-01 RX ORDER — CLOPIDOGREL BISULFATE 75 MG/1
75 TABLET ORAL DAILY
Qty: 90 TABLET | Refills: 3 | Status: SHIPPED | OUTPATIENT
Start: 2022-01-01 | End: 2023-01-01

## 2022-01-01 RX ORDER — LIDOCAINE 40 MG/G
CREAM TOPICAL
Status: DISCONTINUED | OUTPATIENT
Start: 2022-01-01 | End: 2022-01-01 | Stop reason: HOSPADM

## 2022-01-01 RX ORDER — CEFTRIAXONE 1 G/1
1 INJECTION, POWDER, FOR SOLUTION INTRAMUSCULAR; INTRAVENOUS ONCE
Status: COMPLETED | OUTPATIENT
Start: 2022-01-01 | End: 2022-01-01

## 2022-01-01 RX ORDER — DOBUTAMINE HYDROCHLORIDE 200 MG/100ML
2.5 INJECTION INTRAVENOUS CONTINUOUS
Status: DISCONTINUED | OUTPATIENT
Start: 2022-01-01 | End: 2022-01-01

## 2022-01-01 RX ORDER — BUMETANIDE 0.25 MG/ML
4 INJECTION INTRAMUSCULAR; INTRAVENOUS ONCE
Status: COMPLETED | OUTPATIENT
Start: 2022-01-01 | End: 2022-01-01

## 2022-01-01 RX ORDER — DEXTROSE MONOHYDRATE 25 G/50ML
25-50 INJECTION, SOLUTION INTRAVENOUS
Status: DISCONTINUED | OUTPATIENT
Start: 2022-01-01 | End: 2022-01-01

## 2022-01-01 RX ORDER — FUROSEMIDE 10 MG/ML
40 INJECTION INTRAMUSCULAR; INTRAVENOUS ONCE
Status: COMPLETED | OUTPATIENT
Start: 2022-01-01 | End: 2022-01-01

## 2022-01-01 RX ORDER — CALCIUM CARBONATE 500(1250)
1 TABLET ORAL 2 TIMES DAILY
Status: DISCONTINUED | OUTPATIENT
Start: 2022-01-01 | End: 2022-01-01 | Stop reason: HOSPADM

## 2022-01-01 RX ORDER — HEPARIN SODIUM,PORCINE 10 UNIT/ML
5-20 VIAL (ML) INTRAVENOUS EVERY 24 HOURS
Status: DISCONTINUED | OUTPATIENT
Start: 2022-01-01 | End: 2022-01-01 | Stop reason: HOSPADM

## 2022-01-01 RX ORDER — IPRATROPIUM BROMIDE AND ALBUTEROL SULFATE 2.5; .5 MG/3ML; MG/3ML
3 SOLUTION RESPIRATORY (INHALATION) EVERY 4 HOURS PRN
Status: DISCONTINUED | OUTPATIENT
Start: 2022-01-01 | End: 2022-01-01 | Stop reason: HOSPADM

## 2022-01-01 RX ORDER — TORSEMIDE 20 MG/1
20 TABLET ORAL 2 TIMES DAILY
Qty: 90 TABLET | Refills: 4 | COMMUNITY
Start: 2022-01-01 | End: 2022-01-01

## 2022-01-01 RX ORDER — NICOTINE POLACRILEX 4 MG
15-30 LOZENGE BUCCAL
Status: DISCONTINUED | OUTPATIENT
Start: 2022-01-01 | End: 2022-01-01

## 2022-01-01 RX ORDER — SACUBITRIL AND VALSARTAN 24; 26 MG/1; MG/1
1 TABLET, FILM COATED ORAL 2 TIMES DAILY
Qty: 180 TABLET | Refills: 3 | Status: SHIPPED | OUTPATIENT
Start: 2022-01-01 | End: 2023-01-01

## 2022-01-01 RX ORDER — TORSEMIDE 20 MG/1
TABLET ORAL
Qty: 135 TABLET | Refills: 3 | Status: SHIPPED | OUTPATIENT
Start: 2022-01-01 | End: 2022-01-01

## 2022-01-01 RX ORDER — ONDANSETRON 2 MG/ML
4 INJECTION INTRAMUSCULAR; INTRAVENOUS ONCE
Status: COMPLETED | OUTPATIENT
Start: 2022-01-01 | End: 2022-01-01

## 2022-01-01 RX ORDER — SACUBITRIL AND VALSARTAN 24; 26 MG/1; MG/1
TABLET, FILM COATED ORAL
Qty: 180 TABLET | Refills: 3 | COMMUNITY
Start: 2022-01-01 | End: 2022-01-01

## 2022-01-01 RX ORDER — HEPARIN SODIUM,PORCINE 10 UNIT/ML
5-20 VIAL (ML) INTRAVENOUS
Status: DISCONTINUED | OUTPATIENT
Start: 2022-01-01 | End: 2022-01-01 | Stop reason: HOSPADM

## 2022-01-01 RX ORDER — TORSEMIDE 20 MG/1
TABLET ORAL
Qty: 90 TABLET | Refills: 4 | COMMUNITY
Start: 2022-01-01 | End: 2022-01-01

## 2022-01-01 RX ORDER — MAGNESIUM SULFATE HEPTAHYDRATE 40 MG/ML
4 INJECTION, SOLUTION INTRAVENOUS EVERY 4 HOURS
Status: COMPLETED | OUTPATIENT
Start: 2022-01-01 | End: 2022-01-01

## 2022-01-01 RX ORDER — CARVEDILOL 25 MG/1
25 TABLET ORAL 2 TIMES DAILY WITH MEALS
Start: 2022-01-01 | End: 2022-01-01

## 2022-01-01 RX ORDER — ALBUMIN (HUMAN) 12.5 G/50ML
25 SOLUTION INTRAVENOUS ONCE
Status: COMPLETED | OUTPATIENT
Start: 2022-01-01 | End: 2022-01-01

## 2022-01-01 RX ORDER — DEXTROSE MONOHYDRATE 100 MG/ML
INJECTION, SOLUTION INTRAVENOUS CONTINUOUS PRN
Status: DISCONTINUED | OUTPATIENT
Start: 2022-01-01 | End: 2022-01-01 | Stop reason: HOSPADM

## 2022-01-01 RX ORDER — ATORVASTATIN CALCIUM 40 MG/1
80 TABLET, FILM COATED ORAL AT BEDTIME
Status: DISCONTINUED | OUTPATIENT
Start: 2022-01-01 | End: 2022-01-01 | Stop reason: HOSPADM

## 2022-01-01 RX ORDER — DOBUTAMINE HYDROCHLORIDE 200 MG/100ML
2.5 INJECTION INTRAVENOUS CONTINUOUS
Status: DISCONTINUED | OUTPATIENT
Start: 2022-01-01 | End: 2022-01-01 | Stop reason: HOSPADM

## 2022-01-01 RX ORDER — CALCIUM CARBONATE 500(1250)
1 TABLET ORAL 2 TIMES DAILY
Status: DISCONTINUED | OUTPATIENT
Start: 2022-01-01 | End: 2022-01-01 | Stop reason: CLARIF

## 2022-01-01 RX ORDER — HEPARIN SODIUM 5000 [USP'U]/.5ML
5000 INJECTION, SOLUTION INTRAVENOUS; SUBCUTANEOUS EVERY 12 HOURS
Status: DISCONTINUED | OUTPATIENT
Start: 2022-01-01 | End: 2022-01-01 | Stop reason: HOSPADM

## 2022-01-01 RX ORDER — BUMETANIDE 0.25 MG/ML
4 INJECTION INTRAMUSCULAR; INTRAVENOUS ONCE
Status: DISCONTINUED | OUTPATIENT
Start: 2022-01-01 | End: 2022-01-01

## 2022-01-01 RX ORDER — SACUBITRIL AND VALSARTAN 24; 26 MG/1; MG/1
1 TABLET, FILM COATED ORAL 2 TIMES DAILY
Qty: 180 TABLET | Refills: 3 | Status: SHIPPED | OUTPATIENT
Start: 2022-01-01 | End: 2022-01-01

## 2022-01-01 RX ORDER — ROPIVACAINE IN 0.9% SOD CHL/PF 0.1 %
.03-.125 PLASTIC BAG, INJECTION (ML) EPIDURAL CONTINUOUS
Status: DISCONTINUED | OUTPATIENT
Start: 2022-01-01 | End: 2022-01-01 | Stop reason: HOSPADM

## 2022-01-01 RX ADMIN — CALCIUM 500 MG: 500 TABLET ORAL at 08:49

## 2022-01-01 RX ADMIN — CALCIUM 500 MG: 500 TABLET ORAL at 10:32

## 2022-01-01 RX ADMIN — INSULIN GLARGINE 28 UNITS: 100 INJECTION, SOLUTION SUBCUTANEOUS at 09:01

## 2022-01-01 RX ADMIN — Medication 0.03 MCG/KG/MIN: at 22:04

## 2022-01-01 RX ADMIN — PANCRELIPASE 2 CAPSULE: 60000; 12000; 38000 CAPSULE, DELAYED RELEASE PELLETS ORAL at 12:58

## 2022-01-01 RX ADMIN — CLOPIDOGREL BISULFATE 75 MG: 75 TABLET ORAL at 07:43

## 2022-01-01 RX ADMIN — ASPIRIN 81 MG CHEWABLE TABLET 81 MG: 81 TABLET CHEWABLE at 08:10

## 2022-01-01 RX ADMIN — CHLOROTHIAZIDE SODIUM 1000 MG: 500 INJECTION, POWDER, LYOPHILIZED, FOR SOLUTION INTRAVENOUS at 20:12

## 2022-01-01 RX ADMIN — PANCRELIPASE 2 CAPSULE: 60000; 12000; 38000 CAPSULE, DELAYED RELEASE PELLETS ORAL at 16:16

## 2022-01-01 RX ADMIN — ATORVASTATIN CALCIUM 80 MG: 40 TABLET, FILM COATED ORAL at 21:45

## 2022-01-01 RX ADMIN — ASPIRIN 81 MG CHEWABLE TABLET 81 MG: 81 TABLET CHEWABLE at 10:31

## 2022-01-01 RX ADMIN — PANTOPRAZOLE SODIUM 40 MG: 40 TABLET, DELAYED RELEASE ORAL at 08:49

## 2022-01-01 RX ADMIN — CLOPIDOGREL BISULFATE 75 MG: 75 TABLET ORAL at 10:32

## 2022-01-01 RX ADMIN — PANCRELIPASE 2 CAPSULE: 60000; 12000; 38000 CAPSULE, DELAYED RELEASE PELLETS ORAL at 10:30

## 2022-01-01 RX ADMIN — FUROSEMIDE 80 MG: 10 INJECTION, SOLUTION INTRAVENOUS at 05:29

## 2022-01-01 RX ADMIN — PANTOPRAZOLE SODIUM 40 MG: 40 TABLET, DELAYED RELEASE ORAL at 20:07

## 2022-01-01 RX ADMIN — CALCIUM 500 MG: 500 TABLET ORAL at 07:43

## 2022-01-01 RX ADMIN — PANCRELIPASE 2 CAPSULE: 60000; 12000; 38000 CAPSULE, DELAYED RELEASE PELLETS ORAL at 11:36

## 2022-01-01 RX ADMIN — BUMETANIDE 4 MG: 0.25 INJECTION, SOLUTION INTRAMUSCULAR; INTRAVENOUS at 17:54

## 2022-01-01 RX ADMIN — FUROSEMIDE 40 MG: 10 INJECTION, SOLUTION INTRAMUSCULAR; INTRAVENOUS at 18:11

## 2022-01-01 RX ADMIN — CLOPIDOGREL BISULFATE 75 MG: 75 TABLET ORAL at 08:10

## 2022-01-01 RX ADMIN — BUMETANIDE 4 MG: 0.25 INJECTION, SOLUTION INTRAMUSCULAR; INTRAVENOUS at 13:55

## 2022-01-01 RX ADMIN — ASPIRIN 81 MG CHEWABLE TABLET 81 MG: 81 TABLET CHEWABLE at 08:48

## 2022-01-01 RX ADMIN — CALCIUM 500 MG: 500 TABLET ORAL at 20:07

## 2022-01-01 RX ADMIN — PANTOPRAZOLE SODIUM 40 MG: 40 TABLET, DELAYED RELEASE ORAL at 08:10

## 2022-01-01 RX ADMIN — CALCIUM 500 MG: 500 TABLET ORAL at 08:11

## 2022-01-01 RX ADMIN — INSULIN ASPART 4 UNITS: 100 INJECTION, SOLUTION INTRAVENOUS; SUBCUTANEOUS at 08:11

## 2022-01-01 RX ADMIN — HUMAN INSULIN 6 UNITS/HR: 100 INJECTION, SOLUTION SUBCUTANEOUS at 20:18

## 2022-01-01 RX ADMIN — MAGNESIUM SULFATE IN WATER 4 G: 40 INJECTION, SOLUTION INTRAVENOUS at 08:49

## 2022-01-01 RX ADMIN — GABAPENTIN 200 MG: 100 CAPSULE ORAL at 04:21

## 2022-01-01 RX ADMIN — SODIUM CHLORIDE 500 ML: 9 INJECTION, SOLUTION INTRAVENOUS at 21:06

## 2022-01-01 RX ADMIN — DOBUTAMINE HYDROCHLORIDE 2.5 MCG/KG/MIN: 200 INJECTION INTRAVENOUS at 15:58

## 2022-01-01 RX ADMIN — ONDANSETRON 4 MG: 2 INJECTION INTRAMUSCULAR; INTRAVENOUS at 21:56

## 2022-01-01 RX ADMIN — IPRATROPIUM BROMIDE AND ALBUTEROL SULFATE 3 ML: 2.5; .5 SOLUTION RESPIRATORY (INHALATION) at 19:05

## 2022-01-01 RX ADMIN — INSULIN ASPART 5 UNITS: 100 INJECTION, SOLUTION INTRAVENOUS; SUBCUTANEOUS at 11:35

## 2022-01-01 RX ADMIN — PANTOPRAZOLE SODIUM 40 MG: 40 TABLET, DELAYED RELEASE ORAL at 07:42

## 2022-01-01 RX ADMIN — PANCRELIPASE 2 CAPSULE: 60000; 12000; 38000 CAPSULE, DELAYED RELEASE PELLETS ORAL at 08:10

## 2022-01-01 RX ADMIN — METHYLPREDNISOLONE SODIUM SUCCINATE 125 MG: 125 INJECTION, POWDER, FOR SOLUTION INTRAMUSCULAR; INTRAVENOUS at 18:13

## 2022-01-01 RX ADMIN — PANTOPRAZOLE SODIUM 40 MG: 40 TABLET, DELAYED RELEASE ORAL at 20:31

## 2022-01-01 RX ADMIN — INSULIN GLARGINE 28 UNITS: 100 INJECTION, SOLUTION SUBCUTANEOUS at 08:11

## 2022-01-01 RX ADMIN — GABAPENTIN 200 MG: 100 CAPSULE ORAL at 17:18

## 2022-01-01 RX ADMIN — IPRATROPIUM BROMIDE AND ALBUTEROL SULFATE 3 ML: .5; 3 SOLUTION RESPIRATORY (INHALATION) at 20:15

## 2022-01-01 RX ADMIN — PANCRELIPASE 2 CAPSULE: 60000; 12000; 38000 CAPSULE, DELAYED RELEASE PELLETS ORAL at 07:43

## 2022-01-01 RX ADMIN — ATORVASTATIN CALCIUM 80 MG: 40 TABLET, FILM COATED ORAL at 21:28

## 2022-01-01 RX ADMIN — MAGNESIUM SULFATE IN WATER 4 G: 40 INJECTION, SOLUTION INTRAVENOUS at 05:48

## 2022-01-01 RX ADMIN — CALCIUM 500 MG: 500 TABLET ORAL at 20:32

## 2022-01-01 RX ADMIN — CLOPIDOGREL BISULFATE 75 MG: 75 TABLET ORAL at 08:49

## 2022-01-01 RX ADMIN — ALBUMIN HUMAN 25 G: 0.25 SOLUTION INTRAVENOUS at 22:33

## 2022-01-01 RX ADMIN — BUMETANIDE 4 MG: 0.25 INJECTION, SOLUTION INTRAMUSCULAR; INTRAVENOUS at 19:06

## 2022-01-01 RX ADMIN — ATORVASTATIN CALCIUM 80 MG: 40 TABLET, FILM COATED ORAL at 21:48

## 2022-01-01 RX ADMIN — HEPARIN SODIUM 5000 UNITS: 5000 INJECTION, SOLUTION INTRAVENOUS; SUBCUTANEOUS at 08:10

## 2022-01-01 RX ADMIN — PANCRELIPASE 2 CAPSULE: 60000; 12000; 38000 CAPSULE, DELAYED RELEASE PELLETS ORAL at 17:55

## 2022-01-01 RX ADMIN — GABAPENTIN 200 MG: 100 CAPSULE ORAL at 21:28

## 2022-01-01 RX ADMIN — AZITHROMYCIN MONOHYDRATE 500 MG: 500 INJECTION, POWDER, LYOPHILIZED, FOR SOLUTION INTRAVENOUS at 20:11

## 2022-01-01 RX ADMIN — ASPIRIN 81 MG CHEWABLE TABLET 81 MG: 81 TABLET CHEWABLE at 07:42

## 2022-01-01 RX ADMIN — PANCRELIPASE 2 CAPSULE: 60000; 12000; 38000 CAPSULE, DELAYED RELEASE PELLETS ORAL at 17:39

## 2022-01-01 RX ADMIN — PANCRELIPASE 2 CAPSULE: 60000; 12000; 38000 CAPSULE, DELAYED RELEASE PELLETS ORAL at 08:49

## 2022-01-01 RX ADMIN — MOMETASONE FUROATE 2 PUFF: 220 INHALANT RESPIRATORY (INHALATION) at 20:32

## 2022-01-01 RX ADMIN — PANTOPRAZOLE SODIUM 40 MG: 40 TABLET, DELAYED RELEASE ORAL at 20:19

## 2022-01-01 RX ADMIN — HUMAN INSULIN 5.5 UNITS/HR: 100 INJECTION, SOLUTION SUBCUTANEOUS at 14:52

## 2022-01-01 RX ADMIN — PANTOPRAZOLE SODIUM 40 MG: 40 TABLET, DELAYED RELEASE ORAL at 10:32

## 2022-01-01 RX ADMIN — PANCRELIPASE 2 CAPSULE: 60000; 12000; 38000 CAPSULE, DELAYED RELEASE PELLETS ORAL at 17:18

## 2022-01-01 RX ADMIN — CEFTRIAXONE 1 G: 1 INJECTION, POWDER, FOR SOLUTION INTRAMUSCULAR; INTRAVENOUS at 19:48

## 2022-01-01 RX ADMIN — ATORVASTATIN CALCIUM 80 MG: 40 TABLET, FILM COATED ORAL at 04:21

## 2022-01-01 RX ADMIN — FUROSEMIDE 80 MG: 10 INJECTION, SOLUTION INTRAVENOUS at 09:16

## 2022-01-01 RX ADMIN — GABAPENTIN 200 MG: 100 CAPSULE ORAL at 21:49

## 2022-01-01 RX ADMIN — CALCIUM 500 MG: 500 TABLET ORAL at 20:19

## 2022-01-01 ASSESSMENT — ACTIVITIES OF DAILY LIVING (ADL)
ADLS_ACUITY_SCORE: 26
ADLS_ACUITY_SCORE: 24
ADLS_ACUITY_SCORE: 25
PREVIOUS_RESPONSIBILITIES: MEAL PREP;HOUSEKEEPING;LAUNDRY;SHOPPING;YARDWORK;MEDICATION MANAGEMENT;FINANCES;DRIVING
ADLS_ACUITY_SCORE: 24
ADLS_ACUITY_SCORE: 25
ADLS_ACUITY_SCORE: 26
ADLS_ACUITY_SCORE: 25
ADLS_ACUITY_SCORE: 24
ADLS_ACUITY_SCORE: 24
ADLS_ACUITY_SCORE: 31
ADLS_ACUITY_SCORE: 25
ADLS_ACUITY_SCORE: 25
DOING_ERRANDS_INDEPENDENTLY_DIFFICULTY: NO
ADLS_ACUITY_SCORE: 22
ADLS_ACUITY_SCORE: 28
FALL_HISTORY_WITHIN_LAST_SIX_MONTHS: YES
ADLS_ACUITY_SCORE: 26
EQUIPMENT_CURRENTLY_USED_AT_HOME: WALKER, ROLLING
ADLS_ACUITY_SCORE: 26
CHANGE_IN_FUNCTIONAL_STATUS_SINCE_ONSET_OF_CURRENT_ILLNESS/INJURY: NO
TRANSFERRING: 0-->INDEPENDENT
DRESSING/BATHING_DIFFICULTY: NO
WALKING_OR_CLIMBING_STAIRS_DIFFICULTY: YES
ADLS_ACUITY_SCORE: 25
ADLS_ACUITY_SCORE: 25
TOILETING_ISSUES: NO
ADLS_ACUITY_SCORE: 24
ADLS_ACUITY_SCORE: 25
ADLS_ACUITY_SCORE: 25
NUMBER_OF_TIMES_PATIENT_HAS_FALLEN_WITHIN_LAST_SIX_MONTHS: 1
ADLS_ACUITY_SCORE: 25
ADLS_ACUITY_SCORE: 24
ADLS_ACUITY_SCORE: 25
ADLS_ACUITY_SCORE: 26
TRANSFERRING: 0-->INDEPENDENT
ADLS_ACUITY_SCORE: 25
ADLS_ACUITY_SCORE: 24
DIFFICULTY_EATING/SWALLOWING: NO
ADLS_ACUITY_SCORE: 25
DIFFICULTY_COMMUNICATING: NO
ADLS_ACUITY_SCORE: 28
ADLS_ACUITY_SCORE: 24
ADLS_ACUITY_SCORE: 25
CONCENTRATING,_REMEMBERING_OR_MAKING_DECISIONS_DIFFICULTY: NO
ADLS_ACUITY_SCORE: 25
ADLS_ACUITY_SCORE: 25
ADLS_ACUITY_SCORE: 26
ADLS_ACUITY_SCORE: 28
DEPENDENT_IADLS:: INDEPENDENT
WEAR_GLASSES_OR_BLIND: YES

## 2022-01-01 ASSESSMENT — PAIN SCALES - GENERAL: PAINLEVEL: MILD PAIN (2)

## 2022-01-03 ENCOUNTER — LAB (OUTPATIENT)
Dept: LAB | Facility: CLINIC | Age: 72
End: 2022-01-03
Payer: COMMERCIAL

## 2022-01-03 DIAGNOSIS — Z11.59 ENCOUNTER FOR SCREENING FOR OTHER VIRAL DISEASES: ICD-10-CM

## 2022-01-03 PROCEDURE — U0003 INFECTIOUS AGENT DETECTION BY NUCLEIC ACID (DNA OR RNA); SEVERE ACUTE RESPIRATORY SYNDROME CORONAVIRUS 2 (SARS-COV-2) (CORONAVIRUS DISEASE [COVID-19]), AMPLIFIED PROBE TECHNIQUE, MAKING USE OF HIGH THROUGHPUT TECHNOLOGIES AS DESCRIBED BY CMS-2020-01-R: HCPCS

## 2022-01-03 PROCEDURE — U0005 INFEC AGEN DETEC AMPLI PROBE: HCPCS

## 2022-01-04 ENCOUNTER — HOSPITAL ENCOUNTER (OUTPATIENT)
Dept: CARDIOLOGY | Facility: CLINIC | Age: 72
Discharge: HOME | End: 2022-01-04
Attending: INTERNAL MEDICINE | Admitting: INTERNAL MEDICINE
Payer: COMMERCIAL

## 2022-01-04 DIAGNOSIS — Z95.810 ICD (IMPLANTABLE CARDIOVERTER-DEFIBRILLATOR) IN PLACE: ICD-10-CM

## 2022-01-04 DIAGNOSIS — I42.9 CARDIOMYOPATHY (H): Primary | ICD-10-CM

## 2022-01-04 LAB
MDC_IDC_LEAD_IMPLANT_DT: NORMAL
MDC_IDC_LEAD_IMPLANT_DT: NORMAL
MDC_IDC_LEAD_LOCATION: NORMAL
MDC_IDC_LEAD_LOCATION: NORMAL
MDC_IDC_LEAD_LOCATION_DETAIL_1: NORMAL
MDC_IDC_LEAD_LOCATION_DETAIL_1: NORMAL
MDC_IDC_LEAD_MFG: NORMAL
MDC_IDC_LEAD_MFG: NORMAL
MDC_IDC_LEAD_MODEL: NORMAL
MDC_IDC_LEAD_MODEL: NORMAL
MDC_IDC_LEAD_POLARITY_TYPE: NORMAL
MDC_IDC_LEAD_POLARITY_TYPE: NORMAL
MDC_IDC_LEAD_SERIAL: NORMAL
MDC_IDC_LEAD_SERIAL: NORMAL
MDC_IDC_MSMT_BATTERY_DTM: NORMAL
MDC_IDC_MSMT_BATTERY_REMAINING_LONGEVITY: 76 MO
MDC_IDC_MSMT_BATTERY_RRT_TRIGGER: 2.73
MDC_IDC_MSMT_BATTERY_STATUS: NORMAL
MDC_IDC_MSMT_BATTERY_VOLTAGE: 2.99 V
MDC_IDC_MSMT_CAP_CHARGE_DTM: NORMAL
MDC_IDC_MSMT_CAP_CHARGE_ENERGY: 18 J
MDC_IDC_MSMT_CAP_CHARGE_TIME: 3.92
MDC_IDC_MSMT_CAP_CHARGE_TYPE: NORMAL
MDC_IDC_MSMT_LEADCHNL_RA_IMPEDANCE_VALUE: 456 OHM
MDC_IDC_MSMT_LEADCHNL_RA_PACING_THRESHOLD_AMPLITUDE: 0.88 V
MDC_IDC_MSMT_LEADCHNL_RA_PACING_THRESHOLD_PULSEWIDTH: 0.4 MS
MDC_IDC_MSMT_LEADCHNL_RA_SENSING_INTR_AMPL: 3.38 MV
MDC_IDC_MSMT_LEADCHNL_RA_SENSING_INTR_AMPL: 3.75 MV
MDC_IDC_MSMT_LEADCHNL_RV_IMPEDANCE_VALUE: 323 OHM
MDC_IDC_MSMT_LEADCHNL_RV_IMPEDANCE_VALUE: 570 OHM
MDC_IDC_MSMT_LEADCHNL_RV_PACING_THRESHOLD_AMPLITUDE: 1 V
MDC_IDC_MSMT_LEADCHNL_RV_PACING_THRESHOLD_PULSEWIDTH: 0.4 MS
MDC_IDC_MSMT_LEADCHNL_RV_SENSING_INTR_AMPL: 7.5 MV
MDC_IDC_MSMT_LEADCHNL_RV_SENSING_INTR_AMPL: 8.88 MV
MDC_IDC_PG_IMPLANT_DTM: NORMAL
MDC_IDC_PG_MFG: NORMAL
MDC_IDC_PG_MODEL: NORMAL
MDC_IDC_PG_SERIAL: NORMAL
MDC_IDC_PG_TYPE: NORMAL
MDC_IDC_SESS_CLINIC_NAME: NORMAL
MDC_IDC_SESS_DTM: NORMAL
MDC_IDC_SESS_TYPE: NORMAL
MDC_IDC_SET_BRADY_AT_MODE_SWITCH_RATE: 171 {BEATS}/MIN
MDC_IDC_SET_BRADY_HYSTRATE: NORMAL
MDC_IDC_SET_BRADY_LOWRATE: 55 {BEATS}/MIN
MDC_IDC_SET_BRADY_MAX_SENSOR_RATE: 140 {BEATS}/MIN
MDC_IDC_SET_BRADY_MAX_TRACKING_RATE: 140 {BEATS}/MIN
MDC_IDC_SET_BRADY_MODE: NORMAL
MDC_IDC_SET_BRADY_PAV_DELAY_LOW: 180 MS
MDC_IDC_SET_BRADY_SAV_DELAY_LOW: 150 MS
MDC_IDC_SET_LEADCHNL_RA_PACING_AMPLITUDE: 1.5 V
MDC_IDC_SET_LEADCHNL_RA_PACING_ANODE_ELECTRODE_1: NORMAL
MDC_IDC_SET_LEADCHNL_RA_PACING_ANODE_LOCATION_1: NORMAL
MDC_IDC_SET_LEADCHNL_RA_PACING_CAPTURE_MODE: NORMAL
MDC_IDC_SET_LEADCHNL_RA_PACING_CATHODE_ELECTRODE_1: NORMAL
MDC_IDC_SET_LEADCHNL_RA_PACING_CATHODE_LOCATION_1: NORMAL
MDC_IDC_SET_LEADCHNL_RA_PACING_POLARITY: NORMAL
MDC_IDC_SET_LEADCHNL_RA_PACING_PULSEWIDTH: 0.4 MS
MDC_IDC_SET_LEADCHNL_RA_SENSING_ANODE_ELECTRODE_1: NORMAL
MDC_IDC_SET_LEADCHNL_RA_SENSING_ANODE_LOCATION_1: NORMAL
MDC_IDC_SET_LEADCHNL_RA_SENSING_CATHODE_ELECTRODE_1: NORMAL
MDC_IDC_SET_LEADCHNL_RA_SENSING_CATHODE_LOCATION_1: NORMAL
MDC_IDC_SET_LEADCHNL_RA_SENSING_POLARITY: NORMAL
MDC_IDC_SET_LEADCHNL_RA_SENSING_SENSITIVITY: 0.3 MV
MDC_IDC_SET_LEADCHNL_RV_PACING_AMPLITUDE: 2 V
MDC_IDC_SET_LEADCHNL_RV_PACING_ANODE_ELECTRODE_1: NORMAL
MDC_IDC_SET_LEADCHNL_RV_PACING_ANODE_LOCATION_1: NORMAL
MDC_IDC_SET_LEADCHNL_RV_PACING_CAPTURE_MODE: NORMAL
MDC_IDC_SET_LEADCHNL_RV_PACING_CATHODE_ELECTRODE_1: NORMAL
MDC_IDC_SET_LEADCHNL_RV_PACING_CATHODE_LOCATION_1: NORMAL
MDC_IDC_SET_LEADCHNL_RV_PACING_POLARITY: NORMAL
MDC_IDC_SET_LEADCHNL_RV_PACING_PULSEWIDTH: 0.4 MS
MDC_IDC_SET_LEADCHNL_RV_SENSING_ANODE_ELECTRODE_1: NORMAL
MDC_IDC_SET_LEADCHNL_RV_SENSING_ANODE_LOCATION_1: NORMAL
MDC_IDC_SET_LEADCHNL_RV_SENSING_CATHODE_ELECTRODE_1: NORMAL
MDC_IDC_SET_LEADCHNL_RV_SENSING_CATHODE_LOCATION_1: NORMAL
MDC_IDC_SET_LEADCHNL_RV_SENSING_POLARITY: NORMAL
MDC_IDC_SET_LEADCHNL_RV_SENSING_SENSITIVITY: 0.3 MV
MDC_IDC_SET_ZONE_DETECTION_BEATS_DENOMINATOR: 40 {BEATS}
MDC_IDC_SET_ZONE_DETECTION_BEATS_NUMERATOR: 30 {BEATS}
MDC_IDC_SET_ZONE_DETECTION_INTERVAL: 320 MS
MDC_IDC_SET_ZONE_DETECTION_INTERVAL: 350 MS
MDC_IDC_SET_ZONE_DETECTION_INTERVAL: 360 MS
MDC_IDC_SET_ZONE_DETECTION_INTERVAL: 400 MS
MDC_IDC_SET_ZONE_DETECTION_INTERVAL: NORMAL
MDC_IDC_SET_ZONE_TYPE: NORMAL
MDC_IDC_STAT_AT_BURDEN_PERCENT: 0 %
MDC_IDC_STAT_AT_DTM_END: NORMAL
MDC_IDC_STAT_AT_DTM_START: NORMAL
MDC_IDC_STAT_BRADY_AP_VP_PERCENT: 0.54 %
MDC_IDC_STAT_BRADY_AP_VS_PERCENT: 36.19 %
MDC_IDC_STAT_BRADY_AS_VP_PERCENT: 0.04 %
MDC_IDC_STAT_BRADY_AS_VS_PERCENT: 63.24 %
MDC_IDC_STAT_BRADY_DTM_END: NORMAL
MDC_IDC_STAT_BRADY_DTM_START: NORMAL
MDC_IDC_STAT_BRADY_RA_PERCENT_PACED: 36.74 %
MDC_IDC_STAT_BRADY_RV_PERCENT_PACED: 0.6 %
MDC_IDC_STAT_EPISODE_RECENT_COUNT: 0
MDC_IDC_STAT_EPISODE_RECENT_COUNT_DTM_END: NORMAL
MDC_IDC_STAT_EPISODE_RECENT_COUNT_DTM_START: NORMAL
MDC_IDC_STAT_EPISODE_TOTAL_COUNT: 0
MDC_IDC_STAT_EPISODE_TOTAL_COUNT: 2
MDC_IDC_STAT_EPISODE_TOTAL_COUNT_DTM_END: NORMAL
MDC_IDC_STAT_EPISODE_TOTAL_COUNT_DTM_START: NORMAL
MDC_IDC_STAT_EPISODE_TYPE: NORMAL
MDC_IDC_STAT_TACHYTHERAPY_ATP_DELIVERED_RECENT: 0
MDC_IDC_STAT_TACHYTHERAPY_ATP_DELIVERED_TOTAL: 0
MDC_IDC_STAT_TACHYTHERAPY_RECENT_DTM_END: NORMAL
MDC_IDC_STAT_TACHYTHERAPY_RECENT_DTM_START: NORMAL
MDC_IDC_STAT_TACHYTHERAPY_SHOCKS_ABORTED_RECENT: 0
MDC_IDC_STAT_TACHYTHERAPY_SHOCKS_ABORTED_TOTAL: 0
MDC_IDC_STAT_TACHYTHERAPY_SHOCKS_DELIVERED_RECENT: 0
MDC_IDC_STAT_TACHYTHERAPY_SHOCKS_DELIVERED_TOTAL: 0
MDC_IDC_STAT_TACHYTHERAPY_TOTAL_DTM_END: NORMAL
MDC_IDC_STAT_TACHYTHERAPY_TOTAL_DTM_START: NORMAL
SARS-COV-2 RNA RESP QL NAA+PROBE: NEGATIVE

## 2022-01-04 PROCEDURE — 93283 PRGRMG EVAL IMPLANTABLE DFB: CPT | Mod: 26 | Performed by: INTERNAL MEDICINE

## 2022-01-04 PROCEDURE — 93283 PRGRMG EVAL IMPLANTABLE DFB: CPT

## 2022-01-05 ENCOUNTER — ANESTHESIA EVENT (OUTPATIENT)
Dept: GASTROENTEROLOGY | Facility: CLINIC | Age: 72
End: 2022-01-05
Payer: COMMERCIAL

## 2022-01-05 ASSESSMENT — COPD QUESTIONNAIRES: COPD: 1

## 2022-01-05 ASSESSMENT — LIFESTYLE VARIABLES: TOBACCO_USE: 1

## 2022-01-05 NOTE — ANESTHESIA PREPROCEDURE EVALUATION
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%     Diabetes (H)      Gastro-oesophageal reflux disease      H/O: alcohol abuse      HTN (hypertension)      Hyperlipidaemia LDL goal <100 2013     Hypokalemia      Pacemaker      Pancreatitis 06 Hosp      Past Surgical History:   Procedure Laterality Date     CATARACT IOL, RT/LT      Cataract IOL RT/LT     ENDARTERECTOMY CAROTID Right 2015    Procedure: ENDARTERECTOMY CAROTID;  Surgeon: João Turner MD;  Location:  OR     ENDARTERECTOMY CAROTID Left 2017    Procedure: ENDARTERECTOMY CAROTID;  LEFT CAROTID ENDARTERECTOMY WITH EEG;  Surgeon: João Turner MD;  Location:  OR     IMPLANT PACEMAKER  6/10/2010     INTERPOSITION TENDON HAND N/A 2020    Procedure: Right thumb ligament reconstruction tendon interposition with extensor pollicis brevis to abductor pollicis tendon transfer;  Surgeon: Bethel Martin MD;  Location: [facility] OR     RELEASE CARPAL TUNNEL Right 2016    Procedure: RELEASE CARPAL TUNNEL;  Surgeon: Lissy Leger MD;  Location: [facility] OR     SURGICAL HISTORY OF -       Laminectomy     TONSILLECTOMY & ADENOIDECTOMY       Z CABG, ARTERIAL, THREE        Allergies   Allergen Reactions     Hydrocodone GI Disturbance     Upset stomach     Shellfish Allergy Hives and Rash      Social History     Tobacco Use     Smoking status: Current Every Day Smoker     Packs/day:  1.50     Years: 50.00     Pack years: 75.00     Types: Cigarettes     Smokeless tobacco: Never Used     Tobacco comment: 1 2-2 PPD 3/11/21   Substance Use Topics     Alcohol use: No      Wt Readings from Last 1 Encounters:   21 73.7 kg (162 lb 8 oz)        Anesthesia Evaluation   Pt has had prior anesthetic. Type: General and MAC.    No history of anesthetic complications       ROS/MED HX  ENT/Pulmonary:     (+) tobacco use, Current use, 2 packs/day, 75  Pack-Year Hx,  COPD,     Neurologic:  - neg neurologic ROS     Cardiovascular:     (+) Dyslipidemia hypertension--CAD -past MI CABG--CHF pacemaker ([employer] Evera (D) ICD Device Check ), Reason placed: cardiomyopathy EF 20-25%. settings: AAIR - DDDR 55 - 140, - Patientt is not dependent on pacemaker. Irregular Heartbeat/Palpitations, Previous cardiac testing   Echo: Date: 10/12/21 Results:  [facility]  Echocardiography Laboratory  [address] [place] [address]     Name: CARLIN QUEEN  MRN: [id number]  : 1950  Study Date: 10/12/2021 07:51 AM  Age: 71 yrs  Gender: Male  Patient Location: [facility]  Reason For Study: Chronic systolic congestive heart failure (H)  Ordering Physician: NORMAN BARRERA  Referring Physician: Danii Hernández  Performed By: Lila Kelley RDCS     BSA: 1.9 m2  Height: 70 in  Weight: 155 lb  HR: 64  BP: 81/52 mmHg  ______________________________________________________________________________  Procedure  Complete Echo Adult.  ______________________________________________________________________________  Interpretation Summary     Left ventricular systolic function is severely reduced.The visual ejection  fraction is 20-25%.The left ventricle is mildly dilated.  Moderately decreased right ventricular systolic function  The left atrium is severely dilated.  There is mild (1+) mitral regurgitation.  There is mild (1+) tricuspid regurgitation.  Pulmonary hypertension- RVSP 54 mm hg +RA.     Compared to echo  dated 11/23/2020 no significant changes.  ______________________________________________________________________________  Left Ventricle  The left ventricle is mildly dilated. There is severe eccentric left  ventricular hypertrophy. Left ventricular systolic function is severely  reduced. The visual ejection fraction is 20-25%. Diastolic Doppler findings  (E/E' ratio and/or other parameters) suggest left ventricular filling  pressures are increased. There is severe global hypokinesia of the left  ventricle.     Right Ventricle  The right ventricle is normal size. Moderately decreased right ventricular  systolic function. There is a pacemaker lead in the right ventricle.     Atria  The left atrium is severely dilated. Pacer wire in right atrium. The right  atrium is mildly dilated. There is no color Doppler evidence of an atrial  shunt.     Mitral Valve  There is mild (1+) mitral regurgitation.     Tricuspid Valve  There is mild (1+) tricuspid regurgitation. The right ventricular systolic  pressure is approximated at 54.0 mmHg plus the right atrial pressure.  Pulmonary hypertension.     Aortic Valve  The aortic valve is trileaflet with aortic valve sclerosis. No aortic  regurgitation is present. No aortic stenosis is present.     Pulmonic Valve  There is trace pulmonic valvular regurgitation. There is no pulmonic valvular  stenosis.     Vessels  The aortic root is normal size. Normal size ascending aorta. The inferior vena  cava was normal in size with preserved respiratory variability.     Pericardium  There is no pericardial effusion.     Rhythm  The rhythm was sinus bradycardia  Stress Test: Date: Results:    ECG Reviewed: Date: 11/16/20 Results:  Sinus Rhythm   -Right sided conduction defect and right axis -possible right ventricular hypertrophy or posterior fascicular block Low voltage with rightward P-axis and rotation -possible pulmonary disease.    -  Nonspecific T-abnormality.     ABNORMAL   Cath:  Date:  Results:      METS/Exercise Tolerance:     Hematologic: Comments: thrombocytopenia    (+) anemia,     Musculoskeletal:   (+) arthritis,     GI/Hepatic:     (+) GERD, Asymptomatic on medication,     Renal/Genitourinary:     (+) renal disease, type: CRI,     Endo:     (+) type II DM, Last HgA1c: 9.6, date: 11/11/21, Using insulin,     Psychiatric/Substance Use:     (+) alcohol abuse     Infectious Disease:  - neg infectious disease ROS     Malignancy:  - neg malignancy ROS     Other:  - neg other ROS          Physical Exam    Airway        Mallampati: II   TM distance: > 3 FB   Neck ROM: full   Mouth opening: > 3 cm    Respiratory Devices and Support         Dental           Cardiovascular   cardiovascular exam normal          Pulmonary   pulmonary exam normal                OUTSIDE LABS:  CBC:   Lab Results   Component Value Date    WBC 6.7 07/22/2021    WBC 6.3 01/21/2021    HGB 14.1 07/22/2021    HGB 15.0 01/21/2021    HCT 44.0 07/22/2021    HCT 46.2 01/21/2021     (L) 07/22/2021     (L) 01/21/2021     BMP:   Lab Results   Component Value Date     11/11/2021     07/22/2021    POTASSIUM 4.9 11/11/2021    POTASSIUM 5.4 (H) 07/22/2021    CHLORIDE 105 11/11/2021    CHLORIDE 101 07/22/2021    CO2 30 11/11/2021    CO2 29 07/22/2021    BUN 37 (H) 11/11/2021    BUN 35 (H) 07/22/2021    CR 1.39 (H) 11/11/2021    CR 1.4 (H) 11/09/2021     (H) 11/11/2021     (H) 07/22/2021     COAGS:   Lab Results   Component Value Date    PTT 31 12/22/2017    INR 1.02 12/22/2017     POC:   Lab Results   Component Value Date     (H) 10/22/2020     HEPATIC:   Lab Results   Component Value Date    ALBUMIN 3.0 (L) 07/22/2021    PROTTOTAL 6.6 (L) 07/22/2021    ALT 32 07/22/2021    AST 35 07/22/2021    ALKPHOS 145 07/22/2021    BILITOTAL 0.4 07/22/2021     OTHER:   Lab Results   Component Value Date    PH 7.56 (H) 11/22/2004    LACT 1.6 10/21/2020    A1C 9.6 (H) 11/11/2021    MARVA 8.7 11/11/2021     PHOS 3.7 07/22/2021    MAG 1.5 (L) 07/22/2021    LIPASE Quantity not sufficient 05/29/2010    AMYLASE 126 (H) 02/17/2010    TSH 3.84 10/19/2016    CRP 3.2 07/22/2021    SED 9 07/22/2021       Anesthesia Plan    ASA Status:  4   NPO Status:  NPO Appropriate    Anesthesia Type: General.     - Airway: Native airway              Consents    Anesthesia Plan(s) and associated risks, benefits, and realistic alternatives discussed. Questions answered and patient/representative(s) expressed understanding.     - Discussed: Risks, Benefits and Alternatives for BOTH SEDATION and the PROCEDURE were discussed     - Discussed with:  Patient         Postoperative Care            Comments:                ANA Avelar CRNA

## 2022-01-06 ENCOUNTER — HOSPITAL ENCOUNTER (OUTPATIENT)
Facility: CLINIC | Age: 72
Discharge: HOME | End: 2022-01-06
Attending: SURGERY | Admitting: SURGERY
Payer: COMMERCIAL

## 2022-01-06 ENCOUNTER — ANESTHESIA (OUTPATIENT)
Dept: GASTROENTEROLOGY | Facility: CLINIC | Age: 72
End: 2022-01-06
Payer: COMMERCIAL

## 2022-01-06 VITALS
HEART RATE: 81 BPM | WEIGHT: 165 LBS | OXYGEN SATURATION: 94 % | TEMPERATURE: 98.2 F | SYSTOLIC BLOOD PRESSURE: 109 MMHG | DIASTOLIC BLOOD PRESSURE: 71 MMHG | RESPIRATION RATE: 16 BRPM | BODY MASS INDEX: 23.62 KG/M2 | HEIGHT: 70 IN

## 2022-01-06 LAB
COLONOSCOPY: NORMAL
GLUCOSE BLDC GLUCOMTR-MCNC: 123 MG/DL (ref 70–99)
GLUCOSE BLDC GLUCOMTR-MCNC: 154 MG/DL (ref 70–99)

## 2022-01-06 PROCEDURE — 82962 GLUCOSE BLOOD TEST: CPT

## 2022-01-06 PROCEDURE — 88305 TISSUE EXAM BY PATHOLOGIST: CPT | Mod: TC | Performed by: SURGERY

## 2022-01-06 PROCEDURE — 250N000009 HC RX 250: Performed by: SURGERY

## 2022-01-06 PROCEDURE — 45380 COLONOSCOPY AND BIOPSY: CPT | Mod: 59 | Performed by: SURGERY

## 2022-01-06 PROCEDURE — 250N000011 HC RX IP 250 OP 636: Performed by: NURSE ANESTHETIST, CERTIFIED REGISTERED

## 2022-01-06 PROCEDURE — 45384 COLONOSCOPY W/LESION REMOVAL: CPT | Mod: PT

## 2022-01-06 PROCEDURE — 44389 COLONOSCOPY WITH BIOPSY: CPT | Performed by: SURGERY

## 2022-01-06 PROCEDURE — 258N000003 HC RX IP 258 OP 636: Performed by: NURSE ANESTHETIST, CERTIFIED REGISTERED

## 2022-01-06 PROCEDURE — 370N000017 HC ANESTHESIA TECHNICAL FEE, PER MIN: Performed by: SURGERY

## 2022-01-06 PROCEDURE — 250N000009 HC RX 250: Performed by: NURSE ANESTHETIST, CERTIFIED REGISTERED

## 2022-01-06 PROCEDURE — 45384 COLONOSCOPY W/LESION REMOVAL: CPT | Mod: PT | Performed by: SURGERY

## 2022-01-06 PROCEDURE — 258N000003 HC RX IP 258 OP 636: Performed by: SURGERY

## 2022-01-06 PROCEDURE — 45380 COLONOSCOPY AND BIOPSY: CPT | Mod: PT,XU | Performed by: SURGERY

## 2022-01-06 RX ORDER — SODIUM CHLORIDE, SODIUM LACTATE, POTASSIUM CHLORIDE, CALCIUM CHLORIDE 600; 310; 30; 20 MG/100ML; MG/100ML; MG/100ML; MG/100ML
INJECTION, SOLUTION INTRAVENOUS CONTINUOUS
Status: DISCONTINUED | OUTPATIENT
Start: 2022-01-06 | End: 2022-01-06 | Stop reason: HOSPADM

## 2022-01-06 RX ORDER — LIDOCAINE 40 MG/G
CREAM TOPICAL
Status: DISCONTINUED | OUTPATIENT
Start: 2022-01-06 | End: 2022-01-06 | Stop reason: HOSPADM

## 2022-01-06 RX ORDER — ONDANSETRON 2 MG/ML
4 INJECTION INTRAMUSCULAR; INTRAVENOUS EVERY 30 MIN PRN
Status: CANCELLED | OUTPATIENT
Start: 2022-01-06

## 2022-01-06 RX ORDER — ONDANSETRON 2 MG/ML
4 INJECTION INTRAMUSCULAR; INTRAVENOUS
Status: DISCONTINUED | OUTPATIENT
Start: 2022-01-06 | End: 2022-01-06 | Stop reason: HOSPADM

## 2022-01-06 RX ORDER — ONDANSETRON 4 MG/1
4 TABLET, ORALLY DISINTEGRATING ORAL EVERY 30 MIN PRN
Status: CANCELLED | OUTPATIENT
Start: 2022-01-06

## 2022-01-06 RX ORDER — LIDOCAINE HYDROCHLORIDE 10 MG/ML
INJECTION, SOLUTION EPIDURAL; INFILTRATION; INTRACAUDAL; PERINEURAL PRN
Status: DISCONTINUED | OUTPATIENT
Start: 2022-01-06 | End: 2022-01-06

## 2022-01-06 RX ORDER — SODIUM CHLORIDE, SODIUM LACTATE, POTASSIUM CHLORIDE, CALCIUM CHLORIDE 600; 310; 30; 20 MG/100ML; MG/100ML; MG/100ML; MG/100ML
INJECTION, SOLUTION INTRAVENOUS CONTINUOUS
Status: CANCELLED | OUTPATIENT
Start: 2022-01-06

## 2022-01-06 RX ORDER — PROPOFOL 10 MG/ML
INJECTION, EMULSION INTRAVENOUS PRN
Status: DISCONTINUED | OUTPATIENT
Start: 2022-01-06 | End: 2022-01-06

## 2022-01-06 RX ORDER — FENTANYL CITRATE-0.9 % NACL/PF 10 MCG/ML
100 PLASTIC BAG, INJECTION (ML) INTRAVENOUS EVERY 5 MIN PRN
Status: DISCONTINUED | OUTPATIENT
Start: 2022-01-06 | End: 2022-01-06 | Stop reason: HOSPADM

## 2022-01-06 RX ORDER — PROPOFOL 10 MG/ML
INJECTION, EMULSION INTRAVENOUS CONTINUOUS PRN
Status: DISCONTINUED | OUTPATIENT
Start: 2022-01-06 | End: 2022-01-06

## 2022-01-06 RX ADMIN — SODIUM CHLORIDE, POTASSIUM CHLORIDE, SODIUM LACTATE AND CALCIUM CHLORIDE: 600; 310; 30; 20 INJECTION, SOLUTION INTRAVENOUS at 09:35

## 2022-01-06 RX ADMIN — PHENYLEPHRINE HYDROCHLORIDE 100 MCG: 10 INJECTION INTRAVENOUS at 09:53

## 2022-01-06 RX ADMIN — Medication 100 MCG: at 10:29

## 2022-01-06 RX ADMIN — Medication 100 MCG: at 10:23

## 2022-01-06 RX ADMIN — PHENYLEPHRINE HYDROCHLORIDE 100 MCG: 10 INJECTION INTRAVENOUS at 09:57

## 2022-01-06 RX ADMIN — LIDOCAINE HYDROCHLORIDE 0.1 ML: 10 INJECTION, SOLUTION EPIDURAL; INFILTRATION; INTRACAUDAL; PERINEURAL at 09:35

## 2022-01-06 RX ADMIN — LIDOCAINE HYDROCHLORIDE 50 MG: 10 INJECTION, SOLUTION EPIDURAL; INFILTRATION; INTRACAUDAL; PERINEURAL at 09:46

## 2022-01-06 RX ADMIN — PROPOFOL 140 MCG/KG/MIN: 10 INJECTION, EMULSION INTRAVENOUS at 09:46

## 2022-01-06 RX ADMIN — PHENYLEPHRINE HYDROCHLORIDE 100 MCG: 10 INJECTION INTRAVENOUS at 09:51

## 2022-01-06 RX ADMIN — PROPOFOL 70 MG: 10 INJECTION, EMULSION INTRAVENOUS at 09:46

## 2022-01-06 ASSESSMENT — MIFFLIN-ST. JEOR: SCORE: 1499.69

## 2022-01-06 NOTE — ANESTHESIA CARE TRANSFER NOTE
Patient: Delbert Tilley    Procedure: Procedure(s):  COLONOSCOPY, THROUGH STOMA, WITH BIOPSY       Diagnosis: Positive colorectal cancer screening using Cologuard test [R19.5]  Diagnosis Additional Information: No value filed.    Anesthesia Type:   General     Note:    Oropharynx: oropharynx clear of all foreign objects and spontaneously breathing  Level of Consciousness: drowsy  Oxygen Supplementation: room air    Independent Airway: airway patency satisfactory and stable  Dentition: dentition unchanged  Vital Signs Stable: post-procedure vital signs reviewed and stable  Report to RN Given: handoff report given  Patient transferred to: Phase II    Handoff Report: Identifed the Patient, Identified the Reponsible Provider, Reviewed the pertinent medical history, Discussed the surgical course, Reviewed Intra-OP anesthesia mangement and issues during anesthesia, Set expectations for post-procedure period and Allowed opportunity for questions and acknowledgement of understanding      Vitals:  Vitals Value Taken Time   BP     Temp     Pulse     Resp     SpO2         Electronically Signed By: ANA Moncada CRNA  January 6, 2022  10:03 AM

## 2022-01-06 NOTE — ANESTHESIA POSTPROCEDURE EVALUATION
Patient: Delbert Tilley    Procedure: Procedure(s):  COLONOSCOPY, THROUGH STOMA, WITH BIOPSY       Diagnosis:Positive colorectal cancer screening using Cologuard test [R19.5]  Diagnosis Additional Information: No value filed.    Anesthesia Type:  General    Note:  Disposition: Outpatient   Postop Pain Control: Uneventful            Sign Out: Well controlled pain   PONV: No   Neuro/Psych: Uneventful            Sign Out: Acceptable/Baseline neuro status   Airway/Respiratory: Uneventful            Sign Out: Acceptable/Baseline resp. status   CV/Hemodynamics: Uneventful            Sign Out: Acceptable CV status; No obvious hypovolemia; No obvious fluid overload   Other NRE: NONE   DID A NON-ROUTINE EVENT OCCUR? No           Last vitals:  Vitals Value Taken Time   BP 69/47 01/06/22 1015   Temp     Pulse 57 01/06/22 1015   Resp 16 01/06/22 1012   SpO2 96 % 01/06/22 1016   Vitals shown include unvalidated device data.    Electronically Signed By: ANA Moncada CRNA  January 6, 2022  10:17 AM

## 2022-01-06 NOTE — H&P
71 year old year old male here for colonoscopy for screening.    Patient Active Problem List   Diagnosis     Cardiomyopathy (H)     Atrial fibrillation/flutter     Coronary artery disease involving native coronary artery of native heart without angina pectoris     Alcohol abuse, in remission     GERD (gastroesophageal reflux disease)     Alcohol-induced chronic pancreatitis (H)     Diabetes mellitus, type 2 (H)     Type 2 diabetes mellitus with other specified complication, with long-term current use of insulin (H)     CARDIOVASCULAR SCREENING; LDL GOAL LESS THAN 100     Hyperlipidemia LDL goal <70     ACS (acute coronary syndrome) (H)     NSTEMI (non-ST elevated myocardial infarction) (H)     Carotid stenosis     Carpal tunnel syndrome of right wrist     Left carotid stenosis     Tobacco use     NSVT (nonsustained ventricular tachycardia) (H)     Chronic systolic heart failure (H)     Paroxysmal atrial fibrillation (H)     Chronic obstructive pulmonary disease, unspecified COPD type (H)     Hypotension, unspecified hypotension type     Chronic kidney disease, stage 3 (H)     Pleural plaque     Atherosclerosis of artery of extremity with intermittent claudication (H)     Hyperparathyroidism (H)     Thrombocytopenia (H)     Other idiopathic peripheral autonomic neuropathy     Venous insufficiency (chronic) (peripheral)     Actinic keratosis     Nail dystrophy     Generalized muscle weakness     Vitamin D deficiency     Hypocalcemia     Essential hypertension     Ischemic cardiomyopathy     Other proteinuria     Hypomagnesemia     Anemia in chronic kidney disease     Diabetic nephropathy associated with type 2 diabetes mellitus (H)       Past Medical History:   Diagnosis Date     Acute renal failure (H) 8/26/06 Hosp    secondary to dehydration     Arthritis      CAD (coronary artery disease)     LIMA to the LAD, vein graft to OM and a vein graft to the PDA     Carpal tunnel syndrome      CHF (congestive heart failure)  (H)     Ischemic and nonischemic cardiomyopathy; 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%     Diabetes (H)      Gastro-oesophageal reflux disease      H/O: alcohol abuse      HTN (hypertension)      Hyperlipidaemia LDL goal <100 7/8/2013     Hypokalemia      Pacemaker      Pancreatitis 6/26/06 Hosp       Past Surgical History:   Procedure Laterality Date     CATARACT IOL, RT/LT      Cataract IOL RT/LT     ENDARTERECTOMY CAROTID Right 6/12/2015    Procedure: ENDARTERECTOMY CAROTID;  Surgeon: João Turner MD;  Location:  OR     ENDARTERECTOMY CAROTID Left 9/8/2017    Procedure: ENDARTERECTOMY CAROTID;  LEFT CAROTID ENDARTERECTOMY WITH EEG;  Surgeon: João Turner MD;  Location:  OR     IMPLANT PACEMAKER  6/10/2010     INTERPOSITION TENDON HAND N/A 6/9/2020    Procedure: Right thumb ligament reconstruction tendon interposition with extensor pollicis brevis to abductor pollicis tendon transfer;  Surgeon: Bethel Martin MD;  Location: [facility] OR     RELEASE CARPAL TUNNEL Right 4/12/2016    Procedure: RELEASE CARPAL TUNNEL;  Surgeon: Lissy Leger MD;  Location: [facility] OR     SURGICAL HISTORY OF -   1998    Laminectomy     TONSILLECTOMY & ADENOIDECTOMY       [facility] CAB[facility], ARTERIAL[facility]         [facility]    No current outpatient medications on file.       Allergies   Allergen Reactions     Hydrocodone GI Disturbance     Upset stomach     Shellfish Allergy Hives and Rash       Pt reports that he has been smoking cigarettes. He has a 75.00 pack-year smoking history. He has never used smokeless tobacco. He reports that he does not drink alcohol and does not use drugs.    Exam:  There were no vitals taken for this visit.    Awake, Alert OX3  Lungs - CTA bilaterally  CV - RRR, no murmurs, distal pulses intact  Abd - soft, non-distended, non-tender, +BS  Extr - No cyanosis or edema    A/P 71 year old year old male in need of colonoscopy for screening. Risks, benefits, alternatives, and complications were discussed including the  possibility of perforation and the patient agreed to proceed    Alex Beckett MD

## 2022-01-06 NOTE — ADDENDUM NOTE
Addendum  created 01/06/22 1022 by Shantell Rankin APRN CRNA    Order list changed, Order sets accessed

## 2022-01-10 LAB
PATH REPORT.COMMENTS IMP SPEC: NORMAL
PATH REPORT.COMMENTS IMP SPEC: NORMAL
PATH REPORT.FINAL DX SPEC: NORMAL
PATH REPORT.GROSS SPEC: NORMAL
PATH REPORT.MICROSCOPIC SPEC OTHER STN: NORMAL
PATH REPORT.RELEVANT HX SPEC: NORMAL
PHOTO IMAGE: NORMAL

## 2022-01-10 PROCEDURE — 88305 TISSUE EXAM BY PATHOLOGIST: CPT | Mod: 26 | Performed by: PATHOLOGY

## 2022-02-02 ENCOUNTER — OFFICE VISIT (OUTPATIENT)
Dept: FAMILY MEDICINE | Facility: CLINIC | Age: 72
End: 2022-02-02
Payer: COMMERCIAL

## 2022-02-02 VITALS
RESPIRATION RATE: 16 BRPM | HEIGHT: 70 IN | BODY MASS INDEX: 23.43 KG/M2 | TEMPERATURE: 97.7 F | DIASTOLIC BLOOD PRESSURE: 72 MMHG | OXYGEN SATURATION: 98 % | HEART RATE: 75 BPM | SYSTOLIC BLOOD PRESSURE: 120 MMHG | WEIGHT: 163.7 LBS

## 2022-02-02 DIAGNOSIS — Z72.0 TOBACCO USE: ICD-10-CM

## 2022-02-02 DIAGNOSIS — Z01.818 PREOP GENERAL PHYSICAL EXAM: Primary | ICD-10-CM

## 2022-02-02 DIAGNOSIS — E08.65 DIABETES MELLITUS DUE TO UNDERLYING CONDITION WITH HYPERGLYCEMIA, WITH LONG-TERM CURRENT USE OF INSULIN (H): ICD-10-CM

## 2022-02-02 DIAGNOSIS — Z79.4 DIABETES MELLITUS DUE TO UNDERLYING CONDITION WITH HYPERGLYCEMIA, WITH LONG-TERM CURRENT USE OF INSULIN (H): ICD-10-CM

## 2022-02-02 DIAGNOSIS — I70.219 ATHEROSCLEROSIS OF ARTERY OF EXTREMITY WITH INTERMITTENT CLAUDICATION (H): ICD-10-CM

## 2022-02-02 LAB
ANION GAP SERPL CALCULATED.3IONS-SCNC: 3 MMOL/L (ref 3–14)
BUN SERPL-MCNC: 51 MG/DL (ref 7–30)
CALCIUM SERPL-MCNC: 9 MG/DL (ref 8.5–10.1)
CHLORIDE BLD-SCNC: 100 MMOL/L (ref 94–109)
CO2 SERPL-SCNC: 31 MMOL/L (ref 20–32)
CREAT SERPL-MCNC: 1.5 MG/DL (ref 0.66–1.25)
GFR SERPL CREATININE-BSD FRML MDRD: 49 ML/MIN/1.73M2
GLUCOSE BLD-MCNC: 244 MG/DL (ref 70–99)
HBA1C MFR BLD: 9.3 % (ref 0–5.6)
POTASSIUM BLD-SCNC: 5.2 MMOL/L (ref 3.4–5.3)
SODIUM SERPL-SCNC: 134 MMOL/L (ref 133–144)

## 2022-02-02 PROCEDURE — 80048 BASIC METABOLIC PNL TOTAL CA: CPT | Performed by: FAMILY MEDICINE

## 2022-02-02 PROCEDURE — 99214 OFFICE O/P EST MOD 30 MIN: CPT | Performed by: FAMILY MEDICINE

## 2022-02-02 PROCEDURE — 36415 COLL VENOUS BLD VENIPUNCTURE: CPT | Performed by: FAMILY MEDICINE

## 2022-02-02 PROCEDURE — 83036 HEMOGLOBIN GLYCOSYLATED A1C: CPT | Performed by: FAMILY MEDICINE

## 2022-02-02 ASSESSMENT — PAIN SCALES - GENERAL: PAINLEVEL: NO PAIN (0)

## 2022-02-02 ASSESSMENT — MIFFLIN-ST. JEOR: SCORE: 1493.79

## 2022-02-02 NOTE — PROGRESS NOTES
[facility]  [address]  [facility] [address]  Phone: [phone]  Primary Provider: Danii Hernández    PREOPERATIVE EVALUATION:  Today's date: 2/2/2022    Delbert Tilley is a 71 year old male who presents for a preoperative evaluation.    Surgical Information:  Surgery/Procedure: ENDARTERECTOMY, FEMORAL with retrograde iliac intervention, bilateral  Surgery Location: [facility]  Surgeon: Ric Chau MD  Surgery Date: 2/9/2022  Time of Surgery: 10:50 AM  Where patient plans to recover: Other: Staying a couple days in hospital, then home  Fax number for surgical facility: Note does not need to be faxed, will be available electronically in Epic.    Type of Anesthesia Anticipated: General    Assessment & Plan     The proposed surgical procedure is considered HIGH risk.    Preop general physical exam  Labs below.  I did review with patient that that surgery will help with his claudication symptoms, they may not benefit his balance issues.  Discussed possible physical therapy referral to help with his balance in the future.  Patient seen amenable to this.  - Basic metabolic panel  (Ca, Cl, CO2, Creat, Gluc, K, Na, BUN)  - Hemoglobin A1c  - Basic metabolic panel  (Ca, Cl, CO2, Creat, Gluc, K, Na, BUN)  - Hemoglobin A1c    Atherosclerosis of artery of extremity with intermittent claudication (H)      Diabetes mellitus due to underlying condition with hyperglycemia, with long-term current use of insulin (H)   A1c remains elevated at 9.3.  I did discuss with patient potentially needing to adjust his medications after surgery.  He will need close follow-up with his PCP.  Will need hospitalist management of his blood sugars while in the hospital postoperatively.  - Hemoglobin A1c  - Hemoglobin A1c    Tobacco use  Patient is not interested in cessation at this time.  We will continue to discuss at further visits       Implanted Device:   - Type of device: Pacemaker Patient advised to  bring device information on day of surgery.      Risks and Recommendations:  The patient has the following additional risks and recommendations for perioperative complications:   - Consult Hospitalist / IM to assist with post-op medical management  Cardiovascular:   - Would benefit from telemetry monitoring post operatively. Consult IM/Hospitilaist to follow   Diabetes:  - Patient is on insulin therapy; diabetic NPO guidelines provided and discussed.  Pulmonary:    - Incentive spirometry post-op   - Consider Respiratory Therapy (Respiratory Care IP Consult) post-op   - Active nicotine user, advised smoking cessation  Social and Substance:    - Social concerns that may affect postoperative recovery plan, including: Lives alone. Daughter plans to come help patient recover after surgery.   - Active nicotine user, advised smoking cessation    Medication Instructions:   - aspirin: Hold 7 days prior to procedure   - ACE/ARB: May be continued on the day of surgery.    - Beta Blockers: Continue taking on the day of surgery.   - Diuretics: May continue due to heart failure.   - Statins: Continue taking on the day of surgery.    - Long acting insulin (e.g. glargine, detemir): Take 80% of the usual evening or morning dose before surgery. Take 24 units morning before surgery      RECOMMENDATION:  APPROVAL GIVEN to proceed with proposed procedure, without further diagnostic evaluation.    Review of external notes as documented above       Subjective     HPI related to upcoming procedure: Patient has significant peripheral vascular disease.  He will get claudication symptoms in his legs with walking.  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    Does feel that as he is aged his balance is less.  He uses walker and has cane at his house.  His dog ran into him around [place] and he fell forward.  Prior to that and had no history of falls.    Significant cardiac history-he does follow with cardiologist.  Per  chart review cardiology is cleared patient for procedure.    History of diabetes-A1c elevated in nines.  Patient brings blood sugars from the last month today.  Ranging 150 around 250.  Currently taking 30 units of Lantus in the morning.  He will take 3 units of NovoLog with his breakfast.  Was previously recommended to use NovoLog with all meals however patient reports he has episodes of hypoglycemia.  We will noticed symptoms of jitteriness and weakness when his blood sugars fall around 100.  Often would have these episodes in the afternoon prompting him to cut out his NovoLog with lunch and dinner.    Current tobacco use-patient is not interested in smoking cessation at this time.    Preop Questions 2/2/2022   1. Have you ever had a heart attack or stroke? YES -CABG   2. Have you ever had surgery on your heart or blood vessels, such as a stent placement, a coronary artery bypass, or surgery on an artery in your head, neck, heart, or legs? YES - CABG   3. Do you have chest pain with activity? No   4. Do you have a history of  heart failure? YES -CABG   5. Do you currently have a cold, bronchitis or symptoms of other infection? No   6. Do you have a cough, shortness of breath, or wheezing? YES -chronic, underlying COPD   7. Do you or anyone in your family have previous history of blood clots? UNKNOWN    8. Do you or does anyone in your family have a serious bleeding problem such as prolonged bleeding following surgeries or cuts? UNKNOWN    9. Have you ever had problems with anemia or been told to take iron pills? No   10. Have you had any abnormal blood loss such as black, tarry or bloody stools? No   11. Have you ever had a blood transfusion? No   12. Are you willing to have a blood transfusion if it is medically needed before, during, or after your surgery? Yes   13. Have you or any of your relatives ever had problems with anesthesia? UNKNOWN    14. Do you have sleep apnea, excessive snoring or daytime  drowsiness? No   15. Do you have any artifical heart valves or other implanted medical devices like a pacemaker, defibrillator, or continuous glucose monitor? YES -pacemaker,, recently interrogated   15a. What type of device do you have? Pacemaker   15b. Name of the clinic that manages your device:  [facility]   16. Do you have artificial joints? No   17. Are you allergic to latex? No       Health Care Directive:  Patient has a Health Care Directive on file      Preoperative Review of :   reviewed - no record of controlled substances prescribed.      Status of Chronic Conditions:  See problem list for active medical problems.  Problems all longstanding and stable, except as noted/documented.  See ROS for pertinent symptoms related to these conditions.      Review of Systems  CONSTITUTIONAL: NEGATIVE for fever, chills, change in weight  INTEGUMENTARY/SKIN: NEGATIVE for worrisome rashes, moles or lesions  EYES: NEGATIVE for vision changes or irritation  ENT/MOUTH: NEGATIVE for ear, mouth and throat problems  RESP: NEGATIVE for significant cough or SOB  CV: NEGATIVE for chest pain, palpitations or peripheral edema  GI: NEGATIVE for nausea, abdominal pain, heartburn, or change in bowel habits  : NEGATIVE for frequency, dysuria, or hematuria  MUSCULOSKELETAL: NEGATIVE for significant arthralgias or myalgia  NEURO: NEGATIVE for weakness, dizziness or paresthesias  ENDOCRINE: NEGATIVE for temperature intolerance, skin/hair changes  HEME: NEGATIVE for bleeding problems  PSYCHIATRIC: NEGATIVE for changes in mood or affect    Patient Active Problem List    Diagnosis Date Noted     Other idiopathic peripheral autonomic neuropathy 06/22/2021     Priority: Medium     Venous insufficiency (chronic) (peripheral) 06/22/2021     Priority: Medium     Actinic keratosis 06/22/2021     Priority: Medium     Nail dystrophy 06/22/2021     Priority: Medium     Generalized muscle weakness 06/22/2021     Priority: Medium      Atherosclerosis of artery of extremity with intermittent claudication (H) 01/22/2021     Priority: Medium     Hyperparathyroidism (H) 01/22/2021     Priority: Medium     Thrombocytopenia (H) 01/22/2021     Priority: Medium     Vitamin D deficiency 01/15/2021     Priority: Medium     Hypocalcemia 01/15/2021     Priority: Medium     Essential hypertension 01/15/2021     Priority: Medium     Ischemic cardiomyopathy 01/15/2021     Priority: Medium     Formatting of this note might be different from the original.  EF 20-25%       Other proteinuria 01/15/2021     Priority: Medium     Hypomagnesemia 01/15/2021     Priority: Medium     Anemia in chronic kidney disease 01/15/2021     Priority: Medium     Diabetic nephropathy associated with type 2 diabetes mellitus (H) 01/15/2021     Priority: Medium     Pleural plaque 12/31/2020     Priority: Medium     Chronic kidney disease, stage 3 (H) 11/18/2020     Priority: Medium     Hypotension, unspecified hypotension type 10/21/2020     Priority: Medium     Chronic obstructive pulmonary disease, unspecified COPD type (H) 03/09/2020     Priority: Medium     NSVT (nonsustained ventricular tachycardia) (H) 11/01/2019     Priority: Medium     Chronic systolic heart failure (H) 11/01/2019     Priority: Medium     Paroxysmal atrial fibrillation (H) 11/01/2019     Priority: Medium     Tobacco use 05/28/2019     Priority: Medium     Left carotid stenosis 09/08/2017     Priority: Medium     Carpal tunnel syndrome of right wrist 01/10/2016     Priority: Medium     Carotid stenosis 06/12/2015     Priority: Medium     Bilateral carotid Disease S/P Right carotid endarterectomy.   [facility]       NSTEMI (non-ST elevated myocardial infarction) (H) 10/21/2014     Priority: Medium     ACS (acute coronary syndrome) (H) 10/20/2014     Priority: Medium     Hyperlipidemia LDL goal <70 07/08/2013     Priority: Medium     [facility]           Type 2 diabetes mellitus with  other specified complication, with long-term current use of insulin (H) 10/31/2010     Priority: Medium     CARDIOVASCULAR SCREENING; LDL GOAL LESS THAN 100 10/31/2010     Priority: Medium     Coronary artery disease involving native coronary artery of native heart without angina pectoris 07/07/2010     Priority: Medium     S/p mi  Bypass x 4 --grafting to the LAD obtuse marginal and PDA.   Angio on 6/10 negative          Alcohol abuse, in remission 07/07/2010     Priority: Medium     H/o rehab x 5   Dry x 10 years (2010 or so)       GERD (gastroesophageal reflux disease) 07/07/2010     Priority: Medium     Alcohol-induced chronic pancreatitis (H) 07/07/2010     Priority: Medium     H/o pancreatitis --on enzymes        Diabetes mellitus, type 2 (H) 07/07/2010     Priority: Medium     [facility]       Cardiomyopathy (H) 06/15/2010     Priority: Medium     EF 15%.--ICD placement        Atrial fibrillation/flutter 06/15/2010     Priority: Medium     S/p ablation 1June 2010.     Replacing diagnoses that were inactivated after the 10/1/2021 regulatory import.        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%     Diabetes (H)      Gastro-oesophageal reflux disease      H/O: alcohol abuse      HTN (hypertension)      Hyperlipidaemia LDL goal <100 7/8/2013     Hypokalemia      Pacemaker      Pancreatitis 6/26/06 Hosp     Past Surgical History:   Procedure Laterality Date     CATARACT IOL, RT/LT      Cataract IOL RT/LT     ENDARTERECTOMY CAROTID Right 6/12/2015    Procedure: ENDARTERECTOMY CAROTID;  Surgeon: João Turner MD;  Location:  OR     ENDARTERECTOMY CAROTID Left 9/8/2017    Procedure:  ENDARTERECTOMY CAROTID;  LEFT CAROTID ENDARTERECTOMY WITH EEG;  Surgeon: João Turner MD;  Location: [facility] OR     IMPLANT PACEMAKER  6/10/2010     INTERPOSITION TENDON HAND N/A 6/9/2020    Procedure: Right thumb ligament reconstruction tendon interposition with extensor pollicis brevis to abductor pollicis tendon transfer;  Surgeon: Bethel Martin MD;  Location: [facility] OR     RELEASE CARPAL TUNNEL Right 4/12/2016    Procedure: RELEASE CARPAL TUNNEL;  Surgeon: Lissy Leger MD;  Location: [facility] OR     SURGICAL HISTORY OF -   1998    Laminectomy     TONSILLECTOMY & ADENOIDECTOMY       ZZC CABG, ARTERIAL, THREE       Current Outpatient Medications   Medication Sig Dispense Refill     albuterol (PROAIR HFA) 108 (90 Base) MCG/ACT Inhaler Inhale 2 puffs into the lungs every 4 hours as needed for shortness of breath / dyspnea 1 Inhaler 0     amylase-lipase-protease (CREON 12) 24592-63699-66855 units CPEP Take 1 capsule by mouth 2 times daily 120 capsule 0     aspirin (ASA) 500 MG EC tablet Take 1,000 mg by mouth daily       atorvastatin (LIPITOR) 80 MG tablet Take 80 mg by mouth At Bedtime        calcium carbonate (OS-MARVA) 500 MG tablet Take 2 tablets (1,000 mg) by mouth 2 times daily 360 tablet 1     carvedilol (COREG) 6.25 MG tablet Take 2 tablets (12.5 mg) by mouth 2 times daily (with meals) 180 tablet 3     cholecalciferol 125 MCG (5000 UT) CAPS Take 1 capsule (5,000 Units) by mouth daily 90 capsule 1     gabapentin (NEURONTIN) 100 MG capsule Take 1 capsule (100 mg) by mouth 2 times daily 1 capsule 0     insulin aspart (NOVOLOG PEN) 100 UNIT/ML pen Inject 3 Units Subcutaneous 3 times daily (with meals) 15 mL 1     insulin glargine (LANTUS PEN) 100 UNIT/ML pen Inject 32 Units Subcutaneous every morning 45 mL 1     Insulin Pen Needle (PEN NEEDLES) 32G X 4 MM MISC        loperamide (IMODIUM) 2 MG capsule Take 2 mg by mouth 4 times daily as needed for diarrhea       losartan (COZAAR) 100 MG tablet Take 50 mg by  "mouth daily       magnesium oxide (MAG-OX) 400 (241.3 Mg) MG tablet Take 1 tablet (400 mg) by mouth 4 times daily 120 tablet 1     metFORMIN (GLUCOPHAGE-XR) 500 MG 24 hr tablet TAKE ONE TABLET BY MOUTH EVERY MORNING AND TAKE TWO TABLETS EVERY EVENING WITH MEALS FOR DIABETES       mometasone (ASMANEX TWISTHALER) 220 MCG/INH inhaler Inhale 2 puffs into the lungs daily       Nitroglycerin (NITROSTAT SL) Place 0.4 mg under the tongue every 5 minutes as needed for chest pain       pantoprazole (PROTONIX) 40 MG EC tablet Take 1 tablet (40 mg) by mouth daily 30 tablet      torsemide (DEMADEX) 20 MG tablet Take 30 mg (1 and 1/2 tab) daily. 30 tablet 4       Allergies   Allergen Reactions     Hydrocodone GI Disturbance     Upset stomach     Shellfish Allergy Hives and Rash        Social History     Tobacco Use     Smoking status: Current Every Day Smoker     Packs/day: 1.50     Years: 50.00     Pack years: 75.00     Types: Cigarettes     Smokeless tobacco: Never Used     Tobacco comment: 1 1/2-2 PPD 3/11/21   Substance Use Topics     Alcohol use: No     Family History   Adopted: Yes   Problem Relation Age of Onset     Unknown/Adopted No family hx of      History   Drug Use No         Objective     /72 (BP Location: Right arm, Patient Position: Chair, Cuff Size: Adult Regular)   Pulse 75   Temp 97.7  F (36.5  C) (Tympanic)   Resp 16   Ht 1.778 m (5' 10\")   Wt 74.3 kg (163 lb 11.2 oz)   SpO2 98%   BMI 23.49 kg/m      Physical Exam  Constitutional:       General: He is not in acute distress.     Appearance: He is well-developed.      Comments: Ambulates with walker   HENT:      Right Ear: Tympanic membrane and external ear normal.      Left Ear: Tympanic membrane and external ear normal.      Nose: Nose normal.      Mouth/Throat:      Mouth: No oral lesions.      Pharynx: No oropharyngeal exudate.   Eyes:      General:         Right eye: No discharge.         Left eye: No discharge.      Conjunctiva/sclera: " Conjunctivae normal.      Pupils: Pupils are equal, round, and reactive to light.   Neck:      Thyroid: No thyromegaly.      Trachea: No tracheal deviation.   Cardiovascular:      Rate and Rhythm: Normal rate and regular rhythm.      Pulses: Normal pulses.      Heart sounds: Normal heart sounds, S1 normal and S2 normal. No murmur heard.  No S3 or S4 sounds.       Comments: Pacemaker over left chest wall  Pulmonary:      Effort: Pulmonary effort is normal. No respiratory distress.      Breath sounds: Normal breath sounds. No wheezing or rales.   Abdominal:      General: Bowel sounds are normal.      Palpations: Abdomen is soft. There is no mass.      Tenderness: There is no abdominal tenderness.   Musculoskeletal:         General: No deformity. Normal range of motion.      Cervical back: Neck supple.   Lymphadenopathy:      Cervical: No cervical adenopathy.   Skin:     General: Skin is warm and dry.      Findings: No lesion or rash.   Neurological:      Mental Status: He is alert and oriented to person, place, and time.      Motor: No abnormal muscle tone.      Deep Tendon Reflexes: Reflexes are normal and symmetric.   Psychiatric:         Speech: Speech normal.         Thought Content: Thought content normal.         Judgment: Judgment normal.         Recent Labs   Lab Test 11/11/21  1113 11/09/21  1315 07/22/21  1637 06/10/21  1000 01/21/21  1338   HGB  --   --  14.1  --  15.0   PLT  --   --  144*  --  117*     --  133   < > 135   POTASSIUM 4.9  --  5.4*   < > 4.7   CR 1.39* 1.4* 1.33*   < > 1.13   A1C 9.6*  --  9.3*  --   --     < > = values in this interval not displayed.        Diagnostics:  Labs pending at this time.  Results will be reviewed when available.   Patient is pacemaker-recently interrogated    Revised Cardiac Risk Index (RCRI):  The patient has the following serious cardiovascular risks for perioperative complications:   - High risk surgery (>5% cardiac complication risk) = 1 point   -  Coronary Artery Disease (MI, positive stress test, angina, Qs on EKG) = 1 point   - Congestive Heart Failure (pulmonary edema, PND, s3 amada, CXR with pulmonary congestion, basilar rales) = 1 point   - Diabetes Mellitus (on Insulin) = 1 point     RCRI Interpretation: 4 points: Class IV (high risk - >15% complication rate)    Estimated Functional Capacity: Performs 4 METS exercise without symptoms (e.g., light housework, stairs, 4 mph walk, 7 mph bike, slow step dance)       Signed Electronically by: JODY TODD DO  Copy of this evaluation report is provided to requesting physician.

## 2022-02-02 NOTE — PATIENT INSTRUCTIONS
Preparing for Your Surgery  Getting started  A nurse will call you to review your health history and instructions. They will give you an arrival time based on your scheduled surgery time. Please be ready to share:    Your doctor's clinic name and phone number    Your medical, surgical and anesthesia history    A list of allergies and sensitivities    A list of medicines, including herbal treatments and over-the-counter drugs    Whether the patient has a legal guardian (ask how to send us the papers in advance)  Please tell us if you're pregnant--or if there's any chance you might be pregnant. Some surgeries may injure a fetus (unborn baby), so they require a pregnancy test. Surgeries that are safe for a fetus don't always need a test, and you can choose whether to have one.   If you have a child who's having surgery, please ask for a copy of Preparing for Your Child's Surgery.    Preparing for surgery    Within 30 days of surgery: Have a pre-op exam (sometimes called an H&P, or History and Physical). This can be done at a clinic or pre-operative center.  ? If you're having a , you may not need this exam. Talk to your care team.    At your pre-op exam, talk to your care team about all medicines you take. If you need to stop any medicines before surgery, ask when to start taking them again.  ? We do this for your safety. Many medicines can make you bleed too much during surgery. Some change how well surgery (anesthesia) drugs work.    Call your insurance company to let them know you're having surgery. (If you don't have insurance, call [phone].)    Call your clinic if there's any change in your health. This includes signs of a cold or flu (sore throat, runny nose, cough, rash, fever). It also includes a scrape or scratch near the surgery site.    If you have questions on the day of surgery, call your hospital or surgery center.  COVID testing  You may need to be tested for COVID-19 before having  surgery. If so, your surgical team will give you instructions for scheduling this test, separate from your preoperative history and physical.  Eating and drinking guidelines  For your safety: Unless your surgeon tells you otherwise, follow the guidelines below.    Eat and drink as usual until 8 hours before surgery. After that, no food or milk.    Drink clear liquids until 2 hours before surgery. These are liquids you can see through, like water, Gatorade and Propel Water. You may also have black coffee and tea (no cream or milk).    Nothing by mouth within 2 hours of surgery. This includes gum, candy and breath mints.    If you drink alcohol: Stop drinking it the night before surgery.    If your care team tells you to take medicine on the morning of surgery, it's okay to take it with a sip of water.  Preventing infection    Shower or bathe the night before and morning of your surgery. Follow the instructions your clinic gave you. (If no instructions, use regular soap.)    Don't shave or clip hair near your surgery site. We'll remove the hair if needed.    Don't smoke or vape the morning of surgery. You may chew nicotine gum up to 2 hours before surgery. A nicotine patch is okay.  ? Note: Some surgeries require you to completely quit smoking and nicotine. Check with your surgeon.    Your care team will make every effort to keep you safe from infection. We will:  ? Clean our hands often with soap and water (or an alcohol-based hand rub).  ? Clean the skin at your surgery site with a special soap that kills germs.  ? Give you a special gown to keep you warm. (Cold raises the risk of infection.)  ? Wear special hair covers, masks, gowns and gloves during surgery.  ? Give antibiotic medicine, if prescribed. Not all surgeries need antibiotics.  What to bring on the day of surgery    Photo ID and insurance card    Copy of your health care directive, if you have one    Glasses and hearing aides (bring cases)  ? You can't  wear contacts during surgery    Inhaler and eye drops, if you use them (tell us about these when you arrive)    CPAP machine or breathing device, if you use them    A few personal items, if spending the night    If you have . . .  ? A pacemaker, ICD (cardiac defibrillator) or other implant: Bring the ID card.  ? An implanted stimulator: Bring the remote control.  ? A legal guardian: Bring a copy of the certified (court-stamped) guardianship papers.  Please remove any jewelry, including body piercings. Leave jewelry and other valuables at home.  If you're going home the day of surgery    You must have a responsible adult drive you home. They should stay with you overnight as well.    If you don't have someone to stay with you, and you aren't safe to go home alone, we may keep you overnight. Insurance often won't pay for this.  After surgery  If it's hard to control your pain or you need more pain medicine, please call your surgeon's office.  Questions?   If you have any questions for your care team, list them here: _________________________________________________________________________________________________________________________________________________________________________ ____________________________________ ____________________________________ ____________________________________  For informational purposes only. Not to replace the advice of your health care provider. Copyright   2003, 2019 [facility]. All rights reserved. Clinically reviewed by Kelly Sow MD. Point [id number] - REV 07/21.    How to Take Your Medication Before Surgery  Medication Instructions:   - aspirin: Hold 7 days prior to procedure   - ACE/ARB: May be continued on the day of surgery.    - Beta Blockers: Continue taking on the day of surgery.   - Diuretics: May continue due to heart failure.   - Statins: Continue taking on the day of surgery.   Take 24 units morning before surgery -Lantus    How to Manage Your Diabetes  Before Surgery  If you use insulin for your diabetes, follow these steps to keep your blood sugar in a safe range before surgery, when you ve been told not to eat or drink:     Check your blood sugar every 4 hours     If your blood sugar is high, take a corrective dose (not a meal dose) of sliding-scale insulin, if that is what you re used to doing    If your blood sugar is below 100, or you have symptoms of hypoglycemia, follow these steps:   1) Have 1 item from this list:  - 4 oz (1/2 cup) of fruit juice without pulp    - 4 oz (1/2 cup) of regular soda (not diet soda)    - 3 glucose gels    - 5 sugar cubes or sugar packets   2) Check your blood sugar again after 15 minutes  3) Repeat steps 1 and 2 again until your blood sugar is greater than 100

## 2022-02-04 ENCOUNTER — TRANSFERRED RECORDS (OUTPATIENT)
Dept: HEALTH INFORMATION MANAGEMENT | Facility: CLINIC | Age: 72
End: 2022-02-04
Payer: COMMERCIAL

## 2022-02-04 DIAGNOSIS — E11.29 TYPE 2 DIABETES, CONTROLLED, WITH RENAL MANIFESTATION (H): ICD-10-CM

## 2022-02-04 DIAGNOSIS — N18.32 STAGE 3B CHRONIC KIDNEY DISEASE (H): ICD-10-CM

## 2022-02-04 DIAGNOSIS — R80.9 PROTEINURIA, UNSPECIFIED TYPE: Primary | ICD-10-CM

## 2022-02-05 ENCOUNTER — LAB (OUTPATIENT)
Dept: FAMILY MEDICINE | Facility: CLINIC | Age: 72
End: 2022-02-05
Attending: SURGERY
Payer: COMMERCIAL

## 2022-02-05 DIAGNOSIS — I70.219 ATHEROSCLEROSIS OF ARTERY OF EXTREMITY WITH INTERMITTENT CLAUDICATION (H): ICD-10-CM

## 2022-02-05 DIAGNOSIS — I70.212 ATHEROSCLEROSIS OF NATIVE ARTERIES OF EXTREMITIES WITH INTERMITTENT CLAUDICATION, LEFT LEG (H): ICD-10-CM

## 2022-02-05 DIAGNOSIS — I73.9 PAD (PERIPHERAL ARTERY DISEASE) (H): ICD-10-CM

## 2022-02-05 PROCEDURE — U0003 INFECTIOUS AGENT DETECTION BY NUCLEIC ACID (DNA OR RNA); SEVERE ACUTE RESPIRATORY SYNDROME CORONAVIRUS 2 (SARS-COV-2) (CORONAVIRUS DISEASE [COVID-19]), AMPLIFIED PROBE TECHNIQUE, MAKING USE OF HIGH THROUGHPUT TECHNOLOGIES AS DESCRIBED BY CMS-2020-01-R: HCPCS

## 2022-02-05 PROCEDURE — U0005 INFEC AGEN DETEC AMPLI PROBE: HCPCS

## 2022-02-06 LAB — SARS-COV-2 RNA RESP QL NAA+PROBE: NEGATIVE

## 2022-02-07 ENCOUNTER — MYC MEDICAL ADVICE (OUTPATIENT)
Dept: VASCULAR SURGERY | Facility: CLINIC | Age: 72
End: 2022-02-07
Payer: COMMERCIAL

## 2022-02-08 NOTE — TELEPHONE ENCOUNTER
Called patient. He states he received a call from surgery nurse and has no other questions.   
Avery GUERRA: pt accepted by , labs drawn and pt brought to .

## 2022-02-09 ENCOUNTER — SURGERY (OUTPATIENT)
Age: 72
End: 2022-02-09
Payer: COMMERCIAL

## 2022-02-09 ENCOUNTER — ANESTHESIA EVENT (OUTPATIENT)
Dept: SURGERY | Facility: HOSPITAL | Age: 72
DRG: 252 | End: 2022-02-09
Payer: COMMERCIAL

## 2022-02-09 ENCOUNTER — APPOINTMENT (OUTPATIENT)
Dept: RADIOLOGY | Facility: HOSPITAL | Age: 72
DRG: 252 | End: 2022-02-09
Attending: SURGERY
Payer: COMMERCIAL

## 2022-02-09 ENCOUNTER — ANESTHESIA (OUTPATIENT)
Dept: SURGERY | Facility: HOSPITAL | Age: 72
DRG: 252 | End: 2022-02-09
Payer: COMMERCIAL

## 2022-02-09 ENCOUNTER — HOSPITAL ENCOUNTER (INPATIENT)
Facility: HOSPITAL | Age: 72
LOS: 5 days | Discharge: HOME | DRG: 252 | End: 2022-02-14
Attending: SURGERY | Admitting: SURGERY
Payer: COMMERCIAL

## 2022-02-09 DIAGNOSIS — I73.9 PAD (PERIPHERAL ARTERY DISEASE) (H): ICD-10-CM

## 2022-02-09 LAB
ABO/RH(D): NORMAL
ANION GAP SERPL CALCULATED.3IONS-SCNC: 9 MMOL/L (ref 5–18)
ANTIBODY SCREEN: NEGATIVE
BASOPHILS # BLD AUTO: 0.1 10E3/UL (ref 0–0.2)
BASOPHILS NFR BLD AUTO: 0 %
BUN SERPL-MCNC: 40 MG/DL (ref 8–28)
CALCIUM SERPL-MCNC: 9.6 MG/DL (ref 8.5–10.5)
CHLORIDE BLD-SCNC: 102 MMOL/L (ref 98–107)
CO2 SERPL-SCNC: 28 MMOL/L (ref 22–31)
CREAT SERPL-MCNC: 1.44 MG/DL (ref 0.7–1.3)
EOSINOPHIL # BLD AUTO: 0.1 10E3/UL (ref 0–0.7)
EOSINOPHIL NFR BLD AUTO: 1 %
ERYTHROCYTE [DISTWIDTH] IN BLOOD BY AUTOMATED COUNT: 14.5 % (ref 10–15)
ERYTHROCYTE [DISTWIDTH] IN BLOOD BY AUTOMATED COUNT: 14.6 % (ref 10–15)
GFR SERPL CREATININE-BSD FRML MDRD: 51 ML/MIN/1.73M2
GLUCOSE BLD-MCNC: 126 MG/DL (ref 70–125)
GLUCOSE BLDC GLUCOMTR-MCNC: 137 MG/DL (ref 70–99)
GLUCOSE BLDC GLUCOMTR-MCNC: 78 MG/DL (ref 70–99)
GLUCOSE BLDC GLUCOMTR-MCNC: 82 MG/DL (ref 70–99)
HBA1C MFR BLD: 8.8 %
HCT VFR BLD AUTO: 41.1 % (ref 40–53)
HCT VFR BLD AUTO: 44.8 % (ref 40–53)
HGB BLD-MCNC: 12.5 G/DL (ref 13.3–17.7)
HGB BLD-MCNC: 13.7 G/DL (ref 13.3–17.7)
IMM GRANULOCYTES # BLD: 0.1 10E3/UL
IMM GRANULOCYTES NFR BLD: 1 %
LYMPHOCYTES # BLD AUTO: 1.2 10E3/UL (ref 0.8–5.3)
LYMPHOCYTES NFR BLD AUTO: 9 %
MAGNESIUM SERPL-MCNC: 1.4 MG/DL (ref 1.8–2.6)
MCH RBC QN AUTO: 29 PG (ref 26.5–33)
MCH RBC QN AUTO: 29.3 PG (ref 26.5–33)
MCHC RBC AUTO-ENTMCNC: 30.4 G/DL (ref 31.5–36.5)
MCHC RBC AUTO-ENTMCNC: 30.6 G/DL (ref 31.5–36.5)
MCV RBC AUTO: 95 FL (ref 78–100)
MCV RBC AUTO: 96 FL (ref 78–100)
MONOCYTES # BLD AUTO: 1.1 10E3/UL (ref 0–1.3)
MONOCYTES NFR BLD AUTO: 8 %
NEUTROPHILS # BLD AUTO: 11.7 10E3/UL (ref 1.6–8.3)
NEUTROPHILS NFR BLD AUTO: 81 %
NRBC # BLD AUTO: 0 10E3/UL
NRBC BLD AUTO-RTO: 0 /100
PHOSPHATE SERPL-MCNC: 3.9 MG/DL (ref 2.5–4.5)
PLATELET # BLD AUTO: 107 10E3/UL (ref 150–450)
PLATELET # BLD AUTO: 108 10E3/UL (ref 150–450)
POTASSIUM BLD-SCNC: 4.3 MMOL/L (ref 3.5–5)
RBC # BLD AUTO: 4.27 10E6/UL (ref 4.4–5.9)
RBC # BLD AUTO: 4.73 10E6/UL (ref 4.4–5.9)
SODIUM SERPL-SCNC: 139 MMOL/L (ref 136–145)
SPECIMEN EXPIRATION DATE: NORMAL
SPECIMEN EXPIRATION DATE: NORMAL
WBC # BLD AUTO: 14.1 10E3/UL (ref 4–11)
WBC # BLD AUTO: 6.1 10E3/UL (ref 4–11)

## 2022-02-09 PROCEDURE — C1760 CLOSURE DEV, VASC: HCPCS | Performed by: SURGERY

## 2022-02-09 PROCEDURE — 85027 COMPLETE CBC AUTOMATED: CPT | Performed by: SURGERY

## 2022-02-09 PROCEDURE — 250N000011 HC RX IP 250 OP 636: Performed by: SURGERY

## 2022-02-09 PROCEDURE — 250N000025 HC SEVOFLURANE, PER MIN: Performed by: SURGERY

## 2022-02-09 PROCEDURE — 83036 HEMOGLOBIN GLYCOSYLATED A1C: CPT | Performed by: STUDENT IN AN ORGANIZED HEALTH CARE EDUCATION/TRAINING PROGRAM

## 2022-02-09 PROCEDURE — 75716 ARTERY X-RAYS ARMS/LEGS: CPT | Mod: 26 | Performed by: SURGERY

## 2022-02-09 PROCEDURE — 99207 PR CONSULT E&M CHANGED TO INITIAL LEVEL: CPT | Performed by: STUDENT IN AN ORGANIZED HEALTH CARE EDUCATION/TRAINING PROGRAM

## 2022-02-09 PROCEDURE — 999N000182 XR SURGERY CARM FLUORO GREATER THAN 5 MIN

## 2022-02-09 PROCEDURE — 272N000001 HC OR GENERAL SUPPLY STERILE: Performed by: SURGERY

## 2022-02-09 PROCEDURE — 04UK0JZ SUPPLEMENT RIGHT FEMORAL ARTERY WITH SYNTHETIC SUBSTITUTE, OPEN APPROACH: ICD-10-PCS | Performed by: SURGERY

## 2022-02-09 PROCEDURE — 258N000003 HC RX IP 258 OP 636

## 2022-02-09 PROCEDURE — 258N000003 HC RX IP 258 OP 636: Performed by: SURGERY

## 2022-02-09 PROCEDURE — C1725 CATH, TRANSLUMIN NON-LASER: HCPCS | Performed by: SURGERY

## 2022-02-09 PROCEDURE — 83735 ASSAY OF MAGNESIUM: CPT | Performed by: SURGERY

## 2022-02-09 PROCEDURE — 75625 CONTRAST EXAM ABDOMINL AORTA: CPT | Mod: 26 | Performed by: SURGERY

## 2022-02-09 PROCEDURE — 250N000009 HC RX 250

## 2022-02-09 PROCEDURE — 370N000017 HC ANESTHESIA TECHNICAL FEE, PER MIN: Performed by: SURGERY

## 2022-02-09 PROCEDURE — 37221 PR REVASC ILIAC ART, ANGIO/STENT, INIT VESSEL: CPT | Mod: 50 | Performed by: SURGERY

## 2022-02-09 PROCEDURE — 250N000011 HC RX IP 250 OP 636: Performed by: ANESTHESIOLOGY

## 2022-02-09 PROCEDURE — 278N000051 HC OR IMPLANT GENERAL: Performed by: SURGERY

## 2022-02-09 PROCEDURE — 250N000013 HC RX MED GY IP 250 OP 250 PS 637: Performed by: ANESTHESIOLOGY

## 2022-02-09 PROCEDURE — 250N000013 HC RX MED GY IP 250 OP 250 PS 637: Performed by: STUDENT IN AN ORGANIZED HEALTH CARE EDUCATION/TRAINING PROGRAM

## 2022-02-09 PROCEDURE — P9041 ALBUMIN (HUMAN),5%, 50ML: HCPCS | Performed by: ANESTHESIOLOGY

## 2022-02-09 PROCEDURE — 710N000010 HC RECOVERY PHASE 1, LEVEL 2, PER MIN: Performed by: SURGERY

## 2022-02-09 PROCEDURE — 35355 RECHANNELING OF ARTERY: CPT | Mod: 50 | Performed by: SURGERY

## 2022-02-09 PROCEDURE — C1894 INTRO/SHEATH, NON-LASER: HCPCS | Performed by: SURGERY

## 2022-02-09 PROCEDURE — 04CK0ZZ EXTIRPATION OF MATTER FROM RIGHT FEMORAL ARTERY, OPEN APPROACH: ICD-10-PCS | Performed by: SURGERY

## 2022-02-09 PROCEDURE — 36415 COLL VENOUS BLD VENIPUNCTURE: CPT | Performed by: SURGERY

## 2022-02-09 PROCEDURE — 360N000084 HC SURGERY LEVEL 4 W/ FLUORO, PER MIN: Performed by: SURGERY

## 2022-02-09 PROCEDURE — 86901 BLOOD TYPING SEROLOGIC RH(D): CPT | Performed by: SURGERY

## 2022-02-09 PROCEDURE — 99222 1ST HOSP IP/OBS MODERATE 55: CPT | Performed by: STUDENT IN AN ORGANIZED HEALTH CARE EDUCATION/TRAINING PROGRAM

## 2022-02-09 PROCEDURE — 272N000004 HC RX 272: Performed by: SURGERY

## 2022-02-09 PROCEDURE — 258N000003 HC RX IP 258 OP 636: Performed by: ANESTHESIOLOGY

## 2022-02-09 PROCEDURE — 047H0DZ DILATION OF RIGHT EXTERNAL ILIAC ARTERY WITH INTRALUMINAL DEVICE, OPEN APPROACH: ICD-10-PCS | Performed by: SURGERY

## 2022-02-09 PROCEDURE — 047J0DZ DILATION OF LEFT EXTERNAL ILIAC ARTERY WITH INTRALUMINAL DEVICE, OPEN APPROACH: ICD-10-PCS | Performed by: SURGERY

## 2022-02-09 PROCEDURE — C1769 GUIDE WIRE: HCPCS | Performed by: SURGERY

## 2022-02-09 PROCEDURE — C1887 CATHETER, GUIDING: HCPCS | Performed by: SURGERY

## 2022-02-09 PROCEDURE — 88311 DECALCIFY TISSUE: CPT | Mod: TC | Performed by: SURGERY

## 2022-02-09 PROCEDURE — 120N000001 HC R&B MED SURG/OB

## 2022-02-09 PROCEDURE — 84100 ASSAY OF PHOSPHORUS: CPT | Performed by: SURGERY

## 2022-02-09 PROCEDURE — 258N000001 HC RX 258: Performed by: NURSE ANESTHETIST, CERTIFIED REGISTERED

## 2022-02-09 PROCEDURE — 250N000011 HC RX IP 250 OP 636

## 2022-02-09 PROCEDURE — 04UL0JZ SUPPLEMENT LEFT FEMORAL ARTERY WITH SYNTHETIC SUBSTITUTE, OPEN APPROACH: ICD-10-PCS | Performed by: SURGERY

## 2022-02-09 PROCEDURE — 250N000009 HC RX 250: Performed by: NURSE ANESTHETIST, CERTIFIED REGISTERED

## 2022-02-09 PROCEDURE — 82310 ASSAY OF CALCIUM: CPT | Performed by: SURGERY

## 2022-02-09 PROCEDURE — 250N000013 HC RX MED GY IP 250 OP 250 PS 637: Performed by: SURGERY

## 2022-02-09 PROCEDURE — 86850 RBC ANTIBODY SCREEN: CPT

## 2022-02-09 PROCEDURE — 255N000002 HC RX 255 OP 636: Performed by: SURGERY

## 2022-02-09 PROCEDURE — 250N000011 HC RX IP 250 OP 636: Performed by: NURSE ANESTHETIST, CERTIFIED REGISTERED

## 2022-02-09 PROCEDURE — 250N000009 HC RX 250: Performed by: SURGERY

## 2022-02-09 PROCEDURE — 04CL0ZZ EXTIRPATION OF MATTER FROM LEFT FEMORAL ARTERY, OPEN APPROACH: ICD-10-PCS | Performed by: SURGERY

## 2022-02-09 PROCEDURE — 999N000141 HC STATISTIC PRE-PROCEDURE NURSING ASSESSMENT: Performed by: SURGERY

## 2022-02-09 PROCEDURE — C1763 CONN TISS, NON-HUMAN: HCPCS | Performed by: SURGERY

## 2022-02-09 DEVICE — VIABAHN SX ENDO HEPARIN 35 RO 7MMX10CM 8FR 75CMCATH
Type: IMPLANTABLE DEVICE | Site: ILIAC/FEMORALS | Status: FUNCTIONAL
Brand: GORE VIABAHN ENDOPROSTHESIS WITH HEPARIN

## 2022-02-09 DEVICE — IMP PATCH PERICARDIUM PHOTOFIX BOVINE 0.8X8CM PFP0.8X8: Type: IMPLANTABLE DEVICE | Site: GROIN | Status: FUNCTIONAL

## 2022-02-09 RX ORDER — SODIUM CHLORIDE 9 MG/ML
INJECTION, SOLUTION INTRAVENOUS CONTINUOUS PRN
Status: DISCONTINUED | OUTPATIENT
Start: 2022-02-09 | End: 2022-02-09

## 2022-02-09 RX ORDER — CHOLECALCIFEROL (VITAMIN D3) 50 MCG
1 TABLET ORAL 2 TIMES DAILY
Status: ON HOLD | COMMUNITY
End: 2023-01-01

## 2022-02-09 RX ORDER — PROTAMINE SULFATE 10 MG/ML
INJECTION, SOLUTION INTRAVENOUS PRN
Status: DISCONTINUED | OUTPATIENT
Start: 2022-02-09 | End: 2022-02-09

## 2022-02-09 RX ORDER — FENTANYL CITRATE 50 UG/ML
INJECTION, SOLUTION INTRAMUSCULAR; INTRAVENOUS PRN
Status: DISCONTINUED | OUTPATIENT
Start: 2022-02-09 | End: 2022-02-09

## 2022-02-09 RX ORDER — DEXTROSE MONOHYDRATE 25 G/50ML
25-50 INJECTION, SOLUTION INTRAVENOUS
Status: DISCONTINUED | OUTPATIENT
Start: 2022-02-09 | End: 2022-02-14 | Stop reason: HOSPADM

## 2022-02-09 RX ORDER — NALOXONE HYDROCHLORIDE 0.4 MG/ML
0.2 INJECTION, SOLUTION INTRAMUSCULAR; INTRAVENOUS; SUBCUTANEOUS
Status: DISCONTINUED | OUTPATIENT
Start: 2022-02-09 | End: 2022-02-14 | Stop reason: HOSPADM

## 2022-02-09 RX ORDER — EPHEDRINE SULFATE 50 MG/ML
INJECTION, SOLUTION INTRAMUSCULAR; INTRAVENOUS; SUBCUTANEOUS PRN
Status: DISCONTINUED | OUTPATIENT
Start: 2022-02-09 | End: 2022-02-09

## 2022-02-09 RX ORDER — GABAPENTIN 100 MG/1
200 CAPSULE ORAL AT BEDTIME
Status: DISCONTINUED | OUTPATIENT
Start: 2022-02-09 | End: 2022-02-14 | Stop reason: HOSPADM

## 2022-02-09 RX ORDER — ACETAMINOPHEN 325 MG/1
975 TABLET ORAL EVERY 8 HOURS
Status: DISCONTINUED | OUTPATIENT
Start: 2022-02-09 | End: 2022-02-10

## 2022-02-09 RX ORDER — CLOPIDOGREL BISULFATE 75 MG/1
300 TABLET ORAL ONCE
Status: COMPLETED | OUTPATIENT
Start: 2022-02-09 | End: 2022-02-09

## 2022-02-09 RX ORDER — CEFAZOLIN SODIUM 2 G/100ML
2 INJECTION, SOLUTION INTRAVENOUS SEE ADMIN INSTRUCTIONS
Status: DISCONTINUED | OUTPATIENT
Start: 2022-02-09 | End: 2022-02-09 | Stop reason: HOSPADM

## 2022-02-09 RX ORDER — ACETAMINOPHEN 325 MG/1
650 TABLET ORAL EVERY 4 HOURS PRN
Status: DISCONTINUED | OUTPATIENT
Start: 2022-02-12 | End: 2022-02-14 | Stop reason: HOSPADM

## 2022-02-09 RX ORDER — CARBOXYMETHYLCELLULOSE SODIUM 2.5 MG/ML
1 SOLUTION/ DROPS OPHTHALMIC 4 TIMES DAILY PRN
COMMUNITY

## 2022-02-09 RX ORDER — CARVEDILOL 12.5 MG/1
12.5 TABLET ORAL 2 TIMES DAILY WITH MEALS
Status: DISCONTINUED | OUTPATIENT
Start: 2022-02-09 | End: 2022-02-14 | Stop reason: HOSPADM

## 2022-02-09 RX ORDER — PANTOPRAZOLE SODIUM 40 MG/1
40 TABLET, DELAYED RELEASE ORAL 2 TIMES DAILY
Status: DISCONTINUED | OUTPATIENT
Start: 2022-02-09 | End: 2022-02-14 | Stop reason: HOSPADM

## 2022-02-09 RX ORDER — ONDANSETRON 2 MG/ML
4 INJECTION INTRAMUSCULAR; INTRAVENOUS EVERY 6 HOURS PRN
Status: DISCONTINUED | OUTPATIENT
Start: 2022-02-09 | End: 2022-02-14 | Stop reason: HOSPADM

## 2022-02-09 RX ORDER — ONDANSETRON 4 MG/1
4 TABLET, ORALLY DISINTEGRATING ORAL EVERY 30 MIN PRN
Status: DISCONTINUED | OUTPATIENT
Start: 2022-02-09 | End: 2022-02-09 | Stop reason: HOSPADM

## 2022-02-09 RX ORDER — CALCIUM CARBONATE 500(1250)
1 TABLET ORAL 2 TIMES DAILY
Status: DISCONTINUED | OUTPATIENT
Start: 2022-02-09 | End: 2022-02-09 | Stop reason: CLARIF

## 2022-02-09 RX ORDER — LIDOCAINE HYDROCHLORIDE 20 MG/ML
INJECTION, SOLUTION INFILTRATION; PERINEURAL PRN
Status: DISCONTINUED | OUTPATIENT
Start: 2022-02-09 | End: 2022-02-09

## 2022-02-09 RX ORDER — ONDANSETRON 2 MG/ML
4 INJECTION INTRAMUSCULAR; INTRAVENOUS EVERY 30 MIN PRN
Status: DISCONTINUED | OUTPATIENT
Start: 2022-02-09 | End: 2022-02-09 | Stop reason: HOSPADM

## 2022-02-09 RX ORDER — NITROGLYCERIN 0.4 MG/1
0.4 TABLET SUBLINGUAL EVERY 5 MIN PRN
Status: DISCONTINUED | OUTPATIENT
Start: 2022-02-09 | End: 2022-02-14 | Stop reason: HOSPADM

## 2022-02-09 RX ORDER — MAGNESIUM HYDROXIDE 1200 MG/15ML
LIQUID ORAL PRN
Status: DISCONTINUED | OUTPATIENT
Start: 2022-02-09 | End: 2022-02-09 | Stop reason: HOSPADM

## 2022-02-09 RX ORDER — NALOXONE HYDROCHLORIDE 0.4 MG/ML
0.4 INJECTION, SOLUTION INTRAMUSCULAR; INTRAVENOUS; SUBCUTANEOUS
Status: DISCONTINUED | OUTPATIENT
Start: 2022-02-09 | End: 2022-02-14 | Stop reason: HOSPADM

## 2022-02-09 RX ORDER — TORSEMIDE 20 MG/1
40 TABLET ORAL DAILY
Status: DISCONTINUED | OUTPATIENT
Start: 2022-02-10 | End: 2022-02-14

## 2022-02-09 RX ORDER — CALCIUM CARBONATE 500(1250)
1 TABLET ORAL 2 TIMES DAILY
COMMUNITY

## 2022-02-09 RX ORDER — CALCIUM CARBONATE 500 MG/1
1000 TABLET, CHEWABLE ORAL 2 TIMES DAILY
Status: DISCONTINUED | OUTPATIENT
Start: 2022-02-09 | End: 2022-02-14 | Stop reason: HOSPADM

## 2022-02-09 RX ORDER — CALCIUM CARBONATE 500(1250)
500 TABLET ORAL 2 TIMES DAILY WITH MEALS
Status: DISCONTINUED | OUTPATIENT
Start: 2022-02-09 | End: 2022-02-09 | Stop reason: CLARIF

## 2022-02-09 RX ORDER — LIDOCAINE 40 MG/G
CREAM TOPICAL
Status: DISCONTINUED | OUTPATIENT
Start: 2022-02-09 | End: 2022-02-14 | Stop reason: HOSPADM

## 2022-02-09 RX ORDER — ALBUMIN (HUMAN) 12.5 G/50ML
12.5 SOLUTION INTRAVENOUS ONCE
Status: DISCONTINUED | OUTPATIENT
Start: 2022-02-09 | End: 2022-02-09 | Stop reason: CLARIF

## 2022-02-09 RX ORDER — SODIUM CHLORIDE, SODIUM LACTATE, POTASSIUM CHLORIDE, CALCIUM CHLORIDE 600; 310; 30; 20 MG/100ML; MG/100ML; MG/100ML; MG/100ML
INJECTION, SOLUTION INTRAVENOUS CONTINUOUS
Status: DISCONTINUED | OUTPATIENT
Start: 2022-02-09 | End: 2022-02-09 | Stop reason: HOSPADM

## 2022-02-09 RX ORDER — OXYCODONE HYDROCHLORIDE 5 MG/1
5 TABLET ORAL EVERY 4 HOURS PRN
Status: DISCONTINUED | OUTPATIENT
Start: 2022-02-09 | End: 2022-02-09 | Stop reason: HOSPADM

## 2022-02-09 RX ORDER — NICOTINE POLACRILEX 4 MG
15-30 LOZENGE BUCCAL
Status: DISCONTINUED | OUTPATIENT
Start: 2022-02-09 | End: 2022-02-14 | Stop reason: HOSPADM

## 2022-02-09 RX ORDER — CALCIUM CARBONATE 500(1250)
2 TABLET ORAL
COMMUNITY
End: 2022-02-17

## 2022-02-09 RX ORDER — ATORVASTATIN CALCIUM 40 MG/1
80 TABLET, FILM COATED ORAL AT BEDTIME
Status: DISCONTINUED | OUTPATIENT
Start: 2022-02-09 | End: 2022-02-14 | Stop reason: HOSPADM

## 2022-02-09 RX ORDER — FENTANYL CITRATE 50 UG/ML
25 INJECTION, SOLUTION INTRAMUSCULAR; INTRAVENOUS EVERY 5 MIN PRN
Status: DISCONTINUED | OUTPATIENT
Start: 2022-02-09 | End: 2022-02-09 | Stop reason: HOSPADM

## 2022-02-09 RX ORDER — LOSARTAN POTASSIUM 50 MG/1
50 TABLET ORAL DAILY
Status: DISCONTINUED | OUTPATIENT
Start: 2022-02-09 | End: 2022-02-14 | Stop reason: HOSPADM

## 2022-02-09 RX ORDER — KETAMINE HYDROCHLORIDE 50 MG/ML
INJECTION, SOLUTION INTRAMUSCULAR; INTRAVENOUS PRN
Status: DISCONTINUED | OUTPATIENT
Start: 2022-02-09 | End: 2022-02-09

## 2022-02-09 RX ORDER — ONDANSETRON 4 MG/1
4 TABLET, ORALLY DISINTEGRATING ORAL EVERY 6 HOURS PRN
Status: DISCONTINUED | OUTPATIENT
Start: 2022-02-09 | End: 2022-02-14 | Stop reason: HOSPADM

## 2022-02-09 RX ORDER — ALBUMIN, HUMAN INJ 5% 5 %
250 SOLUTION INTRAVENOUS ONCE
Status: COMPLETED | OUTPATIENT
Start: 2022-02-09 | End: 2022-02-09

## 2022-02-09 RX ORDER — CEFAZOLIN SODIUM 2 G/100ML
2 INJECTION, SOLUTION INTRAVENOUS
Status: COMPLETED | OUTPATIENT
Start: 2022-02-09 | End: 2022-02-09

## 2022-02-09 RX ORDER — OXYCODONE HYDROCHLORIDE 5 MG/1
5 TABLET ORAL EVERY 4 HOURS PRN
Status: DISCONTINUED | OUTPATIENT
Start: 2022-02-09 | End: 2022-02-14 | Stop reason: HOSPADM

## 2022-02-09 RX ORDER — AMOXICILLIN 250 MG
1 CAPSULE ORAL 2 TIMES DAILY
Status: DISCONTINUED | OUTPATIENT
Start: 2022-02-09 | End: 2022-02-14 | Stop reason: HOSPADM

## 2022-02-09 RX ORDER — LIDOCAINE 40 MG/G
CREAM TOPICAL
Status: DISCONTINUED | OUTPATIENT
Start: 2022-02-09 | End: 2022-02-09 | Stop reason: HOSPADM

## 2022-02-09 RX ORDER — CLOPIDOGREL BISULFATE 75 MG/1
75 TABLET ORAL DAILY
Status: DISCONTINUED | OUTPATIENT
Start: 2022-02-10 | End: 2022-02-14 | Stop reason: HOSPADM

## 2022-02-09 RX ORDER — POLYETHYLENE GLYCOL 3350 17 G/17G
17 POWDER, FOR SOLUTION ORAL DAILY
Status: DISCONTINUED | OUTPATIENT
Start: 2022-02-10 | End: 2022-02-14 | Stop reason: HOSPADM

## 2022-02-09 RX ORDER — ALBUTEROL SULFATE 90 UG/1
2 AEROSOL, METERED RESPIRATORY (INHALATION) EVERY 4 HOURS PRN
Status: DISCONTINUED | OUTPATIENT
Start: 2022-02-09 | End: 2022-02-14 | Stop reason: HOSPADM

## 2022-02-09 RX ORDER — HEPARIN SODIUM 1000 [USP'U]/ML
INJECTION, SOLUTION INTRAVENOUS; SUBCUTANEOUS PRN
Status: DISCONTINUED | OUTPATIENT
Start: 2022-02-09 | End: 2022-02-09

## 2022-02-09 RX ORDER — ONDANSETRON 2 MG/ML
INJECTION INTRAMUSCULAR; INTRAVENOUS PRN
Status: DISCONTINUED | OUTPATIENT
Start: 2022-02-09 | End: 2022-02-09

## 2022-02-09 RX ORDER — HYDROMORPHONE HCL IN WATER/PF 6 MG/30 ML
0.2 PATIENT CONTROLLED ANALGESIA SYRINGE INTRAVENOUS
Status: DISCONTINUED | OUTPATIENT
Start: 2022-02-09 | End: 2022-02-14 | Stop reason: HOSPADM

## 2022-02-09 RX ORDER — DEXTROSE MONOHYDRATE 25 G/50ML
INJECTION, SOLUTION INTRAVENOUS PRN
Status: DISCONTINUED | OUTPATIENT
Start: 2022-02-09 | End: 2022-02-09

## 2022-02-09 RX ORDER — PROPOFOL 10 MG/ML
INJECTION, EMULSION INTRAVENOUS PRN
Status: DISCONTINUED | OUTPATIENT
Start: 2022-02-09 | End: 2022-02-09

## 2022-02-09 RX ORDER — GLYCERIN 35 %
2 SPRAY, NON-AEROSOL (ML) MUCOUS MEMBRANE DAILY PRN
Status: ON HOLD | COMMUNITY
End: 2023-01-01

## 2022-02-09 RX ORDER — SODIUM CHLORIDE 9 MG/ML
INJECTION, SOLUTION INTRAVENOUS CONTINUOUS
Status: DISCONTINUED | OUTPATIENT
Start: 2022-02-09 | End: 2022-02-10

## 2022-02-09 RX ADMIN — HYDROMORPHONE HYDROCHLORIDE 0.2 MG: 1 INJECTION, SOLUTION INTRAMUSCULAR; INTRAVENOUS; SUBCUTANEOUS at 19:15

## 2022-02-09 RX ADMIN — ACETAMINOPHEN 975 MG: 325 TABLET ORAL at 22:32

## 2022-02-09 RX ADMIN — FENTANYL CITRATE 25 MCG: 50 INJECTION, SOLUTION INTRAMUSCULAR; INTRAVENOUS at 18:30

## 2022-02-09 RX ADMIN — PHENYLEPHRINE HYDROCHLORIDE 100 MCG: 10 INJECTION INTRAVENOUS at 15:57

## 2022-02-09 RX ADMIN — Medication 5 MG: at 15:38

## 2022-02-09 RX ADMIN — FENTANYL CITRATE 25 MCG: 50 INJECTION, SOLUTION INTRAMUSCULAR; INTRAVENOUS at 18:35

## 2022-02-09 RX ADMIN — ROCURONIUM BROMIDE 10 MG: 50 INJECTION, SOLUTION INTRAVENOUS at 12:36

## 2022-02-09 RX ADMIN — HYDROMORPHONE HYDROCHLORIDE 0.2 MG: 1 INJECTION, SOLUTION INTRAMUSCULAR; INTRAVENOUS; SUBCUTANEOUS at 19:25

## 2022-02-09 RX ADMIN — Medication 5 MG: at 16:25

## 2022-02-09 RX ADMIN — PHENYLEPHRINE HYDROCHLORIDE 0.3 MCG/KG/MIN: 10 INJECTION INTRAVENOUS at 11:45

## 2022-02-09 RX ADMIN — PHENYLEPHRINE HYDROCHLORIDE 100 MCG: 10 INJECTION INTRAVENOUS at 16:23

## 2022-02-09 RX ADMIN — DEXTROSE MONOHYDRATE 25 ML: 25 INJECTION, SOLUTION INTRAVENOUS at 17:06

## 2022-02-09 RX ADMIN — Medication 5 MG: at 16:31

## 2022-02-09 RX ADMIN — LIDOCAINE HYDROCHLORIDE 60 MG: 20 INJECTION, SOLUTION INFILTRATION; PERINEURAL at 11:45

## 2022-02-09 RX ADMIN — FENTANYL CITRATE 50 MCG: 50 INJECTION, SOLUTION INTRAMUSCULAR; INTRAVENOUS at 12:28

## 2022-02-09 RX ADMIN — HEPARIN SODIUM 2000 UNITS: 1000 INJECTION INTRAVENOUS; SUBCUTANEOUS at 14:30

## 2022-02-09 RX ADMIN — ALBUMIN HUMAN 250 ML: 0.05 INJECTION, SOLUTION INTRAVENOUS at 20:04

## 2022-02-09 RX ADMIN — PHENYLEPHRINE HYDROCHLORIDE 100 MCG: 10 INJECTION INTRAVENOUS at 16:52

## 2022-02-09 RX ADMIN — MOMETASONE FUROATE 2 PUFF: 220 INHALANT RESPIRATORY (INHALATION) at 22:31

## 2022-02-09 RX ADMIN — PHENYLEPHRINE HYDROCHLORIDE 100 MCG: 10 INJECTION INTRAVENOUS at 17:17

## 2022-02-09 RX ADMIN — PHENYLEPHRINE HYDROCHLORIDE 100 MCG: 10 INJECTION INTRAVENOUS at 15:39

## 2022-02-09 RX ADMIN — Medication 5 MG: at 12:37

## 2022-02-09 RX ADMIN — PHENYLEPHRINE HYDROCHLORIDE 100 MCG: 10 INJECTION INTRAVENOUS at 16:21

## 2022-02-09 RX ADMIN — HYDROMORPHONE HYDROCHLORIDE 0.2 MG: 1 INJECTION, SOLUTION INTRAMUSCULAR; INTRAVENOUS; SUBCUTANEOUS at 19:10

## 2022-02-09 RX ADMIN — FENTANYL CITRATE 25 MCG: 50 INJECTION, SOLUTION INTRAMUSCULAR; INTRAVENOUS at 18:19

## 2022-02-09 RX ADMIN — SODIUM CHLORIDE 1000 ML: 900 IRRIGANT IRRIGATION at 16:46

## 2022-02-09 RX ADMIN — HEPARIN SODIUM 2000 UNITS: 1000 INJECTION INTRAVENOUS; SUBCUTANEOUS at 15:19

## 2022-02-09 RX ADMIN — PHENYLEPHRINE HYDROCHLORIDE 100 MCG: 10 INJECTION INTRAVENOUS at 17:16

## 2022-02-09 RX ADMIN — HYDROMORPHONE HYDROCHLORIDE 0.2 MG: 1 INJECTION, SOLUTION INTRAMUSCULAR; INTRAVENOUS; SUBCUTANEOUS at 19:20

## 2022-02-09 RX ADMIN — PHENYLEPHRINE HYDROCHLORIDE 100 MCG: 10 INJECTION INTRAVENOUS at 15:37

## 2022-02-09 RX ADMIN — PROTAMINE SULFATE 30 MG: 10 INJECTION, SOLUTION INTRAVENOUS at 16:40

## 2022-02-09 RX ADMIN — CEFAZOLIN SODIUM 2 G: 2 INJECTION, SOLUTION INTRAVENOUS at 16:05

## 2022-02-09 RX ADMIN — SENNOSIDES AND DOCUSATE SODIUM 1 TABLET: 50; 8.6 TABLET ORAL at 22:32

## 2022-02-09 RX ADMIN — PHENYLEPHRINE HYDROCHLORIDE 100 MCG: 10 INJECTION INTRAVENOUS at 16:49

## 2022-02-09 RX ADMIN — ROCURONIUM BROMIDE 10 MG: 50 INJECTION, SOLUTION INTRAVENOUS at 15:01

## 2022-02-09 RX ADMIN — ROCURONIUM BROMIDE 10 MG: 50 INJECTION, SOLUTION INTRAVENOUS at 13:00

## 2022-02-09 RX ADMIN — SODIUM CHLORIDE: 0.9 INJECTION, SOLUTION INTRAVENOUS at 11:52

## 2022-02-09 RX ADMIN — IOHEXOL 50 ML: 300 INJECTION, SOLUTION INTRAVENOUS at 15:50

## 2022-02-09 RX ADMIN — SODIUM CHLORIDE 500 ML: 900 IRRIGANT IRRIGATION at 13:40

## 2022-02-09 RX ADMIN — SODIUM CHLORIDE, POTASSIUM CHLORIDE, SODIUM LACTATE AND CALCIUM CHLORIDE: 600; 310; 30; 20 INJECTION, SOLUTION INTRAVENOUS at 11:00

## 2022-02-09 RX ADMIN — PHENYLEPHRINE HYDROCHLORIDE 100 MCG: 10 INJECTION INTRAVENOUS at 14:37

## 2022-02-09 RX ADMIN — ROCURONIUM BROMIDE 50 MG: 50 INJECTION, SOLUTION INTRAVENOUS at 11:45

## 2022-02-09 RX ADMIN — ROCURONIUM BROMIDE 10 MG: 50 INJECTION, SOLUTION INTRAVENOUS at 15:31

## 2022-02-09 RX ADMIN — PHENYLEPHRINE HYDROCHLORIDE 100 MCG: 10 INJECTION INTRAVENOUS at 16:30

## 2022-02-09 RX ADMIN — SODIUM CHLORIDE: 9 INJECTION, SOLUTION INTRAVENOUS at 20:59

## 2022-02-09 RX ADMIN — FENTANYL CITRATE 25 MCG: 50 INJECTION, SOLUTION INTRAMUSCULAR; INTRAVENOUS at 18:24

## 2022-02-09 RX ADMIN — FENTANYL CITRATE 50 MCG: 50 INJECTION, SOLUTION INTRAMUSCULAR; INTRAVENOUS at 17:11

## 2022-02-09 RX ADMIN — ROCURONIUM BROMIDE 20 MG: 50 INJECTION, SOLUTION INTRAVENOUS at 13:39

## 2022-02-09 RX ADMIN — HYDROMORPHONE HYDROCHLORIDE 0.2 MG: 1 INJECTION, SOLUTION INTRAMUSCULAR; INTRAVENOUS; SUBCUTANEOUS at 19:05

## 2022-02-09 RX ADMIN — IOHEXOL 26 ML: 300 INJECTION, SOLUTION INTRAVENOUS at 17:09

## 2022-02-09 RX ADMIN — GELATIN ABSORBABLE SPONGE SIZE 100 1 EACH: MISC at 15:15

## 2022-02-09 RX ADMIN — PHENYLEPHRINE HYDROCHLORIDE 100 MCG: 10 INJECTION INTRAVENOUS at 15:28

## 2022-02-09 RX ADMIN — FENTANYL CITRATE 50 MCG: 50 INJECTION, SOLUTION INTRAMUSCULAR; INTRAVENOUS at 17:09

## 2022-02-09 RX ADMIN — LOSARTAN POTASSIUM 50 MG: 50 TABLET, FILM COATED ORAL at 22:32

## 2022-02-09 RX ADMIN — HEPARIN SODIUM 8000 UNITS: 1000 INJECTION INTRAVENOUS; SUBCUTANEOUS at 13:45

## 2022-02-09 RX ADMIN — PANTOPRAZOLE SODIUM 40 MG: 40 TABLET, DELAYED RELEASE ORAL at 22:33

## 2022-02-09 RX ADMIN — PHENYLEPHRINE HYDROCHLORIDE 100 MCG: 10 INJECTION INTRAVENOUS at 16:31

## 2022-02-09 RX ADMIN — CALCIUM CARBONATE (ANTACID) CHEW TAB 500 MG 1000 MG: 500 CHEW TAB at 22:33

## 2022-02-09 RX ADMIN — KETAMINE HYDROCHLORIDE 50 MG: 50 INJECTION, SOLUTION INTRAMUSCULAR; INTRAVENOUS at 11:45

## 2022-02-09 RX ADMIN — FENTANYL CITRATE 50 MCG: 50 INJECTION, SOLUTION INTRAMUSCULAR; INTRAVENOUS at 11:45

## 2022-02-09 RX ADMIN — SODIUM CHLORIDE, POTASSIUM CHLORIDE, SODIUM LACTATE AND CALCIUM CHLORIDE: 600; 310; 30; 20 INJECTION, SOLUTION INTRAVENOUS at 16:20

## 2022-02-09 RX ADMIN — CEFAZOLIN SODIUM 2 G: 2 INJECTION, SOLUTION INTRAVENOUS at 12:06

## 2022-02-09 RX ADMIN — OXYCODONE HYDROCHLORIDE 5 MG: 5 TABLET ORAL at 18:42

## 2022-02-09 RX ADMIN — ONDANSETRON 4 MG: 2 INJECTION INTRAMUSCULAR; INTRAVENOUS at 16:45

## 2022-02-09 RX ADMIN — SUGAMMADEX 200 MG: 100 INJECTION, SOLUTION INTRAVENOUS at 17:28

## 2022-02-09 RX ADMIN — GABAPENTIN 200 MG: 100 CAPSULE ORAL at 22:33

## 2022-02-09 RX ADMIN — ATORVASTATIN CALCIUM 80 MG: 40 TABLET, FILM COATED ORAL at 22:32

## 2022-02-09 RX ADMIN — PHENYLEPHRINE HYDROCHLORIDE 200 MCG: 10 INJECTION INTRAVENOUS at 16:50

## 2022-02-09 RX ADMIN — Medication 5 MG: at 13:43

## 2022-02-09 RX ADMIN — PROPOFOL 130 MG: 10 INJECTION, EMULSION INTRAVENOUS at 11:45

## 2022-02-09 RX ADMIN — PHENYLEPHRINE HYDROCHLORIDE 100 MCG: 10 INJECTION INTRAVENOUS at 11:45

## 2022-02-09 RX ADMIN — CLOPIDOGREL BISULFATE 300 MG: 75 TABLET ORAL at 22:33

## 2022-02-09 RX ADMIN — CARVEDILOL 12.5 MG: 12.5 TABLET, FILM COATED ORAL at 22:33

## 2022-02-09 RX ADMIN — HEPARIN SODIUM 500 ML: 10000 INJECTION, SOLUTION INTRAVENOUS; SUBCUTANEOUS at 11:36

## 2022-02-09 ASSESSMENT — ACTIVITIES OF DAILY LIVING (ADL)
ADLS_ACUITY_SCORE: 6
ADLS_ACUITY_SCORE: 8
ADLS_ACUITY_SCORE: 6
ADLS_ACUITY_SCORE: 12
ADLS_ACUITY_SCORE: 12
ADLS_ACUITY_SCORE: 6
ADLS_ACUITY_SCORE: 6

## 2022-02-09 ASSESSMENT — ENCOUNTER SYMPTOMS: DYSRHYTHMIAS: 1

## 2022-02-09 ASSESSMENT — LIFESTYLE VARIABLES: TOBACCO_USE: 1

## 2022-02-09 ASSESSMENT — COPD QUESTIONNAIRES: COPD: 1

## 2022-02-09 NOTE — PHARMACY-ADMISSION MEDICATION HISTORY
Pharmacy Note - Admission Medication History   ______________________________________________________________________    Prior To Admission (PTA) med list completed and updated in EMR.       PTA Med List   Medication Sig Note Last Dose     albuterol (PROAIR HFA) 108 (90 Base) MCG/ACT Inhaler Inhale 2 puffs into the lungs every 4 hours as needed for shortness of breath / dyspnea  2/9/2022 at am     amylase-lipase-protease (CREON 12) 45170-17664-65616 units CPEP Take 2 capsules by mouth 3 times daily (with meals)   2/9/2022 at am     aspirin (ASA) 325 MG tablet Take 325 mg by mouth 2 times daily   2/8/2022 at pm     atorvastatin (LIPITOR) 80 MG tablet Take 80 mg by mouth At Bedtime   2/8/2022 at pm     calcium carbonate (OS-MARVA) 500 MG tablet Take 1 tablet by mouth 2 times daily  2/9/2022 at am     calcium carbonate (OS-MARVA) 500 MG tablet Take 2 tablets by mouth daily (with lunch)  2/8/2022 at Unknown time     Carboxymethylcellulose Sod PF (THERATEARS PF) 0.25 % SOLN Apply 1 drop to eye 4 times daily as needed  Unknown at Unknown time     carvedilol (COREG) 6.25 MG tablet Take 2 tablets (12.5 mg) by mouth 2 times daily (with meals)  2/9/2022 at am     gabapentin (NEURONTIN) 100 MG capsule Take 200 mg by mouth At Bedtime   2/8/2022 at pm     Glycerin (OASIS MOISTURIZING MOUTH SPRAY) 35 % LIQD Take 2 sprays by mouth daily as needed  Unknown at Unknown time     insulin aspart (NOVOLOG PEN) 100 UNIT/ML pen Inject 3 Units Subcutaneous 3 times daily (with meals) 2/9/2022: Usually takes every morning, but doesn't always take with lunch and dinner 2/9/2022 at am     insulin glargine (LANTUS PEN) 100 UNIT/ML pen Inject 32 Units Subcutaneous every morning (Patient taking differently: Inject 30 Units Subcutaneous every morning )  2/9/2022 at 24 units in AM     Insulin Pen Needle (PEN NEEDLES) 32G X 4 MM MISC   Unknown at Unknown time     loperamide (IMODIUM) 2 MG capsule Take 2 mg by mouth 4 times daily as needed for diarrhea   2/9/2022 at am     losartan (COZAAR) 100 MG tablet Take 50 mg by mouth daily  2/8/2022 at am     magnesium oxide (MAG-OX) 400 (241.3 Mg) MG tablet Take 1 tablet (400 mg) by mouth 4 times daily  2/9/2022 at am     metFORMIN (GLUCOPHAGE-XR) 500 MG 24 hr tablet Take 500 mg by mouth 2 times daily (with meals)   2/9/2022 at am     mometasone (ASMANEX TWISTHALER) 220 MCG/INH inhaler Inhale 2 puffs into the lungs every evening   2/8/2022 at pm     nitroGLYcerin (NITROSTAT) 0.4 MG sublingual tablet Place 0.4 mg under the tongue every 5 minutes as needed for chest pain   Unknown at Unknown time     pantoprazole (PROTONIX) 40 MG EC tablet Take 40 mg by mouth 2 times daily   2/9/2022 at am     torsemide (DEMADEX) 20 MG tablet Take 40 mg by mouth daily   2/9/2022 at 20 mg this AM     vitamin D3 (CHOLECALCIFEROL) 50 mcg (2000 units) tablet Take 1 tablet by mouth 2 times daily  2/9/2022 at am       Information source(s): Patient and Clinic records    Method of interview communication: in-person    Patient was asked about OTC/herbal products specifically.  PTA med list reflects this.    Based on the pharmacist's assessment, the PTA med list information appears reliable    Allergies were reviewed, assessed, and updated with the patient.      Medications available for use during hospital stay: albuterol HFA, TheraTears, mouth spray.      Thank you for the opportunity to participate in the care of this patient.      Juliet Vasquez RPH     2/9/2022     11:19 AM

## 2022-02-09 NOTE — ANESTHESIA PREPROCEDURE EVALUATION
Anesthesia Pre-Procedure Evaluation    Patient: Delbert Tilley   MRN: [id number] : 1950        Preoperative Diagnosis: PAD (peripheral artery disease) (H) [I73.9]    Procedure : Procedure(s):  ENDARTERECTOMY, FEMORAL with retrograde iliac intervention          Past Medical History:   Diagnosis Date     Acute renal failure (H) 06 Hosp    secondary to dehydration     Anemia      Arthritis      Atherosclerosis of artery of extremity with intermittent claudication (H)      CAD (coronary artery disease)     LIMA to the LAD, vein graft to OM and a vein graft to the PDA     Cardiomyopathy (H)      Carpal tunnel syndrome      CHF (congestive heart failure) (H)     Ischemic and nonischemic cardiomyopathy; 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%     Claudication (H)      COPD (chronic obstructive pulmonary disease) (H)      Diabetes (H)      Gastro-oesophageal reflux disease      GERD (gastroesophageal reflux disease)      H/O: alcohol abuse      HTN (hypertension)      Hyperlipidaemia LDL goal <100 2013     Hyperparathyroidism (H)      Hypokalemia      ICD (implantable cardioverter-defibrillator) in place     Medtronic     NSTEMI (non-ST elevated myocardial infarction) (H)      NSVT (nonsustained ventricular tachycardia) (H)      Pacemaker      Pancreatitis 06 Hosp     Paroxysmal atrial fibrillation (H)      PVD (peripheral vascular disease) (H)      Thrombocytopenia (H)      Tobacco use      Venous (peripheral) insufficiency      Vitamin D deficiency       Past Surgical History:   Procedure Laterality Date     CATARACT IOL, RT/LT      Cataract IOL RT/LT     ENDARTERECTOMY CAROTID Right 2015    Procedure: ENDARTERECTOMY CAROTID;  Surgeon: João Turner MD;  Location:  OR     ENDARTERECTOMY CAROTID Left 2017    Procedure: ENDARTERECTOMY CAROTID;  LEFT CAROTID ENDARTERECTOMY WITH EEG;  Surgeon: João Turner MD;  Location:  OR     IMPLANT PACEMAKER  06/10/2010     IMPLANTED DEVICE        INTERPOSITION TENDON HAND N/A 06/09/2020    Procedure: Right thumb ligament reconstruction tendon interposition with extensor pollicis brevis to abductor pollicis tendon transfer;  Surgeon: Bethel Martin MD;  Location: [facility] OR     RELEASE CARPAL TUNNEL Right 04/12/2016    Procedure: RELEASE CARPAL TUNNEL;  Surgeon: Lissy Leger MD;  Location: [facility] OR     SURGICAL HISTORY OF -   1998    Laminectomy     TONSILLECTOMY & ADENOIDECTOMY       ZZC CABG, ARTERIAL, THREE        Allergies   Allergen Reactions     Fish Nausea and Vomiting     severe     Hydrocodone GI Disturbance     Upset stomach     Shellfish Allergy Hives and Rash      Social History     Tobacco Use     Smoking status: Current Every Day Smoker     Packs/day: 1.50     Years: 50.00     Pack years: 75.00     Types: Cigarettes     Smokeless tobacco: Never Used     Tobacco comment: 1 1/2-2 PPD 3/11/21   Substance Use Topics     Alcohol use: No      Wt Readings from Last 1 Encounters:   02/09/22 72.2 kg (159 lb 1.6 oz)        Anesthesia Evaluation            ROS/MED HX  ENT/Pulmonary:     (+) tobacco use, COPD,     Neurologic:  - neg neurologic ROS     Cardiovascular: Comment: Left ventricular systolic function is severely reduced.The visual ejection  fraction is 20-25%.The left ventricle is mildly dilated.  Moderately decreased right ventricular systolic function  The left atrium is severely dilated.  There is mild (1+) mitral regurgitation.  There is mild (1+) tricuspid regurgitation.  Pulmonary hypertension- RVSP 54 mm hg +RA.     Compared to echo dated 11/23/2020 no significant changes.    (+) hypertension-Peripheral Vascular Disease-CAD ---CHF pacemaker, ICD dysrhythmias, a-fib,     METS/Exercise Tolerance:     Hematologic:       Musculoskeletal:       GI/Hepatic:     (+) GERD,     Renal/Genitourinary:     (+) renal disease, type: CRI,     Endo:     (+) type II DM,     Psychiatric/Substance Use:       Infectious Disease:       Malignancy:        Other:            Physical Exam    Airway  airway exam normal      Mallampati: I   TM distance: > 3 FB   Neck ROM: full     Respiratory Devices and Support         Dental       (+) upper dentures and lower dentures      Cardiovascular   cardiovascular exam normal    Comment: ICD present      Pulmonary   pulmonary exam normal                OUTSIDE LABS:  CBC:   Lab Results   Component Value Date    WBC 6.7 07/22/2021    WBC 6.3 01/21/2021    HGB 14.1 07/22/2021    HGB 15.0 01/21/2021    HCT 44.0 07/22/2021    HCT 46.2 01/21/2021     (L) 07/22/2021     (L) 01/21/2021     BMP:   Lab Results   Component Value Date     02/02/2022     11/11/2021    POTASSIUM 5.2 02/02/2022    POTASSIUM 4.9 11/11/2021    CHLORIDE 100 02/02/2022    CHLORIDE 105 11/11/2021    CO2 31 02/02/2022    CO2 30 11/11/2021    BUN 51 (H) 02/02/2022    BUN 37 (H) 11/11/2021    CR 1.50 (H) 02/02/2022    CR 1.39 (H) 11/11/2021     (H) 02/02/2022     (H) 01/06/2022     COAGS:   Lab Results   Component Value Date    PTT 31 12/22/2017    INR 1.02 12/22/2017     POC:   Lab Results   Component Value Date     (H) 10/22/2020     HEPATIC:   Lab Results   Component Value Date    ALBUMIN 3.0 (L) 07/22/2021    PROTTOTAL 6.6 (L) 07/22/2021    ALT 32 07/22/2021    AST 35 07/22/2021    ALKPHOS 145 07/22/2021    BILITOTAL 0.4 07/22/2021     OTHER:   Lab Results   Component Value Date    PH 7.56 (H) 11/22/2004    LACT 1.6 10/21/2020    A1C 9.3 (H) 02/02/2022    MARVA 9.0 02/02/2022    PHOS 3.7 07/22/2021    MAG 1.5 (L) 07/22/2021    LIPASE Quantity not sufficient 05/29/2010    AMYLASE 126 (H) 02/17/2010    TSH 3.84 10/19/2016    CRP 3.2 07/22/2021    SED 9 07/22/2021       Anesthesia Plan    ASA Status:  4   NPO Status:  NPO Appropriate    Anesthesia Type: General.     - Airway: ETT   Induction: Intravenous.   Maintenance: Balanced.   Techniques and Equipment:     - Lines/Monitors: 2nd IV, Arterial Line      Consents    Anesthesia Plan(s) and associated risks, benefits, and realistic alternatives discussed. Questions answered and patient/representative(s) expressed understanding.    - Discussed:     - Discussed with:  Patient (Daughter)    Use of blood products discussed: Yes.     - Discussed with: Patient.     - Consented: consented to blood products            Reason for refusal: other.     Postoperative Care    Pain management: Multi-modal analgesia.   PONV prophylaxis: Ondansetron (or other 5HT-3), Dexamethasone or Solumedrol     Comments:                Yamilex De Leon MD

## 2022-02-09 NOTE — ANESTHESIA CARE TRANSFER NOTE
Patient: Delbert Tilley    Procedure: Procedure(s):  BILATERAL- COMMON FEMORAL ENDARTERECTOMY; RETROGRADE ILIAC ARTERY STENTING       Diagnosis: PAD (peripheral artery disease) (H) [I73.9]  Diagnosis Additional Information: No value filed.    Anesthesia Type:   General     Note:  Anesthesia Care Transfer Notewriter  Vitals:  Vitals Value Taken Time   /60 02/09/22 1738   Temp 97.0 f      Pulse 57 02/09/22 1739   Resp 26 02/09/22 1739   SpO2 100 % 02/09/22 1739   Vitals shown include unvalidated device data.    Electronically Signed By: ANA Rodriguez CRNA  February 9, 2022  5:41 PM

## 2022-02-09 NOTE — ANESTHESIA PROCEDURE NOTES
Airway       Patient location during procedure: OR       Procedure Start/Stop Times: 2/9/2022 11:49 AM  Staff -        Anesthesiologist:  Yamilex De Leon MD       CRNA: Mainor Reddy APRN CRNA       Performed By: CRNA  Consent for Airway        Urgency: elective  Indications and Patient Condition       Indications for airway management: asa-procedural       Induction type:intravenous       Mask difficulty assessment: 2 - vent by mask + OA or adjuvant +/- NMBA    Final Airway Details       Final airway type: endotracheal airway       Successful airway: ETT - single  Endotracheal Airway Details        ETT size (mm): 8.0       Cuffed: yes       Cuff volume (mL): 8       Successful intubation technique: direct laryngoscopy       DL Blade Type: Parson 2       Grade View of Cords: 1       Adjucts: stylet       Position: Left       Measured from: lips       Secured at (cm): 23       Bite block used: None    Post intubation assessment        Placement verified by: capnometry, equal breath sounds and chest rise        Number of attempts at approach: 1       Number of other approaches attempted: 0       Secured with: silk tape       Ease of procedure: easy       Dentition: Intact and Unchanged

## 2022-02-09 NOTE — ANESTHESIA PROCEDURE NOTES
Arterial Line Procedure Note    Pre-Procedure   Staff -        Anesthesiologist:  Yamilex De Leon MD       Performed By: anesthesiologist       Location: pre-op       Procedure Start/Stop Times: 2/9/2022 11:31 AM and 2/9/2022 11:34 AM       Pre-Anesthestic Checklist: patient identified, IV checked, risks and benefits discussed, informed consent, monitors and equipment checked, pre-op evaluation and at physician/surgeon's request  Timeout:       Correct Patient: Yes        Correct Procedure: Yes        Correct Site: Yes        Correct Position: Yes   Procedure   Procedure: arterial line       Laterality: right       Insertion Site: brachial.  Sterile Prep        Standard elements of sterile barrier followed       Skin prep: Chloraprep  Insertion/Injection        Technique: ultrasound guided        1. Ultrasound was used to evaluate the access site.       2. Artery evaluated via ultrasound for patency/adequacy.       3. Using real-time ultrasound the needle/catheter was observed entering the artery/vein.       5. The visualized structures were anatomically normal.       6. There were no apparent abnormal pathologic findings.       Catheter Type/Size: 20 G, 12 cm  Narrative         Secured by: other       Tegaderm and Biopatch dressing used.       Complications: None apparent,      Arterial waveform: Yes

## 2022-02-09 NOTE — BRIEF OP NOTE
[facility]    Brief Operative Note    Pre-operative diagnosis: Right lower extremity critical limb threatening ischemia   Post-operative diagnosis Same as pre-operative diagnosis    Procedure:   1) Bilateral common femoral artery endarterectomy   2) Placement of 7 mm x 100 mm Viabahn stent; 7 mm x 50 mm Viabahn stent in R external iliac artery   3) Placement of 7 mm x 100 mm Viabahn stent in L external iliac artery     Surgeon: Surgeon(s) and Role:     * Ric Chau MD - Primary     * Terrell Dalal MD - Assisting  Anesthesia: General   Estimated Blood Loss: 500 mL from 2/9/2022 11:40 AM to 2/9/2022  5:34 PM      Drains: None  Specimens:   ID Type Source Tests Collected by Time Destination   1 : PLAQUE-RIGHT FEMORAL Tissue Artery, Femoral, Right SURGICAL PATHOLOGY EXAM Ric Chau MD 2/9/2022  2:01 PM    2 : PLACQUE- LEFT FEMORAL Tissue Artery, Femoral, Left SURGICAL PATHOLOGY EXAM Ric Chau MD 2/9/2022  2:30 PM      Findings: Widely patent R external iliac artery post-intervention and widely patent L external iliac artery post-intervention. Multiphasic flow through the profunda femoris arteries bilaterally. Monophasic DP and PT signals bilaterally at the end of the case.         Complications: None.  Implants:   Implant Name Type Inv. Item Serial No.  Lot No. LRB No. Used Action   IMP PATCH PERICARDIUM PHOTOFIX BOVINE 0.8X8CM PFP0.8X8 - [id number] Bone/Tissue/Biologic IMP PATCH PERICARDIUM PHOTOFIX BOVINE 0.8X8CM PFP0.8X8  CRYOLIFE [id number] Right 1 Implanted   IMP PATCH PERICARDIUM PHOTOFIX BOVINE 0.8X8CM PFP0.8X8 - [id number] Bone/Tissue/Biologic IMP PATCH PERICARDIUM PHOTOFIX BOVINE 0.8X8CM PFP0.8X8  CRYOLIFE [id number] Left 1 Implanted   VIABAHN ENDOPROSTHESIS 7MM X 10CM 8FR   [id number] GORE NA Right 1 Implanted   VIABAHN ENDOPROSTHESIS 7MM X 5CM 8FR   [id number] GORE NA Right 1 Implanted   VIABAHN ENDOPROSTHESIS 7MM X 10CM  8FR   [id number] JV HOOVER Left 1 Implanted

## 2022-02-09 NOTE — H&P
No changes in the patient's health since last clinic visit. No recent COVID diagnoses, hospitalization, or medication changes. Will proceed with bilateral common femoral endarterectomies, retrograde iliac intervention, possible femoral-femoral bypass.     Terrell Dalal MD  Vascular Surgery Fellow

## 2022-02-10 LAB
ANION GAP SERPL CALCULATED.3IONS-SCNC: 9 MMOL/L (ref 5–18)
BASOPHILS # BLD AUTO: 0.1 10E3/UL (ref 0–0.2)
BASOPHILS NFR BLD AUTO: 1 %
BUN SERPL-MCNC: 36 MG/DL (ref 8–28)
CALCIUM SERPL-MCNC: 7.8 MG/DL (ref 8.5–10.5)
CHLORIDE BLD-SCNC: 105 MMOL/L (ref 98–107)
CO2 SERPL-SCNC: 23 MMOL/L (ref 22–31)
CREAT SERPL-MCNC: 1.31 MG/DL (ref 0.7–1.3)
EOSINOPHIL # BLD AUTO: 0.1 10E3/UL (ref 0–0.7)
EOSINOPHIL NFR BLD AUTO: 1 %
ERYTHROCYTE [DISTWIDTH] IN BLOOD BY AUTOMATED COUNT: 14.5 % (ref 10–15)
GFR SERPL CREATININE-BSD FRML MDRD: 57 ML/MIN/1.73M2
GLUCOSE BLD-MCNC: 160 MG/DL (ref 70–125)
GLUCOSE BLDC GLUCOMTR-MCNC: 107 MG/DL (ref 70–99)
GLUCOSE BLDC GLUCOMTR-MCNC: 126 MG/DL (ref 70–99)
GLUCOSE BLDC GLUCOMTR-MCNC: 149 MG/DL (ref 70–99)
GLUCOSE BLDC GLUCOMTR-MCNC: 168 MG/DL (ref 70–99)
GLUCOSE BLDC GLUCOMTR-MCNC: 227 MG/DL (ref 70–99)
GLUCOSE BLDC GLUCOMTR-MCNC: 65 MG/DL (ref 70–99)
GLUCOSE BLDC GLUCOMTR-MCNC: 78 MG/DL (ref 70–99)
HCT VFR BLD AUTO: 33.3 % (ref 40–53)
HGB BLD-MCNC: 10.2 G/DL (ref 13.3–17.7)
IMM GRANULOCYTES # BLD: 0 10E3/UL
IMM GRANULOCYTES NFR BLD: 0 %
LYMPHOCYTES # BLD AUTO: 1.1 10E3/UL (ref 0.8–5.3)
LYMPHOCYTES NFR BLD AUTO: 15 %
MAGNESIUM SERPL-MCNC: 1.4 MG/DL (ref 1.8–2.6)
MCH RBC QN AUTO: 29.7 PG (ref 26.5–33)
MCHC RBC AUTO-ENTMCNC: 30.6 G/DL (ref 31.5–36.5)
MCV RBC AUTO: 97 FL (ref 78–100)
MONOCYTES # BLD AUTO: 0.7 10E3/UL (ref 0–1.3)
MONOCYTES NFR BLD AUTO: 10 %
NEUTROPHILS # BLD AUTO: 5.1 10E3/UL (ref 1.6–8.3)
NEUTROPHILS NFR BLD AUTO: 73 %
NRBC # BLD AUTO: 0 10E3/UL
NRBC BLD AUTO-RTO: 0 /100
PHOSPHATE SERPL-MCNC: 3.6 MG/DL (ref 2.5–4.5)
PLATELET # BLD AUTO: 76 10E3/UL (ref 150–450)
POTASSIUM BLD-SCNC: 4.5 MMOL/L (ref 3.5–5)
RBC # BLD AUTO: 3.44 10E6/UL (ref 4.4–5.9)
SODIUM SERPL-SCNC: 137 MMOL/L (ref 136–145)
WBC # BLD AUTO: 7 10E3/UL (ref 4–11)

## 2022-02-10 PROCEDURE — 84100 ASSAY OF PHOSPHORUS: CPT | Performed by: SURGERY

## 2022-02-10 PROCEDURE — 85025 COMPLETE CBC W/AUTO DIFF WBC: CPT | Performed by: SURGERY

## 2022-02-10 PROCEDURE — 120N000001 HC R&B MED SURG/OB

## 2022-02-10 PROCEDURE — 82310 ASSAY OF CALCIUM: CPT | Performed by: SURGERY

## 2022-02-10 PROCEDURE — 250N000013 HC RX MED GY IP 250 OP 250 PS 637: Performed by: STUDENT IN AN ORGANIZED HEALTH CARE EDUCATION/TRAINING PROGRAM

## 2022-02-10 PROCEDURE — 250N000012 HC RX MED GY IP 250 OP 636 PS 637: Performed by: SURGERY

## 2022-02-10 PROCEDURE — 36415 COLL VENOUS BLD VENIPUNCTURE: CPT | Performed by: SURGERY

## 2022-02-10 PROCEDURE — 250N000011 HC RX IP 250 OP 636: Performed by: SURGERY

## 2022-02-10 PROCEDURE — 99232 SBSQ HOSP IP/OBS MODERATE 35: CPT | Performed by: HOSPITALIST

## 2022-02-10 PROCEDURE — 83735 ASSAY OF MAGNESIUM: CPT | Performed by: SURGERY

## 2022-02-10 PROCEDURE — 250N000013 HC RX MED GY IP 250 OP 250 PS 637: Performed by: SURGERY

## 2022-02-10 PROCEDURE — 250N000013 HC RX MED GY IP 250 OP 250 PS 637: Performed by: HOSPITALIST

## 2022-02-10 RX ORDER — CEFAZOLIN SODIUM 2 G/100ML
2 INJECTION, SOLUTION INTRAVENOUS EVERY 8 HOURS
Status: COMPLETED | OUTPATIENT
Start: 2022-02-10 | End: 2022-02-12

## 2022-02-10 RX ORDER — HEPARIN SODIUM 5000 [USP'U]/.5ML
5000 INJECTION, SOLUTION INTRAVENOUS; SUBCUTANEOUS EVERY 8 HOURS
Status: DISCONTINUED | OUTPATIENT
Start: 2022-02-10 | End: 2022-02-11

## 2022-02-10 RX ORDER — ASPIRIN 81 MG/1
81 TABLET, CHEWABLE ORAL DAILY
Status: DISCONTINUED | OUTPATIENT
Start: 2022-02-10 | End: 2022-02-14 | Stop reason: HOSPADM

## 2022-02-10 RX ORDER — MAGNESIUM OXIDE 400 MG/1
400 TABLET ORAL 3 TIMES DAILY
Status: COMPLETED | OUTPATIENT
Start: 2022-02-10 | End: 2022-02-11

## 2022-02-10 RX ADMIN — GABAPENTIN 200 MG: 100 CAPSULE ORAL at 20:07

## 2022-02-10 RX ADMIN — CARVEDILOL 12.5 MG: 12.5 TABLET, FILM COATED ORAL at 12:15

## 2022-02-10 RX ADMIN — POLYETHYLENE GLYCOL 3350 17 G: 17 POWDER, FOR SOLUTION ORAL at 11:21

## 2022-02-10 RX ADMIN — SENNOSIDES AND DOCUSATE SODIUM 1 TABLET: 50; 8.6 TABLET ORAL at 20:07

## 2022-02-10 RX ADMIN — PANTOPRAZOLE SODIUM 40 MG: 40 TABLET, DELAYED RELEASE ORAL at 11:21

## 2022-02-10 RX ADMIN — ACETAMINOPHEN 975 MG: 325 TABLET ORAL at 13:54

## 2022-02-10 RX ADMIN — CEFAZOLIN SODIUM 2 G: 2 INJECTION, SOLUTION INTRAVENOUS at 23:50

## 2022-02-10 RX ADMIN — CEFAZOLIN SODIUM 2 G: 2 INJECTION, SOLUTION INTRAVENOUS at 16:30

## 2022-02-10 RX ADMIN — PANTOPRAZOLE SODIUM 40 MG: 40 TABLET, DELAYED RELEASE ORAL at 20:07

## 2022-02-10 RX ADMIN — SENNOSIDES AND DOCUSATE SODIUM 1 TABLET: 50; 8.6 TABLET ORAL at 11:20

## 2022-02-10 RX ADMIN — MAGNESIUM OXIDE TAB 400 MG (241.3 MG ELEMENTAL MG) 400 MG: 400 (241.3 MG) TAB at 11:34

## 2022-02-10 RX ADMIN — TORSEMIDE 40 MG: 20 TABLET ORAL at 12:15

## 2022-02-10 RX ADMIN — OXYCODONE HYDROCHLORIDE 2.5 MG: 5 TABLET ORAL at 16:30

## 2022-02-10 RX ADMIN — CARVEDILOL 12.5 MG: 12.5 TABLET, FILM COATED ORAL at 16:29

## 2022-02-10 RX ADMIN — OXYCODONE HYDROCHLORIDE 5 MG: 5 TABLET ORAL at 21:33

## 2022-02-10 RX ADMIN — ACETAMINOPHEN 975 MG: 325 TABLET ORAL at 05:16

## 2022-02-10 RX ADMIN — HEPARIN SODIUM 5000 UNITS: 5000 INJECTION, SOLUTION INTRAVENOUS; SUBCUTANEOUS at 18:42

## 2022-02-10 RX ADMIN — MOMETASONE FUROATE 2 PUFF: 220 INHALANT RESPIRATORY (INHALATION) at 20:07

## 2022-02-10 RX ADMIN — INSULIN GLARGINE 32 UNITS: 100 INJECTION, SOLUTION SUBCUTANEOUS at 12:16

## 2022-02-10 RX ADMIN — LOSARTAN POTASSIUM 50 MG: 50 TABLET, FILM COATED ORAL at 11:20

## 2022-02-10 RX ADMIN — ATORVASTATIN CALCIUM 80 MG: 40 TABLET, FILM COATED ORAL at 20:07

## 2022-02-10 RX ADMIN — CALCIUM CARBONATE (ANTACID) CHEW TAB 500 MG 1000 MG: 500 CHEW TAB at 20:07

## 2022-02-10 RX ADMIN — MAGNESIUM OXIDE TAB 400 MG (241.3 MG ELEMENTAL MG) 400 MG: 400 (241.3 MG) TAB at 20:07

## 2022-02-10 RX ADMIN — OXYCODONE HYDROCHLORIDE 5 MG: 5 TABLET ORAL at 00:04

## 2022-02-10 RX ADMIN — INSULIN ASPART 3 UNITS: 100 INJECTION, SOLUTION INTRAVENOUS; SUBCUTANEOUS at 12:16

## 2022-02-10 RX ADMIN — CEFAZOLIN SODIUM 2 G: 2 INJECTION, SOLUTION INTRAVENOUS at 11:34

## 2022-02-10 RX ADMIN — MAGNESIUM OXIDE TAB 400 MG (241.3 MG ELEMENTAL MG) 400 MG: 400 (241.3 MG) TAB at 13:54

## 2022-02-10 RX ADMIN — CLOPIDOGREL BISULFATE 75 MG: 75 TABLET ORAL at 11:20

## 2022-02-10 RX ADMIN — CALCIUM CARBONATE (ANTACID) CHEW TAB 500 MG 1000 MG: 500 CHEW TAB at 11:20

## 2022-02-10 RX ADMIN — ASPIRIN 81 MG CHEWABLE TABLET 81 MG: 81 TABLET CHEWABLE at 12:23

## 2022-02-10 RX ADMIN — INSULIN ASPART 3 UNITS: 100 INJECTION, SOLUTION INTRAVENOUS; SUBCUTANEOUS at 17:48

## 2022-02-10 ASSESSMENT — ACTIVITIES OF DAILY LIVING (ADL)
ADLS_ACUITY_SCORE: 8
ADLS_ACUITY_SCORE: 10
ADLS_ACUITY_SCORE: 10
ADLS_ACUITY_SCORE: 8
ADLS_ACUITY_SCORE: 8
ADLS_ACUITY_SCORE: 10
ADLS_ACUITY_SCORE: 10
ADLS_ACUITY_SCORE: 8
ADLS_ACUITY_SCORE: 9
ADLS_ACUITY_SCORE: 8
ADLS_ACUITY_SCORE: 8
ADLS_ACUITY_SCORE: 9
ADLS_ACUITY_SCORE: 9
ADLS_ACUITY_SCORE: 8
ADLS_ACUITY_SCORE: 10
ADLS_ACUITY_SCORE: 9
ADLS_ACUITY_SCORE: 8
ADLS_ACUITY_SCORE: 10
ADLS_ACUITY_SCORE: 10
ADLS_ACUITY_SCORE: 9
ADLS_ACUITY_SCORE: 8
ADLS_ACUITY_SCORE: 10
ADLS_ACUITY_SCORE: 8
ADLS_ACUITY_SCORE: 8

## 2022-02-10 NOTE — PLAN OF CARE
Problem: Pain Acute  Goal: Acceptable Pain Control and Functional Ability  Outcome: No Change  Intervention: Develop Pain Management Plan  Flowsheets (Taken 2/10/2022 4877)  Pain Management Interventions:   medication (see MAR)   emotional support   awakened for pain meds per patient request  Intervention: Prevent or Manage Pain  Recent Flowsheet Documentation  Taken 2/10/2022 0228 by Lakia Llanes, RN  Medication Review/Management: medications reviewed    Pt woke up calling for pain meds. Oxy prn given. He also got the sched tylenol. Pt says his pain is worse and kept awakening him from sleep. Will continue to monitor

## 2022-02-10 NOTE — OP NOTE
VASCULAR SURGERY OPERATIVE REPORT        LOCATION:    [facility]    Delbert Tilley   Medical Record #:  [id number]  YOB: 1950  Age:  71 year old     Date of Service: February 8, 2022    PRIMARY CARE PROVIDER: Danii Hernández    Preoperative diagnosis    Lifestyle limiting claudication bilateral lower extremities with severe peripheral disease  Severe bilateral common femoral artery stenosis  Occlusion of the right external iliac artery  Severe stenosis of the left external iliac artery      Postoperative diagnosis    Same      Surgeon: Ric Chau MD, RPVI     Assistant: Terrell Dalal MD, vascular surgery fellow                         Name of the procedure    1. Exposure of the bilateral common femoral artery  2. bilateral iliofemoral endarterectomy with bovine patch angioplasty  3. Placement of the catheter in the aorta  4. Aortogram  5. Placement of self-expanding covered stent Viabahn 7 mm x 100 mm followed by 7 mm x 50 mm into right external iliac artery  6. Placement of the self-expanding covered stent Viabahn 7 mm x 100 mm into left common iliac artery  7. Interpretation of radiologic findings  8. bilateral iliofemoral angiogram    Anesthesia:    General    Indication for procedure:    71-year-old male with known history of significant peripheral arterial disease who was seen in vascular surgery clinic for evaluation of bilateral lower extremity lifestyle limiting claudication.  Patient has failed conservative measures.  Preoperative vascular studies were significant for moderate to severe peripheral arterial disease.  CT scan did show significant disease in bilateral common femoral arteries with occlusion of the right external iliac artery and severe stenosis in the left external iliac artery.  Given these findings we decided to proceed with bilateral iliofemoral endarterectomies with retrograde iliac intervention.  Risk and benefits of this operation were explained patient  agreed to proceed.  Preoperative work-up was performed, patient was cleared by cardiology.    Description of procedure:    The patient was brought to the operating room and positioned on the operating table in the supine position. Prophylactic antibiotics were administered by the anesthesiologist. The bilateral groins were shaved, prepped with chlorhexidine, and draped in the usual fashion.  Transverse incisions were made over the bilateralcommon femoral arteries.  The dissection was performed in similar fashion bilaterally.  Using electrocautery subcutaneous tissue was dissected.  The inguinal ligament was dissected out medially and laterally.  The dissection was carried out along the distal external iliac artery.  The crossing vein was ligated and transected.  Proximal control of the external iliac artery was obtained using Vesseloops.  Then, we started dissection of the common femoral artery distally towards the bifurcation.  Both SFA and profunda femoris artery were exposed and circumferentially dissected.  Then they were controlled circumferentially using vessel loop.   The Seldinger needle was inserted into the right common femoral artery and a glide advantage wire advanced into the iliac artery under fluoroscopic guidance.  I was able to cross the external iliac artery occlusion and advanced the wire into the distal aorta.  The wire was secured.  Then I turned my attention into the iliofemoral endarterectomy. 8000 Units of IV heparin were given.  The profunda femoris, SFA, and distal external iliac artery were clamped.  Longitudinal arteriotomy was made.  Using Aviles scissors it was extended proximally and distally.  Then using vascular elevators I was able to proceed with iliofemoral enterectomy.  Eversion endarterectomy of the profunda femoris artery was gently performed to make sure the orifice is free from disease.  The bovine patch was introduced and passed over the wire which was introduced through the  small opening that I created with 11 blade.  Then I proceeded with bovine patch angioplasty using 5-0 Prolene.  Before the completion of anastomosis the retrograde and antegrade flush was performed.  The anastomosis was completed and hemostasis was achieved.  A short 5 Maltese sheath was then placed over the wire. An angiogram was performed through the sheath and the stenosis identified.  The Omni Flush catheter was introduced over the wire and an aortogram with runoff was performed. Using the images from the angiogram, the length and diameter of the stenotic iliac artery was measured.  The sheath was exchanged to 8 Maltese sheath.  Over the wire the self-expanding noncovered stent was introduced into the right external iliac artery.  It was placed without any difficulties to make sure that the distal end of the stent overlaps with proximal patch.  Balloon angioplasty using 7 mm x 800 mm balloon was performed.  Then we turned our attention to left iliofemoral endarterectomy.  Prior to that, the Seldinger needle was inserted into the left common femoral artery and a glide advantage wire advanced into the iliac artery under fluoroscopic guidance.  I was able to cross the external iliac artery stenosis and advanced the wire into the distal aorta.  The wire was secured.  Then I turned my attention into the iliofemoral endarterectomy.  The profunda femoris, SFA, and distal external iliac artery were clamped.  Longitudinal arteriotomy was made.  Using Aviles scissors it was extended proximally and distally.  Then using vascular elevators I was able to proceed with iliofemoral enterectomy.  Eversion endarterectomy of the profunda femoris artery was gently performed to make sure the orifice is free from disease.  The bovine patch was introduced and passed over the wire which was introduced through the small opening that I created with 11 blade.  Then I proceeded with bovine patch angioplasty using 5-0 Prolene.  Before the  completion of anastomosis the retrograde and antegrade flush was performed.  The anastomosis was completed and hemostasis was achieved.  A short8 Czech sheath was then placed over the wire. An angiogram was performed through the sheath and the stenosis identified.  The Omni Flush catheter was introduced over the wire and an aortogram with runoff was performed. Using the images from the angiogram, the length and diameter of the stenotic iliac artery was measured.  Over the wire the self-expanding noncovered stent was introduced into the right external iliac artery.  It was placed without any difficulties to make sure that the distal end of the stent overlaps with proximal patch.  Balloon angioplasty using 7 mm x 800 mm balloon was performed.   Over the wire the Omni Flush catheter was introduced into the aorta.  The aortogram was performed showing no evidence of dissection or extravasation around the stents.  There was nice and brisk flow throughout iliofemoral system. All balloons, catheters, sheaths, and wires were then removed.  The arteriotomies in the patch was repaired using 5-0 Prolene suture.  The wound was checked for hemostasis.  It was copiously irrigated.  The wound was closed using 2-0 Vicryl, 3-0 Vicryl, and 4 Monocryl.  Skin glue was applied.  Over the glue the Tegaderm was also applied.  Patient was awoken from anesthesia and transferred to PACU in stable condition.      Estimated blood loss: 700 cc    Specimens: Bilateral femoral plaque    Complications: None      Ric Chau MD, MD, [facility]  VASCULAR SURGERY

## 2022-02-10 NOTE — ANESTHESIA POSTPROCEDURE EVALUATION
Patient: Delbert Tilley    Procedure: Procedure(s):  BILATERAL- COMMON FEMORAL ENDARTERECTOMY; RETROGRADE ILIAC ARTERY STENTING       Diagnosis:PAD (peripheral artery disease) (H) [I73.9]  Diagnosis Additional Information: No value filed.    Anesthesia Type:  General    Note:  Disposition: Inpatient   Postop Pain Control: Uneventful            Sign Out: Well controlled pain   PONV: No   Neuro/Psych: Uneventful            Sign Out: Acceptable/Baseline neuro status   Airway/Respiratory: Uneventful            Sign Out: Acceptable/Baseline resp. status   CV/Hemodynamics: Uneventful            Sign Out: Acceptable CV status; No obvious hypovolemia; No obvious fluid overload   Other NRE: NONE   DID A NON-ROUTINE EVENT OCCUR? No           Last vitals:  Vitals Value Taken Time   /60 02/09/22 2015   Temp 36.2  C (97.2  F) 02/09/22 2015   Pulse 55 02/09/22 2021   Resp 0 02/09/22 2018   SpO2 97 % 02/09/22 2021   Vitals shown include unvalidated device data.    Electronically Signed By: Nithin Yip MD  February 9, 2022  8:32 PM

## 2022-02-10 NOTE — PROGRESS NOTES
.  Hospitalist Progress Note    Assessment/Plan    Active Problems:    PAD (peripheral artery disease) (H)    Delbert Tilley is a 71 year old male admitted on 2/9/2022.      71-year-old male with medical history of COPD, type II DM,  CAD status post four-vessel CABG, ischemic cardiomyopathy s/p ICD, A. fib s/p ablation and GERD status post bilateral common femoral artery endarterectomy and stent placement on bilateral external iliac arteries.  [facility] service consulted for cardiomyopathy and type II DM.        Peripheral vascular disease with critical right lower extremity limb threatening ischemia  -status post bilateral common femoral artery endarterectomy and stent placement in bilateral external iliac arteries  -Post OP pain management, DVT prophylaxis per vascular.  -On statins, plavix     Ischemic cardiomyopathy  CAD s/p CABG x4  A. fib s/p ablation  -Last echo 10/2021, LVEF 20-25%, not in exacerbation  -Continue PTA torsemide 40 mg daily, losartan 50 mg daily, carvedilol 12.5 mg twice daily, lipitor 80mg at bedtime,NTG as needed, antiplatelets and anticoagulants per vascular     Hypomagnesemia  -Replace per protocol     Leukocytosis  -Likely reactive demargination, afebrile, monitor        Type II DM  -Hemoglobin A1c 8.8  -Glargine 32 units QAM, aspart 3 units AC 3 tid, SSI.  -Accu-Cheks AC at bedtime.  Hypoglycemia protocol     COPD  -Not in exacerbation, resume home inhalers     GERD  -PPI     CKD 3  -Monitor BMP    Acute blood loss anemia -  -Hb at baseline around 13-14, noted slight drop post op to 10.2  -Monitor Hb for now.     Barriers to Discharge:  Post-op monitoring    Anticipated discharge date/Disposition: Defer to primary    Subjective  Patient is new to me. Chart reviewed and events noted.  Patient seen and examined.   Denies any pain. States wants to be out of bed, unsure of his activity. D/w RN to check with vascular if can ambulate and if tao can be discontinued today per pt request.  Eating  well. No acute issues overnight.       Objective    Vital signs in last 24 hours  Temp:  [96.8  F (36  C)-98  F (36.7  C)] 98  F (36.7  C)  Pulse:  [54-86] 57  Resp:  [8-26] 17  BP: ()/(51-79) 111/58  MAP:  [64 mmHg-101 mmHg] 64 mmHg  Arterial Line BP: (116-165)/(37-67) 116/37  SpO2:  [94 %-100 %] 96 % [unfilled] O2 Device: Nasal cannula    Weight:   [unfilled] Weight change:     Intake/Output last 3 shifts  I/O last 3 completed shifts:  In: 4071 [P.O.:840; I.V.:2981]  Out: 1800 [Urine:1300; Blood:500]  Body mass index is 22.83 kg/m .    Physical Exam    General Appearance:    Alert, cooperative, no distress, appears stated age   Lungs:     CTAB   Cardiovascular:    RRR   Abdomen:     Soft, non-tender, b/l groin clean/dry   Neurologic:   Awake, alert, oriented, Grossly normal.     Pertinent Labs   Lab Results: personally reviewed.   Recent Labs   Lab 02/09/22  1020      CO2 28   BUN 40*     Recent Labs   Lab 02/10/22  0712 02/09/22  2042 02/09/22  1020   WBC 7.0 14.1* 6.1   HGB 10.2* 12.5* 13.7   HCT 33.3* 41.1 44.8   PLT 76* 107* 108*     No results for input(s): CKTOTAL, TROPONINI in the last 168 hours.    Invalid input(s): TROPONINT, CKMBINDEX  Invalid input(s): POCGLUFGR    Medications  Drug and lactation database from the [place] National Library of Medicine:  http://toxnet.nlm.nih.gov/cgi-bin/sis/htmlgen?LACT      Pertinent Radiology   Radiology Results:  Personally reviewed impressions    Reviewed labs in last 24 hours.    Advanced Care Planning:  Discharge Planning discussed with patient  Total time with this patient is 25 min with greater than 50% of time spent in coordination of care.  Care discussed and coordinated with patient, RN, SW/CM.    This Note is created using dragon voice recognition software.  Errors in spelling or words which seems out of context are unintentional.  Sounds alike errors may have escaped editing.    Corrina Moss MD  Hospitalist

## 2022-02-10 NOTE — CONSULTS
[facility]  Consult Note - Hospitalist Service  Date of Admission:  2/9/2022  Consult Requested by: Vascular surgery  Reason for Consult: Ischemic cardiomyopathy and type II DM    Assessment & Plan   Delbert Tilley is a 71 year old male admitted on 2/9/2022.     71-year-old male with medical history of COPD, type II DM,  CAD status post four-vessel CABG, ischemic cardiomyopathy s/p ICD, A. fib s/p ablation and GERD status post bilateral common femoral artery endarterectomy and stent placement on bilateral external iliac arteries.  [facility] [place] consulted for cardiomyopathy and type II DM.        Peripheral vascular disease with critical right lower extremity limb threatening ischemia  -status post bilateral common femoral artery endarterectomy and stent placement in bilateral external iliac arteries  -Post OP pain management, DVT prophylaxis per vascular.    Ischemic cardiomyopathy  CAD s/p CABG x4  A. fib s/p ablation  -Last echo 10/2021, LVEF 20-25%, not in exacerbation  -Resume PTA torsemide 40 mg daily, losartan 50 mg daily, carvedilol 12.5 mg twice daily.  Continue statin, NTG as needed, antiplatelets and anticoagulants per vascula    Hypomagnesemia  -Replace per protocol    Leukocytosis  -Likely reactive demargination, afebrile, monitor      Type II DM  -Hemoglobin A1c 8.8  -Glargine 30 units QAM, aspart 3 units AC 3 tid, SSI.  -Accu-Cheks AC at bedtime.  Hypoglycemia protocol    COPD  -Not in exacerbation, resume home inhalers    GERD  -PPI    CKD 3  -Monitor BMP         The patient's care was discussed with the Patient.    Moi Wilcox MD  [facility]  Securely message with the Vocera Web Console (learn more here)  Text page via [facility] Paging/Directory       Hospitalist Service    Clinically Significant Risk Factors Present on Admission       # Platelet Defect: home medication list includes an antiplatelet medication   # Diabetes, type II: last A1C 8.8 % (Ref  range: <=5.6 %)      ______________________________________________________________________    Chief Complaint   Postop consult    History is obtained from the patient and chart review    History of Present Illness   Delbert Tilley is a 71-year-old male with medical history of COPD, type II DM, CAD status post four-vessel CABG, ischemic cardiomyopathy s/p ICD, A. fib s/p ablation and GERD status post bilateral common femoral artery endarterectomy and stent placement on bilateral external iliac arteries.  [facility] service consulted for cardiomyopathy and type II DM.  Upon evaluation at bedside patient reports some pain at OpSite 5/10, otherwise no complaints.  Already started eating no nausea or vomiting.  Denies any chest pain or shortness of breath but noted to be on oxygen upon eval.      Review of Systems   The 10 point Review of Systems is negative other than noted in the HPI or here.     Past Medical History    I have reviewed this patient's medical history and updated it with pertinent information if needed.   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%     Claudication (H)      COPD (chronic obstructive pulmonary disease) (H)      Diabetes (H)      Gastro-oesophageal reflux disease      GERD (gastroesophageal reflux disease)      H/O: alcohol abuse      HTN (hypertension)      Hyperlipidaemia LDL goal <100 07/08/2013     Hyperparathyroidism (H)      Hypokalemia      ICD (implantable cardioverter-defibrillator) in place     Medtronic     NSTEMI (non-ST elevated myocardial infarction) (H)      NSVT  (nonsustained ventricular tachycardia) (H)      Pacemaker      Pancreatitis 6/26/06 Hosp     Paroxysmal atrial fibrillation (H)      PVD (peripheral vascular disease) (H)      Thrombocytopenia (H)      Tobacco use      Venous (peripheral) insufficiency      Vitamin D deficiency        Past Surgical History   I have reviewed this patient's surgical history and updated it with pertinent information if needed.  Past Surgical History:   Procedure Laterality Date     CATARACT IOL, RT/LT      Cataract IOL RT/LT     ENDARTERECTOMY CAROTID Right 06/12/2015    Procedure: ENDARTERECTOMY CAROTID;  Surgeon: João Turner MD;  Location:  OR     ENDARTERECTOMY CAROTID Left 09/08/2017    Procedure: ENDARTERECTOMY CAROTID;  LEFT CAROTID ENDARTERECTOMY WITH EEG;  Surgeon: João Turner MD;  Location:  OR     IMPLANT PACEMAKER  06/10/2010     IMPLANTED DEVICE       INTERPOSITION TENDON HAND N/A 06/09/2020    Procedure: Right thumb ligament reconstruction tendon interposition with extensor pollicis brevis to abductor pollicis tendon transfer;  Surgeon: Bethel Martin MD;  Location: [facility] OR     RELEASE CARPAL TUNNEL Right 04/12/2016    Procedure: RELEASE CARPAL TUNNEL;  Surgeon: Lissy Leger MD;  Location: [facility] OR     SURGICAL HISTORY OF -   1998    Laminectomy     TONSILLECTOMY & ADENOIDECTOMY       ZC CABG, ARTERIAL, THREE         Social History   I have reviewed this patient's social history and updated it with pertinent information if needed.  Social History     Tobacco Use     Smoking status: Current Every Day Smoker     Packs/day: 1.50     Years: 50.00     Pack years: 75.00     Types: Cigarettes     Smokeless tobacco: Never Used     Tobacco comment: 1 1/2-2 PPD 3/11/21   Vaping Use     Vaping Use: Never used   Substance Use Topics     Alcohol use: No     Drug use: No       Family History   Family history noncontributory to current complaints    Medications   I have reviewed this patient's current  medications    Allergies   Allergies   Allergen Reactions     Fish Nausea and Vomiting     severe     Hydrocodone GI Disturbance     Upset stomach     Shellfish Allergy Hives and Rash       Physical Exam   Vital Signs: Temp: 97.1  F (36.2  C) Temp src: Temporal BP: 115/61 Pulse: 55   Resp: 16 SpO2: 99 % O2 Device: Nasal cannula Oxygen Delivery: 2 LPM  Weight: 159 lbs 1.6 oz    General Appearance: Alert oriented  Eyes: Pink conjunctiva, NIS  HEENT: PERRLA, EOMI  Respiratory: Bilateral clear entry  Cardiovascular: S1-S2, regular rhythm, no gallop  GI: Soft nontender  Skin: No rashes  Musculoskeletal: No pedal edema, good capillary refill bilateral feet.    Neurologic: AOx3      Data   Results for orders placed or performed during the hospital encounter of 02/09/22 (from the past 24 hour(s))   Glucose by meter   Result Value Ref Range    GLUCOSE BY METER POCT 137 (H) 70 - 99 mg/dL   ABO and Rh   Result Value Ref Range    ABO/RH(D) O POS     SPECIMEN EXPIRATION DATE [id number]    Basic metabolic panel   Result Value Ref Range    Sodium 139 136 - 145 mmol/L    Potassium 4.3 3.5 - 5.0 mmol/L    Chloride 102 98 - 107 mmol/L    Carbon Dioxide (CO2) 28 22 - 31 mmol/L    Anion Gap 9 5 - 18 mmol/L    Urea Nitrogen 40 (H) 8 - 28 mg/dL    Creatinine 1.44 (H) 0.70 - 1.30 mg/dL    Calcium 9.6 8.5 - 10.5 mg/dL    Glucose 126 (H) 70 - 125 mg/dL    GFR Estimate 51 (L) >60 mL/min/1.73m2   CBC with platelets   Result Value Ref Range    WBC Count 6.1 4.0 - 11.0 10e3/uL    RBC Count 4.73 4.40 - 5.90 10e6/uL    Hemoglobin 13.7 13.3 - 17.7 g/dL    Hematocrit 44.8 40.0 - 53.0 %    MCV 95 78 - 100 fL    MCH 29.0 26.5 - 33.0 pg    MCHC 30.6 (L) 31.5 - 36.5 g/dL    RDW 14.5 10.0 - 15.0 %    Platelet Count 108 (L) 150 - 450 10e3/uL   Antibody Screen - Red Cell   Result Value Ref Range    Antibody Screen Negative Negative    SPECIMEN EXPIRATION DATE [id number]    XR Surgery ABBY  Fluoro G/T 5 Min    Narrative    This exam was marked as  non-reportable because it will not be read by a   radiologist or a [place] non-radiologist provider.         Glucose by meter   Result Value Ref Range    GLUCOSE BY METER POCT 82 70 - 99 mg/dL   CBC with platelets differential    Narrative    The following orders were created for panel order CBC with platelets differential.  Procedure                               Abnormality         Status                     ---------                               -----------         ------                     CBC with platelets and d...[[id number]]  Abnormal            Final result                 Please view results for these tests on the individual orders.   Magnesium   Result Value Ref Range    Magnesium 1.4 (L) 1.8 - 2.6 mg/dL   Phosphorus   Result Value Ref Range    Phosphorus 3.9 2.5 - 4.5 mg/dL   CBC with platelets and differential   Result Value Ref Range    WBC Count 14.1 (H) 4.0 - 11.0 10e3/uL    RBC Count 4.27 (L) 4.40 - 5.90 10e6/uL    Hemoglobin 12.5 (L) 13.3 - 17.7 g/dL    Hematocrit 41.1 40.0 - 53.0 %    MCV 96 78 - 100 fL    MCH 29.3 26.5 - 33.0 pg    MCHC 30.4 (L) 31.5 - 36.5 g/dL    RDW 14.6 10.0 - 15.0 %    Platelet Count 107 (L) 150 - 450 10e3/uL    % Neutrophils 81 %    % Lymphocytes 9 %    % Monocytes 8 %    % Eosinophils 1 %    % Basophils 0 %    % Immature Granulocytes 1 %    NRBCs per 100 WBC 0 <1 /100    Absolute Neutrophils 11.7 (H) 1.6 - 8.3 10e3/uL    Absolute Lymphocytes 1.2 0.8 - 5.3 10e3/uL    Absolute Monocytes 1.1 0.0 - 1.3 10e3/uL    Absolute Eosinophils 0.1 0.0 - 0.7 10e3/uL    Absolute Basophils 0.1 0.0 - 0.2 10e3/uL    Absolute Immature Granulocytes 0.1 <=0.4 10e3/uL    Absolute NRBCs 0.0 10e3/uL   Hemoglobin A1c   Result Value Ref Range    Hemoglobin A1C 8.8 (H) <=5.6 %

## 2022-02-10 NOTE — PROGRESS NOTES
Vascular Surgery Progress Note     No acute events overnight.  Overall doing well.  Reports pain is well controlled.    Vital signs stable  Monophasic posterior tibial signals bilaterally  Dermabond in place with Tegaderm on bilateral groins    Labs notable for  Hemoglobin of 10.2 (12.5 post - op)   Platelets 76  Creatinine 1.31 which is around baseline    Status post bilateral common femoral artery endarterectomies with retrograde iliac artery stenting on February 8, 2022 for right lower extremity critical limb threatening ischemia    -Continue dual antiplatelet therapy with aspirin and Plavix  -Bedrest for 24 h  -Continue perioperative antibiotics  -Appreciate hospitalist consultation  -Physical therapy and social consultations appreciated      Terrell Dalal MD  Vascular Surgery Fellow

## 2022-02-10 NOTE — PROGRESS NOTES
Care Management Initial Consult    General Information  Assessment completed with: Patient,    Type of CM/SW Visit: Initial Assessment    Primary Care Provider verified and updated as needed: Yes   Readmission within the last 30 days:        Reason for Consult: discharge planning  Advance Care Planning:            Communication Assessment  Patient's communication style: spoken language (English or Bilingual)    Hearing Difficulty or Deaf: no   Wear Glasses or Blind: yes    Cognitive  Cognitive/Neuro/Behavioral: WDL  Level of Consciousness: other (see comments)                   Living Environment:   People in home: alone     Current living Arrangements: house      Able to return to prior arrangements: yes       Family/Social Support:  Care provided by:    Provides care for:    Marital Status:   Children          Description of Support System: Supportive         Current Resources:   Patient receiving home care services:       Community Resources:    Equipment currently used at home: cane, straight,walker, rolling  Supplies currently used at home:      Employment/Financial:  Employment Status: retired        Financial Concerns:             Lifestyle & Psychosocial Needs:  Social Determinants of Health     Tobacco Use: High Risk     Smoking Tobacco Use: Current Every Day Smoker     Smokeless Tobacco Use: Never Used   Alcohol Use: Not on file   Financial Resource Strain: Not on file   Food Insecurity: Not on file   Transportation Needs: Not on file   Physical Activity: Not on file   Stress: Not on file   Social Connections: Not on file   Intimate Partner Violence: Not on file   Depression: Not at risk     PHQ-2 Score: 0   Housing Stability: Not on file       Functional Status:  Prior to admission patient needed assistance:              Mental Health Status: No concerns          Chemical Dependency Status: No concerns                Values/Beliefs:  Spiritual, Cultural Beliefs, Rastafari Practices, Values that  affect care:                 Additional Information:  Chart review.  Spoke with patient in room.  Introduced self and role of care management. Patient lives alone in a one level house.  He recently bought a electric scooter to use.  He has 2 children.  One lives about 10 miles away.  Patient still drives.  He has cleaning help.  He was asking about Meals on Wheels.  Will provide Senior linkage line phone number on discharge sheet so he could look at getting meals on Wheels set up.  Patient daughter will transport him home. He does not think he will need any services.  CM continue to follow.     AMBAR Mathis

## 2022-02-11 ENCOUNTER — APPOINTMENT (OUTPATIENT)
Dept: PHYSICAL THERAPY | Facility: HOSPITAL | Age: 72
DRG: 252 | End: 2022-02-11
Attending: SURGERY
Payer: COMMERCIAL

## 2022-02-11 LAB
ALBUMIN MFR UR ELPH: 18.8 MG/DL
ALBUMIN SERPL-MCNC: 2.7 G/DL (ref 3.5–5)
ALBUMIN UR-MCNC: 10 MG/DL
ALP SERPL-CCNC: 105 U/L (ref 45–120)
ALT SERPL W P-5'-P-CCNC: <9 U/L (ref 0–45)
ANION GAP SERPL CALCULATED.3IONS-SCNC: 9 MMOL/L (ref 5–18)
APPEARANCE UR: CLEAR
AST SERPL W P-5'-P-CCNC: 22 U/L (ref 0–40)
BASOPHILS # BLD AUTO: 0 10E3/UL (ref 0–0.2)
BASOPHILS NFR BLD AUTO: 0 %
BILIRUB DIRECT SERPL-MCNC: 0.2 MG/DL
BILIRUB SERPL-MCNC: 0.4 MG/DL (ref 0–1)
BILIRUB UR QL STRIP: NEGATIVE
BUN SERPL-MCNC: 39 MG/DL (ref 8–28)
CALCIUM SERPL-MCNC: 8.1 MG/DL (ref 8.5–10.5)
CHLORIDE BLD-SCNC: 101 MMOL/L (ref 98–107)
CHLORIDE UR-SCNC: 83 MMOL/L
CO2 SERPL-SCNC: 25 MMOL/L (ref 22–31)
COLOR UR AUTO: COLORLESS
CREAT SERPL-MCNC: 1.85 MG/DL (ref 0.7–1.3)
CREAT UR-MCNC: 18 MG/DL
EOSINOPHIL # BLD AUTO: 0.1 10E3/UL (ref 0–0.7)
EOSINOPHIL NFR BLD AUTO: 1 %
ERYTHROCYTE [DISTWIDTH] IN BLOOD BY AUTOMATED COUNT: 14.6 % (ref 10–15)
GFR SERPL CREATININE-BSD FRML MDRD: 38 ML/MIN/1.73M2
GLUCOSE BLD-MCNC: 165 MG/DL (ref 70–125)
GLUCOSE BLDC GLUCOMTR-MCNC: 110 MG/DL (ref 70–99)
GLUCOSE BLDC GLUCOMTR-MCNC: 152 MG/DL (ref 70–99)
GLUCOSE BLDC GLUCOMTR-MCNC: 190 MG/DL (ref 70–99)
GLUCOSE BLDC GLUCOMTR-MCNC: 44 MG/DL (ref 70–99)
GLUCOSE BLDC GLUCOMTR-MCNC: 54 MG/DL (ref 70–99)
GLUCOSE BLDC GLUCOMTR-MCNC: 63 MG/DL (ref 70–99)
GLUCOSE BLDC GLUCOMTR-MCNC: 71 MG/DL (ref 70–99)
GLUCOSE BLDC GLUCOMTR-MCNC: 86 MG/DL (ref 70–99)
GLUCOSE UR STRIP-MCNC: NEGATIVE MG/DL
HCT VFR BLD AUTO: 34.3 % (ref 40–53)
HGB BLD-MCNC: 10.5 G/DL (ref 13.3–17.7)
HGB UR QL STRIP: ABNORMAL
IMM GRANULOCYTES # BLD: 0.1 10E3/UL
IMM GRANULOCYTES NFR BLD: 1 %
KETONES UR STRIP-MCNC: NEGATIVE MG/DL
LEUKOCYTE ESTERASE UR QL STRIP: NEGATIVE
LYMPHOCYTES # BLD AUTO: 0.8 10E3/UL (ref 0.8–5.3)
LYMPHOCYTES NFR BLD AUTO: 8 %
MAGNESIUM SERPL-MCNC: 1.3 MG/DL (ref 1.8–2.6)
MCH RBC QN AUTO: 29.4 PG (ref 26.5–33)
MCHC RBC AUTO-ENTMCNC: 30.6 G/DL (ref 31.5–36.5)
MCV RBC AUTO: 96 FL (ref 78–100)
MONOCYTES # BLD AUTO: 1 10E3/UL (ref 0–1.3)
MONOCYTES NFR BLD AUTO: 9 %
MUCOUS THREADS #/AREA URNS LPF: PRESENT /LPF
NEUTROPHILS # BLD AUTO: 8.6 10E3/UL (ref 1.6–8.3)
NEUTROPHILS NFR BLD AUTO: 81 %
NITRATE UR QL: NEGATIVE
NRBC # BLD AUTO: 0 10E3/UL
NRBC BLD AUTO-RTO: 0 /100
PATH REPORT.COMMENTS IMP SPEC: NORMAL
PATH REPORT.COMMENTS IMP SPEC: NORMAL
PATH REPORT.FINAL DX SPEC: NORMAL
PATH REPORT.GROSS SPEC: NORMAL
PATH REPORT.MICROSCOPIC SPEC OTHER STN: NORMAL
PATH REPORT.RELEVANT HX SPEC: NORMAL
PH UR STRIP: 5.5 [PH] (ref 5–7)
PHOSPHATE SERPL-MCNC: 3.9 MG/DL (ref 2.5–4.5)
PHOTO IMAGE: NORMAL
PLATELET # BLD AUTO: 90 10E3/UL (ref 150–450)
POTASSIUM BLD-SCNC: 5 MMOL/L (ref 3.5–5)
PROT SERPL-MCNC: 5.2 G/DL (ref 6–8)
PROT/CREAT 24H UR: 1.04 MG/MG CR
RBC # BLD AUTO: 3.57 10E6/UL (ref 4.4–5.9)
RBC URINE: 9 /HPF
SODIUM SERPL-SCNC: 135 MMOL/L (ref 136–145)
SODIUM UR-SCNC: 78 MMOL/L
SP GR UR STRIP: 1.01 (ref 1–1.03)
UROBILINOGEN UR STRIP-MCNC: <2 MG/DL
WBC # BLD AUTO: 10.5 10E3/UL (ref 4–11)
WBC URINE: 2 /HPF

## 2022-02-11 PROCEDURE — 120N000001 HC R&B MED SURG/OB

## 2022-02-11 PROCEDURE — 99222 1ST HOSP IP/OBS MODERATE 55: CPT | Performed by: INTERNAL MEDICINE

## 2022-02-11 PROCEDURE — 85004 AUTOMATED DIFF WBC COUNT: CPT | Performed by: SURGERY

## 2022-02-11 PROCEDURE — 82310 ASSAY OF CALCIUM: CPT | Performed by: SURGERY

## 2022-02-11 PROCEDURE — 250N000013 HC RX MED GY IP 250 OP 250 PS 637: Performed by: STUDENT IN AN ORGANIZED HEALTH CARE EDUCATION/TRAINING PROGRAM

## 2022-02-11 PROCEDURE — 82248 BILIRUBIN DIRECT: CPT | Performed by: INTERNAL MEDICINE

## 2022-02-11 PROCEDURE — 88304 TISSUE EXAM BY PATHOLOGIST: CPT | Mod: 26 | Performed by: PATHOLOGY

## 2022-02-11 PROCEDURE — 97162 PT EVAL MOD COMPLEX 30 MIN: CPT | Mod: GP

## 2022-02-11 PROCEDURE — 84156 ASSAY OF PROTEIN URINE: CPT | Performed by: INTERNAL MEDICINE

## 2022-02-11 PROCEDURE — 84300 ASSAY OF URINE SODIUM: CPT | Performed by: INTERNAL MEDICINE

## 2022-02-11 PROCEDURE — 97116 GAIT TRAINING THERAPY: CPT | Mod: GP

## 2022-02-11 PROCEDURE — 36415 COLL VENOUS BLD VENIPUNCTURE: CPT | Performed by: SURGERY

## 2022-02-11 PROCEDURE — 99233 SBSQ HOSP IP/OBS HIGH 50: CPT | Performed by: INTERNAL MEDICINE

## 2022-02-11 PROCEDURE — 88311 DECALCIFY TISSUE: CPT | Mod: 26 | Performed by: PATHOLOGY

## 2022-02-11 PROCEDURE — 84100 ASSAY OF PHOSPHORUS: CPT | Performed by: SURGERY

## 2022-02-11 PROCEDURE — 83735 ASSAY OF MAGNESIUM: CPT | Performed by: SURGERY

## 2022-02-11 PROCEDURE — 250N000011 HC RX IP 250 OP 636: Performed by: INTERNAL MEDICINE

## 2022-02-11 PROCEDURE — 81001 URINALYSIS AUTO W/SCOPE: CPT | Performed by: INTERNAL MEDICINE

## 2022-02-11 PROCEDURE — 99207 PR CDG-MDM COMPONENT: MEETS HIGH - UP CODED: CPT | Performed by: INTERNAL MEDICINE

## 2022-02-11 PROCEDURE — 250N000013 HC RX MED GY IP 250 OP 250 PS 637: Performed by: SURGERY

## 2022-02-11 PROCEDURE — 82436 ASSAY OF URINE CHLORIDE: CPT | Performed by: INTERNAL MEDICINE

## 2022-02-11 PROCEDURE — 82374 ASSAY BLOOD CARBON DIOXIDE: CPT | Performed by: SURGERY

## 2022-02-11 PROCEDURE — 250N000013 HC RX MED GY IP 250 OP 250 PS 637: Performed by: HOSPITALIST

## 2022-02-11 PROCEDURE — 250N000011 HC RX IP 250 OP 636: Performed by: SURGERY

## 2022-02-11 RX ORDER — HEPARIN SODIUM 5000 [USP'U]/.5ML
5000 INJECTION, SOLUTION INTRAVENOUS; SUBCUTANEOUS EVERY 12 HOURS
Status: DISCONTINUED | OUTPATIENT
Start: 2022-02-11 | End: 2022-02-13 | Stop reason: DRUGHIGH

## 2022-02-11 RX ADMIN — ATORVASTATIN CALCIUM 80 MG: 40 TABLET, FILM COATED ORAL at 20:48

## 2022-02-11 RX ADMIN — ASPIRIN 81 MG CHEWABLE TABLET 81 MG: 81 TABLET CHEWABLE at 08:58

## 2022-02-11 RX ADMIN — OXYCODONE HYDROCHLORIDE 5 MG: 5 TABLET ORAL at 20:50

## 2022-02-11 RX ADMIN — INSULIN ASPART 3 UNITS: 100 INJECTION, SOLUTION INTRAVENOUS; SUBCUTANEOUS at 18:16

## 2022-02-11 RX ADMIN — INSULIN ASPART 3 UNITS: 100 INJECTION, SOLUTION INTRAVENOUS; SUBCUTANEOUS at 12:23

## 2022-02-11 RX ADMIN — HEPARIN SODIUM 5000 UNITS: 5000 INJECTION, SOLUTION INTRAVENOUS; SUBCUTANEOUS at 22:50

## 2022-02-11 RX ADMIN — HEPARIN SODIUM 5000 UNITS: 5000 INJECTION, SOLUTION INTRAVENOUS; SUBCUTANEOUS at 01:31

## 2022-02-11 RX ADMIN — SENNOSIDES AND DOCUSATE SODIUM 1 TABLET: 50; 8.6 TABLET ORAL at 20:48

## 2022-02-11 RX ADMIN — MAGNESIUM OXIDE TAB 400 MG (241.3 MG ELEMENTAL MG) 400 MG: 400 (241.3 MG) TAB at 21:06

## 2022-02-11 RX ADMIN — POLYETHYLENE GLYCOL 3350 17 G: 17 POWDER, FOR SOLUTION ORAL at 09:02

## 2022-02-11 RX ADMIN — LOSARTAN POTASSIUM 50 MG: 50 TABLET, FILM COATED ORAL at 08:58

## 2022-02-11 RX ADMIN — CEFAZOLIN SODIUM 2 G: 2 INJECTION, SOLUTION INTRAVENOUS at 16:33

## 2022-02-11 RX ADMIN — CARVEDILOL 12.5 MG: 12.5 TABLET, FILM COATED ORAL at 08:57

## 2022-02-11 RX ADMIN — GABAPENTIN 200 MG: 100 CAPSULE ORAL at 20:48

## 2022-02-11 RX ADMIN — HEPARIN SODIUM 5000 UNITS: 5000 INJECTION, SOLUTION INTRAVENOUS; SUBCUTANEOUS at 10:25

## 2022-02-11 RX ADMIN — PANTOPRAZOLE SODIUM 40 MG: 40 TABLET, DELAYED RELEASE ORAL at 08:58

## 2022-02-11 RX ADMIN — OXYCODONE HYDROCHLORIDE 5 MG: 5 TABLET ORAL at 16:32

## 2022-02-11 RX ADMIN — CLOPIDOGREL BISULFATE 75 MG: 75 TABLET ORAL at 08:57

## 2022-02-11 RX ADMIN — MAGNESIUM OXIDE TAB 400 MG (241.3 MG ELEMENTAL MG) 400 MG: 400 (241.3 MG) TAB at 14:29

## 2022-02-11 RX ADMIN — INSULIN GLARGINE 32 UNITS: 100 INJECTION, SOLUTION SUBCUTANEOUS at 08:56

## 2022-02-11 RX ADMIN — TORSEMIDE 40 MG: 20 TABLET ORAL at 08:58

## 2022-02-11 RX ADMIN — PANTOPRAZOLE SODIUM 40 MG: 40 TABLET, DELAYED RELEASE ORAL at 20:48

## 2022-02-11 RX ADMIN — CARVEDILOL 12.5 MG: 12.5 TABLET, FILM COATED ORAL at 16:32

## 2022-02-11 RX ADMIN — CALCIUM CARBONATE (ANTACID) CHEW TAB 500 MG 1000 MG: 500 CHEW TAB at 20:48

## 2022-02-11 RX ADMIN — SENNOSIDES AND DOCUSATE SODIUM 1 TABLET: 50; 8.6 TABLET ORAL at 08:58

## 2022-02-11 RX ADMIN — OXYCODONE HYDROCHLORIDE 2.5 MG: 5 TABLET ORAL at 08:53

## 2022-02-11 RX ADMIN — INSULIN ASPART 3 UNITS: 100 INJECTION, SOLUTION INTRAVENOUS; SUBCUTANEOUS at 08:55

## 2022-02-11 RX ADMIN — MOMETASONE FUROATE 2 PUFF: 220 INHALANT RESPIRATORY (INHALATION) at 20:47

## 2022-02-11 RX ADMIN — MAGNESIUM OXIDE TAB 400 MG (241.3 MG ELEMENTAL MG) 400 MG: 400 (241.3 MG) TAB at 08:58

## 2022-02-11 RX ADMIN — CALCIUM CARBONATE (ANTACID) CHEW TAB 500 MG 1000 MG: 500 CHEW TAB at 08:57

## 2022-02-11 RX ADMIN — CEFAZOLIN SODIUM 2 G: 2 INJECTION, SOLUTION INTRAVENOUS at 10:15

## 2022-02-11 ASSESSMENT — ACTIVITIES OF DAILY LIVING (ADL)
ADLS_ACUITY_SCORE: 7

## 2022-02-11 NOTE — PLAN OF CARE
Problem: Risk for Delirium  Goal: Improved Attention and Thought Clarity  Outcome: No Change     Problem: Risk for Delirium  Goal: Improved Sleep  Outcome: No Change  Patient is reporting improved mentation with more obviously today on the overnight.  Patient reports being started melanite for a heparin injection, and wishing that this would not happen again.  Patient had the dosage schedule explained to him he appeared to understand that there would be a overnight dose.

## 2022-02-11 NOTE — PLAN OF CARE
Problem: Diabetes Comorbidity  Goal: Blood Glucose Level Within Targeted Range  Outcome: No Change  Intervention: Monitor and Manage Glycemia  Flowsheets (Taken 2/11/2022 0603)  Glycemic Management:   blood glucose monitored   carbohydrate replacement provided   oral hydration promoted     Problem: Pain Acute  Goal: Acceptable Pain Control and Functional Ability  Outcome: Improving  Intervention: Prevent or Manage Pain  Recent Flowsheet Documentation  Taken 2/11/2022 0100 by Lakia Llanes, RN  Medication Review/Management: medications reviewed     Pt pain has improved. No pain meds requested or given on overnight shift. Pt was up shaking and sweaty on his forehead. Pt had an episode of hypoglycemia with BS low 44, gave carbs food as per order. Recheck 63, gave another carb food and recheck was 110. Pt now stable and feels better.

## 2022-02-11 NOTE — PROGRESS NOTES
Vascular Surgery Progress Note     Episode of hypoglycemia overnight. Overall doing well.  Complaining primarily of right groin pain.     Vital signs stable  Monophasic posterior tibial signals bilaterally  Dermabond in place with Tegaderm on bilateral groins    Labs notable for  Hemoglobin of 10.5   Platelets 90  Creatinine 1.85    Status post bilateral common femoral artery endarterectomies with retrograde iliac artery stenting on February 8, 2022 for right lower extremity critical limb threatening ischemia    - RADHA: Will trend creatinine; consult Nephrology given history of ischemic cardiomyopathy and currently on torsemide   -Continue dual antiplatelet therapy with aspirin and Plavix  -Up as tolerated   -Continue perioperative antibiotics  -Appreciate hospitalist consultation regarding blood sugar control and episode of hypoglycemia   -Physical therapy and social consultations appreciated      Terrell Dalal MD  Vascular Surgery Fellow

## 2022-02-11 NOTE — PROGRESS NOTES
Hospitalist Progress Note  ADMIT DATE: 2/9/2022     FACILITY: [facility]    PCP: Danii Hernández, [phone]    Assessment/Plan    Delbert Tilley is a 71 year old male with medical history of COPD, type II DM,  CAD status post four-vessel CABG, ischemic cardiomyopathy s/p ICD, A. fib s/p ablation and GERD status post bilateral common femoral artery endarterectomy and stent placement on bilateral external iliac arteries.  [facility] service consulted for cardiomyopathy and type II DM.        Peripheral vascular disease with critical right lower extremity limb threatening ischemia  -status post bilateral common femoral artery endarterectomy and stent placement in bilateral external iliac arteries  -Post OP pain management, DVT prophylaxis per vascular.  -On statins, plavix     Ischemic cardiomyopathy  CAD s/p CABG x4  A. fib s/p ablation  -Last echo 10/2021, LVEF 20-25%, not in exacerbation  -Continue PTA torsemide 40 mg daily, losartan 50 mg daily, carvedilol 12.5 mg twice daily, lipitor 80mg at bedtime,NTG as needed, antiplatelets and anticoagulants per vascular     Hypomagnesemia  -Replace per protocol     Leukocytosis  -Likely reactive demargination, afebrile, monitor        Type II DM  -Hemoglobin A1c 8.8  -Glargine 32 units QAM, aspart 3 units AC 3 tid, SSI.  -Accu-Cheks AC at bedtime.  Hypoglycemia protocol  -hypoglycemia during night; decrease lantus and observe; encourage oral intake     COPD  -Not in exacerbation, resume home inhalers     GERD  -PPI     CKD 3  RADHA  -Monitor BMP  -worse cr  -nephrology consultation  -torsemide on hold     Acute blood loss anemia -  -Hb at baseline around 13-14, noted slight drop post op to 10.2  -Monitor Hb for now.    Thrombocytopenia  ??  Heparin  Platelet starting to turn around was down to 76 yesterday now slowly trending up to 90 today  -monitor, if stable or improve, continue Heparin; if trending down, might hold Heparin and check HIT  labs     Barriers to Discharge:  Post-op monitoring     Anticipated discharge date/Disposition: Defer to surgeon       Subjective  C/o postop pain at right groin incision; no cp/sob, no f/c, o n/v    Objective    Vital signs in last 24 hours  Temp:  [97.8  F (36.6  C)-98.9  F (37.2  C)] 98.2  F (36.8  C)  Pulse:  [55-83] 83  Resp:  [16-22] 16  BP: ()/(45-70) 136/70  SpO2:  [92 %-94 %] 93 % [unfilled] O2 Device: None (Room air)    Weight:   [unfilled] Weight change:     Intake/Output last 3 shifts  I/O last 3 completed shifts:  In: 1893 [P.O.:1320; I.V.:573]  Out: 1375 [Urine:1375]  Body mass index is 22.83 kg/m .    Physical Exam    Physical Exam  General appearance: not in acute distress  HEENT: PERRL, EOMI  Lungs: Clear breath sounds in bilateral lung fields  Cardiovascular: Regular rate and rhythm, normal S1-S2  Abdomen: Soft, non tender, no distension, normal bowel sound  Musculoskeletal: right groin incision healing   Skin: No rash and no edema  Neurology: AAO ×3.  Cranial nerves II - XII normal.  General weakness      Pertinent Labs   Lab Results: personally reviewed.   Results for THANIA QUEEN (MRN [id number]) as of 2/11/2022 07:51   Ref. Range 2/11/2022 07:04   Sodium Latest Ref Range: 136 - 145 mmol/L 135 (L)   Potassium Latest Ref Range: 3.5 - 5.0 mmol/L 5.0   Chloride Latest Ref Range: 98 - 107 mmol/L 101   Carbon Dioxide Latest Ref Range: 22 - 31 mmol/L 25   Urea Nitrogen Latest Ref Range: 8 - 28 mg/dL 39 (H)   Creatinine Latest Ref Range: 0.70 - 1.30 mg/dL 1.85 (H)   GFR Estimate Latest Ref Range: >60 mL/min/1.73m2 38 (L)   Calcium Latest Ref Range: 8.5 - 10.5 mg/dL 8.1 (L)   Anion Gap Latest Ref Range: 5 - 18 mmol/L 9   Phosphorus Latest Ref Range: 2.5 - 4.5 mg/dL 3.9   Glucose Latest Ref Range: 70 - 125 mg/dL 165 (H)   WBC Latest Ref Range: 4.0 - 11.0 10e3/uL 10.5   Hemoglobin Latest Ref Range: 13.3 - 17.7 g/dL 10.5 (L)   Hematocrit Latest Ref Range: 40.0 - 53.0 % 34.3 (L)   Platelet Count  Latest Ref Range: 150 - 450 10e3/uL 90 (L)   RBC Count Latest Ref Range: 4.40 - 5.90 10e6/uL 3.57 (L)   MCV Latest Ref Range: 78 - 100 fL 96   MCH Latest Ref Range: 26.5 - 33.0 pg 29.4   MCHC Latest Ref Range: 31.5 - 36.5 g/dL 30.6 (L)   RDW Latest Ref Range: 10.0 - 15.0 % 14.6   % Neutrophils Latest Units: % 81   % Lymphocytes Latest Units: % 8       Medications  Scheduled Meds:    aspirin  81 mg Oral Daily     atorvastatin  80 mg Oral At Bedtime     calcium carbonate  1,000 mg Oral BID     carvedilol  12.5 mg Oral BID w/meals     ceFAZolin  2 g Intravenous Q8H     clopidogrel  75 mg Oral Daily     gabapentin  200 mg Oral At Bedtime     heparin ANTICOAGULANT  5,000 Units Subcutaneous Q8H     insulin aspart  3 Units Subcutaneous TID w/meals     insulin aspart  1-3 Units Subcutaneous TID AC     insulin aspart  1-3 Units Subcutaneous At Bedtime     insulin glargine  32 Units Subcutaneous QAM     losartan  50 mg Oral Daily     magnesium oxide  400 mg Oral TID     mometasone  2 puff Inhalation QPM     pantoprazole  40 mg Oral BID     polyethylene glycol  17 g Oral Daily     senna-docusate  1 tablet Oral BID     sodium chloride (PF)  3 mL Intracatheter Q8H     torsemide  40 mg Oral Daily     Continuous Infusions:  PRN Meds:.[START ON 2/12/2022] acetaminophen, albuterol, glucose **OR** dextrose **OR** glucagon, HYDROmorphone, lidocaine 4%, naloxone **OR** naloxone **OR** naloxone **OR** naloxone, nitroGLYcerin, ondansetron **OR** ondansetron, oxyCODONE **OR** oxyCODONE, sodium chloride (PF)      Advanced Care Planning:  Discharge planning discussed with patient         [facility] Medicine Service  Zaid Calderon

## 2022-02-11 NOTE — PLAN OF CARE
Problem: Risk for Delirium  Goal: Improved Behavioral Control  Outcome: Improving     Pt needed to be reminded several times that he is bedrest and should not be standing/walking in room.  Pt stated that he understood, but that he felt the need to stand and move and/or needed to get something in the room.  Reminded pt of risk for potential complication and encouraged him to call staff if he needed anything not within reach. Pt more compliant by end of shift. Stated he would stay in bed until morning.    Problem: Pain Acute  Goal: Acceptable Pain Control and Functional Ability  Outcome: Improving      Pt reported having pain 5-7/10 this evening.  Received PRN oxycodone x 2.  Pt stated that this medication has been working well for him regarding pain control.

## 2022-02-11 NOTE — CONSULTS
NEPHROLOGY CONSULTATION      ASSESSMENT/PLAN:  71 year old male with hx of CKD 3 , hx of COPD , CAD sp CABG, ICM sp ICD EF 20%, Afib sp ablation, Hx of PAD sp bl common fem a endarterectomy and stent placement in bl iliac art 2/8/2022, postop creatinine noted to be increasing today in the setting of hypotension yesterday with ARB and diuretics on board for management of his CAD and ICM.  Nephrology consulted for RADHA    Nonoliguric RADHA  Baseline CKD 3, baseline creatinine is around 1.3-1.5 with a GFR around 50  CKD likely secondary to poorly controlled diabetes with significant microvascular disease and possible renovascular disease nephrosclerosis  UA with proteinuria for more than 20 years  Urine proteins ordered  Renal imaging from January 2021 with about 9 cm right and 10 cm left kidney without any other significant findings except mild left pelviectasis without hydronephrosis, CTA from November 2021 with good flows to bilateral kidneys except for one of the 2 right renal artery with significant stenosis  At this time the RADHA appears more secondary to hemodynamic specially in the setting of hypotension yesterday with blood pressures down to 80s also received contrast (75cc??)/ATN  --We will manage conservatively  --Hold losartan and torsemide for 24 to 48 hours based on creatinine response  Appears euvolemic on exam right now  --Follow on urine output and renal labs daily    Mild hyponatremia  Mild hyperkalemia  Hypomagnesemia acute on chronic  Monitor on labs  Urine sodium elevated to 78 suggestive of likely tubular injury at this time with significant hypotension and contrast exposure  --Okay to replace per nursing protocols    History of hypertension  On carvedilol losartan and torsemide at home prior to admission  Okay to continue carvedilol currently with holding losartan and torsemide  --Appears euvolemic on exam at this point we will avoid any diuretics or IV fluids  --If hypotensive give bolus  crystalloids    Anemia  ACD/inflamm  Slow down trend ,   Follow on daily labs  No obvious bleed/blood loss  Was on heparin now on DVT Px  -- tranfuse if<7    Thrombocytopenia  ??  Heparin  Platelet starting to turn around was down to 76 yesterday now slowly trending up to 90 today  Plans per primary team    PAD with critical lower extremity ischemia  S/p bilateral common femoral artery endarterectomy and stent placement bilateral external iliac arteries  Reports well-controlled pain    Ischemic cardiomyopathy  Coronary artery disease s/p CABG x4  Atrial fibrillation s/p ablation  Prior to admission has been on torsemide 40 losartan 100 carvedilol 12.5 mg twice a day  Carvedilol continued, held losartan and torsemide today  --Management per primary medicine    Diabetes  On metformin  Poor control  Management per hospitalist medicine currently on insulin  Had an episode of hypoglycemia inpatient in the setting of n.p.o. for surgery    COPD  Stable  On home inhalers    Thank you for the consultation  We will follow    Velma Nagel MD  [facility]  [phone]    CC: RADHA    REASON FOR CONSULTATION: We are asked to see pt by Dr. Dalal    HISTORY OF PRESENT ILLNESS:71 year old male  71 year old male with hx of CKD 3 , hx of COPD , CAD sp CABG, ICM sp ICD EF 20%, Afib sp ablation, Hx of PAD sp bl common fem a endarterectomy and stent placement in bl iliac art 2/8/2022, postop creatinine noted to be increasing today in the setting of hypotension yesterday with ARB and diuretics on board for management of his CAD and ICM.  Nephrology consulted for RADHA  The patient is not a very good historian reports that he is very worried about his heart during the surgery and everything has happened in the last 24 hours.  He understands his blood pressures and sugars in the last 24 hours were very low  He was also told about his kidney functions being not okay today which is very worrisome for him  Reports no other  symptoms besides feeling cold in his whole body.  Leg pain is very well controlled  Baseline creatinine has been around 1.3-1.5 with CKD 3 at baseline it has not been evaluated as outpatient.  No prior history of RADHA.  Creatinine elevated to 1.8 today in the setting of hypotension yesterday.  Urine output has been adequate.  He was continued on his home dose of losartan and torsemide  Blood pressures were down to 80s yesterday systolic.  Remains asymptomatic with respect to his chronic heart failure    REVIEW OF SYSTEMS:  ROS was completely reviewed and otherwise negative and non-contributory    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%     Claudication (H)      COPD (chronic obstructive pulmonary disease) (H)      Diabetes (H)      Gastro-oesophageal reflux disease      GERD (gastroesophageal reflux disease)      H/O: alcohol abuse      HTN (hypertension)      Hyperlipidaemia LDL goal <100 07/08/2013     Hyperparathyroidism (H)      Hypokalemia      ICD (implantable cardioverter-defibrillator) in place     [employer]     NSTEMI (non-ST elevated myocardial infarction) (H)      NSVT (nonsustained ventricular tachycardia) (H)      Pacemaker      Pancreatitis 6/26/06 Hosp     Paroxysmal atrial fibrillation (H)      PVD (peripheral vascular disease) (H)      Thrombocytopenia (H)      Tobacco use      Venous (peripheral) insufficiency      Vitamin D deficiency        Social History     Socioeconomic History     Marital status: Single     Spouse name: Not on file     Number of children: Not on file      Years of education: Not on file     Highest education level: Not on file   Occupational History     Not on file   Tobacco Use     Smoking status: Current Every Day Smoker     Packs/day: 1.50     Years: 50.00     Pack years: 75.00     Types: Cigarettes     Smokeless tobacco: Never Used     Tobacco comment: 1 1/2-2 PPD 3/11/21   Vaping Use     Vaping Use: Never used   Substance and Sexual Activity     Alcohol use: No     Drug use: No     Sexual activity: Not Currently     Partners: Female   Other Topics Concern     Parent/sibling w/ CABG, MI or angioplasty before 65F 55M? No   Social History Narrative     Not on file     Social Determinants of Health     Financial Resource Strain: Not on file   Food Insecurity: Not on file   Transportation Needs: Not on file   Physical Activity: Not on file   Stress: Not on file   Social Connections: Not on file   Intimate Partner Violence: Not on file   Housing Stability: Not on file       Family History   Adopted: Yes   Problem Relation Age of Onset     Unknown/Adopted No family hx of        Allergies   Allergen Reactions     Fish Nausea and Vomiting     severe     Hydrocodone GI Disturbance     Upset stomach     Shellfish Allergy Hives and Rash       MEDICATIONS:    aspirin  81 mg Oral Daily     atorvastatin  80 mg Oral At Bedtime     calcium carbonate  1,000 mg Oral BID     carvedilol  12.5 mg Oral BID w/meals     ceFAZolin  2 g Intravenous Q8H     clopidogrel  75 mg Oral Daily     gabapentin  200 mg Oral At Bedtime     heparin ANTICOAGULANT  5,000 Units Subcutaneous Q8H     insulin aspart  3 Units Subcutaneous TID w/meals     insulin aspart  1-3 Units Subcutaneous TID AC     insulin aspart  1-3 Units Subcutaneous At Bedtime     insulin glargine  32 Units Subcutaneous QAM     [Held by provider] losartan  50 mg Oral Daily     magnesium oxide  400 mg Oral TID     mometasone  2 puff Inhalation QPM     pantoprazole  40 mg Oral BID     polyethylene glycol  17 g Oral Daily      senna-docusate  1 tablet Oral BID     sodium chloride (PF)  3 mL Intracatheter Q8H     [Held by provider] torsemide  40 mg Oral Daily         PHYSICAL EXAM    /70 (BP Location: Right arm, Patient Position: Sitting)   Pulse 83   Temp 98.2  F (36.8  C) (Oral)   Resp 16   Wt 72.2 kg (159 lb 1.6 oz)   SpO2 93%   BMI 22.83 kg/m        Intake/Output Summary (Last 24 hours) at 2/11/2022 1354  Last data filed at 2/11/2022 1257  Gross per 24 hour   Intake 1952 ml   Output 1975 ml   Net -23 ml       Alert/awake and NAD  HEENT NC/AT; perrla; OP clear without lesions; mmm  Neck supple without LAD, TM  CV; RRR without rub +murmur  Lung: clear and equal; no extra sounds  Ab: soft and NT; not distended; normal bs  Ext: no edema and well perfused  Skin; no rash  Neuro; grossly intact    LABORATORIES    Recent Labs   Lab 02/11/22  0704 02/10/22  0712 02/09/22 2042   WBC 10.5 7.0 14.1*   HGB 10.5* 10.2* 12.5*   HCT 34.3* 33.3* 41.1   PLT 90* 76* 107*     Recent Labs   Lab 02/11/22  0704 02/10/22  0712 02/09/22  1020   * 137 139   CO2 25 23 28   BUN 39* 36* 40*   ALKPHOS 105  --   --    ALT <9  --   --    AST 22  --   --      No results for input(s): INR, PTT in the last 168 hours.    Invalid input(s): APTT  Invalid input(s): FERRITIN  No results for input(s): IRON in the last 168 hours.    Invalid input(s): TIBC    I reviewed all labs and imaging    Thank you for the consultation we will follow    Velma Nagel MD  [facility]  [phone]

## 2022-02-12 LAB
ANION GAP SERPL CALCULATED.3IONS-SCNC: 9 MMOL/L (ref 5–18)
BASOPHILS # BLD AUTO: 0 10E3/UL (ref 0–0.2)
BASOPHILS NFR BLD AUTO: 0 %
BUN SERPL-MCNC: 39 MG/DL (ref 8–28)
CALCIUM SERPL-MCNC: 8.1 MG/DL (ref 8.5–10.5)
CHLORIDE BLD-SCNC: 100 MMOL/L (ref 98–107)
CO2 SERPL-SCNC: 26 MMOL/L (ref 22–31)
CREAT SERPL-MCNC: 1.47 MG/DL (ref 0.7–1.3)
EOSINOPHIL # BLD AUTO: 0.1 10E3/UL (ref 0–0.7)
EOSINOPHIL NFR BLD AUTO: 1 %
ERYTHROCYTE [DISTWIDTH] IN BLOOD BY AUTOMATED COUNT: 14.7 % (ref 10–15)
GFR SERPL CREATININE-BSD FRML MDRD: 50 ML/MIN/1.73M2
GLUCOSE BLD-MCNC: 88 MG/DL (ref 70–125)
GLUCOSE BLDC GLUCOMTR-MCNC: 108 MG/DL (ref 70–99)
GLUCOSE BLDC GLUCOMTR-MCNC: 138 MG/DL (ref 70–99)
GLUCOSE BLDC GLUCOMTR-MCNC: 139 MG/DL (ref 70–99)
GLUCOSE BLDC GLUCOMTR-MCNC: 60 MG/DL (ref 70–99)
GLUCOSE BLDC GLUCOMTR-MCNC: 63 MG/DL (ref 70–99)
GLUCOSE BLDC GLUCOMTR-MCNC: 84 MG/DL (ref 70–99)
GLUCOSE BLDC GLUCOMTR-MCNC: 91 MG/DL (ref 70–99)
HCT VFR BLD AUTO: 32.7 % (ref 40–53)
HGB BLD-MCNC: 10.1 G/DL (ref 13.3–17.7)
IMM GRANULOCYTES # BLD: 0 10E3/UL
IMM GRANULOCYTES NFR BLD: 0 %
LYMPHOCYTES # BLD AUTO: 1.2 10E3/UL (ref 0.8–5.3)
LYMPHOCYTES NFR BLD AUTO: 13 %
MAGNESIUM SERPL-MCNC: 1.4 MG/DL (ref 1.8–2.6)
MCH RBC QN AUTO: 29.4 PG (ref 26.5–33)
MCHC RBC AUTO-ENTMCNC: 30.9 G/DL (ref 31.5–36.5)
MCV RBC AUTO: 95 FL (ref 78–100)
MONOCYTES # BLD AUTO: 1 10E3/UL (ref 0–1.3)
MONOCYTES NFR BLD AUTO: 12 %
NEUTROPHILS # BLD AUTO: 6.4 10E3/UL (ref 1.6–8.3)
NEUTROPHILS NFR BLD AUTO: 74 %
NRBC # BLD AUTO: 0 10E3/UL
NRBC BLD AUTO-RTO: 0 /100
PHOSPHATE SERPL-MCNC: 3.2 MG/DL (ref 2.5–4.5)
PLATELET # BLD AUTO: 94 10E3/UL (ref 150–450)
POTASSIUM BLD-SCNC: 4.2 MMOL/L (ref 3.5–5)
RBC # BLD AUTO: 3.44 10E6/UL (ref 4.4–5.9)
SODIUM SERPL-SCNC: 135 MMOL/L (ref 136–145)
WBC # BLD AUTO: 8.7 10E3/UL (ref 4–11)

## 2022-02-12 PROCEDURE — 99232 SBSQ HOSP IP/OBS MODERATE 35: CPT | Performed by: EMERGENCY MEDICINE

## 2022-02-12 PROCEDURE — 120N000001 HC R&B MED SURG/OB

## 2022-02-12 PROCEDURE — 250N000013 HC RX MED GY IP 250 OP 250 PS 637: Performed by: SURGERY

## 2022-02-12 PROCEDURE — 99233 SBSQ HOSP IP/OBS HIGH 50: CPT | Performed by: INTERNAL MEDICINE

## 2022-02-12 PROCEDURE — 250N000013 HC RX MED GY IP 250 OP 250 PS 637: Performed by: EMERGENCY MEDICINE

## 2022-02-12 PROCEDURE — 99207 PR CDG-MDM COMPONENT: MEETS MODERATE - DOWN CODED: CPT | Performed by: EMERGENCY MEDICINE

## 2022-02-12 PROCEDURE — 250N000011 HC RX IP 250 OP 636: Performed by: SURGERY

## 2022-02-12 PROCEDURE — 36415 COLL VENOUS BLD VENIPUNCTURE: CPT | Performed by: SURGERY

## 2022-02-12 PROCEDURE — 84100 ASSAY OF PHOSPHORUS: CPT | Performed by: SURGERY

## 2022-02-12 PROCEDURE — 83735 ASSAY OF MAGNESIUM: CPT | Performed by: SURGERY

## 2022-02-12 PROCEDURE — 80048 BASIC METABOLIC PNL TOTAL CA: CPT | Performed by: SURGERY

## 2022-02-12 PROCEDURE — 85025 COMPLETE CBC W/AUTO DIFF WBC: CPT | Performed by: SURGERY

## 2022-02-12 PROCEDURE — 250N000013 HC RX MED GY IP 250 OP 250 PS 637: Performed by: STUDENT IN AN ORGANIZED HEALTH CARE EDUCATION/TRAINING PROGRAM

## 2022-02-12 PROCEDURE — 250N000011 HC RX IP 250 OP 636: Performed by: INTERNAL MEDICINE

## 2022-02-12 RX ORDER — OXYCODONE AND ACETAMINOPHEN 10; 325 MG/1; MG/1
1 TABLET ORAL EVERY 4 HOURS PRN
Status: DISCONTINUED | OUTPATIENT
Start: 2022-02-12 | End: 2022-02-12

## 2022-02-12 RX ORDER — OXYCODONE HYDROCHLORIDE 5 MG/1
10 TABLET ORAL EVERY 4 HOURS PRN
Status: DISCONTINUED | OUTPATIENT
Start: 2022-02-12 | End: 2022-02-14 | Stop reason: HOSPADM

## 2022-02-12 RX ORDER — MAGNESIUM OXIDE 400 MG/1
400 TABLET ORAL 3 TIMES DAILY
Status: COMPLETED | OUTPATIENT
Start: 2022-02-12 | End: 2022-02-13

## 2022-02-12 RX ORDER — ACETAMINOPHEN 325 MG/1
325 TABLET ORAL EVERY 4 HOURS PRN
Status: DISCONTINUED | OUTPATIENT
Start: 2022-02-12 | End: 2022-02-14 | Stop reason: HOSPADM

## 2022-02-12 RX ORDER — GABAPENTIN 100 MG/1
100 CAPSULE ORAL 3 TIMES DAILY
Status: DISCONTINUED | OUTPATIENT
Start: 2022-02-12 | End: 2022-02-14 | Stop reason: HOSPADM

## 2022-02-12 RX ADMIN — MAGNESIUM OXIDE TAB 400 MG (241.3 MG ELEMENTAL MG) 400 MG: 400 (241.3 MG) TAB at 13:48

## 2022-02-12 RX ADMIN — SENNOSIDES AND DOCUSATE SODIUM 1 TABLET: 50; 8.6 TABLET ORAL at 08:19

## 2022-02-12 RX ADMIN — OXYCODONE HYDROCHLORIDE 10 MG: 5 TABLET ORAL at 12:06

## 2022-02-12 RX ADMIN — OXYCODONE HYDROCHLORIDE 5 MG: 5 TABLET ORAL at 05:29

## 2022-02-12 RX ADMIN — PANTOPRAZOLE SODIUM 40 MG: 40 TABLET, DELAYED RELEASE ORAL at 20:18

## 2022-02-12 RX ADMIN — POLYETHYLENE GLYCOL 3350 17 G: 17 POWDER, FOR SOLUTION ORAL at 08:16

## 2022-02-12 RX ADMIN — ACETAMINOPHEN 325 MG: 325 TABLET ORAL at 20:27

## 2022-02-12 RX ADMIN — MAGNESIUM OXIDE TAB 400 MG (241.3 MG ELEMENTAL MG) 400 MG: 400 (241.3 MG) TAB at 20:17

## 2022-02-12 RX ADMIN — CALCIUM CARBONATE (ANTACID) CHEW TAB 500 MG 1000 MG: 500 CHEW TAB at 08:23

## 2022-02-12 RX ADMIN — OXYCODONE HYDROCHLORIDE 5 MG: 5 TABLET ORAL at 00:58

## 2022-02-12 RX ADMIN — CLOPIDOGREL BISULFATE 75 MG: 75 TABLET ORAL at 08:19

## 2022-02-12 RX ADMIN — GABAPENTIN 200 MG: 100 CAPSULE ORAL at 20:18

## 2022-02-12 RX ADMIN — OXYCODONE HYDROCHLORIDE 10 MG: 5 TABLET ORAL at 16:23

## 2022-02-12 RX ADMIN — MAGNESIUM OXIDE TAB 400 MG (241.3 MG ELEMENTAL MG) 400 MG: 400 (241.3 MG) TAB at 09:58

## 2022-02-12 RX ADMIN — ACETAMINOPHEN 325 MG: 325 TABLET ORAL at 12:06

## 2022-02-12 RX ADMIN — CARVEDILOL 12.5 MG: 12.5 TABLET, FILM COATED ORAL at 08:17

## 2022-02-12 RX ADMIN — OXYCODONE HYDROCHLORIDE 5 MG: 5 TABLET ORAL at 09:48

## 2022-02-12 RX ADMIN — ATORVASTATIN CALCIUM 80 MG: 40 TABLET, FILM COATED ORAL at 20:17

## 2022-02-12 RX ADMIN — CALCIUM CARBONATE (ANTACID) CHEW TAB 500 MG 1000 MG: 500 CHEW TAB at 20:17

## 2022-02-12 RX ADMIN — GABAPENTIN 100 MG: 100 CAPSULE ORAL at 20:17

## 2022-02-12 RX ADMIN — MOMETASONE FUROATE 2 PUFF: 220 INHALANT RESPIRATORY (INHALATION) at 20:17

## 2022-02-12 RX ADMIN — GABAPENTIN 100 MG: 100 CAPSULE ORAL at 09:48

## 2022-02-12 RX ADMIN — CEFAZOLIN SODIUM 2 G: 2 INJECTION, SOLUTION INTRAVENOUS at 00:59

## 2022-02-12 RX ADMIN — CARVEDILOL 12.5 MG: 12.5 TABLET, FILM COATED ORAL at 16:22

## 2022-02-12 RX ADMIN — HEPARIN SODIUM 5000 UNITS: 5000 INJECTION, SOLUTION INTRAVENOUS; SUBCUTANEOUS at 09:48

## 2022-02-12 RX ADMIN — HEPARIN SODIUM 5000 UNITS: 5000 INJECTION, SOLUTION INTRAVENOUS; SUBCUTANEOUS at 22:24

## 2022-02-12 RX ADMIN — OXYCODONE HYDROCHLORIDE 10 MG: 5 TABLET ORAL at 20:27

## 2022-02-12 RX ADMIN — ASPIRIN 81 MG CHEWABLE TABLET 81 MG: 81 TABLET CHEWABLE at 08:19

## 2022-02-12 RX ADMIN — PANTOPRAZOLE SODIUM 40 MG: 40 TABLET, DELAYED RELEASE ORAL at 07:31

## 2022-02-12 RX ADMIN — SENNOSIDES AND DOCUSATE SODIUM 1 TABLET: 50; 8.6 TABLET ORAL at 20:18

## 2022-02-12 RX ADMIN — GABAPENTIN 100 MG: 100 CAPSULE ORAL at 13:48

## 2022-02-12 RX ADMIN — ACETAMINOPHEN 325 MG: 325 TABLET ORAL at 16:23

## 2022-02-12 ASSESSMENT — ACTIVITIES OF DAILY LIVING (ADL)
ADLS_ACUITY_SCORE: 7

## 2022-02-12 NOTE — PROGRESS NOTES
Renal progress note  CC:RADHA  Assessment and Plan:  71 year old male with hx of CKD 3 , hx of COPD , CAD sp CABG, ICM sp ICD EF 20%, Afib sp ablation, Hx of PAD sp bl common fem a endarterectomy and stent placement in bl iliac art 2/8/2022, postop creatinine noted to be increasing today in the setting of hypotension yesterday with ARB and diuretics on board for management of his CAD and ICM.  Nephrology consulted for RADHA     Nonoliguric RADHA  Baseline CKD 3, baseline creatinine is around 1.3-1.5 with a GFR around 50  CKD likely secondary to poorly controlled diabetes with significant microvascular disease and possible renovascular disease nephrosclerosis  UA with proteinuria for more than 20 years  Urine proteins ordered  Renal imaging from January 2021 with about 9 cm right and 10 cm left kidney without any other significant findings except mild left pelviectasis without hydronephrosis, CTA from November 2021 with good flows to bilateral kidneys except for one of the 2 right renal artery with significant stenosis  At this time the RADHA appears more secondary to hemodynamic specially in the setting of hypotension yesterday with blood pressures down to 80s also received contrast (75cc??)/ATN  Creatinine peaked at 1.8 and now steadily improving   Improved back to baseline  --We will manage conservatively  --Losartan resumed  --Hold torsemide for today, urine output remains adequate despite holding diuretics  Appears euvolemic on exam right now  --Follow on urine output and renal labs daily     Mild hyponatremia  Mild hyperkalemia  Hypomagnesemia acute on chronic  Monitor on labs  Urine sodium elevated to 78 suggestive of likely tubular injury at this time with significant hypotension and contrast exposure  --Okay to replace per nursing protocols     History of hypertension  On carvedilol losartan and torsemide at home prior to admission  Okay to continue carvedilol currently with holding losartan and  torsemide  --Appears euvolemic on exam at this point we will avoid any diuretics or IV fluids  --If hypotensive give bolus crystalloids     Anemia  ACD/inflamm  Slow down trend ,   Follow on daily labs  No obvious bleed/blood loss  Was on heparin now on DVT Px  -- tranfuse if<7     Thrombocytopenia  ??  Heparin  Platelet starting to turn around was down to 76 yesterday now slowly trending up to 90 today  Plans per primary team     PAD with critical lower extremity ischemia  S/p bilateral common femoral artery endarterectomy and stent placement bilateral external iliac arteries  Reports well-controlled pain     Ischemic cardiomyopathy  Coronary artery disease s/p CABG x4  Atrial fibrillation s/p ablation  Prior to admission has been on torsemide 40 losartan 100 carvedilol 12.5 mg twice a day  Carvedilol continued,  resume losartan today  Hold diuretics for 1 more day  --Management per primary medicine     Diabetes  On metformin  Poor control  Management per hospitalist medicine currently on insulin  Had an episode of hypoglycemia inpatient in the setting of n.p.o. for surgery     COPD  Stable  On home inhalers       Thank you for the consultation we will follow  Velma Nagel MD  [facility]  [phone]      Subjective  Seen at bedside reports very concerned about holding his losartan and torsemide thinks that his left thigh is swollen even though this might be secondary to his recent procedures he thinks that he collects fluid in that location whenever he has heart failure  Patient was counseled at length that we will resume his losartan today and plan for diuretics resuming tomorrow as long as it creatinine is stable  Urine output has been adequate in the last 24 hours    Objective    Vital signs in last 24 hours  Temp:  [97.9  F (36.6  C)-98.5  F (36.9  C)] 97.9  F (36.6  C)  Pulse:  [61-71] 70  Resp:  [16-18] 18  BP: ()/(58-65) 147/65  SpO2:  [95 %-98 %] 96 %  Weight:    [unfilled]    Intake/Output last 3 shifts  I/O last 3 completed shifts:  In: 1249 [P.O.:1240; I.V.:9]  Out: 2675 [Urine:2675]  Intake/Output this shift:  I/O this shift:  In: 906 [P.O.:900; I.V.:6]  Out: 800 [Urine:800]    Physical Exam  Alert/oriented x 3, awake, NAD  CV: RRR without murmur or rub  Lung: clear and equal; no extra sounds  Ab: soft and NT; not distended; normal bs  Ext: no edema and well perfused  Skin; no rash    Pertinent Labs   Lab Results   Component Value Date    WBC 8.7 02/12/2022    HGB 10.1 (L) 02/12/2022    HCT 32.7 (L) 02/12/2022    MCV 95 02/12/2022    PLT 94 (L) 02/12/2022     Lab Results   Component Value Date    BUN 39 (H) 02/12/2022     (L) 02/12/2022    CO2 26 02/12/2022       Lab Results   Component Value Date    ALBUMIN 2.7 (L) 02/11/2022     Lab Results   Component Value Date    PHOS 3.2 02/12/2022     I reviewed all lab results  Velma Nagel MD

## 2022-02-12 NOTE — PLAN OF CARE
Problem: Pain Acute  Goal: Acceptable Pain Control and Functional Ability  Outcome: Improving  Intervention: Develop Pain Management Plan  Recent Flowsheet Documentation  Taken 2/11/2022 2050 by Laurita Gee RN  Pain Management Interventions: medication (see MAR)   Pt requested oxycodone for pain x 1 at 2030 for right leg pain rated at a 5 unless moving then rated at an 8. Pt stated relief with oxy unless moving. Cont to monitor.    Problem: Diabetes Comorbidity  Goal: Blood Glucose Level Within Targeted Range  Outcome: Improving   Pt experienced symptomatic Hypoglycemia at 2030. Pt sweating and C/O low blood sugar. Blood sugar was 54 and pt was given juice and palomo crackers. Recheck was 71. Pt requested another juice and more palomo crackers. This writer noted that lantus and Novolog dosing is higher than pt report of home dosing. Noted Lantus had already been decreased. Left note for MD to look at scheduled Novolog dosing at meal times. Pt stated that he does not get this at home, only at breakfast. Cont to monitor.

## 2022-02-12 NOTE — PLAN OF CARE
Pt Alert and oriented x4. Complains of right groin pain and requires pain medication q4h. Patient frustrated with Low blood sugars and states that nothing is being done to prevent this from going too low. Offered support and pt was still upset. Blood Sugar at 0145 was 93 and pt insisted that this lab value was low. Gave pt orange juice and crackers. Around 0330 pt requested that we recheck his Blood sugar which was 138. Pt still frustrated, and requested orange juice.     Incisions are intact. Ancef given as ordered. SBA.

## 2022-02-12 NOTE — PLAN OF CARE
Problem: Pain Acute  Goal: Acceptable Pain Control and Functional Ability  Outcome: No Change     Problem: Pain Chronic (Persistent) (Comorbidity Management)  Goal: Acceptable Pain Control and Functional Ability  Outcome: No Change

## 2022-02-12 NOTE — PROGRESS NOTES
Daily Progress Note    Assessment/Plan:  71 year old male with medical history of COPD, type II DM, chronic kidney disease, CAD status post four-vessel CABG, ischemic cardiomyopathy s/p ICD, A. fib s/p ablation and GERD status post bilateral common femoral artery endarterectomy and stent placement on bilateral external iliac arteries.  [facility] service consulted for cardiomyopathy and type II DM.        Peripheral vascular disease with critical right lower extremity limb threatening ischemia  -status post bilateral common femoral artery endarterectomy and stent placement in bilateral external iliac arteries on February 8  -He complains of pain in his right leg with ambulating.  -Post OP pain management, DVT prophylaxis per vascular.  -On statins, plavix     Ischemic cardiomyopathy-no current evidence of exacerbation.  CAD s/p CABG x4  A. fib s/p ablation  -Last echo 10/2021, LVEF 20-25%, not in exacerbation  -Typically on torsemide 40 mg daily, losartan 50 mg daily, carvedilol 12.5 mg twice daily, lipitor 80mg at bedtime,NTG as needed, antiplatelets and anticoagulants per vascular     Hypomagnesemia  -Replace per protocol          Type II DM  -Hemoglobin A1c 8.8  -Has been frequently hypoglycemic here in the 50s and 60s.  Is usually on Lantus 30 units in the mornings and 3 units of NovoLog with breakfast.  He states at home his blood sugars are usually in the 140s or 150s.  We will hold his a.m. NovoLog and decrease his Lantus to 20 units and adjust as necessary.       COPD  -Not in exacerbation, resume home inhalers     GERD  -PPI     CKD 3-baseline creatinine around 1.3-1.5.  Creatinine trending downward, is 1.47 today.  RADHA  -Nephrology following  -torsemide and losartan on hold     Acute blood loss anemia -  -Hb at baseline around 13-14, noted slight drop post op to 10.1  -Monitor Hb for now.     Thrombocytopenia  ??  Heparin  Slowly trending up to 94  -monitor, if stable or improve, continue Heparin; if trending  down, might hold Heparin and check HIT labs     Code status:Full Code      Disposition: Per surgical service    Subjective:  Kamron is new to me today, chart reviewed and discussed with staff.  He has had several episodes of hypoglycemia as discussed above, insulin adjustments made.  He denies any chest pain or shortness of breath.  He reports of right leg pain with ambulating since the surgery.  He is getting physical therapy.        Current Medications Reviewed via EHR List    Objective:  Vital signs in last 24 hours:  [unfilled]  .prog  Weight:   @THISENCWEIGHTS(1)@  Weight change:   Body mass index is 22.83 kg/m .    Intake/Output last 3 shifts:  I/O last 3 completed shifts:  In: 1249 [P.O.:1240; I.V.:9]  Out: 2675 [Urine:2675]  Intake/Output this shift:  I/O this shift:  In: 500 [P.O.:500]  Out: -     Physical Exam:  General: No apparent distress, sitting up on the side of the bed  CV: Rate rate rhythm  Lungs: Clear  Abdomen: Soft, nontender        Imaging:  Personally Reviewed.  XR Surgery ABBY  Fluoro G/T 5 Min    Result Date: 2/9/2022  This exam was marked as non-reportable because it will not be read by a radiologist or a [place] non-radiologist provider.       Lab Results:  Personally Reviewed.   Fingerstick Blood Glucose: @[id number](POCGLUFGR:10)@    Last Hbg A1C: No results found for: HGBA1C   Lab Results   Component Value Date    INR 1.02 12/22/2017    INR 1.03 09/08/2017    INR 0.94 10/20/2014     Recent Results (from the past 24 hour(s))   Glucose by meter    Collection Time: 02/11/22 12:07 PM   Result Value Ref Range    GLUCOSE BY METER POCT 190 (H) 70 - 99 mg/dL   Sodium random urine    Collection Time: 02/11/22  2:18 PM   Result Value Ref Range    Sodium Urine mmol/L 78 mmol/L   Protein  random urine    Collection Time: 02/11/22  2:18 PM   Result Value Ref Range    Total Protein Urine mg/dL 18.8 mg/dL    Total Protein UR MG/MG CR 1.04 mg/mg Cr    Creatinine Urine mg/dL 18 mg/dL   Chloride  random urine    Collection Time: 02/11/22  2:18 PM   Result Value Ref Range    Chloride Urine mmol/L 83 mmol/L   UA with Microscopic    Collection Time: 02/11/22  2:21 PM   Result Value Ref Range    Color Urine Colorless Colorless, Straw, Light Yellow, Yellow    Appearance Urine Clear Clear    Glucose Urine Negative Negative mg/dL    Bilirubin Urine Negative Negative    Ketones Urine Negative Negative mg/dL    Specific Gravity Urine 1.009 1.001 - 1.030    Blood Urine 0.2 mg/dL (A) Negative    pH Urine 5.5 5.0 - 7.0    Protein Albumin Urine 10  (A) Negative mg/dL    Urobilinogen Urine <2.0 <2.0 mg/dL    Nitrite Urine Negative Negative    Leukocyte Esterase Urine Negative Negative    Mucus Urine Present (A) None Seen /LPF    RBC Urine 9 (H) <=2 /HPF    WBC Urine 2 <=5 /HPF   Glucose by meter    Collection Time: 02/11/22  5:30 PM   Result Value Ref Range    GLUCOSE BY METER POCT 86 70 - 99 mg/dL   Glucose by meter    Collection Time: 02/11/22  8:27 PM   Result Value Ref Range    GLUCOSE BY METER POCT 54 (L) 70 - 99 mg/dL   Glucose by meter    Collection Time: 02/11/22  8:46 PM   Result Value Ref Range    GLUCOSE BY METER POCT 71 70 - 99 mg/dL   Glucose by meter    Collection Time: 02/12/22  1:43 AM   Result Value Ref Range    GLUCOSE BY METER POCT 91 70 - 99 mg/dL   Glucose by meter    Collection Time: 02/12/22  3:34 AM   Result Value Ref Range    GLUCOSE BY METER POCT 138 (H) 70 - 99 mg/dL   Magnesium    Collection Time: 02/12/22  6:50 AM   Result Value Ref Range    Magnesium 1.4 (L) 1.8 - 2.6 mg/dL   Phosphorus    Collection Time: 02/12/22  6:50 AM   Result Value Ref Range    Phosphorus 3.2 2.5 - 4.5 mg/dL   Basic metabolic panel    Collection Time: 02/12/22  6:50 AM   Result Value Ref Range    Sodium 135 (L) 136 - 145 mmol/L    Potassium 4.2 3.5 - 5.0 mmol/L    Chloride 100 98 - 107 mmol/L    Carbon Dioxide (CO2) 26 22 - 31 mmol/L    Anion Gap 9 5 - 18 mmol/L    Urea Nitrogen 39 (H) 8 - 28 mg/dL    Creatinine 1.47  (H) 0.70 - 1.30 mg/dL    Calcium 8.1 (L) 8.5 - 10.5 mg/dL    Glucose 88 70 - 125 mg/dL    GFR Estimate 50 (L) >60 mL/min/1.73m2   CBC with platelets and differential    Collection Time: 02/12/22  6:50 AM   Result Value Ref Range    WBC Count 8.7 4.0 - 11.0 10e3/uL    RBC Count 3.44 (L) 4.40 - 5.90 10e6/uL    Hemoglobin 10.1 (L) 13.3 - 17.7 g/dL    Hematocrit 32.7 (L) 40.0 - 53.0 %    MCV 95 78 - 100 fL    MCH 29.4 26.5 - 33.0 pg    MCHC 30.9 (L) 31.5 - 36.5 g/dL    RDW 14.7 10.0 - 15.0 %    Platelet Count 94 (L) 150 - 450 10e3/uL    % Neutrophils 74 %    % Lymphocytes 13 %    % Monocytes 12 %    % Eosinophils 1 %    % Basophils 0 %    % Immature Granulocytes 0 %    NRBCs per 100 WBC 0 <1 /100    Absolute Neutrophils 6.4 1.6 - 8.3 10e3/uL    Absolute Lymphocytes 1.2 0.8 - 5.3 10e3/uL    Absolute Monocytes 1.0 0.0 - 1.3 10e3/uL    Absolute Eosinophils 0.1 0.0 - 0.7 10e3/uL    Absolute Basophils 0.0 0.0 - 0.2 10e3/uL    Absolute Immature Granulocytes 0.0 <=0.4 10e3/uL    Absolute NRBCs 0.0 10e3/uL   Glucose by meter    Collection Time: 02/12/22  7:55 AM   Result Value Ref Range    GLUCOSE BY METER POCT 60 (L) 70 - 99 mg/dL   Glucose by meter    Collection Time: 02/12/22  8:16 AM   Result Value Ref Range    GLUCOSE BY METER POCT 63 (L) 70 - 99 mg/dL           Advanced Care Planning    Time > 35 minutes with greater than 50% of time spent in counseling and coordination of care.    Tyson Hernández MD  Date: 2/12/2022  Time: 8:39 AM  [facility]

## 2022-02-12 NOTE — PROGRESS NOTES
Patient postop day 3 from bilateral femoral endarterectomies and bilateral iliac stents.  Overall doing reasonably well and had been hoped for discharge today.  Noted to have some increasing creatinine and was evaluated by nephrology who want to continue to follow him for the next 24 hours in hospital.  Patient also having increased right groin pain requiring narcotics.  Lastly patient's diabetes management has resulted in hypoglycemic episodes and in review by pharmacy it appears that he has been getting a higher dose than he normally takes at home of his long-acting insulin.  Once patient is up he is able to walk but his right groin hurts him.  This is a shooting pain down the leg.    BP (!) 147/65 (BP Location: Right arm)   Pulse 70   Temp 97.9  F (36.6  C) (Oral)   Resp 18   Wt 72.2 kg (159 lb 1.6 oz)   SpO2 96%   BMI 22.83 kg/m      On my exam both groin incisions look clean and nicely opposed without signs of infection.  Definite shooting pain on the inferior mid aspect of his right groin incision with light tapping.  I think his femoral cutaneous nerve is involved in his repair.    Creatinine down to 1.47 from 1.85.    Hemoglobin, white count normal.    Impression and plan: Overall progressively improving.  Will stay in hospital.  This will allow us to make sure that his blood sugars are better controlled that his creatinine continues to improve.  We will also try to get better control of his right groin pain.  Discussed with Dr. Chau will institute gabapentin and increased narcotic for now.  If things do not improve adequately could always consider reexploration and reclosure of the wound but this would be a drastic step.

## 2022-02-13 ENCOUNTER — ANESTHESIA EVENT (OUTPATIENT)
Dept: SURGERY | Facility: HOSPITAL | Age: 72
DRG: 252 | End: 2022-02-13
Payer: COMMERCIAL

## 2022-02-13 ENCOUNTER — ANESTHESIA (OUTPATIENT)
Dept: SURGERY | Facility: HOSPITAL | Age: 72
DRG: 252 | End: 2022-02-13
Payer: COMMERCIAL

## 2022-02-13 LAB
ANION GAP SERPL CALCULATED.3IONS-SCNC: 6 MMOL/L (ref 5–18)
ATRIAL RATE - MUSE: 79 BPM
BASOPHILS # BLD AUTO: 0 10E3/UL (ref 0–0.2)
BASOPHILS NFR BLD AUTO: 1 %
BUN SERPL-MCNC: 44 MG/DL (ref 8–28)
CALCIUM SERPL-MCNC: 7.8 MG/DL (ref 8.5–10.5)
CHLORIDE BLD-SCNC: 100 MMOL/L (ref 98–107)
CO2 SERPL-SCNC: 27 MMOL/L (ref 22–31)
CREAT SERPL-MCNC: 1.55 MG/DL (ref 0.7–1.3)
DIASTOLIC BLOOD PRESSURE - MUSE: NORMAL MMHG
EOSINOPHIL # BLD AUTO: 0.1 10E3/UL (ref 0–0.7)
EOSINOPHIL NFR BLD AUTO: 2 %
ERYTHROCYTE [DISTWIDTH] IN BLOOD BY AUTOMATED COUNT: 14.6 % (ref 10–15)
GFR SERPL CREATININE-BSD FRML MDRD: 47 ML/MIN/1.73M2
GLUCOSE BLD-MCNC: 176 MG/DL (ref 70–125)
GLUCOSE BLDC GLUCOMTR-MCNC: 157 MG/DL (ref 70–99)
GLUCOSE BLDC GLUCOMTR-MCNC: 173 MG/DL (ref 70–99)
GLUCOSE BLDC GLUCOMTR-MCNC: 174 MG/DL (ref 70–99)
GLUCOSE BLDC GLUCOMTR-MCNC: 223 MG/DL (ref 70–99)
GLUCOSE BLDC GLUCOMTR-MCNC: 391 MG/DL (ref 70–99)
GLUCOSE BLDC GLUCOMTR-MCNC: 51 MG/DL (ref 70–99)
GLUCOSE BLDC GLUCOMTR-MCNC: 54 MG/DL (ref 70–99)
GLUCOSE BLDC GLUCOMTR-MCNC: 59 MG/DL (ref 70–99)
GLUCOSE BLDC GLUCOMTR-MCNC: 80 MG/DL (ref 70–99)
GLUCOSE BLDC GLUCOMTR-MCNC: 92 MG/DL (ref 70–99)
HCT VFR BLD AUTO: 28 % (ref 40–53)
HGB BLD-MCNC: 8.6 G/DL (ref 13.3–17.7)
IMM GRANULOCYTES # BLD: 0 10E3/UL
IMM GRANULOCYTES NFR BLD: 1 %
INTERPRETATION ECG - MUSE: NORMAL
LYMPHOCYTES # BLD AUTO: 0.8 10E3/UL (ref 0.8–5.3)
LYMPHOCYTES NFR BLD AUTO: 13 %
MAGNESIUM SERPL-MCNC: 1.6 MG/DL (ref 1.8–2.6)
MCH RBC QN AUTO: 29.6 PG (ref 26.5–33)
MCHC RBC AUTO-ENTMCNC: 30.7 G/DL (ref 31.5–36.5)
MCV RBC AUTO: 96 FL (ref 78–100)
MONOCYTES # BLD AUTO: 0.7 10E3/UL (ref 0–1.3)
MONOCYTES NFR BLD AUTO: 11 %
NEUTROPHILS # BLD AUTO: 4.9 10E3/UL (ref 1.6–8.3)
NEUTROPHILS NFR BLD AUTO: 72 %
NRBC # BLD AUTO: 0 10E3/UL
NRBC BLD AUTO-RTO: 0 /100
P AXIS - MUSE: 0 DEGREES
PHOSPHATE SERPL-MCNC: 3.9 MG/DL (ref 2.5–4.5)
PLATELET # BLD AUTO: 89 10E3/UL (ref 150–450)
POTASSIUM BLD-SCNC: 4.9 MMOL/L (ref 3.5–5)
POTASSIUM BLD-SCNC: 5.2 MMOL/L (ref 3.5–5)
PR INTERVAL - MUSE: 216 MS
QRS DURATION - MUSE: 120 MS
QT - MUSE: 402 MS
QTC - MUSE: 460 MS
R AXIS - MUSE: -69 DEGREES
RBC # BLD AUTO: 2.91 10E6/UL (ref 4.4–5.9)
SODIUM SERPL-SCNC: 133 MMOL/L (ref 136–145)
SYSTOLIC BLOOD PRESSURE - MUSE: NORMAL MMHG
T AXIS - MUSE: 159 DEGREES
VENTRICULAR RATE- MUSE: 79 BPM
WBC # BLD AUTO: 6.6 10E3/UL (ref 4–11)

## 2022-02-13 PROCEDURE — 35703 EXPL N/FLWD SURG LXTR ART: CPT | Mod: 78 | Performed by: SURGERY

## 2022-02-13 PROCEDURE — 370N000017 HC ANESTHESIA TECHNICAL FEE, PER MIN: Performed by: SURGERY

## 2022-02-13 PROCEDURE — 120N000001 HC R&B MED SURG/OB

## 2022-02-13 PROCEDURE — 01N90ZZ RELEASE LUMBAR PLEXUS, OPEN APPROACH: ICD-10-PCS | Performed by: SURGERY

## 2022-02-13 PROCEDURE — 85048 AUTOMATED LEUKOCYTE COUNT: CPT | Performed by: SURGERY

## 2022-02-13 PROCEDURE — 99232 SBSQ HOSP IP/OBS MODERATE 35: CPT | Performed by: HOSPITALIST

## 2022-02-13 PROCEDURE — 84100 ASSAY OF PHOSPHORUS: CPT | Performed by: SURGERY

## 2022-02-13 PROCEDURE — 250N000013 HC RX MED GY IP 250 OP 250 PS 637: Performed by: SURGERY

## 2022-02-13 PROCEDURE — 250N000013 HC RX MED GY IP 250 OP 250 PS 637: Performed by: EMERGENCY MEDICINE

## 2022-02-13 PROCEDURE — 99233 SBSQ HOSP IP/OBS HIGH 50: CPT | Performed by: INTERNAL MEDICINE

## 2022-02-13 PROCEDURE — 250N000011 HC RX IP 250 OP 636: Performed by: NURSE ANESTHETIST, CERTIFIED REGISTERED

## 2022-02-13 PROCEDURE — 80048 BASIC METABOLIC PNL TOTAL CA: CPT | Performed by: SURGERY

## 2022-02-13 PROCEDURE — 84132 ASSAY OF SERUM POTASSIUM: CPT | Performed by: HOSPITALIST

## 2022-02-13 PROCEDURE — 258N000003 HC RX IP 258 OP 636: Performed by: NURSE ANESTHETIST, CERTIFIED REGISTERED

## 2022-02-13 PROCEDURE — 250N000011 HC RX IP 250 OP 636: Performed by: SURGERY

## 2022-02-13 PROCEDURE — 250N000011 HC RX IP 250 OP 636: Performed by: INTERNAL MEDICINE

## 2022-02-13 PROCEDURE — 250N000009 HC RX 250: Performed by: NURSE ANESTHETIST, CERTIFIED REGISTERED

## 2022-02-13 PROCEDURE — 36415 COLL VENOUS BLD VENIPUNCTURE: CPT | Performed by: SURGERY

## 2022-02-13 PROCEDURE — 93005 ELECTROCARDIOGRAM TRACING: CPT

## 2022-02-13 PROCEDURE — 250N000009 HC RX 250: Performed by: SURGERY

## 2022-02-13 PROCEDURE — 93010 ELECTROCARDIOGRAM REPORT: CPT | Performed by: INTERNAL MEDICINE

## 2022-02-13 PROCEDURE — 83735 ASSAY OF MAGNESIUM: CPT | Performed by: SURGERY

## 2022-02-13 PROCEDURE — 360N000074 HC SURGERY LEVEL 1, PER MIN: Performed by: SURGERY

## 2022-02-13 PROCEDURE — 258N000001 HC RX 258: Performed by: SURGERY

## 2022-02-13 PROCEDURE — 36415 COLL VENOUS BLD VENIPUNCTURE: CPT | Performed by: HOSPITALIST

## 2022-02-13 PROCEDURE — 272N000001 HC OR GENERAL SUPPLY STERILE: Performed by: SURGERY

## 2022-02-13 RX ORDER — SODIUM CHLORIDE, SODIUM LACTATE, POTASSIUM CHLORIDE, CALCIUM CHLORIDE 600; 310; 30; 20 MG/100ML; MG/100ML; MG/100ML; MG/100ML
INJECTION, SOLUTION INTRAVENOUS CONTINUOUS PRN
Status: DISCONTINUED | OUTPATIENT
Start: 2022-02-13 | End: 2022-02-13

## 2022-02-13 RX ORDER — LIDOCAINE 40 MG/G
CREAM TOPICAL
Status: DISCONTINUED | OUTPATIENT
Start: 2022-02-13 | End: 2022-02-14 | Stop reason: HOSPADM

## 2022-02-13 RX ORDER — CEFAZOLIN SODIUM 2 G/100ML
2 INJECTION, SOLUTION INTRAVENOUS
Status: CANCELLED | OUTPATIENT
Start: 2022-02-13

## 2022-02-13 RX ORDER — MAGNESIUM HYDROXIDE 1200 MG/15ML
LIQUID ORAL PRN
Status: DISCONTINUED | OUTPATIENT
Start: 2022-02-13 | End: 2022-02-13 | Stop reason: HOSPADM

## 2022-02-13 RX ORDER — BUPIVACAINE HYDROCHLORIDE 5 MG/ML
INJECTION, SOLUTION PERINEURAL PRN
Status: DISCONTINUED | OUTPATIENT
Start: 2022-02-13 | End: 2022-02-13 | Stop reason: HOSPADM

## 2022-02-13 RX ORDER — KETAMINE HYDROCHLORIDE 50 MG/ML
INJECTION, SOLUTION INTRAMUSCULAR; INTRAVENOUS PRN
Status: DISCONTINUED | OUTPATIENT
Start: 2022-02-13 | End: 2022-02-13

## 2022-02-13 RX ORDER — ONDANSETRON 2 MG/ML
INJECTION INTRAMUSCULAR; INTRAVENOUS PRN
Status: DISCONTINUED | OUTPATIENT
Start: 2022-02-13 | End: 2022-02-13

## 2022-02-13 RX ORDER — DEXAMETHASONE SODIUM PHOSPHATE 4 MG/ML
INJECTION, SOLUTION INTRA-ARTICULAR; INTRALESIONAL; INTRAMUSCULAR; INTRAVENOUS; SOFT TISSUE PRN
Status: DISCONTINUED | OUTPATIENT
Start: 2022-02-13 | End: 2022-02-13

## 2022-02-13 RX ORDER — PROPOFOL 10 MG/ML
INJECTION, EMULSION INTRAVENOUS CONTINUOUS PRN
Status: DISCONTINUED | OUTPATIENT
Start: 2022-02-13 | End: 2022-02-13

## 2022-02-13 RX ORDER — HEPARIN SODIUM 5000 [USP'U]/.5ML
5000 INJECTION, SOLUTION INTRAVENOUS; SUBCUTANEOUS EVERY 8 HOURS
Status: DISCONTINUED | OUTPATIENT
Start: 2022-02-14 | End: 2022-02-14 | Stop reason: HOSPADM

## 2022-02-13 RX ORDER — CEFAZOLIN SODIUM 2 G/100ML
2 INJECTION, SOLUTION INTRAVENOUS SEE ADMIN INSTRUCTIONS
Status: CANCELLED | OUTPATIENT
Start: 2022-02-13

## 2022-02-13 RX ORDER — HYDROMORPHONE HYDROCHLORIDE 2 MG/1
2 TABLET ORAL
Status: DISCONTINUED | OUTPATIENT
Start: 2022-02-13 | End: 2022-02-14 | Stop reason: HOSPADM

## 2022-02-13 RX ORDER — CEFAZOLIN SODIUM 1 G/3ML
INJECTION, POWDER, FOR SOLUTION INTRAMUSCULAR; INTRAVENOUS PRN
Status: DISCONTINUED | OUTPATIENT
Start: 2022-02-13 | End: 2022-02-13

## 2022-02-13 RX ADMIN — PROPOFOL 75 MCG/KG/MIN: 10 INJECTION, EMULSION INTRAVENOUS at 16:50

## 2022-02-13 RX ADMIN — GABAPENTIN 100 MG: 100 CAPSULE ORAL at 22:07

## 2022-02-13 RX ADMIN — MIDAZOLAM 1 MG: 1 INJECTION INTRAMUSCULAR; INTRAVENOUS at 16:52

## 2022-02-13 RX ADMIN — HYDROMORPHONE HYDROCHLORIDE 0.2 MG: 0.2 INJECTION, SOLUTION INTRAMUSCULAR; INTRAVENOUS; SUBCUTANEOUS at 01:40

## 2022-02-13 RX ADMIN — KETAMINE HYDROCHLORIDE 25 MG: 50 INJECTION, SOLUTION INTRAMUSCULAR; INTRAVENOUS at 16:52

## 2022-02-13 RX ADMIN — ATORVASTATIN CALCIUM 80 MG: 40 TABLET, FILM COATED ORAL at 22:05

## 2022-02-13 RX ADMIN — ONDANSETRON 4 MG: 2 INJECTION INTRAMUSCULAR; INTRAVENOUS at 17:02

## 2022-02-13 RX ADMIN — ACETAMINOPHEN 325 MG: 325 TABLET ORAL at 04:54

## 2022-02-13 RX ADMIN — SENNOSIDES AND DOCUSATE SODIUM 1 TABLET: 50; 8.6 TABLET ORAL at 22:06

## 2022-02-13 RX ADMIN — MAGNESIUM OXIDE TAB 400 MG (241.3 MG ELEMENTAL MG) 400 MG: 400 (241.3 MG) TAB at 09:19

## 2022-02-13 RX ADMIN — GABAPENTIN 100 MG: 100 CAPSULE ORAL at 14:26

## 2022-02-13 RX ADMIN — DEXAMETHASONE SODIUM PHOSPHATE 4 MG: 4 INJECTION, SOLUTION INTRA-ARTICULAR; INTRALESIONAL; INTRAMUSCULAR; INTRAVENOUS; SOFT TISSUE at 17:02

## 2022-02-13 RX ADMIN — MOMETASONE FUROATE 2 PUFF: 220 INHALANT RESPIRATORY (INHALATION) at 22:06

## 2022-02-13 RX ADMIN — GABAPENTIN 100 MG: 100 CAPSULE ORAL at 09:19

## 2022-02-13 RX ADMIN — PANTOPRAZOLE SODIUM 40 MG: 40 TABLET, DELAYED RELEASE ORAL at 09:20

## 2022-02-13 RX ADMIN — OXYCODONE HYDROCHLORIDE 10 MG: 5 TABLET ORAL at 04:54

## 2022-02-13 RX ADMIN — CARVEDILOL 12.5 MG: 12.5 TABLET, FILM COATED ORAL at 09:19

## 2022-02-13 RX ADMIN — ACETAMINOPHEN 650 MG: 325 TABLET ORAL at 23:43

## 2022-02-13 RX ADMIN — PANTOPRAZOLE SODIUM 40 MG: 40 TABLET, DELAYED RELEASE ORAL at 22:05

## 2022-02-13 RX ADMIN — LOSARTAN POTASSIUM 50 MG: 50 TABLET, FILM COATED ORAL at 09:20

## 2022-02-13 RX ADMIN — HYDROMORPHONE HYDROCHLORIDE 2 MG: 2 TABLET ORAL at 13:23

## 2022-02-13 RX ADMIN — HEPARIN SODIUM 5000 UNITS: 5000 INJECTION, SOLUTION INTRAVENOUS; SUBCUTANEOUS at 10:41

## 2022-02-13 RX ADMIN — DEXTROSE AND SODIUM CHLORIDE: 5; 450 INJECTION, SOLUTION INTRAVENOUS at 17:50

## 2022-02-13 RX ADMIN — ASPIRIN 81 MG CHEWABLE TABLET 81 MG: 81 TABLET CHEWABLE at 10:40

## 2022-02-13 RX ADMIN — GABAPENTIN 200 MG: 100 CAPSULE ORAL at 22:06

## 2022-02-13 RX ADMIN — MAGNESIUM OXIDE TAB 400 MG (241.3 MG ELEMENTAL MG) 400 MG: 400 (241.3 MG) TAB at 14:26

## 2022-02-13 RX ADMIN — PHENYLEPHRINE HYDROCHLORIDE 100 MCG: 10 INJECTION INTRAVENOUS at 17:11

## 2022-02-13 RX ADMIN — CLOPIDOGREL BISULFATE 75 MG: 75 TABLET ORAL at 10:40

## 2022-02-13 RX ADMIN — MAGNESIUM OXIDE TAB 400 MG (241.3 MG ELEMENTAL MG) 400 MG: 400 (241.3 MG) TAB at 22:05

## 2022-02-13 RX ADMIN — SODIUM CHLORIDE, POTASSIUM CHLORIDE, SODIUM LACTATE AND CALCIUM CHLORIDE: 600; 310; 30; 20 INJECTION, SOLUTION INTRAVENOUS at 16:40

## 2022-02-13 RX ADMIN — CALCIUM CARBONATE (ANTACID) CHEW TAB 500 MG 1000 MG: 500 CHEW TAB at 10:42

## 2022-02-13 RX ADMIN — ACETAMINOPHEN 325 MG: 325 TABLET ORAL at 00:39

## 2022-02-13 RX ADMIN — CEFAZOLIN 2 G: 1 INJECTION, POWDER, FOR SOLUTION INTRAMUSCULAR; INTRAVENOUS at 17:00

## 2022-02-13 RX ADMIN — POLYETHYLENE GLYCOL 3350 17 G: 17 POWDER, FOR SOLUTION ORAL at 22:06

## 2022-02-13 RX ADMIN — OXYCODONE HYDROCHLORIDE 10 MG: 5 TABLET ORAL at 00:38

## 2022-02-13 RX ADMIN — HYDROMORPHONE HYDROCHLORIDE 0.2 MG: 0.2 INJECTION, SOLUTION INTRAMUSCULAR; INTRAVENOUS; SUBCUTANEOUS at 08:59

## 2022-02-13 RX ADMIN — MIDAZOLAM 1 MG: 1 INJECTION INTRAMUSCULAR; INTRAVENOUS at 16:46

## 2022-02-13 RX ADMIN — HYDROMORPHONE HYDROCHLORIDE 2 MG: 2 TABLET ORAL at 10:03

## 2022-02-13 RX ADMIN — SENNOSIDES AND DOCUSATE SODIUM 1 TABLET: 50; 8.6 TABLET ORAL at 09:19

## 2022-02-13 RX ADMIN — CALCIUM CARBONATE (ANTACID) CHEW TAB 500 MG 1000 MG: 500 CHEW TAB at 22:06

## 2022-02-13 RX ADMIN — PHENYLEPHRINE HYDROCHLORIDE 100 MCG: 10 INJECTION INTRAVENOUS at 17:08

## 2022-02-13 ASSESSMENT — ACTIVITIES OF DAILY LIVING (ADL)
ADLS_ACUITY_SCORE: 7
ADLS_ACUITY_SCORE: 7
ADLS_ACUITY_SCORE: 6
ADLS_ACUITY_SCORE: 6
ADLS_ACUITY_SCORE: 7
ADLS_ACUITY_SCORE: 6
ADLS_ACUITY_SCORE: 7
ADLS_ACUITY_SCORE: 6
ADLS_ACUITY_SCORE: 7
ADLS_ACUITY_SCORE: 6
ADLS_ACUITY_SCORE: 7
ADLS_ACUITY_SCORE: 6
ADLS_ACUITY_SCORE: 6
ADLS_ACUITY_SCORE: 7
ADLS_ACUITY_SCORE: 6
ADLS_ACUITY_SCORE: 7

## 2022-02-13 ASSESSMENT — ENCOUNTER SYMPTOMS: DYSRHYTHMIAS: 1

## 2022-02-13 ASSESSMENT — COPD QUESTIONNAIRES: COPD: 1

## 2022-02-13 ASSESSMENT — LIFESTYLE VARIABLES: TOBACCO_USE: 1

## 2022-02-13 NOTE — PROGRESS NOTES
[facility]  Hospitalist Progress Note  [facility]        Date of Service (when I saw the patient): 02/13/2022  Eleazar Hernández,   02/13/2022        Interval History:      Patient with reported hypoglycemia overnight, discussed with patient, providers, nursing staff. Patient updated on plan of care, verbalized understanding.          Assessment and Plan:        71 year old male with medical history of COPD, type II DM, chronic kidney disease, CAD status post four-vessel CABG, ischemic cardiomyopathy s/p ICD, A. fib s/p ablation and GERD status post bilateral common femoral artery endarterectomy and stent placement on bilateral external iliac arteries.  [facility] and type II DM.     Peripheral vascular disease with critical right lower extremity limb threatening ischemia status post bilateral common femoral artery endarterectomy and stent placement in bilateral external iliac arteries on February 8  - Post OP pain management, DVT prophylaxis per vascular.    Swollen penis and scrotum: Likely due to postoperative period.   - Vascular to go back to OR today per report.   - Consider urology consult.   - Patient states he is able to urinate.      Ischemic cardiomyopathy-CAD s/p CABG x4 A. fib s/p ablation Last echo 10/2021, LVEF 20-25%, not in exacerbation  - Typically on torsemide 40 mg daily, losartan 50 mg daily, carvedilol 12.5 mg twice daily, lipitor 80mg at bedtime, NTG as needed,   - Antiplatelets and anticoagulants per vascular     Hypomagnesemia  - Replace per protocol     Type II DM Hemoglobin A1c 8.8 Has been frequently hypoglycemic here in the 50s and 60s.  Is usually on Lantus 30 units in the mornings and 3 units of NovoLog with breakfast.  He states at home his blood sugars are usually in the 140s or 150s.    - Hold lantus.   - High ISS.      COPD  - home inhalers     GERD  - PPI     CKD 3-baseline creatinine around 1.3-1.5.   Creatinine trending downward, is 1.47 today.  RADHA  - Nephrology following     Acute blood loss anemia -Hb at baseline around 13-14, noted post op to 10.1  - Monitor Hb for now.     Thrombocytopenia Slowly trending up to 94  - monitor, if stable or improve, continue Heparin; if trending down, might hold Heparin and check HIT labs          # Diabetes, type II: last A1C 8.8 % (Ref range: <=5.6 %)      Diet: Orders Placed This Encounter      Advance Diet as Tolerated: Regular Diet Adult; Low Consistent Carb (45 g CHO per Meal) Diet    DVT Prophylaxis: Defer to surgery team.     Code: Full Code    Anticipated discharge date: Defer to surgery team.   Length of Stay:  LOS: 4 days /LOS: 4    Text page via [facility] Paging/Directory                   Physical Exam:           Blood pressure 100/51, pulse 56, temperature 98.1  F (36.7  C), temperature source Oral, resp. rate 16, weight 72.2 kg (159 lb 1.6 oz), SpO2 95 %.    Vital Sign Ranges  Temperature Temp  Av.8  F (36.6  C)  Min: 97.5  F (36.4  C)  Max: 98.1  F (36.7  C)   Blood pressure Systolic (24hrs), Av , Min:100 , Max:147        Diastolic (24hrs), Av, Min:51, Max:65      Pulse Pulse  Av.7  Min: 56  Max: 70   Respirations Resp  Av.7  Min: 16  Max: 18   Pulse oximetry SpO2  Av.3 %  Min: 95 %  Max: 98 %     Vital Signs with Ranges  Temp:  [97.5  F (36.4  C)-98.1  F (36.7  C)] 98.1  F (36.7  C)  Pulse:  [56-70] 56  Resp:  [16-18] 16  BP: (100-147)/(51-65) 100/51  SpO2:  [95 %-98 %] 95 %  Temp:  [97.5  F (36.4  C)-98.1  F (36.7  C)] 98.1  F (36.7  C)  Pulse:  [56-70] 56  Resp:  [16-18] 16  BP: (100-147)/(51-65) 100/51  SpO2:  [95 %-98 %] 95 %    I/O Last 3 Shifts:   I/O last 3 completed shifts:  In: 906 [P.O.:900; I.V.:6]  Out:  [Urine:]    I/O past 24 hours:     Intake/Output Summary (Last 24 hours) at 2022 0735  Last data filed at 2022 0200  Gross per 24 hour   Intake 906 ml   Output 5 ml   Net -1119 ml       Oxygen  Temp:  98.1  F (36.7  C) Temp src: Oral BP: 100/51 Pulse: 56   Resp: 16 SpO2: 95 % O2 Device: None (Room air)    Vitals:    02/09/22 1004   Weight: 72.2 kg (159 lb 1.6 oz)       Lines  Peripheral IV 02/09/22 Left;Dorsal Lower forearm (Active)   Site Assessment WDL 02/12/22 2030   Line Status Saline locked 02/12/22 2030   Dressing Intervention Dressing reinforced 02/10/22 1630   Phlebitis Scale 0-->no symptoms 02/12/22 2030   Infiltration Scale 0 02/12/22 2030   Number of days: 4       Peripheral IV 02/09/22 Left Hand (Active)   Site Assessment WDL 02/12/22 2030   Line Status Saline locked 02/12/22 2030   Phlebitis Scale 0-->no symptoms 02/12/22 2030   Infiltration Scale 0 02/12/22 2030   Infiltration Site Treatment Method  None 02/12/22 0400   Number of days: 4     GENERAL: NAD. Conversational, appropriate.   HEENT: Normocephalic. No icterus or injection. Nares normal.   LUNGS: Clear to auscultation. No dyspnea at rest.   HEART: Regular rate. Extremities perfused.   ABDOMEN: Soft, nontender, and nondistended. Positive bowel sounds. Swollen penis and scrotum.   NEUROLOGIC: Moves extremities x4, follows commands.          Prior to Admission Medications:        Medications Prior to Admission   Medication Sig Dispense Refill Last Dose     albuterol (PROAIR HFA) 108 (90 Base) MCG/ACT Inhaler Inhale 2 puffs into the lungs every 4 hours as needed for shortness of breath / dyspnea 1 Inhaler 0 2/9/2022 at am     amylase-lipase-protease (CREON 12) [id number] units CPEP Take 2 capsules by mouth 3 times daily (with meals)  120 capsule 0 2/9/2022 at am     aspirin (ASA) 325 MG tablet Take 325 mg by mouth 2 times daily    2/8/2022 at pm     atorvastatin (LIPITOR) 80 MG tablet Take 80 mg by mouth At Bedtime    2/8/2022 at pm     calcium carbonate (OS-MARVA) 500 MG tablet Take 1 tablet by mouth 2 times daily   2/9/2022 at am     calcium carbonate (OS-MARVA) 500 MG tablet Take 2 tablets by mouth daily (with lunch)   2/8/2022 at Unknown  time     Carboxymethylcellulose Sod PF (THERATEARS PF) 0.25 % SOLN Apply 1 drop to eye 4 times daily as needed   Unknown at Unknown time     carvedilol (COREG) 6.25 MG tablet Take 2 tablets (12.5 mg) by mouth 2 times daily (with meals) 180 tablet 3 2/9/2022 at am     gabapentin (NEURONTIN) 100 MG capsule Take 200 mg by mouth At Bedtime  1 capsule 0 2/8/2022 at pm     Glycerin (OASIS MOISTURIZING MOUTH SPRAY) 35 % LIQD Take 2 sprays by mouth daily as needed   Unknown at Unknown time     insulin aspart (NOVOLOG PEN) 100 UNIT/ML pen Inject 3 Units Subcutaneous 3 times daily (with meals) 15 mL 1 2/9/2022 at am     insulin glargine (LANTUS PEN) 100 UNIT/ML pen Inject 32 Units Subcutaneous every morning (Patient taking differently: Inject 30 Units Subcutaneous every morning ) 45 mL 1 2/9/2022 at 24 units in AM     Insulin Pen Needle (PEN NEEDLES) 32G X 4 MM MISC    Unknown at Unknown time     loperamide (IMODIUM) 2 MG capsule Take 2 mg by mouth 4 times daily as needed for diarrhea   2/9/2022 at am     losartan (COZAAR) 100 MG tablet Take 50 mg by mouth daily   2/8/2022 at am     magnesium oxide (MAG-OX) 400 (241.3 Mg) MG tablet Take 1 tablet (400 mg) by mouth 4 times daily 120 tablet 1 2/9/2022 at am     metFORMIN (GLUCOPHAGE-XR) 500 MG 24 hr tablet Take 500 mg by mouth 2 times daily (with meals)    2/9/2022 at am     mometasone (ASMANEX TWISTHALER) 220 MCG/INH inhaler Inhale 2 puffs into the lungs every evening    2/8/2022 at pm     nitroGLYcerin (NITROSTAT) 0.4 MG sublingual tablet Place 0.4 mg under the tongue every 5 minutes as needed for chest pain    Unknown at Unknown time     pantoprazole (PROTONIX) 40 MG EC tablet Take 40 mg by mouth 2 times daily  30 tablet  2/9/2022 at am     torsemide (DEMADEX) 20 MG tablet Take 40 mg by mouth daily  30 tablet 4 2/9/2022 at 20 mg this AM     vitamin D3 (CHOLECALCIFEROL) 50 mcg (2000 units) tablet Take 1 tablet by mouth 2 times daily   2/9/2022 at am            Medications:         Current Facility-Administered Medications   Medication Last Rate     Current Facility-Administered Medications   Medication Dose Route Frequency     aspirin  81 mg Oral Daily     atorvastatin  80 mg Oral At Bedtime     calcium carbonate  1,000 mg Oral BID     carvedilol  12.5 mg Oral BID w/meals     clopidogrel  75 mg Oral Daily     gabapentin  100 mg Oral TID     gabapentin  200 mg Oral At Bedtime     heparin ANTICOAGULANT  5,000 Units Subcutaneous Q12H     [Held by provider] insulin aspart  3 Units Subcutaneous Daily with breakfast     insulin aspart  1-3 Units Subcutaneous TID AC     insulin aspart  1-3 Units Subcutaneous At Bedtime     insulin glargine  20 Units Subcutaneous QAM     losartan  50 mg Oral Daily     magnesium oxide  400 mg Oral TID     mometasone  2 puff Inhalation QPM     pantoprazole  40 mg Oral BID     polyethylene glycol  17 g Oral Daily     senna-docusate  1 tablet Oral BID     sodium chloride (PF)  3 mL Intracatheter Q8H     [Held by provider] torsemide  40 mg Oral Daily     Current Facility-Administered Medications   Medication Dose Route Frequency     oxyCODONE  10 mg Oral Q4H PRN    And     acetaminophen  325 mg Oral Q4H PRN     acetaminophen  650 mg Oral Q4H PRN     albuterol  2 puff Inhalation Q4H PRN     glucose  15-30 g Oral Q15 Min PRN    Or     dextrose  25-50 mL Intravenous Q15 Min PRN    Or     glucagon  1 mg Subcutaneous Q15 Min PRN     HYDROmorphone  0.2 mg Intravenous Q2H PRN     lidocaine 4%   Topical Q1H PRN     naloxone  0.2 mg Intravenous Q2 Min PRN    Or     naloxone  0.4 mg Intravenous Q2 Min PRN    Or     naloxone  0.2 mg Intramuscular Q2 Min PRN    Or     naloxone  0.4 mg Intramuscular Q2 Min PRN     nitroGLYcerin  0.4 mg Sublingual Q5 Min PRN     ondansetron  4 mg Oral Q6H PRN    Or     ondansetron  4 mg Intravenous Q6H PRN     oxyCODONE  2.5 mg Oral Q4H PRN    Or     oxyCODONE  5 mg Oral Q4H PRN     sodium chloride (PF)  3 mL Intracatheter q1 min prn      ___________________________________________________________________________  Medications       aspirin  81 mg Oral Daily     atorvastatin  80 mg Oral At Bedtime     calcium carbonate  1,000 mg Oral BID     carvedilol  12.5 mg Oral BID w/meals     clopidogrel  75 mg Oral Daily     gabapentin  100 mg Oral TID     gabapentin  200 mg Oral At Bedtime     heparin ANTICOAGULANT  5,000 Units Subcutaneous Q12H     [Held by provider] insulin aspart  3 Units Subcutaneous Daily with breakfast     insulin aspart  1-3 Units Subcutaneous TID AC     insulin aspart  1-3 Units Subcutaneous At Bedtime     insulin glargine  20 Units Subcutaneous QAM     losartan  50 mg Oral Daily     magnesium oxide  400 mg Oral TID     mometasone  2 puff Inhalation QPM     pantoprazole  40 mg Oral BID     polyethylene glycol  17 g Oral Daily     senna-docusate  1 tablet Oral BID     sodium chloride (PF)  3 mL Intracatheter Q8H     [Held by provider] torsemide  40 mg Oral Daily          Data:      Lab data reviewed.     Data   Recent Labs   Lab 02/13/22  0657 02/13/22  0449 02/13/22  0315 02/12/22  0755 02/12/22  0650 02/11/22  0758 02/11/22  0704   WBC 6.6  --   --   --  8.7  --  10.5   HGB 8.6*  --   --   --  10.1*  --  10.5*   MCV 96  --   --   --  95  --  96   PLT 89*  --   --   --  94*  --  90*   *  --   --   --  135*  --  135*   POTASSIUM 5.2*  --   --   --  4.2  --  5.0   CHLORIDE 100  --   --   --  100  --  101   CO2 27  --   --   --  26  --  25   BUN 44*  --   --   --  39*  --  39*   CR 1.55*  --   --   --  1.47*  --  1.85*   ANIONGAP 6  --   --   --  9  --  9   MARVA 7.8*  --   --   --  8.1*  --  8.1*   * 157* 92   < > 88   < > 165*   ALBUMIN  --   --   --   --   --   --  2.7*   PROTTOTAL  --   --   --   --   --   --  5.2*   BILITOTAL  --   --   --   --   --   --  0.4   ALKPHOS  --   --   --   --   --   --  105   ALT  --   --   --   --   --   --  <9   AST  --   --   --   --   --   --  22    < > = values in this interval not  displayed.           Imaging:      Imaging data reviewed.     No results found for this or any previous visit (from the past 24 hour(s)).    Dr. Eleazar Hernández DO, MBA  [facility]  Pager [phone]  Text page via [facility] Paging/Directory

## 2022-02-13 NOTE — PROVIDER NOTIFICATION
Text paged Dr. Arthur. Pt has heparin, aspirin, and plavix all scheduled this morning. Requesting clarification of what to give vs hold pre-op.

## 2022-02-13 NOTE — PROVIDER NOTIFICATION
Notified Resident at 0215 AM regarding Blood Glucose of 51, 54 and 59.     Spoke with: Dr. Ruiz    Orders: Recheck BG again in 15 min.    Comments: Pt called to request BG be checked at 0130, Result 51, Pt then requested many things to eat/drink, had a total of approx 150g carbs from 0130 to 0215. BG up to 80 with next check requested by MD and 92 another 30 min later.

## 2022-02-13 NOTE — OP NOTE
Operative Note    Name: Delbert Tilley     [facility]    Procedure Date:  2/9/2022 - 2/13/2022    PCP:  Danii Hernández    Procedure(s):  Right groin wound exploration, nerve release and closure.     Pre-Procedure Diagnosis:    PAD (peripheral artery disease) (H) [I73.9]     Post-Procedure Diagnosis:    Same     Surgeon(s):  Terrell Dalal MD Faizer, Rumi, MD     Findings:    Femoral cutaneous nerve on sartorius muscle involved in interrupted 3-0 Vicryl suture closure.      Estimated Blood Loss:   10 cc's    Specimens:    * No specimens in log *        Implants:  * No implants in log *     Complications:    * No complications entered in OR log *     Brief Clinical Hx:  This is 71-year-old gentleman who underwent bilateral femoral endarterectomies and iliac stents who has had severe right groin pain shooting down the anterior surface of his thigh.  Consistent with femoral cutaneous nerve entrapment.  Inadequately helped with gabapentin and narcotics.  Contralateral groin incision not painful at all.  After reviewing options plan for return to the OR to explore this with local anesthesia and sedation.  Discussed with patient that we are not absolutely certain that we would find resolution but we would look.  stable    Operative Details:  Patient was prepped and draped for access to both groins.  Tegaderm and Dermabond dressing removed and Marcaine 0.25 infused in subcutaneous tissue along the incision.  The patient was sedated at this point.  Subcuticular suture was taken down and the wound opened through the old incision.  Subcutaneous 3-0 Vicryl running suture was also removed.  Deeper layer of interrupted 3-0 and 2-0 Vicryl sutures was carefully explored and taken down.  Among these there was clearly a suture that appeared to trap the femoral cutaneous nerve.  This was released.  The nerve otherwise appeared to be intact.  We did not dissect out the vasculature.  We irrigated the wound and then  reclosed it with interrupted 3-0 Vicryl sutures for the deeper soft tissue which we took care to him and she did not involve the torus muscle overlying nerve.  We then closed the subcutaneous tissue with Intrepid 3-0 Vicryl suture and the skin with another 4 Monocryl.  Dermabond was applied over top and while still wet Tegaderm was applied onto this.    Procedure well-tolerated.      Karen Arthur MD     Date: 2/13/2022  Time:5:26 PM

## 2022-02-13 NOTE — PROGRESS NOTES
Postop day 4 from bilateral common femoral artery endarterectomy and bilateral iliac stenting.    Overnight, patient had low blood sugar to 51.  Patient had something to eat and blood sugar responded appropriately.  This morning, patient frustrated with intermittent episodes of hypoglycemia.  Patient also reports significant shooting right groin pain to the point where he is unable to walk.  Patient denies any pain in the left groin.    Vital signs stable  Palpable femoral pulses bilaterally  Groin incisions with Dermabond in place; no evidence of any infection; clean dry and intact    Labs reviewed  Creatinine 1.55 from 1.47  Hemoglobin 8.6    Postoperative course complicated by potential femoral cutaneous nerve entrapment causing significant radiating pain in the right groin.  Patient with intermittent episodes of hypoglycemia.    We will plan to take the patient back to the operating room today for local right groin wound exploration and remove any sutures that may be causing her nerve entrapment.    Discussed with hospitalist regarding intermittent episodes of hypoglycemia    Seen and discussed with Dr. Arthur, who is in agreement with the above plan.    Terrell Dalal MD  Vascular Surgery Fellow

## 2022-02-13 NOTE — PLAN OF CARE
Problem: Adult Inpatient Plan of Care  Goal: Plan of Care Review  Outcome: Improving  Flowsheets (Taken 2/13/2022 1539)  Plan of Care Reviewed With: patient  Outcome Summary: Plan today is to go to OR for revision of right leg surgery at 1600  Progress: improving     Problem: Adult Inpatient Plan of Care  Goal: Patient-Specific Goal (Individualized)  Outcome: Improving  Flowsheets (Taken 2/13/2022 1539)  Anxieties, Fears or Concerns: Worried about glucose levels  Patient-Specific Goals (Include Timeframe): To have BG stable and able to walk     Problem: Pain Acute  Goal: Acceptable Pain Control and Functional Ability  Intervention: Prevent or Manage Pain  Recent Flowsheet Documentation  Taken 2/13/2022 0830 by Ghazala Wilson RN  Medication Review/Management: medications reviewed   Pain has been better controlled today with change in medications. Now on PO dilaudid and IV for breakthrough pain. Pt was able to rest today and states no pain at rest. Still is painful when he moves but is able to sit at edge of bed and ambulated to bathroom w/ assist of one and cane or walker.     Problem: Diabetes Comorbidity  Goal: Blood Glucose Level Within Targeted Range  Outcome: Improving   Hospitalist adjusted insulin. Discontinued lantus, changed to resistant sliding scale. BG at breakfast 174, lunch 223.

## 2022-02-13 NOTE — ANESTHESIA PREPROCEDURE EVALUATION
Anesthesia Pre-Procedure Evaluation    Patient: Delbert Tilley   MRN: [id number] : 1950        Preoperative Diagnosis: PAD (peripheral artery disease) (H) [I73.9]    Procedure : Procedure(s):  ENDARTERECTOMY, FEMORAL with retrograde iliac intervention          Past Medical History:   Diagnosis Date     Acute renal failure (H) 06 Hosp    secondary to dehydration     Anemia      Arthritis      Atherosclerosis of artery of extremity with intermittent claudication (H)      CAD (coronary artery disease)     LIMA to the LAD, vein graft to OM and a vein graft to the PDA     Cardiomyopathy (H)      Carpal tunnel syndrome      CHF (congestive heart failure) (H)     Ischemic and nonischemic cardiomyopathy; 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%     Claudication (H)      COPD (chronic obstructive pulmonary disease) (H)      Diabetes (H)      Gastro-oesophageal reflux disease      GERD (gastroesophageal reflux disease)      H/O: alcohol abuse      HTN (hypertension)      Hyperlipidaemia LDL goal <100 2013     Hyperparathyroidism (H)      Hypokalemia      ICD (implantable cardioverter-defibrillator) in place     [employer]     NSTEMI (non-ST elevated myocardial infarction) (H)      NSVT (nonsustained ventricular tachycardia) (H)      Pacemaker      Pancreatitis 06 Hosp     Paroxysmal atrial fibrillation (H)      PVD (peripheral vascular disease) (H)      Thrombocytopenia (H)      Tobacco use      Venous (peripheral) insufficiency      Vitamin D deficiency       Past Surgical History:   Procedure Laterality Date     CATARACT IOL, RT/LT      Cataract IOL RT/LT     ENDARTERECTOMY CAROTID Right 2015    Procedure: ENDARTERECTOMY CAROTID;  Surgeon: João Turner MD;  Location:  OR     ENDARTERECTOMY CAROTID Left 2017    Procedure: ENDARTERECTOMY CAROTID;  LEFT CAROTID ENDARTERECTOMY WITH EEG;  Surgeon: João Turner MD;  Location:  OR     IMPLANT PACEMAKER  06/10/2010     IMPLANTED DEVICE        INTERPOSITION TENDON HAND N/A 06/09/2020    Procedure: Right thumb ligament reconstruction tendon interposition with extensor pollicis brevis to abductor pollicis tendon transfer;  Surgeon: Bethel Martin MD;  Location: [facility] OR     RELEASE CARPAL TUNNEL Right 04/12/2016    Procedure: RELEASE CARPAL TUNNEL;  Surgeon: Lissy Leger MD;  Location: [facility] OR     SURGICAL HISTORY OF -   1998    Laminectomy     TONSILLECTOMY & ADENOIDECTOMY       ZZC CABG, ARTERIAL, THREE        Allergies   Allergen Reactions     Fish Nausea and Vomiting     severe     Hydrocodone GI Disturbance     Upset stomach     Shellfish Allergy Hives and Rash      Social History     Tobacco Use     Smoking status: Current Every Day Smoker     Packs/day: 1.50     Years: 50.00     Pack years: 75.00     Types: Cigarettes     Smokeless tobacco: Never Used     Tobacco comment: 1 1/2-2 PPD 3/11/21   Substance Use Topics     Alcohol use: No      Wt Readings from Last 1 Encounters:   02/09/22 72.2 kg (159 lb 1.6 oz)        Anesthesia Evaluation   Pt has had prior anesthetic. Type: General.    No history of anesthetic complications       ROS/MED HX  ENT/Pulmonary:     (+) tobacco use, COPD,     Neurologic:  - neg neurologic ROS     Cardiovascular: Comment: Left ventricular systolic function is severely reduced.The visual ejection  fraction is 20-25%.The left ventricle is mildly dilated.  Moderately decreased right ventricular systolic function  The left atrium is severely dilated.  There is mild (1+) mitral regurgitation.  There is mild (1+) tricuspid regurgitation.  Pulmonary hypertension- RVSP 54 mm hg +RA.     Compared to echo dated 11/23/2020 no significant changes.    (+) hypertension-Peripheral Vascular Disease-CAD ---CHF pacemaker, ICD dysrhythmias, a-fib,     METS/Exercise Tolerance:     Hematologic:       Musculoskeletal:       GI/Hepatic:     (+) GERD,     Renal/Genitourinary:     (+) renal disease, type: CRI,     Endo:     (+) type II DM,      Psychiatric/Substance Use:       Infectious Disease:       Malignancy:       Other:            Physical Exam    Airway  airway exam normal      Mallampati: I   TM distance: > 3 FB   Neck ROM: full     Respiratory Devices and Support         Dental       (+) upper dentures and lower dentures      Cardiovascular   cardiovascular exam normal    Comment: ICD present      Pulmonary   pulmonary exam normal                OUTSIDE LABS:  CBC:   Lab Results   Component Value Date    WBC 6.6 02/13/2022    WBC 8.7 02/12/2022    HGB 8.6 (L) 02/13/2022    HGB 10.1 (L) 02/12/2022    HCT 28.0 (L) 02/13/2022    HCT 32.7 (L) 02/12/2022    PLT 89 (L) 02/13/2022    PLT 94 (L) 02/12/2022     BMP:   Lab Results   Component Value Date     (L) 02/13/2022     (L) 02/12/2022    POTASSIUM 4.9 02/13/2022    POTASSIUM 5.2 (H) 02/13/2022    CHLORIDE 100 02/13/2022    CHLORIDE 100 02/12/2022    CO2 27 02/13/2022    CO2 26 02/12/2022    BUN 44 (H) 02/13/2022    BUN 39 (H) 02/12/2022    CR 1.55 (H) 02/13/2022    CR 1.47 (H) 02/12/2022     (H) 02/13/2022     (H) 02/13/2022     COAGS:   Lab Results   Component Value Date    PTT 31 12/22/2017    INR 1.02 12/22/2017     POC:   Lab Results   Component Value Date     (H) 10/22/2020     HEPATIC:   Lab Results   Component Value Date    ALBUMIN 2.7 (L) 02/11/2022    PROTTOTAL 5.2 (L) 02/11/2022    ALT <9 02/11/2022    AST 22 02/11/2022    ALKPHOS 105 02/11/2022    BILITOTAL 0.4 02/11/2022     OTHER:   Lab Results   Component Value Date    PH 7.56 (H) 11/22/2004    LACT 1.6 10/21/2020    A1C 8.8 (H) 02/09/2022    MARVA 7.8 (L) 02/13/2022    PHOS 3.9 02/13/2022    MAG 1.6 (L) 02/13/2022    LIPASE Quantity not sufficient 05/29/2010    AMYLASE 126 (H) 02/17/2010    TSH 3.84 10/19/2016    CRP 3.2 07/22/2021    SED 9 07/22/2021       Anesthesia Plan    ASA Status:  4   NPO Status:  NPO Appropriate    Anesthesia Type: MAC.     - Reason for MAC: chronic cardiopulmonary disease,  immobility needed, straight local not clinically adequate      Maintenance: Balanced.        Consents    Anesthesia Plan(s) and associated risks, benefits, and realistic alternatives discussed. Questions answered and patient/representative(s) expressed understanding.     - Discussed: Risks, Benefits and Alternatives for BOTH SEDATION and the PROCEDURE were discussed     - Discussed with:  Patient    Use of blood products discussed: Yes.     - Discussed with: Patient.     - Consented: consented to blood products            Reason for refusal: other.     Postoperative Care    Pain management: Multi-modal analgesia.   PONV prophylaxis: Ondansetron (or other 5HT-3), Dexamethasone or Solumedrol     Comments:    Other Comments: 2/6/22: COVID NEGATIVE    MAC w/ propofol gtt.  Zofran for PONV ppx.                Bethel Brandon MD

## 2022-02-13 NOTE — PROGRESS NOTES
Renal progress note  CC:RADHA  Assessment and Plan:  71 year old male with hx of CKD 3 , hx of COPD , CAD sp CABG, ICM sp ICD EF 20%, Afib sp ablation, Hx of PAD sp bl common fem a endarterectomy and stent placement in bl iliac art 2/8/2022, postop creatinine noted to be increasing today in the setting of hypotension yesterday with ARB and diuretics on board for management of his CAD and ICM.  Nephrology consulted for RADHA     Nonoliguric RADHA  Baseline CKD 3, baseline creatinine is around 1.3-1.5 with a GFR around 50  CKD likely secondary to poorly controlled diabetes with significant microvascular disease and possible renovascular disease nephrosclerosis  UA with proteinuria for more than 20 years  Urine proteins 1.04g/g  Renal imaging from January 2021 with about 9 cm right and 10 cm left kidney without any other significant findings except mild left pelviectasis without hydronephrosis, CTA from November 2021 with good flows to bilateral kidneys except for one of the 2 right renal artery with significant stenosis  At this time the RADHA appears more secondary to hemodynamic specially in the setting of hypotension yesterday with blood pressures down to 80s also received contrast (75cc??)/ATN  Creatinine peaked at 1.8 and now steadily improving   Improved back to baseline  --We will manage conservatively  --Losartan resumed  --Hold torsemide for today, urine output remains adequate despite holding diuretics, going for OR exploration for the groin pain  Appears euvolemic on exam right now, can resume diuretics tomorrow   --Follow on urine output and renal labs daily     Mild hyponatremia  Mild hyperkalemia  Hypomagnesemia acute on chronic  Monitor on labs  Urine sodium elevated to 78 suggestive of likely tubular injury at this time with significant hypotension and contrast exposure  --Okay to replace per nursing protocols     History of hypertension  On carvedilol losartan and torsemide at home prior to  admission  Okay to continue carvedilol and losartan   Held torsemide--> will resume tomorrow  --Appears euvolemic on exam at this point we will avoid any diuretics or IV fluids  --If hypotensive give bolus crystalloids     Anemia  ACD/inflamm  Slow down trend ,   Follow on daily labs  No obvious bleed/blood loss  Was on heparin now on DVT Px  -- tranfuse if<7     Thrombocytopenia  ??  Heparin  Platelet starting to turn around was down to 76 now ~90  Plans per primary team     PAD with critical lower extremity ischemia  S/p bilateral common femoral artery endarterectomy and stent placement bilateral external iliac arteries  Pain on left side  Plans per vasc to re-explore for nerve entrapment related pain  swelling at the left site also more then right side     Ischemic cardiomyopathy  Coronary artery disease s/p CABG x4  Atrial fibrillation s/p ablation  Prior to admission has been on torsemide 40 losartan 100 carvedilol 12.5 mg twice a day  Carvedilol continued,  resume losartan 2/12  Hold diuretics for 1 more day  --Management per primary medicine     Diabetes  On metformin  Poor control  Management per hospitalist medicine currently on insulin  Had an episode of hypoglycemia inpatient in the setting of n.p.o. for surgery     COPD  Stable  On home inhalers       Thank you for the consultation we will follow  Velma Nagel MD  [facility]  [phone]      Subjective  Seen at bedside reports very concerned about left thigh is swollen and painful  Plans per vasc note for exploration of the surg site for fem cut nerve entrapment  Urine output has been adequate in the last 24 hours    Objective    Vital signs in last 24 hours  Temp:  [97.5  F (36.4  C)-98.1  F (36.7  C)] 98  F (36.7  C)  Pulse:  [56-77] 77  Resp:  [16-18] 18  BP: (100-143)/(51-69) 143/69  SpO2:  [95 %-98 %] 95 %  Weight:   [unfilled]    Intake/Output last 3 shifts  I/O last 3 completed shifts:  In: 906 [P.O.:900; I.V.:6]  Out:  2025 [Urine:2025]  Intake/Output this shift:  No intake/output data recorded.    Physical Exam  Alert/oriented x 3, awake, NAD  CV: RRR without murmur or rub  Lung: clear and equal; no extra sounds  Ab: soft and NT; not distended; normal bs  Ext: no edema, left groin area and surg site swollen >right , no pitting edema and well perfused  Skin; no rash    Pertinent Labs   Lab Results   Component Value Date    WBC 6.6 02/13/2022    HGB 8.6 (L) 02/13/2022    HCT 28.0 (L) 02/13/2022    MCV 96 02/13/2022    PLT 89 (L) 02/13/2022     Lab Results   Component Value Date    BUN 44 (H) 02/13/2022     (L) 02/13/2022    CO2 27 02/13/2022       Lab Results   Component Value Date    ALBUMIN 2.7 (L) 02/11/2022     Lab Results   Component Value Date    PHOS 3.9 02/13/2022     I reviewed all lab results  Velma Nagel MD

## 2022-02-13 NOTE — PROGRESS NOTES
Care Management Follow Up    Length of Stay (days): 4    Expected Discharge Date: 02/14/2022     Concerns to be Addressed: Surgery an d Neph Progression       Patient plan of care discussed at interdisciplinary rounds: Yes    Anticipated Discharge Disposition: Home      Anticipated Discharge Services:  Home PT   Anticipated Discharge DME:  TBD   Patient/family educated on Medicare website which has current facility and service quality ratings: N/A   Education Provided on the Discharge Plan:  Yes   Patient/Family in Agreement with the Plan:  Yes     Referrals Placed by CM/SW:  Home Care   Private pay costs discussed: Not at this time     Additional Information:  SWCM met and introduced self and CM services to Pt.  Pt lives alone.  Pt agreeable to Home PT and has no preference for HC.  SWCM sent referrals to [facility], [facility] and [facility].  SWCM talked with [facility] intake and do not think there is PT available.  CM to continue to secure HC.     ADARSH Nunez

## 2022-02-13 NOTE — PLAN OF CARE
Problem: Risk for Delirium  Goal: Improved Sleep  Outcome: Improving  Patient reports that he was able to sleep well for a very short period of time during the night but feels that his pain is slightly better controlled this morning.       Problem: Diabetes Comorbidity  Goal: Blood Glucose Level Within Targeted Range  Outcome: Declining  See provider communication note re: BG overnight.     Patient very upset that pain continues and BGs are still dropping during the night. Therapeutic communication/listening provided to patient and patient appeared more calm after interventions.

## 2022-02-13 NOTE — ANESTHESIA CARE TRANSFER NOTE
Patient: Delbert Tilley    Procedure: Procedure(s):  Right groin wound exploration, nerve release and closure.       Diagnosis: PAD (peripheral artery disease) (H) [I73.9]  Diagnosis Additional Information: No value filed.    Anesthesia Type:   MAC     Note:    Oropharynx: oropharynx clear of all foreign objects  Level of Consciousness: drowsy  Oxygen Supplementation: room air    Independent Airway: airway patency satisfactory and stable  Dentition: dentition unchanged  Vital Signs Stable: post-procedure vital signs reviewed and stable  Report to RN Given: handoff report given  Patient transferred to: Medical/Surgical Unit  Comments: Report called to P2 RN  Handoff Report: Identifed the Patient, Identified the Reponsible Provider, Reviewed the pertinent medical history, Discussed the surgical course, Reviewed Intra-OP anesthesia mangement and issues during anesthesia, Set expectations for post-procedure period and Allowed opportunity for questions and acknowledgement of understanding      Vitals:  Vitals Value Taken Time   BP 93/55 02/13/22 1733   Temp     Pulse 61 02/13/22 1733   Resp 14 02/13/22 1733   SpO2 100 % 02/13/22 1733       Electronically Signed By: ANA Weiss CRNA  February 13, 2022  5:33 PM

## 2022-02-14 ENCOUNTER — TELEPHONE (OUTPATIENT)
Dept: FAMILY MEDICINE | Facility: CLINIC | Age: 72
End: 2022-02-14
Payer: COMMERCIAL

## 2022-02-14 ENCOUNTER — APPOINTMENT (OUTPATIENT)
Dept: PHYSICAL THERAPY | Facility: HOSPITAL | Age: 72
DRG: 252 | End: 2022-02-14
Attending: SURGERY
Payer: COMMERCIAL

## 2022-02-14 VITALS
OXYGEN SATURATION: 97 % | RESPIRATION RATE: 17 BRPM | WEIGHT: 159.1 LBS | TEMPERATURE: 98.1 F | DIASTOLIC BLOOD PRESSURE: 62 MMHG | HEART RATE: 72 BPM | SYSTOLIC BLOOD PRESSURE: 127 MMHG | BODY MASS INDEX: 22.83 KG/M2

## 2022-02-14 LAB
ANION GAP SERPL CALCULATED.3IONS-SCNC: 7 MMOL/L (ref 5–18)
BUN SERPL-MCNC: 48 MG/DL (ref 8–28)
CALCIUM SERPL-MCNC: 7.9 MG/DL (ref 8.5–10.5)
CHLORIDE BLD-SCNC: 99 MMOL/L (ref 98–107)
CO2 SERPL-SCNC: 25 MMOL/L (ref 22–31)
CREAT SERPL-MCNC: 1.56 MG/DL (ref 0.7–1.3)
ERYTHROCYTE [DISTWIDTH] IN BLOOD BY AUTOMATED COUNT: 14.5 % (ref 10–15)
GFR SERPL CREATININE-BSD FRML MDRD: 47 ML/MIN/1.73M2
GLUCOSE BLD-MCNC: 428 MG/DL (ref 70–125)
GLUCOSE BLDC GLUCOMTR-MCNC: 387 MG/DL (ref 70–99)
GLUCOSE BLDC GLUCOMTR-MCNC: 424 MG/DL (ref 70–99)
GLUCOSE BLDC GLUCOMTR-MCNC: 442 MG/DL (ref 70–99)
GLUCOSE BLDC GLUCOMTR-MCNC: 445 MG/DL (ref 70–99)
HCT VFR BLD AUTO: 29.7 % (ref 40–53)
HGB BLD-MCNC: 9 G/DL (ref 13.3–17.7)
MAGNESIUM SERPL-MCNC: 1.8 MG/DL (ref 1.8–2.6)
MCH RBC QN AUTO: 29.3 PG (ref 26.5–33)
MCHC RBC AUTO-ENTMCNC: 30.3 G/DL (ref 31.5–36.5)
MCV RBC AUTO: 97 FL (ref 78–100)
PHOSPHATE SERPL-MCNC: 3.2 MG/DL (ref 2.5–4.5)
PLATELET # BLD AUTO: 112 10E3/UL (ref 150–450)
POTASSIUM BLD-SCNC: 5 MMOL/L (ref 3.5–5)
RBC # BLD AUTO: 3.07 10E6/UL (ref 4.4–5.9)
SODIUM SERPL-SCNC: 131 MMOL/L (ref 136–145)
WBC # BLD AUTO: 5.1 10E3/UL (ref 4–11)

## 2022-02-14 PROCEDURE — 250N000013 HC RX MED GY IP 250 OP 250 PS 637: Performed by: SURGERY

## 2022-02-14 PROCEDURE — 97116 GAIT TRAINING THERAPY: CPT | Mod: GP

## 2022-02-14 PROCEDURE — 84100 ASSAY OF PHOSPHORUS: CPT | Performed by: SURGERY

## 2022-02-14 PROCEDURE — 80048 BASIC METABOLIC PNL TOTAL CA: CPT | Performed by: SURGERY

## 2022-02-14 PROCEDURE — 99231 SBSQ HOSP IP/OBS SF/LOW 25: CPT | Performed by: INTERNAL MEDICINE

## 2022-02-14 PROCEDURE — 250N000011 HC RX IP 250 OP 636: Performed by: SURGERY

## 2022-02-14 PROCEDURE — 250N000013 HC RX MED GY IP 250 OP 250 PS 637: Performed by: INTERNAL MEDICINE

## 2022-02-14 PROCEDURE — 36415 COLL VENOUS BLD VENIPUNCTURE: CPT | Performed by: SURGERY

## 2022-02-14 PROCEDURE — 250N000012 HC RX MED GY IP 250 OP 636 PS 637: Performed by: HOSPITALIST

## 2022-02-14 PROCEDURE — 85027 COMPLETE CBC AUTOMATED: CPT | Performed by: SURGERY

## 2022-02-14 PROCEDURE — 99232 SBSQ HOSP IP/OBS MODERATE 35: CPT | Performed by: HOSPITALIST

## 2022-02-14 PROCEDURE — 83735 ASSAY OF MAGNESIUM: CPT | Performed by: SURGERY

## 2022-02-14 RX ORDER — TORSEMIDE 20 MG/1
40 TABLET ORAL DAILY
Status: DISCONTINUED | OUTPATIENT
Start: 2022-02-14 | End: 2022-02-14 | Stop reason: HOSPADM

## 2022-02-14 RX ADMIN — ASPIRIN 81 MG CHEWABLE TABLET 81 MG: 81 TABLET CHEWABLE at 08:03

## 2022-02-14 RX ADMIN — POLYETHYLENE GLYCOL 3350 17 G: 17 POWDER, FOR SOLUTION ORAL at 08:03

## 2022-02-14 RX ADMIN — CARVEDILOL 12.5 MG: 12.5 TABLET, FILM COATED ORAL at 08:03

## 2022-02-14 RX ADMIN — LOSARTAN POTASSIUM 50 MG: 50 TABLET, FILM COATED ORAL at 08:03

## 2022-02-14 RX ADMIN — HEPARIN SODIUM 5000 UNITS: 5000 INJECTION, SOLUTION INTRAVENOUS; SUBCUTANEOUS at 11:38

## 2022-02-14 RX ADMIN — TORSEMIDE 40 MG: 20 TABLET ORAL at 11:37

## 2022-02-14 RX ADMIN — CALCIUM CARBONATE (ANTACID) CHEW TAB 500 MG 1000 MG: 500 CHEW TAB at 08:02

## 2022-02-14 RX ADMIN — HUMAN INSULIN 25 UNITS: 100 INJECTION, SUSPENSION SUBCUTANEOUS at 09:17

## 2022-02-14 RX ADMIN — PANTOPRAZOLE SODIUM 40 MG: 40 TABLET, DELAYED RELEASE ORAL at 08:03

## 2022-02-14 RX ADMIN — GABAPENTIN 100 MG: 100 CAPSULE ORAL at 08:02

## 2022-02-14 RX ADMIN — SENNOSIDES AND DOCUSATE SODIUM 1 TABLET: 50; 8.6 TABLET ORAL at 08:02

## 2022-02-14 RX ADMIN — CLOPIDOGREL BISULFATE 75 MG: 75 TABLET ORAL at 08:03

## 2022-02-14 ASSESSMENT — ACTIVITIES OF DAILY LIVING (ADL)
ADLS_ACUITY_SCORE: 7
ADLS_ACUITY_SCORE: 7
ADLS_ACUITY_SCORE: 6
ADLS_ACUITY_SCORE: 7
ADLS_ACUITY_SCORE: 6
ADLS_ACUITY_SCORE: 7
ADLS_ACUITY_SCORE: 6
ADLS_ACUITY_SCORE: 6
ADLS_ACUITY_SCORE: 7
ADLS_ACUITY_SCORE: 7

## 2022-02-14 NOTE — PROGRESS NOTES
[facility]  Hospitalist Progress Note  [facility]        Date of Service (when I saw the patient): 02/14/2022  Eleazar Hernández,   02/14/2022        Interval History:      Discussed blood sugars at length with patient, given steroids in surgery setting. Discussed plan of care with patient, verbalized understanding.          Assessment and Plan:        71 year old male with medical history of COPD, type II DM, chronic kidney disease, CAD status post four-vessel CABG, ischemic cardiomyopathy s/p ICD, A. fib s/p ablation and GERD status post bilateral common femoral artery endarterectomy and stent placement on bilateral external iliac arteries.  [facility] service consulted for cardiomyopathy and type II DM.     Peripheral vascular disease with critical right lower extremity limb threatening ischemia status post bilateral common femoral artery endarterectomy and stent placement in bilateral external iliac arteries on February 8  - Post OP pain management, DVT prophylaxis per vascular.     Swollen penis and scrotum: Likely due to postoperative period.   - Vascular to go back to OR today per report.   - Consider urology consult.   - Patient states he is able to urinate.      Ischemic cardiomyopathy-CAD s/p CABG x4 A. fib s/p ablation Last echo 10/2021, LVEF 20-25%, not in exacerbation  - Typically on torsemide 40 mg daily, losartan 50 mg daily, carvedilol 12.5 mg twice daily, lipitor 80mg at bedtime, NTG as needed, per report.   - Antiplatelets and anticoagulants per vascular     Hypomagnesemia  - Replace per protocol     Type II DM Hemoglobin A1c 8.8 Has been frequently hypoglycemic here in the 50s and 60s.  Is usually on Lantus 30 units in the mornings and 3 units of NovoLog with breakfast.  He states at home his blood sugars are usually in the 140s or 150s.    - Hold lantus.   - NPH  - Received steroids in OR on 2/13.   - High ISS.      COPD  - home inhalers     GERD  -  PPI     CKD 3-baseline creatinine around 1.3-1.5.  Creatinine trending downward, is 1.47 today.  RADHA  - Nephrology following     Acute blood loss anemia -Hb at baseline around 13-14, noted post op to 10.1  - Monitor Hb for now.     Thrombocytopenia Slowly trending up to 94  - monitor, if stable or improve, continue Heparin; if trending down, might hold Heparin and check HIT labs          Diabetes, type II: last A1C 8.8 % (Ref range: <=5.6 %)       DVT Prophylaxis: Defer to surgery team.      Code: Full Code     Anticipated discharge date: Defer to surgery team.   Length of Stay:  LOS: 5 days /LOS: 5    Text page via [facility] Paging/Directory                   Physical Exam:           Blood pressure 100/52, pulse 60, temperature 98  F (36.7  C), temperature source Oral, resp. rate 16, weight 72.2 kg (159 lb 1.6 oz), SpO2 97 %.    Vital Sign Ranges  Temperature Temp  Av  F (36.7  C)  Min: 97.8  F (36.6  C)  Max: 98.2  F (36.8  C)   Blood pressure Systolic (24hrs), Av , Min:93 , Max:143        Diastolic (24hrs), Av, Min:52, Max:69      Pulse Pulse  Av.4  Min: 55  Max: 77   Respirations Resp  Av.7  Min: 14  Max: 20   Pulse oximetry SpO2  Av.3 %  Min: 92 %  Max: 100 %     Vital Signs with Ranges  Temp:  [97.8  F (36.6  C)-98.2  F (36.8  C)] 98  F (36.7  C)  Pulse:  [55-77] 60  Resp:  [14-20] 16  BP: ()/(52-69) 100/52  SpO2:  [92 %-100 %] 97 %  Temp:  [97.8  F (36.6  C)-98.2  F (36.8  C)] 98  F (36.7  C)  Pulse:  [55-77] 60  Resp:  [14-20] 16  BP: ()/(52-69) 100/52  SpO2:  [92 %-100 %] 97 %    I/O Last 3 Shifts:   I/O last 3 completed shifts:  In: 460 [P.O.:240; I.V.:200; IV Piggyback:20]  Out: 1485 [Urine:1475; Blood:10]    I/O past 24 hours:     Intake/Output Summary (Last 24 hours) at 2022 0731  Last data filed at 2022 0451  Gross per 24 hour   Intake 460 ml   Output 1485 ml   Net -1025 ml       Oxygen  Temp: 98  F (36.7  C) Temp src: Oral BP: 100/52 Pulse: 60   Resp: 16  SpO2: 97 % O2 Device: None (Room air) Oxygen Delivery: 1 LPM  Vitals:    02/09/22 1004   Weight: 72.2 kg (159 lb 1.6 oz)       Lines     GENERAL: NAD. Conversational, appropriate. Pleasant.   HEENT: Normocephalic. No icterus or injection. Nares normal.   LUNGS: Clear to auscultation. No dyspnea at rest.   HEART: Regular rate. Extremities perfused.   ABDOMEN: Soft, nontender, and nondistended. Positive bowel sounds. Swollen penis and scrotum.   NEUROLOGIC: Moves extremities x4, follows commands.          Prior to Admission Medications:        Medications Prior to Admission   Medication Sig Dispense Refill Last Dose     albuterol (PROAIR HFA) 108 (90 Base) MCG/ACT Inhaler Inhale 2 puffs into the lungs every 4 hours as needed for shortness of breath / dyspnea 1 Inhaler 0 2/9/2022 at am     amylase-lipase-protease (CREON 12) 16818-05934-99523 units CPEP Take 2 capsules by mouth 3 times daily (with meals)  120 capsule 0 2/9/2022 at am     aspirin (ASA) 325 MG tablet Take 325 mg by mouth 2 times daily    2/8/2022 at pm     atorvastatin (LIPITOR) 80 MG tablet Take 80 mg by mouth At Bedtime    2/8/2022 at pm     calcium carbonate (OS-MARVA) 500 MG tablet Take 1 tablet by mouth 2 times daily   2/9/2022 at am     calcium carbonate (OS-MARVA) 500 MG tablet Take 2 tablets by mouth daily (with lunch)   2/8/2022 at Unknown time     Carboxymethylcellulose Sod PF (THERATEARS PF) 0.25 % SOLN Apply 1 drop to eye 4 times daily as needed   Unknown at Unknown time     carvedilol (COREG) 6.25 MG tablet Take 2 tablets (12.5 mg) by mouth 2 times daily (with meals) 180 tablet 3 2/9/2022 at am     gabapentin (NEURONTIN) 100 MG capsule Take 200 mg by mouth At Bedtime  1 capsule 0 2/8/2022 at pm     Glycerin (OASIS MOISTURIZING MOUTH SPRAY) 35 % LIQD Take 2 sprays by mouth daily as needed   Unknown at Unknown time     insulin aspart (NOVOLOG PEN) 100 UNIT/ML pen Inject 3 Units Subcutaneous 3 times daily (with meals) 15 mL 1 2/9/2022 at am      insulin glargine (LANTUS PEN) 100 UNIT/ML pen Inject 32 Units Subcutaneous every morning (Patient taking differently: Inject 30 Units Subcutaneous every morning ) 45 mL 1 2/9/2022 at 24 units in AM     Insulin Pen Needle (PEN NEEDLES) 32G X 4 MM MISC    Unknown at Unknown time     loperamide (IMODIUM) 2 MG capsule Take 2 mg by mouth 4 times daily as needed for diarrhea   2/9/2022 at am     losartan (COZAAR) 100 MG tablet Take 50 mg by mouth daily   2/8/2022 at am     magnesium oxide (MAG-OX) 400 (241.3 Mg) MG tablet Take 1 tablet (400 mg) by mouth 4 times daily 120 tablet 1 2/9/2022 at am     metFORMIN (GLUCOPHAGE-XR) 500 MG 24 hr tablet Take 500 mg by mouth 2 times daily (with meals)    2/9/2022 at am     mometasone (ASMANEX TWISTHALER) 220 MCG/INH inhaler Inhale 2 puffs into the lungs every evening    2/8/2022 at pm     nitroGLYcerin (NITROSTAT) 0.4 MG sublingual tablet Place 0.4 mg under the tongue every 5 minutes as needed for chest pain    Unknown at Unknown time     pantoprazole (PROTONIX) 40 MG EC tablet Take 40 mg by mouth 2 times daily  30 tablet  2/9/2022 at am     torsemide (DEMADEX) 20 MG tablet Take 40 mg by mouth daily  30 tablet 4 2/9/2022 at 20 mg this AM     vitamin D3 (CHOLECALCIFEROL) 50 mcg (2000 units) tablet Take 1 tablet by mouth 2 times daily   2/9/2022 at am            Medications:        Current Facility-Administered Medications   Medication Last Rate     Current Facility-Administered Medications   Medication Dose Route Frequency     aspirin  81 mg Oral Daily     atorvastatin  80 mg Oral At Bedtime     calcium carbonate  1,000 mg Oral BID     carvedilol  12.5 mg Oral BID w/meals     clopidogrel  75 mg Oral Daily     gabapentin  100 mg Oral TID     gabapentin  200 mg Oral At Bedtime     heparin ANTICOAGULANT  5,000 Units Subcutaneous Q8H     insulin aspart  1-10 Units Subcutaneous TID AC     insulin aspart  1-7 Units Subcutaneous At Bedtime     insulin NPH  25 Units Subcutaneous Once      losartan  50 mg Oral Daily     mometasone  2 puff Inhalation QPM     pantoprazole  40 mg Oral BID     polyethylene glycol  17 g Oral Daily     senna-docusate  1 tablet Oral BID     sodium chloride (PF)  3 mL Intracatheter Q8H     sodium chloride (PF)  3 mL Intracatheter Q8H     [Held by provider] torsemide  40 mg Oral Daily     Current Facility-Administered Medications   Medication Dose Route Frequency     oxyCODONE  10 mg Oral Q4H PRN    And     acetaminophen  325 mg Oral Q4H PRN     acetaminophen  650 mg Oral Q4H PRN     albuterol  2 puff Inhalation Q4H PRN     glucose  15-30 g Oral Q15 Min PRN    Or     dextrose  25-50 mL Intravenous Q15 Min PRN    Or     glucagon  1 mg Subcutaneous Q15 Min PRN     HYDROmorphone  0.2 mg Intravenous Q2H PRN     HYDROmorphone  2 mg Oral Q3H PRN     lidocaine 4%   Topical Q1H PRN     lidocaine 4%   Topical Q1H PRN     lidocaine (buffered or not buffered)  0.1-1 mL Other Q1H PRN     naloxone  0.2 mg Intravenous Q2 Min PRN    Or     naloxone  0.4 mg Intravenous Q2 Min PRN    Or     naloxone  0.2 mg Intramuscular Q2 Min PRN    Or     naloxone  0.4 mg Intramuscular Q2 Min PRN     nitroGLYcerin  0.4 mg Sublingual Q5 Min PRN     ondansetron  4 mg Oral Q6H PRN    Or     ondansetron  4 mg Intravenous Q6H PRN     oxyCODONE  2.5 mg Oral Q4H PRN    Or     oxyCODONE  5 mg Oral Q4H PRN     sodium chloride (PF)  3 mL Intracatheter q1 min prn     sodium chloride (PF)  3 mL Intracatheter q1 min prn     ___________________________________________________________________________  Medications       aspirin  81 mg Oral Daily     atorvastatin  80 mg Oral At Bedtime     calcium carbonate  1,000 mg Oral BID     carvedilol  12.5 mg Oral BID w/meals     clopidogrel  75 mg Oral Daily     gabapentin  100 mg Oral TID     gabapentin  200 mg Oral At Bedtime     heparin ANTICOAGULANT  5,000 Units Subcutaneous Q8H     insulin aspart  1-10 Units Subcutaneous TID AC     insulin aspart  1-7 Units Subcutaneous At  Bedtime     insulin NPH  25 Units Subcutaneous Once     losartan  50 mg Oral Daily     mometasone  2 puff Inhalation QPM     pantoprazole  40 mg Oral BID     polyethylene glycol  17 g Oral Daily     senna-docusate  1 tablet Oral BID     sodium chloride (PF)  3 mL Intracatheter Q8H     sodium chloride (PF)  3 mL Intracatheter Q8H     [Held by provider] torsemide  40 mg Oral Daily          Data:      Lab data reviewed.     Data   Recent Labs   Lab 02/14/22  0621 02/14/22  0413 02/14/22  0159 02/13/22  1258 02/13/22  1051 02/13/22  0757 02/13/22  0657 02/12/22  0755 02/12/22  0650 02/11/22  0758 02/11/22  0704   WBC 5.1  --   --   --   --   --  6.6  --  8.7  --  10.5   HGB 9.0*  --   --   --   --   --  8.6*  --  10.1*  --  10.5*   MCV 97  --   --   --   --   --  96  --  95  --  96   *  --   --   --   --   --  89*  --  94*  --  90*   *  --   --   --   --   --  133*  --  135*  --  135*   POTASSIUM 5.0  --   --   --  4.9  --  5.2*  --  4.2  --  5.0   CHLORIDE 99  --   --   --   --   --  100  --  100  --  101   CO2 25  --   --   --   --   --  27  --  26  --  25   BUN 48*  --   --   --   --   --  44*  --  39*  --  39*   CR 1.56*  --   --   --   --   --  1.55*  --  1.47*  --  1.85*   ANIONGAP 7  --   --   --   --   --  6  --  9  --  9   MARVA 7.9*  --   --   --   --   --  7.8*  --  8.1*  --  8.1*   * 442* 445*   < >  --    < > 176*   < > 88   < > 165*   ALBUMIN  --   --   --   --   --   --   --   --   --   --  2.7*   PROTTOTAL  --   --   --   --   --   --   --   --   --   --  5.2*   BILITOTAL  --   --   --   --   --   --   --   --   --   --  0.4   ALKPHOS  --   --   --   --   --   --   --   --   --   --  105   ALT  --   --   --   --   --   --   --   --   --   --  <9   AST  --   --   --   --   --   --   --   --   --   --  22    < > = values in this interval not displayed.           Imaging:      Imaging data reviewed.     No results found for this or any previous visit (from the past 24 hour(s)).      Eleazar Hernández DO, MBA, MHA  [facility]  Pager [phone]  Text page via [facility] Paging/Directory

## 2022-02-14 NOTE — TELEPHONE ENCOUNTER
Reason for Call:  Home Health Care    [facility] with [facility] called regarding (reason for call): Please call May - She needs to know if Dr. Hernández will follow patient for home care services.      Orders are needed for this patient.     Pt Provider: Edgar    Phone Number Homecare Nurse can be reached at: [phone]    Can we leave a detailed message on this number? YES    Phone number patient can be reached at: Home number on file [phone] (home)    Best Time: any    Call taken on 2/14/2022 at 1:03 PM by Nora Bueno

## 2022-02-14 NOTE — PLAN OF CARE
Problem: Risk for Delirium  Goal: Improved Sleep  Outcome: Improving     Problem: Pain Acute  Goal: Acceptable Pain Control and Functional Ability  Outcome: Improving     Problem: Adult Inpatient Plan of Care  Goal: Optimal Comfort and Wellbeing  Outcome: Improving     Pt went to pacu for right groin wound exploration, nerve release and closure. Clear liquid bandage in place with small amount of drainage. On 1L currently 98%. VSS. Pt is drowsy but ate some clear liquids for dinner. Denies pain at this time.

## 2022-02-14 NOTE — PLAN OF CARE
Discharge instructions were reviewed with the pt prior to leaving.  Pt is aware that he has a lab appointment on Feb 18th.  No questions/concerns reported from the pt.

## 2022-02-14 NOTE — PLAN OF CARE
Physical Therapy Discharge Summary    Reason for therapy discharge:    Discharged to home with home therapy.    Progress towards therapy goal(s). See goals on Care Plan in [facility] electronic health record for goal details.  Goals partially met.  Barriers to achieving goals:   limited tolerance for therapy and discharge from facility.    Therapy recommendation(s):    Continued therapy is recommended.  Rationale/Recommendations:  home care PT.

## 2022-02-14 NOTE — DISCHARGE SUMMARY
[facility] Discharge Summary    Delbert Tilley MRN# [id number]   Age: 71 year old YOB: 1950     Date of Admission:  2/9/2022  Date of Discharge::  2/14/2022  Admitting Physician:  Ric Chau MD  Discharge Physician:  MD Kandi          Admission Diagnoses:   PAD (peripheral artery disease) (H) [I73.9]          Discharge Diagnosis:   PAD (peripheral artery disease) (H) [I73.9]          Procedures:   02/09/2022  1. Exposure of the bilateral common femoral artery  2. bilateral iliofemoral endarterectomy with bovine patch angioplasty  3. Placement of the catheter in the aorta  4. Aortogram  5. Placement of self-expanding covered stent Viabahn 7 mm x 100 mm followed by 7 mm x 50 mm into right external iliac artery  6. Placement of the self-expanding covered stent Viabahn 7 mm x 100 mm into left common iliac artery  7. Interpretation of radiologic findings  8. bilateral iliofemoral angiogram    02/13/2022  1. Right groin wound exploration, nerve release and closure.              Medications Prior to Admission:     Medications Prior to Admission   Medication Sig Dispense Refill Last Dose     albuterol (PROAIR HFA) 108 (90 Base) MCG/ACT Inhaler Inhale 2 puffs into the lungs every 4 hours as needed for shortness of breath / dyspnea 1 Inhaler 0 2/9/2022 at am     amylase-lipase-protease (CREON 12) 48334-69215-12558 units CPEP Take 2 capsules by mouth 3 times daily (with meals)  120 capsule 0 2/9/2022 at am     aspirin (ASA) 325 MG tablet Take 325 mg by mouth 2 times daily    2/8/2022 at pm     atorvastatin (LIPITOR) 80 MG tablet Take 80 mg by mouth At Bedtime    2/8/2022 at pm     calcium carbonate (OS-MARVA) 500 MG tablet Take 1 tablet by mouth 2 times daily   2/9/2022 at am     calcium carbonate (OS-MARVA) 500 MG tablet Take 2 tablets by mouth daily (with lunch)   2/8/2022 at Unknown time     Carboxymethylcellulose Sod PF (THERATEARS PF) 0.25 % SOLN Apply 1 drop to eye 4 times  daily as needed   Unknown at Unknown time     carvedilol (COREG) 6.25 MG tablet Take 2 tablets (12.5 mg) by mouth 2 times daily (with meals) 180 tablet 3 2/9/2022 at am     gabapentin (NEURONTIN) 100 MG capsule Take 200 mg by mouth At Bedtime  1 capsule 0 2/8/2022 at pm     Glycerin (OASIS MOISTURIZING MOUTH SPRAY) 35 % LIQD Take 2 sprays by mouth daily as needed   Unknown at Unknown time     insulin aspart (NOVOLOG PEN) 100 UNIT/ML pen Inject 3 Units Subcutaneous 3 times daily (with meals) 15 mL 1 2/9/2022 at am     insulin glargine (LANTUS PEN) 100 UNIT/ML pen Inject 32 Units Subcutaneous every morning (Patient taking differently: Inject 30 Units Subcutaneous every morning ) 45 mL 1 2/9/2022 at 24 units in AM     Insulin Pen Needle (PEN NEEDLES) 32G X 4 MM MISC    Unknown at Unknown time     loperamide (IMODIUM) 2 MG capsule Take 2 mg by mouth 4 times daily as needed for diarrhea   2/9/2022 at am     losartan (COZAAR) 100 MG tablet Take 50 mg by mouth daily   2/8/2022 at am     magnesium oxide (MAG-OX) 400 (241.3 Mg) MG tablet Take 1 tablet (400 mg) by mouth 4 times daily 120 tablet 1 2/9/2022 at am     metFORMIN (GLUCOPHAGE-XR) 500 MG 24 hr tablet Take 500 mg by mouth 2 times daily (with meals)    2/9/2022 at am     mometasone (ASMANEX TWISTHALER) 220 MCG/INH inhaler Inhale 2 puffs into the lungs every evening    2/8/2022 at pm     nitroGLYcerin (NITROSTAT) 0.4 MG sublingual tablet Place 0.4 mg under the tongue every 5 minutes as needed for chest pain    Unknown at Unknown time     pantoprazole (PROTONIX) 40 MG EC tablet Take 40 mg by mouth 2 times daily  30 tablet  2/9/2022 at am     torsemide (DEMADEX) 20 MG tablet Take 40 mg by mouth daily  30 tablet 4 2/9/2022 at 20 mg this AM     vitamin D3 (CHOLECALCIFEROL) 50 mcg (2000 units) tablet Take 1 tablet by mouth 2 times daily   2/9/2022 at am             Discharge Medications:     Current Discharge Medication List      CONTINUE these medications which have NOT  CHANGED    Details   albuterol (PROAIR HFA) 108 (90 Base) MCG/ACT Inhaler Inhale 2 puffs into the lungs every 4 hours as needed for shortness of breath / dyspnea  Qty: 1 Inhaler, Refills: 0      amylase-lipase-protease (CREON 12) 13720-60048-88855 units CPEP Take 2 capsules by mouth 3 times daily (with meals)   Qty: 120 capsule, Refills: 0    Associated Diagnoses: Pancreatic disease      aspirin (ASA) 325 MG tablet Take 325 mg by mouth 2 times daily       atorvastatin (LIPITOR) 80 MG tablet Take 80 mg by mouth At Bedtime       !! calcium carbonate (OS-MARVA) 500 MG tablet Take 1 tablet by mouth 2 times daily      !! calcium carbonate (OS-MARVA) 500 MG tablet Take 2 tablets by mouth daily (with lunch)      Carboxymethylcellulose Sod PF (THERATEARS PF) 0.25 % SOLN Apply 1 drop to eye 4 times daily as needed      carvedilol (COREG) 6.25 MG tablet Take 2 tablets (12.5 mg) by mouth 2 times daily (with meals)  Qty: 180 tablet, Refills: 3    Associated Diagnoses: Chronic systolic heart failure (H); Acute on chronic systolic congestive heart failure (H)      gabapentin (NEURONTIN) 100 MG capsule Take 200 mg by mouth At Bedtime   Qty: 1 capsule, Refills: 0      Glycerin (OASIS MOISTURIZING MOUTH SPRAY) 35 % LIQD Take 2 sprays by mouth daily as needed      insulin aspart (NOVOLOG PEN) 100 UNIT/ML pen Inject 3 Units Subcutaneous 3 times daily (with meals)  Qty: 15 mL, Refills: 1    Associated Diagnoses: Type 2 diabetes mellitus with other specified complication, with long-term current use of insulin (H)      insulin glargine (LANTUS PEN) 100 UNIT/ML pen Inject 32 Units Subcutaneous every morning  Qty: 45 mL, Refills: 1    Comments: If Lantus is not covered by insurance, may substitute Basaglar at same dose and frequency.    Associated Diagnoses: Type 2 diabetes mellitus with other specified complication, with long-term current use of insulin (H)      Insulin Pen Needle (PEN NEEDLES) 32G X 4 MM MISC       loperamide (IMODIUM) 2 MG  capsule Take 2 mg by mouth 4 times daily as needed for diarrhea      losartan (COZAAR) 100 MG tablet Take 50 mg by mouth daily      magnesium oxide (MAG-OX) 400 (241.3 Mg) MG tablet Take 1 tablet (400 mg) by mouth 4 times daily  Qty: 120 tablet, Refills: 1    Associated Diagnoses: Hypomagnesemia      metFORMIN (GLUCOPHAGE-XR) 500 MG 24 hr tablet Take 500 mg by mouth 2 times daily (with meals)       mometasone (ASMANEX TWISTHALER) 220 MCG/INH inhaler Inhale 2 puffs into the lungs every evening       nitroGLYcerin (NITROSTAT) 0.4 MG sublingual tablet Place 0.4 mg under the tongue every 5 minutes as needed for chest pain       pantoprazole (PROTONIX) 40 MG EC tablet Take 40 mg by mouth 2 times daily   Qty: 30 tablet      torsemide (DEMADEX) 20 MG tablet Take 40 mg by mouth daily   Qty: 30 tablet, Refills: 4    Associated Diagnoses: Ischemic cardiomyopathy      vitamin D3 (CHOLECALCIFEROL) 50 mcg (2000 units) tablet Take 1 tablet by mouth 2 times daily       !! - Potential duplicate medications found. Please discuss with provider.                Consultations:   1.  Hospitalist medicine          Brief History of Illness:   71-year-old male with known history of significant peripheral arterial disease who was seen in vascular surgery clinic for evaluation of bilateral lower extremity lifestyle limiting claudication.  Patient has failed conservative measures.  Preoperative vascular studies were significant for moderate to severe peripheral arterial disease.  CT scan did show significant disease in bilateral common femoral arteries with occlusion of the right external iliac artery and severe stenosis in the left external iliac artery.  Given these findings we decided to proceed with bilateral iliofemoral endarterectomies with retrograde iliac intervention.  Risk and benefits of this operation were explained patient agreed to proceed.  Preoperative work-up was performed, patient was cleared by cardiology.           [facility]  Course:   Patient is postop course was complicated by significant right groin pain concerning for lateral femoral cutaneous nerve entrapment.  Patient returned to the operating room on postop day 4 for right groin wound exploration and revision with release of lateral femoral cutaneous nerve.  Patient had significant pain relief following this.  Patient's postop course was additionally complicated by intermittent episodes of hypoglycemia.    Patient's blood sugars were not well controlled but by sending patient home and restarting patient's home regimen, thought was that patient would have better blood sugar control.  Patient was discharged on postoperative day 5.  On day of discharge, patient's pain was well controlled.  Patient was tolerating regular diet.  Patient's vital signs were stable.  Patient's groin incisions were clean dry and intact without any evidence of infection.  Patient to follow-up in 1 4 weeks with repeat noninvasive studies.          Discharge Instructions and Follow-Up:   Discharge diet: Regular   Discharge activity: Activity as tolerated   Discharge follow-up: Follow up with Dr. Chau in 4 weeks   Wound care: May get incision wet in shower but do not soak or scrub           Discharge Disposition:   Discharged to home        Terrell Dalal MD

## 2022-02-14 NOTE — PROGRESS NOTES
Care Management Discharge Note    Discharge Date: 02/14/2022       Discharge Disposition: Home,Home Care    Discharge Services: None    Discharge DME:  Per therapy    Discharge Transportation: family or friend will provide    Private pay costs discussed: Not applicable    Education Provided on the Discharge Plan:  yes  Persons Notified of Discharge Plans: patient  Patient/Family in Agreement with the Plan: yes    Handoff Referral Completed: yes    Additional Information:  REHAN nguyen called [facility]. to follow up on referral sent yesterday. [facility]. confirmed that Pt could be accepted for PT and OT services. Discharge orders were faxed to agency.    Intern introduced self and CM services to Pt. Pt will discharge home today. Intern informed Pt that home care services were set up and the agency will call Pt for scheduling in the next 48 hours. Family to transport. No further CM needs desired or anticipated.      REHAN Tang Intern

## 2022-02-14 NOTE — PLAN OF CARE
Problem: Diabetes Comorbidity  Goal: Blood Glucose Level Within Targeted Range  Outcome: Declining     Vitally stable, denies pain, ambulating hallway independently using a walker, pedal pulses audible with doppler.  Blood sugar elevated- 445, Dr Bang notified, order placed for Novolog 7 units and recheck in 1 hour.  Recheck BS-442, Dr. Bang ordered an additional 10 units of Novolog and recheck after 1 hour.  Awaiting results.

## 2022-02-14 NOTE — PROGRESS NOTES
Renal progress note  CC:RADHA  Assessment and Plan:  71 year old male with hx of CKD 3 , hx of COPD , CAD sp CABG, ICM sp ICD EF 20%, Afib sp ablation, Hx of PAD sp bl common fem a endarterectomy and stent placement in bl iliac art 2/8/2022, postop creatinine noted to be increasing today in the setting of hypotension yesterday with ARB and diuretics on board for management of his CAD and ICM.  Nephrology consulted for RADHA     Nonoliguric RADHA  Baseline CKD 3, baseline creatinine is around 1.3-1.5 with a GFR around 50  CKD likely secondary to poorly controlled diabetes with significant microvascular disease and possible renovascular disease nephrosclerosis  UA with proteinuria for more than 20 years  Urine proteins 1.04g/g  Renal imaging from January 2021 with about 9 cm right and 10 cm left kidney without any other significant findings except mild left pelviectasis without hydronephrosis, CTA from November 2021 with good flows to bilateral kidneys except for one of the 2 right renal artery with significant stenosis  RADHA appears more secondary to hemodynamic specially in the setting of hypotension yesterday with blood pressures down to 80s also received contrast (75cc??)/ATN  Creatinine peaked at 1.8 and back to baseline currently  Recs  --Losartan resumed, can resume torsemide as well  --okay for discharge today, can f/u with Dr. Cueva as an outpatient     Mild hyponatremia  Mild hyperkalemia  Hypomagnesemia acute on chronic  Monitor on labs  Urine sodium elevated to 78 suggestive of likely tubular injury at this time with significant hypotension and contrast exposure  --Okay to resume diuretics, may be mildly hypervolemic  --BMP with his PCP after discharge     Hypertension  On carvedilol losartan and torsemide at home prior to admission  Okay to continue carvedilol and losartan   Resume torsemide     Anemia  ACD/inflamm  Stable today  Follow on daily labs     Thrombocytopenia  ??  Heparin  Trending back  up     PAD with critical lower extremity ischemia  S/p bilateral common femoral artery endarterectomy and stent placement bilateral external iliac arteries  Pain on left side  S/p nerve entrapment release with much improvement       Ischemic cardiomyopathy  Coronary artery disease s/p CABG x4  Atrial fibrillation s/p ablation  Prior to admission has been on torsemide 40 losartan 100 carvedilol 12.5 mg twice a day  Carvedilol continued,  resume losartan 2/12  Resume diuretics     Diabetes  On metformin  Management per hospitalist medicine currently on insulin     COPD  Stable  On home inhalers       Thank you for the consultation we will follow    [facility]  [facility]  [phone]      Subjective  Feels a ton better since the operation.  Legs feel a bit swollen with torsemide held the last few days.  Discussed that he can take it today.  Kidney function stable.      He needs to continue to f/u with Dr. Cueva as an outpatient.    Objective    Vital signs in last 24 hours  Temp:  [97.8  F (36.6  C)-98.2  F (36.8  C)] 98.1  F (36.7  C)  Pulse:  [55-73] 72  Resp:  [14-20] 17  BP: ()/(52-67) 127/62  SpO2:  [92 %-100 %] 97 %  Weight:   [unfilled]    Intake/Output last 3 shifts  I/O last 3 completed shifts:  In: 460 [P.O.:240; I.V.:200; IV Piggyback:20]  Out: 1485 [Urine:1475; Blood:10]  Intake/Output this shift:  No intake/output data recorded.    Physical Exam  Alert/oriented x 3, awake, NAD  CV: RRR without murmur or rub  Lung: clear and equal; no extra sounds  Ab: soft and NT; not distended; normal bs  Ext: trace edema, surgical site bandaged  Skin; no rash    Pertinent Labs   Lab Results   Component Value Date    WBC 5.1 02/14/2022    HGB 9.0 (L) 02/14/2022    HCT 29.7 (L) 02/14/2022    MCV 97 02/14/2022     (L) 02/14/2022     Lab Results   Component Value Date    BUN 48 (H) 02/14/2022     (L) 02/14/2022    CO2 25 02/14/2022       Lab Results   Component Value Date     ALBUMIN 2.7 (L) 02/11/2022     Lab Results   Component Value Date    PHOS 3.2 02/14/2022     I reviewed all lab results

## 2022-02-14 NOTE — PROGRESS NOTES
Postop day 5 from bilateral common femoral artery endarterectomy and bilateral iliac stenting.  Postop course was complicated by concern for femoral cutaneous nerve entrapment requiring return to the operating room on postop day 4 for right groin wound revision and release of femoral cutaneous nerve.     Patient with significant relief following surgery yesterday.  Reports that he is able to move his right leg without any issues.  Still with persistent episodes of hyperglycemia.     Vital signs stable  Palpable femoral pulses bilaterally  Groin incisions with Dermabond in place; no evidence of any infection; clean dry and intact     Labs reviewed  Creatinine 1.56  Hemoglobin 9.0     Postoperative course complicated by potential femoral cutaneous nerve entrapment causing significant radiating pain in the right groin requiring return to the operating room on postop day 4 for right groin wound revision and release of femoral cutaneous nerve.       Plan discharge today with follow-up in 4 weeks with repeat noninvasive studies     Discussed with Dr. Chau, who is in agreement with the above plan.     Terrell Dalal MD  Vascular Surgery Fellow

## 2022-02-14 NOTE — DISCHARGE INSTRUCTIONS
[facility] line for help with finding Meals on Wheels- [phone]    Home care services have been arranged for you:  Home Care Agency: [facility].  Home Care Services: PT and OT  Home Care Phone: [phone]    Instructions: Home care agency will call within 48 hours of discharge to schedule appointments.

## 2022-02-14 NOTE — ANESTHESIA POSTPROCEDURE EVALUATION
Patient: Delbert Tilley    Procedure: Procedure(s):  Right groin wound exploration, nerve release and closure.       Diagnosis:PAD (peripheral artery disease) (H) [I73.9]  Diagnosis Additional Information: No value filed.    Anesthesia Type:  MAC    Note:  Disposition: Inpatient   Postop Pain Control: Uneventful            Sign Out: Well controlled pain   PONV: No   Neuro/Psych: Uneventful            Sign Out: Acceptable/Baseline neuro status   Airway/Respiratory: Uneventful            Sign Out: Acceptable/Baseline resp. status   CV/Hemodynamics: Uneventful            Sign Out: Acceptable CV status; No obvious hypovolemia; No obvious fluid overload   Other NRE: NONE   DID A NON-ROUTINE EVENT OCCUR? No           Last vitals:  Vitals Value Taken Time   BP 94/55 02/13/22 1745   Temp     Pulse 55 02/13/22 1745   Resp     SpO2 100 % 02/13/22 1745       Electronically Signed By: Bethel Brandon MD  February 13, 2022  6:15 PM

## 2022-02-14 NOTE — TELEPHONE ENCOUNTER
Called and left a VM on May's confidential VM with the Providers message below. Left call back number if May's has more questions.     Evonne London RN BSN

## 2022-02-15 ENCOUNTER — PATIENT OUTREACH (OUTPATIENT)
Dept: CARE COORDINATION | Facility: CLINIC | Age: 72
End: 2022-02-15
Payer: COMMERCIAL

## 2022-02-15 DIAGNOSIS — Z71.89 OTHER SPECIFIED COUNSELING: ICD-10-CM

## 2022-02-15 NOTE — PROGRESS NOTES
Clinic Care Coordination Contact  [facility]/Voicemail    Clinical Data: Care Coordinator Outreach  Outreach attempted x 1. Left message on patient's voicemail with call back information and requested return call.     Plan:Care Coordinator will try to reach patient again in 1-2 business days.    DENISA Lancaster  [phone]  [facility]

## 2022-02-16 ENCOUNTER — NURSE TRIAGE (OUTPATIENT)
Dept: NURSING | Facility: CLINIC | Age: 72
End: 2022-02-16
Payer: COMMERCIAL

## 2022-02-16 NOTE — PROGRESS NOTES
Clinic Care Coordination Contact  [facility]: Post-Discharge Note  SITUATION                                                      Admission:    Admission Date: 02/09/22   Reason for Admission: peripheral artery disease  Discharge:   Discharge Date: 02/14/22  Discharge Diagnosis: peripheral artery disease    BACKGROUND                                                      Per hospital discharge summary and inpatient provider notes:  71-year-old male with known history of significant peripheral arterial disease who was seen in vascular surgery clinic for evaluation of bilateral lower extremity lifestyle limiting claudication.  Patient has failed conservative measures.  Preoperative vascular studies were significant for moderate to severe peripheral arterial disease.  CT scan did show significant disease in bilateral common femoral arteries with occlusion of the right external iliac artery and severe stenosis in the left external iliac artery.  Given these findings we decided to proceed with bilateral iliofemoral endarterectomies with retrograde iliac intervention.  Risk and benefits of this operation were explained patient agreed to proceed.  Preoperative work-up was performed, patient was cleared by cardiology.           Hospital Course:   Patient is postop course was complicated by significant right groin pain concerning for lateral femoral cutaneous nerve entrapment.  Patient returned to the operating room on postop day 4 for right groin wound exploration and revision with release of lateral femoral cutaneous nerve.  Patient had significant pain relief following this.  Patient's postop course was additionally complicated by intermittent episodes of hypoglycemia.    Patient's blood sugars were not well controlled but by sending patient home and restarting patient's home regimen, thought was that patient would have better blood sugar control.  Patient was discharged on postoperative day 5.  On day of discharge,  "patient's pain was well controlled.  Patient was tolerating regular diet.  Patient's vital signs were stable.  Patient's groin incisions were clean dry and intact without any evidence of infection.  Patient to follow-up in 1 4 weeks with repeat noninvasive studies.      ASSESSMENT      Enrollment  Primary Care Care Coordination Status: Declined    Discharge Assessment  How are you doing now that you are home?: \" Not to well \"  How are your symptoms? (Red Flag symptoms escalate to triage hotline per guidelines): Worsening (Called Triage and he will be seen tomorrow @ 7:30)  Do you feel your condition is stable enough to be safe at home until your provider visit?: Yes  Does the patient have their discharge instructions? : Yes  Does the patient have questions regarding their discharge instructions? : No  Were you started on any new medications or were there changes to any of your previous medications? : No  Does the patient have all of their medications?: Yes  Do you have questions regarding any of your medications? : No  Do you have all of your needed medical supplies or equipment (DME)?  (i.e. oxygen tank, CPAP, cane, etc.): Yes  Discharge follow-up appointment scheduled within 14 calendar days? : No  Discharge Follow Up Appointment Date: 02/17/22  Discharge Follow Up Appointment Scheduled with?: Primary Care Provider  Is patient agreeable to assistance with scheduling? : Yes (Made appointment for 02- @ 7:15 arrival)    Post-op (W CTA Only)  If the patient had a surgery or procedure, do they have any questions for a nurse?: No              PLAN                                                      Outpatient Plan:    Discharge diet: Regular   Discharge activity: Activity as tolerated   Discharge follow-up: Follow up with Dr. Chau in 4 weeks   Wound care: May get incision wet in shower but do not soak or scrub          Future Appointments   Date Time Provider Department Center   2/17/2022  7:30 AM Hugo, " Samina BENAVIDEZ DO CLCL [facility]   2/18/2022  7:00 AM CL LAB C[facility]ABR [facility]   3/1/2022 10:30 AM [facility]3 [facility]   3/1/2022 11:20 AM [facility]1 [facility]   3/1/2022 11:40 AM Ric Chau MD [facility]   4/5/2022 12:00 AM GALAVIZ DCR2 SU[facility]C UMP PSA [facility]   4/8/2022  8:30 AM WY LAB [facility]   4/8/2022  9:30 AM Buddy Bello MD W[facility]HT [facility] PSA CLIN   6/14/2022 10:00 AM [facility]2 [facility]   6/14/2022 10:45 AM W[facility]2 [facility]   6/14/2022 12:00 PM [facility]         For any urgent concerns, please contact our 24 hour nurse triage line: [phone])         Lin Ma MA

## 2022-02-16 NOTE — TELEPHONE ENCOUNTER
Surgery with a vascular surgery   On the 9th of Feb . And had to redo the surgery on 02/13.    Patient states he has a lot of water weight ,  In both legs,after surgery, and he states his   Legs are so swollen, they are rubbing together.    Patient states he takes medication for heart,  Ischemic Cardiomyopathy  And he was taken off his water pill during his surgery and before his surgery.  He is also not very active, and has been recovering from vascular surgery.    An Appointment was made for patient for 02/17/2022 at 0730 AM @ [facility].    Yeimi Tavares, RN   Care Connection Triage/refill nurse  .       Reason for Disposition    [1] New symptom AND [2] not a possible complication of hospitalized condition    Medication question    [1] Caller has medication question about med not prescribed by PCP AND [2] triager unable to answer question (e.g., compatibility with other med, storage)    Additional Information    Negative: Drug overdose and triager unable to answer question    Negative: Caller requesting information unrelated to medicine    Negative: Caller requesting a prescription for Strep throat and has a positive culture result    Negative: Rash while taking a medication or within 3 days of stopping it    Negative: Immunization reaction suspected    Negative: [1] Asthma and [2] having symptoms of asthma (cough, wheezing, etc.)    Negative: [1] Influenza symptoms AND [2] anti-viral med prescription request, such as Tamiflu    Negative: [1] Symptom of illness (e.g., headache, abdominal pain, earache, vomiting) AND [2] more than mild    Negative: MORE THAN A DOUBLE DOSE of a prescription or over-the-counter (OTC) drug    Negative: [1] DOUBLE DOSE (an extra dose or lesser amount) of over-the-counter (OTC) drug AND [2] any symptoms (e.g., dizziness, nausea, pain, sleepiness)    Negative: [1] DOUBLE DOSE (an extra dose or lesser amount) of prescription drug AND [2] any symptoms  "(e.g., dizziness, nausea, pain, sleepiness)    Negative: Took another person's prescription drug    Negative: [1] DOUBLE DOSE (an extra dose or lesser amount) of prescription drug AND [2] NO symptoms (Exception: a double dose of antibiotics)    Negative: Diabetes drug error or overdose (e.g., took wrong type of insulin or took extra dose)    Negative: [1] Request for URGENT new prescription or refill of \"essential\" medication (i.e., likelihood of harm to patient if not taken) AND [2] triager unable to fill per unit policy    Negative: [1] Prescription not at pharmacy AND [2] was prescribed by PCP recently    Negative: [1] Pharmacy calling with prescription questions AND [2] triager unable to answer question    Negative: [1] Caller has URGENT medication question about med that PCP or specialist prescribed AND [2] triager unable to answer question    Negative: [1] Caller has NON-URGENT medication question about med that PCP prescribed AND [2] triager unable to answer question    Negative: [1] Caller requesting a NON-URGENT new prescription or refill AND [2] triager unable to refill per unit policy    Protocols used: POST-HOSPITALIZATION FOLLOW-UP CALL-A-, MEDICATION QUESTION CALL-A-      "

## 2022-02-17 ENCOUNTER — OFFICE VISIT (OUTPATIENT)
Dept: FAMILY MEDICINE | Facility: CLINIC | Age: 72
End: 2022-02-17
Payer: COMMERCIAL

## 2022-02-17 ENCOUNTER — TELEPHONE (OUTPATIENT)
Dept: FAMILY MEDICINE | Facility: CLINIC | Age: 72
End: 2022-02-17

## 2022-02-17 VITALS
RESPIRATION RATE: 16 BRPM | TEMPERATURE: 97.6 F | SYSTOLIC BLOOD PRESSURE: 116 MMHG | HEIGHT: 70 IN | BODY MASS INDEX: 24.12 KG/M2 | WEIGHT: 168.5 LBS | OXYGEN SATURATION: 98 % | HEART RATE: 68 BPM | DIASTOLIC BLOOD PRESSURE: 64 MMHG

## 2022-02-17 DIAGNOSIS — Z79.4 DIABETES MELLITUS DUE TO UNDERLYING CONDITION WITH HYPERGLYCEMIA, WITH LONG-TERM CURRENT USE OF INSULIN (H): ICD-10-CM

## 2022-02-17 DIAGNOSIS — E08.65 DIABETES MELLITUS DUE TO UNDERLYING CONDITION WITH HYPERGLYCEMIA, WITH LONG-TERM CURRENT USE OF INSULIN (H): ICD-10-CM

## 2022-02-17 DIAGNOSIS — I73.9 PAD (PERIPHERAL ARTERY DISEASE) (H): ICD-10-CM

## 2022-02-17 DIAGNOSIS — R60.0 PERIPHERAL EDEMA: Primary | ICD-10-CM

## 2022-02-17 DIAGNOSIS — N18.32 STAGE 3B CHRONIC KIDNEY DISEASE (H): ICD-10-CM

## 2022-02-17 LAB
ALBUMIN SERPL-MCNC: 2.7 G/DL (ref 3.4–5)
ANION GAP SERPL CALCULATED.3IONS-SCNC: 5 MMOL/L (ref 3–14)
BUN SERPL-MCNC: 59 MG/DL (ref 7–30)
CALCIUM SERPL-MCNC: 8.9 MG/DL (ref 8.5–10.1)
CHLORIDE BLD-SCNC: 100 MMOL/L (ref 94–109)
CO2 SERPL-SCNC: 28 MMOL/L (ref 20–32)
CREAT SERPL-MCNC: 1.69 MG/DL (ref 0.66–1.25)
CREAT UR-MCNC: 15 MG/DL
GFR SERPL CREATININE-BSD FRML MDRD: 42 ML/MIN/1.73M2
GLUCOSE BLD-MCNC: 198 MG/DL (ref 70–99)
PHOSPHATE SERPL-MCNC: 3.3 MG/DL (ref 2.5–4.5)
POTASSIUM BLD-SCNC: 4.8 MMOL/L (ref 3.4–5.3)
PROT UR-MCNC: 0.3 G/L
PROT/CREAT 24H UR: 2 G/G CR (ref 0–0.2)
SODIUM SERPL-SCNC: 133 MMOL/L (ref 133–144)

## 2022-02-17 PROCEDURE — 80069 RENAL FUNCTION PANEL: CPT | Performed by: FAMILY MEDICINE

## 2022-02-17 PROCEDURE — 84156 ASSAY OF PROTEIN URINE: CPT | Performed by: FAMILY MEDICINE

## 2022-02-17 PROCEDURE — 36415 COLL VENOUS BLD VENIPUNCTURE: CPT | Performed by: FAMILY MEDICINE

## 2022-02-17 PROCEDURE — 99215 OFFICE O/P EST HI 40 MIN: CPT | Performed by: FAMILY MEDICINE

## 2022-02-17 ASSESSMENT — PAIN SCALES - GENERAL: PAINLEVEL: MODERATE PAIN (5)

## 2022-02-17 NOTE — PATIENT INSTRUCTIONS
-Get home PT set up  -We will work with your nephrologist to help titrate your diuretics  -Referral for lymphedema PT today  -Continue to watch your blood sugars

## 2022-02-17 NOTE — LETTER
"My Heart Failure Action Plan   Name: Delbert Tilley    YOB: 1950   Date: 2/17/2022    My doctor: Danii Hernández [facility]     [id number] [address] [address] [phone]  My Diagnosis: {HF STAGES:514084}   My Exercise Goal: 30 minutes daily  .     My Weight Plan:   Wt Readings from Last 2 Encounters:   02/09/22 72.2 kg (159 lb 1.6 oz)   02/02/22 74.3 kg (163 lb 11.2 oz)     Weigh yourself daily using the same scale. If you gain more than 2 pounds in 24 hours or 5 pounds in a week {HF WEIGHT GOAL:226217}    My Diet Goal: { :223959::\"No added salt\"}    Emergency Room Visits:    Our goal is to improve your quality of life and help you avoid a visit to the emergency room or hospital.  If we work together, we can achieve this goal. But, if you feel you need to call 911 or go to the emergency room, please do so.  If you go to the emergency room, please bring your list of medicines and your daily weight chart with you.       GREEN ZONE     Doing well today    Weight gained is no more than 2 pounds a day or 5 pounds a week.    No swelling in feet, ankles, legs or stomach.    No more swelling than usual.    No more trouble breathing than usual.    No change in my sleep.    No other problems. Actions:    I am doing fine.  I will take my medicine, follow my diet, see my doctor, exercise, and watch for symptoms.           YELLOW ZONE         Having a bad day or flare up    Weight gain of more than 2 pounds in one day or 5 pounds in one week.    New swelling in ankle, leg, knee or thigh.    Bloating in belly, pants feel tighter.    Swelling in hands or face.    Coughing or trouble breathing while walking or talking.    Harder to breathe last night.    Have trouble sleeping, wake up short of breath.    Much more tired than usual.    Not eating.    Pain in my chest or bad leg cramps.    Feel weak or dizzy. Actions:    I need to take action and call my doctor or nurse " today.                 RED ZONE         Need medical care now    Weight gain of 5 pounds overnight.    Chest pain or pressure that does not go away.    Feel less alert.    Wheezing or have trouble breathing when at rest.    Cannot sleep lying down.    Cannot take my water pill.    Pass out or faint. Actions:    I need to call my doctor or nurse now!    Call 911 if I have chest pain or cannot breathe.

## 2022-02-17 NOTE — PROGRESS NOTES
Assessment & Plan     Peripheral edema  I do think patient's significant edema is likely multifactorial, did receive significant amount of fluids in the hospital after surgery/with surgery and has been bedbound in the hospital.  He was held on his diuretics in losartan due to acute kidney injury after surgery.  He was restarted on his diuretics at discharge.      Patient has already increased his torsemide on his own, went from 20 mg in a.m. and 40 mg in p.m. to 40 mg twice daily.  I did encourage patient to elevate his legs and use compression stockings as able.  Was given Tubigrip in clinic today.  I did place referral for lymphedema physical therapy.    I would like to get the results of his renal panel before making any additional adjustments to his diuretic.  We may need to work closely with his nephrologist to come up with a plan.  - Home Care Referral    Diabetes mellitus due to underlying condition with hyperglycemia, with long-term current use of insulin (H)   Patient is already increased his NovoLog on his own from once a day to 3 times a day.  I did discuss that after undergoing stressful procedure can have increased in blood sugars, however it is imperative to have good blood sugar control for wound healing.  We will plan to monitor blood sugars closely.  He is to let me know if he has any episodes of hypoglycemia.  Continue with 32 units of Lantus in the morning and 4 units NovoLog 3 times a day will with meals.     Stage 3b chronic kidney disease (H)  Follows with nephrology, planning to recheck BMP today    PAD (peripheral artery disease) (H)  Status post stenting. Was on plavix in hospital, however I do not see on patients discharge med list. I will plan to reach out to vascualr surgeon to see if he is to begin this.    60 minutes spent on the date of the encounter doing chart review, interpretation of tests, patient visit, documentation and discussion with other provider(s)     Return in about 2  "weeks (around 3/3/2022).    JODY TODD DO  [facility]    Payam Lundy is a 71 year old who presents for the following health issues  accompanied by his daughter.    History of Present Illness     Reason for visit:  Post surgery complications  Symptom onset:  3-7 days ago  Symptoms include:  Pain in surgery area water weight and high blood sugar  Symptom intensity:  Severe  Symptom progression:  Worsening  Had these symptoms before:  No  What makes it worse:  Standing sitting walking  What makes it better:  Sleeping    He eats 2-3 servings of fruits and vegetables daily.He consumes 1 sweetened beverage(s) daily.He exercises with enough effort to increase his heart rate 9 or less minutes per day.  He exercises with enough effort to increase his heart rate 3 or less days per week.   He is taking medications regularly.       Post Discharge Outreach 2/16/2022   Admission Date 2/9/2022   Reason for Admission peripheral artery disease   Discharge Date 2/14/2022   Discharge Diagnosis peripheral artery disease   How are you doing now that you are home? \" Not to well \"   How are your symptoms? (Red Flag symptoms escalate to triage hotline per guidelines) Worsening   Do you feel your condition is stable enough to be safe at home until your provider visit? Yes   Does the patient have their discharge instructions?  Yes   Does the patient have questions regarding their discharge instructions?  No   Were you started on any new medications or were there changes to any of your previous medications?  No   Does the patient have all of their medications? Yes   Do you have questions regarding any of your medications?  No   Do you have all of your needed medical supplies or equipment (DME)?  (i.e. oxygen tank, CPAP, cane, etc.) Yes   Discharge follow-up appointment scheduled within 14 calendar days?  No   Discharge Follow Up Appointment Date 2/17/2022   Discharge Follow Up Appointment Scheduled " with? Primary Care Provider     Hospital Follow-up Visit:    Hospital/Nursing Home/IP Rehab Facility: [facility]  Date of Admission: 2/9/2022  Date of Discharge: 2/14/2022  Reason(s) for Admission: PAD      Was your hospitalization related to COVID-19? No   Problems taking medications regularly:  None  Medication changes since discharge: None  Problems adhering to non-medication therapy:  None    Summary of hospitalization:  [facility] discharge summary reviewed  Diagnostic Tests/Treatments reviewed.  Follow up needed: Has follow up with vascular surgeona nd cardiology, nephrology  Other Healthcare Providers Involved in Patient s Care:         Care Coordination, Surgical follow-up appointment - 3/1/22 and Physical Therapy  Update since discharge: fluctuating course.  Post Discharge Medication Reconciliation: discharge medications reconciled, continue medications without change.  Plan of care communicated with patient and family       Patient was hospitalized for vascular surgery for peripheral artery disease.  Postoperative period was complicated by right common femoral nerve entrapment requiring additional surgery for release.  Patient did have an RADHA on CKD 3, thought to be due to postoperative hypotension and possibly contrast induced.  He was held on his diuretics for couple days within the hospital.  Patient was discharged on all his home medications.  His postoperative.  Was also complicated by intermittent episodes of hypoglycemia.    Since being home patient has had increase in lower extremity edema, he notices fluid other up to his inner thighs.  These are causing his legs to have together and is irritating to him.  Typically takes his torsemide 20 mg in the morning and 40 mg in the evening.  Since his increase of swelling for the last 2 days he has increased it to 40 mg in the morning and 40 mg in the evening.    His blood sugars have been running in the 300s  "since being home.  Prior to surgery he was taking Lantus 30 units in the morning and 4 units with breakfast of NovoLog.  Now since being home he is taking 32 units of Lantus in the morning and 4 units of NovoLog 3 times a day.  Despite this his blood sugars remain elevated in 300s.  He denies episodes of hypoglycemia.    Daughter is here helping patient recover after surgery.  They have not got him scheduled for home physical therapy yet.    Has bilateral groin incisions, right is more swollen than left.  This has been that way since leaving the hospital.  Denies increase in drainage or surrounding erythema.  Both are painful, again right more so than left.      Review of Systems   Constitutional, HEENT, cardiovascular, pulmonary, gi and gu systems are negative, except as otherwise noted.      Objective    /64 (BP Location: Right arm, Patient Position: Chair, Cuff Size: Adult Regular)   Pulse 68   Temp 97.6  F (36.4  C) (Tympanic)   Resp 16   Ht 1.778 m (5' 10\")   Wt 76.4 kg (168 lb 8 oz)   SpO2 98%   BMI 24.18 kg/m    Body mass index is 24.18 kg/m .  Physical Exam  Constitutional:       General: He is not in acute distress.     Appearance: He is well-developed.      Comments: Walks with walker   HENT:      Right Ear: Tympanic membrane and external ear normal.      Left Ear: Tympanic membrane and external ear normal.      Nose: Nose normal.      Mouth/Throat:      Mouth: No oral lesions.      Pharynx: No oropharyngeal exudate.   Eyes:      General:         Right eye: No discharge.         Left eye: No discharge.      Conjunctiva/sclera: Conjunctivae normal.      Pupils: Pupils are equal, round, and reactive to light.   Neck:      Thyroid: No thyromegaly.      Trachea: No tracheal deviation.   Cardiovascular:      Rate and Rhythm: Normal rate and regular rhythm.      Pulses: Normal pulses.      Heart sounds: Normal heart sounds, S1 normal and S2 normal. No murmur heard.    No S3 or S4 sounds. "   Pulmonary:      Effort: Pulmonary effort is normal. No respiratory distress.      Breath sounds: Normal breath sounds. No wheezing or rales.   Abdominal:      General: Bowel sounds are normal.      Palpations: Abdomen is soft. There is no mass.      Tenderness: There is no abdominal tenderness.   Musculoskeletal:         General: No deformity.      Cervical back: Neck supple.      Right lower leg: Edema present.      Left lower leg: Edema present.      Comments: 2+ pitting edema to bilateral shins, dependent edema over bilateral thighs   Lymphadenopathy:      Cervical: No cervical adenopathy.   Skin:     Findings: Lesion present. No rash.      Comments: Bruising over abdomen,  Bilateral groin incisions, right more swollen than left, no visible drainage   Neurological:      Mental Status: He is alert and oriented to person, place, and time.      Motor: No abnormal muscle tone.      Deep Tendon Reflexes: Reflexes are normal and symmetric.   Psychiatric:         Speech: Speech normal.         Thought Content: Thought content normal.         Judgment: Judgment normal.

## 2022-02-17 NOTE — TELEPHONE ENCOUNTER
Dr Arias,     you had seen this patient today, incoming call from Margarita, [facility] physical therapist, states patient is not considered home bound since he is driving and does not qualify for in home PT, would you like a new referral placed for Outpatient Physical Therapy?     Margarita with any questions [phone]  OK

## 2022-02-18 ENCOUNTER — TELEPHONE (OUTPATIENT)
Dept: FAMILY MEDICINE | Facility: CLINIC | Age: 72
End: 2022-02-18
Payer: COMMERCIAL

## 2022-02-18 DIAGNOSIS — I73.9 PAD (PERIPHERAL ARTERY DISEASE) (H): ICD-10-CM

## 2022-02-18 DIAGNOSIS — R60.0 PERIPHERAL EDEMA: Primary | ICD-10-CM

## 2022-02-18 RX ORDER — CLOPIDOGREL BISULFATE 75 MG/1
75 TABLET ORAL DAILY
Qty: 30 TABLET | Refills: 3 | Status: SHIPPED | OUTPATIENT
Start: 2022-02-18 | End: 2022-01-01

## 2022-02-18 NOTE — TELEPHONE ENCOUNTER
"I saw patient for post op appt on 2/17/22 after stenting for PAD. Patient was discharged on 325mg aspirin. I did message his surgeon who confirmed he should also be on plavix, see below:    \"Terrell Dalal MD Moore, Samina BENAVIDEZ DO  Thank you for the message Dr. Todd! He should be on both aspirin and plavix. Thank you for clarifying. He should be on plavix 75 mg every day in addition to the full dose aspirin.     Thanks. \"    Script sent to [facility] for plavix. He has follow up with vascular surgery in 2 weeks.     -Spoke to patient to update. Reports his swelling has improved some. Discussed his creatinine did increase slightly since discharge. I did recommend for him to stay on his prescribed dose of torsemide (20mg in the am, 40mg in the PM) and not to double morning dose like he was doing.     -Rewrote lymphedema PT script for in person as I received message they are not able to go to his home.     SAMINA TODD, DO        "

## 2022-02-21 NOTE — TELEPHONE ENCOUNTER
Any chance that if we call PT that he could be moved up? They denied my initial script for in home lymphedema PT. Thanks for help!     Samina Arias           Routed to [facility] PT, at MD's request to see if pt can get in any sooner.

## 2022-02-21 NOTE — TELEPHONE ENCOUNTER
Reason for Call:  Post-op appointment    Detailed comments: Pt has been expecting a call back from Dr. Arias's nurse regarding his post-op PT appt.  Pt cannot get an appt for post-op PT until 3/9 and that date is too far out from the surgery?  Please call patient and advise.      Phone Number Patient can be reached at: Home number on file [phone] (home)    Best Time: any    Can we leave a detailed message on this number? YES    Call taken on 2/21/2022 at 11:00 AM by Nora Bueno

## 2022-02-23 NOTE — TELEPHONE ENCOUNTER
Called [facility] for Rehab and they could not help me.  Routed request to PT provider 2/22

## 2022-02-24 ENCOUNTER — TELEPHONE (OUTPATIENT)
Dept: VASCULAR SURGERY | Facility: CLINIC | Age: 72
End: 2022-02-24
Payer: COMMERCIAL

## 2022-02-24 ENCOUNTER — HOSPITAL ENCOUNTER (OUTPATIENT)
Dept: PHYSICAL THERAPY | Facility: CLINIC | Age: 72
Setting detail: THERAPIES SERIES
End: 2022-02-24
Attending: FAMILY MEDICINE
Payer: COMMERCIAL

## 2022-02-24 PROCEDURE — 97140 MANUAL THERAPY 1/> REGIONS: CPT | Mod: GP | Performed by: PHYSICAL THERAPIST

## 2022-02-24 PROCEDURE — 97161 PT EVAL LOW COMPLEX 20 MIN: CPT | Mod: GP | Performed by: PHYSICAL THERAPIST

## 2022-02-24 NOTE — PROGRESS NOTES
"Outpatient Lymphedema Therapy Evaluation       02/24/22 1200   Rehab Discipline   Discipline PT   Type of Visit   Type of visit Initial Edema Evaluation       present No   General Information   Start of care 02/24/22   Referring physician Samina Arias,    Orders Evaluate and treat as indicated   Order date 02/23/22   Medical diagnosis BLE PAD s/p stenting   Onset of illness / date of surgery 02/09/22  (date of surgical stents)   Edema onset 02/23/22  (date of referral)   Affected body parts RLE;LLE   Edema etiology Surgery  (PAD)   Edema etiology comments BLE edema likely multifactorial; hospitalized 2/9/22 - 2/14/22 for BLE stents for PAD; did receive a significant amount of fluid while hospitalized and decreased mobility/activity vs usual mobility (in hospital bed); pt takes Torsemide at baseline; was given Tubigrip in clinic on 2/17/22 when seeing the referring provider; PMG: DM (controlled by insulin); CKD stage 3, PAD (s/p stenting). Post-operative period complicated by right common femoral nerve entrapment requiring additional surgery for release and did have RADHA on CKD 3, thought possibly due to postoperative hypotension and possibly contrast induced.    Pertinent history of current problem (PT: include personal factors and/or comorbidities that impact the POC; OT: include additional occupational profile info) pt reports swelling is down significantly in legs and back to baseline weight; reports incision on right side is still swollen but weeping, not bothersome; pt thinks just time and moving around has both been helpful; pt reports wears knee hi compression stockings at baseline \"for at least 5 years\" and has gone back to wearing those   Surgical / medical history reviewed Yes   Prior level of functional mobility 4WW at all times in community, at clinic, etc. Reports doesn't usually use an AD in the house, \"sometimes a cane, but usually nothing\"   Prior treatment Compression " tye   Patient role / employment history Retired   Living environment House / [facility]   Living environment comments lives alone; daughter close by to help as needed   Current assistive devices Walker  (4WW into clinic today)   Fall Risk Screen   Fall screen completed by PT   Have you fallen 2 or more times in the past year? Yes   Is patient a fall risk? Department fall risk interventions implemented   Fall screen comments pt reports one was dogs fault and the other out in the garden, no repetitve falls in house   Abuse Screen (yes response referral indicated)   Feels Unsafe at Home or Work/School no   Feels Threatened by Someone no   Does Anyone Try to Keep You From Having Contact with Others or Doing Things Outside Your Home? no   Physical Signs of Abuse Present no   System Outcome Measures   Outcome Measures Lymphedema   Lymphedema Life Impact Scale (score range 0-72). A higher score indicates greater impairment. 17   Subjective Report   Patient report of symptoms reports legs aren't really bothersome at this time   Precautions   Precautions comments no precautions, recent stents for PAD   Patient / Family Goals   Patient / family goals statement to see if I should be doing anything else for my swelling   Pain   Patient currently in pain No   Cognitive Status   Orientation Orientation to person, place and time   Level of consciousness Alert   Follows commands and answers questions 100% of the time   Personal safety and judgement Intact   Memory Intact   Edema Exam / Assessment   Skin condition Intact;Pitting   Skin condition comments BLE skin intact on BLEs with 3+ pitting present in RLE from foot to knee and on LLE trace edema onlt at dorsum and then 2+ ankles to mid/upper calf; B medial thighs also with swelling present, 2+ pitting but entire medial thigh is firm and full to touch   Scar Yes   Location B groin incisions    Capillary refill Symmetrical   Dorsal pedal pulse Decreased   Dorsal pedal pulse  "comments on R-foot due to edema   Stemmer sign Negative   Ulceration No   Girth Measurements   Girth Measurements Refer to separate girth measurement flowsheet   Volume LE   Right LE (mL) 5359.64   Left LE (mL) 5148.49   LE volume comparison RLE volume greater than LLE volume   % difference +3.9%   Range of Motion   ROM No deficits were identified   Strength   Strength No deficits were identified   Posture   Posture Normal   Palpation   Palpation denies hypersensitivities   Sensory   Sensory perception Light touch   Light touch Impaired   Sensory perception comments basline neuropathy   Vascular Assessment   Vascular Assessment Comments s/p stenting for BLE PAD (see above)   Coordination   Coordination Gross motor coordination appropriate   Muscle Tone   Muscle tone No deficits were identified   Planned Edema Interventions   Planned edema interventions Manual lymph drainage;Gradient compression bandaging;Fit for compression garment;Exercises;Precautions to prevent infection / exacerbation;Education;Manual therapy;Skin care / precautions;Scar mobilization;Wound care / dressing changes;Home management program development   Clinical Impression   Criteria for skilled therapeutic intervention met Yes   Therapy diagnosis BLE secondary lymphedema    Influenced by the following impairments / conditions Stage 2   Functional limitations due to impairments / conditions pt reports current compression stockings, shoes and pants slightly more snug than normal but \"getting better\"   Clinical Presentation Stable/Uncomplicated   Clinical Presentation Rationale clinical judgmenet; no weeping, pt reports edema getting better on it's own over time slowly   Clinical Decision Making (Complexity) Low complexity   Treatment Frequency 3x/week   Treatment duration 12 weeks   Patient / family and/or staff in agreement with plan of care Yes   Risks and benefits of therapy have been explained Yes   Education Assessment   Preferred learning " style Listening   Barriers to learning No barriers   Goals   Edema Eval Goals 1;2;3;4   Goal 1   Goal identifier stg   Goal description pt to have around the clock tolerance to BLE GCB and Spandagrip for edema reduction response   Target date 03/10/22   Goal 2   Goal identifier ltg   Goal description once appropriate, pt to be independent with donning, doffing and care of compression stockings for longterm edema management for maintenance   Target date 05/25/22   Goal 3   Goal identifier ltg   Goal description pt to be independent with longterm edema management of BLEs via HEP, elevation, skin cares, modified MLD and compression stocking wear/use   Target date 05/25/22   Goal 4   Goal identifier ltg   Goal description pt to have at least 3-5 point improvement on LLIS due to decreased edema and associated symptoms in BLEs   Target date 05/25/22   Total Evaluation Time   PT Pierre, Low Complexity Minutes (83459) 5

## 2022-02-24 NOTE — PROGRESS NOTES
[facility]        OUTPATIENT PHYSICAL THERAPY EDEMA EVALUATION  PLAN OF TREATMENT FOR OUTPATIENT REHABILITATION  (COMPLETE FOR INITIAL CLAIMS ONLY)  Patient's Last Name, First Name, Delbert Rowan                           Provider s Name:   [facility] Medical Record No.  [id number]     Start of Care Date:  02/24/22   Onset Date:  02/23/22 (date of referral)   Type:  PT   Medical Diagnosis:      Therapy Diagnosis:  BLE secondary lymphedema  Visits from SOC:  1                                     __________________________________________________________________________________   Plan of Treatment/Functional Goals:    Manual lymph drainage, Gradient compression bandaging, Fit for compression garment, Exercises, Precautions to prevent infection / exacerbation, Education, Manual therapy, Skin care / precautions, Scar mobilization, Wound care / dressing changes, Home management program development        GOALS  1. Goal description: pt to have around the clock tolerance to BLE GCB and Spandagrip for edema reduction response       Target date: 03/10/22  2. Goal description: once appropriate, pt to be independent with donning, doffing and care of compression stockings for longterm edema management for maintenance       Target date: 05/25/22  3. Goal description: pt to be independent with longterm edema management of BLEs via HEP, elevation, skin cares, modified MLD and compression stocking wear/use       Target date: 05/25/22  4. Goal description: pt to have at least 3-5 point improvement on LLIS due to decreased edema and associated symptoms in BLEs    Treatment Frequency: 3x/week   Treatment duration: 12 weeks (2/24/22 - 5/25/22)    Danii Morales, PT, DPT, CLT                                    I CERTIFY THE NEED FOR THESE SERVICES FURNISHED UNDER        THIS PLAN  OF TREATMENT AND WHILE UNDER MY CARE     (Physician co-signature of this document indicates review and certification of the therapy plan).                      Referring physician: Samina Arias, DO   Initial Assessment  See Epic Evaluation- Start of care: 02/24/22

## 2022-02-24 NOTE — TELEPHONE ENCOUNTER
Reason for Call:  Other call back    Detailed comments:  Verito-RN case manager calling on behalf of pt. Pt has incision (from procedure 02/09/22) glenys right groin -significantly swollen and drastically bigger than the left side (swollen).  No other signs of infection. Not painful, weeping or red.   Need to know if this is normal. Please call pt before noon today, if possible, because pt needs to go to PT.     Phone Number Patient can be reached at: Cell number on file:    Telephone Information:   Mobile [phone]       Best Time: any    Can we leave a detailed message on this number? YES    Call taken on 2/24/2022 at 11:24 AM by Shana Alarcon

## 2022-02-25 ENCOUNTER — HOSPITAL ENCOUNTER (OUTPATIENT)
Dept: PHYSICAL THERAPY | Facility: CLINIC | Age: 72
Setting detail: THERAPIES SERIES
End: 2022-02-25
Attending: FAMILY MEDICINE
Payer: COMMERCIAL

## 2022-02-25 PROCEDURE — 97140 MANUAL THERAPY 1/> REGIONS: CPT | Mod: GP | Performed by: REHABILITATION PRACTITIONER

## 2022-02-25 NOTE — TELEPHONE ENCOUNTER
Left message for patient that Dr. Chau would like to see him in clinic. He is scheduled for Tuesday 3/1 in [place] at 10:30. Call back if any questions.

## 2022-02-27 ENCOUNTER — HEALTH MAINTENANCE LETTER (OUTPATIENT)
Age: 72
End: 2022-02-27

## 2022-02-28 ENCOUNTER — HOSPITAL ENCOUNTER (OUTPATIENT)
Dept: PHYSICAL THERAPY | Facility: CLINIC | Age: 72
Setting detail: THERAPIES SERIES
End: 2022-02-28
Attending: FAMILY MEDICINE
Payer: COMMERCIAL

## 2022-02-28 PROCEDURE — 97140 MANUAL THERAPY 1/> REGIONS: CPT | Mod: GP | Performed by: PHYSICAL THERAPIST

## 2022-02-28 PROCEDURE — 97140 MANUAL THERAPY 1/> REGIONS: CPT | Mod: GP | Performed by: REHABILITATION PRACTITIONER

## 2022-03-01 ENCOUNTER — HOSPITAL ENCOUNTER (OUTPATIENT)
Dept: ULTRASOUND IMAGING | Facility: CLINIC | Age: 72
End: 2022-03-01
Attending: SURGERY
Payer: COMMERCIAL

## 2022-03-01 DIAGNOSIS — I70.212 ATHEROSCLEROSIS OF NATIVE ARTERIES OF EXTREMITIES WITH INTERMITTENT CLAUDICATION, LEFT LEG (H): ICD-10-CM

## 2022-03-01 DIAGNOSIS — I70.219 ATHEROSCLEROSIS OF ARTERY OF EXTREMITY WITH INTERMITTENT CLAUDICATION (H): ICD-10-CM

## 2022-03-01 DIAGNOSIS — I73.9 PAD (PERIPHERAL ARTERY DISEASE) (H): ICD-10-CM

## 2022-03-01 PROCEDURE — 93922 UPR/L XTREMITY ART 2 LEVELS: CPT

## 2022-03-01 PROCEDURE — 93925 LOWER EXTREMITY STUDY: CPT

## 2022-03-01 NOTE — TELEPHONE ENCOUNTER
Patient is calling back to get the apt with Dr Chau r/s, currently the first opening is on 3/29.  Patient states he can't wait that long to have his incision looked at, please advise.

## 2022-03-03 ENCOUNTER — OFFICE VISIT (OUTPATIENT)
Dept: VASCULAR SURGERY | Facility: CLINIC | Age: 72
End: 2022-03-03
Attending: SURGERY
Payer: COMMERCIAL

## 2022-03-03 DIAGNOSIS — Z53.9 ERRONEOUS ENCOUNTER--DISREGARD: ICD-10-CM

## 2022-03-03 DIAGNOSIS — I65.22 LEFT CAROTID STENOSIS: Primary | ICD-10-CM

## 2022-03-03 DIAGNOSIS — I73.9 PAD (PERIPHERAL ARTERY DISEASE) (H): ICD-10-CM

## 2022-03-07 ENCOUNTER — HOSPITAL ENCOUNTER (OUTPATIENT)
Dept: PHYSICAL THERAPY | Facility: CLINIC | Age: 72
Setting detail: THERAPIES SERIES
End: 2022-03-07
Attending: FAMILY MEDICINE
Payer: COMMERCIAL

## 2022-03-07 PROCEDURE — 97140 MANUAL THERAPY 1/> REGIONS: CPT | Mod: GP | Performed by: PHYSICAL THERAPIST

## 2022-03-08 ENCOUNTER — TELEPHONE (OUTPATIENT)
Dept: VASCULAR SURGERY | Facility: CLINIC | Age: 72
End: 2022-03-08
Payer: COMMERCIAL

## 2022-03-08 NOTE — TELEPHONE ENCOUNTER
Received a call from BARBI Brock at [facility] stating that patient had concerns after surgery.     Writer called patient and he explained that in his right groin there is a lump around the incision. The incision itself has healed nicely. There is no drainage, no opening, no pain and no redness. Advised patient to continue to monitor as seromas can sometimes develop after surgery. It can take months for it to go down. He understands. He has an appointment again in 3 months. Advised him to call if it worsens or has concerns.

## 2022-03-14 ENCOUNTER — HOSPITAL ENCOUNTER (OUTPATIENT)
Dept: PHYSICAL THERAPY | Facility: CLINIC | Age: 72
Setting detail: THERAPIES SERIES
Discharge: HOME | End: 2022-03-14
Attending: FAMILY MEDICINE
Payer: COMMERCIAL

## 2022-03-14 PROCEDURE — 97140 MANUAL THERAPY 1/> REGIONS: CPT | Mod: GP | Performed by: PHYSICAL THERAPIST

## 2022-03-21 ENCOUNTER — HOSPITAL ENCOUNTER (OUTPATIENT)
Dept: PHYSICAL THERAPY | Facility: CLINIC | Age: 72
Setting detail: THERAPIES SERIES
Discharge: HOME | End: 2022-03-21
Attending: FAMILY MEDICINE
Payer: COMMERCIAL

## 2022-03-21 PROCEDURE — 97140 MANUAL THERAPY 1/> REGIONS: CPT | Mod: GP | Performed by: PHYSICAL THERAPIST

## 2022-03-23 NOTE — PROGRESS NOTES
[facility]    Outpatient Physical Therapy Progress Note  Patient: Delbert Tilley  : 1950    Beginning/End Dates of Reporting Period:  22 to 3/23/22    Referring Provider: DO Alanis Daniels Diagnosis: BLE secondary lymphedema / phlebolymphedema      Client Self Report: still waiting for the stock[facility], [facility]  [facility]    Objective Measurements:  Objective Measure: girth  Details: since last measured on 3/14/21: RLE -1.1% and LLE +2.0%     Outcome Measures (most recent score):   LLIS = 17 on eval    Goals:  Goal Identifier stg   Goal Description pt to have around the clock tolerance to BLE GCB and Spandagrip for edema reduction response   Target Date 03/10/22   Date Met  22   Progress (detail required for progress note):       Goal Identifier ltg   Goal Description once appropriate, pt to be independent with donning, doffing and care of compression stockings for longterm edema management for maintenance   Target Date 22   Date Met  22   Progress (detail required for progress note):       Goal Identifier ltg   Goal Description pt to be independent with longterm edema management of BLEs via HEP, elevation, skin cares, modified MLD and compression stocking wear/use   Target Date 22   Date Met      Progress (detail required for progress note):       Goal Identifier ltg   Goal Description pt to have at least 3-5 point improvement on LLIS due to decreased edema and associated symptoms in BLEs   Target Date 22   Date Met      Progress (detail required for progress note):         Plan:  Continue therapy per current plan of care. Patient has made fairly good reductions in BLE edema, initially with gradient compression bandages and fit into a knee hi compression stocking here at our OP lymphedema clinic and got thigh hi compression  [facility] so they would be 100% covered by insurance. Pt is still waiting to receive those, so in the meantime is wearing knee hi compression stockings and double layer Spandagrip at thighs.  Pt also instructed in modified MLD for B medial thigh edema.     Discharge:  No

## 2022-03-29 ENCOUNTER — HOSPITAL ENCOUNTER (OUTPATIENT)
Dept: PHYSICAL THERAPY | Facility: CLINIC | Age: 72
Setting detail: THERAPIES SERIES
Discharge: HOME | End: 2022-03-29
Attending: FAMILY MEDICINE
Payer: COMMERCIAL

## 2022-03-29 PROCEDURE — 97140 MANUAL THERAPY 1/> REGIONS: CPT | Mod: GP | Performed by: PHYSICAL THERAPIST

## 2022-04-04 ENCOUNTER — APPOINTMENT (OUTPATIENT)
Dept: GENERAL RADIOLOGY | Facility: CLINIC | Age: 72
End: 2022-04-04
Attending: EMERGENCY MEDICINE
Payer: COMMERCIAL

## 2022-04-04 ENCOUNTER — HOSPITAL ENCOUNTER (EMERGENCY)
Facility: CLINIC | Age: 72
Discharge: HOME | End: 2022-04-04
Attending: EMERGENCY MEDICINE | Admitting: EMERGENCY MEDICINE
Payer: COMMERCIAL

## 2022-04-04 ENCOUNTER — TELEPHONE (OUTPATIENT)
Dept: PHYSICAL THERAPY | Facility: CLINIC | Age: 72
End: 2022-04-04
Payer: COMMERCIAL

## 2022-04-04 VITALS
TEMPERATURE: 97.6 F | RESPIRATION RATE: 24 BRPM | SYSTOLIC BLOOD PRESSURE: 125 MMHG | BODY MASS INDEX: 25.11 KG/M2 | DIASTOLIC BLOOD PRESSURE: 77 MMHG | WEIGHT: 175 LBS | OXYGEN SATURATION: 96 % | HEART RATE: 66 BPM

## 2022-04-04 DIAGNOSIS — E87.70 HYPERVOLEMIA, UNSPECIFIED HYPERVOLEMIA TYPE: ICD-10-CM

## 2022-04-04 LAB
ALBUMIN SERPL-MCNC: 3.2 G/DL (ref 3.4–5)
ALP SERPL-CCNC: 160 U/L (ref 40–150)
ALT SERPL W P-5'-P-CCNC: 26 U/L (ref 0–70)
ANION GAP SERPL CALCULATED.3IONS-SCNC: 8 MMOL/L (ref 3–14)
AST SERPL W P-5'-P-CCNC: 27 U/L (ref 0–45)
BASOPHILS # BLD AUTO: 0.1 10E3/UL (ref 0–0.2)
BASOPHILS NFR BLD AUTO: 1 %
BILIRUB SERPL-MCNC: 0.6 MG/DL (ref 0.2–1.3)
BUN SERPL-MCNC: 60 MG/DL (ref 7–30)
CALCIUM SERPL-MCNC: 9.1 MG/DL (ref 8.5–10.1)
CHLORIDE BLD-SCNC: 106 MMOL/L (ref 94–109)
CO2 SERPL-SCNC: 28 MMOL/L (ref 20–32)
CREAT SERPL-MCNC: 1.71 MG/DL (ref 0.66–1.25)
EOSINOPHIL # BLD AUTO: 0.2 10E3/UL (ref 0–0.7)
EOSINOPHIL NFR BLD AUTO: 2 %
ERYTHROCYTE [DISTWIDTH] IN BLOOD BY AUTOMATED COUNT: 15.4 % (ref 10–15)
GFR SERPL CREATININE-BSD FRML MDRD: 42 ML/MIN/1.73M2
GLUCOSE BLD-MCNC: 202 MG/DL (ref 70–99)
HCT VFR BLD AUTO: 36.3 % (ref 40–53)
HGB BLD-MCNC: 10.8 G/DL (ref 13.3–17.7)
HOLD SPECIMEN: NORMAL
IMM GRANULOCYTES # BLD: 0 10E3/UL
IMM GRANULOCYTES NFR BLD: 0 %
LYMPHOCYTES # BLD AUTO: 1.3 10E3/UL (ref 0.8–5.3)
LYMPHOCYTES NFR BLD AUTO: 16 %
MCH RBC QN AUTO: 27.1 PG (ref 26.5–33)
MCHC RBC AUTO-ENTMCNC: 29.8 G/DL (ref 31.5–36.5)
MCV RBC AUTO: 91 FL (ref 78–100)
MONOCYTES # BLD AUTO: 0.7 10E3/UL (ref 0–1.3)
MONOCYTES NFR BLD AUTO: 8 %
NEUTROPHILS # BLD AUTO: 5.8 10E3/UL (ref 1.6–8.3)
NEUTROPHILS NFR BLD AUTO: 73 %
NRBC # BLD AUTO: 0 10E3/UL
NRBC BLD AUTO-RTO: 0 /100
NT-PROBNP SERPL-MCNC: ABNORMAL PG/ML (ref 0–900)
PLATELET # BLD AUTO: 144 10E3/UL (ref 150–450)
POTASSIUM BLD-SCNC: 5.1 MMOL/L (ref 3.4–5.3)
PROT SERPL-MCNC: 6.7 G/DL (ref 6.8–8.8)
RBC # BLD AUTO: 3.99 10E6/UL (ref 4.4–5.9)
SODIUM SERPL-SCNC: 142 MMOL/L (ref 133–144)
WBC # BLD AUTO: 8 10E3/UL (ref 4–11)

## 2022-04-04 PROCEDURE — 99284 EMERGENCY DEPT VISIT MOD MDM: CPT | Performed by: EMERGENCY MEDICINE

## 2022-04-04 PROCEDURE — 80053 COMPREHEN METABOLIC PANEL: CPT | Performed by: EMERGENCY MEDICINE

## 2022-04-04 PROCEDURE — 36415 COLL VENOUS BLD VENIPUNCTURE: CPT | Performed by: EMERGENCY MEDICINE

## 2022-04-04 PROCEDURE — 99284 EMERGENCY DEPT VISIT MOD MDM: CPT | Mod: 25 | Performed by: EMERGENCY MEDICINE

## 2022-04-04 PROCEDURE — 82040 ASSAY OF SERUM ALBUMIN: CPT | Performed by: EMERGENCY MEDICINE

## 2022-04-04 PROCEDURE — 85025 COMPLETE CBC W/AUTO DIFF WBC: CPT | Performed by: EMERGENCY MEDICINE

## 2022-04-04 PROCEDURE — 96374 THER/PROPH/DIAG INJ IV PUSH: CPT | Performed by: EMERGENCY MEDICINE

## 2022-04-04 PROCEDURE — 250N000011 HC RX IP 250 OP 636: Performed by: EMERGENCY MEDICINE

## 2022-04-04 PROCEDURE — 83880 ASSAY OF NATRIURETIC PEPTIDE: CPT | Performed by: EMERGENCY MEDICINE

## 2022-04-04 PROCEDURE — 71046 X-RAY EXAM CHEST 2 VIEWS: CPT

## 2022-04-04 RX ORDER — TORSEMIDE 20 MG/1
20 TABLET ORAL DAILY
Qty: 30 TABLET | Refills: 3 | Status: ON HOLD | OUTPATIENT
Start: 2022-04-04 | End: 2022-04-09

## 2022-04-04 RX ORDER — FUROSEMIDE 10 MG/ML
80 INJECTION INTRAMUSCULAR; INTRAVENOUS ONCE
Status: COMPLETED | OUTPATIENT
Start: 2022-04-04 | End: 2022-04-04

## 2022-04-04 RX ADMIN — FUROSEMIDE 80 MG: 10 INJECTION, SOLUTION INTRAMUSCULAR; INTRAVENOUS at 13:28

## 2022-04-04 ASSESSMENT — ENCOUNTER SYMPTOMS
FEVER: 0
CHILLS: 0
SHORTNESS OF BREATH: 1

## 2022-04-04 NOTE — ED PROVIDER NOTES
History     Chief Complaint   Patient presents with     Shortness of Breath     Patient has had  recent weight gain making him short of air.  Was waiting for call from primary care to see if he could increase his lasix  No chest pain.       HPI  Delbert Tilley is a 71 year old male who Presents with shortness of breath.  The patient takes diuretics at home.  He has a history of what sounds like peripheral artery disease that was operated on a few months ago.  He has been using compression stocking since recovering from surgery. says his surgery was very helpful.  he reports that over the past week he feels like the compression garments are pushing the water up into his legs and abdomen.  He says this is making it hard to breathe.  S no fevers.  He called his clinic and asked if he could take an extra dose of his diuretic and was told that instead he should come immediately to the emergency room.  No chest pain.  No rashes.  The patient has gained 10 pounds in the past week.        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     Priority: Medium     Encounter for screening laboratory testing for severe acute respiratory syndrome coronavirus 2 (SARS-CoV-2) 04/09/2022     Priority: Medium     CHF (congestive heart failure) (H) 04/08/2022     Priority: Medium     Dyspnea on exertion 04/08/2022     Priority: Medium     PAD (peripheral artery disease) (H) 02/09/2022     Priority: Medium     Other idiopathic peripheral autonomic neuropathy 06/22/2021     Priority: Medium     Venous insufficiency (chronic) (peripheral) 06/22/2021     Priority: Medium     Actinic keratosis 06/22/2021     Priority: Medium     Nail dystrophy 06/22/2021     Priority: Medium     Generalized muscle  weakness 06/22/2021     Priority: Medium     Atherosclerosis of artery of extremity with intermittent claudication (H) 01/22/2021     Priority: Medium     Hyperparathyroidism (H) 01/22/2021     Priority: Medium     Thrombocytopenia (H) 01/22/2021     Priority: Medium     Vitamin D deficiency 01/15/2021     Priority: Medium     Hypocalcemia 01/15/2021     Priority: Medium     Essential hypertension 01/15/2021     Priority: Medium     Ischemic cardiomyopathy 01/15/2021     Priority: Medium     Formatting of this note might be different from the original.  EF 20-25%       Other proteinuria 01/15/2021     Priority: Medium     Hypomagnesemia 01/15/2021     Priority: Medium     Anemia in chronic kidney disease 01/15/2021     Priority: Medium     Diabetic nephropathy associated with type 2 diabetes mellitus (H) 01/15/2021     Priority: Medium     Pleural plaque 12/31/2020     Priority: Medium     Chronic kidney disease, stage 3 (H) 11/18/2020     Priority: Medium     Hypotension, unspecified hypotension type 10/21/2020     Priority: Medium     Chronic obstructive pulmonary disease, unspecified COPD type (H) 03/09/2020     Priority: Medium     NSVT (nonsustained ventricular tachycardia) (H) 11/01/2019     Priority: Medium     Chronic systolic heart failure (H) 11/01/2019     Priority: Medium     Paroxysmal atrial fibrillation (H) 11/01/2019     Priority: Medium     Tobacco use 05/28/2019     Priority: Medium     Left carotid stenosis 09/08/2017     Priority: Medium     Carpal tunnel syndrome of right wrist 01/10/2016     Priority: Medium     Carotid stenosis 06/12/2015     Priority: Medium     Bilateral carotid Disease S/P Right carotid endarterectomy.   [facility]       NSTEMI (non-ST elevated myocardial infarction) (H) 10/21/2014     Priority: Medium     ACS (acute coronary syndrome) (H) 10/20/2014     Priority: Medium     Hyperlipidemia LDL goal <70 07/08/2013     Priority: Medium     [facility]           Type 2 diabetes mellitus with other specified complication, with long-term current use of insulin (H) 10/31/2010     Priority: Medium     CARDIOVASCULAR SCREENING; LDL GOAL LESS THAN 100 10/31/2010     Priority: Medium     Coronary artery disease involving native coronary artery of native heart without angina pectoris 07/07/2010     Priority: Medium     S/p mi  Bypass x 4 --grafting to the LAD obtuse marginal and PDA.   Angio on 6/10 negative          Alcohol abuse, in remission 07/07/2010     Priority: Medium     H/o rehab x 5   Dry x 10 years (2010 or so)       GERD (gastroesophageal reflux disease) 07/07/2010     Priority: Medium     Alcohol-induced chronic pancreatitis (H) 07/07/2010     Priority: Medium     H/o pancreatitis --on enzymes        Diabetes mellitus, type 2 (H) 07/07/2010     Priority: Medium     [facility]       Cardiomyopathy (H) 06/15/2010     Priority: Medium     EF 15%.--ICD placement        Atrial fibrillation/flutter 06/15/2010     Priority: Medium     S/p ablation 1June 2010.     Replacing diagnoses that were inactivated after the 10/1/2021 regulatory import.          Past Medical History:    Past Medical History:   Diagnosis Date     Acute renal failure (H) 8/26/06 Hosp     Anemia      Arthritis      Atherosclerosis of artery of extremity with intermittent claudication (H)      CAD (coronary artery disease)      Cardiomyopathy (H)      Carpal tunnel syndrome      CHF (congestive heart failure) (H)      Claudication (H)      COPD (chronic obstructive pulmonary disease) (H)      Diabetes (H)      Gastro-oesophageal reflux disease      GERD (gastroesophageal reflux disease)      H/O: alcohol abuse      HTN (hypertension)      Hyperlipidaemia LDL goal <100 07/08/2013     Hyperparathyroidism (H)      Hypokalemia      ICD (implantable cardioverter-defibrillator) in place      NSTEMI (non-ST elevated myocardial infarction) (H)      NSVT (nonsustained ventricular  tachycardia) (H)      Pacemaker      Pancreatitis 6/26/06 Hosp     Paroxysmal atrial fibrillation (H)      PVD (peripheral vascular disease) (H)      Thrombocytopenia (H)      Tobacco use      Venous (peripheral) insufficiency      Vitamin D deficiency        Past Surgical History:    Past Surgical History:   Procedure Laterality Date     CATARACT IOL, RT/LT      Cataract IOL RT/LT     CLOSE SECONDARY WOUND LOWER EXTREMITY Right 2/13/2022    Procedure: Right groin wound exploration, nerve release and closure.;  Surgeon: Karen Arthur MD;  [place] [facility] [place]     ENDARTERECTOMY CAROTID Right 06/12/2015    Procedure: ENDARTERECTOMY CAROTID;  Surgeon: João Turner MD;  Location:  OR     ENDARTERECTOMY CAROTID Left 09/08/2017    Procedure: ENDARTERECTOMY CAROTID;  LEFT CAROTID ENDARTERECTOMY WITH EEG;  Surgeon: João Turner MD;  Location:  OR     ENDARTERECTOMY FEMORAL Bilateral 2/9/2022    Procedure: BILATERAL- COMMON FEMORAL ENDARTERECTOMY; RETROGRADE ILIAC ARTERY STENTING;  Surgeon: Ric Chau MD;  [place] [facility] [place]     IMPLANT PACEMAKER  06/10/2010     IMPLANTED DEVICE       INTERPOSITION TENDON HAND N/A 06/09/2020    Procedure: Right thumb ligament reconstruction tendon interposition with extensor pollicis brevis to abductor pollicis tendon transfer;  Surgeon: Bethel Martin MD;  Location: [facility] OR     RELEASE CARPAL TUNNEL Right 04/12/2016    Procedure: RELEASE CARPAL TUNNEL;  Surgeon: Lissy Leger MD;  Location: [facility] OR     SURGICAL HISTORY OF -   1998    Laminectomy     TONSILLECTOMY & ADENOIDECTOMY       ZZC CABG, ARTERIAL, THREE         Family History:    Family History   Adopted: Yes   Problem Relation Age of Onset     Unknown/Adopted No family hx of        Social History:  Marital Status:  Single [1]  Social History     Tobacco Use     Smoking status: Current Every Day Smoker     Packs/day: 1.50     Years: 50.00     Pack years: 75.00     Types:  Cigarettes     Smokeless tobacco: Never Used     Tobacco comment: 1 1/2-2 PPD 3/11/21   Vaping Use     Vaping Use: Never used   Substance Use Topics     Alcohol use: No     Drug use: No        Medications:    albuterol (PROAIR HFA) 108 (90 Base) MCG/ACT Inhaler  amylase-lipase-protease (CREON 12) 52803-[address]-49902 units CPEP  aspirin (ASA) 325 MG EC tablet  atorvastatin (LIPITOR) 80 MG tablet  calcium carbonate (OS-MARVA) 500 MG tablet  Carboxymethylcellulose Sod PF (THERATEARS PF) 0.25 % SOLN  carvedilol (COREG) 6.25 MG tablet  clopidogrel (PLAVIX) 75 MG tablet  gabapentin (NEURONTIN) 100 MG capsule  Glycerin (OASIS MOISTURIZING MOUTH SPRAY) 35 % LIQD  insulin aspart (NOVOLOG PEN) 100 UNIT/ML pen  insulin glargine (LANTUS PEN) 100 UNIT/ML pen  Insulin Pen Needle (PEN NEEDLES) 32G X 4 MM MISC  loperamide (IMODIUM) 2 MG capsule  losartan (COZAAR) 100 MG tablet  magnesium oxide (MAG-OX) 400 (241.3 Mg) MG tablet  metFORMIN (GLUCOPHAGE-XR) 500 MG 24 hr tablet  metolazone (ZAROXOLYN) 2.5 MG tablet  mometasone (ASMANEX TWISTHALER) 220 MCG/INH inhaler  nitroGLYcerin (NITROSTAT) 0.4 MG sublingual tablet  pantoprazole (PROTONIX) 40 MG EC tablet  torsemide (DEMADEX) 20 MG tablet  vitamin D3 (CHOLECALCIFEROL) 50 mcg (2000 units) tablet          Review of Systems   Constitutional: Negative for chills and fever.   Respiratory: Positive for shortness of breath.    Cardiovascular: Positive for leg swelling. Negative for chest pain.       Physical Exam   BP: (!) 106/7  Pulse: 85  Temp: 97.6  F (36.4  C)  Resp: 20  Weight: 79.4 kg (175 lb)  SpO2: 98 %      Physical Exam  Vitals reviewed.   Constitutional:       General: He is not in acute distress.  HENT:      Head: Normocephalic and atraumatic.      Right Ear: External ear normal.      Left Ear: External ear normal.      Nose: No congestion.      Mouth/Throat:      Mouth: Mucous membranes are moist.   Eyes:      General:         Right eye: No discharge.         Left eye: No  discharge.      Extraocular Movements: Extraocular movements intact.   Pulmonary:      Effort: No respiratory distress.      Comments: Faint rales b/l   Abdominal:      General: There is no distension.   Musculoskeletal:      Comments: +edema lower extremities.   Neurological:      Mental Status: He is alert.   Psychiatric:      Comments: Very nice         ED Course              ED Course as of 04/06/22 1352   Mon Apr 04, 2022   1331 BNP is very high. Ordered lasix. Creatinine more or less at baseline.    1536 Urinating well. Feeling somewhat better.    1551 urinated a great deal.  He feels better.  Wants to leave.  Very frustrated that he is here.  He says that he should not have had to come to the ER.  He does not understand why he was not just told that he could just take an extra dose of his torsemide.  I apologized for this.  The swelling does seem to be somewhat better at this time.  He is refusing any further work-up or evaluation and wants to leave.  I am going to discharge him.   1552 The patient asked me as he was leaving if I would -be willing to give him a prescription for torsemide and send it to his local pharmacy.  [facility] he says if he takes extra doses, they are going to want to know why. I sent a prescription to his preferred pharmacy. Looks well. Breathing improved. Will discharge.    1552 Very appreciative of my time and understanding.    1553 CXR shows:    IMPRESSION: Two views of the chest were obtained. Left chest wall dual  lead ICD leads are in stable position. Postsurgical changes of cardiac  surgery with median sternotomy wires and surgical clips. No suspicious  focal pulmonary opacities. No significant pleural effusion or  pneumothorax. Areas of pleural calcification overlying the right  hemidiaphragm.   1554 I reviewed the chest xray and agree with the read.      Procedures             No results found for this or any previous visit (from the past  24 hour(s)).    Medications   furosemide (LASIX) injection 80 mg (80 mg Intravenous Given 4/4/22 1328)       Assessments & Plan (with Medical Decision Making)       I suspect the patient is fluid overloaded given the history and the physical.  Labs weresent by nursing including a BNP, CBC, lites.  I am getting get a chest x-ray.    I have reviewed the nursing notes.    I have reviewed the findings, diagnosis, plan and need for follow up with the patient.  This note was in part created using dictation software in an effort to speed and improve patient care; any typos should be read with the frequent shortcomings of this technology in mind.      Discharge Medication List as of 4/4/2022  3:52 PM      START taking these medications    Details   !! torsemide (DEMADEX) 20 MG tablet Take 1 tablet (20 mg) by mouth daily Take 2 tablets by mouth as needed for fluid overload and swelling, Disp-30 tablet, R-3, E-Prescribe       !! - Potential duplicate medications found. Please discuss with provider.          Final diagnoses:   Hypervolemia, unspecified hypervolemia type       4/4/2022   [facility] EMERGENCY DEPT     Don Hester MD  04/12/22 0114

## 2022-04-04 NOTE — TELEPHONE ENCOUNTER
"Patient called this therapist and reports he's up 12 pounds and has SOB.  Therapist instructed patient to remove his thigh high compression stockings and go to the ER due to significant weight gain, cardiac history and being symptomatic. Patient at first reluctant and wanting to \"wait for a call back from his cardiologist\" but therapist again urged patient to go directly to the ER as this is what cardiology would recommend as well due to significant weight gain and being SOB - pt agreed and reports he will go.   "

## 2022-04-04 NOTE — ED NOTES
"Pt states has gained weight the past week, increased SOB. Pt states \"I just wanted to know if I can increase my pills, I didn't expect to be here\". Pt takes Torsemide for fluid build-up. Pt given sandwich upon arrival to ED room.   "

## 2022-04-04 NOTE — DISCHARGE INSTRUCTIONS
I am sorry that you are put through all this today.  I think in the future if you feel swollen and you gain weight it is fine for you to take an extra dose of the torsemide.  Please eat potatoes every day.  A potato has a lot of potassium and magnesium in it and will help protect your electrolytes while you are taking diuretics.    Come back if you have any new concerns or issues.  Thank you for your patience today.  I apologize for the wait.

## 2022-04-05 ENCOUNTER — ANCILLARY PROCEDURE (OUTPATIENT)
Dept: CARDIOLOGY | Facility: CLINIC | Age: 72
End: 2022-04-05
Attending: INTERNAL MEDICINE
Payer: COMMERCIAL

## 2022-04-05 ENCOUNTER — PATIENT OUTREACH (OUTPATIENT)
Dept: CARE COORDINATION | Facility: CLINIC | Age: 72
End: 2022-04-05

## 2022-04-05 DIAGNOSIS — Z71.89 OTHER SPECIFIED COUNSELING: ICD-10-CM

## 2022-04-05 DIAGNOSIS — Z95.810 ICD (IMPLANTABLE CARDIOVERTER-DEFIBRILLATOR) IN PLACE: ICD-10-CM

## 2022-04-05 DIAGNOSIS — I42.9 CARDIOMYOPATHY (H): ICD-10-CM

## 2022-04-05 LAB
MDC_IDC_LEAD_IMPLANT_DT: NORMAL
MDC_IDC_LEAD_IMPLANT_DT: NORMAL
MDC_IDC_LEAD_LOCATION: NORMAL
MDC_IDC_LEAD_LOCATION: NORMAL
MDC_IDC_LEAD_LOCATION_DETAIL_1: NORMAL
MDC_IDC_LEAD_LOCATION_DETAIL_1: NORMAL
MDC_IDC_LEAD_MFG: NORMAL
MDC_IDC_LEAD_MFG: NORMAL
MDC_IDC_LEAD_MODEL: NORMAL
MDC_IDC_LEAD_MODEL: NORMAL
MDC_IDC_LEAD_POLARITY_TYPE: NORMAL
MDC_IDC_LEAD_POLARITY_TYPE: NORMAL
MDC_IDC_LEAD_SERIAL: NORMAL
MDC_IDC_LEAD_SERIAL: NORMAL
MDC_IDC_MSMT_BATTERY_DTM: NORMAL
MDC_IDC_MSMT_BATTERY_REMAINING_LONGEVITY: 72 MO
MDC_IDC_MSMT_BATTERY_RRT_TRIGGER: 2.73
MDC_IDC_MSMT_BATTERY_STATUS: NORMAL
MDC_IDC_MSMT_BATTERY_VOLTAGE: 2.99 V
MDC_IDC_MSMT_CAP_CHARGE_DTM: NORMAL
MDC_IDC_MSMT_CAP_CHARGE_ENERGY: 18 J
MDC_IDC_MSMT_CAP_CHARGE_TIME: 3.95
MDC_IDC_MSMT_CAP_CHARGE_TYPE: NORMAL
MDC_IDC_MSMT_LEADCHNL_RA_IMPEDANCE_VALUE: 437 OHM
MDC_IDC_MSMT_LEADCHNL_RA_PACING_THRESHOLD_AMPLITUDE: 0.75 V
MDC_IDC_MSMT_LEADCHNL_RA_PACING_THRESHOLD_PULSEWIDTH: 0.4 MS
MDC_IDC_MSMT_LEADCHNL_RA_SENSING_INTR_AMPL: 3 MV
MDC_IDC_MSMT_LEADCHNL_RA_SENSING_INTR_AMPL: 3 MV
MDC_IDC_MSMT_LEADCHNL_RV_IMPEDANCE_VALUE: 285 OHM
MDC_IDC_MSMT_LEADCHNL_RV_IMPEDANCE_VALUE: 570 OHM
MDC_IDC_MSMT_LEADCHNL_RV_PACING_THRESHOLD_AMPLITUDE: 0.75 V
MDC_IDC_MSMT_LEADCHNL_RV_PACING_THRESHOLD_PULSEWIDTH: 0.4 MS
MDC_IDC_MSMT_LEADCHNL_RV_SENSING_INTR_AMPL: 8.75 MV
MDC_IDC_MSMT_LEADCHNL_RV_SENSING_INTR_AMPL: 8.75 MV
MDC_IDC_PG_IMPLANT_DTM: NORMAL
MDC_IDC_PG_MFG: NORMAL
MDC_IDC_PG_MODEL: NORMAL
MDC_IDC_PG_SERIAL: NORMAL
MDC_IDC_PG_TYPE: NORMAL
MDC_IDC_SESS_CLINIC_NAME: NORMAL
MDC_IDC_SESS_DTM: NORMAL
MDC_IDC_SESS_TYPE: NORMAL
MDC_IDC_SET_BRADY_AT_MODE_SWITCH_RATE: 171 {BEATS}/MIN
MDC_IDC_SET_BRADY_HYSTRATE: NORMAL
MDC_IDC_SET_BRADY_LOWRATE: 55 {BEATS}/MIN
MDC_IDC_SET_BRADY_MAX_SENSOR_RATE: 140 {BEATS}/MIN
MDC_IDC_SET_BRADY_MAX_TRACKING_RATE: 140 {BEATS}/MIN
MDC_IDC_SET_BRADY_MODE: NORMAL
MDC_IDC_SET_BRADY_PAV_DELAY_LOW: 180 MS
MDC_IDC_SET_BRADY_SAV_DELAY_LOW: 150 MS
MDC_IDC_SET_LEADCHNL_RA_PACING_AMPLITUDE: 1.5 V
MDC_IDC_SET_LEADCHNL_RA_PACING_ANODE_ELECTRODE_1: NORMAL
MDC_IDC_SET_LEADCHNL_RA_PACING_ANODE_LOCATION_1: NORMAL
MDC_IDC_SET_LEADCHNL_RA_PACING_CAPTURE_MODE: NORMAL
MDC_IDC_SET_LEADCHNL_RA_PACING_CATHODE_ELECTRODE_1: NORMAL
MDC_IDC_SET_LEADCHNL_RA_PACING_CATHODE_LOCATION_1: NORMAL
MDC_IDC_SET_LEADCHNL_RA_PACING_POLARITY: NORMAL
MDC_IDC_SET_LEADCHNL_RA_PACING_PULSEWIDTH: 0.4 MS
MDC_IDC_SET_LEADCHNL_RA_SENSING_ANODE_ELECTRODE_1: NORMAL
MDC_IDC_SET_LEADCHNL_RA_SENSING_ANODE_LOCATION_1: NORMAL
MDC_IDC_SET_LEADCHNL_RA_SENSING_CATHODE_ELECTRODE_1: NORMAL
MDC_IDC_SET_LEADCHNL_RA_SENSING_CATHODE_LOCATION_1: NORMAL
MDC_IDC_SET_LEADCHNL_RA_SENSING_POLARITY: NORMAL
MDC_IDC_SET_LEADCHNL_RA_SENSING_SENSITIVITY: 0.3 MV
MDC_IDC_SET_LEADCHNL_RV_PACING_AMPLITUDE: 2 V
MDC_IDC_SET_LEADCHNL_RV_PACING_ANODE_ELECTRODE_1: NORMAL
MDC_IDC_SET_LEADCHNL_RV_PACING_ANODE_LOCATION_1: NORMAL
MDC_IDC_SET_LEADCHNL_RV_PACING_CAPTURE_MODE: NORMAL
MDC_IDC_SET_LEADCHNL_RV_PACING_CATHODE_ELECTRODE_1: NORMAL
MDC_IDC_SET_LEADCHNL_RV_PACING_CATHODE_LOCATION_1: NORMAL
MDC_IDC_SET_LEADCHNL_RV_PACING_POLARITY: NORMAL
MDC_IDC_SET_LEADCHNL_RV_PACING_PULSEWIDTH: 0.4 MS
MDC_IDC_SET_LEADCHNL_RV_SENSING_ANODE_ELECTRODE_1: NORMAL
MDC_IDC_SET_LEADCHNL_RV_SENSING_ANODE_LOCATION_1: NORMAL
MDC_IDC_SET_LEADCHNL_RV_SENSING_CATHODE_ELECTRODE_1: NORMAL
MDC_IDC_SET_LEADCHNL_RV_SENSING_CATHODE_LOCATION_1: NORMAL
MDC_IDC_SET_LEADCHNL_RV_SENSING_POLARITY: NORMAL
MDC_IDC_SET_LEADCHNL_RV_SENSING_SENSITIVITY: 0.3 MV
MDC_IDC_SET_ZONE_DETECTION_BEATS_DENOMINATOR: 40 {BEATS}
MDC_IDC_SET_ZONE_DETECTION_BEATS_NUMERATOR: 30 {BEATS}
MDC_IDC_SET_ZONE_DETECTION_INTERVAL: 320 MS
MDC_IDC_SET_ZONE_DETECTION_INTERVAL: 350 MS
MDC_IDC_SET_ZONE_DETECTION_INTERVAL: 360 MS
MDC_IDC_SET_ZONE_DETECTION_INTERVAL: 400 MS
MDC_IDC_SET_ZONE_DETECTION_INTERVAL: NORMAL
MDC_IDC_SET_ZONE_TYPE: NORMAL
MDC_IDC_STAT_AT_BURDEN_PERCENT: 0 %
MDC_IDC_STAT_AT_DTM_END: NORMAL
MDC_IDC_STAT_AT_DTM_START: NORMAL
MDC_IDC_STAT_BRADY_AP_VP_PERCENT: 0.89 %
MDC_IDC_STAT_BRADY_AP_VS_PERCENT: 31.99 %
MDC_IDC_STAT_BRADY_AS_VP_PERCENT: 0.04 %
MDC_IDC_STAT_BRADY_AS_VS_PERCENT: 67.08 %
MDC_IDC_STAT_BRADY_DTM_END: NORMAL
MDC_IDC_STAT_BRADY_DTM_START: NORMAL
MDC_IDC_STAT_BRADY_RA_PERCENT_PACED: 32.91 %
MDC_IDC_STAT_BRADY_RV_PERCENT_PACED: 0.97 %
MDC_IDC_STAT_EPISODE_RECENT_COUNT: 0
MDC_IDC_STAT_EPISODE_RECENT_COUNT_DTM_END: NORMAL
MDC_IDC_STAT_EPISODE_RECENT_COUNT_DTM_START: NORMAL
MDC_IDC_STAT_EPISODE_TOTAL_COUNT: 0
MDC_IDC_STAT_EPISODE_TOTAL_COUNT: 2
MDC_IDC_STAT_EPISODE_TOTAL_COUNT_DTM_END: NORMAL
MDC_IDC_STAT_EPISODE_TOTAL_COUNT_DTM_START: NORMAL
MDC_IDC_STAT_EPISODE_TYPE: NORMAL
MDC_IDC_STAT_TACHYTHERAPY_ATP_DELIVERED_RECENT: 0
MDC_IDC_STAT_TACHYTHERAPY_ATP_DELIVERED_TOTAL: 0
MDC_IDC_STAT_TACHYTHERAPY_RECENT_DTM_END: NORMAL
MDC_IDC_STAT_TACHYTHERAPY_RECENT_DTM_START: NORMAL
MDC_IDC_STAT_TACHYTHERAPY_SHOCKS_ABORTED_RECENT: 0
MDC_IDC_STAT_TACHYTHERAPY_SHOCKS_ABORTED_TOTAL: 0
MDC_IDC_STAT_TACHYTHERAPY_SHOCKS_DELIVERED_RECENT: 0
MDC_IDC_STAT_TACHYTHERAPY_SHOCKS_DELIVERED_TOTAL: 0
MDC_IDC_STAT_TACHYTHERAPY_TOTAL_DTM_END: NORMAL
MDC_IDC_STAT_TACHYTHERAPY_TOTAL_DTM_START: NORMAL

## 2022-04-05 PROCEDURE — 93283 PRGRMG EVAL IMPLANTABLE DFB: CPT | Performed by: INTERNAL MEDICINE

## 2022-04-05 NOTE — PROGRESS NOTES
Clinic Care Coordination Contact  [facility]/Voicemail       Clinical Data: Care Coordinator Outreach  Outreach attempted x 1.  Left message on patient's voicemail with call back information and requested return call.  Plan: Care Coordinator will try to reach patient again in 1-2 business days.    DENISA Zambrano  [phone]  [facility]

## 2022-04-06 ENCOUNTER — TELEPHONE (OUTPATIENT)
Dept: CARDIOLOGY | Facility: CLINIC | Age: 72
End: 2022-04-06
Payer: COMMERCIAL

## 2022-04-06 DIAGNOSIS — I50.22 CHRONIC SYSTOLIC HEART FAILURE (H): Primary | ICD-10-CM

## 2022-04-06 NOTE — PROGRESS NOTES
"Clinic Care Coordination Contact  [facility]: Post-Discharge Note  SITUATION                                                      Admission:    Admission Date: 04/04/22   Reason for Admission: Hypervolemia, unspecified hypervolemia type  Discharge:   Discharge Date: 04/04/22  Discharge Diagnosis: Hypervolemia, unspecified hypervolemia type    BACKGROUND                                                      Per hospital discharge summary and inpatient provider notes:    Delbert Tilley is a 71 year old male who Presents with shortness of breath.  The patient takes diuretics at home.  He has a history of what sounds like peripheral artery disease that was operated on a few months ago.  He has been using compression stocking since recovering from surgery. says his surgery was very helpful.  he reports that over the past week he feels like the compression garments are pushing the water up into his legs and abdomen.  He says this is making it hard to breathe.  S no fevers.  He called his clinic and asked if he could take an extra dose of his diuretic and was told that instead he should come immediately to the emergency room.  No chest pain.  No rashes.  The patient has gained 10 pounds in the past week.    ASSESSMENT      Enrollment  Primary Care Care Coordination Status: Declined    Discharge Assessment  How are you doing now that you are home?: \"I got a lot of extra water weight, but I have been working with a team\"  How are your symptoms? (Red Flag symptoms escalate to triage hotline per guidelines): Improved  Do you feel your condition is stable enough to be safe at home until your provider visit?: Yes  Does the patient have their discharge instructions? : Yes  Does the patient have questions regarding their discharge instructions? : No  Were you started on any new medications or were there changes to any of your previous medications? : Yes  Does the patient have all of their medications?: Yes  Do you have " questions regarding any of your medications? : No  Do you have all of your needed medical supplies or equipment (DME)?  (i.e. oxygen tank, CPAP, cane, etc.): Yes  Discharge follow-up appointment scheduled within 14 calendar days? : Yes  Discharge Follow Up Appointment Date: 04/08/22  Discharge Follow Up Appointment Scheduled with?: Specialty Care Provider    Post-op ([facility] Only)  If the patient had a surgery or procedure, do they have any questions for a nurse?: No      PLAN                                                      Outpatient Plan:    Follow up with Danii Hernández MD (Family Medicine) in 2 days (4/6/2022); follow up  Follow up with [facility] (EMERGENCY MEDICINE); As needed, If symptoms worsen      Future Appointments   Date Time Provider Department Center   4/8/2022  8:30 AM [facility]   4/8/2022  9:30 [facility]   6/14/2022 10:40 AM [facility] [place] [facility]   6/14/2022 11:20 AM [facility] [place] [facility]   6/14/2022 12:00 PM Ric Chau MD [facility]   7/7/2022 12:00 [facility]         For any urgent concerns, please contact our 24 hour nurse triage line: [phone] ([phone])         DENISA Zambrano  [phone]  [facility]

## 2022-04-06 NOTE — TELEPHONE ENCOUNTER
Pt states he called in on Monday 4/4/22 as he was gaining weight and was a bit short of breath. He took his compression socks off that day and has not replaced them; his legs are swollen bilaterally to the thigh. States he was told to go to the ED. The ED provider told him to double up on his torsemide to 40 mg BID. He also has an order to take and extra 40 mg as needed for weight gain. He took a total of 120 mg torsemide over the last 24 hours. He states he is finally starting to urinate more. Instructed to continue the torsemide at 40 mg BID as directed by ED provider and to replace his compression stockings so the fluid in his legs can removed by the torsemide. He has a follow up on 4/8/22 with Dr Bello already scheduled. He will go to the ED before then for unrelieved dyspnea. He will continue to eat a low sodium diet, elevated his legs and drink a glass of OJ daily for K+ replacement. Has STAT labs prior to visit. Lissett Sandoval RN Cardiology April 6, 2022, 10:09 AM

## 2022-04-08 ENCOUNTER — LAB (OUTPATIENT)
Dept: LAB | Facility: CLINIC | Age: 72
End: 2022-04-08
Payer: COMMERCIAL

## 2022-04-08 ENCOUNTER — APPOINTMENT (OUTPATIENT)
Dept: CARDIOLOGY | Facility: CLINIC | Age: 72
End: 2022-04-08
Attending: INTERNAL MEDICINE
Payer: COMMERCIAL

## 2022-04-08 ENCOUNTER — APPOINTMENT (OUTPATIENT)
Dept: GENERAL RADIOLOGY | Facility: CLINIC | Age: 72
End: 2022-04-08
Attending: PHYSICIAN ASSISTANT
Payer: COMMERCIAL

## 2022-04-08 ENCOUNTER — HOSPITAL ENCOUNTER (OUTPATIENT)
Facility: CLINIC | Age: 72
Discharge: HOME | End: 2022-04-09
Attending: PHYSICIAN ASSISTANT | Admitting: HOSPITALIST
Payer: COMMERCIAL

## 2022-04-08 VITALS
HEIGHT: 73 IN | HEART RATE: 57 BPM | DIASTOLIC BLOOD PRESSURE: 53 MMHG | WEIGHT: 172 LBS | OXYGEN SATURATION: 98 % | SYSTOLIC BLOOD PRESSURE: 99 MMHG | BODY MASS INDEX: 22.8 KG/M2

## 2022-04-08 DIAGNOSIS — I50.22 CHRONIC SYSTOLIC CONGESTIVE HEART FAILURE (H): ICD-10-CM

## 2022-04-08 DIAGNOSIS — Z11.59 NEED FOR HEPATITIS C SCREENING TEST: ICD-10-CM

## 2022-04-08 DIAGNOSIS — I50.9 CONGESTIVE HEART FAILURE, UNSPECIFIED HF CHRONICITY, UNSPECIFIED HEART FAILURE TYPE (H): ICD-10-CM

## 2022-04-08 DIAGNOSIS — R06.09 PLATYPNEA-ORTHODEOXIA SYNDROME: ICD-10-CM

## 2022-04-08 DIAGNOSIS — Z11.52 ENCOUNTER FOR SCREENING LABORATORY TESTING FOR SEVERE ACUTE RESPIRATORY SYNDROME CORONAVIRUS 2 (SARS-COV-2): ICD-10-CM

## 2022-04-08 DIAGNOSIS — I25.5 ISCHEMIC CARDIOMYOPATHY: ICD-10-CM

## 2022-04-08 DIAGNOSIS — E11.69 TYPE 2 DIABETES MELLITUS WITH OTHER SPECIFIED COMPLICATION, WITH LONG-TERM CURRENT USE OF INSULIN (H): ICD-10-CM

## 2022-04-08 DIAGNOSIS — Z79.4 TYPE 2 DIABETES MELLITUS WITH OTHER SPECIFIED COMPLICATION, WITH LONG-TERM CURRENT USE OF INSULIN (H): ICD-10-CM

## 2022-04-08 DIAGNOSIS — I50.22 CHRONIC SYSTOLIC HEART FAILURE (H): ICD-10-CM

## 2022-04-08 DIAGNOSIS — I50.9 CHF (CONGESTIVE HEART FAILURE) (H): ICD-10-CM

## 2022-04-08 DIAGNOSIS — R06.09 DYSPNEA ON EXERTION: ICD-10-CM

## 2022-04-08 LAB
ANION GAP SERPL CALCULATED.3IONS-SCNC: 3 MMOL/L (ref 3–14)
BASOPHILS # BLD AUTO: 0 10E3/UL (ref 0–0.2)
BASOPHILS NFR BLD AUTO: 1 %
BUN SERPL-MCNC: 67 MG/DL (ref 7–30)
CALCIUM SERPL-MCNC: 9 MG/DL (ref 8.5–10.1)
CHLORIDE BLD-SCNC: 104 MMOL/L (ref 94–109)
CHOLEST SERPL-MCNC: 81 MG/DL
CO2 SERPL-SCNC: 33 MMOL/L (ref 20–32)
CREAT SERPL-MCNC: 1.89 MG/DL (ref 0.66–1.25)
EOSINOPHIL # BLD AUTO: 0.1 10E3/UL (ref 0–0.7)
EOSINOPHIL NFR BLD AUTO: 2 %
ERYTHROCYTE [DISTWIDTH] IN BLOOD BY AUTOMATED COUNT: 15.7 % (ref 10–15)
FASTING STATUS PATIENT QL REPORTED: NO
GFR SERPL CREATININE-BSD FRML MDRD: 37 ML/MIN/1.73M2
GLUCOSE BLD-MCNC: 122 MG/DL (ref 70–99)
GLUCOSE BLDC GLUCOMTR-MCNC: 108 MG/DL (ref 70–99)
GLUCOSE BLDC GLUCOMTR-MCNC: 112 MG/DL (ref 70–99)
HCT VFR BLD AUTO: 30.7 % (ref 40–53)
HCV AB SERPL QL IA: NONREACTIVE
HDLC SERPL-MCNC: 28 MG/DL
HGB BLD-MCNC: 9.3 G/DL (ref 13.3–17.7)
HOLD SPECIMEN: NORMAL
HOLD SPECIMEN: NORMAL
IMM GRANULOCYTES # BLD: 0 10E3/UL
IMM GRANULOCYTES NFR BLD: 1 %
LDLC SERPL CALC-MCNC: 34 MG/DL
LVEF ECHO: NORMAL
LYMPHOCYTES # BLD AUTO: 1 10E3/UL (ref 0.8–5.3)
LYMPHOCYTES NFR BLD AUTO: 15 %
MCH RBC QN AUTO: 27 PG (ref 26.5–33)
MCHC RBC AUTO-ENTMCNC: 30.3 G/DL (ref 31.5–36.5)
MCV RBC AUTO: 89 FL (ref 78–100)
MONOCYTES # BLD AUTO: 0.7 10E3/UL (ref 0–1.3)
MONOCYTES NFR BLD AUTO: 11 %
NEUTROPHILS # BLD AUTO: 4.7 10E3/UL (ref 1.6–8.3)
NEUTROPHILS NFR BLD AUTO: 70 %
NONHDLC SERPL-MCNC: 53 MG/DL
NRBC # BLD AUTO: 0 10E3/UL
NRBC BLD AUTO-RTO: 0 /100
NT-PROBNP SERPL-MCNC: ABNORMAL PG/ML (ref 0–900)
PLATELET # BLD AUTO: 119 10E3/UL (ref 150–450)
POTASSIUM BLD-SCNC: 4.8 MMOL/L (ref 3.4–5.3)
RBC # BLD AUTO: 3.45 10E6/UL (ref 4.4–5.9)
SARS-COV-2 RNA RESP QL NAA+PROBE: NEGATIVE
SODIUM SERPL-SCNC: 140 MMOL/L (ref 133–144)
TRIGL SERPL-MCNC: 94 MG/DL
TROPONIN I SERPL HS-MCNC: 64 NG/L
TROPONIN I SERPL HS-MCNC: 66 NG/L
TROPONIN I SERPL HS-MCNC: 73 NG/L
WBC # BLD AUTO: 6.6 10E3/UL (ref 4–11)

## 2022-04-08 PROCEDURE — 87635 SARS-COV-2 COVID-19 AMP PRB: CPT | Performed by: PHYSICIAN ASSISTANT

## 2022-04-08 PROCEDURE — 250N000011 HC RX IP 250 OP 636: Performed by: PHYSICIAN ASSISTANT

## 2022-04-08 PROCEDURE — 83880 ASSAY OF NATRIURETIC PEPTIDE: CPT | Performed by: PHYSICIAN ASSISTANT

## 2022-04-08 PROCEDURE — 250N000013 HC RX MED GY IP 250 OP 250 PS 637: Performed by: PHYSICIAN ASSISTANT

## 2022-04-08 PROCEDURE — 96374 THER/PROPH/DIAG INJ IV PUSH: CPT | Performed by: EMERGENCY MEDICINE

## 2022-04-08 PROCEDURE — 36415 COLL VENOUS BLD VENIPUNCTURE: CPT | Performed by: PHYSICIAN ASSISTANT

## 2022-04-08 PROCEDURE — 99285 EMERGENCY DEPT VISIT HI MDM: CPT | Mod: 25 | Performed by: EMERGENCY MEDICINE

## 2022-04-08 PROCEDURE — 85025 COMPLETE CBC W/AUTO DIFF WBC: CPT | Performed by: PHYSICIAN ASSISTANT

## 2022-04-08 PROCEDURE — 80048 BASIC METABOLIC PNL TOTAL CA: CPT | Performed by: INTERNAL MEDICINE

## 2022-04-08 PROCEDURE — 250N000011 HC RX IP 250 OP 636: Performed by: HOSPITALIST

## 2022-04-08 PROCEDURE — 99214 OFFICE O/P EST MOD 30 MIN: CPT | Performed by: INTERNAL MEDICINE

## 2022-04-08 PROCEDURE — 93325 DOPPLER ECHO COLOR FLOW MAPG: CPT | Mod: 26 | Performed by: INTERNAL MEDICINE

## 2022-04-08 PROCEDURE — C9803 HOPD COVID-19 SPEC COLLECT: HCPCS | Performed by: EMERGENCY MEDICINE

## 2022-04-08 PROCEDURE — 86803 HEPATITIS C AB TEST: CPT

## 2022-04-08 PROCEDURE — 99285 EMERGENCY DEPT VISIT HI MDM: CPT | Performed by: EMERGENCY MEDICINE

## 2022-04-08 PROCEDURE — 99223 1ST HOSP IP/OBS HIGH 75: CPT | Mod: AI | Performed by: HOSPITALIST

## 2022-04-08 PROCEDURE — 80061 LIPID PANEL: CPT | Performed by: INTERNAL MEDICINE

## 2022-04-08 PROCEDURE — 84484 ASSAY OF TROPONIN QUANT: CPT | Mod: 91 | Performed by: PHYSICIAN ASSISTANT

## 2022-04-08 PROCEDURE — 120N000001 HC R&B MED SURG/OB

## 2022-04-08 PROCEDURE — 93308 TTE F-UP OR LMTD: CPT | Mod: 26 | Performed by: INTERNAL MEDICINE

## 2022-04-08 PROCEDURE — 93010 ELECTROCARDIOGRAM REPORT: CPT | Performed by: EMERGENCY MEDICINE

## 2022-04-08 PROCEDURE — 93005 ELECTROCARDIOGRAM TRACING: CPT | Performed by: EMERGENCY MEDICINE

## 2022-04-08 PROCEDURE — 93321 DOPPLER ECHO F-UP/LMTD STD: CPT | Mod: 26 | Performed by: INTERNAL MEDICINE

## 2022-04-08 PROCEDURE — 93325 DOPPLER ECHO COLOR FLOW MAPG: CPT

## 2022-04-08 PROCEDURE — 250N000013 HC RX MED GY IP 250 OP 250 PS 637: Performed by: HOSPITALIST

## 2022-04-08 PROCEDURE — 71046 X-RAY EXAM CHEST 2 VIEWS: CPT

## 2022-04-08 PROCEDURE — 36415 COLL VENOUS BLD VENIPUNCTURE: CPT | Performed by: INTERNAL MEDICINE

## 2022-04-08 RX ORDER — SALIVA STIMULANT COMB. NO.3
2 SPRAY, NON-AEROSOL (ML) MUCOUS MEMBRANE DAILY PRN
Status: DISCONTINUED | OUTPATIENT
Start: 2022-04-08 | End: 2022-04-09 | Stop reason: HOSPADM

## 2022-04-08 RX ORDER — METOLAZONE 2.5 MG/1
5 TABLET ORAL ONCE
Status: COMPLETED | OUTPATIENT
Start: 2022-04-08 | End: 2022-04-08

## 2022-04-08 RX ORDER — ATORVASTATIN CALCIUM 80 MG/1
80 TABLET, FILM COATED ORAL AT BEDTIME
Status: DISCONTINUED | OUTPATIENT
Start: 2022-04-08 | End: 2022-04-09 | Stop reason: HOSPADM

## 2022-04-08 RX ORDER — GABAPENTIN 100 MG/1
200 CAPSULE ORAL AT BEDTIME
Status: DISCONTINUED | OUTPATIENT
Start: 2022-04-08 | End: 2022-04-09 | Stop reason: HOSPADM

## 2022-04-08 RX ORDER — ACETAMINOPHEN 325 MG/1
650 TABLET ORAL EVERY 4 HOURS PRN
Status: DISCONTINUED | OUTPATIENT
Start: 2022-04-08 | End: 2022-04-09

## 2022-04-08 RX ORDER — ALBUTEROL SULFATE 90 UG/1
2 AEROSOL, METERED RESPIRATORY (INHALATION) EVERY 4 HOURS PRN
Status: DISCONTINUED | OUTPATIENT
Start: 2022-04-08 | End: 2022-04-09 | Stop reason: HOSPADM

## 2022-04-08 RX ORDER — CLOPIDOGREL BISULFATE 75 MG/1
75 TABLET ORAL DAILY
Status: DISCONTINUED | OUTPATIENT
Start: 2022-04-09 | End: 2022-04-09 | Stop reason: HOSPADM

## 2022-04-08 RX ORDER — LOSARTAN POTASSIUM 50 MG/1
50 TABLET ORAL DAILY
Status: DISCONTINUED | OUTPATIENT
Start: 2022-04-09 | End: 2022-04-09 | Stop reason: HOSPADM

## 2022-04-08 RX ORDER — FUROSEMIDE 10 MG/ML
40 INJECTION INTRAMUSCULAR; INTRAVENOUS ONCE
Status: COMPLETED | OUTPATIENT
Start: 2022-04-08 | End: 2022-04-08

## 2022-04-08 RX ORDER — VITAMIN B COMPLEX
50 TABLET ORAL 2 TIMES DAILY
Status: DISCONTINUED | OUTPATIENT
Start: 2022-04-08 | End: 2022-04-09 | Stop reason: HOSPADM

## 2022-04-08 RX ORDER — FUROSEMIDE 10 MG/ML
80 INJECTION INTRAMUSCULAR; INTRAVENOUS EVERY 8 HOURS
Status: DISCONTINUED | OUTPATIENT
Start: 2022-04-08 | End: 2022-04-09 | Stop reason: HOSPADM

## 2022-04-08 RX ORDER — PANTOPRAZOLE SODIUM 40 MG/1
40 TABLET, DELAYED RELEASE ORAL 2 TIMES DAILY
Status: DISCONTINUED | OUTPATIENT
Start: 2022-04-08 | End: 2022-04-09 | Stop reason: HOSPADM

## 2022-04-08 RX ORDER — CARVEDILOL 12.5 MG/1
12.5 TABLET ORAL 2 TIMES DAILY WITH MEALS
Status: DISCONTINUED | OUTPATIENT
Start: 2022-04-08 | End: 2022-04-09

## 2022-04-08 RX ORDER — CARBOXYMETHYLCELLULOSE SODIUM 5 MG/ML
1 SOLUTION/ DROPS OPHTHALMIC 4 TIMES DAILY PRN
Status: DISCONTINUED | OUTPATIENT
Start: 2022-04-08 | End: 2022-04-09 | Stop reason: HOSPADM

## 2022-04-08 RX ORDER — CALCIUM CARBONATE 500(1250)
1 TABLET ORAL 2 TIMES DAILY
Status: DISCONTINUED | OUTPATIENT
Start: 2022-04-08 | End: 2022-04-09 | Stop reason: HOSPADM

## 2022-04-08 RX ADMIN — MOMETASONE FUROATE 2 PUFF: 220 INHALANT RESPIRATORY (INHALATION) at 22:24

## 2022-04-08 RX ADMIN — Medication 50 MCG: at 21:11

## 2022-04-08 RX ADMIN — GABAPENTIN 200 MG: 100 CAPSULE ORAL at 21:11

## 2022-04-08 RX ADMIN — PANTOPRAZOLE SODIUM 40 MG: 40 TABLET, DELAYED RELEASE ORAL at 21:11

## 2022-04-08 RX ADMIN — FUROSEMIDE 40 MG: 10 INJECTION, SOLUTION INTRAMUSCULAR; INTRAVENOUS at 12:18

## 2022-04-08 RX ADMIN — FUROSEMIDE 80 MG: 10 INJECTION, SOLUTION INTRAMUSCULAR; INTRAVENOUS at 22:25

## 2022-04-08 RX ADMIN — ATORVASTATIN CALCIUM 80 MG: 80 TABLET, FILM COATED ORAL at 21:11

## 2022-04-08 RX ADMIN — ACETAMINOPHEN 650 MG: 325 TABLET ORAL at 21:21

## 2022-04-08 RX ADMIN — METOLAZONE 5 MG: 2.5 TABLET ORAL at 21:11

## 2022-04-08 RX ADMIN — CARVEDILOL 12.5 MG: 12.5 TABLET, FILM COATED ORAL at 21:11

## 2022-04-08 RX ADMIN — CALCIUM 500 MG: 500 TABLET ORAL at 21:11

## 2022-04-08 ASSESSMENT — ACTIVITIES OF DAILY LIVING (ADL)
ADLS_ACUITY_SCORE: 5
ADLS_ACUITY_SCORE: 9
ADLS_ACUITY_SCORE: 9
ADLS_ACUITY_SCORE: 5
ADLS_ACUITY_SCORE: 5
ADLS_ACUITY_SCORE: 9
ADLS_ACUITY_SCORE: 5
ADLS_ACUITY_SCORE: 5

## 2022-04-08 ASSESSMENT — ENCOUNTER SYMPTOMS
FATIGUE: 0
CHEST TIGHTNESS: 0
MUSCULOSKELETAL NEGATIVE: 1
EYES NEGATIVE: 1
ENDOCRINE NEGATIVE: 1
GASTROINTESTINAL NEGATIVE: 1
SHORTNESS OF BREATH: 1
WHEEZING: 0
ACTIVITY CHANGE: 0
FEVER: 0
PALPITATIONS: 0
APPETITE CHANGE: 0
COUGH: 1
NEUROLOGICAL NEGATIVE: 1
PSYCHIATRIC NEGATIVE: 1
CHOKING: 0

## 2022-04-08 NOTE — PROGRESS NOTES
[facility] [facility] ADMISSION NOTE    Patient admitted to room 2315 at approximately 1500 via cart from emergency room. Patient was accompanied by transport tech.     Verbal SBAR report received from Sloane BENDER prior to patient arrival.     Patient ambulated to bed with stand-by assist. Patient alert and oriented X 3. The patient is not having any pain.  . Admission vital signs: Blood pressure 106/56, pulse 62, temperature 98  F (36.7  C), temperature source Oral, resp. rate 16, height 1.829 m (6'), weight 78.9 kg (173 lb 15.1 oz), SpO2 97 %. Patient was oriented to plan of care, call light, bed controls, tv, telephone, bathroom and visiting hours.     Risk Assessment    The following safety risks were identified during admission: none. Yellow risk band applied: NO.     Skin Initial Assessment    This writer admitted this patient and completed a full skin assessment and Geo score in the Adult PCS flowsheet. Appropriate interventions initiated as needed.     Secondary skin check completed by declined.               Courtney Torres RN

## 2022-04-08 NOTE — RESULT ENCOUNTER NOTE
Lipids stable and at goal; electrolytes WNL; creatinine continues to creep up. Follow up with Dr Bello today

## 2022-04-08 NOTE — LETTER
4/8/2022    Danii Hernández MD  [id number]  [facility] [id number]    RE: Delbert Tilley       Dear Colleague,     I had the pleasure of seeing Delbert Tilley in the [facility].    Cardiology Consultation       Assessment & Plan     1.  Cardiomyopathy with mixed etiology per chart review.  Ischemic component with multivessel CABG performed in the past  2.  Status post ICD placement for primary prevention  3.  Coronary artery disease status post multivessel CABG 2014 consisting of LIMA to LAD, saphenous vein graft to OM and a saphenous vein graft to PDA, PCI to OM graft 2014  4.  Most recent angiogram from 2017 as listed below  5.  Extensive PAD with right and left carotid endarterectomy and intermittent claudication with abnormal ABIs.  Established vascular surgery patient  6.  History of atrial flutter status post ablation  7.  Paroxysmal atrial fibrillation  8.  COPD  9.  Chronic kidney disease  10.  Hyperlipidemia  11.  DM      Recommendations    1.  Decompensation in heart failure.:  At this time I feel oral diuretics are not sufficient in managing his volume status.  We will arrange for ER visit and likely admit with intravenous diuretics and management of his heart failure.  We have called the emergency room to give them information on his current status and will be arranging for his transport to the emergency room.  ICD check was recently performed.  There is suggestion of higher intravascular volume based on parameters that were identified.  This goes along with his physical exam and current clinical status.    2.  Following stabilization I would have him follow-up with our core clinic colleagues as he has seen them in the past to continue to manage his ischemic cardiomyopathy.      Buddy Bello MD'      HPI:    Patient is a 71-year-old gentleman who I am meeting for the first time.  Previously has been followed by my colleague Dr. Michaels who has since retired.  He has a  notable history as described above.  Pertinent history includes cardiomyopathy which has been deemed mixed in etiology however there is no ischemic component.  He has had multivessel CABG performed in 2014 and an angiogram performed in 2017.  He has significantly reduced LV systolic function for which he had an ICD placed in the past.  He has been followed in the core clinic at [facility] as well.    Patient was last seen by me in 2021.  Since that timeframe he has had procedures from vascular surgery and interventional radiology.  He had stenting of his iliac arteries and bilateral endarterectomies and additional procedures for his longstanding claudication.  Overall this did improve his walk distance and symptoms.  However his most recent difficulty has been weight gain and decompensation in his heart failure.  Earlier this week he did have an ER visit at which time he had adjustments in his medical regimen made.  He was found to have a significant elevated B NP of 33,000, intravenous diuretics were given and a higher dose of his baseline diuretics was prescribed.  Despite this he continues to have further symptoms.  He reports of tightness in his lower extremity, increased edema, abdominal distention and worsening in his heart failure symptoms with shortness of breath with exertion.  He is accompanied by his family member who confirms these findings.  He has been faithful in taking his medical regimen.  Presents for follow-up.  Lab work was performed today and his creatinine is above his typical baseline.                                                          Echo 2021  Left ventricular systolic function is severely reduced.The visual ejection  fraction is 20-25%.The left ventricle is mildly dilated.  Moderately decreased right ventricular systolic function  The left atrium is severely dilated.  There is mild (1+) mitral regurgitation.  There is mild (1+) tricuspid regurgitation.  Pulmonary hypertension- RVSP 54 mm  hg +RA.     Compared to echo dated 11/23/2020 no significant changes.      MPI 2020       The nuclear stress test is abnormal.     There is transmural infarction in the inferior and inferoseptal segment(s) of the left ventricle.     There is a small area of nontransmural infarction in the distal apical segment(s) of the left ventricle.     Left ventricular function is severely reduced.     The left ventricular ejection fraction at rest is 15%.  The left ventricular ejection fraction at stress is 20%.     Severe left ventricular enlargement is noted.     Stress to rest cavity ratio is 1.21.     A prior study was conducted on 8/11/2017.  This study has no significant change when compared with the prior study.      Cardiac Cath 2017  CORONARY ANGIOGRAM:   1. Both coronary arteries arise from their respective cusps.  2. Dominance: Right  3. The LM is without angiographic evidence of disease.   4. LAD: Type 3 [LAD supplies the entire apex].. The LAD is diffusely  diseased gives rise to septal perforators, D1 and D2. There is 90%  stenosis of the proximal LAD just proximal to the D1 branch. Distally  it is supplied by the LIMA, which has competitive flow to the mid  segment.  There is 50% stenosis of distal and apical LAD.  5. LCX gives rise to small size OM1 and OM2 vessels in the proximal  segment and small size OM4 in the distal segment. It also gives rise  to medium size OM3 which is 100% occluded in the ostium. There is also  90% stenosis of the ostium of OM2. There is diffuse mild disease in  the circumflex artery. There are collaterals supplying the PL   6. RCA gives rise to PL branches and supplies PDA. There is diffuse  moderate disease in the proximal to mid portion and 100% occluded in  the mid RCA. Distally the PL and PDA are supplied by collaterals from  the left system.       CORONARY ARTERY BYPASS ANGIOGRAPHY:  Coronary bypass angiography was performed on the following.  LIMA to LAD: The ostium, body and  anastomosis site are free of  significant disease  SVG to OM:  The ostium and anastomosis site are free of disease. There  is patent stent noted in the proximal SVG. There is 20% stenosis of  the proximal SVG, and 20% stenosis of the mid SVG.  SVG to RCA: it is 100% occluded at the ostium        Primary Care Physician   Danii Hernández        Patient Active Problem List   Diagnosis     Cardiomyopathy (H)     Atrial fibrillation/flutter     Coronary artery disease involving native coronary artery of native heart without angina pectoris     Alcohol abuse, in remission     GERD (gastroesophageal reflux disease)     Alcohol-induced chronic pancreatitis (H)     Diabetes mellitus, type 2 (H)     Type 2 diabetes mellitus with other specified complication, with long-term current use of insulin (H)     CARDIOVASCULAR SCREENING; LDL GOAL LESS THAN 100     Hyperlipidemia LDL goal <70     ACS (acute coronary syndrome) (H)     NSTEMI (non-ST elevated myocardial infarction) (H)     Carotid stenosis     Carpal tunnel syndrome of right wrist     Left carotid stenosis     Tobacco use     NSVT (nonsustained ventricular tachycardia) (H)     Chronic systolic heart failure (H)     Paroxysmal atrial fibrillation (H)     Chronic obstructive pulmonary disease, unspecified COPD type (H)     Hypotension, unspecified hypotension type     Chronic kidney disease, stage 3 (H)     Pleural plaque     Atherosclerosis of artery of extremity with intermittent claudication (H)     Hyperparathyroidism (H)     Thrombocytopenia (H)     Other idiopathic peripheral autonomic neuropathy     Venous insufficiency (chronic) (peripheral)     Actinic keratosis     Nail dystrophy     Generalized muscle weakness       Past Medical History   I have reviewed this patient's medical history and updated it with pertinent information if needed.   Past Medical History:   Diagnosis Date     Acute renal failure (H) 8/26/06 Hosp    secondary to dehydration     Anemia       Arthritis      Atherosclerosis of artery of extremity with intermittent claudication (H)      CAD (coronary artery disease)     LIMA to the LAD, vein graft to OM and a vein graft to the PDA     Cardiomyopathy (H)      Carpal tunnel syndrome      CHF (congestive heart failure) (H)     Ischemic and nonischemic cardiomyopathy; 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%     Claudication (H)      COPD (chronic obstructive pulmonary disease) (H)      Diabetes (H)      Gastro-oesophageal reflux disease      GERD (gastroesophageal reflux disease)      H/O: alcohol abuse      HTN (hypertension)      Hyperlipidaemia LDL goal <100 07/08/2013     Hyperparathyroidism (H)      Hypokalemia      ICD (implantable cardioverter-defibrillator) in place     [employer]     NSTEMI (non-ST elevated myocardial infarction) (H)      NSVT (nonsustained ventricular tachycardia) (H)      Pacemaker      Pancreatitis 6/26/06 Hosp     Paroxysmal atrial fibrillation (H)      PVD (peripheral vascular disease) (H)      Thrombocytopenia (H)      Tobacco use      Venous (peripheral) insufficiency      Vitamin D deficiency        Past Surgical History   I have reviewed this patient's surgical history and updated it with pertinent information if needed.  Past Surgical History:   Procedure Laterality Date     CATARACT IOL, RT/LT      Cataract IOL RT/LT     CLOSE SECONDARY WOUND LOWER EXTREMITY Right 2/13/2022    Procedure: Right groin wound exploration, nerve release and closure.;  Surgeon: Karen Arthur MD;  Location: [facility] OR     ENDARTERECTOMY CAROTID Right 06/12/2015    Procedure: ENDARTERECTOMY CAROTID;  Surgeon: João Turner MD;  Location:  OR     ENDARTERECTOMY CAROTID Left 09/08/2017    Procedure: ENDARTERECTOMY CAROTID;  LEFT CAROTID ENDARTERECTOMY WITH EEG;  Surgeon: João Turner MD;  Location:  OR     ENDARTERECTOMY FEMORAL Bilateral 2/9/2022    Procedure: BILATERAL- COMMON FEMORAL ENDARTERECTOMY; RETROGRADE ILIAC ARTERY STENTING;   Surgeon: Ric Chau MD;  Location: [facility] OR     IMPLANT PACEMAKER  06/10/2010     IMPLANTED DEVICE       INTERPOSITION TENDON HAND N/A 06/09/2020    Procedure: Right thumb ligament reconstruction tendon interposition with extensor pollicis brevis to abductor pollicis tendon transfer;  Surgeon: Bethel Martin MD;  Location: [facility] OR     RELEASE CARPAL TUNNEL Right 04/12/2016    Procedure: RELEASE CARPAL TUNNEL;  Surgeon: Lissy Leger MD;  Location: [facility] OR     SURGICAL HISTORY OF -   1998    Laminectomy     TONSILLECTOMY & ADENOIDECTOMY       [facility] CABG, ARTERIAL, THREE         Prior to Admission Medications   Cannot display prior to admission medications because the patient has not been admitted in this contact.     [unfilled]  [unfilled]  Allergies   Allergies   Allergen Reactions     Fish Nausea and Vomiting     severe     Hydrocodone GI Disturbance     Upset stomach     Shellfish Allergy Hives and Rash       Social History    reports that he has been smoking cigarettes. He has a 75.00 pack-year smoking history. He has never used smokeless tobacco. He reports that he does not drink alcohol and does not use drugs.    Family History   Family History   Adopted: Yes   Problem Relation Age of Onset     Unknown/Adopted No family hx of        Review of Systems   The comprehensive 10 point Review of Systems is negative other than noted in the HPI or here.     Physical Exam   Vital Signs with Ranges     Wt Readings from Last 4 Encounters:   04/04/22 79.4 kg (175 lb)   02/17/22 76.4 kg (168 lb 8 oz)   02/09/22 72.2 kg (159 lb 1.6 oz)   02/02/22 74.3 kg (163 lb 11.2 oz)     [unfilled]      Vitals: There were no vitals taken for this visit.    GENERAL: Healthy, alert and no distress  EYES: Eyes grossly normal to inspection.  No discharge or erythema, or obvious scleral/conjunctival abnormalities.  RESP: No audible wheeze, cough, or visible cyanosis.  No visible retractions or  increased work of breathing.    SKIN: Visible skin clear. No significant rash, abnormal pigmentation or lesions.  NEURO: Cranial nerves grossly intact.  Mentation and speech appropriate for age.  PSYCH: Mentation appears normal, affect normal/bright, judgement and insight intact, normal speech and appearance well-groomed.      Thank you for allowing me to participate in the care of your patient.      Sincerely,   Buddy Bello MD   [facility]      cc:   Buddy Bello MD  [address] [address]

## 2022-04-08 NOTE — NURSING NOTE
"Initial BP 99/53   Pulse 57   Ht 1.854 m (6' 1\")   Wt 78 kg (172 lb)   SpO2 98%   BMI 22.69 kg/m   Estimated body mass index is 22.69 kg/m  as calculated from the following:    Height as of this encounter: 1.854 m (6' 1\").    Weight as of this encounter: 78 kg (172 lb). .      "

## 2022-04-08 NOTE — ED PROVIDER NOTES
History     Chief Complaint   Patient presents with     Heart Failure     EXACERBATION, SENT BY PCP     HPI  Delbert Tilley is a 71 year old pleasant male who presents with his daughter from the Cardiology office for congestive heart failure with failure of out patient treatment. Patient has extensive cardiac history cardiomyopathy which has been deemed mixed in etiology however no ischemic component.  He has had multivessel CABG performed in 2014 and angiogram performed in 2017.  He has significantly reduced LV systolic function with an ICD placed in the past.  He has had vascular surgery and interventional radiology since stenting of his iliac arteries and bilateral endarterectomies and additional procedures for his longstanding claudication.  Over the past few weeks since his recent leg surgery he has had a total weight gain of 15 pounds.  He states about 10 pounds in the past week of weight gain and has noticed some shortness of breath with exertion.  Earlier this week he did have an ER visit and had medication adjustments done when he was found to have an elevated BNP of 33,000, intravenous diuretics were given and high-dose of his baseline diuretics was prescribed.  Despite these efforts today in the office visit with cardiology it does not appear that outpatient treatment is working.  He continues to have further symptoms with tightness in his lower extremities, increased edema, abdominal distention and worsening of his heart failure symptoms with shortness of breath with exertion.  He has been faithful with taking his medication regimen.  Creatinine is elevated from office visit labs today.  Patient was seen by Dr. Bello today in the cardiology office and was sent to the emergency department for hospital admission with IV Lasix and monitoring of his creatinine and heart failure. Patient states no chest pain, weakness, rash, or numbness/tingling. Positive dyspnea with exertion, leg swelling even with  compression stockings, and cough. Patient states he still smokes about 1/5 ppd.                                                      Echo 2021  Left ventricular systolic function is severely reduced.The visual ejection  fraction is 20-25%.The left ventricle is mildly dilated.  Moderately decreased right ventricular systolic function  The left atrium is severely dilated.  There is mild (1+) mitral regurgitation.  There is mild (1+) tricuspid regurgitation.  Pulmonary hypertension- RVSP 54 mm hg +RA.     Compared to Echo dated 11/23/2020 no significant changes.        MPI 2020        The nuclear stress test is abnormal.     There is transmural infarction in the inferior and inferoseptal segment(s) of the left ventricle.     There is a small area of nontransmural infarction in the distal apical segment(s) of the left ventricle.     Left ventricular function is severely reduced.     The left ventricular ejection fraction at rest is 15%.  The left ventricular ejection fraction at stress is 20%.     Severe left ventricular enlargement is noted.     Stress to rest cavity ratio is 1.21.     A prior study was conducted on 8/11/2017.  This study has no significant change when compared with the prior study.        Cardiac Cath 2017  CORONARY ANGIOGRAM:   1. Both coronary arteries arise from their respective cusps.  2. Dominance: Right  3. The LM is without angiographic evidence of disease.   4. LAD: Type 3 [LAD supplies the entire apex].. The LAD is diffusely  diseased gives rise to septal perforators, D1 and D2. There is 90%  stenosis of the proximal LAD just proximal to the D1 branch. Distally  it is supplied by the LIMA, which has competitive flow to the mid  segment.  There is 50% stenosis of distal and apical LAD.  5. LCX gives rise to small size OM1 and OM2 vessels in the proximal  segment and small size OM4 in the distal segment. It also gives rise  to medium size OM3 which is 100% occluded in the ostium. There is  also  90% stenosis of the ostium of OM2. There is diffuse mild disease in  the circumflex artery. There are collaterals supplying the PL   6. RCA gives rise to PL branches and supplies PDA. There is diffuse  moderate disease in the proximal to mid portion and 100% occluded in  the mid RCA. Distally the PL and PDA are supplied by collaterals from  the left system.       CORONARY ARTERY BYPASS ANGIOGRAPHY:  Coronary bypass angiography was performed on the following.  LIMA to LAD: The ostium, body and anastomosis site are free of  significant disease  SVG to OM:  The ostium and anastomosis site are free of disease. There  is patent stent noted in the proximal SVG. There is 20% stenosis of  the proximal SVG, and 20% stenosis of the mid SVG.  SVG to RCA: it is 100% occluded at the ostium             Allergies:  Allergies   Allergen Reactions     Fish Nausea and Vomiting     severe     Hydrocodone GI Disturbance     Upset stomach     Shellfish Allergy Hives and Rash       Problem List:    Patient Active Problem List    Diagnosis Date Noted     CHF (congestive heart failure) (H) 04/08/2022     Priority: Medium     Dyspnea on exertion 04/08/2022     Priority: Medium     PAD (peripheral artery disease) (H) 02/09/2022     Priority: Medium     Other idiopathic peripheral autonomic neuropathy 06/22/2021     Priority: Medium     Venous insufficiency (chronic) (peripheral) 06/22/2021     Priority: Medium     Actinic keratosis 06/22/2021     Priority: Medium     Nail dystrophy 06/22/2021     Priority: Medium     Generalized muscle weakness 06/22/2021     Priority: Medium     Atherosclerosis of artery of extremity with intermittent claudication (H) 01/22/2021     Priority: Medium     Hyperparathyroidism (H) 01/22/2021     Priority: Medium     Thrombocytopenia (H) 01/22/2021     Priority: Medium     Vitamin D deficiency 01/15/2021     Priority: Medium     Hypocalcemia 01/15/2021     Priority: Medium     Essential hypertension  01/15/2021     Priority: Medium     Ischemic cardiomyopathy 01/15/2021     Priority: Medium     Formatting of this note might be different from the original.  EF 20-25%       Other proteinuria 01/15/2021     Priority: Medium     Hypomagnesemia 01/15/2021     Priority: Medium     Anemia in chronic kidney disease 01/15/2021     Priority: Medium     Diabetic nephropathy associated with type 2 diabetes mellitus (H) 01/15/2021     Priority: Medium     Pleural plaque 12/31/2020     Priority: Medium     Chronic kidney disease, stage 3 (H) 11/18/2020     Priority: Medium     Hypotension, unspecified hypotension type 10/21/2020     Priority: Medium     Chronic obstructive pulmonary disease, unspecified COPD type (H) 03/09/2020     Priority: Medium     NSVT (nonsustained ventricular tachycardia) (H) 11/01/2019     Priority: Medium     Chronic systolic heart failure (H) 11/01/2019     Priority: Medium     Paroxysmal atrial fibrillation (H) 11/01/2019     Priority: Medium     Tobacco use 05/28/2019     Priority: Medium     Left carotid stenosis 09/08/2017     Priority: Medium     Carpal tunnel syndrome of right wrist 01/10/2016     Priority: Medium     Carotid stenosis 06/12/2015     Priority: Medium     Bilateral carotid Disease S/P Right carotid endarterectomy.   [facility]       NSTEMI (non-ST elevated myocardial infarction) (H) 10/21/2014     Priority: Medium     ACS (acute coronary syndrome) (H) 10/20/2014     Priority: Medium     Hyperlipidemia LDL goal <70 07/08/2013     Priority: Medium     [facility]           Type 2 diabetes mellitus with other specified complication, with long-term current use of insulin (H) 10/31/2010     Priority: Medium     CARDIOVASCULAR SCREENING; LDL GOAL LESS THAN 100 10/31/2010     Priority: Medium     Coronary artery disease involving native coronary artery of native heart without angina pectoris 07/07/2010     Priority: Medium     S/p mi  Bypass x 4 --grafting to  the LAD obtuse marginal and PDA.   Angio on 6/10 negative          Alcohol abuse, in remission 07/07/2010     Priority: Medium     H/o rehab x 5   Dry x 10 years (2010 or so)       GERD (gastroesophageal reflux disease) 07/07/2010     Priority: Medium     Alcohol-induced chronic pancreatitis (H) 07/07/2010     Priority: Medium     H/o pancreatitis --on enzymes        Diabetes mellitus, type 2 (H) 07/07/2010     Priority: Medium     [facility]       Cardiomyopathy (H) 06/15/2010     Priority: Medium     EF 15%.--ICD placement        Atrial fibrillation/flutter 06/15/2010     Priority: Medium     S/p ablation 1June 2010.     Replacing diagnoses that were inactivated after the 10/1/2021 regulatory import.          Past Medical History:    Past Medical History:   Diagnosis Date     Acute renal failure (H) 8/26/06 Hosp     Anemia      Arthritis      Atherosclerosis of artery of extremity with intermittent claudication (H)      CAD (coronary artery disease)      Cardiomyopathy (H)      Carpal tunnel syndrome      CHF (congestive heart failure) (H)      Claudication (H)      COPD (chronic obstructive pulmonary disease) (H)      Diabetes (H)      Gastro-oesophageal reflux disease      GERD (gastroesophageal reflux disease)      H/O: alcohol abuse      HTN (hypertension)      Hyperlipidaemia LDL goal <100 07/08/2013     Hyperparathyroidism (H)      Hypokalemia      ICD (implantable cardioverter-defibrillator) in place      NSTEMI (non-ST elevated myocardial infarction) (H)      NSVT (nonsustained ventricular tachycardia) (H)      Pacemaker      Pancreatitis 6/26/06 Hosp     Paroxysmal atrial fibrillation (H)      PVD (peripheral vascular disease) (H)      Thrombocytopenia (H)      Tobacco use      Venous (peripheral) insufficiency      Vitamin D deficiency        Past Surgical History:    Past Surgical History:   Procedure Laterality Date     CATARACT IOL, RT/LT      Cataract IOL RT/LT     CLOSE SECONDARY WOUND  LOWER EXTREMITY Right 2/13/2022    Procedure: Right groin wound exploration, nerve release and closure.;  Surgeon: Karen Arthur MD;  Location: [facility] OR     ENDARTERECTOMY CAROTID Right 06/12/2015    Procedure: ENDARTERECTOMY CAROTID;  Surgeon: João Turner MD;  Location:  OR     ENDARTERECTOMY CAROTID Left 09/08/2017    Procedure: ENDARTERECTOMY CAROTID;  LEFT CAROTID ENDARTERECTOMY WITH EEG;  Surgeon: João Turner MD;  Location:  OR     ENDARTERECTOMY FEMORAL Bilateral 2/9/2022    Procedure: BILATERAL- COMMON FEMORAL ENDARTERECTOMY; RETROGRADE ILIAC ARTERY STENTING;  Surgeon: Ric Chau MD;  Location: [facility] OR     IMPLANT PACEMAKER  06/10/2010     IMPLANTED DEVICE       INTERPOSITION TENDON HAND N/A 06/09/2020    Procedure: Right thumb ligament reconstruction tendon interposition with extensor pollicis brevis to abductor pollicis tendon transfer;  Surgeon: Bethel Martin MD;  Location: [facility] OR     RELEASE CARPAL TUNNEL Right 04/12/2016    Procedure: RELEASE CARPAL TUNNEL;  Surgeon: Lissy Leger MD;  Location: [facility] OR     SURGICAL HISTORY OF -   1998    Laminectomy     TONSILLECTOMY & ADENOIDECTOMY       ZZC CABG, ARTERIAL, THREE         Family History:    Family History   Adopted: Yes   Problem Relation Age of Onset     Unknown/Adopted No family hx of        Social History:  Marital Status:  Single [1]  Social History     Tobacco Use     Smoking status: Current Every Day Smoker     Packs/day: 1.50     Years: 50.00     Pack years: 75.00     Types: Cigarettes     Smokeless tobacco: Never Used     Tobacco comment: 1 1/2-2 PPD 3/11/21   Vaping Use     Vaping Use: Never used   Substance Use Topics     Alcohol use: No     Drug use: No        Medications:    albuterol (PROAIR HFA) 108 (90 Base) MCG/ACT Inhaler  amylase-lipase-protease (CREON 12) 25500-22143-85280 units CPEP  aspirin (ASA) 325 MG tablet  atorvastatin (LIPITOR) 80 MG tablet  calcium carbonate  (OS-MARVA) 500 MG tablet  Carboxymethylcellulose Sod PF (THERATEARS PF) 0.25 % SOLN  carvedilol (COREG) 6.25 MG tablet  clopidogrel (PLAVIX) 75 MG tablet  gabapentin (NEURONTIN) 100 MG capsule  Glycerin (OASIS MOISTURIZING MOUTH SPRAY) 35 % LIQD  insulin aspart (NOVOLOG PEN) 100 UNIT/ML pen  insulin glargine (LANTUS PEN) 100 UNIT/ML pen  Insulin Pen Needle (PEN NEEDLES) 32G X 4 MM MISC  loperamide (IMODIUM) 2 MG capsule  losartan (COZAAR) 100 MG tablet  magnesium oxide (MAG-OX) 400 (241.3 Mg) MG tablet  metFORMIN (GLUCOPHAGE-XR) 500 MG 24 hr tablet  mometasone (ASMANEX TWISTHALER) 220 MCG/INH inhaler  nitroGLYcerin (NITROSTAT) 0.4 MG sublingual tablet  pantoprazole (PROTONIX) 40 MG EC tablet  torsemide (DEMADEX) 20 MG tablet  torsemide (DEMADEX) 20 MG tablet  vitamin D3 (CHOLECALCIFEROL) 50 mcg (2000 units) tablet          Review of Systems   Constitutional: Negative for activity change, appetite change, fatigue and fever.   HENT: Negative.    Eyes: Negative.    Respiratory: Positive for cough and shortness of breath (with exertion). Negative for choking, chest tightness and wheezing.    Cardiovascular: Positive for leg swelling. Negative for chest pain and palpitations.   Gastrointestinal: Negative.    Endocrine: Negative.    Genitourinary: Negative.    Musculoskeletal: Negative.    Skin: Negative.    Neurological: Negative.    Psychiatric/Behavioral: Negative.    All other systems reviewed and are negative.      Physical Exam   BP: (!) 71/40  Pulse: 55  Temp: 98.1  F (36.7  C)  Resp: 16  Weight: 78 kg (172 lb)  SpO2: 99 %      Physical Exam  Vitals and nursing note reviewed.   Constitutional:       General: He is not in acute distress.     Appearance: Normal appearance. He is normal weight. He is not ill-appearing, toxic-appearing or diaphoretic.   HENT:      Head: Normocephalic and atraumatic.      Mouth/Throat:      Mouth: Mucous membranes are dry.   Eyes:      General: No scleral icterus.     Extraocular  Movements: Extraocular movements intact.      Conjunctiva/sclera: Conjunctivae normal.   Cardiovascular:      Rate and Rhythm: Normal rate and regular rhythm.      Pulses: Normal pulses.      Heart sounds: Normal heart sounds.   Pulmonary:      Effort: Pulmonary effort is normal.      Breath sounds: No stridor. Rales (bilaterally) present. No wheezing or rhonchi.   Chest:      Chest wall: No tenderness.   Abdominal:      General: Bowel sounds are normal.      Palpations: Abdomen is soft.      Tenderness: There is no abdominal tenderness. There is no right CVA tenderness, left CVA tenderness, guarding or rebound.   Musculoskeletal:         General: No tenderness.      Cervical back: Normal range of motion and neck supple.      Right lower leg: Edema present.      Left lower leg: Edema present.   Skin:     General: Skin is warm.      Capillary Refill: Capillary refill takes less than 2 seconds.      Coloration: Skin is not jaundiced or pale.      Findings: No bruising, erythema or rash.   Neurological:      General: No focal deficit present.      Mental Status: He is alert and oriented to person, place, and time.      Sensory: No sensory deficit.      Motor: No weakness.      Gait: Gait normal.   Psychiatric:         Mood and Affect: Mood normal.         Behavior: Behavior normal.         Thought Content: Thought content normal.         Judgment: Judgment normal.         ED Course                 Procedures             Critical Care time:  none          EKG Interpretation:        Interpreted by Filomena Méndez PA-C: Dr. Romero  Symptoms at time of EKG: congestive heart failure, dyspnea on exertion   Rhythm: sinus bradycardia  Rate: 55  Axis: right sided conduction defect and right axis -possible right ventricular hypertrohy or posterior fascicular block   Ectopy: None  Conduction: Normal  ST Segments/ T Waves: non specific diffuse ST depression throughout  Q Waves: None  Comparison to prior: Unchanged from  11/16/20    Clinical Impression: non-specific EKG                Results for orders placed or performed during the hospital encounter of 04/08/22 (from the past 24 hour(s))   CBC with platelets differential    Narrative    The following orders were created for panel order CBC with platelets differential.  Procedure                               Abnormality         Status                     ---------                               -----------         ------                     CBC with platelets and d...[[id number]]  Abnormal            Final result                 Please view results for these tests on the individual orders.   Nt probnp inpatient (BNP)   Result Value Ref Range    N terminal Pro BNP Inpatient 23,797 (H) 0 - 900 pg/mL   Troponin I   Result Value Ref Range    Troponin I High Sensitivity 73 <79 ng/L   [place] Draw    Narrative    The following orders were created for panel order [place] Draw.  Procedure                               Abnormality         Status                     ---------                               -----------         ------                     Extra Blue Top Tube[[id number]]                              Final result               Extra Red Top Tube[[id number]]                               Final result                 Please view results for these tests on the individual orders.   CBC with platelets and differential   Result Value Ref Range    WBC Count 6.6 4.0 - 11.0 10e3/uL    RBC Count 3.45 (L) 4.40 - 5.90 10e6/uL    Hemoglobin 9.3 (L) 13.3 - 17.7 g/dL    Hematocrit 30.7 (L) 40.0 - 53.0 %    MCV 89 78 - 100 fL    MCH 27.0 26.5 - 33.0 pg    MCHC 30.3 (L) 31.5 - 36.5 g/dL    RDW 15.7 (H) 10.0 - 15.0 %    Platelet Count 119 (L) 150 - 450 10e3/uL    % Neutrophils 70 %    % Lymphocytes 15 %    % Monocytes 11 %    % Eosinophils 2 %    % Basophils 1 %    % Immature Granulocytes 1 %    NRBCs per 100 WBC 0 <1 /100    Absolute Neutrophils 4.7 1.6 - 8.3 10e3/uL    Absolute Lymphocytes 1.0 0.8  - 5.3 10e3/uL    Absolute Monocytes 0.7 0.0 - 1.3 10e3/uL    Absolute Eosinophils 0.1 0.0 - 0.7 10e3/uL    Absolute Basophils 0.0 0.0 - 0.2 10e3/uL    Absolute Immature Granulocytes 0.0 <=0.4 10e3/uL    Absolute NRBCs 0.0 10e3/uL   Extra Blue Top Tube   Result Value Ref Range    Hold Specimen JIC    Extra Red Top Tube   Result Value Ref Range    Hold Specimen JIC    XR Chest 2 Views    Narrative    CHEST TWO VIEWS 4/8/2022 12:04 PM    HISTORY: Congestive heart failure    COMPARISON: 4/4/2022    FINDINGS/    Impression    IMPRESSION: Lungs are clear. No airspace consolidation, pneumothorax,  or pleural fluid. Pleural-based calcifications are seen along the  right hemidiaphragm, which are stable in appearance to comparison and  suggestive of possible prior asbestos exposure. Heart size mildly  enlarged. Pulmonary vasculature is normal appearing. Left pectoral  automatic implantable cardiac defibrillator and cardiac leads are in  similar position. Prior median sternotomy. No acute osseous  abnormality.     Asymptomatic COVID-19 Virus (Coronavirus) by PCR Nasopharyngeal    Specimen: Nasopharyngeal; Swab   Result Value Ref Range    SARS CoV2 PCR Negative Negative    Narrative    Testing was performed using the jermaine  SARS-CoV-2 & Influenza A/B Assay on the jermaine  Esthela  System.  This test should be ordered for the detection of SARS-COV-2 in individuals who meet SARS-CoV-2 clinical and/or epidemiological criteria. Test performance is unknown in asymptomatic patients.  This test is for in vitro diagnostic use under the FDA EUA for laboratories certified under CLIA to perform moderate and/or high complexity testing. This test has not been FDA cleared or approved.  A negative test does not rule out the presence of PCR inhibitors in the specimen or target RNA in concentration below the limit of detection for the assay. The possibility of a false negative should be considered if the patient's recent exposure or clinical  presentation suggests COVID-19.  [facility] are certified under the Clinical Laboratory Improvement Amendments of 1988 (CLIA-88) as qualified to perform moderate and/or high complexity laboratory testing.       Medications   furosemide (LASIX) injection 40 mg (40 mg Intravenous Given 4/8/22 1218)       Assessments & Plan (with Medical Decision Making)     I have reviewed the nursing notes.    I have reviewed the findings, diagnosis, plan and need for follow up with the patient.    Delbert Tilley is a 71 year old pleasant male who presents with his daughter from the Cardiology office for congestive heart failure with failure of out patient treatment. Patient has extensive cardiac history cardiomyopathy which has been deemed mixed in etiology however no ischemic component.  He has had multivessel CABG performed in 2014 and angiogram performed in 2017.  He has significantly reduced LV systolic function with an ICD placed in the past.  He has had vascular surgery and interventional radiology since stenting of his iliac arteries and bilateral endarterectomies and additional procedures for his longstanding claudication.  Over the past few weeks since his recent leg surgery he has had a total weight gain of 15 pounds.  He states about 10 pounds in the past week of weight gain and has noticed some shortness of breath with exertion.  Earlier this week he did have an ER visit and had medication adjustments done when he was found to have an elevated BNP of 33,000, intravenous diuretics were given and high-dose of his baseline diuretics was prescribed.  Despite these efforts today in the office visit with cardiology it does not appear that outpatient treatment is working.  He continues to have further symptoms with tightness in his lower extremities, increased edema, abdominal distention and worsening of his heart failure symptoms with shortness of breath with exertion.  He has been faithful with taking his  medication regimen.  Creatinine is elevated from office visit labs today.  Patient was seen by Dr. Bello today in the cardiology office and was sent to the emergency department for hospital admission with IV Lasix and monitoring of his creatinine and heart failure. Patient states no chest pain, weakness, rash, or numbness/tingling. Positive dyspnea with exertion, leg swelling even with compression stockings, and cough. Patient states he still smokes about 1/5 ppd.                                                      Echo 2021  Left ventricular systolic function is severely reduced.The visual ejection  fraction is 20-25%.The left ventricle is mildly dilated.  Moderately decreased right ventricular systolic function  The left atrium is severely dilated.  There is mild (1+) mitral regurgitation.  There is mild (1+) tricuspid regurgitation.  Pulmonary hypertension- RVSP 54 mm hg +RA.     Compared to echo dated 11/23/2020 no significant changes.        MPI 2020        The nuclear stress test is abnormal.     There is transmural infarction in the inferior and inferoseptal segment(s) of the left ventricle.     There is a small area of nontransmural infarction in the distal apical segment(s) of the left ventricle.     Left ventricular function is severely reduced.     The left ventricular ejection fraction at rest is 15%.  The left ventricular ejection fraction at stress is 20%.     Severe left ventricular enlargement is noted.     Stress to rest cavity ratio is 1.21.     A prior study was conducted on 8/11/2017.  This study has no significant change when compared with the prior study.        Cardiac Cath 2017  CORONARY ANGIOGRAM:   1. Both coronary arteries arise from their respective cusps.  2. Dominance: Right  3. The LM is without angiographic evidence of disease.   4. LAD: Type 3 [LAD supplies the entire apex].. The LAD is diffusely  diseased gives rise to septal perforators, D1 and D2. There is 90%  stenosis of the  proximal LAD just proximal to the D1 branch. Distally  it is supplied by the LIMA, which has competitive flow to the mid  segment.  There is 50% stenosis of distal and apical LAD.  5. LCX gives rise to small size OM1 and OM2 vessels in the proximal  segment and small size OM4 in the distal segment. It also gives rise  to medium size OM3 which is 100% occluded in the ostium. There is also  90% stenosis of the ostium of OM2. There is diffuse mild disease in  the circumflex artery. There are collaterals supplying the PL   6. RCA gives rise to PL branches and supplies PDA. There is diffuse  moderate disease in the proximal to mid portion and 100% occluded in  the mid RCA. Distally the PL and PDA are supplied by collaterals from  the left system.       CORONARY ARTERY BYPASS ANGIOGRAPHY:  Coronary bypass angiography was performed on the following.  LIMA to LAD: The ostium, body and anastomosis site are free of  significant disease  SVG to OM:  The ostium and anastomosis site are free of disease. There  is patent stent noted in the proximal SVG. There is 20% stenosis of  the proximal SVG, and 20% stenosis of the mid SVG.  SVG to RCA: it is 100% occluded at the ostium    See exam findings above.  Patient vitally stable and in no acute distress currently with no need for supplemental oxygen.  He is not having any dyspnea or chest pain while in the Emergency Room.  Called and discussed patient case with Dr. Nation the cardiologist who saw him earlier today.  Spoke with him around 11:30 AM who agrees with plan of starting patient at 40 mg of IV Lasix due to elevated creatinine here in the emergency department.  He initially states cardiac echocardiogram is not indicated unless the hospitalist would like this done.  Discussed patient case with Dr. Romero here in the emergency department who agrees with plan for patient to be admitted.  Spoke to Zen Steel MD at 12:35 PM regarding hospital admission.  He would like repeat  troponins and echocardiogram done.  Order was placed by Zen Steel MD and we called to let the tech know that we would like to have the echocardiogram done today since it is a weekend coming up.  Patient is is aware and in agreement with admission.  He states that he is doing well at time of recheck at 12:45 PM.  He has not had any urine output yet, but knows that we need to monitor this and has a bedside urinal to collect urine.  Plan to monitor patient in inpatient setting for congestive heart failure with elevated creatinine.     New Prescriptions    No medications on file       Final diagnoses:   CHF (congestive heart failure) (H)   Dyspnea on exertion       4/8/2022   [facility] EMERGENCY DEPT     Filomena Méndez PA-C  04/08/22 1306 PM      ED Attending Physician Attestation   I, Shaq Romero MD evaluated and cared for the patient as part of a shared visit with the Advanced Practice Provider (MARISOL). I have performed a history and physical examination of the patient independent of the MARISOL. I reviewed the MARISOL's documentation above and agree with the documented findings and plan of care. I personally provided a substantive portion of the care for this patient.  I personally performed the substantive portion of the medical decision making for this visit - please see the MARISOL's documentation for full details.      Key management decisions made by me and carried out under my direction: Initiate diuresis with IV Lasix, 40 mg IV per recommendation of his cardiologist.  I personally performed the substantive portion of the history for this visit - please see the MARISOL's documentation for full details.    Key additional history findings made by me: Acute and chronic refractory congestive heart failure requiring admission for further diuretic therapy, as recommended by cardiology and cardiology clinic evaluation earlier today.  I personally performed the substantive portion of the physical exam -  please see the MARISOL's documentation for full details.    I concur with the MARISOL exam findings, in addition I have noted: Evidence of pulmonary edema with bibasilar rales and peripheral edema.  No hypoxia or respiratory distress at rest.  I have reviewed and discussed with the advanced practice provider their history, physical and plan.  We reviewed the patient's evaluation, medical decision making and treatment plan. I personally reviewed the vital signs, medications, labs and imaging.  I spent 15 minutes face-to-face and/or coordinating care. Over 50% of our time on the unit was spent counseling the patient and/or coordinating care regarding his treatment and admission plan.       Summary of HPI, PE, ED Course  My key history or physical exam findings:   Patient is a 71 year old male evaluated in the emergency department for acute exacerbation of CHF. Exam notable for initial, transient, hypotension but persistent soft blood pressures with systolic BP in the 90s.  No tachypnea, hypoxia or respiratory distress.  Clinical evidence of CHF/volume overload with rales on pulmonary examination and lower extremity pitting edema..   ED course notable for: Initiation of IV diuretic therapy with Lasix 40 mg IV, dosing as recommended by Cardiology.  Case reviewed with the Hospitalist service who accepted his care upon admission which was recommended by Cardiology.    Critical Care & Procedures  Not applicable.    Medical Decision Making  Key management decisions made by me: Initiate IV diuretic therapy, 40 mg IV as recommended by the Cardiology service.  Admitted for continued careful diuresis in the setting of chronic renal failure.  Medical Decision Making The medical record was reviewed and interpreted.  Current labs reviewed and interpreted.  Previous images reviewed and interpreted: most recent echocardiogram results reviewed, as documented in HPI..  EKG reviewed and interpreted: Normal sinus rhythm with new nonspecific  ST-T wave changes and probable strain induced T wave inversions..    Disposition  After the completion of care in the emergency department, the patient was admitted to inpatient.    I agree with the PA's evaluation, assessment and treatment plan.      Shaq Romero MD  Date of Service (when I saw the patient): 04/08/22         Shaq Romero MD  04/08/22 7550

## 2022-04-08 NOTE — ED NOTES
Pt presents to ED with concerns of increased dyspnea on exertion over the last week. Notes his heart failure has been a bigger problems for the last 2 months, but this last week has been much worse where he finds most any activity difficult to do. Pt notes he is up about 15 lbs. Reports he was seen in the ED on Monday and increased his meds with no improvement.

## 2022-04-08 NOTE — PROGRESS NOTES
Cardiology Consultation       Assessment & Plan     1.  Cardiomyopathy with mixed etiology per chart review.  Ischemic component with multivessel CABG performed in the past  2.  Status post ICD placement for primary prevention  3.  Coronary artery disease status post multivessel CABG 2014 consisting of LIMA to LAD, saphenous vein graft to OM and a saphenous vein graft to PDA, PCI to OM graft 2014  4.  Most recent angiogram from 2017 as listed below  5.  Extensive PAD with right and left carotid endarterectomy and intermittent claudication with abnormal ABIs.  Established vascular surgery patient  6.  History of atrial flutter status post ablation  7.  Paroxysmal atrial fibrillation  8.  COPD  9.  Chronic kidney disease  10.  Hyperlipidemia  11.  DM      Recommendations    1.  Decompensation in heart failure.:  At this time I feel oral diuretics are not sufficient in managing his volume status.  We will arrange for ER visit and likely admit with intravenous diuretics and management of his heart failure.  We have called the emergency room to give them information on his current status and will be arranging for his transport to the emergency room.  ICD check was recently performed.  There is suggestion of higher intravascular volume based on parameters that were identified.  This goes along with his physical exam and current clinical status.    2.  Following stabilization I would have him follow-up with our core clinic colleagues as he has seen them in the past to continue to manage his ischemic cardiomyopathy.      Buddy Bello MD'      HPI:    Patient is a 71-year-old gentleman who I am meeting for the first time.  Previously has been followed by my colleague Dr. Michaels who has since retired.  He has a notable history as described above.  Pertinent history includes cardiomyopathy which has been deemed mixed in etiology however there is no ischemic component.  He has had multivessel CABG performed in 2014  and an angiogram performed in 2017.  He has significantly reduced LV systolic function for which he had an ICD placed in the past.  He has been followed in the core clinic at [facility] as well.    Patient was last seen by me in 2021.  Since that timeframe he has had procedures from vascular surgery and interventional radiology.  He had stenting of his iliac arteries and bilateral endarterectomies and additional procedures for his longstanding claudication.  Overall this did improve his walk distance and symptoms.  However his most recent difficulty has been weight gain and decompensation in his heart failure.  Earlier this week he did have an ER visit at which time he had adjustments in his medical regimen made.  He was found to have a significant elevated B NP of 33,000, intravenous diuretics were given and a higher dose of his baseline diuretics was prescribed.  Despite this he continues to have further symptoms.  He reports of tightness in his lower extremity, increased edema, abdominal distention and worsening in his heart failure symptoms with shortness of breath with exertion.  He is accompanied by his family member who confirms these findings.  He has been faithful in taking his medical regimen.  Presents for follow-up.  Lab work was performed today and his creatinine is above his typical baseline.                                                          Echo 2021  Left ventricular systolic function is severely reduced.The visual ejection  fraction is 20-25%.The left ventricle is mildly dilated.  Moderately decreased right ventricular systolic function  The left atrium is severely dilated.  There is mild (1+) mitral regurgitation.  There is mild (1+) tricuspid regurgitation.  Pulmonary hypertension- RVSP 54 mm hg +RA.     Compared to echo dated 11/23/2020 no significant changes.      MPI 2020       The nuclear stress test is abnormal.     There is transmural infarction in the inferior and inferoseptal segment(s)  of the left ventricle.     There is a small area of nontransmural infarction in the distal apical segment(s) of the left ventricle.     Left ventricular function is severely reduced.     The left ventricular ejection fraction at rest is 15%.  The left ventricular ejection fraction at stress is 20%.     Severe left ventricular enlargement is noted.     Stress to rest cavity ratio is 1.21.     A prior study was conducted on 8/11/2017.  This study has no significant change when compared with the prior study.      Cardiac Cath 2017  CORONARY ANGIOGRAM:   1. Both coronary arteries arise from their respective cusps.  2. Dominance: Right  3. The LM is without angiographic evidence of disease.   4. LAD: Type 3 [LAD supplies the entire apex].. The LAD is diffusely  diseased gives rise to septal perforators, D1 and D2. There is 90%  stenosis of the proximal LAD just proximal to the D1 branch. Distally  it is supplied by the LIMA, which has competitive flow to the mid  segment.  There is 50% stenosis of distal and apical LAD.  5. LCX gives rise to small size OM1 and OM2 vessels in the proximal  segment and small size OM4 in the distal segment. It also gives rise  to medium size OM3 which is 100% occluded in the ostium. There is also  90% stenosis of the ostium of OM2. There is diffuse mild disease in  the circumflex artery. There are collaterals supplying the PL   6. RCA gives rise to PL branches and supplies PDA. There is diffuse  moderate disease in the proximal to mid portion and 100% occluded in  the mid RCA. Distally the PL and PDA are supplied by collaterals from  the left system.       CORONARY ARTERY BYPASS ANGIOGRAPHY:  Coronary bypass angiography was performed on the following.  LIMA to LAD: The ostium, body and anastomosis site are free of  significant disease  SVG to OM:  The ostium and anastomosis site are free of disease. There  is patent stent noted in the proximal SVG. There is 20% stenosis of  the proximal  SVG, and 20% stenosis of the mid SVG.  SVG to RCA: it is 100% occluded at the ostium        Primary Care Physician   Danii Hernández        Patient Active Problem List   Diagnosis     Cardiomyopathy (H)     Atrial fibrillation/flutter     Coronary artery disease involving native coronary artery of native heart without angina pectoris     Alcohol abuse, in remission     GERD (gastroesophageal reflux disease)     Alcohol-induced chronic pancreatitis (H)     Diabetes mellitus, type 2 (H)     Type 2 diabetes mellitus with other specified complication, with long-term current use of insulin (H)     CARDIOVASCULAR SCREENING; LDL GOAL LESS THAN 100     Hyperlipidemia LDL goal <70     ACS (acute coronary syndrome) (H)     NSTEMI (non-ST elevated myocardial infarction) (H)     Carotid stenosis     Carpal tunnel syndrome of right wrist     Left carotid stenosis     Tobacco use     NSVT (nonsustained ventricular tachycardia) (H)     Chronic systolic heart failure (H)     Paroxysmal atrial fibrillation (H)     Chronic obstructive pulmonary disease, unspecified COPD type (H)     Hypotension, unspecified hypotension type     Chronic kidney disease, stage 3 (H)     Pleural plaque     Atherosclerosis of artery of extremity with intermittent claudication (H)     Hyperparathyroidism (H)     Thrombocytopenia (H)     Other idiopathic peripheral autonomic neuropathy     Venous insufficiency (chronic) (peripheral)     Actinic keratosis     Nail dystrophy     Generalized muscle weakness       Past Medical History   I have reviewed this patient's medical history and updated it with pertinent information if needed.   Past Medical History:   Diagnosis Date     Acute renal failure (H) 8/26/06 Hosp    secondary to dehydration     Anemia      Arthritis      Atherosclerosis of artery of extremity with intermittent claudication (H)      CAD (coronary artery disease)     LIMA to the LAD, vein graft to OM and a vein graft to the PDA      Cardiomyopathy (H)      Carpal tunnel syndrome      CHF (congestive heart failure) (H)     Ischemic and nonischemic cardiomyopathy; 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%     Claudication (H)      COPD (chronic obstructive pulmonary disease) (H)      Diabetes (H)      Gastro-oesophageal reflux disease      GERD (gastroesophageal reflux disease)      H/O: alcohol abuse      HTN (hypertension)      Hyperlipidaemia LDL goal <100 07/08/2013     Hyperparathyroidism (H)      Hypokalemia      ICD (implantable cardioverter-defibrillator) in place     [employer]     NSTEMI (non-ST elevated myocardial infarction) (H)      NSVT (nonsustained ventricular tachycardia) (H)      Pacemaker      Pancreatitis 6/26/06 Hosp     Paroxysmal atrial fibrillation (H)      PVD (peripheral vascular disease) (H)      Thrombocytopenia (H)      Tobacco use      Venous (peripheral) insufficiency      Vitamin D deficiency        Past Surgical History   I have reviewed this patient's surgical history and updated it with pertinent information if needed.  Past Surgical History:   Procedure Laterality Date     CATARACT IOL, RT/LT      Cataract IOL RT/LT     CLOSE SECONDARY WOUND LOWER EXTREMITY Right 2/13/2022    Procedure: Right groin wound exploration, nerve release and closure.;  Surgeon: Karen Arthur MD;  Location: [facility] [place] OR     ENDARTERECTOMY CAROTID Right 06/12/2015    Procedure: ENDARTERECTOMY CAROTID;  Surgeon: João Turner MD;  Location:  OR     ENDARTERECTOMY CAROTID Left 09/08/2017    Procedure: ENDARTERECTOMY CAROTID;  LEFT CAROTID ENDARTERECTOMY WITH EEG;  Surgeon: João Turner MD;  Location:  OR     ENDARTERECTOMY FEMORAL Bilateral 2/9/2022    Procedure: BILATERAL- COMMON FEMORAL ENDARTERECTOMY; RETROGRADE ILIAC ARTERY STENTING;  Surgeon: Ric Chau MD;  Location: [facility] [place] OR     IMPLANT PACEMAKER  06/10/2010     IMPLANTED DEVICE       INTERPOSITION TENDON HAND N/A 06/09/2020    Procedure: Right thumb  ligament reconstruction tendon interposition with extensor pollicis brevis to abductor pollicis tendon transfer;  Surgeon: Bethel Martin MD;  Location: [facility] OR     RELEASE CARPAL TUNNEL Right 04/12/2016    Procedure: RELEASE CARPAL TUNNEL;  Surgeon: Lissy Leger MD;  Location: [facility] OR     SURGICAL HISTORY OF -   1998    Laminectomy     TONSILLECTOMY & ADENOIDECTOMY       ZZC CABG, ARTERIAL, THREE         Prior to Admission Medications   Cannot display prior to admission medications because the patient has not been admitted in this contact.     [unfilled]  [unfilled]  Allergies   Allergies   Allergen Reactions     Fish Nausea and Vomiting     severe     Hydrocodone GI Disturbance     Upset stomach     Shellfish Allergy Hives and Rash       Social History    reports that he has been smoking cigarettes. He has a 75.00 pack-year smoking history. He has never used smokeless tobacco. He reports that he does not drink alcohol and does not use drugs.    Family History   Family History   Adopted: Yes   Problem Relation Age of Onset     Unknown/Adopted No family hx of        Review of Systems   The comprehensive 10 point Review of Systems is negative other than noted in the HPI or here.     Physical Exam   Vital Signs with Ranges     Wt Readings from Last 4 Encounters:   04/04/22 79.4 kg (175 lb)   02/17/22 76.4 kg (168 lb 8 oz)   02/09/22 72.2 kg (159 lb 1.6 oz)   02/02/22 74.3 kg (163 lb 11.2 oz)     [unfilled]      Vitals: There were no vitals taken for this visit.    GENERAL: Healthy, alert and no distress  EYES: Eyes grossly normal to inspection.  No discharge or erythema, or obvious scleral/conjunctival abnormalities.  RESP: No audible wheeze, cough, or visible cyanosis.  No visible retractions or increased work of breathing.    SKIN: Visible skin clear. No significant rash, abnormal pigmentation or lesions.  NEURO: Cranial nerves grossly intact.  Mentation and speech appropriate for age.  PSYCH:  Mentation appears normal, affect normal/bright, judgement and insight intact, normal speech and appearance well-groomed.

## 2022-04-09 VITALS
HEART RATE: 63 BPM | OXYGEN SATURATION: 95 % | RESPIRATION RATE: 16 BRPM | HEIGHT: 72 IN | TEMPERATURE: 97.7 F | SYSTOLIC BLOOD PRESSURE: 133 MMHG | BODY MASS INDEX: 23.11 KG/M2 | DIASTOLIC BLOOD PRESSURE: 68 MMHG | WEIGHT: 170.64 LBS

## 2022-04-09 PROBLEM — I25.5 ISCHEMIC CARDIOMYOPATHY: Status: ACTIVE | Noted: 2021-01-15

## 2022-04-09 PROBLEM — R06.09 PLATYPNEA-ORTHODEOXIA SYNDROME: Status: ACTIVE | Noted: 2022-04-09

## 2022-04-09 PROBLEM — Z11.52 ENCOUNTER FOR SCREENING LABORATORY TESTING FOR SEVERE ACUTE RESPIRATORY SYNDROME CORONAVIRUS 2 (SARS-COV-2): Status: ACTIVE | Noted: 2022-04-09

## 2022-04-09 LAB
ALBUMIN UR-MCNC: NEGATIVE MG/DL
ANION GAP SERPL CALCULATED.3IONS-SCNC: 6 MMOL/L (ref 3–14)
APPEARANCE UR: CLEAR
BILIRUB UR QL STRIP: NEGATIVE
BUN SERPL-MCNC: 66 MG/DL (ref 7–30)
CALCIUM SERPL-MCNC: 8.6 MG/DL (ref 8.5–10.1)
CHLORIDE BLD-SCNC: 103 MMOL/L (ref 94–109)
CO2 SERPL-SCNC: 31 MMOL/L (ref 20–32)
COLOR UR AUTO: NORMAL
CREAT SERPL-MCNC: 1.98 MG/DL (ref 0.66–1.25)
ERYTHROCYTE [DISTWIDTH] IN BLOOD BY AUTOMATED COUNT: 15.8 % (ref 10–15)
GFR SERPL CREATININE-BSD FRML MDRD: 35 ML/MIN/1.73M2
GLUCOSE BLD-MCNC: 92 MG/DL (ref 70–99)
GLUCOSE BLDC GLUCOMTR-MCNC: 133 MG/DL (ref 70–99)
GLUCOSE BLDC GLUCOMTR-MCNC: 143 MG/DL (ref 70–99)
GLUCOSE BLDC GLUCOMTR-MCNC: 85 MG/DL (ref 70–99)
GLUCOSE UR STRIP-MCNC: NEGATIVE MG/DL
HCT VFR BLD AUTO: 31 % (ref 40–53)
HGB BLD-MCNC: 9 G/DL (ref 13.3–17.7)
HGB UR QL STRIP: NEGATIVE
KETONES UR STRIP-MCNC: NEGATIVE MG/DL
LEUKOCYTE ESTERASE UR QL STRIP: NEGATIVE
MCH RBC QN AUTO: 26 PG (ref 26.5–33)
MCHC RBC AUTO-ENTMCNC: 29 G/DL (ref 31.5–36.5)
MCV RBC AUTO: 90 FL (ref 78–100)
NITRATE UR QL: NEGATIVE
PH UR STRIP: 6 [PH] (ref 5–7)
PLATELET # BLD AUTO: 117 10E3/UL (ref 150–450)
POTASSIUM BLD-SCNC: 4.5 MMOL/L (ref 3.4–5.3)
RBC # BLD AUTO: 3.46 10E6/UL (ref 4.4–5.9)
SODIUM SERPL-SCNC: 140 MMOL/L (ref 133–144)
SP GR UR STRIP: 1.01 (ref 1–1.03)
UROBILINOGEN UR STRIP-MCNC: NORMAL MG/DL
WBC # BLD AUTO: 5.9 10E3/UL (ref 4–11)

## 2022-04-09 PROCEDURE — 80048 BASIC METABOLIC PNL TOTAL CA: CPT | Performed by: HOSPITALIST

## 2022-04-09 PROCEDURE — 96372 THER/PROPH/DIAG INJ SC/IM: CPT | Performed by: HOSPITALIST

## 2022-04-09 PROCEDURE — 81003 URINALYSIS AUTO W/O SCOPE: CPT | Performed by: HOSPITALIST

## 2022-04-09 PROCEDURE — 250N000012 HC RX MED GY IP 250 OP 636 PS 637: Performed by: INTERNAL MEDICINE

## 2022-04-09 PROCEDURE — 96372 THER/PROPH/DIAG INJ SC/IM: CPT | Performed by: INTERNAL MEDICINE

## 2022-04-09 PROCEDURE — 250N000013 HC RX MED GY IP 250 OP 250 PS 637: Performed by: HOSPITALIST

## 2022-04-09 PROCEDURE — 85027 COMPLETE CBC AUTOMATED: CPT | Performed by: HOSPITALIST

## 2022-04-09 PROCEDURE — 36415 COLL VENOUS BLD VENIPUNCTURE: CPT | Performed by: HOSPITALIST

## 2022-04-09 PROCEDURE — 250N000013 HC RX MED GY IP 250 OP 250 PS 637: Performed by: INTERNAL MEDICINE

## 2022-04-09 PROCEDURE — 250N000011 HC RX IP 250 OP 636: Performed by: HOSPITALIST

## 2022-04-09 RX ORDER — LOSARTAN POTASSIUM 100 MG/1
50 TABLET ORAL DAILY
Start: 2022-04-09 | End: 2022-05-18

## 2022-04-09 RX ORDER — NICOTINE POLACRILEX 4 MG
15-30 LOZENGE BUCCAL
Status: DISCONTINUED | OUTPATIENT
Start: 2022-04-09 | End: 2022-04-09 | Stop reason: HOSPADM

## 2022-04-09 RX ORDER — DEXTROSE MONOHYDRATE 25 G/50ML
25-50 INJECTION, SOLUTION INTRAVENOUS
Status: DISCONTINUED | OUTPATIENT
Start: 2022-04-09 | End: 2022-04-09 | Stop reason: HOSPADM

## 2022-04-09 RX ORDER — ACETAMINOPHEN 650 MG/1
650 SUPPOSITORY RECTAL EVERY 4 HOURS PRN
Status: DISCONTINUED | OUTPATIENT
Start: 2022-04-09 | End: 2022-04-09 | Stop reason: HOSPADM

## 2022-04-09 RX ORDER — TORSEMIDE 20 MG/1
TABLET ORAL
Qty: 30 TABLET | Refills: 4
Start: 2022-04-09 | End: 2022-04-14

## 2022-04-09 RX ORDER — ACETAMINOPHEN 325 MG/1
650 TABLET ORAL EVERY 4 HOURS PRN
Status: DISCONTINUED | OUTPATIENT
Start: 2022-04-09 | End: 2022-04-09 | Stop reason: HOSPADM

## 2022-04-09 RX ORDER — ONDANSETRON 4 MG/1
4 TABLET, ORALLY DISINTEGRATING ORAL EVERY 6 HOURS PRN
Status: DISCONTINUED | OUTPATIENT
Start: 2022-04-09 | End: 2022-04-09 | Stop reason: HOSPADM

## 2022-04-09 RX ORDER — METOLAZONE 2.5 MG/1
2.5 TABLET ORAL DAILY
Qty: 3 TABLET | Refills: 0 | Status: SHIPPED | OUTPATIENT
Start: 2022-04-09 | End: 2022-04-14

## 2022-04-09 RX ORDER — HEPARIN SODIUM 5000 [USP'U]/.5ML
5000 INJECTION, SOLUTION INTRAVENOUS; SUBCUTANEOUS EVERY 12 HOURS
Status: DISCONTINUED | OUTPATIENT
Start: 2022-04-09 | End: 2022-04-09 | Stop reason: HOSPADM

## 2022-04-09 RX ORDER — LIDOCAINE 40 MG/G
CREAM TOPICAL
Status: DISCONTINUED | OUTPATIENT
Start: 2022-04-09 | End: 2022-04-09 | Stop reason: HOSPADM

## 2022-04-09 RX ORDER — CARVEDILOL 6.25 MG/1
6.25 TABLET ORAL 2 TIMES DAILY WITH MEALS
Status: DISCONTINUED | OUTPATIENT
Start: 2022-04-09 | End: 2022-04-09 | Stop reason: HOSPADM

## 2022-04-09 RX ORDER — ONDANSETRON 2 MG/ML
4 INJECTION INTRAMUSCULAR; INTRAVENOUS EVERY 6 HOURS PRN
Status: DISCONTINUED | OUTPATIENT
Start: 2022-04-09 | End: 2022-04-09 | Stop reason: HOSPADM

## 2022-04-09 RX ADMIN — CARVEDILOL 6.25 MG: 6.25 TABLET, FILM COATED ORAL at 09:30

## 2022-04-09 RX ADMIN — CALCIUM 500 MG: 500 TABLET ORAL at 09:25

## 2022-04-09 RX ADMIN — PANTOPRAZOLE SODIUM 40 MG: 40 TABLET, DELAYED RELEASE ORAL at 09:25

## 2022-04-09 RX ADMIN — CLOPIDOGREL BISULFATE 75 MG: 75 TABLET ORAL at 09:25

## 2022-04-09 RX ADMIN — Medication 50 MCG: at 09:25

## 2022-04-09 RX ADMIN — PANCRELIPASE 1 CAPSULE: 24000; 76000; 120000 CAPSULE, DELAYED RELEASE PELLETS ORAL at 12:32

## 2022-04-09 RX ADMIN — PANCRELIPASE 1 CAPSULE: 24000; 76000; 120000 CAPSULE, DELAYED RELEASE PELLETS ORAL at 09:25

## 2022-04-09 RX ADMIN — INSULIN GLARGINE 24 UNITS: 100 INJECTION, SOLUTION SUBCUTANEOUS at 09:27

## 2022-04-09 RX ADMIN — FUROSEMIDE 80 MG: 10 INJECTION, SOLUTION INTRAMUSCULAR; INTRAVENOUS at 06:40

## 2022-04-09 RX ADMIN — ASPIRIN 325 MG: 325 TABLET, COATED ORAL at 09:25

## 2022-04-09 RX ADMIN — HEPARIN SODIUM 5000 UNITS: 10000 INJECTION, SOLUTION INTRAVENOUS; SUBCUTANEOUS at 01:11

## 2022-04-09 ASSESSMENT — ACTIVITIES OF DAILY LIVING (ADL)
ADLS_ACUITY_SCORE: 5

## 2022-04-09 NOTE — PLAN OF CARE
Goal Outcome Evaluation:    Patient is a&o, up independently in the room. Denying any shortness of breath or pain. +2 edema in bilateral lower extremities. Plan to discharge home today. /68 (BP Location: Right arm)   Pulse 63   Temp 97.7  F (36.5  C) (Oral)   Resp 16   Ht 1.829 m (6')   Wt 77.4 kg (170 lb 10.2 oz)   SpO2 95%   BMI 23.14 kg/m

## 2022-04-09 NOTE — H&P
[facility]    History and Physical  Hospital Medicine       Date of Admission:  4/8/2022  Date of Service: 4/8/2022     Assessment & Plan   Delbert Tilely is a 71 year old male who presents on 4/8/2022 with CHF exacerbation    Active Problems:    CHF (congestive heart failure) (H)    Dyspnea on exertion    Acute on chronic systolic congestive heart failure  Known ischemic cardiomyopathy.  EF 20-25% with severe global hypokinesis.      Baseline weight prior to his vascular surgery was 162 pounds.  This is documented in office visit 1/11.  Subsequently up to 168 2/17 and now 172 on cardiology clinic scale.  He reports his weight on his home scale is as high as 178.  He reports bilateral lower extremity and abdominal pitting edema.  Per cardiology his ICD is reporting evidence of hypervolemia.  He is prescribed torsemide 40 mg once daily, but since being seen in the ER on 4/4 he has been taking this to to 3 times per day.  He reports it does not seem to be working.  He is not losing any weight.  He still feels like he is still swollen.  He was sent by cardiology in for IV diuresis.  That being said, his BNP is actually down to 23,000 from 33,000 earlier in the week and his x-ray is not clearly hypervolemic..  He was given furosemide 40 mg IV in the ER with minimal urine output.  -Give a one-time dose of metolazone 5 mg   -Then initiate furosemide 80 mg every 8 hours  -Strict I's and O's  -Repeat BMP in the morning.  -Repeat echo obtained today; LV EF appears similar although it appears his RV systolic function has declined further.  -Check urinalysis to look for proteinuria in case of alternative explanation for his edema.    Right groin mass  Patient reports significant bulging at the site of his right groin wound from his endarterectomy and stenting in February.  Postoperative course was complicated by a lateral femoral cutaneous nerve entrapment and he had his groin wound explored on  postop day 4. He says about 2 weeks ago it started leaking clear serous fluid.  On exam there is a noticeable bulge there.  It is nonpulsatile.  Ultrasound from 3/1/2022 obtained as part of his vascular follow-up does note a fluid collections present bilaterally, although larger on the right at 8.7 x 5 x 3.5 cm.  Hopefully this fluid collection will improve with diuresis.  -If not, could consider obtaining repeat ultrasound and discussing with vascular.    CAD  Status post CABG 2014 consisting of LIMA to LAD, saphenous vein graft to OM and a saphenous vein graft to PDA, PCI to OM graft 2014, follow-up angiogram 2017.  -Continue clopidogrel 75 mg daily  -Continue aspirin 325 twice daily (it was noted after I spoke with patient that he is apparently taking aspirin 650 mg twice a day ... Reason for this is unclear; I have ordered at lower dose of 325 twice daily but this is still quite high)  -Continue atorvastatin 80    History of atrial fibrillation and atrial flutter  -Status post ablation    Hypertension  -Continue losartan 50 mg daily  -Continue carvedilol 12.5 mg twice daily    PAD  History bilateral carotid endarterectomies.  On 2/9/2022 patient underwent a bilateral iliofemoral endarterectomy with bovine patch angioplasty as well as a stent into the right external iliac artery and the left common iliac artery.  -Continue aspirin and clopidogrel as above    COPD  -Continue inhaler    RADHA on CKD 3  Creatinine baseline 1.3-1.6, currently up to 1.89.  -Trend with diuresis for further worsening    Anemia  Hemoglobin 9.3  -Trend with diuresis.    Type 2 diabetes  PTA on glargine 32 units every morning and 3 units 3 times daily AC.  Also Metformin.  -Decrease to glargine 28 units while here.  Continue mealtime 3 units.  Hold metformin for the time being but likely could resume if medically stable tomorrow.    GERD  -Continue pantoprazole 40 twice daily          Clinically Significant Risk Factors Present on Admission  "               # Platelet Defect: home medication list includes an antiplatelet medication   # Diabetes, type II: last A1C 8.8 % (Ref range: <=5.6 %)           Diet: Moderate Consistent Carb (60 g CHO per Meal) Diet    DVT Prophylaxis: Heparin SQ  Arriola Catheter: Not present  Code Status:    FULL  Lines: PIV    Disposition Plan   Expected Discharge:     Entered: Evens Duarte MD 04/08/2022, 7:00 PM     Status: Patient is appropriate for med surg  Evens Duarte MD        The patient's care was discussed with the Patient.    Primary Care Physician   Danii Hernández [phone]    History is obtained from the patient,  and review of old records via the EMR.    History of Present Illness   Delbert Tilley is a 71 year old male with past medical history of chf now presents on 4/8/2022 with the same.     Per cardiology  \"He reports of tightness in his lower extremity, increased edema, abdominal distention and worsening in his heart failure symptoms with shortness of breath with exertion.  He is accompanied by his family member who confirms these findings.  He has been faithful in taking his medical regimen.\"    I discussed with patient.  At baseline he is on torsemide 40 mg once daily.  When he was seen in the ER on Monday they increase this up to twice a day but it sounds like he has been taking this at least 3 if not possibly even 4 times per day.  He reports no increase in his urine output yet he still having significant lower extremity and abdominal swelling.  He denies any chest pain.    Review of Systems   The 10 point Review of Systems is negative other than noted in the HPI or here.     Past Medical History      Past Medical History:   Diagnosis Date     Acute renal failure (H) 8/26/06 Hosp    secondary to dehydration     Anemia      Arthritis      Atherosclerosis of artery of extremity with intermittent claudication (H)      CAD (coronary artery disease)     LIMA to the LAD, vein graft to OM and a vein " graft to the PDA     Cardiomyopathy (H)      Carpal tunnel syndrome      CHF (congestive heart failure) (H)     Ischemic and nonischemic cardiomyopathy; 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%     Claudication (H)      COPD (chronic obstructive pulmonary disease) (H)      Diabetes (H)      Gastro-oesophageal reflux disease      GERD (gastroesophageal reflux disease)      H/O: alcohol abuse      HTN (hypertension)      Hyperlipidaemia LDL goal <100 07/08/2013     Hyperparathyroidism (H)      Hypokalemia      ICD (implantable cardioverter-defibrillator) in place     [employer]     NSTEMI (non-ST elevated myocardial infarction) (H)      NSVT (nonsustained ventricular tachycardia) (H)      Pacemaker      Pancreatitis 6/26/06 Hosp     Paroxysmal atrial fibrillation (H)      PVD (peripheral vascular disease) (H)      Thrombocytopenia (H)      Tobacco use      Venous (peripheral) insufficiency      Vitamin D deficiency      Patient Active Problem List    Diagnosis Date Noted     CHF (congestive heart failure) (H) 04/08/2022     Priority: Medium     Dyspnea on exertion 04/08/2022     Priority: Medium     PAD (peripheral artery disease) (H) 02/09/2022     Priority: Medium     Other idiopathic peripheral autonomic neuropathy 06/22/2021     Priority: Medium     Venous insufficiency (chronic) (peripheral) 06/22/2021     Priority: Medium     Actinic keratosis 06/22/2021     Priority: Medium     Nail dystrophy 06/22/2021     Priority: Medium     Generalized muscle weakness 06/22/2021     Priority: Medium     Atherosclerosis of artery of extremity with intermittent claudication (H) 01/22/2021     Priority: Medium     Hyperparathyroidism (H) 01/22/2021     Priority: Medium     Thrombocytopenia (H) 01/22/2021     Priority: Medium     Vitamin D deficiency 01/15/2021     Priority: Medium     Hypocalcemia 01/15/2021     Priority: Medium     Essential hypertension 01/15/2021     Priority: Medium     Ischemic cardiomyopathy 01/15/2021     Priority: Medium      Formatting of this note might be different from the original.  EF 20-25%       Other proteinuria 01/15/2021     Priority: Medium     Hypomagnesemia 01/15/2021     Priority: Medium     Anemia in chronic kidney disease 01/15/2021     Priority: Medium     Diabetic nephropathy associated with type 2 diabetes mellitus (H) 01/15/2021     Priority: Medium     Pleural plaque 12/31/2020     Priority: Medium     Chronic kidney disease, stage 3 (H) 11/18/2020     Priority: Medium     Hypotension, unspecified hypotension type 10/21/2020     Priority: Medium     Chronic obstructive pulmonary disease, unspecified COPD type (H) 03/09/2020     Priority: Medium     NSVT (nonsustained ventricular tachycardia) (H) 11/01/2019     Priority: Medium     Chronic systolic heart failure (H) 11/01/2019     Priority: Medium     Paroxysmal atrial fibrillation (H) 11/01/2019     Priority: Medium     Tobacco use 05/28/2019     Priority: Medium     Left carotid stenosis 09/08/2017     Priority: Medium     Carpal tunnel syndrome of right wrist 01/10/2016     Priority: Medium     Carotid stenosis 06/12/2015     Priority: Medium     Bilateral carotid Disease S/P Right carotid endarterectomy.   [facility]       NSTEMI (non-ST elevated myocardial infarction) (H) 10/21/2014     Priority: Medium     ACS (acute coronary syndrome) (H) 10/20/2014     Priority: Medium     Hyperlipidemia LDL goal <70 07/08/2013     Priority: Medium     [facility]           Type 2 diabetes mellitus with other specified complication, with long-term current use of insulin (H) 10/31/2010     Priority: Medium     CARDIOVASCULAR SCREENING; LDL GOAL LESS THAN 100 10/31/2010     Priority: Medium     Coronary artery disease involving native coronary artery of native heart without angina pectoris 07/07/2010     Priority: Medium     S/p mi  Bypass x 4 --grafting to the LAD obtuse marginal and PDA.   Angio on 6/10 negative          Alcohol abuse, in remission  07/07/2010     Priority: Medium     H/o rehab x 5   Dry x 10 years (2010 or so)       GERD (gastroesophageal reflux disease) 07/07/2010     Priority: Medium     Alcohol-induced chronic pancreatitis (H) 07/07/2010     Priority: Medium     H/o pancreatitis --on enzymes        Diabetes mellitus, type 2 (H) 07/07/2010     Priority: Medium     [facility]       Cardiomyopathy (H) 06/15/2010     Priority: Medium     EF 15%.--ICD placement        Atrial fibrillation/flutter 06/15/2010     Priority: Medium     S/p ablation 1June 2010.     Replacing diagnoses that were inactivated after the 10/1/2021 regulatory import.          Past Surgical History     Past Surgical History:   Procedure Laterality Date     CATARACT IOL, RT/LT      Cataract IOL RT/LT     CLOSE SECONDARY WOUND LOWER EXTREMITY Right 2/13/2022    Procedure: Right groin wound exploration, nerve release and closure.;  Surgeon: Karen Arthur MD;  Location: [facility] OR     ENDARTERECTOMY CAROTID Right 06/12/2015    Procedure: ENDARTERECTOMY CAROTID;  Surgeon: João Turner MD;  Location:  OR     ENDARTERECTOMY CAROTID Left 09/08/2017    Procedure: ENDARTERECTOMY CAROTID;  LEFT CAROTID ENDARTERECTOMY WITH EEG;  Surgeon: João Turner MD;  Location:  OR     ENDARTERECTOMY FEMORAL Bilateral 2/9/2022    Procedure: BILATERAL- COMMON FEMORAL ENDARTERECTOMY; RETROGRADE ILIAC ARTERY STENTING;  Surgeon: Ric Chau MD;  Location: [facility] OR     IMPLANT PACEMAKER  06/10/2010     IMPLANTED DEVICE       INTERPOSITION TENDON HAND N/A 06/09/2020    Procedure: Right thumb ligament reconstruction tendon interposition with extensor pollicis brevis to abductor pollicis tendon transfer;  Surgeon: Bethel Martin MD;  Location: [facility] OR     RELEASE CARPAL TUNNEL Right 04/12/2016    Procedure: RELEASE CARPAL TUNNEL;  Surgeon: Lissy Leger MD;  Location: [facility] OR     SURGICAL HISTORY OF -   1998    Laminectomy      TONSILLECTOMY & ADENOIDECTOMY       C CABG, ARTERIAL, THREE          Prior to Admission Medications   Prior to Admission Medications   Prescriptions Last Dose Informant Patient Reported? Taking?   Carboxymethylcellulose Sod PF (THERATEARS PF) 0.25 % SOLN 4/8/2022 at 0800  Yes Yes   Sig: Apply 1 drop to eye 4 times daily as needed   Glycerin (OASIS MOISTURIZING MOUTH SPRAY) 35 % LIQD Unknown  Yes Yes   Sig: Take 2 sprays by mouth daily as needed   Insulin Pen Needle (PEN NEEDLES) 32G X 4 MM MISC  Self Yes No   albuterol (PROAIR HFA) 108 (90 Base) MCG/ACT Inhaler  Self No Yes   Sig: Inhale 2 puffs into the lungs every 4 hours as needed for shortness of breath / dyspnea   amylase-lipase-protease (CREON 12) 37299-63683-81123 units CPEP 4/8/2022 at 0800  Yes Yes   Sig: Take 2 capsules by mouth 3 times daily (with meals)    atorvastatin (LIPITOR) 80 MG tablet 4/7/2022 at 2100 Self Yes Yes   Sig: Take 80 mg by mouth At Bedtime    calcium carbonate (OS-MARVA) 500 MG tablet 4/8/2022 at 0800  Yes Yes   Sig: Take 1 tablet by mouth 2 times daily   carvedilol (COREG) 6.25 MG tablet 4/8/2022 at 0800  No Yes   Sig: Take 2 tablets (12.5 mg) by mouth 2 times daily (with meals)   clopidogrel (PLAVIX) 75 MG tablet 4/8/2022 at 0800  No Yes   Sig: Take 1 tablet (75 mg) by mouth daily   gabapentin (NEURONTIN) 100 MG capsule 4/7/2022 at 2100  Yes Yes   Sig: Take 200 mg by mouth At Bedtime    insulin aspart (NOVOLOG PEN) 100 UNIT/ML pen 4/8/2022 at 0800  No Yes   Sig: Inject 3 Units Subcutaneous 3 times daily (with meals)   insulin glargine (LANTUS PEN) 100 UNIT/ML pen 4/8/2022 at 0800  No Yes   Sig: Inject 32 Units Subcutaneous every morning   loperamide (IMODIUM) 2 MG capsule 4/8/2022 at 0800 Self Yes Yes   Sig: Take 2 mg by mouth 4 times daily as needed for diarrhea Patient takes a dose in the am and another at night   losartan (COZAAR) 100 MG tablet 4/8/2022 at 0800  Yes Yes   Sig: Take 50 mg by mouth daily   magnesium oxide (MAG-OX)  400 (241.3 Mg) MG tablet 4/8/2022 at 0800  No Yes   Sig: Take 1 tablet (400 mg) by mouth 4 times daily   metFORMIN (GLUCOPHAGE-XR) 500 MG 24 hr tablet 4/8/2022 at 0800  Yes Yes   Sig: Take 500 mg by mouth 2 times daily (with meals)    mometasone (ASMANEX TWISTHALER) 220 MCG/INH inhaler 4/8/2022 at 0800 Self Yes Yes   Sig: Inhale 2 puffs into the lungs every evening    nitroGLYcerin (NITROSTAT) 0.4 MG sublingual tablet More than a month Self Yes Yes   Sig: Place 0.4 mg under the tongue every 5 minutes as needed for chest pain    pantoprazole (PROTONIX) 40 MG EC tablet 4/8/2022 at 0800 Self Yes Yes   Sig: Take 40 mg by mouth 2 times daily    torsemide (DEMADEX) 20 MG tablet 4/8/2022 at 0800  Yes Yes   Sig: Take 40 mg by mouth daily    torsemide (DEMADEX) 20 MG tablet Unknown at Unknown time  No Yes   Sig: Take 1 tablet (20 mg) by mouth daily Take 2 tablets by mouth as needed for fluid overload and swelling   vitamin D3 (CHOLECALCIFEROL) 50 mcg (2000 units) tablet 4/8/2022 at 0800  Yes Yes   Sig: Take 1 tablet by mouth 2 times daily      Facility-Administered Medications: None     Allergies   Allergies   Allergen Reactions     Fish Nausea and Vomiting     severe     Hydrocodone GI Disturbance     Upset stomach     Shellfish Allergy Hives and Rash       Family History    Family History   Adopted: Yes   Problem Relation Age of Onset     Unknown/Adopted No family hx of        Social History   Social History     Socioeconomic History     Marital status: Single     Spouse name: Not on file     Number of children: Not on file     Years of education: Not on file     Highest education level: Not on file   Occupational History     Not on file   Tobacco Use     Smoking status: Current Every Day Smoker     Packs/day: 1.50     Years: 50.00     Pack years: 75.00     Types: Cigarettes     Smokeless tobacco: Never Used     Tobacco comment: 1 1/2-2 PPD 3/11/21   Vaping Use     Vaping Use: Never used   Substance and Sexual Activity      Alcohol use: No     Drug use: No     Sexual activity: Not Currently     Partners: Female   Other Topics Concern     Parent/sibling w/ CABG, MI or angioplasty before 65F 55M? No   Social History Narrative     Not on file     Social Determinants of Health     Financial Resource Strain: Not on file   Food Insecurity: Not on file   Transportation Needs: Not on file   Physical Activity: Not on file   Stress: Not on file   Social Connections: Not on file   Intimate Partner Violence: Not on file   Housing Stability: Not on file       Physical Exam   /56 (BP Location: Right arm, Patient Position: Supine)   Pulse 64   Temp 98  F (36.7  C) (Oral)   Resp 16   Ht 1.829 m (6')   Wt 78.9 kg (173 lb 15.1 oz)   SpO2 97%   BMI 23.59 kg/m       Weight: 173 lbs 15.09 oz Body mass index is 23.59 kg/m .     Constitutional: Alert, oriented, cooperative, no apparent distress, appears nontoxic,  Eyes: Eyes are clear, pupils are reactive.  HENT: Oropharynx is clear and moist. No evidence of cranial trauma.  Lymph/Hematologic: No epitrochlear, axillary, anterior or posterior cervical, or supraclavicular lymphadenopathy is appreciated.  Cardiovascular: Regular rate and rhythm, normal S1 and S2, and no murmur noted.  2-3+ lower extremity edema.  Respiratory: Clear to auscultation bilaterally.   GI: Soft, non-tender, normal bowel sounds, no hepatomegaly.  Genitourinary: Deferred  Musculoskeletal: There is a palpable bulge present at the site of his right femoral access.  It is nonpulsatile.  Skin: Warm and dry, no rashes.   Neurologic: Neck supple. Cranial nerves are grossly intact.  is symmetric.     Data   Data reviewed today:   Recent Labs   Lab 04/08/22  1650 04/08/22  1150 04/08/22  0832 04/04/22  1053   WBC  --  6.6  --  8.0   HGB  --  9.3*  --  10.8*   MCV  --  89  --  91   PLT  --  119*  --  144*   NA  --   --  140 142   POTASSIUM  --   --  4.8 5.1   CHLORIDE  --   --  104 106   CO2  --   --  33* 28   BUN  --   --  67*  60*   CR  --   --  1.89* 1.71*   ANIONGAP  --   --  3 8   MARVA  --   --  9.0 9.1   *  --  122* 202*   ALBUMIN  --   --   --  3.2*   PROTTOTAL  --   --   --  6.7*   BILITOTAL  --   --   --  0.6   ALKPHOS  --   --   --  160*   ALT  --   --   --  26   AST  --   --   --  27       Recent Results (from the past 24 hour(s))   XR Chest 2 Views    Narrative    CHEST TWO VIEWS 2022 12:04 PM    HISTORY: Congestive heart failure    COMPARISON: 2022    FINDINGS/    Impression    IMPRESSION: Lungs are clear. No airspace consolidation, pneumothorax,  or pleural fluid. Pleural-based calcifications are seen along the  right hemidiaphragm, which are stable in appearance to comparison and  suggestive of possible prior asbestos exposure. Heart size mildly  enlarged. Pulmonary vasculature is normal appearing. Left pectoral  automatic implantable cardiac defibrillator and cardiac leads are in  similar position. Prior median sternotomy. No acute osseous  abnormality.    GIORGIO BOND MD         SYSTEM ID:  [id number]   Echo Limited   Result Value    LVEF  20-25%    Narrative    [id number]  [id number]  [id number]  [id number]^KAYLIN^MARIA ANTONIA^[facility]U     [facility]  Echocardiography Laboratory  [address] [place] [address]     Name: CARLIN QUEEN  MRN: [id number]  : 1950  Study Date: 2022 03:01 PM  Age: 71 yrs  Gender: Male  Patient Location: [facility]  Reason For Study: CHF  Ordering Physician: MARIA ANTONIA EWING  Referring Physician: Danii Hernández  Performed By: Aleksandra David RDCS     BSA: 1.9 m2  Height: 66 in  Weight: 174 lb  HR: 62  BP: 118/65 mmHg  ______________________________________________________________________________  Procedure  Limited Portable Echo Adult.  ______________________________________________________________________________  Interpretation Summary     The left ventricle is mildly dilated.  The visual ejection fraction is 20-25%.  There is severe global hypokinesia of the  left ventricle.  Severely decreased right ventricular systolic function  There is a catheter/pacemaker lead seen in the right ventricle.  ______________________________________________________________________________  Left Ventricle  The left ventricle is mildly dilated. There is normal left ventricular wall  thickness. The visual ejection fraction is 20-25%. There is severe global  hypokinesia of the left ventricle.     Right Ventricle  The right ventricle is moderately dilated. There is a catheter/pacemaker lead  seen in the right ventricle. Severely decreased right ventricular systolic  function.     Mitral Valve  The mitral valve leaflets are mildly thickened. There is mild (1+) mitral  regurgitation.     Tricuspid Valve  There is moderate to mod-severe (2-3+) tricuspid regurgitation. The right  ventricular systolic pressure is approximated at 48.3 mmHg plus the right  atrial pressure. IVC diameter >2.1 cm collapsing <50% with sniff suggests a  high RA pressure estimated at 15 mmHg or greater.     Aortic Valve  There is moderate trileaflet aortic sclerosis. No aortic regurgitation is  present.     Pulmonic Valve  There is trace pulmonic valvular regurgitation.     Pericardium  There is no pericardial effusion.     Rhythm  The rhythm was paced.     ______________________________________________________________________________  MMode/2D Measurements & Calculations  IVSd: 0.88 cm  LVIDd: 5.9 cm  LVIDs: 5.4 cm  LVPWd: 1.0 cm  FS: 8.5 %  LV mass(C)d: 226.9 grams  LV mass(C)dI: 120.3 grams/m2  LVOT diam: 2.3 cm  LVOT area: 4.1 cm2  RWT: 0.36     Doppler Measurements & Calculations  LV V1 max P.2 mmHg  LV V1 max: 102.6 cm/sec  LV V1 VTI: 22.0 cm  MR ERO: 0.12 cm2  MR volume: 20.3 ml  SV(LVOT): 90.6 ml  SI(LVOT): 48.1 ml/m2  TR max katie: 347.5 cm/sec  TR max P.3 mmHg     ______________________________________________________________________________  Report approved by: Rufus Dugan 2022 03:55 PM              I personally reviewed the chest x-ray image(s) showing clear xray

## 2022-04-09 NOTE — PLAN OF CARE
Patient requested palomo crackers. He ate palomo crackers then requested to have BG checked, feeling BG was low. BG 83. He ate soup and crackers.  now. Patient declines taking pants off. He did show right groin, surgery site to doctor and I. Area is quite swollen/firm and is dry and pretty well healed. He reports, a lot of clear drainage upon squeezing area at home. Warm pack applied to area. He reported arthritic pain. He reports that he normally takes 2 aspirin twice daily at home. Last couple days he has been using ibuprofen instead of aspirin, without relief of pain. Explained to him that he should not use ibuprofen and high does of aspirin r/t kidney function and possible GI problems while on plavix. Given Tylenol here and he has been sleeping well since.

## 2022-04-09 NOTE — PROGRESS NOTES
"Patient increasing in frustration, wanting \"to leave right now\". Paged to update MD. Writer explained the patient's plan of care and reason for hospitalization, but patient not happy or wanting to discuss with writer.   "

## 2022-04-09 NOTE — PROGRESS NOTES
[facility] DISCHARGE NOTE    Patient discharged to home at 1:30 PM via wheel chair. Accompanied by son and staff. Discharge instructions reviewed with patient and son, opportunity offered to ask questions. Prescriptions filled and sent with patient upon discharge. All belongings sent with patient.    Kirstin Breaux RN

## 2022-04-09 NOTE — PROGRESS NOTES
CLINICAL NUTRITION SERVICES    Reason for Assessment:  Low-sodium (2 g/day) nutrition education    Diet History:  Pt reports no history of receiving low-sodium nutrition education in the past.    Nutrition Diagnosis:  Food- and nutrition-related knowledge deficit r/t no previous knowledge of low-sodium diet AEB pt report of no previous formal low-sodium nutrition education.    Nutrition Prescription/Recs:  Continue low-sodium diet.      Interventions:  Nutrition Education  1. Provided verbal instruction on low-sodium meal planning.  2. Provided the following handouts: How to Read Nutrition Labels, Low-Sodium Foods and Drinks, Tips for a Low-Sodium Diet, Seasoning Your Foods Without Adding Salt, and Managing Fluid Restriction.      Goals:    Pt will verbalize at least five high sodium foods and the importance of avoiding added salt to foods for cooking or seasoning foods.     Follow-up:   Patient to ask any further nutrition-related questions before discharge. In addition, pt may request outpatient RD appointment.    Kristen Zuleta RDN, FELICIA  Clinical Dietitian  Office: [phone]  Weekend pager: [phone]

## 2022-04-13 ENCOUNTER — LAB (OUTPATIENT)
Dept: LAB | Facility: CLINIC | Age: 72
End: 2022-04-13
Payer: COMMERCIAL

## 2022-04-13 DIAGNOSIS — I50.9 CONGESTIVE HEART FAILURE, UNSPECIFIED HF CHRONICITY, UNSPECIFIED HEART FAILURE TYPE (H): ICD-10-CM

## 2022-04-13 LAB
ANION GAP SERPL CALCULATED.3IONS-SCNC: 4 MMOL/L (ref 3–14)
BUN SERPL-MCNC: 57 MG/DL (ref 7–30)
CALCIUM SERPL-MCNC: 9 MG/DL (ref 8.5–10.1)
CHLORIDE BLD-SCNC: 99 MMOL/L (ref 94–109)
CO2 SERPL-SCNC: 34 MMOL/L (ref 20–32)
CREAT SERPL-MCNC: 1.54 MG/DL (ref 0.66–1.25)
GFR SERPL CREATININE-BSD FRML MDRD: 47 ML/MIN/1.73M2
GLUCOSE BLD-MCNC: 175 MG/DL (ref 70–99)
POTASSIUM BLD-SCNC: 4 MMOL/L (ref 3.4–5.3)
SODIUM SERPL-SCNC: 137 MMOL/L (ref 133–144)

## 2022-04-13 PROCEDURE — 80048 BASIC METABOLIC PNL TOTAL CA: CPT

## 2022-04-13 PROCEDURE — 36415 COLL VENOUS BLD VENIPUNCTURE: CPT

## 2022-04-14 ENCOUNTER — OFFICE VISIT (OUTPATIENT)
Dept: FAMILY MEDICINE | Facility: CLINIC | Age: 72
End: 2022-04-14
Payer: COMMERCIAL

## 2022-04-14 VITALS
WEIGHT: 160 LBS | TEMPERATURE: 96.1 F | SYSTOLIC BLOOD PRESSURE: 96 MMHG | HEART RATE: 60 BPM | BODY MASS INDEX: 21.7 KG/M2 | OXYGEN SATURATION: 98 % | DIASTOLIC BLOOD PRESSURE: 60 MMHG | RESPIRATION RATE: 16 BRPM

## 2022-04-14 DIAGNOSIS — Z79.4 TYPE 2 DIABETES MELLITUS WITH OTHER SPECIFIED COMPLICATION, WITH LONG-TERM CURRENT USE OF INSULIN (H): ICD-10-CM

## 2022-04-14 DIAGNOSIS — E11.21 DIABETIC NEPHROPATHY ASSOCIATED WITH TYPE 2 DIABETES MELLITUS (H): Primary | ICD-10-CM

## 2022-04-14 DIAGNOSIS — I25.5 ISCHEMIC CARDIOMYOPATHY: ICD-10-CM

## 2022-04-14 DIAGNOSIS — E11.69 TYPE 2 DIABETES MELLITUS WITH OTHER SPECIFIED COMPLICATION, WITH LONG-TERM CURRENT USE OF INSULIN (H): ICD-10-CM

## 2022-04-14 PROCEDURE — 99214 OFFICE O/P EST MOD 30 MIN: CPT | Performed by: FAMILY MEDICINE

## 2022-04-14 RX ORDER — TORSEMIDE 20 MG/1
20 TABLET ORAL 2 TIMES DAILY
Qty: 60 TABLET | Refills: 4 | Status: SHIPPED | OUTPATIENT
Start: 2022-04-14 | End: 2022-04-14

## 2022-04-14 RX ORDER — ACETAMINOPHEN 500 MG
500-1000 TABLET ORAL EVERY 6 HOURS PRN
COMMUNITY
End: 2022-01-01

## 2022-04-14 RX ORDER — TORSEMIDE 20 MG/1
20 TABLET ORAL 2 TIMES DAILY
Qty: 90 TABLET | Refills: 4 | Status: SHIPPED | OUTPATIENT
Start: 2022-04-14 | End: 2022-01-01

## 2022-04-14 ASSESSMENT — PAIN SCALES - GENERAL: PAINLEVEL: NO PAIN (0)

## 2022-04-14 NOTE — PROGRESS NOTES
"  Assessment & Plan     Diabetic nephropathy associated with type 2 diabetes mellitus (H)   has follow up with nephrology scheduled in 3 weeks    Ischemic cardiomyopathy  Needs follow up with cardiology  He has sufficiently diuresed  Was told goal weight was 162-165 lbs - he is 160 currently.  Will back off on the torsemide and he will watch his weight closely  - torsemide (DEMADEX) 20 MG tablet; Take 1 tablet (20 mg) by mouth 2 times daily May take an additional tablet if there is 3 lbs of weight gain    Type 2 diabetes mellitus with other specified complication, with long-term current use of insulin (H)  Increase lantus per instructions  - insulin glargine (LANTUS PEN) 100 UNIT/ML pen; Inject 28 Units Subcutaneous every morning Increase by 2 units every 2-3 days until morning blood sugar is .  Max dose 40 units/day      Patient Instructions     Monitor weight daily      Decrease dose of the torsemide back to 20 mg twice daily.  If you gain 2-3 lbs, take an additional Torsemide dose    Resume the Losartan 50 mg daily       Lantus:  go to 28 units daily.  Increase by 2 units every 2-3 days until morning blood sugar is .  Max dose 40 units/day            No follow-ups on file.    Danii Hernández MD  [facility]    Payam Lundy is a 71 year old who presents for the following health issues:    HPI     Post Discharge Outreach 4/6/2022   Admission Date 4/4/2022   Reason for Admission Hypervolemia, unspecified hypervolemia type   Discharge Date 4/4/2022   Discharge Diagnosis Hypervolemia, unspecified hypervolemia type   How are you doing now that you are home? \"I got a lot of extra water weight, but I have been working with a team\"   How are your symptoms? (Red Flag symptoms escalate to triage hotline per guidelines) Improved   Do you feel your condition is stable enough to be safe at home until your provider visit? Yes   Does the patient have their discharge instructions?  Yes "   Does the patient have questions regarding their discharge instructions?  No   Were you started on any new medications or were there changes to any of your previous medications?  Yes   Does the patient have all of their medications? Yes   Do you have questions regarding any of your medications?  No   Do you have all of your needed medical supplies or equipment (DME)?  (i.e. oxygen tank, CPAP, cane, etc.) Yes   Discharge follow-up appointment scheduled within 14 calendar days?  Yes   Discharge Follow Up Appointment Date 4/8/2022   Discharge Follow Up Appointment Scheduled with? Specialty Care Provider     Hospital Follow-up Visit:    Hospital/Nursing Home/IP Rehab Facility: [facility]  Date of Admission: 4/8/22  Date of Discharge: 4/9/22  Reason(s) for Admission: Congestive heart failure       Was your hospitalization related to COVID-19? No   Problems taking medications regularly:  None  Medication changes since discharge: Added metolazone for 3 days, holding Losartan currently  Problems adhering to non-medication therapy:  None    Summary of hospitalization:  Discharge summary unavailable  Diagnostic Tests/Treatments reviewed.  Follow up needed: cardiology/nephrology  Other Healthcare Providers Involved in Patient s Care:         None  Update since discharge: improved. Post Discharge Medication Reconciliation: discharge medications reconciled and changed, per note/orders.  Plan of care communicated with patient          Wt Readings from Last 5 Encounters:   04/14/22 72.6 kg (160 lb)   04/09/22 77.4 kg (170 lb 10.2 oz)   04/08/22 78 kg (172 lb)   04/04/22 79.4 kg (175 lb)   02/17/22 76.4 kg (168 lb 8 oz)     Lost about 15 lbs in water weight.    Torsemide was doubled - now on 40 mg twice daily   Did metolazone for 3 days on discharge  Losartan has been held.     When he had surgery they stopped his diuretics x 6 days and he started gaining weight.     Review of Systems   Constitutional,  HEENT, cardiovascular, pulmonary, gi and gu systems are negative, except as otherwise noted.      Objective    BP 96/60   Pulse 60   Temp (!) 96.1  F (35.6  C) (Tympanic)   Resp 16   Wt 72.6 kg (160 lb)   SpO2 98%   BMI 21.70 kg/m    Body mass index is 21.7 kg/m .  Physical Exam   GENERAL: healthy, alert and no distress  NECK: no adenopathy, no asymmetry, masses, or scars and thyroid normal to palpation  RESP: lungs clear to auscultation - no rales, rhonchi or wheezes  CV: regular rate and rhythm, normal S1 S2, no S3 or S4, no murmur, click or rub, no peripheral edema and peripheral pulses strong  ABDOMEN: soft, nontender, no hepatosplenomegaly, no masses and bowel sounds normal  MS: no gross musculoskeletal defects noted, no edema    BMP from yesterday reviewed.

## 2022-04-14 NOTE — PATIENT INSTRUCTIONS
Monitor weight daily      Decrease dose of the torsemide back to 20 mg twice daily.  If you gain 2-3 lbs, take an additional Torsemide dose    Resume the Losartan 50 mg daily       Lantus:  go to 28 units daily.  Increase by 2 units every 2-3 days until morning blood sugar is .  Max dose 40 units/day

## 2022-04-26 NOTE — PROGRESS NOTES
[facility]    Outpatient Physical Therapy Progress Note  Patient: Delbert Tilley  : 1950    Beginning/End Dates of Reporting Period:  3/23/22 to 22    Referring Provider: DO Alanis Daniels Diagnosis: BLE secondary lymphedema / phlebolymphedema      Client Self Report: [facility] were too [facility] at my foot, I wear a size 12 shoe, so I cut them    Objective Measurements:  Objective Measure: girth  Details: since last measured on 3/21/22: RLE -2.5% and LLE -4.4%     Outcome Measures (most recent score):  LLIS = 17 on eval; will again complete at discharge      Goals:  Goal Identifier stg   Goal Description pt to have around the clock tolerance to BLE GCB and Spandagrip for edema reduction response   Target Date 03/10/22   Date Met  22   Progress (detail required for progress note):       Goal Identifier ltg   Goal Description once appropriate, pt to be independent with donning, doffing and care of compression stockings for longterm edema management for maintenance   Target Date 22   Date Met  22   Progress (detail required for progress note):       Goal Identifier ltg   Goal Description pt to be independent with longterm edema management of BLEs via HEP, elevation, skin cares, modified MLD and compression stocking wear/use   Target Date 22   Date Met      Progress (detail required for progress note):       Goal Identifier ltg   Goal Description pt to have at least 3-5 point improvement on LLIS due to decreased edema and associated symptoms in BLEs   Target Date 22   Date Met      Progress (detail required for progress note):       Plan:  Continue therapy per current plan of care.  Patient only seen 1x this reporting period on 3/29/22 and then had to cancel future appts due to being hospitalized, so progress, assessment and modifications  to home program limited. Pt with next scheduled appt on 4/27/22.    Discharge:  No

## 2022-05-04 ENCOUNTER — HOSPITAL ENCOUNTER (OUTPATIENT)
Dept: PHYSICAL THERAPY | Facility: CLINIC | Age: 72
Setting detail: THERAPIES SERIES
Discharge: HOME | End: 2022-05-04
Attending: FAMILY MEDICINE
Payer: COMMERCIAL

## 2022-05-04 PROCEDURE — 97140 MANUAL THERAPY 1/> REGIONS: CPT | Mod: GP | Performed by: PHYSICAL THERAPIST

## 2022-05-04 NOTE — PROGRESS NOTES
[facility]    Outpatient Physical Therapy Discharge Note  Patient: Delbert Tilley  : 1950    Beginning/End Dates of Reporting Period:  22 to 22    Referring Provider: DO Alanis Daniels Diagnosis: BLE secondary lymphedema / phlebolymphedema      Client Self Report: [facility], [facility] will [facility] me more every 6 months    Objective Measurements:  Objective Measure: girth  Details: since last seen/measured on 3/29/22: RLE -3.8% and LLE -0.8% and since initial eval on 22: RLE -17.2% and LLE -9.5%     Outcome Measures (most recent score):  Lymphedema Life Impact Scale (score range 0-72). A higher score indicates greater impairment.: 6    Goals:  Goal Identifier stg   Goal Description pt to have around the clock tolerance to BLE GCB and Spandagrip for edema reduction response   Target Date 03/10/22   Date Met  22   Progress (detail required for progress note):       Goal Identifier ltg   Goal Description once appropriate, pt to be independent with donning, doffing and care of compression stockings for longterm edema management for maintenance   Target Date 22   Date Met  22   Progress (detail required for progress note):       Goal Identifier ltg   Goal Description pt to be independent with longterm edema management of BLEs via HEP, elevation, skin cares, modified MLD and compression stocking wear/use   Target Date 22   Date Met  22   Progress (detail required for progress note):       Goal Identifier ltg   Goal Description pt to have at least 3-5 point improvement on LLIS due to decreased edema and associated symptoms in BLEs   Target Date 22   Date Met  22   Progress (detail required for progress note):       Plan:  Discharge from therapy.    Discharge:    Reason for Discharge: Patient has met all  goals.    Equipment Issued: received thigh hi Jobst [facility] 15-20 knee hi size 2, jada [facility]    Discharge Plan: Patient to continue home program.

## 2022-05-13 ENCOUNTER — TELEPHONE (OUTPATIENT)
Dept: CARDIOLOGY | Facility: CLINIC | Age: 72
End: 2022-05-13
Payer: COMMERCIAL

## 2022-05-13 NOTE — TELEPHONE ENCOUNTER
Patient states blood pressures have been fluctuating more lately.  Patient reports past readings as low as 70's systolic.  Patient recently seen by PCP post hospitalization and it was recommended that patient follow up with cardiology.  Patient is agreeable to making a follow up appointment with [facility].  In the meantime RN advised patient to check BP before taking medications, if SBP less than 100 RN advised patient to hold his BP medications for that dose.  Patient advised that if symptoms worsen he should be seen in ED.  Betty Arias RN on 5/13/2022 at 4:45 PM

## 2022-05-18 ENCOUNTER — VIRTUAL VISIT (OUTPATIENT)
Dept: CARDIOLOGY | Facility: CLINIC | Age: 72
End: 2022-05-18
Payer: COMMERCIAL

## 2022-05-18 VITALS — SYSTOLIC BLOOD PRESSURE: 120 MMHG | DIASTOLIC BLOOD PRESSURE: 71 MMHG | WEIGHT: 160 LBS | BODY MASS INDEX: 21.7 KG/M2

## 2022-05-18 DIAGNOSIS — I50.9 CONGESTIVE HEART FAILURE, UNSPECIFIED HF CHRONICITY, UNSPECIFIED HEART FAILURE TYPE (H): ICD-10-CM

## 2022-05-18 DIAGNOSIS — I50.22 CHRONIC SYSTOLIC HEART FAILURE (H): Primary | ICD-10-CM

## 2022-05-18 DIAGNOSIS — Z95.810 ICD (IMPLANTABLE CARDIOVERTER-DEFIBRILLATOR) IN PLACE: ICD-10-CM

## 2022-05-18 DIAGNOSIS — I25.5 ISCHEMIC CARDIOMYOPATHY: ICD-10-CM

## 2022-05-18 DIAGNOSIS — I25.810 CORONARY ARTERY DISEASE INVOLVING CORONARY BYPASS GRAFT OF NATIVE HEART WITHOUT ANGINA PECTORIS: ICD-10-CM

## 2022-05-18 DIAGNOSIS — I73.9 PAD (PERIPHERAL ARTERY DISEASE) (H): ICD-10-CM

## 2022-05-18 PROCEDURE — 99214 OFFICE O/P EST MOD 30 MIN: CPT | Mod: 95 | Performed by: NURSE PRACTITIONER

## 2022-05-18 RX ORDER — LOSARTAN POTASSIUM 100 MG/1
50 TABLET ORAL DAILY
Start: 2022-05-18 | End: 2022-01-01

## 2022-05-18 NOTE — LETTER
5/18/2022    Danii Hernández MD  [id number]  [facility] [address]    RE: Delbert SORIA Jarek       Dear Colleague,     I had the pleasure of seeing Delbert Tilley in the [facility].  Kamron is a 71 year old who is being evaluated via a billable telephone visit.      What phone number would you like to be contacted at? [phone]  How would you like to obtain your AVS? [email or web address]  Phone call duration: 10  minutes      Cardiology Clinic Progress Note  Delbert Tilley MRN# [id number]   YOB: 1950 Age: 67 year old         Primary Cardiologist:   Dr. Bello/ [facility]          History of Presenting Illness:    Delbert Tilley is a pleasant 67 year old patient with a past cardiac history significant for   1. paroxysmal atrial fibrillation,   2. CAD s/p CABG (LIMA to LAD, SVG to OM, and SVG to PDA) and PCI to OM3 graft 2014,   3. PAD follows with vascular surgery s/p carotid endarterectomies and iliac artery stenting  4. s/p flutter ablation,   5. ischemic combined with nonischemic cardiomyopathy s/p ICD  Past medical history significant for diabetes mellitus, CKD following with [facility] nephrology Dr. Cueva, and tobacco abuse.  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    In 2014 he had NSTEMI with stent to the SVG to OM.  EF at that time was 35-40%.  In 2017 he underwent preoperative assessment for carotid endarterectomy.  Stress test was negative for ischemia but EF had fallen.   He presented with heart failure symptoms later in 2017 and EF was noted to be 20-25% on echocardiogram.  Repeat coronary angiogram demonstrated 50% stenosis in the apical LAD and SVG to the RPDA had occluded with other grafts patent. Echocardiogram November 2020 continued to show EF 20 to 25% unchanged from his prior study.  Stress test 2020 showing infarct but no ischemia.     Patient was seen by Dr. Bello in April 2021.  Echocardiogram at that time continued to show stable EF 20 to 25% with mildly dilated LV, moderately decreased RV  function, mild MR/TR, pulmonary hypertension, no significant changes. Patient had stenting of his iliac arteries and bilateral endarterectomies with additional procedures for longstanding claudication.  He noted improvement walking distance and symptoms.  He was seen in the ED for diuresis.  At office visit he remained volume up with symptoms of heart failure and was sent back to the ED for further diuresis.  Dr. Bello recommended following back with [facility] again.      He was hospitalized on 4/8/2022 for heart failure exacerbation.  He was given metolazone and IV Lasix.  Hospital discharge note reviewed today.      At PCP follow-up torsemide was decreased to 20 mg twice daily with additional as needed dose and losartan resumed.  Nephrology visit 5/6/2022 consider SGLT2 inhibitor for CKD support but noted that PAD may be a limiting factor.    Pt presents today for hospital and blood pressure follow-up.  BMP 4/13/2022 showing creatinine 1.54 BUN 57 GFR 47 and normal electrolytes.  Lipid profile 4/8/2022 controlled with decreased HDL 28 and ALT WNL.  Results reviewed today.  Hemoglobin 9.0.  Results reviewed today.    On 5/13/2022 patient noted fluctuating home BPs.  He says that on 5/11/2022 systolic blood pressures ranged from 70-170s.  He has continued monitoring blood pressures at home and they have stabilized at 101-120/60-70.  This morning it was 120/70.  He has been taking medications as directed and has not needed to hold antihypertensives at all.  He will notify us if this occurs again.  He also notes that he tested positive for COVID recently.  Fortunately, he only has a runny nose and cough.    Weights at home have been stable at 160 pounds.  He notes his dry weight to be 160-162 pounds.  He denies any heart failure or anginal symptoms.  He is agreeable to [place] [facility] [place] Dr. Bello, since patient has not been there since 2020. He has upcoming LITA and carotid ultrasound  with vascular appointment in June 2022. Patient reports no chest pain, PND, orthopnea, presyncope, syncope, heart racing, or palpitations.      Current Cardiac Medications   Atorvastatin 80 mg daily  Carvedilol 12.5 mg twice daily  Plavix 75 mg daily  Losartan 50 mg daily   Nitroglycerin as needed  Torsemide 20 mg twice daily, additional tablet for weight gain                     Assessment and Plan:     Plan  Patient Instructions   Medication Changes:  1. None     Recommendations:  1. Call if blood pressure is less than 90 on top or less than 100 with lightheadedness.    2. Check blood pressure at least 1 hour after medications. Call the clinic if your blood pressure is consistently greater than 130/80.   3. Check daily weights and call the clinic if your weight has increased more than 2 lbs in one day or 5 lbs in one week; if you feel more short of breath or have worsening swelling in your legs or abdomen.  4. 2000 mg sodium diet   5. 4-8 8 ounce glasses of fluids per day    Follow-up:  1. See [facility] or Kristen for cardiology follow up at [place]: 1-2 months. Call to schedule.     Cardiology Scheduling~[phone]  Cardiology Clinic RN~[phone] (Lissett Sandoval RN)               1. CAD    CABG (LIMA to LAD, SVG to OM, and SVG to PDA) years ago     2014 NSTEMI PCI to SVG-OM3     Angio 2017 without intervention, 50% stenosis in the apical LAD and SVG to the RPDA had occluded    No angina     Continue statin, aspirin, ARB, beta blocker      2. Ischemic cardiomyopathy with chronic systolic heart failure     Status post ICD with generator replaced     LVEF stable 20-25% since 2017, previously 35-40%     Dry Wt: 160-162 lbs     No symptoms of heart failure, per patient    NYHA class II     Continue beta blocker, ARB, torsemide     If patient develops left bundle branch block, may consider bi-V ICD upgrade in the future    Did not tolerate spironolactone d/t hypotension      3. Paroxysmal atrial  fibrillation/flutter    status post flutter ablation    History of a couple episodes of 30 seconds of atrial fibrillation    No symptoms of atrial fibrillation and no atrial fibrillation seen on device checks    Continue rate control with beta-blocker and no current anticoagulation      4. PVD    status post right and left carotid endarterectomy    following with vascular surgery       5.  NSVT    Seen on device checks, asymptomatic    Continue beta-blocker    We will continue to monitor on device checks and, if needed, can consider decreasing losartan to increase beta-blocker               Thank you for allowing me to participate in this delightful patient's care.      This note was completed in part using Dragon voice recognition software. Although reviewed after completion, some word and grammatical errors may occur.    ANA Givens, CNP           Data:   All laboratory data reviewed    Total time spent today was 34 mins, reviewing labs, testing, notes, documenting notes, and seeing patient.    Thank you for allowing me to participate in the care of your patient.      Sincerely,     ANA Givens CNP     [facility]  cc:   No referring provider defined for this encounter.

## 2022-05-18 NOTE — PROGRESS NOTES
Kamron is a 71 year old who is being evaluated via a billable telephone visit.      What phone number would you like to be contacted at? [phone]  How would you like to obtain your AVS? [facility]  Phone call duration: 10  minutes      Cardiology Clinic Progress Note  Delbert Tilley MRN# [id number]   YOB: 1950 Age: 67 year old         Primary Cardiologist:   Dr. Bello/ [facility]          History of Presenting Illness:    Delbert Tilley is a pleasant 67 year old patient with a past cardiac history significant for   1. paroxysmal atrial fibrillation,   2. CAD s/p CABG (LIMA to LAD, SVG to OM, and SVG to PDA) and PCI to OM3 graft 2014,   3. PAD follows with vascular surgery s/p carotid endarterectomies and iliac artery stenting  4. s/p flutter ablation,   5. ischemic combined with nonischemic cardiomyopathy s/p ICD  Past medical history significant for diabetes mellitus, CKD following with [facility] nephrology Dr. Cueva, and tobacco abuse.  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    In 2014 he had NSTEMI with stent to the SVG to OM.  EF at that time was 35-40%.  In 2017 he underwent preoperative assessment for carotid endarterectomy.  Stress test was negative for ischemia but EF had fallen.   He presented with heart failure symptoms later in 2017 and EF was noted to be 20-25% on echocardiogram.  Repeat coronary angiogram demonstrated 50% stenosis in the apical LAD and SVG to the RPDA had occluded with other grafts patent. Echocardiogram November 2020 continued to show EF 20 to 25% unchanged from his prior study.  Stress test 2020 showing infarct but no ischemia.     Patient was seen by Dr. Bello in April 2021.  Echocardiogram at that time continued to show stable EF 20 to 25% with mildly dilated LV, moderately decreased RV function, mild MR/TR, pulmonary hypertension, no significant changes. Patient had stenting of his iliac arteries and bilateral endarterectomies with additional procedures for longstanding claudication.  He  noted improvement walking distance and symptoms.  He was seen in the ED for diuresis.  At office visit he remained volume up with symptoms of heart failure and was sent back to the ED for further diuresis.  Dr. Bello recommended following back with [facility] again.      He was hospitalized on 4/8/2022 for heart failure exacerbation.  He was given metolazone and IV Lasix.  Hospital discharge note reviewed today.      At PCP follow-up torsemide was decreased to 20 mg twice daily with additional as needed dose and losartan resumed.  Nephrology visit 5/6/2022 consider SGLT2 inhibitor for CKD support but noted that PAD may be a limiting factor.    Pt presents today for hospital and blood pressure follow-up.  BMP 4/13/2022 showing creatinine 1.54 BUN 57 GFR 47 and normal electrolytes.  Lipid profile 4/8/2022 controlled with decreased HDL 28 and ALT WNL.  Results reviewed today.  Hemoglobin 9.0.  Results reviewed today.    On 5/13/2022 patient noted fluctuating home BPs.  He says that on 5/11/2022 systolic blood pressures ranged from 70-170s.  He has continued monitoring blood pressures at home and they have stabilized at 101-120/60-70.  This morning it was 120/70.  He has been taking medications as directed and has not needed to hold antihypertensives at all.  He will notify us if this occurs again.  He also notes that he tested positive for COVID recently.  Fortunately, he only has a runny nose and cough.    Weights at home have been stable at 160 pounds.  He notes his dry weight to be 160-162 pounds.  He denies any heart failure or anginal symptoms.  He is agreeable to follow-up in [facility] as previously recommended by Dr. Bello, since patient has not been there since 2020. He has upcoming LITA and carotid ultrasound with vascular appointment in June 2022. Patient reports no chest pain, PND, orthopnea, presyncope, syncope, heart racing, or palpitations.      Current Cardiac Medications   Atorvastatin 80 mg  daily  Carvedilol 12.5 mg twice daily  Plavix 75 mg daily  Losartan 50 mg daily   Nitroglycerin as needed  Torsemide 20 mg twice daily, additional tablet for weight gain                     Assessment and Plan:     Plan  Patient Instructions   Medication Changes:  1. None     Recommendations:  1. Call if blood pressure is less than 90 on top or less than 100 with lightheadedness.    2. Check blood pressure at least 1 hour after medications. Call the clinic if your blood pressure is consistently greater than 130/80.   3. Check daily weights and call the clinic if your weight has increased more than 2 lbs in one day or 5 lbs in one week; if you feel more short of breath or have worsening swelling in your legs or abdomen.  4. 2000 mg sodium diet   5. 4-8 8 ounce glasses of fluids per day    Follow-up:  1. See [facility] Hasna Peterson for cardiology follow up at [place]: 1-2 months. Call to schedule.     Cardiology Scheduling~[phone]  Cardiology Clinic RN~[phone] (Lissett Sandoval RN)               1. CAD    CABG (LIMA to LAD, SVG to OM, and SVG to PDA) years ago     2014 NSTEMI PCI to SVG-OM3     Angio 2017 without intervention, 50% stenosis in the apical LAD and SVG to the RPDA had occluded    No angina     Continue statin, aspirin, ARB, beta blocker      2. Ischemic cardiomyopathy with chronic systolic heart failure     Status post ICD with generator replaced     LVEF stable 20-25% since 2017, previously 35-40%     Dry Wt: 160-162 lbs     No symptoms of heart failure, per patient    NYHA class II     Continue beta blocker, ARB, torsemide     If patient develops left bundle branch block, may consider bi-V ICD upgrade in the future    Did not tolerate spironolactone d/t hypotension      3. Paroxysmal atrial fibrillation/flutter    status post flutter ablation    History of a couple episodes of 30 seconds of atrial fibrillation    No symptoms of atrial fibrillation and no atrial fibrillation seen on device  checks    Continue rate control with beta-blocker and no current anticoagulation      4. PVD    status post right and left carotid endarterectomy    following with vascular surgery       5.  NSVT    Seen on device checks, asymptomatic    Continue beta-blocker    We will continue to monitor on device checks and, if needed, can consider decreasing losartan to increase beta-blocker               Thank you for allowing me to participate in this delightful patient's care.      This note was completed in part using Dragon voice recognition software. Although reviewed after completion, some word and grammatical errors may occur.    Yareli Kim, APRN, CNP           Data:   All laboratory data reviewed    Total time spent today was 34 mins, reviewing labs, testing, notes, documenting notes, and seeing patient.

## 2022-06-01 PROBLEM — M54.9 BACK PAIN: Status: ACTIVE | Noted: 2022-06-01

## 2022-06-01 PROBLEM — G58.9 MONONEURITIS: Status: ACTIVE | Noted: 2022-06-01

## 2022-06-01 PROBLEM — E11.3299 NONPROLIFERATIVE DIABETIC RETINOPATHY (H): Status: ACTIVE | Noted: 2022-06-01

## 2022-06-01 PROBLEM — I25.2 HISTORY OF NON-ST ELEVATION MYOCARDIAL INFARCTION (NSTEMI): Status: ACTIVE | Noted: 2022-06-01

## 2022-06-01 PROBLEM — M54.2 NECK PAIN: Status: ACTIVE | Noted: 2022-06-01

## 2022-06-01 PROBLEM — I70.213 ATHEROSCLEROSIS OF NATIVE ARTERIES OF EXTREMITIES WITH INTERMITTENT CLAUDICATION, BILATERAL LEGS (H): Status: ACTIVE | Noted: 2022-06-01

## 2022-06-01 PROBLEM — L03.818 CELLULITIS AND ABSCESS OF OTHER SPECIFIED SITE: Status: ACTIVE | Noted: 2022-06-01

## 2022-06-01 PROBLEM — H91.90 HEARING LOSS: Status: ACTIVE | Noted: 2022-06-01

## 2022-06-01 PROBLEM — M77.50 ENTHESOPATHY OF ANKLE AND TARSUS: Status: ACTIVE | Noted: 2022-06-01

## 2022-06-01 PROBLEM — Z59.00 LACK OF HOUSING: Status: ACTIVE | Noted: 2022-06-01

## 2022-06-01 PROBLEM — H04.123 DRY EYE SYNDROME OF BILATERAL LACRIMAL GLANDS: Status: ACTIVE | Noted: 2022-06-01

## 2022-06-01 PROBLEM — E66.9 OBESITY: Status: ACTIVE | Noted: 2022-06-01

## 2022-06-01 PROBLEM — Z95.810 CARDIAC DEFIBRILLATOR IN PLACE: Status: ACTIVE | Noted: 2022-06-01

## 2022-06-01 PROBLEM — F10.20 OTHER AND UNSPECIFIED ALCOHOL DEPENDENCE, UNSPECIFIED DRINKING BEHAVIOR: Status: ACTIVE | Noted: 2022-06-01

## 2022-06-01 PROBLEM — Z79.01 LONG TERM CURRENT USE OF ANTICOAGULANT THERAPY: Status: ACTIVE | Noted: 2022-06-01

## 2022-06-01 PROBLEM — L98.9 DISORDER OF SKIN OR SUBCUTANEOUS TISSUE: Status: ACTIVE | Noted: 2022-06-01

## 2022-06-01 PROBLEM — H04.123 DRY EYES: Status: ACTIVE | Noted: 2022-06-01

## 2022-06-01 PROBLEM — H52.4 PRESBYOPIA: Status: ACTIVE | Noted: 2022-06-01

## 2022-06-01 PROBLEM — Z96.1 PSEUDOPHAKIA: Status: ACTIVE | Noted: 2022-06-01

## 2022-06-01 PROBLEM — L02.818 CELLULITIS AND ABSCESS OF OTHER SPECIFIED SITE: Status: ACTIVE | Noted: 2022-06-01

## 2022-06-01 PROBLEM — Z86.69 HISTORY OF RETINAL DETACHMENT: Status: ACTIVE | Noted: 2022-06-01

## 2022-06-01 PROBLEM — H40.009 GLAUCOMA SUSPECT: Status: ACTIVE | Noted: 2022-06-01

## 2022-06-01 PROBLEM — L72.0 EPIDERMOID CYST OF SKIN: Status: ACTIVE | Noted: 2022-06-01

## 2022-06-01 PROBLEM — K86.1 CHRONIC PANCREATITIS (H): Status: ACTIVE | Noted: 2022-06-01

## 2022-06-01 PROBLEM — K85.90 ACUTE PANCREATITIS: Status: ACTIVE | Noted: 2022-06-01

## 2022-06-01 PROBLEM — H93.19 TINNITUS: Status: ACTIVE | Noted: 2022-06-01

## 2022-06-14 ENCOUNTER — HOSPITAL ENCOUNTER (OUTPATIENT)
Dept: ULTRASOUND IMAGING | Facility: CLINIC | Age: 72
Discharge: HOME | End: 2022-06-14
Attending: SURGERY
Payer: COMMERCIAL

## 2022-06-14 ENCOUNTER — OFFICE VISIT (OUTPATIENT)
Dept: VASCULAR SURGERY | Facility: CLINIC | Age: 72
End: 2022-06-14
Payer: COMMERCIAL

## 2022-06-14 VITALS
BODY MASS INDEX: 21.67 KG/M2 | SYSTOLIC BLOOD PRESSURE: 77 MMHG | HEART RATE: 70 BPM | OXYGEN SATURATION: 95 % | TEMPERATURE: 97.7 F | DIASTOLIC BLOOD PRESSURE: 53 MMHG | HEIGHT: 72 IN | WEIGHT: 160 LBS

## 2022-06-14 DIAGNOSIS — I70.219 ATHEROSCLEROSIS OF ARTERY OF EXTREMITY WITH INTERMITTENT CLAUDICATION (H): ICD-10-CM

## 2022-06-14 DIAGNOSIS — I70.212 ATHEROSCLEROSIS OF NATIVE ARTERIES OF EXTREMITIES WITH INTERMITTENT CLAUDICATION, LEFT LEG (H): ICD-10-CM

## 2022-06-14 DIAGNOSIS — I65.22 LEFT CAROTID STENOSIS: ICD-10-CM

## 2022-06-14 DIAGNOSIS — I73.9 PAD (PERIPHERAL ARTERY DISEASE) (H): ICD-10-CM

## 2022-06-14 DIAGNOSIS — I70.212 ATHEROSCLEROSIS OF NATIVE ARTERIES OF EXTREMITIES WITH INTERMITTENT CLAUDICATION, LEFT LEG (H): Primary | ICD-10-CM

## 2022-06-14 PROCEDURE — 93922 UPR/L XTREMITY ART 2 LEVELS: CPT

## 2022-06-14 PROCEDURE — 93880 EXTRACRANIAL BILAT STUDY: CPT

## 2022-06-14 PROCEDURE — 99213 OFFICE O/P EST LOW 20 MIN: CPT | Performed by: SURGERY

## 2022-06-14 ASSESSMENT — PAIN SCALES - GENERAL: PAINLEVEL: SEVERE PAIN (6)

## 2022-06-14 NOTE — PATIENT INSTRUCTIONS
Odalys Tilley ,    Thank you for entrusting your care with us today. After your visit today with Dr Pepito Chau this is the plan that was discussed at your appointment.    You need a right leg angiogram. Sounds like you want this completed in the fall. We will contact you to schedule this.       I am including additional information on these things and our contact information if you have any questions or concerns.   Please do not hesitate to reach out to us if you felt we did not answer your questions or you are unsure of the treatment plan after your visit today. Our number is [phone].Thank you for trusting us with your care.         If you take blood thinners: SEE SPECIFIC INSTRUCTIONS BELOW    PLEASE DO NOT STOP YOUR ASPIRIN OR PLAVIX UNLESS SPECIFICALLY DIRECTED BY THE VASCULAR SURGEON TO STOP!  In most cases Vascular providers want you to continue these. This is different from most NON vascular surgeries and may not be well known by your Primary Care Provider        Prepare for the peripheral angiography as follows:  Do not eat 8 hours before your procedure. You may have clear liquids up to 2 hours before surgery.  If your provider says to take your normal medicines, swallow them with only small sips of water.  Tell your healthcare provider about all medicines you take and any allergies you may have.  Arrange for a family member or friend to drive you home.  If you are on insulin, please take only 1/2 the dose the night before and HOLD the day of the procedure.   If you are on Metformin, please HOLD for 48 hours after the procedure.     Peripheral Angiography    Peripheral angiography is an outpatient procedure that makes a  map  of the vessels (arteries) in your lower body, legs, and arms, using X-ray and dye.This map can show where blood flow may be blocked.    An angiogram is commonly performed under sedation with the use of local anesthesia.    The procedure usually starts with a needle put  into the femoral (groin) artery. From one treatment site, areas all over the body can be treated.  After access is established, catheters (thin tubes) and wires are threaded through the arterial system to a specific area of interest or throughout the entire body.  As a contrast agent (iodine dye) is injected, X-ray images are taken to let your provider view the flow of the dye and identify blockages. The surgeon can then choose the best mode of therapy for you - whether during or following the angiogram. This decision depends on your symptoms and the severity and characteristics of the blockages.  Two common therapies that can be provided during the angiogram are balloon angioplasty and stent placement.                   Angioplasty can be used to open arterial blockages. Guided by X-ray, your provider navigates through the blockage with a wire and introduces a special device equipped with an inflatable balloon. After positioning the balloon device across the blocked portion of the artery, the provider inflates the balloon to expand the artery and compress the blockage. The balloon is then deflated and removed while keeping the wire in place across the area that has been treated. Next, contrast dye is injected to assess the result. Treatment is considered a success if blood flow is improved and less than 30% of the blockage remains. If the vessel is still considerably narrowed, placing a stent may be the next step.    Stents are used to prop open an artery at the site of a narrowing. Stents are generally placed after balloon angioplasty when there is residual narrowing or insufficient blood flow in a treated vessel. Stents are considered a permanent implant and cannot be used if you have a metal allergy. Stents that are used in the leg are constructed of a nickel-titanium alloy (Nitinol), a memory-shaped metal. This alloy has a predetermined size and shape at body temperature and expands to this size and shape after  being introduced through a catheter. These stents resist kinking and are flexible so that damage from activities that involve your legs is minimized.  Your procedure may require other techniques to address the problem or plaque.     If surgery is felt to be a better option, your vascular provider will obtain any additional X-ray images needed to plan a surgical bypass of the blocked vessel/s and will then conclude the angiogram.      During the procedure  Here is what to expect:  You may get medicine through an IV (intravenous) line to relax you. You re given an injection to numb the insertion site. Then, a tiny skin cut (incision) is made near an artery in your groin.  Your provider inserts a thin tube (catheter) through the incision. He or she then threads the catheter into an artery while looking at a video monitor.  Contrast  dye  is injected into the catheter to confirm position. You may feel warmth or pressure in your legs and back. You lie still as X-rays are taken. The catheter is then taken out.  After the procedure  You ll be taken to a recovery area. A healthcare provider will apply pressure to the site for about 10 minutes. Your healthcare provider will tell you how long to lie down and keep the insertion site still. Your healthcare provider will discuss the results with you soon after the procedure.      Angiogram Procedure Discharge Instructions:     1. If you received sedation for your procedure: Do not drive or operate heavy machinery for the rest of the day.  2. Avoid strenuous activity for 72 hours (3 days):                        - Do not lift greater than 10 pounds.                        - Excessive exercise                        - Straining                        - Return to your normal activities as you tolerate after the 3 days restriction  3. Avoid tub baths, Jacuzzis, hot tubs and pools for 72 hours (3 days) or until puncture site is will healed.  4. You may shower beginning tomorrow. Do  not scrub puncture site(s) until well healed, pat dry.  5. You can expect to return to work 1-2 days after your procedure - depending on the nature of your profession.  6. It is normal to have some tenderness and minimal swelling at puncture site. A small area of discoloration may be present. Tenderness typically subsides in 24-48 hours. A small knot may also be present at puncture site for 6-8 weeks, this can be a normal part of the healing process.       After the angiogram If you:      1. Experience any bleeding or active swelling from puncture site: Lie down, firmly apply pressure to puncture site and CALL 9-1-1  2. Fever greater than 101 degrees Fahrenheit.  3. Redness, swelling, warmth to touch, or purulent (yellow/green/foul smelling) drainage from the puncture site.  4. Increasing pain, tenderness or swelling at puncture site OR of arm/leg near puncture site.  5. Feeling weak or faint.  6. Change in color, temperature, or sensation of arm/leg where puncture was made.  7. You can t feel your thrill (pulse at your dialysis fistula site) or it feels weak (If you had fistulogram done).     Call us with any other questions or concerns after your procedure: [phone]      All invasive procedures can have complications. While the risk of an angiogram is low it is not zero. The most common complications are related to the arterial access site.       Risks/ Complications   Bruising is Common  You will likely have bruising (ecchymosis) where the artery was entered.    Pain and Bleeding  Less commonly, patients experience pain and bleeding that may include blood collecting under the skin (hematoma).    Blockage and Leakage   In rare cases, the access artery can become blocked. Infrequently, patients experience persistent leakage of blood where the artery was entered, which can result in the formation of a pseudoaneurysm--a blood-filled sac--that may require further treatment.  Other complications related to an  "angiogram include:   Allergic reaction to the iodine contrast dye, which can lead to the development of kidney failure.  Very rarely during balloon angioplasty and/or stent placement, part of the arterial blockage can break off (embolism) and travel to more distant arteries. This can worsen blood flow.        Notify our office right away, if you have any changes in your health status, or if you develop a cold, flu, diarrhea, infection, fever or sore throat before your scheduled surgery date. We can be reached at [phone] Monday-Friday 8 am-4:30 pm if you have any questions.   Thank you for choosing [facility]      [] PCR COVID-19 test is required within 4 days of surgery  If your test is positive or you fail to get tested, your surgery will be postponed.     [] Contact your insurance regarding coverage  If you would like a [place] Estimate for your upcoming procedure at [facility] Location, contact Cost of Care Estimates   Advocates are available Monday through Friday 8am - 5pm   [phone]  You may also submit a request online at [email or web address]  - Complete the secure online form found under \"Services and Procedure Pricing\"   If your procedure is at [facility] please contact the numbers below for Cost of Care Estimates.   - Facility Charge: [phone]    Anesthesiology charge:  [phone]      [] DO NOT BRING FMLA WITH TO SURGERY.  These should be sent to the provider's office by fax to [phone].     [] Day of Surgery  Medications - Take as indicated with sips of water.   Wear comfortable loose fitting clothes. Wear your glasses-Not contacts. Do not wear jewelry and remove body piercing's. Surgery may be cancelled if they are not removed.   You may have 1 family member wait in the lobby during your surgery. Visitor restrictions are subject to change. Please verify with the surgery nurse when they call.   If same day surgery-Have a someone come " with you to surgery that can help you understand the surgeon's instructions, drive you home and stay with you overnight the first night.    [] If the community sees a new COVID-19 surge, your procedure may need to be postponed. We will contact you if this happens.    [] You will receive a call from a surgery nurse 1-3 days prior to surgery. They will go over more details with you.       Before Your Procedure or Hospital Admission Testing for COVID-19  Thank you for choosing [facility] for your health care needs. The COVID-19 pandemic is a very challenging time for everyone.   Our goal is to keep you and our team here at [facility] safe and healthy. We ve taken many steps to make this happen.   For example:   We test and screen our staff, care teams and patients for COVID-19.   Everyone at [facility] must wear a mask and stay 6 feet apart.   We are limiting hospital and clinic visitors.  Before you come in  You must get tested for COVID-19, even if you ve been vaccinated.   If you re going home on the same day as your procedure (surgery):  1 to 2 days before your procedure, take an at-home, rapid antigen test. You can buy these at many pharmacy stores. Or you can order free, at-home tests at covid.gov/tests. If you   can t find an at-home, rapid antigen test, please schedule a PCR test with [facility] by calling [facility], or visiting [email or web address]. We   can t accept tests that are more than 4 days old.   If your test is negative, please take a photo of the test. Show the photo to the nurse when you come in for your procedure.  If your test is positive, please see the  If your test shows you have COVID-19  section on this page.    If you re staying overnight in the hospital:  4 days before your procedure, please get a PCR test from a lab.   To schedule a PCR test with [facility], call [phone]. Or, visit [email or web address].    If  your test is negative, please ask the testing location to fax your results to us at [phone].  If your test is positive, please see the  If your test shows you have COVID-19  section below.    After your test and before your procedure, please follow these safety guidelines:   Limit trips outside your home.   Limit the number of people you see.   Always wear a face covering outside your home.   Use social distancing. Stay 6 feet away from others whenever you can.   Wash your hands often.    If your test shows you have COVID-19  If your test is positive, please tell your surgeon s office right away. A positive test means that you have COVID-19.  We ll probably have to postpone your admission, surgery or procedure. Your care team will discuss this with you. After that, we ll let you know what to   do and when you can re-schedule.      If your test shows you DON T have COVID-19.   Even if your test is negative, you can still get COVID-19. It s rare, but sometimes the test result is wrong. You could also catch the virus after taking   the test. There s a very small chance that you could catch COVID-19 in the hospital or surgery center. [facility] has taken many steps to prevent this from happening.   Possible surgery delay like you, we want your surgery to happen when it s scheduled. But sometimes the hospital is so full that it s not safe for you to have your surgery. This is   especially true during the pandemic. Your surgery may need to be re-scheduled at a later date. If this happens, we will call and tell you.   Day of your surgery or procedure   Please come wearing a face covering that covers both your nose and mouth.   When you arrive, we ll ask you some questions to find out if you ve had any exposures to COVID-19, or have any signs of COVID-19.   No matter where you took a test, we ll need to have the results. You can ask your testing location to fax the results to us at [phone]. If you   took a  rapid antigen at-home test, you can bring a photo of the results.    Ask your care team if you can have visitors. All visitors must wear face coverings and will be screened for exposure to, or signs of, COVID-19.    The rules for visitors change often, depending on how much the virus is spreading. To learn more, see Visiting a Loved One in the Hospital during   the COVID-19 Outbreak.Please call your care team, hospital or surgery center if you have any questions. We thank you for your understanding and for choosing [facility]   for your care.   Questions and answers  Does it matter how I get tested for COVID-19?   Yes. If the plan is for you to go home on the same day as your procedure, you can take a rapid antigen, at-home test 1 to 2 days before you come in. If your test is negative, please take a photo of your test. Show it to the nurse when you come to the hospital. If you can t find a rapid antigen, at-home test, you can take a PCR test at a lab instead. If the plan is for you to stay in the hospital after your surgery, you ll need to get a PCR test from a lab 4 days before your procedure. Ask the testing location to fax your results to [phone]. If we don t get your results, we may have to delay or cancel your treatment.  Do I need to get tested at [facility]?  No. However, if you need a PCR test from a lab, we recommend using an [facility] lab. We ll get the results more quickly and easily that way. To schedule a test, please call [phone]. Or, visit [email or web address]      For informational purposes only. Not to replace the advice of your health care provider. Copyright   2020 [facility]. All rights reserved. Clinically reviewed by Infection Prevention and the [facility] COVID-19 Clinical Team.   [employer] [id number] - Rev 05/26/22.        Again thank you for your time.     Anastasia Malagon RN

## 2022-06-14 NOTE — NURSING NOTE
Initial BP (!) 77/53 (BP Location: Left arm, Patient Position: Sitting, Cuff Size: Adult Regular)   Pulse 70   Temp 97.7  F (36.5  C) (Tympanic)   Ht 1.829 m (6')   Wt 72.6 kg (160 lb)   SpO2 95%   BMI 21.70 kg/m   Estimated body mass index is 21.7 kg/m  as calculated from the following:    Height as of this encounter: 1.829 m (6').    Weight as of this encounter: 72.6 kg (160 lb). .    Constance Rangel LPN on 6/14/2022 at 12:16 PM

## 2022-06-14 NOTE — PROGRESS NOTES
Patient is in clinic today to see MD Remi Chau.      Patient is here for a follow up to discuss 4 month f/u s/p BILATERAL- COMMON FEMORAL ENDARTERECTOMY.    The patient does smoke.    Pt is currently taking Plavix.    The patient states he/she are wearing compression.    Swelling is observed in bilateral legs. Only on days he does not wear compression socks.    Pt rates pain 6/10 located at both feet.    Pts symptoms include leg cramps, swelling, numbness or weakness and pain with walking at a distance of 1/2 block in Bilateral  extremity.    Patients condition is stable.    The provider has been notified that the patient is complaining of groin swelling still on right side from surgery.

## 2022-06-16 ENCOUNTER — TELEPHONE (OUTPATIENT)
Dept: VASCULAR SURGERY | Facility: CLINIC | Age: 72
End: 2022-06-16
Payer: COMMERCIAL

## 2022-06-16 NOTE — TELEPHONE ENCOUNTER
Left voicemail on pt's phone to call back to schedule right leg angiogram.  Pt would like like to schedule in the fall.

## 2022-06-19 NOTE — PROGRESS NOTES
VASCULAR SURGERY PROGRESS NOTE   VASCULAR SURGEON: Ric Chau MD, RPVI     LOCATION:  [facility]     Delbert Tilley   Medical Record #:  [id number]  YOB: 1950  Age:  71 year old     Date of Service: 6/14/2022    PRIMARY CARE PROVIDER: Danii Hernández      Reason for visit: Follow-up    IMPRESSION: 70-year-old male who comes to vascular surgical for follow-up.  Patient status post bilateral iliofemoral endarterectomies with retrograde iliac intervention.  Patient states that his symptoms mildly improved but he still complaining of significant claudication bilateral lower extremities, right is worse than left.  He is currently on best medical management therapy.    RECOMMENDATION/RISKS: We will proceed with right lower extremity angiogram.  Risk and benefits of this procedure were explained patient agreed to proceed.    HPI:  Delbert Tilley is a 71 year old male who was seen today for follow-up.  Patient is complaining of bilateral lower extremity claudication, right is worse than left.    REVIEW OF SYSTEMS:    A 12 point ROS was reviewed and is negative except for bilateral lower extremity pain.     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%     Claudication (H)      COPD (chronic obstructive pulmonary disease) (H)      Diabetes (H)      Gastro-oesophageal reflux disease      GERD (gastroesophageal reflux disease)      H/O: alcohol abuse      HTN (hypertension)      Hyperlipidaemia LDL goal <100 07/08/2013     Hyperparathyroidism (H)      Hypokalemia       ICD (implantable cardioverter-defibrillator) in place     [employer]     NSTEMI (non-ST elevated myocardial infarction) (H)      NSVT (nonsustained ventricular tachycardia) (H)      Pacemaker      Pancreatitis 6/26/06 Hosp     Paroxysmal atrial fibrillation (H)      PVD (peripheral vascular disease) (H)      Thrombocytopenia (H)      Tobacco use      Venous (peripheral) insufficiency      Vitamin D deficiency           Past Surgical History:   Procedure Laterality Date     CATARACT IOL, RT/LT      Cataract IOL RT/LT     CLOSE SECONDARY WOUND LOWER EXTREMITY Right 2/13/2022    Procedure: Right groin wound exploration, nerve release and closure.;  Surgeon: Karen Arthur MD;  Location: [facility] OR     ENDARTERECTOMY CAROTID Right 06/12/2015    Procedure: ENDARTERECTOMY CAROTID;  Surgeon: João Turner MD;  Location:  OR     ENDARTERECTOMY CAROTID Left 09/08/2017    Procedure: ENDARTERECTOMY CAROTID;  LEFT CAROTID ENDARTERECTOMY WITH EEG;  Surgeon: João Turner MD;  Location:  OR     ENDARTERECTOMY FEMORAL Bilateral 2/9/2022    Procedure: BILATERAL- COMMON FEMORAL ENDARTERECTOMY; RETROGRADE ILIAC ARTERY STENTING;  Surgeon: Ric Chau MD;  Location: [facility] OR     IMPLANT PACEMAKER  06/10/2010     IMPLANTED DEVICE       INTERPOSITION TENDON HAND N/A 06/09/2020    Procedure: Right thumb ligament reconstruction tendon interposition with extensor pollicis brevis to abductor pollicis tendon transfer;  Surgeon: Bethel Martin MD;  Location: [facility] OR     RELEASE CARPAL TUNNEL Right 04/12/2016    Procedure: RELEASE CARPAL TUNNEL;  Surgeon: Lissy Leger MD;  Location: [facility] OR     SURGICAL HISTORY OF -   1998    Laminectomy     TONSILLECTOMY & ADENOIDECTOMY       [facility] CABG, ARTERIAL, THREE         ALLERGIES:  Fish, Hydrocodone, and Shellfish allergy    MEDS:    Current Outpatient Medications:      acetaminophen (TYLENOL) 500 MG tablet, Take 500-1,000 mg by mouth every 6  hours as needed for mild pain, Disp: , Rfl:      albuterol (PROAIR HFA) 108 (90 Base) MCG/ACT Inhaler, Inhale 2 puffs into the lungs every 4 hours as needed for shortness of breath / dyspnea, Disp: 1 Inhaler, Rfl: 0     amylase-lipase-protease (CREON 12) 24338-16860-66824 units CPEP, Take 2 capsules by mouth 3 times daily (with meals) , Disp: 120 capsule, Rfl: 0     atorvastatin (LIPITOR) 80 MG tablet, Take 80 mg by mouth At Bedtime , Disp: , Rfl:      calcium carbonate (OS-MARVA) 500 MG tablet, Take 1 tablet by mouth 2 times daily, Disp: , Rfl:      Carboxymethylcellulose Sod PF (THERATEARS PF) 0.25 % SOLN, Apply 1 drop to eye 4 times daily as needed, Disp: , Rfl:      carvedilol (COREG) 6.25 MG tablet, Take 2 tablets (12.5 mg) by mouth 2 times daily (with meals), Disp: 180 tablet, Rfl: 3     clopidogrel (PLAVIX) 75 MG tablet, Take 1 tablet (75 mg) by mouth daily, Disp: 30 tablet, Rfl: 3     gabapentin (NEURONTIN) 100 MG capsule, Take 200 mg by mouth At Bedtime , Disp: 1 capsule, Rfl: 0     Glycerin (OASIS MOISTURIZING MOUTH SPRAY) 35 % LIQD, Take 2 sprays by mouth daily as needed, Disp: , Rfl:      insulin aspart (NOVOLOG PEN) 100 UNIT/ML pen, Inject 3 Units Subcutaneous 3 times daily (with meals), Disp: 15 mL, Rfl: 1     insulin glargine (LANTUS PEN) 100 UNIT/ML pen, Inject 28 Units Subcutaneous every morning Increase by 2 units every 2-3 days until morning blood sugar is .  Max dose 40 units/day, Disp: 45 mL, Rfl: 1     Insulin Pen Needle (PEN NEEDLES) 32G X 4 MM MISC, , Disp: , Rfl:      loperamide (IMODIUM) 2 MG capsule, Take 2 mg by mouth 4 times daily as needed for diarrhea Patient takes a dose in the am and another at night, Disp: , Rfl:      losartan (COZAAR) 100 MG tablet, Take 0.5 tablets (50 mg) by mouth daily, Disp: , Rfl:      magnesium oxide (MAG-OX) 400 (241.3 Mg) MG tablet, Take 1 tablet (400 mg) by mouth 4 times daily, Disp: 120 tablet, Rfl: 1     metFORMIN (GLUCOPHAGE-XR) 500 MG 24 hr tablet,  Take 500 mg by mouth 2 times daily (with meals) , Disp: , Rfl:      mometasone (ASMANEX TWISTHALER) 220 MCG/INH inhaler, Inhale 2 puffs into the lungs every evening , Disp: , Rfl:      pantoprazole (PROTONIX) 40 MG EC tablet, Take 40 mg by mouth 2 times daily , Disp: 30 tablet, Rfl:      torsemide (DEMADEX) 20 MG tablet, Take 1 tablet (20 mg) by mouth 2 times daily May take an additional tablet if there is 3 lbs of weight gain, Disp: 90 tablet, Rfl: 4     vitamin D3 (CHOLECALCIFEROL) 50 mcg (2000 units) tablet, Take 1 tablet by mouth 2 times daily, Disp: , Rfl:      nitroGLYcerin (NITROSTAT) 0.4 MG sublingual tablet, Place 0.4 mg under the tongue every 5 minutes as needed for chest pain  (Patient not taking: Reported on 6/14/2022), Disp: , Rfl:     SOCIAL HABITS:    History   Smoking Status     Current Every Day Smoker     Packs/day: 1.50     Years: 50.00     Types: Cigarettes   Smokeless Tobacco     Never Used     Comment: 1 1/2 PPD 05/18/22     Social History    Substance and Sexual Activity      Alcohol use: No      History   Drug Use No       FAMILY HISTORY:    Family History   Adopted: Yes   Problem Relation Age of Onset     Unknown/Adopted No family hx of        PE:  BP (!) 77/53 (BP Location: Left arm, Patient Position: Sitting, Cuff Size: Adult Regular)   Pulse 70   Temp 97.7  F (36.5  C) (Tympanic)   Ht 1.829 m (6')   Wt 72.6 kg (160 lb)   SpO2 95%   BMI 21.70 kg/m    Wt Readings from Last 1 Encounters:   06/14/22 72.6 kg (160 lb)     Body mass index is 21.7 kg/m .    EXAM:  GENERAL: This is a well-developed 71 year old male who appears his stated age  EYES: Grossly normal.  MOUTH: Buccal mucosa normal   CARDIAC: Normal   CHEST/LUNG: Clear to auscultation bilaterally  GASTROINTESINAL soft nontender nondistended  MUSCULOSKELETAL: Grossly normal and both lower extremities are intact.  HEME/LYMPH: No lymphedema  NEUROLOGIC: Focally intact, Alert and oriented x 3.   PSYCH: appropriate affect  INTEGUMENT:  No open lesions or ulcers  Pulse Exam:           DIAGNOSTIC STUDIES:     Images:  US LITA with PPG wo Exercise Bilateral    Result Date: 6/15/2022  US LITA WITH PPG WITHOUT EXERCISE BILATERAL   6/14/2022 10:00 AM HISTORY: Atherosclerosis of native arteries of extremities with intermittent claudication, left leg (H); Atherosclerosis of artery of extremity with intermittent claudication (H); Left carotid stenosis. COMPARISON: 3/1/2022 FINDINGS: Right LITA: PT: 0.52. DP: 0.53 Left LITA: PT: 0.55. DP: 0.52 Right Digital brachial index: 0.19. Left Digital Brachial index: 0 Waveforms: Triphasic in the right femoral, monophasic throughout the remainder of the right lower extremity. Monophasic throughout the left.     IMPRESSION: 1. Bilateral ABIs show moderate arterial insufficiency. 2. Absent left digital pressure and waveform. LITA CRITERIA: >1.4 NC 0.95-1.4 Normal 0.90 - 0.94 Mild 0.5 - 0.89 Moderate 0.2 - 0.49 Severe <0.2 Critical GIL LORENZANA DO   SYSTEM ID:  [id number]    US Carotid Bilateral    Result Date: 6/14/2022  BILATERAL CAROTID ULTRASOUND   6/14/2022 10:30 AM HISTORY:  Atherosclerosis of artery of extremity with intermittent claudication (H). Left carotid stenosis. COMPARISON: 6/22/2021 RIGHT CAROTID FINDINGS:  Plaque in the common carotid, carotid bulb, internal carotid and external carotid artery. Right ICA PSV:  139 cm/sec. Right ICA EDV:  34 cm/sec. Right ICA/CCA PSV Ratio:  1.1  These indicate 50-69% diameter stenosis of the right ICA.  Right Vertebral: Antegrade flow. Right ECA: Antegrade flow. LEFT CAROTID FINDINGS:  Significant plaque in the common carotid artery. Plaque in the external and internal carotid artery. Left ICA PSV:  200 cm/sec. Left ICA EDV:  38 cm/sec. Left ICA/CCA PSV Ratio:  0.8  These indicate 50-69% diameter stenosis of the left ICA.  Left Vertebral: Abnormal waveform in the left vertebral artery. Left ECA: Antegrade flow. Significant stenosis with a peak systolic velocity of 319.  Causes of Decreased Accuracy:   None.     IMPRESSION:  1. 50-69% diameter stenosis of the right ICA relative to the distal ICA diameter. 2. 50-69% diameter stenosis of the left ICA relative to the distal ICA diameter. 3. Significantly elevated velocities with visual stenosis in the left common carotid artery. 4. Abnormal waveform in the left vertebral artery, suggesting subclavian steal, similar to the prior study. 5. Severe stenosis of the left external carotid artery. GIL LORENZANA,    SYSTEM ID:  [id number]      LABS:      Sodium   Date Value Ref Range Status   04/13/2022 137 133 - 144 mmol/L Final   04/09/2022 140 133 - 144 mmol/L Final   04/08/2022 140 133 - 144 mmol/L Final   06/10/2021 140 133 - 144 mmol/L Final   01/21/2021 135 133 - 144 mmol/L Final   12/22/2020 136 133 - 144 mmol/L Final     Urea Nitrogen   Date Value Ref Range Status   04/13/2022 57 (H) 7 - 30 mg/dL Final   04/09/2022 66 (H) 7 - 30 mg/dL Final   04/08/2022 67 (H) 7 - 30 mg/dL Final   06/10/2021 34 (H) 7 - 30 mg/dL Final   01/21/2021 27 7 - 30 mg/dL Final   12/22/2020 34 (H) 7 - 30 mg/dL Final     Hemoglobin   Date Value Ref Range Status   04/09/2022 9.0 (L) 13.3 - 17.7 g/dL Final   04/08/2022 9.3 (L) 13.3 - 17.7 g/dL Final   04/04/2022 10.8 (L) 13.3 - 17.7 g/dL Final   01/21/2021 15.0 13.3 - 17.7 g/dL Final   11/16/2020 13.2 (L) 13.3 - 17.7 g/dL Final   10/22/2020 13.2 (L) 13.3 - 17.7 g/dL Final     Platelet Count   Date Value Ref Range Status   04/09/2022 117 (L) 150 - 450 10e3/uL Final   04/08/2022 119 (L) 150 - 450 10e3/uL Final   04/04/2022 144 (L) 150 - 450 10e3/uL Final   01/21/2021 117 (L) 150 - 450 10e9/L Final   11/16/2020 111 (L) 150 - 450 10e9/L Final   10/22/2020 151 150 - 450 10e9/L Final     BNP   Date Value Ref Range Status   11/22/2004 200 (H) 5 - 100 pg/mL Final   07/21/2004 131 (H) 5 - 100 pg/mL Final   07/16/2004 283 (H) 5 - 100 pg/mL Final     INR   Date Value Ref Range Status   12/22/2017 1.02 0.86 - 1.14 Final    09/08/2017 1.03 0.86 - 1.14 Final   10/20/2014 0.94 0.86 - 1.14 Final       30 minutes spent on the day of encounter doing chart review, history and exam, documentation, and further activities as noted.         Ric Chau MD, [facility]  VASCULAR SURGERY

## 2022-06-19 NOTE — ED NOTES
"Pt fell outside his home on Thursday while gardening. Pt states that his neighbor was outside and came to help him up. Pt normally wears compression socks, \"I can't get them on since I fell. My left hip and knee hurt like hell\".   Pt states that albuterol doesn't seem to be helping for shortness of breath at home like it used to. Denies chest pain.   "

## 2022-06-19 NOTE — ED PROVIDER NOTES
History     Chief Complaint   Patient presents with     Shortness of Breath     For the last week     Fall     On Thursday, hurt left hip and knee     HPI  Delbert Tilley is a 71 year old male, past medical history is significant for obesity, ASCVD with history of NSTEMI, cardiac defibrillator implanted, pancreatitis, CHF, PAD, idiopathic peripheral autonomic neuropathy, venous insufficiency, actinic keratoses, hyperparathyroidism thrombocytopenia, hypocalcemia, hypertension, ischemic cardiomyopathy, hypomagnesemia, anemia, stage III renal disease, hypotension, COPD, NSVT, type 2 diabetes, alcohol abuse in remission, GERD, nicotine dependence (the patient is still currently a 2 pack/day smoker), who presents with his daughter from home with concerns of weakness and pain in his left hip and knee after he fell 3 days prior to presentation when he was outside and tripped on a shovel landing on his left hip and left knee.  With assistance from a neighbor he has been able to get back up and move around.  Preceding this and over approximately the last 1 week he has been increasingly short of breath at home increased cough runny nose sore throat no fever.  He states that he sleeps well however he does identify several episodes of PND where he had to get up after awakening short of breath and then go back to sleep in his recliner sitting upright.  At no point has he noted any chest pain or chest tightness.  He has noted an increase in shortness of breath over the past 1 week as described, primarily with exertion.  He states has been using his inhaler medicine that he thinks is albuterol at home and it does not seem to be helping as much.      Allergies:  Allergies   Allergen Reactions     Fish Nausea and Vomiting     severe     Hydrocodone GI Disturbance     Upset stomach     Shellfish Allergy Hives and Rash       Problem List:    Patient Active Problem List    Diagnosis Date Noted     Tinnitus 06/01/2022     Priority:  Medium     Pseudophakia 06/01/2022     Priority: Medium     Presbyopia 06/01/2022     Priority: Medium     Nonproliferative diabetic retinopathy (H) 06/01/2022     Priority: Medium     Obesity 06/01/2022     Priority: Medium     Mononeuritis 06/01/2022     Priority: Medium     Neck pain 06/01/2022     Priority: Medium     Lack of housing 06/01/2022     Priority: Medium     Long term current use of anticoagulant therapy 06/01/2022     Priority: Medium     Epidermoid cyst of skin 06/01/2022     Priority: Medium     Glaucoma suspect 06/01/2022     Priority: Medium     History of non-ST elevation myocardial infarction (NSTEMI) 06/01/2022     Priority: Medium     Nov 07, 2014 Entered By: ELYSIA DEE Comment: drug-eluting stent to saphenous vein graft       Hearing loss 06/01/2022     Priority: Medium     History of retinal detachment 06/01/2022     Priority: Medium     Back pain 06/01/2022     Priority: Medium     Cardiac defibrillator in place 06/01/2022     Priority: Medium     Cellulitis and abscess of other specified site 06/01/2022     Priority: Medium     Feb 16, 2006 Entered By: DIMA TUCKER Comment: Perineum       Acute pancreatitis 06/01/2022     Priority: Medium     Chronic pancreatitis (H) 06/01/2022     Priority: Medium     Dry eye syndrome of bilateral lacrimal glands 06/01/2022     Priority: Medium     Disorder of skin or subcutaneous tissue 06/01/2022     Priority: Medium     Dry eyes 06/01/2022     Priority: Medium     Enthesopathy of ankle and tarsus 06/01/2022     Priority: Medium     Atherosclerosis of native arteries of extremities with intermittent claudication, bilateral legs (H) 06/01/2022     Priority: Medium     Other and unspecified alcohol dependence, unspecified drinking behavior 06/01/2022     Priority: Medium     Platypnea-orthodeoxia syndrome 04/09/2022     Priority: Medium     Encounter for screening laboratory testing for severe acute respiratory syndrome coronavirus 2  (SARS-CoV-2) 04/09/2022     Priority: Medium     CHF (congestive heart failure) (H) 04/08/2022     Priority: Medium     Dyspnea on exertion 04/08/2022     Priority: Medium     PAD (peripheral artery disease) (H) 02/09/2022     Priority: Medium     Other idiopathic peripheral autonomic neuropathy 06/22/2021     Priority: Medium     Venous insufficiency (chronic) (peripheral) 06/22/2021     Priority: Medium     Actinic keratosis 06/22/2021     Priority: Medium     Nail dystrophy 06/22/2021     Priority: Medium     Generalized muscle weakness 06/22/2021     Priority: Medium     Atherosclerosis of artery of extremity with intermittent claudication (H) 01/22/2021     Priority: Medium     Hyperparathyroidism (H) 01/22/2021     Priority: Medium     Thrombocytopenia (H) 01/22/2021     Priority: Medium     Vitamin D deficiency 01/15/2021     Priority: Medium     Hypocalcemia 01/15/2021     Priority: Medium     Essential hypertension 01/15/2021     Priority: Medium     Ischemic cardiomyopathy 01/15/2021     Priority: Medium     Formatting of this note might be different from the original.  EF 20-25%       Other proteinuria 01/15/2021     Priority: Medium     Hypomagnesemia 01/15/2021     Priority: Medium     Anemia in chronic kidney disease 01/15/2021     Priority: Medium     Diabetic nephropathy associated with type 2 diabetes mellitus (H) 01/15/2021     Priority: Medium     Pleural plaque 12/31/2020     Priority: Medium     Chronic kidney disease, stage 3 (H) 11/18/2020     Priority: Medium     Hypotension, unspecified hypotension type 10/21/2020     Priority: Medium     Chronic obstructive pulmonary disease, unspecified COPD type (H) 03/09/2020     Priority: Medium     NSVT (nonsustained ventricular tachycardia) (H) 11/01/2019     Priority: Medium     Chronic systolic heart failure (H) 11/01/2019     Priority: Medium     Paroxysmal atrial fibrillation (H) 11/01/2019     Priority: Medium     Tobacco use 05/28/2019      Priority: Medium     Left carotid stenosis 09/08/2017     Priority: Medium     Carpal tunnel syndrome of right wrist 01/10/2016     Priority: Medium     Carotid stenosis 06/12/2015     Priority: Medium     Bilateral carotid Disease S/P Right carotid endarterectomy.   [facility]       NSTEMI (non-ST elevated myocardial infarction) (H) 10/21/2014     Priority: Medium     ACS (acute coronary syndrome) (H) 10/20/2014     Priority: Medium     Hyperlipidemia LDL goal <70 07/08/2013     Priority: Medium     [facility]           Type 2 diabetes mellitus with other specified complication, with long-term current use of insulin (H) 10/31/2010     Priority: Medium     CARDIOVASCULAR SCREENING; LDL GOAL LESS THAN 100 10/31/2010     Priority: Medium     Coronary artery disease involving native coronary artery of native heart without angina pectoris 07/07/2010     Priority: Medium     S/p mi  Bypass x 4 --grafting to the LAD obtuse marginal and PDA.   Angio on 6/10 negative          Alcohol abuse, in remission 07/07/2010     Priority: Medium     H/o rehab x 5   Dry x 10 years (2010 or so)       GERD (gastroesophageal reflux disease) 07/07/2010     Priority: Medium     Alcohol-induced chronic pancreatitis (H) 07/07/2010     Priority: Medium     H/o pancreatitis --on enzymes        Diabetes mellitus, type 2 (H) 07/07/2010     Priority: Medium     [facility]       Cardiomyopathy (H) 06/15/2010     Priority: Medium     EF 15%.--ICD placement        Atrial fibrillation/flutter 06/15/2010     Priority: Medium     S/p ablation 1June 2010.     Replacing diagnoses that were inactivated after the 10/1/2021 regulatory import.          Past Medical History:    Past Medical History:   Diagnosis Date     Acute renal failure (H) 8/26/06 Hosp     Anemia      Arthritis      Atherosclerosis of artery of extremity with intermittent claudication (H)      CAD (coronary artery disease)      Cardiomyopathy (H)       Carpal tunnel syndrome      CHF (congestive heart failure) (H)      Claudication (H)      COPD (chronic obstructive pulmonary disease) (H)      Diabetes (H)      Gastro-oesophageal reflux disease      GERD (gastroesophageal reflux disease)      H/O: alcohol abuse      HTN (hypertension)      Hyperlipidaemia LDL goal <100 07/08/2013     Hyperparathyroidism (H)      Hypokalemia      ICD (implantable cardioverter-defibrillator) in place      NSTEMI (non-ST elevated myocardial infarction) (H)      NSVT (nonsustained ventricular tachycardia) (H)      Pacemaker      Pancreatitis 6/26/06 Hosp     Paroxysmal atrial fibrillation (H)      PVD (peripheral vascular disease) (H)      Thrombocytopenia (H)      Tobacco use      Venous (peripheral) insufficiency      Vitamin D deficiency        Past Surgical History:    Past Surgical History:   Procedure Laterality Date     CATARACT IOL, RT/LT      Cataract IOL RT/LT     CLOSE SECONDARY WOUND LOWER EXTREMITY Right 2/13/2022    Procedure: Right groin wound exploration, nerve release and closure.;  Surgeon: Karen Arthur MD;  Location: [facility] OR     ENDARTERECTOMY CAROTID Right 06/12/2015    Procedure: ENDARTERECTOMY CAROTID;  Surgeon: João Turner MD;  Location:  OR     ENDARTERECTOMY CAROTID Left 09/08/2017    Procedure: ENDARTERECTOMY CAROTID;  LEFT CAROTID ENDARTERECTOMY WITH EEG;  Surgeon: João Turner MD;  Location:  OR     ENDARTERECTOMY FEMORAL Bilateral 2/9/2022    Procedure: BILATERAL- COMMON FEMORAL ENDARTERECTOMY; RETROGRADE ILIAC ARTERY STENTING;  Surgeon: Ric Chau MD;  Location: [facility] OR     IMPLANT PACEMAKER  06/10/2010     IMPLANTED DEVICE       INTERPOSITION TENDON HAND N/A 06/09/2020    Procedure: Right thumb ligament reconstruction tendon interposition with extensor pollicis brevis to abductor pollicis tendon transfer;  Surgeon: Bethel Martin MD;  Location: [facility] OR     RELEASE CARPAL TUNNEL Right  04/12/2016    Procedure: RELEASE CARPAL TUNNEL;  Surgeon: Lissy Leger MD;  Location: [facility] OR     SURGICAL HISTORY OF -   1998    Laminectomy     TONSILLECTOMY & ADENOIDECTOMY       ZZC CABG, ARTERIAL, THREE         Family History:    Family History   Adopted: Yes   Problem Relation Age of Onset     Unknown/Adopted No family hx of        Social History:  Marital Status:  Single [1]  Social History     Tobacco Use     Smoking status: Current Every Day Smoker     Packs/day: 1.50     Years: 50.00     Pack years: 75.00     Types: Cigarettes     Smokeless tobacco: Never Used     Tobacco comment: 1 1/2 PPD 05/18/22   Vaping Use     Vaping Use: Never used   Substance Use Topics     Alcohol use: No     Drug use: No        Medications:    acetaminophen (TYLENOL) 500 MG tablet  albuterol (PROAIR HFA) 108 (90 Base) MCG/ACT Inhaler  amylase-lipase-protease (CREON 12) 07275-41680-61191 units CPEP  atorvastatin (LIPITOR) 80 MG tablet  calcium carbonate (OS-MARVA) 500 MG tablet  Carboxymethylcellulose Sod PF (THERATEARS PF) 0.25 % SOLN  carvedilol (COREG) 6.25 MG tablet  clopidogrel (PLAVIX) 75 MG tablet  gabapentin (NEURONTIN) 100 MG capsule  Glycerin (OASIS MOISTURIZING MOUTH SPRAY) 35 % LIQD  insulin aspart (NOVOLOG PEN) 100 UNIT/ML pen  insulin glargine (LANTUS PEN) 100 UNIT/ML pen  Insulin Pen Needle (PEN NEEDLES) 32G X 4 MM MISC  loperamide (IMODIUM) 2 MG capsule  losartan (COZAAR) 100 MG tablet  magnesium oxide (MAG-OX) 400 (241.3 Mg) MG tablet  metFORMIN (GLUCOPHAGE-XR) 500 MG 24 hr tablet  mometasone (ASMANEX TWISTHALER) 220 MCG/INH inhaler  nitroGLYcerin (NITROSTAT) 0.4 MG sublingual tablet  pantoprazole (PROTONIX) 40 MG EC tablet  torsemide (DEMADEX) 20 MG tablet  vitamin D3 (CHOLECALCIFEROL) 50 mcg (2000 units) tablet          Review of Systems   All other systems reviewed and are negative.      Physical Exam   BP: (!) 77/49  Pulse: 65  Temp: 97.3  F (36.3  C)  Resp: 18  Height: 182.9 cm (6')  Weight: 73.5 kg (162  lb) (stated)  SpO2: 98 %      Physical Exam  Vitals and nursing note reviewed.   Constitutional:       General: He is not in acute distress.     Appearance: He is well-developed and normal weight. He is not ill-appearing.   HENT:      Head: Normocephalic and atraumatic.      Mouth/Throat:      Mouth: Mucous membranes are moist.      Pharynx: Oropharynx is clear.   Eyes:      Extraocular Movements: Extraocular movements intact.      Pupils: Pupils are equal, round, and reactive to light.   Cardiovascular:      Rate and Rhythm: Normal rate. Rhythm irregular.   Pulmonary:      Comments: Poor air entry throughout, expiratory wheezes throughout very faintly with poor respiratory effort as well as inspiratory crackles bibasilar.    Musculoskeletal:      Cervical back: Normal range of motion and neck supple.   Skin:     General: Skin is warm and dry.      Capillary Refill: Capillary refill takes less than 2 seconds.   Neurological:      General: No focal deficit present.      Mental Status: He is alert.   Psychiatric:         Mood and Affect: Mood normal.         Behavior: Behavior normal.         ED Course                 Procedures              EKG Interpretation:      Interpreted by Jose Manuel Babb MD  Time reviewed: Time obtained 1728 time interpreted same sinus rhythm 68 bpm comparison cardiogram is dated 4/8/2022.  There are no significant changes from the previous EKG these demonstrate right-sided conduction defect right axis deviation, nonspecific QRS widening and flipped T's in precordial leads V3, V4, V5 and V6.  There are no new changes appreciated.        Critical Care time:  97 minutes excluding procedures               Results for orders placed or performed during the hospital encounter of 06/19/22 (from the past 24 hour(s))   CBC with platelets differential    Narrative    The following orders were created for panel order CBC with platelets differential.  Procedure                                Abnormality         Status                     ---------                               -----------         ------                     CBC with platelets and d...[[id number]]  Abnormal            Final result                 Please view results for these tests on the individual orders.   CRP inflammation   Result Value Ref Range    CRP Inflammation 57.2 (H) 0.0 - 8.0 mg/L   Comprehensive metabolic panel   Result Value Ref Range    Sodium 137 133 - 144 mmol/L    Potassium 4.6 3.4 - 5.3 mmol/L    Chloride 101 94 - 109 mmol/L    Carbon Dioxide (CO2) 29 20 - 32 mmol/L    Anion Gap 7 3 - 14 mmol/L    Urea Nitrogen 64 (H) 7 - 30 mg/dL    Creatinine 2.07 (H) 0.66 - 1.25 mg/dL    Calcium 6.2 (L) 8.5 - 10.1 mg/dL    Glucose 131 (H) 70 - 99 mg/dL    Alkaline Phosphatase 144 40 - 150 U/L    AST 38 0 - 45 U/L    ALT 27 0 - 70 U/L    Protein Total 6.3 (L) 6.8 - 8.8 g/dL    Albumin 2.6 (L) 3.4 - 5.0 g/dL    Bilirubin Total 0.6 0.2 - 1.3 mg/dL    GFR Estimate 33 (L) >60 mL/min/1.73m2   Lipase   Result Value Ref Range    Lipase 16 (L) 73 - 393 U/L   Lactic acid whole blood   Result Value Ref Range    Lactic Acid 1.1 0.7 - 2.0 mmol/L   Nt probnp inpatient (BNP)   Result Value Ref Range    N terminal Pro BNP Inpatient 22,432 (H) 0 - 900 pg/mL   Blood gas venous   Result Value Ref Range    pH Venous 7.37 7.32 - 7.43    pCO2 Venous 54 (H) 40 - 50 mm Hg    pO2 Venous 19 (L) 25 - 47 mm Hg    Bicarbonate Venous 31 (H) 21 - 28 mmol/L    Base Excess/Deficit (+/-) 5.1 (H) -7.7 - 1.9 mmol/L    FIO2 0    Troponin I   Result Value Ref Range    Troponin I High Sensitivity 1,403 (HH) <79 ng/L   CBC with platelets and differential   Result Value Ref Range    WBC Count 9.3 4.0 - 11.0 10e3/uL    RBC Count 3.87 (L) 4.40 - 5.90 10e6/uL    Hemoglobin 10.0 (L) 13.3 - 17.7 g/dL    Hematocrit 33.6 (L) 40.0 - 53.0 %    MCV 87 78 - 100 fL    MCH 25.8 (L) 26.5 - 33.0 pg    MCHC 29.8 (L) 31.5 - 36.5 g/dL    RDW 18.1 (H) 10.0 - 15.0 %    Platelet Count 126 (L)  150 - 450 10e3/uL    % Neutrophils 73 %    % Lymphocytes 13 %    % Monocytes 10 %    % Eosinophils 2 %    % Basophils 0 %    % Immature Granulocytes 2 %    NRBCs per 100 WBC 0 <1 /100    Absolute Neutrophils 6.8 1.6 - 8.3 10e3/uL    Absolute Lymphocytes 1.2 0.8 - 5.3 10e3/uL    Absolute Monocytes 0.9 0.0 - 1.3 10e3/uL    Absolute Eosinophils 0.2 0.0 - 0.7 10e3/uL    Absolute Basophils 0.0 0.0 - 0.2 10e3/uL    Absolute Immature Granulocytes 0.1 <=0.4 10e3/uL    Absolute NRBCs 0.0 10e3/uL   Symptomatic; Unknown Influenza A/B & SARS-CoV2 (COVID-19) Virus PCR Multiplex Nasopharyngeal    Specimen: Nasopharyngeal; Swab   Result Value Ref Range    Influenza A PCR Negative Negative    Influenza B PCR Negative Negative    SARS CoV2 PCR Negative Negative    Narrative    Testing was performed using the jermaine SARS-CoV-2 & Influenza A/B Assay on the jermaine Esthela System. This test should be ordered for the detection of SARS-CoV-2 and influenza viruses in individuals who meet clinical and/or epidemiological criteria. Test performance is unknown in asymptomatic patients. This test is for in vitro diagnostic use under the FDA EUA for laboratories certified under CLIA to perform moderate and/or high complexity testing. This test has not been FDA cleared or approved. A negative result does not rule out the presence of PCR inhibitors in the specimen or target RNA in concentration below the limit of detection for the assay. If only one viral target is positive but coinfection with multiple targets is suspected, the sample should be re-tested with another FDA cleared, approved or authorized test, if coinfection would change clinical management. [facility] are certified under the Clinical Laboratory Improvement Amendments of 1988 (CLIA-88) as  qualified to perform moderate and/or high complexity laboratory testing.   XR Hip Left 2-3 Views    Narrative    EXAM: XR HIP LEFT 2-3 VIEWS  LOCATION: [facility]  MEDICAL CENTER  DATE/TIME: 6/19/2022 6:26 PM    INDICATION: Fall, left hip pain  COMPARISON: None.      Impression    IMPRESSION: No fracture. Osteopenia. No degenerative changes. Surgical clips in the left inguinal region. Left-sided vascular stent in the pelvis.    XR Knee Left 3 Views    Narrative    EXAM: XR KNEE LEFT 3 VIEWS  LOCATION: [facility]  DATE/TIME: 6/19/2022 6:26 PM    INDICATION: Knee pain after fall  COMPARISON: None.      Impression    IMPRESSION: No fracture. Chondrocalcinosis in the medial and lateral compartments. Extensive arterial calcifications. Surgical clips along the medial aspect of the knee and proximal calf.   XR Chest 2 Views    Narrative    EXAM: XR CHEST 2 VW  LOCATION: [facility]  DATE/TIME: 6/19/2022 6:26 PM    INDICATION: SOA, cough.  COMPARISON: 08/10/2021.      Impression    IMPRESSION: Heart size is upper limit of normal. Dual-lead AICD in the left hemithorax is unchanged. Median sternotomy wires again noted. No pleural effusion, pneumothorax, or abnormal area of consolidation. Calcifications overlie the right   hemidiaphragm, likely pleural plaque.   Glucose by meter   Result Value Ref Range    GLUCOSE BY METER POCT 86 70 - 99 mg/dL       Medications   norepinephrine (LEVOPHED) 4 mg in  mL PERIPHERAL infusion (0.04 mcg/kg/min × 73.5 kg Intravenous Rate/Dose Change 6/19/22 2234)   furosemide (LASIX) injection 40 mg (40 mg Intravenous Given 6/19/22 1811)   ipratropium - albuterol 0.5 mg/2.5 mg/3 mL (DUONEB) neb solution 3 mL (3 mLs Nebulization Given 6/19/22 1905)   methylPREDNISolone sodium succinate (solu-MEDROL) injection 125 mg (125 mg Intravenous Given 6/19/22 1813)   cefTRIAXone (ROCEPHIN) 1 g vial to attach to  mL bag for ADULTS or NS 50 mL bag for PEDS (0 g Intravenous Stopped 6/19/22 2010)   azithromycin (ZITHROMAX) 500 mg in sodium chloride 0.9 % 250 mL intermittent infusion (0 mg Intravenous Stopped  6/19/22 2126)   0.9% sodium chloride BOLUS (0 mLs Intravenous Stopped 6/19/22 2200)   ondansetron (ZOFRAN) injection 4 mg (4 mg Intravenous Given 6/19/22 2156)   albumin human 25 % injection 25 g (25 g Intravenous New Bag 6/19/22 2233)   9:01 PM  I reviewed all lab diagnostics in the room as well as imaging with the patient and his daughter.  His breathing is improved subjectively and his air entry is a little bit better after the DuoNeb and steroids and the diuresis with 40 of Lasix IV however he is now more hypotensive.  This is also taken in context with his markedly elevated BNP, chest x-ray consistent with cardiomegaly, no pulmonary edema, feeling better and breathing slightly better more comfortable however now more hypotensive.  His renal function is also deteriorating BUN and creatinine are both up from his baseline.  I am going to try giving him 500 cc of normal saline bolus.  I discussed the elevated troponin and the potential multiple etiologies for this including his renal failure, CHF, ACS.  I specifically asked with regards to his desire to undergo further evaluation with cardiology for potential ACS.  Both he and his daughter felt that he would want to consider the possibility of a heart cath at some point and intervention if indicated.  We have no beds at our facility currently we will plan to look for a bed elsewhere with cardiology capability.  10:22 PM  The patient did not feel better after the 500 cc bolus of normal saline.  He remains persistently hypotensive.  I speculated the possibility of hepatorenal syndrome.  I will start him on Levophed at the present time to get his pressure up with a MAP of 60 goal.  Plan to discussing with cardiology.  I spoke with Dr. Hand for cardiology at [facility].  He recommended in addition to the above giving the patient 25 g of albumin and leg elevation.  He felt that if our hospitalist was not comfortable managing the patient here that we  should consider transfer.    10:49 PM  I spoke with Dr. Hatch for the cardiac ICU at [facility].  He felt it would be reasonable to continue with the current course of therapy with pressor support through peripheral IV.  She asked that I contact her if there is any significant increase in pressor requirement or change in the patient's status i.e. intubation ventilation etc..  The patient is improving on pressor support and will receive albumin as directed by previous cardiology consult at [facility].  They do have a bed to accommodate this patient after 11:00 and anticipate that he will be transferred shortly.  At shift change my colleague was made aware of the ultimate disposition plan for the patient.    Assessments & Plan (with Medical Decision Making)   Assessments and plan with medical decision making at the time stamp above.  Briefly medically complex patient who presents with acute on chronic congestive heart failure, cardiorenal syndrome suspected, difficult to treat volume overload as well as hypotension.  Ultimately the patient required pressor support in addition to albumin at the direction of cardiology and was ultimately transferred for admission to the cardiac ICU at [facility].      Disclaimer: This note consists of symbols derived from keyboarding, dictation and/or voice recognition software. As a result, there may be errors in the script that have gone undetected. Please consider this when interpreting information found in this chart.      I have reviewed the nursing notes.    I have reviewed the findings, diagnosis, plan and need for follow up with the patient.            New Prescriptions    No medications on file       Final diagnoses:   Acute on chronic congestive heart failure, unspecified heart failure type (H)   Fall, initial encounter   Contusion of left hip and thigh, initial encounter   Elevated troponin   Cardiomyopathy,  unspecified type (H) - Ischemic cardiomyopathy   Hypotension, unspecified hypotension type   Cardiorenal syndrome with renal failure, stage 1-4 or unspecified chronic kidney disease, with heart failure (H)       6/19/2022   [facility] EMERGENCY DEPT     Jose Manuel Babb MD  06/25/22 2783

## 2022-06-20 PROBLEM — R57.9 SHOCK CIRCULATORY (H): Status: ACTIVE | Noted: 2022-01-01

## 2022-06-20 NOTE — PLAN OF CARE
ICU End of Shift Summary. See flowsheets for vital signs and detailed assessment.    Changes this shift: Pt admitted from [facility]. Concern for cardiogenic shock and needs for pressors. Pt was transferred w/o pressors, maintaining MAP goals above 65. AxO 4, MAYEN, LLE pain due to fall. Magnesium critically low, 0.5/ replaced w/ 4mg of mag sulfate IV. Lasix given- 275 out after administration.    Plan:  Re-evaluate need for pressors as appropriate if pt does not meet MAP goals. Plans to possibly start dobutamine if pt does not diurese.     Goal Outcome Evaluation:    Plan of Care Reviewed With: patient     Overall Patient Progress: improving    Outcome Evaluation: Does not require pressors,

## 2022-06-20 NOTE — PROGRESS NOTES
Admitted/transferred from: [facility]   Reason for admission/transfer: Requiring ICU care, pressors   Patient status upon admission/transfer: VSS, off of pressors on RA.   Interventions: CHG bath, placed on monitor, q15 vitals  Plan: Trend blood pressures and labs  2 RN skin assessment: completed by Iva BENDER and Melanie RN  Result of skin assessment and interventions/actions: Left elbow skin tear, scabs on left arm, left wrist abrasion-scabbed  Height, weight, drug calc weight: Done  Patient belongings (see Flowsheet - Adult Profile for details): wallet, phone, shoes, clothes, glasses, inhaler, eye drops  MDRO education (if applicable): Importance of hand washing

## 2022-06-20 NOTE — H&P
Cardiology History and Physical  Delbert Tilley MRN: [id number]  Age: 71 year old, : 1950  Primary care provider: Danii Hernández            Assessment and Plan:     71 year old male with PMH of  ICM (LVEF 20-25%, severe RV failure, LVIDd 5.9 cm on 2022)  s/p dual chamber ICD, CAD s/p CABG in  (LIMA to LAD, SVG to OM, and SVG to PDA) and PCI to OM3 graft ), PAF/flutter s/p flutter ablation, PAD s/p carotid endarterectomies, bilateral iliofemoral endarterectomy with bovine patch angioplasty and iliac artery stenting on 2022, DM II, CKD III (baseline creatinine 1.5-1.6), tobacco use, obesity, pancreatitis, autonomic neuropathy, hypocalcemia, thrombocytopenia (questionable history of ?alcoholic cirrhosis), COPD, GERD presented to [facility] ED on 2022 with concerns of weakness and pain in his left hip and knee after he fell 3 days prior to presentation when he was outside and tripped on a shovel landing on his left hip and left knee. He has been having 1 week of SOB.  He was hypotensive in [facility] ED started on peripheral levophed, and transferred for [facility] for further management of possible cardiogenic shock.      A) Cardiovascular  ?Possible cardiogenic shock -- off levophed in route from [facility] ED to [facility]  Acute on chronic systolic heart failure  Advanced ischemic cardiomyopathy, Class IV, Stage D  CAD s/p CABG  PAF/flutter - now in sinus rhythm  PAD s/p carotid endarterectomy and iliac endarterectomy    Last TTE on 2022: EF 20-25%; Severe RV failure; LVIDd 5.9  cm.    Plan:  -- Inotropes: will start dobutamine 2.5 if does not diurese with lasix 80 IV  -- Fluid status: Hypervolemic. Diuretic: lasix 80 IV  -- Afterload reduction: hold for hypotension  -- BB: hold coreg for hypotension/possible shock  -- Aldosterone antagonist: did not tolerate in the past because of hypotension  -- Antiplatelet: Continue home ASA-81 mg and home plavix  -- Statin: Continue atorvastatin 80 mg qday  -- Device:  Dual chamber ICD  -- BMP BID, K>4 & Mg>2  -- Strict I/Os  -- Daily weight  -- Low salt diet <2 g/24 hours  -- Likely not a candidate for advanced therapies given heavy active smoking.      B) Neuro  - Chronic neuropathy  Continue home gabapentin  - Recent fall  Still having pain left knee, left hip, left shoulder. X rays showed no fracture.  Consult trauma      C) Pulm  History of COPD -There was concern for COPD exacerbation in [facility] ED and received solumedrol and antibiotics. There is stable sputum production and there is low suspicion for COPD exacerbation.   Continue home inhalers.      D) GI  GERD  - Diet: low salt  - LFTs: wnl  - GI prophylaxis: protonix     E) Renal  RADHA on CKD III -- Creatinine 2, baseline 1.5. Likely cardiorenal  Hypomagnesemia -- Mg 0.5  Hypocalcemia    Plan:  Diuresis as above  Hold losartan  Mg>2    F) Heme  DVT Prophylaxis: Heparin SQ      G) ID  No evidence of infection, will hold off on continuing antibiotics.  Follow culture results    H) Endo  DM II  Continue home glargine 28 units + SSI    Arriola Catheter: not needed     Patient discussed with staff attending, Dr. Hatch.    Shadi Canchola MD  Cardiology Fellow  Pager: [phone]            Chief Complaint:     Concern for cardiogenic shock          History of Present Illness:     History is obtained from the patient     71 year old male with PMH of  ICM (LVEF 20-25%, severe RV failure, LVIDd 5.9 cm on 4/2022)  s/p ICD, CAD s/p CABG in 2005 (LIMA to LAD, SVG to OM, and SVG to PDA) and PCI to OM3 graft 2014), PAF/flutter s/p flutter ablation, PAD s/p carotid endarterectomies, bilateral iliofemoral endarterectomy with bovine patch angioplasty and iliac artery stenting on 2/2022, DM II, CKD III (baseline creatinine 1.5-1.6), tobacco use, obesity, pancreatitis, autonomic neuropathy, hypocalcemia, thrombocytopenia (questionable history of ?alcoholic cirrhosis), COPD, GERD presented to [facility] ED on 6/19/2022 with concerns of weakness  and pain in his left hip and knee after he fell 3 days prior to presentation when he was outside and tripped on a shovel landing on his left hip and left knee. He has been having 1 week of SOB.  In the ED, he was given 40 IV of lasix, duoneb, solumedrol 125 IV. He became more hypotensive so he was given 500 ml of fluids and 25 g of 25% albumin. He also received one dose of rocephin 1 g and 500 mg of azithromycin. He remained persistently hypotensive so he was started on peripheral levophed and transferred for [facility] for further management of possible cardiogenic shock.  Labs: Hb 10, , WBC 9.3, creatinine 2.07 (up from 1.54), ALT 27, AST 38, lactic acid 1.1, Troponin I 1,403    He was weaned off levophed on the way to [facility]. On arrival to [facility], patient was doing well. He does have left   He endorses SOB for a week. He denies weight gain, however he has been having decreased appetite for a while and is not sure if he has been having muscle loss.  He has chronic cough from his COPD with clear sputum and no change in cough/phlegm characteristics. No fever no chills. He denies any chest pain/pressure.  He is compliant with his medications.  He is 120 pack year active smoker. No alcohol no recreational drug use.  Lives alone at home.    A 13-point ROS is negative except as mentioned above. The patient has chronic numbness left fingers.          Past Medical History:     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%     Claudication (H)      COPD (chronic obstructive pulmonary disease) (H)      Diabetes  (H)      Gastro-oesophageal reflux disease      GERD (gastroesophageal reflux disease)      H/O: alcohol abuse      HTN (hypertension)      Hyperlipidaemia LDL goal <100 07/08/2013     Hyperparathyroidism (H)      Hypokalemia      ICD (implantable cardioverter-defibrillator) in place     Medtronic     NSTEMI (non-ST elevated myocardial infarction) (H)      NSVT (nonsustained ventricular tachycardia) (H)      Pacemaker      Pancreatitis 6/26/06 Hosp     Paroxysmal atrial fibrillation (H)      PVD (peripheral vascular disease) (H)      Thrombocytopenia (H)      Tobacco use      Venous (peripheral) insufficiency      Vitamin D deficiency                 Past Surgical History:      Past Surgical History:   Procedure Laterality Date     CATARACT IOL, RT/LT      Cataract IOL RT/LT     CLOSE SECONDARY WOUND LOWER EXTREMITY Right 2/13/2022    Procedure: Right groin wound exploration, nerve release and closure.;  Surgeon: Karen Arthur MD;  Location: [facility] OR     ENDARTERECTOMY CAROTID Right 06/12/2015    Procedure: ENDARTERECTOMY CAROTID;  Surgeon: João Turner MD;  Location:  OR     ENDARTERECTOMY CAROTID Left 09/08/2017    Procedure: ENDARTERECTOMY CAROTID;  LEFT CAROTID ENDARTERECTOMY WITH EEG;  Surgeon: João Turner MD;  Location:  OR     ENDARTERECTOMY FEMORAL Bilateral 2/9/2022    Procedure: BILATERAL- COMMON FEMORAL ENDARTERECTOMY; RETROGRADE ILIAC ARTERY STENTING;  Surgeon: Ric Chau MD;  Location: [facility] OR     IMPLANT PACEMAKER  06/10/2010     IMPLANTED DEVICE       INTERPOSITION TENDON HAND N/A 06/09/2020    Procedure: Right thumb ligament reconstruction tendon interposition with extensor pollicis brevis to abductor pollicis tendon transfer;  Surgeon: Bethel Martin MD;  Location: [facility] OR     RELEASE CARPAL TUNNEL Right 04/12/2016    Procedure: RELEASE CARPAL TUNNEL;  Surgeon: Lissy Leger MD;  Location: [facility] OR     SURGICAL HISTORY OF - 1998     Laminectomy     TONSILLECTOMY & ADENOIDECTOMY       ZZC CABG, ARTERIAL, THREE                Social History:     Social History     Socioeconomic History     Marital status: Single     Spouse name: Not on file     Number of children: Not on file     Years of education: Not on file     Highest education level: Not on file   Occupational History     Not on file   Tobacco Use     Smoking status: Current Every Day Smoker     Packs/day: 1.50     Years: 50.00     Pack years: 75.00     Types: Cigarettes     Smokeless tobacco: Never Used     Tobacco comment: 1 1/2 PPD 05/18/22   Vaping Use     Vaping Use: Never used   Substance and Sexual Activity     Alcohol use: No     Drug use: No     Sexual activity: Not Currently     Partners: Female   Other Topics Concern     Parent/sibling w/ CABG, MI or angioplasty before 65F 55M? No   Social History Narrative     Not on file     Social Determinants of Health     Financial Resource Strain: Not on file   Food Insecurity: Not on file   Transportation Needs: Not on file   Physical Activity: Not on file   Stress: Not on file   Social Connections: Not on file   Intimate Partner Violence: Not on file   Housing Stability: Not on file              Family History:     Family History   Adopted: Yes   Problem Relation Age of Onset     Unknown/Adopted No family hx of      Family history reviewed and updated in EPIC          Allergies:     Allergies   Allergen Reactions     Fish Nausea and Vomiting     severe     Hydrocodone GI Disturbance     Upset stomach     Shellfish Allergy Hives and Rash              Medications:     Medications Prior to Admission   Medication Sig Dispense Refill Last Dose     aspirin (ASA) 81 MG chewable tablet Take 81 mg by mouth daily        acetaminophen (TYLENOL) 500 MG tablet Take 500-1,000 mg by mouth every 6 hours as needed for mild pain        albuterol (PROAIR HFA) 108 (90 Base) MCG/ACT Inhaler Inhale 2 puffs into the lungs every 4 hours as needed for shortness  of breath / dyspnea 1 Inhaler 0      amylase-lipase-protease (CREON 12) [id number] units CPEP Take 2 capsules by mouth 3 times daily (with meals)  120 capsule 0      atorvastatin (LIPITOR) 80 MG tablet Take 80 mg by mouth At Bedtime         calcium carbonate (OS-MARVA) 500 MG tablet Take 1 tablet by mouth 2 times daily        Carboxymethylcellulose Sod PF (THERATEARS PF) 0.25 % SOLN Apply 1 drop to eye 4 times daily as needed        carvedilol (COREG) 6.25 MG tablet Take 2 tablets (12.5 mg) by mouth 2 times daily (with meals) 180 tablet 3      clopidogrel (PLAVIX) 75 MG tablet Take 1 tablet (75 mg) by mouth daily 30 tablet 3      gabapentin (NEURONTIN) 100 MG capsule Take 200 mg by mouth At Bedtime  1 capsule 0      Glycerin (OASIS MOISTURIZING MOUTH SPRAY) 35 % LIQD Take 2 sprays by mouth daily as needed        insulin aspart (NOVOLOG PEN) 100 UNIT/ML pen Inject 3 Units Subcutaneous 3 times daily (with meals) 15 mL 1      insulin glargine (LANTUS PEN) 100 UNIT/ML pen Inject 28 Units Subcutaneous every morning Increase by 2 units every 2-3 days until morning blood sugar is .  Max dose 40 units/day 45 mL 1      Insulin Pen Needle (PEN NEEDLES) 32G X 4 MM MISC         loperamide (IMODIUM) 2 MG capsule Take 2 mg by mouth 4 times daily as needed for diarrhea Patient takes a dose in the am and another at night        losartan (COZAAR) 100 MG tablet Take 0.5 tablets (50 mg) by mouth daily        magnesium oxide (MAG-OX) 400 (241.3 Mg) MG tablet Take 1 tablet (400 mg) by mouth 4 times daily 120 tablet 1      metFORMIN (GLUCOPHAGE-XR) 500 MG 24 hr tablet Take 500 mg by mouth 2 times daily (with meals)         mometasone (ASMANEX TWISTHALER) 220 MCG/INH inhaler Inhale 2 puffs into the lungs every evening         nitroGLYcerin (NITROSTAT) 0.4 MG sublingual tablet Place 0.4 mg under the tongue every 5 minutes as needed for chest pain  (Patient not taking: Reported on 6/14/2022)        pantoprazole (PROTONIX) 40 MG  EC tablet Take 40 mg by mouth 2 times daily  30 tablet       torsemide (DEMADEX) 20 MG tablet Take 1 tablet (20 mg) by mouth 2 times daily May take an additional tablet if there is 3 lbs of weight gain 90 tablet 4      vitamin D3 (CHOLECALCIFEROL) 50 mcg (2000 units) tablet Take 1 tablet by mouth 2 times daily                       Physical Exam:     /75   Pulse 86   Temp 97.7  F (36.5  C) (Axillary)   Resp 22   Wt 73.4 kg (161 lb 13.1 oz)   SpO2 99%   BMI 21.95 kg/m        Wt Readings from Last 4 Encounters:   06/20/22 73.4 kg (161 lb 13.1 oz)   06/19/22 73.5 kg (162 lb)   06/14/22 72.6 kg (160 lb)   05/18/22 72.6 kg (160 lb)       Gen: No acute distress  HEENT: PERRL, EOM intact, OP without exudates  PULM/THORAX: Clear to auscultation bilaterally, no rales/rhonchi/wheezes  CV: RRR, normal S1 and S2, no murmurs or rubs. +JVP 12 cm H2O  ABD: Soft, NTND, bowel sounds present, no masses  EXT:  +2 edema. Palpable pulses  NEURO: A&Ox3. + left hand numbness  PSYCH: normal affect and mentation  SKIN: upper/lower extremity wounds post fall.              Data:     Labs Reviewed on Admission  Pertinent for:  Troponin   Lab Results   Component Value Date    TROPI 0.039 10/21/2020    TROPI 9.335 () 10/21/2014    TROPI 11.453 () 10/20/2014    TROPI 1.672 () 10/20/2014    TROPI 0.726 () 10/20/2014    TROPONIN 0.30 11/24/2004    TROPONIN 0.50 11/24/2004    TROPONIN 0.70 11/23/2004    TROPONIN 1.30 11/23/2004    TROPONIN 2.60 () 11/23/2004     BMP  Recent Labs   Lab 06/20/22  0528 06/20/22  0351 06/20/22  0236 06/19/22  1954 06/19/22  1722   NA  --  137  --   --  137   POTASSIUM  --  5.0  --   --  4.6   CHLORIDE  --  103  --   --  101   MARVA  --  6.4*  --   --  6.2*   CO2  --  24  --   --  29   BUN  --  63*  --   --  64*   CR  --  2.01*  --   --  2.07*   * 194* 200* 86 131*     CBC  Recent Labs   Lab 06/20/22  0351 06/19/22  1722   WBC 7.6 9.3   RBC 4.10* 3.87*   HGB 10.6* 10.0*   HCT 36.5* 33.6*   MCV  89 87   MCH 25.9* 25.8*   MCHC 29.0* 29.8*   RDW 18.3* 18.1*   * 126*     INRNo lab results found in last 7 days.   Hepatic Panel   Lab Results   Component Value Date    AST 38 06/19/2022    AST 38 12/07/2020     Lab Results   Component Value Date    ALT 27 06/19/2022    ALT 28 12/07/2020     Lab Results   Component Value Date    BILICONJ 0.0 05/29/2010      Lab Results   Component Value Date    BILITOTAL 0.6 06/19/2022    BILITOTAL 0.8 12/07/2020     Lab Results   Component Value Date    ALBUMIN 2.6 06/19/2022    ALBUMIN 3.1 01/21/2021     Lab Results   Component Value Date    PROTTOTAL 6.3 06/19/2022    PROTTOTAL 6.9 12/07/2020      Lab Results   Component Value Date    ALKPHOS 144 06/19/2022    ALKPHOS 223 12/07/2020               Most Recent Imaging:       EKG 6/19/2022:      Echo 4/8/2022:   The left ventricle is mildly dilated.  The visual ejection fraction is 20-25%.  There is severe global hypokinesia of the left ventricle.  Severely decreased right ventricular systolic function  There is a catheter/pacemaker lead seen in the right ventricle.    Device check 4/5/2022  Medtronic Evera (D) ICD Remote Device Check  AP: 32.9%  : 1.0%   Mode: AAIR-DDDR   Presenting Rhythm: AP/VS rhythm in the 70's   Heart Rate: Stable with good variability.  HRs per histogram range primarily from .   Sensing: WNL  Pacing Threshold: Stable   Impedance: WNL   Battery Status: Estimated at 6.0 years remaining   Atrial Arrhythmia: 0  Ventricular Arrhythmia: 0  ATP: 0  Shocks: 0  Other: Possible OptoVol fluid accumulation since 3/5/22.  Graphic trends shows OptiVol index hovering around trigger point of 60.  Patient had noticed increase in swelling and shortness of breath and presented to the ER on 4/4/22.  He is on an increased dose of Lasix and has follow-up with Dr. Bello as below.  Patient stated that he is feeling much better with the increased diuretic.      Care Plan:  Scheduled for remote device check on  7/7/22. Scheduled to follow-up with Dr. Bello on 4/8/22.  Hank, YULIA         Cath 12/2017  SUMMARY:   Three vessel CAD RCA, LAD and LCX      -90% stenosis of the proximal LAD just proximal to the D1   -50% stenosis of distal LAD  -100% occlusion of OM3   -90% stenosis of the ostium of small size OM2  -100% occluded in the mid RCA ( supplied by collaterals from LAD)  Patent LIMA to LAD    Patent SVG to OM3  -100% occlusion of SVG to RCA  -severely decreased LV function with EF of 20%                              .

## 2022-06-20 NOTE — PLAN OF CARE
Shift Summary: No acute events this shift. Pt started on Insulin gtt for BG in the 400s. Lactic acid trending up, high of 3.1. Code status addressed by CSI team, decision made for full code at this time. PICC lined being placed for frequent lab draws per CSI. Daughter at bedside and updated. Pt working with therapies, ambulating in hallway. Pt has 6C orders. No other concerns.     Neuro: A/O x4. PERRL, equal strength. Generalized weakness, pain to LLE from previous fall.     Cardiac: NSR. SBP in 90s most of shift but MAP >65.     Resp: RA while awake, 2L NC while sleeping. Clear, diminished lungs. Non-productive cough.     GI/: Pt tolerating 2g Na diet, 2L fluid restriction. Pt given Bumex x2 this shift. Pt having adequate output, voids in urinal. No BM this shift.    Skin: Skin tear to L elbow is CDI. Scattered bruising.     Lines: PICC, 2 PIVs    Gtts: Insulin

## 2022-06-20 NOTE — ED NOTES
"Pt called with increased nausea and \"feeling unwell\". Pt BP re-checked with fluids infusing SBP 70s. Dr. Babb notified. Per MD bolus placed on pressure bag. SBP remains in 70s at this time. MD updated. Will continue close monitoring.   "

## 2022-06-20 NOTE — PROGRESS NOTES
"CLINICAL NUTRITION SERVICES - BRIEF NOTE     Nutrition Prescription    RECOMMENDATIONS FOR MDs/PROVIDERS TO ORDER:  -Advance diet as tolerated, and liberalize diet order as able to.     Recommendations already ordered by Registered Dietitian (RD):  -Discussed low sodium nutrition with pt, and provided relevant handouts.     Future/Additional Recommendations:  -Continue to monitor PO intake, wt trends, and need for further nutrition assessment / intervention. Continue to follow per protocol, full assessment due at LOS 7-10 days, unless consulted earlier.      REASON FOR ASSESSMENT  Delbert Tilley is a/an 71 year old male assessed by the dietitian for Provider Order - Congestive Heart Failure (CHF) - Dietitian to instruct patient on 2 gram sodium diet    Findings  -Discussed low sodium diet with pt, and provided \"Heart-Healthy - Reduced Sodium Nutrition Therapy\" handout, along with \"Sodium-Free Flavoring Tips\" handouts to pt.   -Pt has already been working on low-sodium diet choices at home, and is cognizant of foods that are typically high in sodium. Discussed strategies for finding lower sodium options in the store, and other methods of limiting sodium such as rinsing canned foods off when applicable, and continuing to use salt replacement products such as Nu-Salt, pt is aware these are high in potassium. Pt had no further questions or concerns a this time.     Wt Readings from Last 10 Encounters:   06/20/22 73.4 kg (161 lb 13.1 oz)   06/19/22 73.5 kg (162 lb)   06/14/22 72.6 kg (160 lb)   05/18/22 72.6 kg (160 lb)   04/14/22 72.6 kg (160 lb)   04/09/22 77.4 kg (170 lb 10.2 oz)   04/08/22 78 kg (172 lb)   04/04/22 79.4 kg (175 lb)   02/17/22 76.4 kg (168 lb 8 oz)   02/09/22 72.2 kg (159 lb 1.6 oz)     INTERVENTIONS  Implementation  Nutrition education for nutrition relationship to health/disease     Follow up/Monitoring  RD will continue to follow per protocol.     Tom Jonas RD, LD  Neuro ICU RD pager " [phone]  Ascom number [id number]  Weekend  pager [phone]

## 2022-06-20 NOTE — PROGRESS NOTES
Cardiology ICU Progress Note    Brief HPI:Delbert Tilley is a 71 year old male, past medical history is significant for obesity, ASCVD with history of NSTEMI, cardiac defibrillator implanted, pancreatitis, CHF, PAD, idiopathic peripheral autonomic neuropathy, venous insufficiency, actinic keratoses, hyperparathyroidism thrombocytopenia, hypocalcemia, hypertension, ischemic cardiomyopathy, hypomagnesemia, anemia, stage III renal disease, hypotension, COPD, NSVT, type 2 diabetes, alcohol abuse in remission, GERD, nicotine dependence (the patient is still currently a 2 pack/day smoker)    Presented 6/20 with his daughter from home with concerns of weakness and pain in his left hip and knee after he fell 3 days prior to presentation when he was outside and tripped on a shovel landing on his left hip and left knee.  With assistance from a neighbor he has been able to get back up and move around.  Preceding this and over approximately the last 1 week he has been increasingly short of breath at home increased cough runny nose sore throat no fever.  He states that he sleeps well however he does identify several episodes of PND where he had to get up after awakening short of breath and then go back to sleep in his recliner sitting upright.  At no point has he noted any chest pain or chest tightness.  He has noted an increase in shortness of breath over the past 1 week as described, primarily with exertion.  He states has been using his inhaler medicine that he thinks is albuterol at home and it does not seem to be helping as much.      Interval changes: Feels much better this morning. Denies feeling SOB or having chest pain.      Plan:  1. Diurese with Bumex 4 mg at 1800 tonight  2. Insulin gtt for -400 this afternoon    ASSESSMENT AND PLAN BY SYSTEM:     Neurology   # No acute concerns  # Chronic neuropathy  # fall at home  --Continue home gabapentin  --X Ray show no fracture      Cardiovascular  # Cardiogenic  shock  #acute on chronic systolic HF  #Advanced ischemic cardiomyopathy, class IV, Stage D  #CAD s/p CABG  #Paroxymal A-Fib/Flutter  # PAD S/p endarterectomy and iliac endartectomy  --lactate increasing 2.5, cr 1.83-->1.91    GDMT:               - DAPT: Continue ASA 81mg and Plavix QD               - Statin: Lipitor               - ARB: home losartan on hold               - BB: home coreg on hold    Plan:  1. ASA 81 mg QD  2. Plavix QD  3. Lipitor QD  4. Hold ACE/ARB for now given likely reduced renal fxn after arrest  5. Holding beta blocker given shock  6. VBG, lactate, CMP Q 8 hours     Pulmonary  # COPD    Plan:  1. Continue mometasone     Arterial Blood Gas Recent Labs   Lab 06/19/22  1722   O2PER 0       Gastrointestinal, Nutrition  # No acute issues  No known medical hx.   Diet: diabetic cardiac diet with 2000 ml FR      Recent Labs   Lab 06/20/22  0351 06/19/22  1722   PROTTOTAL 6.7* 6.3*   ALBUMIN 3.0* 2.6*   BILITOTAL 0.7 0.6   ALKPHOS 143 144   AST 37 38   ALT 29 27       Renal, Electrolytes  # Acute Renal Injury  # Lactic acidosis  Cr 1.9  CO2 24       Plan:  1. Monitor urine output  2. I/O goal net negative 500-1000  3. diurese      Intake/Output Summary (Last 24 hours) at 6/20/2022 0933  Last data filed at 6/20/2022 0800  Gross per 24 hour   Intake 869 ml   Output 900 ml   Net -31 ml     Recent Labs   Lab 06/20/22  0809 06/20/22  0528 06/20/22  0351 06/20/22  0236 06/19/22  1954 06/19/22  1722   NA  --   --  137  --   --  137   POTASSIUM  --   --  5.0  --   --  4.6   CHLORIDE  --   --  103  --   --  101   CO2  --   --  24  --   --  29   ANIONGAP  --   --  10  --   --  7   * 285* 194* 200*   < > 131*   BUN  --   --  63*  --   --  64*   CR  --   --  2.01*  --   --  2.07*   GFRESTIMATED  --   --  34*  --   --  33*   MARVA  --   --  6.4*  --   --  6.2*   MAG  --   --  0.5*  --   --   --     < > = values in this interval not displayed.     No components found for: URINE     Infectious  Disease  # No acute issues  WBC 7.6, T Max     Hematology  # Anemia of critical illness  Hgb stable. No e/o bleeding.        CBC  Recent Labs   Lab 06/20/22  0351 06/19/22  1722   WBC 7.6 9.3   RBC 4.10* 3.87*   HGB 10.6* 10.0*   HCT 36.5* 33.6*   MCV 89 87   MCH 25.9* 25.8*   MCHC 29.0* 29.8*   RDW 18.3* 18.1*   * 126*     INRNo lab results found in last 7 days.    Endocrinology  # Hyperglycemia   # Hgb A1c  No known medical history    Plan:  1. Insulin gtt as needed    Integumentary:  - No skin issues    ICU Cares:  CXR:   Fluids/Feeds:   DVT Prophylaxis:   GI Prophylaxis:   Bowel Regimen: s  Anticipated Floor Transfer:     Lines/Tubes/Drains:           Family updated by me          Physical exam:  General: In bed, in NAD  HEENT: PERRL, no scleral icterus or injection  CARDIAC: RRR, no m/r/g appreciated. Peripheral pulses dopplered  RESP: Mechanical ventilation; CTAB, no wheezes, rhonchi or crackles appreciated.  GI: soft, BS hypoactive  : Arriola  EXTREMITIES: No LE edema, pulses 2+. Femoral access site w/o bleeding, dressing c/d/i. No bruits appreciated.   SKIN: No acute lesions appreciated  NEURO: Intubated and sedated    Imaging/procedure results:  XR Chest Port 1 View  Narrative: Exam: XR CHEST PORT 1 VIEW, 6/20/2022 3:30 AM    Comparison: 6/19/2022    History: SOB    Findings:  Single portable semiupright view of the chest is obtained.  Postoperative changes of sternotomy, unchanged fracture of the first  and third sternotomy wires when counting from the cranial end. Left  chest implantable cardiac defibrillator is in place, leads appear  intact.    Trachea is midline. Calcification of the aortic knob. Unchanged  cardiomegaly. Mild interstitial prominence. There is no pneumothorax  or pleural effusion. Calcification along the right  hemidiaphragm/hepatic dome.  Impression: Impression:   Cardiomegaly and mild interstitial prominence likely indicative of  pulmonary edema.    I have personally reviewed  the examination and initial interpretation  and I agree with the findings.    LEONIDAS MCCLAIN MD         SYSTEM ID:  [id number]       Patient seen, discussed, and plan reviewed with Dr. Nuñez    Time Spent on this Encounter   Delbert was seen and evaluated by me on 06/20/22.  He was in critical condition as the result of cardiogenic shock.    His condition is now Critical.      The acute issues managed by me today include  Hemodynamic supportive measures, RADHA support   Supportive interventions provided and/or ordered by me include Pt education on plan and procedures     Total Critical Care time spent by me, excluding procedures, was 74 minutes.    ANA Rainey CNP     ANA Nicole, CNP  Critical Care Cardiology

## 2022-06-21 NOTE — PLAN OF CARE
Changes this shift: Patient alert and oriented, MAP maintained in 60's, afebrile. Intermittent hypotension throughout the shift. Blood glucose as high as 352, insulin given per orders, provider notified. Patient on room air and maintaining 02 sat of 93-96.

## 2022-06-21 NOTE — CONSULTS
Care Management Initial Consult    General Information  Assessment completed with: Patient,    Type of CM/SW Visit: Initial Assessment    Primary Care Provider verified and updated as needed:     Readmission within the last 30 days: no previous admission in last 30 days      Reason for Consult: other (see comments) (elevated risk score)  Advance Care Planning: Advance Care Planning Reviewed: present on chart, questions answered          Communication Assessment  Patient's communication style: spoken language (English or Bilingual)    Hearing Difficulty or Deaf: no   Wear Glasses or Blind: yes    Cognitive  Cognitive/Neuro/Behavioral: WDL                      Living Environment:   People in home: alone     Current living Arrangements: mobile home      Able to return to prior arrangements: yes       Family/Social Support:  Care provided by: self  Provides care for: no one  Marital Status: Single  Children          Description of Support System: Supportive, Involved    Support Assessment: Adequate family and caregiver support    Current Resources:   Patient receiving home care services: No     Community Resources: None  Equipment currently used at home: walker, rolling, tub bench, raised toilet seat, other (see comments) (scooter)  Supplies currently used at home:      Employment/Financial:  Employment Status: retired        Financial Concerns: No concerns identified   Referral to Financial Worker: No       Lifestyle & Psychosocial Needs:  Social Determinants of Health     Tobacco Use: High Risk     Smoking Tobacco Use: Current Every Day Smoker     Smokeless Tobacco Use: Never Used   Alcohol Use: Not on file   Financial Resource Strain: Not on file   Food Insecurity: Not on file   Transportation Needs: Not on file   Physical Activity: Not on file   Stress: Not on file   Social Connections: Not on file   Intimate Partner Violence: Not on file   Depression: Not at risk     PHQ-2 Score: 0   Housing Stability: Not on file  "      Functional Status:  Prior to admission patient needed assistance:   Dependent ADLs:: Independent  Dependent IADLs:: Independent       Mental Health Status:  Mental Health Status: No Current Concerns       Chemical Dependency Status:  Chemical Dependency Status: No Current Concerns             Values/Beliefs:  Spiritual, Cultural Beliefs, Restorationist Practices, Values that affect care: no               Additional Information:  Met with patient to get initial assessment. Patient lives alone and does not receive services. He has two children that are local and can help if needed. He stated that he has the number for a county  that his daughter was working on so he can get meals on wheels or other services. He is retired and enjoys gardening and working on old cars in his shop. Patient became tearful when talking about gardening and stated that he can't do it anymore and will need to downsize his house soon.. Writer asked about therapies and rehab at discharge and he stated \" I don't think I need any of that, I will be fine.\"   RNCC/SW will continue to check in with patient and follow for discharge planning.     Shawanda Koenig RN Care Coordinator   [facility]  [phone]         Shawanda Koenig RN        "

## 2022-06-21 NOTE — PROCEDURES
[facility]    Triple Lumen PICC Placement    Date/Time: 6/20/2022 7:13 PM  Performed by: Gene Ogden RN  Authorized by: Mikala Carey APRN CNP   Indications: vascular access      UNIVERSAL PROTOCOL   Site Marked: Yes  Prior Images Obtained and Reviewed:  Yes  Required items: Required blood products, implants, devices and special equipment available    Patient identity confirmed:  Verbally with patient, arm band, provided demographic data, hospital-assigned identification number and anonymous protocol, patient vented/unresponsive  NA - No sedation, light sedation, or local anesthesia  Confirmation Checklist:  Patient's identity using two indicators, relevant allergies, procedure was appropriate and matched the consent or emergent situation and correct equipment/implants were available  Time out: Immediately prior to the procedure a time out was called (Gene)    Universal Protocol: the Joint Commission Universal Protocol was followed       ANESTHESIA    Anesthesia: Local infiltration  Local Anesthetic:  Lidocaine 1% without epinephrine  Anesthetic Total (mL):  5      SEDATION    Patient Sedated: No        Preparation: skin prepped with ChloraPrep  Skin prep agent: skin prep agent completely dried prior to procedure  Sterile barriers: maximum sterile barriers were used: cap, mask, sterile gown, sterile gloves, and large sterile sheet  Hand hygiene: hand hygiene performed prior to central venous catheter insertion  Type of line used: Power PICC  Catheter type: triple lumen  Lumen type: non-valved  Catheter size: 5 Fr  Brand: Bard  Lot number: [id number]  Placement method: venipuncture, MST, ultrasound, flouroscopy and tip navigation system  Number of attempts: 1  Difficulty threading catheter: no  Successful placement: yes  Orientation: right    Location: basilic vein (0.62cm)  Tip Location: superior vena cava  Arm circumference: adults 10 cm  Extremity circumference:  23  Visible catheter length: 4  Total catheter length: 43  Dressing and securement: adhesive securement device, alcohol impregnated caps, blood removed, blood cleaned with CHG, chlorhexidine patch applied, fixation device, line secured, secure lock, securement device, site cleansed and transparent securement dressing  Post procedure assessment: blood return through all ports, free fluid flow and placement verfied by 3CG technology  PROCEDURE Describe Procedure: Right basilic vein 0.62m 4cm ext.Placement verified by [facility]ilson 3CLIN.PICC okay to use.

## 2022-06-21 NOTE — PROGRESS NOTES
Cardiology ICU Progress Note    Brief HPI:Delbert Tilley is a 71 year old male, past medical history is significant for obesity, ASCVD with history of NSTEMI, cardiac defibrillator implanted, pancreatitis, CHF, PAD, idiopathic peripheral autonomic neuropathy, venous insufficiency, actinic keratoses, hyperparathyroidism thrombocytopenia, hypocalcemia, hypertension, ischemic cardiomyopathy, hypomagnesemia, anemia, stage III renal disease, hypotension, COPD, NSVT, type 2 diabetes, alcohol abuse in remission, GERD, nicotine dependence (the patient is still currently a 2 pack/day smoker)    Presented 6/20 with his daughter from home with concerns of weakness and pain in his left hip and knee after he fell 3 days prior to presentation when he was outside and tripped on a shovel landing on his left hip and left knee.  With assistance from a neighbor he has been able to get back up and move around.  Preceding this and over approximately the last 1 week he has been increasingly short of breath at home increased cough runny nose sore throat no fever.  He states that he sleeps well however he does identify several episodes of PND where he had to get up after awakening short of breath and then go back to sleep in his recliner sitting upright.  At no point has he noted any chest pain or chest tightness.  He has noted an increase in shortness of breath over the past 1 week as described, primarily with exertion.  He states has been using his inhaler medicine that he thinks is albuterol at home and it does not seem to be helping as much.      Interval changes: Feels much better this morning. Hypoensive this afternoon. Will not diurese today. Will follow CVP, SVO2, and lactic      Plan:  1. Increased lantus      ASSESSMENT AND PLAN BY SYSTEM:     Neurology   # No acute concerns  # Chronic neuropathy  # fall at home  --Continue home gabapentin  --X Ray show no fracture      Cardiovascular  # Cardiogenic shock  #acute on chronic  systolic HF  #Advanced ischemic cardiomyopathy, class IV, Stage D  #CAD s/p CABG  #Paroxymal A-Fib/Flutter  # PAD S/p endarterectomy and iliac endartectomy  --lactic 1.2, Cr 1.81, LFT's normal    GDMT:               - DAPT: Continue ASA 81mg and Plavix QD               - Statin: Lipitor               - ARB: home losartan on hold               - BB: home coreg on hold    Plan:  1. ASA 81 mg QD  2. Plavix QD  3. Lipitor QD  4. Hold ACE/ARB for now given likely reduced renal fxn after arrest  5. Holding beta blocker given shock  6. VBG, lactate, CMP Q 8 hours     Pulmonary  # COPD    Plan:  1. Continue mometasone     Arterial Blood Gas   Recent Labs   Lab 06/21/22  0431 06/21/22  0039 06/20/22  1642 06/19/22  1722   O2PER 21 21 21 0       Gastrointestinal, Nutrition  # No acute issues  No known medical hx.   Diet: diabetic cardiac diet with 2000 ml FR      Recent Labs   Lab 06/21/22  0431 06/20/22  2145 06/20/22  1911 06/20/22  0351   PROTTOTAL 6.4* 7.0  7.0 6.6* 6.7*   ALBUMIN 2.7* 3.1*  3.1* 2.9* 3.0*   BILITOTAL 0.6 1.0  1.0 0.6 0.7   ALKPHOS 138 149  149 149 143   AST 32 32  32 33 37   ALT 26 31  31 27 29       Renal, Electrolytes  # Acute Renal Injury  # Lactic acidosis  Cr 1.81, net - 1479. Since 2400 net -1633 CO2 29       Plan:  1. Monitor urine output  2. I/O goal net negative 500-1000    Intake/Output Summary (Last 24 hours) at 6/21/2022 1343  Last data filed at 6/21/2022 1300  Gross per 24 hour   Intake 1233.47 ml   Output 5575 ml   Net -4341.53 ml       Recent Labs   Lab 06/21/22  0436 06/21/22  0431 06/21/22  0229 06/21/22  0108 06/20/22  2159 06/20/22  2145 06/20/22  1449 06/20/22  1445 06/20/22  1354 06/20/22  1231 06/20/22  0528 06/20/22  0351   NA  --  138  --   --   --  136  --  133  --  135  --  137   POTASSIUM  --  4.0  --   --   --  4.4  --  4.7  --  4.2  --  5.0   CHLORIDE  --  100  --   --   --  100  --  100  --  98  --  103   CO2  --  29  --   --   --  28  --  22  --  26  --  24    ANIONGAP  --  9  --   --   --  8  --  11  --  11  --  10   GLC 74 71 137* 153*   < > 133*   < > 419*   < > 385*   < > 194*   BUN  --  68*  --   --   --  67*  --  65*  --  65*  --  63*   CR  --  1.81*  --   --   --  1.85*  --  1.91*  --  1.83*  --  2.01*   GFRESTIMATED  --  39*  --   --   --  38*  --  37*  --  38*  --  34*   MARVA  --  7.0*  --   --   --  7.0*  --  6.6*  --  6.3*  --  6.4*   MAG  --  2.0  --   --   --   --   --  2.2  --  2.3  --  0.5*    < > = values in this interval not displayed.     No components found for: URINE     Infectious Disease  # No acute issues  WBC 7.6, T Max     Hematology  # Anemia of critical illness  Hgb stable. No e/o bleeding.        CBC  Recent Labs   Lab 06/20/22  0351 06/19/22  1722   WBC 7.6 9.3   RBC 4.10* 3.87*   HGB 10.6* 10.0*   HCT 36.5* 33.6*   MCV 89 87   MCH 25.9* 25.8*   MCHC 29.0* 29.8*   RDW 18.3* 18.1*   * 126*     INRNo lab results found in last 7 days.    Endocrinology  # Hyperglycemia   # Hgb A1c  hyperglycemic    Plan:  1. Increase lantus from 28 to 32 units every day     Integumentary:  - No skin issues      Lines/Tubes/Drains:  R Picc    Physical exam:  General: In bed, in NAD  HEENT: PERRL, no scleral icterus or injection  CARDIAC: RRR, no m/r/g appreciated. Peripheral pulses dopplered  RESP: Mechanical ventilation; CTAB, no wheezes, rhonchi or crackles appreciated.  GI: soft, BS hypoactive  : Arriola  EXTREMITIES: No LE edema, pulses 2+. Femoral access site w/o bleeding, dressing c/d/i. No bruits appreciated.   SKIN: No acute lesions appreciated  NEURO: Intubated and sedated    Imaging/procedure results:  XR Chest Port 1 View  Narrative: EXAM: XR Chest 1 view 6/20/2022 8:50 PM      HISTORY: verify PICC.    COMPARISON: Same day radiographic chest at 0324.     TECHNIQUE: Frontal view of the chest.    FINDINGS: Right-sided PICC with tip in the high SVC. Left chest wall  ICD with leads in stable position. Postsurgical changes of the chest  with stable  fractured median sternotomy wires.  Trachea is midline. Stable cardiomegaly. Stable interstitial pulmonary  opacities. No pleural effusions. No pneumothoraces.  Impression: IMPRESSION:   1. Right-sided PICC with tip in the high SVC.  2. Stable cardiomegaly with pulmonary edema.    I have personally reviewed the examination and initial interpretation  and I agree with the findings.    COY CARCAMO,          SYSTEM ID:  [id number]  Echocardiogram Limited  [id number]  [id number]  [id number]  [id number]^ELVIN^TYRELL     [facility],[place]  Echocardiography Laboratory  [address] [place] [id number]     Name: CARLIN QUEEN  MRN: [id number]  : 1950  Study Date: 2022 09:09 AM  Age: 71 yrs  Gender: Male  Patient Location: [facility]  Reason For Study: CHF  Ordering Physician: TYRELL OCONNOR  Referring Physician: KEVEN GUIDRY  Performed By: Samuel Alicia     BSA: 1.9 m2  Height: 72 in  Weight: 161 lb  ______________________________________________________________________________  Procedure  Limited Echocardiogram with portions of two-dimensional, color and spectral  Doppler performed.  ______________________________________________________________________________  Interpretation Summary  Mild to moderate left ventricular dilation is present.  The visual ejection fraction is 15-20%.  Global right ventricular function is mildly to moderately reduced.  ______________________________________________________________________________  Left Ventricle  Mild to moderate left ventricular dilation is present. The visual ejection  fraction is 15-20%.     Right Ventricle  The right ventricle is normal size. Global right ventricular function is  mildly to moderately reduced. A pacemaker lead is noted in the right  ventricle.     ______________________________________________________________________________  MMode/2D Measurements & Calculations  IVSd: 1.0 cm  LVIDd: 6.2 cm  LVIDs: 5.4  cm  LVPWd: 0.96 cm  FS: 14.0 %  LV mass(C)d: 262.7 grams  LV mass(C)dI: 135.2 grams/m2  RWT: 0.31     ______________________________________________________________________________  Report approved by: Rufus Stevens 06/20/2022 09:47 AM        XR Chest Port 1 View  Narrative: Exam: XR CHEST PORT 1 VIEW, 6/20/2022 3:30 AM    Comparison: 6/19/2022    History: SOB    Findings:  Single portable semiupright view of the chest is obtained.  Postoperative changes of sternotomy, unchanged fracture of the first  and third sternotomy wires when counting from the cranial end. Left  chest implantable cardiac defibrillator is in place, leads appear  intact.    Trachea is midline. Calcification of the aortic knob. Unchanged  cardiomegaly. Mild interstitial prominence. There is no pneumothorax  or pleural effusion. Calcification along the right  hemidiaphragm/hepatic dome.  Impression: Impression:   Cardiomegaly and mild interstitial prominence likely indicative of  pulmonary edema.    I have personally reviewed the examination and initial interpretation  and I agree with the findings.    LEONIDAS MCCLAIN MD         SYSTEM ID:  [id number]       Patient seen, discussed, and plan reviewed with Dr. Nuñez    Time Spent on this Encounter   Delbert was seen and evaluated by me on 06/20/22.  He was in critical condition as the result of cardiogenic shock.    His condition is now Critical.      The acute issues managed by me today include  Hemodynamic supportive measures, RADHA support   Supportive interventions provided and/or ordered by me include Pt education on plan and procedures     Total Critical Care time spent by me, excluding procedures, was 74 minutes.    ANA Rainey CNP     ANA Nicole, CNP  Critical Care Cardiology

## 2022-06-21 NOTE — PLAN OF CARE
Goal Outcome Evaluation:     ICU End of Shift Summary. See flowsheets for vital signs and detailed assessment.    Changes this shift: Mild hypotensive episode with elevated lactate this afternoon. Dobtuamine ordered and labs rechecked. BP now WDL. Speech consulted.     Plan:   Continue to monitor labs/ care as indicated and ordered

## 2022-06-21 NOTE — PLAN OF CARE
ICU End of Shift Summary. See flowsheets for vital signs and detailed assessment.    Changes this shift: Patient alert & oriented x 4, denies pain, Sinus R, afebrile, MAP in 60's, SBP in 80's, MD aware, lung sound coarse diminished, was on room air, now on 3 liters nasal canula due to saturation in low 80's, complained of right arm numbness since PICC line placement, MD notify and came to patient beside, numbness improved per patient, insulin drip off, blood glucose dropped to 62, 120 ml of orange juice and palomo crackles given, recheck 109, voiding well, CVP monitoring q6, CVP 12 and 11.    Plan: Continue to monitor per plan of care.    Problem: Heart Failure Comorbidity  Goal: Maintenance of Heart Failure Symptom Control  Intervention: Maintain Heart Failure-Management  Recent Flowsheet Documentation  Taken 6/21/2022 0400 by Darlene Gomez, RN  Medication Review/Management:    medications reviewed    high-risk medications identified  Taken 6/21/2022 0000 by Darlene Gomez RN  Medication Review/Management:    medications reviewed    high-risk medications identified  Taken 6/20/2022 2000 by Darlene Gomez, RN  Medication Review/Management:    medications reviewed    high-risk medications identified   Goal Outcome Evaluation:    Plan of Care Reviewed With: patient     Overall Patient Progress: improving

## 2022-06-22 NOTE — PROGRESS NOTES
Cardiology ICU Progress Note    Brief HPI:Delbert Tilley is a 71 year old male, past medical history is significant for obesity, ASCVD with history of NSTEMI, cardiac defibrillator implanted, pancreatitis, CHF, PAD, idiopathic peripheral autonomic neuropathy, venous insufficiency, actinic keratoses, hyperparathyroidism thrombocytopenia, hypocalcemia, hypertension, ischemic cardiomyopathy, hypomagnesemia, anemia, stage III renal disease, hypotension, COPD, NSVT, type 2 diabetes, alcohol abuse in remission, GERD, nicotine dependence (the patient is still currently a 2 pack/day smoker)    Presented 6/20 with his daughter from home with concerns of weakness and pain in his left hip and knee after he fell 3 days prior to presentation when he was outside and tripped on a shovel landing on his left hip and left knee.  With assistance from a neighbor he has been able to get back up and move around.  Preceding this and over approximately the last 1 week he has been increasingly short of breath at home increased cough runny nose sore throat no fever.  He states that he sleeps well however he does identify several episodes of PND where he had to get up after awakening short of breath and then go back to sleep in his recliner sitting upright.  At no point has he noted any chest pain or chest tightness.  He has noted an increase in shortness of breath over the past 1 week as described, primarily with exertion.  He states has been using his inhaler medicine that he thinks is albuterol at home and it does not seem to be helping as much.      Interval changes: Feeling good this morning. Frustrated with being in hospital and being made NPO for possible angiogram today. This morning labs with CVP 12, SVO2 56%, CI 2.4, CO 4.7. Dobutamine discontinued, will recheck hemodynamics at 1800      Plan:  1. Discontinue dobutamine  2. Hope to discharge tomorrow      ASSESSMENT AND PLAN BY SYSTEM:     Neurology   # No acute concerns  # Chronic  neuropathy  # fall at home  --Continue home gabapentin  --X Ray show no fracture      Cardiovascular  # Cardiogenic shock  #acute on chronic systolic HF  #Advanced ischemic cardiomyopathy, class IV, Stage D  #CAD s/p CABG  #Paroxymal A-Fib/Flutter  # PAD S/p endarterectomy and iliac endartectomy  --lactic 1.2, Cr 1.41, LFT's normal  Cardiogenic shock improving lactate, liver enzymes, and SVO2 improving. CI >2  GDMT:               - DAPT: Continue ASA 81mg and Plavix QD               - Statin: Lipitor               - ARB: home losartan on hold               - BB: home coreg on hold    Plan:  1. ASA 81 mg QD  2. Plavix QD  3. Lipitor QD  4. Hold ACE/ARB for now given likely reduced renal fxn after arrest  5. Holding beta blocker given shock       Pulmonary  # COPD    Plan:  1. Continue mometasone     Arterial Blood Gas   Recent Labs   Lab 06/22/22  0615 06/21/22  1738 06/21/22  1230 06/21/22  0431   O2PER 21 21 12 21       Gastrointestinal, Nutrition  # No acute issues  No known medical hx.   Diet: diabetic cardiac diet with 2000 ml FR      Recent Labs   Lab 06/21/22  1738 06/21/22  1543 06/21/22  1230 06/21/22  0431   PROTTOTAL 6.0* 6.3* 6.1* 6.4*   ALBUMIN 3.2* 3.3* 3.3* 2.7*   BILITOTAL 0.4 0.4 0.5 0.6   ALKPHOS 132* 142* 149* 138   AST 34 37 36 32   ALT 24 24 22 26       Renal, Electrolytes  # Acute Renal Injury  # Lactic acidosis  Cr 1.47, net - 1779.        Plan:  1. Monitor urine output  2. I/O goal net negative 500-1000    Intake/Output Summary (Last 24 hours) at 6/21/2022 1343  Last data filed at 6/21/2022 1300  Gross per 24 hour   Intake 1233.47 ml   Output 5575 ml   Net -4341.53 ml       Recent Labs   Lab 06/21/22  2126 06/21/22  1740 06/21/22  1738 06/21/22  1549 06/21/22  1302 06/21/22  1230 06/21/22  0436 06/21/22  0431 06/20/22  2159 06/20/22  2145 06/20/22  1449 06/20/22  1445 06/20/22  1354 06/20/22  1231 06/20/22  0528 06/20/22  0351   NA  --   --  133*  --   --  134*  --  138  --  136  --  133  --   135  --  137   POTASSIUM  --   --  4.1  --   --  4.6*  --  4.0  --  4.4  --  4.7  --  4.2  --  5.0   CHLORIDE  --   --  95*  --   --  96*  --  100  --  100  --  100  --  98  --  103   CO2  --   --  28  --   --  27  --  29  --  28  --  22  --  26  --  24   ANIONGAP  --   --  10  --   --  11  --  9  --  8  --  11  --  11  --  10   * 215* 218* 244*   < > 364*   < > 71   < > 133*   < > 419*   < > 385*   < > 194*   BUN  --   --  61.3*  --   --  62.6*  --  68*  --  67*  --  65*  --  65*  --  63*   CR  --   --  1.47*  --   --  1.72*  --  1.81*  --  1.85*  --  1.91*  --  1.83*  --  2.01*   GFRESTIMATED  --   --  50*  --   --  41*  --  39*  --  38*  --  37*  --  38*  --  34*   MARVA  --   --  6.9*  --   --  7.2*  --  7.0*  --  7.0*  --  6.6*  --  6.3*  --  6.4*   MAG  --   --   --   --   --   --   --  2.0  --   --   --  2.2  --  2.3  --  0.5*    < > = values in this interval not displayed.     No components found for: URINE     Infectious Disease  # No acute issues  WBC 7.6, T Max     Hematology  # Anemia of critical illness  Hgb stable. No e/o bleeding.        CBC  Recent Labs   Lab 06/21/22  0553 06/20/22  0351 06/19/22  1722   WBC 13.3* 7.6 9.3   RBC 3.96* 4.10* 3.87*   HGB 10.0* 10.6* 10.0*   HCT 33.9* 36.5* 33.6*   MCV 86 89 87   MCH 25.3* 25.9* 25.8*   MCHC 29.5* 29.0* 29.8*   RDW 18.0* 18.3* 18.1*   * 124* 126*     INRNo lab results found in last 7 days.    Endocrinology  # Hyperglycemia   # Hgb A1c  hyperglycemic    Plan:  1. Increase lantus from 28 to 32 units every day     Integumentary:  - No skin issues      Lines/Tubes/Drains:  R Picc    Physical exam:  General: In bed, in NAD  HEENT: PERRL, no scleral icterus or injection  CARDIAC: RRR, no m/r/g appreciated. Peripheral pulses dopplered  RESP: Mechanical ventilation; CTAB, no wheezes, rhonchi or crackles appreciated.  GI: soft, BS hypoactive  : Arriola  EXTREMITIES: No LE edema, pulses 2+. Femoral access site w/o bleeding, dressing c/d/i. No bruits  appreciated.   SKIN: No acute lesions appreciated  NEURO: Intubated and sedated    Imaging/procedure results:  XR Chest Port 1 View  Narrative: EXAM: XR Chest 1 view 2022 8:50 PM      HISTORY: verify PICC.    COMPARISON: Same day radiographic chest at 0324.     TECHNIQUE: Frontal view of the chest.    FINDINGS: Right-sided PICC with tip in the high SVC. Left chest wall  ICD with leads in stable position. Postsurgical changes of the chest  with stable fractured median sternotomy wires.  Trachea is midline. Stable cardiomegaly. Stable interstitial pulmonary  opacities. No pleural effusions. No pneumothoraces.  Impression: IMPRESSION:   1. Right-sided PICC with tip in the high SVC.  2. Stable cardiomegaly with pulmonary edema.    I have personally reviewed the examination and initial interpretation  and I agree with the findings.    COY CARCAMO DO         SYSTEM ID:  [id number]  Echocardiogram Limited  [id number]  [id number]  [id number]  [id number]^ELVIN^TYRELL     [facility]  Echocardiography Laboratory  [address] [place] [id number]     Name: CARLIN QUEEN  MRN: [id number]  : 1950  Study Date: 2022 09:09 AM  Age: 71 yrs  Gender: Male  [place] [facility] [place]  Reason For Study: CHF  Ordering Physician: TYRELL OCONNOR  Referring Physician: KEVEN GUIDRY  Performed By: Samuel Alicia     BSA: 1.9 m2  Height: 72 in  Weight: 161 lb  ______________________________________________________________________________  Procedure  Limited Echocardiogram with portions of two-dimensional, color and spectral  Doppler performed.  ______________________________________________________________________________  Interpretation Summary  Mild to moderate left ventricular dilation is present.  The visual ejection fraction is 15-20%.  Global right ventricular function is mildly to moderately  reduced.  ______________________________________________________________________________  Left Ventricle  Mild to moderate left ventricular dilation is present. The visual ejection  fraction is 15-20%.     Right Ventricle  The right ventricle is normal size. Global right ventricular function is  mildly to moderately reduced. A pacemaker lead is noted in the right  ventricle.     ______________________________________________________________________________  MMode/2D Measurements & Calculations  IVSd: 1.0 cm  LVIDd: 6.2 cm  LVIDs: 5.4 cm  LVPWd: 0.96 cm  FS: 14.0 %  LV mass(C)d: 262.7 grams  LV mass(C)dI: 135.2 grams/m2  RWT: 0.31     ______________________________________________________________________________  Report approved by: Rufus Stevens 06/20/2022 09:47 AM        XR Chest Port 1 View  Narrative: Exam: XR CHEST PORT 1 VIEW, 6/20/2022 3:30 AM    Comparison: 6/19/2022    History: SOB    Findings:  Single portable semiupright view of the chest is obtained.  Postoperative changes of sternotomy, unchanged fracture of the first  and third sternotomy wires when counting from the cranial end. Left  chest implantable cardiac defibrillator is in place, leads appear  intact.    Trachea is midline. Calcification of the aortic knob. Unchanged  cardiomegaly. Mild interstitial prominence. There is no pneumothorax  or pleural effusion. Calcification along the right  hemidiaphragm/hepatic dome.  Impression: Impression:   Cardiomegaly and mild interstitial prominence likely indicative of  pulmonary edema.    I have personally reviewed the examination and initial interpretation  and I agree with the findings.    LEONIDAS MCCLAIN MD         SYSTEM ID:  [id number]       Patient seen, discussed, and plan reviewed with Dr. Nuñez    Time Spent on this Encounter   Delbert was seen and evaluated by me on 06/20/22.  He was in critical condition as the result of cardiogenic shock.    His condition is now Critical.      The acute  issues managed by me today include  Hemodynamic supportive measures, RADHA support   Supportive interventions provided and/or ordered by me include Pt education on plan and procedures     Total Critical Care time spent by me, excluding procedures, was 74 minutes.    ANA Rainey CNP     ANA Nicole, CNP  Critical Care Cardiology

## 2022-06-22 NOTE — PLAN OF CARE
ICU End of Shift Summary. See flowsheets for vital signs and detailed assessment.    Changes this shift: Pt A&Ox4, following commands, PERRLA. HR NSR with no ectopy. Afebrile. Maintained MAP >60. Slightly hypotensive at beginning of shift, but quickly resolved. Dobutamine at 2.5. On RA, sating >90%. No BM this shift. Good UOP.     Plan: Transfer to  when able. Needs education on inhaler.       Problem: Plan of Care - These are the overarching goals to be used throughout the patient stay.    Goal: Absence of Hospital-Acquired Illness or Injury  Outcome: Ongoing, Progressing  Intervention: Identify and Manage Fall Risk  Recent Flowsheet Documentation  Taken 6/22/2022 0400 by Nicole Earl RN  Safety Promotion/Fall Prevention:   activity supervised   clutter free environment maintained   fall prevention program maintained   increased rounding and observation   increase visualization of patient   lighting adjusted   nonskid shoes/slippers when out of bed   patient and family education   room door open   room near nurse's station   safety round/check completed  Taken 6/22/2022 0000 by Nicole Earl RN  Safety Promotion/Fall Prevention:   activity supervised   clutter free environment maintained   fall prevention program maintained   increased rounding and observation   increase visualization of patient   lighting adjusted   nonskid shoes/slippers when out of bed   patient and family education   room door open   room near nurse's station   safety round/check completed  Taken 6/21/2022 2000 by Nicole Earl, RN  Safety Promotion/Fall Prevention:   activity supervised   clutter free environment maintained   fall prevention program maintained   increased rounding and observation   increase visualization of patient   lighting adjusted   nonskid shoes/slippers when out of bed   patient and family education   room door open   room near nurse's station   safety round/check completed  Intervention: Prevent  Skin Injury  Recent Flowsheet Documentation  Taken 6/22/2022 0600 by Nicole Earl RN  Body Position: position changed independently  Taken 6/22/2022 0400 by Nicole Earl RN  Body Position: position changed independently  Taken 6/22/2022 0200 by Nicole Earl RN  Body Position: position changed independently  Taken 6/22/2022 0000 by Nicole Earl RN  Body Position: position changed independently  Taken 6/21/2022 2220 by Nicole Earl RN  Body Position: position changed independently  Taken 6/21/2022 2000 by Nicole Earl RN  Body Position: position changed independently  Intervention: Prevent and Manage VTE (Venous Thromboembolism) Risk  Recent Flowsheet Documentation  Taken 6/22/2022 0400 by Nicole Earl RN  VTE Prevention/Management: SCDs (sequential compression devices) off  Activity Management:   activity adjusted per tolerance   activity encouraged  Taken 6/22/2022 0000 by Nicole Earl RN  VTE Prevention/Management: SCDs (sequential compression devices) off  Activity Management:   activity adjusted per tolerance   activity encouraged  Taken 6/21/2022 2200 by Nicole Earl RN  Activity Management: ambulated in [place]avera  Taken 6/21/2022 2000 by Nicole Earl RN  VTE Prevention/Management: SCDs (sequential compression devices) off  Activity Management:   activity adjusted per tolerance   activity encouraged  Intervention: Prevent Infection  Recent Flowsheet Documentation  Taken 6/22/2022 0400 by Nicole Earl RN  Infection Prevention:   rest/sleep promoted   equipment surfaces disinfected   hand hygiene promoted   personal protective equipment utilized   single patient room provided  Taken 6/22/2022 0000 by Nicole Earl RN  Infection Prevention:   rest/sleep promoted   equipment surfaces disinfected   hand hygiene promoted   personal protective equipment utilized   single patient room provided  Taken 6/21/2022 2000 by Rajat  Nicole GOODMAN RN  Infection Prevention:   rest/sleep promoted   equipment surfaces disinfected   hand hygiene promoted   personal protective equipment utilized   single patient room provided     Problem: Risk for Delirium  Goal: Improved Sleep  Outcome: Ongoing, Progressing  Intervention: Promote Sleep  Recent Flowsheet Documentation  Taken 6/22/2022 0400 by Nicole Earl RN  Sleep/Rest Enhancement:   awakenings minimized   consistent schedule promoted   noise level reduced   regular sleep/rest pattern promoted   relaxation techniques promoted  Taken 6/22/2022 0000 by Nicole Earl RN  Sleep/Rest Enhancement:   awakenings minimized   consistent schedule promoted   noise level reduced   regular sleep/rest pattern promoted   relaxation techniques promoted  Taken 6/21/2022 2000 by Nicole Earl RN  Sleep/Rest Enhancement:   awakenings minimized   consistent schedule promoted   noise level reduced   regular sleep/rest pattern promoted   relaxation techniques promoted     Problem: COPD (Chronic Obstructive Pulmonary Disease) Comorbidity  Goal: Maintenance of COPD Symptom Control  Outcome: Ongoing, Progressing  Intervention: Maintain COPD-Symptom Control  Recent Flowsheet Documentation  Taken 6/22/2022 0400 by Nicole Earl RN  Supportive Measures:   active listening utilized   positive reinforcement provided   relaxation techniques promoted   verbalization of feelings encouraged  Medication Review/Management: medications reviewed  Taken 6/22/2022 0000 by Nicole Earl RN  Supportive Measures:   active listening utilized   positive reinforcement provided   relaxation techniques promoted   verbalization of feelings encouraged  Medication Review/Management: medications reviewed  Taken 6/21/2022 2000 by Nicole Earl RN  Supportive Measures:   active listening utilized   positive reinforcement provided   relaxation techniques promoted   verbalization of feelings encouraged  Medication  Review/Management: medications reviewed

## 2022-06-22 NOTE — PLAN OF CARE
ICU End of Shift Summary. See flowsheets for vital signs and detailed assessment.    Changes this shift: Pt alert and orient X4. Stand by assist. Neuro and Cardiac are stable. Denied pain. Vs with in defined limits.Dobutamine drip held per MD's order. 2g Na+ and 2L fluid restriction. Labs were drawn. Daughter visited @6p.m.     Plan: plan is to discharge tomorrow anytime.   Goal Outcome Evaluation:    Plan of Care Reviewed With: patient, daughter

## 2022-06-22 NOTE — PROGRESS NOTES
06/22/22 1456   General Information   Onset of Illness/Injury or Date of Surgery 06/20/22   Referring Physician Roselia Luna MD   Patient/Family Therapy Goal Statement (SLP) None stated   Pertinent History of Current Problem Pt is a  71 year old male, past medical history is significant for obesity, ASCVD with history of NSTEMI, cardiac defibrillator implanted, pancreatitis, CHF, PAD, idiopathic peripheral autonomic neuropathy, venous insufficiency, actinic keratoses, hyperparathyroidism thrombocytopenia, hypocalcemia, hypertension, ischemic cardiomyopathy, hypomagnesemia, anemia, stage III renal disease, hypotension, COPD, NSVT, type 2 diabetes, alcohol abuse in remission, GERD, nicotine dependence (the patient is still currently a 2 pack/day smoker).   Past History of Dysphagia VFSS completed on 09/09/2017 s/p right carotid endaterectomy. Results indicated silent aspiration of thin liquids, deep penetration of mildly thick liquids, and aspiration of moderately thick liquids. IDDSI 2 mildly thick liquids and IDDSI 5 minced and moist diet w/ supra superglottic swallow maneuver was recommended. Shortly after VFSS, pt self advanced to regular diet/thin liquids and has been eating a regular diet/thin liquids since this time.   Type of Evaluation   Type of Evaluation Swallow Evaluation   Oral Motor   Oral Musculature generally intact   Structural Abnormalities none present   Mucosal Quality good   Dentition (Oral Motor)   Dentition (Oral Motor) significant number of missing teeth;dental appliance/dentures   Dental Appliance/Denture (Oral Motor) dentures, partial   Facial Symmetry (Oral Motor)   Facial Symmetry (Oral Motor) WNL   Lip Function (Oral Motor)   Lip Range of Motion (Oral Motor) WNL   Tongue Function (Oral Motor)   Tongue ROM (Oral Motor) WNL   Jaw Function (Oral Motor)   Jaw Function (Oral Motor) WNL   Cough/Swallow/Gag Reflex (Oral Motor)   Volitional Throat Clear/Cough (Oral Motor) WNL   Volitional  Swallow (Oral Motor) WNL   Vocal Quality/Secretion Management (Oral Motor)   Vocal Quality (Oral Motor) hoarse   Secretion Management (Oral Motor) WNL   General Swallowing Observations   Respiratory Support (General Swallowing Observations) none   Current Diet/Method of Nutritional Intake (General Swallowing Observations, NIS) regular diet   Swallowing Evaluation Clinical swallow evaluation   Clinical Swallow Evaluation   Feeding Assistance no assistance needed   Additional evaluation(s) completed today No   Clinical Swallow Evaluation Textures Trialed thin liquids;pureed;soft & bite-sized;solid foods   Clinical Swallow Eval: Thin Liquid Texture Trial   Mode of Presentation, Thin Liquids straw;cup;self-fed   Volume of Liquid or Food Presented 6 oz   Oral Phase of Swallow WFL   Pharyngeal Phase of Swallow intact   Diagnostic Statement No overt s/sx of apsiration/penetration.   Clinical Swallow Evaluation: Puree Solid Texture Trial   Mode of Presentation, Puree spoon;self-fed   Volume of Puree Presented 2 oz   Oral Phase, Puree WFL   Pharyngeal Phase, Puree intact   Diagnostic Statement No overt s/sx of aspiration/penetration.   Clinical Swallow Eval: Soft & Bite Sized   Mode of Presentation self-fed;spoon   Volume Presented 2 oz   Oral Phase WFL   Pharyngeal Phase intact   Diagnostic Statement No overt s/sx of aspiration/penetration.   Clinical Swallow Evaluation: Solid Food Texture Trial   Mode of Presentation self-fed   Volume Presented 2 palomo crackers   Oral Phase   (Prolonged, but functional)   Pharyngeal Phase intact   Diagnostic Statement No overt s/sx of aspiration/penetration   Esophageal Phase of Swallow   Patient reports or presents with symptoms of esophageal dysphagia No   Swallowing Recommendations   Diet Consistency Recommendations regular diet;thin liquids (level 0)   Supervision Level for Intake distant supervision needed   Mode of Delivery Recommendations bolus size, small;slow rate of intake    Swallowing Maneuver Recommendations alternate food and liquid intake   Monitoring/Assistance Required (Eating/Swallowing) stop eating activities when fatigue is present   Recommended Feeding/Eating Techniques (Swallow Eval) maintain upright sitting position for eating;minimize distractions during oral intake   Medication Administration Recommendations, Swallowing (SLP) Meds OK w/ thin liquids   Clinical Impression   Criteria for Skilled Therapeutic Interventions Met (SLP Eval) Evaluation only   SLP Diagnosis Functional oropharyngeal swallow mechanism   Risks & Benefits of therapy have been explained evaluation/treatment results reviewed;care plan/treatment goals reviewed;risks/benefits reviewed;current/potential barriers reviewed;participants voiced agreement with care plan;participants included;patient   Clinical Impression Comments Clinical swallow eval completed per MD order. Pt's oropharyngeal swallow mechanism deemed to be WNL. Oral mech exam remarkable for significant number of missing teeth and hoarse vocal quality, which pt attributes to smoking. Pt assessed w/ thin liquids via cup and straw, puree, soft and bite sized, and regular. Oral phase characterized by prolonged, but functional mastication. Pharyngeal phase unremarkable w/ no overt s/sx of aspiration across consistencies/textures. Vocal quality remained unchanged throughout. It should be noted, pt coughed frequently throughout evaluation; however, this was unrelated to PO trials. Recommend thin liquids/regular diet w/ distance superivsion. Meds OK in thin liquids. Pt can self select soft foods as needed d/t missing teeth. Ensure pt is fully upright and alert, taking small sips/bites at a slow rate. Discussed w/ pt; no additional SLP services indicated at this time.   SLP Discharge Planning   SLP Discharge Recommendation home   SLP Rationale for DC Rec Functional oorpharyngeal swallow mechanism   SLP Brief overview of current status  Recommend thin  liquids/regular diet w/ distance superivsion. Meds OK in thin liquids. Pt can self select soft foods as needed d/t missing teeth. Ensure pt is fully upright and alert, taking small sips/bites at a slow rate. Discussed w/ pt; no additional SLP services indicated at this time.    Total Evaluation Time   Total Evaluation Time (Minutes) 17

## 2022-06-23 NOTE — PROGRESS NOTES
Cardiology ICU Progress Note    Brief HPI:Delbert Tilley is a 71 year old male, past medical history is significant for obesity, ASCVD with history of NSTEMI, cardiac defibrillator implanted, pancreatitis, CHF, PAD, idiopathic peripheral autonomic neuropathy, venous insufficiency, actinic keratoses, hyperparathyroidism thrombocytopenia, hypocalcemia, hypertension, ischemic cardiomyopathy, hypomagnesemia, anemia, stage III renal disease, hypotension, COPD, NSVT, type 2 diabetes, alcohol abuse in remission, GERD, nicotine dependence (the patient is still currently a 2 pack/day smoker)    Presented 6/20 with his daughter from home with concerns of weakness and pain in his left hip and knee after he fell 3 days prior to presentation when he was outside and tripped on a shovel landing on his left hip and left knee.  With assistance from a neighbor he has been able to get back up and move around.  Preceding this and over approximately the last 1 week he has been increasingly short of breath at home increased cough runny nose sore throat no fever.  He states that he sleeps well however he does identify several episodes of PND where he had to get up after awakening short of breath and then go back to sleep in his recliner sitting upright.  At no point has he noted any chest pain or chest tightness.  He has noted an increase in shortness of breath over the past 1 week as described, primarily with exertion.  He states has been using his inhaler medicine that he thinks is albuterol at home and it does not seem to be helping as much.      Interval changes: Feeling good this morning. Frustrated with being in hospital. Via PICC line CVP 9, SVO2 50, CI 1.7. BP and Hr stable. Pt decided to leave AMA. Writer discussed with patient that today we were planning on adjusting his home medications due to his lower blood pressure. We would like him to stay in the hospital for this transition. Patient is angry and refuses to stay in the  hospital. PICC line pulled and AMA paper work completed. Reviewed with patient that some insurance companies do not cover cost of hospital stay when patients leave AMA, he states his insurance company will pay.    Plan:  1. Discontinue dobutamine  2. Hope to discharge tomorrow      ASSESSMENT AND PLAN BY SYSTEM:     Neurology   # No acute concerns  # Chronic neuropathy  # fall at home  --Continue home gabapentin  --X Ray show no fracture      Cardiovascular  # Cardiogenic shock  #acute on chronic systolic HF  #Advanced ischemic cardiomyopathy, class IV, Stage D  #CAD s/p CABG  #Paroxymal A-Fib/Flutter  # PAD S/p endarterectomy and iliac endartectomy  --lactic 1.2, Cr 1.41, LFT's normal  Cardiogenic shock improving lactate, liver enzymes, and SVO2 improving. CI >2  GDMT:               - DAPT: Continue ASA 81mg and Plavix QD               - Statin: Lipitor               - ARB: home losartan on hold               - BB: home coreg on hold    Plan:  1. ASA 81 mg QD  2. Plavix QD  3. Lipitor QD  4. Hold ACE/ARB for now given likely reduced renal fxn after arrest  5. Holding beta blocker given shock       Pulmonary  # COPD    Plan:  1. Continue mometasone     Arterial Blood Gas   Recent Labs   Lab 06/23/22  0339 06/22/22  1843 06/22/22  0615 06/21/22  1738   O2PER 21 21 21 21       Gastrointestinal, Nutrition  # No acute issues  No known medical hx.   Diet: diabetic cardiac diet with 2000 ml FR      Recent Labs   Lab 06/22/22  1843 06/22/22  0615 06/21/22  1738 06/21/22  1543   PROTTOTAL 6.5 5.7* 6.0* 6.3*   ALBUMIN 3.4* 3.1* 3.2* 3.3*   BILITOTAL 0.6 0.5 0.4 0.4   ALKPHOS 156* 126 132* 142*   AST 42 28 34 37   ALT 27 20 24 24       Renal, Electrolytes  # Acute Renal Injury  # Lactic acidosis  Cr 1.47, net - 1779.        Plan:  1. Monitor urine output  2. I/O goal net negative 500-1000    Intake/Output Summary (Last 24 hours) at 6/21/2022 1343  Last data filed at 6/21/2022 1300  Gross per 24 hour   Intake 1233.47 ml    Output 5575 ml   Net -4341.53 ml       Recent Labs   Lab 06/23/22  0345 06/22/22  2358 06/22/22  2141 06/22/22  1843 06/22/22  0757 06/22/22  0615 06/21/22  1740 06/21/22  1738 06/21/22  1302 06/21/22  1230 06/21/22  0436 06/21/22  0431 06/20/22  1449 06/20/22  1445 06/20/22  1354 06/20/22  1231   NA  --   --   --  135*  --  138  --  133*  --  134*  --  138   < > 133  --  135   POTASSIUM  --   --   --  4.4  --  4.2  --  4.1  --  4.6*  --  4.0   < > 4.7  --  4.2   CHLORIDE  --   --   --  97*  --  101  --  95*  --  96*  --  100   < > 100  --  98   CO2  --   --   --  27  --  28  --  28  --  27  --  29   < > 22  --  26   ANIONGAP  --   --   --  11  --  9  --  10  --  11  --  9   < > 11  --  11   GLC 73 135* 188* 252*   < > 129*   < > 218*   < > 364*   < > 71   < > 419*   < > 385*   BUN  --   --   --  47.3*  --  48.2*  --  61.3*  --  62.6*  --  68*   < > 65*  --  65*   CR  --   --   --  1.24*  --  1.41*  --  1.47*  --  1.72*  --  1.81*   < > 1.91*  --  1.83*   GFRESTIMATED  --   --   --  61  --  53*  --  50*  --  41*  --  39*   < > 37*  --  38*   MARVA  --   --   --  8.5*  --  7.9*  --  6.9*  --  7.2*  --  7.0*   < > 6.6*  --  6.3*   MAG  --   --   --   --   --  1.8  --   --   --   --   --  2.0  --  2.2  --  2.3    < > = values in this interval not displayed.     No components found for: URINE     Infectious Disease  # No acute issues  WBC 7.6, T Max     Hematology  # Anemia of critical illness  Hgb stable. No e/o bleeding.        CBC  Recent Labs   Lab 06/23/22  0339 06/22/22  0615 06/21/22  0553 06/20/22  0351   WBC 8.7 8.5 13.3* 7.6   RBC 4.20* 3.52* 3.96* 4.10*   HGB 10.8* 9.4* 10.0* 10.6*   HCT 36.6* 31.2* 33.9* 36.5*   MCV 87 89 86 89   MCH 25.7* 26.7 25.3* 25.9*   MCHC 29.5* 30.1* 29.5* 29.0*   RDW 18.1* 18.4* 18.0* 18.3*    132* 141* 124*     INRNo lab results found in last 7 days.    Endocrinology  # Hyperglycemia   # Hgb A1c  hyperglycemic    Plan:  1. Increase lantus from 28 to 32 units every day      Integumentary:  - No skin issues      Lines/Tubes/Drains:  R Picc    Physical exam:  General: In bed, in NAD  HEENT: PERRL, no scleral icterus or injection  CARDIAC: RRR, no m/r/g appreciated. Peripheral pulses dopplered  RESP: Mechanical ventilation; CTAB, no wheezes, rhonchi or crackles appreciated.  GI: soft, BS hypoactive  : Arriola  EXTREMITIES: No LE edema, pulses 2+. Femoral access site w/o bleeding, dressing c/d/i. No bruits appreciated.   SKIN: No acute lesions appreciated  NEURO: Intubated and sedated    Imaging/procedure results:  XR Chest Port 1 View  Narrative: EXAM: XR Chest 1 view 2022 8:50 PM      HISTORY: verify PICC.    COMPARISON: Same day radiographic chest at 0324.     TECHNIQUE: Frontal view of the chest.    FINDINGS: Right-sided PICC with tip in the high SVC. Left chest wall  ICD with leads in stable position. Postsurgical changes of the chest  with stable fractured median sternotomy wires.  Trachea is midline. Stable cardiomegaly. Stable interstitial pulmonary  opacities. No pleural effusions. No pneumothoraces.  Impression: IMPRESSION:   1. Right-sided PICC with tip in the high SVC.  2. Stable cardiomegaly with pulmonary edema.    I have personally reviewed the examination and initial interpretation  and I agree with the findings.    COY CARCAMO DO         SYSTEM ID:  [id number]  Echocardiogram Limited  [id number]  [id number]  [id number]  [id number]^ELVIN^TYRELL     [facility] [place]  Echocardiography Laboratory  [address] [place] [id number]     Name: CARLIN QUEEN  MRN: [id number]  : 1950  Study Date: 2022 09:09 AM  Age: 71 yrs  Gender: Male  Patient Location: [facility] [place], [place]  Reason For Study: CHF  Ordering Physician: TYRELL OCONNOR  Referring Physician: KEVEN GUIDRY  Performed By: Samuel Alicia     BSA: 1.9 m2  Height: 72 in  Weight: 161  lb  ______________________________________________________________________________  Procedure  Limited Echocardiogram with portions of two-dimensional, color and spectral  Doppler performed.  ______________________________________________________________________________  Interpretation Summary  Mild to moderate left ventricular dilation is present.  The visual ejection fraction is 15-20%.  Global right ventricular function is mildly to moderately reduced.  ______________________________________________________________________________  Left Ventricle  Mild to moderate left ventricular dilation is present. The visual ejection  fraction is 15-20%.     Right Ventricle  The right ventricle is normal size. Global right ventricular function is  mildly to moderately reduced. A pacemaker lead is noted in the right  ventricle.     ______________________________________________________________________________  MMode/2D Measurements & Calculations  IVSd: 1.0 cm  LVIDd: 6.2 cm  LVIDs: 5.4 cm  LVPWd: 0.96 cm  FS: 14.0 %  LV mass(C)d: 262.7 grams  LV mass(C)dI: 135.2 grams/m2  RWT: 0.31     ______________________________________________________________________________  Report approved by: Rufus Stevens 06/20/2022 09:47 AM        XR Chest Port 1 View  Narrative: Exam: XR CHEST PORT 1 VIEW, 6/20/2022 3:30 AM    Comparison: 6/19/2022    History: SOB    Findings:  Single portable semiupright view of the chest is obtained.  Postoperative changes of sternotomy, unchanged fracture of the first  and third sternotomy wires when counting from the cranial end. Left  chest implantable cardiac defibrillator is in place, leads appear  intact.    Trachea is midline. Calcification of the aortic knob. Unchanged  cardiomegaly. Mild interstitial prominence. There is no pneumothorax  or pleural effusion. Calcification along the right  hemidiaphragm/hepatic dome.  Impression: Impression:   Cardiomegaly and mild interstitial prominence likely  indicative of  pulmonary edema.    I have personally reviewed the examination and initial interpretation  and I agree with the findings.    LEONIDAS MCCLAIN MD         SYSTEM ID:  [id number]       Patient seen, discussed, and plan reviewed with Dr. Nuñez    Time Spent on this Encounter   Delbert was seen and evaluated by me on 06/20/22.  He was in critical condition as the result of cardiogenic shock.    His condition is now Critical.      The acute issues managed by me today include  Hemodynamic supportive measures, RADHA support   Supportive interventions provided and/or ordered by me include Pt education on plan and procedures     Total Critical Care time spent by me, excluding procedures, was 74 minutes.    ANA Rainey CNP     ANA Nicole, CNP  Critical Care Cardiology

## 2022-06-23 NOTE — PLAN OF CARE
Occupational Therapy Discharge Summary    Reason for therapy discharge:    Discharged to home. (Per chart pt left AMA - see MD notes)    Progress towards therapy goal(s). See goals on Care Plan in [facility] electronic health record for goal details.  Goals partially met.  Barriers to achieving goals:   discharge from facility.    Therapy recommendation(s):    Continued therapy is recommended.  Rationale/Recommendations:  OP cardiac rehab to progress pt functional endurance and IND with ADLs.

## 2022-06-23 NOTE — PROGRESS NOTES
Discharged to: home at 1125  Belongings: Sent home with marbella BERNSTEIN (After Visit Summary) discussed with: SNEHA    Patient left AMA this morning after speaking with [facility] NP and signing the paperwork.  Prior to this, patient taking off all of his monitoring so few vitals able to be charted after 0800.  PICC was pulled prior to patient leaving. See charting for further details.

## 2022-06-23 NOTE — PLAN OF CARE
ICU End of Shift Summary. See flowsheets for vital signs and detailed assessment.    Changes this shift: A&O x4. Up ad jos. VSS on RA. Denies pain. Voiding WDL.     Plan: Plan to discharge to home today.

## 2022-06-24 NOTE — PROGRESS NOTES
Clinic Care Coordination Contact  [facility]/Voicemail    Clinical Data: Care Coordinator Outreach  Outreach attempted x 1. Left message on patient's voicemail with call back information and requested return call.     Plan:Care Coordinator will try to reach patient again in 1-2 business days.    DENISA Lancaster  [phone]  [facility]

## 2022-06-25 NOTE — PROGRESS NOTES
[facility]    Background: Care Coordination referral placed from [facility] discharge report for reason of patient meeting criteria for a TCM outreach call by [facility] [place] team.    Assessment: Upon chart review, [facility] Team member will cancel/close the referral for TCM outreach due to reason below:    Patient declined completing post hospital discharge questions.    Plan: Care Coordination referral for TCM outreach canceled.      Lorena Osorio MA  [facility], [facility]

## 2022-06-27 NOTE — DISCHARGE SUMMARY
[address] [address]  p: [phone]    Discharge Summary: Cardiology Service    Delbert Tilley MRN# [id number]   YOB: 1950 Age: 71 year old       Admission Date: 6/20/2022  Discharge Date: 06/27/22      Discharge Diagnoses:  1.Cardiogenic shock  2. Acute on chronic systolic HF  3. Acute on chronic renal failure  4. Hyperglycemia    Imaging with results:  Echocardiogram 6/20:  Mild to moderate left ventricular dilation is present.The visual ejection fraction is 15-20%.  Global right ventricular function is mildly to moderately reduced.    Other imaging studies:  6/20 CXR: Cardiomegaly and mild interstitial prominence likely indicative of  pulmonary edema.    Brief HPI:  Presented 6/20 with his daughter from home with concerns of weakness and pain in his left hip and knee after he fell 3 days prior to presentation when he was outside and tripped on a shovel landing on his left hip and left knee.  With assistance from a neighbor he has been able to get back up and move around.  Preceding this and over approximately the last 1 week he has been increasingly short of breath at home increased cough runny nose sore throat no fever.      He identifies several episodes of PND where he had to get up after awakening short of breath and then go back to sleep in his recliner sitting upright.  At no point has he noted any chest pain or chest tightness.  He has noted an increase in shortness of breath over the past 1 week as described, primarily with exertion.  He states has been using his inhaler medicine that he thinks is albuterol at home and it does not seem to be helping as much.    Hospital Course by Diagnosis:  Cardiogenic shock: Treated with diuresis and dobutamine. His lactate, LFT's, and Cr were monitored closely and trended down with treatment. The night before discharge his dobutamine was discontinued, unfortunately in the morning his  CI decreased to 1.7 and his SVR had increased to 1100. Hemodynamics calculated from a PICC line, but trending down form previous PICC line hemodynamics. Mr Tilley was very angry about a delay in discharge and decided to leave against medical advice. Throughout his hospitalization his home dose of losartan and coreg had been held due to intermittent hypotension. Encouraged patient to have frequent communication with his primary cardiologist to start him back on his GDMT.    Hyperglycemia: blood sugars elevated. Lantus was increased with improvement    Acute on Chronic renal failure: improved with diuresis and dobutamine. Cr at baseline at discharge      Condition on discharge     General: Alert, interactive, NAD  Eyes: sclera anicteric, EOMI  Neck: , carotid 2+ bilaterally  Cardiovascular: regular rate and rhythm, normal S1 and S2, no murmurs, gallops, or rubs  Resp: clear to auscultation bilaterally, no rales, wheezes, or rhonchi  GI: Soft, nontender, nondistended. +BS.  No HSM or masses, no rebound or guarding.  Extremities: trace edema, no cyanosis or clubbing, dorsalis pedis and posterior tibialis pulses 2+ bilaterally  Skin: Warm and dry, no jaundice or rash  Neuro: CN 2-12 intact, moves all extremities equally  Psych: Alert & oriented x 3      Medication Changes:  Patient left AMA before adjustments in his medications could be completed    Discharge medications:   Left AMA    Code status:  Full    ANA Nicole, CNP  [facility]  Critical Care Cardiology        Patient Care Team:  Danii Hernández MD as PCP - General (Family Medicine)  Danii Hernández MD as Assigned PCP  Ric Chau MD as Assigned Heart and Vascular Provider

## 2022-06-28 NOTE — PATIENT INSTRUCTIONS
Today, we discussed the following:   - Medication changes:    My nurse will contact you tomorrow morning to review your medication bottles. I will let you know what doses of carvedilol and Losartan you should be taking based on that.   For now, continue to take torsemide 40 mg in the AM and 20 in the afternoon.   - Follow up:   Labs at  on , followed by a visit with the heart failure specialist Dr. Glass.     Please, remember to continue the followin.  Weigh yourself daily. Call if your weight is up > than 2 pounds in one day, or 5 pounds in one week; if you feel more short of breath or have worsening swelling in your legs or abdomen.  2.  Eat a low sodium diet (less than 2,000mg or 2g daily). If you eat less salt, you will retain less fluid.   3.  Avoid alcohol, as this can worsen heart failure.   4.  Avoid NSAIDs as able (For example, Ibuprofen / aleve / advil / naprosen / diclofenac).    Please call [facility] nurse for any questions or concerns Mon-Fri 8am-4pm:   [phone]  For concerns after hours:   [phone] option 2   Scheduling phone number:   [phone]    Thank you for visiting with us today.   Kristen Rice PA-C  ______________________________________________________________

## 2022-06-28 NOTE — LETTER
"6/28/2022    Danii Hernández MD  [id number]  [facility] [address]    RE: Delbert Tilley       Dear Colleague,     I had the pleasure of seeing Delbert Tilley in the [facility].    Cardiology Clinic Progress Note    Delbert Tilley MRN# [id number]   YOB: 1950 Age: 71 year old   Primary cardiologist: Dr. Bello         Assessment and Plan:     In summary, Delbert Tilley presents today for follow up in the [facility] clinic about one week after leaving the hospital AMA for cardiogenic shock. Please see the HPI for details. He appears well-compensated with a marginal BP.     Plan:  -I will have my nurses call him to review his medication bottles. He isn't sure what doses of his medications he's currently taking.  - He declines cardioMEMs at this time. \"I have too many things in my body already.\" After more discussion, it seems he may consider it down the line if he has another admission.  - He is interested in Mom's meals. I provided the resources to obtain this today.   -I have arranged for him to meet our core clinic MD Dr. Glass in 1 week, with labs including a BMP, hemoglobin, and proBNP prior. Will ask pharmacy to price Entresto in anticipation of switching him from Losartan at that time.  - Prior neph note states his PAD may be a limiting factor for SGLT2 inhibitor therapy, but I'm not aware of this. Will connect with them for clarification.        History of Presenting Illness:      Delbert Tilley is a pleasant 71 year old patient who presents today a hospital follow up visit and [facility] clinic enrollment.    The patient has a history of the following -   1.  History of ischemic cardiomyopathy, ICD in place  2.  CAD, status post CABG (LIMA-LAD, SVG-OM, SVG-PDA) and PCI to the OM 3 graft in 2014  3.  Proximal atrial fibrillation and flutter, status post atrial flutter ablation  4.  PAD, with history of carotid endarterectomies and iliac artery stenting, followed by vascular surgery  5.  " CKD, [facility] by [facility]rology Dr. Cueva  6.  Diabetes mellitus  7.  Tobacco abuse, ongoing    In brief, this patient has an ischemic cardiomyopathy at least since he suffered an NSTEMI in 2014.  EF was 35-40% of the time.  This further declined to 20-25% in 2017, where it has remained.    He was recently admitted in April for heart failure exacerbation requiring IV Lasix and metolazone administration.  He was discharged with torsemide 20 mg twice daily. Ultimately left AMA.    Kamron most recently saw my colleague Yareli Mina on May 18, virtually, given her recent diagnosis of COVID-19.  He reported a stable home weight of 160 pounds.  No changes were made to his regimen.    He was then admitted to [facility] for 3 days, discharged 6/23, with cardiogenic shock.  He originally presented with concerns of weakness and pain in his left hip and knee after a mechanical fall 3 days prior to presentation, but prior to this had developed increasing dyspnea.  He was found to be in cardiogenic shock requiring dobutamine and IV diuresis with a Geary in place.  His dobutamine was continued until the night prior to discharge.  Unfortunately in the morning his CI had decreased to 1.7 and SVR increased to 1100.  For this reason, it was recommended that he stay longer in the hospital however he ultimately left that day again AMA.  Throughout the hospitalization, his PTA doses of losartan 50 daily and Coreg 12.5 BID have been held intermittently due to hypotension.  It appears that he actually left before a final med rec could be completed and recommendations made for medications at home.  Echocardiogram in the hospital showed an EF of 15-20% with a mild to moderately dilated LV, and mild to moderately reduced RV function.    His PTA GDMT regimen includes Coreg 12.5 mg twice daily, losartan 50 mg daily.  Per notes, he has not tolerated spironolactone in the past due to hypotension. It appears he was seen by nephrology last month  "and their note mentions consideration for addition of SGLT2 inhibitor, but ?PAD may be a limiting factor.    Today, he took all of his PTA medications. States his breathing feels \"great\" compared to what it was. He is mostly upset that his hip still hurts and nothing was done for it. Sees his PCP Dr. Hernández tomorrow. Patient denies chest pain, PND, orthopnea, edema, claudication, palpitations, near syncope or syncope.  Unfortunately, he really is not sure of what medication doses he is taking at this time.  He feels fairly confident that he is taking torsemide 40 mg in the morning and 20 in the afternoon, but is unsure about the doses of losartan and carvedilol.  He monitors his weight and BP every morning. His weight never increased pre-admission, but likely he had lost muscle mass from poor intake. He continues to smoke. He takes 1.5 g of aspirin every day for hip pain. 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          Review of Systems:     12-pt ROS is negative except for as noted in the HPI.          Physical Exam:     Vitals: /59 (BP Location: Right arm, Patient Position: Sitting, Cuff Size: Adult Regular)   Pulse 68   Resp 24   Wt 69.9 kg (154 lb)   SpO2 97%   BMI 20.89 kg/m    Wt Readings from Last 10 Encounters:   06/28/22 69.9 kg (154 lb)   06/22/22 74 kg (163 lb 2.3 oz)   06/19/22 73.5 kg (162 lb)   06/14/22 72.6 kg (160 lb)   05/18/22 72.6 kg (160 lb)   04/14/22 72.6 kg (160 lb)   04/09/22 77.4 kg (170 lb 10.2 oz)   04/08/22 78 kg (172 lb)   04/04/22 79.4 kg (175 lb)   02/17/22 76.4 kg (168 lb 8 oz)       Constitutional:  Patient is pleasant, alert, cooperative, and in NAD.  HEENT:  NCAT. PERRLA. EOM's intact.   Neck:  CVP appears normal. No carotid bruits.   Pulmonary: Normal respiratory effort. CTAB.   Cardiac: RRR, normal S1/S2, no S3/S4, no murmur or rub.   Abdomen:  Non-tender abdomen, no hepatosplenomegaly " appreciated.   Vascular: Pulses in the upper and lower extremities are 2+ and equal bilaterally.  Extremities: No edema, erythema, cyanosis or tenderness appreciated.  Skin:  No rashes or lesions appreciated.   Neurological:  No gross motor or sensory deficits.   Psych: Appropriate affect.        Data:     Labs reviewed:  Recent Labs   Lab Test 06/21/22  0431 04/08/22  0832 11/11/21  1113 01/21/21  1338 12/16/20  0803 02/02/18  0604 01/17/18  0920 12/11/17  0831 09/08/17  0859 10/19/16  0000   LDL 28 34 42  --    < >  --   --   --    < >  --    HDL 30* 28* 30*  --    < >  --   --   --    < >  --    NHDL 36 53 70  --    < >  --   --   --    < >  --    CHOL 66 81 100  --    < >  --   --   --    < >  --    TRIG 40 94 141  --    < >  --   --   --    < >  --    TSH  --   --   --   --   --   --   --   --   --  3.84   NTBNP  --   --   --   --   --  11,243* 21,137* 17,764*   < >  --    IRON  --   --   --  85  --   --   --   --   --   --    FEB  --   --   --  270  --   --   --   --   --   --    IRONSAT  --   --   --  31  --   --   --   --   --   --    AMBER  --   --   --  30  --   --   --   --   --   --     < > = values in this interval not displayed.       Lab Results   Component Value Date    WBC 8.7 06/23/2022    WBC 6.3 01/21/2021    RBC 4.20 (L) 06/23/2022    RBC 4.81 01/21/2021    HGB 10.8 (L) 06/23/2022    HGB 15.0 01/21/2021    HCT 36.6 (L) 06/23/2022    HCT 46.2 01/21/2021    MCV 87 06/23/2022    MCV 96 01/21/2021    MCH 25.7 (L) 06/23/2022    MCH 31.2 01/21/2021    MCHC 29.5 (L) 06/23/2022    MCHC 32.5 01/21/2021    RDW 18.1 (H) 06/23/2022    RDW 13.1 01/21/2021     06/23/2022     (L) 01/21/2021       Lab Results   Component Value Date     06/23/2022     06/10/2021    POTASSIUM 4.5 06/23/2022    POTASSIUM 4.0 06/21/2022    POTASSIUM 4.6 06/10/2021    CHLORIDE 103 06/23/2022    CHLORIDE 100 06/21/2022    CHLORIDE 105 06/10/2021    CO2 25 06/23/2022    CO2 29 06/21/2022    CO2 30 06/10/2021     ANIONGAP 10 06/23/2022    ANIONGAP 9 06/21/2022    ANIONGAP 5 06/10/2021     (H) 06/23/2022    GLC 71 06/23/2022    GLC 71 06/21/2022     (H) 06/10/2021    BUN 44.6 (H) 06/23/2022    BUN 68 (H) 06/21/2022    BUN 34 (H) 06/10/2021    CR 1.14 06/23/2022    CR 1.17 06/10/2021    GFRESTIMATED 68 06/23/2022    GFRESTIMATED 49 (L) 11/09/2021    GFRESTIMATED 62 06/10/2021    GFRESTBLACK 71 06/10/2021    MARVA 9.1 06/23/2022    MARVA 8.7 06/10/2021      Lab Results   Component Value Date    AST 46 06/23/2022    AST 38 12/07/2020    ALT 31 06/23/2022    ALT 28 12/07/2020       Lab Results   Component Value Date    A1C 7.8 (H) 06/20/2022    A1C 9.6 (H) 12/16/2020       Lab Results   Component Value Date    INR 1.02 12/22/2017    INR 1.03 09/08/2017           Problem List:     Patient Active Problem List   Diagnosis     Cardiomyopathy (H)     Atrial fibrillation/flutter     Coronary artery disease involving native coronary artery of native heart without angina pectoris     Alcohol abuse, in remission     GERD (gastroesophageal reflux disease)     Alcohol-induced chronic pancreatitis (H)     Diabetes mellitus, type 2 (H)     Type 2 diabetes mellitus with other specified complication, with long-term current use of insulin (H)     CARDIOVASCULAR SCREENING; LDL GOAL LESS THAN 100     Hyperlipidemia LDL goal <70     ACS (acute coronary syndrome) (H)     NSTEMI (non-ST elevated myocardial infarction) (H)     Carotid stenosis     Carpal tunnel syndrome of right wrist     Left carotid stenosis     Tobacco use     NSVT (nonsustained ventricular tachycardia) (H)     Chronic systolic heart failure (H)     Paroxysmal atrial fibrillation (H)     Chronic obstructive pulmonary disease, unspecified COPD type (H)     Hypotension, unspecified hypotension type     Chronic kidney disease, stage 3 (H)     Pleural plaque     Atherosclerosis of artery of extremity with intermittent claudication (H)     Hyperparathyroidism (H)      Thrombocytopenia (H)     Other idiopathic peripheral autonomic neuropathy     Venous insufficiency (chronic) (peripheral)     Actinic keratosis     Nail dystrophy     Generalized muscle weakness     Vitamin D deficiency     Hypocalcemia     Essential hypertension     Ischemic cardiomyopathy     Other proteinuria     Hypomagnesemia     Anemia in chronic kidney disease     Diabetic nephropathy associated with type 2 diabetes mellitus (H)     PAD (peripheral artery disease) (H)     CHF (congestive heart failure) (H)     Dyspnea on exertion     Platypnea-orthodeoxia syndrome     Encounter for screening laboratory testing for severe acute respiratory syndrome coronavirus 2 (SARS-CoV-2)     Tinnitus     Pseudophakia     Presbyopia     Nonproliferative diabetic retinopathy (H)     Obesity     Mononeuritis     Neck pain     Lack of housing     Long term current use of anticoagulant therapy     Epidermoid cyst of skin     Glaucoma suspect     History of non-ST elevation myocardial infarction (NSTEMI)     Hearing loss     History of retinal detachment     Back pain     Cardiac defibrillator in place     Cellulitis and abscess of other specified site     Acute pancreatitis     Chronic pancreatitis (H)     Dry eye syndrome of bilateral lacrimal glands     Disorder of skin or subcutaneous tissue     Dry eyes     Enthesopathy of ankle and tarsus     Atherosclerosis of native arteries of extremities with intermittent claudication, bilateral legs (H)     Other and unspecified alcohol dependence, unspecified drinking behavior     Shock circulatory (H)           Medications:     Current Outpatient Medications   Medication Sig Dispense Refill     acetaminophen (TYLENOL) 500 MG tablet Take 500-1,000 mg by mouth every 6 hours as needed for mild pain       albuterol (PROAIR HFA) 108 (90 Base) MCG/ACT Inhaler Inhale 2 puffs into the lungs every 4 hours as needed for shortness of breath / dyspnea 1 Inhaler 0     amylase-lipase-protease  (CREON 12) 19125-69515-65478 units CPEP Take 2 capsules by mouth 3 times daily (with meals)  120 capsule 0     aspirin (ASA) 81 MG chewable tablet Take 81 mg by mouth daily Taking 1500mg twice daily for arthritis pain       atorvastatin (LIPITOR) 80 MG tablet Take 80 mg by mouth At Bedtime        calcium carbonate (OS-MARVA) 500 MG tablet Take 1 tablet by mouth 2 times daily One tablet in the morning, two at noon and one at bedtime       Carboxymethylcellulose Sod PF (THERATEARS PF) 0.25 % SOLN Apply 1 drop to eye 4 times daily as needed       carvedilol (COREG) 6.25 MG tablet Take 2 tablets (12.5 mg) by mouth 2 times daily (with meals) 180 tablet 3     clopidogrel (PLAVIX) 75 MG tablet Take 1 tablet (75 mg) by mouth daily 30 tablet 3     gabapentin (NEURONTIN) 100 MG capsule Take 300 mg by mouth At Bedtime 1 capsule 0     Glycerin (OASIS MOISTURIZING MOUTH SPRAY) 35 % LIQD Take 2 sprays by mouth daily as needed       insulin aspart (NOVOLOG PEN) 100 UNIT/ML pen Inject 3 Units Subcutaneous 3 times daily (with meals) 15 mL 1     insulin glargine (LANTUS PEN) 100 UNIT/ML pen Inject 28 Units Subcutaneous every morning Increase by 2 units every 2-3 days until morning blood sugar is .  Max dose 40 units/day (Patient taking differently: Inject 32 Units Subcutaneous every morning Increase by 2 units every 2-3 days until morning blood sugar is .  Max dose 40 units/day) 45 mL 1     Insulin Pen Needle (PEN NEEDLES) 32G X 4 MM MISC        loperamide (IMODIUM) 2 MG capsule Take 2 mg by mouth 4 times daily as needed for diarrhea Patient takes a dose in the am and another at night       losartan (COZAAR) 100 MG tablet Take 0.5 tablets (50 mg) by mouth daily       magnesium oxide (MAG-OX) 400 (241.3 Mg) MG tablet Take 1 tablet (400 mg) by mouth 4 times daily 120 tablet 1     metFORMIN (GLUCOPHAGE-XR) 500 MG 24 hr tablet Take 500 mg by mouth 2 times daily (with meals)        mometasone (ASMANEX TWISTHALER) 220 MCG/INH  inhaler Inhale 2 puffs into the lungs every evening        nitroGLYcerin (NITROSTAT) 0.4 MG sublingual tablet Place 0.4 mg under the tongue every 5 minutes as needed for chest pain       pantoprazole (PROTONIX) 40 MG EC tablet Take 40 mg by mouth 2 times daily  30 tablet      torsemide (DEMADEX) 20 MG tablet Take 1 tablet (20 mg) by mouth 2 times daily May take an additional tablet if there is 3 lbs of weight gain (Patient taking differently: Take 20 mg by mouth 2 times daily May take an additional tablet if there is 3 lbs of weight gain  06/28/22 Taking 2 tablets in the morning and 1 at noon.) 90 tablet 4     vitamin D3 (CHOLECALCIFEROL) 50 mcg (2000 units) tablet Take 1 tablet by mouth 2 times daily             Past Medical History:     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%     Claudication (H)      COPD (chronic obstructive pulmonary disease) (H)      Diabetes (H)      Gastro-oesophageal reflux disease      GERD (gastroesophageal reflux disease)      H/O: alcohol abuse      HTN (hypertension)      Hyperlipidaemia LDL goal <100 07/08/2013     Hyperparathyroidism (H)      Hypokalemia      ICD (implantable cardioverter-defibrillator) in place     [employer]     NSTEMI (non-ST elevated myocardial infarction) (H)      NSVT (nonsustained ventricular tachycardia) (H)      Pacemaker      Pancreatitis 6/26/06 Hosp     Paroxysmal atrial fibrillation (H)      PVD (peripheral vascular disease) (H)      Thrombocytopenia (H)      Tobacco use      Venous (peripheral) insufficiency      Vitamin D  deficiency      Past Surgical History:   Procedure Laterality Date     CATARACT IOL, RT/LT      Cataract IOL RT/LT     CLOSE SECONDARY WOUND LOWER EXTREMITY Right 02/13/2022    Procedure: Right groin wound exploration, nerve release and closure.;  Surgeon: Karen Arthur MD;  Location: [facility] OR     ENDARTERECTOMY CAROTID Right 06/12/2015    Procedure: ENDARTERECTOMY CAROTID;  Surgeon: João Turner MD;  Location:  OR     ENDARTERECTOMY CAROTID Left 09/08/2017    Procedure: ENDARTERECTOMY CAROTID;  LEFT CAROTID ENDARTERECTOMY WITH EEG;  Surgeon: João Turner MD;  Location:  OR     ENDARTERECTOMY FEMORAL Bilateral 02/09/2022    Procedure: BILATERAL- COMMON FEMORAL ENDARTERECTOMY; RETROGRADE ILIAC ARTERY STENTING;  Surgeon: Ric Chau MD;  Location: [facility] OR     IMPLANT PACEMAKER  06/10/2010     IMPLANTED DEVICE       INTERPOSITION TENDON HAND N/A 06/09/2020    Procedure: Right thumb ligament reconstruction tendon interposition with extensor pollicis brevis to abductor pollicis tendon transfer;  Surgeon: Bethel Martin MD;  Location: [facility] OR     PICC INSERTION - TRIPLE LUMEN Right 06/20/2022    Right basilic vein 0.62cm 4cm ext.Placement verified by Sherlock 3CG.PICC okay to use.     RELEASE CARPAL TUNNEL Right 04/12/2016    Procedure: RELEASE CARPAL TUNNEL;  Surgeon: Lissy Leger MD;  Location: [facility] OR     SURGICAL HISTORY OF -   1998    Laminectomy     TONSILLECTOMY & ADENOIDECTOMY       ZZC CABG, ARTERIAL, THREE       Family History   Adopted: Yes   Problem Relation Age of Onset     Unknown/Adopted No family hx of      Social History     Socioeconomic History     Marital status: Single     Spouse name: Not on file     Number of children: Not on file     Years of education: Not on file     Highest education level: Not on file   Occupational History     Not on file   Tobacco Use     Smoking status: Current Every Day Smoker     Packs/day: 1.50     Years:  50.00     Pack years: 75.00     Types: Cigarettes     Smokeless tobacco: Never Used     Tobacco comment: 1 1/2 PPD 05/18/22   Vaping Use     Vaping Use: Never used   Substance and Sexual Activity     Alcohol use: No     Drug use: No     Sexual activity: Not Currently     Partners: Female   Other Topics Concern     Parent/sibling w/ CABG, MI or angioplasty before 65F 55M? No   Social History Narrative     Not on file     Social Determinants of Health     Financial Resource Strain: Not on file   Food Insecurity: Not on file   Transportation Needs: Not on file   Physical Activity: Not on file   Stress: Not on file   Social Connections: Not on file   Intimate Partner Violence: Not on file   Housing Stability: Not on file           Allergies:   Fish, Hydrocodone, and Shellfish allergy      TONJA Smith [facility]  Pager: [phone]    Today's clinic visit entailed:  Review of the result(s) of each unique test - echocardiogram, BMP, CBC  Ordering of each unique test  I spent a total of 40 minutes on the day of the visit.   Time spent doing chart review, history and exam, documentation and further activities per the note  Provider  Link to [facility] Help Grid     The level of medical decision making during this visit was of high complexity.    Thank you for allowing me to participate in the care of your patient.      Sincerely,     TONJA Smith [facility]  cc:   Yareli Mina, APRN CNP  [address]  [place] [address]

## 2022-06-28 NOTE — PROGRESS NOTES
"  Cardiology Clinic Progress Note    Delbert Tilley MRN# [id number]   YOB: 1950 Age: 71 year old   Primary cardiologist: Dr. Bello         Assessment and Plan:     In summary, Delbert Tilley presents today for follow up in the [facility] clinic about one week after leaving the hospital AMA for cardiogenic shock. Please see the HPI for details. He appears well-compensated with a marginal BP.     Plan:  -I will have my nurses call him to review his medication bottles. He isn't sure what doses of his medications he's currently taking.  - He declines cardioMEMs at this time. \"I have too many things in my body already.\" After more discussion, it seems he may consider it down the line if he has another admission.  - He is interested in Mom's meals. I provided the resources to obtain this today.   -I have arranged for him to meet our core clinic MD Dr. Glass in 1 week, with labs including a BMP, hemoglobin, and proBNP prior. Will ask pharmacy to price Entresto in anticipation of switching him from Losartan at that time.  - Prior neph note states his PAD may be a limiting factor for SGLT2 inhibitor therapy, but I'm not aware of this. Will connect with them for clarification.        History of Presenting Illness:      Delbert Tilley is a pleasant 71 year old patient who presents today a hospital follow up visit and C[facility] clinic enrollment.    The patient has a history of the following -   1.  History of ischemic cardiomyopathy, ICD in place  2.  CAD, status post CABG (LIMA-LAD, SVG-OM, SVG-PDA) and PCI to the OM 3 graft in 2014  3.  Proximal atrial fibrillation and flutter, status post atrial flutter ablation  4.  PAD, with history of carotid endarterectomies and iliac artery stenting, followed by vascular surgery  5.  CKD, followed by [facility] nephrology Dr. Cueva  6.  Diabetes mellitus  7.  Tobacco abuse, ongoing    In brief, this patient has an ischemic cardiomyopathy at least since he suffered an NSTEMI in 2014.  EF " "was 35-40% of the time.  This further declined to 20-25% in 2017, where it has remained.    He was recently admitted in April for heart failure exacerbation requiring IV Lasix and metolazone administration.  He was discharged with torsemide 20 mg twice daily. Ultimately left AMA.    Kamron most recently saw my colleague Yareli Mina on May 18, virtually, given her recent diagnosis of COVID-19.  He reported a stable home weight of 160 pounds.  No changes were made to his regimen.    He was then admitted to [facility] for 3 days, discharged 6/23, with cardiogenic shock.  He originally presented with concerns of weakness and pain in his left hip and knee after a mechanical fall 3 days prior to presentation, but prior to this had developed increasing dyspnea.  He was found to be in cardiogenic shock requiring dobutamine and IV diuresis with a Grenola in place.  His dobutamine was continued until the night prior to discharge.  Unfortunately in the morning his CI had decreased to 1.7 and SVR increased to 1100.  For this reason, it was recommended that he stay longer in the hospital however he ultimately left that day again AMA.  Throughout the hospitalization, his PTA doses of losartan 50 daily and Coreg 12.5 BID have been held intermittently due to hypotension.  It appears that he actually left before a final med rec could be completed and recommendations made for medications at home.  Echocardiogram in the hospital showed an EF of 15-20% with a mild to moderately dilated LV, and mild to moderately reduced RV function.    His PTA GDMT regimen includes Coreg 12.5 mg twice daily, losartan 50 mg daily.  Per notes, he has not tolerated spironolactone in the past due to hypotension. It appears he was seen by nephrology last month and their note mentions consideration for addition of SGLT2 inhibitor, but ?PAD may be a limiting factor.    Today, he took all of his PTA medications. States his breathing feels \"great\" compared to what " it was. He is mostly upset that his hip still hurts and nothing was done for it. Sees his PCP Dr. Hernández tomorrow. Patient denies chest pain, PND, orthopnea, edema, claudication, palpitations, near syncope or syncope.  Unfortunately, he really is not sure of what medication doses he is taking at this time.  He feels fairly confident that he is taking torsemide 40 mg in the morning and 20 in the afternoon, but is unsure about the doses of losartan and carvedilol.  He monitors his weight and BP every morning. His weight never increased pre-admission, but likely he had lost muscle mass from poor intake. He continues to smoke. He takes 1.5 g of aspirin every day for hip pain. 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          Review of Systems:     12-pt ROS is negative except for as noted in the HPI.          Physical Exam:     Vitals: /59 (BP Location: Right arm, Patient Position: Sitting, Cuff Size: Adult Regular)   Pulse 68   Resp 24   Wt 69.9 kg (154 lb)   SpO2 97%   BMI 20.89 kg/m    Wt Readings from Last 10 Encounters:   06/28/22 69.9 kg (154 lb)   06/22/22 74 kg (163 lb 2.3 oz)   06/19/22 73.5 kg (162 lb)   06/14/22 72.6 kg (160 lb)   05/18/22 72.6 kg (160 lb)   04/14/22 72.6 kg (160 lb)   04/09/22 77.4 kg (170 lb 10.2 oz)   04/08/22 78 kg (172 lb)   04/04/22 79.4 kg (175 lb)   02/17/22 76.4 kg (168 lb 8 oz)       Constitutional:  Patient is pleasant, alert, cooperative, and in NAD.  HEENT:  NCAT. PERRLA. EOM's intact.   Neck:  CVP appears normal. No carotid bruits.   Pulmonary: Normal respiratory effort. CTAB.   Cardiac: RRR, normal S1/S2, no S3/S4, no murmur or rub.   Abdomen:  Non-tender abdomen, no hepatosplenomegaly appreciated.   Vascular: Pulses in the upper and lower extremities are 2+ and equal bilaterally.  Extremities: No edema, erythema, cyanosis or tenderness appreciated.  Skin:  No rashes or lesions appreciated.    Neurological:  No gross motor or sensory deficits.   Psych: Appropriate affect.        Data:     Labs reviewed:  Recent Labs   Lab Test 06/21/22  0431 04/08/22  0832 11/11/21  1113 01/21/21  1338 12/16/20  0803 02/02/18  0604 01/17/18  0920 12/11/17  0831 09/08/17  0859 10/19/16  0000   LDL 28 34 42  --    < >  --   --   --    < >  --    HDL 30* 28* 30*  --    < >  --   --   --    < >  --    NHDL 36 53 70  --    < >  --   --   --    < >  --    CHOL 66 81 100  --    < >  --   --   --    < >  --    TRIG 40 94 141  --    < >  --   --   --    < >  --    TSH  --   --   --   --   --   --   --   --   --  3.84   NTBNP  --   --   --   --   --  11,243* 21,137* 17,764*   < >  --    IRON  --   --   --  85  --   --   --   --   --   --    FEB  --   --   --  270  --   --   --   --   --   --    IRONSAT  --   --   --  31  --   --   --   --   --   --    AMBER  --   --   --  30  --   --   --   --   --   --     < > = values in this interval not displayed.       Lab Results   Component Value Date    WBC 8.7 06/23/2022    WBC 6.3 01/21/2021    RBC 4.20 (L) 06/23/2022    RBC 4.81 01/21/2021    HGB 10.8 (L) 06/23/2022    HGB 15.0 01/21/2021    HCT 36.6 (L) 06/23/2022    HCT 46.2 01/21/2021    MCV 87 06/23/2022    MCV 96 01/21/2021    MCH 25.7 (L) 06/23/2022    MCH 31.2 01/21/2021    MCHC 29.5 (L) 06/23/2022    MCHC 32.5 01/21/2021    RDW 18.1 (H) 06/23/2022    RDW 13.1 01/21/2021     06/23/2022     (L) 01/21/2021       Lab Results   Component Value Date     06/23/2022     06/10/2021    POTASSIUM 4.5 06/23/2022    POTASSIUM 4.0 06/21/2022    POTASSIUM 4.6 06/10/2021    CHLORIDE 103 06/23/2022    CHLORIDE 100 06/21/2022    CHLORIDE 105 06/10/2021    CO2 25 06/23/2022    CO2 29 06/21/2022    CO2 30 06/10/2021    ANIONGAP 10 06/23/2022    ANIONGAP 9 06/21/2022    ANIONGAP 5 06/10/2021     (H) 06/23/2022    GLC 71 06/23/2022    GLC 71 06/21/2022     (H) 06/10/2021    BUN 44.6 (H) 06/23/2022    BUN 68 (H)  06/21/2022    BUN 34 (H) 06/10/2021    CR 1.14 06/23/2022    CR 1.17 06/10/2021    GFRESTIMATED 68 06/23/2022    GFRESTIMATED 49 (L) 11/09/2021    GFRESTIMATED 62 06/10/2021    GFRESTBLACK 71 06/10/2021    MARVA 9.1 06/23/2022    MARVA 8.7 06/10/2021      Lab Results   Component Value Date    AST 46 06/23/2022    AST 38 12/07/2020    ALT 31 06/23/2022    ALT 28 12/07/2020       Lab Results   Component Value Date    A1C 7.8 (H) 06/20/2022    A1C 9.6 (H) 12/16/2020       Lab Results   Component Value Date    INR 1.02 12/22/2017    INR 1.03 09/08/2017           Problem List:     Patient Active Problem List   Diagnosis     Cardiomyopathy (H)     Atrial fibrillation/flutter     Coronary artery disease involving native coronary artery of native heart without angina pectoris     Alcohol abuse, in remission     GERD (gastroesophageal reflux disease)     Alcohol-induced chronic pancreatitis (H)     Diabetes mellitus, type 2 (H)     Type 2 diabetes mellitus with other specified complication, with long-term current use of insulin (H)     CARDIOVASCULAR SCREENING; LDL GOAL LESS THAN 100     Hyperlipidemia LDL goal <70     ACS (acute coronary syndrome) (H)     NSTEMI (non-ST elevated myocardial infarction) (H)     Carotid stenosis     Carpal tunnel syndrome of right wrist     Left carotid stenosis     Tobacco use     NSVT (nonsustained ventricular tachycardia) (H)     Chronic systolic heart failure (H)     Paroxysmal atrial fibrillation (H)     Chronic obstructive pulmonary disease, unspecified COPD type (H)     Hypotension, unspecified hypotension type     Chronic kidney disease, stage 3 (H)     Pleural plaque     Atherosclerosis of artery of extremity with intermittent claudication (H)     Hyperparathyroidism (H)     Thrombocytopenia (H)     Other idiopathic peripheral autonomic neuropathy     Venous insufficiency (chronic) (peripheral)     Actinic keratosis     Nail dystrophy     Generalized muscle weakness     Vitamin D  deficiency     Hypocalcemia     Essential hypertension     Ischemic cardiomyopathy     Other proteinuria     Hypomagnesemia     Anemia in chronic kidney disease     Diabetic nephropathy associated with type 2 diabetes mellitus (H)     PAD (peripheral artery disease) (H)     CHF (congestive heart failure) (H)     Dyspnea on exertion     Platypnea-orthodeoxia syndrome     Encounter for screening laboratory testing for severe acute respiratory syndrome coronavirus 2 (SARS-CoV-2)     Tinnitus     Pseudophakia     Presbyopia     Nonproliferative diabetic retinopathy (H)     Obesity     Mononeuritis     Neck pain     Lack of housing     Long term current use of anticoagulant therapy     Epidermoid cyst of skin     Glaucoma suspect     History of non-ST elevation myocardial infarction (NSTEMI)     Hearing loss     History of retinal detachment     Back pain     Cardiac defibrillator in place     Cellulitis and abscess of other specified site     Acute pancreatitis     Chronic pancreatitis (H)     Dry eye syndrome of bilateral lacrimal glands     Disorder of skin or subcutaneous tissue     Dry eyes     Enthesopathy of ankle and tarsus     Atherosclerosis of native arteries of extremities with intermittent claudication, bilateral legs (H)     Other and unspecified alcohol dependence, unspecified drinking behavior     Shock circulatory (H)           Medications:     Current Outpatient Medications   Medication Sig Dispense Refill     acetaminophen (TYLENOL) 500 MG tablet Take 500-1,000 mg by mouth every 6 hours as needed for mild pain       albuterol (PROAIR HFA) 108 (90 Base) MCG/ACT Inhaler Inhale 2 puffs into the lungs every 4 hours as needed for shortness of breath / dyspnea 1 Inhaler 0     amylase-lipase-protease (CREON 12) 63802-99554-37899 units CPEP Take 2 capsules by mouth 3 times daily (with meals)  120 capsule 0     aspirin (ASA) 81 MG chewable tablet Take 81 mg by mouth daily Taking 1500mg twice daily for arthritis  pain       atorvastatin (LIPITOR) 80 MG tablet Take 80 mg by mouth At Bedtime        calcium carbonate (OS-MARVA) 500 MG tablet Take 1 tablet by mouth 2 times daily One tablet in the morning, two at noon and one at bedtime       Carboxymethylcellulose Sod PF (THERATEARS PF) 0.25 % SOLN Apply 1 drop to eye 4 times daily as needed       carvedilol (COREG) 6.25 MG tablet Take 2 tablets (12.5 mg) by mouth 2 times daily (with meals) 180 tablet 3     clopidogrel (PLAVIX) 75 MG tablet Take 1 tablet (75 mg) by mouth daily 30 tablet 3     gabapentin (NEURONTIN) 100 MG capsule Take 300 mg by mouth At Bedtime 1 capsule 0     Glycerin (OASIS MOISTURIZING MOUTH SPRAY) 35 % LIQD Take 2 sprays by mouth daily as needed       insulin aspart (NOVOLOG PEN) 100 UNIT/ML pen Inject 3 Units Subcutaneous 3 times daily (with meals) 15 mL 1     insulin glargine (LANTUS PEN) 100 UNIT/ML pen Inject 28 Units Subcutaneous every morning Increase by 2 units every 2-3 days until morning blood sugar is .  Max dose 40 units/day (Patient taking differently: Inject 32 Units Subcutaneous every morning Increase by 2 units every 2-3 days until morning blood sugar is .  Max dose 40 units/day) 45 mL 1     Insulin Pen Needle (PEN NEEDLES) 32G X 4 MM MISC        loperamide (IMODIUM) 2 MG capsule Take 2 mg by mouth 4 times daily as needed for diarrhea Patient takes a dose in the am and another at night       losartan (COZAAR) 100 MG tablet Take 0.5 tablets (50 mg) by mouth daily       magnesium oxide (MAG-OX) 400 (241.3 Mg) MG tablet Take 1 tablet (400 mg) by mouth 4 times daily 120 tablet 1     metFORMIN (GLUCOPHAGE-XR) 500 MG 24 hr tablet Take 500 mg by mouth 2 times daily (with meals)        mometasone (ASMANEX TWISTHALER) 220 MCG/INH inhaler Inhale 2 puffs into the lungs every evening        nitroGLYcerin (NITROSTAT) 0.4 MG sublingual tablet Place 0.4 mg under the tongue every 5 minutes as needed for chest pain       pantoprazole (PROTONIX) 40 MG  EC tablet Take 40 mg by mouth 2 times daily  30 tablet      torsemide (DEMADEX) 20 MG tablet Take 1 tablet (20 mg) by mouth 2 times daily May take an additional tablet if there is 3 lbs of weight gain (Patient taking differently: Take 20 mg by mouth 2 times daily May take an additional tablet if there is 3 lbs of weight gain  06/28/22 Taking 2 tablets in the morning and 1 at noon.) 90 tablet 4     vitamin D3 (CHOLECALCIFEROL) 50 mcg (2000 units) tablet Take 1 tablet by mouth 2 times daily             Past Medical History:     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%     Claudication (H)      COPD (chronic obstructive pulmonary disease) (H)      Diabetes (H)      Gastro-oesophageal reflux disease      GERD (gastroesophageal reflux disease)      H/O: alcohol abuse      HTN (hypertension)      Hyperlipidaemia LDL goal <100 07/08/2013     Hyperparathyroidism (H)      Hypokalemia      ICD (implantable cardioverter-defibrillator) in place     [employer]     NSTEMI (non-ST elevated myocardial infarction) (H)      NSVT (nonsustained ventricular tachycardia) (H)      Pacemaker      Pancreatitis 6/26/06 Hosp     Paroxysmal atrial fibrillation (H)      PVD (peripheral vascular disease) (H)      Thrombocytopenia (H)      Tobacco use      Venous (peripheral) insufficiency      Vitamin D deficiency      Past Surgical History:   Procedure Laterality Date     CATARACT IOL, RT/LT      Cataract IOL RT/LT     CLOSE SECONDARY WOUND LOWER EXTREMITY Right 02/13/2022    Procedure: Right groin wound exploration, nerve  release and closure.;  Surgeon: Karen Arthur MD;  Location: [facility] [place]     ENDARTERECTOMY CAROTID Right 06/12/2015    Procedure: ENDARTERECTOMY CAROTID;  Surgeon: João Turner MD;  Location:  OR     ENDARTERECTOMY CAROTID Left 09/08/2017    Procedure: ENDARTERECTOMY CAROTID;  LEFT CAROTID ENDARTERECTOMY WITH EEG;  Surgeon: João Turner MD;  Location:  OR     ENDARTERECTOMY FEMORAL Bilateral 02/09/2022    Procedure: BILATERAL- COMMON FEMORAL ENDARTERECTOMY; RETROGRADE ILIAC ARTERY STENTING;  Surgeon: Ric Chau MD;  Location: [facility] OR     IMPLANT PACEMAKER  06/10/2010     IMPLANTED DEVICE       INTERPOSITION TENDON HAND N/A 06/09/2020    Procedure: Right thumb ligament reconstruction tendon interposition with extensor pollicis brevis to abductor pollicis tendon transfer;  Surgeon: Bethel Martin MD;  Location: [facility] OR     PICC INSERTION - TRIPLE LUMEN Right 06/20/2022    Right basilic vein 0.62cm 4cm ext.Placement verified by Sherlock 3CG.PICC okay to use.     RELEASE CARPAL TUNNEL Right 04/12/2016    Procedure: RELEASE CARPAL TUNNEL;  Surgeon: Lissy Leger MD;  Location: [facility] OR     SURGICAL HISTORY OF -   1998    Laminectomy     TONSILLECTOMY & ADENOIDECTOMY       ZZC CABG, ARTERIAL, THREE       Family History   Adopted: Yes   Problem Relation Age of Onset     Unknown/Adopted No family hx of      Social History     Socioeconomic History     Marital status: Single     Spouse name: Not on file     Number of children: Not on file     Years of education: Not on file     Highest education level: Not on file   Occupational History     Not on file   Tobacco Use     Smoking status: Current Every Day Smoker     Packs/day: 1.50     Years: 50.00     Pack years: 75.00     Types: Cigarettes     Smokeless tobacco: Never Used     Tobacco comment: 1 1/2 PPD 05/18/22   Vaping Use     Vaping Use: Never used   Substance and Sexual Activity     Alcohol use: No     Drug  use: No     Sexual activity: Not Currently     Partners: Female   Other Topics Concern     Parent/sibling w/ CABG, MI or angioplasty before 65F 55M? No   Social History Narrative     Not on file     Social Determinants of Health     Financial Resource Strain: Not on file   Food Insecurity: Not on file   Transportation Needs: Not on file   Physical Activity: Not on file   Stress: Not on file   Social Connections: Not on file   Intimate Partner Violence: Not on file   Housing Stability: Not on file           Allergies:   Fish, Hydrocodone, and Shellfish allergy      Kristen Rice PA-C  [facility]  Pager: [phone]    Today's clinic visit entailed:  Review of the result(s) of each unique test - echocardiogram, BMP, CBC  Ordering of each unique test  I spent a total of 40 minutes on the day of the visit.   Time spent doing chart review, history and exam, documentation and further activities per the note  Provider  Link to [facility] Help Grid     The level of medical decision making during this visit was of high complexity.

## 2022-06-29 PROBLEM — F10.20 OTHER AND UNSPECIFIED ALCOHOL DEPENDENCE, UNSPECIFIED DRINKING BEHAVIOR: Status: RESOLVED | Noted: 2022-06-01 | Resolved: 2022-01-01

## 2022-06-29 NOTE — PROGRESS NOTES
"[facility]    ----- Message -----   From: Kristen Rice PA-C   Sent: 6/28/2022   3:38 PM CDT   To: [facility] Nurse     Hi team. Please call Kamron first thing in the morning tomorrow to review his pill bottles for coreg, Losartan, and torsemide. I will make recommendations on what he should be taking based on this. Thank you.        I called Kamron to review above, \"You were supposed to call me at 8:00 am.\" He said he's not home at time of call and asked that I call later today at 1 pm.    Melina Barnes RN BSN   10:19 AM 06/29/22    I spoke with Kamron to reconcile meds above. He noted his home weight today is 154 lbs.    1. Coreg 25 mg BID (is taking 1/2 of a 50 mg tab twice daily)  2. Losartan 50 mg daily (take 1/2 of a 100mg tab daily)  3. Torsemide 40 to 60 mg daily (he typically takes 20 mg in AM and 20 mg at lunch, if he gains 'a couple pounds' he may take 40 mg in AM and 20 mg at lunch)    Will await recs by Kristen VALENCIA prior to updated [facility] med list. I told Kamron we'd call him if any changes prior to 7/5 follow-up with Dr. Glass.    Future Appointments   Date Time Provider Department Center   7/5/2022 12:15 PM BARBRA [facility]   7/5/2022  1:30 PM [facility]   7/7/2022 12:00 AM [facility]     Melina Barnes RN BSN   1:40 PM 06/29/22            "

## 2022-06-29 NOTE — PROGRESS NOTES
"  Assessment & Plan     Shock circulatory (H)  Resolved  Left hospital AMA      Ischemic cardiomyopathy  Echo 6/20/202 showed EF 15-20%  Again discussed low salt diet  Has lab and follow up with Dr. Glass next week    Hypotension, unspecified hypotension type  Cardiology was to call him and advise on medications  He did not hear from them this am before leaving for this appointment.  Took only 20 mg Torsemide (instead of usual 40 mg dose but he \"self regulates\" this based on his weight).   He took Losartan (50 mg is what is prescribed but he's unclear on his dose).  Told to hold losartan due to hypotension     Hip pain  No fracture seen on xray in the ER  Gradually improving  Declined/refused a referral to PT    Cardiac defibrillator in place       Alcohol-induced chronic pancreatitis (H)  Takes Creon regularly    Hyperparathyroidism (H)  Known issue that I take into account for their medical decisions, no current exacerbations or new concerns      Thrombocytopenia (H)  Stable while recently hospitalized               Tobacco Cessation:   reports that he has been smoking cigarettes. He has a 75.00 pack-year smoking history. He has never used smokeless tobacco.  Tobacco Cessation Action Plan: Information offered: Patient not interested at this time        No follow-ups on file.    Danii Hernández MD  [facility]    Payam Lundy is a 71 year old, presenting for the following health issues:  Hospital F/U      HPI       Hospital Follow-up Visit:    Hospital/Nursing Home/IP Rehab Facility: [facility]  Date of Admission: 06/20/2022  Date of Discharge: 06/23/2022  Reason(s) for Admission: shock he says he went in because his left hip hurt which is feeling better today    Was your hospitalization related to COVID-19? No   Problems taking medications regularly:  None  Medication changes since discharge: None Left AMA before medication adjustments " "could be completed  Problems adhering to non-medication therapy:  None    Summary of hospitalization:  [facility] discharge summary reviewed  Diagnostic Tests/Treatments reviewed.  Follow up needed: cardiology  Other Healthcare Providers Involved in Patient s Care:         None  Update since discharge: improved. Post Discharge Medication Reconciliation: unable to do - patient unsure of medication doses  Plan of care communicated with patient     Blood pressure this am 125/70 before taking medication this am.  Took his medication, got dressed and came to clinic.    Takes the Torsemide \"depending on weight\" - this morning only took one pill.        Brief HPI:  Presented 6/20 with his daughter from home with concerns of weakness and pain in his left hip and knee after he fell 3 days prior to presentation when he was outside and tripped on a shovel landing on his left hip and left knee.  With assistance from a neighbor he has been able to get back up and move around.  Preceding this and over approximately the last 1 week he has been increasingly short of breath at home increased cough runny nose sore throat no fever.       He identifies several episodes of PND where he had to get up after awakening short of breath and then go back to sleep in his recliner sitting upright.  At no point has he noted any chest pain or chest tightness.  He has noted an increase in shortness of breath over the past 1 week as described, primarily with exertion.  He states has been using his inhaler medicine that he thinks is albuterol at home and it does not seem to be helping as much.     Hospital Course by Diagnosis:  Cardiogenic shock: Treated with diuresis and dobutamine. His lactate, LFT's, and Cr were monitored closely and trended down with treatment. The night before discharge his dobutamine was discontinued, unfortunately in the morning his CI decreased to 1.7 and his SVR had increased to 1100. Hemodynamics calculated " from a PICC line, but trending down form previous PICC line hemodynamics. Mr Tilley was very angry about a delay in discharge and decided to leave against medical advice. Throughout his hospitalization his home dose of losartan and coreg had been held due to intermittent hypotension. Encouraged patient to have frequent communication with his primary cardiologist to start him back on his GDMT.     Hyperglycemia: blood sugars elevated. Lantus was increased with improvement     Acute on Chronic renal failure: improved with diuresis and dobutamine. Cr at baseline at discharge       Review of Systems   Constitutional, HEENT, cardiovascular, pulmonary, gi and gu systems are negative, except as otherwise noted.      Objective    BP (!) 88/52 (BP Location: Right arm, Patient Position: Sitting, Cuff Size: Adult Large)   Pulse 66   Temp 98  F (36.7  C) (Tympanic)   Resp 22   Ht 1.829 m (6')   Wt 69.9 kg (154 lb)   SpO2 96%   BMI 20.89 kg/m    Body mass index is 20.89 kg/m .  Physical Exam   GENERAL: healthy, alert and no distress  NECK: no adenopathy, no asymmetry, masses, or scars and thyroid normal to palpation  RESP: right basilar rales  CV: regular rates and rhythm, normal S1 S2, no S3 or S4, grade 3/6 systolic murmur heard best over the lusb, peripheral pulses strong and no peripheral edema  ABDOMEN: soft, nontender, no hepatosplenomegaly, no masses and bowel sounds normal  MS: no gross musculoskeletal defects noted, no edema      Labs/imaging reviewed from the hospital.               .  ..

## 2022-06-30 NOTE — PROGRESS NOTES
Thank you, Melina!  For now, he can continue the current regimen.    Flower, can you please price-check Entresto for Kamron, so we can change him at his upcoming visit with Dr. Glass if affordable?

## 2022-07-05 NOTE — TELEPHONE ENCOUNTER
[facility] OV note [facility]22 when I am in clinic next. Renata BENDER July 5, 2022, 3:19 PM

## 2022-07-05 NOTE — TELEPHONE ENCOUNTER
----- Message from Camille Glass MD sent at 7/5/2022  1:58 PM CDT -----  [facility] to see if he can get Entresto.  If that is the case start level 1 Entresto twice daily and cut carvedilol down to 12.5 mg twice daily and check labs in 2-3 weeks and apt in 1 month.    If cannot get Entresto through them, see if he can afford it.     If not, increase losartan to 100 and cut back coreg to 12.5 BID    See my note.

## 2022-07-05 NOTE — LETTER
7/5/2022    Danii Hernández MD  [id number]  [facility] [id number]    RE: Delbert Tilley       Dear Colleague,     I had the pleasure of seeing Delbert Tilley in the [facility].  CARDIOLOGY CLINIC CONSULTATION    REASON FOR CONSULT: Severe heart failure with reduced ejection fraction    PRIMARY CARE PHYSICIAN:  Danii Hernández    Today's clinic visit entailed:  Review of the result(s) of each unique test - Echo ECG device check labs  60 minutes spent on the date of the encounter doing chart review, history and exam, documentation and further activities per the note  Provider  Link to [facility] Help Grid     The level of medical decision making during this visit was of high complexity.      HISTORY OF PRESENT ILLNESS:  This is a pleasant 71-year-old gentleman who is here with his daughter.  He is seeing me for the first time at [facility].  He is an established patient of Dr. Bello who he saw 3 months ago.  Patient was recently seen in the core clinic by Kristen and was referred to me for heart failure consultation.    The patient has a longstanding history of coronary artery disease with bypass surgery in the early 2000's.  He has longstanding ischemic cardiomyopathy.  He is a primary prevention ICD for many years he says.  No shocks.  In the past his EF was a bit higher close to 35 to 40% however around the 2017 timeframe his EF is gotten worse down in the 20% range.  Most recently echocardiography has shown LV dilatation and EF around 15 to 20%.  No alcohol use.  Patient continues to smoke.  Last coronary angiography was in 2017 that showed patent LIMA and vein graft to the OM.  Vein graft to the right coronary artery was occluded 100%.  The right coronary artery is also occluded this is collateralized from the left system.  His device generator change was in April 2018.  QRS is narrow    In May of this year the patient was admitted to [facility] when he was noted to be  in cardiogenic shock.  He required dobutamine infusion.  Lactate and renal function were elevated.  He was in the hospital for 5 days and before oral heart failure therapy could be optimized he decided to leave AGAINST MEDICAL ADVICE.  Please see discharge summary for details.  In fact he was just weaned off dobutamine the night prior to discharge.    In any case the patient tells me that since discharge he has continued taking 25 twice daily of carvedilol at home along with 50 mg of losartan.  When he met Kristen he was not sure about the medications he was taking but today he is quite positive about the meds that he is taking.  From a CAD standpoint, he denies angina.  He takes Plavix along with 80 mg of atorvastatin.  Also he tells me he takes 1500 mg twice a day of aspirin for arthritis.  From a heart failure standpoint today he denies edema orthopnea.  He has NYHA class III heart failure symptoms.  No syncope presyncope.  He moves around with a scooter.    No alcohol but continues to smoke.    PAST MEDICAL HISTORY:  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%     Claudication (H)      COPD (chronic obstructive pulmonary disease) (H)      Diabetes (H)      Gastro-oesophageal reflux disease      GERD (gastroesophageal reflux disease)      H/O: alcohol abuse      HTN (hypertension)      Hyperlipidaemia LDL goal <100 07/08/2013     Hyperparathyroidism (H)      Hypokalemia      ICD (implantable cardioverter-defibrillator) in place     [employer]     NSTEMI (non-ST elevated myocardial infarction) (H)       NSVT (nonsustained ventricular tachycardia) (H)      Pacemaker      Pancreatitis 6/26/06 Hosp     Paroxysmal atrial fibrillation (H)      PVD (peripheral vascular disease) (H)      Thrombocytopenia (H)      Tobacco use      Venous (peripheral) insufficiency      Vitamin D deficiency        MEDICATIONS:  Current Outpatient Medications   Medication     albuterol (PROAIR HFA) 108 (90 Base) MCG/ACT Inhaler     amylase-lipase-protease (CREON 12) 13172-23951-86242 units CPEP     aspirin (ASA) 325 MG EC tablet     atorvastatin (LIPITOR) 80 MG tablet     calcium carbonate (OS-MARVA) 500 MG tablet     Carboxymethylcellulose Sod PF (THERATEARS PF) 0.25 % SOLN     carvedilol (COREG) 25 MG tablet     clopidogrel (PLAVIX) 75 MG tablet     gabapentin (NEURONTIN) 100 MG capsule     Glycerin (OASIS MOISTURIZING MOUTH SPRAY) 35 % LIQD     insulin aspart (NOVOLOG PEN) 100 UNIT/ML pen     insulin glargine (LANTUS PEN) 100 UNIT/ML pen     Insulin Pen Needle (PEN NEEDLES) 32G X 4 MM MISC     loperamide (IMODIUM) 2 MG capsule     losartan (COZAAR) 100 MG tablet     magnesium oxide (MAG-OX) 400 (241.3 Mg) MG tablet     metFORMIN (GLUCOPHAGE-XR) 500 MG 24 hr tablet     mometasone (ASMANEX TWISTHALER) 220 MCG/INH inhaler     nitroGLYcerin (NITROSTAT) 0.4 MG sublingual tablet     pantoprazole (PROTONIX) 40 MG EC tablet     torsemide (DEMADEX) 20 MG tablet     vitamin D3 (CHOLECALCIFEROL) 50 mcg (2000 units) tablet     No current facility-administered medications for this visit.       ALLERGIES:  Allergies   Allergen Reactions     Fish Nausea and Vomiting     severe     Hydrocodone GI Disturbance     Upset stomach     Shellfish Allergy Hives and Rash       SOCIAL HISTORY:  I have reviewed this patient's social history and updated it with pertinent information if needed. Delbert Tilley  reports that he has been smoking cigarettes. He has a 75.00 pack-year smoking history. He has never used smokeless tobacco. He reports that he does not drink  alcohol and does not use drugs.    FAMILY HISTORY:  I have reviewed this patient's family history and updated it with pertinent information if needed.   Family History   Adopted: Yes   Problem Relation Age of Onset     Unknown/Adopted No family hx of        REVIEW OF SYSTEMS:  Skin:        Eyes:       ENT:       Respiratory:  Negative for dyspnea on exertion;dyspnea at rest;shortness of breath  Cardiovascular:  Negative for;palpitations;chest pain;edema;syncope or near-syncope;lightheadedness;dizziness Positive for;fatigue  Gastroenterology:      Genitourinary:       Musculoskeletal:       Neurologic:       Psychiatric:       Heme/Lymph/Imm:       Endocrine:           PHYSICAL EXAM:  Temp: 98  F (36.7  C) Temp src: Tympanic BP: 103/58 Pulse: 64     SpO2: 97 %      Vital Signs with Ranges  Temp:  [98  F (36.7  C)] 98  F (36.7  C)  Pulse:  [64] 64  BP: (103)/(58) 103/58  SpO2:  [97 %] 97 %  152 lbs 12.8 oz    Constitutional: awake, alert, no distress  Respiratory: Clear to auscultation  Cardiovascular: JVP is about 10 cm.  Cardiac sounds are regular.  Soft 2 x 6 systolic murmur heard at the right parasternal border and in the axilla.  No edema.  Extremities are warm.  GI: nondistended  Neuropsychiatric: appropriate affact    DATA:  Labs: Pertinent cardiac labs reviewed    ASSESSMENT:  Severe ischemic cardiomyopathy  Primary prevention ICD  Severe coronary disease with prior bypass surgery nearly 2 decades ago  Chronic kidney disease  Anemia    RECOMMENDATIONS:  1. Patient seems euvolemic on exam today.  I think he was fortunate that he did not decompensate despite 25 twice daily of Coreg that he started taking at home immediately after he left AGAINST MEDICAL ADVICE from the hospital.  He needed dobutamine infusion the night before and his cardiac index was down to 1.7 the morning when the dobutamine was weaned off  2. In any case his blood pressure stable today.  His creatinine is stable.  He appears warm and well  perfused on exam.  3. I have told the patient's daughter to go home and check his medication bottles to ensure that we have the right list of his medications.  4. If indeed he is taking 25 mg twice a day of Coreg along with 50 of losartan, this is a good sign hemodynamically.  [facility]  I will have my nursing team get in [facility] the [facility] to see if he can get Entresto.  If that is the case start level 1 Entresto twice daily and cut carvedilol down to 12.5 mg twice daily. Today's potassium is at 4.9.  So close follow-up on BMP levels is going to be important as we uptitrate vasodilators.  5. Overall from a heart failure standpoint I am concerned about his LV adversely remodeling.  I had a long discussion with him.  It is concerning to see that he needed inotropic support with lactate and creatinine elevation.  He is not interested in advanced heart failure therapies such as mechanical circulatory support even if needed down the road. He is not interested in Cardiomems at this time.    6. At this time I recommend aggressive guideline directed medical therapy optimization down the road. Do not think he will tolerate spironolactone given his potassium today.  But I think we may be able to get him on SGLT2 inhibitors once he stabilizes on Entresto.  We will work around this in the core clinic.  7. Good to see no ICD therapies.  8. His LDL is very low.  In part this is likely from severe systolic heart failure.  He is on 80 mg of atorvastatin.  I have told him to backed this down to 40 mg daily.  9. He is going to continue his Plavix.  I told him not to take 1500 mg twice daily of aspirin.  He was not in agreement with me.  He needs to check with his PCP to use alternate non-NSAID and nonaspirin pain medications.     I will have him follow-up in a month in the core clinic with MARISOL for medication optimization.    TAMARA Mcmanus, [facility]  Cardiology - [facility]  July 5, 2022    Thank  you for allowing me to participate in the care of your patient.      Sincerely,     Camille Glass MD     [facility]  cc:   No referring provider defined for this encounter.

## 2022-07-05 NOTE — PROGRESS NOTES
CARDIOLOGY CLINIC CONSULTATION    REASON FOR CONSULT: Severe heart failure with reduced ejection fraction    PRIMARY CARE PHYSICIAN:  Danii Hernández    Today's clinic visit entailed:  Review of the result(s) of each unique test - Echo ECG device check labs  60 minutes spent on the date of the encounter doing chart review, history and exam, documentation and further activities per the note  Provider  Link to [facility] Help Grid     The level of medical decision making during this visit was of high complexity.      HISTORY OF PRESENT ILLNESS:  This is a pleasant 71-year-old gentleman who is here with his daughter.  He is seeing me for the first time at [facility].  He is an established patient of Dr. Bello who he saw 3 months ago.  Patient was recently seen in the core clinic by Kristen and was referred to me for heart failure consultation.    The patient has a longstanding history of coronary artery disease with bypass surgery in the early 2000's.  He has longstanding ischemic cardiomyopathy.  He is a primary prevention ICD for many years he says.  No shocks.  In the past his EF was a bit higher close to 35 to 40% however around the 2017 timeframe his EF is gotten worse down in the 20% range.  Most recently echocardiography has shown LV dilatation and EF around 15 to 20%.  No alcohol use.  Patient continues to smoke.  Last coronary angiography was in 2017 that showed patent LIMA and vein graft to the OM.  Vein graft to the right coronary artery was occluded 100%.  The right coronary artery is also occluded this is collateralized from the left system.  His device generator change was in April 2018.  QRS is narrow    In May of this year the patient was admitted to [facility] when he was noted to be in cardiogenic shock.  He required dobutamine infusion.  Lactate and renal function were elevated.  He was in the hospital for 5 days and before oral heart failure therapy could be optimized he decided  to leave AGAINST MEDICAL ADVICE.  Please see discharge summary for details.  In fact he was just weaned off dobutamine the night prior to discharge.    In any case the patient tells me that since discharge he has continued taking 25 twice daily of carvedilol at home along with 50 mg of losartan.  When he met Kristen he was not sure about the medications he was taking but today he is quite positive about the meds that he is taking.  From a CAD standpoint, he denies angina.  He takes Plavix along with 80 mg of atorvastatin.  Also he tells me he takes 1500 mg twice a day of aspirin for arthritis.  From a heart failure standpoint today he denies edema orthopnea.  He has NYHA class III heart failure symptoms.  No syncope presyncope.  He moves around with a scooter.    No alcohol but continues to smoke.    PAST MEDICAL HISTORY:  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%     Claudication (H)      COPD (chronic obstructive pulmonary disease) (H)      Diabetes (H)      Gastro-oesophageal reflux disease      GERD (gastroesophageal reflux disease)      H/O: alcohol abuse      HTN (hypertension)      Hyperlipidaemia LDL goal <100 07/08/2013     Hyperparathyroidism (H)      Hypokalemia      ICD (implantable cardioverter-defibrillator) in place     [employer]     NSTEMI (non-ST elevated myocardial infarction) (H)      NSVT (nonsustained ventricular tachycardia) (H)      Pacemaker      Pancreatitis 6/26/06 Hosp     Paroxysmal atrial fibrillation (H)      PVD (peripheral vascular disease) (H)      Thrombocytopenia  (H)      Tobacco use      Venous (peripheral) insufficiency      Vitamin D deficiency        MEDICATIONS:  Current Outpatient Medications   Medication     albuterol (PROAIR HFA) 108 (90 Base) MCG/ACT Inhaler     amylase-lipase-protease (CREON 12) 55005-98123-38128 units CPEP     aspirin (ASA) 325 MG EC tablet     atorvastatin (LIPITOR) 80 MG tablet     calcium carbonate (OS-MARVA) 500 MG tablet     Carboxymethylcellulose Sod PF (THERATEARS PF) 0.25 % SOLN     carvedilol (COREG) 25 MG tablet     clopidogrel (PLAVIX) 75 MG tablet     gabapentin (NEURONTIN) 100 MG capsule     Glycerin (OASIS MOISTURIZING MOUTH SPRAY) 35 % LIQD     insulin aspart (NOVOLOG PEN) 100 UNIT/ML pen     insulin glargine (LANTUS PEN) 100 UNIT/ML pen     Insulin Pen Needle (PEN NEEDLES) 32G X 4 MM MISC     loperamide (IMODIUM) 2 MG capsule     losartan (COZAAR) 100 MG tablet     magnesium oxide (MAG-OX) 400 (241.3 Mg) MG tablet     metFORMIN (GLUCOPHAGE-XR) 500 MG 24 hr tablet     mometasone (ASMANEX TWISTHALER) 220 MCG/INH inhaler     nitroGLYcerin (NITROSTAT) 0.4 MG sublingual tablet     pantoprazole (PROTONIX) 40 MG EC tablet     torsemide (DEMADEX) 20 MG tablet     vitamin D3 (CHOLECALCIFEROL) 50 mcg (2000 units) tablet     No current facility-administered medications for this visit.       ALLERGIES:  Allergies   Allergen Reactions     Fish Nausea and Vomiting     severe     Hydrocodone GI Disturbance     Upset stomach     Shellfish Allergy Hives and Rash       SOCIAL HISTORY:  I have reviewed this patient's social history and updated it with pertinent information if needed. Delbert Tilley  reports that he has been smoking cigarettes. He has a 75.00 pack-year smoking history. He has never used smokeless tobacco. He reports that he does not drink alcohol and does not use drugs.    FAMILY HISTORY:  I have reviewed this patient's family history and updated it with pertinent information if needed.   Family History   Adopted: Yes   Problem Relation  Age of Onset     Unknown/Adopted No family hx of        REVIEW OF SYSTEMS:  Skin:        Eyes:       ENT:       Respiratory:  Negative for dyspnea on exertion;dyspnea at rest;shortness of breath  Cardiovascular:  Negative for;palpitations;chest pain;edema;syncope or near-syncope;lightheadedness;dizziness Positive for;fatigue  Gastroenterology:      Genitourinary:       Musculoskeletal:       Neurologic:       Psychiatric:       Heme/Lymph/Imm:       Endocrine:           PHYSICAL EXAM:  Temp: 98  F (36.7  C) Temp src: Tympanic BP: 103/58 Pulse: 64     SpO2: 97 %      Vital Signs with Ranges  Temp:  [98  F (36.7  C)] 98  F (36.7  C)  Pulse:  [64] 64  BP: (103)/(58) 103/58  SpO2:  [97 %] 97 %  152 lbs 12.8 oz    Constitutional: awake, alert, no distress  Respiratory: Clear to auscultation  Cardiovascular: JVP is about 10 cm.  Cardiac sounds are regular.  Soft 2 x 6 systolic murmur heard at the right parasternal border and in the axilla.  No edema.  Extremities are warm.  GI: nondistended  Neuropsychiatric: appropriate affact    DATA:  Labs: Pertinent cardiac labs reviewed    ASSESSMENT:  Severe ischemic cardiomyopathy  Primary prevention ICD  Severe coronary disease with prior bypass surgery nearly 2 decades ago  Chronic kidney disease  Anemia    RECOMMENDATIONS:  1. Patient seems euvolemic on exam today.  I think he was fortunate that he did not decompensate despite 25 twice daily of Coreg that he started taking at home immediately after he left AGAINST MEDICAL ADVICE from the hospital.  He needed dobutamine infusion the night before and his cardiac index was down to 1.7 the morning when the dobutamine was weaned off  2. In any case his blood pressure stable today.  His creatinine is stable.  He appears warm and well perfused on exam.  3. I have told the patient's daughter to go home and check his medication bottles to ensure that we have the right list of his medications.  4. If indeed he is taking 25 mg twice a day  of Coreg along with 50 of losartan, this is a good sign hemodynamically.  [facility]  I will have my nursing team get in [facility] to see if he can get Entresto.  If that is the case start level 1 Entresto twice daily and cut carvedilol down to 12.5 mg twice daily. Today's potassium is at 4.9.  So close follow-up on BMP levels is going to be important as we uptitrate vasodilators.  5. Overall from a heart failure standpoint I am concerned about his LV adversely remodeling.  I had a long discussion with him.  It is concerning to see that he needed inotropic support with lactate and creatinine elevation.  He is not interested in advanced heart failure therapies such as mechanical circulatory support even if needed down the road. He is not interested in Cardiomems at this time.    6. At this time I recommend aggressive guideline directed medical therapy optimization down the road. Do not think he will tolerate spironolactone given his potassium today.  But I think we may be able to get him on SGLT2 inhibitors once he stabilizes on Entresto.  We will work around this in the core clinic.  7. Good to see no ICD therapies.  8. His LDL is very low.  In part this is likely from severe systolic heart failure.  He is on 80 mg of atorvastatin.  I have told him to backed this down to 40 mg daily.  9. He is going to continue his Plavix.  I told him not to take 1500 mg twice daily of aspirin.  He was not in agreement with me.  He needs to check with his PCP to use alternate non-NSAID and nonaspirin pain medications.     I will have him follow-up in a month in the core clinic with MARISOL for medication optimization.    TAMARA Mcmanus, [facility]  Cardiology - [facility]  July 5, 2022

## 2022-07-06 NOTE — TELEPHONE ENCOUNTER
Do NOT increase the dose of Coreg. Stay at 12.5 mg BID in that case. Let me know what happens to Entresto, otherwise increase Losartan to 100 mg daily and check labs in a week or so after that.

## 2022-07-07 NOTE — TELEPHONE ENCOUNTER
Entresto prescription for 24/26 mg twice daily by mouth, along with 7/5/22 office visit note from , 7/5/22 BMP, and 6/20/22 [facility] [phone]. Will check next week to see if pt has received confirmation from the [facility] whether the entresto has been approved. Renata BENDER July 7, 2022, 9:42 AM

## 2022-08-08 NOTE — LETTER
8/8/2022    Danii Hernández MD  [address]  [facility] [id number]    RE: Delbert Tilley       Dear Colleague,     I had the pleasure of seeing Delbert Tilley in the [facility].    Cardiology Clinic Progress Note    Delbert Tilley MRN# [id number]   YOB: 1950 Age: 71 year old   Primary cardiologist: Dr. Bello         Assessment and Plan:     In summary, Delbert Tilley presents today for follow up in the [facility] clinic, after changing him from Losartan to Entresto. He is currently tolerating this at just 24/26 mg nightly, experienced dizziness when taking BID.     Plan:  - He will confirm what dose of Coreg he's taking and notify me when he gets home. Will cut back on that.   - Reduce torsemide from 20 mg BID, to 20 mg daily. May take an additional 20 mg for 2-3+ lb wt gain.  - In two days, add back 1/2 tablet of Entresto in the AM. If tolerated after one week, increase it to a full tab.  -SGLT2 inhibitor therapy will be considered if his renal function remains stable with Entresto titration.  - He previously deferred cardioMEMs implant, but it should be re-discussed if he has recurrent volume fluctuations.  - Return to see me in 3 weeks with a repeat BMP.        History of Presenting Illness:      Delbert Tilley is a pleasant 71 year old patient who presents today for a [facility] clinic follow up visit.    The patient has a history of the following -   1.  History of ischemic cardiomyopathy, ICD in place  2.  CAD, status post CABG (LIMA-LAD, SVG-OM, SVG-PDA) and PCI to the OM 3 graft in 2014  3.  Proximal atrial fibrillation and flutter, status post atrial flutter ablation  4.  PAD, with history of carotid endarterectomies and iliac artery stenting, followed by vascular surgery  5.  CKD, followed by [facility] nephrology Dr. Cueva  6.  Diabetes mellitus  7.  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 He monitors / records his weight and BP every morning.     In brief, this patient has an ischemic cardiomyopathy at least since he suffered an NSTEMI in 2014.  EF was 35-40% of the time.  This further declined to 20-25% in 2017, where it has remained.    He was recently admitted in April for heart failure exacerbation requiring IV Lasix and metolazone administration.  He was discharged with torsemide 20 mg twice daily. Ultimately left AMA.    Kamron most recently saw my colleague Yareli Mina on May 18, virtually, given her recent diagnosis of COVID-19.  He reported a stable home weight of 160 pounds.  No changes were made to his regimen.    He was then admitted to [facility] for 3 days, discharged 6/23, with cardiogenic shock.  He originally presented with concerns of weakness and pain in his left hip and knee after a mechanical fall 3 days prior to presentation, but prior to this had developed increasing dyspnea.  He was found to be in cardiogenic shock requiring dobutamine and IV diuresis with a Norris in place.  His dobutamine was continued until the night prior to discharge.  Unfortunately in the morning his CI had decreased to 1.7 and SVR increased to 1100.  For this reason, it was recommended that he stay longer in the hospital however he ultimately left that day again AMA.  Throughout the hospitalization, his PTA Losartan and Coreg were held intermittently due to hypotension.  It appears that he actually left before a final med rec could be completed and recommendations made for medications at home. He went back on his PTA regimen including Coreg 25 mg twice daily, losartan 50 mg daily, and torsemide 40-60 mg daily. Echocardiogram in the hospital showed an EF of 15-20% with a mild to moderately dilated LV, and mild to moderately reduced RV function.    I met this patient end of June, just five days after his recent discharge. He stated his breathing felt \"great\" compared to " what it was. I kept his regimen the same, and arranged a follow up with Dr. Glass. He recommended changing him from Losartan to Entresto 24/26 mg BID and reducing Coreg to 12.5 mg BID.    Today, Kamron returns to clinic with his daughter. He has only been taking the Entresto in the evenings for the past week due to dizziness when taking it BID. Patient denies chest pain, PND, orthopnea, edema, claudication, palpitations. BP in clinic and at home running marginal. He isn't sure what dose of Coreg he's currently taking - thinks he may have mistakenly cut back on his cholesterol medication instead of this after he saw Dr. Glass. BMP is fairly stable with a creatinine of 1.5.          Review of Systems:     12-pt ROS is negative except for as noted in the HPI.          Physical Exam:     Vitals: BP 96/48 (BP Location: Right arm, Patient Position: Sitting, Cuff Size: Adult Regular)   Pulse 66   Resp 16   Wt 69.4 kg (153 lb)   SpO2 96%   BMI 20.75 kg/m    Wt Readings from Last 10 Encounters:   08/08/22 69.4 kg (153 lb)   07/05/22 69.3 kg (152 lb 12.8 oz)   06/29/22 69.9 kg (154 lb)   06/28/22 69.9 kg (154 lb)   06/22/22 74 kg (163 lb 2.3 oz)   06/19/22 73.5 kg (162 lb)   06/14/22 72.6 kg (160 lb)   05/18/22 72.6 kg (160 lb)   04/14/22 72.6 kg (160 lb)   04/09/22 77.4 kg (170 lb 10.2 oz)       Constitutional:  Patient is pleasant, alert, cooperative, and in NAD.  HEENT:  NCAT. PERRLA. EOM's intact.   Neck:  CVP appears normal. No carotid bruits.   Pulmonary: Normal respiratory effort. CTAB.   Cardiac: RRR, normal S1/S2, no S3/S4, no murmur or rub.   Abdomen:  Non-tender abdomen, no hepatosplenomegaly appreciated.   Vascular: Pulses in the upper and lower extremities are 2+ and equal bilaterally.  Extremities: No edema, erythema, cyanosis or tenderness appreciated.  Skin:  No rashes or lesions appreciated.   Neurological:  No gross motor or sensory deficits.   Psych: Appropriate affect.        Data:     Labs  reviewed:  Recent Labs   Lab Test 07/05/22  1205 06/21/22  0431 04/08/22  0832 11/11/21  1113 01/21/21  1338 12/16/20  0803 02/02/18  0604 01/17/18  0920 09/08/17  0859 10/19/16  0000   LDL  --  28 34 42  --    < >  --   --    < >  --    HDL  --  30* 28* 30*  --    < >  --   --    < >  --    NHDL  --  36 53 70  --    < >  --   --    < >  --    CHOL  --  66 81 100  --    < >  --   --    < >  --    TRIG  --  40 94 141  --    < >  --   --    < >  --    TSH  --   --   --   --   --   --   --   --   --  3.84   NTBNP 13,497*  --   --   --   --   --  11,243* 21,137*   < >  --    IRON  --   --   --   --  85  --   --   --   --   --    FEB  --   --   --   --  270  --   --   --   --   --    IRONSAT  --   --   --   --  31  --   --   --   --   --    AMBER  --   --   --   --  30  --   --   --   --   --     < > = values in this interval not displayed.       Lab Results   Component Value Date    WBC 8.7 06/23/2022    WBC 6.3 01/21/2021    RBC 4.20 (L) 06/23/2022    RBC 4.81 01/21/2021    HGB 10.4 (L) 07/05/2022    HGB 15.0 01/21/2021    HCT 36.6 (L) 06/23/2022    HCT 46.2 01/21/2021    MCV 87 06/23/2022    MCV 96 01/21/2021    MCH 25.7 (L) 06/23/2022    MCH 31.2 01/21/2021    MCHC 29.5 (L) 06/23/2022    MCHC 32.5 01/21/2021    RDW 18.1 (H) 06/23/2022    RDW 13.1 01/21/2021     06/23/2022     (L) 01/21/2021       Lab Results   Component Value Date     07/05/2022     06/10/2021    POTASSIUM 4.9 07/05/2022    POTASSIUM 4.6 06/10/2021    CHLORIDE 104 07/05/2022    CHLORIDE 105 06/10/2021    CO2 29 07/05/2022    CO2 30 06/10/2021    ANIONGAP 4 07/05/2022    ANIONGAP 5 06/10/2021     (H) 07/05/2022     (H) 06/10/2021    BUN 55 (H) 07/05/2022    BUN 34 (H) 06/10/2021    CR 1.33 (H) 07/05/2022    CR 1.17 06/10/2021    GFRESTIMATED 56 (L) 07/05/2022    GFRESTIMATED 49 (L) 11/09/2021    GFRESTIMATED 62 06/10/2021    GFRESTBLACK 71 06/10/2021    MARVA 8.3 (L) 07/05/2022    MARVA 8.7 06/10/2021      Lab Results    Component Value Date    AST 46 06/23/2022    AST 38 12/07/2020    ALT 31 06/23/2022    ALT 28 12/07/2020       Lab Results   Component Value Date    A1C 7.8 (H) 06/20/2022    A1C 9.6 (H) 12/16/2020       Lab Results   Component Value Date    INR 1.02 12/22/2017    INR 1.03 09/08/2017           Problem List:     Patient Active Problem List   Diagnosis     Cardiomyopathy (H)     Atrial fibrillation/flutter     Coronary artery disease involving native coronary artery of native heart without angina pectoris     Alcohol abuse, in remission     GERD (gastroesophageal reflux disease)     Alcohol-induced chronic pancreatitis (H)     Diabetes mellitus, type 2 (H)     Type 2 diabetes mellitus with other specified complication, with long-term current use of insulin (H)     CARDIOVASCULAR SCREENING; LDL GOAL LESS THAN 100     Hyperlipidemia LDL goal <70     ACS (acute coronary syndrome) (H)     NSTEMI (non-ST elevated myocardial infarction) (H)     Carotid stenosis     Carpal tunnel syndrome of right wrist     Left carotid stenosis     Tobacco use     NSVT (nonsustained ventricular tachycardia) (H)     Chronic systolic heart failure (H)     Paroxysmal atrial fibrillation (H)     Chronic obstructive pulmonary disease, unspecified COPD type (H)     Hypotension, unspecified hypotension type     Chronic kidney disease, stage 3 (H)     Pleural plaque     Atherosclerosis of artery of extremity with intermittent claudication (H)     Hyperparathyroidism (H)     Thrombocytopenia (H)     Other idiopathic peripheral autonomic neuropathy     Venous insufficiency (chronic) (peripheral)     Actinic keratosis     Nail dystrophy     Generalized muscle weakness     Vitamin D deficiency     Hypocalcemia     Essential hypertension     Ischemic cardiomyopathy     Other proteinuria     Hypomagnesemia     Anemia in chronic kidney disease     Diabetic nephropathy associated with type 2 diabetes mellitus (H)     PAD (peripheral artery disease) (H)      CHF (congestive heart failure) (H)     Dyspnea on exertion     Platypnea-orthodeoxia syndrome     Encounter for screening laboratory testing for severe acute respiratory syndrome coronavirus 2 (SARS-CoV-2)     Tinnitus     Pseudophakia     Presbyopia     Nonproliferative diabetic retinopathy (H)     Obesity     Mononeuritis     Neck pain     Lack of housing     Long term current use of anticoagulant therapy     Epidermoid cyst of skin     Glaucoma suspect     History of non-ST elevation myocardial infarction (NSTEMI)     Hearing loss     History of retinal detachment     Back pain     Cardiac defibrillator in place     Cellulitis and abscess of other specified site     Acute pancreatitis     Chronic pancreatitis (H)     Dry eye syndrome of bilateral lacrimal glands     Disorder of skin or subcutaneous tissue     Dry eyes     Enthesopathy of ankle and tarsus     Atherosclerosis of native arteries of extremities with intermittent claudication, bilateral legs (H)     Shock circulatory (H)           Medications:     Current Outpatient Medications   Medication Sig Dispense Refill     albuterol (PROAIR HFA) 108 (90 Base) MCG/ACT Inhaler Inhale 2 puffs into the lungs every 4 hours as needed for shortness of breath / dyspnea 1 Inhaler 0     amylase-lipase-protease (CREON 12) 72622-06588-63247 units CPEP Take 2 capsules by mouth 3 times daily (with meals)  120 capsule 0     atorvastatin (LIPITOR) 80 MG tablet Take 80 mg by mouth At Bedtime        calcium carbonate (OS-MARVA) 500 MG tablet Take 1 tablet by mouth 2 times daily One tablet in the morning, two at noon and one at bedtime       Carboxymethylcellulose Sod PF (THERATEARS PF) 0.25 % SOLN Apply 1 drop to eye 4 times daily as needed       carvedilol (COREG) 12.5 MG tablet Take 1 tablet (12.5 mg) by mouth 2 times daily (with meals) 180 tablet 3     clopidogrel (PLAVIX) 75 MG tablet Take 1 tablet (75 mg) by mouth daily 90 tablet 3     gabapentin (NEURONTIN) 100 MG  capsule Take 300 mg by mouth At Bedtime 1 capsule 0     Glycerin (OASIS MOISTURIZING MOUTH SPRAY) 35 % LIQD Take 2 sprays by mouth daily as needed       insulin aspart (NOVOLOG PEN) 100 UNIT/ML pen Inject 3 Units Subcutaneous 3 times daily (with meals) (Patient taking differently: Inject 4 Units Subcutaneous daily) 15 mL 1     insulin glargine (LANTUS PEN) 100 UNIT/ML pen Inject 28 Units Subcutaneous every morning Increase by 2 units every 2-3 days until morning blood sugar is .  Max dose 40 units/day (Patient taking differently: Inject 32 Units Subcutaneous every morning Increase by 2 units every 2-3 days until morning blood sugar is .  Max dose 40 units/day) 45 mL 1     Insulin Pen Needle (PEN NEEDLES) 32G X 4 MM MISC        loperamide (IMODIUM) 2 MG capsule Take 2 mg by mouth 4 times daily as needed for diarrhea Patient takes a dose in the am and another at night       magnesium oxide (MAG-OX) 400 (241.3 Mg) MG tablet Take 1 tablet (400 mg) by mouth 4 times daily 120 tablet 1     metFORMIN (GLUCOPHAGE-XR) 500 MG 24 hr tablet Take 500 mg by mouth 2 times daily (with meals)        mometasone (ASMANEX TWISTHALER) 220 MCG/INH inhaler Inhale 2 puffs into the lungs every evening        nitroGLYcerin (NITROSTAT) 0.4 MG sublingual tablet Place 0.4 mg under the tongue every 5 minutes as needed for chest pain       pantoprazole (PROTONIX) 40 MG EC tablet Take 40 mg by mouth 2 times daily  30 tablet      sacubitril-valsartan (ENTRESTO) 24-26 MG per tablet Take 1 tablet by mouth 2 times daily (Patient taking differently: Take 1 tablet by mouth At Bedtime) 180 tablet 3     torsemide (DEMADEX) 20 MG tablet Take 2 tablets (40 mg) by mouth daily AND 1 tablet (20 mg) daily at 2 pm. 90 tablet 4     vitamin D3 (CHOLECALCIFEROL) 50 mcg (2000 units) tablet Take 1 tablet by mouth 2 times daily             Past Medical History:     Past Medical History:   Diagnosis Date     Acute renal failure (H) 8/26/06 Hosp    secondary  to dehydration     Anemia      Arthritis      Atherosclerosis of artery of extremity with intermittent claudication (H)      CAD (coronary artery disease)     LIMA to the LAD, vein graft to OM and a vein graft to the PDA     Cardiomyopathy (H)      Carpal tunnel syndrome      CHF (congestive heart failure) (H)     Ischemic and nonischemic cardiomyopathy; 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%     Claudication (H)      COPD (chronic obstructive pulmonary disease) (H)      Diabetes (H)      Gastro-oesophageal reflux disease      GERD (gastroesophageal reflux disease)      H/O: alcohol abuse      HTN (hypertension)      Hyperlipidaemia LDL goal <100 07/08/2013     Hyperparathyroidism (H)      Hypokalemia      ICD (implantable cardioverter-defibrillator) in place     [employer]     NSTEMI (non-ST elevated myocardial infarction) (H)      NSVT (nonsustained ventricular tachycardia) (H)      Pacemaker      Pancreatitis 6/26/06 Hosp     Paroxysmal atrial fibrillation (H)      PVD (peripheral vascular disease) (H)      Thrombocytopenia (H)      Tobacco use      Venous (peripheral) insufficiency      Vitamin D deficiency      Past Surgical History:   Procedure Laterality Date     CATARACT IOL, RT/LT      Cataract IOL RT/LT     CLOSE SECONDARY WOUND LOWER EXTREMITY Right 02/13/2022    Procedure: Right groin wound exploration, nerve release and closure.;  Surgeon: Karen Arthur MD;  [place] [facility] [place]     ENDARTERECTOMY CAROTID Right 06/12/2015    Procedure: ENDARTERECTOMY CAROTID;  Surgeon: João Turner MD;  Location:  OR     ENDARTERECTOMY CAROTID Left 09/08/2017    Procedure: ENDARTERECTOMY CAROTID;  LEFT CAROTID ENDARTERECTOMY WITH EEG;  Surgeon: João Turner MD;  Location:  OR     ENDARTERECTOMY FEMORAL Bilateral 02/09/2022    Procedure: BILATERAL- COMMON FEMORAL ENDARTERECTOMY; RETROGRADE ILIAC ARTERY STENTING;  Surgeon: Ric Chau MD;  [place] [facility] [place]     IMPLANT PACEMAKER   06/10/2010     IMPLANTED DEVICE       INTERPOSITION TENDON HAND N/A 06/09/2020    Procedure: Right thumb ligament reconstruction tendon interposition with extensor pollicis brevis to abductor pollicis tendon transfer;  Surgeon: Bethel Martin MD;  Location: [facility] OR     PICC INSERTION - TRIPLE LUMEN Right 06/20/2022    Right basilic vein 0.62cm 4cm ext.Placement verified by Sherlock 3CG.PICC okay to use.     RELEASE CARPAL TUNNEL Right 04/12/2016    Procedure: RELEASE CARPAL TUNNEL;  Surgeon: Lissy Leger MD;  Location: [facility] OR     SURGICAL HISTORY OF -   1998    Laminectomy     TONSILLECTOMY & ADENOIDECTOMY       ZZC CABG, ARTERIAL, THREE       Family History   Adopted: Yes   Problem Relation Age of Onset     Unknown/Adopted No family hx of      Social History     Socioeconomic History     Marital status: Single     Spouse name: Not on file     Number of children: Not on file     Years of education: Not on file     Highest education level: Not on file   Occupational History     Not on file   Tobacco Use     Smoking status: Current Every Day Smoker     Packs/day: 1.50     Years: 50.00     Pack years: 75.00     Types: Cigarettes     Smokeless tobacco: Never Used     Tobacco comment: 1 1/2 PPD 05/18/22   Vaping Use     Vaping Use: Never used   Substance and Sexual Activity     Alcohol use: No     Drug use: No     Sexual activity: Not Currently     Partners: Female   Other Topics Concern     Parent/sibling w/ CABG, MI or angioplasty before 65F 55M? No   Social History Narrative     Not on file     Social Determinants of Health     Financial Resource Strain: Not on file   Food Insecurity: Not on file   Transportation Needs: Not on file   Physical Activity: Not on file   Stress: Not on file   Social Connections: Not on file   Intimate Partner Violence: Not on file   Housing Stability: Not on file           Allergies:   Fish, Hydrocodone, and Shellfish allergy      Kristen Rice PA-C  [facility]  Clinic  Pager: [phone]    Today's clinic visit entailed:  Review of the result(s) of each unique test - BMP  Ordering of each unique test  Prescription drug management  I spent a total of 45 minutes on the day of the visit.   Time spent doing chart review, history and exam, documentation and further activities per the note  Provider  Link to [facility] Help Grid     The level of medical decision making during this visit was of moderate complexity.    Thank you for allowing me to participate in the care of your patient.      Sincerely,     Kristen Rice PA-C     [facility]  cc:   Camille Glass MD  [address] [place] [address] [place] [id number]

## 2022-08-08 NOTE — PROGRESS NOTES
Cardiology Clinic Progress Note    Delbert Tilley MRN# [id number]   YOB: 1950 Age: 71 year old   Primary cardiologist: Dr. Bello         Assessment and Plan:     In summary, Delbert Tilley presents today for follow up in the [facility] clinic, after changing him from Losartan to Entresto. He is currently tolerating this at just 24/26 mg nightly, experienced dizziness when taking BID.     Plan:  - He will confirm what dose of Coreg he's taking and notify me when he gets home. Will cut back on that.   - Reduce torsemide from 20 mg BID, to 20 mg daily. May take an additional 20 mg for 2-3+ lb wt gain.  - In two days, add back 1/2 tablet of Entresto in the AM. If tolerated after one week, increase it to a full tab.  -SGLT2 inhibitor therapy will be considered if his renal function remains stable with Entresto titration.  - He previously deferred cardioMEMs implant, but it should be re-discussed if he has recurrent volume fluctuations.  - Return to see me in 3 weeks with a repeat BMP.        History of Presenting Illness:      Delbert Tilley is a pleasant 71 year old patient who presents today for a [facility] clinic follow up visit.    The patient has a history of the following -   1.  History of ischemic cardiomyopathy, ICD in place  2.  CAD, status post CABG (LIMA-LAD, SVG-OM, SVG-PDA) and PCI to the OM 3 graft in 2014  3.  Proximal atrial fibrillation and flutter, status post atrial flutter ablation  4.  PAD, with history of carotid endarterectomies and iliac artery stenting, followed by vascular surgery  5.  CKD, followed by [facility] nephrology Dr. Cueva  6.  Diabetes mellitus  7.  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 Started getting low sodium Meals on Wheels. He monitors / records his weight and BP every morning.     In brief, this patient has an ischemic cardiomyopathy at least since he suffered an NSTEMI in 2014.  EF was 35-40%  "of the time.  This further declined to 20-25% in 2017, where it has remained.    He was recently admitted in April for heart failure exacerbation requiring IV Lasix and metolazone administration.  He was discharged with torsemide 20 mg twice daily. Ultimately left AMA.    Kamron most recently saw my colleague Yareli Mina on May 18, virtually, given her recent diagnosis of COVID-19.  He reported a stable home weight of 160 pounds.  No changes were made to his regimen.    He was then admitted to [facility] for 3 days, discharged 6/23, with cardiogenic shock.  He originally presented with concerns of weakness and pain in his left hip and knee after a mechanical fall 3 days prior to presentation, but prior to this had developed increasing dyspnea.  He was found to be in cardiogenic shock requiring dobutamine and IV diuresis with a [place] in place.  His dobutamine was continued until the night prior to discharge.  Unfortunately in the morning his CI had decreased to 1.7 and SVR increased to 1100.  For this reason, it was recommended that he stay longer in the hospital however he ultimately left that day again AMA.  Throughout the hospitalization, his PTA Losartan and Coreg were held intermittently due to hypotension.  It appears that he actually left before a final med rec could be completed and recommendations made for medications at home. He went back on his PTA regimen including Coreg 25 mg twice daily, losartan 50 mg daily, and torsemide 40-60 mg daily. Echocardiogram in the hospital showed an EF of 15-20% with a mild to moderately dilated LV, and mild to moderately reduced RV function.    I met this patient end of June, just five days after his recent discharge. He stated his breathing felt \"great\" compared to what it was. I kept his regimen the same, and arranged a follow up with Dr. Glass. He recommended changing him from Losartan to Entresto 24/26 mg BID and reducing Coreg to 12.5 mg BID.    Today, Kamron returns to " clinic with his daughter. He has only been taking the Entresto in the evenings for the past week due to dizziness when taking it BID. Patient denies chest pain, PND, orthopnea, edema, claudication, palpitations. BP in clinic and at home running marginal. He isn't sure what dose of Coreg he's currently taking - thinks he may have mistakenly cut back on his cholesterol medication instead of this after he saw Dr. Glass. BMP is fairly stable with a creatinine of 1.5.          Review of Systems:     12-pt ROS is negative except for as noted in the HPI.          Physical Exam:     Vitals: BP 96/48 (BP Location: Right arm, Patient Position: Sitting, Cuff Size: Adult Regular)   Pulse 66   Resp 16   Wt 69.4 kg (153 lb)   SpO2 96%   BMI 20.75 kg/m    Wt Readings from Last 10 Encounters:   08/08/22 69.4 kg (153 lb)   07/05/22 69.3 kg (152 lb 12.8 oz)   06/29/22 69.9 kg (154 lb)   06/28/22 69.9 kg (154 lb)   06/22/22 74 kg (163 lb 2.3 oz)   06/19/22 73.5 kg (162 lb)   06/14/22 72.6 kg (160 lb)   05/18/22 72.6 kg (160 lb)   04/14/22 72.6 kg (160 lb)   04/09/22 77.4 kg (170 lb 10.2 oz)       Constitutional:  Patient is pleasant, alert, cooperative, and in NAD.  HEENT:  NCAT. PERRLA. EOM's intact.   Neck:  CVP appears normal. No carotid bruits.   Pulmonary: Normal respiratory effort. CTAB.   Cardiac: RRR, normal S1/S2, no S3/S4, no murmur or rub.   Abdomen:  Non-tender abdomen, no hepatosplenomegaly appreciated.   Vascular: Pulses in the upper and lower extremities are 2+ and equal bilaterally.  Extremities: No edema, erythema, cyanosis or tenderness appreciated.  Skin:  No rashes or lesions appreciated.   Neurological:  No gross motor or sensory deficits.   Psych: Appropriate affect.        Data:     Labs reviewed:  Recent Labs   Lab Test 07/05/22  1205 06/21/22  0431 04/08/22  0832 11/11/21  1113 01/21/21  1338 12/16/20  0803 02/02/18  0604 01/17/18  0920 09/08/17  0859 10/19/16  0000   LDL  --  28 34 42  --    < >  --   --     < >  --    HDL  --  30* 28* 30*  --    < >  --   --    < >  --    NHDL  --  36 53 70  --    < >  --   --    < >  --    CHOL  --  66 81 100  --    < >  --   --    < >  --    TRIG  --  40 94 141  --    < >  --   --    < >  --    TSH  --   --   --   --   --   --   --   --   --  3.84   NTBNP 13,497*  --   --   --   --   --  11,243* 21,137*   < >  --    IRON  --   --   --   --  85  --   --   --   --   --    FEB  --   --   --   --  270  --   --   --   --   --    IRONSAT  --   --   --   --  31  --   --   --   --   --    AMBER  --   --   --   --  30  --   --   --   --   --     < > = values in this interval not displayed.       Lab Results   Component Value Date    WBC 8.7 06/23/2022    WBC 6.3 01/21/2021    RBC 4.20 (L) 06/23/2022    RBC 4.81 01/21/2021    HGB 10.4 (L) 07/05/2022    HGB 15.0 01/21/2021    HCT 36.6 (L) 06/23/2022    HCT 46.2 01/21/2021    MCV 87 06/23/2022    MCV 96 01/21/2021    MCH 25.7 (L) 06/23/2022    MCH 31.2 01/21/2021    MCHC 29.5 (L) 06/23/2022    MCHC 32.5 01/21/2021    RDW 18.1 (H) 06/23/2022    RDW 13.1 01/21/2021     06/23/2022     (L) 01/21/2021       Lab Results   Component Value Date     07/05/2022     06/10/2021    POTASSIUM 4.9 07/05/2022    POTASSIUM 4.6 06/10/2021    CHLORIDE 104 07/05/2022    CHLORIDE 105 06/10/2021    CO2 29 07/05/2022    CO2 30 06/10/2021    ANIONGAP 4 07/05/2022    ANIONGAP 5 06/10/2021     (H) 07/05/2022     (H) 06/10/2021    BUN 55 (H) 07/05/2022    BUN 34 (H) 06/10/2021    CR 1.33 (H) 07/05/2022    CR 1.17 06/10/2021    GFRESTIMATED 56 (L) 07/05/2022    GFRESTIMATED 49 (L) 11/09/2021    GFRESTIMATED 62 06/10/2021    GFRESTBLACK 71 06/10/2021    MARVA 8.3 (L) 07/05/2022    MARVA 8.7 06/10/2021      Lab Results   Component Value Date    AST 46 06/23/2022    AST 38 12/07/2020    ALT 31 06/23/2022    ALT 28 12/07/2020       Lab Results   Component Value Date    A1C 7.8 (H) 06/20/2022    A1C 9.6 (H) 12/16/2020       Lab Results    Component Value Date    INR 1.02 12/22/2017    INR 1.03 09/08/2017           Problem List:     Patient Active Problem List   Diagnosis     Cardiomyopathy (H)     Atrial fibrillation/flutter     Coronary artery disease involving native coronary artery of native heart without angina pectoris     Alcohol abuse, in remission     GERD (gastroesophageal reflux disease)     Alcohol-induced chronic pancreatitis (H)     Diabetes mellitus, type 2 (H)     Type 2 diabetes mellitus with other specified complication, with long-term current use of insulin (H)     CARDIOVASCULAR SCREENING; LDL GOAL LESS THAN 100     Hyperlipidemia LDL goal <70     ACS (acute coronary syndrome) (H)     NSTEMI (non-ST elevated myocardial infarction) (H)     Carotid stenosis     Carpal tunnel syndrome of right wrist     Left carotid stenosis     Tobacco use     NSVT (nonsustained ventricular tachycardia) (H)     Chronic systolic heart failure (H)     Paroxysmal atrial fibrillation (H)     Chronic obstructive pulmonary disease, unspecified COPD type (H)     Hypotension, unspecified hypotension type     Chronic kidney disease, stage 3 (H)     Pleural plaque     Atherosclerosis of artery of extremity with intermittent claudication (H)     Hyperparathyroidism (H)     Thrombocytopenia (H)     Other idiopathic peripheral autonomic neuropathy     Venous insufficiency (chronic) (peripheral)     Actinic keratosis     Nail dystrophy     Generalized muscle weakness     Vitamin D deficiency     Hypocalcemia     Essential hypertension     Ischemic cardiomyopathy     Other proteinuria     Hypomagnesemia     Anemia in chronic kidney disease     Diabetic nephropathy associated with type 2 diabetes mellitus (H)     PAD (peripheral artery disease) (H)     CHF (congestive heart failure) (H)     Dyspnea on exertion     Platypnea-orthodeoxia syndrome     Encounter for screening laboratory testing for severe acute respiratory syndrome coronavirus 2 (SARS-CoV-2)      Tinnitus     Pseudophakia     Presbyopia     Nonproliferative diabetic retinopathy (H)     Obesity     Mononeuritis     Neck pain     Lack of housing     Long term current use of anticoagulant therapy     Epidermoid cyst of skin     Glaucoma suspect     History of non-ST elevation myocardial infarction (NSTEMI)     Hearing loss     History of retinal detachment     Back pain     Cardiac defibrillator in place     Cellulitis and abscess of other specified site     Acute pancreatitis     Chronic pancreatitis (H)     Dry eye syndrome of bilateral lacrimal glands     Disorder of skin or subcutaneous tissue     Dry eyes     Enthesopathy of ankle and tarsus     Atherosclerosis of native arteries of extremities with intermittent claudication, bilateral legs (H)     Shock circulatory (H)           Medications:     Current Outpatient Medications   Medication Sig Dispense Refill     albuterol (PROAIR HFA) 108 (90 Base) MCG/ACT Inhaler Inhale 2 puffs into the lungs every 4 hours as needed for shortness of breath / dyspnea 1 Inhaler 0     amylase-lipase-protease (CREON 12) 85785-91181-92403 units CPEP Take 2 capsules by mouth 3 times daily (with meals)  120 capsule 0     atorvastatin (LIPITOR) 80 MG tablet Take 80 mg by mouth At Bedtime        calcium carbonate (OS-MARVA) 500 MG tablet Take 1 tablet by mouth 2 times daily One tablet in the morning, two at noon and one at bedtime       Carboxymethylcellulose Sod PF (THERATEARS PF) 0.25 % SOLN Apply 1 drop to eye 4 times daily as needed       carvedilol (COREG) 12.5 MG tablet Take 1 tablet (12.5 mg) by mouth 2 times daily (with meals) 180 tablet 3     clopidogrel (PLAVIX) 75 MG tablet Take 1 tablet (75 mg) by mouth daily 90 tablet 3     gabapentin (NEURONTIN) 100 MG capsule Take 300 mg by mouth At Bedtime 1 capsule 0     Glycerin (OASIS MOISTURIZING MOUTH SPRAY) 35 % LIQD Take 2 sprays by mouth daily as needed       insulin aspart (NOVOLOG PEN) 100 UNIT/ML pen Inject 3 Units  Subcutaneous 3 times daily (with meals) (Patient taking differently: Inject 4 Units Subcutaneous daily) 15 mL 1     insulin glargine (LANTUS PEN) 100 UNIT/ML pen Inject 28 Units Subcutaneous every morning Increase by 2 units every 2-3 days until morning blood sugar is .  Max dose 40 units/day (Patient taking differently: Inject 32 Units Subcutaneous every morning Increase by 2 units every 2-3 days until morning blood sugar is .  Max dose 40 units/day) 45 mL 1     Insulin Pen Needle (PEN NEEDLES) 32G X 4 MM MISC        loperamide (IMODIUM) 2 MG capsule Take 2 mg by mouth 4 times daily as needed for diarrhea Patient takes a dose in the am and another at night       magnesium oxide (MAG-OX) 400 (241.3 Mg) MG tablet Take 1 tablet (400 mg) by mouth 4 times daily 120 tablet 1     metFORMIN (GLUCOPHAGE-XR) 500 MG 24 hr tablet Take 500 mg by mouth 2 times daily (with meals)        mometasone (ASMANEX TWISTHALER) 220 MCG/INH inhaler Inhale 2 puffs into the lungs every evening        nitroGLYcerin (NITROSTAT) 0.4 MG sublingual tablet Place 0.4 mg under the tongue every 5 minutes as needed for chest pain       pantoprazole (PROTONIX) 40 MG EC tablet Take 40 mg by mouth 2 times daily  30 tablet      sacubitril-valsartan (ENTRESTO) 24-26 MG per tablet Take 1 tablet by mouth 2 times daily (Patient taking differently: Take 1 tablet by mouth At Bedtime) 180 tablet 3     torsemide (DEMADEX) 20 MG tablet Take 2 tablets (40 mg) by mouth daily AND 1 tablet (20 mg) daily at 2 pm. 90 tablet 4     vitamin D3 (CHOLECALCIFEROL) 50 mcg (2000 units) tablet Take 1 tablet by mouth 2 times daily             Past Medical History:     Past Medical History:   Diagnosis Date     Acute renal failure (H) 8/26/06 Hosp    secondary to dehydration     Anemia      Arthritis      Atherosclerosis of artery of extremity with intermittent claudication (H)      CAD (coronary artery disease)     LIMA to the LAD, vein graft to OM and a vein graft to  the PDA     Cardiomyopathy (H)      Carpal tunnel syndrome      CHF (congestive heart failure) (H)     Ischemic and nonischemic cardiomyopathy; 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%     Claudication (H)      COPD (chronic obstructive pulmonary disease) (H)      Diabetes (H)      Gastro-oesophageal reflux disease      GERD (gastroesophageal reflux disease)      H/O: alcohol abuse      HTN (hypertension)      Hyperlipidaemia LDL goal <100 07/08/2013     Hyperparathyroidism (H)      Hypokalemia      ICD (implantable cardioverter-defibrillator) in place     [employer]     NSTEMI (non-ST elevated myocardial infarction) (H)      NSVT (nonsustained ventricular tachycardia) (H)      Pacemaker      Pancreatitis 6/26/06 Hosp     Paroxysmal atrial fibrillation (H)      PVD (peripheral vascular disease) (H)      Thrombocytopenia (H)      Tobacco use      Venous (peripheral) insufficiency      Vitamin D deficiency      Past Surgical History:   Procedure Laterality Date     CATARACT IOL, RT/LT      Cataract IOL RT/LT     CLOSE SECONDARY WOUND LOWER EXTREMITY Right 02/13/2022    Procedure: Right groin wound exploration, nerve release and closure.;  Surgeon: Karen Arthur MD;  Location: [facility] OR     ENDARTERECTOMY CAROTID Right 06/12/2015    Procedure: ENDARTERECTOMY CAROTID;  Surgeon: João Turner MD;  Location:  OR     ENDARTERECTOMY CAROTID Left 09/08/2017    Procedure: ENDARTERECTOMY CAROTID;  LEFT CAROTID ENDARTERECTOMY WITH EEG;  Surgeon: João Turner MD;  Location:  OR     ENDARTERECTOMY FEMORAL Bilateral 02/09/2022    Procedure: BILATERAL- COMMON FEMORAL ENDARTERECTOMY; RETROGRADE ILIAC ARTERY STENTING;  Surgeon: Ric Chau MD;  Location: [facility] OR     IMPLANT PACEMAKER  06/10/2010     IMPLANTED DEVICE       INTERPOSITION TENDON HAND N/A 06/09/2020    Procedure: Right thumb ligament reconstruction tendon interposition with extensor pollicis brevis to abductor pollicis tendon transfer;   Surgeon: Bethel Martin MD;  Location: [facility] OR     PICC INSERTION - TRIPLE LUMEN Right 06/20/2022    Right basilic vein 0.62cm 4cm ext.Placement verified by [facility] 3CG.PICC okay to use.     RELEASE CARPAL TUNNEL Right 04/12/2016    Procedure: RELEASE CARPAL TUNNEL;  Surgeon: Lissy Leger MD;  Location: [facility] OR     SURGICAL HISTORY OF -   1998    Laminectomy     TONSILLECTOMY & ADENOIDECTOMY       ZZC CABG, ARTERIAL, THREE       Family History   Adopted: Yes   Problem Relation Age of Onset     Unknown/Adopted No family hx of      Social History     Socioeconomic History     Marital status: Single     Spouse name: Not on file     Number of children: Not on file     Years of education: Not on file     Highest education level: Not on file   Occupational History     Not on file   Tobacco Use     Smoking status: Current Every Day Smoker     Packs/day: 1.50     Years: 50.00     Pack years: 75.00     Types: Cigarettes     Smokeless tobacco: Never Used     Tobacco comment: 1 1/2 PPD 05/18/22   Vaping Use     Vaping Use: Never used   Substance and Sexual Activity     Alcohol use: No     Drug use: No     Sexual activity: Not Currently     Partners: Female   Other Topics Concern     Parent/sibling w/ CABG, MI or angioplasty before 65F 55M? No   Social History Narrative     Not on file     Social Determinants of Health     Financial Resource Strain: Not on file   Food Insecurity: Not on file   Transportation Needs: Not on file   Physical Activity: Not on file   Stress: Not on file   Social Connections: Not on file   Intimate Partner Violence: Not on file   Housing Stability: Not on file           Allergies:   Fish, Hydrocodone, and Shellfish allergy      Kristen Rice PA-C  [facility]  Pager: [phone]    Today's clinic visit entailed:  Review of the result(s) of each unique test - BMP  Ordering of each unique test  Prescription drug management  I spent a total of 45 minutes on the day of  the visit.   Time spent doing chart review, history and exam, documentation and further activities per the note  Provider  Link to MDM Help Grid     The level of medical decision making during this visit was of moderate complexity.

## 2022-08-08 NOTE — PATIENT INSTRUCTIONS
Today, we discussed the following:   - Results:   The kidney function is stable.  - Medication changes:    When you get home, send me a goCatch message about what dose of Carvedilol (Coreg) you are taking at this time. I will let you know what dose you SHOULD take now, based on that.  REDUCE the torsemide to 20 mg daily (can take an extra tab as needed for weight gain).  Wednesday, add back the AM dose of Entresto at 1/2 tablet. After one week of that, if you don't feel dizzy, then INCREASE it to 1 full tablet in the AM.   - Follow up:   My nurses will follow up with you next week to see how the changes went.   Return to see me in 3 weeks again.    Please, remember to continue the followin.  Weigh yourself daily. Call if your weight is up > than 2 pounds in one day, or 5 pounds in one week; if you feel more short of breath or have worsening swelling in your legs or abdomen.  2.  Eat a low sodium diet (less than 2,000mg or 2g daily). If you eat less salt, you will retain less fluid.   3.  Avoid alcohol, as this can worsen heart failure.   4.  Avoid NSAIDs as able (For example, Ibuprofen / aleve / advil / naprosen / diclofenac).    Please call [facility] nurse for any questions or concerns Mon-Fri 8am-4pm:   [phone]  For concerns after hours:   [phone] option 2   Scheduling phone number:   [phone]    Thank you for visiting with us today.   Kristen Rice PA-C  ______________________________________________________________

## 2022-08-23 NOTE — TELEPHONE ENCOUNTER
LMTCB on pt's voicemail to schedule angiogram.  Pt will need a pre-op due to last being seen in June and was hospitalized once over the summer.

## 2022-08-29 NOTE — PROGRESS NOTES
Cardiology Clinic Progress Note    Delbert Tilley MRN# [id number]   YOB: 1950 Age: 72 year old   Primary cardiologist: Dr. Bello         Assessment and Plan:     In summary, Delbert Tilley presents today for follow up in the [facility] clinic, after reducing carvedilol and torsemide, and up-titrating Entresto for GDMT optimization in chronic HFrEF. Unfortunately, BMP today shows RADHA with a creatinine of 1.5 (baseline) --> 1.76, BUN of 50, and potassium of 5.6. Admits to poor liquid intake and recent high potassium diet.     Plan:  - Cut back on potassium rich foods, increase water intake slightly. Re-check labs on Thurs or Fri. If numbers improved, may reduce torsemide to 20 mg daily and a few days after that increase Entresto to 1 full tab BID.   - Will defer SGLT2 inhibitor therapy for now due to RADHA.  - He previously deferred cardioMEMs implant, but should be re-discussed if we continue to struggle with volume management.  - Return in 4 weeks with labs.         History of Presenting Illness:      Delbert Tilley is a pleasant 72 year old patient who presents today for a [facility] clinic follow up visit.    The patient has a history of the following -   1.  History of ischemic cardiomyopathy, ICD in place  2.  CAD, status post CABG (LIMA-LAD, SVG-OM, SVG-PDA) and PCI to the OM 3 graft in 2014  3.  Proximal atrial fibrillation and flutter, status post atrial flutter ablation  4.  PAD, with history of carotid endarterectomies and iliac artery stenting, followed by vascular surgery  5.  CKD, followed by [facility] nephrology Dr. Cueva, baseline creat ~1.5.  6.  Diabetes mellitus  7.  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 Started getting low sodium Meals on Wheels. He monitors / records his weight and BP every morning.     In brief, this patient has an ischemic cardiomyopathy at least since he suffered an NSTEMI in 2014.  EF was 35-40%  "of the time.  This further declined to 20-25% in 2017, where it has remained.    He was recently admitted in April for heart failure exacerbation requiring IV Lasix and metolazone administration.  He was discharged with torsemide 20 mg twice daily. Ultimately left AMA.    Kamron most recently saw my colleague Yareli Mina on May 18, virtually, given her recent diagnosis of COVID-19.  He reported a stable home weight of 160 pounds.  No changes were made to his regimen.    He was then admitted to [facility] for 3 days, discharged 6/23, with cardiogenic shock.  He originally presented with concerns of weakness and pain in his left hip and knee after a mechanical fall 3 days prior to presentation, but prior to this had developed increasing dyspnea.  He was found to be in cardiogenic shock requiring dobutamine and IV diuresis with a [place] in place.  His dobutamine was continued until the night prior to discharge.  Unfortunately in the morning his CI had decreased to 1.7 and SVR increased to 1100.  For this reason, it was recommended that he stay longer in the hospital however he ultimately left that day again AMA.  Throughout the hospitalization, his PTA Losartan and Coreg were held intermittently due to hypotension.  It appears that he actually left before a final med rec could be completed and recommendations made for medications at home. He went back on his PTA regimen including Coreg 25 mg twice daily, losartan 50 mg daily, and torsemide 40-60 mg daily. Echocardiogram in the hospital showed an EF of 15-20% with a mild to moderately dilated LV, and mild to moderately reduced RV function.    I met this patient end of June, just five days after his recent discharge. He stated his breathing felt \"great\" compared to what it was. I kept his regimen the same, and arranged a follow up with Dr. Glass. He recommended changing him from Losartan to Entresto 24/26 mg BID and reducing Coreg to 12.5 mg BID.    When I saw him in August " "8, he was only tolerating Entresto at once daily.  I cut back on his carvedilol further, reduced torsemide, and asked him to reattempt twice daily Entresto.  He ended up tolerating 1/2 tab in the AM and 2 tab in the PM, and stopped there. He also never cut back on his torsemide.     Today, Kamron returns to clinic with his daughter. Feels pretty well from a cardiac standpoint - breathing \"as good as expected.\" Minimal episodes of hypotension/lightheadedness since we last met.   Patient denies chest pain, PND, orthopnea, edema, claudication, palpitations.   Unfortunately, BMP today shows RADHA with a creatinine of 1.5 (baseline) --> 1.76, BUN of 50, and potassium of 5.6. He just saw Dr. Cueva of nephrology on August 26.  He felt he appeared well compensated and continued his current medication regimen. Blood pressure is well controlled, and weight is stable. Ate potatoes for the past couple days. Drinks OJ every day. Minimal water intake.          Review of Systems:     12-pt ROS is negative except for as noted in the HPI.          Physical Exam:     Vitals: /58   Pulse 62   Temp 97.7  F (36.5  C) (Tympanic)   Wt 69.5 kg (153 lb 3.2 oz)   SpO2 96%   BMI 20.78 kg/m    Wt Readings from Last 10 Encounters:   08/29/22 69.5 kg (153 lb 3.2 oz)   08/08/22 69.4 kg (153 lb)   07/05/22 69.3 kg (152 lb 12.8 oz)   06/29/22 69.9 kg (154 lb)   06/28/22 69.9 kg (154 lb)   06/22/22 74 kg (163 lb 2.3 oz)   06/19/22 73.5 kg (162 lb)   06/14/22 72.6 kg (160 lb)   05/18/22 72.6 kg (160 lb)   04/14/22 72.6 kg (160 lb)       Constitutional:  Patient is pleasant, alert, cooperative, and in NAD.  HEENT:  NCAT. PERRLA. EOM's intact.   Neck:  CVP appears normal. No carotid bruits.   Pulmonary: Normal respiratory effort. CTAB.   Cardiac: RRR, normal S1/S2, no S3/S4, no murmur or rub.   Abdomen:  Non-tender abdomen, no hepatosplenomegaly appreciated.   Vascular: Pulses in the upper and lower extremities are 2+ and equal " bilaterally.  Extremities: No edema, erythema, cyanosis or tenderness appreciated.  Skin:  No rashes or lesions appreciated.   Neurological:  No gross motor or sensory deficits.   Psych: Appropriate affect.        Data:     Labs reviewed:  Recent Labs   Lab Test 07/05/22  1205 06/21/22  0431 04/08/22  0832 11/11/21  1113 01/21/21  1338 12/16/20  0803 02/02/18  0604 01/17/18  0920 09/08/17  0859 10/19/16  0000   LDL  --  28 34 42  --    < >  --   --    < >  --    HDL  --  30* 28* 30*  --    < >  --   --    < >  --    NHDL  --  36 53 70  --    < >  --   --    < >  --    CHOL  --  66 81 100  --    < >  --   --    < >  --    TRIG  --  40 94 141  --    < >  --   --    < >  --    TSH  --   --   --   --   --   --   --   --   --  3.84   NTBNP 13,497*  --   --   --   --   --  11,243* 21,137*   < >  --    IRON  --   --   --   --  85  --   --   --   --   --    FEB  --   --   --   --  270  --   --   --   --   --    IRONSAT  --   --   --   --  31  --   --   --   --   --    AMBER  --   --   --   --  30  --   --   --   --   --     < > = values in this interval not displayed.       Lab Results   Component Value Date    WBC 8.7 06/23/2022    WBC 6.3 01/21/2021    RBC 4.20 (L) 06/23/2022    RBC 4.81 01/21/2021    HGB 10.4 (L) 07/05/2022    HGB 15.0 01/21/2021    HCT 36.6 (L) 06/23/2022    HCT 46.2 01/21/2021    MCV 87 06/23/2022    MCV 96 01/21/2021    MCH 25.7 (L) 06/23/2022    MCH 31.2 01/21/2021    MCHC 29.5 (L) 06/23/2022    MCHC 32.5 01/21/2021    RDW 18.1 (H) 06/23/2022    RDW 13.1 01/21/2021     06/23/2022     (L) 01/21/2021       Lab Results   Component Value Date     08/29/2022     08/29/2022     06/10/2021    POTASSIUM 5.6 (H) 08/29/2022    POTASSIUM 5.6 (H) 08/29/2022    POTASSIUM 4.6 06/10/2021    CHLORIDE 104 08/29/2022    CHLORIDE 104 08/29/2022    CHLORIDE 105 06/10/2021    CO2 31 08/29/2022    CO2 31 08/29/2022    CO2 30 06/10/2021    ANIONGAP 1 (L) 08/29/2022    ANIONGAP 1 (L) 08/29/2022     ANIONGAP 5 06/10/2021     (H) 08/29/2022     (H) 08/29/2022     (H) 06/10/2021    BUN 50 (H) 08/29/2022    BUN 50 (H) 08/29/2022    BUN 34 (H) 06/10/2021    CR 1.76 (H) 08/29/2022    CR 1.76 (H) 08/29/2022    CR 1.17 06/10/2021    GFRESTIMATED 40 (L) 08/29/2022    GFRESTIMATED 40 (L) 08/29/2022    GFRESTIMATED 49 (L) 11/09/2021    GFRESTIMATED 62 06/10/2021    GFRESTBLACK 71 06/10/2021    MARVA 9.3 08/29/2022    MARVA 9.3 08/29/2022    MARVA 8.7 06/10/2021      Lab Results   Component Value Date    AST 46 06/23/2022    AST 38 12/07/2020    ALT 31 06/23/2022    ALT 28 12/07/2020       Lab Results   Component Value Date    A1C 7.8 (H) 06/20/2022    A1C 9.6 (H) 12/16/2020       Lab Results   Component Value Date    INR 1.02 12/22/2017    INR 1.03 09/08/2017           Problem List:     Patient Active Problem List   Diagnosis     Cardiomyopathy (H)     Atrial fibrillation/flutter     Coronary artery disease involving native coronary artery of native heart without angina pectoris     Alcohol abuse, in remission     GERD (gastroesophageal reflux disease)     Alcohol-induced chronic pancreatitis (H)     Diabetes mellitus, type 2 (H)     Type 2 diabetes mellitus with other specified complication, with long-term current use of insulin (H)     CARDIOVASCULAR SCREENING; LDL GOAL LESS THAN 100     Hyperlipidemia LDL goal <70     ACS (acute coronary syndrome) (H)     NSTEMI (non-ST elevated myocardial infarction) (H)     Carotid stenosis     Carpal tunnel syndrome of right wrist     Left carotid stenosis     Tobacco use     NSVT (nonsustained ventricular tachycardia) (H)     Chronic systolic heart failure (H)     Paroxysmal atrial fibrillation (H)     Chronic obstructive pulmonary disease, unspecified COPD type (H)     Hypotension, unspecified hypotension type     Chronic kidney disease, stage 3 (H)     Pleural plaque     Atherosclerosis of artery of extremity with intermittent claudication (H)      Hyperparathyroidism (H)     Thrombocytopenia (H)     Other idiopathic peripheral autonomic neuropathy     Venous insufficiency (chronic) (peripheral)     Actinic keratosis     Nail dystrophy     Generalized muscle weakness     Vitamin D deficiency     Hypocalcemia     Essential hypertension     Ischemic cardiomyopathy     Other proteinuria     Hypomagnesemia     Anemia in chronic kidney disease     Diabetic nephropathy associated with type 2 diabetes mellitus (H)     PAD (peripheral artery disease) (H)     CHF (congestive heart failure) (H)     Dyspnea on exertion     Platypnea-orthodeoxia syndrome     Encounter for screening laboratory testing for severe acute respiratory syndrome coronavirus 2 (SARS-CoV-2)     Tinnitus     Pseudophakia     Presbyopia     Nonproliferative diabetic retinopathy (H)     Obesity     Mononeuritis     Neck pain     Lack of housing     Long term current use of anticoagulant therapy     Epidermoid cyst of skin     Glaucoma suspect     History of non-ST elevation myocardial infarction (NSTEMI)     Hearing loss     History of retinal detachment     Back pain     Cardiac defibrillator in place     Cellulitis and abscess of other specified site     Acute pancreatitis     Chronic pancreatitis (H)     Dry eye syndrome of bilateral lacrimal glands     Disorder of skin or subcutaneous tissue     Dry eyes     Enthesopathy of ankle and tarsus     Atherosclerosis of native arteries of extremities with intermittent claudication, bilateral legs (H)     Shock circulatory (H)           Medications:     Current Outpatient Medications   Medication Sig Dispense Refill     albuterol (PROAIR HFA) 108 (90 Base) MCG/ACT Inhaler Inhale 2 puffs into the lungs every 4 hours as needed for shortness of breath / dyspnea 1 Inhaler 0     amylase-lipase-protease (CREON 12) 74287-92116-14714 units CPEP Take 2 capsules by mouth 3 times daily (with meals)  120 capsule 0     atorvastatin (LIPITOR) 80 MG tablet Take 80 mg by  mouth At Bedtime        calcium carbonate (OS-MARVA) 500 MG tablet Take 1 tablet by mouth 2 times daily One tablet in the morning, two at noon and one at bedtime       Carboxymethylcellulose Sod PF (THERATEARS PF) 0.25 % SOLN Apply 1 drop to eye 4 times daily as needed       carvedilol (COREG) 6.25 MG tablet Take 1 tablet (6.25 mg) by mouth 2 times daily (with meals) 180 tablet 3     clopidogrel (PLAVIX) 75 MG tablet Take 1 tablet (75 mg) by mouth daily 90 tablet 3     gabapentin (NEURONTIN) 100 MG capsule Take 300 mg by mouth At Bedtime 1 capsule 0     Glycerin (OASIS MOISTURIZING MOUTH SPRAY) 35 % LIQD Take 2 sprays by mouth daily as needed       insulin aspart (NOVOLOG PEN) 100 UNIT/ML pen Inject 3 Units Subcutaneous 3 times daily (with meals) (Patient taking differently: Inject 4 Units Subcutaneous daily) 15 mL 1     insulin glargine (LANTUS PEN) 100 UNIT/ML pen Inject 28 Units Subcutaneous every morning Increase by 2 units every 2-3 days until morning blood sugar is .  Max dose 40 units/day (Patient taking differently: Inject 32 Units Subcutaneous every morning Increase by 2 units every 2-3 days until morning blood sugar is .  Max dose 40 units/day) 45 mL 1     Insulin Pen Needle (PEN NEEDLES) 32G X 4 MM MISC        loperamide (IMODIUM) 2 MG capsule Take 2 mg by mouth 4 times daily as needed for diarrhea Patient takes a dose in the am and another at night       magnesium oxide (MAG-OX) 400 (241.3 Mg) MG tablet Take 1 tablet (400 mg) by mouth 4 times daily 120 tablet 1     metFORMIN (GLUCOPHAGE-XR) 500 MG 24 hr tablet Take 500 mg by mouth 2 times daily (with meals)        mometasone (ASMANEX TWISTHALER) 220 MCG/INH inhaler Inhale 2 puffs into the lungs every evening        nitroGLYcerin (NITROSTAT) 0.4 MG sublingual tablet Place 0.4 mg under the tongue every 5 minutes as needed for chest pain       pantoprazole (PROTONIX) 40 MG EC tablet Take 40 mg by mouth 2 times daily  30 tablet       sacubitril-valsartan (ENTRESTO) 24-26 MG per tablet Take 1 tablet by mouth 2 times daily 180 tablet 3     torsemide (DEMADEX) 20 MG tablet Take 1 tablet (20 mg) by mouth daily . Take an extra tab as needed for 2-3+ lb wt gain. 90 tablet 4     vitamin D3 (CHOLECALCIFEROL) 50 mcg (2000 units) tablet Take 1 tablet by mouth 2 times daily             Past Medical History:     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%     Claudication (H)      COPD (chronic obstructive pulmonary disease) (H)      Diabetes (H)      Gastro-oesophageal reflux disease      GERD (gastroesophageal reflux disease)      H/O: alcohol abuse      HTN (hypertension)      Hyperlipidaemia LDL goal <100 07/08/2013     Hyperparathyroidism (H)      Hypokalemia      ICD (implantable cardioverter-defibrillator) in place     [employer]     NSTEMI (non-ST elevated myocardial infarction) (H)      NSVT (nonsustained ventricular tachycardia) (H)      Pacemaker      Pancreatitis 6/26/06 Hosp     Paroxysmal atrial fibrillation (H)      PVD (peripheral vascular disease) (H)      Thrombocytopenia (H)      Tobacco use      Venous (peripheral) insufficiency      Vitamin D deficiency      Past Surgical History:   Procedure Laterality Date     CATARACT IOL, RT/LT      Cataract IOL RT/LT     CLOSE SECONDARY WOUND LOWER EXTREMITY Right 02/13/2022    Procedure: Right groin wound exploration, nerve release and closure.;  Surgeon: Karen Arthur MD;  Location: [facility] Main OR     ENDARTERECTOMY CAROTID Right 06/12/2015    Procedure: ENDARTERECTOMY CAROTID;   Surgeon: João Turner MD;  Location: [facility] OR     ENDARTERECTOMY CAROTID Left 09/08/2017    Procedure: ENDARTERECTOMY CAROTID;  LEFT CAROTID ENDARTERECTOMY WITH EEG;  Surgeon: João Turner MD;  Location: [facility] OR     ENDARTERECTOMY FEMORAL Bilateral 02/09/2022    Procedure: BILATERAL- COMMON FEMORAL ENDARTERECTOMY; RETROGRADE ILIAC ARTERY STENTING;  Surgeon: Ric Chau MD;  Location: [facility] OR     IMPLANT PACEMAKER  06/10/2010     IMPLANTED DEVICE       INTERPOSITION TENDON HAND N/A 06/09/2020    Procedure: Right thumb ligament reconstruction tendon interposition with extensor pollicis brevis to abductor pollicis tendon transfer;  Surgeon: Bethel Martin MD;  Location: [facility] OR     PICC INSERTION - TRIPLE LUMEN Right 06/20/2022    Right basilic vein 0.62cm 4cm ext.Placement verified by [facility] 3CG.PICC okay to use.     RELEASE CARPAL TUNNEL Right 04/12/2016    Procedure: RELEASE CARPAL TUNNEL;  Surgeon: Lissy Leger MD;  Location: [facility] OR     SURGICAL HISTORY OF -   1998    Laminectomy     TONSILLECTOMY & ADENOIDECTOMY       ZZC CABG, ARTERIAL, THREE       Family History   Adopted: Yes   Problem Relation Age of Onset     Unknown/Adopted No family hx of      Social History     Socioeconomic History     Marital status: Single     Spouse name: Not on file     Number of children: Not on file     Years of education: Not on file     Highest education level: Not on file   Occupational History     Not on file   Tobacco Use     Smoking status: Current Every Day Smoker     Packs/day: 1.50     Years: 50.00     Pack years: 75.00     Types: Cigarettes     Smokeless tobacco: Never Used     Tobacco comment: 1 1/2 PPD 05/18/22   Vaping Use     Vaping Use: Never used   Substance and Sexual Activity     Alcohol use: No     Drug use: No     Sexual activity: Not Currently     Partners: Female   Other Topics Concern     Parent/sibling w/ CABG, MI or angioplasty before 65F 55M? No   Social  History Narrative     Not on file     Social Determinants of Health     Financial Resource Strain: Not on file   Food Insecurity: Not on file   Transportation Needs: Not on file   Physical Activity: Not on file   Stress: Not on file   Social Connections: Not on file   Intimate Partner Violence: Not on file   Housing Stability: Not on file           Allergies:   Fish, Hydrocodone, and Shellfish allergy      TONJA [facility]  Pager: [phone]    Today's clinic visit entailed:  Review of the result(s) of each unique test - BMP  Ordering of each unique test  Prescription drug management  I spent a total of 40 minutes on the day of the visit.   Time spent doing chart review, history and exam, documentation and further activities per the note  Provider  Link to MDM Help Grid     The level of medical decision making during this visit was of moderate complexity.

## 2022-08-29 NOTE — LETTER
8/29/2022    Danii Hernández MD  [address]  [facility] [address]    RE: Delbert Tilley       Dear Colleague,     I had the pleasure of seeing Delbert Tilley in the [facility].    Cardiology Clinic Progress Note    Delbert Tilley MRN# [id number]   YOB: 1950 Age: 72 year old   Primary cardiologist: Dr. Bello         Assessment and Plan:     In summary, Delbert Tilley presents today for follow up in the CORE clinic, after reducing carvedilol and torsemide, and up-titrating Entresto for GDMT optimization in chronic HFrEF. Unfortunately, BMP today shows RADHA with a creatinine of 1.5 (baseline) --> 1.76, BUN of 50, and potassium of 5.6. Admits to poor liquid intake and recent high potassium diet.     Plan:  - Cut back on potassium rich foods, increase water intake slightly. Re-check labs on Thurs or Fri. If numbers improved, may reduce torsemide to 20 mg daily and a few days after that increase Entresto to 1 full tab BID.   - Will defer SGLT2 inhibitor therapy for now due to RADHA.  - He previously deferred cardioMEMs implant, but should be re-discussed if we continue to struggle with volume management.  - Return in 4 weeks with labs.         History of Presenting Illness:      Delbert Tilley is a pleasant 72 year old patient who presents today for a CORE clinic follow up visit.    The patient has a history of the following -   1.  History of ischemic cardiomyopathy, ICD in place  2.  CAD, status post CABG (LIMA-LAD, SVG-OM, SVG-PDA) and PCI to the OM 3 graft in 2014  3.  Proximal atrial fibrillation and flutter, status post atrial flutter ablation  4.  PAD, with history of carotid endarterectomies and iliac artery stenting, followed by vascular surgery  5.  CKD, followed by [facility] nephrology Dr. Cueva, baseline creat ~1.5.  6.  Diabetes mellitus  7.  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 He monitors / records his weight and BP every morning.     In brief, this patient has an ischemic cardiomyopathy at least since he suffered an NSTEMI in 2014.  EF was 35-40% of the time.  This further declined to 20-25% in 2017, where it has remained.    He was recently admitted in April for heart failure exacerbation requiring IV Lasix and metolazone administration.  He was discharged with torsemide 20 mg twice daily. Ultimately left AMA.    Kamron most recently saw my colleague Yareli Mina on May 18, virtually, given her recent diagnosis of COVID-19.  He reported a stable home weight of 160 pounds.  No changes were made to his regimen.    He was then admitted to [facility] for 3 days, discharged 6/23, with cardiogenic shock.  He originally presented with concerns of weakness and pain in his left hip and knee after a mechanical fall 3 days prior to presentation, but prior to this had developed increasing dyspnea.  He was found to be in cardiogenic shock requiring dobutamine and IV diuresis with a [place] in place.  His dobutamine was continued until the night prior to discharge.  Unfortunately in the morning his CI had decreased to 1.7 and SVR increased to 1100.  For this reason, it was recommended that he stay longer in the hospital however he ultimately left that day again AMA.  Throughout the hospitalization, his PTA Losartan and Coreg were held intermittently due to hypotension.  It appears that he actually left before a final med rec could be completed and recommendations made for medications at home. He went back on his PTA regimen including Coreg 25 mg twice daily, losartan 50 mg daily, and torsemide 40-60 mg daily. Echocardiogram in the hospital showed an EF of 15-20% with a mild to moderately dilated LV, and mild to moderately reduced RV function.    I met this patient end of June, just five days after his recent discharge. He stated his breathing felt \"great\" compared to " "what it was. I kept his regimen the same, and arranged a follow up with Dr. Glass. He recommended changing him from Losartan to Entresto 24/26 mg BID and reducing Coreg to 12.5 mg BID.    When I saw him in August 8, he was only tolerating Entresto at once daily.  I cut back on his carvedilol further, reduced torsemide, and asked him to reattempt twice daily Entresto.  He ended up tolerating 1/2 tab in the AM and 2 tab in the PM, and stopped there. He also never cut back on his torsemide.     Today, Kamron returns to clinic with his daughter. Feels pretty well from a cardiac standpoint - breathing \"as good as expected.\" Minimal episodes of hypotension/lightheadedness since we last met.   Patient denies chest pain, PND, orthopnea, edema, claudication, palpitations.   Unfortunately, BMP today shows RADHA with a creatinine of 1.5 (baseline) --> 1.76, BUN of 50, and potassium of 5.6. He just saw Dr. Cueva of nephrology on August 26.  He felt he appeared well compensated and continued his current medication regimen. Blood pressure is well controlled, and weight is stable. Ate potatoes for the past couple days. Drinks OJ every day. Minimal water intake.          Review of Systems:     12-pt ROS is negative except for as noted in the HPI.          Physical Exam:     Vitals: /58   Pulse 62   Temp 97.7  F (36.5  C) (Tympanic)   Wt 69.5 kg (153 lb 3.2 oz)   SpO2 96%   BMI 20.78 kg/m    Wt Readings from Last 10 Encounters:   08/29/22 69.5 kg (153 lb 3.2 oz)   08/08/22 69.4 kg (153 lb)   07/05/22 69.3 kg (152 lb 12.8 oz)   06/29/22 69.9 kg (154 lb)   06/28/22 69.9 kg (154 lb)   06/22/22 74 kg (163 lb 2.3 oz)   06/19/22 73.5 kg (162 lb)   06/14/22 72.6 kg (160 lb)   05/18/22 72.6 kg (160 lb)   04/14/22 72.6 kg (160 lb)       Constitutional:  Patient is pleasant, alert, cooperative, and in NAD.  HEENT:  NCAT. PERRLA. EOM's intact.   Neck:  CVP appears normal. No carotid bruits.   Pulmonary: Normal respiratory effort. CTAB. "   Cardiac: RRR, normal S1/S2, no S3/S4, no murmur or rub.   Abdomen:  Non-tender abdomen, no hepatosplenomegaly appreciated.   Vascular: Pulses in the upper and lower extremities are 2+ and equal bilaterally.  Extremities: No edema, erythema, cyanosis or tenderness appreciated.  Skin:  No rashes or lesions appreciated.   Neurological:  No gross motor or sensory deficits.   Psych: Appropriate affect.        Data:     Labs reviewed:  Recent Labs   Lab Test 07/05/22  1205 06/21/22  0431 04/08/22  0832 11/11/21  1113 01/21/21  1338 12/16/20  0803 02/02/18  0604 01/17/18  0920 09/08/17  0859 10/19/16  0000   LDL  --  28 34 42  --    < >  --   --    < >  --    HDL  --  30* 28* 30*  --    < >  --   --    < >  --    NHDL  --  36 53 70  --    < >  --   --    < >  --    CHOL  --  66 81 100  --    < >  --   --    < >  --    TRIG  --  40 94 141  --    < >  --   --    < >  --    TSH  --   --   --   --   --   --   --   --   --  3.84   NTBNP 13,497*  --   --   --   --   --  11,243* 21,137*   < >  --    IRON  --   --   --   --  85  --   --   --   --   --    FEB  --   --   --   --  270  --   --   --   --   --    IRONSAT  --   --   --   --  31  --   --   --   --   --    AMBER  --   --   --   --  30  --   --   --   --   --     < > = values in this interval not displayed.       Lab Results   Component Value Date    WBC 8.7 06/23/2022    WBC 6.3 01/21/2021    RBC 4.20 (L) 06/23/2022    RBC 4.81 01/21/2021    HGB 10.4 (L) 07/05/2022    HGB 15.0 01/21/2021    HCT 36.6 (L) 06/23/2022    HCT 46.2 01/21/2021    MCV 87 06/23/2022    MCV 96 01/21/2021    MCH 25.7 (L) 06/23/2022    MCH 31.2 01/21/2021    MCHC 29.5 (L) 06/23/2022    MCHC 32.5 01/21/2021    RDW 18.1 (H) 06/23/2022    RDW 13.1 01/21/2021     06/23/2022     (L) 01/21/2021       Lab Results   Component Value Date     08/29/2022     08/29/2022     06/10/2021    POTASSIUM 5.6 (H) 08/29/2022    POTASSIUM 5.6 (H) 08/29/2022    POTASSIUM 4.6 06/10/2021     CHLORIDE 104 08/29/2022    CHLORIDE 104 08/29/2022    CHLORIDE 105 06/10/2021    CO2 31 08/29/2022    CO2 31 08/29/2022    CO2 30 06/10/2021    ANIONGAP 1 (L) 08/29/2022    ANIONGAP 1 (L) 08/29/2022    ANIONGAP 5 06/10/2021     (H) 08/29/2022     (H) 08/29/2022     (H) 06/10/2021    BUN 50 (H) 08/29/2022    BUN 50 (H) 08/29/2022    BUN 34 (H) 06/10/2021    CR 1.76 (H) 08/29/2022    CR 1.76 (H) 08/29/2022    CR 1.17 06/10/2021    GFRESTIMATED 40 (L) 08/29/2022    GFRESTIMATED 40 (L) 08/29/2022    GFRESTIMATED 49 (L) 11/09/2021    GFRESTIMATED 62 06/10/2021    GFRESTBLACK 71 06/10/2021    MARVA 9.3 08/29/2022    MARVA 9.3 08/29/2022    MARVA 8.7 06/10/2021      Lab Results   Component Value Date    AST 46 06/23/2022    AST 38 12/07/2020    ALT 31 06/23/2022    ALT 28 12/07/2020       Lab Results   Component Value Date    A1C 7.8 (H) 06/20/2022    A1C 9.6 (H) 12/16/2020       Lab Results   Component Value Date    INR 1.02 12/22/2017    INR 1.03 09/08/2017           Problem List:     Patient Active Problem List   Diagnosis     Cardiomyopathy (H)     Atrial fibrillation/flutter     Coronary artery disease involving native coronary artery of native heart without angina pectoris     Alcohol abuse, in remission     GERD (gastroesophageal reflux disease)     Alcohol-induced chronic pancreatitis (H)     Diabetes mellitus, type 2 (H)     Type 2 diabetes mellitus with other specified complication, with long-term current use of insulin (H)     CARDIOVASCULAR SCREENING; LDL GOAL LESS THAN 100     Hyperlipidemia LDL goal <70     ACS (acute coronary syndrome) (H)     NSTEMI (non-ST elevated myocardial infarction) (H)     Carotid stenosis     Carpal tunnel syndrome of right wrist     Left carotid stenosis     Tobacco use     NSVT (nonsustained ventricular tachycardia) (H)     Chronic systolic heart failure (H)     Paroxysmal atrial fibrillation (H)     Chronic obstructive pulmonary disease, unspecified COPD type (H)      Hypotension, unspecified hypotension type     Chronic kidney disease, stage 3 (H)     Pleural plaque     Atherosclerosis of artery of extremity with intermittent claudication (H)     Hyperparathyroidism (H)     Thrombocytopenia (H)     Other idiopathic peripheral autonomic neuropathy     Venous insufficiency (chronic) (peripheral)     Actinic keratosis     Nail dystrophy     Generalized muscle weakness     Vitamin D deficiency     Hypocalcemia     Essential hypertension     Ischemic cardiomyopathy     Other proteinuria     Hypomagnesemia     Anemia in chronic kidney disease     Diabetic nephropathy associated with type 2 diabetes mellitus (H)     PAD (peripheral artery disease) (H)     CHF (congestive heart failure) (H)     Dyspnea on exertion     Platypnea-orthodeoxia syndrome     Encounter for screening laboratory testing for severe acute respiratory syndrome coronavirus 2 (SARS-CoV-2)     Tinnitus     Pseudophakia     Presbyopia     Nonproliferative diabetic retinopathy (H)     Obesity     Mononeuritis     Neck pain     Lack of housing     Long term current use of anticoagulant therapy     Epidermoid cyst of skin     Glaucoma suspect     History of non-ST elevation myocardial infarction (NSTEMI)     Hearing loss     History of retinal detachment     Back pain     Cardiac defibrillator in place     Cellulitis and abscess of other specified site     Acute pancreatitis     Chronic pancreatitis (H)     Dry eye syndrome of bilateral lacrimal glands     Disorder of skin or subcutaneous tissue     Dry eyes     Enthesopathy of ankle and tarsus     Atherosclerosis of native arteries of extremities with intermittent claudication, bilateral legs (H)     Shock circulatory (H)           Medications:     Current Outpatient Medications   Medication Sig Dispense Refill     albuterol (PROAIR HFA) 108 (90 Base) MCG/ACT Inhaler Inhale 2 puffs into the lungs every 4 hours as needed for shortness of breath / dyspnea 1 Inhaler 0      amylase-lipase-protease (CREON 12) 52666-59707-27505 units CPEP Take 2 capsules by mouth 3 times daily (with meals)  120 capsule 0     atorvastatin (LIPITOR) 80 MG tablet Take 80 mg by mouth At Bedtime        calcium carbonate (OS-MARVA) 500 MG tablet Take 1 tablet by mouth 2 times daily One tablet in the morning, two at noon and one at bedtime       Carboxymethylcellulose Sod PF (THERATEARS PF) 0.25 % SOLN Apply 1 drop to eye 4 times daily as needed       carvedilol (COREG) 6.25 MG tablet Take 1 tablet (6.25 mg) by mouth 2 times daily (with meals) 180 tablet 3     clopidogrel (PLAVIX) 75 MG tablet Take 1 tablet (75 mg) by mouth daily 90 tablet 3     gabapentin (NEURONTIN) 100 MG capsule Take 300 mg by mouth At Bedtime 1 capsule 0     Glycerin (OASIS MOISTURIZING MOUTH SPRAY) 35 % LIQD Take 2 sprays by mouth daily as needed       insulin aspart (NOVOLOG PEN) 100 UNIT/ML pen Inject 3 Units Subcutaneous 3 times daily (with meals) (Patient taking differently: Inject 4 Units Subcutaneous daily) 15 mL 1     insulin glargine (LANTUS PEN) 100 UNIT/ML pen Inject 28 Units Subcutaneous every morning Increase by 2 units every 2-3 days until morning blood sugar is .  Max dose 40 units/day (Patient taking differently: Inject 32 Units Subcutaneous every morning Increase by 2 units every 2-3 days until morning blood sugar is .  Max dose 40 units/day) 45 mL 1     Insulin Pen Needle (PEN NEEDLES) 32G X 4 MM MISC        loperamide (IMODIUM) 2 MG capsule Take 2 mg by mouth 4 times daily as needed for diarrhea Patient takes a dose in the am and another at night       magnesium oxide (MAG-OX) 400 (241.3 Mg) MG tablet Take 1 tablet (400 mg) by mouth 4 times daily 120 tablet 1     metFORMIN (GLUCOPHAGE-XR) 500 MG 24 hr tablet Take 500 mg by mouth 2 times daily (with meals)        mometasone (ASMANEX TWISTHALER) 220 MCG/INH inhaler Inhale 2 puffs into the lungs every evening        nitroGLYcerin (NITROSTAT) 0.4 MG sublingual  tablet Place 0.4 mg under the tongue every 5 minutes as needed for chest pain       pantoprazole (PROTONIX) 40 MG EC tablet Take 40 mg by mouth 2 times daily  30 tablet      sacubitril-valsartan (ENTRESTO) 24-26 MG per tablet Take 1 tablet by mouth 2 times daily 180 tablet 3     torsemide (DEMADEX) 20 MG tablet Take 1 tablet (20 mg) by mouth daily . Take an extra tab as needed for 2-3+ lb wt gain. 90 tablet 4     vitamin D3 (CHOLECALCIFEROL) 50 mcg (2000 units) tablet Take 1 tablet by mouth 2 times daily             Past Medical History:     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%     Claudication (H)      COPD (chronic obstructive pulmonary disease) (H)      Diabetes (H)      Gastro-oesophageal reflux disease      GERD (gastroesophageal reflux disease)      H/O: alcohol abuse      HTN (hypertension)      Hyperlipidaemia LDL goal <100 07/08/2013     Hyperparathyroidism (H)      Hypokalemia      ICD (implantable cardioverter-defibrillator) in place     Medtronic     NSTEMI (non-ST elevated myocardial infarction) (H)      NSVT (nonsustained ventricular tachycardia) (H)      Pacemaker      Pancreatitis 6/26/06 Hosp     Paroxysmal atrial fibrillation (H)      PVD (peripheral vascular disease) (H)      Thrombocytopenia (H)      Tobacco use      Venous (peripheral) insufficiency      Vitamin D deficiency      Past Surgical History:   Procedure Laterality Date     CATARACT IOL, RT/LT      Cataract IOL RT/LT     CLOSE SECONDARY WOUND LOWER EXTREMITY Right 02/13/2022    Procedure: Right groin wound  exploration, nerve release and closure.;  Surgeon: Karen Arthur MD;  Location: [facility] OR     ENDARTERECTOMY CAROTID Right 06/12/2015    Procedure: ENDARTERECTOMY CAROTID;  Surgeon: João Turner MD;  Location:  OR     ENDARTERECTOMY CAROTID Left 09/08/2017    Procedure: ENDARTERECTOMY CAROTID;  LEFT CAROTID ENDARTERECTOMY WITH EEG;  Surgeon: João Turner MD;  Location:  OR     ENDARTERECTOMY FEMORAL Bilateral 02/09/2022    Procedure: BILATERAL- COMMON FEMORAL ENDARTERECTOMY; RETROGRADE ILIAC ARTERY STENTING;  Surgeon: Ric Chau MD;  Location: [facility] OR     IMPLANT PACEMAKER  06/10/2010     IMPLANTED DEVICE       INTERPOSITION TENDON HAND N/A 06/09/2020    Procedure: Right thumb ligament reconstruction tendon interposition with extensor pollicis brevis to abductor pollicis tendon transfer;  Surgeon: Bethel Martin MD;  Location: [facility] OR     PICC INSERTION - TRIPLE LUMEN Right 06/20/2022    Right basilic vein 0.62cm 4cm ext.Placement verified by Sherlock 3CG.PICC okay to use.     RELEASE CARPAL TUNNEL Right 04/12/2016    Procedure: RELEASE CARPAL TUNNEL;  Surgeon: Lissy Leger MD;  Location: [facility] OR     SURGICAL HISTORY OF -   1998    Laminectomy     TONSILLECTOMY & ADENOIDECTOMY       ZZC CABG, ARTERIAL, THREE       Family History   Adopted: Yes   Problem Relation Age of Onset     Unknown/Adopted No family hx of      Social History     Socioeconomic History     Marital status: Single     Spouse name: Not on file     Number of children: Not on file     Years of education: Not on file     Highest education level: Not on file   Occupational History     Not on file   Tobacco Use     Smoking status: Current Every Day Smoker     Packs/day: 1.50     Years: 50.00     Pack years: 75.00     Types: Cigarettes     Smokeless tobacco: Never Used     Tobacco comment: 1 1/2 PPD 05/18/22   Vaping Use     Vaping Use: Never used   Substance and Sexual Activity     Alcohol  use: No     Drug use: No     Sexual activity: Not Currently     Partners: Female   Other Topics Concern     Parent/sibling w/ CABG, MI or angioplasty before 65F 55M? No   Social History Narrative     Not on file     Social Determinants of Health     Financial Resource Strain: Not on file   Food Insecurity: Not on file   Transportation Needs: Not on file   Physical Activity: Not on file   Stress: Not on file   Social Connections: Not on file   Intimate Partner Violence: Not on file   Housing Stability: Not on file           Allergies:   Fish, Hydrocodone, and Shellfish allergy      TONJA Smith [facility]  Pager: [phone]    Today's clinic visit entailed:  Review of the result(s) of each unique test - BMP  Ordering of each unique test  Prescription drug management  I spent a total of 40 minutes on the day of the visit.   Time spent doing chart review, history and exam, documentation and further activities per the note  Provider  Link to [facility] Help Grid     The level of medical decision making during this visit was of moderate complexity.    Thank you for allowing me to participate in the care of your patient.      Sincerely,     TONJA Smith [facility]  cc:   Kristen Rice PA-C  [address]  [place] [address]

## 2022-08-29 NOTE — PATIENT INSTRUCTIONS
Today, we discussed the following:   - Results:   Potassium is high and the kidneys are a little irritated.   - Medication changes:    No changes for now.   Reduce your high potassium food intake, and increase your water intake. Re-check lab on Thursday or Friday of this week. If your numbers look better, we'll plan to cut back on your torsemide then.   - Follow up:   With Hansa in four weeks.     Please, remember to continue the followin.  Weigh yourself daily. Call if your weight is up > than 2 pounds in one day, or 5 pounds in one week; if you feel more short of breath or have worsening swelling in your legs or abdomen.  2.  Eat a low sodium diet (less than 2,000mg or 2g daily). If you eat less salt, you will retain less fluid.   3.  Avoid alcohol, as this can worsen heart failure.   4.  Avoid NSAIDs as able (For example, Ibuprofen / aleve / advil / naprosen / diclofenac).    Please call CORE nurse for any questions or concerns Mon-Fri 8am-4pm:   [phone]  For concerns after hours:   [phone] option 2   Scheduling phone number:   [phone]    Thank you for visiting with us today.   Kristen Rice PA-C  ______________________________________________________________  Some foods that are high in potassium  Fruits Vegetables Proteins Other   Avocado  Bananas  Coconut  Cantaloupe and honeydew melons  Dates  Dried fruits  Figs  Kiwi  Beto  Nectarines  Oranges and orange juice  Prunes and prune juice  Raisins Artichokes  Baked beans  Beets  Broccoli  [place] sprouts  Cabbage (raw)  Carrots (raw)  Chard  Olives  Potatoes (white and sweet)  Pickles  Pumpkin  [place]  Squash (acorn, butternut, elizalde)  Tomatoes and tomato juice Black beans  Clams  Ground beef  Kidney beans  Lobster  Navy beans  Luciano beans  [place]  Sardines  Scallops  Steak  [place]runson Chocolate  Dairy products  Granola  Milk  Peanut butter  Soups that are salt-free or low-sodium  Soy milk  Sports drinks  Tomato sauce  Wheat bran and bran  products  Whole-grain bread  Yogurt   The foods on this list are high in potassium. People who need to follow a low-potassium diet should avoid or limit these foods.

## 2022-09-01 NOTE — TELEPHONE ENCOUNTER
"Pt states he is still waiting on his other operation to heal up.  \"I still have a knot in my leg the size of a walnut.\"      He would like a call back in at least a month to discuss scheduling.  He will call if anything changes.  "

## 2022-09-06 NOTE — PROGRESS NOTES
[facility]    Re: 9/2 BMP result per Kristen Rice PAC:          [email or web address] message sent with info. Will follow-up if not read. Will need to fax BMP and Kristen's recs to [facility] 9/7.    Future Appointments   Date Time Provider Department Center   9/26/2022  1:45 PM [facility] [place]   9/26/2022  2:45 PM Parul Badillo PA-C [facility] PSA CLIN   10/10/2022 12:00 AM GALAV[facility] PSA CLIN         Melina Barnes RN BSN   4:51 PM 09/06/22  CORE nurse line M-F 8a-4p: [phone]

## 2022-09-07 NOTE — PROGRESS NOTES
Great- we'll keep things the same then! Follow up as planned.    Kristen Rice PA-C  [facility]

## 2022-09-07 NOTE — PROGRESS NOTES
[facility]        Spoke with Kamron and reviewed message above.    Kamron said he self-adjusted torsemide to 20 mg daily as his weight was stable, will occasionally take extra tab if increased swelling, no new HF sx today. Current wt 153 lbs (clinic wt 8/29 was 153).    He also said he's been taking entresto one full tab at 24-26 mg BID.     No dizziness--has increased fluid intake as directed, will continue to avoid juice. BP 120s/60s.     Reviewed with him to keep follow-up 9/26 as planned, RN will call if further directions from Kristen VALENCIA.    Melina Barnes RN BSN   1:52 PM 09/07/22  CORE nurse line M-F 8a-4p: [phone]

## 2022-09-16 NOTE — PROGRESS NOTES
Reviewed labs after torsemide reduction to 20 mg daily and Entresto increase to 24/26 mg BID.     Component      Latest Ref Rng & Units 9/2/2022 9/14/2022   Creatinine      0.67 - 1.17 mg/dL 1.57 (H) 1.73 (H)   Sodium      136 - 145 mmol/L 136 137   Potassium      3.4 - 5.3 mmol/L 5.1 5.2   Urea Nitrogen      8.0 - 23.0 mg/dL 39.3 (H) 49.1 (H)   Chloride      98 - 107 mmol/L 102 101   Carbon Dioxide (CO2)      22 - 29 mmol/L 25 28   Anion Gap      7 - 15 mmol/L 9 8   Glucose      70 - 99 mg/dL 116 (H) 95   GFR Estimate      >60 mL/min/1.73m2 46 (L) 41 (L)   Calcium      8.8 - 10.2 mg/dL 8.6 (L) 9.1       Creat has bumped slightly, but overall not far from his baseline. Will keep things the same for now, and re-check as scheduled at upcoming OV malinda/Hansa Badillo on 9/26.     TONJA Smith [facility]

## 2022-09-26 NOTE — LETTER
9/26/2022    Danii Hernández MD  [id number]  [facility] [address]    RE: Delbert Tilley       Dear Colleague,     I had the pleasure of seeing Delbert Tilley in the [facility].    Cardiology Progress Note    Patient seen today in follow up of: CORE follow up  Primary cardiologist: Dr. Glass    HPI:  Delbert Tilley is a very pleasant 72 year old male with a history of:  1.  History of a severe ischemic cardiomyopathy.  ICD in place  2.  CAD, status post CABG (LIMA-LAD, SVG-OM, SVG-PDA) and PCI to the OM 3 graft in 2014  3.  Proximal atrial fibrillation and flutter, status post atrial flutter ablation  4.  PAD, with history of carotid endarterectomies and iliac artery stenting, followed by vascular surgery  5.  CKD, followed by [facility] nephrology Dr. Cueva, baseline creat ~1.5.  6.  Diabetes mellitus  7.  Tobacco abuse, ongoing    Delbert has had an ischemic cardiomyopathy since suffering a NSTEMI in 2014. EF was 35 - 40% at that time and declined to 20 - 25% in 2017 where it remained.     He was admitted in April of this year with acute HFrEF exacerbation requiring IV lasix and metolazone. He was discharged on 20 mg of Torsemide BID but ultimately left AMA.     He was admitted in June then at [facility] for three days with cardiogenic shock. He initially presented with weakness and hip pain after a mechanical fall a few days prior to presentation. He had noted increasing dyspnea and was found to be in cardiogenic shock. He required IV dobutamine and IV diuresis with a swan in place. Dobutamine was continued until the night before he lived. In the morning, his CI had decreased to 1.7 and SVR had increased to 1100. Ongoing hospitalization was recommended however he refused and left against medical advise. His losartan and carvedilol were held. He went home and resumed his carvedilol 25 mg twice daily, losartan 50 mg daily, and Torsemide 40 - 60 mg daily. His echo in the hospital showed EF 15 - 20% with  "mild to moderate RV dysfunction, LV was mild to moderately dilated.    He was seen in follow up by my colleague Kristen Rice PA-C a few dasy after discharge. His breathing was \"great.\" He was subsequently seen by Dr. Glass who decreased his Carvedilol to 12. 5 mg twice daily and started him on low dose Entresto. In follow up, he was tolerating Entresto only once daily. Carvedilol was cut back further. His torsemide was reduced and Entresto was increased to twice daily. He ended up never cutting back on his Torsemide however.      He was last seen in clinic about a month ago. He was doing relatively well clinically however renal function was mildly worse with a creatinine up to 1.76 from 1.5. he had mild hyperkalemia as well. He was instructed to follow a high potassium diet and drink some fluids. Due to RADHA, he was not started on a SGLT-2 inhibitor. Ultimately, due to his renal insufficiency, Torsemide was cut back to 20 mg daily from 40 mg daily.     He is back today in follow up. He presents with his daughter. I am meeting him for the first time. He notes overall he is feeling pretty good. His weight has been under 155 lbs consistent at home. His energy level is \"good enough.\" He is able to work in his shop which he enjoys. His activity is limited by neuropathy and balance issues. No significant dyspnea on exertion. [facility]. He is tolerating his current medications without lightheadedness or dizziness.     ASSESSMENT/PLAN:  Delbert Tilley is a very pleasant 72 year old male with a history of a severe ischemic cardiomyopathy who is here today for CORE clinic follow up.    Today, Delbert has ongoing NYHA class III symptoms. We have been working to optimize his GDMT since his recent hospitalization at the [facility] with cardiogenic shock. He currently is on carvedilol 6.25 mg twice daily, Entresto 24/26 mg twice daily, and Torsemide 20 mg daily (dose recently " reduced due to worsening renal insufficiency). His blood pressure is stable today. Unfortunately, his BMP drawn today is pending at the time of our visit. Assuming his renal function is stable/better and potassium level is normal, I'd like to try to increase his Entresto to medium dose in a step wise fashion. He was in agreement with that plan but again, we will await his labs from today and let him know.     I do think we should consider a SGLT-2 inhibitor at this point. He has DM and is on insulin [facility]. He is due to [facility] who manage [facility]. I have given him the names of the SGLT-2 inhibitors to discuss at his follow up appointment with them and hopefully initiate.    He has CAD and vascular disease for which he is on plavix and lipitor which will be continued.     I will see Kmaron back in a few weeks for follow up to further optimize his medications as able. We will coordinate in the meantime follow his labs today. In the meantime, I asked him to call with questions or concerns. It was a pleasure meeting Kamron in clinic today.    Orders this Visit:  Orders Placed This Encounter   Procedures     Basic metabolic panel     Follow-Up with Cardiology [facility] CORE     No orders of the defined types were placed in this encounter.    There are no discontinued medications.    CURRENT MEDICATIONS:  Current Outpatient Medications   Medication Sig Dispense Refill     albuterol (PROAIR HFA) 108 (90 Base) MCG/ACT Inhaler Inhale 2 puffs into the lungs every 4 hours as needed for shortness of breath / dyspnea 1 Inhaler 0     amylase-lipase-protease (CREON 12) [id number] units CPEP Take 2 capsules by mouth 3 times daily (with meals)  120 capsule 0     atorvastatin (LIPITOR) 80 MG tablet Take 80 mg by mouth At Bedtime        calcium carbonate (OS-MARVA) 500 MG tablet Take 1 tablet by mouth 2 times daily One tablet in the morning, two at noon and one at bedtime        Carboxymethylcellulose Sod PF (THERATEARS PF) 0.25 % SOLN Apply 1 drop to eye 4 times daily as needed       carvedilol (COREG) 6.25 MG tablet Take 1 tablet (6.25 mg) by mouth 2 times daily (with meals) 180 tablet 3     gabapentin (NEURONTIN) 100 MG capsule Take 300 mg by mouth At Bedtime 1 capsule 0     Glycerin (OASIS MOISTURIZING MOUTH SPRAY) 35 % LIQD Take 2 sprays by mouth daily as needed       insulin aspart (NOVOLOG PEN) 100 UNIT/ML pen Inject 3 Units Subcutaneous 3 times daily (with meals) (Patient taking differently: Inject 4 Units Subcutaneous daily) 15 mL 1     insulin glargine (LANTUS PEN) 100 UNIT/ML pen Inject 28 Units Subcutaneous every morning Increase by 2 units every 2-3 days until morning blood sugar is .  Max dose 40 units/day (Patient taking differently: Inject 32 Units Subcutaneous every morning Increase by 2 units every 2-3 days until morning blood sugar is .  Max dose 40 units/day) 45 mL 1     Insulin Pen Needle (PEN NEEDLES) 32G X 4 MM MISC        loperamide (IMODIUM) 2 MG capsule Take 2 mg by mouth 4 times daily as needed for diarrhea Patient takes a dose in the am and another at night       magnesium oxide (MAG-OX) 400 MG tablet Take 6.5 tablets (2,600 mg) by mouth daily       metFORMIN (GLUCOPHAGE-XR) 500 MG 24 hr tablet Take 500 mg by mouth 2 times daily (with meals)        mometasone (ASMANEX TWISTHALER) 220 MCG/INH inhaler Inhale 2 puffs into the lungs every evening        nitroGLYcerin (NITROSTAT) 0.4 MG sublingual tablet Place 0.4 mg under the tongue every 5 minutes as needed for chest pain       pantoprazole (PROTONIX) 40 MG EC tablet Take 40 mg by mouth 2 times daily  30 tablet      sacubitril-valsartan (ENTRESTO) 24-26 MG per tablet Take 1 tablet by mouth 2 times daily 180 tablet 3     torsemide (DEMADEX) 20 MG tablet Take 1 tablet (20 mg) by mouth daily May take additional 20 mg daily if shortness of breath, swelling, weight gain 100 tablet 3     vitamin D3  (CHOLECALCIFEROL) 50 mcg (2000 units) tablet Take 1 tablet by mouth 2 times daily       clopidogrel (PLAVIX) 75 MG tablet Take 1 tablet (75 mg) by mouth daily 90 tablet 3     ALLERGIES  Allergies   Allergen Reactions     Fish Nausea and Vomiting     severe     Hydrocodone GI Disturbance     Upset stomach     Shellfish Allergy Hives and Rash     PAST MEDICAL HISTORY:  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%     Claudication (H)      COPD (chronic obstructive pulmonary disease) (H)      Diabetes (H)      Gastro-oesophageal reflux disease      GERD (gastroesophageal reflux disease)      H/O: alcohol abuse      HTN (hypertension)      Hyperlipidaemia LDL goal <100 07/08/2013     Hyperparathyroidism (H)      Hypokalemia      ICD (implantable cardioverter-defibrillator) in place     [employer]     NSTEMI (non-ST elevated myocardial infarction) (H)      NSVT (nonsustained ventricular tachycardia) (H)      Pacemaker      Pancreatitis 6/26/06 Hosp     Paroxysmal atrial fibrillation (H)      PVD (peripheral vascular disease) (H)      Thrombocytopenia (H)      Tobacco use      Venous (peripheral) insufficiency      Vitamin D deficiency      PAST SURGICAL HISTORY:  Past Surgical History:   Procedure Laterality Date     CATARACT IOL, RT/LT      Cataract IOL RT/LT     CLOSE SECONDARY WOUND LOWER EXTREMITY Right 02/13/2022    Procedure: Right groin wound exploration, nerve release and closure.;  Surgeon: Karen Arthur MD;  Location: [facility] OR     ENDARTERECTOMY CAROTID Right 06/12/2015    Procedure:  ENDARTERECTOMY CAROTID;  Surgeon: João Turner MD;  Location:  OR     ENDARTERECTOMY CAROTID Left 09/08/2017    Procedure: ENDARTERECTOMY CAROTID;  LEFT CAROTID ENDARTERECTOMY WITH EEG;  Surgeon: João Turner MD;  Location:  OR     ENDARTERECTOMY FEMORAL Bilateral 02/09/2022    Procedure: BILATERAL- COMMON FEMORAL ENDARTERECTOMY; RETROGRADE ILIAC ARTERY STENTING;  Surgeon: Ric Chau MD;  Location: [facility] OR     IMPLANT PACEMAKER  06/10/2010     IMPLANTED DEVICE       INTERPOSITION TENDON HAND N/A 06/09/2020    Procedure: Right thumb ligament reconstruction tendon interposition with extensor pollicis brevis to abductor pollicis tendon transfer;  Surgeon: Bethel Martin MD;  Location: [facility] OR     PICC INSERTION - TRIPLE LUMEN Right 06/20/2022    Right basilic vein 0.62cm 4cm ext.Placement verified by [employer] 3CG.PICC okay to use.     RELEASE CARPAL TUNNEL Right 04/12/2016    Procedure: RELEASE CARPAL TUNNEL;  Surgeon: Lissy Leger MD;  Location: [facility] OR     SURGICAL HISTORY OF -   1998    Laminectomy     TONSILLECTOMY & ADENOIDECTOMY       ZZC CABG, ARTERIAL, THREE       FAMILY HISTORY:  Family History   Adopted: Yes   Problem Relation Age of Onset     Unknown/Adopted No family hx of      SOCIAL HISTORY:  Social History     Socioeconomic History     Marital status: Single     Spouse name: None     Number of children: None     Years of education: None     Highest education level: None   Tobacco Use     Smoking status: Current Every Day Smoker     Packs/day: 1.50     Years: 50.00     Pack years: 75.00     Types: Cigarettes     Smokeless tobacco: Never Used     Tobacco comment: 1 1/2 PPD 05/18/22   Vaping Use     Vaping Use: Never used   Substance and Sexual Activity     Alcohol use: No     Drug use: No     Sexual activity: Not Currently     Partners: Female   Other Topics Concern     Parent/sibling w/ CABG, MI or angioplasty before 65F 55M? No     Review of  Systems:  Skin:        Eyes:       ENT:       Respiratory:  Negative    Cardiovascular:    Positive for;fatigue  Gastroenterology:      Genitourinary:       Musculoskeletal:       Neurologic:       Psychiatric:       Heme/Lymph/Imm:       Endocrine:          Physical Exam:  Vitals: /56 (BP Location: Right arm, Patient Position: Sitting, Cuff Size: Adult Regular)   Pulse 58   Resp 20   Wt 69.7 kg (153 lb 9.6 oz)   SpO2 100%   BMI 20.83 kg/m     Wt Readings from Last 4 Encounters:   09/26/22 69.7 kg (153 lb 9.6 oz)   08/29/22 69.5 kg (153 lb 3.2 oz)   08/08/22 69.4 kg (153 lb)   07/05/22 69.3 kg (152 lb 12.8 oz)     GEN: well nourished, in no acute distress.  HEENT:  Pupils equal, round. Sclerae nonicteric.   NECK: no JVD  C/V:  Regular rate and rhythm, no murmur, rub or gallop.    RESP: Respirations are unlabored. Clear to auscultation bilaterally without wheezing, rales, or rhonchi.  GI: Abdomen soft, nontender.  EXTREM: no LE edema.  NEURO: Alert and oriented, cooperative.  SKIN: Warm and dry.     Recent Lab Results:  LIPID RESULTS:  Lab Results   Component Value Date    CHOL 66 06/21/2022    CHOL 95 12/16/2020    HDL 30 (L) 06/21/2022    HDL 28 (L) 12/16/2020    LDL 28 06/21/2022    LDL 44 12/16/2020    TRIG 40 06/21/2022    TRIG 115 12/16/2020    CHOLHDLRATIO 4.6 06/12/2015     LIVER ENZYME RESULTS:  Lab Results   Component Value Date    AST 46 06/23/2022    AST 38 12/07/2020    ALT 31 06/23/2022    ALT 28 12/07/2020     CBC RESULTS:  Lab Results   Component Value Date    WBC 8.7 06/23/2022    WBC 6.3 01/21/2021    RBC 4.20 (L) 06/23/2022    RBC 4.81 01/21/2021    HGB 11.9 (L) 09/14/2022    HGB 15.0 01/21/2021    HCT 36.6 (L) 06/23/2022    HCT 46.2 01/21/2021    MCV 87 06/23/2022    MCV 96 01/21/2021    MCH 25.7 (L) 06/23/2022    MCH 31.2 01/21/2021    MCHC 29.5 (L) 06/23/2022    MCHC 32.5 01/21/2021    RDW 18.1 (H) 06/23/2022    RDW 13.1 01/21/2021     06/23/2022     (L) 01/21/2021      BMP RESULTS:  Lab Results   Component Value Date     09/26/2022     06/10/2021    POTASSIUM 5.4 (H) 09/26/2022    POTASSIUM 5.6 (H) 08/29/2022    POTASSIUM 5.6 (H) 08/29/2022    POTASSIUM 4.6 06/10/2021    CHLORIDE 103 09/26/2022    CHLORIDE 104 08/29/2022    CHLORIDE 104 08/29/2022    CHLORIDE 105 06/10/2021    CO2 28 09/26/2022    CO2 31 08/29/2022    CO2 31 08/29/2022    CO2 30 06/10/2021    ANIONGAP 7 09/26/2022    ANIONGAP 1 (L) 08/29/2022    ANIONGAP 1 (L) 08/29/2022    ANIONGAP 5 06/10/2021     (H) 09/26/2022     (H) 08/29/2022     (H) 08/29/2022     (H) 06/10/2021    BUN 47.9 (H) 09/26/2022    BUN 50 (H) 08/29/2022    BUN 50 (H) 08/29/2022    BUN 34 (H) 06/10/2021    CR 1.60 (H) 09/26/2022    CR 1.17 06/10/2021    GFRESTIMATED 45 (L) 09/26/2022    GFRESTIMATED 49 (L) 11/09/2021    GFRESTIMATED 62 06/10/2021    GFRESTBLACK 71 06/10/2021    MARVA 8.6 (L) 09/26/2022    MARVA 8.7 06/10/2021      A1C RESULTS:  Lab Results   Component Value Date    A1C 7.8 (H) 06/20/2022    A1C 9.6 (H) 12/16/2020     INR RESULTS:  Lab Results   Component Value Date    INR 1.02 12/22/2017    INR 1.03 09/08/2017     Total time today was 40 minutes review notes, imaging, labs, patient visit, orders and documentation.     Parul Badillo PA-C  [facility]      Thank you for allowing me to participate in the care of your patient.      Sincerely,     Parul Badillo PA-C     [facility]  cc:   Kristen Rice PA-C  [address]  [place] [address]

## 2022-09-26 NOTE — PATIENT INSTRUCTIONS
Call CORE nurse for any questions or concerns Mon-Fri 8am-4pm:                                                 #[phone]                                       For concerns after hours:                                               #[phone], option 2     1: Medication changes: We'll hopefully plan to increase your Entresto hopefully to 49/51 mg twice daily if your labs are stable. We'd do this stepwise by having you take two of the 24/26 mg tablets you have at home in the evening for a week and if you tolerate that then increase to two tablets twice daily.   2: Plan from today: see us back in one month.  3. I'll send you a [facility] message with your labs results.

## 2022-09-26 NOTE — PROGRESS NOTES
"  Cardiology Progress Note    Patient seen today in follow up of: CORE follow up  Primary cardiologist: Dr. Glass    HPI:  Delbert Tilley is a very pleasant 72 year old male with a history of:  1.  History of a severe ischemic cardiomyopathy.  ICD in place  2.  CAD, status post CABG (LIMA-LAD, SVG-OM, SVG-PDA) and PCI to the OM 3 graft in 2014  3.  Proximal atrial fibrillation and flutter, status post atrial flutter ablation  4.  PAD, with history of carotid endarterectomies and iliac artery stenting, followed by vascular surgery  5.  CKD, followed by [facility] nephrology Dr. Cueva, baseline creat ~1.5.  6.  Diabetes mellitus  7.  Tobacco abuse, ongoing    Delbert has had an ischemic cardiomyopathy since suffering a NSTEMI in 2014. EF was 35 - 40% at that time and declined to 20 - 25% in 2017 where it remained.     He was admitted in April of this year with acute HFrEF exacerbation requiring IV lasix and metolazone. He was discharged on 20 mg of Torsemide BID but ultimately left AMA.     He was admitted in June then at [facility] for three days with cardiogenic shock. He initially presented with weakness and hip pain after a mechanical fall a few days prior to presentation. He had noted increasing dyspnea and was found to be in cardiogenic shock. He required IV dobutamine and IV diuresis with a swan in place. Dobutamine was continued until the night before he lived. In the morning, his CI had decreased to 1.7 and SVR had increased to 1100. Ongoing hospitalization was recommended however he refused and left against medical advise. His losartan and carvedilol were held. He went home and resumed his carvedilol 25 mg twice daily, losartan 50 mg daily, and Torsemide 40 - 60 mg daily. His echo in the hospital showed EF 15 - 20% with mild to moderate RV dysfunction, LV was mild to moderately dilated.    He was seen in follow up by my colleague Kristen Rice PA-C a few dasy after discharge. His breathing was \"great.\" He was " "subsequently seen by Dr. Glass who decreased his Carvedilol to 12. 5 mg twice daily and started him on low dose Entresto. In follow up, he was tolerating Entresto only once daily. Carvedilol was cut back further. His torsemide was reduced and Entresto was increased to twice daily. He ended up never cutting back on his Torsemide however.      He was last seen in clinic about a month ago. He was doing relatively well clinically however renal function was mildly worse with a creatinine up to 1.76 from 1.5. he had mild hyperkalemia as well. He was instructed to follow a high potassium diet and drink some fluids. Due to RADHA, he was not started on a SGLT-2 inhibitor. Ultimately, due to his renal insufficiency, Torsemide was cut back to 20 mg daily from 40 mg daily.     He is back today in follow up. He presents with his daughter. I am meeting him for the first time. He notes overall he is feeling pretty good. His weight has been under 155 lbs consistent at home. His energy level is \"good enough.\" He is able to work in his shop which he enjoys. His activity is limited by neuropathy and balance issues. No significant dyspnea on exertion. [facility]. He is tolerating his current medications without lightheadedness or dizziness.     ASSESSMENT/PLAN:  Delbert Tilley is a very pleasant 72 year old male with a history of a severe ischemic cardiomyopathy who is here today for CORE clinic follow up.    Today, Delbert has ongoing NYHA class III symptoms. We have been working to optimize his GDMT since his recent hospitalization at the [place] with cardiogenic shock. He currently is on carvedilol 6.25 mg twice daily, Entresto 24/26 mg twice daily, and Torsemide 20 mg daily (dose recently reduced due to worsening renal insufficiency). His blood pressure is stable today. Unfortunately, his BMP drawn today is pending at the time of our visit. Assuming his renal function is " stable/better and potassium level is normal, I'd like to try to increase his Entresto to medium dose in a step wise fashion. He was in agreement with that plan but again, we will await his labs from today and let him know.     I do think we should consider a SGLT-2 inhibitor at this point. He has DM and is on [facility]. He is due to [facility] who [facility]. I have given him the names of the SGLT-2 inhibitors to discuss at his follow up appointment with them and hopefully initiate.    He has CAD and vascular disease for which he is on plavix and lipitor which will be continued.     I will see Kamron back in a few weeks for follow up to further optimize his medications as able. We will coordinate in the meantime follow his labs today. In the meantime, I asked him to call with questions or concerns. It was a pleasure meeting Kamron in clinic today.    Orders this Visit:  Orders Placed This Encounter   Procedures     Basic metabolic panel     Follow-Up with Cardiology [facility] CORE     No orders of the defined types were placed in this encounter.    There are no discontinued medications.    CURRENT MEDICATIONS:  Current Outpatient Medications   Medication Sig Dispense Refill     albuterol (PROAIR HFA) 108 (90 Base) MCG/ACT Inhaler Inhale 2 puffs into the lungs every 4 hours as needed for shortness of breath / dyspnea 1 Inhaler 0     amylase-lipase-protease (CREON 12) 50617-67150-14002 units CPEP Take 2 capsules by mouth 3 times daily (with meals)  120 capsule 0     atorvastatin (LIPITOR) 80 MG tablet Take 80 mg by mouth At Bedtime        calcium carbonate (OS-MARVA) 500 MG tablet Take 1 tablet by mouth 2 times daily One tablet in the morning, two at noon and one at bedtime       Carboxymethylcellulose Sod PF (THERATEARS PF) 0.25 % SOLN Apply 1 drop to eye 4 times daily as needed       carvedilol (COREG) 6.25 MG tablet Take 1 tablet (6.25 mg) by mouth 2 times daily (with meals)  180 tablet 3     gabapentin (NEURONTIN) 100 MG capsule Take 300 mg by mouth At Bedtime 1 capsule 0     Glycerin (OASIS MOISTURIZING MOUTH SPRAY) 35 % LIQD Take 2 sprays by mouth daily as needed       insulin aspart (NOVOLOG PEN) 100 UNIT/ML pen Inject 3 Units Subcutaneous 3 times daily (with meals) (Patient taking differently: Inject 4 Units Subcutaneous daily) 15 mL 1     insulin glargine (LANTUS PEN) 100 UNIT/ML pen Inject 28 Units Subcutaneous every morning Increase by 2 units every 2-3 days until morning blood sugar is .  Max dose 40 units/day (Patient taking differently: Inject 32 Units Subcutaneous every morning Increase by 2 units every 2-3 days until morning blood sugar is .  Max dose 40 units/day) 45 mL 1     Insulin Pen Needle (PEN NEEDLES) 32G X 4 MM MISC        loperamide (IMODIUM) 2 MG capsule Take 2 mg by mouth 4 times daily as needed for diarrhea Patient takes a dose in the am and another at night       magnesium oxide (MAG-OX) 400 MG tablet Take 6.5 tablets (2,600 mg) by mouth daily       metFORMIN (GLUCOPHAGE-XR) 500 MG 24 hr tablet Take 500 mg by mouth 2 times daily (with meals)        mometasone (ASMANEX TWISTHALER) 220 MCG/INH inhaler Inhale 2 puffs into the lungs every evening        nitroGLYcerin (NITROSTAT) 0.4 MG sublingual tablet Place 0.4 mg under the tongue every 5 minutes as needed for chest pain       pantoprazole (PROTONIX) 40 MG EC tablet Take 40 mg by mouth 2 times daily  30 tablet      sacubitril-valsartan (ENTRESTO) 24-26 MG per tablet Take 1 tablet by mouth 2 times daily 180 tablet 3     torsemide (DEMADEX) 20 MG tablet Take 1 tablet (20 mg) by mouth daily May take additional 20 mg daily if shortness of breath, swelling, weight gain 100 tablet 3     vitamin D3 (CHOLECALCIFEROL) 50 mcg (2000 units) tablet Take 1 tablet by mouth 2 times daily       clopidogrel (PLAVIX) 75 MG tablet Take 1 tablet (75 mg) by mouth daily 90 tablet 3     ALLERGIES  Allergies   Allergen  Reactions     Fish Nausea and Vomiting     severe     Hydrocodone GI Disturbance     Upset stomach     Shellfish Allergy Hives and Rash     PAST MEDICAL HISTORY:  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%     Claudication (H)      COPD (chronic obstructive pulmonary disease) (H)      Diabetes (H)      Gastro-oesophageal reflux disease      GERD (gastroesophageal reflux disease)      H/O: alcohol abuse      HTN (hypertension)      Hyperlipidaemia LDL goal <100 07/08/2013     Hyperparathyroidism (H)      Hypokalemia      ICD (implantable cardioverter-defibrillator) in place     [employer]     NSTEMI (non-ST elevated myocardial infarction) (H)      NSVT (nonsustained ventricular tachycardia) (H)      Pacemaker      Pancreatitis 6/26/06 Hosp     Paroxysmal atrial fibrillation (H)      PVD (peripheral vascular disease) (H)      Thrombocytopenia (H)      Tobacco use      Venous (peripheral) insufficiency      Vitamin D deficiency      PAST SURGICAL HISTORY:  Past Surgical History:   Procedure Laterality Date     CATARACT IOL, RT/LT      Cataract IOL RT/LT     CLOSE SECONDARY WOUND LOWER EXTREMITY Right 02/13/2022    Procedure: Right groin wound exploration, nerve release and closure.;  Surgeon: Karen Arthur MD;  Location: [facility] [place] OR     ENDARTERECTOMY CAROTID Right 06/12/2015    Procedure: ENDARTERECTOMY CAROTID;  Surgeon: João Turner MD;  Location:  OR     ENDARTERECTOMY CAROTID Left 09/08/2017    Procedure: ENDARTERECTOMY CAROTID;  LEFT CAROTID ENDARTERECTOMY WITH EEG;  Surgeon:  João Turner MD;  Location:  OR     ENDARTERECTOMY FEMORAL Bilateral 02/09/2022    Procedure: BILATERAL- COMMON FEMORAL ENDARTERECTOMY; RETROGRADE ILIAC ARTERY STENTING;  Surgeon: Ric Chau MD;  Location: [facility] [place] OR     IMPLANT PACEMAKER  06/10/2010     IMPLANTED DEVICE       INTERPOSITION TENDON HAND N/A 06/09/2020    Procedure: Right thumb ligament reconstruction tendon interposition with extensor pollicis brevis to abductor pollicis tendon transfer;  Surgeon: Bethel Martin MD;  Location: [facility] OR     PICC INSERTION - TRIPLE LUMEN Right 06/20/2022    Right basilic vein 0.62cm 4cm ext.Placement verified by Sherlock 3CG.PICC okay to use.     RELEASE CARPAL TUNNEL Right 04/12/2016    Procedure: RELEASE CARPAL TUNNEL;  Surgeon: Lissy Leger MD;  Location: [facility] OR     SURGICAL HISTORY OF -   1998    Laminectomy     TONSILLECTOMY & ADENOIDECTOMY       ZZC CABG, ARTERIAL, THREE       FAMILY HISTORY:  Family History   Adopted: Yes   Problem Relation Age of Onset     Unknown/Adopted No family hx of      SOCIAL HISTORY:  Social History     Socioeconomic History     Marital status: Single     Spouse name: None     Number of children: None     Years of education: None     Highest education level: None   Tobacco Use     Smoking status: Current Every Day Smoker     Packs/day: 1.50     Years: 50.00     Pack years: 75.00     Types: Cigarettes     Smokeless tobacco: Never Used     Tobacco comment: 1 1/2 PPD 05/18/22   Vaping Use     Vaping Use: Never used   Substance and Sexual Activity     Alcohol use: No     Drug use: No     Sexual activity: Not Currently     Partners: Female   Other Topics Concern     Parent/sibling w/ CABG, MI or angioplasty before 65F 55M? No     Review of Systems:  Skin:        Eyes:       ENT:       Respiratory:  Negative    Cardiovascular:    Positive for;fatigue  Gastroenterology:      Genitourinary:       Musculoskeletal:       Neurologic:       Psychiatric:        Heme/Lymph/Imm:       Endocrine:          Physical Exam:  Vitals: /56 (BP Location: Right arm, Patient Position: Sitting, Cuff Size: Adult Regular)   Pulse 58   Resp 20   Wt 69.7 kg (153 lb 9.6 oz)   SpO2 100%   BMI 20.83 kg/m     Wt Readings from Last 4 Encounters:   09/26/22 69.7 kg (153 lb 9.6 oz)   08/29/22 69.5 kg (153 lb 3.2 oz)   08/08/22 69.4 kg (153 lb)   07/05/22 69.3 kg (152 lb 12.8 oz)     GEN: well nourished, in no acute distress.  HEENT:  Pupils equal, round. Sclerae nonicteric.   NECK: no JVD  C/V:  Regular rate and rhythm, no murmur, rub or gallop.    RESP: Respirations are unlabored. Clear to auscultation bilaterally without wheezing, rales, or rhonchi.  GI: Abdomen soft, nontender.  EXTREM: no LE edema.  NEURO: Alert and oriented, cooperative.  SKIN: Warm and dry.     Recent Lab Results:  LIPID RESULTS:  Lab Results   Component Value Date    CHOL 66 06/21/2022    CHOL 95 12/16/2020    HDL 30 (L) 06/21/2022    HDL 28 (L) 12/16/2020    LDL 28 06/21/2022    LDL 44 12/16/2020    TRIG 40 06/21/2022    TRIG 115 12/16/2020    CHOLHDLRATIO 4.6 06/12/2015     LIVER ENZYME RESULTS:  Lab Results   Component Value Date    AST 46 06/23/2022    AST 38 12/07/2020    ALT 31 06/23/2022    ALT 28 12/07/2020     CBC RESULTS:  Lab Results   Component Value Date    WBC 8.7 06/23/2022    WBC 6.3 01/21/2021    RBC 4.20 (L) 06/23/2022    RBC 4.81 01/21/2021    HGB 11.9 (L) 09/14/2022    HGB 15.0 01/21/2021    HCT 36.6 (L) 06/23/2022    HCT 46.2 01/21/2021    MCV 87 06/23/2022    MCV 96 01/21/2021    MCH 25.7 (L) 06/23/2022    MCH 31.2 01/21/2021    MCHC 29.5 (L) 06/23/2022    MCHC 32.5 01/21/2021    RDW 18.1 (H) 06/23/2022    RDW 13.1 01/21/2021     06/23/2022     (L) 01/21/2021     BMP RESULTS:  Lab Results   Component Value Date     09/26/2022     06/10/2021    POTASSIUM 5.4 (H) 09/26/2022    POTASSIUM 5.6 (H) 08/29/2022    POTASSIUM 5.6 (H) 08/29/2022    POTASSIUM 4.6 06/10/2021     CHLORIDE 103 09/26/2022    CHLORIDE 104 08/29/2022    CHLORIDE 104 08/29/2022    CHLORIDE 105 06/10/2021    CO2 28 09/26/2022    CO2 31 08/29/2022    CO2 31 08/29/2022    CO2 30 06/10/2021    ANIONGAP 7 09/26/2022    ANIONGAP 1 (L) 08/29/2022    ANIONGAP 1 (L) 08/29/2022    ANIONGAP 5 06/10/2021     (H) 09/26/2022     (H) 08/29/2022     (H) 08/29/2022     (H) 06/10/2021    BUN 47.9 (H) 09/26/2022    BUN 50 (H) 08/29/2022    BUN 50 (H) 08/29/2022    BUN 34 (H) 06/10/2021    CR 1.60 (H) 09/26/2022    CR 1.17 06/10/2021    GFRESTIMATED 45 (L) 09/26/2022    GFRESTIMATED 49 (L) 11/09/2021    GFRESTIMATED 62 06/10/2021    GFRESTBLACK 71 06/10/2021    MARVA 8.6 (L) 09/26/2022    MARVA 8.7 06/10/2021      A1C RESULTS:  Lab Results   Component Value Date    A1C 7.8 (H) 06/20/2022    A1C 9.6 (H) 12/16/2020     INR RESULTS:  Lab Results   Component Value Date    INR 1.02 12/22/2017    INR 1.03 09/08/2017     Total time today was 40 minutes review notes, imaging, labs, patient visit, orders and documentation.     Parul Badillo PA-C  [facility]

## 2022-09-29 NOTE — PROGRESS NOTES
[facility] - CORE Clinic    Per request of Hansa Badillo:  When you get a chance can you please send a [facility] to [facility] for [facility].  Confirmed w/patient that this should be sent to [facility], fax:[phone]. Supporting documentation faxed.  Teodora De Jesus RN on 9/29/2022 at 2:30 PM

## 2022-10-17 NOTE — PATIENT INSTRUCTIONS
Call CORE nurse for any questions or concerns Mon-Fri 8am-4pm:                                                 #[phone]                                       For concerns after hours:                                               #[phone], option 2     1: Medication changes: no medication changes today. I think we should plan to start you on a SGLT-2 inhibitor for your heart failure and kidney dysfunction. We'll [facility] managing [facility] DM. If we do - we'll check labs in 1- 2 weeks after starting.  2: Plan from today: [facility] at the [facility] if they [facility] - we faxed things over 2.5 weeks ago. If they don't have it - let me know.  -  send me a message with your provider names [facility] at the [facility] so we can coordinate possibly starting a SGLT-2 inhibitor with them and see if any changes are needed to your insulin regimen.  -  see me in 4 - 6 weeks and make an appointment with Dr. Glass as well in a few months.  3: Lab results:   Component      Latest Ref Rng & Units 10/17/2022   Sodium      136 - 145 mmol/L 140   Potassium      3.4 - 5.3 mmol/L 4.5   Chloride      98 - 107 mmol/L 107   Carbon Dioxide (CO2)      22 - 29 mmol/L 30 (H)   Anion Gap      7 - 15 mmol/L 3 (L)   Urea Nitrogen      8.0 - 23.0 mg/dL 43.5 (H)   Creatinine      0.67 - 1.17 mg/dL 1.25 (H)   Calcium      8.8 - 10.2 mg/dL 9.0   Glucose      70 - 99 mg/dL 108 (H)   GFR Estimate      >60 mL/min/1.73m2 60 (L)

## 2022-10-17 NOTE — PROGRESS NOTES
"  Cardiology Progress Note    Patient seen today in follow up of: [facility] follow up  Primary cardiologist: Dr. Glass    HPI:  Delbert Tilley is a very pleasant 72 year old male with a history of:  1.  A severe ischemic cardiomyopathy. with EF around 20%. He has an ICD in place  2.  CAD, status post CABG (LIMA-LAD, SVG-OM, SVG-PDA) and PCI to the OM 3 graft in 2014.  3.  Proximal atrial fibrillation and flutter, status post atrial flutter ablation  4.  PAD, with history of carotid endarterectomies and iliac artery stenting, followed by vascular surgery  5.  CKD, followed by [facility] nephrology Dr. Cueva, baseline creat ~1.5.  6.  Diabetes mellitus  7.  Tobacco abuse, ongoing    Delbert has had an ischemic cardiomyopathy since suffering a NSTEMI in 2014. EF was 35 - 40% at that time and declined to 20 - 25% in 2017 where it remained.      He was admitted in April of this year with acute HFrEF exacerbation requiring IV lasix and metolazone. He was discharged on 20 mg of Torsemide BID but ultimately left AMA.     In June, he was admitted at [facility] for three days with cardiogenic shock. He initially presented with weakness and hip pain after a mechanical fall a few days prior. He had noted increasing dyspnea and was found to be in cardiogenic shock. He required IV dobutamine and IV diuresis with a swan in place. Dobutamine was continued until the night before he left. In the morning, his CI had decreased to 1.7 and SVR had increased to 1100. Ongoing hospitalization was recommended however he refused and left against medical advise. His losartan and carvedilol were held. He went home and resumed his carvedilol 25 mg twice daily, losartan 50 mg daily, and Torsemide 40 - 60 mg daily. His echo in the hospital showed EF 15 - 20% with mild to moderate RV dysfunction, LV was mild to moderately dilated.    He was seen in follow up by my colleague Kristen Rice PA-C a few days after discharge. His breathing was \"great.\" He was subsequently " seen by Dr. Glass who decreased his Carvedilol to 12. 5 mg twice daily and started him on low dose Entresto. In follow up, he was tolerating Entresto only once daily. Carvedilol was cut back further. His torsemide was reduced and Entresto was increased to twice daily.  His torsemide dose was subsequently cut back due to some worsening renal insufficiency from 40 mg daily to 20 mg daily.    I met Kamron on 9/26/2022 in follow-up.  At that time, he had not had any weight gain or worsening heart failure symptoms on the lower dose of diuretic.  We have talked about increasing his Entresto however labs returned showing a mildly elevated potassium of 5.4.  His creatinine was mildly better at 1.6.  He be no changes to his medications.  We discussed a low-sodium I have made no changes to his medications.  He has been instructed to follow a low potassium diet.    Delbert is back today for routine follow-up.  He presents as usual with his daughter.  All things considered, he tells me he is feeling good.  He denies dyspnea on exertion.  He is able to walk around his house without difficulty, go shopping and work in his shop.  No orthopnea or PND.  He denies peripheral edema.  Blood pressures have been stable in the 100s or 110s systolic typically at home.  He is not ha lightheadedness or presyncope.  Weight at home has been stable around 150 - 152 pounds.  It did go up to 154 and he took extra torsemide for a day or two.    ASSESSMENT/PLAN:  Delbert Tilley is a very pleasant 72-year-old male with a history of a severe ischemic cardiomyopathy, CAD, and CKD among other comorbidities as noted above who is here today for follow-up.    We have been following Kamron closely in the [facility] clinic since his hospitalization in June with cardiogenic shock trying to optimize his medications.  Currently, he is tolerating carvedilol 6.25 mg twice daily and low-dose Entresto.  He is on torsemide 20 mg daily.  We deferred medication adjustments at  his last visit due to mild hyperkalemia.  Today, his basic metabolic panel shows his creatinine is down to 1.2.  His potassium is in the normal range as well.  his potassium level does tend to run at the high end of normal and given his intermittent hyperkalemia I do not suspect he will tolerate spironolactone.  Blood pressure in clinic today is in the 90s systolic but BPs have been stable at home.    At this point, I think it would be appropriate to consider initiation of an SGLT2 inhibitor.  He is on insulin in addition to metformin.  [facility]  I asked him to let me know names of [facility] providers [facility]. I will touch base with them about possibly starting an SGLT2 inhibitor and see if an adjustments need to be made to his insulin regimen.  We discussed possible side effects of the medication.  Following initiation, would recommend a BMP in 1 to 2 weeks.    We will coordinate in the next week. I will plan to see Delbert back in about a month.  In the meantime, he will contact us with questions or concerns or certainly any worsening heart failure symptoms. It was a pleasure seeing him in clinic today.     Orders this Visit:  Orders Placed This Encounter   Procedures     Basic metabolic panel     Follow-Up with Cardiology [facility]     Follow-Up with Cardiology [facility] (Self)     No orders of the defined types were placed in this encounter.    There are no discontinued medications.    CURRENT MEDICATIONS:  Current Outpatient Medications   Medication Sig Dispense Refill     albuterol (PROAIR HFA) 108 (90 Base) MCG/ACT Inhaler Inhale 2 puffs into the lungs every 4 hours as needed for shortness of breath / dyspnea 1 Inhaler 0     amylase-lipase-protease (CREON 12) [id number] units CPEP Take 2 capsules by mouth 3 times daily (with meals)  120 capsule 0     atorvastatin (LIPITOR) 80 MG tablet Take 80 mg by mouth At Bedtime        calcium carbonate (OS-MARVA) 500 MG tablet Take 1 tablet by mouth 2  times daily One tablet in the morning, two at noon and one at bedtime       Carboxymethylcellulose Sod PF (THERATEARS PF) 0.25 % SOLN Apply 1 drop to eye 4 times daily as needed       carvedilol (COREG) 6.25 MG tablet Take 1 tablet (6.25 mg) by mouth 2 times daily (with meals) 180 tablet 3     clopidogrel (PLAVIX) 75 MG tablet Take 1 tablet (75 mg) by mouth daily 90 tablet 3     gabapentin (NEURONTIN) 100 MG capsule Take 300 mg by mouth At Bedtime 1 capsule 0     Glycerin (OASIS MOISTURIZING MOUTH SPRAY) 35 % LIQD Take 2 sprays by mouth daily as needed       insulin aspart (NOVOLOG PEN) 100 UNIT/ML pen Inject 3 Units Subcutaneous 3 times daily (with meals) (Patient taking differently: Inject 4 Units Subcutaneous daily) 15 mL 1     insulin glargine (LANTUS PEN) 100 UNIT/ML pen Inject 28 Units Subcutaneous every morning Increase by 2 units every 2-3 days until morning blood sugar is .  Max dose 40 units/day (Patient taking differently: Inject 32 Units Subcutaneous every morning Increase by 2 units every 2-3 days until morning blood sugar is .  Max dose 40 units/day) 45 mL 1     Insulin Pen Needle (PEN NEEDLES) 32G X 4 MM MISC        loperamide (IMODIUM) 2 MG capsule Take 2 mg by mouth 4 times daily as needed for diarrhea Patient takes a dose in the am and another at night       magnesium oxide (MAG-OX) 400 MG tablet Take 6.5 tablets (2,600 mg) by mouth daily       metFORMIN (GLUCOPHAGE-XR) 500 MG 24 hr tablet Take 500 mg by mouth 2 times daily (with meals)        mometasone (ASMANEX TWISTHALER) 220 MCG/INH inhaler Inhale 2 puffs into the lungs every evening        nitroGLYcerin (NITROSTAT) 0.4 MG sublingual tablet Place 0.4 mg under the tongue every 5 minutes as needed for chest pain       pantoprazole (PROTONIX) 40 MG EC tablet Take 40 mg by mouth 2 times daily  30 tablet      sacubitril-valsartan (ENTRESTO) 24-26 MG per tablet Take 1 tablet by mouth 2 times daily 180 tablet 3     torsemide (DEMADEX) 20  MG tablet Take 1 tablet (20 mg) by mouth daily May take additional 20 mg daily if shortness of breath, swelling, weight gain 100 tablet 3     vitamin D3 (CHOLECALCIFEROL) 50 mcg (2000 units) tablet Take 1 tablet by mouth 2 times daily       ALLERGIES  Allergies   Allergen Reactions     Fish Nausea and Vomiting     severe     Hydrocodone GI Disturbance     Upset stomach     Shellfish Allergy Hives and Rash     PAST MEDICAL HISTORY:  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%     Claudication (H)      COPD (chronic obstructive pulmonary disease) (H)      Diabetes (H)      Gastro-oesophageal reflux disease      GERD (gastroesophageal reflux disease)      H/O: alcohol abuse      HTN (hypertension)      Hyperlipidaemia LDL goal <100 07/08/2013     Hyperparathyroidism (H)      Hypokalemia      ICD (implantable cardioverter-defibrillator) in place     [employer]     NSTEMI (non-ST elevated myocardial infarction) (H)      NSVT (nonsustained ventricular tachycardia)      Pacemaker      Pancreatitis 6/26/06 Hosp     Paroxysmal atrial fibrillation (H)      PVD (peripheral vascular disease) (H)      Thrombocytopenia (H)      Tobacco use      Venous (peripheral) insufficiency      Vitamin D deficiency      PAST SURGICAL HISTORY:  Past Surgical History:   Procedure Laterality Date     CATARACT IOL, RT/LT      Cataract IOL RT/LT     CLOSE SECONDARY WOUND LOWER EXTREMITY Right 02/13/2022    Procedure: Right groin wound exploration, nerve release and closure.;  Surgeon: Karen Arthur MD;  [facility]      ENDARTERECTOMY CAROTID Right 06/12/2015    Procedure: ENDARTERECTOMY CAROTID;  Surgeon: João Turner MD;  Location:  OR     ENDARTERECTOMY CAROTID Left 09/08/2017    Procedure: ENDARTERECTOMY CAROTID;  LEFT CAROTID ENDARTERECTOMY WITH EEG;  Surgeon: João Turner MD;  Location:  OR     ENDARTERECTOMY FEMORAL Bilateral 02/09/2022    Procedure: BILATERAL- COMMON FEMORAL ENDARTERECTOMY; RETROGRADE ILIAC ARTERY STENTING;  Surgeon: Ric Chau MD;  [place] [facility] [place]     IMPLANT PACEMAKER  06/10/2010     IMPLANTED DEVICE       INTERPOSITION TENDON HAND N/A 06/09/2020    Procedure: Right thumb ligament reconstruction tendon interposition with extensor pollicis brevis to abductor pollicis tendon transfer;  Surgeon: Bethel Martin MD;  Location: [facility] OR     PICC INSERTION - TRIPLE LUMEN Right 06/20/2022    Right basilic vein 0.62cm 4cm ext.Placement verified by Sherlock 3CG.PICC okay to use.     RELEASE CARPAL TUNNEL Right 04/12/2016    Procedure: RELEASE CARPAL TUNNEL;  Surgeon: Lissy Leger MD;  Location: [facility] OR     SURGICAL HISTORY OF -   1998    Laminectomy     TONSILLECTOMY & ADENOIDECTOMY       ZZC CABG, ARTERIAL, THREE       FAMILY HISTORY:  Family History   Adopted: Yes   Problem Relation Age of Onset     Unknown/Adopted No family hx of      SOCIAL HISTORY:  Social History     Socioeconomic History     Marital status: Single     Spouse name: None     Number of children: None     Years of education: None     Highest education level: None   Tobacco Use     Smoking status: Every Day     Packs/day: 1.50     Years: 50.00     Pack years: 75.00     Types: Cigarettes     Smokeless tobacco: Never     Tobacco comments:     1 1/2 PPD 05/18/22   Vaping Use     Vaping Use: Never used   Substance and Sexual Activity     Alcohol use: No     Drug use: No     Sexual activity: Not Currently     Partners: Female   Other Topics Concern     Parent/sibling w/ CABG, MI or  angioplasty before 65F 55M? No     Review of Systems:  Skin:        Eyes:       ENT:       Respiratory:  Negative    Cardiovascular:  Negative    Gastroenterology:      Genitourinary:       Musculoskeletal:       Neurologic:       Psychiatric:       Heme/Lymph/Imm:       Endocrine:          Physical Exam:  Vitals: BP 95/45   Pulse 66   Resp 12   Wt 69.7 kg (153 lb 9.6 oz)   SpO2 98%   BMI 20.83 kg/m     Wt Readings from Last 4 Encounters:   10/17/22 69.7 kg (153 lb 9.6 oz)   09/26/22 69.7 kg (153 lb 9.6 oz)   08/29/22 69.5 kg (153 lb 3.2 oz)   08/08/22 69.4 kg (153 lb)     GEN: well nourished, in no acute distress.  HEENT:  Pupils equal, round. Sclerae nonicteric.   C/V:  Regular rate and rhythm, no murmur, rub or gallop.    RESP: Respirations are unlabored. Clear to auscultation bilaterally without wheezing, rales, or rhonchi.  GI: Abdomen soft, nontender.  EXTREM: no LE edema.  NEURO: Alert and oriented, cooperative.  SKIN: Warm and dry.     Recent Lab Results:  LIPID RESULTS:  Lab Results   Component Value Date    CHOL 66 06/21/2022    CHOL 95 12/16/2020    HDL 30 (L) 06/21/2022    HDL 28 (L) 12/16/2020    LDL 28 06/21/2022    LDL 44 12/16/2020    TRIG 40 06/21/2022    TRIG 115 12/16/2020    CHOLHDLRATIO 4.6 06/12/2015     LIVER ENZYME RESULTS:  Lab Results   Component Value Date    AST 46 06/23/2022    AST 38 12/07/2020    ALT 31 06/23/2022    ALT 28 12/07/2020     CBC RESULTS:  Lab Results   Component Value Date    WBC 8.7 06/23/2022    WBC 6.3 01/21/2021    RBC 4.20 (L) 06/23/2022    RBC 4.81 01/21/2021    HGB 11.9 (L) 09/14/2022    HGB 15.0 01/21/2021    HCT 36.6 (L) 06/23/2022    HCT 46.2 01/21/2021    MCV 87 06/23/2022    MCV 96 01/21/2021    MCH 25.7 (L) 06/23/2022    MCH 31.2 01/21/2021    MCHC 29.5 (L) 06/23/2022    MCHC 32.5 01/21/2021    RDW 18.1 (H) 06/23/2022    RDW 13.1 01/21/2021     06/23/2022     (L) 01/21/2021     BMP RESULTS:  Lab Results   Component Value Date      10/17/2022     06/10/2021    POTASSIUM 4.5 10/17/2022    POTASSIUM 5.6 (H) 08/29/2022    POTASSIUM 5.6 (H) 08/29/2022    POTASSIUM 4.6 06/10/2021    CHLORIDE 107 10/17/2022    CHLORIDE 104 08/29/2022    CHLORIDE 104 08/29/2022    CHLORIDE 105 06/10/2021    CO2 30 (H) 10/17/2022    CO2 31 08/29/2022    CO2 31 08/29/2022    CO2 30 06/10/2021    ANIONGAP 3 (L) 10/17/2022    ANIONGAP 1 (L) 08/29/2022    ANIONGAP 1 (L) 08/29/2022    ANIONGAP 5 06/10/2021     (H) 10/17/2022     (H) 08/29/2022     (H) 08/29/2022     (H) 06/10/2021    BUN 43.5 (H) 10/17/2022    BUN 50 (H) 08/29/2022    BUN 50 (H) 08/29/2022    BUN 34 (H) 06/10/2021    CR 1.25 (H) 10/17/2022    CR 1.17 06/10/2021    GFRESTIMATED 60 (L) 10/17/2022    GFRESTIMATED 49 (L) 11/09/2021    GFRESTIMATED 62 06/10/2021    GFRESTBLACK 71 06/10/2021    MARVA 9.0 10/17/2022    MARVA 8.7 06/10/2021      A1C RESULTS:  Lab Results   Component Value Date    A1C 7.8 (H) 06/20/2022    A1C 9.6 (H) 12/16/2020     INR RESULTS:  Lab Results   Component Value Date    INR 1.02 12/22/2017    INR 1.03 09/08/2017       aPrul Badillo PA-C  [facility]

## 2022-10-17 NOTE — LETTER
10/17/2022    Danii Hernández MD  [id number]  [facility] [address]    RE: Delbert Tilley       Dear Colleague,     I had the pleasure of seeing Delbert Tilley in the [facility].    Cardiology Progress Note    Patient seen today in follow up of: CORE follow up  Primary cardiologist: Dr. Glass    HPI:  Delbert Tilley is a very pleasant 72 year old male with a history of:  1.  A severe ischemic cardiomyopathy. with EF around 20%. He has an ICD in place  2.  CAD, status post CABG (LIMA-LAD, SVG-OM, SVG-PDA) and PCI to the OM 3 graft in 2014.  3.  Proximal atrial fibrillation and flutter, status post atrial flutter ablation  4.  PAD, with history of carotid endarterectomies and iliac artery stenting, followed by vascular surgery  5.  CKD, followed by [facility] nephrology Dr. Cueva, baseline creat ~1.5.  6.  Diabetes mellitus  7.  Tobacco abuse, ongoing    Delbert has had an ischemic cardiomyopathy since suffering a NSTEMI in 2014. EF was 35 - 40% at that time and declined to 20 - 25% in 2017 where it remained.      He was admitted in April of this year with acute HFrEF exacerbation requiring IV lasix and metolazone. He was discharged on 20 mg of Torsemide BID but ultimately left AMA.     In June, he was admitted at [facility] for three days with cardiogenic shock. He initially presented with weakness and hip pain after a mechanical fall a few days prior. He had noted increasing dyspnea and was found to be in cardiogenic shock. He required IV dobutamine and IV diuresis with a swan in place. Dobutamine was continued until the night before he left. In the morning, his CI had decreased to 1.7 and SVR had increased to 1100. Ongoing hospitalization was recommended however he refused and left against medical advise. His losartan and carvedilol were held. He went home and resumed his carvedilol 25 mg twice daily, losartan 50 mg daily, and Torsemide 40 - 60 mg daily. His echo in the hospital showed EF 15 - 20% with  "mild to moderate RV dysfunction, LV was mild to moderately dilated.    He was seen in follow up by my colleague Kristen Rice PA-C a few days after discharge. His breathing was \"great.\" He was subsequently seen by Dr. Glass who decreased his Carvedilol to 12. 5 mg twice daily and started him on low dose Entresto. In follow up, he was tolerating Entresto only once daily. Carvedilol was cut back further. His torsemide was reduced and Entresto was increased to twice daily.  His torsemide dose was subsequently cut back due to some worsening renal insufficiency from 40 mg daily to 20 mg daily.    I met Kamron on 9/26/2022 in follow-up.  At that time, he had not had any weight gain or worsening heart failure symptoms on the lower dose of diuretic.  We have talked about increasing his Entresto however labs returned showing a mildly elevated potassium of 5.4.  His creatinine was mildly better at 1.6.  He be no changes to his medications.  We discussed a low-sodium I have made no changes to his medications.  He has been instructed to follow a low potassium diet.    Delbert is back today for routine follow-up.  He presents as usual with his daughter.  All things considered, he tells me he is feeling good.  He denies dyspnea on exertion.  He is able to walk around his house without difficulty, go shopping and work in his shop.  No orthopnea or PND.  He denies peripheral edema.  Blood pressures have been stable in the 100s or 110s systolic typically at home.  He is not ha lightheadedness or presyncope.  Weight at home has been stable around 150 - 152 pounds.  It did go up to 154 and he took extra torsemide for a day or two.    ASSESSMENT/PLAN:  Delbert Tilley is a very pleasant 72-year-old male with a history of a severe ischemic cardiomyopathy, CAD, and CKD among other comorbidities as noted above who is here today for follow-up.    We have been following Kamron closely in the CORE clinic since his hospitalization in June with " cardiogenic shock trying to optimize his medications.  Currently, he is tolerating carvedilol 6.25 mg twice daily and low-dose Entresto.  He is on torsemide 20 mg daily.  We deferred medication adjustments at his last visit due to mild hyperkalemia.  Today, his basic metabolic panel shows his creatinine is down to 1.2.  His potassium is in the normal range as well.  his potassium level does tend to run at the high end of normal and given his intermittent hyperkalemia I do not suspect he will tolerate spironolactone.  Blood pressure in clinic today is in the 90s systolic but BPs have been stable at home.    At this point, I think it would be appropriate to consider initiation of an SGLT2 inhibitor.  He is on insulin in addition to metformin.  [facility]  I asked him to let me know [facility]. I will touch base with them about possibly starting an SGLT2 inhibitor and see if an adjustments need to be made to his insulin regimen.  We discussed possible side effects of the medication.  Following initiation, would recommend a BMP in 1 to 2 weeks.    We will coordinate in the next week. I will plan to see Delbert back in about a month.  In the meantime, he will contact us with questions or concerns or certainly any worsening heart failure symptoms. It was a pleasure seeing him in clinic today.     Orders this Visit:  Orders Placed This Encounter   Procedures     Basic metabolic panel     Follow-Up with Cardiology MARISOL CORE     Follow-Up with Cardiology CORE (Self)     No orders of the defined types were placed in this encounter.    There are no discontinued medications.    CURRENT MEDICATIONS:  Current Outpatient Medications   Medication Sig Dispense Refill     albuterol (PROAIR HFA) 108 (90 Base) MCG/ACT Inhaler Inhale 2 puffs into the lungs every 4 hours as needed for shortness of breath / dyspnea 1 Inhaler 0     amylase-lipase-protease (CREON 12) 16973-64468-37272 units CPEP  Take 2 capsules by mouth 3 times daily (with meals)  120 capsule 0     atorvastatin (LIPITOR) 80 MG tablet Take 80 mg by mouth At Bedtime        calcium carbonate (OS-MARVA) 500 MG tablet Take 1 tablet by mouth 2 times daily One tablet in the morning, two at noon and one at bedtime       Carboxymethylcellulose Sod PF (THERATEARS PF) 0.25 % SOLN Apply 1 drop to eye 4 times daily as needed       carvedilol (COREG) 6.25 MG tablet Take 1 tablet (6.25 mg) by mouth 2 times daily (with meals) 180 tablet 3     clopidogrel (PLAVIX) 75 MG tablet Take 1 tablet (75 mg) by mouth daily 90 tablet 3     gabapentin (NEURONTIN) 100 MG capsule Take 300 mg by mouth At Bedtime 1 capsule 0     Glycerin (OASIS MOISTURIZING MOUTH SPRAY) 35 % LIQD Take 2 sprays by mouth daily as needed       insulin aspart (NOVOLOG PEN) 100 UNIT/ML pen Inject 3 Units Subcutaneous 3 times daily (with meals) (Patient taking differently: Inject 4 Units Subcutaneous daily) 15 mL 1     insulin glargine (LANTUS PEN) 100 UNIT/ML pen Inject 28 Units Subcutaneous every morning Increase by 2 units every 2-3 days until morning blood sugar is .  Max dose 40 units/day (Patient taking differently: Inject 32 Units Subcutaneous every morning Increase by 2 units every 2-3 days until morning blood sugar is .  Max dose 40 units/day) 45 mL 1     Insulin Pen Needle (PEN NEEDLES) 32G X 4 MM MISC        loperamide (IMODIUM) 2 MG capsule Take 2 mg by mouth 4 times daily as needed for diarrhea Patient takes a dose in the am and another at night       magnesium oxide (MAG-OX) 400 MG tablet Take 6.5 tablets (2,600 mg) by mouth daily       metFORMIN (GLUCOPHAGE-XR) 500 MG 24 hr tablet Take 500 mg by mouth 2 times daily (with meals)        mometasone (ASMANEX TWISTHALER) 220 MCG/INH inhaler Inhale 2 puffs into the lungs every evening        nitroGLYcerin (NITROSTAT) 0.4 MG sublingual tablet Place 0.4 mg under the tongue every 5 minutes as needed for chest pain        pantoprazole (PROTONIX) 40 MG EC tablet Take 40 mg by mouth 2 times daily  30 tablet      sacubitril-valsartan (ENTRESTO) 24-26 MG per tablet Take 1 tablet by mouth 2 times daily 180 tablet 3     torsemide (DEMADEX) 20 MG tablet Take 1 tablet (20 mg) by mouth daily May take additional 20 mg daily if shortness of breath, swelling, weight gain 100 tablet 3     vitamin D3 (CHOLECALCIFEROL) 50 mcg (2000 units) tablet Take 1 tablet by mouth 2 times daily       ALLERGIES  Allergies   Allergen Reactions     Fish Nausea and Vomiting     severe     Hydrocodone GI Disturbance     Upset stomach     Shellfish Allergy Hives and Rash     PAST MEDICAL HISTORY:  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%     Claudication (H)      COPD (chronic obstructive pulmonary disease) (H)      Diabetes (H)      Gastro-oesophageal reflux disease      GERD (gastroesophageal reflux disease)      H/O: alcohol abuse      HTN (hypertension)      Hyperlipidaemia LDL goal <100 07/08/2013     Hyperparathyroidism (H)      Hypokalemia      ICD (implantable cardioverter-defibrillator) in place     [employer]     NSTEMI (non-ST elevated myocardial infarction) (H)      NSVT (nonsustained ventricular tachycardia)      Pacemaker      Pancreatitis 6/26/06 Hosp     Paroxysmal atrial fibrillation (H)      PVD (peripheral vascular disease) (H)      Thrombocytopenia (H)      Tobacco use      Venous (peripheral) insufficiency      Vitamin D deficiency      PAST SURGICAL HISTORY:  Past Surgical History:   Procedure Laterality Date     CATARACT  IOL, RT/LT      Cataract IOL RT/LT     CLOSE SECONDARY WOUND LOWER EXTREMITY Right 02/13/2022    Procedure: Right groin wound exploration, nerve release and closure.;  Surgeon: Karen Arthur MD;  [place] [facility] [place]     ENDARTERECTOMY CAROTID Right 06/12/2015    Procedure: ENDARTERECTOMY CAROTID;  Surgeon: João Turner MD;  Location:  OR     ENDARTERECTOMY CAROTID Left 09/08/2017    Procedure: ENDARTERECTOMY CAROTID;  LEFT CAROTID ENDARTERECTOMY WITH EEG;  Surgeon: João Turner MD;  Location:  OR     ENDARTERECTOMY FEMORAL Bilateral 02/09/2022    Procedure: BILATERAL- COMMON FEMORAL ENDARTERECTOMY; RETROGRADE ILIAC ARTERY STENTING;  Surgeon: Ric Chau MD;  [place] [facility] [place]     IMPLANT PACEMAKER  06/10/2010     IMPLANTED DEVICE       INTERPOSITION TENDON HAND N/A 06/09/2020    Procedure: Right thumb ligament reconstruction tendon interposition with extensor pollicis brevis to abductor pollicis tendon transfer;  Surgeon: Bethel Martin MD;  Location: [facility] OR     PICC INSERTION - TRIPLE LUMEN Right 06/20/2022    Right basilic vein 0.62cm 4cm ext.Placement verified by Sherlock 3CG.PICC okay to use.     RELEASE CARPAL TUNNEL Right 04/12/2016    Procedure: RELEASE CARPAL TUNNEL;  Surgeon: Lissy Leger MD;  Location: [facility] OR     SURGICAL HISTORY OF -   1998    Laminectomy     TONSILLECTOMY & ADENOIDECTOMY       ZZC CABG, ARTERIAL, THREE       FAMILY HISTORY:  Family History   Adopted: Yes   Problem Relation Age of Onset     Unknown/Adopted No family hx of      SOCIAL HISTORY:  Social History     Socioeconomic History     Marital status: Single     Spouse name: None     Number of children: None     Years of education: None     Highest education level: None   Tobacco Use     Smoking status: Every Day     Packs/day: 1.50     Years: 50.00     Pack years: 75.00     Types: Cigarettes     Smokeless tobacco: Never     Tobacco comments:     1 1/2 PPD 05/18/22    Vaping Use     Vaping Use: Never used   Substance and Sexual Activity     Alcohol use: No     Drug use: No     Sexual activity: Not Currently     Partners: Female   Other Topics Concern     Parent/sibling w/ CABG, MI or angioplasty before 65F 55M? No     Review of Systems:  Skin:        Eyes:       ENT:       Respiratory:  Negative    Cardiovascular:  Negative    Gastroenterology:      Genitourinary:       Musculoskeletal:       Neurologic:       Psychiatric:       Heme/Lymph/Imm:       Endocrine:          Physical Exam:  Vitals: BP 95/45   Pulse 66   Resp 12   Wt 69.7 kg (153 lb 9.6 oz)   SpO2 98%   BMI 20.83 kg/m     Wt Readings from Last 4 Encounters:   10/17/22 69.7 kg (153 lb 9.6 oz)   09/26/22 69.7 kg (153 lb 9.6 oz)   08/29/22 69.5 kg (153 lb 3.2 oz)   08/08/22 69.4 kg (153 lb)     GEN: well nourished, in no acute distress.  HEENT:  Pupils equal, round. Sclerae nonicteric.   C/V:  Regular rate and rhythm, no murmur, rub or gallop.    RESP: Respirations are unlabored. Clear to auscultation bilaterally without wheezing, rales, or rhonchi.  GI: Abdomen soft, nontender.  EXTREM: no LE edema.  NEURO: Alert and oriented, cooperative.  SKIN: Warm and dry.     Recent Lab Results:  LIPID RESULTS:  Lab Results   Component Value Date    CHOL 66 06/21/2022    CHOL 95 12/16/2020    HDL 30 (L) 06/21/2022    HDL 28 (L) 12/16/2020    LDL 28 06/21/2022    LDL 44 12/16/2020    TRIG 40 06/21/2022    TRIG 115 12/16/2020    CHOLHDLRATIO 4.6 06/12/2015     LIVER ENZYME RESULTS:  Lab Results   Component Value Date    AST 46 06/23/2022    AST 38 12/07/2020    ALT 31 06/23/2022    ALT 28 12/07/2020     CBC RESULTS:  Lab Results   Component Value Date    WBC 8.7 06/23/2022    WBC 6.3 01/21/2021    RBC 4.20 (L) 06/23/2022    RBC 4.81 01/21/2021    HGB 11.9 (L) 09/14/2022    HGB 15.0 01/21/2021    HCT 36.6 (L) 06/23/2022    HCT 46.2 01/21/2021    MCV 87 06/23/2022    MCV 96 01/21/2021    MCH 25.7 (L) 06/23/2022    MCH 31.2  01/21/2021    MCHC 29.5 (L) 06/23/2022    MCHC 32.5 01/21/2021    RDW 18.1 (H) 06/23/2022    RDW 13.1 01/21/2021     06/23/2022     (L) 01/21/2021     BMP RESULTS:  Lab Results   Component Value Date     10/17/2022     06/10/2021    POTASSIUM 4.5 10/17/2022    POTASSIUM 5.6 (H) 08/29/2022    POTASSIUM 5.6 (H) 08/29/2022    POTASSIUM 4.6 06/10/2021    CHLORIDE 107 10/17/2022    CHLORIDE 104 08/29/2022    CHLORIDE 104 08/29/2022    CHLORIDE 105 06/10/2021    CO2 30 (H) 10/17/2022    CO2 31 08/29/2022    CO2 31 08/29/2022    CO2 30 06/10/2021    ANIONGAP 3 (L) 10/17/2022    ANIONGAP 1 (L) 08/29/2022    ANIONGAP 1 (L) 08/29/2022    ANIONGAP 5 06/10/2021     (H) 10/17/2022     (H) 08/29/2022     (H) 08/29/2022     (H) 06/10/2021    BUN 43.5 (H) 10/17/2022    BUN 50 (H) 08/29/2022    BUN 50 (H) 08/29/2022    BUN 34 (H) 06/10/2021    CR 1.25 (H) 10/17/2022    CR 1.17 06/10/2021    GFRESTIMATED 60 (L) 10/17/2022    GFRESTIMATED 49 (L) 11/09/2021    GFRESTIMATED 62 06/10/2021    GFRESTBLACK 71 06/10/2021    MARVA 9.0 10/17/2022    MARVA 8.7 06/10/2021      A1C RESULTS:  Lab Results   Component Value Date    A1C 7.8 (H) 06/20/2022    A1C 9.6 (H) 12/16/2020     INR RESULTS:  Lab Results   Component Value Date    INR 1.02 12/22/2017    INR 1.03 09/08/2017       Parul Badillo PA-C  [facility]      Thank you for allowing me to participate in the care of your patient.      Sincerely,     Parul Badillo PA-C     [facility]  cc:   Parul Badillo PA-C  [address]  [facility] [address] [place] [id number]

## 2022-10-25 NOTE — TELEPHONE ENCOUNTER
[facility]      From: Parul Badillo PA-C  Sent: 10/21/2022   5:06 PM CDT  To: [facility] Core Nurse    Hi there - I saw Delbert recently and want to start him on Jardiance. [facility]. Can we see if we can get in touch with Dr. Bright's team or his diabetic educator. I would like them to know we'd like to start jardiance and see if they want to make any changes to his insulin in that case. Thanks! Hansa      Will reach out to Dr. Bright and team 10/26.    Melina Barnes RN BSN   4:53 PM 10/25/22  CORE nurse line M-F 8a-4p: [phone]

## 2022-10-26 NOTE — TELEPHONE ENCOUNTER
[facility]    I called Dr. Bright with [facility] [phone] to review that Hasna VALENCIA would like to start Kamron on Jardiance and would like their comment on this and if any changes to diabetic regimen are needed.    Left message with coordination staff requesting above.    Melina Barnes RN BSN   3:23 PM 10/26/22  CORE nurse line M-F 8a-4p: [phone]

## 2022-11-01 NOTE — TELEPHONE ENCOUNTER
"[facility]    Second call to [facility] [phone] and spoke with nurse Grover who asked that we fax Hansa VALENCIA's 10/17 clinic note re: Jardiance initiation to [phone] with \"attn Dr. Bright please respond asap\" in bold marker.    Will ask in clinic staff to assist with fax.    Melina Barnes RN BSN   2:23 PM 11/01/22  CORE nurse line M-F 8a-4p: [phone]        "

## 2022-11-01 NOTE — TELEPHONE ENCOUNTER
Sent 10/17 note to Dr. Bright with request to respond ASAP to [phone]. Edgar MCCORMACK([facility])

## 2022-11-15 NOTE — TELEPHONE ENCOUNTER
"[facility]    Have not received response from PCP office re: Hansa Badillo PAC wants to start Jardiance and would like PCP comment on this and if any diabetic regimen changes needed.    Third attempt to contact. Will ask CORE CMA to re-fax request with Hansa Badillo PAC's 10/17 clinic note re: Jardiance initiation to [phone] with \"attn Dr. Bright please respond asap\".    If no response, will defer to next week's clinic visit. Updated pt via [employer].    Future Appointments   Date Time Provider Department Center   11/21/2022  7:15 AM [facility]   11/21/2022  8:15 AM Parul Badillo PA-C [email or web address]   1/3/2023  8:10 AM [facility] DEVICE RN [facility]   2/16/2023  8:30 AM [facility]     Melina Barnes RN BSN   10:23 AM 11/15/22  CORE nurse line M-F 8a-4p: [phone]        "

## 2022-11-15 NOTE — TELEPHONE ENCOUNTER
Sent 10/17 note to Dr. Bright with request to respond ASA to [phone]. Edgar MCCORMACK([facility]) 11/15/22  10:54 AM

## 2022-11-21 NOTE — PROGRESS NOTES
"  Cardiology Progress Note    Patient seen today in follow up of: CORE follow up  Primary cardiologist: Dr. Glass    HPI:  Delbert Tilley is a very pleasant 72 year old male with a history of a severe ischemic cardiomyopathy, CAD, CKD, PAF/FL s/p atrial flutter ablation, PAD s/p carotid endarterectomies and iliac artery stenting, followed by vascular surgery, DM and ongoing tobacco use.    Delbert has longstanding history of coronary artery disease.  He had coronary artery bypass grafting in the early 2000's with a LIMA-LAD, SVG-OM, SVG-PDA.  He has an ICD in place for primary prevention. And 2014, he suffered a non-ST elevation myocardial infarction and underwent PCI to the OM 3 graft.  His ejection fraction was in the 35 to 40% range at that time.    His EF was noted to decline further in 2017 to the 20% range and has not recovered.    He establish care [facility] clinic this summer and has been followed by Kristen Rice PA-C, myself, and Dr. Glass.  In April, he was admitted with an acute heart failure exacerbation requiring IV Lasix and metolazone.  He left AGAINST MEDICAL ADVICE.  He was then readmitted in June.  He initially presented with weakness and hip pain after mechanical fall but was found to be in cardiogenic shock with severe dyspnea.  He required IV dobutamine and IV diuresis with this monitor.  Dobutamine was discontinued and in the morning his cardiac index had decreased to 1.7 and SVR had increased to 1100.  It was recommended he remain in the hospital however he refused and left AGAINST MEDICAL ADVICE.  His losartan and carvedilol has been held however when he went home he resumed carvedilol 25 mg twice daily, losartan 50 mg daily and torsemide.  His echo in this hospitalization showed an EF of 15 to 20% with mild to moderate RV dysfunction.  His LV was mild to moderately dilated.    He was seen in clinic subsequently by my colleague Kristen Rice a few days after discharge.  He is breathing was \"great.\"  " Subsequently, he saw Dr. Glass in consultation.  His carvedilol was decreased and he was started on low-dose Entresto.  We have since been able to increase his Entresto and cut back on his torsemide dose.    I met Kamron 2 months ago.  I had hoped to increase his Entresto to level 2 however labs showed mild hyperkalemia and thus we have deferred this.  We have been hoping to get him started on an SGLT2 inhibitor and have been [facility].    Today, Delbert presents for routine follow-up.  He is here with his daughter.  He denies any increasing symptoms since his last visit.  No significant dyspnea on exertion.  No orthopnea or PND.  Weight is stable at 152 pounds at home.  He monitors his blood pressure daily.  They are stable in the 100s systolic typically.  No dizziness or lightheadedness.      [facility].  He gave me the number for the pharmacy as he has been working with to help manage his diabetes with Dr. Bright.  There was some concern regarding his history of pancreatitis with starting an SGLT2 inhibitor.    ASSESSMENT/PLAN:  Delbert Tilley is a very pleasant 72 year old male with a complex history as noted above including a severe ischemic cardiomyopathy.    Today, Kamron has ongoing NYHA class III symptoms.  We have been attempting to optimize his heart failure therapy following his hospitalization at the [place] earlier this year.  Currently, he is on carvedilol 6.25 mg twice daily and Entresto 24-26 mg twice daily.  I would like to see if we can get him started on an SGLT2 inhibitor as his blood pressures and renal function have been relatively stable over the last few months.  We have been attempting to get in contact [facility].  He is on insulin, denies any recent hypoglycemia.  He gave me the number to the pharmacy as he has been working with there.  I tried to call her today but was unable to leave a message.  I will try to touch  base with her later this week.  They had raised some concerns regarding his history of pancreatitis.  He is on chronic therapy with [facility].  He did have pancreatitis over 10 years ago in the setting of alcohol use however this has not been a recurrent issue and he has been sober for some time.  I will discuss with his team and they will let him know any further recommendations.  If we do start an SGLT2 inhibitor would repeat a basic metabolic panel after about a week and we may consider cutting back his torsemide to 10 mg daily but we will see how things go.    He had labs today which are pending at the time of our visit.  I will follow up on those and let him of those results.    He is taking 20 mg of torsemide daily.  Weights are stable and I did not make any changes to that dose today.    I will have him return for follow-up in 6 to 8 weeks.  He has follow-up with Dr. Glass scheduled as well in February of this year.  In the meantime, I asked him to call with questions or concerns. It was a pleasure seeing Cave in clinic today.    Orders this Visit:  No orders of the defined types were placed in this encounter.    No orders of the defined types were placed in this encounter.    There are no discontinued medications.    CURRENT MEDICATIONS:  Current Outpatient Medications   Medication Sig Dispense Refill     albuterol (PROAIR HFA) 108 (90 Base) MCG/ACT Inhaler Inhale 2 puffs into the lungs every 4 hours as needed for shortness of breath / dyspnea 1 Inhaler 0     amylase-lipase-protease (CREON 12) 93299-78607-65987 units CPEP Take 2 capsules by mouth 3 times daily (with meals)  120 capsule 0     atorvastatin (LIPITOR) 80 MG tablet Take 80 mg by mouth At Bedtime        calcium carbonate (OS-MARVA) 500 MG tablet Take 1 tablet by mouth 2 times daily One tablet in the morning, two at noon and one at bedtime       Carboxymethylcellulose Sod PF (THERATEARS PF) 0.25 % SOLN Apply 1 drop to eye 4 times daily as needed        carvedilol (COREG) 6.25 MG tablet Take 1 tablet (6.25 mg) by mouth 2 times daily (with meals) 180 tablet 3     clopidogrel (PLAVIX) 75 MG tablet Take 1 tablet (75 mg) by mouth daily 90 tablet 3     gabapentin (NEURONTIN) 100 MG capsule Take 300 mg by mouth At Bedtime 1 capsule 0     Glycerin (OASIS MOISTURIZING MOUTH SPRAY) 35 % LIQD Take 2 sprays by mouth daily as needed       insulin aspart (NOVOLOG PEN) 100 UNIT/ML pen Inject 3 Units Subcutaneous 3 times daily (with meals) (Patient taking differently: Inject 4 Units Subcutaneous daily) 15 mL 1     insulin glargine (LANTUS PEN) 100 UNIT/ML pen Inject 28 Units Subcutaneous every morning Increase by 2 units every 2-3 days until morning blood sugar is .  Max dose 40 units/day (Patient taking differently: Inject 32 Units Subcutaneous every morning Increase by 2 units every 2-3 days until morning blood sugar is .  Max dose 40 units/day) 45 mL 1     Insulin Pen Needle (PEN NEEDLES) 32G X 4 MM MISC        loperamide (IMODIUM) 2 MG capsule Take 2 mg by mouth 4 times daily as needed for diarrhea Patient takes a dose in the am and another at night       magnesium oxide (MAG-OX) 400 MG tablet Take 6.5 tablets (2,600 mg) by mouth daily       metFORMIN (GLUCOPHAGE-XR) 500 MG 24 hr tablet Take 500 mg by mouth 2 times daily (with meals)        mometasone (ASMANEX TWISTHALER) 220 MCG/INH inhaler Inhale 2 puffs into the lungs every evening        pantoprazole (PROTONIX) 40 MG EC tablet Take 40 mg by mouth 2 times daily  30 tablet      sacubitril-valsartan (ENTRESTO) 24-26 MG per tablet Take 1 tablet by mouth 2 times daily 180 tablet 3     torsemide (DEMADEX) 20 MG tablet Take 1 tablet (20 mg) by mouth daily May take additional 20 mg daily if shortness of breath, swelling, weight gain 100 tablet 3     vitamin D3 (CHOLECALCIFEROL) 50 mcg (2000 units) tablet Take 1 tablet by mouth 2 times daily       nitroGLYcerin (NITROSTAT) 0.4 MG sublingual tablet Place 0.4 mg under  the tongue every 5 minutes as needed for chest pain (Patient not taking: Reported on 11/21/2022)       ALLERGIES  Allergies   Allergen Reactions     Fish Nausea and Vomiting     severe     Hydrocodone GI Disturbance     Upset stomach     Shellfish Allergy Hives and Rash     PAST MEDICAL HISTORY:  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%     Claudication (H)      COPD (chronic obstructive pulmonary disease) (H)      Diabetes (H)      Gastro-oesophageal reflux disease      GERD (gastroesophageal reflux disease)      H/O: alcohol abuse      HTN (hypertension)      Hyperlipidaemia LDL goal <100 07/08/2013     Hyperparathyroidism (H)      Hypokalemia      ICD (implantable cardioverter-defibrillator) in place     [employer]     NSTEMI (non-ST elevated myocardial infarction) (H)      NSVT (nonsustained ventricular tachycardia)      Pacemaker      Pancreatitis 6/26/06 Hosp     Paroxysmal atrial fibrillation (H)      PVD (peripheral vascular disease) (H)      Thrombocytopenia (H)      Tobacco use      Venous (peripheral) insufficiency      Vitamin D deficiency      PAST SURGICAL HISTORY:  Past Surgical History:   Procedure Laterality Date     CATARACT IOL, RT/LT      Cataract IOL RT/LT     CLOSE SECONDARY WOUND LOWER EXTREMITY Right 02/13/2022    Procedure: Right groin wound exploration, nerve release and closure.;  Surgeon: Karen Arthur MD;  Location: [facility] OR     ENDARTERECTOMY CAROTID Right 06/12/2015    Procedure: ENDARTERECTOMY CAROTID;  Surgeon: João Turner MD;  Location: [facility]      ENDARTERECTOMY CAROTID Left 09/08/2017    Procedure: ENDARTERECTOMY CAROTID;  LEFT CAROTID ENDARTERECTOMY WITH EEG;  Surgeon: João Turner MD;  Location:  OR     ENDARTERECTOMY FEMORAL Bilateral 02/09/2022    Procedure: BILATERAL- COMMON FEMORAL ENDARTERECTOMY; RETROGRADE ILIAC ARTERY STENTING;  Surgeon: Ric Chau MD;  Location: [facility] OR     IMPLANT PACEMAKER  06/10/2010     IMPLANTED DEVICE       INTERPOSITION TENDON HAND N/A 06/09/2020    Procedure: Right thumb ligament reconstruction tendon interposition with extensor pollicis brevis to abductor pollicis tendon transfer;  Surgeon: Bethel Martin MD;  Location: [facility] OR     PICC INSERTION - TRIPLE LUMEN Right 06/20/2022    Right basilic vein 0.62cm 4cm ext.Placement verified by [employer] 3CG.PICC okay to use.     RELEASE CARPAL TUNNEL Right 04/12/2016    Procedure: RELEASE CARPAL TUNNEL;  Surgeon: Lissy Leger MD;  Location: [facility] OR     SURGICAL HISTORY OF -   1998    Laminectomy     TONSILLECTOMY & ADENOIDECTOMY       ZZC CABG, ARTERIAL, THREE       FAMILY HISTORY:  Family History   Adopted: Yes   Problem Relation Age of Onset     Unknown/Adopted No family hx of      SOCIAL HISTORY:  Social History     Socioeconomic History     Marital status: Single     Spouse name: None     Number of children: None     Years of education: None     Highest education level: None   Tobacco Use     Smoking status: Every Day     Packs/day: 1.50     Years: 50.00     Pack years: 75.00     Types: Cigarettes     Smokeless tobacco: Never     Tobacco comments:     1 1/2 PPD 05/18/22   Vaping Use     Vaping Use: Never used   Substance and Sexual Activity     Alcohol use: No     Drug use: No     Sexual activity: Not Currently     Partners: Female   Other Topics Concern     Parent/sibling w/ CABG, MI or angioplasty before 65F 55M? No     Review of Systems:  Skin:        Eyes:       ENT:       Respiratory:  Positive for shortness of  "breath;cough;wheezing  Cardiovascular:  Negative;palpitations;chest pain;edema fatigue;lightheadedness;dizziness;Positive for  Gastroenterology:      Genitourinary:       Musculoskeletal:       Neurologic:       Psychiatric:       Heme/Lymph/Imm:       Endocrine:          Physical Exam:  Vitals: /54 (BP Location: Left arm, Patient Position: Sitting, Cuff Size: Adult Regular)   Pulse 77   Ht 1.803 m (5' 11\")   Wt 69.4 kg (153 lb)   SpO2 99%   BMI 21.34 kg/m     Wt Readings from Last 4 Encounters:   11/21/22 69.4 kg (153 lb)   10/17/22 69.7 kg (153 lb 9.6 oz)   09/26/22 69.7 kg (153 lb 9.6 oz)   08/29/22 69.5 kg (153 lb 3.2 oz)     GEN: well nourished, in no acute distress.  HEENT:  Pupils equal, round. Sclerae nonicteric.   C/V:  Regular rate and rhythm, no murmur, rub or gallop.    RESP: Respirations are unlabored. Clear to auscultation bilaterally without wheezing, rales, or rhonchi.  GI: Abdomen soft, nontender.  EXTREM: no LE edema.  NEURO: Alert and oriented, cooperative.  SKIN: Warm and dry.     Recent Lab Results:  LIPID RESULTS:  Lab Results   Component Value Date    CHOL 66 06/21/2022    CHOL 95 12/16/2020    HDL 30 (L) 06/21/2022    HDL 28 (L) 12/16/2020    LDL 28 06/21/2022    LDL 44 12/16/2020    TRIG 40 06/21/2022    TRIG 115 12/16/2020    CHOLHDLRATIO 4.6 06/12/2015     LIVER ENZYME RESULTS:  Lab Results   Component Value Date    AST 46 06/23/2022    AST 38 12/07/2020    ALT 31 06/23/2022    ALT 28 12/07/2020     CBC RESULTS:  Lab Results   Component Value Date    WBC 8.7 06/23/2022    WBC 6.3 01/21/2021    RBC 4.20 (L) 06/23/2022    RBC 4.81 01/21/2021    HGB 11.9 (L) 09/14/2022    HGB 15.0 01/21/2021    HCT 36.6 (L) 06/23/2022    HCT 46.2 01/21/2021    MCV 87 06/23/2022    MCV 96 01/21/2021    MCH 25.7 (L) 06/23/2022    MCH 31.2 01/21/2021    MCHC 29.5 (L) 06/23/2022    MCHC 32.5 01/21/2021    RDW 18.1 (H) 06/23/2022    RDW 13.1 01/21/2021     06/23/2022     (L) 01/21/2021 "     BMP RESULTS:  Lab Results   Component Value Date     10/17/2022     06/10/2021    POTASSIUM 4.5 10/17/2022    POTASSIUM 5.6 (H) 08/29/2022    POTASSIUM 5.6 (H) 08/29/2022    POTASSIUM 4.6 06/10/2021    CHLORIDE 107 10/17/2022    CHLORIDE 104 08/29/2022    CHLORIDE 104 08/29/2022    CHLORIDE 105 06/10/2021    CO2 30 (H) 10/17/2022    CO2 31 08/29/2022    CO2 31 08/29/2022    CO2 30 06/10/2021    ANIONGAP 3 (L) 10/17/2022    ANIONGAP 1 (L) 08/29/2022    ANIONGAP 1 (L) 08/29/2022    ANIONGAP 5 06/10/2021     (H) 10/17/2022     (H) 08/29/2022     (H) 08/29/2022     (H) 06/10/2021    BUN 43.5 (H) 10/17/2022    BUN 50 (H) 08/29/2022    BUN 50 (H) 08/29/2022    BUN 34 (H) 06/10/2021    CR 1.25 (H) 10/17/2022    CR 1.17 06/10/2021    GFRESTIMATED 60 (L) 10/17/2022    GFRESTIMATED 49 (L) 11/09/2021    GFRESTIMATED 62 06/10/2021    GFRESTBLACK 71 06/10/2021    MARVA 9.0 10/17/2022    MARVA 8.7 06/10/2021      A1C RESULTS:  Lab Results   Component Value Date    A1C 7.8 (H) 06/20/2022    A1C 9.6 (H) 12/16/2020     INR RESULTS:  Lab Results   Component Value Date    INR 1.02 12/22/2017    INR 1.03 09/08/2017       Parul Badillo PA-C  [facility]

## 2022-11-21 NOTE — NURSING NOTE
"Chief Complaint   Patient presents with     CORE     1 month Core follow up:Per patient he has not started Jardiance and is here to discus with provider       Vitals:    11/21/22 0810   BP: 100/54   BP Location: Left arm   Patient Position: Sitting   Cuff Size: Adult Regular   Pulse: 77   SpO2: 99%   Weight: 69.4 kg (153 lb)   Height: 1.803 m (5' 11\")     Wt Readings from Last 1 Encounters:   11/21/22 69.4 kg (153 lb)       Emily George MA      "

## 2022-11-21 NOTE — LETTER
11/21/2022    Danii Hernández MD  [address] [address]  [facility] [address]    RE: Delbert Tilley       Dear Colleague,     I had the pleasure of seeing Delbert Tilley in the [facility].    Cardiology Progress Note    Patient seen today in follow up of: CORE follow up  Primary cardiologist: Dr. Glass    HPI:  Delbert Tilley is a very pleasant 72 year old male with a history of a severe ischemic cardiomyopathy, CAD, CKD, PAF/FL s/p atrial flutter ablation, PAD s/p carotid endarterectomies and iliac artery stenting, followed by vascular surgery, DM and ongoing tobacco use.    Delbert has longstanding history of coronary artery disease.  He had coronary artery bypass grafting in the early 2000's with a LIMA-LAD, SVG-OM, SVG-PDA.  He has an ICD in place for primary prevention. And 2014, he suffered a non-ST elevation myocardial infarction and underwent PCI to the OM 3 graft.  His ejection fraction was in the 35 to 40% range at that time.    His EF was noted to decline further in 2017 to the 20% range and has not recovered.    He establish care [facility] clinic this summer and has been followed by Kristen Rice PA-C, myself, and Dr. Glass.  In April, he was admitted with an acute heart failure exacerbation requiring IV Lasix and metolazone.  He left AGAINST MEDICAL ADVICE.  He was then readmitted in June.  He initially presented with weakness and hip pain after mechanical fall but was found to be in cardiogenic shock with severe dyspnea.  He required IV dobutamine and IV diuresis with this monitor.  Dobutamine was discontinued and in the morning his cardiac index had decreased to 1.7 and SVR had increased to 1100.  It was recommended he remain in the hospital however he refused and left AGAINST MEDICAL ADVICE.  His losartan and carvedilol has been held however when he went home he resumed carvedilol 25 mg twice daily, losartan 50 mg daily and torsemide.  His echo in this hospitalization showed an EF of 15 to 20%  "with mild to moderate RV dysfunction.  His LV was mild to moderately dilated.    He was seen in clinic subsequently by my colleague Kristen Rice a few days after discharge.  He is breathing was \"great.\"  Subsequently, he saw Dr. Glass in consultation.  His carvedilol was decreased and he was started on low-dose Entresto.  We have since been able to increase his Entresto and cut back on his torsemide dose.    I met Kamron 2 months ago.  I had hoped to increase his Entresto to level 2 however labs showed mild hyperkalemia and thus we have deferred this.  We have been hoping to get him started on an SGLT2 inhibitor and have been [facility].    Today, Delbert presents for routine follow-up.  He is here with his daughter.  He denies any increasing symptoms since his last visit.  No significant dyspnea on exertion.  No orthopnea or PND.  Weight is stable at 152 pounds at home.  He monitors his blood pressure daily.  They are stable in the 100s systolic typically.  No dizziness or lightheadedness.      [facility].  He gave me the number for the pharmacy as he has been working with to help manage his diabetes with Dr. Bright.  There was some concern regarding his history of pancreatitis with starting an SGLT2 inhibitor.    ASSESSMENT/PLAN:  Delbert Tilley is a very pleasant 72 year old male with a complex history as noted above including a severe ischemic cardiomyopathy.    Today, Kamron has ongoing NYHA class III symptoms.  We have been attempting to optimize his heart failure therapy following his hospitalization at the [facility] earlier this year.  Currently, he is on carvedilol 6.25 mg twice daily and Entresto 24-26 mg twice daily.  I would like to see if we can get him started on an SGLT2 inhibitor as his blood pressures and renal function have been relatively stable over the last few months.  We have been attempting to get in contact [facility].  He is " on insulin, denies any recent hypoglycemia.  He gave me the number to the pharmacy as he has been working with there.  I tried to call her today but was unable to leave a message.  I will try to touch base with her later this week.  They had raised some concerns regarding his history of pancreatitis.  He is on chronic therapy with [facility].  He did have pancreatitis over 10 years ago in the setting of alcohol use however this has not been a recurrent issue and he has been sober for some time.  I will discuss with his team and they will let him know any further recommendations.  If we do start an SGLT2 inhibitor would repeat a basic metabolic panel after about a week and we may consider cutting back his torsemide to 10 mg daily but we will see how things go.    He had labs today which are pending at the time of our visit.  I will follow up on those and let him of those results.    He is taking 20 mg of torsemide daily.  Weights are stable and I did not make any changes to that dose today.    I will have him return for follow-up in 6 to 8 weeks.  He has follow-up with Dr. Glass scheduled as well in February of this year.  In the meantime, I asked him to call with questions or concerns. It was a pleasure seeing Sandy in clinic today.    Orders this Visit:  No orders of the defined types were placed in this encounter.    No orders of the defined types were placed in this encounter.    There are no discontinued medications.    CURRENT MEDICATIONS:  Current Outpatient Medications   Medication Sig Dispense Refill     albuterol (PROAIR HFA) 108 (90 Base) MCG/ACT Inhaler Inhale 2 puffs into the lungs every 4 hours as needed for shortness of breath / dyspnea 1 Inhaler 0     amylase-lipase-protease (CREON 12) 57970-67779-50627 units CPEP Take 2 capsules by mouth 3 times daily (with meals)  120 capsule 0     atorvastatin (LIPITOR) 80 MG tablet Take 80 mg by mouth At Bedtime        calcium carbonate (OS-MARVA) 500 MG tablet Take 1  tablet by mouth 2 times daily One tablet in the morning, two at noon and one at bedtime       Carboxymethylcellulose Sod PF (THERATEARS PF) 0.25 % SOLN Apply 1 drop to eye 4 times daily as needed       carvedilol (COREG) 6.25 MG tablet Take 1 tablet (6.25 mg) by mouth 2 times daily (with meals) 180 tablet 3     clopidogrel (PLAVIX) 75 MG tablet Take 1 tablet (75 mg) by mouth daily 90 tablet 3     gabapentin (NEURONTIN) 100 MG capsule Take 300 mg by mouth At Bedtime 1 capsule 0     Glycerin (OASIS MOISTURIZING MOUTH SPRAY) 35 % LIQD Take 2 sprays by mouth daily as needed       insulin aspart (NOVOLOG PEN) 100 UNIT/ML pen Inject 3 Units Subcutaneous 3 times daily (with meals) (Patient taking differently: Inject 4 Units Subcutaneous daily) 15 mL 1     insulin glargine (LANTUS PEN) 100 UNIT/ML pen Inject 28 Units Subcutaneous every morning Increase by 2 units every 2-3 days until morning blood sugar is .  Max dose 40 units/day (Patient taking differently: Inject 32 Units Subcutaneous every morning Increase by 2 units every 2-3 days until morning blood sugar is .  Max dose 40 units/day) 45 mL 1     Insulin Pen Needle (PEN NEEDLES) 32G X 4 MM MISC        loperamide (IMODIUM) 2 MG capsule Take 2 mg by mouth 4 times daily as needed for diarrhea Patient takes a dose in the am and another at night       magnesium oxide (MAG-OX) 400 MG tablet Take 6.5 tablets (2,600 mg) by mouth daily       metFORMIN (GLUCOPHAGE-XR) 500 MG 24 hr tablet Take 500 mg by mouth 2 times daily (with meals)        mometasone (ASMANEX TWISTHALER) 220 MCG/INH inhaler Inhale 2 puffs into the lungs every evening        pantoprazole (PROTONIX) 40 MG EC tablet Take 40 mg by mouth 2 times daily  30 tablet      sacubitril-valsartan (ENTRESTO) 24-26 MG per tablet Take 1 tablet by mouth 2 times daily 180 tablet 3     torsemide (DEMADEX) 20 MG tablet Take 1 tablet (20 mg) by mouth daily May take additional 20 mg daily if shortness of breath,  swelling, weight gain 100 tablet 3     vitamin D3 (CHOLECALCIFEROL) 50 mcg (2000 units) tablet Take 1 tablet by mouth 2 times daily       nitroGLYcerin (NITROSTAT) 0.4 MG sublingual tablet Place 0.4 mg under the tongue every 5 minutes as needed for chest pain (Patient not taking: Reported on 11/21/2022)       ALLERGIES  Allergies   Allergen Reactions     Fish Nausea and Vomiting     severe     Hydrocodone GI Disturbance     Upset stomach     Shellfish Allergy Hives and Rash     PAST MEDICAL HISTORY:  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%     Claudication (H)      COPD (chronic obstructive pulmonary disease) (H)      Diabetes (H)      Gastro-oesophageal reflux disease      GERD (gastroesophageal reflux disease)      H/O: alcohol abuse      HTN (hypertension)      Hyperlipidaemia LDL goal <100 07/08/2013     Hyperparathyroidism (H)      Hypokalemia      ICD (implantable cardioverter-defibrillator) in place     [employer]     NSTEMI (non-ST elevated myocardial infarction) (H)      NSVT (nonsustained ventricular tachycardia)      Pacemaker      Pancreatitis 6/26/06 Hosp     Paroxysmal atrial fibrillation (H)      PVD (peripheral vascular disease) (H)      Thrombocytopenia (H)      Tobacco use      Venous (peripheral) insufficiency      Vitamin D deficiency      PAST SURGICAL HISTORY:  Past Surgical History:   Procedure Laterality Date     CATARACT IOL, RT/LT      Cataract IOL RT/LT     CLOSE SECONDARY WOUND LOWER EXTREMITY Right 02/13/2022    Procedure: Right groin wound exploration, nerve release  and closure.;  Surgeon: Karen Arthur MD;  Location: [facility] OR     ENDARTERECTOMY CAROTID Right 06/12/2015    Procedure: ENDARTERECTOMY CAROTID;  Surgeon: João Turner MD;  Location:  OR     ENDARTERECTOMY CAROTID Left 09/08/2017    Procedure: ENDARTERECTOMY CAROTID;  LEFT CAROTID ENDARTERECTOMY WITH EEG;  Surgeon: João Tunrer MD;  Location:  OR     ENDARTERECTOMY FEMORAL Bilateral 02/09/2022    Procedure: BILATERAL- COMMON FEMORAL ENDARTERECTOMY; RETROGRADE ILIAC ARTERY STENTING;  Surgeon: Ric Chau MD;  Location: [facility] OR     IMPLANT PACEMAKER  06/10/2010     IMPLANTED DEVICE       INTERPOSITION TENDON HAND N/A 06/09/2020    Procedure: Right thumb ligament reconstruction tendon interposition with extensor pollicis brevis to abductor pollicis tendon transfer;  Surgeon: Bethel Martin MD;  Location: [facility] OR     PICC INSERTION - TRIPLE LUMEN Right 06/20/2022    Right basilic vein 0.62cm 4cm ext.Placement verified by Sherlock 3CG.PICC okay to use.     RELEASE CARPAL TUNNEL Right 04/12/2016    Procedure: RELEASE CARPAL TUNNEL;  Surgeon: Lissy Leger MD;  Location: [facility] OR     SURGICAL HISTORY OF -   1998    Laminectomy     TONSILLECTOMY & ADENOIDECTOMY       ZZC CABG, ARTERIAL, THREE       FAMILY HISTORY:  Family History   Adopted: Yes   Problem Relation Age of Onset     Unknown/Adopted No family hx of      SOCIAL HISTORY:  Social History     Socioeconomic History     Marital status: Single     Spouse name: None     Number of children: None     Years of education: None     Highest education level: None   Tobacco Use     Smoking status: Every Day     Packs/day: 1.50     Years: 50.00     Pack years: 75.00     Types: Cigarettes     Smokeless tobacco: Never     Tobacco comments:     1 1/2 PPD 05/18/22   Vaping Use     Vaping Use: Never used   Substance and Sexual Activity     Alcohol use: No     Drug use: No     Sexual activity: Not Currently     Partners:  "Female   Other Topics Concern     Parent/sibling w/ CABG, MI or angioplasty before 65F 55M? No     Review of Systems:  Skin:        Eyes:       ENT:       Respiratory:  Positive for shortness of breath;cough;wheezing  Cardiovascular:  Negative;palpitations;chest pain;edema fatigue;lightheadedness;dizziness;Positive for  Gastroenterology:      Genitourinary:       Musculoskeletal:       Neurologic:       Psychiatric:       Heme/Lymph/Imm:       Endocrine:          Physical Exam:  Vitals: /54 (BP Location: Left arm, Patient Position: Sitting, Cuff Size: Adult Regular)   Pulse 77   Ht 1.803 m (5' 11\")   Wt 69.4 kg (153 lb)   SpO2 99%   BMI 21.34 kg/m     Wt Readings from Last 4 Encounters:   11/21/22 69.4 kg (153 lb)   10/17/22 69.7 kg (153 lb 9.6 oz)   09/26/22 69.7 kg (153 lb 9.6 oz)   08/29/22 69.5 kg (153 lb 3.2 oz)     GEN: well nourished, in no acute distress.  HEENT:  Pupils equal, round. Sclerae nonicteric.   C/V:  Regular rate and rhythm, no murmur, rub or gallop.    RESP: Respirations are unlabored. Clear to auscultation bilaterally without wheezing, rales, or rhonchi.  GI: Abdomen soft, nontender.  EXTREM: no LE edema.  NEURO: Alert and oriented, cooperative.  SKIN: Warm and dry.     Recent Lab Results:  LIPID RESULTS:  Lab Results   Component Value Date    CHOL 66 06/21/2022    CHOL 95 12/16/2020    HDL 30 (L) 06/21/2022    HDL 28 (L) 12/16/2020    LDL 28 06/21/2022    LDL 44 12/16/2020    TRIG 40 06/21/2022    TRIG 115 12/16/2020    CHOLHDLRATIO 4.6 06/12/2015     LIVER ENZYME RESULTS:  Lab Results   Component Value Date    AST 46 06/23/2022    AST 38 12/07/2020    ALT 31 06/23/2022    ALT 28 12/07/2020     CBC RESULTS:  Lab Results   Component Value Date    WBC 8.7 06/23/2022    WBC 6.3 01/21/2021    RBC 4.20 (L) 06/23/2022    RBC 4.81 01/21/2021    HGB 11.9 (L) 09/14/2022    HGB 15.0 01/21/2021    HCT 36.6 (L) 06/23/2022    HCT 46.2 01/21/2021    MCV 87 06/23/2022    MCV 96 01/21/2021    MCH " 25.7 (L) 06/23/2022    MCH 31.2 01/21/2021    MCHC 29.5 (L) 06/23/2022    MCHC 32.5 01/21/2021    RDW 18.1 (H) 06/23/2022    RDW 13.1 01/21/2021     06/23/2022     (L) 01/21/2021     BMP RESULTS:  Lab Results   Component Value Date     10/17/2022     06/10/2021    POTASSIUM 4.5 10/17/2022    POTASSIUM 5.6 (H) 08/29/2022    POTASSIUM 5.6 (H) 08/29/2022    POTASSIUM 4.6 06/10/2021    CHLORIDE 107 10/17/2022    CHLORIDE 104 08/29/2022    CHLORIDE 104 08/29/2022    CHLORIDE 105 06/10/2021    CO2 30 (H) 10/17/2022    CO2 31 08/29/2022    CO2 31 08/29/2022    CO2 30 06/10/2021    ANIONGAP 3 (L) 10/17/2022    ANIONGAP 1 (L) 08/29/2022    ANIONGAP 1 (L) 08/29/2022    ANIONGAP 5 06/10/2021     (H) 10/17/2022     (H) 08/29/2022     (H) 08/29/2022     (H) 06/10/2021    BUN 43.5 (H) 10/17/2022    BUN 50 (H) 08/29/2022    BUN 50 (H) 08/29/2022    BUN 34 (H) 06/10/2021    CR 1.25 (H) 10/17/2022    CR 1.17 06/10/2021    GFRESTIMATED 60 (L) 10/17/2022    GFRESTIMATED 49 (L) 11/09/2021    GFRESTIMATED 62 06/10/2021    GFRESTBLACK 71 06/10/2021    MARVA 9.0 10/17/2022    MARVA 8.7 06/10/2021      A1C RESULTS:  Lab Results   Component Value Date    A1C 7.8 (H) 06/20/2022    A1C 9.6 (H) 12/16/2020     INR RESULTS:  Lab Results   Component Value Date    INR 1.02 12/22/2017    INR 1.03 09/08/2017       Parul Badillo PA-C  [facility]      Thank you for allowing me to participate in the care of your patient.      Sincerely,     Parul Badillo PA-C     [facility]  cc:   Parul Badillo PA-C  [address]  [place] [address]

## 2022-11-21 NOTE — PATIENT INSTRUCTIONS
Call CORE nurse for any questions or concerns Mon-Fri 8am-4pm:                                                 #[phone]                                       For concerns after hours:                                               #[phone], option 2     1: Medication changes: I'll chat with Kerrie about starting Jardiance and we'll let you know. We'd do labs a week after starting to recheck kidney function.   2: Plan from today: I'll let you know your lab results from today.  -  follow up in 2 months

## 2022-11-22 NOTE — PROGRESS NOTES
Clinic Care Coordination Contact    Situation: Patient chart reviewed by care coordinator.    Background: Chart reviewed  due to the patient was identified on the high risk report for readmission to the hospital     Assessment:Patient is being followed closely by the Cardiology group    Plan/Recommendations: No outreach made at this time     [facility]   Marizol Davis RN, Care Coordinator   [facility]'s   E-mail [email or web address]   [phone]

## 2023-01-01 ENCOUNTER — MYC MEDICAL ADVICE (OUTPATIENT)
Dept: CARDIOLOGY | Facility: CLINIC | Age: 73
End: 2023-01-01
Payer: COMMERCIAL

## 2023-01-01 ENCOUNTER — MEDICAL CORRESPONDENCE (OUTPATIENT)
Dept: HEALTH INFORMATION MANAGEMENT | Facility: CLINIC | Age: 73
End: 2023-01-01
Payer: COMMERCIAL

## 2023-01-01 ENCOUNTER — OFFICE VISIT (OUTPATIENT)
Dept: FAMILY MEDICINE | Facility: CLINIC | Age: 73
End: 2023-01-01
Payer: COMMERCIAL

## 2023-01-01 ENCOUNTER — LAB (OUTPATIENT)
Dept: LAB | Facility: CLINIC | Age: 73
End: 2023-01-01
Payer: COMMERCIAL

## 2023-01-01 ENCOUNTER — OFFICE VISIT (OUTPATIENT)
Dept: CARDIOLOGY | Facility: CLINIC | Age: 73
End: 2023-01-01
Payer: COMMERCIAL

## 2023-01-01 ENCOUNTER — APPOINTMENT (OUTPATIENT)
Dept: ULTRASOUND IMAGING | Facility: CLINIC | Age: 73
DRG: 292 | End: 2023-01-01
Attending: PHYSICIAN ASSISTANT
Payer: COMMERCIAL

## 2023-01-01 ENCOUNTER — ANCILLARY PROCEDURE (OUTPATIENT)
Dept: GENERAL RADIOLOGY | Facility: CLINIC | Age: 73
End: 2023-01-01
Attending: FAMILY MEDICINE
Payer: COMMERCIAL

## 2023-01-01 ENCOUNTER — APPOINTMENT (OUTPATIENT)
Dept: EDUCATION SERVICES | Facility: CLINIC | Age: 73
DRG: 286 | End: 2023-01-01
Payer: COMMERCIAL

## 2023-01-01 ENCOUNTER — APPOINTMENT (OUTPATIENT)
Dept: OCCUPATIONAL THERAPY | Facility: CLINIC | Age: 73
DRG: 286 | End: 2023-01-01
Attending: NURSE PRACTITIONER
Payer: COMMERCIAL

## 2023-01-01 ENCOUNTER — TELEPHONE (OUTPATIENT)
Dept: CARDIOLOGY | Facility: CLINIC | Age: 73
End: 2023-01-01
Payer: COMMERCIAL

## 2023-01-01 ENCOUNTER — HOSPITAL ENCOUNTER (OUTPATIENT)
Dept: CARDIOLOGY | Facility: CLINIC | Age: 73
Discharge: HOME | End: 2023-01-03
Attending: INTERNAL MEDICINE | Admitting: INTERNAL MEDICINE
Payer: COMMERCIAL

## 2023-01-01 ENCOUNTER — TELEPHONE (OUTPATIENT)
Dept: CARDIOLOGY | Facility: CLINIC | Age: 73
End: 2023-01-01

## 2023-01-01 ENCOUNTER — ANCILLARY PROCEDURE (OUTPATIENT)
Dept: CARDIOLOGY | Facility: CLINIC | Age: 73
End: 2023-01-01
Attending: INTERNAL MEDICINE
Payer: COMMERCIAL

## 2023-01-01 ENCOUNTER — APPOINTMENT (OUTPATIENT)
Dept: CARDIOLOGY | Facility: CLINIC | Age: 73
DRG: 286 | End: 2023-01-01
Attending: STUDENT IN AN ORGANIZED HEALTH CARE EDUCATION/TRAINING PROGRAM
Payer: COMMERCIAL

## 2023-01-01 ENCOUNTER — HOSPITAL ENCOUNTER (OUTPATIENT)
Dept: ULTRASOUND IMAGING | Facility: CLINIC | Age: 73
Discharge: HOME | End: 2023-04-18
Attending: SURGERY | Admitting: SURGERY
Payer: COMMERCIAL

## 2023-01-01 ENCOUNTER — HOSPITAL ENCOUNTER (OUTPATIENT)
Facility: CLINIC | Age: 73
Discharge: HOME | End: 2023-01-01
Attending: INTERNAL MEDICINE | Admitting: INTERNAL MEDICINE
Payer: COMMERCIAL

## 2023-01-01 ENCOUNTER — MYC REFILL (OUTPATIENT)
Dept: CARDIOLOGY | Facility: CLINIC | Age: 73
End: 2023-01-01
Payer: COMMERCIAL

## 2023-01-01 ENCOUNTER — ALLIED HEALTH/NURSE VISIT (OUTPATIENT)
Dept: FAMILY MEDICINE | Facility: CLINIC | Age: 73
End: 2023-01-01
Payer: COMMERCIAL

## 2023-01-01 ENCOUNTER — PATIENT OUTREACH (OUTPATIENT)
Dept: CARE COORDINATION | Facility: CLINIC | Age: 73
End: 2023-01-01
Payer: COMMERCIAL

## 2023-01-01 ENCOUNTER — APPOINTMENT (OUTPATIENT)
Dept: OCCUPATIONAL THERAPY | Facility: CLINIC | Age: 73
DRG: 286 | End: 2023-01-01
Payer: COMMERCIAL

## 2023-01-01 ENCOUNTER — HOME INFUSION (PRE-WILLOW HOME INFUSION) (OUTPATIENT)
Dept: PHARMACY | Facility: CLINIC | Age: 73
End: 2023-01-01

## 2023-01-01 ENCOUNTER — LAB REQUISITION (OUTPATIENT)
Dept: LAB | Facility: CLINIC | Age: 73
End: 2023-01-01
Payer: COMMERCIAL

## 2023-01-01 ENCOUNTER — TELEPHONE (OUTPATIENT)
Dept: FAMILY MEDICINE | Facility: CLINIC | Age: 73
End: 2023-01-01
Payer: COMMERCIAL

## 2023-01-01 ENCOUNTER — APPOINTMENT (OUTPATIENT)
Dept: GENERAL RADIOLOGY | Facility: CLINIC | Age: 73
DRG: 292 | End: 2023-01-01
Attending: FAMILY MEDICINE
Payer: COMMERCIAL

## 2023-01-01 ENCOUNTER — MYC MEDICAL ADVICE (OUTPATIENT)
Dept: CARDIOLOGY | Facility: CLINIC | Age: 73
End: 2023-01-01

## 2023-01-01 ENCOUNTER — CARE COORDINATION (OUTPATIENT)
Dept: CARDIOLOGY | Facility: CLINIC | Age: 73
End: 2023-01-01

## 2023-01-01 ENCOUNTER — HOSPITAL ENCOUNTER (OUTPATIENT)
Dept: CARDIOLOGY | Facility: CLINIC | Age: 73
Discharge: HOME | End: 2023-01-31
Attending: PHYSICIAN ASSISTANT | Admitting: PHYSICIAN ASSISTANT
Payer: COMMERCIAL

## 2023-01-01 ENCOUNTER — APPOINTMENT (OUTPATIENT)
Dept: GENERAL RADIOLOGY | Facility: CLINIC | Age: 73
DRG: 286 | End: 2023-01-01
Attending: STUDENT IN AN ORGANIZED HEALTH CARE EDUCATION/TRAINING PROGRAM
Payer: COMMERCIAL

## 2023-01-01 ENCOUNTER — OFFICE VISIT (OUTPATIENT)
Dept: DERMATOLOGY | Facility: CLINIC | Age: 73
End: 2023-01-01
Attending: FAMILY MEDICINE
Payer: COMMERCIAL

## 2023-01-01 ENCOUNTER — HOSPITAL ENCOUNTER (INPATIENT)
Facility: CLINIC | Age: 73
LOS: 7 days | Discharge: HOME | DRG: 286 | End: 2023-05-17
Attending: STUDENT IN AN ORGANIZED HEALTH CARE EDUCATION/TRAINING PROGRAM | Admitting: STUDENT IN AN ORGANIZED HEALTH CARE EDUCATION/TRAINING PROGRAM
Payer: COMMERCIAL

## 2023-01-01 ENCOUNTER — DOCUMENTATION ONLY (OUTPATIENT)
Dept: OTHER | Facility: CLINIC | Age: 73
End: 2023-01-01
Payer: COMMERCIAL

## 2023-01-01 ENCOUNTER — OFFICE VISIT (OUTPATIENT)
Dept: CARDIOLOGY | Facility: CLINIC | Age: 73
End: 2023-01-01
Attending: PHYSICIAN ASSISTANT
Payer: COMMERCIAL

## 2023-01-01 ENCOUNTER — HOSPITAL ENCOUNTER (OUTPATIENT)
Dept: ULTRASOUND IMAGING | Facility: CLINIC | Age: 73
Discharge: HOME | End: 2023-04-13
Attending: SURGERY | Admitting: SURGERY
Payer: COMMERCIAL

## 2023-01-01 ENCOUNTER — TELEPHONE (OUTPATIENT)
Dept: FAMILY MEDICINE | Facility: CLINIC | Age: 73
End: 2023-01-01

## 2023-01-01 ENCOUNTER — PRE VISIT (OUTPATIENT)
Dept: CARDIOLOGY | Facility: CLINIC | Age: 73
End: 2023-01-01
Payer: COMMERCIAL

## 2023-01-01 ENCOUNTER — ANCILLARY PROCEDURE (OUTPATIENT)
Dept: CARDIOLOGY | Facility: CLINIC | Age: 73
DRG: 292 | End: 2023-01-01
Attending: PHYSICIAN ASSISTANT
Payer: COMMERCIAL

## 2023-01-01 ENCOUNTER — CARE COORDINATION (OUTPATIENT)
Dept: CARDIOLOGY | Facility: CLINIC | Age: 73
End: 2023-01-01
Payer: COMMERCIAL

## 2023-01-01 ENCOUNTER — HOSPITAL ENCOUNTER (INPATIENT)
Facility: CLINIC | Age: 73
LOS: 6 days | Discharge: HOME HEALTH | DRG: 292 | End: 2023-05-31
Attending: FAMILY MEDICINE | Admitting: INTERNAL MEDICINE
Payer: COMMERCIAL

## 2023-01-01 ENCOUNTER — PATIENT OUTREACH (OUTPATIENT)
Dept: CARDIOLOGY | Facility: CLINIC | Age: 73
End: 2023-01-01
Payer: COMMERCIAL

## 2023-01-01 ENCOUNTER — LAB (OUTPATIENT)
Dept: LAB | Facility: CLINIC | Age: 73
End: 2023-01-01
Attending: INTERNAL MEDICINE
Payer: COMMERCIAL

## 2023-01-01 VITALS
WEIGHT: 152.8 LBS | HEART RATE: 83 BPM | HEIGHT: 72 IN | TEMPERATURE: 98 F | BODY MASS INDEX: 20.7 KG/M2 | RESPIRATION RATE: 16 BRPM | SYSTOLIC BLOOD PRESSURE: 94 MMHG | OXYGEN SATURATION: 98 % | DIASTOLIC BLOOD PRESSURE: 61 MMHG

## 2023-01-01 VITALS
HEART RATE: 82 BPM | BODY MASS INDEX: 21.32 KG/M2 | SYSTOLIC BLOOD PRESSURE: 95 MMHG | DIASTOLIC BLOOD PRESSURE: 60 MMHG | WEIGHT: 157.2 LBS | OXYGEN SATURATION: 98 %

## 2023-01-01 VITALS
BODY MASS INDEX: 22.01 KG/M2 | WEIGHT: 157.2 LBS | DIASTOLIC BLOOD PRESSURE: 68 MMHG | OXYGEN SATURATION: 98 % | HEART RATE: 75 BPM | SYSTOLIC BLOOD PRESSURE: 110 MMHG | HEIGHT: 71 IN

## 2023-01-01 VITALS — OXYGEN SATURATION: 99 % | HEART RATE: 84 BPM | DIASTOLIC BLOOD PRESSURE: 63 MMHG | SYSTOLIC BLOOD PRESSURE: 100 MMHG

## 2023-01-01 VITALS
RESPIRATION RATE: 16 BRPM | WEIGHT: 154 LBS | HEIGHT: 71 IN | DIASTOLIC BLOOD PRESSURE: 58 MMHG | OXYGEN SATURATION: 96 % | BODY MASS INDEX: 21.56 KG/M2 | TEMPERATURE: 97.7 F | SYSTOLIC BLOOD PRESSURE: 102 MMHG | HEART RATE: 81 BPM

## 2023-01-01 VITALS
HEART RATE: 76 BPM | WEIGHT: 146 LBS | OXYGEN SATURATION: 97 % | DIASTOLIC BLOOD PRESSURE: 44 MMHG | BODY MASS INDEX: 19.77 KG/M2 | TEMPERATURE: 98.2 F | SYSTOLIC BLOOD PRESSURE: 80 MMHG | RESPIRATION RATE: 16 BRPM | HEIGHT: 72 IN

## 2023-01-01 VITALS
HEART RATE: 69 BPM | RESPIRATION RATE: 20 BRPM | OXYGEN SATURATION: 98 % | WEIGHT: 153 LBS | BODY MASS INDEX: 21.34 KG/M2 | SYSTOLIC BLOOD PRESSURE: 105 MMHG | DIASTOLIC BLOOD PRESSURE: 50 MMHG

## 2023-01-01 VITALS
SYSTOLIC BLOOD PRESSURE: 118 MMHG | HEART RATE: 70 BPM | OXYGEN SATURATION: 95 % | BODY MASS INDEX: 21.2 KG/M2 | DIASTOLIC BLOOD PRESSURE: 51 MMHG | WEIGHT: 152 LBS

## 2023-01-01 VITALS
DIASTOLIC BLOOD PRESSURE: 52 MMHG | SYSTOLIC BLOOD PRESSURE: 99 MMHG | WEIGHT: 152 LBS | HEART RATE: 80 BPM | BODY MASS INDEX: 21.2 KG/M2 | OXYGEN SATURATION: 97 %

## 2023-01-01 VITALS
HEIGHT: 71 IN | TEMPERATURE: 97.4 F | HEART RATE: 83 BPM | RESPIRATION RATE: 18 BRPM | DIASTOLIC BLOOD PRESSURE: 60 MMHG | BODY MASS INDEX: 21.7 KG/M2 | OXYGEN SATURATION: 98 % | WEIGHT: 155 LBS | SYSTOLIC BLOOD PRESSURE: 110 MMHG

## 2023-01-01 VITALS
BODY MASS INDEX: 21.63 KG/M2 | RESPIRATION RATE: 16 BRPM | OXYGEN SATURATION: 97 % | SYSTOLIC BLOOD PRESSURE: 122 MMHG | TEMPERATURE: 97.8 F | WEIGHT: 154.5 LBS | HEIGHT: 71 IN | HEART RATE: 92 BPM | DIASTOLIC BLOOD PRESSURE: 64 MMHG

## 2023-01-01 DIAGNOSIS — I50.22 CHRONIC SYSTOLIC HEART FAILURE (H): ICD-10-CM

## 2023-01-01 DIAGNOSIS — I50.22 CHRONIC SYSTOLIC HEART FAILURE (H): Primary | ICD-10-CM

## 2023-01-01 DIAGNOSIS — I50.9 LOW OUTPUT HEART FAILURE (H): ICD-10-CM

## 2023-01-01 DIAGNOSIS — I50.84 END STAGE CONGESTIVE HEART FAILURE (H): ICD-10-CM

## 2023-01-01 DIAGNOSIS — E11.69 TYPE 2 DIABETES MELLITUS WITH OTHER SPECIFIED COMPLICATION, WITH LONG-TERM CURRENT USE OF INSULIN (H): ICD-10-CM

## 2023-01-01 DIAGNOSIS — I50.23 ACUTE ON CHRONIC SYSTOLIC CONGESTIVE HEART FAILURE (H): ICD-10-CM

## 2023-01-01 DIAGNOSIS — M25.551 HIP PAIN, RIGHT: Primary | ICD-10-CM

## 2023-01-01 DIAGNOSIS — E11.3299 NONPROLIFERATIVE DIABETIC RETINOPATHY (H): ICD-10-CM

## 2023-01-01 DIAGNOSIS — G89.29 CHRONIC BILATERAL LOW BACK PAIN WITH RIGHT-SIDED SCIATICA: ICD-10-CM

## 2023-01-01 DIAGNOSIS — Z20.822 LAB TEST NEGATIVE FOR COVID-19 VIRUS: ICD-10-CM

## 2023-01-01 DIAGNOSIS — M54.41 CHRONIC BILATERAL LOW BACK PAIN WITH RIGHT-SIDED SCIATICA: ICD-10-CM

## 2023-01-01 DIAGNOSIS — E87.1 HYPONATREMIA: ICD-10-CM

## 2023-01-01 DIAGNOSIS — G89.29 CHRONIC BILATERAL LOW BACK PAIN WITH RIGHT-SIDED SCIATICA: Primary | ICD-10-CM

## 2023-01-01 DIAGNOSIS — I50.84 END STAGE HEART FAILURE (H): Primary | ICD-10-CM

## 2023-01-01 DIAGNOSIS — I25.10 CORONARY ARTERY DISEASE INVOLVING NATIVE HEART WITHOUT ANGINA PECTORIS, UNSPECIFIED VESSEL OR LESION TYPE: ICD-10-CM

## 2023-01-01 DIAGNOSIS — I73.9 PAD (PERIPHERAL ARTERY DISEASE) (H): ICD-10-CM

## 2023-01-01 DIAGNOSIS — N18.32 ANEMIA IN STAGE 3B CHRONIC KIDNEY DISEASE (H): ICD-10-CM

## 2023-01-01 DIAGNOSIS — I70.219 ATHEROSCLEROSIS OF ARTERY OF EXTREMITY WITH INTERMITTENT CLAUDICATION (H): ICD-10-CM

## 2023-01-01 DIAGNOSIS — I48.3 TYPICAL ATRIAL FLUTTER (H): ICD-10-CM

## 2023-01-01 DIAGNOSIS — E21.3 HYPERPARATHYROIDISM (H): ICD-10-CM

## 2023-01-01 DIAGNOSIS — Z72.0 TOBACCO USE: ICD-10-CM

## 2023-01-01 DIAGNOSIS — I48.0 PAROXYSMAL ATRIAL FIBRILLATION (H): ICD-10-CM

## 2023-01-01 DIAGNOSIS — I50.9 CONGESTIVE HEART FAILURE, UNSPECIFIED HF CHRONICITY, UNSPECIFIED HEART FAILURE TYPE (H): ICD-10-CM

## 2023-01-01 DIAGNOSIS — I70.212 ATHEROSCLEROSIS OF NATIVE ARTERIES OF EXTREMITIES WITH INTERMITTENT CLAUDICATION, LEFT LEG (H): ICD-10-CM

## 2023-01-01 DIAGNOSIS — D63.1 ANEMIA IN STAGE 3B CHRONIC KIDNEY DISEASE (H): ICD-10-CM

## 2023-01-01 DIAGNOSIS — M54.41 CHRONIC BILATERAL LOW BACK PAIN WITH RIGHT-SIDED SCIATICA: Primary | ICD-10-CM

## 2023-01-01 DIAGNOSIS — Z95.810 ICD (IMPLANTABLE CARDIOVERTER-DEFIBRILLATOR) IN PLACE: ICD-10-CM

## 2023-01-01 DIAGNOSIS — R06.09 DOE (DYSPNEA ON EXERTION): ICD-10-CM

## 2023-01-01 DIAGNOSIS — I25.5 ISCHEMIC CARDIOMYOPATHY: Primary | ICD-10-CM

## 2023-01-01 DIAGNOSIS — Z79.4 TYPE 2 DIABETES MELLITUS WITH OTHER SPECIFIED COMPLICATION, WITH LONG-TERM CURRENT USE OF INSULIN (H): ICD-10-CM

## 2023-01-01 DIAGNOSIS — L82.1 SEBORRHEIC KERATOSIS: Primary | ICD-10-CM

## 2023-01-01 DIAGNOSIS — N17.9 ACUTE KIDNEY INJURY (H): ICD-10-CM

## 2023-01-01 DIAGNOSIS — E11.21 DIABETIC NEPHROPATHY ASSOCIATED WITH TYPE 2 DIABETES MELLITUS (H): ICD-10-CM

## 2023-01-01 DIAGNOSIS — J44.9 CHRONIC OBSTRUCTIVE PULMONARY DISEASE, UNSPECIFIED COPD TYPE (H): ICD-10-CM

## 2023-01-01 DIAGNOSIS — I50.84 END STAGE CONGESTIVE HEART FAILURE (H): Primary | ICD-10-CM

## 2023-01-01 DIAGNOSIS — Z00.00 ENCOUNTER FOR MEDICARE ANNUAL WELLNESS EXAM: Primary | ICD-10-CM

## 2023-01-01 DIAGNOSIS — I25.5 ISCHEMIC CARDIOMYOPATHY: ICD-10-CM

## 2023-01-01 DIAGNOSIS — D69.6 THROMBOCYTOPENIA (H): ICD-10-CM

## 2023-01-01 DIAGNOSIS — I42.9 CARDIOMYOPATHY (H): ICD-10-CM

## 2023-01-01 DIAGNOSIS — D22.30 CHANGE IN NEVUS OF FACE: Primary | ICD-10-CM

## 2023-01-01 DIAGNOSIS — I50.23 ACUTE ON CHRONIC SYSTOLIC CONGESTIVE HEART FAILURE (H): Primary | ICD-10-CM

## 2023-01-01 DIAGNOSIS — L21.9 DERMATITIS, SEBORRHEIC: ICD-10-CM

## 2023-01-01 DIAGNOSIS — I25.10 CORONARY ARTERY DISEASE INVOLVING NATIVE HEART WITHOUT ANGINA PECTORIS, UNSPECIFIED VESSEL OR LESION TYPE: Primary | ICD-10-CM

## 2023-01-01 DIAGNOSIS — L30.9 DERMATITIS: ICD-10-CM

## 2023-01-01 DIAGNOSIS — I50.23 ACUTE ON CHRONIC SYSTOLIC (CONGESTIVE) HEART FAILURE (H): ICD-10-CM

## 2023-01-01 DIAGNOSIS — K86.0 ALCOHOL-INDUCED CHRONIC PANCREATITIS (H): ICD-10-CM

## 2023-01-01 DIAGNOSIS — L98.491 ULCER OF EXTERNAL EAR, LIMITED TO BREAKDOWN OF SKIN (H): ICD-10-CM

## 2023-01-01 DIAGNOSIS — I25.2 HISTORY OF NON-ST ELEVATION MYOCARDIAL INFARCTION (NSTEMI): ICD-10-CM

## 2023-01-01 DIAGNOSIS — N18.32 STAGE 3B CHRONIC KIDNEY DISEASE (H): ICD-10-CM

## 2023-01-01 LAB
ALBUMIN SERPL BCG-MCNC: 3.5 G/DL (ref 3.5–5.2)
ALBUMIN SERPL BCG-MCNC: 3.6 G/DL (ref 3.5–5.2)
ALBUMIN SERPL BCG-MCNC: 3.7 G/DL (ref 3.5–5.2)
ALBUMIN SERPL BCG-MCNC: 3.9 G/DL (ref 3.5–5.2)
ALP SERPL-CCNC: 100 U/L (ref 40–129)
ALP SERPL-CCNC: 104 U/L (ref 40–129)
ALP SERPL-CCNC: 106 U/L (ref 40–129)
ALP SERPL-CCNC: 120 U/L (ref 40–129)
ALP SERPL-CCNC: 124 U/L (ref 40–129)
ALP SERPL-CCNC: 98 U/L (ref 40–129)
ALT SERPL W P-5'-P-CCNC: 13 U/L (ref 10–50)
ALT SERPL W P-5'-P-CCNC: 16 U/L (ref 10–50)
ALT SERPL W P-5'-P-CCNC: 19 U/L (ref 10–50)
ALT SERPL W P-5'-P-CCNC: 19 U/L (ref 10–50)
ALT SERPL W P-5'-P-CCNC: 21 U/L (ref 10–50)
ALT SERPL W P-5'-P-CCNC: 22 U/L (ref 10–50)
ALT SERPL W P-5'-P-CCNC: 23 U/L (ref 10–50)
ANION GAP SERPL CALCULATED.3IONS-SCNC: 10 MMOL/L (ref 7–15)
ANION GAP SERPL CALCULATED.3IONS-SCNC: 11 MMOL/L (ref 7–15)
ANION GAP SERPL CALCULATED.3IONS-SCNC: 12 MMOL/L (ref 7–15)
ANION GAP SERPL CALCULATED.3IONS-SCNC: 13 MMOL/L (ref 7–15)
ANION GAP SERPL CALCULATED.3IONS-SCNC: 14 MMOL/L (ref 7–15)
ANION GAP SERPL CALCULATED.3IONS-SCNC: 14 MMOL/L (ref 7–15)
ANION GAP SERPL CALCULATED.3IONS-SCNC: 16 MMOL/L (ref 7–15)
ANION GAP SERPL CALCULATED.3IONS-SCNC: 7 MMOL/L (ref 7–15)
ANION GAP SERPL CALCULATED.3IONS-SCNC: 7 MMOL/L (ref 7–15)
ANION GAP SERPL CALCULATED.3IONS-SCNC: 8 MMOL/L (ref 7–15)
ANION GAP SERPL CALCULATED.3IONS-SCNC: 9 MMOL/L (ref 7–15)
AST SERPL W P-5'-P-CCNC: 21 U/L (ref 10–50)
AST SERPL W P-5'-P-CCNC: 22 U/L (ref 10–50)
AST SERPL W P-5'-P-CCNC: 22 U/L (ref 10–50)
AST SERPL W P-5'-P-CCNC: 27 U/L (ref 10–50)
AST SERPL W P-5'-P-CCNC: 27 U/L (ref 10–50)
AST SERPL W P-5'-P-CCNC: 30 U/L (ref 10–50)
ATRIAL RATE - MUSE: 70 BPM
ATRIAL RATE - MUSE: 72 BPM
ATRIAL RATE - MUSE: 73 BPM
ATRIAL RATE - MUSE: 79 BPM
BASE EXCESS BLDV CALC-SCNC: 10.1 MMOL/L (ref -7.7–1.9)
BASE EXCESS BLDV CALC-SCNC: 10.8 MMOL/L (ref -7.7–1.9)
BASE EXCESS BLDV CALC-SCNC: 11.1 MMOL/L (ref -7.7–1.9)
BASE EXCESS BLDV CALC-SCNC: 9.3 MMOL/L (ref -7.7–1.9)
BASE EXCESS BLDV CALC-SCNC: 9.3 MMOL/L (ref -7.7–1.9)
BASE EXCESS BLDV CALC-SCNC: 9.7 MMOL/L (ref -7.7–1.9)
BASE EXCESS BLDV CALC-SCNC: 9.8 MMOL/L (ref -7.7–1.9)
BASE EXCESS BLDV CALC-SCNC: 9.8 MMOL/L (ref -7.7–1.9)
BASE EXCESS BLDV CALC-SCNC: 9.9 MMOL/L (ref -7.7–1.9)
BASE EXCESS BLDV CALC-SCNC: 9.9 MMOL/L (ref -7.7–1.9)
BASOPHILS # BLD AUTO: 0 10E3/UL (ref 0–0.2)
BASOPHILS NFR BLD AUTO: 1 %
BILIRUB DIRECT SERPL-MCNC: 0.22 MG/DL (ref 0–0.3)
BILIRUB DIRECT SERPL-MCNC: <0.2 MG/DL (ref 0–0.3)
BILIRUB SERPL-MCNC: 0.2 MG/DL
BILIRUB SERPL-MCNC: 0.4 MG/DL
BILIRUB SERPL-MCNC: 0.4 MG/DL
BILIRUB SERPL-MCNC: 0.5 MG/DL
BUN SERPL-MCNC: 33.3 MG/DL (ref 8–23)
BUN SERPL-MCNC: 33.7 MG/DL (ref 8–23)
BUN SERPL-MCNC: 35.9 MG/DL (ref 8–23)
BUN SERPL-MCNC: 38.5 MG/DL (ref 8–23)
BUN SERPL-MCNC: 43.1 MG/DL (ref 8–23)
BUN SERPL-MCNC: 45.3 MG/DL (ref 8–23)
BUN SERPL-MCNC: 51.2 MG/DL (ref 8–23)
BUN SERPL-MCNC: 55 MG/DL (ref 8–23)
BUN SERPL-MCNC: 55.7 MG/DL (ref 8–23)
BUN SERPL-MCNC: 55.9 MG/DL (ref 8–23)
BUN SERPL-MCNC: 55.9 MG/DL (ref 8–23)
BUN SERPL-MCNC: 57.5 MG/DL (ref 8–23)
BUN SERPL-MCNC: 58.1 MG/DL (ref 8–23)
BUN SERPL-MCNC: 59 MG/DL (ref 8–23)
BUN SERPL-MCNC: 59.1 MG/DL (ref 8–23)
BUN SERPL-MCNC: 59.5 MG/DL (ref 8–23)
BUN SERPL-MCNC: 60.4 MG/DL (ref 8–23)
BUN SERPL-MCNC: 61.6 MG/DL (ref 8–23)
BUN SERPL-MCNC: 61.7 MG/DL (ref 8–23)
BUN SERPL-MCNC: 62 MG/DL (ref 8–23)
BUN SERPL-MCNC: 65.8 MG/DL (ref 8–23)
BUN SERPL-MCNC: 66.3 MG/DL (ref 8–23)
BUN SERPL-MCNC: 68.4 MG/DL (ref 8–23)
BUN SERPL-MCNC: 68.4 MG/DL (ref 8–23)
BUN SERPL-MCNC: 68.7 MG/DL (ref 8–23)
BUN SERPL-MCNC: 68.7 MG/DL (ref 8–23)
BUN SERPL-MCNC: 69.2 MG/DL (ref 8–23)
BUN SERPL-MCNC: 70.8 MG/DL (ref 8–23)
BUN SERPL-MCNC: 71.8 MG/DL (ref 8–23)
BUN SERPL-MCNC: 72.2 MG/DL (ref 8–23)
BUN SERPL-MCNC: 72.3 MG/DL (ref 8–23)
BUN SERPL-MCNC: 73 MG/DL (ref 8–23)
BUN SERPL-MCNC: 73.2 MG/DL (ref 8–23)
BUN SERPL-MCNC: 73.5 MG/DL (ref 8–23)
CALCIUM SERPL-MCNC: 8.5 MG/DL (ref 8.8–10.2)
CALCIUM SERPL-MCNC: 8.6 MG/DL (ref 8.8–10.2)
CALCIUM SERPL-MCNC: 8.7 MG/DL (ref 8.8–10.2)
CALCIUM SERPL-MCNC: 8.7 MG/DL (ref 8.8–10.2)
CALCIUM SERPL-MCNC: 8.8 MG/DL (ref 8.8–10.2)
CALCIUM SERPL-MCNC: 8.9 MG/DL (ref 8.8–10.2)
CALCIUM SERPL-MCNC: 9 MG/DL (ref 8.8–10.2)
CALCIUM SERPL-MCNC: 9.1 MG/DL (ref 8.8–10.2)
CALCIUM SERPL-MCNC: 9.2 MG/DL (ref 8.8–10.2)
CALCIUM SERPL-MCNC: 9.3 MG/DL (ref 8.8–10.2)
CALCIUM SERPL-MCNC: 9.4 MG/DL (ref 8.8–10.2)
CALCIUM SERPL-MCNC: 9.5 MG/DL (ref 8.8–10.2)
CALCIUM SERPL-MCNC: 9.8 MG/DL (ref 8.8–10.2)
CHLORIDE SERPL-SCNC: 100 MMOL/L (ref 98–107)
CHLORIDE SERPL-SCNC: 100 MMOL/L (ref 98–107)
CHLORIDE SERPL-SCNC: 101 MMOL/L (ref 98–107)
CHLORIDE SERPL-SCNC: 103 MMOL/L (ref 98–107)
CHLORIDE SERPL-SCNC: 105 MMOL/L (ref 98–107)
CHLORIDE SERPL-SCNC: 79 MMOL/L (ref 98–107)
CHLORIDE SERPL-SCNC: 82 MMOL/L (ref 98–107)
CHLORIDE SERPL-SCNC: 86 MMOL/L (ref 98–107)
CHLORIDE SERPL-SCNC: 87 MMOL/L (ref 98–107)
CHLORIDE SERPL-SCNC: 88 MMOL/L (ref 98–107)
CHLORIDE SERPL-SCNC: 88 MMOL/L (ref 98–107)
CHLORIDE SERPL-SCNC: 90 MMOL/L (ref 98–107)
CHLORIDE SERPL-SCNC: 91 MMOL/L (ref 98–107)
CHLORIDE SERPL-SCNC: 91 MMOL/L (ref 98–107)
CHLORIDE SERPL-SCNC: 92 MMOL/L (ref 98–107)
CHLORIDE SERPL-SCNC: 93 MMOL/L (ref 98–107)
CHLORIDE SERPL-SCNC: 93 MMOL/L (ref 98–107)
CHLORIDE SERPL-SCNC: 94 MMOL/L (ref 98–107)
CHLORIDE SERPL-SCNC: 95 MMOL/L (ref 98–107)
CHLORIDE SERPL-SCNC: 96 MMOL/L (ref 98–107)
CHLORIDE SERPL-SCNC: 96 MMOL/L (ref 98–107)
CHLORIDE SERPL-SCNC: 98 MMOL/L (ref 98–107)
CHLORIDE SERPL-SCNC: 99 MMOL/L (ref 98–107)
CHOLEST SERPL-MCNC: 97 MG/DL
CREAT SERPL-MCNC: 1.18 MG/DL (ref 0.67–1.17)
CREAT SERPL-MCNC: 1.2 MG/DL (ref 0.67–1.17)
CREAT SERPL-MCNC: 1.48 MG/DL (ref 0.67–1.17)
CREAT SERPL-MCNC: 1.49 MG/DL (ref 0.67–1.17)
CREAT SERPL-MCNC: 1.51 MG/DL (ref 0.67–1.17)
CREAT SERPL-MCNC: 1.59 MG/DL (ref 0.67–1.17)
CREAT SERPL-MCNC: 1.6 MG/DL (ref 0.67–1.17)
CREAT SERPL-MCNC: 1.61 MG/DL (ref 0.67–1.17)
CREAT SERPL-MCNC: 1.62 MG/DL (ref 0.67–1.17)
CREAT SERPL-MCNC: 1.63 MG/DL (ref 0.67–1.17)
CREAT SERPL-MCNC: 1.69 MG/DL (ref 0.67–1.17)
CREAT SERPL-MCNC: 1.76 MG/DL (ref 0.67–1.17)
CREAT SERPL-MCNC: 1.78 MG/DL (ref 0.67–1.17)
CREAT SERPL-MCNC: 1.85 MG/DL (ref 0.67–1.17)
CREAT SERPL-MCNC: 1.87 MG/DL (ref 0.67–1.17)
CREAT SERPL-MCNC: 1.88 MG/DL (ref 0.67–1.17)
CREAT SERPL-MCNC: 1.9 MG/DL (ref 0.67–1.17)
CREAT SERPL-MCNC: 1.91 MG/DL (ref 0.67–1.17)
CREAT SERPL-MCNC: 1.94 MG/DL (ref 0.67–1.17)
CREAT SERPL-MCNC: 1.98 MG/DL (ref 0.67–1.17)
CREAT SERPL-MCNC: 2.04 MG/DL (ref 0.67–1.17)
CREAT SERPL-MCNC: 2.05 MG/DL (ref 0.67–1.17)
CREAT SERPL-MCNC: 2.05 MG/DL (ref 0.67–1.17)
CREAT SERPL-MCNC: 2.08 MG/DL (ref 0.67–1.17)
CREAT SERPL-MCNC: 2.08 MG/DL (ref 0.67–1.17)
CREAT SERPL-MCNC: 2.09 MG/DL (ref 0.67–1.17)
CREAT SERPL-MCNC: 2.09 MG/DL (ref 0.67–1.17)
CREAT SERPL-MCNC: 2.1 MG/DL (ref 0.67–1.17)
CREAT SERPL-MCNC: 2.11 MG/DL (ref 0.67–1.17)
CREAT SERPL-MCNC: 2.12 MG/DL (ref 0.67–1.17)
CREAT SERPL-MCNC: 2.28 MG/DL (ref 0.67–1.17)
CREAT SERPL-MCNC: 2.31 MG/DL (ref 0.67–1.17)
CREAT UR-MCNC: 13.1 MG/DL
CYSTATIN C (ROCHE): 2.5 MG/L (ref 0.6–1)
DEPRECATED HCO3 PLAS-SCNC: 25 MMOL/L (ref 22–29)
DEPRECATED HCO3 PLAS-SCNC: 26 MMOL/L (ref 22–29)
DEPRECATED HCO3 PLAS-SCNC: 27 MMOL/L (ref 22–29)
DEPRECATED HCO3 PLAS-SCNC: 27 MMOL/L (ref 22–29)
DEPRECATED HCO3 PLAS-SCNC: 28 MMOL/L (ref 22–29)
DEPRECATED HCO3 PLAS-SCNC: 28 MMOL/L (ref 22–29)
DEPRECATED HCO3 PLAS-SCNC: 29 MMOL/L (ref 22–29)
DEPRECATED HCO3 PLAS-SCNC: 30 MMOL/L (ref 22–29)
DEPRECATED HCO3 PLAS-SCNC: 31 MMOL/L (ref 22–29)
DEPRECATED HCO3 PLAS-SCNC: 32 MMOL/L (ref 22–29)
DEPRECATED HCO3 PLAS-SCNC: 33 MMOL/L (ref 22–29)
DEPRECATED HCO3 PLAS-SCNC: 33 MMOL/L (ref 22–29)
DIASTOLIC BLOOD PRESSURE - MUSE: NORMAL MMHG
EOSINOPHIL # BLD AUTO: 0.1 10E3/UL (ref 0–0.7)
EOSINOPHIL NFR BLD AUTO: 1 %
ERYTHROCYTE [DISTWIDTH] IN BLOOD BY AUTOMATED COUNT: 17 % (ref 10–15)
ERYTHROCYTE [DISTWIDTH] IN BLOOD BY AUTOMATED COUNT: 17.1 % (ref 10–15)
ERYTHROCYTE [DISTWIDTH] IN BLOOD BY AUTOMATED COUNT: 17.2 % (ref 10–15)
ERYTHROCYTE [DISTWIDTH] IN BLOOD BY AUTOMATED COUNT: 17.4 % (ref 10–15)
ERYTHROCYTE [DISTWIDTH] IN BLOOD BY AUTOMATED COUNT: 17.5 % (ref 10–15)
ERYTHROCYTE [DISTWIDTH] IN BLOOD BY AUTOMATED COUNT: 19 % (ref 10–15)
ERYTHROCYTE [DISTWIDTH] IN BLOOD BY AUTOMATED COUNT: 19.1 % (ref 10–15)
ERYTHROCYTE [DISTWIDTH] IN BLOOD BY AUTOMATED COUNT: 19.1 % (ref 10–15)
ERYTHROCYTE [DISTWIDTH] IN BLOOD BY AUTOMATED COUNT: 19.2 % (ref 10–15)
ERYTHROCYTE [DISTWIDTH] IN BLOOD BY AUTOMATED COUNT: 19.3 % (ref 10–15)
ERYTHROCYTE [DISTWIDTH] IN BLOOD BY AUTOMATED COUNT: 19.4 % (ref 10–15)
ERYTHROCYTE [DISTWIDTH] IN BLOOD BY AUTOMATED COUNT: 19.7 % (ref 10–15)
FERRITIN SERPL-MCNC: 107 NG/ML (ref 31–409)
FERRITIN SERPL-MCNC: 23 NG/ML (ref 31–409)
GFR SERPL CREATININE-BSD FRML MDRD: 22 ML/MIN/1.73M2
GFR SERPL CREATININE-BSD FRML MDRD: 29 ML/MIN/1.73M2
GFR SERPL CREATININE-BSD FRML MDRD: 29 ML/MIN/1.73M2
GFR SERPL CREATININE-BSD FRML MDRD: 32 ML/MIN/1.73M2
GFR SERPL CREATININE-BSD FRML MDRD: 33 ML/MIN/1.73M2
GFR SERPL CREATININE-BSD FRML MDRD: 34 ML/MIN/1.73M2
GFR SERPL CREATININE-BSD FRML MDRD: 35 ML/MIN/1.73M2
GFR SERPL CREATININE-BSD FRML MDRD: 36 ML/MIN/1.73M2
GFR SERPL CREATININE-BSD FRML MDRD: 36 ML/MIN/1.73M2
GFR SERPL CREATININE-BSD FRML MDRD: 37 ML/MIN/1.73M2
GFR SERPL CREATININE-BSD FRML MDRD: 38 ML/MIN/1.73M2
GFR SERPL CREATININE-BSD FRML MDRD: 40 ML/MIN/1.73M2
GFR SERPL CREATININE-BSD FRML MDRD: 40 ML/MIN/1.73M2
GFR SERPL CREATININE-BSD FRML MDRD: 42 ML/MIN/1.73M2
GFR SERPL CREATININE-BSD FRML MDRD: 44 ML/MIN/1.73M2
GFR SERPL CREATININE-BSD FRML MDRD: 44 ML/MIN/1.73M2
GFR SERPL CREATININE-BSD FRML MDRD: 45 ML/MIN/1.73M2
GFR SERPL CREATININE-BSD FRML MDRD: 48 ML/MIN/1.73M2
GFR SERPL CREATININE-BSD FRML MDRD: 49 ML/MIN/1.73M2
GFR SERPL CREATININE-BSD FRML MDRD: 49 ML/MIN/1.73M2
GFR SERPL CREATININE-BSD FRML MDRD: 63 ML/MIN/1.73M2
GFR SERPL CREATININE-BSD FRML MDRD: 65 ML/MIN/1.73M2
GLUCOSE BLDC GLUCOMTR-MCNC: 101 MG/DL (ref 70–99)
GLUCOSE BLDC GLUCOMTR-MCNC: 103 MG/DL (ref 70–99)
GLUCOSE BLDC GLUCOMTR-MCNC: 107 MG/DL (ref 70–99)
GLUCOSE BLDC GLUCOMTR-MCNC: 114 MG/DL (ref 70–99)
GLUCOSE BLDC GLUCOMTR-MCNC: 115 MG/DL (ref 70–99)
GLUCOSE BLDC GLUCOMTR-MCNC: 116 MG/DL (ref 70–99)
GLUCOSE BLDC GLUCOMTR-MCNC: 121 MG/DL (ref 70–99)
GLUCOSE BLDC GLUCOMTR-MCNC: 128 MG/DL (ref 70–99)
GLUCOSE BLDC GLUCOMTR-MCNC: 136 MG/DL (ref 70–99)
GLUCOSE BLDC GLUCOMTR-MCNC: 139 MG/DL (ref 70–99)
GLUCOSE BLDC GLUCOMTR-MCNC: 143 MG/DL (ref 70–99)
GLUCOSE BLDC GLUCOMTR-MCNC: 144 MG/DL (ref 70–99)
GLUCOSE BLDC GLUCOMTR-MCNC: 146 MG/DL (ref 70–99)
GLUCOSE BLDC GLUCOMTR-MCNC: 150 MG/DL (ref 70–99)
GLUCOSE BLDC GLUCOMTR-MCNC: 152 MG/DL (ref 70–99)
GLUCOSE BLDC GLUCOMTR-MCNC: 155 MG/DL (ref 70–99)
GLUCOSE BLDC GLUCOMTR-MCNC: 158 MG/DL (ref 70–99)
GLUCOSE BLDC GLUCOMTR-MCNC: 163 MG/DL (ref 70–99)
GLUCOSE BLDC GLUCOMTR-MCNC: 176 MG/DL (ref 70–99)
GLUCOSE BLDC GLUCOMTR-MCNC: 180 MG/DL (ref 70–99)
GLUCOSE BLDC GLUCOMTR-MCNC: 181 MG/DL (ref 70–99)
GLUCOSE BLDC GLUCOMTR-MCNC: 184 MG/DL (ref 70–99)
GLUCOSE BLDC GLUCOMTR-MCNC: 185 MG/DL (ref 70–99)
GLUCOSE BLDC GLUCOMTR-MCNC: 186 MG/DL (ref 70–99)
GLUCOSE BLDC GLUCOMTR-MCNC: 187 MG/DL (ref 70–99)
GLUCOSE BLDC GLUCOMTR-MCNC: 187 MG/DL (ref 70–99)
GLUCOSE BLDC GLUCOMTR-MCNC: 189 MG/DL (ref 70–99)
GLUCOSE BLDC GLUCOMTR-MCNC: 197 MG/DL (ref 70–99)
GLUCOSE BLDC GLUCOMTR-MCNC: 198 MG/DL (ref 70–99)
GLUCOSE BLDC GLUCOMTR-MCNC: 200 MG/DL (ref 70–99)
GLUCOSE BLDC GLUCOMTR-MCNC: 200 MG/DL (ref 70–99)
GLUCOSE BLDC GLUCOMTR-MCNC: 201 MG/DL (ref 70–99)
GLUCOSE BLDC GLUCOMTR-MCNC: 205 MG/DL (ref 70–99)
GLUCOSE BLDC GLUCOMTR-MCNC: 206 MG/DL (ref 70–99)
GLUCOSE BLDC GLUCOMTR-MCNC: 206 MG/DL (ref 70–99)
GLUCOSE BLDC GLUCOMTR-MCNC: 211 MG/DL (ref 70–99)
GLUCOSE BLDC GLUCOMTR-MCNC: 219 MG/DL (ref 70–99)
GLUCOSE BLDC GLUCOMTR-MCNC: 220 MG/DL (ref 70–99)
GLUCOSE BLDC GLUCOMTR-MCNC: 229 MG/DL (ref 70–99)
GLUCOSE BLDC GLUCOMTR-MCNC: 241 MG/DL (ref 70–99)
GLUCOSE BLDC GLUCOMTR-MCNC: 241 MG/DL (ref 70–99)
GLUCOSE BLDC GLUCOMTR-MCNC: 243 MG/DL (ref 70–99)
GLUCOSE BLDC GLUCOMTR-MCNC: 243 MG/DL (ref 70–99)
GLUCOSE BLDC GLUCOMTR-MCNC: 245 MG/DL (ref 70–99)
GLUCOSE BLDC GLUCOMTR-MCNC: 248 MG/DL (ref 70–99)
GLUCOSE BLDC GLUCOMTR-MCNC: 251 MG/DL (ref 70–99)
GLUCOSE BLDC GLUCOMTR-MCNC: 254 MG/DL (ref 70–99)
GLUCOSE BLDC GLUCOMTR-MCNC: 256 MG/DL (ref 70–99)
GLUCOSE BLDC GLUCOMTR-MCNC: 257 MG/DL (ref 70–99)
GLUCOSE BLDC GLUCOMTR-MCNC: 269 MG/DL (ref 70–99)
GLUCOSE BLDC GLUCOMTR-MCNC: 299 MG/DL (ref 70–99)
GLUCOSE BLDC GLUCOMTR-MCNC: 306 MG/DL (ref 70–99)
GLUCOSE BLDC GLUCOMTR-MCNC: 350 MG/DL (ref 70–99)
GLUCOSE BLDC GLUCOMTR-MCNC: 352 MG/DL (ref 70–99)
GLUCOSE BLDC GLUCOMTR-MCNC: 357 MG/DL (ref 70–99)
GLUCOSE BLDC GLUCOMTR-MCNC: 367 MG/DL (ref 70–99)
GLUCOSE BLDC GLUCOMTR-MCNC: 379 MG/DL (ref 70–99)
GLUCOSE BLDC GLUCOMTR-MCNC: 382 MG/DL (ref 70–99)
GLUCOSE BLDC GLUCOMTR-MCNC: 417 MG/DL (ref 70–99)
GLUCOSE BLDC GLUCOMTR-MCNC: 53 MG/DL (ref 70–99)
GLUCOSE BLDC GLUCOMTR-MCNC: 57 MG/DL (ref 70–99)
GLUCOSE BLDC GLUCOMTR-MCNC: 63 MG/DL (ref 70–99)
GLUCOSE BLDC GLUCOMTR-MCNC: 72 MG/DL (ref 70–99)
GLUCOSE BLDC GLUCOMTR-MCNC: 79 MG/DL (ref 70–99)
GLUCOSE BLDC GLUCOMTR-MCNC: 84 MG/DL (ref 70–99)
GLUCOSE BLDC GLUCOMTR-MCNC: 94 MG/DL (ref 70–99)
GLUCOSE SERPL-MCNC: 108 MG/DL (ref 70–99)
GLUCOSE SERPL-MCNC: 114 MG/DL (ref 70–99)
GLUCOSE SERPL-MCNC: 139 MG/DL (ref 70–99)
GLUCOSE SERPL-MCNC: 147 MG/DL (ref 70–99)
GLUCOSE SERPL-MCNC: 148 MG/DL (ref 70–99)
GLUCOSE SERPL-MCNC: 150 MG/DL (ref 70–99)
GLUCOSE SERPL-MCNC: 152 MG/DL (ref 70–99)
GLUCOSE SERPL-MCNC: 163 MG/DL (ref 70–99)
GLUCOSE SERPL-MCNC: 173 MG/DL (ref 70–99)
GLUCOSE SERPL-MCNC: 180 MG/DL (ref 70–99)
GLUCOSE SERPL-MCNC: 181 MG/DL (ref 70–99)
GLUCOSE SERPL-MCNC: 186 MG/DL (ref 70–99)
GLUCOSE SERPL-MCNC: 187 MG/DL (ref 70–99)
GLUCOSE SERPL-MCNC: 201 MG/DL (ref 70–99)
GLUCOSE SERPL-MCNC: 202 MG/DL (ref 70–99)
GLUCOSE SERPL-MCNC: 210 MG/DL (ref 70–99)
GLUCOSE SERPL-MCNC: 211 MG/DL (ref 70–99)
GLUCOSE SERPL-MCNC: 215 MG/DL (ref 70–99)
GLUCOSE SERPL-MCNC: 231 MG/DL (ref 70–99)
GLUCOSE SERPL-MCNC: 242 MG/DL (ref 70–99)
GLUCOSE SERPL-MCNC: 257 MG/DL (ref 70–99)
GLUCOSE SERPL-MCNC: 266 MG/DL (ref 70–99)
GLUCOSE SERPL-MCNC: 269 MG/DL (ref 70–99)
GLUCOSE SERPL-MCNC: 274 MG/DL (ref 70–99)
GLUCOSE SERPL-MCNC: 313 MG/DL (ref 70–99)
GLUCOSE SERPL-MCNC: 342 MG/DL (ref 70–99)
GLUCOSE SERPL-MCNC: 362 MG/DL (ref 70–99)
GLUCOSE SERPL-MCNC: 505 MG/DL (ref 70–99)
GLUCOSE SERPL-MCNC: 65 MG/DL (ref 70–99)
GLUCOSE SERPL-MCNC: 73 MG/DL (ref 70–99)
GLUCOSE SERPL-MCNC: 80 MG/DL (ref 70–99)
GLUCOSE SERPL-MCNC: 89 MG/DL (ref 70–99)
HBA1C MFR BLD: 7.4 %
HBA1C MFR BLD: 7.9 % (ref 0–5.6)
HCO3 BLDV-SCNC: 35 MMOL/L (ref 21–28)
HCO3 BLDV-SCNC: 35 MMOL/L (ref 21–28)
HCO3 BLDV-SCNC: 36 MMOL/L (ref 21–28)
HCO3 BLDV-SCNC: 37 MMOL/L (ref 21–28)
HCT VFR BLD AUTO: 34.2 % (ref 40–53)
HCT VFR BLD AUTO: 34.4 % (ref 40–53)
HCT VFR BLD AUTO: 34.6 % (ref 40–53)
HCT VFR BLD AUTO: 34.8 % (ref 40–53)
HCT VFR BLD AUTO: 35 % (ref 40–53)
HCT VFR BLD AUTO: 35.5 % (ref 40–53)
HCT VFR BLD AUTO: 35.6 % (ref 40–53)
HCT VFR BLD AUTO: 35.8 % (ref 40–53)
HCT VFR BLD AUTO: 36.2 % (ref 40–53)
HCT VFR BLD AUTO: 36.3 % (ref 40–53)
HCT VFR BLD AUTO: 37.2 % (ref 40–53)
HCT VFR BLD AUTO: 37.7 % (ref 40–53)
HCT VFR BLD AUTO: 39.9 % (ref 40–53)
HCT VFR BLD AUTO: 40.4 % (ref 40–53)
HDLC SERPL-MCNC: 32 MG/DL
HGB BLD-MCNC: 10 G/DL (ref 13.3–17.7)
HGB BLD-MCNC: 10.1 G/DL (ref 13.3–17.7)
HGB BLD-MCNC: 10.2 G/DL (ref 13.3–17.7)
HGB BLD-MCNC: 10.3 G/DL (ref 13.3–17.7)
HGB BLD-MCNC: 10.5 G/DL (ref 13.3–17.7)
HGB BLD-MCNC: 10.6 G/DL (ref 13.3–17.7)
HGB BLD-MCNC: 10.7 G/DL (ref 13.3–17.7)
HGB BLD-MCNC: 11.2 G/DL (ref 13.3–17.7)
HGB BLD-MCNC: 11.3 G/DL (ref 13.3–17.7)
HGB BLD-MCNC: 12.9 G/DL (ref 13.3–17.7)
HGB BLD-MCNC: 9.7 G/DL (ref 13.3–17.7)
HGB BLD-MCNC: 9.7 G/DL (ref 13.3–17.7)
HGB BLD-MCNC: 9.9 G/DL (ref 13.3–17.7)
HOLD SPECIMEN: NORMAL
IMM GRANULOCYTES # BLD: 0 10E3/UL
IMM GRANULOCYTES NFR BLD: 0 %
INR PPP: 1.06 (ref 0.85–1.15)
INTERPRETATION ECG - MUSE: NORMAL
IRON BINDING CAPACITY (ROCHE): 244 UG/DL (ref 240–430)
IRON BINDING CAPACITY (ROCHE): 250 UG/DL (ref 240–430)
IRON SATN MFR SERPL: 12 % (ref 15–46)
IRON SATN MFR SERPL: 18 % (ref 15–46)
IRON SERPL-MCNC: 29 UG/DL (ref 61–157)
IRON SERPL-MCNC: 46 UG/DL (ref 61–157)
LDLC SERPL CALC-MCNC: 39 MG/DL
LVEF ECHO: NORMAL
LVEF ECHO: NORMAL
LYMPHOCYTES # BLD AUTO: 1.3 10E3/UL (ref 0.8–5.3)
LYMPHOCYTES NFR BLD AUTO: 17 %
MAGNESIUM SERPL-MCNC: 1.2 MG/DL (ref 1.7–2.3)
MAGNESIUM SERPL-MCNC: 1.7 MG/DL (ref 1.7–2.3)
MAGNESIUM SERPL-MCNC: 1.8 MG/DL (ref 1.7–2.3)
MAGNESIUM SERPL-MCNC: 1.9 MG/DL (ref 1.7–2.3)
MAGNESIUM SERPL-MCNC: 2 MG/DL (ref 1.7–2.3)
MAGNESIUM SERPL-MCNC: 2.1 MG/DL (ref 1.7–2.3)
MAGNESIUM SERPL-MCNC: 2.2 MG/DL (ref 1.7–2.3)
MAGNESIUM SERPL-MCNC: 2.3 MG/DL (ref 1.7–2.3)
MAGNESIUM SERPL-MCNC: 2.3 MG/DL (ref 1.7–2.3)
MAGNESIUM SERPL-MCNC: 2.4 MG/DL (ref 1.7–2.3)
MCH RBC QN AUTO: 23.2 PG (ref 26.5–33)
MCH RBC QN AUTO: 23.3 PG (ref 26.5–33)
MCH RBC QN AUTO: 23.4 PG (ref 26.5–33)
MCH RBC QN AUTO: 23.5 PG (ref 26.5–33)
MCH RBC QN AUTO: 23.5 PG (ref 26.5–33)
MCH RBC QN AUTO: 23.6 PG (ref 26.5–33)
MCH RBC QN AUTO: 23.9 PG (ref 26.5–33)
MCH RBC QN AUTO: 24 PG (ref 26.5–33)
MCH RBC QN AUTO: 24.1 PG (ref 26.5–33)
MCH RBC QN AUTO: 24.1 PG (ref 26.5–33)
MCH RBC QN AUTO: 24.2 PG (ref 26.5–33)
MCH RBC QN AUTO: 24.2 PG (ref 26.5–33)
MCH RBC QN AUTO: 24.3 PG (ref 26.5–33)
MCH RBC QN AUTO: 24.3 PG (ref 26.5–33)
MCHC RBC AUTO-ENTMCNC: 28 G/DL (ref 31.5–36.5)
MCHC RBC AUTO-ENTMCNC: 28 G/DL (ref 31.5–36.5)
MCHC RBC AUTO-ENTMCNC: 28.1 G/DL (ref 31.5–36.5)
MCHC RBC AUTO-ENTMCNC: 28.2 G/DL (ref 31.5–36.5)
MCHC RBC AUTO-ENTMCNC: 28.4 G/DL (ref 31.5–36.5)
MCHC RBC AUTO-ENTMCNC: 28.8 G/DL (ref 31.5–36.5)
MCHC RBC AUTO-ENTMCNC: 28.9 G/DL (ref 31.5–36.5)
MCHC RBC AUTO-ENTMCNC: 29.2 G/DL (ref 31.5–36.5)
MCHC RBC AUTO-ENTMCNC: 29.4 G/DL (ref 31.5–36.5)
MCV RBC AUTO: 82 FL (ref 78–100)
MCV RBC AUTO: 83 FL (ref 78–100)
MCV RBC AUTO: 84 FL (ref 78–100)
MCV RBC AUTO: 85 FL (ref 78–100)
MCV RBC AUTO: 85 FL (ref 78–100)
MCV RBC AUTO: 86 FL (ref 78–100)
MCV RBC AUTO: 86 FL (ref 78–100)
MDC_IDC_EPISODE_DTM: NORMAL
MDC_IDC_EPISODE_DURATION: 5 S
MDC_IDC_EPISODE_ID: 3
MDC_IDC_EPISODE_TYPE: NORMAL
MDC_IDC_LEAD_IMPLANT_DT: NORMAL
MDC_IDC_LEAD_LOCATION: NORMAL
MDC_IDC_LEAD_LOCATION_DETAIL_1: NORMAL
MDC_IDC_LEAD_MFG: NORMAL
MDC_IDC_LEAD_MODEL: NORMAL
MDC_IDC_LEAD_POLARITY_TYPE: NORMAL
MDC_IDC_LEAD_SERIAL: NORMAL
MDC_IDC_MSMT_BATTERY_DTM: NORMAL
MDC_IDC_MSMT_BATTERY_REMAINING_LONGEVITY: 57 MO
MDC_IDC_MSMT_BATTERY_REMAINING_LONGEVITY: 57 MO
MDC_IDC_MSMT_BATTERY_REMAINING_LONGEVITY: 61 MO
MDC_IDC_MSMT_BATTERY_RRT_TRIGGER: 2.73
MDC_IDC_MSMT_BATTERY_STATUS: NORMAL
MDC_IDC_MSMT_BATTERY_VOLTAGE: 2.94 V
MDC_IDC_MSMT_BATTERY_VOLTAGE: 2.98 V
MDC_IDC_MSMT_BATTERY_VOLTAGE: 2.98 V
MDC_IDC_MSMT_CAP_CHARGE_DTM: NORMAL
MDC_IDC_MSMT_CAP_CHARGE_ENERGY: 18 J
MDC_IDC_MSMT_CAP_CHARGE_TIME: 3.96
MDC_IDC_MSMT_CAP_CHARGE_TIME: 4
MDC_IDC_MSMT_CAP_CHARGE_TIME: 4.03
MDC_IDC_MSMT_CAP_CHARGE_TYPE: NORMAL
MDC_IDC_MSMT_LEADCHNL_RA_IMPEDANCE_VALUE: 456 OHM
MDC_IDC_MSMT_LEADCHNL_RA_PACING_THRESHOLD_AMPLITUDE: 0.75 V
MDC_IDC_MSMT_LEADCHNL_RA_PACING_THRESHOLD_AMPLITUDE: 1 V
MDC_IDC_MSMT_LEADCHNL_RA_PACING_THRESHOLD_PULSEWIDTH: 0.4 MS
MDC_IDC_MSMT_LEADCHNL_RA_SENSING_INTR_AMPL: 2.5 MV
MDC_IDC_MSMT_LEADCHNL_RA_SENSING_INTR_AMPL: 2.62 MV
MDC_IDC_MSMT_LEADCHNL_RA_SENSING_INTR_AMPL: 2.62 MV
MDC_IDC_MSMT_LEADCHNL_RA_SENSING_INTR_AMPL: 3.38 MV
MDC_IDC_MSMT_LEADCHNL_RA_SENSING_INTR_AMPL: 4.25 MV
MDC_IDC_MSMT_LEADCHNL_RV_IMPEDANCE_VALUE: 285 OHM
MDC_IDC_MSMT_LEADCHNL_RV_IMPEDANCE_VALUE: 285 OHM
MDC_IDC_MSMT_LEADCHNL_RV_IMPEDANCE_VALUE: 323 OHM
MDC_IDC_MSMT_LEADCHNL_RV_IMPEDANCE_VALUE: 551 OHM
MDC_IDC_MSMT_LEADCHNL_RV_PACING_THRESHOLD_AMPLITUDE: 0.62 V
MDC_IDC_MSMT_LEADCHNL_RV_PACING_THRESHOLD_AMPLITUDE: 0.62 V
MDC_IDC_MSMT_LEADCHNL_RV_PACING_THRESHOLD_AMPLITUDE: 0.75 V
MDC_IDC_MSMT_LEADCHNL_RV_PACING_THRESHOLD_AMPLITUDE: 0.75 V
MDC_IDC_MSMT_LEADCHNL_RV_PACING_THRESHOLD_PULSEWIDTH: 0.4 MS
MDC_IDC_MSMT_LEADCHNL_RV_SENSING_INTR_AMPL: 5.62 MV
MDC_IDC_MSMT_LEADCHNL_RV_SENSING_INTR_AMPL: 5.62 MV
MDC_IDC_MSMT_LEADCHNL_RV_SENSING_INTR_AMPL: 5.88 MV
MDC_IDC_MSMT_LEADCHNL_RV_SENSING_INTR_AMPL: 7.62 MV
MDC_IDC_MSMT_LEADCHNL_RV_SENSING_INTR_AMPL: 7.88 MV
MDC_IDC_PG_IMPLANT_DTM: NORMAL
MDC_IDC_PG_MFG: NORMAL
MDC_IDC_PG_MODEL: NORMAL
MDC_IDC_PG_SERIAL: NORMAL
MDC_IDC_PG_TYPE: NORMAL
MDC_IDC_SESS_CLINIC_NAME: NORMAL
MDC_IDC_SESS_DTM: NORMAL
MDC_IDC_SESS_TYPE: NORMAL
MDC_IDC_SET_BRADY_AT_MODE_SWITCH_RATE: 171 {BEATS}/MIN
MDC_IDC_SET_BRADY_HYSTRATE: NORMAL
MDC_IDC_SET_BRADY_LOWRATE: 55 {BEATS}/MIN
MDC_IDC_SET_BRADY_LOWRATE: 55 {BEATS}/MIN
MDC_IDC_SET_BRADY_LOWRATE: 80 {BEATS}/MIN
MDC_IDC_SET_BRADY_MAX_SENSOR_RATE: 140 {BEATS}/MIN
MDC_IDC_SET_BRADY_MAX_TRACKING_RATE: 140 {BEATS}/MIN
MDC_IDC_SET_BRADY_MODE: NORMAL
MDC_IDC_SET_BRADY_PAV_DELAY_LOW: 180 MS
MDC_IDC_SET_BRADY_SAV_DELAY_LOW: 150 MS
MDC_IDC_SET_LEADCHNL_RA_PACING_AMPLITUDE: 1.5 V
MDC_IDC_SET_LEADCHNL_RA_PACING_ANODE_ELECTRODE_1: NORMAL
MDC_IDC_SET_LEADCHNL_RA_PACING_ANODE_LOCATION_1: NORMAL
MDC_IDC_SET_LEADCHNL_RA_PACING_CAPTURE_MODE: NORMAL
MDC_IDC_SET_LEADCHNL_RA_PACING_CATHODE_ELECTRODE_1: NORMAL
MDC_IDC_SET_LEADCHNL_RA_PACING_CATHODE_LOCATION_1: NORMAL
MDC_IDC_SET_LEADCHNL_RA_PACING_POLARITY: NORMAL
MDC_IDC_SET_LEADCHNL_RA_PACING_PULSEWIDTH: 0.4 MS
MDC_IDC_SET_LEADCHNL_RA_SENSING_ANODE_ELECTRODE_1: NORMAL
MDC_IDC_SET_LEADCHNL_RA_SENSING_ANODE_LOCATION_1: NORMAL
MDC_IDC_SET_LEADCHNL_RA_SENSING_CATHODE_ELECTRODE_1: NORMAL
MDC_IDC_SET_LEADCHNL_RA_SENSING_CATHODE_LOCATION_1: NORMAL
MDC_IDC_SET_LEADCHNL_RA_SENSING_POLARITY: NORMAL
MDC_IDC_SET_LEADCHNL_RA_SENSING_SENSITIVITY: 0.3 MV
MDC_IDC_SET_LEADCHNL_RV_PACING_AMPLITUDE: 2 V
MDC_IDC_SET_LEADCHNL_RV_PACING_ANODE_ELECTRODE_1: NORMAL
MDC_IDC_SET_LEADCHNL_RV_PACING_ANODE_LOCATION_1: NORMAL
MDC_IDC_SET_LEADCHNL_RV_PACING_CAPTURE_MODE: NORMAL
MDC_IDC_SET_LEADCHNL_RV_PACING_CATHODE_ELECTRODE_1: NORMAL
MDC_IDC_SET_LEADCHNL_RV_PACING_CATHODE_LOCATION_1: NORMAL
MDC_IDC_SET_LEADCHNL_RV_PACING_POLARITY: NORMAL
MDC_IDC_SET_LEADCHNL_RV_PACING_PULSEWIDTH: 0.4 MS
MDC_IDC_SET_LEADCHNL_RV_SENSING_ANODE_ELECTRODE_1: NORMAL
MDC_IDC_SET_LEADCHNL_RV_SENSING_ANODE_LOCATION_1: NORMAL
MDC_IDC_SET_LEADCHNL_RV_SENSING_CATHODE_ELECTRODE_1: NORMAL
MDC_IDC_SET_LEADCHNL_RV_SENSING_CATHODE_LOCATION_1: NORMAL
MDC_IDC_SET_LEADCHNL_RV_SENSING_POLARITY: NORMAL
MDC_IDC_SET_LEADCHNL_RV_SENSING_SENSITIVITY: 0.3 MV
MDC_IDC_SET_ZONE_DETECTION_BEATS_DENOMINATOR: 40 {BEATS}
MDC_IDC_SET_ZONE_DETECTION_BEATS_NUMERATOR: 30 {BEATS}
MDC_IDC_SET_ZONE_DETECTION_INTERVAL: 320 MS
MDC_IDC_SET_ZONE_DETECTION_INTERVAL: 350 MS
MDC_IDC_SET_ZONE_DETECTION_INTERVAL: 360 MS
MDC_IDC_SET_ZONE_DETECTION_INTERVAL: 400 MS
MDC_IDC_SET_ZONE_DETECTION_INTERVAL: NORMAL
MDC_IDC_SET_ZONE_TYPE: NORMAL
MDC_IDC_STAT_AT_BURDEN_PERCENT: 0 %
MDC_IDC_STAT_AT_DTM_END: NORMAL
MDC_IDC_STAT_AT_DTM_START: NORMAL
MDC_IDC_STAT_BRADY_AP_VP_PERCENT: 0.2 %
MDC_IDC_STAT_BRADY_AP_VP_PERCENT: 0.46 %
MDC_IDC_STAT_BRADY_AP_VP_PERCENT: 1.37 %
MDC_IDC_STAT_BRADY_AP_VS_PERCENT: 13.81 %
MDC_IDC_STAT_BRADY_AP_VS_PERCENT: 24.42 %
MDC_IDC_STAT_BRADY_AP_VS_PERCENT: 95.98 %
MDC_IDC_STAT_BRADY_AS_VP_PERCENT: 0.03 %
MDC_IDC_STAT_BRADY_AS_VP_PERCENT: 0.03 %
MDC_IDC_STAT_BRADY_AS_VP_PERCENT: 0.05 %
MDC_IDC_STAT_BRADY_AS_VS_PERCENT: 2.6 %
MDC_IDC_STAT_BRADY_AS_VS_PERCENT: 75.09 %
MDC_IDC_STAT_BRADY_AS_VS_PERCENT: 85.97 %
MDC_IDC_STAT_BRADY_DTM_END: NORMAL
MDC_IDC_STAT_BRADY_DTM_START: NORMAL
MDC_IDC_STAT_BRADY_RA_PERCENT_PACED: 14.01 %
MDC_IDC_STAT_BRADY_RA_PERCENT_PACED: 24.89 %
MDC_IDC_STAT_BRADY_RA_PERCENT_PACED: 96.81 %
MDC_IDC_STAT_BRADY_RV_PERCENT_PACED: 0.23 %
MDC_IDC_STAT_BRADY_RV_PERCENT_PACED: 0.51 %
MDC_IDC_STAT_BRADY_RV_PERCENT_PACED: 1.54 %
MDC_IDC_STAT_EPISODE_RECENT_COUNT: 0
MDC_IDC_STAT_EPISODE_RECENT_COUNT: 1
MDC_IDC_STAT_EPISODE_RECENT_COUNT_DTM_END: NORMAL
MDC_IDC_STAT_EPISODE_RECENT_COUNT_DTM_START: NORMAL
MDC_IDC_STAT_EPISODE_TOTAL_COUNT: 0
MDC_IDC_STAT_EPISODE_TOTAL_COUNT: 2
MDC_IDC_STAT_EPISODE_TOTAL_COUNT: 3
MDC_IDC_STAT_EPISODE_TOTAL_COUNT: 3
MDC_IDC_STAT_EPISODE_TOTAL_COUNT_DTM_END: NORMAL
MDC_IDC_STAT_EPISODE_TOTAL_COUNT_DTM_START: NORMAL
MDC_IDC_STAT_EPISODE_TYPE: NORMAL
MDC_IDC_STAT_TACHYTHERAPY_ATP_DELIVERED_RECENT: 0
MDC_IDC_STAT_TACHYTHERAPY_ATP_DELIVERED_TOTAL: 0
MDC_IDC_STAT_TACHYTHERAPY_RECENT_DTM_END: NORMAL
MDC_IDC_STAT_TACHYTHERAPY_RECENT_DTM_START: NORMAL
MDC_IDC_STAT_TACHYTHERAPY_SHOCKS_ABORTED_RECENT: 0
MDC_IDC_STAT_TACHYTHERAPY_SHOCKS_ABORTED_TOTAL: 0
MDC_IDC_STAT_TACHYTHERAPY_SHOCKS_DELIVERED_RECENT: 0
MDC_IDC_STAT_TACHYTHERAPY_SHOCKS_DELIVERED_TOTAL: 0
MDC_IDC_STAT_TACHYTHERAPY_TOTAL_DTM_END: NORMAL
MDC_IDC_STAT_TACHYTHERAPY_TOTAL_DTM_START: NORMAL
MICROALBUMIN UR-MCNC: 253.2 MG/L
MICROALBUMIN/CREAT UR: 1932.82 MG/G CR (ref 0–17)
MONOCYTES # BLD AUTO: 0.6 10E3/UL (ref 0–1.3)
MONOCYTES NFR BLD AUTO: 8 %
NEUTROPHILS # BLD AUTO: 5.6 10E3/UL (ref 1.6–8.3)
NEUTROPHILS NFR BLD AUTO: 73 %
NONHDLC SERPL-MCNC: 65 MG/DL
NRBC # BLD AUTO: 0 10E3/UL
NRBC BLD AUTO-RTO: 0 /100
NT-PROBNP SERPL-MCNC: 9612 PG/ML (ref 0–900)
NT-PROBNP SERPL-MCNC: ABNORMAL PG/ML (ref 0–900)
O2/TOTAL GAS SETTING VFR VENT: 0 %
O2/TOTAL GAS SETTING VFR VENT: 0 %
O2/TOTAL GAS SETTING VFR VENT: 21 %
OXYHGB MFR BLDV: 46 % (ref 70–75)
OXYHGB MFR BLDV: 49 % (ref 70–75)
OXYHGB MFR BLDV: 49 % (ref 92–100)
OXYHGB MFR BLDV: 50 % (ref 70–75)
OXYHGB MFR BLDV: 50 % (ref 92–100)
OXYHGB MFR BLDV: 52 % (ref 70–75)
OXYHGB MFR BLDV: 53 % (ref 70–75)
OXYHGB MFR BLDV: 54 % (ref 70–75)
OXYHGB MFR BLDV: 55 % (ref 70–75)
OXYHGB MFR BLDV: 55 % (ref 70–75)
OXYHGB MFR BLDV: 58 % (ref 70–75)
OXYHGB MFR BLDV: 59 % (ref 70–75)
P AXIS - MUSE: -79 DEGREES
P AXIS - MUSE: 62 DEGREES
P AXIS - MUSE: 67 DEGREES
P AXIS - MUSE: 84 DEGREES
PCO2 BLDV: 52 MM HG (ref 40–50)
PCO2 BLDV: 55 MM HG (ref 40–50)
PCO2 BLDV: 56 MM HG (ref 40–50)
PCO2 BLDV: 57 MM HG (ref 40–50)
PCO2 BLDV: 59 MM HG (ref 40–50)
PH BLDV: 7.4 [PH] (ref 7.32–7.43)
PH BLDV: 7.41 [PH] (ref 7.32–7.43)
PH BLDV: 7.42 [PH] (ref 7.32–7.43)
PH BLDV: 7.43 [PH] (ref 7.32–7.43)
PH BLDV: 7.43 [PH] (ref 7.32–7.43)
PH BLDV: 7.44 [PH] (ref 7.32–7.43)
PLATELET # BLD AUTO: 143 10E3/UL (ref 150–450)
PLATELET # BLD AUTO: 146 10E3/UL (ref 150–450)
PLATELET # BLD AUTO: 156 10E3/UL (ref 150–450)
PLATELET # BLD AUTO: 156 10E3/UL (ref 150–450)
PLATELET # BLD AUTO: 159 10E3/UL (ref 150–450)
PLATELET # BLD AUTO: 160 10E3/UL (ref 150–450)
PLATELET # BLD AUTO: 163 10E3/UL (ref 150–450)
PLATELET # BLD AUTO: 164 10E3/UL (ref 150–450)
PLATELET # BLD AUTO: 164 10E3/UL (ref 150–450)
PLATELET # BLD AUTO: 166 10E3/UL (ref 150–450)
PLATELET # BLD AUTO: 172 10E3/UL (ref 150–450)
PLATELET # BLD AUTO: 174 10E3/UL (ref 150–450)
PLATELET # BLD AUTO: 185 10E3/UL (ref 150–450)
PLATELET # BLD AUTO: 189 10E3/UL (ref 150–450)
PO2 BLDV: 28 MM HG (ref 25–47)
PO2 BLDV: 30 MM HG (ref 25–47)
PO2 BLDV: 31 MM HG (ref 25–47)
PO2 BLDV: 32 MM HG (ref 25–47)
PO2 BLDV: 33 MM HG (ref 25–47)
PO2 BLDV: 33 MM HG (ref 25–47)
POTASSIUM SERPL-SCNC: 3.1 MMOL/L (ref 3.4–5.3)
POTASSIUM SERPL-SCNC: 3.4 MMOL/L (ref 3.4–5.3)
POTASSIUM SERPL-SCNC: 3.5 MMOL/L (ref 3.4–5.3)
POTASSIUM SERPL-SCNC: 3.6 MMOL/L (ref 3.4–5.3)
POTASSIUM SERPL-SCNC: 3.6 MMOL/L (ref 3.4–5.3)
POTASSIUM SERPL-SCNC: 3.7 MMOL/L (ref 3.4–5.3)
POTASSIUM SERPL-SCNC: 3.7 MMOL/L (ref 3.4–5.3)
POTASSIUM SERPL-SCNC: 3.8 MMOL/L (ref 3.4–5.3)
POTASSIUM SERPL-SCNC: 3.9 MMOL/L (ref 3.4–5.3)
POTASSIUM SERPL-SCNC: 4 MMOL/L (ref 3.4–5.3)
POTASSIUM SERPL-SCNC: 4.1 MMOL/L (ref 3.4–5.3)
POTASSIUM SERPL-SCNC: 4.2 MMOL/L (ref 3.4–5.3)
POTASSIUM SERPL-SCNC: 4.2 MMOL/L (ref 3.4–5.3)
POTASSIUM SERPL-SCNC: 4.4 MMOL/L (ref 3.4–5.3)
POTASSIUM SERPL-SCNC: 4.4 MMOL/L (ref 3.4–5.3)
POTASSIUM SERPL-SCNC: 4.5 MMOL/L (ref 3.4–5.3)
POTASSIUM SERPL-SCNC: 4.5 MMOL/L (ref 3.4–5.3)
POTASSIUM SERPL-SCNC: 4.6 MMOL/L (ref 3.4–5.3)
POTASSIUM SERPL-SCNC: 4.8 MMOL/L (ref 3.4–5.3)
POTASSIUM SERPL-SCNC: 4.9 MMOL/L (ref 3.4–5.3)
POTASSIUM SERPL-SCNC: 5 MMOL/L (ref 3.4–5.3)
POTASSIUM SERPL-SCNC: 5.1 MMOL/L (ref 3.4–5.3)
POTASSIUM SERPL-SCNC: 5.2 MMOL/L (ref 3.4–5.3)
POTASSIUM SERPL-SCNC: 5.8 MMOL/L (ref 3.4–5.3)
POTASSIUM SERPL-SCNC: 5.9 MMOL/L (ref 3.4–5.3)
PR INTERVAL - MUSE: 184 MS
PR INTERVAL - MUSE: 202 MS
PR INTERVAL - MUSE: 212 MS
PR INTERVAL - MUSE: 214 MS
PROT SERPL-MCNC: 5.5 G/DL (ref 6.4–8.3)
PROT SERPL-MCNC: 5.9 G/DL (ref 6.4–8.3)
PROT SERPL-MCNC: 6.1 G/DL (ref 6.4–8.3)
PROT SERPL-MCNC: 6.3 G/DL (ref 6.4–8.3)
PROT SERPL-MCNC: 6.5 G/DL (ref 6.4–8.3)
PROT SERPL-MCNC: 6.7 G/DL (ref 6.4–8.3)
QRS DURATION - MUSE: 120 MS
QRS DURATION - MUSE: 122 MS
QRS DURATION - MUSE: 130 MS
QRS DURATION - MUSE: 136 MS
QT - MUSE: 394 MS
QT - MUSE: 410 MS
QT - MUSE: 430 MS
QT - MUSE: 458 MS
QTC - MUSE: 451 MS
QTC - MUSE: 451 MS
QTC - MUSE: 464 MS
QTC - MUSE: 501 MS
R AXIS - MUSE: 113 DEGREES
R AXIS - MUSE: 122 DEGREES
R AXIS - MUSE: 127 DEGREES
R AXIS - MUSE: 134 DEGREES
RBC # BLD AUTO: 4.11 10E6/UL (ref 4.4–5.9)
RBC # BLD AUTO: 4.13 10E6/UL (ref 4.4–5.9)
RBC # BLD AUTO: 4.13 10E6/UL (ref 4.4–5.9)
RBC # BLD AUTO: 4.15 10E6/UL (ref 4.4–5.9)
RBC # BLD AUTO: 4.17 10E6/UL (ref 4.4–5.9)
RBC # BLD AUTO: 4.17 10E6/UL (ref 4.4–5.9)
RBC # BLD AUTO: 4.26 10E6/UL (ref 4.4–5.9)
RBC # BLD AUTO: 4.3 10E6/UL (ref 4.4–5.9)
RBC # BLD AUTO: 4.32 10E6/UL (ref 4.4–5.9)
RBC # BLD AUTO: 4.35 10E6/UL (ref 4.4–5.9)
RBC # BLD AUTO: 4.4 10E6/UL (ref 4.4–5.9)
RBC # BLD AUTO: 4.44 10E6/UL (ref 4.4–5.9)
RBC # BLD AUTO: 4.78 10E6/UL (ref 4.4–5.9)
RBC # BLD AUTO: 4.82 10E6/UL (ref 4.4–5.9)
SARS-COV-2 RNA RESP QL NAA+PROBE: NEGATIVE
SODIUM SERPL-SCNC: 125 MMOL/L (ref 136–145)
SODIUM SERPL-SCNC: 127 MMOL/L (ref 136–145)
SODIUM SERPL-SCNC: 128 MMOL/L (ref 136–145)
SODIUM SERPL-SCNC: 130 MMOL/L (ref 136–145)
SODIUM SERPL-SCNC: 131 MMOL/L (ref 136–145)
SODIUM SERPL-SCNC: 132 MMOL/L (ref 136–145)
SODIUM SERPL-SCNC: 132 MMOL/L (ref 136–145)
SODIUM SERPL-SCNC: 133 MMOL/L (ref 136–145)
SODIUM SERPL-SCNC: 134 MMOL/L (ref 136–145)
SODIUM SERPL-SCNC: 135 MMOL/L (ref 136–145)
SODIUM SERPL-SCNC: 136 MMOL/L (ref 136–145)
SODIUM SERPL-SCNC: 137 MMOL/L (ref 136–145)
SODIUM SERPL-SCNC: 137 MMOL/L (ref 136–145)
SODIUM SERPL-SCNC: 138 MMOL/L (ref 136–145)
SODIUM SERPL-SCNC: 139 MMOL/L (ref 136–145)
SODIUM SERPL-SCNC: 142 MMOL/L (ref 136–145)
STFR SERPL-MCNC: 6.8 MG/L
SYSTOLIC BLOOD PRESSURE - MUSE: NORMAL MMHG
T AXIS - MUSE: -78 DEGREES
T AXIS - MUSE: 157 DEGREES
T AXIS - MUSE: 213 DEGREES
T AXIS - MUSE: 263 DEGREES
TRIGL SERPL-MCNC: 129 MG/DL
TROPONIN T SERPL HS-MCNC: 158 NG/L
TROPONIN T SERPL HS-MCNC: 164 NG/L
TROPONIN T SERPL HS-MCNC: 188 NG/L
TROPONIN T SERPL HS-MCNC: 189 NG/L
TSH SERPL DL<=0.005 MIU/L-ACNC: 2.31 UIU/ML (ref 0.3–4.2)
TSH SERPL DL<=0.005 MIU/L-ACNC: 2.36 UIU/ML (ref 0.3–4.2)
VENTRICULAR RATE- MUSE: 70 BPM
VENTRICULAR RATE- MUSE: 72 BPM
VENTRICULAR RATE- MUSE: 73 BPM
VENTRICULAR RATE- MUSE: 79 BPM
WBC # BLD AUTO: 5.3 10E3/UL (ref 4–11)
WBC # BLD AUTO: 5.6 10E3/UL (ref 4–11)
WBC # BLD AUTO: 5.6 10E3/UL (ref 4–11)
WBC # BLD AUTO: 5.7 10E3/UL (ref 4–11)
WBC # BLD AUTO: 6.2 10E3/UL (ref 4–11)
WBC # BLD AUTO: 6.2 10E3/UL (ref 4–11)
WBC # BLD AUTO: 6.3 10E3/UL (ref 4–11)
WBC # BLD AUTO: 6.5 10E3/UL (ref 4–11)
WBC # BLD AUTO: 6.6 10E3/UL (ref 4–11)
WBC # BLD AUTO: 6.8 10E3/UL (ref 4–11)
WBC # BLD AUTO: 7 10E3/UL (ref 4–11)
WBC # BLD AUTO: 7.1 10E3/UL (ref 4–11)
WBC # BLD AUTO: 7.5 10E3/UL (ref 4–11)
WBC # BLD AUTO: 7.7 10E3/UL (ref 4–11)

## 2023-01-01 PROCEDURE — 85027 COMPLETE CBC AUTOMATED: CPT | Performed by: NURSE PRACTITIONER

## 2023-01-01 PROCEDURE — 250N000013 HC RX MED GY IP 250 OP 250 PS 637: Performed by: PHYSICIAN ASSISTANT

## 2023-01-01 PROCEDURE — 250N000013 HC RX MED GY IP 250 OP 250 PS 637: Performed by: INTERNAL MEDICINE

## 2023-01-01 PROCEDURE — 80048 BASIC METABOLIC PNL TOTAL CA: CPT | Performed by: PHYSICIAN ASSISTANT

## 2023-01-01 PROCEDURE — 214N000001 HC R&B CCU UMMC

## 2023-01-01 PROCEDURE — 93451 RIGHT HEART CATH: CPT | Performed by: INTERNAL MEDICINE

## 2023-01-01 PROCEDURE — 80053 COMPREHEN METABOLIC PANEL: CPT | Performed by: NURSE PRACTITIONER

## 2023-01-01 PROCEDURE — 250N000012 HC RX MED GY IP 250 OP 636 PS 637: Performed by: STUDENT IN AN ORGANIZED HEALTH CARE EDUCATION/TRAINING PROGRAM

## 2023-01-01 PROCEDURE — 250N000013 HC RX MED GY IP 250 OP 250 PS 637: Performed by: NURSE PRACTITIONER

## 2023-01-01 PROCEDURE — 250N000011 HC RX IP 250 OP 636

## 2023-01-01 PROCEDURE — 999N000044 HC STATISTIC CVC DRESSING CHANGE

## 2023-01-01 PROCEDURE — 83735 ASSAY OF MAGNESIUM: CPT | Performed by: STUDENT IN AN ORGANIZED HEALTH CARE EDUCATION/TRAINING PROGRAM

## 2023-01-01 PROCEDURE — G0463 HOSPITAL OUTPT CLINIC VISIT: HCPCS | Performed by: INTERNAL MEDICINE

## 2023-01-01 PROCEDURE — 82310 ASSAY OF CALCIUM: CPT | Performed by: NURSE PRACTITIONER

## 2023-01-01 PROCEDURE — 82248 BILIRUBIN DIRECT: CPT | Performed by: PHYSICIAN ASSISTANT

## 2023-01-01 PROCEDURE — 250N000011 HC RX IP 250 OP 636: Performed by: STUDENT IN AN ORGANIZED HEALTH CARE EDUCATION/TRAINING PROGRAM

## 2023-01-01 PROCEDURE — 93283 PRGRMG EVAL IMPLANTABLE DFB: CPT

## 2023-01-01 PROCEDURE — 85014 HEMATOCRIT: CPT | Performed by: STUDENT IN AN ORGANIZED HEALTH CARE EDUCATION/TRAINING PROGRAM

## 2023-01-01 PROCEDURE — 83036 HEMOGLOBIN GLYCOSYLATED A1C: CPT | Performed by: NURSE PRACTITIONER

## 2023-01-01 PROCEDURE — 99285 EMERGENCY DEPT VISIT HI MDM: CPT | Mod: 25 | Performed by: FAMILY MEDICINE

## 2023-01-01 PROCEDURE — 84443 ASSAY THYROID STIM HORMONE: CPT | Performed by: STUDENT IN AN ORGANIZED HEALTH CARE EDUCATION/TRAINING PROGRAM

## 2023-01-01 PROCEDURE — 71045 X-RAY EXAM CHEST 1 VIEW: CPT | Mod: 26 | Performed by: RADIOLOGY

## 2023-01-01 PROCEDURE — 99232 SBSQ HOSP IP/OBS MODERATE 35: CPT | Performed by: NURSE PRACTITIONER

## 2023-01-01 PROCEDURE — 99231 SBSQ HOSP IP/OBS SF/LOW 25: CPT | Mod: GC | Performed by: INTERNAL MEDICINE

## 2023-01-01 PROCEDURE — 36415 COLL VENOUS BLD VENIPUNCTURE: CPT | Performed by: NURSE PRACTITIONER

## 2023-01-01 PROCEDURE — 250N000011 HC RX IP 250 OP 636: Performed by: NURSE PRACTITIONER

## 2023-01-01 PROCEDURE — 82805 BLOOD GASES W/O2 SATURATION: CPT | Performed by: STUDENT IN AN ORGANIZED HEALTH CARE EDUCATION/TRAINING PROGRAM

## 2023-01-01 PROCEDURE — 83735 ASSAY OF MAGNESIUM: CPT | Performed by: INTERNAL MEDICINE

## 2023-01-01 PROCEDURE — 80048 BASIC METABOLIC PNL TOTAL CA: CPT | Performed by: NURSE PRACTITIONER

## 2023-01-01 PROCEDURE — 83735 ASSAY OF MAGNESIUM: CPT | Performed by: EMERGENCY MEDICINE

## 2023-01-01 PROCEDURE — 250N000009 HC RX 250: Performed by: PHYSICIAN ASSISTANT

## 2023-01-01 PROCEDURE — 999N000208 ECHOCARDIOGRAM COMPLETE

## 2023-01-01 PROCEDURE — 80053 COMPREHEN METABOLIC PANEL: CPT | Performed by: STUDENT IN AN ORGANIZED HEALTH CARE EDUCATION/TRAINING PROGRAM

## 2023-01-01 PROCEDURE — 97110 THERAPEUTIC EXERCISES: CPT | Mod: GO

## 2023-01-01 PROCEDURE — 97535 SELF CARE MNGMENT TRAINING: CPT | Mod: GO | Performed by: OCCUPATIONAL THERAPIST

## 2023-01-01 PROCEDURE — 82805 BLOOD GASES W/O2 SATURATION: CPT | Performed by: NURSE PRACTITIONER

## 2023-01-01 PROCEDURE — 99207 PR FOOT EXAM NO CHARGE: CPT | Performed by: FAMILY MEDICINE

## 2023-01-01 PROCEDURE — 250N000009 HC RX 250: Performed by: STUDENT IN AN ORGANIZED HEALTH CARE EDUCATION/TRAINING PROGRAM

## 2023-01-01 PROCEDURE — 36592 COLLECT BLOOD FROM PICC: CPT | Performed by: STUDENT IN AN ORGANIZED HEALTH CARE EDUCATION/TRAINING PROGRAM

## 2023-01-01 PROCEDURE — 82962 GLUCOSE BLOOD TEST: CPT

## 2023-01-01 PROCEDURE — 93005 ELECTROCARDIOGRAM TRACING: CPT

## 2023-01-01 PROCEDURE — 93306 TTE W/DOPPLER COMPLETE: CPT | Mod: 26 | Performed by: INTERNAL MEDICINE

## 2023-01-01 PROCEDURE — 85004 AUTOMATED DIFF WBC COUNT: CPT | Performed by: EMERGENCY MEDICINE

## 2023-01-01 PROCEDURE — 99285 EMERGENCY DEPT VISIT HI MDM: CPT | Mod: 25 | Performed by: STUDENT IN AN ORGANIZED HEALTH CARE EDUCATION/TRAINING PROGRAM

## 2023-01-01 PROCEDURE — 85027 COMPLETE CBC AUTOMATED: CPT | Performed by: STUDENT IN AN ORGANIZED HEALTH CARE EDUCATION/TRAINING PROGRAM

## 2023-01-01 PROCEDURE — 36415 COLL VENOUS BLD VENIPUNCTURE: CPT | Performed by: PHYSICIAN ASSISTANT

## 2023-01-01 PROCEDURE — 71045 X-RAY EXAM CHEST 1 VIEW: CPT

## 2023-01-01 PROCEDURE — 99215 OFFICE O/P EST HI 40 MIN: CPT | Mod: GC | Performed by: INTERNAL MEDICINE

## 2023-01-01 PROCEDURE — 76705 ECHO EXAM OF ABDOMEN: CPT | Mod: 26 | Performed by: RADIOLOGY

## 2023-01-01 PROCEDURE — 99215 OFFICE O/P EST HI 40 MIN: CPT | Performed by: INTERNAL MEDICINE

## 2023-01-01 PROCEDURE — 83880 ASSAY OF NATRIURETIC PEPTIDE: CPT

## 2023-01-01 PROCEDURE — 250N000013 HC RX MED GY IP 250 OP 250 PS 637

## 2023-01-01 PROCEDURE — 272N000473 HC KIT, VPS RHYTHM STYLET

## 2023-01-01 PROCEDURE — 250N000009 HC RX 250: Performed by: NURSE PRACTITIONER

## 2023-01-01 PROCEDURE — 97530 THERAPEUTIC ACTIVITIES: CPT | Mod: GO

## 2023-01-01 PROCEDURE — 250N000013 HC RX MED GY IP 250 OP 250 PS 637: Performed by: STUDENT IN AN ORGANIZED HEALTH CARE EDUCATION/TRAINING PROGRAM

## 2023-01-01 PROCEDURE — 99215 OFFICE O/P EST HI 40 MIN: CPT | Performed by: NURSE PRACTITIONER

## 2023-01-01 PROCEDURE — 84484 ASSAY OF TROPONIN QUANT: CPT | Performed by: EMERGENCY MEDICINE

## 2023-01-01 PROCEDURE — 99213 OFFICE O/P EST LOW 20 MIN: CPT | Performed by: FAMILY MEDICINE

## 2023-01-01 PROCEDURE — 99207 PR NO CHARGE NURSE ONLY: CPT

## 2023-01-01 PROCEDURE — 80053 COMPREHEN METABOLIC PANEL: CPT | Performed by: PHYSICIAN ASSISTANT

## 2023-01-01 PROCEDURE — 250N000011 HC RX IP 250 OP 636: Performed by: PHYSICIAN ASSISTANT

## 2023-01-01 PROCEDURE — 99285 EMERGENCY DEPT VISIT HI MDM: CPT | Performed by: STUDENT IN AN ORGANIZED HEALTH CARE EDUCATION/TRAINING PROGRAM

## 2023-01-01 PROCEDURE — 272N000278 HC DEVICE 5FR SECURACATH

## 2023-01-01 PROCEDURE — 97535 SELF CARE MNGMENT TRAINING: CPT | Mod: GO

## 2023-01-01 PROCEDURE — 93283 PRGRMG EVAL IMPLANTABLE DFB: CPT | Mod: 26 | Performed by: INTERNAL MEDICINE

## 2023-01-01 PROCEDURE — 82043 UR ALBUMIN QUANTITATIVE: CPT | Performed by: FAMILY MEDICINE

## 2023-01-01 PROCEDURE — 93922 UPR/L XTREMITY ART 2 LEVELS: CPT

## 2023-01-01 PROCEDURE — 82610 CYSTATIN C: CPT

## 2023-01-01 PROCEDURE — 71046 X-RAY EXAM CHEST 2 VIEWS: CPT

## 2023-01-01 PROCEDURE — 84132 ASSAY OF SERUM POTASSIUM: CPT | Performed by: STUDENT IN AN ORGANIZED HEALTH CARE EDUCATION/TRAINING PROGRAM

## 2023-01-01 PROCEDURE — 84132 ASSAY OF SERUM POTASSIUM: CPT | Mod: XU | Performed by: PHYSICIAN ASSISTANT

## 2023-01-01 PROCEDURE — 73502 X-RAY EXAM HIP UNI 2-3 VIEWS: CPT | Mod: TC | Performed by: RADIOLOGY

## 2023-01-01 PROCEDURE — 99239 HOSP IP/OBS DSCHRG MGMT >30: CPT | Mod: FS | Performed by: NURSE PRACTITIONER

## 2023-01-01 PROCEDURE — 84132 ASSAY OF SERUM POTASSIUM: CPT | Performed by: INTERNAL MEDICINE

## 2023-01-01 PROCEDURE — C1751 CATH, INF, PER/CENT/MIDLINE: HCPCS | Performed by: INTERNAL MEDICINE

## 2023-01-01 PROCEDURE — 36415 COLL VENOUS BLD VENIPUNCTURE: CPT

## 2023-01-01 PROCEDURE — 80048 BASIC METABOLIC PNL TOTAL CA: CPT | Performed by: STUDENT IN AN ORGANIZED HEALTH CARE EDUCATION/TRAINING PROGRAM

## 2023-01-01 PROCEDURE — 83880 ASSAY OF NATRIURETIC PEPTIDE: CPT | Performed by: PATHOLOGY

## 2023-01-01 PROCEDURE — 76705 ECHO EXAM OF ABDOMEN: CPT

## 2023-01-01 PROCEDURE — 85018 HEMOGLOBIN: CPT | Performed by: FAMILY MEDICINE

## 2023-01-01 PROCEDURE — 36415 COLL VENOUS BLD VENIPUNCTURE: CPT | Performed by: EMERGENCY MEDICINE

## 2023-01-01 PROCEDURE — 82805 BLOOD GASES W/O2 SATURATION: CPT | Performed by: PHYSICIAN ASSISTANT

## 2023-01-01 PROCEDURE — 83735 ASSAY OF MAGNESIUM: CPT | Performed by: NURSE PRACTITIONER

## 2023-01-01 PROCEDURE — 85018 HEMOGLOBIN: CPT | Performed by: STUDENT IN AN ORGANIZED HEALTH CARE EDUCATION/TRAINING PROGRAM

## 2023-01-01 PROCEDURE — 258N000003 HC RX IP 258 OP 636: Performed by: PHYSICIAN ASSISTANT

## 2023-01-01 PROCEDURE — 99232 SBSQ HOSP IP/OBS MODERATE 35: CPT | Performed by: PHYSICIAN ASSISTANT

## 2023-01-01 PROCEDURE — 93010 ELECTROCARDIOGRAM REPORT: CPT | Mod: 76 | Performed by: INTERNAL MEDICINE

## 2023-01-01 PROCEDURE — 250N000009 HC RX 250: Performed by: FAMILY MEDICINE

## 2023-01-01 PROCEDURE — 82248 BILIRUBIN DIRECT: CPT | Performed by: STUDENT IN AN ORGANIZED HEALTH CARE EDUCATION/TRAINING PROGRAM

## 2023-01-01 PROCEDURE — 83735 ASSAY OF MAGNESIUM: CPT | Performed by: THORACIC SURGERY (CARDIOTHORACIC VASCULAR SURGERY)

## 2023-01-01 PROCEDURE — 84484 ASSAY OF TROPONIN QUANT: CPT | Performed by: STUDENT IN AN ORGANIZED HEALTH CARE EDUCATION/TRAINING PROGRAM

## 2023-01-01 PROCEDURE — 258N000003 HC RX IP 258 OP 636: Performed by: NURSE PRACTITIONER

## 2023-01-01 PROCEDURE — 93005 ELECTROCARDIOGRAM TRACING: CPT | Performed by: FAMILY MEDICINE

## 2023-01-01 PROCEDURE — 99213 OFFICE O/P EST LOW 20 MIN: CPT | Performed by: PHYSICIAN ASSISTANT

## 2023-01-01 PROCEDURE — 80048 BASIC METABOLIC PNL TOTAL CA: CPT

## 2023-01-01 PROCEDURE — 999N000128 HC STATISTIC PERIPHERAL IV START W/O US GUIDANCE

## 2023-01-01 PROCEDURE — 80053 COMPREHEN METABOLIC PANEL: CPT | Performed by: EMERGENCY MEDICINE

## 2023-01-01 PROCEDURE — 250N000011 HC RX IP 250 OP 636: Performed by: FAMILY MEDICINE

## 2023-01-01 PROCEDURE — 272N000451 HC KIT SHRLOCK 5FR POWER PICC DOUBLE LUMEN

## 2023-01-01 PROCEDURE — 93306 TTE W/DOPPLER COMPLETE: CPT

## 2023-01-01 PROCEDURE — 97140 MANUAL THERAPY 1/> REGIONS: CPT | Mod: GO | Performed by: OCCUPATIONAL THERAPIST

## 2023-01-01 PROCEDURE — 99232 SBSQ HOSP IP/OBS MODERATE 35: CPT | Mod: FS | Performed by: PHYSICIAN ASSISTANT

## 2023-01-01 PROCEDURE — 82810 BLOOD GASES O2 SAT ONLY: CPT

## 2023-01-01 PROCEDURE — 99232 SBSQ HOSP IP/OBS MODERATE 35: CPT | Performed by: INTERNAL MEDICINE

## 2023-01-01 PROCEDURE — 250N000013 HC RX MED GY IP 250 OP 250 PS 637: Performed by: FAMILY MEDICINE

## 2023-01-01 PROCEDURE — 84460 ALANINE AMINO (ALT) (SGPT): CPT | Performed by: FAMILY MEDICINE

## 2023-01-01 PROCEDURE — 93451 RIGHT HEART CATH: CPT | Mod: 26 | Performed by: INTERNAL MEDICINE

## 2023-01-01 PROCEDURE — 255N000002 HC RX 255 OP 636: Performed by: INTERNAL MEDICINE

## 2023-01-01 PROCEDURE — 84132 ASSAY OF SERUM POTASSIUM: CPT | Performed by: THORACIC SURGERY (CARDIOTHORACIC VASCULAR SURGERY)

## 2023-01-01 PROCEDURE — 83036 HEMOGLOBIN GLYCOSYLATED A1C: CPT | Performed by: FAMILY MEDICINE

## 2023-01-01 PROCEDURE — 36569 INSJ PICC 5 YR+ W/O IMAGING: CPT

## 2023-01-01 PROCEDURE — 72100 X-RAY EXAM L-S SPINE 2/3 VWS: CPT | Mod: TC | Performed by: RADIOLOGY

## 2023-01-01 PROCEDURE — 272N000001 HC OR GENERAL SUPPLY STERILE: Performed by: INTERNAL MEDICINE

## 2023-01-01 PROCEDURE — 84132 ASSAY OF SERUM POTASSIUM: CPT | Performed by: NURSE PRACTITIONER

## 2023-01-01 PROCEDURE — 85610 PROTHROMBIN TIME: CPT | Performed by: EMERGENCY MEDICINE

## 2023-01-01 PROCEDURE — 82728 ASSAY OF FERRITIN: CPT | Performed by: NURSE PRACTITIONER

## 2023-01-01 PROCEDURE — 80048 BASIC METABOLIC PNL TOTAL CA: CPT | Performed by: INTERNAL MEDICINE

## 2023-01-01 PROCEDURE — 36415 COLL VENOUS BLD VENIPUNCTURE: CPT | Performed by: STUDENT IN AN ORGANIZED HEALTH CARE EDUCATION/TRAINING PROGRAM

## 2023-01-01 PROCEDURE — 250N000009 HC RX 250: Performed by: INTERNAL MEDICINE

## 2023-01-01 PROCEDURE — 80048 BASIC METABOLIC PNL TOTAL CA: CPT | Performed by: PATHOLOGY

## 2023-01-01 PROCEDURE — 84238 ASSAY NONENDOCRINE RECEPTOR: CPT | Performed by: PHYSICIAN ASSISTANT

## 2023-01-01 PROCEDURE — 99417 PROLNG OP E/M EACH 15 MIN: CPT | Performed by: INTERNAL MEDICINE

## 2023-01-01 PROCEDURE — 83550 IRON BINDING TEST: CPT | Performed by: PHYSICIAN ASSISTANT

## 2023-01-01 PROCEDURE — 99213 OFFICE O/P EST LOW 20 MIN: CPT | Mod: 25 | Performed by: FAMILY MEDICINE

## 2023-01-01 PROCEDURE — 82570 ASSAY OF URINE CREATININE: CPT | Performed by: FAMILY MEDICINE

## 2023-01-01 PROCEDURE — 36415 COLL VENOUS BLD VENIPUNCTURE: CPT | Performed by: PATHOLOGY

## 2023-01-01 PROCEDURE — U0003 INFECTIOUS AGENT DETECTION BY NUCLEIC ACID (DNA OR RNA); SEVERE ACUTE RESPIRATORY SYNDROME CORONAVIRUS 2 (SARS-COV-2) (CORONAVIRUS DISEASE [COVID-19]), AMPLIFIED PROBE TECHNIQUE, MAKING USE OF HIGH THROUGHPUT TECHNOLOGIES AS DESCRIBED BY CMS-2020-01-R: HCPCS | Performed by: STUDENT IN AN ORGANIZED HEALTH CARE EDUCATION/TRAINING PROGRAM

## 2023-01-01 PROCEDURE — 82310 ASSAY OF CALCIUM: CPT | Performed by: STUDENT IN AN ORGANIZED HEALTH CARE EDUCATION/TRAINING PROGRAM

## 2023-01-01 PROCEDURE — 99214 OFFICE O/P EST MOD 30 MIN: CPT | Performed by: PHYSICIAN ASSISTANT

## 2023-01-01 PROCEDURE — 83880 ASSAY OF NATRIURETIC PEPTIDE: CPT | Performed by: EMERGENCY MEDICINE

## 2023-01-01 PROCEDURE — 99233 SBSQ HOSP IP/OBS HIGH 50: CPT | Performed by: PHYSICIAN ASSISTANT

## 2023-01-01 PROCEDURE — 36415 COLL VENOUS BLD VENIPUNCTURE: CPT | Performed by: FAMILY MEDICINE

## 2023-01-01 PROCEDURE — 99233 SBSQ HOSP IP/OBS HIGH 50: CPT | Performed by: INTERNAL MEDICINE

## 2023-01-01 PROCEDURE — 71046 X-RAY EXAM CHEST 2 VIEWS: CPT | Mod: 26 | Performed by: RADIOLOGY

## 2023-01-01 PROCEDURE — 80048 BASIC METABOLIC PNL TOTAL CA: CPT | Performed by: FAMILY MEDICINE

## 2023-01-01 PROCEDURE — 84443 ASSAY THYROID STIM HORMONE: CPT | Performed by: FAMILY MEDICINE

## 2023-01-01 PROCEDURE — 83550 IRON BINDING TEST: CPT | Performed by: NURSE PRACTITIONER

## 2023-01-01 PROCEDURE — 83880 ASSAY OF NATRIURETIC PEPTIDE: CPT | Performed by: FAMILY MEDICINE

## 2023-01-01 PROCEDURE — 93296 REM INTERROG EVL PM/IDS: CPT | Performed by: INTERNAL MEDICINE

## 2023-01-01 PROCEDURE — 250N000011 HC RX IP 250 OP 636: Performed by: INTERNAL MEDICINE

## 2023-01-01 PROCEDURE — 80061 LIPID PANEL: CPT | Performed by: FAMILY MEDICINE

## 2023-01-01 PROCEDURE — 82728 ASSAY OF FERRITIN: CPT | Performed by: PHYSICIAN ASSISTANT

## 2023-01-01 PROCEDURE — 97165 OT EVAL LOW COMPLEX 30 MIN: CPT | Mod: GO

## 2023-01-01 PROCEDURE — 99231 SBSQ HOSP IP/OBS SF/LOW 25: CPT | Performed by: INTERNAL MEDICINE

## 2023-01-01 PROCEDURE — 82947 ASSAY GLUCOSE BLOOD QUANT: CPT | Performed by: NURSE PRACTITIONER

## 2023-01-01 PROCEDURE — 4A023N6 MEASUREMENT OF CARDIAC SAMPLING AND PRESSURE, RIGHT HEART, PERCUTANEOUS APPROACH: ICD-10-PCS | Performed by: INTERNAL MEDICINE

## 2023-01-01 PROCEDURE — 99223 1ST HOSP IP/OBS HIGH 75: CPT | Mod: GC | Performed by: STUDENT IN AN ORGANIZED HEALTH CARE EDUCATION/TRAINING PROGRAM

## 2023-01-01 PROCEDURE — 93926 LOWER EXTREMITY STUDY: CPT | Mod: RT

## 2023-01-01 PROCEDURE — G0439 PPPS, SUBSEQ VISIT: HCPCS | Performed by: FAMILY MEDICINE

## 2023-01-01 PROCEDURE — 999N000127 HC STATISTIC PERIPHERAL IV START W US GUIDANCE

## 2023-01-01 PROCEDURE — 36415 COLL VENOUS BLD VENIPUNCTURE: CPT | Performed by: THORACIC SURGERY (CARDIOTHORACIC VASCULAR SURGERY)

## 2023-01-01 PROCEDURE — 93010 ELECTROCARDIOGRAM REPORT: CPT | Performed by: FAMILY MEDICINE

## 2023-01-01 PROCEDURE — 93295 DEV INTERROG REMOTE 1/2/MLT: CPT | Performed by: INTERNAL MEDICINE

## 2023-01-01 PROCEDURE — 99223 1ST HOSP IP/OBS HIGH 75: CPT | Mod: 25 | Performed by: INTERNAL MEDICINE

## 2023-01-01 PROCEDURE — 85018 HEMOGLOBIN: CPT

## 2023-01-01 RX ORDER — POLYETHYLENE GLYCOL 3350 17 G/17G
17 POWDER, FOR SOLUTION ORAL DAILY
Status: DISCONTINUED | OUTPATIENT
Start: 2023-01-01 | End: 2023-01-01 | Stop reason: HOSPADM

## 2023-01-01 RX ORDER — LOPERAMIDE HCL 2 MG
2 CAPSULE ORAL 4 TIMES DAILY PRN
Status: DISCONTINUED | OUTPATIENT
Start: 2023-01-01 | End: 2023-01-01 | Stop reason: HOSPADM

## 2023-01-01 RX ORDER — MIDODRINE HYDROCHLORIDE 10 MG/1
10 TABLET ORAL
Qty: 90 TABLET | Refills: 3 | Status: SHIPPED | OUTPATIENT
Start: 2023-01-01

## 2023-01-01 RX ORDER — POTASSIUM CHLORIDE 750 MG/1
20 TABLET, EXTENDED RELEASE ORAL ONCE
Status: COMPLETED | OUTPATIENT
Start: 2023-01-01 | End: 2023-01-01

## 2023-01-01 RX ORDER — MAGNESIUM OXIDE 400 MG/1
400 TABLET ORAL EVERY 4 HOURS
Status: COMPLETED | OUTPATIENT
Start: 2023-01-01 | End: 2023-01-01

## 2023-01-01 RX ORDER — SENNOSIDES A AND B 8.6 MG/1
2 TABLET, FILM COATED ORAL 2 TIMES DAILY PRN
Qty: 100 TABLET | Refills: 0 | Status: SHIPPED | OUTPATIENT
Start: 2023-01-01

## 2023-01-01 RX ORDER — TORSEMIDE 100 MG/1
100 TABLET ORAL 2 TIMES DAILY
Qty: 60 TABLET | Refills: 3 | Status: ON HOLD | OUTPATIENT
Start: 2023-01-01 | End: 2023-01-01

## 2023-01-01 RX ORDER — METOLAZONE 2.5 MG/1
2.5 TABLET ORAL DAILY PRN
Qty: 5 TABLET | Refills: 0 | Status: SHIPPED | OUTPATIENT
Start: 2023-01-01

## 2023-01-01 RX ORDER — NICOTINE POLACRILEX 4 MG
15-30 LOZENGE BUCCAL
Status: DISCONTINUED | OUTPATIENT
Start: 2023-01-01 | End: 2023-01-01

## 2023-01-01 RX ORDER — POTASSIUM CHLORIDE 750 MG/1
40 TABLET, EXTENDED RELEASE ORAL DAILY
Status: DISCONTINUED | OUTPATIENT
Start: 2023-01-01 | End: 2023-01-01 | Stop reason: HOSPADM

## 2023-01-01 RX ORDER — FUROSEMIDE 10 MG/ML
100 INJECTION INTRAMUSCULAR; INTRAVENOUS ONCE
Status: COMPLETED | OUTPATIENT
Start: 2023-01-01 | End: 2023-01-01

## 2023-01-01 RX ORDER — MAGNESIUM SULFATE HEPTAHYDRATE 40 MG/ML
2 INJECTION, SOLUTION INTRAVENOUS ONCE
Status: COMPLETED | OUTPATIENT
Start: 2023-01-01 | End: 2023-01-01

## 2023-01-01 RX ORDER — DOBUTAMINE HYDROCHLORIDE 200 MG/100ML
2.5 INJECTION INTRAVENOUS CONTINUOUS
Status: DISCONTINUED | OUTPATIENT
Start: 2023-01-01 | End: 2023-01-01

## 2023-01-01 RX ORDER — NICOTINE 21 MG/24HR
1 PATCH, TRANSDERMAL 24 HOURS TRANSDERMAL DAILY
Status: DISCONTINUED | OUTPATIENT
Start: 2023-01-01 | End: 2023-01-01 | Stop reason: HOSPADM

## 2023-01-01 RX ORDER — ACETAMINOPHEN 500 MG
1500 TABLET ORAL 2 TIMES DAILY PRN
Status: DISCONTINUED | OUTPATIENT
Start: 2023-01-01 | End: 2023-01-01

## 2023-01-01 RX ORDER — MAGNESIUM OXIDE 400 MG/1
400 TABLET ORAL EVERY 4 HOURS
Status: ACTIVE | OUTPATIENT
Start: 2023-01-01 | End: 2023-01-01

## 2023-01-01 RX ORDER — TORSEMIDE 20 MG/1
TABLET ORAL
Qty: 100 TABLET | Refills: 3 | COMMUNITY
Start: 2023-01-01 | End: 2023-01-01

## 2023-01-01 RX ORDER — POTASSIUM CHLORIDE 750 MG/1
40 TABLET, EXTENDED RELEASE ORAL ONCE
Status: COMPLETED | OUTPATIENT
Start: 2023-01-01 | End: 2023-01-01

## 2023-01-01 RX ORDER — MAGNESIUM OXIDE 400 MG/1
800 TABLET ORAL 2 TIMES DAILY
Status: DISCONTINUED | OUTPATIENT
Start: 2023-01-01 | End: 2023-01-01 | Stop reason: HOSPADM

## 2023-01-01 RX ORDER — METOPROLOL SUCCINATE 25 MG/1
TABLET, EXTENDED RELEASE ORAL
Qty: 15 TABLET | Refills: 0 | Status: SHIPPED | OUTPATIENT
Start: 2023-01-01 | End: 2023-01-01

## 2023-01-01 RX ORDER — MIDODRINE HYDROCHLORIDE 5 MG/1
10 TABLET ORAL
Status: DISCONTINUED | OUTPATIENT
Start: 2023-01-01 | End: 2023-01-01 | Stop reason: HOSPADM

## 2023-01-01 RX ORDER — ATORVASTATIN CALCIUM 80 MG/1
80 TABLET, FILM COATED ORAL AT BEDTIME
Status: DISCONTINUED | OUTPATIENT
Start: 2023-01-01 | End: 2023-01-01

## 2023-01-01 RX ORDER — CHLOROTHIAZIDE SODIUM 500 MG/1
500 INJECTION INTRAVENOUS ONCE
Status: COMPLETED | OUTPATIENT
Start: 2023-01-01 | End: 2023-01-01

## 2023-01-01 RX ORDER — CALCIUM CARBONATE 500(1250)
1 TABLET ORAL 2 TIMES DAILY
Status: DISCONTINUED | OUTPATIENT
Start: 2023-01-01 | End: 2023-01-01 | Stop reason: HOSPADM

## 2023-01-01 RX ORDER — CARBOXYMETHYLCELLULOSE SODIUM 5 MG/ML
1 SOLUTION/ DROPS OPHTHALMIC 4 TIMES DAILY PRN
Status: DISCONTINUED | OUTPATIENT
Start: 2023-01-01 | End: 2023-01-01 | Stop reason: HOSPADM

## 2023-01-01 RX ORDER — DOBUTAMINE HYDROCHLORIDE 200 MG/100ML
3 INJECTION INTRAVENOUS CONTINUOUS
Qty: 500 ML | Refills: 0 | Status: SHIPPED | OUTPATIENT
Start: 2023-01-01

## 2023-01-01 RX ORDER — NICOTINE POLACRILEX 4 MG
15-30 LOZENGE BUCCAL
Status: DISCONTINUED | OUTPATIENT
Start: 2023-01-01 | End: 2023-01-01 | Stop reason: HOSPADM

## 2023-01-01 RX ORDER — MAGNESIUM OXIDE 400 MG/1
800 TABLET ORAL 2 TIMES DAILY
Qty: 60 TABLET | Refills: 3 | Status: ON HOLD | COMMUNITY
Start: 2023-01-01 | End: 2023-01-01

## 2023-01-01 RX ORDER — FUROSEMIDE 10 MG/ML
60 INJECTION INTRAMUSCULAR; INTRAVENOUS EVERY 8 HOURS
Status: DISCONTINUED | OUTPATIENT
Start: 2023-01-01 | End: 2023-01-01

## 2023-01-01 RX ORDER — ACETAMINOPHEN 500 MG
1000 TABLET ORAL EVERY 8 HOURS PRN
Status: DISCONTINUED | OUTPATIENT
Start: 2023-01-01 | End: 2023-01-01

## 2023-01-01 RX ORDER — GABAPENTIN 300 MG/1
300 CAPSULE ORAL AT BEDTIME
Status: DISCONTINUED | OUTPATIENT
Start: 2023-01-01 | End: 2023-01-01 | Stop reason: HOSPADM

## 2023-01-01 RX ORDER — POTASSIUM CHLORIDE 1500 MG/1
40 TABLET, EXTENDED RELEASE ORAL DAILY
COMMUNITY
Start: 2023-01-01 | End: 2023-01-01

## 2023-01-01 RX ORDER — TORSEMIDE 100 MG/1
100 TABLET ORAL 3 TIMES DAILY
Qty: 90 TABLET | Refills: 3 | COMMUNITY
Start: 2023-01-01 | End: 2023-01-01

## 2023-01-01 RX ORDER — ACETAMINOPHEN 500 MG
1000 TABLET ORAL EVERY 8 HOURS PRN
COMMUNITY
Start: 2023-01-01

## 2023-01-01 RX ORDER — PANTOPRAZOLE SODIUM 40 MG/1
40 TABLET, DELAYED RELEASE ORAL 2 TIMES DAILY
Status: DISCONTINUED | OUTPATIENT
Start: 2023-01-01 | End: 2023-01-01 | Stop reason: HOSPADM

## 2023-01-01 RX ORDER — HEPARIN SODIUM,PORCINE 10 UNIT/ML
5-20 VIAL (ML) INTRAVENOUS
Status: DISCONTINUED | OUTPATIENT
Start: 2023-01-01 | End: 2023-01-01 | Stop reason: HOSPADM

## 2023-01-01 RX ORDER — METOLAZONE 2.5 MG/1
2.5 TABLET ORAL DAILY PRN
Qty: 3 TABLET | Refills: 0 | Status: ON HOLD | OUTPATIENT
Start: 2023-01-01 | End: 2023-01-01

## 2023-01-01 RX ORDER — TRIAMCINOLONE ACETONIDE 1 MG/G
CREAM TOPICAL 2 TIMES DAILY
Qty: 15 G | Refills: 0 | Status: SHIPPED | OUTPATIENT
Start: 2023-01-01 | End: 2023-01-01

## 2023-01-01 RX ORDER — DEXTROSE MONOHYDRATE 25 G/50ML
25-50 INJECTION, SOLUTION INTRAVENOUS
Status: DISCONTINUED | OUTPATIENT
Start: 2023-01-01 | End: 2023-01-01

## 2023-01-01 RX ORDER — METOLAZONE 2.5 MG/1
2.5 TABLET ORAL DAILY PRN
Qty: 5 TABLET | Refills: 0 | COMMUNITY
Start: 2023-01-01 | End: 2023-01-01

## 2023-01-01 RX ORDER — ACETAMINOPHEN 650 MG/1
650 SUPPOSITORY RECTAL EVERY 4 HOURS PRN
Status: DISCONTINUED | OUTPATIENT
Start: 2023-01-01 | End: 2023-01-01 | Stop reason: HOSPADM

## 2023-01-01 RX ORDER — LORAZEPAM 0.5 MG/1
0.5 TABLET ORAL EVERY 4 HOURS PRN
Qty: 30 TABLET | Refills: 0 | Status: SHIPPED | OUTPATIENT
Start: 2023-01-01

## 2023-01-01 RX ORDER — FUROSEMIDE 10 MG/ML
40 INJECTION INTRAMUSCULAR; INTRAVENOUS ONCE
Status: COMPLETED | OUTPATIENT
Start: 2023-01-01 | End: 2023-01-01

## 2023-01-01 RX ORDER — BUMETANIDE 0.25 MG/ML
5 INJECTION INTRAMUSCULAR; INTRAVENOUS ONCE
Status: COMPLETED | OUTPATIENT
Start: 2023-01-01 | End: 2023-01-01

## 2023-01-01 RX ORDER — NICOTINE 21 MG/24HR
1 PATCH, TRANSDERMAL 24 HOURS TRANSDERMAL DAILY
Status: DISCONTINUED | OUTPATIENT
Start: 2023-01-01 | End: 2023-01-01

## 2023-01-01 RX ORDER — FUROSEMIDE 10 MG/ML
80 INJECTION INTRAMUSCULAR; INTRAVENOUS ONCE
Status: COMPLETED | OUTPATIENT
Start: 2023-01-01 | End: 2023-01-01

## 2023-01-01 RX ORDER — METHYLPREDNISOLONE SODIUM SUCCINATE 125 MG/2ML
125 INJECTION, POWDER, LYOPHILIZED, FOR SOLUTION INTRAMUSCULAR; INTRAVENOUS
Status: DISCONTINUED | OUTPATIENT
Start: 2023-01-01 | End: 2023-01-01 | Stop reason: HOSPADM

## 2023-01-01 RX ORDER — POTASSIUM CHLORIDE 1500 MG/1
40 TABLET, EXTENDED RELEASE ORAL 2 TIMES DAILY
Qty: 120 TABLET | Refills: 3 | Status: SHIPPED | OUTPATIENT
Start: 2023-01-01 | End: 2023-01-01

## 2023-01-01 RX ORDER — LIDOCAINE 40 MG/G
CREAM TOPICAL
Status: CANCELLED | OUTPATIENT
Start: 2023-01-01

## 2023-01-01 RX ORDER — FUROSEMIDE 10 MG/ML
60 INJECTION INTRAMUSCULAR; INTRAVENOUS ONCE
Status: DISCONTINUED | OUTPATIENT
Start: 2023-01-01 | End: 2023-01-01

## 2023-01-01 RX ORDER — BUMETANIDE 0.25 MG/ML
4 INJECTION INTRAMUSCULAR; INTRAVENOUS ONCE
Status: COMPLETED | OUTPATIENT
Start: 2023-01-01 | End: 2023-01-01

## 2023-01-01 RX ORDER — POTASSIUM CHLORIDE 1500 MG/1
40 TABLET, EXTENDED RELEASE ORAL DAILY
Qty: 60 TABLET | Refills: 1 | Status: ON HOLD | OUTPATIENT
Start: 2023-01-01 | End: 2023-01-01

## 2023-01-01 RX ORDER — AMOXICILLIN 250 MG
1 CAPSULE ORAL 2 TIMES DAILY
Status: DISCONTINUED | OUTPATIENT
Start: 2023-01-01 | End: 2023-01-01 | Stop reason: HOSPADM

## 2023-01-01 RX ORDER — SACUBITRIL AND VALSARTAN 24; 26 MG/1; MG/1
0.5 TABLET, FILM COATED ORAL 2 TIMES DAILY
Qty: 180 TABLET | Refills: 3 | Status: ON HOLD | COMMUNITY
Start: 2023-01-01 | End: 2023-01-01

## 2023-01-01 RX ORDER — ACETAMINOPHEN 500 MG
1500 TABLET ORAL 2 TIMES DAILY
Status: DISCONTINUED | OUTPATIENT
Start: 2023-01-01 | End: 2023-01-01

## 2023-01-01 RX ORDER — DOBUTAMINE HCL IN DEXTROSE 5 % 500MG/250
2.5 INTRAVENOUS SOLUTION INTRAVENOUS CONTINUOUS
Status: CANCELLED | OUTPATIENT
Start: 2023-01-01

## 2023-01-01 RX ORDER — ACETAMINOPHEN 325 MG/1
975 TABLET ORAL EVERY 8 HOURS
Status: DISCONTINUED | OUTPATIENT
Start: 2023-01-01 | End: 2023-01-01 | Stop reason: HOSPADM

## 2023-01-01 RX ORDER — ACETAMINOPHEN 500 MG
1000 TABLET ORAL 3 TIMES DAILY
Status: DISCONTINUED | OUTPATIENT
Start: 2023-01-01 | End: 2023-01-01 | Stop reason: HOSPADM

## 2023-01-01 RX ORDER — MIDODRINE HYDROCHLORIDE 10 MG/1
10 TABLET ORAL
Qty: 90 TABLET | Refills: 3 | Status: SHIPPED | OUTPATIENT
Start: 2023-01-01 | End: 2023-01-01

## 2023-01-01 RX ORDER — ALBUTEROL SULFATE 90 UG/1
2 AEROSOL, METERED RESPIRATORY (INHALATION) EVERY 4 HOURS PRN
Status: DISCONTINUED | OUTPATIENT
Start: 2023-01-01 | End: 2023-01-01 | Stop reason: HOSPADM

## 2023-01-01 RX ORDER — POLYETHYLENE GLYCOL 3350 17 G
2 POWDER IN PACKET (EA) ORAL
Qty: 30 LOZENGE | Refills: 0 | Status: SHIPPED | OUTPATIENT
Start: 2023-01-01

## 2023-01-01 RX ORDER — LIDOCAINE 40 MG/G
CREAM TOPICAL
Status: DISCONTINUED | OUTPATIENT
Start: 2023-01-01 | End: 2023-01-01

## 2023-01-01 RX ORDER — LIDOCAINE 40 MG/G
CREAM TOPICAL
Status: DISCONTINUED | OUTPATIENT
Start: 2023-01-01 | End: 2023-01-01 | Stop reason: HOSPADM

## 2023-01-01 RX ORDER — DOBUTAMINE HYDROCHLORIDE 200 MG/100ML
3 INJECTION INTRAVENOUS CONTINUOUS
Qty: 500 ML | Refills: 0 | Status: SHIPPED | OUTPATIENT
Start: 2023-01-01 | End: 2023-01-01

## 2023-01-01 RX ORDER — HEPARIN SODIUM,PORCINE 10 UNIT/ML
5-20 VIAL (ML) INTRAVENOUS EVERY 24 HOURS
Status: DISCONTINUED | OUTPATIENT
Start: 2023-01-01 | End: 2023-01-01 | Stop reason: HOSPADM

## 2023-01-01 RX ORDER — ACETAMINOPHEN 325 MG/1
650 TABLET ORAL EVERY 4 HOURS PRN
Status: DISCONTINUED | OUTPATIENT
Start: 2023-01-01 | End: 2023-01-01

## 2023-01-01 RX ORDER — DEXTROSE MONOHYDRATE 25 G/50ML
25-50 INJECTION, SOLUTION INTRAVENOUS
Status: DISCONTINUED | OUTPATIENT
Start: 2023-01-01 | End: 2023-01-01 | Stop reason: HOSPADM

## 2023-01-01 RX ORDER — FUROSEMIDE 80 MG
80 TABLET ORAL
Status: DISCONTINUED | OUTPATIENT
Start: 2023-01-01 | End: 2023-01-01

## 2023-01-01 RX ORDER — TORSEMIDE 100 MG/1
100 TABLET ORAL 3 TIMES DAILY
Qty: 90 TABLET | Refills: 3 | Status: SHIPPED | OUTPATIENT
Start: 2023-01-01

## 2023-01-01 RX ORDER — DOBUTAMINE HYDROCHLORIDE 200 MG/100ML
2.5 INJECTION INTRAVENOUS CONTINUOUS
Status: DISCONTINUED | OUTPATIENT
Start: 2023-01-01 | End: 2023-01-01 | Stop reason: HOSPADM

## 2023-01-01 RX ORDER — DOPAMINE HYDROCHLORIDE 160 MG/100ML
2.5 INJECTION, SOLUTION INTRAVENOUS CONTINUOUS
Status: DISCONTINUED | OUTPATIENT
Start: 2023-01-01 | End: 2023-01-01

## 2023-01-01 RX ORDER — POTASSIUM CHLORIDE 750 MG/1
40 TABLET, EXTENDED RELEASE ORAL 2 TIMES DAILY
Status: DISCONTINUED | OUTPATIENT
Start: 2023-01-01 | End: 2023-01-01 | Stop reason: HOSPADM

## 2023-01-01 RX ORDER — FUROSEMIDE 10 MG/ML
100 INJECTION INTRAMUSCULAR; INTRAVENOUS ONCE
Status: DISCONTINUED | OUTPATIENT
Start: 2023-01-01 | End: 2023-01-01

## 2023-01-01 RX ORDER — METOLAZONE 2.5 MG/1
2.5 TABLET ORAL DAILY PRN
Qty: 5 TABLET | Refills: 0 | Status: ON HOLD | OUTPATIENT
Start: 2023-01-01 | End: 2023-01-01

## 2023-01-01 RX ORDER — CLOPIDOGREL BISULFATE 75 MG/1
75 TABLET ORAL DAILY
Status: DISCONTINUED | OUTPATIENT
Start: 2023-01-01 | End: 2023-01-01 | Stop reason: HOSPADM

## 2023-01-01 RX ORDER — TORSEMIDE 20 MG/1
TABLET ORAL
Status: ON HOLD | COMMUNITY
Start: 2023-01-01 | End: 2023-01-01

## 2023-01-01 RX ORDER — ALBUTEROL SULFATE 90 UG/1
2 AEROSOL, METERED RESPIRATORY (INHALATION) ONCE
Status: COMPLETED | OUTPATIENT
Start: 2023-01-01 | End: 2023-01-01

## 2023-01-01 RX ORDER — NICOTINE 21 MG/24HR
1 PATCH, TRANSDERMAL 24 HOURS TRANSDERMAL DAILY
Qty: 30 PATCH | Refills: 0 | Status: SHIPPED | OUTPATIENT
Start: 2023-01-01

## 2023-01-01 RX ORDER — ATORVASTATIN CALCIUM 80 MG/1
80 TABLET, FILM COATED ORAL AT BEDTIME
Status: DISCONTINUED | OUTPATIENT
Start: 2023-01-01 | End: 2023-01-01 | Stop reason: HOSPADM

## 2023-01-01 RX ORDER — VITAMIN B COMPLEX
50 TABLET ORAL 2 TIMES DAILY
Status: DISCONTINUED | OUTPATIENT
Start: 2023-01-01 | End: 2023-01-01 | Stop reason: HOSPADM

## 2023-01-01 RX ORDER — BISACODYL 10 MG
10 SUPPOSITORY, RECTAL RECTAL DAILY PRN
Status: DISCONTINUED | OUTPATIENT
Start: 2023-01-01 | End: 2023-01-01 | Stop reason: HOSPADM

## 2023-01-01 RX ORDER — DOBUTAMINE HYDROCHLORIDE 200 MG/100ML
2.5 INJECTION INTRAVENOUS CONTINUOUS
Qty: 500 ML | Refills: 0 | Status: ON HOLD | OUTPATIENT
Start: 2023-01-01 | End: 2023-01-01

## 2023-01-01 RX ORDER — POTASSIUM CHLORIDE 1500 MG/1
40 TABLET, EXTENDED RELEASE ORAL 3 TIMES DAILY
Qty: 180 TABLET | Refills: 3 | Status: SHIPPED | OUTPATIENT
Start: 2023-01-01

## 2023-01-01 RX ORDER — DIPHENHYDRAMINE HYDROCHLORIDE 50 MG/ML
50 INJECTION INTRAMUSCULAR; INTRAVENOUS
Status: DISCONTINUED | OUTPATIENT
Start: 2023-01-01 | End: 2023-01-01 | Stop reason: HOSPADM

## 2023-01-01 RX ORDER — HEPARIN SODIUM 5000 [USP'U]/.5ML
5000 INJECTION, SOLUTION INTRAVENOUS; SUBCUTANEOUS EVERY 12 HOURS
Status: DISCONTINUED | OUTPATIENT
Start: 2023-01-01 | End: 2023-01-01 | Stop reason: HOSPADM

## 2023-01-01 RX ORDER — FUROSEMIDE 10 MG/ML
60 INJECTION INTRAMUSCULAR; INTRAVENOUS ONCE
Status: COMPLETED | OUTPATIENT
Start: 2023-01-01 | End: 2023-01-01

## 2023-01-01 RX ORDER — METOPROLOL SUCCINATE 25 MG/1
TABLET, EXTENDED RELEASE ORAL
Qty: 15 TABLET | Refills: 0 | OUTPATIENT
Start: 2023-01-01

## 2023-01-01 RX ORDER — METOLAZONE 2.5 MG/1
2.5 TABLET ORAL ONCE
Status: COMPLETED | OUTPATIENT
Start: 2023-01-01 | End: 2023-01-01

## 2023-01-01 RX ORDER — BISACODYL 10 MG
10 SUPPOSITORY, RECTAL RECTAL DAILY PRN
Qty: 2 SUPPOSITORY | Refills: 0 | Status: SHIPPED | OUTPATIENT
Start: 2023-01-01

## 2023-01-01 RX ORDER — POLYETHYLENE GLYCOL 3350 17 G
2 POWDER IN PACKET (EA) ORAL
Status: DISCONTINUED | OUTPATIENT
Start: 2023-01-01 | End: 2023-01-01 | Stop reason: HOSPADM

## 2023-01-01 RX ORDER — MAGNESIUM OXIDE 400 MG/1
400 TABLET ORAL EVERY 4 HOURS
Status: DISCONTINUED | OUTPATIENT
Start: 2023-01-01 | End: 2023-01-01

## 2023-01-01 RX ORDER — ACETAMINOPHEN 500 MG
1500 TABLET ORAL 2 TIMES DAILY
Status: ON HOLD | COMMUNITY
End: 2023-01-01

## 2023-01-01 RX ORDER — MAGNESIUM HYDROXIDE/ALUMINUM HYDROXICE/SIMETHICONE 120; 1200; 1200 MG/30ML; MG/30ML; MG/30ML
30 SUSPENSION ORAL EVERY 4 HOURS PRN
Status: DISCONTINUED | OUTPATIENT
Start: 2023-01-01 | End: 2023-01-01 | Stop reason: HOSPADM

## 2023-01-01 RX ORDER — MIDODRINE HYDROCHLORIDE 5 MG/1
5 TABLET ORAL
Status: DISCONTINUED | OUTPATIENT
Start: 2023-01-01 | End: 2023-01-01

## 2023-01-01 RX ORDER — CALCIUM CARBONATE 500(1250)
1000 TABLET ORAL
Status: DISCONTINUED | OUTPATIENT
Start: 2023-01-01 | End: 2023-01-01

## 2023-01-01 RX ORDER — AMOXICILLIN 250 MG
2 CAPSULE ORAL 2 TIMES DAILY
Status: DISCONTINUED | OUTPATIENT
Start: 2023-01-01 | End: 2023-01-01 | Stop reason: HOSPADM

## 2023-01-01 RX ORDER — METOLAZONE 2.5 MG/1
2.5 TABLET ORAL DAILY PRN
Qty: 5 TABLET | Refills: 0 | Status: SHIPPED | OUTPATIENT
Start: 2023-01-01 | End: 2023-01-01

## 2023-01-01 RX ADMIN — FUROSEMIDE 20 MG/HR: 10 INJECTION, SOLUTION INTRAMUSCULAR; INTRAVENOUS at 04:55

## 2023-01-01 RX ADMIN — MIDODRINE HYDROCHLORIDE 5 MG: 5 TABLET ORAL at 17:13

## 2023-01-01 RX ADMIN — ACETAMINOPHEN 1000 MG: 500 TABLET ORAL at 20:12

## 2023-01-01 RX ADMIN — NICOTINE 1 PATCH: 21 PATCH, EXTENDED RELEASE TRANSDERMAL at 08:31

## 2023-01-01 RX ADMIN — MIDODRINE HYDROCHLORIDE 5 MG: 5 TABLET ORAL at 08:38

## 2023-01-01 RX ADMIN — PANCRELIPASE 2 CAPSULE: 60000; 12000; 38000 CAPSULE, DELAYED RELEASE PELLETS ORAL at 12:26

## 2023-01-01 RX ADMIN — CALCIUM 500 MG: 500 TABLET ORAL at 20:30

## 2023-01-01 RX ADMIN — INSULIN ASPART 2 UNITS: 100 INJECTION, SOLUTION INTRAVENOUS; SUBCUTANEOUS at 12:07

## 2023-01-01 RX ADMIN — ACETAMINOPHEN 975 MG: 325 TABLET, FILM COATED ORAL at 14:59

## 2023-01-01 RX ADMIN — MOMETASONE FUROATE 1 PUFF: 220 INHALANT RESPIRATORY (INHALATION) at 08:49

## 2023-01-01 RX ADMIN — MAGNESIUM OXIDE TAB 400 MG (240 MG ELEMENTAL MG) 400 MG: 400 (240 MG) TAB at 07:56

## 2023-01-01 RX ADMIN — PANCRELIPASE 2 CAPSULE: 60000; 12000; 38000 CAPSULE, DELAYED RELEASE PELLETS ORAL at 08:14

## 2023-01-01 RX ADMIN — MAGNESIUM SULFATE IN WATER 2 G: 40 INJECTION, SOLUTION INTRAVENOUS at 21:45

## 2023-01-01 RX ADMIN — MAGNESIUM HYDROXIDE 30 ML: 400 SUSPENSION ORAL at 04:06

## 2023-01-01 RX ADMIN — NICOTINE 1 PATCH: 21 PATCH, EXTENDED RELEASE TRANSDERMAL at 08:45

## 2023-01-01 RX ADMIN — NICOTINE POLACRILEX 2 MG: 2 LOZENGE ORAL at 08:46

## 2023-01-01 RX ADMIN — PANCRELIPASE 2 CAPSULE: 60000; 12000; 38000 CAPSULE, DELAYED RELEASE PELLETS ORAL at 18:35

## 2023-01-01 RX ADMIN — MOMETASONE FUROATE 1 PUFF: 220 INHALANT RESPIRATORY (INHALATION) at 20:46

## 2023-01-01 RX ADMIN — NICOTINE 1 PATCH: 21 PATCH, EXTENDED RELEASE TRANSDERMAL at 07:36

## 2023-01-01 RX ADMIN — PANTOPRAZOLE SODIUM 40 MG: 40 TABLET, DELAYED RELEASE ORAL at 20:31

## 2023-01-01 RX ADMIN — HEPARIN SODIUM 5000 UNITS: 5000 INJECTION, SOLUTION INTRAVENOUS; SUBCUTANEOUS at 08:41

## 2023-01-01 RX ADMIN — PANTOPRAZOLE SODIUM 40 MG: 40 TABLET, DELAYED RELEASE ORAL at 19:40

## 2023-01-01 RX ADMIN — MIDODRINE HYDROCHLORIDE 5 MG: 5 TABLET ORAL at 18:27

## 2023-01-01 RX ADMIN — Medication 400 MG: at 19:41

## 2023-01-01 RX ADMIN — MIDODRINE HYDROCHLORIDE 10 MG: 5 TABLET ORAL at 07:33

## 2023-01-01 RX ADMIN — ACETAMINOPHEN 1000 MG: 500 TABLET ORAL at 13:23

## 2023-01-01 RX ADMIN — ALUMINUM HYDROXIDE, MAGNESIUM HYDROXIDE, AND SIMETHICONE 30 ML: 200; 200; 20 SUSPENSION ORAL at 15:45

## 2023-01-01 RX ADMIN — ALBUTEROL SULFATE 2 PUFF: 90 AEROSOL, METERED RESPIRATORY (INHALATION) at 15:39

## 2023-01-01 RX ADMIN — PANCRELIPASE 2 CAPSULE: 60000; 12000; 38000 CAPSULE, DELAYED RELEASE PELLETS ORAL at 12:34

## 2023-01-01 RX ADMIN — ACETAMINOPHEN 1000 MG: 500 TABLET ORAL at 08:53

## 2023-01-01 RX ADMIN — PANCRELIPASE 2 CAPSULE: 60000; 12000; 38000 CAPSULE, DELAYED RELEASE PELLETS ORAL at 19:59

## 2023-01-01 RX ADMIN — MAGNESIUM OXIDE TAB 400 MG (240 MG ELEMENTAL MG) 800 MG: 400 (240 MG) TAB at 19:43

## 2023-01-01 RX ADMIN — PANCRELIPASE 2 CAPSULE: 60000; 12000; 38000 CAPSULE, DELAYED RELEASE PELLETS ORAL at 18:11

## 2023-01-01 RX ADMIN — CALCIUM 500 MG: 500 TABLET ORAL at 20:23

## 2023-01-01 RX ADMIN — PANCRELIPASE 2 CAPSULE: 60000; 12000; 38000 CAPSULE, DELAYED RELEASE PELLETS ORAL at 20:15

## 2023-01-01 RX ADMIN — MAGNESIUM HYDROXIDE 30 ML: 400 SUSPENSION ORAL at 12:37

## 2023-01-01 RX ADMIN — POTASSIUM CHLORIDE 40 MEQ: 750 TABLET, EXTENDED RELEASE ORAL at 06:05

## 2023-01-01 RX ADMIN — MAGNESIUM SULFATE HEPTAHYDRATE 2 G: 40 INJECTION, SOLUTION INTRAVENOUS at 17:28

## 2023-01-01 RX ADMIN — INSULIN ASPART 2 UNITS: 100 INJECTION, SOLUTION INTRAVENOUS; SUBCUTANEOUS at 13:11

## 2023-01-01 RX ADMIN — MAGNESIUM HYDROXIDE 30 ML: 400 SUSPENSION ORAL at 12:02

## 2023-01-01 RX ADMIN — MIDODRINE HYDROCHLORIDE 5 MG: 5 TABLET ORAL at 08:17

## 2023-01-01 RX ADMIN — MIDODRINE HYDROCHLORIDE 5 MG: 5 TABLET ORAL at 09:03

## 2023-01-01 RX ADMIN — CALCIUM 500 MG: 500 TABLET ORAL at 07:41

## 2023-01-01 RX ADMIN — MIDODRINE HYDROCHLORIDE 10 MG: 5 TABLET ORAL at 12:02

## 2023-01-01 RX ADMIN — PANTOPRAZOLE SODIUM 40 MG: 40 TABLET, DELAYED RELEASE ORAL at 19:48

## 2023-01-01 RX ADMIN — ACETAMINOPHEN 1000 MG: 500 TABLET ORAL at 13:06

## 2023-01-01 RX ADMIN — MIDODRINE HYDROCHLORIDE 5 MG: 5 TABLET ORAL at 17:24

## 2023-01-01 RX ADMIN — HEPARIN SODIUM 5000 UNITS: 5000 INJECTION, SOLUTION INTRAVENOUS; SUBCUTANEOUS at 07:35

## 2023-01-01 RX ADMIN — FUROSEMIDE 20 MG/HR: 10 INJECTION, SOLUTION INTRAMUSCULAR; INTRAVENOUS at 06:30

## 2023-01-01 RX ADMIN — INSULIN ASPART 1 UNITS: 100 INJECTION, SOLUTION INTRAVENOUS; SUBCUTANEOUS at 12:39

## 2023-01-01 RX ADMIN — ACETAMINOPHEN 1000 MG: 500 TABLET ORAL at 09:53

## 2023-01-01 RX ADMIN — GABAPENTIN 300 MG: 300 CAPSULE ORAL at 18:22

## 2023-01-01 RX ADMIN — FUROSEMIDE 20 MG/HR: 10 INJECTION, SOLUTION INTRAVENOUS at 12:49

## 2023-01-01 RX ADMIN — INSULIN ASPART 1 UNITS: 100 INJECTION, SOLUTION INTRAVENOUS; SUBCUTANEOUS at 08:20

## 2023-01-01 RX ADMIN — CALCIUM 500 MG: 500 TABLET ORAL at 09:16

## 2023-01-01 RX ADMIN — MIDODRINE HYDROCHLORIDE 5 MG: 5 TABLET ORAL at 12:33

## 2023-01-01 RX ADMIN — POTASSIUM CHLORIDE 20 MEQ: 750 TABLET, EXTENDED RELEASE ORAL at 09:53

## 2023-01-01 RX ADMIN — PANCRELIPASE 2 CAPSULE: 60000; 12000; 38000 CAPSULE, DELAYED RELEASE PELLETS ORAL at 08:47

## 2023-01-01 RX ADMIN — SENNOSIDES AND DOCUSATE SODIUM 1 TABLET: 50; 8.6 TABLET ORAL at 20:36

## 2023-01-01 RX ADMIN — NICOTINE 1 PATCH: 21 PATCH, EXTENDED RELEASE TRANSDERMAL at 09:17

## 2023-01-01 RX ADMIN — ATORVASTATIN CALCIUM 80 MG: 80 TABLET, FILM COATED ORAL at 20:30

## 2023-01-01 RX ADMIN — INSULIN ASPART 2 UNITS: 100 INJECTION, SOLUTION INTRAVENOUS; SUBCUTANEOUS at 08:37

## 2023-01-01 RX ADMIN — PANTOPRAZOLE SODIUM 40 MG: 40 TABLET, DELAYED RELEASE ORAL at 08:37

## 2023-01-01 RX ADMIN — CLOPIDOGREL BISULFATE 75 MG: 75 TABLET ORAL at 08:24

## 2023-01-01 RX ADMIN — DOBUTAMINE HYDROCHLORIDE 2.5 MCG/KG/MIN: 400 INJECTION INTRAVENOUS at 16:23

## 2023-01-01 RX ADMIN — ACETAMINOPHEN 975 MG: 325 TABLET, FILM COATED ORAL at 22:37

## 2023-01-01 RX ADMIN — INSULIN ASPART 2 UNITS: 100 INJECTION, SOLUTION INTRAVENOUS; SUBCUTANEOUS at 07:12

## 2023-01-01 RX ADMIN — GABAPENTIN 300 MG: 300 CAPSULE ORAL at 21:46

## 2023-01-01 RX ADMIN — MOMETASONE FUROATE 1 PUFF: 220 INHALANT RESPIRATORY (INHALATION) at 20:15

## 2023-01-01 RX ADMIN — MAGNESIUM OXIDE TAB 400 MG (240 MG ELEMENTAL MG) 400 MG: 400 (240 MG) TAB at 09:52

## 2023-01-01 RX ADMIN — FUROSEMIDE 20 MG/HR: 10 INJECTION, SOLUTION INTRAMUSCULAR; INTRAVENOUS at 04:36

## 2023-01-01 RX ADMIN — POTASSIUM CHLORIDE 40 MEQ: 750 TABLET, EXTENDED RELEASE ORAL at 07:33

## 2023-01-01 RX ADMIN — CHLOROTHIAZIDE SODIUM 500 MG: 500 INJECTION, POWDER, LYOPHILIZED, FOR SOLUTION INTRAVENOUS at 17:08

## 2023-01-01 RX ADMIN — MIDODRINE HYDROCHLORIDE 5 MG: 5 TABLET ORAL at 18:14

## 2023-01-01 RX ADMIN — ACETAMINOPHEN 975 MG: 325 TABLET, FILM COATED ORAL at 14:24

## 2023-01-01 RX ADMIN — POTASSIUM CHLORIDE 40 MEQ: 750 TABLET, EXTENDED RELEASE ORAL at 19:41

## 2023-01-01 RX ADMIN — MIDODRINE HYDROCHLORIDE 10 MG: 5 TABLET ORAL at 18:22

## 2023-01-01 RX ADMIN — SENNOSIDES AND DOCUSATE SODIUM 1 TABLET: 50; 8.6 TABLET ORAL at 08:38

## 2023-01-01 RX ADMIN — MOMETASONE FUROATE 1 PUFF: 220 INHALANT RESPIRATORY (INHALATION) at 20:34

## 2023-01-01 RX ADMIN — PANCRELIPASE 2 CAPSULE: 60000; 12000; 38000 CAPSULE, DELAYED RELEASE PELLETS ORAL at 08:46

## 2023-01-01 RX ADMIN — PANCRELIPASE 2 CAPSULE: 60000; 12000; 38000 CAPSULE, DELAYED RELEASE PELLETS ORAL at 08:36

## 2023-01-01 RX ADMIN — HUMAN ALBUMIN MICROSPHERES AND PERFLUTREN 5 ML: 10; .22 INJECTION, SOLUTION INTRAVENOUS at 11:34

## 2023-01-01 RX ADMIN — IRON SUCROSE 200 MG: 20 INJECTION, SOLUTION INTRAVENOUS at 16:17

## 2023-01-01 RX ADMIN — INSULIN ASPART 1 UNITS: 100 INJECTION, SOLUTION INTRAVENOUS; SUBCUTANEOUS at 08:38

## 2023-01-01 RX ADMIN — PANCRELIPASE 2 CAPSULE: 60000; 12000; 38000 CAPSULE, DELAYED RELEASE PELLETS ORAL at 12:32

## 2023-01-01 RX ADMIN — FUROSEMIDE 20 MG/HR: 10 INJECTION, SOLUTION INTRAMUSCULAR; INTRAVENOUS at 10:34

## 2023-01-01 RX ADMIN — PANCRELIPASE 2 CAPSULE: 60000; 12000; 38000 CAPSULE, DELAYED RELEASE PELLETS ORAL at 21:00

## 2023-01-01 RX ADMIN — CALCIUM 500 MG: 500 TABLET ORAL at 08:43

## 2023-01-01 RX ADMIN — BUMETANIDE 1 MG/HR: 0.25 INJECTION INTRAMUSCULAR; INTRAVENOUS at 06:12

## 2023-01-01 RX ADMIN — ALBUTEROL SULFATE 2 PUFF: 90 AEROSOL, METERED RESPIRATORY (INHALATION) at 08:48

## 2023-01-01 RX ADMIN — PANCRELIPASE 2 CAPSULE: 60000; 12000; 38000 CAPSULE, DELAYED RELEASE PELLETS ORAL at 08:43

## 2023-01-01 RX ADMIN — PANTOPRAZOLE SODIUM 40 MG: 40 TABLET, DELAYED RELEASE ORAL at 19:38

## 2023-01-01 RX ADMIN — CLOPIDOGREL BISULFATE 75 MG: 75 TABLET ORAL at 08:15

## 2023-01-01 RX ADMIN — PANCRELIPASE 2 CAPSULE: 60000; 12000; 38000 CAPSULE, DELAYED RELEASE PELLETS ORAL at 17:24

## 2023-01-01 RX ADMIN — Medication 50 MCG: at 08:28

## 2023-01-01 RX ADMIN — MOMETASONE FUROATE 1 PUFF: 220 INHALANT RESPIRATORY (INHALATION) at 08:14

## 2023-01-01 RX ADMIN — Medication 50 MCG: at 19:40

## 2023-01-01 RX ADMIN — PANCRELIPASE 2 CAPSULE: 60000; 12000; 38000 CAPSULE, DELAYED RELEASE PELLETS ORAL at 11:26

## 2023-01-01 RX ADMIN — CALCIUM 500 MG: 500 TABLET ORAL at 08:24

## 2023-01-01 RX ADMIN — HEPARIN SODIUM 5000 UNITS: 5000 INJECTION, SOLUTION INTRAVENOUS; SUBCUTANEOUS at 20:39

## 2023-01-01 RX ADMIN — CALCIUM 500 MG: 500 TABLET ORAL at 20:05

## 2023-01-01 RX ADMIN — Medication 50 MCG: at 20:30

## 2023-01-01 RX ADMIN — SENNOSIDES AND DOCUSATE SODIUM 2 TABLET: 50; 8.6 TABLET ORAL at 20:05

## 2023-01-01 RX ADMIN — Medication 50 MCG: at 08:40

## 2023-01-01 RX ADMIN — MOMETASONE FUROATE 2 PUFF: 220 INHALANT RESPIRATORY (INHALATION) at 00:24

## 2023-01-01 RX ADMIN — BUMETANIDE 4 MG: 0.25 INJECTION INTRAMUSCULAR; INTRAVENOUS at 12:25

## 2023-01-01 RX ADMIN — PANCRELIPASE 2 CAPSULE: 60000; 12000; 38000 CAPSULE, DELAYED RELEASE PELLETS ORAL at 12:04

## 2023-01-01 RX ADMIN — ATORVASTATIN CALCIUM 80 MG: 80 TABLET, FILM COATED ORAL at 21:47

## 2023-01-01 RX ADMIN — ATORVASTATIN CALCIUM 80 MG: 80 TABLET, FILM COATED ORAL at 19:40

## 2023-01-01 RX ADMIN — INSULIN GLARGINE 16 UNITS: 100 INJECTION, SOLUTION SUBCUTANEOUS at 08:44

## 2023-01-01 RX ADMIN — Medication 50 MCG: at 20:22

## 2023-01-01 RX ADMIN — Medication 5 MG: at 23:58

## 2023-01-01 RX ADMIN — PANCRELIPASE 2 CAPSULE: 60000; 12000; 38000 CAPSULE, DELAYED RELEASE PELLETS ORAL at 09:18

## 2023-01-01 RX ADMIN — ACETAMINOPHEN 1000 MG: 500 TABLET ORAL at 20:00

## 2023-01-01 RX ADMIN — MIDODRINE HYDROCHLORIDE 5 MG: 5 TABLET ORAL at 08:24

## 2023-01-01 RX ADMIN — PANTOPRAZOLE SODIUM 40 MG: 40 TABLET, DELAYED RELEASE ORAL at 09:16

## 2023-01-01 RX ADMIN — ATORVASTATIN CALCIUM 80 MG: 80 TABLET, FILM COATED ORAL at 21:31

## 2023-01-01 RX ADMIN — THIAMINE HYDROCHLORIDE 500 MG: 100 INJECTION, SOLUTION INTRAMUSCULAR; INTRAVENOUS at 17:13

## 2023-01-01 RX ADMIN — MOMETASONE FUROATE 2 PUFF: 220 INHALANT RESPIRATORY (INHALATION) at 20:05

## 2023-01-01 RX ADMIN — CLOPIDOGREL BISULFATE 75 MG: 75 TABLET ORAL at 08:37

## 2023-01-01 RX ADMIN — HEPARIN SODIUM 5000 UNITS: 5000 INJECTION, SOLUTION INTRAVENOUS; SUBCUTANEOUS at 20:24

## 2023-01-01 RX ADMIN — PANCRELIPASE 2 CAPSULE: 60000; 12000; 38000 CAPSULE, DELAYED RELEASE PELLETS ORAL at 12:02

## 2023-01-01 RX ADMIN — CALCIUM 500 MG: 500 TABLET ORAL at 07:34

## 2023-01-01 RX ADMIN — FUROSEMIDE 60 MG: 10 INJECTION, SOLUTION INTRAMUSCULAR; INTRAVENOUS at 13:08

## 2023-01-01 RX ADMIN — Medication 50 MCG: at 09:53

## 2023-01-01 RX ADMIN — SENNOSIDES AND DOCUSATE SODIUM 1 TABLET: 50; 8.6 TABLET ORAL at 23:58

## 2023-01-01 RX ADMIN — MAGNESIUM HYDROXIDE 30 ML: 400 SUSPENSION ORAL at 08:17

## 2023-01-01 RX ADMIN — PANTOPRAZOLE SODIUM 40 MG: 40 TABLET, DELAYED RELEASE ORAL at 08:43

## 2023-01-01 RX ADMIN — PANCRELIPASE 2 CAPSULE: 60000; 12000; 38000 CAPSULE, DELAYED RELEASE PELLETS ORAL at 18:27

## 2023-01-01 RX ADMIN — PANCRELIPASE 1 CAPSULE: 60000; 12000; 38000 CAPSULE, DELAYED RELEASE PELLETS ORAL at 09:57

## 2023-01-01 RX ADMIN — POTASSIUM CHLORIDE 20 MEQ: 750 TABLET, EXTENDED RELEASE ORAL at 00:56

## 2023-01-01 RX ADMIN — DOPAMINE HYDROCHLORIDE 2.5 MCG/KG/MIN: 160 INJECTION, SOLUTION INTRAVENOUS at 23:26

## 2023-01-01 RX ADMIN — SENNOSIDES AND DOCUSATE SODIUM 1 TABLET: 50; 8.6 TABLET ORAL at 10:55

## 2023-01-01 RX ADMIN — PANTOPRAZOLE SODIUM 40 MG: 40 TABLET, DELAYED RELEASE ORAL at 20:25

## 2023-01-01 RX ADMIN — DOBUTAMINE HYDROCHLORIDE 2.5 MCG/KG/MIN: 200 INJECTION INTRAVENOUS at 12:29

## 2023-01-01 RX ADMIN — ACETAMINOPHEN 1500 MG: 325 TABLET ORAL at 08:24

## 2023-01-01 RX ADMIN — CALCIUM 500 MG: 500 TABLET ORAL at 20:25

## 2023-01-01 RX ADMIN — BUMETANIDE 1 MG/HR: 0.25 INJECTION INTRAMUSCULAR; INTRAVENOUS at 13:17

## 2023-01-01 RX ADMIN — ALUMINUM HYDROXIDE, MAGNESIUM HYDROXIDE, AND SIMETHICONE 30 ML: 200; 200; 20 SUSPENSION ORAL at 08:25

## 2023-01-01 RX ADMIN — CALCIUM 500 MG: 500 TABLET ORAL at 19:48

## 2023-01-01 RX ADMIN — CALCIUM 500 MG: 500 TABLET ORAL at 20:36

## 2023-01-01 RX ADMIN — PANCRELIPASE 2 CAPSULE: 60000; 12000; 38000 CAPSULE, DELAYED RELEASE PELLETS ORAL at 12:41

## 2023-01-01 RX ADMIN — PANCRELIPASE 2 CAPSULE: 60000; 12000; 38000 CAPSULE, DELAYED RELEASE PELLETS ORAL at 07:34

## 2023-01-01 RX ADMIN — MOMETASONE FUROATE 2 PUFF: 220 INHALANT RESPIRATORY (INHALATION) at 19:43

## 2023-01-01 RX ADMIN — Medication 5 MG: at 23:20

## 2023-01-01 RX ADMIN — PANCRELIPASE 2 CAPSULE: 60000; 12000; 38000 CAPSULE, DELAYED RELEASE PELLETS ORAL at 07:42

## 2023-01-01 RX ADMIN — MAGNESIUM OXIDE TAB 400 MG (240 MG ELEMENTAL MG) 800 MG: 400 (240 MG) TAB at 08:43

## 2023-01-01 RX ADMIN — NICOTINE POLACRILEX 2 MG: 2 LOZENGE ORAL at 04:30

## 2023-01-01 RX ADMIN — PANCRELIPASE 2 CAPSULE: 60000; 12000; 38000 CAPSULE, DELAYED RELEASE PELLETS ORAL at 19:00

## 2023-01-01 RX ADMIN — MAGNESIUM SULFATE IN WATER 2 G: 40 INJECTION, SOLUTION INTRAVENOUS at 05:58

## 2023-01-01 RX ADMIN — PANTOPRAZOLE SODIUM 40 MG: 40 TABLET, DELAYED RELEASE ORAL at 09:53

## 2023-01-01 RX ADMIN — MOMETASONE FUROATE 1 PUFF: 220 INHALANT RESPIRATORY (INHALATION) at 07:36

## 2023-01-01 RX ADMIN — CLOPIDOGREL BISULFATE 75 MG: 75 TABLET ORAL at 09:16

## 2023-01-01 RX ADMIN — PANCRELIPASE 2 CAPSULE: 60000; 12000; 38000 CAPSULE, DELAYED RELEASE PELLETS ORAL at 13:07

## 2023-01-01 RX ADMIN — CALCIUM 500 MG: 500 TABLET ORAL at 20:00

## 2023-01-01 RX ADMIN — HEPARIN SODIUM 5000 UNITS: 5000 INJECTION, SOLUTION INTRAVENOUS; SUBCUTANEOUS at 08:25

## 2023-01-01 RX ADMIN — INSULIN ASPART 1 UNITS: 100 INJECTION, SOLUTION INTRAVENOUS; SUBCUTANEOUS at 15:40

## 2023-01-01 RX ADMIN — SENNOSIDES AND DOCUSATE SODIUM 1 TABLET: 50; 8.6 TABLET ORAL at 07:33

## 2023-01-01 RX ADMIN — NICOTINE 1 PATCH: 21 PATCH, EXTENDED RELEASE TRANSDERMAL at 07:53

## 2023-01-01 RX ADMIN — MIDODRINE HYDROCHLORIDE 5 MG: 5 TABLET ORAL at 18:02

## 2023-01-01 RX ADMIN — LOPERAMIDE HYDROCHLORIDE 2 MG: 2 CAPSULE ORAL at 09:27

## 2023-01-01 RX ADMIN — GABAPENTIN 300 MG: 300 CAPSULE ORAL at 21:59

## 2023-01-01 RX ADMIN — HEPARIN SODIUM 5000 UNITS: 5000 INJECTION, SOLUTION INTRAVENOUS; SUBCUTANEOUS at 20:06

## 2023-01-01 RX ADMIN — INSULIN GLARGINE 20 UNITS: 100 INJECTION, SOLUTION SUBCUTANEOUS at 09:22

## 2023-01-01 RX ADMIN — DOBUTAMINE HYDROCHLORIDE 2.5 MCG/KG/MIN: 200 INJECTION INTRAVENOUS at 16:56

## 2023-01-01 RX ADMIN — ACETAMINOPHEN 1000 MG: 500 TABLET ORAL at 08:56

## 2023-01-01 RX ADMIN — Medication 400 MG: at 20:36

## 2023-01-01 RX ADMIN — MIDODRINE HYDROCHLORIDE 5 MG: 5 TABLET ORAL at 12:04

## 2023-01-01 RX ADMIN — MIDODRINE HYDROCHLORIDE 5 MG: 5 TABLET ORAL at 11:12

## 2023-01-01 RX ADMIN — PANTOPRAZOLE SODIUM 40 MG: 40 TABLET, DELAYED RELEASE ORAL at 07:34

## 2023-01-01 RX ADMIN — Medication 50 MCG: at 08:22

## 2023-01-01 RX ADMIN — POTASSIUM CHLORIDE 20 MEQ: 750 TABLET, EXTENDED RELEASE ORAL at 13:06

## 2023-01-01 RX ADMIN — CALCIUM 500 MG: 500 TABLET ORAL at 08:19

## 2023-01-01 RX ADMIN — CALCIUM 500 MG: 500 TABLET ORAL at 08:37

## 2023-01-01 RX ADMIN — CALCIUM 500 MG: 500 TABLET ORAL at 09:03

## 2023-01-01 RX ADMIN — EMPAGLIFLOZIN 10 MG: 10 TABLET, FILM COATED ORAL at 16:09

## 2023-01-01 RX ADMIN — ACETAMINOPHEN 1500 MG: 325 TABLET ORAL at 20:14

## 2023-01-01 RX ADMIN — CLOPIDOGREL BISULFATE 75 MG: 75 TABLET ORAL at 07:41

## 2023-01-01 RX ADMIN — PANTOPRAZOLE SODIUM 40 MG: 40 TABLET, DELAYED RELEASE ORAL at 21:47

## 2023-01-01 RX ADMIN — FUROSEMIDE 80 MG: 10 INJECTION, SOLUTION INTRAVENOUS at 08:22

## 2023-01-01 RX ADMIN — DOPAMINE HYDROCHLORIDE 2.5 MCG/KG/MIN: 160 INJECTION, SOLUTION INTRAVENOUS at 17:30

## 2023-01-01 RX ADMIN — MOMETASONE FUROATE 1 PUFF: 220 INHALANT RESPIRATORY (INHALATION) at 08:44

## 2023-01-01 RX ADMIN — FUROSEMIDE 60 MG: 10 INJECTION, SOLUTION INTRAVENOUS at 20:20

## 2023-01-01 RX ADMIN — FUROSEMIDE 40 MG: 10 INJECTION, SOLUTION INTRAVENOUS at 11:26

## 2023-01-01 RX ADMIN — FUROSEMIDE 40 MG: 10 INJECTION, SOLUTION INTRAVENOUS at 18:10

## 2023-01-01 RX ADMIN — CALCIUM 500 MG: 500 TABLET ORAL at 08:29

## 2023-01-01 RX ADMIN — Medication 50 MCG: at 08:43

## 2023-01-01 RX ADMIN — ACETAMINOPHEN 1000 MG: 500 TABLET ORAL at 20:29

## 2023-01-01 RX ADMIN — NICOTINE 1 PATCH: 21 PATCH, EXTENDED RELEASE TRANSDERMAL at 08:29

## 2023-01-01 RX ADMIN — THIAMINE HYDROCHLORIDE 500 MG: 100 INJECTION, SOLUTION INTRAMUSCULAR; INTRAVENOUS at 15:48

## 2023-01-01 RX ADMIN — Medication 400 MG: at 16:09

## 2023-01-01 RX ADMIN — MAGNESIUM HYDROXIDE 30 ML: 400 SUSPENSION ORAL at 20:05

## 2023-01-01 RX ADMIN — MOMETASONE FUROATE 1 PUFF: 220 INHALANT RESPIRATORY (INHALATION) at 20:06

## 2023-01-01 RX ADMIN — SENNOSIDES AND DOCUSATE SODIUM 2 TABLET: 50; 8.6 TABLET ORAL at 19:47

## 2023-01-01 RX ADMIN — POLYETHYLENE GLYCOL 3350 17 G: 17 POWDER, FOR SOLUTION ORAL at 08:37

## 2023-01-01 RX ADMIN — INSULIN ASPART 1 UNITS: 100 INJECTION, SOLUTION INTRAVENOUS; SUBCUTANEOUS at 18:27

## 2023-01-01 RX ADMIN — FUROSEMIDE 80 MG: 10 INJECTION, SOLUTION INTRAVENOUS at 17:23

## 2023-01-01 RX ADMIN — ATORVASTATIN CALCIUM 80 MG: 80 TABLET, FILM COATED ORAL at 21:58

## 2023-01-01 RX ADMIN — Medication 50 MCG: at 20:32

## 2023-01-01 RX ADMIN — PANTOPRAZOLE SODIUM 40 MG: 40 TABLET, DELAYED RELEASE ORAL at 20:12

## 2023-01-01 RX ADMIN — INSULIN ASPART 2 UNITS: 100 INJECTION, SOLUTION INTRAVENOUS; SUBCUTANEOUS at 09:18

## 2023-01-01 RX ADMIN — NICOTINE POLACRILEX 2 MG: 2 LOZENGE ORAL at 16:09

## 2023-01-01 RX ADMIN — CLOPIDOGREL BISULFATE 75 MG: 75 TABLET ORAL at 09:03

## 2023-01-01 RX ADMIN — Medication 50 MCG: at 09:16

## 2023-01-01 RX ADMIN — MOMETASONE FUROATE 1 PUFF: 220 INHALANT RESPIRATORY (INHALATION) at 23:56

## 2023-01-01 RX ADMIN — ACETAMINOPHEN 1500 MG: 325 TABLET ORAL at 08:15

## 2023-01-01 RX ADMIN — Medication 400 MG: at 17:24

## 2023-01-01 RX ADMIN — MAGNESIUM OXIDE TAB 400 MG (240 MG ELEMENTAL MG) 800 MG: 400 (240 MG) TAB at 08:41

## 2023-01-01 RX ADMIN — NICOTINE 1 PATCH: 21 PATCH, EXTENDED RELEASE TRANSDERMAL at 08:37

## 2023-01-01 RX ADMIN — FUROSEMIDE 10 MG/HR: 10 INJECTION, SOLUTION INTRAVENOUS at 18:18

## 2023-01-01 RX ADMIN — POTASSIUM CHLORIDE 20 MEQ: 750 TABLET, EXTENDED RELEASE ORAL at 08:46

## 2023-01-01 RX ADMIN — ACETAMINOPHEN 1000 MG: 500 TABLET ORAL at 13:15

## 2023-01-01 RX ADMIN — PANCRELIPASE 2 CAPSULE: 60000; 12000; 38000 CAPSULE, DELAYED RELEASE PELLETS ORAL at 12:48

## 2023-01-01 RX ADMIN — CHLOROTHIAZIDE SODIUM 500 MG: 500 INJECTION, POWDER, LYOPHILIZED, FOR SOLUTION INTRAVENOUS at 10:48

## 2023-01-01 RX ADMIN — CLOPIDOGREL BISULFATE 75 MG: 75 TABLET ORAL at 07:33

## 2023-01-01 RX ADMIN — NICOTINE 1 PATCH: 21 PATCH, EXTENDED RELEASE TRANSDERMAL at 02:44

## 2023-01-01 RX ADMIN — ATORVASTATIN CALCIUM 80 MG: 80 TABLET, FILM COATED ORAL at 20:05

## 2023-01-01 RX ADMIN — PANCRELIPASE 2 CAPSULE: 60000; 12000; 38000 CAPSULE, DELAYED RELEASE PELLETS ORAL at 11:07

## 2023-01-01 RX ADMIN — POTASSIUM CHLORIDE 40 MEQ: 750 TABLET, EXTENDED RELEASE ORAL at 12:33

## 2023-01-01 RX ADMIN — INSULIN ASPART 1 UNITS: 100 INJECTION, SOLUTION INTRAVENOUS; SUBCUTANEOUS at 12:07

## 2023-01-01 RX ADMIN — MIDODRINE HYDROCHLORIDE 5 MG: 5 TABLET ORAL at 13:07

## 2023-01-01 RX ADMIN — FUROSEMIDE 100 MG: 10 INJECTION, SOLUTION INTRAVENOUS at 09:14

## 2023-01-01 RX ADMIN — PANCRELIPASE 2 CAPSULE: 60000; 12000; 38000 CAPSULE, DELAYED RELEASE PELLETS ORAL at 08:21

## 2023-01-01 RX ADMIN — PANTOPRAZOLE SODIUM 40 MG: 40 TABLET, DELAYED RELEASE ORAL at 09:03

## 2023-01-01 RX ADMIN — PANTOPRAZOLE SODIUM 40 MG: 40 TABLET, DELAYED RELEASE ORAL at 20:00

## 2023-01-01 RX ADMIN — ATORVASTATIN CALCIUM 80 MG: 80 TABLET, FILM COATED ORAL at 20:23

## 2023-01-01 RX ADMIN — ACETAMINOPHEN 650 MG: 325 TABLET, FILM COATED ORAL at 23:58

## 2023-01-01 RX ADMIN — ACETAMINOPHEN 1500 MG: 325 TABLET ORAL at 09:01

## 2023-01-01 RX ADMIN — NICOTINE 1 PATCH: 21 PATCH, EXTENDED RELEASE TRANSDERMAL at 08:42

## 2023-01-01 RX ADMIN — CLOPIDOGREL BISULFATE 75 MG: 75 TABLET ORAL at 08:30

## 2023-01-01 RX ADMIN — PANTOPRAZOLE SODIUM 40 MG: 40 TABLET, DELAYED RELEASE ORAL at 08:22

## 2023-01-01 RX ADMIN — FUROSEMIDE 80 MG: 80 TABLET ORAL at 08:16

## 2023-01-01 RX ADMIN — ACETAMINOPHEN 1000 MG: 500 TABLET ORAL at 20:31

## 2023-01-01 RX ADMIN — POTASSIUM CHLORIDE 40 MEQ: 750 TABLET, EXTENDED RELEASE ORAL at 08:43

## 2023-01-01 RX ADMIN — CALCIUM 500 MG: 500 TABLET ORAL at 08:22

## 2023-01-01 RX ADMIN — ACETAMINOPHEN 1000 MG: 500 TABLET ORAL at 09:16

## 2023-01-01 RX ADMIN — GABAPENTIN 300 MG: 300 CAPSULE ORAL at 21:06

## 2023-01-01 RX ADMIN — Medication 50 MCG: at 20:00

## 2023-01-01 RX ADMIN — INSULIN ASPART 1 UNITS: 100 INJECTION, SOLUTION INTRAVENOUS; SUBCUTANEOUS at 18:15

## 2023-01-01 RX ADMIN — POTASSIUM CHLORIDE 20 MEQ: 750 TABLET, EXTENDED RELEASE ORAL at 10:08

## 2023-01-01 RX ADMIN — FUROSEMIDE 80 MG: 10 INJECTION, SOLUTION INTRAVENOUS at 17:10

## 2023-01-01 RX ADMIN — BUMETANIDE 5 MG: 0.25 INJECTION INTRAMUSCULAR; INTRAVENOUS at 13:19

## 2023-01-01 RX ADMIN — MAGNESIUM HYDROXIDE 30 ML: 400 SUSPENSION ORAL at 11:07

## 2023-01-01 RX ADMIN — ACETAMINOPHEN 1000 MG: 500 TABLET ORAL at 12:32

## 2023-01-01 RX ADMIN — Medication 400 MG: at 02:47

## 2023-01-01 RX ADMIN — INSULIN ASPART 2 UNITS: 100 INJECTION, SOLUTION INTRAVENOUS; SUBCUTANEOUS at 19:00

## 2023-01-01 RX ADMIN — POLYETHYLENE GLYCOL 3350 17 G: 17 POWDER, FOR SOLUTION ORAL at 10:55

## 2023-01-01 RX ADMIN — CALCIUM 500 MG: 500 TABLET ORAL at 22:00

## 2023-01-01 RX ADMIN — DOBUTAMINE HYDROCHLORIDE 2.5 MCG/KG/MIN: 200 INJECTION INTRAVENOUS at 13:35

## 2023-01-01 RX ADMIN — ATORVASTATIN CALCIUM 80 MG: 80 TABLET, FILM COATED ORAL at 21:06

## 2023-01-01 RX ADMIN — Medication 400 MG: at 22:11

## 2023-01-01 RX ADMIN — FUROSEMIDE 20 MG/HR: 10 INJECTION, SOLUTION INTRAVENOUS at 18:35

## 2023-01-01 RX ADMIN — Medication 50 MCG: at 20:12

## 2023-01-01 RX ADMIN — MAGNESIUM SULFATE IN WATER 2 G: 40 INJECTION, SOLUTION INTRAVENOUS at 02:44

## 2023-01-01 RX ADMIN — MAGNESIUM OXIDE TAB 400 MG (241.3 MG ELEMENTAL MG) 400 MG: 400 (241.3 MG) TAB at 21:51

## 2023-01-01 RX ADMIN — GABAPENTIN 300 MG: 300 CAPSULE ORAL at 20:12

## 2023-01-01 RX ADMIN — DOPAMINE HYDROCHLORIDE 2.5 MCG/KG/MIN: 160 INJECTION, SOLUTION INTRAVENOUS at 07:44

## 2023-01-01 RX ADMIN — INSULIN GLARGINE 20 UNITS: 100 INJECTION, SOLUTION SUBCUTANEOUS at 07:42

## 2023-01-01 RX ADMIN — CALCIUM 500 MG: 500 TABLET ORAL at 19:41

## 2023-01-01 RX ADMIN — POTASSIUM CHLORIDE 20 MEQ: 750 TABLET, EXTENDED RELEASE ORAL at 05:58

## 2023-01-01 RX ADMIN — ATORVASTATIN CALCIUM 80 MG: 80 TABLET, FILM COATED ORAL at 20:12

## 2023-01-01 RX ADMIN — NICOTINE 1 PATCH: 21 PATCH, EXTENDED RELEASE TRANSDERMAL at 09:52

## 2023-01-01 RX ADMIN — PANTOPRAZOLE SODIUM 40 MG: 40 TABLET, DELAYED RELEASE ORAL at 08:44

## 2023-01-01 RX ADMIN — Medication 400 MG: at 13:06

## 2023-01-01 RX ADMIN — CALCIUM 500 MG: 500 TABLET ORAL at 20:12

## 2023-01-01 RX ADMIN — GABAPENTIN 300 MG: 300 CAPSULE ORAL at 21:57

## 2023-01-01 RX ADMIN — PANCRELIPASE 2 CAPSULE: 60000; 12000; 38000 CAPSULE, DELAYED RELEASE PELLETS ORAL at 12:43

## 2023-01-01 RX ADMIN — MOMETASONE FUROATE 1 PUFF: 220 INHALANT RESPIRATORY (INHALATION) at 08:37

## 2023-01-01 RX ADMIN — IRON SUCROSE 200 MG: 20 INJECTION, SOLUTION INTRAVENOUS at 15:22

## 2023-01-01 RX ADMIN — MAGNESIUM OXIDE TAB 400 MG (240 MG ELEMENTAL MG) 800 MG: 400 (240 MG) TAB at 20:29

## 2023-01-01 RX ADMIN — HEPARIN SODIUM 5000 UNITS: 5000 INJECTION, SOLUTION INTRAVENOUS; SUBCUTANEOUS at 20:42

## 2023-01-01 RX ADMIN — ACETAMINOPHEN 650 MG: 325 TABLET, FILM COATED ORAL at 21:00

## 2023-01-01 RX ADMIN — THIAMINE HYDROCHLORIDE 500 MG: 100 INJECTION, SOLUTION INTRAMUSCULAR; INTRAVENOUS at 20:16

## 2023-01-01 RX ADMIN — Medication 50 MCG: at 21:46

## 2023-01-01 RX ADMIN — PANTOPRAZOLE SODIUM 40 MG: 40 TABLET, DELAYED RELEASE ORAL at 20:36

## 2023-01-01 RX ADMIN — ACETAMINOPHEN 1500 MG: 325 TABLET ORAL at 08:47

## 2023-01-01 RX ADMIN — Medication 50 MCG: at 07:41

## 2023-01-01 RX ADMIN — FUROSEMIDE 20 MG/HR: 10 INJECTION, SOLUTION INTRAMUSCULAR; INTRAVENOUS at 04:37

## 2023-01-01 RX ADMIN — ACETAMINOPHEN 1500 MG: 325 TABLET ORAL at 20:25

## 2023-01-01 RX ADMIN — POLYETHYLENE GLYCOL 3350 17 G: 17 POWDER, FOR SOLUTION ORAL at 09:01

## 2023-01-01 RX ADMIN — NICOTINE POLACRILEX 2 MG: 2 LOZENGE ORAL at 20:59

## 2023-01-01 RX ADMIN — MAGNESIUM HYDROXIDE 30 ML: 400 SUSPENSION ORAL at 06:20

## 2023-01-01 RX ADMIN — INSULIN ASPART 3 UNITS: 100 INJECTION, SOLUTION INTRAVENOUS; SUBCUTANEOUS at 19:23

## 2023-01-01 RX ADMIN — PANCRELIPASE 2 CAPSULE: 60000; 12000; 38000 CAPSULE, DELAYED RELEASE PELLETS ORAL at 08:30

## 2023-01-01 RX ADMIN — CLOPIDOGREL BISULFATE 75 MG: 75 TABLET ORAL at 09:53

## 2023-01-01 RX ADMIN — INSULIN ASPART 1 UNITS: 100 INJECTION, SOLUTION INTRAVENOUS; SUBCUTANEOUS at 13:07

## 2023-01-01 RX ADMIN — CLOPIDOGREL BISULFATE 75 MG: 75 TABLET ORAL at 08:43

## 2023-01-01 RX ADMIN — NICOTINE POLACRILEX 2 MG: 2 LOZENGE ORAL at 04:36

## 2023-01-01 RX ADMIN — CALCIUM 500 MG: 500 TABLET ORAL at 19:39

## 2023-01-01 RX ADMIN — ACETAMINOPHEN 1000 MG: 500 TABLET ORAL at 13:11

## 2023-01-01 RX ADMIN — MIDODRINE HYDROCHLORIDE 5 MG: 5 TABLET ORAL at 12:26

## 2023-01-01 RX ADMIN — CALCIUM 500 MG: 500 TABLET ORAL at 20:31

## 2023-01-01 RX ADMIN — GABAPENTIN 300 MG: 300 CAPSULE ORAL at 20:31

## 2023-01-01 RX ADMIN — PANTOPRAZOLE SODIUM 40 MG: 40 TABLET, DELAYED RELEASE ORAL at 20:30

## 2023-01-01 RX ADMIN — INSULIN ASPART 1 UNITS: 100 INJECTION, SOLUTION INTRAVENOUS; SUBCUTANEOUS at 14:21

## 2023-01-01 RX ADMIN — PANCRELIPASE 2 CAPSULE: 60000; 12000; 38000 CAPSULE, DELAYED RELEASE PELLETS ORAL at 08:25

## 2023-01-01 RX ADMIN — CALCIUM 500 MG: 500 TABLET ORAL at 19:40

## 2023-01-01 RX ADMIN — METOLAZONE 2.5 MG: 2.5 TABLET ORAL at 12:26

## 2023-01-01 RX ADMIN — PANTOPRAZOLE SODIUM 40 MG: 40 TABLET, DELAYED RELEASE ORAL at 08:30

## 2023-01-01 RX ADMIN — HEPARIN SODIUM 5000 UNITS: 5000 INJECTION, SOLUTION INTRAVENOUS; SUBCUTANEOUS at 08:36

## 2023-01-01 RX ADMIN — PANTOPRAZOLE SODIUM 40 MG: 40 TABLET, DELAYED RELEASE ORAL at 07:41

## 2023-01-01 RX ADMIN — CALCIUM 500 MG: 500 TABLET ORAL at 08:45

## 2023-01-01 RX ADMIN — FUROSEMIDE 80 MG: 80 TABLET ORAL at 12:26

## 2023-01-01 RX ADMIN — PANTOPRAZOLE SODIUM 40 MG: 40 TABLET, DELAYED RELEASE ORAL at 20:05

## 2023-01-01 RX ADMIN — SENNOSIDES AND DOCUSATE SODIUM 2 TABLET: 50; 8.6 TABLET ORAL at 20:25

## 2023-01-01 RX ADMIN — FUROSEMIDE 80 MG: 80 TABLET ORAL at 18:15

## 2023-01-01 RX ADMIN — MOMETASONE FUROATE 1 PUFF: 220 INHALANT RESPIRATORY (INHALATION) at 08:25

## 2023-01-01 RX ADMIN — NICOTINE 1 PATCH: 21 PATCH, EXTENDED RELEASE TRANSDERMAL at 12:37

## 2023-01-01 RX ADMIN — PANCRELIPASE 2 CAPSULE: 60000; 12000; 38000 CAPSULE, DELAYED RELEASE PELLETS ORAL at 18:22

## 2023-01-01 RX ADMIN — GABAPENTIN 300 MG: 300 CAPSULE ORAL at 19:40

## 2023-01-01 RX ADMIN — INSULIN ASPART 1 UNITS: 100 INJECTION, SOLUTION INTRAVENOUS; SUBCUTANEOUS at 08:25

## 2023-01-01 RX ADMIN — INSULIN ASPART 1 UNITS: 100 INJECTION, SOLUTION INTRAVENOUS; SUBCUTANEOUS at 07:35

## 2023-01-01 RX ADMIN — MOMETASONE FUROATE 2 PUFF: 220 INHALANT RESPIRATORY (INHALATION) at 20:21

## 2023-01-01 RX ADMIN — THIAMINE HYDROCHLORIDE 500 MG: 100 INJECTION, SOLUTION INTRAMUSCULAR; INTRAVENOUS at 08:47

## 2023-01-01 RX ADMIN — HEPARIN SODIUM 5000 UNITS: 5000 INJECTION, SOLUTION INTRAVENOUS; SUBCUTANEOUS at 09:03

## 2023-01-01 RX ADMIN — SENNOSIDES AND DOCUSATE SODIUM 2 TABLET: 50; 8.6 TABLET ORAL at 09:11

## 2023-01-01 RX ADMIN — CLOPIDOGREL BISULFATE 75 MG: 75 TABLET ORAL at 08:44

## 2023-01-01 RX ADMIN — POTASSIUM CHLORIDE 40 MEQ: 750 TABLET, EXTENDED RELEASE ORAL at 20:05

## 2023-01-01 RX ADMIN — ACETAMINOPHEN 1000 MG: 500 TABLET ORAL at 14:43

## 2023-01-01 RX ADMIN — GABAPENTIN 300 MG: 300 CAPSULE ORAL at 21:31

## 2023-01-01 RX ADMIN — BUMETANIDE 2 MG/HR: 0.25 INJECTION INTRAMUSCULAR; INTRAVENOUS at 11:13

## 2023-01-01 RX ADMIN — FUROSEMIDE 20 MG/HR: 10 INJECTION, SOLUTION INTRAMUSCULAR; INTRAVENOUS at 14:53

## 2023-01-01 RX ADMIN — MIDODRINE HYDROCHLORIDE 5 MG: 5 TABLET ORAL at 08:43

## 2023-01-01 RX ADMIN — ACETAMINOPHEN 1000 MG: 500 TABLET ORAL at 08:29

## 2023-01-01 RX ADMIN — THIAMINE HYDROCHLORIDE 500 MG: 100 INJECTION, SOLUTION INTRAMUSCULAR; INTRAVENOUS at 08:44

## 2023-01-01 RX ADMIN — PANCRELIPASE 2 CAPSULE: 60000; 12000; 38000 CAPSULE, DELAYED RELEASE PELLETS ORAL at 12:11

## 2023-01-01 RX ADMIN — ATORVASTATIN CALCIUM 80 MG: 80 TABLET, FILM COATED ORAL at 22:28

## 2023-01-01 RX ADMIN — MOMETASONE FUROATE 2 PUFF: 220 INHALANT RESPIRATORY (INHALATION) at 20:33

## 2023-01-01 RX ADMIN — BUMETANIDE 2 MG/HR: 0.25 INJECTION INTRAMUSCULAR; INTRAVENOUS at 01:26

## 2023-01-01 RX ADMIN — ACETAMINOPHEN 1500 MG: 325 TABLET ORAL at 20:36

## 2023-01-01 RX ADMIN — PANTOPRAZOLE SODIUM 40 MG: 40 TABLET, DELAYED RELEASE ORAL at 19:42

## 2023-01-01 RX ADMIN — MOMETASONE FUROATE 1 PUFF: 220 INHALANT RESPIRATORY (INHALATION) at 20:51

## 2023-01-01 RX ADMIN — MAGNESIUM OXIDE TAB 400 MG (240 MG ELEMENTAL MG) 400 MG: 400 (240 MG) TAB at 12:11

## 2023-01-01 RX ADMIN — INSULIN ASPART 2 UNITS: 100 INJECTION, SOLUTION INTRAVENOUS; SUBCUTANEOUS at 12:43

## 2023-01-01 RX ADMIN — PANCRELIPASE 2 CAPSULE: 60000; 12000; 38000 CAPSULE, DELAYED RELEASE PELLETS ORAL at 18:31

## 2023-01-01 RX ADMIN — NICOTINE 1 PATCH: 21 PATCH, EXTENDED RELEASE TRANSDERMAL at 17:40

## 2023-01-01 RX ADMIN — MOMETASONE FUROATE 1 PUFF: 220 INHALANT RESPIRATORY (INHALATION) at 20:39

## 2023-01-01 RX ADMIN — CALCIUM 500 MG: 500 TABLET ORAL at 09:55

## 2023-01-01 RX ADMIN — NICOTINE 1 PATCH: 21 PATCH, EXTENDED RELEASE TRANSDERMAL at 09:04

## 2023-01-01 RX ADMIN — GABAPENTIN 300 MG: 300 CAPSULE ORAL at 20:05

## 2023-01-01 RX ADMIN — ACETAMINOPHEN 1000 MG: 500 TABLET ORAL at 08:42

## 2023-01-01 RX ADMIN — NICOTINE POLACRILEX 2 MG: 2 LOZENGE ORAL at 01:40

## 2023-01-01 RX ADMIN — MAGNESIUM OXIDE TAB 400 MG (240 MG ELEMENTAL MG) 400 MG: 400 (240 MG) TAB at 12:33

## 2023-01-01 RX ADMIN — PANTOPRAZOLE SODIUM 40 MG: 40 TABLET, DELAYED RELEASE ORAL at 08:17

## 2023-01-01 RX ADMIN — FUROSEMIDE 100 MG: 10 INJECTION, SOLUTION INTRAVENOUS at 16:19

## 2023-01-01 RX ADMIN — ATORVASTATIN CALCIUM 80 MG: 80 TABLET, FILM COATED ORAL at 20:31

## 2023-01-01 RX ADMIN — HEPARIN SODIUM 5000 UNITS: 5000 INJECTION, SOLUTION INTRAVENOUS; SUBCUTANEOUS at 08:17

## 2023-01-01 RX ADMIN — INSULIN ASPART 1 UNITS: 100 INJECTION, SOLUTION INTRAVENOUS; SUBCUTANEOUS at 11:12

## 2023-01-01 RX ADMIN — PANCRELIPASE 2 CAPSULE: 60000; 12000; 38000 CAPSULE, DELAYED RELEASE PELLETS ORAL at 08:41

## 2023-01-01 RX ADMIN — INSULIN ASPART 3 UNITS: 100 INJECTION, SOLUTION INTRAVENOUS; SUBCUTANEOUS at 17:26

## 2023-01-01 RX ADMIN — ATORVASTATIN CALCIUM 80 MG: 80 TABLET, FILM COATED ORAL at 21:57

## 2023-01-01 RX ADMIN — MAGNESIUM OXIDE TAB 400 MG (241.3 MG ELEMENTAL MG) 400 MG: 400 (241.3 MG) TAB at 02:44

## 2023-01-01 RX ADMIN — POTASSIUM CHLORIDE 40 MEQ: 750 TABLET, EXTENDED RELEASE ORAL at 11:26

## 2023-01-01 RX ADMIN — GABAPENTIN 300 MG: 300 CAPSULE ORAL at 20:30

## 2023-01-01 RX ADMIN — GABAPENTIN 300 MG: 300 CAPSULE ORAL at 20:22

## 2023-01-01 RX ADMIN — MOMETASONE FUROATE 1 PUFF: 220 INHALANT RESPIRATORY (INHALATION) at 20:24

## 2023-01-01 RX ADMIN — PANCRELIPASE 2 CAPSULE: 60000; 12000; 38000 CAPSULE, DELAYED RELEASE PELLETS ORAL at 17:28

## 2023-01-01 RX ADMIN — PANCRELIPASE 2 CAPSULE: 60000; 12000; 38000 CAPSULE, DELAYED RELEASE PELLETS ORAL at 18:47

## 2023-01-01 RX ADMIN — PANTOPRAZOLE SODIUM 40 MG: 40 TABLET, DELAYED RELEASE ORAL at 08:24

## 2023-01-01 RX ADMIN — THIAMINE HYDROCHLORIDE 250 MG: 100 INJECTION, SOLUTION INTRAMUSCULAR; INTRAVENOUS at 07:36

## 2023-01-01 RX ADMIN — GABAPENTIN 300 MG: 300 CAPSULE ORAL at 22:28

## 2023-01-01 RX ADMIN — PANTOPRAZOLE SODIUM 40 MG: 40 TABLET, DELAYED RELEASE ORAL at 20:23

## 2023-01-01 RX ADMIN — BUMETANIDE 1 MG/HR: 0.25 INJECTION INTRAMUSCULAR; INTRAVENOUS at 12:24

## 2023-01-01 RX ADMIN — HEPARIN SODIUM 5000 UNITS: 5000 INJECTION, SOLUTION INTRAVENOUS; SUBCUTANEOUS at 19:47

## 2023-01-01 RX ADMIN — Medication 400 MG: at 23:20

## 2023-01-01 RX ADMIN — ACETAMINOPHEN 650 MG: 325 TABLET, FILM COATED ORAL at 08:40

## 2023-01-01 RX ADMIN — ACETAMINOPHEN 1000 MG: 500 TABLET ORAL at 19:40

## 2023-01-01 ASSESSMENT — ACTIVITIES OF DAILY LIVING (ADL)
ADLS_ACUITY_SCORE: 20
TRANSFERRING: 0-->INDEPENDENT
ADLS_ACUITY_SCORE: 20
ADLS_ACUITY_SCORE: 22
ADLS_ACUITY_SCORE: 20
ADLS_ACUITY_SCORE: 35
TRANSFERRING: 0-->INDEPENDENT
ADLS_ACUITY_SCORE: 22
DIFFICULTY_EATING/SWALLOWING: NO
TRANSFERRING: 0-->INDEPENDENT
ADLS_ACUITY_SCORE: 35
DOING_ERRANDS_INDEPENDENTLY_DIFFICULTY: NO
ADLS_ACUITY_SCORE: 20
TOILETING_ISSUES: NO
ADLS_ACUITY_SCORE: 22
ADLS_ACUITY_SCORE: 20
ADLS_ACUITY_SCORE: 21
WALKING_OR_CLIMBING_STAIRS_DIFFICULTY: YES
CONCENTRATING,_REMEMBERING_OR_MAKING_DECISIONS_DIFFICULTY: NO
ADLS_ACUITY_SCORE: 20
ADLS_ACUITY_SCORE: 22
ADLS_ACUITY_SCORE: 20
ADLS_ACUITY_SCORE: 26
WEAR_GLASSES_OR_BLIND: YES
ADLS_ACUITY_SCORE: 20
CHANGE_IN_FUNCTIONAL_STATUS_SINCE_ONSET_OF_CURRENT_ILLNESS/INJURY: NO
ADLS_ACUITY_SCORE: 20
DOING_ERRANDS_INDEPENDENTLY_DIFFICULTY: NO
ADLS_ACUITY_SCORE: 20
ADLS_ACUITY_SCORE: 22
ADLS_ACUITY_SCORE: 20
CURRENT_FUNCTION: NO ASSISTANCE NEEDED
ADLS_ACUITY_SCORE: 21
ADLS_ACUITY_SCORE: 20
ADLS_ACUITY_SCORE: 22
ADLS_ACUITY_SCORE: 20
ADLS_ACUITY_SCORE: 20
ADLS_ACUITY_SCORE: 21
ADLS_ACUITY_SCORE: 20
ADLS_ACUITY_SCORE: 22
ADLS_ACUITY_SCORE: 22
DRESSING/BATHING_DIFFICULTY: NO
ADLS_ACUITY_SCORE: 22
ADLS_ACUITY_SCORE: 20
CHANGE_IN_FUNCTIONAL_STATUS_SINCE_ONSET_OF_CURRENT_ILLNESS/INJURY: NO
ADLS_ACUITY_SCORE: 20
ADLS_ACUITY_SCORE: 22
ADLS_ACUITY_SCORE: 20
VISION_MANAGEMENT: HAS GLASSES
ADLS_ACUITY_SCORE: 22
ADLS_ACUITY_SCORE: 22
ADLS_ACUITY_SCORE: 20
ADLS_ACUITY_SCORE: 22
ADLS_ACUITY_SCORE: 20
ADLS_ACUITY_SCORE: 20
FALL_HISTORY_WITHIN_LAST_SIX_MONTHS: NO
ADLS_ACUITY_SCORE: 20
ADLS_ACUITY_SCORE: 35
WEAR_GLASSES_OR_BLIND: YES
ADLS_ACUITY_SCORE: 20
ADLS_ACUITY_SCORE: 22
ADLS_ACUITY_SCORE: 22
DEPENDENT_IADLS:: INDEPENDENT
ADLS_ACUITY_SCORE: 22
ADLS_ACUITY_SCORE: 20
ADLS_ACUITY_SCORE: 22
ADLS_ACUITY_SCORE: 22
ADLS_ACUITY_SCORE: 20
ADLS_ACUITY_SCORE: 20
ADLS_ACUITY_SCORE: 22
ADLS_ACUITY_SCORE: 20
VISION_MANAGEMENT: GLASSES
ADLS_ACUITY_SCORE: 20
ADLS_ACUITY_SCORE: 20
ADLS_ACUITY_SCORE: 22
ADLS_ACUITY_SCORE: 20
CONCENTRATING,_REMEMBERING_OR_MAKING_DECISIONS_DIFFICULTY: NO
ADLS_ACUITY_SCORE: 22
WALKING_OR_CLIMBING_STAIRS: OTHER (SEE COMMENTS)
ADLS_ACUITY_SCORE: 22
ADLS_ACUITY_SCORE: 22
ADLS_ACUITY_SCORE: 20
ADLS_ACUITY_SCORE: 22
ADLS_ACUITY_SCORE: 22
ADLS_ACUITY_SCORE: 20
TRANSFERRING: 0-->INDEPENDENT
ADLS_ACUITY_SCORE: 20
ADLS_ACUITY_SCORE: 35
ADLS_ACUITY_SCORE: 26
ADLS_ACUITY_SCORE: 20
ADLS_ACUITY_SCORE: 21
ADLS_ACUITY_SCORE: 20
ADLS_ACUITY_SCORE: 21
ADLS_ACUITY_SCORE: 20
ADLS_ACUITY_SCORE: 22
ADLS_ACUITY_SCORE: 20
ADLS_ACUITY_SCORE: 20
DEPENDENT_IADLS:: CLEANING
ADLS_ACUITY_SCORE: 22
ADLS_ACUITY_SCORE: 20
ADLS_ACUITY_SCORE: 33
ADLS_ACUITY_SCORE: 20
ADLS_ACUITY_SCORE: 21
ADLS_ACUITY_SCORE: 20
ADLS_ACUITY_SCORE: 22
FALL_HISTORY_WITHIN_LAST_SIX_MONTHS: NO
DRESSING/BATHING_DIFFICULTY: NO
ADLS_ACUITY_SCORE: 20
ADLS_ACUITY_SCORE: 20
ADLS_ACUITY_SCORE: 21
ADLS_ACUITY_SCORE: 21
ADLS_ACUITY_SCORE: 20
ADLS_ACUITY_SCORE: 20
EQUIPMENT_CURRENTLY_USED_AT_HOME: CANE, STRAIGHT;WALKER, ROLLING
ADLS_ACUITY_SCORE: 20
ADLS_ACUITY_SCORE: 22
ADLS_ACUITY_SCORE: 20
ADLS_ACUITY_SCORE: 22
ADLS_ACUITY_SCORE: 20
TOILETING_ISSUES: NO
ADLS_ACUITY_SCORE: 20
EQUIPMENT_CURRENTLY_USED_AT_HOME: WALKER, ROLLING;WHEELCHAIR, POWER
ADLS_ACUITY_SCORE: 20
ADLS_ACUITY_SCORE: 20
ADLS_ACUITY_SCORE: 21
ADLS_ACUITY_SCORE: 22
ADLS_ACUITY_SCORE: 20
ADLS_ACUITY_SCORE: 22
ADLS_ACUITY_SCORE: 20
WALKING_OR_CLIMBING_STAIRS_DIFFICULTY: YES
DIFFICULTY_EATING/SWALLOWING: NO
ADLS_ACUITY_SCORE: 20
ADLS_ACUITY_SCORE: 20
ADLS_ACUITY_SCORE: 21
ADLS_ACUITY_SCORE: 35
ADLS_ACUITY_SCORE: 22
ADLS_ACUITY_SCORE: 20
ADLS_ACUITY_SCORE: 22
ADLS_ACUITY_SCORE: 22
ADLS_ACUITY_SCORE: 20
WALKING_OR_CLIMBING_STAIRS: OTHER (SEE COMMENTS)
ADLS_ACUITY_SCORE: 21
ADLS_ACUITY_SCORE: 20
ADLS_ACUITY_SCORE: 22
ADLS_ACUITY_SCORE: 21
ADLS_ACUITY_SCORE: 22
ADLS_ACUITY_SCORE: 20

## 2023-01-01 ASSESSMENT — ENCOUNTER SYMPTOMS
ARTHRALGIAS: 1
COUGH: 1
SORE THROAT: 0
WEAKNESS: 1
HEMATURIA: 0
DYSURIA: 0
NAUSEA: 0
EYE PAIN: 0
MYALGIAS: 0
FEVER: 0
CONSTIPATION: 0
JOINT SWELLING: 0
HEARTBURN: 0
DIZZINESS: 0
FREQUENCY: 1
HEMATOCHEZIA: 0
HEADACHES: 0
PARESTHESIAS: 1
SHORTNESS OF BREATH: 1
NERVOUS/ANXIOUS: 0
ABDOMINAL PAIN: 0
PALPITATIONS: 0
CHILLS: 0
DIARRHEA: 0

## 2023-01-01 ASSESSMENT — PAIN SCALES - GENERAL
PAINLEVEL: MILD PAIN (2)
PAINLEVEL: NO PAIN (0)

## 2023-01-05 NOTE — PROGRESS NOTES
"  Assessment & Plan     Nonproliferative diabetic retinopathy (H)  Encouraged to schedule exam  - Adult Eye  Referral    Change in nevus of face  Recommended derm referral-patient desires removal  - Adult Dermatology Referral    Dermatitis  Continue moisturizer and emollient  - triamcinolone (KENALOG) 0.1 % external cream  Dispense: 15 g; Refill: 0      No follow-ups on file.    JODY TODD DO  [facility]    Payam Lundy is a 72 year old, presenting for the following health issues:  Derm Problem      History of Present Illness       Reason for visit:  Ear skin problem    He eats 0-1 servings of fruits and vegetables daily.He consumes 1 sweetened beverage(s) daily.He exercises with enough effort to increase his heart rate 9 or less minutes per day.  He exercises with enough effort to increase his heart rate 3 or less days per week.   He is taking medications regularly.       Right ear worse than left ear.  Feels that he has a growth on his skin. Mole just anterior to his ear growing significantly and changing in color.     Has tried moisturizer with no improvement.     Review of Systems   Constitutional, HEENT, cardiovascular, pulmonary, gi and gu systems are negative, except as otherwise noted.      Objective    /58 (BP Location: Right arm, Patient Position: Chair, Cuff Size: Adult Regular)   Pulse 81   Temp 97.7  F (36.5  C) (Tympanic)   Resp 16   Ht 1.803 m (5' 11\")   Wt 69.9 kg (154 lb)   SpO2 96%   BMI 21.48 kg/m    Body mass index is 21.48 kg/m .  Physical Exam  Constitutional:       Appearance: Normal appearance.   HENT:      Head: Normocephalic.   Cardiovascular:      Rate and Rhythm: Normal rate and regular rhythm.      Pulses: Normal pulses.      Heart sounds: Normal heart sounds.   Pulmonary:      Effort: Pulmonary effort is normal.      Breath sounds: Normal breath sounds.   Abdominal:      Palpations: Abdomen is soft.   Neurological:      Mental " Status: He is alert.   Psychiatric:         Thought Content: Thought content normal.         Judgment: Judgment normal.

## 2023-01-16 NOTE — PROGRESS NOTES
Cardiology Progress Note    Patient seen today in follow up of: [facility] follow up  Primary cardiologist: Dr. Glass    HPI:  Delbert Tilley is a very pleasant 72 year old male with a history of of a severe ischemic cardiomyopathy, CAD, CKD, PAF/FL s/p atrial flutter ablation, PAD s/p carotid endarterectomies and iliac artery stenting, followed by vascular surgery, DM and ongoing tobacco use.     Delbert has longstanding history of coronary artery disease.  He had coronary artery bypass grafting in the early 2000's with a LIMA-LAD, SVG-OM, SVG-PDA.  He has an ICD in place for primary prevention. And 2014, he suffered a non-ST elevation myocardial infarction and underwent PCI to the OM 3 graft.  His ejection fraction was in the 35 to 40% range at that time.     His EF was noted to decline further in 2017 to the 20% range and has not recovered.     He establish care [facility] clinic this summer and has been followed by Kristen Rice PA-C, myself, and Dr. Glass.  In April, he was admitted with an acute heart failure exacerbation requiring IV Lasix and metolazone.  He left against medical advise.  He was then readmitted in June.  He initially presented with weakness and hip pain after mechanical fall but was found to be in cardiogenic shock with severe dyspnea.  He required IV dobutamine and IV diuresis with this monitor.  Dobutamine was discontinued and in the morning his cardiac index had decreased to 1.7 and SVR had increased to 1100.  It was recommended he remain in the hospital however he refused and left against medical advise. His losartan and carvedilol has been held however when he went home he resumed carvedilol 25 mg twice daily, losartan 50 mg daily and torsemide.  His echo in this hospitalization showed an EF of 15 to 20% with mild to moderate RV dysfunction.  His LV was mild to moderately dilated.    In subsequent clinic follow-ups, his carvedilol was decreased and he was started on low-dose Entresto.  We have been  able to get him on level 1 Entresto and reduce his torsemide.  We have had to defer increasing this further due to a history of hyperkalemia.     I met Kamron 2 months ago.  I had hoped to increase his Entresto to level 2 however labs showed mild hyperkalemia and thus we have deferred this.  We have been hoping to get him started on an SGLT2 inhibitor and after discussing [facility] as [facility] manage [facility] diabetes.     Fortunately, Kamron has not had any worsening heart failure symptoms over the last few months.  I last had a visit with him on 11/21/2022.  We talked about we discussed consideration of an SGLT2 inhibitor after conferring discussing with [facility] primary team at [facility].  He subsequently saw nephrology on 12/2/2022 who was okay with a trial of an SGLT2 as well.    He returns today for follow-up. He presents as usual with his daughter.  He was to have a basic metabolic panel done after the initiation of Jardiance however this was not done.  He does feel a bit dry since starting the Jardiance.  His mouth has been dry.  He feels like he is urinating more often. is mild chronic dyspnea on exertion which is completely unchanged today.  No chest pain.  No orthopnea or PND.  Weight is stable at 153 pounds in clinic today.  Blood pressure initially when he came into clinic today was mildly low.  He had not drink anything this morning.  We gave him some water to drink and his blood pressure improved to over 100 systolic. BP at a recent clinic visit 10 days ago was 102/58. He denied any dizziness, lightheadedness or presyncope whatsoever. He also denies any infectious symptoms. His blood pressure at home this morning was 120 systolic prior to his medications.  He does not typically check his blood pressure after his medications but has not experienced any lightheadedness over the past few months.    ASSESSMENT/PLAN:  Delbert Tilley is a very pleasant 72 year old male with a history of a severe ischemic  cardiomyopathy who is here today for [facility] clinic follow up.     From a heart failure perspective, Kamron denies any worsening symptoms today. He has some chronic exertional dyspnea which is stable. On exam, he appears euvolemic. We have been working to optimize his heart failure therapy. He presently is taking carvedilol 6.25 mg twice daily, Entresto 24 26 mg twice daily and was recently started on Jardiance 10 mg daily. Blood pressure was low on initial assessment here in clinic today however he is completely asymptomatic and improved on repeat to his baseline. He has had increased thirst and urination since starting the Jardiance. I am going to cut his Torsemide back today from 20 mg daily to 10 mg daily with an additional 10 mg as needed for any heart failure symptoms. He has not needed any additional Torsemide for some time.      His BMP which we reviewed shows relatively stable renal function with a creatinine of 1.63 and a mildly evaluated BUN. His potassium however is 5.9. He has had mild hyperkalemia in the past and his potassium tends to run at the high end of normal. Will verify with another lab today but in the meantime will plan to cut Entresto back to 12/13 mg twice daily and Torsemide as above. I asked him to follow low potassium diet in the coming days and will arrange for close lab follow up in two days.  He will of course contact us if he has any weight gain or worsening heart failure symptoms.    I recommended a clinic follow-up in 1 to 2 weeks to reevaluate his blood pressures and labs.  If potassium improves, could consider increasing his Entresto back up but we will see how things go.  He has follow-up with Dr. Glass scheduled in about a month.  In the meantime, if he has any questions or concerns, I asked him to contact us.    Orders this Visit:  Orders Placed This Encounter   Procedures     Potassium     Basic metabolic panel     Basic metabolic panel     Follow-Up with Cardiology MARISOL     Orders  Placed This Encounter   Medications     torsemide (DEMADEX) 20 MG tablet     Sig: Take 10 mg daily (1/2 tablet)     Dispense:  100 tablet     Refill:  3     Medications Discontinued During This Encounter   Medication Reason     torsemide (DEMADEX) 20 MG tablet Reorder       CURRENT MEDICATIONS:  Current Outpatient Medications   Medication Sig Dispense Refill     albuterol (PROAIR HFA) 108 (90 Base) MCG/ACT Inhaler Inhale 2 puffs into the lungs every 4 hours as needed for shortness of breath / dyspnea 1 Inhaler 0     amylase-lipase-protease (CREON 12) 97496-19285-96432 units CPEP Take 2 capsules by mouth 3 times daily (with meals)  120 capsule 0     atorvastatin (LIPITOR) 80 MG tablet Take 80 mg by mouth At Bedtime        calcium carbonate (OS-MARVA) 500 MG tablet Take 1 tablet by mouth 2 times daily One tablet in the morning, two at noon and one at bedtime       Carboxymethylcellulose Sod PF (THERATEARS PF) 0.25 % SOLN Apply 1 drop to eye 4 times daily as needed       carvedilol (COREG) 6.25 MG tablet Take 1 tablet (6.25 mg) by mouth 2 times daily (with meals) 180 tablet 3     clopidogrel (PLAVIX) 75 MG tablet Take 1 tablet (75 mg) by mouth daily 90 tablet 3     empagliflozin (JARDIANCE) 10 MG TABS tablet Take 1 tablet (10 mg) by mouth daily 90 tablet 3     gabapentin (NEURONTIN) 100 MG capsule Take 300 mg by mouth At Bedtime 1 capsule 0     Glycerin (OASIS MOISTURIZING MOUTH SPRAY) 35 % LIQD Take 2 sprays by mouth daily as needed       insulin aspart (NOVOLOG PEN) 100 UNIT/ML pen Inject 3 Units Subcutaneous 3 times daily (with meals) (Patient taking differently: Inject 4 Units Subcutaneous daily) 15 mL 1     insulin glargine (LANTUS PEN) 100 UNIT/ML pen Inject 28 Units Subcutaneous every morning Increase by 2 units every 2-3 days until morning blood sugar is .  Max dose 40 units/day (Patient taking differently: Inject 26 Units Subcutaneous every morning Increase by 2 units every 2-3 days until morning blood  sugar is .  Max dose 40 units/day) 45 mL 1     Insulin Pen Needle (PEN NEEDLES) 32G X 4 MM MISC        loperamide (IMODIUM) 2 MG capsule Take 2 mg by mouth 4 times daily as needed for diarrhea Patient takes a dose in the am and another at night       magnesium oxide (MAG-OX) 400 MG tablet Take 6.5 tablets (2,600 mg) by mouth daily       metFORMIN (GLUCOPHAGE-XR) 500 MG 24 hr tablet Take 500 mg by mouth 2 times daily (with meals)        mometasone (ASMANEX TWISTHALER) 220 MCG/INH inhaler Inhale 2 puffs into the lungs every evening        nitroGLYcerin (NITROSTAT) 0.4 MG sublingual tablet Place 0.4 mg under the tongue every 5 minutes as needed for chest pain       pantoprazole (PROTONIX) 40 MG EC tablet Take 40 mg by mouth 2 times daily  30 tablet      sacubitril-valsartan (ENTRESTO) 24-26 MG per tablet Take 1 tablet by mouth 2 times daily 180 tablet 3     torsemide (DEMADEX) 20 MG tablet Take 10 mg daily (1/2 tablet) 100 tablet 3     triamcinolone (KENALOG) 0.1 % external cream Apply topically 2 times daily for 14 days (Patient taking differently: Apply topically 2 times daily as needed) 15 g 0     vitamin D3 (CHOLECALCIFEROL) 50 mcg (2000 units) tablet Take 1 tablet by mouth 2 times daily       ALLERGIES  Allergies   Allergen Reactions     Fish Nausea and Vomiting     severe     Hydrocodone GI Disturbance     Upset stomach     Shellfish Allergy Hives and Rash     PAST MEDICAL HISTORY:  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%     Claudication (H)      COPD (chronic obstructive pulmonary  disease) (H)      Diabetes (H)      Gastro-oesophageal reflux disease      GERD (gastroesophageal reflux disease)      H/O: alcohol abuse      HTN (hypertension)      Hyperlipidaemia LDL goal <100 07/08/2013     Hyperparathyroidism (H)      Hypokalemia      ICD (implantable cardioverter-defibrillator) in place     [employer]     NSTEMI (non-ST elevated myocardial infarction) (H)      NSVT (nonsustained ventricular tachycardia)      Pacemaker      Pancreatitis 6/26/06 Hosp     Paroxysmal atrial fibrillation (H)      PVD (peripheral vascular disease) (H)      Thrombocytopenia (H)      Tobacco use      Venous (peripheral) insufficiency      Vitamin D deficiency      PAST SURGICAL HISTORY:  Past Surgical History:   Procedure Laterality Date     CATARACT IOL, RT/LT      Cataract IOL RT/LT     CLOSE SECONDARY WOUND LOWER EXTREMITY Right 02/13/2022    Procedure: Right groin wound exploration, nerve release and closure.;  Surgeon: Karen Arthur MD;  Location: [facility] OR     ENDARTERECTOMY CAROTID Right 06/12/2015    Procedure: ENDARTERECTOMY CAROTID;  Surgeon: João Turner MD;  Location:  OR     ENDARTERECTOMY CAROTID Left 09/08/2017    Procedure: ENDARTERECTOMY CAROTID;  LEFT CAROTID ENDARTERECTOMY WITH EEG;  Surgeon: João Turner MD;  Location:  OR     ENDARTERECTOMY FEMORAL Bilateral 02/09/2022    Procedure: BILATERAL- COMMON FEMORAL ENDARTERECTOMY; RETROGRADE ILIAC ARTERY STENTING;  Surgeon: Ric Chau MD;  Location: [facility] OR     IMPLANT PACEMAKER  06/10/2010     IMPLANTED DEVICE       INTERPOSITION TENDON HAND N/A 06/09/2020    Procedure: Right thumb ligament reconstruction tendon interposition with extensor pollicis brevis to abductor pollicis tendon transfer;  Surgeon: Bethel Martin MD;  Location: [facility] OR     PICC INSERTION - TRIPLE LUMEN Right 06/20/2022    Right basilic vein 0.62cm 4cm ext.Placement verified by Sherlock 3CG.PICC okay to use.     RELEASE CARPAL  TUNNEL Right 04/12/2016    Procedure: RELEASE CARPAL TUNNEL;  Surgeon: Lissy Leger MD;  Location: [facility] OR     SURGICAL HISTORY OF -   1998    Laminectomy     TONSILLECTOMY & ADENOIDECTOMY       ZZC CABG, ARTERIAL, THREE       FAMILY HISTORY:  Family History   Adopted: Yes   Problem Relation Age of Onset     Unknown/Adopted No family hx of      SOCIAL HISTORY:  Social History     Socioeconomic History     Marital status: Single     Spouse name: None     Number of children: None     Years of education: None     Highest education level: None   Tobacco Use     Smoking status: Every Day     Packs/day: 1.50     Years: 50.00     Pack years: 75.00     Types: Cigarettes     Smokeless tobacco: Never     Tobacco comments:     1 1/2 PPD 05/18/22   Vaping Use     Vaping Use: Never used   Substance and Sexual Activity     Alcohol use: No     Drug use: No     Sexual activity: Not Currently     Partners: Female   Other Topics Concern     Parent/sibling w/ CABG, MI or angioplasty before 65F 55M? No     Review of Systems:  Skin:        Eyes:       ENT:       Respiratory:  Negative    Cardiovascular:    lightheadedness;Positive for  Gastroenterology:      Genitourinary:       Musculoskeletal:       Neurologic:       Psychiatric:       Heme/Lymph/Imm:       Endocrine:          Physical Exam:  Vitals: /50   Pulse 69   Resp 20   Wt 69.4 kg (153 lb)   SpO2 98%   BMI 21.34 kg/m     Wt Readings from Last 4 Encounters:   01/16/23 69.4 kg (153 lb)   01/05/23 69.9 kg (154 lb)   11/21/22 69.4 kg (153 lb)   10/17/22 69.7 kg (153 lb 9.6 oz)     GEN: well nourished, in no acute distress.  HEENT:  Pupils equal, round. Sclerae nonicteric.   NECK: no JVD  C/V:  Regular rate and rhythm, soft systolic murmur  RESP: Respirations are unlabored. Clear to auscultation bilaterally without wheezing, rales, or rhonchi.  GI: Abdomen soft, nontender.  EXTREM: no LE edema.  NEURO: Alert and oriented, cooperative.  SKIN: Warm and dry.      Recent Lab Results:  LIPID RESULTS:  Lab Results   Component Value Date    CHOL 66 06/21/2022    CHOL 95 12/16/2020    HDL 30 (L) 06/21/2022    HDL 28 (L) 12/16/2020    LDL 28 06/21/2022    LDL 44 12/16/2020    TRIG 40 06/21/2022    TRIG 115 12/16/2020    CHOLHDLRATIO 4.6 06/12/2015     LIVER ENZYME RESULTS:  Lab Results   Component Value Date    AST 46 06/23/2022    AST 38 12/07/2020    ALT 31 06/23/2022    ALT 28 12/07/2020     CBC RESULTS:  Lab Results   Component Value Date    WBC 8.7 06/23/2022    WBC 6.3 01/21/2021    RBC 4.20 (L) 06/23/2022    RBC 4.81 01/21/2021    HGB 11.9 (L) 09/14/2022    HGB 15.0 01/21/2021    HCT 36.6 (L) 06/23/2022    HCT 46.2 01/21/2021    MCV 87 06/23/2022    MCV 96 01/21/2021    MCH 25.7 (L) 06/23/2022    MCH 31.2 01/21/2021    MCHC 29.5 (L) 06/23/2022    MCHC 32.5 01/21/2021    RDW 18.1 (H) 06/23/2022    RDW 13.1 01/21/2021     06/23/2022     (L) 01/21/2021     BMP RESULTS:  Lab Results   Component Value Date     01/16/2023     06/10/2021    POTASSIUM 5.8 (H) 01/16/2023    POTASSIUM 5.6 (H) 08/29/2022    POTASSIUM 5.6 (H) 08/29/2022    POTASSIUM 4.6 06/10/2021    CHLORIDE 105 01/16/2023    CHLORIDE 104 08/29/2022    CHLORIDE 104 08/29/2022    CHLORIDE 105 06/10/2021    CO2 26 01/16/2023    CO2 31 08/29/2022    CO2 31 08/29/2022    CO2 30 06/10/2021    ANIONGAP 7 01/16/2023    ANIONGAP 1 (L) 08/29/2022    ANIONGAP 1 (L) 08/29/2022    ANIONGAP 5 06/10/2021     (H) 01/16/2023     (H) 08/29/2022     (H) 08/29/2022     (H) 06/10/2021    BUN 38.5 (H) 01/16/2023    BUN 50 (H) 08/29/2022    BUN 50 (H) 08/29/2022    BUN 34 (H) 06/10/2021    CR 1.63 (H) 01/16/2023    CR 1.17 06/10/2021    GFRESTIMATED 44 (L) 01/16/2023    GFRESTIMATED 49 (L) 11/09/2021    GFRESTIMATED 62 06/10/2021    GFRESTBLACK 71 06/10/2021    MARVA 9.0 01/16/2023    MARVA 8.7 06/10/2021      A1C RESULTS:  Lab Results   Component Value Date    A1C 7.8 (H) 06/20/2022     A1C 9.6 (H) 12/16/2020     INR RESULTS:  Lab Results   Component Value Date    INR 1.02 12/22/2017    INR 1.03 09/08/2017       Parul Badillo PA-C  [facility]

## 2023-01-16 NOTE — PATIENT INSTRUCTIONS
Call [facility] nurse for any questions or concerns Mon-Fri 8am-4pm:                                                 #[phone]                                       For concerns after hours:                                               #[phone], option 2     1: Medication changes: DECREASE Torsemide to 10 mg (1/2 tablet) daily.   -  DECREASE Entresto to 12/13 mg (1/2 tablet) twice daily. If you are only taking 1/2 of a tablet - send me a message so I know.   2: Plan from today: repeat labs on Wednesday.   -  please watch your potassium intake in the next few days.   -  see Yareli next week for follow up with labs and Dr. Glass as scheduled.   3: Lab results: kidney function looks fairly stable. Potassium level is elevated on labs. We'll repeat your potassium level today and then plan for repeat labs on Wednesday to recheck.  Component      Latest Ref Rng & Units 1/16/2023   Sodium      136 - 145 mmol/L 138   Potassium      3.4 - 5.3 mmol/L 5.9 (H)   Chloride      98 - 107 mmol/L 105   Carbon Dioxide (CO2)      22 - 29 mmol/L 26   Anion Gap      7 - 15 mmol/L 7   Urea Nitrogen      8.0 - 23.0 mg/dL 38.5 (H)   Creatinine      0.67 - 1.17 mg/dL 1.63 (H)   Calcium      8.8 - 10.2 mg/dL 9.0   Glucose      70 - 99 mg/dL 114 (H)   GFR Estimate      >60 mL/min/1.73m2 44 (L)

## 2023-01-16 NOTE — LETTER
1/16/2023    Danii Hernández MD  [address]  [facility] [address]    RE: Delbert Tilley       Dear Colleague,     I had the pleasure of seeing Delbert Tilley in the [facility].    Cardiology Progress Note    Patient seen today in follow up of: CORE follow up  Primary cardiologist: Dr. Glass    HPI:  Delbert Tilley is a very pleasant 72 year old male with a history of of a severe ischemic cardiomyopathy, CAD, CKD, PAF/FL s/p atrial flutter ablation, PAD s/p carotid endarterectomies and iliac artery stenting, followed by vascular surgery, DM and ongoing tobacco use.     Delbert has longstanding history of coronary artery disease.  He had coronary artery bypass grafting in the early 2000's with a LIMA-LAD, SVG-OM, SVG-PDA.  He has an ICD in place for primary prevention. And 2014, he suffered a non-ST elevation myocardial infarction and underwent PCI to the OM 3 graft.  His ejection fraction was in the 35 to 40% range at that time.     His EF was noted to decline further in 2017 to the 20% range and has not recovered.     He establish care CORE clinic this summer and has been followed by Kristen Rice PA-C, myself, and Dr. Glass.  In April, he was admitted with an acute heart failure exacerbation requiring IV Lasix and metolazone.  He left against medical advise.  He was then readmitted in June.  He initially presented with weakness and hip pain after mechanical fall but was found to be in cardiogenic shock with severe dyspnea.  He required IV dobutamine and IV diuresis with this monitor.  Dobutamine was discontinued and in the morning his cardiac index had decreased to 1.7 and SVR had increased to 1100.  It was recommended he remain in the hospital however he refused and left against medical advise. His losartan and carvedilol has been held however when he went home he resumed carvedilol 25 mg twice daily, losartan 50 mg daily and torsemide.  His echo in this hospitalization showed an EF of 15 to  20% with mild to moderate RV dysfunction.  His LV was mild to moderately dilated.    In subsequent clinic follow-ups, his carvedilol was decreased and he was started on low-dose Entresto.  We have been able to get him on level 1 Entresto and reduce his torsemide.  We have had to defer increasing this further due to a history of hyperkalemia.     I met Kamron 2 months ago.  I had hoped to increase his Entresto to level 2 however labs showed mild hyperkalemia and thus we have deferred this.  We have been hoping to get him started on an SGLT2 inhibitor and after discussing [facility] as [facility] manage [facility] diabetes.     Fortunately, Kamron has not had any worsening heart failure symptoms over the last few months.  I last had a visit with him on 11/21/2022.  We talked about we discussed consideration of an SGLT2 inhibitor after conferring discussing with [facility] primary [facility].  He subsequently saw nephrology on 12/2/2022 who was okay with a trial of an SGLT2 as well.    He returns today for follow-up. He presents as usual with his daughter.  He was to have a basic metabolic panel done after the initiation of Jardiance however this was not done.  He does feel a bit dry since starting the Jardiance.  His mouth has been dry.  He feels like he is urinating more often. is mild chronic dyspnea on exertion which is completely unchanged today.  No chest pain.  No orthopnea or PND.  Weight is stable at 153 pounds in clinic today.  Blood pressure initially when he came into clinic today was mildly low.  He had not drink anything this morning.  We gave him some water to drink and his blood pressure improved to over 100 systolic. BP at a recent clinic visit 10 days ago was 102/58. He denied any dizziness, lightheadedness or presyncope whatsoever. He also denies any infectious symptoms. His blood pressure at home this morning was 120 systolic prior to his medications.  He does not typically check his blood pressure  after his medications but has not experienced any lightheadedness over the past few months.    ASSESSMENT/PLAN:  Delbert Tilley is a very pleasant 72 year old male with a history of a severe ischemic cardiomyopathy who is here today for CORE clinic follow up.     From a heart failure perspective, Kamron denies any worsening symptoms today. He has some chronic exertional dyspnea which is stable. On exam, he appears euvolemic. We have been working to optimize his heart failure therapy. He presently is taking carvedilol 3.125 mg twice daily, Entresto 24 26 mg twice daily and was recently started on Jardiance 10 mg daily. Blood pressure was low on initial assessment here in clinic today however he is completely asymptomatic and improved on repeat to his baseline. He has had increased thirst and urination since starting the Jardiance. I am going to cut his Torsemide back today from 20 mg daily to 10 mg daily with an additional 10 mg as needed for any heart failure symptoms. He has not needed any additional Torsemide for some time.      His BMP which we reviewed shows relatively stable renal function with a creatinine of 1.63 and a mildly evaluated BUN. His potassium however is 5.9. He has had mild hyperkalemia in the past and his potassium tends to run at the high end of normal. Will verify with another lab today but in the meantime will plan to cut Entresto back to 12/13 mg twice daily and Torsemide as above. I asked him to follow low potassium diet in the coming days and will arrange for close lab follow up in two days.  He will of course contact us if he has any weight gain or worsening heart failure symptoms.    I recommended a clinic follow-up in 1 to 2 weeks to reevaluate his blood pressures and labs.  If potassium improves, could consider increasing his Entresto back up but we will see how things go.  He has follow-up with Dr. Glass scheduled in about a month.  In the meantime, if he has any questions or concerns, I  asked him to contact us.    Orders this Visit:  Orders Placed This Encounter   Procedures     Potassium     Basic metabolic panel     Basic metabolic panel     Follow-Up with Cardiology MARISOL     Orders Placed This Encounter   Medications     torsemide (DEMADEX) 20 MG tablet     Sig: Take 10 mg daily (1/2 tablet)     Dispense:  100 tablet     Refill:  3     Medications Discontinued During This Encounter   Medication Reason     torsemide (DEMADEX) 20 MG tablet Reorder       CURRENT MEDICATIONS:  Current Outpatient Medications   Medication Sig Dispense Refill     albuterol (PROAIR HFA) 108 (90 Base) MCG/ACT Inhaler Inhale 2 puffs into the lungs every 4 hours as needed for shortness of breath / dyspnea 1 Inhaler 0     amylase-lipase-protease (CREON 12) 96829-69822-64879 units CPEP Take 2 capsules by mouth 3 times daily (with meals)  120 capsule 0     atorvastatin (LIPITOR) 80 MG tablet Take 80 mg by mouth At Bedtime        calcium carbonate (OS-MARVA) 500 MG tablet Take 1 tablet by mouth 2 times daily One tablet in the morning, two at noon and one at bedtime       Carboxymethylcellulose Sod PF (THERATEARS PF) 0.25 % SOLN Apply 1 drop to eye 4 times daily as needed       carvedilol (COREG) 6.25 MG tablet Take 1 tablet (6.25 mg) by mouth 2 times daily (with meals) 180 tablet 3     clopidogrel (PLAVIX) 75 MG tablet Take 1 tablet (75 mg) by mouth daily 90 tablet 3     empagliflozin (JARDIANCE) 10 MG TABS tablet Take 1 tablet (10 mg) by mouth daily 90 tablet 3     gabapentin (NEURONTIN) 100 MG capsule Take 300 mg by mouth At Bedtime 1 capsule 0     Glycerin (OASIS MOISTURIZING MOUTH SPRAY) 35 % LIQD Take 2 sprays by mouth daily as needed       insulin aspart (NOVOLOG PEN) 100 UNIT/ML pen Inject 3 Units Subcutaneous 3 times daily (with meals) (Patient taking differently: Inject 4 Units Subcutaneous daily) 15 mL 1     insulin glargine (LANTUS PEN) 100 UNIT/ML pen Inject 28 Units Subcutaneous every morning Increase by 2 units  every 2-3 days until morning blood sugar is .  Max dose 40 units/day (Patient taking differently: Inject 26 Units Subcutaneous every morning Increase by 2 units every 2-3 days until morning blood sugar is .  Max dose 40 units/day) 45 mL 1     Insulin Pen Needle (PEN NEEDLES) 32G X 4 MM MISC        loperamide (IMODIUM) 2 MG capsule Take 2 mg by mouth 4 times daily as needed for diarrhea Patient takes a dose in the am and another at night       magnesium oxide (MAG-OX) 400 MG tablet Take 6.5 tablets (2,600 mg) by mouth daily       metFORMIN (GLUCOPHAGE-XR) 500 MG 24 hr tablet Take 500 mg by mouth 2 times daily (with meals)        mometasone (ASMANEX TWISTHALER) 220 MCG/INH inhaler Inhale 2 puffs into the lungs every evening        nitroGLYcerin (NITROSTAT) 0.4 MG sublingual tablet Place 0.4 mg under the tongue every 5 minutes as needed for chest pain       pantoprazole (PROTONIX) 40 MG EC tablet Take 40 mg by mouth 2 times daily  30 tablet      sacubitril-valsartan (ENTRESTO) 24-26 MG per tablet Take 1 tablet by mouth 2 times daily 180 tablet 3     torsemide (DEMADEX) 20 MG tablet Take 10 mg daily (1/2 tablet) 100 tablet 3     triamcinolone (KENALOG) 0.1 % external cream Apply topically 2 times daily for 14 days (Patient taking differently: Apply topically 2 times daily as needed) 15 g 0     vitamin D3 (CHOLECALCIFEROL) 50 mcg (2000 units) tablet Take 1 tablet by mouth 2 times daily       ALLERGIES  Allergies   Allergen Reactions     Fish Nausea and Vomiting     severe     Hydrocodone GI Disturbance     Upset stomach     Shellfish Allergy Hives and Rash     PAST MEDICAL HISTORY:  Past Medical History:   Diagnosis Date     Acute renal failure (H) 8/26/06 Hosp    secondary to dehydration     Anemia      Arthritis      Atherosclerosis of artery of extremity with intermittent claudication (H)      CAD (coronary artery disease)     LIMA to the LAD, vein graft to OM and a vein graft to the PDA     Cardiomyopathy  (H)      Carpal tunnel syndrome      CHF (congestive heart failure) (H)     Ischemic and nonischemic cardiomyopathy; 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%     Claudication (H)      COPD (chronic obstructive pulmonary disease) (H)      Diabetes (H)      Gastro-oesophageal reflux disease      GERD (gastroesophageal reflux disease)      H/O: alcohol abuse      HTN (hypertension)      Hyperlipidaemia LDL goal <100 07/08/2013     Hyperparathyroidism (H)      Hypokalemia      ICD (implantable cardioverter-defibrillator) in place     [employer]     NSTEMI (non-ST elevated myocardial infarction) (H)      NSVT (nonsustained ventricular tachycardia)      Pacemaker      Pancreatitis 6/26/06 Hosp     Paroxysmal atrial fibrillation (H)      PVD (peripheral vascular disease) (H)      Thrombocytopenia (H)      Tobacco use      Venous (peripheral) insufficiency      Vitamin D deficiency      PAST SURGICAL HISTORY:  Past Surgical History:   Procedure Laterality Date     CATARACT IOL, RT/LT      Cataract IOL RT/LT     CLOSE SECONDARY WOUND LOWER EXTREMITY Right 02/13/2022    Procedure: Right groin wound exploration, nerve release and closure.;  Surgeon: Karen Arthur MD;  Location: [facility] OR     ENDARTERECTOMY CAROTID Right 06/12/2015    Procedure: ENDARTERECTOMY CAROTID;  Surgeon: João Turner MD;  Location:  OR     ENDARTERECTOMY CAROTID Left 09/08/2017    Procedure: ENDARTERECTOMY CAROTID;  LEFT CAROTID ENDARTERECTOMY WITH EEG;  Surgeon: João Turner MD;  Location:  OR     ENDARTERECTOMY FEMORAL Bilateral 02/09/2022    Procedure: BILATERAL- COMMON FEMORAL ENDARTERECTOMY; RETROGRADE ILIAC ARTERY STENTING;  Surgeon: Ric Chau MD;  Location: [facility] OR     IMPLANT PACEMAKER  06/10/2010     IMPLANTED DEVICE       INTERPOSITION TENDON HAND N/A 06/09/2020    Procedure: Right thumb ligament reconstruction tendon interposition with extensor pollicis brevis to abductor pollicis tendon transfer;  Surgeon:  Bethel Martin MD;  Location: [facility] OR     PICC INSERTION - TRIPLE LUMEN Right 06/20/2022    Right basilic vein 0.62cm 4cm ext.Placement verified by [facility] 3CG.PICC okay to use.     RELEASE CARPAL TUNNEL Right 04/12/2016    Procedure: RELEASE CARPAL TUNNEL;  Surgeon: Lissy Leger MD;  Location: [facility] OR     SURGICAL HISTORY OF -   1998    Laminectomy     TONSILLECTOMY & ADENOIDECTOMY       ZZC CABG, ARTERIAL, THREE       FAMILY HISTORY:  Family History   Adopted: Yes   Problem Relation Age of Onset     Unknown/Adopted No family hx of      SOCIAL HISTORY:  Social History     Socioeconomic History     Marital status: Single     Spouse name: None     Number of children: None     Years of education: None     Highest education level: None   Tobacco Use     Smoking status: Every Day     Packs/day: 1.50     Years: 50.00     Pack years: 75.00     Types: Cigarettes     Smokeless tobacco: Never     Tobacco comments:     1 1/2 PPD 05/18/22   Vaping Use     Vaping Use: Never used   Substance and Sexual Activity     Alcohol use: No     Drug use: No     Sexual activity: Not Currently     Partners: Female   Other Topics Concern     Parent/sibling w/ CABG, MI or angioplasty before 65F 55M? No     Review of Systems:  Skin:        Eyes:       ENT:       Respiratory:  Negative    Cardiovascular:    lightheadedness;Positive for  Gastroenterology:      Genitourinary:       Musculoskeletal:       Neurologic:       Psychiatric:       Heme/Lymph/Imm:       Endocrine:          Physical Exam:  Vitals: /50   Pulse 69   Resp 20   Wt 69.4 kg (153 lb)   SpO2 98%   BMI 21.34 kg/m     Wt Readings from Last 4 Encounters:   01/16/23 69.4 kg (153 lb)   01/05/23 69.9 kg (154 lb)   11/21/22 69.4 kg (153 lb)   10/17/22 69.7 kg (153 lb 9.6 oz)     GEN: well nourished, in no acute distress.  HEENT:  Pupils equal, round. Sclerae nonicteric.   NECK: no JVD  C/V:  Regular rate and rhythm, soft systolic murmur  RESP: Respirations are  unlabored. Clear to auscultation bilaterally without wheezing, rales, or rhonchi.  GI: Abdomen soft, nontender.  EXTREM: no LE edema.  NEURO: Alert and oriented, cooperative.  SKIN: Warm and dry.     Recent Lab Results:  LIPID RESULTS:  Lab Results   Component Value Date    CHOL 66 06/21/2022    CHOL 95 12/16/2020    HDL 30 (L) 06/21/2022    HDL 28 (L) 12/16/2020    LDL 28 06/21/2022    LDL 44 12/16/2020    TRIG 40 06/21/2022    TRIG 115 12/16/2020    CHOLHDLRATIO 4.6 06/12/2015     LIVER ENZYME RESULTS:  Lab Results   Component Value Date    AST 46 06/23/2022    AST 38 12/07/2020    ALT 31 06/23/2022    ALT 28 12/07/2020     CBC RESULTS:  Lab Results   Component Value Date    WBC 8.7 06/23/2022    WBC 6.3 01/21/2021    RBC 4.20 (L) 06/23/2022    RBC 4.81 01/21/2021    HGB 11.9 (L) 09/14/2022    HGB 15.0 01/21/2021    HCT 36.6 (L) 06/23/2022    HCT 46.2 01/21/2021    MCV 87 06/23/2022    MCV 96 01/21/2021    MCH 25.7 (L) 06/23/2022    MCH 31.2 01/21/2021    MCHC 29.5 (L) 06/23/2022    MCHC 32.5 01/21/2021    RDW 18.1 (H) 06/23/2022    RDW 13.1 01/21/2021     06/23/2022     (L) 01/21/2021     BMP RESULTS:  Lab Results   Component Value Date     01/16/2023     06/10/2021    POTASSIUM 5.8 (H) 01/16/2023    POTASSIUM 5.6 (H) 08/29/2022    POTASSIUM 5.6 (H) 08/29/2022    POTASSIUM 4.6 06/10/2021    CHLORIDE 105 01/16/2023    CHLORIDE 104 08/29/2022    CHLORIDE 104 08/29/2022    CHLORIDE 105 06/10/2021    CO2 26 01/16/2023    CO2 31 08/29/2022    CO2 31 08/29/2022    CO2 30 06/10/2021    ANIONGAP 7 01/16/2023    ANIONGAP 1 (L) 08/29/2022    ANIONGAP 1 (L) 08/29/2022    ANIONGAP 5 06/10/2021     (H) 01/16/2023     (H) 08/29/2022     (H) 08/29/2022     (H) 06/10/2021    BUN 38.5 (H) 01/16/2023    BUN 50 (H) 08/29/2022    BUN 50 (H) 08/29/2022    BUN 34 (H) 06/10/2021    CR 1.63 (H) 01/16/2023    CR 1.17 06/10/2021    GFRESTIMATED 44 (L) 01/16/2023    GFRESTIMATED 49 (L)  11/09/2021    GFRESTIMATED 62 06/10/2021    GFRESTBLACK 71 06/10/2021    MARVA 9.0 01/16/2023    MARVA 8.7 06/10/2021      A1C RESULTS:  Lab Results   Component Value Date    A1C 7.8 (H) 06/20/2022    A1C 9.6 (H) 12/16/2020     INR RESULTS:  Lab Results   Component Value Date    INR 1.02 12/22/2017    INR 1.03 09/08/2017       Parul Badillo PA-C  [facility]      Thank you for allowing me to participate in the care of your patient.      Sincerely,     Parul Badillo PA-C     [facility]  cc:   Parul Badillo PA-C  [address]  [place] [address]

## 2023-01-23 NOTE — LETTER
1/23/2023         RE: Delbert Tilley  [address]  [facility] [address]        Dear Colleague,    Thank you for referring your patient, Delbert Tilley, to the [facility]. Please see a copy of my visit note below.    HPI:   Chief complaints: Delbert Tilley is a pleasant 72 year old male who presents for evaluation of a spot on the left sideburn. He has had the area for about 10 years. It has grown a little. He also had a rash behind and inside of his ears in the fall which is clear today after using TAC cream.       PHYSICAL EXAM:    There were no vitals taken for this visit.  Skin exam performed as follows: Type 2 skin. Mood appropriate  Alert and Oriented X 3. Well developed, well nourished in no distress.  General appearance: Normal  Head including face: Normal  Eyes: conjunctiva and lids: Normal  Mouth: Lips, teeth, gums: Normal  Neck: Normal  Cardiovascular: Exam of peripheral vascular system by observation for swelling, varicosities, edema: Normal  Right upper extremity: Normal  Left upper extremity: Normal  Right lower extremity: Normal  Left lower extremity: Normal  Skin: Scalp and body hair: See below    Keratotic papule on the left sideburn x 1  5 mm depression along the right antihelix    ASSESSMENT/PLAN:     1. Seborrheic keratotis on the left sideburn - advised benign no treatment needed  2. Depression/ulceration on the right antihelix - per patient is resolving. Discussed biopsy vs watching the area and he prefers to monitor the area as it does seem to be healing. Will plan to recheck in 6 months.   --Photograph taken for reference.   3. Likely resolved seborrheic dermatitis behind the ears - discussed chronic condition. Advised can resume TAC cream BID x 1-2 weeks PRN flares.           Follow-up: 6 months  CC:   Scribed By: Makayla Shoemaker, MS, PA-C          Again, thank you for allowing me to participate in the care of your patient.        Sincerely,        Makayla  LILY Roy PA-C

## 2023-01-23 NOTE — PROGRESS NOTES
HPI:   Chief complaints: Delbert Tilley is a pleasant 72 year old male who presents for evaluation of a spot on the left sideburn. He has had the area for about 10 years. It has grown a little. He also had a rash behind and inside of his ears in the fall which is clear today after using TAC cream.       PHYSICAL EXAM:    There were no vitals taken for this visit.  Skin exam performed as follows: Type 2 skin. Mood appropriate  Alert and Oriented X 3. Well developed, well nourished in no distress.  General appearance: Normal  Head including face: Normal  Eyes: conjunctiva and lids: Normal  Mouth: Lips, teeth, gums: Normal  Neck: Normal  Cardiovascular: Exam of peripheral vascular system by observation for swelling, varicosities, edema: Normal  Right upper extremity: Normal  Left upper extremity: Normal  Right lower extremity: Normal  Left lower extremity: Normal  Skin: Scalp and body hair: See below    Keratotic papule on the left sideburn x 1  5 mm depression along the right antihelix    ASSESSMENT/PLAN:     1. Seborrheic keratotis on the left sideburn - advised benign no treatment needed  2. Depression/ulceration on the right antihelix - per patient is resolving. Discussed biopsy vs watching the area and he prefers to monitor the area as it does seem to be healing. Will plan to recheck in 6 months.   --Photograph taken for reference.   3. Likely resolved seborrheic dermatitis behind the ears - discussed chronic condition. Advised can resume TAC cream BID x 1-2 weeks PRN flares.           Follow-up: 6 months  CC:   Scribed By: Makayla Shoemaker, MS, PAPRASHANT

## 2023-01-25 NOTE — PATIENT INSTRUCTIONS
Medication Changes:  We will call with recommendations after speaking with Hansa.     Recommendations:  Check daily weights and call the clinic if your weight has increased more than 2 lbs in one day or 5 lbs in one week; if you feel more short of breath or have worsening swelling in your legs or abdomen.  2000 mg sodium diet   4-8 8 ounce glasses of fluids per day   Drink 1 glass of water in the AM when you get out of bed   Call if blood pressure is less than 90 on top or less than 100 with lightheadedness.       Follow-up:  Scheduled to see Dr. Glass 2/16/23  Call 6 months prior, to schedule.     Cardiology Scheduling~[phone]  Cardiology Clinic RN~[phone] (Lissett RN, Arlen RN, Michelle RN)

## 2023-01-25 NOTE — PROGRESS NOTES
Cardiology Clinic Progress Note  Delbert Tilley MRN# [id number]   YOB: 1950 Age: 72 year old          Primary Cardiologist:   Dr. Bello/ JOHANNY          History of Presenting Illness:    Delbert Tilley is a pleasant 72 year old  patient with a past cardiac history significant for   1. paroxysmal atrial fibrillation/ flutter     s/p flutter ablation    Rate control and not anticoagulated d/t small burden of afib  2. CAD     CABG (LIMA to LAD, SVG to OM, and SVG to PDA) years ago     2014 NSTEMI PCI to SVG-OM3     Angio 2017 without intervention, 50% stenosis in the apical LAD and SVG to the RPDA had occluded  3. PAD     s/p carotid endarterectomies and iliac artery stenting with improved claudication    follows with vascular surgery   4. ischemic combined with nonischemic cardiomyopathy     Status post ICD with generator replaced     Prior to 2017 35-40%    EF 20-25%     EF 15-20% 6/2022    Dry Wt: 160-162 lbs  Past medical history significant for diabetes mellitus, CKD following with [facility] nephrology Dr. Cueva, and tobacco abuse.  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      Patient was seen by Hansa in CORE clinic on 1/16/2023.  He continued with chronic dyspnea on exertion which was stable and appeared euvolemic on exam.  After starting Jardiance he noted increased thirst and urination.  Therefore, she decreased torsemide back to 10 mg daily and to use an additional 10 mg as needed.  Given hyperkalemia Entresto was reduced to 12-13 mg twice daily along with eating low potassium diet.    Pt presents today for 1 week follow-up.  BMP today showing potassium has normalized but creatinine has increased to 2.11 with BUN of 43 and GFR 32.  Results reviewed today.    Blood pressure remains soft at 99/52.  He reports hypotension, daily, after his a.m. medications.  He has symptoms of fatigue and lightheadedness with this.  Blood pressures down to 80s systolic.  They improve when he drinks a glass of water.  In the  morning, he eats his breakfast and drinks a very small glass of orange juice around the time he takes medications.  I recommended drinking a full glass of water when he gets out of bed to give him room with his blood pressure for his a.m. medications.  We also discussed switching one of his caffeinated coffees for a glass of water instead.  We discussed staying well-hydrated with at least 32 ounces of fluids a day.  He is currently drinking about 24 ounces per day.    He has been working on low potassium diet and cut back his torsemide and Entresto as recommended.  A couple days ago he noted edema in his thighs but weights were stable between 149 to 151 pounds.  Given the possible edema, he decided to increase his torsemide back up to 20 mg daily for 2 days.  He did not notice any significant decrease in weight but the edema improved.  Weight is down 1 pound in the clinic from 153 down to 152 this week.  Weight at home was 149 pounds today. Patient reports no chest pain, SOB, PND, orthopnea, presyncope, syncope, edema, heart racing, or palpitations.    I reviewed his case with Hansa in CORE clinic.  Given his complicated case, she would like to discuss with Dr. Glass before making a plan.  We will get back to the patient with their recommendations.      Current Cardiac Medications   Atorvastatin 80 mg daily  Carvedilol 6.25 mg twice daily  Plavix 75 mg daily  Jardiance 10 mg daily  Entresto 24-26 mg twice daily- Pt taking half tablet twice daily  Torsemide 10 mg daily  Nitroglycerin as needed                   Assessment and Plan:     Plan  Patient Instructions   Medication Changes:  1. We will call with recommendations after speaking with Hansa.     Recommendations:  1. Check daily weights and call the clinic if your weight has increased more than 2 lbs in one day or 5 lbs in one week; if you feel more short of breath or have worsening swelling in your legs or abdomen.  2. 2000 mg sodium diet   3. 4-8 8 ounce glasses  of fluids per day   4. Drink 1 glass of water in the AM when you get out of bed   5. Call if blood pressure is less than 90 on top or less than 100 with lightheadedness.       Follow-up:  1. Scheduled to see Dr. Glass 2/16/23  Call 6 months prior, to schedule.     Cardiology Scheduling~[phone]  Cardiology Clinic RN~[phone] (Lissett RN, Arlen RN, Michelle RN)           1. CAD    No angina     Continue statin, aspirin, ARB, beta blocker      2. Ischemic cardiomyopathy with chronic systolic heart failure      No symptoms of heart failure    NYHA class II     Continue beta blocker, Entresto, Jardiance, torsemide     If patient develops left bundle branch block, may consider bi-V ICD upgrade in the future    Did not tolerate spironolactone d/t hypotension and hyperkalemia       3. Paroxysmal atrial fibrillation/flutter    status post flutter ablation    History of a couple episodes of 30 seconds of atrial fibrillation    No symptoms of atrial fibrillation     Continue rate control with beta-blocker and no anticoagulation      4. PVD    status post right and left carotid endarterectomy    following with vascular surgery       5.  NSVT    Seen on device checks, asymptomatic    Continue beta-blocker    We will continue to monitor on device checks                Thank you for allowing me to participate in this delightful patient's care.      This note was completed in part using Dragon voice recognition software. Although reviewed after completion, some word and grammatical errors may occur.    Yareli Kim, APRN, CNP           Data:   All laboratory data reviewed    Total time spent today was 56 mins, reviewing labs, testing, notes, documenting notes, and seeing patient.         Constitutional:  cooperative, alert and oriented, well developed, well nourished, in no acute distress    Skin:  warm and dry to the touch         Head:  normocephalic       Eyes:  pupils equal and round       ENT:  no pallor  or cyanosis       Neck:  no stiffness       Respiratory:  clear to auscultation; normal symmetry, decreased lung sounds bilateral bases       Cardiac: regular rate and rhythm; normal S1 and S2                 pulses full and equal     GI:  abdomen soft, nondistended     Extremities and Muscular Skeletal:  no edema    Neurological:  affect appropriate; no gross motor deficits       Psych:  Alert and Oriented x 3 , appropriate affact

## 2023-01-25 NOTE — LETTER
1/25/2023    Danii Hernández MD  [id number]  [facility] [address]    RE: Delbert Tilley       Dear Colleague,     I had the pleasure of seeing Delbert Tilley in the [facility].    Cardiology Clinic Progress Note  Delbert Tilley MRN# [id number]   YOB: 1950 Age: 72 year old          Primary Cardiologist:   Dr. Bello/ JOHANNY          History of Presenting Illness:    Delbert Tilley is a pleasant 72 year old  patient with a past cardiac history significant for   1. paroxysmal atrial fibrillation/ flutter     s/p flutter ablation    Rate control and not anticoagulated d/t small burden of afib  2. CAD     CABG (LIMA to LAD, SVG to OM, and SVG to PDA) years ago     2014 NSTEMI PCI to SVG-OM3     Angio 2017 without intervention, 50% stenosis in the apical LAD and SVG to the RPDA had occluded  3. PAD     s/p carotid endarterectomies and iliac artery stenting with improved claudication    follows with vascular surgery   4. ischemic combined with nonischemic cardiomyopathy     Status post ICD with generator replaced     Prior to 2017 35-40%    EF 20-25%     EF 15-20% 6/2022    Dry Wt: 160-162 lbs  Past medical history significant for diabetes mellitus, CKD following with [facility] nephrology Dr. Cueva, and tobacco abuse.  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      Patient was seen by Hansa in C[facility] clinic on 1/16/2023.  He continued with chronic dyspnea on exertion which was stable and appeared euvolemic on exam.  After starting Jardiance he noted increased thirst and urination.  Therefore, she decreased torsemide back to 10 mg daily and to use an additional 10 mg as needed.  Given hyperkalemia Entresto was reduced to 12-13 mg twice daily along with eating low potassium diet.    Pt presents today for 1 week follow-up.  BMP today showing potassium has normalized but creatinine has increased to 2.11 with BUN of 43 and GFR 32.  Results reviewed today.    Blood pressure remains soft at 99/52.  He reports  hypotension, daily, after his a.m. medications.  He has symptoms of fatigue and lightheadedness with this.  Blood pressures down to 80s systolic.  They improve when he drinks a glass of water.  In the morning, he eats his breakfast and drinks a very small glass of orange juice around the time he takes medications.  I recommended drinking a full glass of water when he gets out of bed to give him room with his blood pressure for his a.m. medications.  We also discussed switching one of his caffeinated coffees for a glass of water instead.  We discussed staying well-hydrated with at least 32 ounces of fluids a day.  He is currently drinking about 24 ounces per day.    He has been working on low potassium diet and cut back his torsemide and Entresto as recommended.  A couple days ago he noted edema in his thighs but weights were stable between 149 to 151 pounds.  Given the possible edema, he decided to increase his torsemide back up to 20 mg daily for 2 days.  He did not notice any significant decrease in weight but the edema improved.  Weight is down 1 pound in the clinic from 153 down to 152 this week.  Weight at home was 149 pounds today. Patient reports no chest pain, SOB, PND, orthopnea, presyncope, syncope, edema, heart racing, or palpitations.    I reviewed his case with Hansa in [facility]ORE clinic.  Given his complicated case, she would like to discuss with Dr. Glass before making a plan.  We will get back to the patient with their recommendations.      Current Cardiac Medications   Atorvastatin 80 mg daily  Carvedilol 6.25 mg twice daily  Plavix 75 mg daily  Jardiance 10 mg daily  Entresto 24-26 mg twice daily- Pt taking half tablet twice daily  Torsemide 10 mg daily  Nitroglycerin as needed                   Assessment and Plan:     Plan  Patient Instructions   Medication Changes:  1. We will call with recommendations after speaking with Hansa.     Recommendations:  1. Check daily weights and call the clinic if your  weight has increased more than 2 lbs in one day or 5 lbs in one week; if you feel more short of breath or have worsening swelling in your legs or abdomen.  2. 2000 mg sodium diet   3. 4-8 8 ounce glasses of fluids per day   4. Drink 1 glass of water in the AM when you get out of bed   5. Call if blood pressure is less than 90 on top or less than 100 with lightheadedness.       Follow-up:  1. Scheduled to see Dr. Glass 2/16/23  Call 6 months prior, to schedule.     Cardiology Scheduling~[phone]  Cardiology Clinic RN~[phone] (Lissett RN, Arlen RN, Michelle RN)           1. CAD    No angina     Continue statin, aspirin, ARB, beta blocker      2. Ischemic cardiomyopathy with chronic systolic heart failure      No symptoms of heart failure    NYHA class II     Continue beta blocker, Entresto, Jardiance, torsemide     If patient develops left bundle branch block, may consider bi-V ICD upgrade in the future    Did not tolerate spironolactone d/t hypotension and hyperkalemia       3. Paroxysmal atrial fibrillation/flutter    status post flutter ablation    History of a couple episodes of 30 seconds of atrial fibrillation    No symptoms of atrial fibrillation     Continue rate control with beta-blocker and no anticoagulation      4. PVD    status post right and left carotid endarterectomy    following with vascular surgery       5.  NSVT    Seen on device checks, asymptomatic    Continue beta-blocker    We will continue to monitor on device checks                Thank you for allowing me to participate in this delightful patient's care.      This note was completed in part using Dragon voice recognition software. Although reviewed after completion, some word and grammatical errors may occur.    Yareli Kim, APRN, CNP           Data:   All laboratory data reviewed    Total time spent today was 56 mins, reviewing labs, testing, notes, documenting notes, and seeing patient.         Constitutional:   cooperative, alert and oriented, well developed, well nourished, in no acute distress    Skin:  warm and dry to the touch         Head:  normocephalic       Eyes:  pupils equal and round       ENT:  no pallor or cyanosis       Neck:  no stiffness       Respiratory:  clear to auscultation; normal symmetry, decreased lung sounds bilateral bases       Cardiac: regular rate and rhythm; normal S1 and S2                 pulses full and equal     GI:  abdomen soft, nondistended     Extremities and Muscular Skeletal:  no edema    Neurological:  affect appropriate; no gross motor deficits       Psych:  Alert and Oriented x 3 , appropriate affact      Thank you for allowing me to participate in the care of your patient.      Sincerely,     ANA [facility]  cc:   Parul Badillo PA-C  [address]  [place] [address]

## 2023-01-25 NOTE — TELEPHONE ENCOUNTER
Reviewed recent labs with Dr. Glass. Will continue jardiance for now but stop Torsemide. I called Kamron this afternoon. There was no answer. I then called his daughter Sara. Reviewed recommendations to stop Torsemide for now. She will let Kamron know. Discussed that he needs to call with weight gain, edema, or worsening shortness of breath. Will have the CORE nurses call him on Friday for an update on how he is doing. Will also update an echocardiogram and plan on labs early next week to re-evaluate his renal function. Orders placed.Will also consider CardioMEMS implantation. I briefly discussed with his daughter and will try to call Kamron later this week to discuss. Will ask CORE nurses to evaluate for this.  If he has ongoing lightheadedness or hypotension, would consider decreasing carvedilol. Alba

## 2023-01-27 NOTE — TELEPHONE ENCOUNTER
[facility] Heart - CORE Clinic    Called patient to review instructions per Hansa Badillo:   1.  STOP coreg   2.  START metoprolol XL 12.5mg/d. Rx sent to [facility] in [place], [place]. Additional refills [facility] through [facility]   3.  For weight ~155# or sx take torsemide 20mg/d   3.  Call [place] to schedule BMP for Monday 1/30   4.  Schedule ECHO ASAP  I gave him contact information for [place] clinic. He will call for above upon completion of our call. Reminder sent to board to watch for results.  Teodora De Jesus, RN on 1/27/2023 at 3:27 PM

## 2023-01-27 NOTE — TELEPHONE ENCOUNTER
Let's stop carvedilol and start Metoprolol XL 12.5 mg daily. He can take this in the evening if he'd like. His weight at home was previously running 152 - 155ish. Okay to continue to hold Torsemide for now but if weight is getting close to 155 lbs I'd have him take 20 of Torsemide or if he is starting to develop worsening swelling or dyspnea. Please make sure he has a lab on Monday. We can get an update from him then. Also, Tere from [place] was trying to get in touch with him about scheduling an echocardiogram which he should have done asap. Please have him get a hold of her. I'm not up at [facility] in the next few weeks. He doesn't see Dr. Glass until 2/16, I think it'd be best to have him follow up next week if he is still having issues. Thanks. Hansa

## 2023-01-27 NOTE — TELEPHONE ENCOUNTER
"[facility] - CORE Clinic    Called patient to assess progress after torsemide put on hold. Patient stated that he may feel a little better, however he again experienced BP drop and lightheadedness this am after taking his meds. Drinking a large glass of water in the am has not helped.  BP readings today:   119/58 upon waking   90/? after taking am meds   98/40's 60min later   104/52 120min later  He added that he felt \"crappy and lightheaded\" this am. At the time of our conversation he felt fine, all earlier sx had resolved.     He denied any changes to his breathing. He is able to do all his usual activities w/o difficulty. He sleeps w/o orthopnea/PND.     Current home weights:   1/27 152   1/26 151   1/25 149(torsemide put on hold)    He continues to feel swelling to his thighs. He does not feel this has changed. Will review w/Hansa Badillo. Patient verbalized understanding.  Teodora De Jesus RN on 1/27/2023 at 2:23 PM      "

## 2023-02-01 NOTE — TELEPHONE ENCOUNTER
[facility] Heart-CORE Clinic    Background:   --1/25/23 scheduled torsemide stopped given decline in renal function, jardiance continued   --1/27 coreg stopped in favor of toprol xl given symptomatic, marginal BPs; advised to take torsemide 20 mg PRN if wt >155# or new HF symptoms.   --1/31 echo repeated: stable results per reading MD, DIAN repeated and showed renal function improved    1/30 Kamron's daughter Sara sent auctionpoint[employer] message to [place] team with concern of weight gain overnight 152--155 lbs, with stable BPs. Note stated he was going to take total of 50 mg of torsemide that day.    1/31 Sara sent another MyChart noting that weight decreased to 152 lbs, he felt good with stable BP; planned to take 10 mg of torsemide.    I sent them another [employer] message today to confirm that current recommendation is to take torsemide PRN as above and to alert them of below results. Will send FYI to Hansa Badillo PAC and Yareli Mina CNP.    Component      Latest Ref Rng & Units 1/16/2023 1/25/2023 1/31/2023   Sodium      136 - 145 mmol/L 138 142 139   Potassium      3.4 - 5.3 mmol/L 5.9 (H) 4.9 5.0   Chloride      98 - 107 mmol/L 105 100 99   Carbon Dioxide (CO2)      22 - 29 mmol/L 26 33 (H) 30 (H)   Anion Gap      7 - 15 mmol/L 7 9 10   Urea Nitrogen      8.0 - 23.0 mg/dL 38.5 (H) 43.1 (H) 33.7 (H)   Creatinine      0.67 - 1.17 mg/dL 1.63 (H) 2.11 (H) 1.62 (H)   Calcium      8.8 - 10.2 mg/dL 9.0 9.3 9.2   Glucose      70 - 99 mg/dL 114 (H) 150 (H) 152 (H)   GFR Estimate      >60 mL/min/1.73m2 44 (L) 32 (L) 44 (L)     Future Appointments   Date Time Provider Department Center   2/16/2023  8:30 AM [facility]   2/22/2023  3:40 PM Danii Hernández MD [facility]CL FLCL   4/4/2023 12:00 AM [facility] DCR2 [facility] UMP PSA CLIN   5/11/2023  9:00 AM Makayla Shoemaker PA-C [facility]WY Anne Wagner RN BSN   11:37 AM 02/01/23  CORE nurse line M-F 8a-4p: [phone]

## 2023-02-06 NOTE — TELEPHONE ENCOUNTER
[facility] Heart-CORE Clinic    Repeat labs have been scheduled as below.    Will complete CardioMEMS eval to be reviewed 2/16/23.      Future Appointments   Date Time Provider Department Center   2/16/2023  7:30 AM [facility] [place]   2/16/2023  8:30 AM Camille Glass MD [facility] PSA CLIN   2/22/2023  3:40 PM Danii Hernández MD CLCL FLCL   4/4/2023 12:00 AM [facility]IZ DCR2 [facility] PSA CLIN   5/11/2023  9:00 AM Makayla Shoemaker PA-C [facility]     Melina Barnes RN BSN   4:54 PM 02/06/23  CORE nurse line M-LLOYD 8a-4p: [phone]

## 2023-02-06 NOTE — TELEPHONE ENCOUNTER
Yareli Mina, Melina Siu CNP, RN  Caller: Unspecified (1 week ago)  Not sure if Hansa is on vacation yet, but I think it would be a good idea to repeat prior to Maria Luisa's appt.     Thanks!       Placed BMP order to be drawn next week. Messaged [place] scheduling team to arrange.    Melina Barnes RN BSN   9:13 AM 02/06/23  CORE nurse line M-F 8a-4p: [phone]

## 2023-02-06 NOTE — TELEPHONE ENCOUNTER
Reviewed. Agree he should have labs prior to next appointment. His echo looked stable. I reviewed recent Mychart messages and it looks like BPs have been better as well. Last labs showed improvement in his kidney function so would continue with prn Torsemide for weight or symptoms and make sure he lets us know if BPs are low again or he's having symptoms/weight gain not responding to prn Torsemide. We should consider a CardioMEMs implant. Thanks. Hansa

## 2023-02-08 NOTE — TELEPHONE ENCOUNTER
Received two requests for toprol xl. Closing this encounter as it's duplicate.    Melina Barnes RN BSN   12:00 PM 02/08/23  CORE nurse line M-F 8a-4p: [phone]

## 2023-02-08 NOTE — TELEPHONE ENCOUNTER
"[facility]  CardioMEMS Criteria Evaluation    Kamron has follow-up 2/16/23 and this can be reviewed at that time.     Per chart review, patient's criteria for CardioMEMS is as follows, provider to comment on items in red:    Current Insurance:  [employer]    Heart Failure Type: HFrEF    Class II or III Heart Failure Symptoms: yes    Hospitalization within the last 12 months AND/OR elevated BNP or NT -BNP within the past 12 months 4/2022, 6/2022    RVSP on ECHO or RHC: 1/2023 echo, \"Right ventricular systolic pressure could not be approximated due to inadequate tricuspid regurgitation.\"      Followed by CORE Clinic Providers: yes    Compliant with [employer]): has not been enrolled    GFR > 25: yes    Chest circumference <65 inches or BMI: BMI 22    Able to take Plavix for 30 Days: currently on plavix    Able to take ASA 81 mg for Life: provider to comment    On Coumadin/DOAC: no    History of GI Bleed: 11/23/2004, \"Upper gastrointestinal bleed, most likely Marilee-Xie tear\"    History of Pulmonary Embolism: no    History of DVT: no    Congenital Heart Disease: no    Mechanical Heart Valves: no    Active infection: no    CRT in the Last 3 Months: no    Life Expectancy > 12 Months: provider to comment.    Melina Barnes RN BSN   9:41 AM 02/08/23  CORE nurse line M-LLOYD 8a-4p: [phone]                "

## 2023-02-10 NOTE — TELEPHONE ENCOUNTER
[facility] Heart - CORE Clinic    -Called and spoke to the [facility] regarding the Rx sent. They stated they are still waiting on clinic notes and labs. They have the Rx but need documentation. Faxed labs and clinic notes to [phone].    Called [facility] in [place] and spoke with Nata.  Gave new Rx for Toprol XL  [facility]      Sent [employer] message to pt and wife with update.     Future Appointments   Date Time Provider Department Center   2/16/2023  7:30 AM [facility] [place]   2/16/2023  8:30 AM [facility]   2/22/2023  3:40 PM Danii Hernández MD CLCL FLCL   4/4/2023 12:00 AM [facility]IZ DCR2 [facility] PSA CLIN   5/11/2023  9:00 AM Makayla Shoemaker, PACasieC [place]   11:27 AM 2/10/2023    CORE nurse line ESTELLA-F 8a-4p: [phone]

## 2023-02-16 NOTE — PROGRESS NOTES
Advanced HF referral order placed. Messaged [place] CORE team for intake. Susan Streeter RN on 2/16/2023 at 9:41 AM      Message  Received: Today  Camille Glass MD  P [facility] Heart Team 7  Please make a referral to [facility] heart failure group for advanced heart failure evaluation

## 2023-02-16 NOTE — PROGRESS NOTES
CARDIOLOGY CLINIC CONSULTATION    REASON FOR CONSULT: Severe heart failure with reduced ejection fraction    PRIMARY CARE PHYSICIAN:  Danii Hernández    Today's clinic visit entailed:  Review of the result(s) of each unique test - Echo labs ECG device data  50 minutes spent on the date of the encounter doing chart review, history and exam, documentation and further activities per the note  Provider  Link to [facility] Help Grid     The level of medical decision making during this visit was of high complexity.      HISTORY OF PRESENT ILLNESS:  This is a pleasant 72-year-old gentleman who is here with his daughter.  Patient transferred care to me in July 2022.  He has complex set of severe cardiac issues that have mentioned below    1. The patient has a longstanding history of coronary artery disease with bypass surgery in the early 2000's.  Last coronary angiography was in 2017 that showed patent LIMA and vein graft to the OM.  Vein graft to the right coronary artery was occluded 100%.  The right coronary artery is also occluded this is collateralized from the left system.    2. He has longstanding ischemic cardiomyopathy.    Most recent EF is in the 15 to 20% range.    3. Primary prevention ICD for many years.  No shocks. His device generator change was in April 2018.  QRS is narrow  4. Ongoing history of smoking at least about 1-1/2 pack a day  5. History of flutter ablation many years ago  6. Chronic kidney disease cardiorenal syndrome  7. Peripheral arterial disease and venous insufficiency  8. Admission to [facility] on the middle of 2022 with cardiogenic shock needing inotropic support    Patient is here for follow-up in the cardiology clinic.  He has severe near end-stage heart failure with significant symptomatic hypotension with further up titration of neurohormonal blockade.  He has gradually worsening chronic kidney disease.  He is NYHA class IIIb most days.  Ongoing history of smoking which is  contributing to his dyspnea as well.  Denies angina syncope presyncope on current regimen of heart failure therapy that includes low doses of beta-blockers Entresto and Jardiance.  No diuretic needs.    PAST MEDICAL HISTORY:  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%     Claudication (H)      COPD (chronic obstructive pulmonary disease) (H)      Diabetes (H)      Gastro-oesophageal reflux disease      GERD (gastroesophageal reflux disease)      H/O: alcohol abuse      HTN (hypertension)      Hyperlipidaemia LDL goal <100 07/08/2013     Hyperparathyroidism (H)      Hypokalemia      ICD (implantable cardioverter-defibrillator) in place     Medtronic     NSTEMI (non-ST elevated myocardial infarction) (H)      NSVT (nonsustained ventricular tachycardia)      Pacemaker      Pancreatitis 6/26/06 Hosp     Paroxysmal atrial fibrillation (H)      PVD (peripheral vascular disease) (H)      Thrombocytopenia (H)      Tobacco use      Venous (peripheral) insufficiency      Vitamin D deficiency        MEDICATIONS:  Current Outpatient Medications   Medication     albuterol (PROAIR HFA) 108 (90 Base) MCG/ACT Inhaler     amylase-lipase-protease (CREON 12) 99923-73241-71555 units CPEP     atorvastatin (LIPITOR) 80 MG tablet     calcium carbonate (OS-MARVA) 500 MG tablet     Carboxymethylcellulose Sod PF (THERATEARS PF) 0.25 % SOLN     clopidogrel (PLAVIX) 75 MG tablet     empagliflozin (JARDIANCE) 10 MG TABS tablet     gabapentin (NEURONTIN) 100 MG capsule     Glycerin (OASIS MOISTURIZING MOUTH SPRAY) 35 % LIQD      insulin aspart (NOVOLOG PEN) 100 UNIT/ML pen     insulin glargine (LANTUS PEN) 100 UNIT/ML pen     Insulin Pen Needle (PEN NEEDLES) 32G X 4 MM MISC     loperamide (IMODIUM) 2 MG capsule     magnesium oxide (MAG-OX) 400 MG tablet     metFORMIN (GLUCOPHAGE-XR) 500 MG 24 hr tablet     metoprolol succinate ER (TOPROL XL) 25 MG 24 hr tablet     mometasone (ASMANEX TWISTHALER) 220 MCG/INH inhaler     nitroGLYcerin (NITROSTAT) 0.4 MG sublingual tablet     pantoprazole (PROTONIX) 40 MG EC tablet     sacubitril-valsartan (ENTRESTO) 24-26 MG per tablet     vitamin D3 (CHOLECALCIFEROL) 50 mcg (2000 units) tablet     No current facility-administered medications for this visit.       ALLERGIES:  Allergies   Allergen Reactions     Fish Nausea and Vomiting     severe     Hydrocodone GI Disturbance     Upset stomach     Shellfish Allergy Hives and Rash       SOCIAL HISTORY:  I have reviewed this patient's social history and updated it with pertinent information if needed. Delbert Tilley  reports that he has been smoking cigarettes. He has a 75.00 pack-year smoking history. He has never used smokeless tobacco. He reports that he does not drink alcohol and does not use drugs.    FAMILY HISTORY:  I have reviewed this patient's family history and updated it with pertinent information if needed.   Family History   Adopted: Yes   Problem Relation Age of Onset     Unknown/Adopted No family hx of        REVIEW OF SYSTEMS:  Skin:        Eyes:       ENT:       Respiratory:  Negative for shortness of breath;dyspnea on exertion  Cardiovascular:  Negative for;palpitations;chest pain;edema;syncope or near-syncope;fatigue;lightheadedness;dizziness    Gastroenterology:      Genitourinary:       Musculoskeletal:       Neurologic:       Psychiatric:       Heme/Lymph/Imm:       Endocrine:           PHYSICAL EXAM:      BP: 118/51 Pulse: 70     SpO2: 95 %      Vital Signs with Ranges  Pulse:  [70] 70  BP: (118)/(51) 118/51  SpO2:  [95 %] 95 %  152 lbs 0  oz    Constitutional: awake, alert, no distress  Respiratory: Clear to auscultation  Cardiovascular: Regular heart sounds soft systolic murmur no rubs or gallops JVP is normal there is no edema  GI: nondistended  Neuropsychiatric: appropriate affact    DATA:  Labs: Pertinent cardiac labs reviewed    ASSESSMENT:  Compensated severe heart failure with reduced ejection fraction,   Chronic kidney disease/cardiorenal syndrome  Coronary artery disease as above    RECOMMENDATIONS:  1. CAD: Patient is without symptoms.  Continue statin therapy.  LDL is at goal.  In fact LDL is quite low in part contributed by severe LV dysfunction.  2. Severe heart failure with reduced ejection fraction: Patient has features of end-stage heart failure.  Intolerance for further up titration of neurohormonal blockade.  Any further up titration is causing symptomatic dizziness.  He is on half dose of level 1 Entresto and low-dose of beta-blockers.  Had an admission last year needing inotropic support at the [facility].  At my last discussion with him he was not interested in advanced heart failure evaluation.  In any case given his chronic kidney disease, RV dysfunction and ongoing smoking history this is a challenge.  His age is way past transplant, however destination therapy LVAD may still be a small possibility.  3. He wishes not to talk to [facility] advanced heart failure group.  We will refer the patient to them.  4. Routine care per device clinic.  Alert me with any ICD therapies or atrial arrhythmias.  5. Complete smoking cessation strongly reinforced.    Follow-up routinely in the core clinic in 6 months.  Sooner if anything changes clinically.  Patient is high risk of cardiovascular events and the duration of heart failure.    TAMARA Mcmanus, [facility]  Cardiology - [facility]  February 16, 2023

## 2023-02-16 NOTE — PROGRESS NOTES
New Heart Failure Referral     Sit: Referral received via TE. Patient referred by Dr. Glass to Dr. Headley.     B/A: Patient referred to [facility] for consideration of LVAD. Patient is being referred due to severe near-end HF, Low EF. Was admitted to [facility] in June 2022. Per staff message arrangements will be made to schedule appointment with Dr. Headley.     Rec: Sending to HF RN to review     Plan: offer Dr. Headley appt on 3/9/23, labs prior.                    vital signs

## 2023-02-16 NOTE — LETTER
2/16/2023    Danii Hernández MD  [id number]  [facility] [address]    RE: Delbert Tilley       Dear Colleague,     I had the pleasure of seeing Delbert Tilley in the [facility].  CARDIOLOGY CLINIC CONSULTATION    REASON FOR CONSULT: Severe heart failure with reduced ejection fraction    PRIMARY CARE PHYSICIAN:  Danii Hernández    Today's clinic visit entailed:  Review of the result(s) of each unique test - Echo labs ECG device data  50 minutes spent on the date of the encounter doing chart review, history and exam, documentation and further activities per the note  Provider  Link to [facility] Help Grid     The level of medical decision making during this visit was of high complexity.      HISTORY OF PRESENT ILLNESS:  This is a pleasant 72-year-old gentleman who is here with his daughter.  Patient transferred care to me in July 2022.  He has complex set of severe cardiac issues that have mentioned below    1. The patient has a longstanding history of coronary artery disease with bypass surgery in the early 2000's.  Last coronary angiography was in 2017 that showed patent LIMA and vein graft to the OM.  Vein graft to the right coronary artery was occluded 100%.  The right coronary artery is also occluded this is collateralized from the left system.    2. He has longstanding ischemic cardiomyopathy.    Most recent EF is in the 15 to 20% range.    3. Primary prevention ICD for many years.  No shocks. His device generator change was in April 2018.  QRS is narrow  4. Ongoing history of smoking at least about 1-1/2 pack a day  5. History of flutter ablation many years ago  6. Chronic kidney disease cardiorenal syndrome  7. Peripheral arterial disease and venous insufficiency  8. Admission to [facility] on the middle of 2022 with cardiogenic shock needing inotropic support    Patient is here for follow-up in the cardiology clinic.  He has severe near end-stage heart failure with significant  symptomatic hypotension with further up titration of neurohormonal blockade.  He has gradually worsening chronic kidney disease.  He is NYHA class IIIb most days.  Ongoing history of smoking which is contributing to his dyspnea as well.  Denies angina syncope presyncope on current regimen of heart failure therapy that includes low doses of beta-blockers Entresto and Jardiance.  No diuretic needs.    PAST MEDICAL HISTORY:  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%     Claudication (H)      COPD (chronic obstructive pulmonary disease) (H)      Diabetes (H)      Gastro-oesophageal reflux disease      GERD (gastroesophageal reflux disease)      H/O: alcohol abuse      HTN (hypertension)      Hyperlipidaemia LDL goal <100 07/08/2013     Hyperparathyroidism (H)      Hypokalemia      ICD (implantable cardioverter-defibrillator) in place     Medtronic     NSTEMI (non-ST elevated myocardial infarction) (H)      NSVT (nonsustained ventricular tachycardia)      Pacemaker      Pancreatitis 6/26/06 Hosp     Paroxysmal atrial fibrillation (H)      PVD (peripheral vascular disease) (H)      Thrombocytopenia (H)      Tobacco use      Venous (peripheral) insufficiency      Vitamin D deficiency        MEDICATIONS:  Current Outpatient Medications   Medication     albuterol (PROAIR HFA) 108 (90 Base) MCG/ACT Inhaler     amylase-lipase-protease (CREON 12) 51652-63737-29150 units CPEP     atorvastatin (LIPITOR) 80 MG tablet     calcium carbonate (OS-MARVA) 500 MG tablet     Carboxymethylcellulose Sod PF (THERATEARS PF)  0.25 % SOLN     clopidogrel (PLAVIX) 75 MG tablet     empagliflozin (JARDIANCE) 10 MG TABS tablet     gabapentin (NEURONTIN) 100 MG capsule     Glycerin (OASIS MOISTURIZING MOUTH SPRAY) 35 % LIQD     insulin aspart (NOVOLOG PEN) 100 UNIT/ML pen     insulin glargine (LANTUS PEN) 100 UNIT/ML pen     Insulin Pen Needle (PEN NEEDLES) 32G X 4 MM MISC     loperamide (IMODIUM) 2 MG capsule     magnesium oxide (MAG-OX) 400 MG tablet     metFORMIN (GLUCOPHAGE-XR) 500 MG 24 hr tablet     metoprolol succinate ER (TOPROL XL) 25 MG 24 hr tablet     mometasone (ASMANEX TWISTHALER) 220 MCG/INH inhaler     nitroGLYcerin (NITROSTAT) 0.4 MG sublingual tablet     pantoprazole (PROTONIX) 40 MG EC tablet     sacubitril-valsartan (ENTRESTO) 24-26 MG per tablet     vitamin D3 (CHOLECALCIFEROL) 50 mcg (2000 units) tablet     No current facility-administered medications for this visit.       ALLERGIES:  Allergies   Allergen Reactions     Fish Nausea and Vomiting     severe     Hydrocodone GI Disturbance     Upset stomach     Shellfish Allergy Hives and Rash       SOCIAL HISTORY:  I have reviewed this patient's social history and updated it with pertinent information if needed. Delbert SORIA Jarek  reports that he has been smoking cigarettes. He has a 75.00 pack-year smoking history. He has never used smokeless tobacco. He reports that he does not drink alcohol and does not use drugs.    FAMILY HISTORY:  I have reviewed this patient's family history and updated it with pertinent information if needed.   Family History   Adopted: Yes   Problem Relation Age of Onset     Unknown/Adopted No family hx of        REVIEW OF SYSTEMS:  Skin:        Eyes:       ENT:       Respiratory:  Negative for shortness of breath;dyspnea on exertion  Cardiovascular:  Negative for;palpitations;chest pain;edema;syncope or near-syncope;fatigue;lightheadedness;dizziness    Gastroenterology:      Genitourinary:       Musculoskeletal:       Neurologic:       Psychiatric:        Heme/Lymph/Imm:       Endocrine:           PHYSICAL EXAM:      BP: 118/51 Pulse: 70     SpO2: 95 %      Vital Signs with Ranges  Pulse:  [70] 70  BP: (118)/(51) 118/51  SpO2:  [95 %] 95 %  152 lbs 0 oz    Constitutional: awake, alert, no distress  Respiratory: Clear to auscultation  Cardiovascular: Regular heart sounds soft systolic murmur no rubs or gallops JVP is normal there is no edema  GI: nondistended  Neuropsychiatric: appropriate affact    DATA:  Labs: Pertinent cardiac labs reviewed    ASSESSMENT:  Compensated severe heart failure with reduced ejection fraction,   Chronic kidney disease/cardiorenal syndrome  Coronary artery disease as above    RECOMMENDATIONS:  1. CAD: Patient is without symptoms.  Continue statin therapy.  LDL is at goal.  In fact LDL is quite low in part contributed by severe LV dysfunction.  2. Severe heart failure with reduced ejection fraction: Patient has features of end-stage heart failure.  Intolerance for further up titration of neurohormonal blockade.  Any further up titration is causing symptomatic dizziness.  He is on half dose of level 1 Entresto and low-dose of beta-blockers.  Had an admission last year needing inotropic support at the [facility].  At my last discussion with him he was not interested in advanced heart failure evaluation.  In any case given his chronic kidney disease, RV dysfunction and ongoing smoking history this is a challenge.  His age is way past transplant, however destination therapy LVAD may still be a small possibility.  3. He wishes not to talk to [facility] advanced heart failure group.  We will refer the patient to them.  4. Routine care per device clinic.  Alert me with any ICD therapies or atrial arrhythmias.  5. Complete smoking cessation strongly reinforced.    Follow-up routinely in the core clinic in 6 months.  Sooner if anything changes clinically.  Patient is high risk of cardiovascular events and the duration of heart  failure.    TAMARA Mcmanus, [facility]  Cardiology - [facility]  February 16, 2023      Thank you for allowing me to participate in the care of your patient.      Sincerely,     Camille Glass MD     [facility]  cc:   Parul Badillo PA-C  [address]  [place] [address]

## 2023-02-17 NOTE — PROGRESS NOTES
2/17/23 - Called and left message for pt to return call to schedule advanced heart failure consult.

## 2023-02-22 PROBLEM — R57.9 SHOCK CIRCULATORY (H): Status: RESOLVED | Noted: 2022-01-01 | Resolved: 2023-01-01

## 2023-02-22 NOTE — PATIENT INSTRUCTIONS
You are due for a diabetic eye exam.  Please schedule that and have results sent to me.      Patient Education   Personalized Prevention Plan  You are due for the preventive services outlined below.  Your care team is available to assist you in scheduling these services.  If you have already completed any of these items, please share that information with your care team to update in your medical record.  Health Maintenance Due   Topic Date Due    Heart Failure Action Plan  Never done    Diabetic Foot Exam  Never done    COPD Action Plan  Never done    Eye Exam  Never done    Thyroid Function Lab  10/19/2017    LUNG CANCER SCREENING  10/21/2021    A1C Lab  12/20/2022    Kidney Microalbumin Urine Test  02/17/2023    ANNUAL REVIEW OF HM ORDERS  02/17/2023

## 2023-02-22 NOTE — Clinical Note
Please abstract the following data from this visit with this patient into the appropriate field in Epic:  Tests that can be patient reported without a hard copy:  Eye exam with ophthalmology on this date: 9/1/2022 Exam Location: [facility]

## 2023-02-22 NOTE — PROGRESS NOTES
"SUBJECTIVE:   Kamron is a 72 year old who presents for Preventive Visit.  Patient has been advised of split billing requirements and indicates understanding: Yes  Are you in the first 12 months of your Medicare coverage?  No    Healthy Habits:     In general, how would you rate your overall health?  Fair    Frequency of exercise:  None    Do you usually eat at least 4 servings of fruit and vegetables a day, include whole grains    & fiber and avoid regularly eating high fat or \"junk\" foods?  No    Taking medications regularly:  Yes    Medication side effects:  Lightheadedness and Other    Ability to successfully perform activities of daily living:  No assistance needed    Home Safety:  No safety concerns identified    Hearing Impairment:  No hearing concerns    In the past 6 months, have you been bothered by leaking of urine?  No    In general, how would you rate your overall mental or emotional health?  Fair      PHQ-2 Total Score: 0    Additional concerns today:  No      Have you ever done Advance Care Planning? (For example, a Health Directive, POLST, or a discussion with a medical provider or your loved ones about your wishes): Yes, advance care planning is on file.       Fall risk  Fallen 2 or more times in the past year?: Yes  Any fall with injury in the past year?: No    Cognitive Screening   1) Repeat 3 items (Leader, Season, Table)    2) Clock draw: NORMAL  3) 3 item recall: Recalls 3 objects  Results: 3 items recalled: COGNITIVE IMPAIRMENT LESS LIKELY    Mini-CogTM Copyright JANEL Horvath. Licensed by the author for use in [facility]; reprinted with permission (bianca@.[email or web address]). All rights reserved.      Do you have sleep apnea, excessive snoring or daytime drowsiness?: yes    Reviewed and updated as needed this visit by clinical staff   Tobacco  Allergies  Meds              Reviewed and updated as needed this visit by Provider                 Social History     Tobacco Use     Smoking status: Every " Day     Packs/day: 1.50     Years: 50.00     Pack years: 75.00     Types: Cigarettes     Smokeless tobacco: Never     Tobacco comments:     1 1/2 PPD 05/18/22   Substance Use Topics     Alcohol use: No     If you drink alcohol do you typically have >3 drinks per day or >7 drinks per week? No    Alcohol Use 2/22/2023   Prescreen: >3 drinks/day or >7 drinks/week? No         Diabetes Follow-up  Jardiance 10mg every day, Insulin - Novolog and Lantus, metformin 500mg qd  How often are you checking your blood sugar? Two times daily  Blood sugar testing frequency justification:  Uncontrolled diabetes  What time of day are you checking your blood sugars (select all that apply)?  Before and after meals  Have you had any blood sugars above 200?  Yes   Have you had any blood sugars below 70?  Yes     What symptoms do you notice when your blood sugar is low?  Numbness I left arm    What concerns do you have today about your diabetes? Low blood sugars at night     Do you have any of these symptoms? (Select all that apply)  Numbness in feet and Burning in feet    Have you had a diabetic eye exam in the last 12 months? No          Hyperlipidemia Follow-Up  Atorvastatin 80mg qd    Are you regularly taking any medication or supplement to lower your cholesterol?   Yes- statin    Are you having muscle aches or other side effects that you think could be caused by your cholesterol lowering medication?  No    Hypertension Follow-up  Metoprolol 12.5mg qd    Do you check your blood pressure regularly outside of the clinic? Yes     Are you following a low salt diet? Yes    Are your blood pressures ever more than 140 on the top number (systolic) OR more   than 90 on the bottom number (diastolic), for example 140/90? No    BP Readings from Last 2 Encounters:   02/22/23 122/64   02/16/23 118/51     Hemoglobin A1C (%)   Date Value   06/20/2022 7.8 (H)   02/09/2022 8.8 (H)   12/16/2020 9.6 (H)   10/21/2020 7.8 (H)     LDL Cholesterol Calculated  (mg/dL)   Date Value   06/21/2022 28   04/08/2022 34   12/16/2020 44   09/08/2017 45       Heart Failure Follow-up  Are you experiencing any shortness of breath? Yes, with activity only   How would you describe your shortness of breath?  Same as usual    Are you experiencing any swelling in your legs or feet?  Stable    Are you using more pillows than usual? No    Do you cough at night?  Yes    Do you check your weight daily?  Yes    Have you had a weight change recently?  No    Are you having any of the following side effects from your medications? (Select all that apply)  The patient does not report symptoms of dizziness, fatigue, cough, swelling, or slow heart beat.    Since your last visit, how many times have you gone to the cardiologist, urgent care, emergency room, or hospital because of your heart failure?   None    COPD Follow-Up    Overall, how are your COPD symptoms since your last clinic visit?  No change    How much fatigue or shortness of breath do you have when you are walking?  Same as usual    How much shortness of breath do you have when you are resting?  None    How often do you cough? Sometimes    Have you noticed any change in your sputum/phlegm?  No    Have you experienced a recent fever? No    Please describe how far you can walk without stopping to rest:  Less than 1 block    How many flights of stairs are you able to walk up without stopping?  None    Have you had any Emergency Room Visits, Urgent Care Visits, or Hospital Admissions because of your COPD since your last office visit?  No    History   Smoking Status     Every Day     Packs/day: 1.50     Years: 50.00     Types: Cigarettes   Smokeless Tobacco     Never     No results found for: FEV1, PAT0PAM    Chronic Kidney Disease Follow-up      Do you take any over the counter pain medicine?: No      Current providers sharing in care for this patient include:   Patient Care Team:  Danii Hernández MD as PCP - General (Family  "Medicine)  Danii Hernández MD as Assigned PCP  Parul Badillo PA-C as Assigned Heart and Vascular Provider  Makayla Shoemaker PA-C as Physician Assistant (Dermatology)  Makayla Shoemaker PA-C as Assigned Surgical Provider    The following health maintenance items are reviewed in Epic and correct as of today:  Health Maintenance   Topic Date Due     HF ACTION PLAN  Never done     COPD ACTION PLAN  Never done     EYE EXAM  Never done     TSH W/FREE T4 REFLEX  10/19/2017     LUNG CANCER SCREENING  10/21/2021     A1C  12/20/2022     MICROALBUMIN  02/17/2023     DTAP/TDAP/TD IMMUNIZATION (7 - Td or Tdap) 05/22/2023     LIPID  06/21/2023     ALT  06/23/2023     CBC  06/23/2023     BMP  08/16/2023     HEMOGLOBIN  09/14/2023     NICOTINE/TOBACCO CESSATION COUNSELING Q 1 YR  02/22/2024     MEDICARE ANNUAL WELLNESS VISIT  02/22/2024     DIABETIC FOOT EXAM  02/22/2024     ANNUAL REVIEW OF HM ORDERS  02/22/2024     FALL RISK ASSESSMENT  02/22/2024     COLORECTAL CANCER SCREENING  01/06/2025     ADVANCE CARE PLANNING  03/25/2026     SPIROMETRY  Completed     HEPATITIS C SCREENING  Completed     PHQ-2 (once per calendar year)  Completed     INFLUENZA VACCINE  Completed     Pneumococcal Vaccine: 65+ Years  Completed     URINALYSIS  Completed     ZOSTER IMMUNIZATION  Completed     AORTIC ANEURYSM SCREENING (SYSTEM ASSIGNED)  Completed     COVID-19 Vaccine  Completed     IPV IMMUNIZATION  Aged Out     MENINGITIS IMMUNIZATION  Aged Out     Lab work is in process  Labs reviewed in EPIC      Review of Systems  Constitutional, HEENT, cardiovascular, pulmonary, gi and gu systems are negative, except as otherwise noted.    OBJECTIVE:   /64   Pulse 92   Temp 97.8  F (36.6  C) (Tympanic)   Resp 16   Ht 1.803 m (5' 11\")   Wt 70.1 kg (154 lb 8 oz)   SpO2 97%   BMI 21.55 kg/m   Estimated body mass index is 21.55 kg/m  as calculated from the following:    Height as of this encounter: 1.803 m (5' 11\").    Weight as of " this encounter: 70.1 kg (154 lb 8 oz).  Physical Exam  GENERAL: healthy, alert and no distress  NECK: no adenopathy, no asymmetry, masses, or scars and thyroid normal to palpation  RESP: lungs clear to auscultation - no rales, rhonchi or wheezes  CV: regular rate and rhythm, normal S1 S2, no S3 or S4, no murmur, click or rub, no peripheral edema and peripheral pulses strong  ABDOMEN: soft, nontender, no hepatosplenomegaly, no masses and bowel sounds normal  MS: no gross musculoskeletal defects noted, no edema, wearing compression stockings    Diagnostic Test Results:  Labs reviewed in Epic  Results for orders placed or performed in visit on 02/22/23 (from the past 24 hour(s))   Hemoglobin   Result Value Ref Range    Hemoglobin 12.9 (L) 13.3 - 17.7 g/dL   HEMOGLOBIN A1C   Result Value Ref Range    Hemoglobin A1C 7.9 (H) 0.0 - 5.6 %       ASSESSMENT / PLAN:   (Z00.00) Encounter for Medicare annual wellness exam  (primary encounter diagnosis)  Comment:    Plan: HEMOGLOBIN A1C, FOOT EXAM             (E11.3299) Nonproliferative diabetic retinopathy (H)  Comment[facility]  Plan: TSH WITH FREE T4 REFLEX, HEMOGLOBIN A1C, FOOT         EXAM             (N18.32,  D63.1) Anemia in stage 3b chronic kidney disease (H)  Comment:  stable  Plan: Albumin Random Urine Quantitative with Creat         Ratio             (E11.21) Diabetic nephropathy associated with type 2 diabetes mellitus (H)  Comment: has been relatively stable   Plan: HEMOGLOBIN A1C, OFFICE/OUTPT VISIT,SANDY LAINEZ III             (I50.22) Chronic systolic heart failure (H)  Comment: severe heart failure with reduced EF.   End stage heart failure.  Cardiology is having him see the advanced heart failure group at the  of  to discuss destination LVAD but he's not sure he wants to do this. Will go to the consultation with his dtr  Plan: OFFICE/OUTPT VISIT,SANDY LAINEZ III             (I25.10) Coronary artery disease involving native heart without  angina pectoris, unspecified vessel or lesion type  Comment: asymptomatic  Plan: OFFICE/OUTPT VISIT,EST,LEVL III        Continue statin    (J44.9) Chronic obstructive pulmonary disease, unspecified COPD type (H)  Comment:  Encouraged smoking cessation, stable  Plan:  On no oxygen    (K86.0) Alcohol-induced chronic pancreatitis (H)  Comment: no further alcohol use  Plan: continue Creon with meals    (E21.3) Hyperparathyroidism (H)  Comment: stable  Plan: continue vitamin D    (D69.6) Thrombocytopenia (H)  Comment: has been stable.  In June was 159k plt count  Plan:      (N18.32) Stage 3b chronic kidney disease (H)  Comment: stable, continue monitorin  Plan: OFFICE/OUTPT VISIT,EST,LEVL III               Patient has been advised of split billing requirements and indicates understanding: Yes      COUNSELING:  Reviewed preventive health counseling, as reflected in patient instructions       Regular exercise       Healthy diet/nutrition        He reports that he has been smoking cigarettes. He has a 75.00 pack-year smoking history. He has never used smokeless tobacco.  Nicotine/Tobacco Cessation Plan:   Information offered: Patient not interested at this time      Appropriate preventive services were discussed with this patient, including applicable screening as appropriate for cardiovascular disease, diabetes, osteopenia/osteoporosis, and glaucoma.  As appropriate for age/gender, discussed screening for colorectal cancer, prostate cancer, breast cancer, and cervical cancer. Checklist reviewing preventive services available has been given to the patient.    Reviewed patients plan of care and provided an AVS. The Basic Care Plan (routine screening as documented in Health Maintenance) for Delbert meets the Care Plan requirement. This Care Plan has been established and reviewed with the Patient.      Danii Hernández MD  [facility]    Identified Health Risks:

## 2023-03-03 NOTE — TELEPHONE ENCOUNTER
[facility]    Responded to [employer] message re: medications. Per clinic note from earlier today w/Dr Cueva of [facility] it was discovered that patient taking both metoprolol and carvedilol. Patient instructed to STOP metoprolol but continue w/the coreg. He was also instructed to continue all other meds(incl jardiance). This response sent to pt in [employer] message.  Teodora De Jesus RN on 3/3/2023 at 12:39 PM

## 2023-03-16 NOTE — PROGRESS NOTES
Patient walked into clinic requesting referral to chiropractor or PT.   States he was lifting about 2 gallons of water and felt something pull and now has right hip pain. Ambulating with a cane. Scheduled appointment tomorrow.   Has not been evaluated for this.   Charisma Odell RN on 3/16/2023 at 10:57 AM

## 2023-03-17 NOTE — PATIENT INSTRUCTIONS
Continue heat/ice  Muscle rubs or lidocaine patches/ointment  We will update you on x ray results. If worsening please let us know. Be careful lifting.

## 2023-03-17 NOTE — PROGRESS NOTES
Assessment & Plan     Chronic bilateral low back pain with right-sided sciatica  Patient would like to pursue x-rays today.  Symptoms are improving discussed symptomatic cares including rest, gentle stretching heat and over-the-counter topical medications.  Discussed if symptoms are worsening or not improving recommended reevaluation.  Reviewed symptoms that would require more urgent evaluation  - XR Lumbar Spine 2/3 Views  - XR Hip Right 2-3 Views        Return if symptoms worsen or fail to improve.    JODY TODD [facility]    Payam Lundy is a 72 year old, presenting for the following health issues:  Musculoskeletal Problem (Right hip pain started a few years ago but over the past 2 weeks has got worse. )      Musculoskeletal Problem    History of Present Illness       Reason for visit:  Hip pain    He eats 2-3 servings of fruits and vegetables daily.He consumes 0 sweetened beverage(s) daily.He exercises with enough effort to increase his heart rate 9 or less minutes per day.  He exercises with enough effort to increase his heart rate 3 or less days per week.   He is taking medications regularly.       Pain History:  When did you first notice your pain? - Acute Pain A couple of years ago but got worse in the past 2 weeks.   Have you seen anyone else for your pain? No  Where in your body do you have pain? Right hip     Does have history of lumbar disc disease.  Was lifting a pot of water out of stove a few weeks ago when this all started.  Has had pain primarily in right buttock.  Has been taking Tylenol and using heat pack which has helped significantly.  Will occasionally have shooting pain over her right thigh and into lower leg.    Review of Systems   Constitutional, HEENT, cardiovascular, pulmonary, gi and gu systems are negative, except as otherwise noted.      Objective    /60 (BP Location: Right arm, Patient Position: Sitting, Cuff Size: Adult Regular)    "Pulse 83   Temp 97.4  F (36.3  C) (Tympanic)   Resp 18   Ht 1.8 m (5' 10.87\")   Wt 70.3 kg (155 lb)   SpO2 98%   BMI 21.70 kg/m    Body mass index is 21.7 kg/m .  Physical Exam  Constitutional:       Comments: Ambulates with cane   HENT:      Head: Normocephalic.   Cardiovascular:      Rate and Rhythm: Normal rate.   Musculoskeletal:         General: No tenderness.      Right lower leg: No edema.      Left lower leg: No edema.   Neurological:      Mental Status: He is alert.   Psychiatric:         Thought Content: Thought content normal.         Judgment: Judgment normal.                "

## 2023-04-27 NOTE — PROGRESS NOTES
[facility]  Patient Name:  Delbert Tilley  MRN:  [id number]    :  1950  Date of Service:  2023  Primary care provider:  Danii Hernández      History of Present Illness:   Delbert Tilley is a 72 year old male with history of CAD s/p bypass surgery in the early , ICM HFrEF LVEF 20-25%  s/p ICD placement, tobacco use disorder (1-1/2 pack a day), T2DM, CKD - cardiorenal syndrome, PAD, and venous insufficiency, who is coming today for evaluation of advanced heart therapies.     From chart review patient had last coronary angiography was in 2017 that showed patent LIMA and vein graft to the OM.  Vein graft to the right coronary artery was occluded 100%.  The right coronary artery is also occluded this is collateralized from the left system.  Patient had an Admission to [facility] on the middle of  with cardiogenic shock needing inotropic support     Patient last time seen by his Cardiologist on 2023. Intolerance for further up titration of neurohormonal blockade because symptomatic dizziness. Patient was referred to [facility] for evaluation of advanced heart therapies.     Today patient was accompanied by his daughter. Patient reports dyspnea on exertion (patient can't walk more than half of block), weakness and fatigue. Patient reports chronic swelling of lower extremities. Patient denies chest pain, orthopnea, PND, palpitations, nausea, vomiting, abdominal pain or blood in stools or urine. Patient denies drug use or alcohol intake.     ROS  A comprehensive ROS was performed and pertinent findings were included in HPI.     PMH  Past Medical History:   Diagnosis Date     Acute renal failure (H) 06 Hosp    secondary to dehydration     Anemia      Arthritis      Atherosclerosis of artery of extremity with intermittent claudication (H)      CAD (coronary artery disease)     LIMA to the LAD, vein graft to OM and a vein graft to the PDA      Cardiomyopathy (H)      Carpal tunnel syndrome      CHF (congestive heart failure) (H)     Ischemic and nonischemic cardiomyopathy; 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%     Claudication (H)      COPD (chronic obstructive pulmonary disease) (H)      Diabetes (H)      Gastro-oesophageal reflux disease      GERD (gastroesophageal reflux disease)      H/O: alcohol abuse      HTN (hypertension)      Hyperlipidaemia LDL goal <100 07/08/2013     Hyperparathyroidism (H)      Hypokalemia      ICD (implantable cardioverter-defibrillator) in place     [employer]     NSTEMI (non-ST elevated myocardial infarction) (H)      NSVT (nonsustained ventricular tachycardia)      Pacemaker      Pancreatitis 6/26/06 Hosp     Paroxysmal atrial fibrillation (H)      PVD (peripheral vascular disease) (H)      Thrombocytopenia (H)      Tobacco use      Venous (peripheral) insufficiency      Vitamin D deficiency      Past Surgical History:   Procedure Laterality Date     CATARACT IOL, RT/LT      Cataract IOL RT/LT     CLOSE SECONDARY WOUND LOWER EXTREMITY Right 02/13/2022    Procedure: Right groin wound exploration, nerve release and closure.;  Surgeon: Karen Arthur MD;  Location: [facility] OR     ENDARTERECTOMY CAROTID Right 06/12/2015    Procedure: ENDARTERECTOMY CAROTID;  Surgeon: João Turner MD;  Location:  OR     ENDARTERECTOMY CAROTID Left 09/08/2017    Procedure: ENDARTERECTOMY CAROTID;  LEFT CAROTID ENDARTERECTOMY WITH EEG;  Surgeon: João Turner MD;  Location:  OR     ENDARTERECTOMY FEMORAL Bilateral 02/09/2022    Procedure: BILATERAL- COMMON FEMORAL ENDARTERECTOMY; RETROGRADE ILIAC ARTERY STENTING;  Surgeon: Ric Chau MD;  Location: [facility] OR     IMPLANT PACEMAKER  06/10/2010     IMPLANTED DEVICE       INTERPOSITION TENDON HAND N/A 06/09/2020    Procedure: Right thumb ligament reconstruction tendon interposition with extensor pollicis brevis to abductor pollicis tendon transfer;  Surgeon: Veronica  "Bethel ROONEY MD;  Location: [facility] OR     PICC INSERTION - TRIPLE LUMEN Right 06/20/2022    Right basilic vein 0.62cm 4cm ext.Placement verified by [facility] 3CG.PICC okay to use.     RELEASE CARPAL TUNNEL Right 04/12/2016    Procedure: RELEASE CARPAL TUNNEL;  Surgeon: Lissy Leger MD;  Location: [facility] OR     SURGICAL HISTORY OF -   1998    Laminectomy     TONSILLECTOMY & ADENOIDECTOMY       ZZC CABG, ARTERIAL, THREE         Medications   I have personally reviewed the patient's medication list.     Allergies  I have personally reviewed the patient's allergy list.     Physical Examination   /68 (BP Location: Right arm, Patient Position: Sitting, Cuff Size: Adult Regular)   Pulse 75   Ht 1.795 m (5' 10.67\")   Wt 71.3 kg (157 lb 3.2 oz)   SpO2 98%   BMI 22.13 kg/m    General: NAD, interactive  Head: NC/AT, EOMI   Cardiac: RRR. JVD ~ 6-8 cm    Respiratory: non-labored on RA, no wheezing or crackles  GI: S/NT/ND, no guarding   Skin: No rash or lesion on exposed skin  Extremities: Mild lower peripheral edema   Psych: Mood pleasant, affect congruent  Neuro:Alert and oriented x 3    Assessment and Plan:   Delbert Tilley is a 72 year old male with history of CAD s/p bypass surgery in the early 2000's, ICM HFrEF LVEF 20-25%  s/p ICD placement, tobacco use disorder (1-1/2 pack a day), T2DM, CKD - Cardiorenal syndrome,  PAD, and venous insufficiency, who is coming today for evaluation of advanced heart therapies.     Currently patient with features of end-stage heart failure. Last TTE on 01/31/23 showed LVEF 20-25% with cavity LVIDd of 6.3 cm. Unfortunately patient is intolerant to further up titration of GDMT due to symptomatic dizziness. Currently patient is not candidate for heart transplant due to age and active smoker. Discussed with patient about possible evaluation for LVAD placement. Will start first with Right Heart Catheterization and Cardio Pulmonary stress test to have more objective data before LVAD " evaluation. Patient will continue with same GDMT therapy (metoprolol 12.5 mg every day, entresto 24-25 mg every day, jardiance 10 mg every day, and torsemide) and will f/u with Dr. Headley in 1 month.     Thank you for involving Cardiology in the care of Delbert Tilley.  Please do not hesitate to call with questions/concerns.      Patient was seen and discussed with Dr. Headley.    Siva Glez MD  Internal Medicine, PGY2     I have seen and examined the patient on April 28, 2023. I have discussed the patient with the heart failure team and I agree with the assessment and plan as outlined below. I have personally reviewed vital signs, hemodynamics, medications, laboratory values, and diagnostic testing.     Patient referred to me from my colleague Dr. Camille Glass who he has seen in [facility]  Patient has had a cardiomyopathy for several years, underwent bypass surgery around 2005  Is a vasculopath with severe peripheral arterial disease, previously had his bilateral femoral arteries stented, has upcoming bilateral tibial artery procedure  Continues to smoke, has diabetes, stage III chronic kidney disease with proteinuria  Rates quality of life approximately 4 out of 10.  Does appear to have cachexia and sarcopenia on exam with temporal and masseter muscle wasting, very thin thighs  Was above 200 pounds most of his adult life, had significant weight loss over 5 years ago, has been roughly 150 to 170 pounds since 2018  No alcohol use, lives alone, daughter lives 20 miles away, son lives 2 hours away, previously worked in construction    Patient has all the markers for end-stage heart disease.  He appears euvolemic on exam and his NT proBNP is still 33,000  Unfortunately his LV cavity is normal size 5.6 cm and his ejection fraction is around 20 to 25%.  These patient's tend to not do so well with LVAD's given recurrent low flow alarms, risk for suction events, inability to unload well enough.  Not  impossible but more difficult to manage  Not a transplant candidate given his age and his continued smoking.  Can start with a screening right heart catheterization and cardiopulmonary exercise stress test.  My interpretation is that he would be a high risk candidate given his comorbidities, age, prior thoracic surgery.  He would be high risk for worsening claudication and limb ischemia, temporary dialysis, stroke.  Patient unsure if he even wants to proceed down this route but definitely wants to get the above testing done to see his risk.    Humble Headley MD   of Medicine  Advanced Heart Failure and Transplant Cardiology    Today's clinic visit entailed:  75 minutes spent on the date of the encounter doing chart review, history and exam, documentation and further activities per the note    The level of medical decision making during this visit was of high complexity.

## 2023-04-27 NOTE — NURSING NOTE
Chief Complaint   Patient presents with     New Patient     72 year old male referred by Dr. Glass presents for consideration of LVAD EF 20-25% . Was admitted to [facility] in June 2022. Labs prior.       Vitals were taken, medications reconciled.    Kiarra Heaton, EMT   3:57 PM

## 2023-04-27 NOTE — PATIENT INSTRUCTIONS
"Cardiology Providers you saw during your visit: Dr. Headley     Medication changes:   - No changes.    Testing:   - Right Heart Catheterization, Cardio Pulmonary stress test, and follow up with Dr. Headley in 1 month. We will call you to schedule these procedures.           Please call if you have :  1. Weight gain of more than 2 pounds in a day or 5 pounds in a week  2. Increased shortness of breath, swelling or bloating  3. Dizziness, lightheadedness   4. Any questions or concerns.       Follow the American Heart Association Diet and Lifestyle recommendations:  Limit saturated fat, trans fat, sodium, red meat, sweets and sugar-sweetened beverages. If you choose to eat red meat, compare labels and select the leanest cuts available.  Aim for at least 150 minutes of moderate physical activity or 75 minutes of vigorous physical activity - or an equal combination of both - each week.      During business hours: [phone], press option # 1 to schedule or leave a message for your care team      After hours, weekends or holidays: On Call Cardiologist- [phone]   option #4 and ask to speak to the on-call Cardiologist. Inform them you are a CORE/heart failure patient at [facility].      Heart Failure Support Group  Virtual meetings 2023,  1-2 p.m.  Monday,  1-2 p.m.  Monday, , 1-2 p.m.  Monday,  1-2 p.m.  Monday, May 1 st, 1-2 p.m.  Monday, , 1-2 p.m,  , 1-2 p.m.  Monday, , 1-2 p.m.  Monday, , 1-2 pm  Monday, , 1-2pm  Monday, , 1-2pm      Ellie Cook RN   Cardiology Care Coordinator - Heart Failure/ [facility]  [facility]   Questions and [phone]   First press #1 for the [place] \"To send a message to your care team\"    "

## 2023-04-27 NOTE — LETTER
2023      RE: Delbert Tilley  [address]  [place] [address]       Dear Colleague,    Thank you for the opportunity to participate in the care of your patient, Delbert Tilley, at the [facility] [place] [facility]. Please see a copy of my visit note below.    [facility]  Patient Name:  Delbert Tilley  MRN:  [id number]    :  1950  Date of Service:  2023  Primary care provider:  Danii Hernández      History of Present Illness:   Delbert Tilley is a 72 year old male with history of CAD s/p bypass surgery in the early , ICM HFrEF LVEF 20-25%  s/p ICD placement, tobacco use disorder (1-1/2 pack a day), T2DM, CKD - cardiorenal syndrome, PAD, and venous insufficiency, who is coming today for evaluation of advanced heart therapies.     From chart review patient had last coronary angiography was in 2017 that showed patent LIMA and vein graft to the OM.  Vein graft to the right coronary artery was occluded 100%.  The right coronary artery is also occluded this is collateralized from the left system.  Patient had an Admission to [facility] on the middle of  with cardiogenic shock needing inotropic support     Patient last time seen by his Cardiologist on 2023. Intolerance for further up titration of neurohormonal blockade because symptomatic dizziness. Patient was referred to [facility] for evaluation of advanced heart therapies.     Today patient was accompanied by his daughter. Patient reports dyspnea on exertion (patient can't walk more than half of block), weakness and fatigue. Patient reports chronic swelling of lower extremities. Patient denies chest pain, orthopnea, PND, palpitations, nausea, vomiting, abdominal pain or blood in stools or urine. Patient denies drug use or alcohol intake.     ROS  A comprehensive ROS was performed and pertinent findings were  included in HPI.     PMH  Past Medical History:   Diagnosis Date    Acute renal failure (H) 8/26/06 Hosp    secondary to dehydration    Anemia     Arthritis     Atherosclerosis of artery of extremity with intermittent claudication (H)     CAD (coronary artery disease)     LIMA to the LAD, vein graft to OM and a vein graft to the PDA    Cardiomyopathy (H)     Carpal tunnel syndrome     CHF (congestive heart failure) (H)     Ischemic and nonischemic cardiomyopathy; 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%    Claudication (H)     COPD (chronic obstructive pulmonary disease) (H)     Diabetes (H)     Gastro-oesophageal reflux disease     GERD (gastroesophageal reflux disease)     H/O: alcohol abuse     HTN (hypertension)     Hyperlipidaemia LDL goal <100 07/08/2013    Hyperparathyroidism (H)     Hypokalemia     ICD (implantable cardioverter-defibrillator) in place     [employer]    NSTEMI (non-ST elevated myocardial infarction) (H)     NSVT (nonsustained ventricular tachycardia)     Pacemaker     Pancreatitis 6/26/06 Hosp    Paroxysmal atrial fibrillation (H)     PVD (peripheral vascular disease) (H)     Thrombocytopenia (H)     Tobacco use     Venous (peripheral) insufficiency     Vitamin D deficiency      Past Surgical History:   Procedure Laterality Date    CATARACT IOL, RT/LT      Cataract IOL RT/LT    CLOSE SECONDARY WOUND LOWER EXTREMITY Right 02/13/2022    Procedure: Right groin wound exploration, nerve release and closure.;  Surgeon: Karen Arthur MD;  Location: [facility] OR    ENDARTERECTOMY CAROTID Right 06/12/2015    Procedure: ENDARTERECTOMY CAROTID;  Surgeon: João Turner MD;  Location:  OR    ENDARTERECTOMY CAROTID Left 09/08/2017    Procedure: ENDARTERECTOMY CAROTID;  LEFT CAROTID ENDARTERECTOMY WITH EEG;  Surgeon: João Turner MD;  Location:  OR    ENDARTERECTOMY FEMORAL Bilateral 02/09/2022    Procedure: BILATERAL- COMMON FEMORAL ENDARTERECTOMY; RETROGRADE ILIAC ARTERY STENTING;  Surgeon: Kandi  "Ric Gallo MD;  Location: [facility] [place]    IMPLANT PACEMAKER  06/10/2010    IMPLANTED DEVICE      INTERPOSITION TENDON HAND N/A 06/09/2020    Procedure: Right thumb ligament reconstruction tendon interposition with extensor pollicis brevis to abductor pollicis tendon transfer;  Surgeon: Bethel Martin MD;  Location: [facility] OR    PICC INSERTION - TRIPLE LUMEN Right 06/20/2022    Right basilic vein 0.62cm 4cm ext.Placement verified by [facility]lock 3CG.PICC okay to use.    RELEASE CARPAL TUNNEL Right 04/12/2016    Procedure: RELEASE CARPAL TUNNEL;  Surgeon: Lissy Leger MD;  Location: [facility] OR    SURGICAL HISTORY OF -   1998    Laminectomy    TONSILLECTOMY & ADENOIDECTOMY      ZZC CABG, ARTERIAL, THREE         Medications   I have personally reviewed the patient's medication list.     Allergies  I have personally reviewed the patient's allergy list.     Physical Examination   /68 (BP Location: Right arm, Patient Position: Sitting, Cuff Size: Adult Regular)   Pulse 75   Ht 1.795 m (5' 10.67\")   Wt 71.3 kg (157 lb 3.2 oz)   SpO2 98%   BMI 22.13 kg/m    General: NAD, interactive  Head: NC/AT, EOMI   Cardiac: RRR. JVD ~ 6-8 cm    Respiratory: non-labored on RA, no wheezing or crackles  GI: S/NT/ND, no guarding   Skin: No rash or lesion on exposed skin  Extremities: Mild lower peripheral edema   Psych: Mood pleasant, affect congruent  Neuro:Alert and oriented x 3    Assessment and Plan:   Delbert Tilley is a 72 year old male with history of CAD s/p bypass surgery in the early 2000's, ICM HFrEF LVEF 20-25%  s/p ICD placement, tobacco use disorder (1-1/2 pack a day), T2DM, CKD - Cardiorenal syndrome,  PAD, and venous insufficiency, who is coming today for evaluation of advanced heart therapies.     Currently patient with features of end-stage heart failure. Last TTE on 01/31/23 showed LVEF 20-25% with cavity LVIDd of 6.3 cm. Unfortunately patient is intolerant to further up titration of GDMT due to " symptomatic dizziness. Currently patient is not candidate for heart transplant due to age and active smoker. Discussed with patient about possible evaluation for LVAD placement. Will start first with Right Heart Catheterization and Cardio Pulmonary stress test to have more objective data before LVAD evaluation. Patient will continue with same GDMT therapy (metoprolol 12.5 mg every day, entresto 24-25 mg every day, jardiance 10 mg every day, and torsemide) and will f/u with Dr. Headley in 1 month.     Thank you for involving Cardiology in the care of Delbert Tilley.  Please do not hesitate to call with questions/concerns.      Patient was seen and discussed with Dr. Headley.    Siva Glez MD  Internal Medicine, PGY2     I have seen and examined the patient on April 28, 2023. I have discussed the patient with the heart failure team and I agree with the assessment and plan as outlined below. I have personally reviewed vital signs, hemodynamics, medications, laboratory values, and diagnostic testing.     Patient referred to me from my colleague Dr. Camille Glass who he has seen in [facility]  Patient has had a cardiomyopathy for several years, underwent bypass surgery around 2005  Is a vasculopath with severe peripheral arterial disease, previously had his bilateral femoral arteries stented, has upcoming bilateral tibial artery procedure  Continues to smoke, has diabetes, stage III chronic kidney disease with proteinuria  Rates quality of life approximately 4 out of 10.  Does appear to have cachexia and sarcopenia on exam with temporal and masseter muscle wasting, very thin thighs  Was above 200 pounds most of his adult life, had significant weight loss over 5 years ago, has been roughly 150 to 170 pounds since 2018  No alcohol use, lives alone, daughter lives 20 miles away, son lives 2 hours away, previously worked in construction    Patient has all the markers for end-stage heart disease.  He appears  euvolemic on exam and his NT proBNP is still 33,000  Unfortunately his LV cavity is normal size 5.6 cm and his ejection fraction is around 20 to 25%.  These patient's tend to not do so well with LVAD's given recurrent low flow alarms, risk for suction events, inability to unload well enough.  Not impossible but more difficult to manage  Not a transplant candidate given his age and his continued smoking.  Can start with a screening right heart catheterization and cardiopulmonary exercise stress test.  My interpretation is that he would be a high risk candidate given his comorbidities, age, prior thoracic surgery.  He would be high risk for worsening claudication and limb ischemia, temporary dialysis, stroke.  Patient unsure if he even wants to proceed down this route but definitely wants to get the above testing done to see his risk.    Humble Headley MD   of Medicine  Advanced Heart Failure and Transplant Cardiology    Today's clinic visit entailed:  75 minutes spent on the date of the encounter doing chart review, history and exam, documentation and further activities per the note    The level of medical decision making during this visit was of high complexity.          Please do not hesitate to contact me if you have any questions/concerns.     Sincerely,     Humble Headley MD

## 2023-04-27 NOTE — NURSING NOTE
Cardiac Testing: Patient given instructions regarding RHC/CPX. Discussed purpose, preparation, procedure and when to expect results reported back to the patient. Patient demonstrated understanding of this information and agreed to call with further questions or concerns.    Med Reconcile: Reviewed and verified all current medications with the patient. The updated medication list was printed and given to the patient.    Return Appointment:   - Follow up in 1 month with Dr. Headley  Patient given instructions regarding scheduling next clinic visit. Patient demonstrated understanding of this information and agreed to call with further questions or concerns.    Right Heart Catheterization:   Testing:   - Right Heart Catheterization, Cardio Pulmonary stress test, and follow up with Dr. Headley in 1 month. We will call you to schedule these procedures.   Patient was instructed regarding right heart catheterization, including discussion of the procedure, preparation, intra-procedural steps, and recovery at home. Patient demonstrated understanding of this information and agreed to call with further questions or concerns.    Patient stated he understood all health information given and agreed to call with further questions or concerns.

## 2023-04-28 NOTE — TELEPHONE ENCOUNTER
----- Message from Ellie Cook RN sent at 4/27/2023  5:10 PM CDT -----  RHC/CPX/appt with Yasemin in 1 month approx for LVAD workup. Schedule

## 2023-04-28 NOTE — TELEPHONE ENCOUNTER
Date: 4/28/2023    Time of Call: 9:28 AM     Diagnosis:  HF     [ VORB ] Ordering provider: Dr. Headley    Order: RHC/CPX in about 1 month      Order received by: Ellie Cook RN       Follow-up/additional notes: RHC/CPX ordered. Called pt daughter, Kim, who is in charge of all pts appointment scheduling. Got pt scheduled for RHC/CPX and Follow-up appt with Dr. Headley on June 7th starting at 7am.

## 2023-05-01 NOTE — TELEPHONE ENCOUNTER
----- Message from Ellie Cook RN sent at 4/28/2023  9:55 AM CDT -----  Call yeni in cath scheduling; get pt scheduled for CPX and RHC June 7th starting at 7am. Pt has Yasemin appt same day to review results at 3 pm

## 2023-05-01 NOTE — TELEPHONE ENCOUNTER
Pt reports some weight increases over the weekend which improved with increasing Torsemide to 20 mg daily (previously taking 10 mg daily). See [employer] messages.     Today's weight is 156#; when seen in clinic on 4/28 weight was 157#. Pt reports some swelling in his thighs. No SOB or fatigue.     Dr. Headley, any recommendations?     Ellie Cook RN

## 2023-05-01 NOTE — TELEPHONE ENCOUNTER
June 7th RHC/CPX times unavailable.     June 9th 10 am start time and 4:30 pm appt with Dr. Headley available.     Called and left pt message.     Called pt daughter, who does all his appt scheduling, and offered June 9th; date and time work for them. RHC/CPX and Follow-up with Dr. Headley scheduled. [employer] message sent with updated date and time.     Ellie Cook RN

## 2023-05-02 PROBLEM — Z96.1 ARTIFICIAL LENS PRESENT: Status: ACTIVE | Noted: 2023-01-01

## 2023-05-02 PROBLEM — I42.6 ALCOHOLIC CARDIOMYOPATHY (H): Status: ACTIVE | Noted: 2023-01-01

## 2023-05-02 NOTE — TELEPHONE ENCOUNTER
Date: 5/2/2023    Time of Call: 9:12 AM     Diagnosis:  Hypervolemia      [ TORB ] Ordering provider: Dr. Headley    Order: Increase Torsemide to 20 mg in the AM and 10 mg in the PM.      Order received by: Ellie Cook RN       Follow-up/additional notes: [employer] message sent to pt.

## 2023-05-05 NOTE — TELEPHONE ENCOUNTER
----- Message from Ellie Cook RN sent at 5/2/2023  9:16 AM CDT -----  5/2 Torsemide increase to 20/10 for swelling in thighs.

## 2023-05-05 NOTE — TELEPHONE ENCOUNTER
Sent pt and daughter mychart message to Follow-up on increased Torsemide dose.     Ellie Cook RN

## 2023-05-08 NOTE — TELEPHONE ENCOUNTER
Date: 5/8/2023    Time of Call: 1:13 PM     Diagnosis:  Hypervolemia      [ VORB ] Ordering provider: Dr. Headley    Order:   -Increase Torsemide to 40mg AM and 20 mg afternoon.   -BMP on Thursday 5/11/23.      Order received by: Ellie Cook RN       Follow-up/additional notes: Communicated recommendations with pt via [employer]. Pt will get labs drawn locally.

## 2023-05-08 NOTE — TELEPHONE ENCOUNTER
Post-Care Instructions: I reviewed with the patient in detail post-care instructions. Patient is to keep the area dry for 48 hours, and not to engage in any swimming until the area is healed. Should the patient develop any fevers, chills, bleeding, severe pain patient will contact the office immediately. Torsemide increased to 20/10 on 5/2/23.     Pt continues to have swelling in bilateral thighs and reports some increased SOB with exertion today.     Pt weight today is 157#, no change since Torsemide increase.     CPX/RHC/clinc FU scheduled for 6/9.     Routing to Dr. Headley to review and advise.     Ellie Cook RN             Size Of Lesion After Curettage: 1.6 Number Of Curettages: 3 Anesthesia Type: 1% lidocaine with epinephrine Add Intralesional Injection: No Additional Information: (Optional): The wound was cleaned, and a pressure dressing was applied.  The patient received detailed post-op instructions. Total Volume (Ccs): 1 Size Of Lesion In Cm: 0.8 Concentration (Mg/Ml Or Millions Of Plaque Forming Units/Cc): 0.01 Bill As A Line Item Or As Units: Line Item Consent was obtained from the patient. The risks, benefits and alternatives to therapy were discussed in detail. Specifically, the risks of infection, scarring, bleeding, prolonged wound healing, nerve injury, incomplete removal, allergy to anesthesia and recurrence were addressed. Alternatives to ED&C, such as: surgical removal and XRT were also discussed.  Prior to the procedure, the treatment site was clearly identified and confirmed by the patient. Cautery Type: aluminum chloride What Was Performed First?: Curettage Detail Level: Detailed

## 2023-05-09 NOTE — TELEPHONE ENCOUNTER
Called pt; he states that his weight is up to 160# today; up 3# from yesterday.     Pt was instructed to increase Torsemide to 40/20 yesterday; pt self increased Torsemide.   - 5/8 he took Torsemide 40 mg in the AM and 40 mg at 6pm and didn't urinate.   -Took another 40 mg today at 2am and urinated once.  -Took 40 mg this morning at 7 am and urinated once.     Pt states that he is urinating the same amount as he did before increasing Torsemide dose. Reports increased welling in upper thighs.     Pt mentioned that in the past he had taken Metolazone 2.5 mg x 2-3 days and it helped with his swelling/weight gain.     Pt scheduled for BMP on Thursday 5/11 at 10 am.     Routing to Dr. Headley to advise.     Ellie Cook RN

## 2023-05-09 NOTE — TELEPHONE ENCOUNTER
Date: 5/9/2023    Time of Call: 1:35 PM     Diagnosis:  Hypervolemia      [ TORB ] Ordering provider: Dr. Headley    Order:   -Continue Torsemide 40/20.   -Metolazone 2.5 mg daily x 2 days.      Order received by: Ellie Cook RN       Follow-up/additional notes: Called Kamron; he will  Metolazone now and take 2.5 mg today and 2.5 mg tomorrow AM prior to Torsemide dose. Asked that pt please not take any extra Torsemide while taking the Metolazone. Confirmed lab appt for 5/11 at 10am. Pt verbalized understanding. [employer] message sent as well to pt daughter Kim to keep her updated.

## 2023-05-10 PROBLEM — I50.23 ACUTE ON CHRONIC SYSTOLIC CONGESTIVE HEART FAILURE (H): Status: ACTIVE | Noted: 2023-01-01

## 2023-05-10 NOTE — TELEPHONE ENCOUNTER
Received [employer] message from pt daughter, Kim, that pt took Metolazone yesterday but is still putting on weight and is more symptomatic.     Called pt; he states his weight is up another 3-4# this morning; weight is up a total of 12# since 4/27 office visit.     Pt states he took Metolzaone 2.5 mg yesterday at 4 pm along with Torsemide and then took another Metolazone 2.5 mg at 4am and only urinated once.     Pt reports feeling much more short of breath since yesterday; pt sounds SOB on the phone and is coughing.     Advised pt to go to the ER today to manage hypervolemia and have labs done right away; he has taken more Torsemide than prescribed and Metolazone doses very close together. If able, pt should be seen at the ER at  of  in [place]. Pt verbalized understanding and agreed to go to ER today. [employer] message sent to pt daughter.     Ellie Cook RN

## 2023-05-10 NOTE — ED PROVIDER NOTES
"ED Provider Note  [facility]      History     Chief Complaint   Patient presents with     fluid overload     Fluid over load       HPI  Delbert Tilley is a 72 year old male with history of CHF and diabetes who presents with volume overload.  He has been messaging his clinic due to being 12 pounds over his dry weight.  He has dyspnea with exertion and increased swelling in his lower extremities.  Unable to lay flat at night.  He has been increasing his metolazone and torsemide at home without good effect.  He has not had any fevers, chills or other infectious symptoms.  Denies chest pain but does endorse shortness of breath.  Discussed with his clinic presenting to the emergency department for further work-up and IV diuresis.             Physical Exam   BP: 92/61  Pulse: 81  Temp: 98.1  F (36.7  C)  Resp: 18  Height: 185.4 cm (6' 1\")  Weight: 73.5 kg (162 lb)  Physical Exam  General: no acute distress. Appears stated age.   HENT: MMM, no oropharyngeal lesions  Eyes: PERRL, normal sclerae  Neck: non-tender, supple  Cardio: Regular rate. Regular rhythm. Lower extremity edema.  Resp: Normal work of breathing, normal respiratory rate. Orthopnea.  Chest/Back: no visual signs of trauma, no CVA tenderness  Abdomen: no tenderness, non-distended, no rebound, no guarding  Neuro: alert and fully oriented. CN II-XII grossly intact. Grossly normal strength and sensation in all extremities.   MSK: no deformities. Grossly normal ROM in extremities.   Integumentary/Skin: no rash visualized, normal color  Psych: normal affect, normal behavior      ED Course, Procedures, & Data      Procedures                      Results for orders placed or performed during the hospital encounter of 05/10/23   XR Chest 2 Views     Status: None    Narrative    Exam: XR CHEST 2 VIEWS, 5/10/2023 3:55 PM    Indication: dyspnea    Comparison: Chest radiograph 6/20/2022    Findings:   Left chest cardiac conduction device leads " terminate over the right  atrium and right ventricle. No pulmonary consolidation, pleural  effusion, or pneumothorax. Normal heart size. Aortic atherosclerosis.  Unchanged fracture of the most cranial and third most cranial  sternotomy wires.      Impression    Impression:   No acute airspace opacity. Atherosclerosis. Implantable cardiac  defibrillator.    I have personally reviewed the examination and initial interpretation  and I agree with the findings.    EFRAIN POMPA MD         SYSTEM ID:  [id number]   INR     Status: Normal   Result Value Ref Range    INR 1.06 0.85 - 1.15   Comprehensive metabolic panel     Status: Abnormal   Result Value Ref Range    Sodium 137 136 - 145 mmol/L    Potassium 4.9 3.4 - 5.3 mmol/L    Chloride 96 (L) 98 - 107 mmol/L    Carbon Dioxide (CO2) 27 22 - 29 mmol/L    Anion Gap 14 7 - 15 mmol/L    Urea Nitrogen 55.9 (H) 8.0 - 23.0 mg/dL    Creatinine 2.28 (H) 0.67 - 1.17 mg/dL    Calcium 8.8 8.8 - 10.2 mg/dL    Glucose 201 (H) 70 - 99 mg/dL    Alkaline Phosphatase 124 40 - 129 U/L    AST 30 10 - 50 U/L    ALT 23 10 - 50 U/L    Protein Total 6.7 6.4 - 8.3 g/dL    Albumin 3.7 3.5 - 5.2 g/dL    Bilirubin Total 0.2 <=1.2 mg/dL    GFR Estimate 29 (L) >60 mL/min/1.73m2   Troponin T, High Sensitivity     Status: Abnormal   Result Value Ref Range    Troponin T, High Sensitivity 158 (HH) <=22 ng/L   Nt probnp inpatient (BNP)     Status: Abnormal   Result Value Ref Range    N terminal Pro BNP Inpatient 47,957 (H) 0 - 900 pg/mL   Magnesium     Status: Abnormal   Result Value Ref Range    Magnesium 1.2 (L) 1.7 - 2.3 mg/dL   CBC with platelets and differential     Status: Abnormal   Result Value Ref Range    WBC Count 7.7 4.0 - 11.0 10e3/uL    RBC Count 4.78 4.40 - 5.90 10e6/uL    Hemoglobin 11.3 (L) 13.3 - 17.7 g/dL    Hematocrit 40.4 40.0 - 53.0 %    MCV 85 78 - 100 fL    MCH 23.6 (L) 26.5 - 33.0 pg    MCHC 28.0 (L) 31.5 - 36.5 g/dL    RDW 17.5 (H) 10.0 - 15.0 %    Platelet Count 189 150 - 450  10e3/uL    % Neutrophils 73 %    % Lymphocytes 17 %    % Monocytes 8 %    % Eosinophils 1 %    % Basophils 1 %    % Immature Granulocytes 0 %    NRBCs per 100 WBC 0 <1 /100    Absolute Neutrophils 5.6 1.6 - 8.3 10e3/uL    Absolute Lymphocytes 1.3 0.8 - 5.3 10e3/uL    Absolute Monocytes 0.6 0.0 - 1.3 10e3/uL    Absolute Eosinophils 0.1 0.0 - 0.7 10e3/uL    Absolute Basophils 0.0 0.0 - 0.2 10e3/uL    Absolute Immature Granulocytes 0.0 <=0.4 10e3/uL    Absolute NRBCs 0.0 10e3/uL   CBC with platelets differential     Status: Abnormal    Narrative    The following orders were created for panel order CBC with platelets differential.  Procedure                               Abnormality         Status                     ---------                               -----------         ------                     CBC with platelets and d...[[id number]]  Abnormal            Final result                 Please view results for these tests on the individual orders.     Medications   magnesium sulfate 2 g in 50 mL sterile water intermittent infusion (2 g Intravenous $New Bag 5/10/23 1728)   furosemide (LASIX) injection 80 mg (80 mg Intravenous $Given 5/10/23 1723)     Labs Ordered and Resulted from Time of ED Arrival to Time of ED Departure   COMPREHENSIVE METABOLIC PANEL - Abnormal       Result Value    Sodium 137      Potassium 4.9      Chloride 96 (*)     Carbon Dioxide (CO2) 27      Anion Gap 14      Urea Nitrogen 55.9 (*)     Creatinine 2.28 (*)     Calcium 8.8      Glucose 201 (*)     Alkaline Phosphatase 124      AST 30      ALT 23      Protein Total 6.7      Albumin 3.7      Bilirubin Total 0.2      GFR Estimate 29 (*)    TROPONIN T, HIGH SENSITIVITY - Abnormal    Troponin T, High Sensitivity 158 (*)    NT PROBNP INPATIENT - Abnormal    N terminal Pro BNP Inpatient 47,957 (*)    MAGNESIUM - Abnormal    Magnesium 1.2 (*)    CBC WITH PLATELETS AND DIFFERENTIAL - Abnormal    WBC Count 7.7      RBC Count 4.78      Hemoglobin 11.3  (*)     Hematocrit 40.4      MCV 85      MCH 23.6 (*)     MCHC 28.0 (*)     RDW 17.5 (*)     Platelet Count 189      % Neutrophils 73      % Lymphocytes 17      % Monocytes 8      % Eosinophils 1      % Basophils 1      % Immature Granulocytes 0      NRBCs per 100 WBC 0      Absolute Neutrophils 5.6      Absolute Lymphocytes 1.3      Absolute Monocytes 0.6      Absolute Eosinophils 0.1      Absolute Basophils 0.0      Absolute Immature Granulocytes 0.0      Absolute NRBCs 0.0     INR - Normal    INR 1.06     COVID-19 VIRUS (CORONAVIRUS) BY PCR     XR Chest 2 Views   Final Result   Impression:    No acute airspace opacity. Atherosclerosis. Implantable cardiac   defibrillator.      I have personally reviewed the examination and initial interpretation   and I agree with the findings.      EFRAIN POMPA MD            SYSTEM ID:  [id number]      Echo Complete    (Results Pending)          Critical care was not performed.     Medical Decision Making  The patient's presentation was of high complexity (an acute health issue posing potential threat to life or bodily function).    The patient's evaluation involved:  review of external note(s) from 1 sources (see separate area of note for details)  ordering and/or review of 3+ test(s) in this encounter (see separate area of note for details)  review of 3+ test result(s) ordered prior to this encounter (see separate area of note for details)  independent interpretation of testing performed by another health professional (see separate area of note for details)  discussion of management or test interpretation with another health professional (see separate area of note for details)    The patient's management necessitated high risk (drug therapy requiring intensive monitoring (see separate area of note for details)) and high risk (a decision regarding hospitalization).      Assessment & Plan    Delbert Tilley is a 72 year old male with PMH of CHF on torsemide and diabetes  presenting 12 pounds over his dry weight with shortness of breath and LE edema.  Increasing diuretics this week per clinic without effect.  Arrives with SBP in the 90s and otherwise vital signs normal.  Normal mentation.  Cardiac workup initiated, concern for CHF exacerbation.  No chest pain to suggest ACS.      CBC with normal WBC.  Hemoglobin 11.3, similar to prior.  Creatinine 2.3, elevated from his prior. Potassium normal.  Magnesium low and initiating replacement.  [place] [address], double his prior.  Troponin 164 likely in setting of CHF exacerbation.  Initiated 80 mg IV lasix.  Admitting to cardiology service.  Care signed out to Dr. Andrea.  EKG pending on sign out to Dr. Kim in the ED.      I have reviewed the nursing notes. I have reviewed the findings, diagnosis, plan and need for follow up with the patient.    New Prescriptions    No medications on file       Final diagnoses:   Acute on chronic systolic congestive heart failure (H)       Kristen Dee MD  [facility] EMERGENCY DEPARTMENT  5/10/2023     Kristen Dee MD  05/10/23 1698

## 2023-05-10 NOTE — H&P
[facility]    Cardiology History and Physical - Cardiology         Date of Admission:  5/10/2023    Assessment & Plan: SL    Delbert Tilley is a 72 year old male admitted on 5/10/2023. He has a PMH notable for CAD s/p bypass surgery in the early 2000's, ICM HFrEF LVEF 20-25%  s/p ICD placement, current tobacco use (2ppd), T2DM, CKD - Cardiorenal syndrome, PAD, and venous insufficiency who presented from home with 2w weight gain and worsening PARDO, admitted for CHF exacerbation.     # Acute on chronic Heart failure with reduced ejection fraction 2/2 Ischemic cardiomyopathy,   LVEF 20-25% (1/31/23). Stage D, NYHA Class IIIB  Last admission to [facility] in 6/2022 with cardiogenic shock requiring inotropic support.   - GDMT   - ACE/ARB/ARNI: hold Entresto 12-13 given RADHA  - BB: hold metoprolol 12.5 overnight pending tolerance of diuresis  - Aldosterone antagonist: did not tolerate 2/2 HoTN, hyperK  - SGLT2i: hold Empagliflozin given RADHA  - Hydralazine + nitrates - none  - Volume status: hypervolemic  --> diuresis: s/p 80mg IV lasix in ED; monitor response, plan to continue diuresis  --> PTA regimen: pt reports 20-->40mg torsemide BID; also Rx for metolazone PRN  - SCD ppx: ICD primary prevention, no shocks, device generator changed 2018, narrow QRS  - Strict I&O's, daily weights, <2g Na diet   - avoid NSAIDs, nondihydropyridine CCBs  - BMP, Replace lytes per protocol K>4, Mg>2  - Dr. Headley's last clinic note 4/27/23 mentions consideration of LVAD as destination therapy, though pt previously not very interested, and given CKD, RV dysfunction, and ongoing tobacco use this would be challenging. Pt's hypotension has limited further up-titrations of GDMT in the past. OP plans        # Coronary artery disease s/p CABG early 2000s  # Troponin elevation likely in setting of CHF exacerbation with demand ischemia vs ACS in setting of impaired renal function  Last coronary  angiogarm 2017: patent LIMA, vein graft to OM. Vein graft to RCA was 100% occluded. RCA also occluded which is collateralized from the L system.    - cont atorvastatin, LDL at goal   - cont Plavix   - Trop trend flat delta with impaired renal function, low suspicion for ACS    # Tobacco use, current 2ppd  # Chronic cough   - received Nicotine patch on presentation, defer to day team re: risks/benefits of continuing   - cont Albuterol PRN   - cont Mometasone inhaler    # Atrial flutter, s/p ablation many years ago  remote hx GIB 2004, possible Marilee Xie tear--> not on chronic A/C    # RADHA on CKD with hx cardiorenal syndrome  Suspect prerenal in setting of vol overload   - diuresis per above   - hold ARNI for RADHA    # T2DM with neuropathy  A1c 2/2023 7.9. Home regimen: Insulin glargine 26 units every day. Insulin Aspart 4u TID with meals. Metformin, Empagliflozin.   - Hold Metformin   - Empagliflozin per above   - Insulin glargine: 20 units qAM   - Insulin aspart: 1 unit / 15g carb with meals   - low dose sliding scale insulin   - Cont Gabapentin    # Hx pancreatitis 2/2 EtOH, remote 11+ yrs ago  # Former alcohol use, now sober   - Cont creon   - Empagliflozin per above    # GERD   - cont PPI BID    # Hx low Vit D 2020   - last check 2021 WNL   - cont Vit D3 for now    # Hypomagnesemia  # Loose stools, chronic   - Cont Magnesium oxide; additional IV replacement for Mg >2   - Cont Loperamide    # Peripheral artery disease  # Venous insufficiency       Diet: 2 Gram Sodium Diet  Fluid restriction 2000 ML FLUID  DVT Prophylaxis: not on A/C PTA, remote hx GIB, defer to day team for further ppx  Arriola Catheter: Not present  Cardiac Monitoring: ACTIVE order. Indication: Acute decompensated heart failure (48 hours)  Code Status:   confirmed w/ pt on admit        Clinically Significant Risk Factors Present on Admission            # Hypomagnesemia: Lowest Mg = 1.2 mg/dL in last 2 days, will replace as needed     # Drug  Induced Platelet Defect: home medication list includes an antiplatelet medication  # Acute Kidney Injury, unspecified: based on a >150% or 0.3 mg/dL increase in last creatinine compared to past 90 day average, will monitor renal function   # Acute systolic heart failure: last echo with EF <40% and receiving IV diuretics    # DMII: A1C = 7.9 % (Ref range: 0.0 - 5.6 %) within past 6 months           Disposition Plan   Expected discharge: 4 - 7 days, recommended to prior living arrangement once fluid volume status optimized on oral medication and advanced heart failure work-up completed.    Entered: Tammy Cerna MD 05/11/2023, 12:32 AM   The patient's care was discussed with the night cardiology team briefly. Patient will be formally staffed in AM..      Tammy Cerna MD  [facility]    ______________________________________________________________________    Chief Complaint   SOB, weight gain    History is obtained from the patient    History of Present Illness   Delbert Tilley is a 72 year old male who presents with a couple of weeks of slowly increasing weight gain and SOB/PARDO, fatigue.     First called into nurses line when he noticed, attempted to increase diuretic (reports 20-->40mg Torsemide BID) and strict diet adherence with some improvement in weight, but this AM pt weighed in 10lb up from  so he called again, and came to hospital. Used to notice a big response from diuretic but hasn't been able to urinate as much over the past week despite increasing dose. Denies any recent illness, has chronic cough, chronic stable angina that resolves immediately with rest. Can walk approx 1/2 block when feeling healthy/well, and currently dyspneic walking down short hallways. No orthopnea, maybe some PND but pt not sure if it's due to his tobacco-cough or a bad dream, etc. Notices weight gain in upper thighs and abdomen. No fevers/chills, no N/V/D/C. No abdominal  pain, dysuria.     Past Medical History    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%     Claudication (H)      COPD (chronic obstructive pulmonary disease) (H)      Diabetes (H)      Gastro-oesophageal reflux disease      GERD (gastroesophageal reflux disease)      H/O: alcohol abuse      HTN (hypertension)      Hyperlipidaemia LDL goal <100 07/08/2013     Hyperparathyroidism (H)      Hypokalemia      ICD (implantable cardioverter-defibrillator) in place     [employer]     NSTEMI (non-ST elevated myocardial infarction) (H)      NSVT (nonsustained ventricular tachycardia) (H)      Pacemaker      Pancreatitis 6/26/06 Hosp     Paroxysmal atrial fibrillation (H)      PVD (peripheral vascular disease) (H)      Thrombocytopenia (H)      Tobacco use      Venous (peripheral) insufficiency      Vitamin D deficiency        Past Surgical History   Past Surgical History:   Procedure Laterality Date     CATARACT IOL, RT/LT      Cataract IOL RT/LT     CLOSE SECONDARY WOUND LOWER EXTREMITY Right 02/13/2022    Procedure: Right groin wound exploration, nerve release and closure.;  Surgeon: Karen Arthur MD;  Location: [facility] OR     ENDARTERECTOMY CAROTID Right 06/12/2015    Procedure: ENDARTERECTOMY CAROTID;  Surgeon: João Turner MD;  Location:  OR     ENDARTERECTOMY CAROTID Left 09/08/2017    Procedure: ENDARTERECTOMY CAROTID;  LEFT CAROTID ENDARTERECTOMY WITH EEG;  Surgeon: João Turner MD;  Location:  OR     ENDARTERECTOMY FEMORAL Bilateral 02/09/2022    Procedure: BILATERAL-  COMMON FEMORAL ENDARTERECTOMY; RETROGRADE ILIAC ARTERY STENTING;  Surgeon: Ric Chau MD;  Location: [facility] OR     IMPLANT PACEMAKER  06/10/2010     IMPLANTED DEVICE       INTERPOSITION TENDON HAND N/A 06/09/2020    Procedure: Right thumb ligament reconstruction tendon interposition with extensor pollicis brevis to abductor pollicis tendon transfer;  Surgeon: Bethel Martin MD;  Location: [facility] OR     PICC INSERTION - TRIPLE LUMEN Right 06/20/2022    Right basilic vein 0.62cm 4cm ext.Placement verified by Sherlock 3CG.PICC okay to use.     RELEASE CARPAL TUNNEL Right 04/12/2016    Procedure: RELEASE CARPAL TUNNEL;  Surgeon: Lissy Leger MD;  Location: [facility] OR     SURGICAL HISTORY OF -   1998    Laminectomy     TONSILLECTOMY & ADENOIDECTOMY       ZZC CABG, ARTERIAL, THREE         Prior to Admission Medications   Prior to Admission Medications   Prescriptions Last Dose Informant Patient Reported? Taking?   Carboxymethylcellulose Sod PF (THERATEARS PF) 0.25 % SOLN   Yes No   Sig: Apply 1 drop to eye 4 times daily as needed   Glycerin (OASIS MOISTURIZING MOUTH SPRAY) 35 % LIQD   Yes No   Sig: Take 2 sprays by mouth daily as needed   Insulin Pen Needle (PEN NEEDLES) 32G X 4 MM MISC  Self Yes No   albuterol (PROAIR HFA) 108 (90 Base) MCG/ACT Inhaler  Self No No   Sig: Inhale 2 puffs into the lungs every 4 hours as needed for shortness of breath / dyspnea   amylase-lipase-protease (CREON 12) 07592-565[address]-16144 units CPEP   Yes No   Sig: Take 2 capsules by mouth 3 times daily (with meals)    atorvastatin (LIPITOR) 80 MG tablet  Self Yes No   Sig: Take 80 mg by mouth At Bedtime    calcium carbonate (OS-MARVA) 500 MG tablet   Yes No   Sig: Take 1 tablet by mouth 2 times daily One tablet in the morning, two at noon and one at bedtime   clopidogrel (PLAVIX) 75 MG tablet   No No   Sig: Take 1 tablet (75 mg) by mouth daily   empagliflozin (JARDIANCE) 10 MG TABS tablet   No No   Sig: Take 1 tablet (10  mg) by mouth daily   gabapentin (NEURONTIN) 100 MG capsule   Yes No   Sig: Take 300 mg by mouth At Bedtime   insulin aspart (NOVOLOG PEN) 100 UNIT/ML pen   No No   Sig: Inject 3 Units Subcutaneous 3 times daily (with meals)   Patient taking differently: Inject 4 Units Subcutaneous daily   insulin glargine (LANTUS PEN) 100 UNIT/ML pen   No No   Sig: Inject 28 Units Subcutaneous every morning Increase by 2 units every 2-3 days until morning blood sugar is .  Max dose 40 units/day   Patient taking differently: Inject 26 Units Subcutaneous every morning Increase by 2 units every 2-3 days until morning blood sugar is .  Max dose 40 units/day   loperamide (IMODIUM) 2 MG capsule  Self Yes No   Sig: Take 2 mg by mouth 4 times daily as needed for diarrhea Patient takes a dose in the am and another at night   magnesium oxide (MAG-OX) 400 MG tablet   Yes No   Sig: Take 6.5 tablets (2,600 mg) by mouth daily   metFORMIN (GLUCOPHAGE-XR) 500 MG 24 hr tablet   Yes No   Sig: Take 500 mg by mouth 2 times daily (with meals)    metolazone (ZAROXOLYN) 2.5 MG tablet   No No   Sig: Take 1 tablet (2.5 mg) by mouth daily as needed (When directed by your provider.)   metoprolol succinate ER (TOPROL XL) 25 MG 24 hr tablet   No No   Sig: Take 1/2 tablet(12.5mg) by mouth every day. May take at night   mometasone (ASMANEX TWISTHALER) 220 MCG/INH inhaler  Self Yes No   Sig: Inhale 2 puffs into the lungs every evening    nitroGLYcerin (NITROSTAT) 0.4 MG sublingual tablet   Yes No   Sig: Place 0.4 mg under the tongue every 5 minutes as needed for chest pain   pantoprazole (PROTONIX) 40 MG EC tablet  Self Yes No   Sig: Take 40 mg by mouth 2 times daily    sacubitril-valsartan (ENTRESTO) 24-26 MG per tablet   Yes No   Sig: Take 0.5 tablets by mouth 2 times daily   torsemide (DEMADEX) 20 MG tablet   Yes No   Sig: Take 1/2 a tablet daily   vitamin D3 (CHOLECALCIFEROL) 50 mcg (2000 units) tablet   Yes No   Sig: Take 1 tablet by mouth 2 times  daily      Facility-Administered Medications: None        Review of Systems    The 10 point Review of Systems is negative other than noted in the HPI or here.    Social History   I have reviewed this patient's social history and updated it with pertinent information if needed.  Social History     Tobacco Use     Smoking status: Every Day     Packs/day: 1.50     Years: 50.00     Pack years: 75.00     Types: Cigarettes     Smokeless tobacco: Never     Tobacco comments:     1 1/2 PPD 05/18/22   Vaping Use     Vaping status: Never Used     Passive vaping exposure: Yes   Substance Use Topics     Alcohol use: No     Drug use: No       Family History     No significant family history, including no history of: early sudden cardiac death    Allergies   Allergies   Allergen Reactions     Fish Oil Nausea and Vomiting     severe     Hydrocodone GI Disturbance     Upset stomach     Shellfish Allergy Hives and Rash        Physical Exam   Vital Signs: Temp: 98.6  F (37  C) Temp src: Oral BP: 115/66 Pulse: 88   Resp: 20 SpO2: 97 % O2 Device: None (Room air)    Weight: 158 lbs 4.8 oz    GEN: NAD though mildly uncomfortable, alert, pleasant  HEENT: NCAT, EOMI, MMM  CVS: RRR, Normal S1, S2, Grade 1/6 systolic murmur heard best LUSB  Pulm: decreased breath sounds throughout, mild bibasilar crackles and L middle lobe crackles, no inc WOB on RA. Coughing during conversation  Abd: soft, NTND  Ext: WWP, no pitting edema in ankles. 1+ pitting edema noted in inner thighs  Skin: no rash, lesion, or bruising noted on exposed surfaces  Neuro: A&Ox4, answering questions appropriately, moving all extremities spontaneously      Medical Decision Making           Data    TTE 1/31/2023: impression  Left ventricular systolic function is severely reduced.The visual ejection  fraction is 20-25%.The left ventricle is mildly dilated.  Moderately decreased right ventricular systolic function.  The left atrium is severely dilated.  There is mild (1+) mitral  regurgitation.  Compared to echo dated 6/20/2022, no significant change.    3/2020 NM lexiscan     The nuclear stress test is abnormal.     There is transmural infarction in the inferior and inferoseptal segment(s) of the left ventricle.     There is a small area of nontransmural infarction in the distal apical segment(s) of the left ventricle.     Left ventricular function is severely reduced.     The left ventricular ejection fraction at rest is 15%.  The left ventricular ejection fraction at stress is 20%.     Severe left ventricular enlargement is noted.     Stress to rest cavity ratio is 1.21.     A prior study was conducted on 8/11/2017.  This study has no significant change when compared with the prior study    12/22/2017 coronary angiogram  LIMA to LAD: The ostium, body and anastomosis site are free of  significant disease  SVG to OM: The ostium and anastomosis site are free of disease. There  is patent stent noted in the proximal SVG. There is 20% stenosis of  the proximal SVG, and 20% stenosis of the mid SVG.  SVG to RCA: it is 100% occluded at the ostium

## 2023-05-10 NOTE — ED TRIAGE NOTES
Patient was referred for fluid overload. History of CHF, quad bye-pass. Report dizziness, SOB.     Triage Assessment     Row Name 05/10/23 1509       Triage Assessment (Adult)    Airway WDL WDL       Respiratory WDL    Respiratory WDL WDL       Skin Circulation/Temperature WDL    Skin Circulation/Temperature WDL WDL       Cardiac WDL    Cardiac WDL WDL;X   dizzy, ongoing       Peripheral/Neurovascular WDL    Peripheral Neurovascular WDL WDL

## 2023-05-10 NOTE — Clinical Note
Called to Makayla BENDER on 6C. All questions answered. All belongings sent with patient. Patient transported to 6C via stretcher with LORETO RN.

## 2023-05-11 NOTE — PLAN OF CARE
Care from: 0700 - 1530    Neuro: A&Ox4. Calls appropriately.  Cardiac: SR. VSS. SBPs 90s - 100s   Respiratory: Sating >92% on RA. Denies SOB/chest pain  GI/: Adequate urine output with urinal. Pt on lasix. LBM 5/10 - requesting PRN imodium 2x daily per home routine   Diet/appetite: Tolerating 2g NA diet. Eating well 1.8L FR but non-compliant with restriction.  Activity:  Independent with walker  Pain: At acceptable level on current regimen. Denies pain. Does want PRN tylenol when available for chronic arthritis pain.  Skin: No new deficits noted.  LDA's: L PIV - SL  POCT: BG ACHS with sliding scale and carb coverage insulin.    /67 (BP Location: Right arm)   Pulse 75   Temp 97.3  F (36.3  C) (Oral)   Resp 16   Ht 1.829 m (6')   Wt 71.2 kg (157 lb)   SpO2 97%   BMI 21.29 kg/m      Plan: Continue to monitor Pt status and report changes to Cards 2 treatment team. Encourage ambulation/up to chair.    Problem: Plan of Care - These are the overarching goals to be used throughout the patient stay.    Goal: Absence of Hospital-Acquired Illness or Injury  Outcome: Progressing  Intervention: Identify and Manage Fall Risk  Recent Flowsheet Documentation  Taken 5/11/2023 0830 by Lori Page RN  Safety Promotion/Fall Prevention:    activity supervised    assistive device/personal items within reach    clutter free environment maintained    increased rounding and observation    increase visualization of patient    nonskid shoes/slippers when out of bed    safety round/check completed    supervised activity  Intervention: Prevent Skin Injury  Recent Flowsheet Documentation  Taken 5/11/2023 0830 by Lori Page RN  Body Position: position changed independently  Intervention: Prevent and Manage VTE (Venous Thromboembolism) Risk  Recent Flowsheet Documentation  Taken 5/11/2023 0830 by Lori aPge RN  VTE Prevention/Management: SCDs (sequential compression devices) off   Goal Outcome Evaluation:      Plan of  Care Reviewed With: patient    Overall Patient Progress: improvingOverall Patient Progress: improving

## 2023-05-11 NOTE — PROGRESS NOTES
"CLINICAL NUTRITION SERVICES    Reason for Assessment:  Low-sodium (2 g/day) nutrition education, received consult.     Diet History:  Per RD note 6/2022, \"Discussed low sodium diet with pt, and provided \"Heart-Healthy - Reduced Sodium Nutrition Therapy\" handout, along with \"Sodium-Free Flavoring Tips\" handouts to pt. Pt has already been working on low-sodium diet choices at home, and is cognizant of foods that are typically high in sodium. Discussed strategies for finding lower sodium options in the store, and other methods of limiting sodium such as rinsing canned foods off when applicable, and continuing to use salt replacement products such as Nu-Salt, pt is aware these are high in potassium. Pt had no further questions or concerns a this time.\"     Pt reports receiving low-sodium nutrition education in the past during visit 5/11. States he reads labels and avoids options that are high in sodium (soups). He often includes oatmeal for breakfast. He was agreeable to a short review. No education regarding fluid restriction in the past (not on a restriction PTA).     Nutrition Diagnosis:  Food- and nutrition-related knowledge deficit r/t no previous knowledge of fluid restriction AEB pt report of no previous formal fluid restriction education.    Nutrition Prescription/Recs:  Continue low-sodium diet, fluid restriction.      Interventions:  Nutrition Education: Provided verbal instruction on low-sodium meal planning, reinforcing low-sodium choices and rationale. Explained fluid restriction, including current restriction. Gave examples of options that are included in fluid restriction. Provided the following handouts: How to Read Nutrition Labels, Low-Sodium Foods and Drinks, Tips for a Low-Sodium Diet, Seasoning Your Foods Without Adding Salt, and Managing Fluid Restriction. Gave sodium room service menu.     Goals:    Pt will verbalize at least five high sodium foods and the importance of avoiding added salt to " foods for cooking or seasoning foods.     Follow-up:   Patient to ask any further nutrition-related questions before discharge. In addition, pt may request outpatient RD appointment.     Jessica Najera MS, RD, LD, [facility]   6C/5A(beds 5201 - 5209) RD pgr: [phone]       Addendum:    Request to revisit pt from RN, pt with additional questions. Clarified fluid restriction. Answered additional questions about the room service menu and sodium content. No further questions at this time.     Jessica Najera MS, MACEY, LD, [facility]

## 2023-05-11 NOTE — PROGRESS NOTES
[facility]   Cardiology II Service / Advanced Heart Failure  Daily Progress Note  Date of Service: 5/11/2023      Patient: Delbert Tilley  MRN: [id number]  Admission Date: 5/10/2023  Hospital Day # 1    Assessment and Plan: Delbert Tilley is a 72 year old male with a PMH of HFrEF secondary to ICM, CAD s/p CABG, s/p ICD, Aflutter s/p ablation, DM Type II, CKD Stage III, PAD, Venous insufficiency, and COPD. He presents for heart failure exacerbation for consideration of advanced therapies.     Acute on Chronic SCHF secondary to ICM.   Stage C, NYHA Class IIIB  ACEi/ARB/ARNI Entresto held in setting of RADHA with soft BP   BB Metoprolol on hold in setting of decompensation   Aldosterone antagonist contraindicated due to renal dysfunction  SGLT2I: Jardiance 10 mg po daily   SCD prophylaxis ICD  Fluid status hypervolemic. Lasix 80 mg IV today.   - Will completed LVAD show-n-tell to becky interest prior to workup. LVAD coordinator to schedule.     CAD s/p CABG. Coronary angiogarm 2017: patent LIMA, vein graft to OM. Vein graft to RCA was 100% occluded. RCA also occluded which is collateralized from the L system.   - continue Lipitor and Plavix.   - BB on hold as above.     COPD with active tobacco use.   - Nicotine patch.     Aflutter s/p ablation. Not on anticoagulation due to GIB.   - BB held as above.     RADHA on CKD Stage III.   - Diuresis as above.   - Hold Entresto as above.     DM Type II, uncontrolled and complicated by neuropathy. A1c 2/2023 7.9.  - Hgb A1C in AM.   - Lantus 20 units daily (PTA 24 units) and Novolog insulin.   - Continue Jardiance.     Chronic/Resolved Medical Conditions:   History of pancreatitis secondary to ETOH. Remote use 11 years ago. Continue Creon  GERD. Continue PPI  PAD. Venous Insufficiency    FEN: 2 gram sodium diet   PROPHY: Ambulation  LINES: PIV  DISPO: TBD  CODE STATUS:  DNR/DNI  ================================================================    Interval History/ROS: He  denies fever, chills, chest pain, palpitations, nausea, vomiting, and diarrhea. He complains of abdominal distention, LE edema, and PARDO. He is tolerating oral intake and ambulation.     Last 24 hr care team notes reviewed.   ROS:  4 point ROS including Respiratory, CV, GI and , other than that noted in the HPI, is negative.     Medications: Reviewed in EPIC.     Physical Exam:   /67 (BP Location: Right arm)   Pulse 75   Temp 97.3  F (36.3  C) (Oral)   Resp 16   Ht 1.829 m (6')   Wt 71.2 kg (157 lb)   SpO2 97%   BMI 21.29 kg/m    GENERAL: Appears alert and oriented times three.   HEENT: Eye symmetrical and free of discharge bilaterally. Mucous membranes moist and without lesions.  NECK: Supple and without lymphadenopathy. JVD to mid neck   CV: RRR, S1S2 present without murmur, rub, or gallop.   RESPIRATORY: Respirations regular, even, and unlabored. Lungs CTA throughout.   GI: Soft and distended with normoactive bowel sounds present in all quadrants. No tenderness, rebound, guarding. No organomegaly.   EXTREMITIES: +2 bilateral LE peripheral edema. 2+ bilateral pedal pulses.   NEUROLOGIC: Alert and orientated x 3. CN II-XII grossly intact. No focal deficits.   MUSCULOSKELETAL: No joint swelling or tenderness.   SKIN: No jaundice. No rashes or lesions.     Data:  CMPRecent Labs   Lab 05/11/23  1148 05/11/23  0817 05/11/23  0726 05/11/23  0350 05/11/23  0328 05/10/23  2032 05/10/23  2021 05/10/23  1540   NA  --   --   --   --   --   --   --  137   POTASSIUM  --   --  4.4  --   --   --   --  4.9   CHLORIDE  --   --   --   --   --   --   --  96*   CO2  --   --   --   --   --   --   --  27   ANIONGAP  --   --   --   --   --   --   --  14   GLC 84 143*  --  107* 57*  --    < > 201*   BUN  --   --   --   --   --   --   --  55.9*   CR  --   --   --   --   --   --   --  2.28*   GFRESTIMATED  --   --   --   --   --   --   --  29*   MARVA  --   --   --   --   --   --   --  8.8   MAG  --   --  2.1  --   --  1.7  --   1.2*   PROTTOTAL  --   --   --   --   --   --   --  6.7   ALBUMIN  --   --   --   --   --   --   --  3.7   BILITOTAL  --   --   --   --   --   --   --  0.2   ALKPHOS  --   --   --   --   --   --   --  124   AST  --   --   --   --   --   --   --  30   ALT  --   --   --   --   --   --   --  23    < > = values in this interval not displayed.     CBC  Recent Labs   Lab 05/10/23  1540   WBC 7.7   RBC 4.78   HGB 11.3*   HCT 40.4   MCV 85   MCH 23.6*   MCHC 28.0*   RDW 17.5*        INR  Recent Labs   Lab 05/10/23  1540   INR 1.06       Time/Communication  I personally spent a total of 35 minutes. Of that 20 minutes was counseling/coordination of patient's care. Plan of care discussed with patient. See my note above for details. Patient discussed with Dr. Santiago.      Kim Craig [facility]  5/11/2023

## 2023-05-11 NOTE — PROVIDER NOTIFICATION
"D/I/A:Pt called stating \"feeling like my sugar is low.\"  BG checked, 72.  Oral CHO given; apple juice and peanut butter and palomo crackers.  A+Ox4; \"Feeling better.\"  MD Alcantara notified; orders changed/obtained.  P: Continue to monitor status.  "

## 2023-05-11 NOTE — PROGRESS NOTES
D/[facility] admit for CHF, SOB. He got 80 mg and 60 mg Lasix in the ER and voided several times. History of DM2, Afib/flutter, past alcohol use with alcoholic pancreatitis SVT, COPD, CKD, peripheral neuropathy, HYN, CHF 20-25%. ECHO done in [facility] and results not available yet. He ate supper in [facility], and they gave HS meds. His BMP and trop are elevated and 12 lead showed inferior-lateral T wave inversion. Previous ECHO also mentioned LVH and LA enlargement.  A/ He says he feels much better after those big Lasix doses and has peed many times. He says he likes to sleep flat. He says he does not do stairs, has a ramp at home and can use a motorized chair at times. Sinus rhythm, skin check done with ER RN. He wants to keep his socks on with our slippers as his feet are too cold otherwise-done.  P/monitor for changes, keep him and team update

## 2023-05-11 NOTE — PLAN OF CARE
Pt admitted 5/10 for CHF exacerbation. PMH includes CAD s/p CABG (early 2000s), ICM, HFrEF (EF 20-25%) s/p ICD, current tobacco use, DM2, CKD, AFlutter, pancreatitis 2/2 EtOH, cardiorenal syndrome, PAD, and venous insufficiency.     Neuro: A&O x 4. Calls appropriately.  Cardiac: SR 80s. SBP 90s to 100s. Denies dizziness, chest pain, or palpitations. BLE edema.   Respiratory: Sating well on RA. Expiratory wheezes. Chronic cough.    GI/: Good UOP via bedside urinal.  Endocrine: ACHS. Pt reported feeling lightheaded and asked for blood sugar to be checked overnight. BG was 57. Gave apple juce with palomo crackers and peanut butter and recheck was 107.   Diet/Appetite: 2G Na. 2L FR. Educated pt on fluid restriction overnight but has had more than 1L of fluids today.   Skin: Zhao BLE.  LDA: L PIV SL  Activity: SBA  Pain: Denies     Plan: Continue to monitor and alert team with any changes or concerns.

## 2023-05-11 NOTE — PROGRESS NOTES
"   05/11/23 1700   Appointment Info   Signing Clinician's Name / Credentials (OT) Madie Ly, OTD, OTR/L   Living Environment   People in Home alone   Current Living Arrangements mobile home   Home Accessibility   (ramp to enter home)   Transportation Anticipated car, drives self;family or friend will provide   Living Environment Comments pt lives in  mobile home with ramp to enter. has WIS with GB's & lives alone   Self-Care   Usual Activity Tolerance good   Current Activity Tolerance good   Equipment Currently Used at Home other (see comments);grab bar, tub/shower;walker, rolling  (mobility scooter)   Fall history within last six months no   Activity/Exercise/Self-Care Comment ind-mod IND with ADLs, recieves assist as needed from family with IADLs. uses FWW & mobility scooter as needed. Enjoys gardening & light carpentry   Instrumental Activities of Daily Living (IADL)   IADL Comments hires assist for deep cleaning, assist from family PRN   General Information   Onset of Illness/Injury or Date of Surgery 05/10/23   Referring Physician Delfin Alcantara MD   Additional Occupational Profile Info/Pertinent History of Current Problem \"Delbert Tilley is a 72 year old male with a PMH of HFrEF secondary to ICM, CAD s/p CABG, s/p ICD, Aflutter s/p ablation, DM Type II, CKD Stage III, PAD, Venous insufficiency, and COPD. He presents for heart failure exacerbation for consideration of advanced therapies\"   Existing Precautions/Restrictions fall;cardiac   Cognitive Status Examination   Orientation Status orientation to person, place and time   Affect/Mental Status (Cognitive) WFL   Follows Commands WFL   Visual Perception   Visual Impairment/Limitations WFL   Sensory   Sensory Comments endorses baseline BLE neuropathy   Range of Motion Comprehensive   General Range of Motion bilateral upper extremity ROM WFL   Strength Comprehensive (MMT)   General Manual Muscle Testing (MMT) Assessment no strength deficits identified "   Coordination   Upper Extremity Coordination No deficits were identified   Bed Mobility   Comment (Bed Mobility) not witnessed on this date   Transfers   Transfers sit-stand transfer   Sit-Stand Transfer   Sit-Stand [place] (Transfers) supervision   Activities of Daily Living   BADL Assessment/Intervention upper body dressing;lower body dressing;toileting   Upper Body Dressing Assessment/Training   Comment, (Upper Body Dressing) anticipate setup per clinical judgement   Lower Body Dressing Assessment/Training   Comment, (Lower Body Dressing) anticipate SBA per clinical judgement   Toileting   Comment, (Toileting) anticipate SBA per clinical judgement   Clinical Impression   Criteria for Skilled Therapeutic Interventions Met (OT) Yes, treatment indicated   OT Diagnosis CR, dec IND   OT Problem List-Impairments impacting ADL problems related to;activity tolerance impaired;strength   Assessment of Occupational Performance 1-3 Performance Deficits   Identified Performance Deficits bathing, dressing, act bennie   Planned Therapy Interventions (OT) ADL retraining;IADL retraining;strengthening;transfer training;home program guidelines;progressive activity/exercise;risk factor education   Clinical Decision Making Complexity (OT) low complexity   Risk & Benefits of therapy have been explained evaluation/treatment results reviewed;care plan/treatment goals reviewed;risks/benefits reviewed;current/potential barriers reviewed;participants voiced agreement with care plan;participants included;patient   OT Total Evaluation Time   OT Eval, Low Complexity Minutes (23690) 8   OT Goals   Therapy Frequency (OT) 3 times/wk   OT Predicted Duration/Target Date for Goal Attainment 05/25/23   OT Goals Upper Body Dressing;Lower Body Dressing;Toilet Transfer/Toileting;Cardiac Phase 1   OT: Upper Body Dressing Modified independent   OT: Lower Body Dressing Modified independent   OT: Toilet Transfer/Toileting Modified independent   OT:  Understanding of cardiac education to maximize quality of life, condition management, and health outcomes Patient;Verbalize;Demonstrate   OT: Perform aerobic activity with stable cardiovascular response continuous;10 minutes   OT: Functional/aerobic ambulation tolerance with stable cardiovascular response in order to return to home and community environment Modified independent;100 feet   OT Discharge Planning   OT Discharge Recommendation (DC Rec) home with assist;home with outpatient cardiac rehab   OT Rationale for DC Rec pt currently presenting slightly below baseline & anticipate safe to d/c home with A as needed & OP CR   Total Session Time   Total Session Time (sum of timed and untimed services) 8

## 2023-05-11 NOTE — PROGRESS NOTES
Talked with patient's nurse, Lori. No caregiver at bedside. Lori relayed patient's remarks of daughter coming tomorrow, but could potentially come today if needed. Attempted to call patient's daughter. No answer. Voicemail left to coordinate a time to perform a Pre-VAD Education session. Will continue to monitor.    ADDENDUM:  Patients daughter returned call around 3PM. Coordinated PVE to be performed at 1PM. Daughter in agreement and will be at hospital and available for PVE at 1PM on 5/12.

## 2023-05-12 NOTE — CONSULTS
Care Management Initial Consult    General Information  Assessment completed with: Patient, Juve   Type of CM/SW Visit: Initial Assessment    Primary Care Provider verified and updated as needed: Yes-    Danii Hernández [phone] [id number] [facility] [facility] [address]       Readmission within the last 30 days:   Unknown        Advance Care Planning: Advance Care Planning Reviewed: present on chart; Both Dtr and Son are listed as pt's Health Care Agents         Communication Assessment  Patient's communication style: spoken language (English or Bilingual)    Hearing Difficulty or Deaf: no   Wear Glasses or Blind: yes    Cognitive  Cognitive/Neuro/Behavioral: WDL                      Living Environment:   People in home: alone     Current living Arrangements: mobile home with ramps in the front and back. 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    Current Resources:   Patient receiving home care services: No     Community Resources: Housekeeping/Chore Agency, Meals on Wheels  Equipment currently used at home: grab bar, toilet, grab bar, tub/shower, shower chair. He also reports that he has canes, walkers, and W/C's at home if needed  Supplies currently used at home: None    Employment/Financial:  Employment Status: retired        Financial Concerns:   None-able to pay monthly bills. Medical bills covered and [facility], so copays are affordable          Does the patient's insurance plan have a 3 day qualifying hospital stay waiver?  No    Lifestyle & Psychosocial Needs:  Social Determinants of Health     Tobacco Use: High Risk (4/27/2023)    Patient History      Smoking Tobacco Use: Every Day       Smokeless Tobacco Use: Never      Passive Exposure: Not on file   Alcohol Use: Not on file   Financial Resource Strain: Not on file   Food Insecurity: Not on file   Transportation Needs: Not on file   Physical Activity: Not on file   Stress: Not on file   Social Connections: Not on file   Intimate Partner Violence: Not on file   Depression: Not at risk (2/22/2023)    PHQ-2      PHQ-2 Score: 0   Housing Stability: Not on file       Functional Status:  Prior to admission patient needed assistance:   Dependent ADLs:: Independent  Dependent IADLs:: Independent       Mental Health Status:  Mental Health Status: No Current Concerns       Chemical Dependency Status:  Chemical Dependency Status: Past Concerns with ETOH. Does not drink heavily anymore-no current concerns          Values/Beliefs:  Spiritual, Cultural Beliefs, Yarsani Practices, Values that affect care:                 Additional Information:  Delbert reports being admitted for diuresis. States about once/year he gains significant fluid, which requires a hospitalization. Per H &P and patient report, he has kidney disease, along with liver issues and a history of pancreatitis and Diabetes.    Despite these medical conditions, he reports feeling as though he is experiencing a decent quality of life. He reports independence at home and is able to drive. He enjoys spending time with family and fixing up outboard motors in his shop. He only receives help with cleaning the house and meals through Meals on Wheels. He does not have any other community or in-home services in place and seems to have good coverage for all of his medical follow up and medications. He mentions no financial concerns at this time. He follow up with his PCP-Dr. Danii Hernández regularly.    Due to his personal perspective of having good quality of life currently and his awareness of all of his other medical issues, he reports not being interested in pursuing LVAD. He seems aware of what that  might mean for him in terms of longevity.    His Dtr and Son are highly involved in his care with his Dtr living 25 minutes away and his son living about 45 minutes to 1 hour away. Dtr supportive of patient's decision to not pursue LVAD.    Patient hopeful he can return home to [place] once the fluid comes off to return to his life. He seems to exhibit good coping skills and seems capable of making complex decisions. His Dtr was present and able to transport patient home when medically ready for discharge.    No anticipated care management discharge planning needs identified.    Alicia Casillas, LICSW

## 2023-05-12 NOTE — PROGRESS NOTES
Major shift updates:    -started on lasix gtt @ 2ml/hr with 100mg IV push loading dose prior to initiation of gtt.   -lymphedema consult for BLE pitting edema. +2 edema in BLE's. On 2L FR.    -showered w/minimal help. On 1000mg Q8H schedule tylenol for O.A. pain in hands and joints. Denies pain other than chronic O.A pain.   -SR, VSS, afebrile. No BM, voids spontaneous. Good UOP on lasix gtt. Up ad jos-stand by asst.   -pt's daughter came today for LVAD show and tell at 1pm. Awaiting pts decision on LVAD placement.   -type 2 DM, A1C checked today and trending down from historical trend. On sliding scale insulin and lantus. Jardiance on hold in anticipation for possible surgery.

## 2023-05-12 NOTE — PLAN OF CARE
Goal Outcome Evaluation:    D: Patient adimitted for CHF excerbation.  I/A: Patinient is A&OX4, On room air, SR HR in 70s-80s, baseline neuropathy takes prn Tylenol declined.  Up sba with walker, voiding via bedside urinal.  P: Possible work up and show and tele LVAD.

## 2023-05-12 NOTE — PROGRESS NOTES
"Met with patient and Daughter, Sara, to present the HM3 as a possible treatment option. Jeremy, Patient Son, was included virtually/over the phone.     Discussed patient and caregiver responsibilities before and after VAD implant. Clarified that, r/t COVID-19 visitor restrictions, only 2 caregivers may be trained. Clarified the need for a caregiver to be present for education and to assist patient for at least first 30 days after a patient returns home. Patient's designated caregiver is Sara.     Discussed basic overview of VAD equipment, on going management, need for anticoagulation, regular dressing change, grounded three-pronged outlet and functioning telephone. Also discussed frequency of follow-up clinic appointments and the need to stay locally for at least 2-4 weeks.  Reviewed restrictions of having a VAD such as no swimming, bathing, boats, MRI's, or arc welding.     Provided and discussed the VAD educational brochure, information regarding the VAD and transplant support groups, and \"VAD What You Should Know\" with additional details. Patient and Sara signed \"VAD What You Should Know\" document (or agreed to have VAD Coordinator sign on their behalf). VAD coordinator contact information provided.  Encouraged patient, daughter, and son to call with questions. Learners verbalized understanding of the above information.    "

## 2023-05-12 NOTE — PROGRESS NOTES
[facility]   Cardiology II Service / Advanced Heart Failure  Daily Progress Note  Date of Service: 5/12/2023      Patient: Delbert Tilley  MRN: [id number]  Admission Date: 5/10/2023  Hospital Day # 2    Assessment and Plan: Delbert Tilley is a 72 year old male with a PMH of HFrEF secondary to ICM, CAD s/p CABG, s/p ICD, Aflutter s/p ablation, DM Type II, CKD Stage III, PAD, Venous insufficiency, and COPD. He presents for heart failure exacerbation for consideration of advanced therapies.     Plan today:   - Lasix gtt at 20 mg/hr, bolus 100 mg IV prior  - Lymphedema consult  - Carb coverage discontinued given soft BG and Lantus decreased   - Hold Jardiance in prep for potential surgery  - LVAD show n tell at 1 PM today. If in agreement will move forward with workup, financially cleared.      Acute on Chronic SCHF secondary to ICM. Echo 5/11/23 consistent with EF 10-15%, Grade II DD, severe diffuse hypokinesis, moderate RV dysfunction, and LVIDd 6.3 cm.   Stage C, NYHA Class IIIB  ACEi/ARB/ARNI Entresto held in setting of RADHA with soft BP   BB Metoprolol on hold in setting of decompensation   Aldosterone antagonist contraindicated due to renal dysfunction  SGLT2I: Jardiance discontinued due to soft BG and potential surgery   SCD prophylaxis ICD  Fluid status hypervolemic. Lasix 80 mg IV today.   - Will completed LVAD show-n-tell to gauge interest prior to workup. LVAD coordinator to schedule.      CAD s/p CABG. Coronary angiogarm 2017: patent LIMA, vein graft to OM. Vein graft to RCA was 100% occluded. RCA also occluded which is collateralized from the L system.   - continue Lipitor and Plavix.   - BB on hold as above.      COPD with active tobacco use.   - Nicotine patch.      Aflutter s/p ablation. Not on anticoagulation due to GIB.   - BB held as above.      RADHA on CKD Stage III.   - Diuresis as above.   - Hold Entresto as above.      DM Type II, uncontrolled and complicated by neuropathy. A1c 2/2023  7.9.  - Hgb A1C pending.    - Lantus decreased to 16 units daily (PTA 24 units) and Novolog insulin, Carb coverage and Jardiance discontinued.      Chronic/Resolved Medical Conditions:   History of pancreatitis secondary to ETOH. Remote use 11 years ago. Continue Creon  GERD. Continue PPI  PAD. Venous Insufficiency     FEN: 2 gram sodium diet   PROPHY: Ambulation  LINES: PIV  DISPO: TBD  CODE STATUS:  DNR/DNI  ================================================================    Interval History/ROS: He complains of persistent LE edema and abdominal distention with limited urine output. He has no complaints of fever, chills, chest pain, palpitations, cough, nausea, vomiting, diarrhea. He is tolerating oral intake and ambulation.     Last 24 hr care team notes reviewed.   ROS:  4 point ROS including Respiratory, CV, GI and , other than that noted in the HPI, is negative.     Medications: Reviewed in EPIC.     Physical Exam:   /72 (BP Location: Right arm)   Pulse 82   Temp 98.1  F (36.7  C) (Oral)   Resp 16   Ht 1.829 m (6')   Wt 70.2 kg (154 lb 12.8 oz)   SpO2 96%   BMI 20.99 kg/m    GENERAL: Appears alert and oriented times three.   HEENT: Eye symmetrical and free of discharge bilaterally. Mucous membranes moist and without lesions.  NECK: Supple and without lymphadenopathy. JVD to jaw.   CV: RRR, S1S2 present with III/VI murmur heard best at LSB   RESPIRATORY: Respirations regular, even, and unlabored. Lungs CTA throughout.   GI: Soft and distended with normoactive bowel sounds present in all quadrants. No tenderness, rebound, guarding. No organomegaly.   EXTREMITIES: +2 bilateral LE peripheral edema. 2+ bilateral pedal pulses.   NEUROLOGIC: Alert and orientated x 3. CN II-XII grossly intact. No focal deficits.   MUSCULOSKELETAL: No joint swelling or tenderness.   SKIN: No jaundice. No rashes or lesions.     Data:  CMPRecent Labs   Lab 05/12/23  0746 05/12/23  0556 05/12/23  0449 05/11/23  2152  05/11/23  1715 05/11/23  1633 05/11/23  0817 05/11/23  0726 05/11/23  0328 05/10/23  2032 05/10/23  2021 05/10/23  1540   NA  --  133*  --   --   --  136  --   --   --   --   --  137   POTASSIUM  --  3.9  --   --   --  4.6  --  4.4  --   --   --  4.9   CHLORIDE  --  92*  --   --   --  96*  --   --   --   --   --  96*   CO2  --  29  --   --   --  31*  --   --   --   --   --  27   ANIONGAP  --  12  --   --   --  9  --   --   --   --   --  14   * 202* 94 200*   < > 80   < >  --    < >  --    < > 201*   BUN  --  59.5*  --   --   --  55.9*  --   --   --   --   --  55.9*   CR  --  2.08*  --   --   --  2.31*  --   --   --   --   --  2.28*   GFRESTIMATED  --  33*  --   --   --  29*  --   --   --   --   --  29*   MARVA  --  8.6*  --   --   --  8.6*  --   --   --   --   --  8.8   MAG  --  1.8  --   --   --   --   --  2.1  --  1.7  --  1.2*   PROTTOTAL  --   --   --   --   --   --   --   --   --   --   --  6.7   ALBUMIN  --   --   --   --   --   --   --   --   --   --   --  3.7   BILITOTAL  --   --   --   --   --   --   --   --   --   --   --  0.2   ALKPHOS  --   --   --   --   --   --   --   --   --   --   --  124   AST  --   --   --   --   --   --   --   --   --   --   --  30   ALT  --   --   --   --   --   --   --   --   --   --   --  23    < > = values in this interval not displayed.     CBC  Recent Labs   Lab 05/12/23  0556 05/10/23  1540   WBC 7.5 7.7   RBC 4.15* 4.78   HGB 9.9* 11.3*   HCT 34.8* 40.4   MCV 84 85   MCH 23.9* 23.6*   MCHC 28.4* 28.0*   RDW 17.2* 17.5*    189     INR  Recent Labs   Lab 05/10/23  1540   INR 1.06       Time/Communication  I personally spent a total of 35 minutes. Of that 20 minutes was counseling/coordination of patient's care. Plan of care discussed with patient. See my note above for details. Patient discussed with Dr. Marshall.      Kim Craig [facility]  5/12/2023

## 2023-05-12 NOTE — PLAN OF CARE
Goal Outcome Evaluation:      Plan of Care Reviewed With: patient    Overall Patient Progress: improvingOverall Patient Progress: improving    D/I/A: Pt up with walker and independent.  A+O x4 and making needs known.  VSS, afebrile.  Pleasant and cooperative.  Medicated effectively with Tylenol for arthritic should discomfort.  Voiding in fair amounts without difficulty.    P: Continue to monitor status.

## 2023-05-13 NOTE — PLAN OF CARE
Pt is A/O x 4.   Independent.   VSS, RA. Denies pain.   Tele first degree AVB with BBB.   Lung sounds clear throughout. Denies SOB.   Lasix gtt @ 20mg/hr .  Skin WDL.  BS active x4. Adequate urine output via urinal.   BG checks ACHS. 2gm Na diet, tolerating well. 2L FR.   Plans for discharge currently pending diuresis.   Patient currently resting in bed with call light in reach.

## 2023-05-13 NOTE — PROGRESS NOTES
[facility]   Cardiology II Service / Advanced Heart Failure  Daily Progress Note        Patient: Delbert Tilley  MRN: [id number]  Admission Date: 5/10/2023  Hospital Day # 3    Assessment and Plan: Delbert Tilley is a 72 year old male with a PMH of HFrEF secondary to ICM, CAD s/p CABG, s/p ICD, Aflutter s/p ablation, DM Type II, CKD Stage III, PAD, Venous insufficiency, and COPD. He presents for heart failure exacerbation for consideration of advanced therapies.     Plan today:   - Lasix gtt at 20 mg/hr  - Diuril 500 mg IV  - Lymphedema consult  - Carb coverage discontinued given soft BG and Lantus decreased. Stopping PM insulin.   - Restart Jardiance     Acute on Chronic SCHF secondary to ICM. Echo 5/11/23 consistent with EF 10-15%, Grade II DD, severe diffuse hypokinesis, moderate RV dysfunction, and LVIDd 6.3 cm.   Stage C, NYHA Class IIIB  ACEi/ARB/ARNI Entresto held in setting of RADHA with soft BP   BB Metoprolol on hold in setting of decompensation   Aldosterone antagonist contraindicated due to renal dysfunction  SGLT2I: Jardiance restarted given no impending surgery  SCD prophylaxis ICD  Fluid status hypervolemic. Lasix gtt at 20 and IV diuril as above  - Patient had LVAD show-and-tell, not interested in LVAD.      CAD s/p CABG. Coronary angiogarm 2017: patent LIMA, vein graft to OM. Vein graft to RCA was 100% occluded. RCA also occluded which is collateralized from the L system.   - Continue Lipitor and Plavix.   - BB on hold as above.      COPD with active tobacco use.   - Nicotine patch.      Aflutter s/p ablation. Not on anticoagulation due to GIB.   - BB held as above.      RADHA on CKD Stage III. peaked at 2.3, likely due to cardiorenal. B/l Cr is ~1.5  - Diuresis as above.  - Cr improved to 2.04   - Hold Entresto as above.      DM Type II, uncontrolled and complicated by neuropathy. A1c 2/2023 7.9.  - Hgb A1C 7. On 5/12/23  - Lantus decreased to 12 units daily (PTA 24 units) and Novolog  insulin, Carb coverage and  discontinued. Discontinued PM sliding scale  - Hypoglycemia protocol     Chronic/Resolved Medical Conditions:   History of pancreatitis secondary to ETOH. Remote use 11 years ago. Continue Creon  GERD. Continue PPI  PAD. Venous Insufficiency     FEN: 2 gram sodium diet   PROPHY: Ambulation  LINES: PIV  DISPO: TBD  CODE STATUS:  DNR/DNI  ================================================================    Interval History/ROS: He complains of persistent LE edema and abdominal distention, although both are significantly improved. He has no complaints of fever, chills, chest pain, palpitations, cough, nausea, vomiting, diarrhea. He is tolerating oral intake and ambulation.     Last 24 hr care team notes reviewed.   ROS:  4 point ROS including Respiratory, CV, GI and , other than that noted in the HPI, is negative.     Medications: Reviewed in EPIC.     Physical Exam:   /70 (BP Location: Left arm)   Pulse 86   Temp 98.6  F (37  C) (Oral)   Resp 17   Ht 1.829 m (6')   Wt 70.5 kg (155 lb 8 oz)   SpO2 95%   BMI 21.09 kg/m    GENERAL: Appears alert and interacting appropriatly.  HEENT: Eye symmetrical and free of discharge bilaterally. Mucous membranes moist and without lesions.  NECK: Supple and without lymphadenopathy. JVD to jaw.   CV: RRR, S1S2 present with III/VI murmur heard best at LSB   RESPIRATORY: Respirations regular, even, and unlabored. Lungs CTA throughout.   GI: Soft and distended with normoactive bowel sounds present in all quadrants. No tenderness, rebound, guarding. No organomegaly.   EXTREMITIES: +1 bilateral LE peripheral edema..   NEUROLOGIC: Alert and interacing appropriatly. No focal deficits.   MUSCULOSKELETAL: No joint swelling or tenderness.   SKIN: No jaundice. No rashes or lesions.     Data:  CMP  Recent Labs   Lab 05/13/23  1212 05/13/23  0622 05/13/23  0307 05/13/23  0248 05/12/23  1809 05/12/23  1727 05/12/23  0746 05/12/23  0556 05/11/23  1715  05/11/23  1633 05/11/23  0817 05/11/23  0726 05/11/23  0328 05/10/23  2032 05/10/23  2021 05/10/23  1540   NA  --  135*  --   --   --  135*  --  133*  --  136  --   --   --   --   --  137   POTASSIUM  --  3.8  --   --   --  4.2  --  3.9  --  4.6  --  4.4  --   --   --  4.9   CHLORIDE  --  94*  --   --   --  93*  --  92*  --  96*  --   --   --   --   --  96*   CO2  --  30*  --   --   --  31*  --  29  --  31*  --   --   --   --   --  27   ANIONGAP  --  11  --   --   --  11  --  12  --  9  --   --   --   --   --  14   * 266* 143* 79   < > 152*   < > 202*   < > 80   < >  --    < >  --    < > 201*   BUN  --  61.6*  --   --   --  60.4*  --  59.5*  --  55.9*  --   --   --   --   --  55.9*   CR  --  2.04*  --   --   --  2.10*  --  2.08*  --  2.31*  --   --   --   --   --  2.28*   GFRESTIMATED  --  34*  --   --   --  32*  --  33*  --  29*  --   --   --   --   --  29*   MARVA  --  8.6*  --   --   --  9.1  --  8.6*  --  8.6*  --   --   --   --   --  8.8   MAG  --  1.8  --   --   --   --   --  1.8  --   --   --  2.1  --  1.7  --  1.2*   PROTTOTAL  --   --   --   --   --   --   --   --   --   --   --   --   --   --   --  6.7   ALBUMIN  --   --   --   --   --   --   --   --   --   --   --   --   --   --   --  3.7   BILITOTAL  --   --   --   --   --   --   --   --   --   --   --   --   --   --   --  0.2   ALKPHOS  --   --   --   --   --   --   --   --   --   --   --   --   --   --   --  124   AST  --   --   --   --   --   --   --   --   --   --   --   --   --   --   --  30   ALT  --   --   --   --   --   --   --   --   --   --   --   --   --   --   --  23    < > = values in this interval not displayed.     CBC  Recent Labs   Lab 05/13/23  0622 05/12/23  0556 05/10/23  1540   WBC 7.1 7.5 7.7   RBC 4.13* 4.15* 4.78   HGB 9.7* 9.9* 11.3*   HCT 34.4* 34.8* 40.4   MCV 83 84 85   MCH 23.5* 23.9* 23.6*   MCHC 28.2* 28.4* 28.0*   RDW 17.2* 17.2* 17.5*    163 189     INR  Recent Labs   Lab 05/10/23  1540   INR 1.06        Time/Communication  I personally spent a total of 55 minutes. Of that 30 minutes was counseling/coordination of patient's care. Plan of care discussed with patient. See my note above for details. Patient discussed with Dr. Marshall.      Yoly Rene PA-C  [facility]

## 2023-05-13 NOTE — PLAN OF CARE
Temp: 98.8  F (37.1  C) Temp src: Oral BP: (!) 88/54 Pulse: 77   Resp: 18 SpO2: 97 % O2 Device: None (Room air)       Diagnosis: HF exacerbation  History of ICM, HFrEF, CAD s/p CABG, Aflutter s/p ablation, ICD, CKD, T2DM, PAD, COPD, venous insufficiency    Neuro: A&Ox 4, denied headache or dizziness  Cardiac: Tele in place, SR with first degree AVB.   Respiratory: O2 sating >95% on RA.  Vitals: VSS, afebrile, denied pain, BG 53 @ 0231, patient given juice & palomo crackers, BG improved to 143  GI/: Denied nausea, no BM, voiding well to bedside urinal  Skin: Left PIV in place, infusing lasix. Pt removed BLE lymphwraps @ 2300 d/t to wraps were aggravating neuropathy.   Mobility: Up independently.   Rest: Slept poorly initially d/t neuropathy in feet, pt requested sleep medication after removing his lymphwraps, team contacted for melatonin order & melatonin administered.    Changed:  Running: lasix @ 20 mg/hr  PRN: melatonin 5mg x1    P: Continue to monitor and follow POC. Notify CARDS 2 with changes.      [id number]

## 2023-05-14 NOTE — PLAN OF CARE
Temp: 97.8  F (36.6  C) Temp src: Oral BP: 103/69 Pulse: 79   Resp: 16 SpO2: 95 % O2 Device: None (Room air)       Diagnosis: HF exacerbation  History of ICM, HFrEF, CAD s/p CABG, Aflutter s/p ablation, ICD, CKD, T2DM, PAD, COPD, venous insufficiency    Neuro: A&Ox 4, denied headache or dizziness  Cardiac: Tele in place, SR with first degree AVB. Evening Mg was 1.7, replacement protocol run.  Respiratory: O2 sating >95% on RA.  Vitals: VSS, afebrile, denied pain  GI/: Denied nausea, no BM overnight, voiding well to bedside urinal  Skin: Left PIV in place, infusing lasix.  Mobility: Up independently.   Rest: Slept well overnight.    Running: lasix @ 20 mg/hr    P: Continue to monitor and follow POC. Notify CARDS 2 with changes.      [id number]

## 2023-05-14 NOTE — PLAN OF CARE
RN [id number]:    Vital signs: BP (!) 80/47 (BP Location: Left arm)   Pulse 77   Temp 97.7  F (36.5  C) (Oral)   Resp 17   Ht 1.829 m (6')   Wt 68.3 kg (150 lb 9.6 oz)   SpO2 96%   BMI 20.43 kg/m      Status: 5/10 admit with HF exacerbation  Neuro: AOX4  Pain/Nausea: denies pain and nausea  Cardiac: BP soft, denies chest pain. On tele  Respiratory: WNL on room air  Mobility: up independently  Diet: 2 g Na diet with 2000 mL FR  Labs: reviewed, potassium replacement ordered and given  LDAs: PIV SL  Skin/incisions: no new deficits found  GI/: voids spontaneously without difficulty. Continent B/B  New Changes: lasix gtt stopped this morning due to soft BP  Plan: continue with plan of care

## 2023-05-14 NOTE — PROVIDER NOTIFICATION
Cards 2 Provider PA-C paged regarding BP 76/47, recommended to recheck in 10 minutes    Addendum: lasix gtt stopped, BP improving most recent being 87/54

## 2023-05-14 NOTE — PROGRESS NOTES
[facility]   Cardiology II Service / Advanced Heart Failure  Daily Progress Note        Patient: Delbert Tilley  MRN: [id number]  Admission Date: 5/10/2023  Hospital Day # 4    Assessment and Plan: Delbert Tilley is a 72 year old male with a PMH of HFrEF secondary to ICM, CAD s/p CABG, s/p ICD, Aflutter s/p ablation, DM Type II, CKD Stage III, PAD, Venous insufficiency, and COPD. He presents for heart failure exacerbation for consideration of advanced therapies.     Plan today:   - Lasix gtt at 20 mg/hr- holding for soft Bps this AM, plan to restart at lower level as he still appears hypervolemic  - Holding Jardiance and entresto given soft Bps    Acute on Chronic SCHF secondary to ICM. Echo 5/11/23 consistent with EF 10-15%, Grade II DD, severe diffuse hypokinesis, moderate RV dysfunction, and LVIDd 6.3 cm.   Stage C, NYHA Class IIIB  ACEi/ARB/ARNI Entresto held in setting of RADHA with soft BP   BB Metoprolol on hold in setting of decompensation   Aldosterone antagonist contraindicated due to renal dysfunction  SGLT2I: Holding Jardiance due to soft BPS this AM  SCD prophylaxis ICD  Fluid status hypervolemic. HOLDING lasix gtt, plan to restart lat morning pending BPs  - Patient had LVAD show-and-tell, not interested in LVAD. He understands the trajectory of his HF without advanced therapies.      CAD s/p CABG. Coronary angiogarm 2017: patent LIMA, vein graft to OM. Vein graft to RCA was 100% occluded. RCA also occluded which is collateralized from the L system.   - Continue Lipitor and Plavix.   - BB on hold as above.      COPD with active tobacco use.   - Nicotine patch.      Aflutter s/p ablation. Not on anticoagulation due to GIB.   - BB held as above.      RADHA on CKD Stage III. peaked at 2.3, likely due to cardiorenal. B/l Cr is ~1.5  - Diuresis as above.  - Cr improved to 1.88  - Holding Entresto as above.      DM Type II, uncontrolled and complicated by neuropathy. A1c 2/2023 7.9.  - Hgb A1C 7. On  5/12/23  - Lantus decreased to 18 units daily (PTA 24 units) and Novolog insulin, Carb coverage and  discontinued. Discontinued PM sliding scale  - Hypoglycemia protocol     Chronic/Resolved Medical Conditions:   History of pancreatitis secondary to ETOH. Remote use 11 years ago. Continue Creon  GERD. Continue PPI  PAD. Venous Insufficiency     FEN: 2 gram sodium diet   PROPHY: Ambulation  LINES: PIV  DISPO: TBD  CODE STATUS:  DNR/DNI  ================================================================    Interval History/ROS: He complains of persistent LE edema and abdominal distention, although both are significantly improved. He has no complaints of fever, chills, chest pain, palpitations, cough, nausea, vomiting, diarrhea. No lightheadedness or dizziness. His low Bps have been asymptomatic. He is tolerating oral intake and ambulation.     Last 24 hr care team notes reviewed.   ROS:  4 point ROS including Respiratory, CV, GI and , other than that noted in the HPI, is negative.     Medications: Reviewed in EPIC.     Physical Exam:   BP (!) 80/72 (BP Location: Left arm)   Pulse 77   Temp 97.7  F (36.5  C) (Oral)   Resp 17   Ht 1.829 m (6')   Wt 68.3 kg (150 lb 9.6 oz)   SpO2 96%   BMI 20.43 kg/m    GENERAL: Appears alert and interacting appropriatly.  HEENT: Eye symmetrical and free of discharge bilaterally. Mucous membranes moist and without lesions.  NECK: Supple and without lymphadenopathy. JVD to upper third of neck at 60 degrees  CV: RRR, S1S2 present with III/VI murmur heard best at LSB   RESPIRATORY: Respirations regular, even, and unlabored. Lungs CTA throughout.   GI: Soft and distended with normoactive bowel sounds present in all quadrants. No tenderness, rebound, guarding. No organomegaly.   EXTREMITIES: +1 bilateral LE peripheral edema..   NEUROLOGIC: Alert and interacing appropriatly. No focal deficits.   MUSCULOSKELETAL: No joint swelling or tenderness.   SKIN: No jaundice. No rashes or lesions.      Data:  [facility]  Recent Labs   Lab 05/14/23  0725 05/14/23  0709 05/14/23  0206 05/13/23  2043 05/13/23  1802 05/13/23  1645 05/13/23  1212 05/13/23  0622 05/12/23  1809 05/12/23  1727 05/12/23  0746 05/12/23  0556 05/10/23  2021 05/10/23  1540     --   --   --   --  133*  --  135*  --  135*  --  133*   < > 137   POTASSIUM 3.6  --   --   --   --  4.0  --  3.8  --  4.2  --  3.9   < > 4.9   CHLORIDE 92*  --   --   --   --  91*  --  94*  --  93*  --  92*   < > 96*   CO2 32*  --   --   --   --  31*  --  30*  --  31*  --  29   < > 27   ANIONGAP 13  --   --   --   --  11  --  11  --  11  --  12   < > 14   * 248* 185* 256*   < > 269*   < > 266*   < > 152*   < > 202*   < > 201*   BUN 62.0*  --   --   --   --  59.0*  --  61.6*  --  60.4*  --  59.5*   < > 55.9*   CR 1.88*  --   --   --   --  1.94*  --  2.04*  --  2.10*  --  2.08*   < > 2.28*   GFRESTIMATED 37*  --   --   --   --  36*  --  34*  --  32*  --  33*   < > 29*   MARVA 9.5  --   --   --   --  9.0  --  8.6*  --  9.1  --  8.6*   < > 8.8   MAG 2.2  --   --   --   --  1.7  --  1.8  --   --   --  1.8   < > 1.2*   PROTTOTAL  --   --   --   --   --   --   --   --   --   --   --   --   --  6.7   ALBUMIN  --   --   --   --   --   --   --   --   --   --   --   --   --  3.7   BILITOTAL  --   --   --   --   --   --   --   --   --   --   --   --   --  0.2   ALKPHOS  --   --   --   --   --   --   --   --   --   --   --   --   --  124   AST  --   --   --   --   --   --   --   --   --   --   --   --   --  30   ALT  --   --   --   --   --   --   --   --   --   --   --   --   --  23    < > = values in this interval not displayed.     CBC  Recent Labs   Lab 05/14/23  0725 05/13/23  0622 05/12/23  0556 05/10/23  1540   WBC 6.6 7.1 7.5 7.7   RBC 4.82 4.13* 4.15* 4.78   HGB 11.2* 9.7* 9.9* 11.3*   HCT 39.9* 34.4* 34.8* 40.4   MCV 83 83 84 85   MCH 23.2* 23.5* 23.9* 23.6*   MCHC 28.1* 28.2* 28.4* 28.0*   RDW 17.4* 17.2* 17.2* 17.5*    159 163 189     INR  Recent Labs   Lab  05/10/23  1540   INR 1.06       Time/Communication  I personally spent a total of 40 minutes. Of that 15 minutes was counseling/coordination of patient's care. Plan of care discussed with patient. See my note above for details. Patient discussed with Dr. Marshall.      Yoly Rene PA-C  [facility]

## 2023-05-14 NOTE — PLAN OF CARE
Major Shift Events:  Crit glucose 505. Cards 2 intern notified 1719. New orders for additional 5 units of insulin. Recheck 417. New orders for carb coverage and increase in Lantus   Plan: Moisesor low pressures and hyperglycemia. Lasix gtt on hold  For vital signs and complete assessments, please see documentation flowsheets

## 2023-05-14 NOTE — PHARMACY-ADMISSION MEDICATION HISTORY
Pharmacy Intern Admission Medication History    Admission medication history is complete. The information provided in this note is only as accurate as the sources available at the time of the update.    Medication reconciliation/reorder completed by provider prior to medication history? Yes    Information Source(s): Patient via in-person; dispense report    Pertinent Information:     Patient is knowledgeable about medications, the dispense report and patient's home med list were used to confirm dosing regimens    Changes made to PTA medication list:    Added: None    Deleted: None    Changed: None    Allergies reviewed with patient and updates made in EHR: no    Medication History Completed By: Nora Seo 5/14/2023 1:05 PM    Prior to Admission medications    Medication Sig Last Dose Taking? Auth Provider Long Term End Date   albuterol (PROAIR HFA) 108 (90 Base) MCG/ACT Inhaler Inhale 2 puffs into the lungs every 4 hours as needed for shortness of breath / dyspnea Unknown Yes Delbert Chua MD Yes    amylase-lipase-protease (CREON 12) 37544-02390-89855 units CPEP Take 2 capsules by mouth 3 times daily (with meals)  Unknown Yes Danii Hernández MD No    atorvastatin (LIPITOR) 80 MG tablet Take 80 mg by mouth At Bedtime  Unknown Yes Reported, Patient No    calcium carbonate (OS-MARVA) 500 MG tablet Take 1 tablet by mouth 2 times daily One tablet in the morning, two at noon and one at bedtime Unknown Yes Unknown, Entered By History     Carboxymethylcellulose Sod PF (THERATEARS PF) 0.25 % SOLN Apply 1 drop to eye 4 times daily as needed Unknown Yes Unknown, Entered By History     clopidogrel (PLAVIX) 75 MG tablet Take 1 tablet (75 mg) by mouth daily Unknown Yes Parul Badilol PA-C Yes    empagliflozin (JARDIANCE) 10 MG TABS tablet Take 1 tablet (10 mg) by mouth daily  Yes Parul Badillo PA-C     gabapentin (NEURONTIN) 100 MG capsule Take 300 mg by mouth At Bedtime Unknown Yes  Danii Hernández MD Yes    insulin aspart (NOVOLOG PEN) 100 UNIT/ML pen Inject 3 Units Subcutaneous 3 times daily (with meals)  Patient taking differently: Inject 4 Units Subcutaneous daily Unknown Yes Danii Hernández MD Yes    insulin glargine (LANTUS PEN) 100 UNIT/ML pen Inject 28 Units Subcutaneous every morning Increase by 2 units every 2-3 days until morning blood sugar is .  Max dose 40 units/day  Patient taking differently: Inject 26 Units Subcutaneous every morning Increase by 2 units every 2-3 days until morning blood sugar is .  Max dose 40 units/day Unknown Yes Danii Hernández MD Yes    loperamide (IMODIUM) 2 MG capsule Take 2 mg by mouth 4 times daily as needed for diarrhea Patient takes a dose in the am and another at night Unknown at PRN Yes Reported, Patient     magnesium oxide (MAG-OX) 400 MG tablet Take 6.5 tablets (2,600 mg) by mouth daily Unknown Yes Kristen Rice PA-C     metFORMIN (GLUCOPHAGE-XR) 500 MG 24 hr tablet Take 500 mg by mouth 2 times daily (with meals)  Unknown Yes Reported, Patient No    metolazone (ZAROXOLYN) 2.5 MG tablet Take 1 tablet (2.5 mg) by mouth daily as needed (When directed by your provider.) Unknown Yes Humble Headley MD Yes    metoprolol succinate ER (TOPROL XL) 25 MG 24 hr tablet Take 1/2 tablet(12.5mg) by mouth every day. May take at night Unknown Yes Parul Badillo PA-C Yes    mometasone (ASMANEX TWISTHALER) 220 MCG/INH inhaler Inhale 2 puffs into the lungs every evening  Unknown Yes Reported, Patient Yes    pantoprazole (PROTONIX) 40 MG EC tablet Take 40 mg by mouth 2 times daily  Unknown Yes Mimi Michaels MD No    sacubitril-valsartan (ENTRESTO) 24-26 MG per tablet Take 0.5 tablets by mouth 2 times daily Unknown Yes Yareli Mina, APRN CNP Yes    torsemide (DEMADEX) 20 MG tablet Take 1/2 a tablet daily Unknown at PRN Yes Parul Badillo PA-C Yes    vitamin D3 (CHOLECALCIFEROL) 50 mcg (2000 units) tablet  Take 1 tablet by mouth 2 times daily Unknown Yes Unknown, Entered By History     Glycerin (OASIS MOISTURIZING MOUTH SPRAY) 35 % LIQD Take 2 sprays by mouth daily as needed   Unknown, Entered By History     Insulin Pen Needle (PEN NEEDLES) 32G X 4 MM MISC    Reported, Patient     nitroGLYcerin (NITROSTAT) 0.4 MG sublingual tablet Place 0.4 mg under the tongue every 5 minutes as needed for chest pain   Reported, Patient Yes

## 2023-05-15 NOTE — PROCEDURES
[facility]    Double Lumen PICC Placement    Date/Time: 5/15/2023 4:09 PM    Performed by: Luna Hutchison RN  Authorized by: Yoly Rene PA-C  Indications: vascular access      UNIVERSAL PROTOCOL   Site Marked: Yes  Prior Images Obtained and Reviewed:  Yes  Required items: Required blood products, implants, devices and special equipment available    Patient identity confirmed:  Verbally with patient, arm band, provided demographic data, hospital-assigned identification number and anonymous protocol, patient vented/unresponsive  Patient was reevaluated immediately before administering moderate or deep sedation or anesthesia  Confirmation Checklist:  Patient's identity using two indicators, relevant allergies, procedure was appropriate and matched the consent or emergent situation and correct equipment/implants were available  Time out: Immediately prior to the procedure a time out was called    Universal Protocol: the Joint Commission Universal Protocol was followed    Preparation: Patient was prepped and draped in usual sterile fashion       ANESTHESIA    Anesthesia: Local infiltration  Local Anesthetic:  Lidocaine 1% without epinephrine  Anesthetic Total (mL):  3.5      SEDATION    Patient Sedated: No        Preparation: skin prepped with ChloraPrep  Skin prep agent: skin prep agent completely dried prior to procedure  Sterile barriers: maximum sterile barriers were used: cap, mask, sterile gown, sterile gloves, and large sterile sheet  Hand hygiene: hand hygiene performed prior to central venous catheter insertion  Type of line used: PICC  Catheter type: double lumen  Lumen type: power PICC and non-valved  Lumen Identification: Red and Purple  Catheter size: 5 Fr  Brand: Bard  Lot number: [id number]  Placement method: venipuncture, MST, ultrasound and tip navigation system  Number of attempts: 1  Difficulty threading catheter: no  Successful placement:  yes  Orientation: right  Catheter to Vein (%): 22  Location: basilic vein (0.83 cm vein diameter)  Tip Location: SVC  Arm circumference: adults 10 cm  Extremity circumference: 23  Visible catheter length: 3  Total catheter length: 43  Dressing and securement: adhesive securement device, alcohol impregnated caps, blood cleaned with CHG, chlorhexidine disc applied, glue, site cleansed, sterile dressing applied, subcutaneous anchor securement system and transparent dressing  Post procedure assessment: blood return through all ports, free fluid flow and placement verified by 3CG technology  PROCEDURE   Disposal: sharps and needle count correct at the end of procedure, needles and guidewire disposed in sharps container

## 2023-05-15 NOTE — PROGRESS NOTES
Care Management Follow Up    Length of Stay (days): 5    Expected Discharge Date: 5/18/2023     Concerns to be Addressed: Discharge planning   Patient plan of care discussed at interdisciplinary rounds: Yes    Anticipated Discharge Disposition: Home     Anticipated Discharge Services: Home Infusion  Anticipated Discharge DME: None    Education Provided on the Discharge Plan: Yes  Patient/Family in Agreement with the Plan: Yes    Referrals Placed by CM/SW: Home Infusion, benefit check pending with [facility].     Private pay costs discussed: TBD    Additional Information:  Per PA, plan to initiate dobutamine drip today and plan for pt to discharge on dobutamine if he tolerates. This writer met with pt and daughter, Sara (via phone) to discuss home infusion. Pt has never done home infusion but is willing and able to learn. Pt lives alone and would be the primary one managing his infusion. Daughter states she is willing to learn as a back up.     [facility] scheduled for 5/16 at 12:30pm for PICC/IV med teaching. Sara stated she can also attend.      CC will continue to monitor patient's medical condition and progress towards discharge.  Brianna Larsen RN BSN  6C Unit Care Coordinator  Phone number: [phone]  Pager: [phone]

## 2023-05-15 NOTE — PROGRESS NOTES
[facility]   Cardiology II Service / Advanced Heart Failure  Daily Progress Note      Patient: Delbert Tilley  MRN: [id number]  Admission Date: 5/10/2023  Hospital Day # 5    Assessment and Plan: Delbert Tilley is a 72 year old male with a PMH of HFrEF secondary to ICM, CAD s/p CABG, s/p ICD, Aflutter s/p ablation, DM Type II, CKD Stage III, PAD, Venous insufficiency, and COPD. He presents for heart failure exacerbation for consideration of advanced therapies.     Today's Plan:  - RHC today   - PICC placement today, then starting dobutamine 2.5 mcg  - Restart lasix once inotrope started  - PICC teaching 5/16  - Holding PTA Jardiance and Enresto for hypotention  - Stopped PTA metoprolol  - Changed to consistent carb diet  - Increased lantus to 22 U (still below home dose)  - Resumed PM sliding scale insulin    Acute on Chronic SCHF secondary to ICM. Stage D, NYHA Class IIIB  - Echo 5/11/23 consistent with EF 10-15%, Grade II DD, severe diffuse hypokinesis, moderate RV dysfunction, and LVIDd 6.3 cm.   - RHC 5/15/23 with Ra 15, PA 70/26/41, PCW 21, ZE CO/CI: 3.94/2.1, TD CO/CI 4.33/2.1, , PVR 4.65    ACEi/ARB/ARNI Entresto held in setting of RADHA with soft BP   BB Metoprolol stopped in the setting of low output heart failure, do not plan to resume  Aldosterone antagonist contraindicated due to renal dysfunction  SGLT2I: Holding Jardiance due to soft BPS, plan to resume once more stable  Inotrope: Adding palliative dobutamine 2.5 mcg/kg/min pending PICC placement, picc teaching 5/16  SCD prophylaxis ICD  Fluid status hypervolemic. restart lasix gtt pending dobutamine start  - Patient had LVAD show-and-tell, not interested in LVAD. He understands the trajectory of his HF without advanced therapies.   - Patient understands that inotropes are palliative and not life prolonging, understands that they come with a risk of arrhythmia which can be life-shortening     CAD s/p CABG. Coronary angiogarm 2017:  patent LIMA, vein graft to OM. Vein graft to RCA was 100% occluded. RCA also occluded which is collateralized from the L system.   - Continue Lipitor and Plavix.   - BB on hold as above.      COPD with active tobacco use.   - Nicotine patch.      Aflutter s/p ablation. Not on anticoagulation due to GIB history. Has been in sinus this amdission  - BB held as above.      RADHA on CKD Stage III. peaked at 2.3, likely due to cardiorenal. B/l Cr is ~1.5  - Diuresis as above.  - Cr improved to 1.69  - Holding Entresto as above, but would consider restarting pending BPS on dobutamine      DM Type II, uncontrolled and complicated by neuropathy. Hgb A1C 7. On 5/12/23. Have struggled with both hyper and hypoglycemia this admission.  - Lantus increased to 22 units daily (PTA 27 units)  - Added back PM sliding scale, continue sliding scale before meals  - Added back carb coverage  - Hypoglycemia protocol  - Holding PTA entresto, but restart pending BPS on dobutamine     Chronic/Resolved Medical Conditions:   History of pancreatitis secondary to ETOH. Remote use 11 years ago. Continue Creon  GERD. Continue PPI  PAD. Venous Insufficiency     FEN: 2 gram sodium diet   PROPHY: Ambulation  LINES: PIV  DISPO: TBD  CODE STATUS:  DNR/DNI    Yoly Rene PA-C  Advanced Heart Failure/Cardiology II Service  Pager [phone] [facility] [phone]      Patient discussed with Dr. Marshall.        45 minutes spent on the date of the encounter doing chart review, history and exam, documentation and further activities per the note    ================================================================    Subjective/24-Hr Events:   Last 24 hr care team notes reviewed. Feeling fine today. No lightheadedness, dizziness, pre-syncope, or syncope. Breathing feels okay but he still feels swelling in his thighs. No chest pain. No dysnpnea. No nausea. Good appetite. No tother concerns.     ROS:  4 point ROS including respiratory, CV, GI and  (other than that  noted in the HPI) is negative.     Medications: Reviewed in EPIC.     Physical Exam:   BP 99/69 (BP Location: Left arm)   Pulse 82   Temp 97.7  F (36.5  C) (Oral)   Resp 16   Ht 1.829 m (6')   Wt 67.5 kg (148 lb 14.4 oz)   SpO2 95%   BMI 20.19 kg/m      GENERAL: Appears comfortable, in no distress .  HEENT: Eye symmetrical, no discharge or icterus bilaterally. Mucous membranes moist and without lesions.  NECK: Supple, JVD >12.   CV: RRR, +S1S2, no murmur, rub, or gallop.   RESPIRATORY: Respirations regular, even, and unlabored. Lungs CTA throughout.   GI: Soft and non distended with normoactive bowel sounds present in all quadrants. No tenderness, rebound, guarding.   EXTREMITIES: Mild lower extremity peripheral edema. All extremities are warm and well perfused  NEUROLOGIC: Alert and interacting appropriatly. No focal deficits.   MUSCULOSKELETAL: No joint swelling or tenderness.   SKIN: No jaundice. No rashes or lesions.     Labs:  CMP  Recent Labs   Lab 05/15/23  0959 05/15/23  0746 05/15/23  0525 05/15/23  0135 05/14/23  1822 05/14/23  1641 05/14/23  1237 05/14/23  0725 05/13/23  1802 05/13/23  1645 05/10/23  2021 05/10/23  1540   NA  --   --  134*  --   --  130*  --  137  --  133*   < > 137   POTASSIUM  --   --  3.7  --   --  4.1  --  3.6  --  4.0   < > 4.9   CHLORIDE  --   --  92*  --   --  90*  --  92*  --  91*   < > 96*   CO2  --   --  29  --   --  28  --  32*  --  31*   < > 27   ANIONGAP  --   --  13  --   --  12  --  13  --  11   < > 14   * 211* 173* 184*   < > 505*   < > 231*   < > 269*   < > 201*   BUN  --   --  59.1*  --   --  61.7*  --  62.0*  --  59.0*   < > 55.9*   CR  --   --  1.69*  --   --  1.85*  --  1.88*  --  1.94*   < > 2.28*   GFRESTIMATED  --   --  42*  --   --  38*  --  37*  --  36*   < > 29*   MARVA  --   --  8.9  --   --  8.6*  --  9.5  --  9.0   < > 8.8   MAG  --   --  1.9  --   --  1.9  --  2.2  --  1.7   < > 1.2*   PROTTOTAL  --   --   --   --   --   --   --   --   --   --    --  6.7   ALBUMIN  --   --   --   --   --   --   --   --   --   --   --  3.7   BILITOTAL  --   --   --   --   --   --   --   --   --   --   --  0.2   ALKPHOS  --   --   --   --   --   --   --   --   --   --   --  124   AST  --   --   --   --   --   --   --   --   --   --   --  30   ALT  --   --   --   --   --   --   --   --   --   --   --  23    < > = values in this interval not displayed.       CBC  Recent Labs   Lab 05/15/23  0525 05/14/23  0725 05/13/23  0622 05/12/23  0556   WBC 7.0 6.6 7.1 7.5   RBC 4.30* 4.82 4.13* 4.15*   HGB 10.1* 11.2* 9.7* 9.9*   HCT 35.8* 39.9* 34.4* 34.8*   MCV 83 83 83 84   MCH 23.5* 23.2* 23.5* 23.9*   MCHC 28.2* 28.1* 28.2* 28.4*   RDW 17.2* 17.4* 17.2* 17.2*    166 159 163       INR  Recent Labs   Lab 05/10/23  1540   INR 1.06

## 2023-05-15 NOTE — PROVIDER NOTIFICATION
Pt BP 70/44, MAP 52 @ 1958. Pt lying supine sleeping, denied symptoms when awakened. Team contacted and advised to monitor for next hour. BP up to 90/57 MAP 68 @ 2100 with patient sitting up in bed and alert.

## 2023-05-15 NOTE — PLAN OF CARE
Major Shift Events:  RHC completed. Hypotension improving. Asymptomatic. Hyperglycemic- corrected with sliding scale insulin and carb coverage. Lantus increased.  Plan: Needs PICC line. Dobutamine to start once PICC placed. Lasix gtt to start with dobutamine.   For vital signs and complete assessments, please see documentation flowsheets

## 2023-05-15 NOTE — PLAN OF CARE
Temp: 97.7  F (36.5  C) Temp src: Oral BP: 107/65 Pulse: 84   Resp: 16 SpO2: 98 % O2 Device: None (Room air)       Diagnosis: HF exacerbation  History of ICM, HFrEF, CAD s/p CABG, Aflutter s/p ablation, ICD, CKD, T2DM, PAD, COPD, venous insufficiency    Neuro: A&Ox 4, denied headache or dizziness  Cardiac: Tele in place, SR with first degree AVB.   Respiratory: O2 sating >93% on RA.  Vitals: BP 70/44 MAP 52 @ 1958, pt supine and sleeping, denied symptoms when awakened. Team advised to monitor for 1 hour. BP 90/57 MAP 68 @ 2100 with patient sitting up in bed and alert. MAP remained >65 for remainder of shift. Afebrile. Denied pain.  GI/: Denied nausea, no BM overnight, voiding adequately to bedside urinal with 625 mL output  Endocrine:  @ bedtime, no bedtime insulin order in place, team paged, insulin ordered & administered.  @ 0200.  Skin: Left PIV in place, SL.  Mobility: Up independently.   Rest: Slept well overnight.    Changed: NPO @ midnight    P: Continue to monitor and follow POC. Plan for possible RHC today. Notify CARDS 2 with changes.      [id number]

## 2023-05-16 NOTE — PROGRESS NOTES
Care Management Follow Up    Length of Stay (days): 6    Expected Discharge Date: 05/17/2023     Concerns to be Addressed: Discharge planning    Patient plan of care discussed at interdisciplinary rounds: Yes    Anticipated Discharge Disposition: Home     Anticipated Discharge Services: Home Infusion  Anticipated Discharge DME: None    Education Provided on the Discharge Plan: Yes  Patient/Family in Agreement with the Plan: Yes    Additional Information:  Received update from [facility] that pt now meets Medicare criteria for home dobutamine.    Pt and daughter, Sara attended [facility] today for dobutamine teaching. Pt states he felt it went well and he feels that with continued practice he will be able to independently manage the infusion.     Per [facility] liason, they have confirmed home nursing through [facility], they are unable to start until Monday 5/22. [facility] will provide home nursing in the meantime.     [facility] (IV dobutamine)  Phone: [phone]    [facility] (skilled nursing)  Placed orders for 5/22 start of care.     CC will continue to monitor patient's medical condition and progress towards discharge.  Brianna Larsen RN BSN  6C Unit Care Coordinator  Phone number: [phone]  Pager: [phone]

## 2023-05-16 NOTE — PROGRESS NOTES
[facility]   Cardiology II Service / Advanced Heart Failure  Daily Progress Note      Patient: Delbert Tilley  MRN: [id number]  Admission Date: 5/10/2023  Hospital Day # 6    Assessment and Plan: Delbert Tilley is a 72 year old male with chronic HFrEF secondary to ICM, CAD s/p CABG, s/p ICD, Aflutter s/p ablation, DM Type II, CKD stage III, PAD, venous insufficiency, and COPD. He presents for acute on chronic systolic heart failure - admitted for consideration of advanced therapies (DT VAD). Patient elected to not pursue VAD w/u and he was started on palliative dobutamine.     Today's Plan:  - increase lasix 20 mg/hr goal net neg 1.5L  - PICC teaching today  - cardiac MRI with contrast and stress Thursday  - iron studies  - will discuss increased dobutamine (PICC sat this AM c/f CI 1.6)    # Acute on chronic systolic heart failure secondary to ICM  # Inotrope dependence  Stage D, NYHA Class IIIB. Echo 5/11/23 EF 10-15%, Grade II DD, severe diffuse hypokinesis, moderate RV dysfunction, and LVIDd 6.3 cm. RHC 5/15/23 RA 15, PA 70/26/41, PCW 21, ZE CO/CI: 3.94/2.1, TD CO/CI 4.33/2.1, , PVR 4.65    -Fluid status: hypervolemic. increase lasix gtt to 20 mg/hr  -Inotrope: dobutamine 2.5 mcg/kg/min   -ACEi/ARB/ARNI Entresto held in setting of RADHA with soft BP   -BB Metoprolol stopped in the setting of low output   -Aldosterone antagonist contraindicated due to renal dysfunction  -SGLT2I: holding Jardiance due to soft BPS, plan to resume once more stable  -SCD prophylaxis: ICD  -Patient had LVAD show-and-tell, not interested in LVAD. He understands the trajectory of his HF without advanced therapies. Patient understands that inotropes are palliative and not life prolonging, understands that they come with a risk of arrhythmia.     # CAD s/p CABG. Coronary angiogarm 2017: patent LIMA, vein graft to OM. Vein graft to RCA was 100% occluded. RCA also occluded which is collateralized from the L system.   -  Continue Lipitor and Plavix.   - BB on hold as above  - Cardiac MRI with contrast and stress scheduled for Thursday, if LIMA not patent ?PCI option     # COPD with active tobacco use.   - Nicotine patch.      # Aflutter s/p ablation. Not on anticoagulation due to GIB history. Has been in sinus this amdission  - BB held as above.      # RADHA on CKD Stage III. Peaked at 2.3, likely due to cardiorenal. B/l Cr is ~1.5  - Diuresis as above.  - Cr improved to 1.69, now plateaud   - Holding Entresto as above     # DM Type II, uncontrolled and complicated by neuropathy. Hgb A1C 7. Hyper and hypoglycemia this admission.  - Lantus increase to 24 units daily (PTA 27 units)  - continue sliding scale before meals ND carb coverage  - Hypoglycemia protocol  - Holding PTA jardiance for low BP     Chronic/Resolved Medical Conditions:   # History of pancreatitis secondary to ETOH. Remote use 11 years ago. Continue Creon  # GERD. Continue PPI  # PAD. Venous Insufficiency     FEN: 2 gram sodium diet   PROPHY: Ambulation  LINES: PIV  DISPO: TBD  CODE STATUS:  DNR/DNI    Darlene Bryant, AUBRIE, NP-C  Advanced Heart Failure/Cardiology 2 Nurse Practitioner  Pager       Patient discussed with Dr. Marshall.        45 minutes spent on the date of the encounter doing chart review, history and exam, documentation and further activities per the note    ================================================================    Subjective/24-Hr Events:   Last 24 hr care team notes reviewed. Feeling ok , didn't sleep well overnight. Not due to any symptoms breathing feeling ok. Denies chest pain. No significant LE edema. Doesn't feel much different on dobutamine.     ROS:  4 point ROS including respiratory, CV, GI and  (other than that noted in the HPI) is negative.     Medications: Reviewed in EPIC.     Physical Exam:   BP (!) 81/57 (BP Location: Left arm)   Pulse 81   Temp 98.3  F (36.8  C) (Oral)   Resp 16   Ht 1.829 m (6')   Wt 66.3 kg  (146 lb 1.6 oz)   SpO2 93%   BMI 19.81 kg/m      GENERAL: Appears comfortable, in no distress .  HEENT: Eye symmetrical, no discharge or icterus bilaterally. Mucous membranes moist and without lesions.  NECK: Supple, JVD to angle of jaw.   CV: RRR, +S1S2, no murmur, rub, or gallop.   RESPIRATORY: Respirations regular, even, and unlabored. Lungs CTA throughout.   GI: Soft and non distended with normoactive bowel sounds present in all quadrants. No tenderness, rebound, guarding.   EXTREMITIES: Mild lower extremity peripheral edema. All extremities are warm and well perfused  NEUROLOGIC: Alert and interacting appropriatly. No focal deficits.   MUSCULOSKELETAL: No joint swelling or tenderness.   SKIN: No jaundice. No rashes or lesions.     Labs:  CMP  Recent Labs   Lab 05/16/23  1251 05/16/23  0556 05/16/23  0338 05/15/23  2137 05/15/23  1834 05/15/23  1637 05/15/23  0746 05/15/23  0525 05/14/23  1822 05/14/23  1641 05/14/23  1237 05/14/23  0725 05/10/23  2021 05/10/23  1540   NA  --  136  --   --   --  134*  --  134*  --  130*  --  137   < > 137   POTASSIUM  --  4.0  --   --   --  4.1  --  3.7  --  4.1  --  3.6   < > 4.9   CHLORIDE  --  94*  --   --   --  91*  --  92*  --  90*  --  92*   < > 96*   CO2  --  31*  --   --   --  33*  --  29  --  28  --  32*   < > 27   ANIONGAP  --  11  --   --   --  10  --  13  --  12  --  13   < > 14   * 242* 229* 269*   < > 210*   < > 173*   < > 505*   < > 231*   < > 201*   BUN  --  57.5*  --   --   --  58.1*  --  59.1*  --  61.7*  --  62.0*   < > 55.9*   CR  --  1.60*  --   --   --  1.61*  --  1.69*  --  1.85*  --  1.88*   < > 2.28*   GFRESTIMATED  --  45*  --   --   --  45*  --  42*  --  38*  --  37*   < > 29*   MARVA  --  9.1  --   --   --  9.3  --  8.9  --  8.6*  --  9.5   < > 8.8   MAG  --  1.8  --   --   --   --   --  1.9  --  1.9  --  2.2   < > 1.2*   PROTTOTAL  --  5.9*  --   --   --   --   --   --   --   --   --   --   --  6.7   ALBUMIN  --  3.5  --   --   --   --   --    --   --   --   --   --   --  3.7   BILITOTAL  --  0.5  --   --   --   --   --   --   --   --   --   --   --  0.2   ALKPHOS  --  104  --   --   --   --   --   --   --   --   --   --   --  124   AST  --  27  --   --   --   --   --   --   --   --   --   --   --  30   ALT  --  22  --   --   --   --   --   --   --   --   --   --   --  23    < > = values in this interval not displayed.       CBC  Recent Labs   Lab 05/16/23  0556 05/15/23  0525 05/14/23  0725 05/13/23  0622   WBC 6.8 7.0 6.6 7.1   RBC 4.17* 4.30* 4.82 4.13*   HGB 9.7* 10.1* 11.2* 9.7*   HCT 34.6* 35.8* 39.9* 34.4*   MCV 83 83 83 83   MCH 23.3* 23.5* 23.2* 23.5*   MCHC 28.0* 28.2* 28.1* 28.2*   RDW 17.0* 17.2* 17.4* 17.2*    174 166 159       INR  Recent Labs   Lab 05/10/23  1540   INR 1.06

## 2023-05-16 NOTE — PROGRESS NOTES
Addendum  Kamron is discharging today on his continuous dobutamine therapy.   He requires a hook up of home medication and pump prior to discharge.    Met with Kamron at bedside once supplies arrived.  Provided hook up of home dobutamine after reviewing pump settings and verifying with orders while Kamron observed and asked questions.  Informed him about SNV for tomorrow for first bag change, supplies and ongoing supply deliveries, storage of medication, back up pump, 24/7 availability of I staff and how to contact as needed while on home IV therapy.    Kamron verbalized understanding of information given.  He feels comfortable discharging and managing the IV therapy at home once teaching is completed.  Kamron's home dobutamine infusion is running and he is ready for discharge from [facility] perspective.      Home Infusion  Kamron is expected to discharge as soon as tomorrow and will be going home on continuous IV dobutamine.   He has never done home IV therapy before however he and his dtr have had some initial teaching by a [facility] nurse.   Benefits have been checked and pt will have coverage through his [employer] policy subject to meeting their deductible (covered at 80% until then).    Met with Kamron at bedside to relay benefits and offer choice of agency.  Kamron would like to keep services within [facility] so will use [facility].  Provided him with information about [facility] services.  Explained about administration method, the pump, medication bag and shola pack used for mobility.    I informed him that I will assist with hook up of his home medication here at the hospital on day of discharge (M-F or another [facility] nurse if discharge on the w/e) and he will have a nurse visit at home the following day to continue teaching the daily bag changes.  Informed him about supplies and delivery of supplies, storage of medication, continuous infusion requirements, plan for SNV and 24/7 availability of [facility] staff while on IV therapy.  Teagan SALTER will  be providing home nursing and can see pt 5/22/23.  [facility] will provide nursing until then for additional teaching.  Kamron verbalized understanding of all information given.   He is willing and able to learn and manage home IV therapy.  Questions answered.  Will continue to follow and update pt as needed.    Oneyda Florian RN CRNI  Nurse Liaison  [facility] I [email or web address]  [address] [place] [address]  [email or web address]  [phone] M-F I [facility] main: [phone]

## 2023-05-16 NOTE — PROGRESS NOTES
D-Admitted on 05/10/23 with heart failure exacerbation and consideration for advanced therapies. PMH includes HFrEF 2/2 ICM, CAD, s/p CABG, s/p ICD, aflutter, s/p ablation, DM2, CKD, PAD, venous insufficiency and COPD. S/p R heart cath on 05/15/23.   I-Dobutamine and furosemide gtts initiated.  A-Soft BPs. See provider notification note. Net I/O +265 so far today.  P-Continue with current poc. Notify Cards 2  if MAPS < 65 or symptomatic or any other concerns/chnages. PICC line education tomorrow.

## 2023-05-16 NOTE — CONSULTS
"72 year old man presenting with HF. CORE consult requested.   Kamron was provided with a CHF book.  I reviewed the importance of daily weights, 2 gm sodium diet, 2L fluid restriction and compliance with medications upon hospital discharge.  CORE contact phone numbers provided and patient is encouraged to call with any questions or concerns, including any weight gain or loss of 2 or more pounds in 24 hours or 5 or more pounds in 1 week.      Appointment made in CORE clinic on May 24 at [place].  I will follow-up with the patient at that time, sooner if needed.  Thank you for the consult.    Elsia Odell RN BSN  Cardiology Care Coordinator - Heart Failure/ C.O.R.E. Clinic  [facility]   Questions and [phone]   First press #1 for the [facility] and then press #4 for \"Send a message to your care team\"         "

## 2023-05-16 NOTE — PROVIDER NOTIFICATION
Dobutamine gtt initiated at 1700. Bumex 40 mg and gtt at 10mg/hr initiated at 1800. BP 77/50 MAP 60 83/53 MAP 65. Asymptomatic. Discussed with Cards 2 cross cover provider, [id number]. Continue gtts and recheck BP in 1 hour. Notify provider if symptomatic or MAP < 65.

## 2023-05-16 NOTE — PROGRESS NOTES
Therapy: IV DOBUTAMINE  Insurance: MEDICA ADV, MEDICARE  Ded: DOES NOT APPLY    Co-Insurance: 80/20  Max Out of Pocket: $2800  Met: $251    In reference to referral from [facility] 6C on pt admitted 05/10/23 to check for dobutamine coverage    Misc: Pt meets Medicare criteria for Dobutamine. Must be administered on CADD pump for coverage. Hospitals in [place] cannot provide nursing if pt is homebound, if not we can supply for $90 per visit.    Please contact Intake with any questions, [phone] or In Basket pool, [facility] ([phone]).

## 2023-05-16 NOTE — DISCHARGE INSTRUCTIONS
Take your medicines every day, as directed    Changes made today:       Monitor Your Weight and Symptoms    Contact us if you:    Gain 2 pounds in one day or 5 pounds in one week  Feel more short of breath  Notice more leg swelling  Feel lightheadeded   Change your lifestyle    Limit Salt or Sodium:  2000 mg  Limit Fluids:  2000 mL or approximately 64 ounces  Eat a Heart Healthy Diet  Low in saturated fats  Stay Active:  Aim to move at least 150 minutes every  week         To Contact us    During Business Hours:  [phone], option # 1      After hours, weekends or holidays:   [phone], Option #4  Ask to speak to the On-Call Cardiologist. Inform them you are a CORE/heart failure patient at the [facility].     Use [employer] allows you to communicate directly with your heart team through secure messaging.  Tintri can be accessed any time on your phone, computer, or tablet.  If you need assistance, we'd be happy to help!         Keep your Heart Appointments:    May 24:  [facility] at 7:00  May 25:  CORE clinic (heart failure clinic ) at [place] at 9:30 (9:15 check in)    [address] [place] [phone]     Please consider attending our virtual support group which is held monthly. Please reach out to Ramakrishna at [phone] for more information if you are interested in attending.       2023 dates:    Monday, May 1st, 1-2pm (Topic: CORE clinic)  Monday, June 5th, 1-2pm (Topic: Exercise and Cardiac Rehab: An Essential Tool in Managing Heart Failure)  Monday, July 3rd, 1-2pm  Monday, August 7th, 1-2pm  Monday, September 11th, 1-2pm  Monday, October 2nd, 1-2pm  Monday, November 6th, 1-2pm  Monday, December 4th, 1-2pm

## 2023-05-16 NOTE — CONSULTS
05/16/23 [id number] Nivia Weiner, YULIA     Patient and daughter seen at bedside for DL PICC/IV medication education. Daughter observed while patient RD correctly on a model with flushing, cap change and changing IV bad on a CADD Vazquez infusion pump. Pt. States he can do cares alone but will need a review with home care. Daughter states she will be close by for support if needed. States he understands all information presented. States he feels safe going home with cares needed. Literature given: Handwashing and Skin Care, Caring for Your PICC at Home, Changing the End Cap, Flushing the Line with Heparin, Saline or Citrate, and How to Change Your Medicine Bag.

## 2023-05-16 NOTE — PLAN OF CARE
Dx: Admitted for consideration of advanced HF therapies.  PMH of HFrEF secondary to ICM, CAD s/p CABG, s/p ICD, Aflutter s/p ablation, DM Type II, CKD Stage III, PAD, Venous insufficiency, and COPD.    Pain: Denies  Neuro: A&Ox4, calling appropriately, making needs known  CV: SR  Resp: RA, LSC  GI/: LBM 5/15, diuresing with bedside urinal  Lines:   R Double lumen PICC running Dobutamine at 2.5mcg  L PIV SL  Protocols: No replacements overnight    Activity/Transfers: Up ad jos  Diet: Consistent carb, 2L FR    Plan: PICC teaching today at 12:30pm. Continue POC, notify Cards 2 if MAP < 65 and with changes in pt status

## 2023-05-17 NOTE — DISCHARGE SUMMARY
"  [facility]   Cardiology II Service / Advanced Heart Failure  Discharge Summary     Delbert Tilley MRN# [id number]   YOB: 1950 Age: 72 year old     DATE OF ADMISSION:  5/10/2023  DATE OF DISCHARGE: 5/17/2023  ADMITTING PROVIDER: Delbert Andrea MD  DISCHARGE PROVIDER: Joanna Marshall MD and Darlene Bryant DNP, ANP-C   PRIMARY PROVIDER:  Danii Hernández    ADMIT DIAGNOSES:     Acute on chronic HFrEF 2/2    ICM, hx of CABG    RADHA on CKD    Troponin elevation due to demand ischemia in the setting of AHF    Active tobacco use, COPD    Atrial flutter s/p remote ablation    DM2 with neuropathy    PAD, venous insufficiency    DISCHARGE DIAGNOSES:     End stage, stage D HFrEF 2/2    Inotrope dependence     Frequent PVCs, bigeminy, NSVT in the setting of inotrope    ICM, hx of CABG    RADHA on CKD, cardiorenal     DM2 with neuropathy    Medical recommendation non adherence    FOLLOW-UP:  [] CORE 5/25 with Joy Choudhury NP in [place]  [] reviewed reasons, symptoms to call with his daughter Sara, likely he will need diuretic adjustment between discharge and first clinic visit  [] [facility] RN with [facility] until 5/22 then [facility]    PENDING RESULTS:   [] none    HPI: Please see the detailed H & P by Kenrick Cerna and Jack from 5/10/2023. Briefly, Delbert Tilley is a 72 year old male who presented with a \"few weeks of slowly increasing weight gain and SOB/PARDO, fatigue. First called into nurses line when he noticed, attempted to increase diuretic (reports 20--> 40mg Torsemide BID) and strict diet adherence with some improvement in weight, but this AM pt weighed in 10lb up from  so he called again, and came to hospital. Used to notice a big response from diuretic but hasn't been able to urinate as much over the past week despite increasing dose. Denies any recent illness, has chronic cough, chronic stable angina that resolves immediately with rest. Can walk approx 1/2 block when feeling " "healthy/well, and currently dyspneic walking down short hallways. No orthopnea, maybe some PND but pt not sure if it's due to his tobacco-cough or a bad dream, etc. Notices weight gain in upper thighs and abdomen. No fevers/chills, no N/V/D/C. No abdominal pain, dysuria.\"    HOSPITAL COURSE:   Patient was admitted for acute on chronic systolic heart failure.  He was diuresed with IV diuretic.  He was shown an LVAD and decided not to pursue advanced therapy work-up with the support of his daughter.  With ongoing diuresis he began hypotensive.  Right heart catheterization on May 15th showed RA 15 PA 70/26 with a mean of 41 wedge pressure 21 cardiac index by Daniel 2.1.  He was initiated on 2.5 of dobutamine and restarted on a Lasix drip.  Despite aggressive loop diuretic therapy he did not have more than 2.5 L of urine output.  Dobutamine was increased to 5 mics per kilogram per minute very briefly and the patient developed bigeminy for several minutes.  Patient also strongly desired to discharge despite recommendations to remain hospitalized for ongoing diuresis, monitoring of cardiac rhythm, and cardiac MRI with contrast and stress to evaluate ischemia contributing to his worsening symptoms.  Risks of early discharge include ICD shock, endorgan dysfunction, death, patient was able to verbalize these risks.  This was also discussed with his daughter Sara.  We discussed the option to turn off his shock therapies of his defibrillator for which she declined.  Patient was discharged with a magnet in the case of repeated ICD shocks.  It was made clear to patient and his family that the dobutamine was a means for palliation and not as a life-prolonging measure.  They verbalized understanding.  Sara acknowledges our recommendation that Kamron remain inpatient but agrees his is competent to make his own medical decisions and she is in agreement with his choice to discharge today. She will contact the outpatient HF team via phone " or Hugh on his behalf with any questions or concerns.  He has an appointment in core clinic for next Thursday.  Discussed high likelihood that he may be readmitted between now and Thursday.  Outpatient referral was placed for palliative care.  Outpatient order was placed for cardiac MRI with contrast and stress and schedulers to reach out to patient to schedule this.  Home care nursing will obtain a BMP tomorrow.  He will discharge on torsemide 100 mg twice daily and potassium 40 mEq daily.  Weight on day of discharge 146 pounds but he is hypervolemic on exam.      Detailed hospital course:   # Acute on chronic systolic heart failure secondary to ICM  # Inotrope dependence  Stage D, NYHA Class IIIB. Echo 5/11/23 EF 10-15%, Grade II DD, severe diffuse hypokinesis, moderate RV dysfunction, and LVIDd 6.3 cm. RHC 5/15/23 RA 15, PA 70/26/41, PCW 21, ZE CO/CI: 3.94/2.1, TD CO/CI 4.33/2.1, , PVR 4.65     -Fluid status: hypervolemic, stopped lasix gtt and started torsemide 100 mg bid with kcl 40 meq daily, BMP tomorrow (pta torsemide 10 mg daily?)  -Inotrope: dobutamine 2.5 mcg/kg/min, PICC sat on day of discharge c/w CI 1.6; received PICC teaching 5/16  -ACEi/ARB/ARNI Entresto d/c'ed due to hypotension  -BB: metoprolol stopped in the setting of low output/inotrope dependence  -Aldosterone antagonist contraindicated due to renal dysfunction  -SGLT2I:d/c'ed  Jardiance due to hypotension  -SCD prophylaxis: ICD, patient confirmed he wants to keep shock therapies ON despite risk of shock with PVCs, NSVT, on inotrope  -Patient had LVAD show-and-tell, not interested in LVAD. He understands the trajectory of his HF without advanced therapies. Patient understands that inotropes are palliative and not life prolonging, understands that they come with a risk of arrhythmia and he elects to keep ICD on for now. Patient discharged prior to being seen by inpatient palliative care team. Outpatient referral placed.     #  Frequency PVCs, bigeminy   # NSVT  Likely 2/2 inotrope use. Asymptomatic. BP low all admission, as low as 75 systolic. K and Mg are stable on day of discharge (4/5 and 1.9 respectively). Continue kcl 40 meq daily and mg ox 800 mg bid. He is at increased risk of sustained VT/ICD shock. Dr Marshall discussed with patient today, he verbalizes understanding and still elects to discharge today. BMP tomorrow with [facility].      # CAD s/p CABG. Coronary angiogarm 2017 patent LIMA, vein graft to OM. Vein graft to RCA was 100% occluded. RCA also occluded which is collateralized from the L system. Continue Lipitor and Plavix. BB d/c'ed as above. Plan was for Cardiac MRI with contrast and stress scheduled for tomorrow to assess targets for revascularization. However patient elected to discharge prior to medical optimization and testing so this will need to be rescheduled as outpatient. Message sent to CORE RN to assist.       # COPD with active tobacco use. Nicotine patch.      # Aflutter s/p ablation. Not on anticoagulation due to GIB history. Has been in sinus this admission. BB d/c'ed as above.      # RADHA on CKD Stage III. Peaked at 2.3, likely due to cardiorenal. B/l Cr is ~1.5. Cr improved with diuresis and inotrope. Cr 1.5 on day of discharge.      # DM Type II, uncontrolled and complicated by neuropathy. Hgb A1C 7. Hyper and hypoglycemia this admission. Lantus was decreased initially then increased back towards pta dose. He only take novolog daily at home. Resume pta regimen for short acting. Stopped Jardiance as above. Follow-up with PCP for further management.    # History of pancreatitis secondary to ETOH. Remote use 11 years ago. Continued pta Creon  # GERD. Continue pta PPI  # PAD with claudication. Venous Insufficiency. Has appt with vascular coming up to discuss treatment.     Darlene Bryant DNP, ANP-C  Advanced Heart Failure/Cardiology 2 Nurse Practitioner  5/17/2023  Pager: [phone]    PHYSICAL EXAM:  Blood  pressure (!) 82/51, pulse 69, temperature 97.8  F (36.6  C), temperature source Oral, resp. rate 16, height 1.829 m (6'), weight 66.2 kg (146 lb), SpO2 92 %.    GENERAL: Appears comfortable, in no distress.  HEENT: Eye symmetrical and without discharge or icterus bilaterally. Mucous membranes moist and without lesions.  NECK: Supple, JVD at least midneck at 75 degrees.   CV: RRR, +S1S2, soft HSM, rub, or gallop.   RESPIRATORY: Respirations regular, even, and unlabored. Lungs CTA throughout.   GI: Soft and non distended with normoactive bowel sounds present in all quadrants. No tenderness, rebound, guarding.   EXTREMITIES: Trace peripheral edema. 2+ bilateral pedal pulses.   NEUROLOGIC: Alert and orientated x 3. No focal deficits.   MUSCULOSKELETAL: No joint swelling or tenderness.   SKIN: No jaundice. No rashes or lesions.     LABS:   Last CBC:   Recent Labs   Lab Test 05/17/23  0454   WBC 6.3   RBC 4.35*   HGB 10.2*   HCT 36.2*   MCV 83   MCH 23.4*   MCHC 28.2*   RDW 17.1*          Last CMP:  Recent Labs   Lab Test 05/17/23  1158 05/17/23  0658 05/17/23  0454 05/16/23  1251 05/16/23  0556   NA  --   --  136   < > 136   POTASSIUM  --   --  3.7   < > 4.0   CHLORIDE  --   --  95*   < > 94*   MARVA  --   --  9.3   < > 9.1   CO2  --   --  31*   < > 31*   BUN  --   --  51.2*   < > 57.5*   CR  --   --  1.51*   < > 1.60*   *   < > 152*   < > 242*   AST  --   --   --   --  27   ALT  --   --   --   --  22   BILITOTAL  --   --   --   --  0.5   ALBUMIN  --   --   --   --  3.5   PROTTOTAL  --   --   --   --  5.9*   ALKPHOS  --   --   --   --  104    < > = values in this interval not displayed.       IMAGING:  Results for orders placed or performed during the hospital encounter of 05/10/23   XR Chest 2 Views    Narrative    Exam: XR CHEST 2 VIEWS, 5/10/2023 3:55 PM    Indication: dyspnea    Comparison: Chest radiograph 6/20/2022    Findings:   Left chest cardiac conduction device leads terminate over the  right  atrium and right ventricle. No pulmonary consolidation, pleural  effusion, or pneumothorax. Normal heart size. Aortic atherosclerosis.  Unchanged fracture of the most cranial and third most cranial  sternotomy wires.      Impression    Impression:   No acute airspace opacity. Atherosclerosis. Implantable cardiac  defibrillator.    I have personally reviewed the examination and initial interpretation  and I agree with the findings.    EFRAIN POMPA MD         SYSTEM ID:  [id number]   Echo Complete     Value    LVEF  10-15% (severely reduced)    [facility]    [id number]  [id number]  [id number]  [id number]^MARTIN^IZABELA^SHREYA     [facility]  Echocardiography Laboratory  [address] [place] [id number]     Name: CARLIN QUEEN  MRN: [id number]  : 1950  Study Date: 2023 11:22 AM  Age: 72 yrs  Gender: Male  Patient Location: [facility]  Reason For Study: Heart Failure  Ordering Physician: IZABELA SALAZAR  Performed By: Griselda Moon RDCS     BSA: 2.0 m2  Height: 73 in  Weight: 162 lb  BP: 116/67 mmHg  ______________________________________________________________________________  Procedure  Echocardiogram with two-dimensional, color and spectral Doppler performed.  Contrast Optison. Optison (NDC #3530-9553-10) given intravenously. Patient was  given 5 ml mixture of 3 ml Optison and 6 ml saline. 4 ml wasted.  ______________________________________________________________________________  Interpretation Summary  Left ventricular function is decreased. The ejection fraction is 10-15%  (severely reduced). Grade II or moderate diastolic dysfunction. Severe diffuse  hypokinesis is present.There is no thrombus seen in the left ventricle.  Global right ventricular function is moderately reduced.  Mild to moderate mitral insufficiency is present.  The estimated mean RA pressure is elevated at 15 mmHg.  No pericardial effusion is present.     This study was compared  with the study from 1/31/23 . Interval worsening of  LVEF is present.     ______________________________________________________________________________  Left Ventricle  Mild left ventricular dilation is present. Left ventricular function is  decreased. The ejection fraction is 10-15% (severely reduced). Grade II or  moderate diastolic dysfunction. Severe diffuse hypokinesis is present. There  is no thrombus seen in the left ventricle.     Right Ventricle  The right ventricle is normal size. Global right ventricular function is  moderately reduced. A pacemaker lead is noted in the right ventricle.     Atria  The right atria appears normal. Moderate to severe left atrial enlargement is  present.     Mitral Valve  Mild to moderate mitral insufficiency is present.     Aortic Valve  The aortic valve is tricuspid. Moderate aortic valve calcification is present.     Tricuspid Valve  Mild tricuspid insufficiency is present. The right ventricular systolic  pressure is approximated at 26.6 mmHg plus the right atrial pressure.     Pulmonic Valve  The pulmonic valve is normal. Trace pulmonic insufficiency is present.     Vessels  The aorta root is normal. The inferior vena cava was dilated at 2.24 cm  without respiratory variability, consistent with increased right atrial  pressure. IVC diameter >2.1 cm collapsing <50% with sniff suggests a high RA  pressure estimated at 15 mmHg or greater.     Pericardium  No pericardial effusion is present.     Compared to Previous Study  This study was compared with the study from 1/31/23 . Interval worsening of  LVEF.     Attestation  I have personally viewed the imaging and agree with the interpretation and  report as documented by the fellow, Chito Sánchez, and/or edited by me.  ______________________________________________________________________________  MMode/2D Measurements & Calculations  IVSd: 1.2 cm  LVIDd: 6.3 cm  LVIDs: 5.9 cm  LVPWd: 1.2 cm  FS: 6.4 %  LV mass(C)d: 326.7  grams  LV mass(C)dI: 166.0 grams/m2  LVOT diam: 2.3 cm  LVOT area: 4.2 cm2     EF(MOD-bp): 14.3 %  LA Volume (BP): 108.0 ml     LA Volume Index (BP): 54.8 ml/m2  RWT: 0.38     Doppler Measurements & Calculations  MV E max rodrigue: 102.0 cm/sec  MV A max rodrigue: 54.0 cm/sec  MV E/A: 1.9  MV dec time: 0.14 sec  Ao V2 max: 192.0 cm/sec  Ao max P.7 mmHg  Ao V2 mean: 128.0 cm/sec  Ao mean P.0 mmHg  Ao V2 VTI: 37.7 cm  ANGEL(I,D): 1.7 cm2  ANGEL(V,D): 1.7 cm2  LV V1 max P.6 mmHg  LV V1 max: 80.6 cm/sec  LV V1 VTI: 15.7 cm  SV(LVOT): 65.2 ml  SI(LVOT): 33.2 ml/m2  PA acc time: 0.13 sec  TR max rodrigue: 258.0 cm/sec  TR max P.6 mmHg  AV Rodrigue Ratio (DI): 0.42  ANGEL Index (cm2/m2): 0.88  E/E' av.7  Lateral E/e': 17.1     Medial E/e': 32.4     ______________________________________________________________________________  Report approved by: Rufus Alva 2023 01:03 PM         Cardiac Catheterization    Narrative       Right sided filling pressures are moderately elevated.     Left sided filling pressures are mildly elevated.     Moderately elevated pulmonary artery hypertension.     Reduced cardiac output level.     Hemodynamic data has been modified in Epic per physician review.         PROCEDURES:  Right heart catheterization  PICC placement    CONSULTATIONS:   CORE RN  Social work  RN care coordinator  Patient learning center    DISCHARGE MEDICATIONS:  Discharge Medication List as of 2023  6:06 PM      START taking these medications    Details   DOBUTamine 500 mg in dextrose 5% 250 mL (adult std conc) premix Inject 168.75 mcg/min into the vein continuous, Disp-500 mL, R-0, Local Print      potassium chloride ER (KLOR-CON M) 20 MEQ CR tablet Take 2 tablets (40 mEq) by mouth daily, Historical         CONTINUE these medications which have CHANGED    Details   insulin aspart (NOVOLOG PEN) 100 UNIT/ML pen Inject 4 Units Subcutaneous daily Per previous dosing, Historical      insulin glargine (LANTUS PEN)  100 UNIT/ML pen Inject 26 Units Subcutaneous every morning Increase by 2 units every 2-3 days until morning blood sugar is .  Max dose 40 units/day, HistoricalIf Lantus is not covered by insurance, may substitute Basaglar or Semglee or other insulin glargine prod uct per insurance preference at same dose and frequency.        magnesium oxide (MAG-OX) 400 MG tablet Take 2 tablets (800 mg) by mouth 2 times daily, Disp-60 tablet, R-3, Historical      metolazone (ZAROXOLYN) 2.5 MG tablet Take 1 tablet (2.5 mg) by mouth daily as needed (take ONLY when directed by your cardiology provider), Disp-5 tablet, R-0, Historical      torsemide (DEMADEX) 100 MG tablet Take 1 tablet (100 mg) by mouth 2 times daily, Disp-60 tablet, R-3, E-Prescribe         CONTINUE these medications which have NOT CHANGED    Details   albuterol (PROAIR HFA) 108 (90 Base) MCG/ACT Inhaler Inhale 2 puffs into the lungs every 4 hours as needed for shortness of breath / dyspnea, Disp-1 Inhaler, R-0, Local Print      amylase-lipase-protease (CREON 12) 60618-60084-52601 units CPEP Take 2 capsules by mouth 3 times daily (with meals) , Disp-120 capsule, R-0, Historical      atorvastatin (LIPITOR) 80 MG tablet Take 80 mg by mouth At Bedtime , Historical      calcium carbonate (OS-MARVA) 500 MG tablet Take 1 tablet by mouth 2 times daily One tablet in the morning, two at noon and one at bedtime, Historical      Carboxymethylcellulose Sod PF (THERATEARS PF) 0.25 % SOLN Apply 1 drop to eye 4 times daily as needed, Historical      clopidogrel (PLAVIX) 75 MG tablet Take 1 tablet (75 mg) by mouth daily, Disp-90 tablet, R-3, Local Print      gabapentin (NEURONTIN) 100 MG capsule Take 300 mg by mouth At Bedtime, Disp-1 capsule, R-0, Historical      loperamide (IMODIUM) 2 MG capsule Take 2 mg by mouth 4 times daily as needed for diarrhea Patient takes a dose in the am and another at night, Historical      metFORMIN (GLUCOPHAGE-XR) 500 MG 24 hr tablet Take 500 mg  by mouth 2 times daily (with meals) , Historical      mometasone (ASMANEX TWISTHALER) 220 MCG/INH inhaler Inhale 2 puffs into the lungs every evening , Historical      pantoprazole (PROTONIX) 40 MG EC tablet Take 40 mg by mouth 2 times daily , Disp-30 tablet, Historical      vitamin D3 (CHOLECALCIFEROL) 50 mcg (2000 units) tablet Take 1 tablet by mouth 2 times daily, Historical      Glycerin (OASIS MOISTURIZING MOUTH SPRAY) 35 % LIQD Take 2 sprays by mouth daily as needed, Historical      Insulin Pen Needle (PEN NEEDLES) 32G X 4 MM MISC Historical      nitroGLYcerin (NITROSTAT) 0.4 MG sublingual tablet Place 0.4 mg under the tongue every 5 minutes as needed for chest pain, Historical         STOP taking these medications       empagliflozin (JARDIANCE) 10 MG TABS tablet Comments:   Reason for Stopping:         metoprolol succinate ER (TOPROL XL) 25 MG 24 hr tablet Comments:   Reason for Stopping:         sacubitril-valsartan (ENTRESTO) 24-26 MG per tablet Comments:   Reason for Stopping:               DISCHARGE DISPOSITION: Delbert SORIA aJrek will discharge to home, sick, but stable condition.     DISCHARGE INSTRUCTIONS:  Discharge Procedure Orders   Home Care Referral   Referral Priority: Routine: Next available opening Referral Type: Home Health Therapies & Aides   Number of Visits Requested: 1     Home Infusion Referral   Referral Priority: Routine: Next available opening Referral Type: Consultation   Number of Visits Requested: 1     Palliative Care Referral   Standing Status: Future   Referral Priority: Routine: Next available opening Referral Type: Consultation   Requested Specialty: Hospice And Palliative Care   Number of Visits Requested: 1     Reason for your hospital stay   Order Comments: Heart failure exacerbation needing continuous IV medication     Activity   Order Comments: Your activity upon discharge: activity as tolerated     Order Specific Question Answer Comments   Is discharge order? Yes      Monitor  and record   Order Comments: Weigh yourself every morning     Add follow up information to the AVS prior to printing     When to contact your care team   Order Comments: Contact your care team If symptoms get worse, If increased shortness of breath, If waking up at night with difficulty breathing, If unable to lie down for sleep due to symptoms, If weight gain of 2 pounds a day for 2 days in a row OR 5 pounds in 1 week., Increased swelling in your ankles or legs, and Dizziness or lightheadedness     Adult [facility] Follow-up and recommended labs and tests   Order Comments: Follow up with Joy Cruz NP , at (location with clinic name or city) [facility] as scheduled next week , The following labs/tests are recommended: BMP.    Appointments on [place] and/or [facility] (with [facility] provider or service). Call [phone] if you haven't heard regarding these appointments within 7 days of discharge.     Diet   Order Comments: Follow this diet upon discharge: Orders Placed This Encounter      Fluid restriction 2000 ML FLUID      Combination Diet Regular Diet; Moderate Consistent Carb (60 g CHO per Meal) Diet; 2 gm NA Diet     Order Specific Question Answer Comments   Is discharge order? Yes        75 minutes spent in discharge, including >50% in counseling and coordination of care, medication review and plan of care recommended on follow up. Questions were answered, patient agrees to plan.

## 2023-05-17 NOTE — PLAN OF CARE
Lymphedema Discharge Summary    Reason for therapy discharge:    All goals and outcomes met, no further needs identified.    Progress towards therapy goal(s). See goals on Care Plan in [facility] electronic health record for goal details.  Goals met    Therapy recommendation(s):    No further therapy is recommended.

## 2023-05-17 NOTE — PLAN OF CARE
Temp: 97.7  F (36.5  C) Temp src: Oral BP: 94/58 Pulse: 72   Resp: 16 SpO2: 97 %         D: Admit 5/10 for consideration of advanced HF therapies.  PMH of HFrEF secondary to ICM, CAD s/p CABG, s/p ICD, Aflutter s/p ablation, DM Type II, CKD Stage III, PAD, Venous insufficiency, and COPD.    I/A: Kamron (he/him) is A/Ox4. Tele in place, SR. VSS on RA. Double lumen R PICC in place infusing lasix 20mg/hr and dobutamine 2.5mcg/kg/min. Up independently.    P: Continue to monitor and follow POC. Notify Cards 2 with changes.     Ingrid Harris RN

## 2023-05-17 NOTE — PLAN OF CARE
DISCHARGE   Discharged to: Home  Via: Automobile  Accompanied by: Family  Discharge Instructions: diet, activity, medications, follow up appointments, when to call the MD, and what to watchout for (i.e. s/s of infection, increasing SOB, palpitations, chest pain, lightheadedness)  Prescriptions: To be filled by discharge pharmacy per pt's request; medication list reviewed & sent with pt  Follow Up Appointments: arranged; information given  Belongings: All sent with pt  IV: out  Telemetry: off  Pt exhibits understanding of above discharge instructions; all questions answered.    Ingrid Harris RN

## 2023-05-17 NOTE — PLAN OF CARE
Dx: Admitted for consideration of advanced HF therapies.  PMH of HFrEF secondary to ICM, CAD s/p CABG, s/p ICD, Aflutter s/p ablation, DM Type II, CKD Stage III, PAD, Venous insufficiency, and COPD.     Pain: Denies  Neuro: A&Ox4, calling appropriately, making needs known  CV: SR with 1st degree AVB  Resp: RA, LSC  GI/: LBM 5/16, diuresing with bedside urinal  Lines:   R Double lumen PICC running Dobutamine at 2.5mcg, Lasix 20mg/hr  L PIV SL  Protocols: Magnesium replaced overnight - recheck in AM     Activity/Transfers: Up ad jos, walking unit independently  Diet: Consistent carb 2g Na, 2L FR     Plan: Continue POC, notify Cards 2 if MAP < 65 and with changes in pt status

## 2023-05-17 NOTE — PROGRESS NOTES
Date: 5/17/2023    Time of Call: 2:12 PM     Diagnosis:  HF CAD     [ VORB ] Ordering provider: Makayla Bryant  Order: [facility] MRI with contrast and stress.     Order received by: Zen Ruiz RN     Follow-up/additional notes: Orders placed. Scheduling notified and they will call patient to schedule.

## 2023-05-18 NOTE — PLAN OF CARE
Occupational Therapy Discharge Summary    Reason for therapy discharge:    Discharged to home with outpatient therapy.    Progress towards therapy goal(s). See goals on Care Plan in [facility] electronic health record for goal details.  Goals partially met.  Barriers to achieving goals:   discharge from facility.    Therapy recommendation(s):    Continued therapy is recommended.  Rationale/Recommendations:  Pt is slightly below functional baseline, would benefit from OP CR to progress activity tolerance and improve cardiac function..

## 2023-05-19 NOTE — TELEPHONE ENCOUNTER
Would like verbal to start SN for the start of care visit on Monday 5/22/23.    Thank you  Bia LUCERO RN

## 2023-05-19 NOTE — PROGRESS NOTES
Clinic Care Coordination Contact  [facility]/Voicemail       Clinical Data: Care Coordinator Outreach  Outreach attempted x 2.  Left message on patient's voicemail with call back information and requested return call.    Plan: Care Coordinator will do no further outreaches at this time.    ADARSH Grossman  [facility]  [facility]     *Connected Care Resource Team does NOT follow patient ongoing. Referrals are identified based on internal discharge reports and the outreach is to ensure patient has an understanding of their discharge instructions.

## 2023-05-19 NOTE — PROGRESS NOTES
Called Kamorn back to check in. He is doing ok since discharging. Blood pressure has been on low side. Today is 90/63. One reading was 83/54 yesterday.   Having some dizziness today.   153 lbs yesterday, this morning 152. Reports that is where he's been since discharge.   Symptoms are stable, no worsening shortness of breath, swelling in legs.     Reviewed that we will have someone at the [facility] next Thursday to turn off his defibrillator per request and he is in agreement with that.   Will await lab results and update provider.   Marisa Okeefe RN

## 2023-05-19 NOTE — PROGRESS NOTES
Called Kamron to check in post hospitalization. Discharged 5/17 after hospitalization for acute on chronic systolic heart failure. Left message and asked for call back.     Weight on day of discharge 146 pounds.     Calling [facility] as don't see any lab results yet. They had a nurse from [facility] go out yesterday and they confirm they did have orders to draw labs yesterday, but don't have results.   She is calling Teagan and will call me back.     [id number] Update: call back from [facility], they did have one of their nurses stop out yesterday, but labs did not get drawn. Will get someone back out there today.   Marisa Okeefe, RN

## 2023-05-22 NOTE — TELEPHONE ENCOUNTER
Huddled with Darlene Bryant CNP who say the pt while admitted and discharged. Discussed pt symptoms/weight gain.     Date: 5/22/2023    Time of Call: 11:27 AM     Diagnosis:  Hypervolemia      [ VORB ] Ordering provider: Darlene Bryant CNP   Order:   -Recommend ER admission; if pt refuses:   -Metolazone 5 mg ONCE today.   -Extra Potassium 40 meq today with Metolazone.   -Keep lab appt on Wednesday and CORE appt on Thursday 5/24.      Order received by: Ellie Cook RN       Follow-up/additional notes: Called pt tp review recommendations. Pt refuses to go to ER.   Instructed pt to take metolazone 5 mg today along with Extra Potasisum 40 meq. Pt confirmed that he has Metolazone 2.5 mg, 2 pills left. Pt had  RN come out today for dressing change, BP was improved 102/60. Pt to Follow-up with labs on Wednesday and CORE clinic on Thursday. Pt confirmed that he will be at both appts.     Ellie Cook RN

## 2023-05-22 NOTE — TELEPHONE ENCOUNTER
Left detailed voice mail for Kim malcolm for orders as below    Charisma Odell RN on 5/22/2023 at 11:05 AM

## 2023-05-22 NOTE — CONFIDENTIAL NOTE
Date: 5/22/2023    Time of Call: 5:29 PM     Diagnosis:  HF     [ VORB ] Ordering provider: Darlene Bryant CNP   Order: Okay to turn off ICD, per pt request, at upcoming CORE appt.      Order received by: Ellie Cook RN       Follow-up/additional notes: Pt aware; pt requested.

## 2023-05-22 NOTE — TELEPHONE ENCOUNTER
"Called pt; he states that his weight today is 155-156#; his weight when he left the hospital on 5/17/23 was 146#.     Pt reports increased SOB with activity and some swelling in his BLE.     Pt BP's are low 82/54 and pt endorses prolonged lightheaded/dizziness even after acclimating from sitting to standing.     Confimred that pt is taking Torsemide 100 mg BID; pt states that \"sometime I throw in an extra 20 mg a day.\" Pt denies taking any other extra medications.     Discussed at length with pt that he doesn't have room with his lower BP's to increase medications and his weight is up 10# in 5 days. Pt left hospital early; dicussed with pt that completing hospital stay is very important so he doesn't end up needing to go back due to hypervolemia. Pt states he will not go back to hospital or ER.     Pt has Lab appt on 5/24 and CORE appt with Joy on 5/25.     Routing to Dr. Headley to review and advise.     Ellie Cook RN    "

## 2023-05-24 NOTE — TELEPHONE ENCOUNTER
I called Kamron with lab results reviewed by Joy Cruz NP. Her recommendation is that he needs to go to the ER. No answer, LVM with this information.    Debi Pereyra RN

## 2023-05-25 PROBLEM — R06.09 DOE (DYSPNEA ON EXERTION): Status: ACTIVE | Noted: 2023-01-01

## 2023-05-25 NOTE — PHARMACY-ADMISSION MEDICATION HISTORY
Pharmacist Admission Medication History    Admission medication history is complete. The information provided in this note is only as accurate as the sources available at the time of the update.    Medication reconciliation/reorder completed by provider prior to medication history? Yes    Information Source(s): Patient, Hospital records and CareEverywhere/SureScripts via in-person    Pertinent Information: Patient uses Novolog and Lantus both once daily in the morning. Current dose of Lantus is 28 Units.     Changes made to PTA medication list:    Added: acetaminophen scheduled    Deleted: None    Changed: mometasone 2 puffs daily to 1 puff BID    Medication Affordability: Not including over the counter (OTC) medications, was there a time in the past 3 months when you did not take your medications as prescribed because of cost? No    Allergies reviewed with patient and updates made in EHR: yes    Medication History Completed By: Laura Villalobos PharmD 5/25/2023 6:36 PM    Prior to Admission medications    Medication Sig Last Dose Taking? Auth Provider Long Term End Date   acetaminophen (TYLENOL) 500 MG tablet Take 1,500 mg by mouth 2 times daily 5/24/2023 at pm Yes Unknown, Entered By History     albuterol (PROAIR HFA) 108 (90 Base) MCG/ACT Inhaler Inhale 2 puffs into the lungs every 4 hours as needed for shortness of breath / dyspnea 5/25/2023 Yes Delbert Chua MD Yes    amylase-lipase-protease (CREON 12) 41698-24102-80046 units CPEP Take 2 capsules by mouth 3 times daily (with meals)  5/24/2023 Yes Danii Hernández MD No    atorvastatin (LIPITOR) 80 MG tablet Take 80 mg by mouth At Bedtime  5/24/2023 Yes Reported, Patient No    calcium carbonate (OS-MARVA) 500 MG tablet Take 1 tablet by mouth 2 times daily One tablet in the morning, two at noon and one at bedtime 5/24/2023 Yes Unknown, Entered By History     Carboxymethylcellulose Sod PF (THERATEARS PF) 0.25 % SOLN Apply 1 drop to eye 4 times daily as needed  5/24/2023 Yes Unknown, Entered By History     clopidogrel (PLAVIX) 75 MG tablet Take 1 tablet (75 mg) by mouth daily 5/24/2023 Yes Parul Badillo PA-C Yes    DOBUTamine 500 mg in dextrose 5% 250 mL (adult std conc) premix Inject 168.75 mcg/min into the vein continuous 5/25/2023 Yes Makayla Bryant APRN CNP     gabapentin (NEURONTIN) 100 MG capsule Take 300 mg by mouth At Bedtime 5/24/2023 Yes Danii Hernández MD Yes    Glycerin (OASIS MOISTURIZING MOUTH SPRAY) 35 % LIQD Take 2 sprays by mouth daily as needed Past Week Yes Unknown, Entered By History     insulin aspart (NOVOLOG PEN) 100 UNIT/ML pen Inject 4 Units Subcutaneous daily Per previous dosing 5/25/2023 at am Yes Makayla Bryant APRN CNP Yes    insulin glargine (LANTUS PEN) 100 UNIT/ML pen Inject 26 Units Subcutaneous every morning Increase by 2 units every 2-3 days until morning blood sugar is .  Max dose 40 units/day 5/25/2023 at am Yes Makayla Bryant APRN CNP Yes    loperamide (IMODIUM) 2 MG capsule Take 2 mg by mouth 4 times daily as needed for diarrhea Past Week Yes Reported, Patient     magnesium oxide (MAG-OX) 400 MG tablet Take 2 tablets (800 mg) by mouth 2 times daily 5/24/2023 Yes Makayla Bryant APRN CNP     metFORMIN (GLUCOPHAGE-XR) 500 MG 24 hr tablet Take 500 mg by mouth 2 times daily (with meals)  5/24/2023 Yes Reported, Patient No    metolazone (ZAROXOLYN) 2.5 MG tablet Take 1 tablet (2.5 mg) by mouth daily as needed (take ONLY when directed by your cardiology provider) Unknown Yes Humble Headley MD Yes    mometasone (ASMANEX TWISTHALER) 220 MCG/INH inhaler Inhale 1 puff into the lungs 2 times daily 5/24/2023 Yes Reported, Patient Yes    nitroGLYcerin (NITROSTAT) 0.4 MG sublingual tablet Place 0.4 mg under the tongue every 5 minutes as needed for chest pain  Yes Reported, Patient Yes    pantoprazole (PROTONIX) 40 MG EC tablet Take 40 mg by mouth 2 times daily  5/24/2023 Yes Mimi iMchaels MD No    potassium  chloride ER (KLOR-CON M) 20 MEQ CR tablet Take 2 tablets (40 mEq) by mouth daily 5/24/2023 Yes Humble Headley MD     torsemide (DEMADEX) 100 MG tablet Take 1 tablet (100 mg) by mouth 2 times daily 5/24/2023 Yes Makayla Bryant, APRN CNP Yes    vitamin D3 (CHOLECALCIFEROL) 50 mcg (2000 units) tablet Take 1 tablet by mouth 2 times daily 5/24/2023 Yes Unknown, Entered By History     Insulin Pen Needle (PEN NEEDLES) 32G X 4 MM MISC    Reported, Patient

## 2023-05-25 NOTE — PATIENT INSTRUCTIONS
Take your medicines every day, as directed    Changes made today:    GO to the ER now   Monitor Your Weight and Symptoms    Contact us if you:    Gain 2 pounds in one day or 5 pounds in one week  Feel more short of breath  Notice more leg swelling  Feel lightheadeded   Change your lifestyle    Limit Salt or Sodium:  2000 mg  Limit Fluids:  2000 mL or approximately 64 ounces  Eat a Heart Healthy Diet  Low in saturated fats  Stay Active:  Aim to move at least 150 minutes every  week         To Contact us    During Business Hours:  [phone], option # 1      After hours, weekends or holidays:   [phone], Option #4  Ask to speak to the On-Call Cardiologist. Inform them you are a CORE/heart failure patient at the [facility].     Use [employer] allows you to communicate directly with your heart team through secure messaging.  Maven can be accessed any time on your phone, computer, or tablet.  If you need assistance, we'd be happy to help!         Keep your Heart Appointments:    Dr. Headley 6/9 as scheduled     Please consider attending our virtual support group which is held monthly. Please reach out to Ramakrishna at [phone] for more information if you are interested in attending.       2023 dates:    Monday, May 1st, 1-2pm (Topic: CORE clinic)  Monday, June 5th, 1-2pm (Topic: Exercise and Cardiac Rehab: An Essential Tool in Managing Heart Failure)  Monday, July 3rd, 1-2pm  Monday, August 7th, 1-2pm  Monday, September 11th, 1-2pm  Monday, October 2nd, 1-2pm  Monday, November 6th, 1-2pm  Monday, December 4th, 1-2pm

## 2023-05-25 NOTE — LETTER
5/25/2023      RE: Delbert Tilley  [id number]  [place] [id number]       Dear Colleague,    Thank you for the opportunity to participate in the care of your patient, Delbert Tilley, at [facility]. Please see a copy of my visit note below.      HPI: Delbert is a 72 year old White male with a past medical history of  CAD s/p bypass surgery in the early 2000's, ICM HFrEF LVEF 20-25%  s/p ICD placement, tobacco use disorder (1-1/2 pack a day), T2DM, CKD - cardiorenal syndrome, PAD, and venous insufficiency, who is coming today for an initial CORE visit.      We had tried to reach out to the patient yesterday as his labs were abnormal and we advised an ER visit.  Patient didn't respond to our call or message and is here in clinic with his daughter . His legs are weak. He is gaining water weight. No headache and has some confusion he says.   He has SOB at rest and severe PARDO. He feels like he has bloating in his belly.  He will go to the ER now .  His daughter is with him and feels safe to drive him in. They refused an ambulance.    Denies  chest pain, palpitations, lightheadedness, dizziness, near syncopal/syncopal episodes. Delbert has been following salt  restrictions.      PAST MEDICAL HISTORY:  Past Medical History:   Diagnosis Date    Acute renal failure (H) 8/26/06 Hosp    secondary to dehydration    Anemia     Arthritis     Atherosclerosis of artery of extremity with intermittent claudication (H)     CAD (coronary artery disease)     LIMA to the LAD, vein graft to OM and a vein graft to the PDA    Cardiomyopathy (H)     Carpal tunnel syndrome     CHF (congestive heart failure) (H)     Ischemic and nonischemic cardiomyopathy; 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%    Claudication (H)     COPD (chronic obstructive pulmonary disease) (H)     Diabetes (H)     Gastro-oesophageal reflux disease     GERD (gastroesophageal reflux disease)      H/O: alcohol abuse     HTN (hypertension)     Hyperlipidaemia LDL goal <100 07/08/2013    Hyperparathyroidism (H)     Hypokalemia     ICD (implantable cardioverter-defibrillator) in place     [employer]    NSTEMI (non-ST elevated myocardial infarction) (H)     NSVT (nonsustained ventricular tachycardia) (H)     Pacemaker     Pancreatitis 6/26/06 Hosp    Paroxysmal atrial fibrillation (H)     PVD (peripheral vascular disease) (H)     Thrombocytopenia (H)     Tobacco use     Venous (peripheral) insufficiency     Vitamin D deficiency        FAMILY HISTORY:  Family History   Adopted: Yes   Problem Relation Age of Onset    Unknown/Adopted No family hx of        SOCIAL HISTORY:  Social History     Tobacco Use    Smoking status: Every Day     Packs/day: 1.50     Years: 50.00     Pack years: 75.00     Types: Cigarettes    Smokeless tobacco: Never    Tobacco comments:     1 1/2 PPD 05/18/22   Vaping Use    Vaping status: Never Used     Passive vaping exposure: Yes   Substance Use Topics    Alcohol use: No    Drug use: No           CURRENT MEDICATIONS:  Current Outpatient Medications   Medication Sig Dispense Refill    albuterol (PROAIR HFA) 108 (90 Base) MCG/ACT Inhaler Inhale 2 puffs into the lungs every 4 hours as needed for shortness of breath / dyspnea 1 Inhaler 0    amylase-lipase-protease (CREON 12) 34275-84490-76876 units CPEP Take 2 capsules by mouth 3 times daily (with meals)  120 capsule 0    atorvastatin (LIPITOR) 80 MG tablet Take 80 mg by mouth At Bedtime       calcium carbonate (OS-MARVA) 500 MG tablet Take 1 tablet by mouth 2 times daily One tablet in the morning, two at noon and one at bedtime      Carboxymethylcellulose Sod PF (THERATEARS PF) 0.25 % SOLN Apply 1 drop to eye 4 times daily as needed      clopidogrel (PLAVIX) 75 MG tablet Take 1 tablet (75 mg) by mouth daily 90 tablet 3    DOBUTamine 500 mg in dextrose 5% 250 mL (adult std conc) premix Inject 168.75 mcg/min into the vein continuous 500 mL 0     gabapentin (NEURONTIN) 100 MG capsule Take 300 mg by mouth At Bedtime 1 capsule 0    Glycerin (OASIS MOISTURIZING MOUTH SPRAY) 35 % LIQD Take 2 sprays by mouth daily as needed      insulin aspart (NOVOLOG PEN) 100 UNIT/ML pen Inject 4 Units Subcutaneous daily Per previous dosing      insulin glargine (LANTUS PEN) 100 UNIT/ML pen Inject 26 Units Subcutaneous every morning Increase by 2 units every 2-3 days until morning blood sugar is .  Max dose 40 units/day      Insulin Pen Needle (PEN NEEDLES) 32G X 4 MM MISC       loperamide (IMODIUM) 2 MG capsule Take 2 mg by mouth 4 times daily as needed for diarrhea Patient takes a dose in the am and another at night      magnesium oxide (MAG-OX) 400 MG tablet Take 2 tablets (800 mg) by mouth 2 times daily 60 tablet 3    metFORMIN (GLUCOPHAGE-XR) 500 MG 24 hr tablet Take 500 mg by mouth 2 times daily (with meals)       metolazone (ZAROXOLYN) 2.5 MG tablet Take 1 tablet (2.5 mg) by mouth daily as needed (take ONLY when directed by your cardiology provider) 5 tablet 0    mometasone (ASMANEX TWISTHALER) 220 MCG/INH inhaler Inhale 2 puffs into the lungs every evening       nitroGLYcerin (NITROSTAT) 0.4 MG sublingual tablet Place 0.4 mg under the tongue every 5 minutes as needed for chest pain      pantoprazole (PROTONIX) 40 MG EC tablet Take 40 mg by mouth 2 times daily  30 tablet     potassium chloride ER (KLOR-CON M) 20 MEQ CR tablet Take 2 tablets (40 mEq) by mouth daily 60 tablet 1    torsemide (DEMADEX) 100 MG tablet Take 1 tablet (100 mg) by mouth 2 times daily 60 tablet 3    vitamin D3 (CHOLECALCIFEROL) 50 mcg (2000 units) tablet Take 1 tablet by mouth 2 times daily         ROS:  Review Of Systems  Skin: negative  Eyes: negative  Ears/Nose/Throat: negative  Respiratory: Shortness of breath-  and Dyspnea on exertion-   Cardiovascular: dyspnea on exertion and lower extremity edema  Gastrointestinal: negative  Genitourinary: negative  Musculoskeletal:  negative  Neurologic: negative  Psychiatric: negative  Hematologic/Lymphatic/Immunologic: negative  Endocrine: negative      EXAM:  /63 (BP Location: Left arm, Patient Position: Sitting, Cuff Size: Adult Small)   Pulse 84   SpO2 99%   General: NAD, interactive  Head: NC/AT, EOMI   Cardiac: RRR. JVD ~ 8-10 cm  - 90 degrees  Respiratory: non-labored on RA, no wheezing or crackles  GI: S/NT/ND, no guarding   Skin: No rash or lesion on exposed skin  Extremities: Moderate lower peripheral edema   Psych: Mood pleasant, affect congruent  Neuro:Alert and oriented x 3.    Labs:  CBC RESULTS:  Lab Results   Component Value Date    WBC 6.3 05/17/2023    WBC 6.3 01/21/2021    RBC 4.35 (L) 05/17/2023    RBC 4.81 01/21/2021    HGB 10.2 (L) 05/17/2023    HGB 15.0 01/21/2021    HCT 36.2 (L) 05/17/2023    HCT 46.2 01/21/2021    MCV 83 05/17/2023    MCV 96 01/21/2021    MCH 23.4 (L) 05/17/2023    MCH 31.2 01/21/2021    MCHC 28.2 (L) 05/17/2023    MCHC 32.5 01/21/2021    RDW 17.1 (H) 05/17/2023    RDW 13.1 01/21/2021     05/17/2023     (L) 01/21/2021       CMP RESULTS:  Lab Results   Component Value Date     (L) 05/24/2023     06/10/2021    POTASSIUM 3.9 05/24/2023    POTASSIUM 5.6 (H) 08/29/2022    POTASSIUM 5.6 (H) 08/29/2022    POTASSIUM 4.6 06/10/2021    CHLORIDE 79 (L) 05/24/2023    CHLORIDE 104 08/29/2022    CHLORIDE 104 08/29/2022    CHLORIDE 105 06/10/2021    CO2 32 (H) 05/24/2023    CO2 31 08/29/2022    CO2 31 08/29/2022    CO2 30 06/10/2021    ANIONGAP 14 05/24/2023    ANIONGAP 1 (L) 08/29/2022    ANIONGAP 1 (L) 08/29/2022    ANIONGAP 5 06/10/2021     (H) 05/24/2023     (H) 05/17/2023     (H) 08/29/2022     (H) 08/29/2022     (H) 06/10/2021    BUN 68.4 (H) 05/24/2023    BUN 50 (H) 08/29/2022    BUN 50 (H) 08/29/2022    BUN 34 (H) 06/10/2021    CR 2.09 (H) 05/24/2023    CR 1.17 06/10/2021    GFRESTIMATED 33 (L) 05/24/2023    GFRESTIMATED 49 (L) 11/09/2021     GFRESTIMATED 62 06/10/2021    GFRESTBLACK 71 06/10/2021    MARVA 9.3 05/24/2023    MARVA 8.7 06/10/2021    BILITOTAL 0.5 05/16/2023    BILITOTAL 0.8 12/07/2020    ALBUMIN 3.5 05/16/2023    ALBUMIN 3.3 (L) 08/29/2022    ALBUMIN 3.1 (L) 01/21/2021    ALKPHOS 104 05/16/2023    ALKPHOS 223 (H) 12/07/2020    ALT 22 05/16/2023    ALT 28 12/07/2020    AST 27 05/16/2023    AST 38 12/07/2020        INR RESULTS:  Lab Results   Component Value Date    INR 1.06 05/10/2023    INR 1.02 12/22/2017       No components found for: CK  Lab Results   Component Value Date    MAG 1.9 05/17/2023    MAG 1.0 (L) 12/22/2020     Lab Results   Component Value Date    NTBNP 44,160 (H) 05/24/2023    NTBNP 11,243 (H) 02/02/2018     @BRIEFLABR (dig)@    Most recent echocardiogram:  No results found for this or any previous visit (from the past 8760 hour(s)).      Assessment and Plan:    Delbert Tilley is a 72 year old male with history of CAD s/p bypass surgery in the early 2000's, ICM HFrEF LVEF 20-25%  s/p ICD placement, tobacco use disorder (1-1/2 pack a day), T2DM, CKD - Cardiorenal syndrome,  PAD, and venous insufficiency, he is here for an initial CORE visit. Patients labs were grossly abnormal and would require the patient to go to the ER - we tried to reach out yesterday with no response. As soon as I walked in he said I am feeling worse and I need to go to the ER.  Patient is in acute heart failure.     Currently patient with features of end-stage heart failure. Last TTE on 01/31/23 showed LVEF 20-25% with cavity LVIDd of 6.3 cm. Unfortunately patient is intolerant to further up titration of GDMT due to symptomatic dizziness. Currently patient is not candidate for heart transplant due to age and active smoker.  1.  Chronic sytolic heart failure   Stage D  NYHA Class IIIB  -Inotrope: dobutamine 2.5 mcg/kg/min, PICC sat on day of discharge c/w CI 1.6; received PICC teaching 5/16  -ACEi/ARB/ARNI Entresto d/c'ed due to hypotension  -BB: metoprolol  stopped in the setting of low output/inotrope dependence  -Aldosterone antagonist contraindicated due to renal dysfunction  -SGLT2I:d/c'ed  Jardiance due to hypotension  -SCD prophylaxis: ICD, patient confirmed he wants to keep shock therapies ON despite risk of shock with PVCs, NSVT, on inotrope  -Patient had LVAD show-and-tell, not interested in LVAD. He understands the trajectory of his HF without advanced therapies. Patient understands that inotropes are palliative and not life prolonging, understands that they come with a risk of arrhythmia and he elects to keep ICD on for now. Patient discharged prior to being seen by inpatient palliative care team. Outpatient referral placed.     2.  Other comordbid conditions:     # Frequency PVCs, bigeminy   # NSVT  Likely 2/2 inotrope use. Asymptomatic. BP low all admission, as low as 75 systolic. K and Mg are stable on day of discharge (4/5 and 1.9 respectively). Continue kcl 40 meq daily and mg ox 800 mg bid. He is at increased risk of sustained VT/ICD shock. Dr Marshall discussed with patient inpatient     # CAD s/p CABG. Coronary angiogarm 2017 patent LIMA, vein graft to OM. Vein graft to RCA was 100% occluded. RCA also occluded which is collateralized from the L system. Continue Lipitor and Plavix. BB d/c'ed as above. Plan was for Cardiac MRI with contrast and stress scheduled for tomorrow to assess targets for revascularization.      # COPD with active tobacco use. Nicotine patch inpatient     # Aflutter s/p ablation. Not on anticoagulation due to GIB history. Has been in sinus this last admission. BB d/c'ed as above.      # RADHA on CKD Stage III. B/l Cr is ~1.5. cardiorenal? he is at 2.09 as of yesterday     # DM Type II, uncontrolled and complicated by neuropathy. Hgb A1C 7. Hyper and hypoglycemia this admission. Lantus was decreased initially then increased back towards pta dose. He only take novolog daily at home. Resume pta regimen for short acting. Stopped  Jardiance as above. Follow-up with PCP for further management.     # History of pancreatitis secondary to ETOH. Remote use 11 years ago. Continued pta Creon  # GERD. Continue pta PPI  # PAD with claudication. Venous Insufficiency. Has appt with vascular coming up to discuss treatment.    # Hyponatremia- 125 as of yesterday- does have some confusion today.     3.  Follow-up   Shock  function is off on ICD- rep came out from [employer].   Recommend ER with current labs and symptoms.      Joy PEREZ CNP  [facility]     Addendum- ER aware report given. Dr. Craig notified             Please do not hesitate to contact me if you have any questions/concerns.     Sincerely,     ANA Reynolds CNP

## 2023-05-25 NOTE — PROGRESS NOTES
HPI: Delbert is a 72 year old White male with a past medical history of  CAD s/p bypass surgery in the early 2000's, ICM HFrEF LVEF 20-25%  s/p ICD placement, tobacco use disorder (1-1/2 pack a day), T2DM, CKD - cardiorenal syndrome, PAD, and venous insufficiency, who is coming today for an initial CORE visit.      We had tried to reach out to the patient yesterday as his labs were abnormal and we advised an ER visit.  Patient didn't respond to our call or message and is here in clinic with his daughter . His legs are weak. He is gaining water weight. No headache and has some confusion he says.   He has SOB at rest and severe PARDO. He feels like he has bloating in his belly.  He will go to the ER now .  His daughter is with him and feels safe to drive him in. They refused an ambulance.    Denies  chest pain, palpitations, lightheadedness, dizziness, near syncopal/syncopal episodes. Delbert has been following salt  restrictions.      PAST MEDICAL HISTORY:  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%     Claudication (H)      COPD (chronic obstructive pulmonary disease) (H)      Diabetes (H)      Gastro-oesophageal reflux disease      GERD (gastroesophageal reflux disease)      H/O: alcohol abuse      HTN (hypertension)      Hyperlipidaemia LDL goal <100 07/08/2013     Hyperparathyroidism (H)      Hypokalemia      ICD (implantable cardioverter-defibrillator) in place     [employer]     NSTEMI (non-ST elevated myocardial infarction) (H)      NSVT (nonsustained ventricular tachycardia) (H)      Pacemaker       Pancreatitis 6/26/06 Hosp     Paroxysmal atrial fibrillation (H)      PVD (peripheral vascular disease) (H)      Thrombocytopenia (H)      Tobacco use      Venous (peripheral) insufficiency      Vitamin D deficiency        FAMILY HISTORY:  Family History   Adopted: Yes   Problem Relation Age of Onset     Unknown/Adopted No family hx of        SOCIAL HISTORY:  Social History     Tobacco Use     Smoking status: Every Day     Packs/day: 1.50     Years: 50.00     Pack years: 75.00     Types: Cigarettes     Smokeless tobacco: Never     Tobacco comments:     1 1/2 PPD 05/18/22   Vaping Use     Vaping status: Never Used     Passive vaping exposure: Yes   Substance Use Topics     Alcohol use: No     Drug use: No           CURRENT MEDICATIONS:  Current Outpatient Medications   Medication Sig Dispense Refill     albuterol (PROAIR HFA) 108 (90 Base) MCG/ACT Inhaler Inhale 2 puffs into the lungs every 4 hours as needed for shortness of breath / dyspnea 1 Inhaler 0     amylase-lipase-protease (CREON 12) 33534-73690-61832 units CPEP Take 2 capsules by mouth 3 times daily (with meals)  120 capsule 0     atorvastatin (LIPITOR) 80 MG tablet Take 80 mg by mouth At Bedtime        calcium carbonate (OS-MARVA) 500 MG tablet Take 1 tablet by mouth 2 times daily One tablet in the morning, two at noon and one at bedtime       Carboxymethylcellulose Sod PF (THERATEARS PF) 0.25 % SOLN Apply 1 drop to eye 4 times daily as needed       clopidogrel (PLAVIX) 75 MG tablet Take 1 tablet (75 mg) by mouth daily 90 tablet 3     DOBUTamine 500 mg in dextrose 5% 250 mL (adult std conc) premix Inject 168.75 mcg/min into the vein continuous 500 mL 0     gabapentin (NEURONTIN) 100 MG capsule Take 300 mg by mouth At Bedtime 1 capsule 0     Glycerin (OASIS MOISTURIZING MOUTH SPRAY) 35 % LIQD Take 2 sprays by mouth daily as needed       insulin aspart (NOVOLOG PEN) 100 UNIT/ML pen Inject 4 Units Subcutaneous daily Per previous dosing       insulin glargine  (LANTUS PEN) 100 UNIT/ML pen Inject 26 Units Subcutaneous every morning Increase by 2 units every 2-3 days until morning blood sugar is .  Max dose 40 units/day       Insulin Pen Needle (PEN NEEDLES) 32G X 4 MM MISC        loperamide (IMODIUM) 2 MG capsule Take 2 mg by mouth 4 times daily as needed for diarrhea Patient takes a dose in the am and another at night       magnesium oxide (MAG-OX) 400 MG tablet Take 2 tablets (800 mg) by mouth 2 times daily 60 tablet 3     metFORMIN (GLUCOPHAGE-XR) 500 MG 24 hr tablet Take 500 mg by mouth 2 times daily (with meals)        metolazone (ZAROXOLYN) 2.5 MG tablet Take 1 tablet (2.5 mg) by mouth daily as needed (take ONLY when directed by your cardiology provider) 5 tablet 0     mometasone (ASMANEX TWISTHALER) 220 MCG/INH inhaler Inhale 2 puffs into the lungs every evening        nitroGLYcerin (NITROSTAT) 0.4 MG sublingual tablet Place 0.4 mg under the tongue every 5 minutes as needed for chest pain       pantoprazole (PROTONIX) 40 MG EC tablet Take 40 mg by mouth 2 times daily  30 tablet      potassium chloride ER (KLOR-CON M) 20 MEQ CR tablet Take 2 tablets (40 mEq) by mouth daily 60 tablet 1     torsemide (DEMADEX) 100 MG tablet Take 1 tablet (100 mg) by mouth 2 times daily 60 tablet 3     vitamin D3 (CHOLECALCIFEROL) 50 mcg (2000 units) tablet Take 1 tablet by mouth 2 times daily         ROS:  Review Of Systems  Skin: negative  Eyes: negative  Ears/Nose/Throat: negative  Respiratory: Shortness of breath-  and Dyspnea on exertion-   Cardiovascular: dyspnea on exertion and lower extremity edema  Gastrointestinal: negative  Genitourinary: negative  Musculoskeletal: negative  Neurologic: negative  Psychiatric: negative  Hematologic/Lymphatic/Immunologic: negative  Endocrine: negative      EXAM:  /63 (BP Location: Left arm, Patient Position: Sitting, Cuff Size: Adult Small)   Pulse 84   SpO2 99%   General: NAD, interactive  Head: NC/AT, EOMI   Cardiac: RRR. JVD ~  8-10 cm  - 90 degrees  Respiratory: non-labored on RA, no wheezing or crackles  GI: S/NT/ND, no guarding   Skin: No rash or lesion on exposed skin  Extremities: Moderate lower peripheral edema   Psych: Mood pleasant, affect congruent  Neuro:Alert and oriented x 3.    Labs:  CBC RESULTS:  Lab Results   Component Value Date    WBC 6.3 05/17/2023    WBC 6.3 01/21/2021    RBC 4.35 (L) 05/17/2023    RBC 4.81 01/21/2021    HGB 10.2 (L) 05/17/2023    HGB 15.0 01/21/2021    HCT 36.2 (L) 05/17/2023    HCT 46.2 01/21/2021    MCV 83 05/17/2023    MCV 96 01/21/2021    MCH 23.4 (L) 05/17/2023    MCH 31.2 01/21/2021    MCHC 28.2 (L) 05/17/2023    MCHC 32.5 01/21/2021    RDW 17.1 (H) 05/17/2023    RDW 13.1 01/21/2021     05/17/2023     (L) 01/21/2021       CMP RESULTS:  Lab Results   Component Value Date     (L) 05/24/2023     06/10/2021    POTASSIUM 3.9 05/24/2023    POTASSIUM 5.6 (H) 08/29/2022    POTASSIUM 5.6 (H) 08/29/2022    POTASSIUM 4.6 06/10/2021    CHLORIDE 79 (L) 05/24/2023    CHLORIDE 104 08/29/2022    CHLORIDE 104 08/29/2022    CHLORIDE 105 06/10/2021    CO2 32 (H) 05/24/2023    CO2 31 08/29/2022    CO2 31 08/29/2022    CO2 30 06/10/2021    ANIONGAP 14 05/24/2023    ANIONGAP 1 (L) 08/29/2022    ANIONGAP 1 (L) 08/29/2022    ANIONGAP 5 06/10/2021     (H) 05/24/2023     (H) 05/17/2023     (H) 08/29/2022     (H) 08/29/2022     (H) 06/10/2021    BUN 68.4 (H) 05/24/2023    BUN 50 (H) 08/29/2022    BUN 50 (H) 08/29/2022    BUN 34 (H) 06/10/2021    CR 2.09 (H) 05/24/2023    CR 1.17 06/10/2021    GFRESTIMATED 33 (L) 05/24/2023    GFRESTIMATED 49 (L) 11/09/2021    GFRESTIMATED 62 06/10/2021    GFRESTBLACK 71 06/10/2021    MARVA 9.3 05/24/2023    MARVA 8.7 06/10/2021    BILITOTAL 0.5 05/16/2023    BILITOTAL 0.8 12/07/2020    ALBUMIN 3.5 05/16/2023    ALBUMIN 3.3 (L) 08/29/2022    ALBUMIN 3.1 (L) 01/21/2021    ALKPHOS 104 05/16/2023    ALKPHOS 223 (H) 12/07/2020    ALT 22  05/16/2023    ALT 28 12/07/2020    AST 27 05/16/2023    AST 38 12/07/2020        INR RESULTS:  Lab Results   Component Value Date    INR 1.06 05/10/2023    INR 1.02 12/22/2017       No components found for: CK  Lab Results   Component Value Date    MAG 1.9 05/17/2023    MAG 1.0 (L) 12/22/2020     Lab Results   Component Value Date    NTBNP 44,160 (H) 05/24/2023    NTBNP 11,243 (H) 02/02/2018     @BRIEFLABR (dig)@    Most recent echocardiogram:  No results found for this or any previous visit (from the past 8760 hour(s)).      Assessment and Plan:    Delbert Tilley is a 72 year old male with history of CAD s/p bypass surgery in the early 2000's, ICM HFrEF LVEF 20-25%  s/p ICD placement, tobacco use disorder (1-1/2 pack a day), T2DM, CKD - Cardiorenal syndrome,  PAD, and venous insufficiency, he is here for an initial CORE visit. Patients labs were grossly abnormal and would require the patient to go to the ER - we tried to reach out yesterday with no response. As soon as I walked in he said I am feeling worse and I need to go to the ER.  Patient is in acute heart failure.     Currently patient with features of end-stage heart failure. Last TTE on 01/31/23 showed LVEF 20-25% with cavity LVIDd of 6.3 cm. Unfortunately patient is intolerant to further up titration of GDMT due to symptomatic dizziness. Currently patient is not candidate for heart transplant due to age and active smoker.  1.  Chronic sytolic heart failure   Stage D  NYHA Class IIIB  -Inotrope: dobutamine 2.5 mcg/kg/min, PICC sat on day of discharge c/w CI 1.6; received PICC teaching 5/16  -ACEi/ARB/ARNI Entresto d/c'ed due to hypotension  -BB: metoprolol stopped in the setting of low output/inotrope dependence  -Aldosterone antagonist contraindicated due to renal dysfunction  -SGLT2I:d/c'ed  Jardiance due to hypotension  -SCD prophylaxis: ICD, patient confirmed he wants to keep shock therapies ON despite risk of shock with PVCs, NSVT, on inotrope  -Patient  had LVAD show-and-tell, not interested in LVAD. He understands the trajectory of his HF without advanced therapies. Patient understands that inotropes are palliative and not life prolonging, understands that they come with a risk of arrhythmia and he elects to keep ICD on for now. Patient discharged prior to being seen by inpatient palliative care team. Outpatient referral placed.     2.  Other comordbid conditions:     # Frequency PVCs, bigeminy   # NSVT  Likely 2/2 inotrope use. Asymptomatic. BP low all admission, as low as 75 systolic. K and Mg are stable on day of discharge (4/5 and 1.9 respectively). Continue kcl 40 meq daily and mg ox 800 mg bid. He is at increased risk of sustained VT/ICD shock. Dr Marshall discussed with patient inpatient     # CAD s/p CABG. Coronary angiogarm 2017 patent LIMA, vein graft to OM. Vein graft to RCA was 100% occluded. RCA also occluded which is collateralized from the L system. Continue Lipitor and Plavix. BB d/c'ed as above. Plan was for Cardiac MRI with contrast and stress scheduled for tomorrow to assess targets for revascularization.      # COPD with active tobacco use. Nicotine patch inpatient     # Aflutter s/p ablation. Not on anticoagulation due to GIB history. Has been in sinus this last admission. BB d/c'ed as above.      # RADHA on CKD Stage III. B/l Cr is ~1.5. cardiorenal? he is at 2.09 as of yesterday     # DM Type II, uncontrolled and complicated by neuropathy. Hgb A1C 7. Hyper and hypoglycemia this admission. Lantus was decreased initially then increased back towards pta dose. He only take novolog daily at home. Resume pta regimen for short acting. Stopped Jardiance as above. Follow-up with PCP for further management.     # History of pancreatitis secondary to ETOH. Remote use 11 years ago. Continued pta Creon  # GERD. Continue pta PPI  # PAD with claudication. Venous Insufficiency. Has appt with vascular coming up to discuss treatment.    # Hyponatremia- 125 as  of yesterday- does have some confusion today.     3.  Follow-up   Shock  function is off on ICD- rep came out from [employer].   Recommend ER with current labs and symptoms.      Joy PEREZ CNP  [facility]     Addendum- ER aware report given. Dr. Craig notified

## 2023-05-25 NOTE — PROGRESS NOTES
Patient admitted to: 6C  Admitted from: ED  Arrived by: wheelchair  Reason for admission: HF exacerbation and abnormal labs  Patient accompanied by: daughter  Belongings:  clothing, pillow, walker with wheels, cane, lunch bag, shaver, , phone, shoes, cadd massey pump, and wallet  Teaching: Oriented to unit, pain assessment plan, treatment plan  Skin double check completed by: GRUPO RN and ELEAZAR RN  1. Bruise on R clavicle  2. Bruise on R forearm  3. Edema near knees and thighs  Will continue to encourage mobility and fluid restriction

## 2023-05-25 NOTE — H&P
[facility]    Cardiology History and Physical - Cardiology         Date of Admission:  5/25/2023    Assessment & Plan: HVSL    Mr Delbert Tilley is a 72 year old gentleman with a history of CAD s/p CABG (early 2000s) c/b ICM with HFrEF 20-25% w ICD in place, tobacco use (2ppd), T2DM, and CKD who presents from cardiology clinic with progressive weight gain, poor diuretic response, and worsening renal function despite home inotrope therapy. The patient is hemodynamically stable, warm and wet, and in no distress.    #Acute Decompensated Heart Failure  #Heart Failure with Reduced Ejection Fraction 2/2  #Ischemic Cardiomyopathy  #Coronary Artery Disease s/p CABG (early 2000s)    #End Stage Heart Failure   Baseline: ACC/AHA Stage D, NYHA FC IV  Estimated Dry Weight: unknown, previously reported 162 pounds   Admission Weight: 154 pounds  Etiology: Ischemic Cardiomyopathy  Most Recent Diagnostics  Ischemic workup: 2017, patent grafts to LIMA and OM, RCA  and SVG-RCA occluded  Hemodynamics: 05/15/23 RA 15, PA 70/26/41, PCWP 21, CO/CI 4.3/2.3 by TD, PVR 4.6  Echo: 05/10/23 LVEF 10-15%, moderate diastolic dysfunction, severe diffuse hypokinesis, mild-moderate RV dysfunction  EKG: NSR, first degree AVB, incomplete RBBB, lateral TWI  Outpatient Regimen  - Dobutamine 2.5 mcg/kg/min  - Torsemide 100mg BID  - Metolazone 2.5mg daily  Current Presentation  NT-proBNP: 88332; baseline  ~15537  Presents clinically hypervolemic despite home inotrope therapy   Volume status: Weight today 154, I&O NA  - Bumetanide 2mg/hr  GDMT:   > Beta Blocker: None, on dopamine   > ACE/ARB/ARNI: None in s/o RADHA   > MRA: None in s/o RADHA   > SGLT2i: None in s/o RADHA  Additional Afterload:    > Unable due to hypotension   Therapies:    > Inotrope: Dopamine 2.5 mcg/kg/min   > ICD: disabled   > CRT: NA   > Mechanical Support: NA  Other Management:    > Daily weights, BID BMP, telemetry monitoring    >  Continue ASA and Clopidogrel for CAD    #RADHA on CKD with hx cardiorenal syndrome  Discharged with creatinine 1.5 on 05/17, presents with creatinine 2.09. Suspect prerenal in setting of vol overload and end stage heart failure.    - diuresis per above    #Acute Hyponatremia  Newly reduced this admission to 125. Concerning for hypervolemic hyponatremia in setting of end-stage heart failure.   - Trend with diuresis     #Atrial Flutter s/p Ablation  Currently NSR, rate controlled despite dobutamine     #Tobacco use, 2ppd  - NRT   - cont Albuterol PRN  - cont Mometasone inhaler     #T2DM with neuropathy  A1c 2/2023 7.9. Home regimen: Insulin glargine 26 units every day. Insulin Aspart 4u TID with meals. Metformin   - Hold Metformin   - Insulin glargine: 16 units qAM   - low dose sliding scale insulin   - Cont Gabapentin     #Hx pancreatitis 2/2 EtOH, remote 11+ yrs ago  #Former alcohol use, now sober   - Cont creon    #GERD   - cont PPI BID      #Peripheral artery disease  #Venous insufficiency       Diet: Combination Diet Moderate Consistent Carb (60 g CHO per Meal) Diet; 2 gm NA Diet  Fluid restriction 2000 ML FLUID    DVT Prophylaxis: Heparin SQ  Arriola Catheter: Not present  Cardiac Monitoring: None  Code Status:   DNR    This patient's care was discussed with Dr Elizabeth Craig, attending cardiologist, who agrees with the assessment and plan unless otherwise indicated.    Jared Armstrong MD [facility], PGY-4  Fellow, Cardiovascular Disease          Clinically Significant Risk Factors Present on Admission         # Hyponatremia: Lowest Na = 125 mmol/L in last 2 days, will monitor as appropriate        # Drug Induced Platelet Defect: home medication list includes an antiplatelet medication   # Hypertension: Noted on problem list  # End stage heart failure: home medication list includes inotropes    # DMII: A1C = 7.4 % (Ref range: <5.7 %) within past 6 months            Disposition Plan   Expected discharge: 4 - 7 days,  recommended to TBD once fluid volume status optimized on oral medication.    Entered: Jared Armstrong MD 05/25/2023, 4:30 PM   The patient's care was discussed with the Attending Physician, Dr. Elizabeth Craig.      Jared Armstrong MD  [facility]    ______________________________________________________________________    Chief Complaint   Fluid retention    History is obtained from the patient    History of Present Illness   Mr Delbert Tilley is a 72 year old gentleman with a history of CAD s/p CABG (early 2000s) c/b ICM with HFrEF 20-25% w ICD in place, tobacco use (2ppd), T2DM, and CKD who presents from cardiology clinic with progressive weight gain, poor diuretic response, and worsening renal function despite home inotrope therapy.     Presents with progressive hypervolemia in the outpatient setting after discharging on palliative home inotrope with dobutamine 2.5. Not urinating well. No chest pains or palpitations. No nausea, vomiting, diarrhea.       Past Medical History    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%     Claudication (H)      COPD (chronic obstructive pulmonary disease) (H)      Diabetes (H)      Gastro-oesophageal reflux disease      GERD (gastroesophageal reflux disease)      H/O: alcohol abuse      HTN (hypertension)      Hyperlipidaemia LDL goal <100 07/08/2013     Hyperparathyroidism (H)      Hypokalemia      ICD (implantable cardioverter-defibrillator) in place     [employer]     NSTEMI (non-ST elevated myocardial infarction) (H)       NSVT (nonsustained ventricular tachycardia) (H)      Pacemaker      Pancreatitis 6/26/06 Hosp     Paroxysmal atrial fibrillation (H)      PVD (peripheral vascular disease) (H)      Thrombocytopenia (H)      Tobacco use      Venous (peripheral) insufficiency      Vitamin D deficiency        Past Surgical History   Past Surgical History:   Procedure Laterality Date     CATARACT IOL, RT/LT      Cataract IOL RT/LT     CLOSE SECONDARY WOUND LOWER EXTREMITY Right 02/13/2022    Procedure: Right groin wound exploration, nerve release and closure.;  Surgeon: Karen Arthur MD;  Location: [facility] OR     CV RIGHT HEART CATH MEASUREMENTS RECORDED N/A 5/15/2023    Procedure: Right Heart Cath;  Surgeon: Von Dickens MD;  Location:  HEART CARDIAC CATH LAB     ENDARTERECTOMY CAROTID Right 06/12/2015    Procedure: ENDARTERECTOMY CAROTID;  Surgeon: João Turner MD;  Location:  OR     ENDARTERECTOMY CAROTID Left 09/08/2017    Procedure: ENDARTERECTOMY CAROTID;  LEFT CAROTID ENDARTERECTOMY WITH EEG;  Surgeon: João Turner MD;  Location:  OR     ENDARTERECTOMY FEMORAL Bilateral 02/09/2022    Procedure: BILATERAL- COMMON FEMORAL ENDARTERECTOMY; RETROGRADE ILIAC ARTERY STENTING;  Surgeon: Ric Chau MD;  Location: [facility] OR     IMPLANT PACEMAKER  06/10/2010     IMPLANTED DEVICE       INTERPOSITION TENDON HAND N/A 06/09/2020    Procedure: Right thumb ligament reconstruction tendon interposition with extensor pollicis brevis to abductor pollicis tendon transfer;  Surgeon: Bethel Martin MD;  Location: [facility] OR     PICC INSERTION - TRIPLE LUMEN Right 06/20/2022    Right basilic vein 0.62cm 4cm ext.Placement verified by Sherlock 3CG.PICC okay to use.     RELEASE CARPAL TUNNEL Right 04/12/2016    Procedure: RELEASE CARPAL TUNNEL;  Surgeon: Lissy Leger MD;  Location: [facility] OR     SURGICAL HISTORY OF -   1998    Laminectomy     TONSILLECTOMY & ADENOIDECTOMY       [facility] CABG,  ARTERIAL, THREE         Prior to Admission Medications   Prior to Admission Medications   Prescriptions Last Dose Informant Patient Reported? Taking?   Carboxymethylcellulose Sod PF (THERATEARS PF) 0.25 % SOLN   Yes No   Sig: Apply 1 drop to eye 4 times daily as needed   DOBUTamine 500 mg in dextrose 5% 250 mL (adult std conc) premix 5/25/2023  No Yes   Sig: Inject 168.75 mcg/min into the vein continuous   Glycerin (OASIS MOISTURIZING MOUTH SPRAY) 35 % LIQD   Yes No   Sig: Take 2 sprays by mouth daily as needed   Insulin Pen Needle (PEN NEEDLES) 32G X 4 MM MISC  Self Yes No   albuterol (PROAIR HFA) 108 (90 Base) MCG/ACT Inhaler  Self No No   Sig: Inhale 2 puffs into the lungs every 4 hours as needed for shortness of breath / dyspnea   amylase-lipase-protease (CREON 12) 85942-71935-04296 units CPEP   Yes No   Sig: Take 2 capsules by mouth 3 times daily (with meals)    atorvastatin (LIPITOR) 80 MG tablet  Self Yes No   Sig: Take 80 mg by mouth At Bedtime    calcium carbonate (OS-MARVA) 500 MG tablet   Yes No   Sig: Take 1 tablet by mouth 2 times daily One tablet in the morning, two at noon and one at bedtime   clopidogrel (PLAVIX) 75 MG tablet   No No   Sig: Take 1 tablet (75 mg) by mouth daily   gabapentin (NEURONTIN) 100 MG capsule   Yes No   Sig: Take 300 mg by mouth At Bedtime   insulin aspart (NOVOLOG PEN) 100 UNIT/ML pen   Yes No   Sig: Inject 4 Units Subcutaneous daily Per previous dosing   insulin glargine (LANTUS PEN) 100 UNIT/ML pen   Yes No   Sig: Inject 26 Units Subcutaneous every morning Increase by 2 units every 2-3 days until morning blood sugar is .  Max dose 40 units/day   loperamide (IMODIUM) 2 MG capsule  Self Yes No   Sig: Take 2 mg by mouth 4 times daily as needed for diarrhea Patient takes a dose in the am and another at night   magnesium oxide (MAG-OX) 400 MG tablet   Yes No   Sig: Take 2 tablets (800 mg) by mouth 2 times daily   metFORMIN (GLUCOPHAGE-XR) 500 MG 24 hr tablet   Yes No   Sig:  Take 500 mg by mouth 2 times daily (with meals)    metolazone (ZAROXOLYN) 2.5 MG tablet   No No   Sig: Take 1 tablet (2.5 mg) by mouth daily as needed (take ONLY when directed by your cardiology provider)   mometasone (ASMANEX TWISTHALER) 220 MCG/INH inhaler  Self Yes No   Sig: Inhale 2 puffs into the lungs every evening    nitroGLYcerin (NITROSTAT) 0.4 MG sublingual tablet   Yes No   Sig: Place 0.4 mg under the tongue every 5 minutes as needed for chest pain   pantoprazole (PROTONIX) 40 MG EC tablet  Self Yes No   Sig: Take 40 mg by mouth 2 times daily    potassium chloride ER (KLOR-CON M) 20 MEQ CR tablet   No No   Sig: Take 2 tablets (40 mEq) by mouth daily   torsemide (DEMADEX) 100 MG tablet   No No   Sig: Take 1 tablet (100 mg) by mouth 2 times daily   vitamin D3 (CHOLECALCIFEROL) 50 mcg (2000 units) tablet   Yes No   Sig: Take 1 tablet by mouth 2 times daily      Facility-Administered Medications: None        Review of Systems    Skin: negative for rash  Eyes: negative for, visual blurring, double vision, eye pain  Ears/Nose/Throat: negative for, nasal congestion, hearing loss, sore throat  Respiratory: +shortness of breath, negative for cough, or hemoptysis  Cardiovascular: +lower extremity edema, negative for, palpitations, chest pain, paroxysmal nocturnal dyspnea, dyspnea on exertion, orthopnea, syncope or near-syncope, claudication and exercise intolerance  Gastrointestinal: negative for, nausea, vomiting, abdominal pain, constipation and diarrhea  Genitourinary: negative for, dysuria and frequency  Musculoskeletal: negative for, back pain and joint pain  Neurologic: negative for, headaches, syncope, seizures and local weakness    Social History   I have reviewed this patient's social history and updated it with pertinent information if needed.  Social History     Tobacco Use     Smoking status: Every Day     Packs/day: 1.50     Years: 50.00     Pack years: 75.00     Types: Cigarettes     Smokeless  tobacco: Never     Tobacco comments:     1 1/2 PPD 05/18/22   Vaping Use     Vaping status: Never Used     Passive vaping exposure: Yes   Substance Use Topics     Alcohol use: No     Drug use: No       Family History     No significant family history, including no history of: sudden cardiac death    Allergies   Allergies   Allergen Reactions     Fish Oil Nausea and Vomiting     severe     Hydrocodone GI Disturbance     Upset stomach     Shellfish Allergy Hives and Rash        Physical Exam   Vital Signs: Temp: 97.7  F (36.5  C)   BP: 105/67 Pulse: 81   Resp: 16 SpO2: 97 % O2 Device: None (Room air)    Weight: 154 lbs 0 oz  Exam:  Constitutional: healthy, alert, and no distress  Head: Normocephalic. No masses, lesions, or abnormalities  Neck: Normal appearance, JVP estimated to mandible at 90 degrees  ENT: EOMI, no scleral icterus or injection, external nose and ears are normal  Cardiovascular: Regular rate and rhythm, no murmur appreciated, no rub, radial pulses 2+ and equal bilaterally  Respiratory: Diffuse quiet rales, normal work of breathing, no wheeze, no rales  Gastrointestinal: Abdomen soft, non-tender, non-distended. No masses.   : Deferred  Musculoskeletal: extremities normal with no gross deformities noted, 1+ edema to thighs   Skin: no suspicious lesions or rashes  Neurologic: Alert and responsive, grossly non-focal, moves all extremities, sensation grossly intact.     Medical Decision Making             Data   I personally reviewed all laboratory and imaging data from the last 24 hours in addition to all available cardiology data.

## 2023-05-25 NOTE — ED TRIAGE NOTES
Pt coming from clinic with daughter with complaints of lower extremity swelling, SOB, and abnormal labs. Pt was in clinic this morning and was sent here after abnormal labs. Hx of heart failure. On Dubutamine pump. Hypotensive on arrival.     Triage Assessment     Row Name 05/25/23 1035       Triage Assessment (Adult)    Airway WDL WDL       Respiratory WDL    Respiratory WDL X;all    Rhythm/Pattern, Respiratory shortness of breath       Cardiac WDL    Cardiac WDL WDL       Peripheral/Neurovascular WDL    Peripheral Neurovascular WDL WDL       Cognitive/Neuro/Behavioral WDL    Cognitive/Neuro/Behavioral WDL WDL

## 2023-05-25 NOTE — ED PROVIDER NOTES
[place] EMERGENCY DEPARTMENT ([facility])    5/25/23       ED PROVIDER NOTE      History     Chief Complaint   Patient presents with     Abnormal Labs     Leg Swelling     Shortness of Breath     The history is provided by the patient, medical records and a relative.     Delbert Tilley is a 72 year old male with a past medical history of CAD s/p bypass surgery in the early 2000's, ICM HFrEF LVEF 20-25%  s/p ICD placement, tobacco use disorder (1-1/2 pack a day), T2DM, CKD - cardiorenal syndrome, PAD, and venous insufficiency who presents today from [facility] clinic for increased shortness of breath, leg swelling, and abnormal labs.  Per chart review, patient was recently hospitalized at [facility] 05/10/2023 - 05/17/2023 for acute on chronic systolic congestive heart failure discharged with double-lumen PICC and dobutamine pump.  He had a BMP, Cystatin C with GFR, and N-terminal proBNP done yesterday which were abnormal and he was called and advised to present to the ED.  He presented to clinic today with his daughter and was again advised to present to the ED.  Here, the patient endorses increased leg swelling, shortness of breath, and weight gain.  He says he is still making urine.  His daughter says he is on torsemide and metolazone as well.     Wt Readings from Last 4 Encounters:   05/25/23 69.1 kg (152 lb 5.4 oz)   05/17/23 66.2 kg (146 lb)   04/27/23 71.3 kg (157 lb 3.2 oz)   03/17/23 70.3 kg (155 lb)     Past Medical History  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%     Claudication (H)       COPD (chronic obstructive pulmonary disease) (H)      Diabetes (H)      Gastro-oesophageal reflux disease      GERD (gastroesophageal reflux disease)      H/O: alcohol abuse      HTN (hypertension)      Hyperlipidaemia LDL goal <100 07/08/2013     Hyperparathyroidism (H)      Hypokalemia      ICD (implantable cardioverter-defibrillator) in place     [employer]     NSTEMI (non-ST elevated myocardial infarction) (H)      NSVT (nonsustained ventricular tachycardia) (H)      Pacemaker      Pancreatitis 6/26/06 Hosp     Paroxysmal atrial fibrillation (H)      PVD (peripheral vascular disease) (H)      Thrombocytopenia (H)      Tobacco use      Venous (peripheral) insufficiency      Vitamin D deficiency      Past Surgical History:   Procedure Laterality Date     CATARACT IOL, RT/LT      Cataract IOL RT/LT     CLOSE SECONDARY WOUND LOWER EXTREMITY Right 02/13/2022    Procedure: Right groin wound exploration, nerve release and closure.;  Surgeon: Karen Arthur MD;  Location: [facility] [place] OR      RIGHT HEART CATH MEASUREMENTS RECORDED N/A 5/15/2023    Procedure: Right Heart Cath;  Surgeon: Von Dickens MD;  Location: [facility] CARDIAC CATH LAB     ENDARTERECTOMY CAROTID Right 06/12/2015    Procedure: ENDARTERECTOMY CAROTID;  Surgeon: João Turner MD;  Location:  OR     ENDARTERECTOMY CAROTID Left 09/08/2017    Procedure: ENDARTERECTOMY CAROTID;  LEFT CAROTID ENDARTERECTOMY WITH EEG;  Surgeon: João Turner MD;  Location:  OR     ENDARTERECTOMY FEMORAL Bilateral 02/09/2022    Procedure: BILATERAL- COMMON FEMORAL ENDARTERECTOMY; RETROGRADE ILIAC ARTERY STENTING;  Surgeon: Ric Chau MD;  Location: [facility] [place] OR     IMPLANT PACEMAKER  06/10/2010     IMPLANTED DEVICE       INTERPOSITION TENDON HAND N/A 06/09/2020    Procedure: Right thumb ligament reconstruction tendon interposition with extensor pollicis brevis to abductor pollicis tendon transfer;  Surgeon: Bethel Martin MD;   Location: [facility] OR     PICC INSERTION - TRIPLE LUMEN Right 06/20/2022    Right basilic vein 0.62cm 4cm ext.Placement verified by [place]ilson 3CG.PICC okay to use.     RELEASE CARPAL TUNNEL Right 04/12/2016    Procedure: RELEASE CARPAL TUNNEL;  Surgeon: Lissy Leger MD;  Location: [facility] OR     SURGICAL HISTORY OF -   1998    Laminectomy     TONSILLECTOMY & ADENOIDECTOMY       ZZC CABG, ARTERIAL, THREE       No current outpatient medications on file.    Allergies   Allergen Reactions     Fish Oil Nausea and Vomiting     severe     Hydrocodone GI Disturbance     Upset stomach     Shellfish Allergy Hives and Rash     Family History  Family History   Adopted: Yes   Problem Relation Age of Onset     Unknown/Adopted No family hx of      Social History   Social History     Tobacco Use     Smoking status: Every Day     Packs/day: 1.50     Years: 50.00     Pack years: 75.00     Types: Cigarettes     Smokeless tobacco: Never     Tobacco comments:     1 1/2 PPD 05/18/22   Vaping Use     Vaping status: Never Used     Passive vaping exposure: Yes   Substance Use Topics     Alcohol use: No     Drug use: No      Past medical history, past surgical history, medications, allergies, family history, and social history were reviewed with the patient. No additional pertinent items.      A medically appropriate review of systems was performed with pertinent positives and negatives noted in the HPI, and all other systems negative.    Physical Exam   BP: (!) 89/60  Pulse: 81  Temp: 97.7  F (36.5  C)  Resp: 16  Height: 182.9 cm (6')  Weight: 69.9 kg (154 lb) (pt checked this a.m.)  SpO2: 97 %  Physical Exam  Vitals and nursing note reviewed.   Constitutional:       General: He is in acute distress.      Appearance: He is well-developed. He is ill-appearing. He is not toxic-appearing or diaphoretic.      Comments: Patient is chronically ill at this point here with daughter otherwise nontoxic though lying semirecumbent vitals are stable with  some mild hypotension but oxygen saturation stable without marked respiratory distress.   HENT:      Head: Normocephalic and atraumatic.      Nose: Nose normal.      Mouth/Throat:      Mouth: Mucous membranes are moist.      Pharynx: Oropharynx is clear.   Eyes:      General: No scleral icterus.     Extraocular Movements: Extraocular movements intact.      Conjunctiva/sclera: Conjunctivae normal.      Pupils: Pupils are equal, round, and reactive to light.   Cardiovascular:      Rate and Rhythm: Normal rate and regular rhythm.   Pulmonary:      Effort: Respiratory distress present.      Breath sounds: Rales present.   Abdominal:      General: There is no distension.      Palpations: Abdomen is soft.      Tenderness: There is no abdominal tenderness. There is no guarding.   Musculoskeletal:      Cervical back: Normal range of motion and neck supple. No rigidity.      Right lower leg: Edema present.      Left lower leg: Edema present.   Skin:     General: Skin is warm and dry.      Capillary Refill: Capillary refill takes less than 2 seconds.      Coloration: Skin is pale. Skin is not jaundiced.      Findings: No rash.   Neurological:      General: No focal deficit present.      Mental Status: He is alert and oriented to person, place, and time. Mental status is at baseline.   Psychiatric:      Comments: Patient appropriate here in the ER.  Does not anxious delusional or combative.           ED Course, Procedures, & Data     11:06 AM  The patient was seen and examined by Jose Manuel Santana MD in Room ED22.   Had reviewed information from clinic visit today from staff in the core clinic who called me.  Records reviewed in epic.  Patient in May 10, 2023 was admitted to the care of Dr. Myers for acute on chronic heart failure exacerbation.  Patient previous cardiology visits in February this year for coronary disease CHF etc.      In the ER as noted we did discuss with the transferring care provider to the ER.  Patient  seen upon arrival here in the ER patient had an EKG done portable chest x-ray was also done.  Labs of been done already prior from the clinic and reviewed.  Patient's sodium 128 creatinine up to 2  Glucose 147.    Talked to cardiology .  Patient given 4 mg of Bumex IV along with 1 mg/h drip IV Bumex.  Patient been monitored here in the ER    Chest x-ray personally reviewed by myself also revealed AICD in place along with this findings concerning for pulmonary edema congestive heart failure.    As noted patient otherwise been stable will be admitted to the heart failure cards 2 service.    Treatment noted patient is on constant dobutamine infusion IV he is going to run out next few hours range with pharmacy to have dobutamine continued the [facility] hospital pharmacy peripheral dosing and will continue at the same dosage the patient is on.          Procedures       ED Course Selections:        EKG Interpretation:      Interpreted by Jose Manuel Santana MD  Time reviewed: 11:15AM  Symptoms at time of EKG: shortness of breath    Rhythm: Sinus rhythm with first-degree AV block  Rate: 72 bpm  Axis: Normal  Ectopy: none  Conduction: nonspecific interventricular conduction block  ST Segments/ T Waves: lateral T wave abnormality  Q Waves: none  Comparison to prior: No old EKG available    Clinical Impression:  lateral T wave abnormality                           Results for orders placed or performed during the hospital encounter of 05/25/23   XR Chest Port 1 View     Status: None    Narrative    Portable chest    INDICATION: CHF dyspnea on exertion    COMPARISON: 5/10/2023    FINDINGS: Implantable cardiac defibrillator again noted from a left  subclavian transvenous approach. Median sternotomy again present.  Heart size normal. Atherosclerotic calcification in the aortic knob.  Right PICC line tip in the SVC. Patchy densities in the lungs appears  slightly increased number communicate pulmonary venous congestion.        Impression    IMPRESSION: Implantable cardiac defibrillator. Atherosclerosis.  Possible increased pulmonary venous congestion which may indicate  positive fluid volume balance.    EFRAIN POMPA MD         SYSTEM ID:  [id number]   [place] Draw     Status: None    Narrative    The following orders were created for panel order [place] Draw.  Procedure                               Abnormality         Status                     ---------                               -----------         ------                     Extra Blue Top Tube[[id number]]                              Final result               Extra Red Top Tube[[id number]]                               Final result               Extra Green Top (Lithium...[[id number]]                      Final result               Extra Purple Top Tube[[id number]]                            Final result                 Please view results for these tests on the individual orders.   Extra Blue Top Tube     Status: None   Result Value Ref Range    Hold Specimen JIC    Extra Red Top Tube     Status: None   Result Value Ref Range    Hold Specimen JIC    Extra Green Top (Lithium Heparin) Tube     Status: None   Result Value Ref Range    Hold Specimen JIC    Extra Purple Top Tube     Status: None   Result Value Ref Range    Hold Specimen JIC    Basic metabolic panel     Status: Abnormal   Result Value Ref Range    Sodium 128 (L) 136 - 145 mmol/L    Potassium 3.4 3.4 - 5.3 mmol/L    Chloride 82 (L) 98 - 107 mmol/L    Carbon Dioxide (CO2) 30 (H) 22 - 29 mmol/L    Anion Gap 16 (H) 7 - 15 mmol/L    Urea Nitrogen 73.2 (H) 8.0 - 23.0 mg/dL    Creatinine 2.05 (H) 0.67 - 1.17 mg/dL    Calcium 8.5 (L) 8.8 - 10.2 mg/dL    Glucose 147 (H) 70 - 99 mg/dL    GFR Estimate 33 (L) >60 mL/min/1.73m2   Hepatic panel     Status: Abnormal   Result Value Ref Range    Protein Total 6.3 (L) 6.4 - 8.3 g/dL    Albumin 3.9 3.5 - 5.2 g/dL    Bilirubin Total 0.4 <=1.2 mg/dL    Alkaline Phosphatase 120 40 - 129  U/L    AST 27 10 - 50 U/L    ALT 21 10 - 50 U/L    Bilirubin Direct <0.20 0.00 - 0.30 mg/dL   CBC with platelets     Status: Abnormal   Result Value Ref Range    WBC Count 6.2 4.0 - 11.0 10e3/uL    RBC Count 4.11 (L) 4.40 - 5.90 10e6/uL    Hemoglobin 10.0 (L) 13.3 - 17.7 g/dL    Hematocrit 34.2 (L) 40.0 - 53.0 %    MCV 83 78 - 100 fL    MCH 24.3 (L) 26.5 - 33.0 pg    MCHC 29.2 (L) 31.5 - 36.5 g/dL    RDW 19.3 (H) 10.0 - 15.0 %    Platelet Count 146 (L) 150 - 450 10e3/uL   Magnesium     Status: Normal   Result Value Ref Range    Magnesium 1.9 1.7 - 2.3 mg/dL   Glucose by meter     Status: Abnormal   Result Value Ref Range    GLUCOSE BY METER POCT 115 (H) 70 - 99 mg/dL   EKG 12 lead     Status: None   Result Value Ref Range    Systolic Blood Pressure  mmHg    Diastolic Blood Pressure  mmHg    Ventricular Rate 72 BPM    Atrial Rate 72 BPM    ID Interval 214 ms    QRS Duration 136 ms     ms    QTc 501 ms    P Axis 67 degrees    R AXIS 113 degrees    T Axis 213 degrees    Interpretation ECG       Sinus rhythm with 1st degree A-V block  Non-specific intra-ventricular conduction block  Cannot rule out Anterior infarct , age undetermined  T wave abnormality, consider lateral ischemia  Abnormal ECG  Unconfirmed report - interpretation of this ECG is computer generated - see medical record for final interpretation  Confirmed by - EMERGENCY ROOM, PHYSICIAN ([id number]),  DENISA WANG ([id number]) on 5/25/2023 11:51:19 AM       Medications   DOBUTamine (DOBUTREX) 500 mg in D5W 250 mL infusion (adult std conc) (0 mcg/kg/min × 68 kg (Order-Specific) Intravenous Stopped 5/25/23 1740)   bumetanide (BUMEX) 0.25 mg/mL infusion (2 mg/hr Intravenous Rate/Dose Verify 5/25/23 1945)   lipase-protease-amylase (CREON 12) 33263-75436-29273 units per capsule 2 capsule (has no administration in time range)   sodium chloride (PF) 0.9% PF flush 10-40 mL (has no administration in time range)   albuterol (PROVENTIL HFA/VENTOLIN HFA) inhaler  (has no administration in time range)   atorvastatin (LIPITOR) tablet 80 mg (has no administration in time range)   calcium carbonate 500 mg (elemental) (OSCAL) tablet 500 mg (500 mg Oral $Given 5/25/23 1941)   carboxymethylcellulose PF (REFRESH PLUS) 0.5 % ophthalmic solution 1 drop (has no administration in time range)   clopidogrel (PLAVIX) tablet 75 mg (has no administration in time range)   gabapentin (NEURONTIN) capsule 300 mg (has no administration in time range)   pantoprazole (PROTONIX) EC tablet 40 mg (40 mg Oral $Given 5/25/23 1942)   lidocaine 1 % 0.1-1 mL (has no administration in time range)   lidocaine (LMX4) cream (has no administration in time range)   sodium chloride (PF) 0.9% PF flush 3 mL (3 mLs Intracatheter Not Given 5/25/23 1715)   sodium chloride (PF) 0.9% PF flush 3 mL (has no administration in time range)   medication instruction (has no administration in time range)   alum & mag hydroxide-simethicone (MAALOX) suspension 30 mL (has no administration in time range)   acetaminophen (TYLENOL) tablet 650 mg (has no administration in time range)   acetaminophen (TYLENOL) Suppository 650 mg (has no administration in time range)   heparin ANTICOAGULANT injection 5,000 Units (has no administration in time range)   senna-docusate (SENOKOT-S/PERICOLACE) 8.6-50 MG per tablet 1 tablet (1 tablet Oral Not Given 5/25/23 1946)     Or   senna-docusate (SENOKOT-S/PERICOLACE) 8.6-50 MG per tablet 2 tablet ( Oral See Alternative 5/25/23 1946)   polyethylene glycol (MIRALAX) Packet 17 g (17 g Oral Not Given 5/25/23 1715)   nicotine Patch in Place ( Transdermal Patch in Place 5/25/23 1745)   nicotine (NICODERM CQ) 21 MG/24HR 24 hr patch 1 patch (1 patch Transdermal $Patch/Med Applied 5/25/23 1740)   DOPamine 400 mg in dextrose 5% 250 mL (adult std) - premix - CENTRAL (2.5 mcg/kg/min × 69.1 kg Intravenous Rate/Dose Verify 5/25/23 1946)   midodrine (PROAMATINE) tablet 5 mg (5 mg Oral $Given 5/25/23 2529)   glucose  gel 15-30 g (has no administration in time range)     Or   dextrose 50 % injection 25-50 mL (has no administration in time range)     Or   glucagon injection 1 mg (has no administration in time range)   insulin glargine (LANTUS PEN) injection 16 Units (has no administration in time range)   insulin aspart (NovoLOG) injection (RAPID ACTING) (1 Units Subcutaneous Not Given 5/25/23 1831)   insulin aspart (NovoLOG) injection (RAPID ACTING) (has no administration in time range)   nicotine (COMMIT) lozenge 2 mg (has no administration in time range)   magnesium oxide (MAG-OX) tablet 400 mg (400 mg Oral $Given 5/25/23 1941)   bumetanide (BUMEX) injection 4 mg (4 mg Intravenous $Given 5/25/23 1225)   albuterol (PROVENTIL HFA/VENTOLIN HFA) inhaler (2 puffs Inhalation $Given 5/25/23 1539)   potassium chloride ER (KLOR-CON M) CR tablet 40 mEq (40 mEq Oral $Given 5/25/23 1941)     Labs Ordered and Resulted from Time of ED Arrival to Time of ED Departure - No data to display  XR Chest Port 1 View   Final Result   IMPRESSION: Implantable cardiac defibrillator. Atherosclerosis.   Possible increased pulmonary venous congestion which may indicate   positive fluid volume balance.      EFRAIN POMPA MD            SYSTEM ID:  [id number]             Critical care was not performed.     Medical Decision Making  The patient's presentation was of high complexity (an acute health issue posing potential threat to life or bodily function).    The patient's evaluation involved:  review of external note(s) from 3+ sources (see separate area of note for details)  ordering and/or review of 3+ test(s) in this encounter (see separate area of note for details)  review of 3+ test result(s) ordered prior to this encounter (see separate area of note for details)  independent interpretation of testing performed by another health professional (see separate area of note for details)  discussion of management or test interpretation with another health  professional (see separate area of note for details)    The patient's management necessitated moderate risk (prescription drug management including medications given in the ED) and high risk (a decision regarding hospitalization).      Assessment & Plan   72-year-old male history of systolic heart failure has AICD planted also is on diuretics was recently admitted for acute on chronic heart failure presents now with weight up increasing dyspnea exertion center.  Chest x-ray shows increasing pulmonary edema Labs of been drawn in the clinic and reviewed we did do an EKG also here in the ER EKG shows some nonspecific interventricular conduction delay with some lateral ST nonspecific findings also.  I talked to [facility] to staff we did start Bumex 2 mg IV followed by Bumex 1 mg/h drip.  This was given IV.  Patient otherwise here has been stable will be admitted to [facility] 2 heart failure service.       I have reviewed the nursing notes. I have reviewed the findings, diagnosis, plan and need for follow up with the patient.    Current Discharge Medication List          Final diagnoses:   Acute on chronic systolic congestive heart failure (H)   PARDO (dyspnea on exertion)     I, Makayla Moody, am serving as a trained medical scribe to document services personally performed by Jose Manuel Santana MD based on the provider's statements to me on May 25, 2023.  This document has been checked and approved by the attending provider.    I, Jose Manuel Santana MD, was physically present and have reviewed and verified the accuracy of this note documented by Makayla Moody, medical scribe.        Jose Manuel Santana  [facility] EMERGENCY DEPARTMENT  5/25/2023    This note was created at least in part by the use of dragon voice dictation system. Inadvertent typographical errors may still exist.  Jose Manuel Santana MD.    Patient evaluated in the emergency department during the COVID-19 pandemic period. Careful attention to patients safety was  addressed throughout the evaluation. Evaluation and treatment management was initiated with disposition made efficiently and appropriate as possible to minimize any risk of potential exposure to patient during this evaluation.       Jose Manuel Santana MD  05/25/23 2029       Jose Manuel Santana MD  05/25/23 2053

## 2023-05-25 NOTE — PROGRESS NOTES
Admitted 5/25 for abnormal labs and fluid volume overload    Neuro: A&O x 4. Afebrile. Pleasant affect. Calls appropriately. Reports neuropathy in lower extremities-- states he cannot walk without his adaptive shoes due to neuropathy.  Cardiac: SR  80-90s with occasional PACs. BPs soft.  90s/70s  Respiratory: Sating well on RA. LS clear with fine crackles at bases. Denies cough.  GI/: Good UOP via urinal. LBM 5/25. Denies nausea.  Diet/Appetite: Consistent carb diet with BG checks before meals. 2 L fluid restriction in effect.   Skin: No concerns noted. Generalized bruising.  LDA: PICC-- caps changed today and blood return noted in both lumen.   Red lumen running Bumex gtt  Purple lumen running Dopamine gtt  Activity: Independent  Pain: Denies  Replacements: On Mg and K+ protocols.      Plan: Diurese, continue dopamine, and assess heart and kidney function. Will report any changes to Cards 2

## 2023-05-25 NOTE — NURSING NOTE
"Chief Complaint   Patient presents with     New Patient     Pt reports SOB, gaining water weight in legs, pt reports feeling \"off\", weakness in legs, May 25, 2023 - Reason for visit: CORE Enrollment, 73 yo male with HFrEF presents for initial visit with labs prior. Needs device nurse today.       Initial /63 (BP Location: Left arm, Patient Position: Sitting, Cuff Size: Adult Small)   Pulse 84   SpO2 99%  Estimated body mass index is 19.8 kg/m  as calculated from the following:    Height as of 5/10/23: 1.829 m (6').    Weight as of 5/17/23: 66.2 kg (146 lb)..  BP completed using cuff size: small POLLY Nieves    "

## 2023-05-25 NOTE — NURSING NOTE
Patient unable to verify all medications during rooming process. Patient stated they have not changed since he was recently in the hospital. Writer verbally made provider aware.    POLLY Spring

## 2023-05-26 NOTE — PROGRESS NOTES
[facility]    Cardiology Progress Note- Cardiology    Faculty Attestation  Serge Fitch M.D.    I personally saw and examined this patient fellow, reviewed recent laboratories and imaging studies, discussed the case with the housestaff, and conveyed plan to the patient.  I answered all questions from patient and/or family. I agree with the examination, assessment and plan outlined here.      Date of Admission:  5/25/2023     Assessment & Plan: HVSL   Mr Delbert Tilley is a 72 year old gentleman with a history of CAD s/p CABG (early 2000s) c/b ICM with HFrEF 20-25% w ICD in place, tobacco use (2ppd), T2DM, and CKD who presents from cardiology clinic with progressive weight gain, poor diuretic response, and worsening renal function despite home inotrope therapy. The patient is hemodynamically stable, warm and wet, and in no distress.     #Acute Decompensated Heart Failure  #Heart Failure with Reduced Ejection Fraction 2/2  #Ischemic Cardiomyopathy  #Coronary Artery Disease s/p CABG (early 2000s)    #End Stage Heart Failure   Baseline: ACC/AHA Stage D, NYHA FC IV  Estimated Dry Weight: unknown, previously reported 162 pounds   Admission Weight: 154 pounds  Etiology: Ischemic Cardiomyopathy  Most Recent Diagnostics  Ischemic workup: 2017, patent grafts to LIMA and OM, RCA  and SVG-RCA occluded  Hemodynamics: 05/15/23 RA 15, PA 70/26/41, PCWP 21, CO/CI 4.3/2.3 by TD, PVR 4.6  Echo: 05/10/23 LVEF 10-15%, moderate diastolic dysfunction, severe diffuse hypokinesis, mild-moderate RV dysfunction  EKG: NSR, first degree AVB, incomplete RBBB, lateral TWI  Outpatient Regimen  - Dobutamine 2.5 mcg/kg/min  - Torsemide 100mg BID  - Metolazone 2.5mg daily  Current Presentation  NT-proBNP: 73222; baseline   ~13191  Presents clinically hypervolemic despite home inotrope therapy   Volume status: Weight today 150, I&O pending  - Bumetanide 2mg/hr  GDMT:              > Beta  Blocker: None, on dopamine              > ACE/ARB/ARNI: None in s/o RADHA              > MRA: None in s/o RADHA              > SGLT2i: None in s/o RADHA  Additional Afterload:               > Unable due to hypotension   Therapies:               > Inotrope: Dopamine 2.5 mcg/kg/min, likely transition back to dobutamine in next 24 hours               > ICD: disabled   > CRT: NA              > Mechanical Support: NA  Other Management:               > Daily weights, BID BMP, telemetry monitoring               > Continue ASA and Clopidogrel for CAD     #RADHA on CKD with hx cardiorenal syndrome  Discharged with creatinine 1.5 on 05/17, presents with creatinine 2.09. Suspect prerenal in setting of vol overload and end stage heart failure.    - diuresis per above     #Acute Hyponatremia  Newly reduced this admission to 125. Concerning for hypervolemic hyponatremia in setting of end-stage heart failure.   - Trend with diuresis      #Atrial Flutter s/p Ablation  Currently NSR, rate controlled despite dobutamine     #Tobacco use, 2ppd  - NRT   - cont Albuterol PRN  - cont Mometasone inhaler     #T2DM with neuropathy  A1c 2/2023 7.9. Home regimen: Insulin glargine 26 units every day. Insulin Aspart 4u TID with meals. Metformin   - Hold Metformin   - Insulin glargine: 16 units qAM   - low dose sliding scale insulin   - Cont Gabapentin     #Hx pancreatitis 2/2 EtOH, remote 11+ yrs ago  #Former alcohol use, now sober   - Cont creon     #GERD   - cont PPI BID      #Peripheral artery disease  #Venous insufficiency           Diet: Combination Diet Moderate Consistent Carb (60 g CHO per Meal) Diet; 2 gm NA Diet  Fluid restriction 2000 ML FLUID    DVT Prophylaxis: Heparin SQ  Arriola Catheter: Not present  Cardiac Monitoring: None  Code Status:   DNR     This patient's care was discussed with Dr Serge Fitch, attending cardiologist, who agrees with the assessment and plan unless otherwise indicated.     Jared Armstrong MD MPP, PGY-4  Fellow,  Cardiovascular Disease     Diet: Combination Diet Moderate Consistent Carb (60 g CHO per Meal) Diet; 2 gm NA Diet  Fluid restriction 2000 ML FLUID    DVT Prophylaxis: Heparin SQ  Arriola Catheter: Not present  Cardiac Monitoring: ACTIVE order. Indication: Acute decompensated heart failure (48 hours)  Code Status: No CPR- Do NOT Intubate          Clinically Significant Risk Factors Present on Admission         # Hyponatremia: Lowest Na = 128 mmol/L in last 2 days, will monitor as appropriate        # Drug Induced Platelet Defect: home medication list includes an antiplatelet medication   # Hypertension: Noted on problem list  # End stage heart failure: home medication list includes inotropes  # Circulatory Shock: currently requiring pressors for blood pressure support    # DMII: A1C = 7.4 % (Ref range: <5.7 %) within past 6 months             Disposition Plan   Expected discharge: 4 - 7 days, recommended to prior living arrangement once fluid volume status optimized on oral medication.    Entered: Jared Armstrong MD 05/26/2023, 8:48 AM   The patient's care was discussed with the Attending Physician, Dr. Serge Fitch.      Jared Armstrong MD  [facility]  ______________________________________________________________________    Interval History   No acute events overnight. Some muscle aches while on bumetanide but tolerable. No chest pain, dyspnea, palpitations.     Physical Exam   Vital Signs: Temp: 97.6  F (36.4  C) Temp src: Oral BP: 98/65 Pulse: 83   Resp: 16 SpO2: 92 % O2 Device: None (Room air) Oxygen Delivery: 1 LPM  Weight: 150 lbs 8 oz  Exam:  Constitutional: healthy, alert, and no distress  Head: Normocephalic. No masses, lesions, or abnormalities  Neck: Normal appearance, JVP estimated to mandible at 90 degrees  ENT: EOMI, no scleral icterus or injection, external nose and ears are normal  Cardiovascular: Regular rate and rhythm, no murmur appreciated, no rub, radial  pulses 2+ and equal bilaterally  Respiratory: Diffuse quiet rales, normal work of breathing, no wheeze, no rales  Gastrointestinal: Abdomen soft, non-tender, non-distended. No masses.   : Deferred  Musculoskeletal: extremities normal with no gross deformities noted, 1+ edema to thighs   Skin: no suspicious lesions or rashes  Neurologic: Alert and responsive, grossly non-focal, moves all extremities, sensation grossly intact.       Medical Decision Making             Data   I personally reviewed all laboratory and imaging data from the last 24 hours in addition to all available cardiology data.

## 2023-05-26 NOTE — UTILIZATION REVIEW
Admission Status; Secondary Review Determination         Under the authority of the Utilization Management Committee, the utilization review process indicated a secondary review on the above patient.  The review outcome is based on review of the medical records, discussions with staff, and applying clinical experience noted on the date of the review.        (x)      Inpatient Status Appropriate - This patient's medical care is consistent with medical management for inpatient care and reasonable inpatient medical practice.     RATIONALE FOR DETERMINATION   The patient is a 72-year-old male admitted on 5/25/2023.  Patient is admitted from the cardiology clinic directly to the hospital with failure in outpatient management for controlling his chronic congestive heart failure condition.  Despite being on dobutamine 2.5 mg at home, his creatinine is worsening and his weight is increasing.  Chest x-ray on admission shows worsening pulmonary edema.  He does have a ejection fraction that is severely decreased in the 20 to 25% range and ICD is in place.  He is admitted for more intensive management to decrease his CHF findings and improve renal function.  Likely this will take 4 to 7 days according to the chart notes to increase his overall trajectory.  Based on severe CHF this worsening with outpatient failure, agree with continuing inpatient status.      The severity of illness, intensity of service provided, expected LOS and risk for adverse outcome make the care complex, high risk and appropriate for hospital admission.        The information on this document is developed by the utilization review team in order for the business office to ensure compliance.  This only denotes the appropriateness of proper admission status and does not reflect the quality of care rendered.         The definitions of Inpatient Status and Observation Status used in making the determination above are those provided in the CMS Coverage  Manual, Chapter 1 and Chapter 6, section 70.4.      Sincerely,     Teto Lang MD  Physician Advisor  Utilization Review/ Case Management  [facility].

## 2023-05-26 NOTE — PROGRESS NOTES
Pt admitted 5/25 from cardiology clinic with progressive weight gain, poor diuretic response, and decreased renal function despite home inotrope therapy. PMH includes CAD s/p bypass in early 2000's, ICM HFrEF (20-25%) s/p ICD placement, tobacco use disorder, T2DM, CKD, PAD, and venous insufficiency.    Neuro: A&O x 4. Afebrile. Pleasant affect. Calls appropriately.   Cardiac: SR 80s to 90s. SBP 90s to 100s. Denies dizziness, chest pain, or palpitations. +1 thigh edema.   Respiratory: Sating well on RA. LS clear. Frequent productive cough.   GI/: Good UOP via urinal. LBM 5/26. Denies nausea.   Endocrine: ACHS. 0540 lab results showed BG of 89. Orange juice and palomo crackers given, BG at 0615 = 114.   Diet/Appetite: 2G Na / 2 L FR  Skin: Bruise on R clavicle and R forearm   LDA: R DL PICC. Red infusing Bumex @ 2 mg/hr (8 mL/hr). Purple infusing Dopamine @ 2.5 mcg/kg/min (6.5 mL/hr). Unit collect - draw from Red lumen.   Activity: Independent - requires his shoes to walk d/t his BLE neuropathy.   Pain: Generalized joint and muscle pain 4/10 - given PRN Tylenol x1 with moderate relief.   Replacements: On Mg & K protocols. Mg (1.9) and K (3.4 & 3.6 & 3.5) replaced overnight.      Plan: Continue to monitor and alert team (Cards 2) with any changes or concerns.

## 2023-05-26 NOTE — DISCHARGE INSTRUCTIONS
"We increased dobutamine (the \"rocket fuel\" through the IV) to 3 mcg/kg/min  Increase torsemide to 100 mg three times a day  Increase potassium to 40 meq three times a day  If you gain > 2lb in one day or worse swelling, ok to take metolazone 2.5 mg once daily as needed  Please take extra 40 meq potassium if you take this  Contact hospice agency when you are ready to transition to hospice  I am prescribing some \"Comfort Kit\" medications in case they are unable to see you within 24 hours of your call (hydromorphone for pain or shortness of breath and lorazepam for anxiety/restlessness)  Keep these in a cupboard  We have you scheduled June 9 with Dr Headley in clinic - feel free to cancel this  Keep POLST form and this paperwork on your fridge so that contact numbers are easily accessible    I sent nicotine replacement to the pharmacy if you would like to pick that up  I sent new prescriptions for a lot of medications to the Discharge Pharmacy, if you know you have a large supply of them, feel free to not  new medications    _______________________________________________________  Discharge RN please fax discharge orders to home care agency:  [facility]  ________________________________________________________     [facility]  Phone: [phone] (Kim care transition coordinator)  Fax: [phone]  Main line: [phone]  "

## 2023-05-26 NOTE — PLAN OF CARE
72 year old gentleman with a history of CAD s/p CABG (early 2000s) c/b ICM with HFrEF 20-25% w ICD in place, tobacco use (2ppd), T2DM, and CKD who presents from cardiology clinic with progressive weight gain, poor diuretic response, and worsening renal function despite home inotrope therapy     Neuro: AOx4, calm and cooperative. Denies headache/lightheadedness. Endorses N/T baseline in BLE.  Resp: RA >92%. Denies SOB, PARDO. LS with fine crackles in bases. Chronic congestive cough  Cardiac: SR 70-80s, denies chest pain and palpitations. VSS on RA. Notified by MT that pt had 5 beat run of VT at 1224 with no associated symptoms.  GI/: 2g Na and 2L FR tolerating well. Denies N/V. BG ACHS, given insulin as ordered. Was previously on Bumex gtt that was discontinued. Now voiding independently on Lasix gtt w/ 60mg IVP Lasix loading dose.   Skin: No new deficits noted, bruises to R clavicle/forearm.  Pain: Aches in joints/muscles due to Bumex gtt, declined pain meds.  Activity: Independent  Electrolytes: Replaced by shift prior  LDAs: R DL PICC infusing Lasix 2 mL/hr and Dopamine 2.5 mcg/kg/min (6.5 mL/hr)     Will continue to monitor and report changes to team.

## 2023-05-27 NOTE — PLAN OF CARE
Hours of Care:  1900 - 0700    Temp:  [97.6  F (36.4  C)-98.6  F (37  C)] 98  F (36.7  C)  Pulse:  [66-83] 66  Resp:  [14-18] 16  BP: (84-99)/(55-69) 87/56  Cuff Mean (mmHg):  [66-79] 66  SpO2:  [92 %-98 %] 95 %     D: Mr Delbert Tilley is a 72 year old gentleman with a history of CAD s/p CABG (early 2000s) c/b ICM with HFrEF 20-25% w ICD in place, tobacco use (2ppd), T2DM, and CKD who presents from cardiology clinic with progressive weight gain, poor diuretic response, and worsening renal function despite home inotrope therapy. The patient is hemodynamically stable, warm and wet, and in no distress.    I: Monitored vitals and assessed pt status.     Running: Dopamine 2.5 mcg/kg/min                   Lasix 20 mg/hr  Tele: Sinus rhythm with first degree AVB  O2: Room air  Mobility: Independent     A: Neuro: A/O x4.  Call light appropriate.  Able to make needs known.  Neuropathy bilateral lower extremities  Respiratory:  On room air.  Lung sounds clear.  Denies shortness of breath at rest.  Endorses PARDO  Cardiac: VSS.  BP soft.  ICD.  AAIR <--> DDDR  (per interrogation 4/4/23). Receives clopidogrel daily.  Heparin subcutaneous Q12H.  No s/s of bleeding  GI: Last BM 5/26.  No report of nausea or vomiting.  : Urinating adequate amounts of clear, yellow urine via urinal  Endo:  Blood sugars ACHS.  BG 63 overnight.  Received 240 apple juice and 2 packets palomo crackers.  BG increased to 101 on recheck  Skin:  See PCS for assessment and treatment of wounds and surgical incisions.  LDA:  Double lumen PICC RUE  Drips:  Dopamine, Lasix    Electrolytes: RN managed K and Mg.  K 3.1.  Replaced per RN managed protocol.  Pain: Denies  Isolation:  Standard Precautions  Tests/procedures: None performed overnight.  Diet: 60 g CHO.  2 g Na.  2000 mL fluid restriction  Sleep:  No sleep disturbances noted or reported.    P: Continue to monitor Pt status and report changes to Cards 2.      Intake/Output Summary (Last 24 hours) at  5/27/2023 0653  Last data filed at 5/27/2023 0457  Gross per 24 hour   Intake 1431.5 ml   Output 1550 ml   Net -118.5 ml

## 2023-05-27 NOTE — PROGRESS NOTES
72 year old male with history of CAD s/p CABG early 2000s c/b ICM with HFrEF 20-25% with ICD in place, tobacco use (2ppd), T2DM, and CKD who present from cardiology clinic with progressive weight gain poor diuretic response, and worsening renal functio despite home inotrope therapy.    Change today:    * Last night blood glucose was low so bedside insulin sliding scale discontinued.  Please check blood sugar at night.   * K was 3.1 replaced with rechecked was 4.0. 1 time bolus lasix IV given.   * Pt went into bradycardia around 1530 with HR 34-50s, Pacer pacing, ST  changes, and MAP was 63. Pt Asymptomatic.12 lead EKG done and Dr Fitch @bedside verbal not to give Lasix 100 mg IV bolus.   * Rechecked Magnesium was 1.7 replaced. Rechecked K was 4.9.  * VBG with Oxyhemoglobin 52<59- Card 2 ICU updated  * Cr 2.08<1.49- card2 ICU updated.   * Troponine 189 card 2 ICU updated.   * NPO after midnight for abdomen Ultrasound to evaluate for cirrhosis in setting of low SVR.   * Possible ICD interrogate tomorrow.   * Dopamine gtt continue at 2.5mcg/kg/min continuously   * Laxis gtt 20mg/hr continuosly- voiding 700cc today.   * BMP, VBG, CBC, Magnesium Q12HR.   * Continue to monitor I/o Closely.  * Pt tolerates regular diet.  * Pt shower today.     /66   Pulse 74   Temp 98.5  F (36.9  C) (Oral)   Resp 16   Ht 1.829 m (6')   Wt 68.9 kg (152 lb)   SpO2 95%   BMI 20.61 kg/m       Neuro: A&O x4, denied pain and dizziness.  Respiratory:  Denies SOB. RA. O2 sating >  Cardiac: Tele shows SR with some ST changes. 12 lead EKG done and Dr. Fitch not concern. Magnesium 1.7 replaced and K 3.1 replaced and rechecked 4.9. Pt denies CP, SOB, dizziness, or nausea.   GI: Denied nausea, bowel sounds audible. Tolerates regular diet.  : Pt making urine urine output, see I&O flowsheet.   Drips:   * Dopamine gtt continue at 2.5mcg/kg/min continuously   * Laxis gtt 20mg/hr continuosly- voiding 700cc today.    Access: PICCx2.  PIVx1. Unit draw  Pain: Denies pain.   Tests/procedures: CXR, 12 lead EKG, Echo   Mobility: Ambulated bath room with SBA and was steady on his feet.  Social: Daughter, son, and grand daughter visited during shift. Family is very supportive.     Plan: Abdomin ultrasound and NPO after midnight. Possible ICD interrogate tomorrow Continue to monitor pain, VS, heart rhythm, fluid status, bowel status, cardiac and respiratory status.  Notify care team of changes in patient condition or other concerns.

## 2023-05-27 NOTE — PROGRESS NOTES
[facility]   Cardiology II Service / Advanced Heart Failure  Daily Progress Note      Patient: Delbert Tilley  MRN: [id number]  Admission Date: 5/25/2023  Hospital Day # 2    Assessment and Plan: Mr Delbert Tilley is a 72 year old gentleman with a history of CAD s/p CABG (early 2000s) c/b ICM with HFrEF 20-25% w ICD in place, tobacco use (2ppd), T2DM, and CKD who presents from cardiology clinic with progressive weight gain, poor diuretic response, and worsening renal function despite home inotrope therapy. The patient is hemodynamically stable, warm and wet, and in no distress.    Today's Plan:  - Discontinue at bedtime insulin sliding scale  - Abdominal U/S in the AM (NPO at MN, no changes to insulin as I expect he will be able to eat breakfast shortly after ultrasound)  - cMRI ordered for ischemic eval  - ICD check given intermittent audible alarm after tachy therapies programed off    #Acute Decompensated Heart Failure  #Heart Failure with Reduced Ejection Fraction 2/2  #Ischemic Cardiomyopathy  #Coronary Artery Disease s/p CABG (early 2000s)    #End Stage Heart Failure   Baseline: ACC/AHA Stage D, NYHA FC IV  Estimated Dry Weight: unknown, previously reported 162 pounds   Admission Weight: 154 pounds  Etiology: Ischemic Cardiomyopathy  Most Recent Diagnostics  Ischemic workup: 2017, patent grafts to LIMA and OM, RCA  and SVG-RCA occluded  Hemodynamics: 05/15/23 RA 15, PA 70/26/41, PCWP 21, CO/CI 4.3/2.3 by TD, PVR 4.6  Echo: 05/10/23 LVEF 10-15%, moderate diastolic dysfunction, severe diffuse hypokinesis, mild-moderate RV dysfunction  EKG: NSR, first degree AVB, incomplete RBBB, lateral TWI  Outpatient Regimen  - Dobutamine 2.5 mcg/kg/min  - Torsemide 100mg BID  - Metolazone 2.5mg daily  Current Presentation  NT-proBNP: 52552; baseline   ~14794  Presents clinically hypervolemic despite inotrope therapy   Volume status: Weight today 152, hypervolemic  GDMT:              > Beta Blocker: None, on  dopamine              > ACE/ARB/ARNI: None in s/o RADHA              > MRA: None in s/o RADHA              > SGLT2i: None in s/o RADHA  Additional Afterload:               > Unable due to hypotension, low SVR   Therapies:               > Inotrope: Dopamine 2.5 mcg/kg/min, likely transition back to dobutamine in next 24 hours               > ICD: disabled   > CRT: NA              > Mechanical Support: NA  Other Management:               > Daily weights, BID BMP, telemetry monitoring               > Continue ASA and Clopidogrel for CAD     #RADHA on CKD with hx cardiorenal syndrome  Discharged with creatinine 1.5 on 05/17, presents with creatinine 2.09. Suspect prerenal in setting of vol overload and end stage heart failure.    - diuresis per above     #Acute Hyponatremia  Newly reduced this admission to 125. Concerning for hypervolemic hyponatremia in setting of end-stage heart failure. Improving with diuresis.  - Trend with diuresis   - 132 (From 130 yesterday)     #Atrial Flutter s/p Ablation  Currently NSR, rate controlled despite dobutamine      #Tobacco use, 2ppd  - NRT   - cont Albuterol PRN  - cont Mometasone inhaler     #T2DM with neuropathy  A1c 2/2023 7.9. Home regimen: Insulin glargine 26 units every day. Insulin Aspart 4u TID with meals. Metformin   - Hold Metformin   - Insulin glargine: Decrease to 14 units qAM   - Low dose sliding scale insulin with each meal, discontinue night time sliding scale given AM hypoglycemia   - Cont Gabapentin     #Hx pancreatitis 2/2 EtOH, remote 11+ yrs ago  #Former alcohol use, now sober   - Cont creon  - Abdominal U/S to evaluate for cirrhosis in the setting of low SVR     #GERD   - cont PPI BID      #Peripheral artery disease  #Venous insufficiency          Diet: Combination Diet Moderate Consistent Carb (60 g CHO per Meal) Diet; 2 gm NA Diet  Fluid restriction 2000 ML FLUID    DVT Prophylaxis: Heparin SQ  Arriola Catheter: Not present  Cardiac Monitoring: None  Code  Status:   DNR-DNI    Yoly Rene PA-C  Advanced Heart Failure/Cardiology II Service  Pager [phone] [facility] [phone]      Patient discussed with Dr. Fitch.        45 minutes spent on the date of the encounter doing chart review, history and exam, documentation and further activities per the note    ================================================================    Subjective/24-Hr Events:   Last 24 hr care team notes reviewed. Feeling much better today than the day before.  Yesterday, he couldn't even stand up, but today he is wanting to get out of bed and move around. His breathing feels better. Laying flat. No cough. No lightheadedness or dizziness. No pre-syncope or syncope. No chest pain. He did have ome heart burn this morning after some orange juice on an empty stomach.     ROS:  4 point ROS including respiratory, CV, GI and  (other than that noted in the HPI) is negative.     Medications: Reviewed in EPIC.     Physical Exam:   BP 91/62 (BP Location: Left arm)   Pulse 70   Temp 97.9  F (36.6  C) (Oral)   Resp 16   Ht 1.829 m (6')   Wt 68.9 kg (152 lb)   SpO2 95%   BMI 20.61 kg/m      GENERAL: Appears comfortable, in no distress .  HEENT: Eye symmetrical, no discharge or icterus bilaterally. Mucous membranes moist and without lesions.  NECK: Supple, JVD upper third of neck at 90 degrees.   CV: RRR, +S1S2, no murmur, rub, or gallop.   RESPIRATORY: Respirations regular, even, and unlabored. Lungs CTA throughout.    GI: Soft and non distended with normoactive bowel sounds present in all quadrants. No tenderness, rebound, guarding.   EXTREMITIES: 2+ b/l lower extremity peripheral edema. All extremities are warm and well perfused  NEUROLOGIC: Alert and interacting appropriatly.  MUSCULOSKELETAL: No joint swelling or tenderness.   SKIN: No jaundice. No rashes or lesions.     Labs:  CMP  Recent Labs   Lab 05/27/23  1158 05/27/23  1150 05/27/23  0516 05/27/23  0454 05/27/23  0453 05/26/23  1746  05/26/23  1726 05/26/23 0615 05/26/23 0433 05/25/23  1831 05/25/23  1703   NA  --   --   --  132*  --   --  131*  --  130*  --  128*   POTASSIUM  --  4.0  --  3.1*  --   --  3.9  --  3.5   < > 3.4   CHLORIDE  --   --   --  88*  --   --  87*  --  86*  --  82*   CO2  --   --   --  31*  --   --  31*  --  31*  --  30*   ANIONGAP  --   --   --  13  --   --  13  --  13  --  16*   *  --  101* 65* 63*   < > 163*   < > 89   < > 147*   BUN  --   --   --  70.8*  --   --  72.2*  --  72.3*  --  73.2*   CR  --   --   --  1.49*  --   --  1.87*  --  1.18*  --  2.05*   GFRESTIMATED  --   --   --  49*  --   --  37*  --  65  --  33*   MARVA  --   --   --  8.9  --   --  9.2  --  8.8  --  8.5*   MAG  --   --   --  2.1  --   --  2.0  --  2.1  --  1.9   PROTTOTAL  --   --   --   --   --   --   --   --   --   --  6.3*   ALBUMIN  --   --   --   --   --   --   --   --   --   --  3.9   BILITOTAL  --   --   --   --   --   --   --   --   --   --  0.4   ALKPHOS  --   --   --   --   --   --   --   --   --   --  120   AST  --   --   --   --   --   --   --   --   --   --  27   ALT  --   --   --   --   --   --   --   --   --   --  21    < > = values in this interval not displayed.       CBC  Recent Labs   Lab 05/27/23 0454 05/26/23 0433 05/25/23 1703   WBC 6.2 6.5 6.2   RBC 4.44 4.26* 4.11*   HGB 10.7* 10.3* 10.0*   HCT 37.2* 35.0* 34.2*   MCV 84 82 83   MCH 24.1* 24.2* 24.3*   MCHC 28.8* 29.4* 29.2*   RDW 19.7* 19.4* 19.3*    164 146*       INR  No lab results found in last 7 days.

## 2023-05-27 NOTE — PROGRESS NOTES
Serge Fitch M.D.  Cardiovascular Medicine    I personally saw and examined this patient, discussed care with housestaff and other consultants, reviewed current laboratories and imaging studies, and conveyed impression and diagnostic/therapeutic plan to patient.    Problem List  1. Ischemic cardiomyopathy  2. CABG  3. History of stenting  4. Diabetes  5. Biventricular congestive heart failure  6. Anemia with microcytosis  7. Chronic renal disease  8. Pancreatic insuffiiency    History    Patient seen and examined with declining EF, biventricular systolic dysfunction, low SVR and elevated PVR without strong evidence for severe pulmonary hypertension.  He has had persistently unexplained low blood pressure      Objective  BP 91/62 (BP Location: Left arm)   Pulse 70   Temp 97.9  F (36.6  C) (Oral)   Resp 16   Ht 1.829 m (6')   Wt 68.9 kg (152 lb)   SpO2 95%   BMI 20.61 kg/m      Intake/Output Summary (Last 24 hours) at 5/27/2023 1337  Last data filed at 5/27/2023 1200  Gross per 24 hour   Intake 1580.85 ml   Output 1625 ml   Net -44.15 ml     Wt Readings from Last 5 Encounters:   05/27/23 68.9 kg (152 lb)   05/17/23 66.2 kg (146 lb)   04/27/23 71.3 kg (157 lb 3.2 oz)   03/17/23 70.3 kg (155 lb)   02/22/23 70.1 kg (154 lb 8 oz)       Meds    acetaminophen  1,500 mg Oral BID     atorvastatin  80 mg Oral At Bedtime     calcium carbonate 500 mg (elemental)  1 tablet Oral BID     clopidogrel  75 mg Oral Daily     gabapentin  300 mg Oral At Bedtime     heparin ANTICOAGULANT  5,000 Units Subcutaneous Q12H     insulin aspart  1-3 Units Subcutaneous TID AC     insulin glargine  14 Units Subcutaneous QAM AC     lipase-protease-amylase  2 capsule Oral TID w/meals     midodrine  5 mg Oral TID w/meals     mometasone  1 puff Inhalation BID     nicotine  1 patch Transdermal Daily     nicotine   Transdermal Q8H     pantoprazole  40 mg Oral BID     polyethylene glycol  17 g Oral Daily     senna-docusate  1 tablet Oral BID     Or     senna-docusate  2 tablet Oral BID     sodium chloride (PF)  3 mL Intracatheter Q8H       Labs  CMP  Recent Labs   Lab 23  1158 23  1150 23  0516 23  0454 23  0453 23  1746 23  1726 23  0615 23  0433 23  1831 23  1703   NA  --   --   --  132*  --   --  131*  --  130*  --  128*   POTASSIUM  --  4.0  --  3.1*  --   --  3.9  --  3.5   < > 3.4   CHLORIDE  --   --   --  88*  --   --  87*  --  86*  --  82*   CO2  --   --   --  31*  --   --  31*  --  31*  --  30*   ANIONGAP  --   --   --  13  --   --  13  --  13  --  16*   *  --  101* 65* 63*   < > 163*   < > 89   < > 147*   BUN  --   --   --  70.8*  --   --  72.2*  --  72.3*  --  73.2*   CR  --   --   --  1.49*  --   --  1.87*  --  1.18*  --  2.05*   GFRESTIMATED  --   --   --  49*  --   --  37*  --  65  --  33*   MARVA  --   --   --  8.9  --   --  9.2  --  8.8  --  8.5*   MAG  --   --   --  2.1  --   --  2.0  --  2.1  --  1.9   PROTTOTAL  --   --   --   --   --   --   --   --   --   --  6.3*   ALBUMIN  --   --   --   --   --   --   --   --   --   --  3.9   BILITOTAL  --   --   --   --   --   --   --   --   --   --  0.4   ALKPHOS  --   --   --   --   --   --   --   --   --   --  120   AST  --   --   --   --   --   --   --   --   --   --  27   ALT  --   --   --   --   --   --   --   --   --   --  21    < > = values in this interval not displayed.     CBC  Recent Labs   Lab 23  0454 23  0433 23  1703   WBC 6.2 6.5 6.2   RBC 4.44 4.26* 4.11*   HGB 10.7* 10.3* 10.0*   HCT 37.2* 35.0* 34.2*   MCV 84 82 83   MCH 24.1* 24.2* 24.3*   MCHC 28.8* 29.4* 29.2*   RDW 19.7* 19.4* 19.3*    164 146*     INRNo lab results found in last 7 days.  Arterial Blood Gas  Recent Labs   Lab 234 23  1726 23  0433   O2PER 21 21 21       Imaging   Name: CARLIN QUEEN  MRN: [id number]  : 1950  Study Date: 2023 11:22 AM  Age: 72 yrs  Gender: Male  Patient  Location: [facility] [place]  Reason For Study: Heart Failure  Ordering Physician: IZABELA SALAZAR  Performed By: Griselda Moon RDCS     BSA: 2.0 m2  Height: 73 in  Weight: 162 lb  BP: 116/67 mmHg  ______________________________________________________________________________  Procedure  Echocardiogram with two-dimensional, color and spectral Doppler performed.  Contrast Optison. Optison (NDC #6631-0298-76) given intravenously. Patient was  given 5 ml mixture of 3 ml Optison and 6 ml saline. 4 ml wasted.  ______________________________________________________________________________  Interpretation Summary  Left ventricular function is decreased. The ejection fraction is 10-15%  (severely reduced). Grade II or moderate diastolic dysfunction. Severe diffuse  hypokinesis is present.There is no thrombus seen in the left ventricle.  Global right ventricular function is moderately reduced.  Mild to moderate mitral insufficiency is present.  The estimated mean RA pressure is elevated at 15 mmHg.  No pericardial effusion is present.     This study was compared with the study from 1/31/23 . Interval worsening of  LVEF is present.     ______________________________________________________________________________  Left Ventricle  Mild left ventricular dilation is present. Left ventricular function is  decreased. The ejection fraction is 10-15% (severely reduced). Grade II or  moderate diastolic dysfunction. Severe diffuse hypokinesis is present. There  is no thrombus seen in the left ventricle.     Right Ventricle  The right ventricle is normal size. Global right ventricular function is  moderately reduced. A pacemaker lead is noted in the right ventricle.     Atria  The right atria appears normal. Moderate to severe left atrial enlargement is  present.     Mitral Valve  Mild to moderate mitral insufficiency is present.     Aortic Valve  The aortic valve is tricuspid. Moderate aortic valve calcification is present.     Tricuspid  Valve  Mild tricuspid insufficiency is present. The right ventricular systolic  pressure is approximated at 26.6 mmHg plus the right atrial pressure.     Pulmonic Valve  The pulmonic valve is normal. Trace pulmonic insufficiency is present.     Vessels  The aorta root is normal. The inferior vena cava was dilated at 2.24 cm  without respiratory variability, consistent with increased right atrial  pressure. IVC diameter >2.1 cm collapsing <50% with sniff suggests a high RA  pressure estimated at 15 mmHg or greater.     Pericardium  No pericardial effusion is present.     Compared to Previous Study  This study was compared with the study from 1/31/23 . Interval worsening of  LVEF.    Catheterization 5/2-23     Right sided filling pressures are moderately elevated.     Left sided filling pressures are mildly elevated.     Moderately elevated pulmonary artery hypertension.     Reduced cardiac output level.     Hemodynamic data has been modified in [facility] per physician review.       Hemodynamics    RA 16/16/15 mmHg  RV 70/15 mmHg  PA 70/26/41 mmHg  CWP 21/21/21 mmHg  CO (Daniel) 3.94 L/min  CI (Daniel) 2.1 L/min/m2  CO (TD) 4.33  L/min  CI (TD) 2.31 L/min/m2    MAP 61 mmHg    dynes  PVR 4.65 FANG    Right sided filling pressures are moderately elevated. Left sided filling pressures are mildly elevated. Moderately elevated pulmonary artery hypertension. Reduced cardiac output level. Hemodynamic data has been modified in Epic per physician review

## 2023-05-28 NOTE — PLAN OF CARE
Neuro: Completely intact. Moves with assist of 1. No c/o pain.     CV: Afebrile. SR with wide QRA. Soft pressures with systolics in the 80s, team aware.     Resp: LS dim on RA. Frequent, productive cough.    GI/: No BM past two days. Pt concerned. Miralax and senna given, Milk of Magnesia ordered and given. Adequate UO. Pt adhering to 2L fluid restriction. Abd CT performed this morning to determine if cirrhosis could be causative factor in hypotension. BG labile, consider providing midnight snack.    Gtt: Dopamine gtt at 2.5mcg/kg/hr straight rate through PICC, Lasix at 6mg/hr.    Labs: Mg replaced. Creatinine trending down.    Plan: Palliative care consulted today. Continue to monitor, notify team of significant changes.    Kourtney Mcadams RN on 5/28/2023 at 2:12 PM

## 2023-05-28 NOTE — PLAN OF CARE
Goal Outcome Evaluation:      Plan of Care Reviewed With: patient    Overall Patient Progress: decliningOverall Patient Progress: declining    End of Shift Summary. See flowsheets for vital signs and detailed assessment.    Changes this shift: NPO at 0000 for ultrasound today. No hypoglycemia. Mag replaced.    Plan: Abdominal ultrasound today, possible ICD interrogation. Continue to closely monitor electrolytes and renal status.

## 2023-05-28 NOTE — CONSULTS
Care Management Initial Consult    General Information  Assessment completed with: Patient,    Type of CM/SW Visit: Initial Assessment    Primary Care Provider verified and updated as needed: Yes   Readmission within the last 30 days: other Progression   Return Category: Progression of disease  Reason for Consult: discharge planning  Advance Care Planning: Advance Care Planning Reviewed: present on chart, verified with patient     General Information Comments: Hospice appropriate    Communication Assessment  Patient's communication style: spoken language (English or Bilingual)    Hearing Difficulty or Deaf: no   Wear Glasses or Blind: yes    Cognitive  Cognitive/Neuro/Behavioral: WDL                      Living Environment:   People in home: alone     Current living Arrangements: mobile home (with ramps)      Able to return to prior arrangements: yes  Living Arrangement Comments: taryn return home with supports family  (Hospice once confirmed)    Family/Social Support:  Care provided by: self  Provides care for: no one     Children, Neighbor (Son and daughter)          Description of Support System: Supportive, Involved       Current Resources:   Patient receiving home care services: Yes  Skilled Home Care Services: Skilled Nursing  Community Resources:    Equipment currently used at home: cane, straight, walker, rolling, wheelchair, power  Supplies currently used at home:      Employment/Financial:  Employment Status: retired, , previous service        Financial Concerns: No concerns identified   Referral to Financial Worker: No     Does the patient's insurance plan have a 3 day qualifying hospital stay waiver?  Yes   Will the waiver be used for post-acute placement? No    Lifestyle & Psychosocial Needs:  Social Determinants of Health     Tobacco Use: High Risk (5/25/2023)    Patient History      Smoking Tobacco Use: Every Day      Smokeless Tobacco Use: Never      Passive Exposure: Not on file   Alcohol Use:  Not on file   Financial Resource Strain: Not on file   Food Insecurity: Not on file   Transportation Needs: Not on file   Physical Activity: Not on file   Stress: Not on file   Social Connections: Not on file   Intimate Partner Violence: Not on file   Depression: Not at risk (2/22/2023)    PHQ-2      PHQ-2 Score: 0   Housing Stability: Not on file       Functional Status:  Prior to admission patient needed assistance:   Dependent ADLs:: Ambulation-walker  Dependent IADLs:: Cleaning hired cleaning lady once monthly, MOW- 7 dinners per week. Typically eats breakfast out.   Assesssment of Functional Status: Not at baseline with ADL Functioning    Mental Health Status:  Mental Health Status: No Current Concerns       Chemical Dependency Status:  Chemical Dependency Status: No Current Concerns  Chemical Dependency Management: Smoker 1-2 PPD- does NOT want to quit smoking!        Values/Beliefs:  Spiritual, Cultural Beliefs, Buddhism Practices, Values that affect care: no       Cultural/Buddhism Practices Patient Routinely Participates In: X[employer]       Additional Information:   Delbert is a 71 yo male with PMH of CAD s/p CABG with heart failure, current smoker 1-2 PPD, Type 2 diabetes, CKD who admitted 5/25 from cardiology clinic with weight gain, poor diuretic response with home inotrope therapy, worsening renal     Mr Delbert Tilley is a 72 year old gentleman with a history of CAD s/p CABG (early 2000s) c/b ICM with HFrEF 20-25% w ICD in place, tobacco use (2ppd), T2DM, and CKD who presents from cardiology clinic with progressive weight gain, poor diuretic response, and worsening renal function despite home inotrope therapy.    PATIENTS ASKS FOR HOSPICE CONSULT and reports he was given 2 weeks to live. Palliative order is listed in chart.  Patient does NOT want to quit smoking.     Sofy Nassar RNCC casual float  5/28/2023  Nurse Coordinator      Social Work and Care Management Department      SEARCHABLE in [facility] - search CARE COORDINATOR     [place] ([phone]) Saturday & Sunday; [phone]) [facility] Recognized Holidays     Units: 4A, 4C, 4E, 5A & 5B   Pager: [phone]    Units: 6A & 6B    Pager: [phone]    Units: 6C & 6D   Pager: [phone]    Units: 7A, 7B, 7C, 7D & 5C    Pager: [phone]    Units: [facility] ED, 5 Ortho, 5 Med/Surg, 6 Med/Surg, 8A, 10 ICU   Pager: [phone]

## 2023-05-28 NOTE — CONSULTS
"[facility]  Palliative Care Consultation Note    Patient: Delbert Tilley  Date of Admission:  5/25/2023    Requesting Clinician / Team: cards 2  Reason for consult: Goals of care    Recommendations:    We'll continue care planning discussions with him; tentatively hospice enrollment at the time of discharge may be the best option for him. He did not thrive on dobutamine at his recent discharge although if it is felt that home dobutamine is yet indicated and may help of course proceed with that. Otherwise I think hospice will be best. He seems to understand and accept he is close to the end of his life \"They told me a month--maybe not even that.\" His major goal is to have a little bit of time at home again; he goes to a diner each morning where they know him and make him salt free food and he wants to return there again another time. He has heard that hospice may be an option and we talked about that including that hospice focuses his medical treatment on his sx and comfort but not life prolongation; that if/when he decompensates on hospice (eg worsening edema, dyspnea, etc) hospice would focus treatment on symptom relief but not rehospitalization for attempts at disease modifying care (\"fine with me as long as I'm comfortable\"). Also d/w him hospice does not provide 24/7 care and at some point he'll need that and usually that's done by family caregivers which he says he doesn't have (\"I don't want my children taking time off of work to care for me\"); I d/w him if that's the case people usually go to a hospice in a nursing home or similar place. He has veterans benefits and he may be eligible to go to the [facility] hospice unit but I'm not sure of that.     His plan is evolving and we'll continue to follow and weigh in how we can    We are on holiday staffing tomorrow; I am on call; call me if you need me; ow team will follow-up Tuesday    These recommendations have been " "discussed with cards team.      Thank you for the opportunity to participate in the care of this patient and family. Our team: will continue to follow.     During regular M-F work hours -- if you are not sure who specifically to contact -- please text the \"Palliative Care [facility]\" voice group in [employer]. Our after-hours answering service is [phone]. Who's on-call for us in available in [facility], also.     Rudy Elaine MD / Palliative Medicine / Text me via [employer]      Assessments:  Delbert Tilley is a 72 year old male with ischemic cardiomyopathy HFrEF 10-15%; admitted with RADHA in context of CKD; he is on home inotropes started just this month.     Today, the patient was seen for:  Acute decompensated systolic HF  RADHA    Prognosis, Goals, & Planning:      Functional Status just prior to hospitalization: 2 (Ambulatory and capable of all selfcare but unable to carry out any work activities; may need help with IADLs up and about > 50% of waking hours)      Prognosis, Goals, and/or Advance Care Planning were addressed today: Yes        Summary/Comments:       Patient's decision making preferences: independently          Patient has decision-making capacity today for complex decisions: No            I have concerns about the patient/family's health literacy today: No           Patient has a completed Health Care Directive: Yes, and on file.    Legally designated health care agent: Katie Amezquita, daughter      Code status: No CPR / No Intubation    Coping, Meaning, & Spirituality:   Mood, coping, and/or meaning in the context of serious illness were addressed today: Yes  Summary/Comments:     Social:     Lives alone. Drives.     Son and dtr in region--dtr Sara close by; she is his healthcare agent    History of Present Illness:  History gathered today from: patient, medical chart, medical team members    Goals & care planning discussion as above    He feels better since he's been here--dyspnea better.  BP and UOP remain " low  Weight stable     Medications:  I have reviewed this patient's medication profile and medications from this hospitalization.   Noted:  APAP 1500 mg  Bid  Dopamine 2.5 mcg/kg/min      Physical Exam:  Vital Signs: Temp: 97.5  F (36.4  C) Temp src: Oral BP: (!) 87/53 (RN is aware of BP) Pulse: 66   Resp: 16 SpO2: 92 % O2 Device: None (Room air)    Weight: 153 lbs 0 oz   alert NAD  Clear sensorium full affect    Data reviewed:  Recent imaging reviewed, my comments on pertinents:   Echo this mo EF 10-15%, worse than prior in [place]    Recent lab data reviewed, my comments on pertinents:   Cr 2-->1.2 today   Na 130  BNP 44K  Trop 188

## 2023-05-28 NOTE — PROGRESS NOTES
[facility]   Cardiology II Service / Advanced Heart Failure  Daily Progress Note      Patient: Delbert Tilley  MRN: [id number]  Admission Date: 5/25/2023  Hospital Day # 3       Faculty Attestation  Serge Fitch M.D.    I personally saw and examined this patient with ANP, reviewed recent laboratories and imaging studies, discussed the case with the housestaff, and conveyed plan to the patient.  I answered all questions from patient and/or family. I agree with the examination, assessment and plan outlined here.  I believe that his hemodynamics would be improved by augmenting heart rate with atrial pacing.  I supervised the reprogramming of his pacemaker        Assessment and Plan: Mr Delbert Tilley is a 72 year old gentleman with a history of CAD s/p CABG (early 2000s) c/b ICM with HFrEF 20-25% w ICD in place, tobacco use (2ppd), T2DM, and CKD who presents from cardiology clinic with progressive weight gain, poor diuretic response, and worsening renal function despite home inotrope therapy. The patient is hemodynamically stable, warm and wet, and in no distress.    Today's Plan:  - REsume bedtime insulin sliding scale  - cMRI ordered for ischemic eval, date to be determined on tuesday  - Increase the low rate on the pacemaker to 80  - Lasix IV push, 80 mg x1, continue the gtt  - BID BMP  - Palliative care consult    #Acute Decompensated Heart Failure  #Heart Failure with Reduced Ejection Fraction 2/2  #Ischemic Cardiomyopathy  #Coronary Artery Disease s/p CABG (early 2000s)    #End Stage Heart Failure   Baseline: ACC/AHA Stage D, NYHA FC IV  Estimated Dry Weight: unknown, previously reported 162 pounds   Admission Weight: 154 pounds  Etiology: Ischemic Cardiomyopathy  Most Recent Diagnostics  Ischemic workup: 2017, patent grafts to LIMA and OM, RCA  and SVG-RCA occluded  Hemodynamics: 05/15/23 RA 15, PA 70/26/41, PCWP 21, CO/CI 4.3/2.3 by TD, PVR 4.6  Echo: 05/10/23 LVEF 10-15%, moderate  diastolic dysfunction, severe diffuse hypokinesis, mild-moderate RV dysfunction  EKG: NSR, first degree AVB, incomplete RBBB, lateral TWI  Outpatient Regimen  - Dobutamine 2.5 mcg/kg/min  - Torsemide 100mg BID  - Metolazone 2.5mg daily  Current Presentation  NT-proBNP: 74777; baseline   ~62346  Presents clinically hypervolemic despite inotrope therapy   Volume status: Weight today 152, hypervolemic  GDMT:              > Beta Blocker: None, on dopamine              > ACE/ARB/ARNI: None in s/o RADHA              > MRA: None in s/o RADHA              > SGLT2i: None in s/o RADHA  Additional Afterload:               > Unable due to hypotension, low SVR   Therapies:    > Diuretic: Lasix gtt @20, IV push of lasix 80 mg x1 today              > Inotrope: Dopamine 2.5 mcg/kg/min, likely transition back to dobutamine in next 24 hours pending response to increased pacing rate              > ICD: disabled   > CRT: NA              > Mechanical Support: NA  Other Management:               > Daily weights, BID BMP, telemetry monitoring               > Continue Clopidogrel for CAD     #RADHA on CKD with hx cardiorenal syndrome  Discharged with creatinine 1.5 on 05/17, presents with creatinine 2.09. Suspect prerenal in setting of vol overload and end stage heart failure.   - diuresis per above  - Cr 1.2 (F2.08)     #Acute Hyponatremia  Newly reduced this admission to 125. Concerning for hypervolemic hyponatremia in setting of end-stage heart failure. Improving with diuresis.  - Trend with diuresis   - 130 (From 128 yesterday)     #Atrial Flutter s/p Ablation  - Currently NSR, rate controlled despite dobutamine   - Tele  - Not on A/C      #Tobacco use, 2ppd  - NRT   - cont Albuterol PRN  - cont Mometasone inhaler     #T2DM with neuropathy  A1c 2/2023 7.9. Home regimen: Insulin glargine 26 units every day. Insulin Aspart 4u TID with meals. Metformin   - Hold PTA Metformin   - Insulin glargine: Decreasde to 14 units qAM   - Low dose sliding  "scale insulin with each meal and QHS   - Cont Gabapentin     #Hx pancreatitis 2/2 EtOH, remote 11+ yrs ago  #Former alcohol use, now sober  Abdominal U/S 5/28 \"without definitive cirrhosis\". Did show dilation of the pancreatic ducts.  - Cont creon  - Non-emergent MRCP to evaluate for mass causing pancreatic duct is recommended. Patient aware of finding, aware that we will discuss management and further testing pending trajectory of HF and GOC.     #GERD   - cont PPI BID      #Peripheral artery disease  #Venous insufficiency          Diet: Combination Diet Moderate Consistent Carb (60 g CHO per Meal) Diet; 2 gm NA Diet  Fluid restriction 2000 ML FLUID    DVT Prophylaxis: Heparin SQ  Arriola Catheter: Not present  Cardiac Monitoring: None  Code Status:   DNR-DNI    Yoly Rene PA-C  Advanced Heart Failure/Cardiology II Service  Pager [phone] [facility] [id number]      Patient discussed with Dr. Fitch.        45 minutes spent on the date of the encounter doing chart review, history and exam, documentation and further activities per the note    ================================================================    Subjective/24-Hr Events:   Last 24 hr care team notes reviewed. Feeling better today. Still feels like he has fluid.. His breathing feels better. Laying flat. No cough. No lightheadedness or dizziness. No pre-syncope or syncope. No chest pain.     ROS:  4 point ROS including respiratory, CV, GI and  (other than that noted in the HPI) is negative.     Medications: Reviewed in EPIC.     Physical Exam:   BP (!) 87/53 (BP Location: Left arm, Cuff Size: Adult Regular)   Pulse 66   Temp 97.5  F (36.4  C) (Oral)   Resp 16   Ht 1.829 m (6')   Wt 69.4 kg (153 lb)   SpO2 92%   BMI 20.75 kg/m      GENERAL: Appears comfortable, in no distress .  HEENT: Eye symmetrical, no discharge or icterus bilaterally. Mucous membranes moist and without lesions.  NECK: Supple, JVD upper third of neck at 90 degrees.   CV: RRR, " +S1S2, no murmur, rub, or gallop.   RESPIRATORY: Respirations regular, even, and unlabored. Lungs CTA throughout.    GI: Soft and non distended with normoactive bowel sounds present in all quadrants. No tenderness, rebound, guarding.   EXTREMITIES: 2+ b/l lower extremity peripheral edema. All extremities are warm and well perfused  NEUROLOGIC: Alert and interacting appropriatly.  MUSCULOSKELETAL: No joint swelling or tenderness.   SKIN: No jaundice. No rashes or lesions.     Labs:  CMP  Recent Labs   Lab 05/28/23  1111 05/28/23  0802 05/28/23  0441 05/28/23  0440 05/27/23  1800 05/27/23  1550 05/27/23  1158 05/27/23  1150 05/27/23  0516 05/27/23  0454 05/26/23  1746 05/26/23  1726 05/25/23  1831 05/25/23  1703   NA  --   --  130*  --   --  128*  --   --   --  132*  --  131*   < > 128*   POTASSIUM  --   --  4.1  --   --  4.9  --  4.0  --  3.1*  --  3.9   < > 3.4   CHLORIDE  --   --  88*  --   --  87*  --   --   --  88*  --  87*   < > 82*   CO2  --   --  31*  --   --  31*  --   --   --  31*  --  31*   < > 30*   ANIONGAP  --   --  11  --   --  10  --   --   --  13  --  13   < > 16*   * 139* 187* 181*   < > 211*   < >  --    < > 65*   < > 163*   < > 147*   BUN  --   --  73.5*  --   --  73.0*  --   --   --  70.8*  --  72.2*   < > 73.2*   CR  --   --  1.20*  --   --  2.08*  --   --   --  1.49*  --  1.87*   < > 2.05*   GFRESTIMATED  --   --  63  --   --  33*  --   --   --  49*  --  37*   < > 33*   MARVA  --   --  9.0  --   --  8.8  --   --   --  8.9  --  9.2   < > 8.5*   MAG  --   --  1.8  --   --  1.7  --   --   --  2.1  --  2.0   < > 1.9   PROTTOTAL  --   --   --   --   --   --   --   --   --   --   --   --   --  6.3*   ALBUMIN  --   --   --   --   --   --   --   --   --   --   --   --   --  3.9   BILITOTAL  --   --   --   --   --   --   --   --   --   --   --   --   --  0.4   ALKPHOS  --   --   --   --   --   --   --   --   --   --   --   --   --  120   AST  --   --   --   --   --   --   --   --   --   --   --   --    --  27   ALT  --   --   --   --   --   --   --   --   --   --   --   --   --  21    < > = values in this interval not displayed.       CBC  Recent Labs   Lab 05/28/23  0441 05/27/23  0454 05/26/23  0433 05/25/23  1703   WBC 5.6 6.2 6.5 6.2   RBC 4.32* 4.44 4.26* 4.11*   HGB 10.5* 10.7* 10.3* 10.0*   HCT 36.3* 37.2* 35.0* 34.2*   MCV 84 84 82 83   MCH 24.3* 24.1* 24.2* 24.3*   MCHC 28.9* 28.8* 29.4* 29.2*   RDW 19.1* 19.7* 19.4* 19.3*    185 164 146*       INR  No lab results found in last 7 days.

## 2023-05-28 NOTE — PROCEDURES
PPM/ICD Programming  [employer] Evera Dual Chamber ICD  - Low rate limit increased to 80 bpm  - Tachytherapy alarm discontinued (therapies previously turned off)

## 2023-05-29 NOTE — PROGRESS NOTES
[facility]   Cardiology II Service / Advanced Heart Failure  Daily Progress Note  I personally saw and examined this patient, reviewed imaging and laboratory studies, confirmed physical examination and discussed results and plan with patient  His is markedly improved with increasing heart rate from pacer    Patient: Delbert Tilley  MRN: [id number]  Admission Date: 5/25/2023  Hospital Day # 4     Assessment and Plan: Mr Delbert Tilley is a 72 year old gentleman with a history of CAD s/p CABG (early 2000s) c/b ICM with HFrEF 20-25% w ICD in place, tobacco use (2ppd), T2DM, and CKD who presents from cardiology clinic with progressive weight gain, poor diuretic response, and worsening renal function despite home inotrope therapy. The patient is hemodynamically stable, warm and wet, and in no distress.    Today's Plan:  - cMRI ordered for ischemic eval, date to be determined on tuesday  - Discontinue lasix gtt  - Discontinue dopamine gtt. Start dopamine gtt  - Lasix 80 mg TID  - Metolazone 2.5 mg once  - Increase glargine to 18 units    #Acute Decompensated Heart Failure  #Heart Failure with Reduced Ejection Fraction 2/2  #Ischemic Cardiomyopathy  #Coronary Artery Disease s/p CABG (early 2000s)    #End Stage Heart Failure   Baseline: ACC/AHA Stage D, NYHA FC IV  Estimated Dry Weight: unknown, previously reported 162 pounds   Admission Weight: 154 pounds  Etiology: Ischemic Cardiomyopathy  Most Recent Diagnostics  Ischemic workup: 2017, patent grafts to LIMA and OM, RCA  and SVG-RCA occluded  Hemodynamics: 05/15/23 RA 15, PA 70/26/41, PCWP 21, CO/CI 4.3/2.3 by TD, PVR 4.6  Echo: 05/10/23 LVEF 10-15%, moderate diastolic dysfunction, severe diffuse hypokinesis, mild-moderate RV dysfunction  EKG: NSR, first degree AVB, incomplete RBBB, lateral TWI  Outpatient Regimen  - Dobutamine 2.5 mcg/kg/min  - Torsemide 100mg BID  - Metolazone 2.5mg daily  Current Presentation  NT-proBNP: 69188;  "baseline   ~18624  Presents clinically hypervolemic despite inotrope therapy   Volume status: Weight today 152, hypervolemic  GDMT:              > Beta Blocker: None              > ACE/ARB/ARNI: None in s/o RADHA              > MRA: None in s/o RADHA              > SGLT2i: None in s/o RADHA  Additional Afterload:               > Unable due to hypotension, low SVR   Therapies:    > Diuretic: Lasix 80 mg TID              > Inotrope: Dobutamine 2.5 mcg/kg/min              > ICD: disabled   > CRT: NA              > Mechanical Support: NA  Other Management:               > Daily weights, BID BMP, telemetry monitoring               > Continue Clopidogrel for CAD     #RADHA on CKD with hx cardiorenal syndrome  Discharged with creatinine 1.5 on 05/17, presents with creatinine 2.09. Suspect prerenal in setting of vol overload and end stage heart failure.   - diuresis per above     #Acute Hyponatremia  Newly reduced this admission to 125. Concerning for hypervolemic hyponatremia in setting of end-stage heart failure. Improving with diuresis.  - Trend with diuresis   - 130 (From 128 yesterday)     #Atrial Flutter s/p Ablation  - Currently NSR, rate controlled despite dobutamine   - Tele  - Not on A/C      #Tobacco use, 2ppd  - NRT   - cont Albuterol PRN  - cont Mometasone inhaler     #T2DM with neuropathy  A1c 2/2023 7.9. Home regimen: Insulin glargine 26 units every day. Insulin Aspart 4u TID with meals. Metformin   - Hold PTA Metformin   - Insulin glargine: to 18 units qAM   - Low dose sliding scale insulin with each meal and QHS   - Cont Gabapentin     #Hx pancreatitis 2/2 EtOH, remote 11+ yrs ago  #Former alcohol use, now sober  Abdominal U/S 5/28 \"without definitive cirrhosis\". Did show dilation of the pancreatic ducts.  - Cont creon  - Non-emergent MRCP to evaluate for mass causing pancreatic duct is recommended. Patient aware of finding, aware that we will discuss management and further testing pending trajectory of HF and " [facility].     #GERD   - cont PPI BID      #Peripheral artery disease  #Venous insufficiency          Diet: Combination Diet Moderate Consistent Carb (60 g CHO per Meal) Diet; 2 gm NA Diet  Fluid restriction 2000 ML FLUID    DVT Prophylaxis: Heparin SQ  Arriola Catheter: Not present  Cardiac Monitoring: None  Code Status:   DNR-DNI    Patient discussed with Dr. Fitch.      Baron Odell Jr., MD  Internal Medicine- PGY2  [facility]  Pager: [phone]    ================================================================    Subjective/24-Hr Events:   Last 24 hr care team notes reviewed. Feeling better today. Still feels like he has fluid.. His breathing feels better. Laying flat. No cough. No lightheadedness or dizziness. No pre-syncope or syncope. No chest pain.     ROS:  4 point ROS including respiratory, CV, GI and  (other than that noted in the HPI) is negative.     Medications: Reviewed in EPIC.     Physical Exam:   BP 93/58 (BP Location: Left arm)   Pulse 79   Temp 97.6  F (36.4  C) (Oral)   Resp 18   Ht 1.829 m (6')   Wt 69.8 kg (153 lb 12.8 oz)   SpO2 96%   BMI 20.86 kg/m      GENERAL: Appears comfortable, in no distress .  HEENT: Eye symmetrical, no discharge or icterus bilaterally. Mucous membranes moist and without lesions.  NECK: Supple, JVD upper third of neck at 90 degrees.   CV: RRR, +S1S2, no murmur, rub, or gallop.   RESPIRATORY: Respirations regular, even, and unlabored. Lungs CTA throughout.    GI: Soft and non distended with normoactive bowel sounds present in all quadrants. No tenderness, rebound, guarding.   EXTREMITIES: 2+ b/l lower extremity peripheral edema. All extremities are warm and well perfused  NEUROLOGIC: Alert and interacting appropriatly.  MUSCULOSKELETAL: No joint swelling or tenderness.   SKIN: No jaundice. No rashes or lesions.     Labs:  CMP  Recent Labs   Lab 05/29/23  0800 05/29/23  0412 05/29/23  0406 05/28/23  2133 05/28/23  1637 05/28/23  0802  05/28/23 0441 05/27/23  1800 05/27/23  1550 05/25/23  1831 05/25/23  1703   NA  --  135*  --   --  127*  --  130*  --  128*   < > 128*   POTASSIUM  --  4.0  --   --  4.2  --  4.1  --  4.9   < > 3.4   CHLORIDE  --  92*  --   --  87*  --  88*  --  87*   < > 82*   CO2  --  32*  --   --  30*  --  31*  --  31*   < > 30*   ANIONGAP  --  11  --   --  10  --  11  --  10   < > 16*   * 139* 146* 306* 362*   < > 187*   < > 211*   < > 147*   BUN  --  68.4*  --   --  71.8*  --  73.5*  --  73.0*   < > 73.2*   CR  --  1.88*  --   --  1.78*  --  1.20*  --  2.08*   < > 2.05*   GFRESTIMATED  --  37*  --   --  40*  --  63  --  33*   < > 33*   MARVA  --  9.3  --   --  8.7*  --  9.0  --  8.8   < > 8.5*   MAG  --  2.3  --   --  2.3  --  1.8  --  1.7   < > 1.9   PROTTOTAL  --  6.5  --   --   --   --   --   --   --   --  6.3*   ALBUMIN  --  3.7  --   --   --   --   --   --   --   --  3.9   BILITOTAL  --  0.5  --   --   --   --   --   --   --   --  0.4   ALKPHOS  --  106  --   --   --   --   --   --   --   --  120   AST  --  22  --   --   --   --   --   --   --   --  27   ALT  --  19  --   --   --   --   --   --   --   --  21    < > = values in this interval not displayed.       CBC  Recent Labs   Lab 05/29/23 0412 05/28/23 0441 05/27/23  0454 05/26/23  0433   WBC 5.7 5.6 6.2 6.5   RBC 4.40 4.32* 4.44 4.26*   HGB 10.6* 10.5* 10.7* 10.3*   HCT 37.7* 36.3* 37.2* 35.0*   MCV 86 84 84 82   MCH 24.1* 24.3* 24.1* 24.2*   MCHC 28.1* 28.9* 28.8* 29.4*   RDW 19.2* 19.1* 19.7* 19.4*    172 185 164       INR  No lab results found in last 7 days.

## 2023-05-29 NOTE — PLAN OF CARE
D: PMH of CAD s/p CABG (early 2000s) c/b ICM with HFrEF (10-15% this admission), s/p ICD, tobacco use, DM2, and CKD. Admitted for progressive weight gain, poor diuretic response, and worsening renal function.    A:   Neuro: A/Ox4. Calls appropriately. Pleasant and cooperative. Denies headache, dizziness, and lightheadedness.  Cardiac/Tele: VSS. A-paced. HR 70's-80's. Soft BP's- team aware. Denies chest pain and palpitations. Afebrile.  Respiratory: Sats >92% on room air. PARDO and intermittent cough.  GI/: Continent. Urinal at bedside with adequate urine output. Pt c/o constipation. Last BM 5/25. Scheduled Senna and PRN Milk of Magnesia given.  Diet/Appetite: Regular diet, 2LFR. ACHS BG.  Skin: No new deficits noted.   LDAs: R PIV- SL. R DL PICC- Infusing Lasix at 2ml/hr and Dopamine at 6.5 ml/hr.  Activity: Up ad jos with walker.  Pain: Denies     P: Possibly going home on hospice when discharged. Continue to monitor and notify Cards 2 with changes.

## 2023-05-30 NOTE — PLAN OF CARE
5/25: Admitted for weight gain, poor diuretic response and worsening renal function.     Neuro: AOx4  Cardio: 100% A paced. Ordered to keep systolic BP above 85. Dobutamine gtt running @ 2.5mcg/kg/min  Resp: RA  GI/: Regular diet, ACHS BG, 2000 fluid restriction. LBM 5/25.   Skin: WDL  Mobility: Independent   LDAs: D lumen PICC R and R PIV  Pain: N/A  Plan: Cardiac MRI hopefully sometime on 5/30. Plan to go home on dobutamine w/ hospice care.

## 2023-05-30 NOTE — PLAN OF CARE
Temp: 97.8  F (36.6  C) Temp src: Oral BP: (!) 87/59 Pulse: 81   Resp: 18 SpO2: 95 % O2 Device: None (Room air)       D: 72 year old gentleman with a history of CAD s/p CABG (early 2000s) c/b ICM with HFrEF 20-25% w ICD in place, tobacco use (2ppd), T2DM, and CKD who presents from cardiology clinic with progressive weight gain, poor diuretic response, and worsening renal function despite home inotrope therapy.     I/A: Kamron (he/him) is A/Ox4. Tele in place, A paced. VSS on RA w/ soft BPs in the afternoon that improved immediately following oral intake. Double lumen R PICC and R PIV in place. PICC infusing dobutamine @ 2.5mcg/kg/min. Transition from IV to oral lasix today. Up independently.     P: Continue to monitor and follow POC. Plan for discharge to home with hospice on Wednesday. Notify Cards 2 with changes.     Ingrid Harris RN

## 2023-05-30 NOTE — PROGRESS NOTES
"[facility] [place]  Palliative Care Daily Progress Note       Recommendations & Counseling       Agree with care conference tomorrow at 11am, our team is happy to join and Pt agreeable to our team participating.     Kamron has voiced preference of not wanting to die in hospital and not wanting to return to the hospital once he discharges this time. He tells me today \"you can keep me alive indefinitely in the hospital and then I just sit here and stare at the river\" and he doesn't want this, quality of time for him is being out of the hospital and he is ok if this means less time.    Kamron voices uncertainty about what hospice care looks like (he tells me \"I don't want them around all the time\") and also expressed uncertainty about \"when it's time\" to enroll in hospice and is hoping to talk more about these things tomorrow with his daughter included.     Pt is a  and receives [facility] of [facility] the [facility], recommend reaching out to [facility] to inquire about benefits for hospice and homecare that Pt may qualify for (discussed with unit SW/RNCC team).    Total time spent was 35 minutes,  >50% of time was spent counseling and/or coordination of care regarding goals of care, support with coping. Recommendations communicated with primary team by note, phone.    Jacqueline Roach -[phone]   Team Consult Pager [phone] (answered 8am-430pm M-F) - ok to text page via [employer] / After-Hours Answering Service [phone] / Palliative Clinic in the [facility] at the [facility] - [phone] (scheduling); [phone] (triage).      Assessments          Delbert Tilley is a 72 year old male with PMH that includes CAD s/p CABG in early 2000s c/b ischemic cardiomyopathy and HFrEF 20-25% with ICD in place (turned off now per primary team) admitted with worsening weight gain, poor diuretic response and RADHA on home inotropes started just this month. Palliative Care consulted to " assist with goals of care discussions.     Today, the patient was seen for:  CAD s/p CABG in early 2000s c/b ischemic cardiomyopathy and HFrEF   IV ionotrope dependence  Goals of care  Support with coping  Decisions support    Prognosis, Goals, or Advance Care Planning was addressed today with: Yes.  Mood, coping, and/or meaning in the context of serious illness were addressed today: Yes.  Summary/Comments:            Interval History:     Chart review/discussion with unit or clinical team members:   No acute events overnight.    Per patient or family/caregivers today:  Pt eating lunch when I arrived today, no complaints for me today other than he is tired of being in the hospital but agreeable to remaining to ensure we have him optimized as possible to maximize his time out of hospital once he discharges. He denies dyspnea or pain, no nausea, PO intake going okay.             Review of Systems:     Besides above, an additional 1 system ROS was reviewed and is unremarkable          Medications:     I have reviewed this patient's medication profile and medications during this hospitalization.             Physical Exam:   Vitals were reviewed  Temp: 97.7  F (36.5  C) Temp src: Oral BP: 96/63 Pulse: 80   Resp: 16 SpO2: 98 % O2 Device: None (Room air)    Gen: NAD, sitting up comfortably at side of bed, pleasant and cooperative with interview and exam  HEENT: normocephalic, no scleral icterus, no perioral lesions, oral mucosa moist  CV: no mottling on UE bilaterally  Pulm: no increased work of breathing with eating or talking  Skin: no rash or jaundice on limited exam  Neuro: AOx3, no tremor  Psych: mood-affect congruent             Data Reviewed:     Reviewed recent pertinent imaging, comments:   No new imaging over last 24 hours on chart review.    Reviewed recent labs, comments:   This morning, creatinine 2.05, Na 134, WBC 5.3, Hgb 10.0

## 2023-05-30 NOTE — PROGRESS NOTES
[facility]   Cardiology II Service / Advanced Heart Failure  Daily Progress Note    Patient: Delbert Tilley  MRN: [id number]  Admission Date: 5/25/2023  Hospital Day # 5     Assessment and Plan:  Delbert Tilley is a 72 year with history of CAD s/p CABG (early 2000s) c/b ICM with HFrEF 20-25% w ICD in place, tobacco use (2ppd), T2DM, and CKD with recent discharge on palliative dobutamine who presented from cardiology clinic with progressive weight gain, poor diuretic response, and worsening renal function despite home inotrope therapy.    Today's Plan:  - Bumex 5 mg IV then gtt  - increase dobutamine to 3 mcg/kg/min  - increase midodrine 10 mg tid  - care conference tomorrow to determine dispo plans  - patient adamantly does not want to die in the hospital, we have discussed ways to prevent that - advanced directive/do not rehospitalize/etc - will discuss tomorrow at care conference    # Acute on chronic systolic heart failure 2/2 ICM  # Stage D HF on outpatient palliative inotropey  # Coronary artery disease s/p CABG (early 2000s)    Ischemic workup: 2017, patent grafts to LIMA and OM, RCA  and SVG-RCA occluded  RHC 5/15/23 RA 15, PA 70/26/41, PCWP 21, CO/CI 4.3/2.3 by TD, PVR 4.6  Echo 5/10/23 LVEF 10-15%, moderate diastolic dysfunction, severe diffuse hypokinesis, mild-moderate RV dysfunction    Baseline: ACC/AHA Stage D, NYHA   Estimated Dry Weight: < 146 lb, unclear due to cardiac cachexia of end stage disease  Admission Weight: 154 pounds  NT-proBNP: 30790; baseline   ~41457    Patient presented hypervolemic, hyponatremic after recent discharged on palliative dobutamine. Note: he discharged hypervolemic last admission but he strongly desired to leave the hospital. He is hypotensive at baseline, on midodrine. During previous admission, PVCs/bigeminy preventing increasing dobutamine dose. He was referred to outpatient palliative care last admission but had not yet seen them in clinic. This  admission, will attempt to diurese, trial of increased dobutamine dose, and his backup pacer rate was increased to help augment cardiac output. Symptoms have improved but remains hypervolemic and hypotension. He is stage D, end stage HF. He desires to go home on dobutamine again this time in order to maximize time with family and at home. Strongly desires to not die in the hospital     Volume status: efforts at diuresing this admission have been ineffective, received metolazone yesterday, given desire to discharge in the coming days with escalate again with resuming loop diuretic gtt, goal net neg 2.5L, schedule kcl 40 meq bid so we can use thiazide ?tomorrow  GDMT:              > Beta Blocker: contraindicated due to low cardiac output              > ACE/ARB/ARNI: contraindicated due to renal dysfunction/hypotension              > MRA: contraindicated due to renal dysfunction/hypotension              > SGLT2i: contraindicated due to renal dysfunction/hypotension   > on midodrine 5 mg tid for BP support   > Diuretic: Lasix 80 mg TID -> change back to IV diuretic, Bumex gtt after bolus              > Inotrope: Dobutamine increase to 3 mcg/kg/min              > ICD: shock therapies disabled   Other Management:               > Daily weights, BID BMP, telemetry monitoring               > Continue Clopidogrel for CAD for now     #RADHA on CKD, cardiorenal syndrome  Discharge Cr 1.5 5/17, presents Cr 2.1 due to CRS  - diuresis as above  - increasing dobutamine as above     # Hypervolemic hyponatremia, exacerbated by hyperglycemia  Newly reduced this admission to 125. Hypervolemic hyponatremia in setting of end-stage heart failure. Also BS often >250. Improving with diuresis.   - q12hr BMP  - monitor BS, insulin as below     # Atrial flutter s/p ablation  - Currently NSR, rate controlled despite dobutamine   - Tele  - Not on A/C      #Tobacco use, 2ppd  - NRT   - Albuterol PRN  - Mometasone inhaler     # T2DM with  "neuropathy  A1c 2/2023 7.9. Home regimen: Insulin glargine 26 units every day. Insulin Aspart 4u TID with meals. Metformin   - Hold PTA Metformin   - Insulin glargine 18 units qAM (increased 5/29_   - Low dose sliding scale insulin with each meal and QHS   - Cont Gabapentin     # Hx pancreatitis 2/2 EtOH, remote 11+ yrs ago  # Former alcohol use, now sober  Abdominal U/S 5/28 \"without definitive cirrhosis\". Did show dilation of the pancreatic ducts.  - PTA creon  - Non-emergent MRCP to evaluate for mass causing pancreatic duct is recommended. At this point HF is likely life limiting dx so defer for now. Patient aware of finding.    #GERD   - PPI BID      #Peripheral artery disease  #Venous insufficiency        Diet: Combination Diet Moderate Consistent Carb (60 g CHO per Meal) Diet; 2 gm NA Diet  Fluid restriction 2000 ML FLUID    DVT Prophylaxis: Heparin SQ  Arriola Catheter: Not present  Cardiac Monitoring: None  Code Status:   DNR-DNI    Patient discussed with Dr. Craig.      Darlene Bryant, AUBRIE, NP-C  Advanced Heart Failure/Cardiology 2 Nurse Practitioner  Pager       ================================================================    Subjective/24-Hr Events:   Last 24 hr care team notes reviewed. Feeling ok today. Leg edema unchanged. Able to do some walking without significant symptoms. Reiterates his desire to discharge and not die in the hospital.     ROS:  4 point ROS including respiratory, CV, GI and  (other than that noted in the HPI) is negative.     Medications: Reviewed in EPIC.     Physical Exam:   BP 98/65 (BP Location: Left arm, Cuff Size: Adult Regular)   Pulse 80   Temp 98.3  F (36.8  C) (Oral)   Resp 16   Ht 1.829 m (6')   Wt 69.2 kg (152 lb 9.6 oz)   SpO2 99%   BMI 20.70 kg/m      GENERAL: Appears comfortable, in no distress .  HEENT: Eye symmetrical, no discharge or icterus bilaterally. Mucous membranes moist and without lesions.  NECK: Supple, JVD to haw at 90 degrees.   CV: " RRR, +S1S2, no murmur, rub, or gallop.   RESPIRATORY: Respirations regular, even, and unlabored. Lungs CTA throughout.    GI: Soft and mildly distended with normoactive bowel sounds present in all quadrants. No tenderness, rebound, guarding.   EXTREMITIES: 2+ b/l lower extremity peripheral edema to knees. Extremities somewhat warm.   NEUROLOGIC: Alert and interacting appropriatly.  MUSCULOSKELETAL: No joint swelling or tenderness.   SKIN: No jaundice. No rashes or lesions.     Labs:  CMP  Recent Labs   Lab 05/30/23  0651 05/30/23  0401 05/30/23  0247 05/29/23  2200 05/29/23  1944 05/29/23  0800 05/29/23  0412 05/28/23  2133 05/28/23  1637 05/25/23  1831 05/25/23  1703   NA  --  134*  --   --  133*  --  135*  --  127*   < > 128*   POTASSIUM  --  3.9  --   --  4.5  --  4.0  --  4.2   < > 3.4   CHLORIDE  --  92*  --   --  92*  --  92*  --  87*   < > 82*   CO2  --  30*  --   --  31*  --  32*  --  30*   < > 30*   ANIONGAP  --  12  --   --  10  --  11  --  10   < > 16*   * 181* 187* 243* 274*   < > 139*   < > 362*   < > 147*   BUN  --  68.7*  --   --  69.2*  --  68.4*  --  71.8*   < > 73.2*   CR  --  2.05*  --   --  2.12*  --  1.88*  --  1.78*   < > 2.05*   GFRESTIMATED  --  33*  --   --  32*  --  37*  --  40*   < > 33*   MARVA  --  9.3  --   --  8.8  --  9.3  --  8.7*   < > 8.5*   MAG  --  2.4*  --   --  2.2  --  2.3  --  2.3   < > 1.9   PROTTOTAL  --  6.1*  --   --   --   --  6.5  --   --   --  6.3*   ALBUMIN  --  3.7  --   --   --   --  3.7  --   --   --  3.9   BILITOTAL  --  0.4  --   --   --   --  0.5  --   --   --  0.4   ALKPHOS  --  100  --   --   --   --  106  --   --   --  120   AST  --  22  --   --   --   --  22  --   --   --  27   ALT  --  13  --   --   --   --  19  --   --   --  21    < > = values in this interval not displayed.       CBC  Recent Labs   Lab 05/30/23  0401 05/29/23  0412 05/28/23  0441 05/27/23  0454   WBC 5.3 5.7 5.6 6.2   RBC 4.17* 4.40 4.32* 4.44   HGB 10.0* 10.6* 10.5* 10.7*   HCT  35.6* 37.7* 36.3* 37.2*   MCV 85 86 84 84   MCH 24.0* 24.1* 24.3* 24.1*   MCHC 28.1* 28.1* 28.9* 28.8*   RDW 19.1* 19.2* 19.1* 19.7*    164 172 185

## 2023-05-30 NOTE — PROGRESS NOTES
Care Management Follow Up    Length of Stay (days): 5    Expected Discharge Date: 05/30/2023     Concerns to be Addressed: discharge planning, hospice  Patient plan of care discussed at interdisciplinary rounds: Yes    Anticipated Discharge Disposition:  Home (potentially with hospice services)     Anticipated Discharge Services:  Hospice services  Anticipated Discharge DME:  N/a    Patient/family educated on Medicare website which has current facility and service quality ratings:  n/a  Education Provided on the Discharge Plan:  n/a  Patient/Family in Agreement with the Plan:  n/a    Referrals Placed by CM/SW:      [facility]  Phone: [phone] (Kim, care transition coordinator)  Fax: [phone]  Main line: [phone]    Private pay costs discussed: Not applicable    Additional Information:  SW is following for discharge planning.    Pt admitted to the [place] ED with his daughter from [facility] with progressive weight gain, poor diuretic response, and worsening renal function despite home inotrope therapy. Per the initial CM consult on 5/28, pt requested to go home on hospice. Pt currently on palliative dobutamine and IV bumex for diuresis.     SW met with the pt at bedside and introduced herself, role in discharge planning, and spoke with the pt about hospice. Per the pt, he still is debating on whether or not he wants hospice directly home from this admission or if he can make that decision/set up hospice after he gets home. Pt was open to a intake-like visit with a hospice agency.     SW called [facility] (Phone: [phone]) and spoke with intake. Per intake, they didn't know specifically if they can take pts on IV dobutamine and REHAN would have to fax them a referral so that they can send to their clinical team to review. REHAN faxed over a referral (Fax: [phone]). Kim (Care Transition Coordinator) called the SW and 12:25pm telling the SW that she'll stop by the pt's room today to  meet with him.    Per the provider, plan is for a care conference tomorrow 5/31 with pt, family, provider, palliative, and SW. Kim from [facility] told the SW that she'd be available from 10am to 12:30pm tomorrow as well. See RNCC note for details.     SW will continue to follow and assist as needed.  ___________________    YAMILETH Post, LGSW  6C   [facility]  Phone: [phone]  Pager: [phone]             Bladder non-tender and non-distended. Urine clear yellow.

## 2023-05-30 NOTE — PLAN OF CARE
Outcome Evaluation: VSS and systolic blood pressures WDL. Afebrile. Denies nausea - occasionally has pain r/t neuropathy but is medicated for this. Patient down approximately 1lb since 5/29. Dobutamine gtt continued at an increased rate of 3mcg/kg/min, and bumex gtt started at 1mg/hr. Continuing diuretic therapy until he discharges home with hospice - initial consult occurred this afternoon. No other acute changes noted at this time.    Neuro: A&Ox4. Able to make needs known  Cardiac: 100% Atrial paced. VSS; pressures low but within ordered parameters  Respiratory: Sating >90% FiO2 on RA.  GI/: Adequate urine output and using urinal. BM X1 this AM.  Diet/appetite: Tolerating regular diet. Eating well.  Activity: Standby assistance in halls; independent in room and when performing ADLs.  Pain: At acceptable level on current regimen.   Skin: No new deficits noted. Showered, linen changed.  LDA's: RUE PIV saline locked; double lumen PICC on RUE  Psychosocial: WDL    Problem: Fluid Volume Excess  Goal: Fluid Balance  Outcome: Not Progressing     Problem: Plan of Care - These are the overarching goals to be used throughout the patient stay.    Goal: Plan of Care Review  Description: The Plan of Care Review/Shift note should be completed every shift.  The Outcome Evaluation is a brief statement about your assessment that the patient is improving, declining, or no change.  This information will be displayed automatically on your shift note.  Outcome: Progressing  Flowsheets (Taken 5/30/2023 1215)  Outcome Evaluation: VSS and systolic blood pressures WDL. Afebrile. Denies nausea - occasionally has pain r/t neuropathy but is medicated for this. Patient down approximately 1lb since 5/29. Dobutamine gtt continued at an increased rate of 3mcg/kg/min, and bumex gtt started at 1mg/hr. Continuing diuretic therapy until he discharges home with hospice. No other acute changes noted at this time.  Plan of Care Reviewed With:  "patient  Overall Patient Progress: no change  Goal: Patient-Specific Goal (Individualized)  Description: You can add care plan individualizations to a care plan. Examples of Individualization might be:  \"Parent requests to be called daily at 9am for status\", \"I have a hard time hearing out of my right ear\", or \"Do not touch me to wake me up as it startles me\".  Outcome: Progressing  Flowsheets (Taken 5/30/2023 1215)  Individualized Care Needs: Diuretic therapy, EKG monitoring  Anxieties, Fears or Concerns: None noted at this time  Patient/Family-Specific Goals (Include Timeframe): Goal: shower, get a new book to read from the library  Goal: Absence of Hospital-Acquired Illness or Injury  Outcome: Progressing  Intervention: Identify and Manage Fall Risk  Recent Flowsheet Documentation  Taken 5/30/2023 0725 by Luna Ramsey RN  Safety Promotion/Fall Prevention:    room door open    treat underlying cause    assistive device/personal items within reach    clutter free environment maintained    increased rounding and observation    increase visualization of patient    lighting adjusted    nonskid shoes/slippers when out of bed  Intervention: Prevent Skin Injury  Recent Flowsheet Documentation  Taken 5/30/2023 0725 by Luna Ramsey RN  Body Position: neutral body alignment  Goal: Optimal Comfort and Wellbeing  Outcome: Progressing  Intervention: Monitor Pain and Promote Comfort  Recent Flowsheet Documentation  Taken 5/30/2023 0725 by Luna Ramsey RN  Pain Management Interventions: repositioned  Intervention: Provide Person-Centered Care  Recent Flowsheet Documentation  Taken 5/30/2023 0725 by Lnua Ramsey RN  Trust Relationship/Rapport:    care explained    choices provided  Goal: Readiness for Transition of Care  Outcome: Progressing     Problem: Heart Failure  Goal: Optimal Coping  Outcome: Progressing  Goal: Optimal Cardiac Output  Outcome: Progressing  Goal: Stable Heart Rate and Rhythm  Outcome: " Progressing  Goal: Optimal Functional Ability  Outcome: Progressing  Intervention: Optimize Functional Ability  Recent Flowsheet Documentation  Taken 5/30/2023 0821 by Luna Ramsey RN  Activity Management: activity adjusted per tolerance  Taken 5/30/2023 0725 by Luna Ramsey RN  Activity Management: sitting, edge of bed  Goal: Fluid and Electrolyte Balance  Outcome: Progressing  Goal: Improved Oral Intake  Outcome: Progressing  Goal: Effective Oxygenation and Ventilation  Outcome: Progressing  Intervention: Promote Airway Secretion Clearance  Recent Flowsheet Documentation  Taken 5/30/2023 0821 by Luna Ramsey RN  Activity Management: activity adjusted per tolerance  Taken 5/30/2023 0725 by Luna Ramsey RN  Cough And Deep Breathing: done with encouragement  Activity Management: sitting, edge of bed  Goal: Effective Breathing Pattern During Sleep  Outcome: Progressing  Intervention: Monitor and Manage Obstructive Sleep Apnea  Recent Flowsheet Documentation  Taken 5/30/2023 0725 by Luna Ramsey RN  Medication Review/Management:    medications reviewed    high-risk medications identified           Goal Outcome Evaluation:      Plan of Care Reviewed With: patient    Overall Patient Progress: no changeOverall Patient Progress: no change

## 2023-05-30 NOTE — PROGRESS NOTES
Care Management Follow Up    Length of Stay (days): 5    Expected Discharge Date: 06/01/2023     Concerns to be Addressed: all concerns addressed in this encounter       Patient plan of care discussed at interdisciplinary rounds: Yes    Anticipated Discharge Disposition: Home     Anticipated Discharge Services: TBD   Anticipated Discharge DME: None    Plan for care conference tomorrow to discuss discharge options- resumption of home palliative inotropes vs hospice. Confirmed arrangements with Cards 2 NP, SW, palliative care, [facility] liason, Kim Bettencourt and daughterSara.    Care Conference date/time: 5/31 at 11am  Location: 6C conference room  Call information- Phone: [phone], ID: [id number], PIN [phone]    DaughterSara plans to call into the meeting.     CC will continue to monitor patient's medical condition and progress towards discharge.  Brianna Larsen RN BSN  6C Unit Care Coordinator  Phone number: [phone]  Pager: [phone]

## 2023-05-31 PROBLEM — N17.9 ACUTE KIDNEY FAILURE, UNSPECIFIED (H): Status: ACTIVE | Noted: 2023-01-01

## 2023-05-31 NOTE — PLAN OF CARE
D: Presented from cardiology clinic with progressive weight gain, poor diuretic response, and worsening renal function despite home inotrope therapy    PMH: CAD s/p CABG (early 2000s) c/b ICM with HFrEF 20-25% w ICD in place, tobacco use (2ppd), T2DM, and CKD with recent discharge on palliative dobutamine     I: Monitored vitals and assessed pt status.   Running:   Dobutamine @ 3 mcg/kg/min  Bumex @ 1 mg/hr  PRN: milk of mag x2  Tele: A paced  O2: room air  Mobility: independent     A: A0x4. C/o significant neuropathy in feet. Some numbness in hands. VSS. SBP goal > 85. Afebrile. Urinating adequately. LBM 5/30. C/o constipation. Stool small and hard per pt. Denies pain.     P: Continue to monitor Pt status and report changes to Cards 2. Plan to discharge to home with dobutamine on hospice. Care conference today at 11am.

## 2023-05-31 NOTE — DISCHARGE SUMMARY
[facility]   Cardiology II Service / Advanced Heart Failure  Discharge Summary     Delbert Tilley MRN# [id number]   YOB: 1950 Age: 72 year old     DATE OF ADMISSION:  5/25/2023  DATE OF DISCHARGE: 5/31/2023  ADMITTING PROVIDER: Elizabeth Craig MD  DISCHARGE PROVIDER: Jair Gongora MD and Darlene Bryant DNP, ANP-C   PRIMARY PROVIDER:  Danii Hernández    ADMIT DIAGNOSES:   Acute on chronic stage D systolic heart failure, inotrope dependent  RADHA on CKD due to cardiorenal syndrome  Hypervolemia hyponatremia    DISCHARGE DIAGNOSES:   Acute on chronic stage D systolic heart failure, inotrope dependent  Worsening CKD due to cardiorenal syndrome  Hypervolemia hyponatremia, improved    FOLLOW-UP:  [] appt made with Dr Headley for 6/6/23 - d/w patient that he may cancel this if plans are for hospice in the interim  [] patient/family to contact [facility] when he is ready to enroll and dobutamine will be stopped at that time  [] POLST form to be filled out by palliative care physician    PENDING RESULTS:   [] none    HPI: Please see the detailed H & P by Kenrick Armstrong and Raaf from 5/25/2023. Briefly, Kamron Tilley is a 72 year old with history of CAD s/p CABG (early 2000s) c/b ICM with HFrEF 20-25% w ICD in place, tobacco use (2ppd), T2DM, and CKD who presents from cardiology clinic with progressive weight gain, poor diuretic response, and worsening renal function despite home inotrope therapy.   Presented with progressive hypervolemia in the outpatient setting after discharging on palliative home inotrope with dobutamine 2.5. Not urinating well. No chest pains or palpitations. No nausea, vomiting, diarrhea.     HOSPITAL COURSE:   # Acute on chronic systolic heart failure 2/2 ICM  # Stage D HF on outpatient palliative inotropy  # Coronary artery disease s/p CABG (early 2000s)    Ischemic workup: 2017, patent grafts to LIMA and OM, RCA  and SVG-RCA occluded  [facility] 5/15/23 RA 15, PA  70/26/41, PCWP 21, CO/CI 4.3/2.3 by TD, PVR 4.6  Echo 5/10/23 LVEF 10-15%, moderate diastolic dysfunction, severe diffuse hypokinesis, mild-moderate RV dysfunction     Baseline: ACC/AHA Stage D, NYHA   Estimated Dry Weight: < 146 lb, unclear due to cardiac cachexia of end stage disease  Admission Weight: 154 pounds  NT-proBNP: 08439; baseline   ~66529     Patient presented hypervolemic, hyponatremic after recent discharged on palliative dobutamine. Note: he discharged hypervolemic last admission but he strongly desired to leave the hospital. He is hypotensive at baseline, on midodrine. During previous admission, PVCs/bigeminy preventing increasing dobutamine dose. He was referred to outpatient palliative care last admission but had not yet seen them in clinic. This admission, attempted to diurese, trial of increased dobutamine dose, and his backup pacer rate was increased to help augment cardiac output. Symptoms have improved but remains hypervolemic and hypotensive. He is stage D, end stage HF. He desires to go home on dobutamine again this time in order to maximize time with family and at home. Strongly desires to not die in the hospital. Today (day of discharge) patient elects to go home on dobutamine as palliation and transition to hospice when symptoms worsen - despite not being medically optimized. He understands risks. Meeting held on day of discharge with patient's son, daughter, [facility] RN, Palliative Care physician, [facility] and RN care coordinator. Will discharge on higher dose dobutamine than pta (3 mcg/kg/min), torsemide 100 mg tid, and prn metolazone for weight gain. Midodrine was increased to 10 mg tid. He has appt scheduled in clinic 6/6/23 but he can cancel this if preferred. Kamron has contact information for hospice RN to call when ready to enroll in hospice. Dobutamine will be stopped at that time and PICC will be removed. Patient's family verbalize understanding.        GDMT:              >  "Beta Blocker: contraindicated due to low cardiac output              > ACE/ARB/ARNI: contraindicated due to renal dysfunction/hypotension              > MRA: contraindicated due to renal dysfunction/hypotension              > SGLT2i: contraindicated due to renal dysfunction/hypotension               > on midodrine increased to 10 mg tid for BP support               > Diuretic: Bumex gtt 2 mg/hr, IV diuril 500 mg once this AM prior to discharge, discharge on torsemide 100 mg tid with prn metolazone 2.5               > Inotrope: Dobutamine increased to 3 mcg/kg/min on 5/30              > ICD: shock therapies disabled  Other Management:               > Continue Clopidogrel for CAD for now, discontinue when hospice enrollement     #Worsening CKD due to cardiorenal syndrome  Discharge Cr 1.5 5/17, presents Cr 2.1 due to CRS. Dobutamine increased 5/30. Cr 1.9 on day of discharge.      # Hypervolemic hyponatremia, exacerbated by hyperglycemia  Newly reduced this admission to 125. Hypervolemic hyponatremia in setting of end-stage heart failure. Also BS often >250. Improving with diuresis. Na 136 on day of discharge.      # Atrial flutter s/p ablation  Currently NSR, rate controlled despite dobutamine. No A/C due to goals of care.     #Tobacco use, 2ppd  NRT if desired. Albuterol PRN. Mometasone inhaler     # T2DM with neuropathy  A1c 2/2023 7.9. Insulin glargine 18 units qAM (increased 5/29) with instructions for increase. Continue pta gabapentin.       # Hx pancreatitis 2/2 EtOH, remote 11+ yrs ago  # Former alcohol use, now sober  Abdominal U/S 5/28 \"without definitive cirrhosis\". Did show dilation of the pancreatic ducts. Continue PTA creon. Imaging this admission suggested Non-emergent MRCP to evaluate for mass causing pancreatic duct is recommended. At this point HF is life limiting dx so defer. Patient aware of finding.     # GERD  PPI BID      #Peripheral artery disease  #Venous insufficiency    Darlene Bryant DNP, " ANP-C  Advanced Heart Failure/Cardiology 2 Nurse Practitioner  5/31/2023  Pager: [phone]    PHYSICAL EXAM:  Blood pressure 94/61, pulse 83, temperature 98  F (36.7  C), temperature source Oral, resp. rate 16, height 1.829 m (6'), weight 69.3 kg (152 lb 12.8 oz), SpO2 98 %.    GENERAL: Appears comfortable, cachectic, in no distress .  HEENT: Eye symmetrical and without discharge or icterus bilaterally. Mucous membranes moist and without lesions.  NECK: Supple, JVD at least to mandible at 90 degrees.   CV: RRR with ectopy, +S1S2, +S#no murmur, rub, or gallop.   RESPIRATORY: Respirations regular, even, and unlabored. Lungs CTA throughout.   GI: Soft, mildly distrended and non distended with normoactive bowel sounds present in all quadrants. No tenderness, rebound, guarding.   EXTREMITIES: 2+ peripheral edema to knees. 1+ bilateral pedal pulses.   NEUROLOGIC: Alert and orientated x 3. No focal deficits.   MUSCULOSKELETAL: No joint swelling or tenderness.   SKIN: No jaundice. No rashes or lesions.     LABS:   Last CBC:   Recent Labs   Lab Test 05/31/23  0534   WBC 5.6   RBC 4.13*   HGB 10.0*   HCT 35.5*   MCV 86   MCH 24.2*   MCHC 28.2*   RDW 19.0*   *       Last CMP:  Recent Labs   Lab Test 05/31/23  0616 05/31/23  0534   NA  --  136   POTASSIUM  --  4.4   CHLORIDE  --  93*   MARVA  --  9.1   CO2  --  31*   BUN  --  66.3*   CR  --  1.98*   * 186*   AST  --  21   ALT  --  16   BILITOTAL  --  0.5   ALBUMIN  --  3.6   PROTTOTAL  --  5.5*   ALKPHOS  --  98       IMAGING:  Results for orders placed or performed during the hospital encounter of 05/25/23   XR Chest Port 1 View    Narrative    Portable chest    INDICATION: CHF dyspnea on exertion    COMPARISON: 5/10/2023    FINDINGS: Implantable cardiac defibrillator again noted from a left  subclavian transvenous approach. Median sternotomy again present.  Heart size normal. Atherosclerotic calcification in the aortic knob.  Right PICC line tip in the SVC.  Patchy densities in the lungs appears  slightly increased number communicate pulmonary venous congestion.       Impression    IMPRESSION: Implantable cardiac defibrillator. Atherosclerosis.  Possible increased pulmonary venous congestion which may indicate  positive fluid volume balance.    EFRAIN POMPA MD         SYSTEM ID:  [id number]   US Abdomen Limited    Narrative    EXAMINATION: Limited Abdominal Ultrasound, 5/28/2023 9:11 AM     COMPARISON: CT chest 10/21/2020    HISTORY: Evaluation for cirrhosis    FINDINGS:   Fluid: Trace ascites in the right upper quadrant.     Liver: The liver demonstrates normal echotexture, measuring 14.8 cm in  craniocaudal dimension. Curvilinear calcifications in the peripheral  right lobe. Otherwise no focal mass.    Gallbladder: Shadowing stone. There is no wall thickening,  pericholecystic fluid, positive sonographic Harper's sign.     Bile Ducts: No intrahepatic biliary dilation. The common bile duct  measures 9 mm in diameter.    Pancreas: Atrophic pancreatic parenchyma with dilated pancreatic duct  measuring up to 8.4 mm and multiple pancreatic calcifications.    Kidney: The right kidney measures 9.9 cm long. There is no  hydronephrosis or hydroureter, no shadowing renal calculi, cystic  lesion or mass.       Impression    IMPRESSION:  1. No definitive evidence of cirrhosis. Curvilinear calcifications in  the peripheral right lobe of the liver. Trace right upper quadrant  ascites.  2. Dilated common bile duct and pancreatic duct with calcifications in  the pancreas related to chronic calcific pancreatitis. Consider  nonemergent MRCP to rule out pancreatic mass.  3. Cholelithiasis without evidence of acute cholecystitis.    I have personally reviewed the examination and initial interpretation  and I agree with the findings.    LENNOX RYAN MD         SYSTEM ID:  [id number]   Cardiac Device Check - Inpatient     Value    Date Time Interrogation Session [id number]     Implantable Pulse Generator  Medtronic    Implantable Pulse Generator Model [id number] Evera MRI XT DR    Implantable Pulse Generator Serial Number [id number]    Type Interrogation Session In Clinic    Clinic Name [facility]    Implantable Pulse Generator Type Defibrillator    Implantable Pulse Generator Implant Date 20180418    Implantable Lead  Medtronic    Implantable Lead Model 5076 CapSureFix Novus    Implantable Lead Serial Number [id number]    Implantable Lead Implant Date 20100603    Implantable Lead Polarity Type Bipolar Lead    Implantable Lead Location Detail 1 APPENDAGE    Implantable Lead Location Right Atrium    Implantable Lead  Medtronic    Implantable Lead Model 6947M Sprint Quattro Secure MRI SureScan    Implantable Lead Serial Number [id number]    Implantable Lead Implant Date 20100603    Implantable Lead Polarity Type Quadripolar Lead    Implantable Lead Location Detail 1 APEX    Implantable Lead Location Right Ventricle    [place] Setting Mode (NBG Code) MVP_AAIR_DDDR    Cha[place]cey Setting Lower Rate Limit 80    Chauncey Setting Maximum Tracking Rate 140    Cha[place]cey Setting Maximum Sensor Rate 140    [place]y Setting Hysterisis Rate DISABLED    Chauncey Setting BETTYE Delay Low 150    Chauncey Setting PAV Delay Low 180    Chauncey Setting AT Mode Switch Rate 171    Lead Channel Setting Sensing Polarity Bipolar    Lead Channel Setting Sensing Anode Location Right Atrium    Lead Channel Setting Sensing Anode Terminal Ring    Lead Channel Setting Sensing Cathode Location Right Atrium    Lead Channel Setting Sensing Cathode Terminal Tip    Lead Channel Setting Sensing Sensitivity 0.3    Lead Channel Setting Sensing Polarity Bipolar    Lead Channel Setting Sensing Anode Location Right Ventricle    Lead Channel Setting Sensing Anode Terminal Ring    Lead Channel Setting Sensing Cathode Location Right Ventricle    Lead Channel Setting Sensing Cathode Terminal Tip    Lead  Channel Setting Sensing Sensitivity 0.3    Lead Channel Setting Pacing Polarity Bipolar    Lead Channel Setting Pacing Anode Location Right Atrium    Lead Channel Setting Pacing Anode Terminal Ring    Lead Channel Setting Sensing Cathode Location Right Atrium    Lead Channel Setting Sensing Cathode Terminal Tip    Lead Channel Setting Pacing Pulse Width 0.4    Lead Channel Setting Pacing Amplitude 1.5    Lead Channel Setting Pacing Capture Mode Adaptive    Lead Channel Setting Pacing Polarity Bipolar    Lead Channel Setting Pacing Anode Location Right Ventricle    Lead Channel Setting Pacing Anode Terminal Ring    Lead Channel Setting Sensing Cathode Location Right Ventricle    Lead Channel Setting Sensing Cathode Terminal Tip    Lead Channel Setting Pacing Pulse Width 0.4    Lead Channel Setting Pacing Amplitude 2    Lead Channel Setting Pacing Capture Mode Adaptive    Zone Setting Type Category VF    Zone Setting Detection Interval 320    Zone Setting Detection Beats Numerator 30    Zone Setting Detection Beats Denominator 40    Zone Setting Type Category VT    Zone Setting Detection Interval Blank    Zone Setting Type Category VT    Zone Setting Detection Interval 360    Zone Setting Type Category VT    Zone Setting Detection Interval 400    Zone Setting Type Category ATRIAL_FIBRILLATION    Zone Setting Type Category AT/AF    Zone Setting Detection Interval 350    Lead Channel Impedance Value 456    Lead Channel Sensing Intrinsic Amplitude 2.5    Lead Channel Pacing Threshold Amplitude 0.75    Lead Channel Pacing Threshold Pulse Width 0.4    Lead Channel Impedance Value 551    Lead Channel Impedance Value 285    Lead Channel Sensing Intrinsic Amplitude 7.625    Lead Channel Pacing Threshold Amplitude 0.75    Lead Channel Pacing Threshold Pulse Width 0.4    Battery Date Time of Measurements 96142462161769    Battery Status Middle of Service    Battery RRT Trigger 2.727    Battery Remaining Longevity 57    Battery  Voltage 2.94    Capacitor Charge Type Reformation    Capacitor Last Charge Date Time 91000270494397    Capacitor Charge Time 4.033    Capacitor Charge Energy 18    Chauncey Statistic Date Time Start 25147887953560    Chauncey Statistic Date Time End 75123825488385    Chauncey Statistic RA Percent Paced 96.81    Chauncey Statistic RV Percent Paced 1.54    Chauncey Statistic AP  Percent 1.37    Chauncey Statistic AS  Percent 0.05    Chauncey Statistic AP VS Percent 95.98    Chauncey Statistic AS VS Percent 2.6    Atrial Tachy Statistic Date Time Start 06671636253884    Atrial Tachy Statistic Date Time End 29549525647343    Atrial Tachy Statistic AT/AF [place] Percent 0    Therapy Statistic Recent Shocks Delivered 0    Therapy Statistic Recent Shocks Aborted 0    Therapy Statistic Recent ATP Delivered 0    Therapy Statistic Recent Date Time Start 18930074815638    Therapy Statistic Recent Date Time End 74183513461797    Therapy Statistic Total Shocks Delivered 0    Therapy Statistic Total Shocks Aborted 0    Therapy Statistic Total ATP Delivered 0    Therapy Statistic Total  Date Time Start 81688272315111    Therapy Statistic Total  Date Time End 05980404401790    Episode Statistic Recent Count 0    Episode Statistic Type Category AT/AF    Episode Statistic Recent Count 0    Episode Statistic Type Category SVT    Episode Statistic Recent Count 0    Episode Statistic Type Category VT    Episode Statistic Recent Count 0    Episode Statistic Type Category VF    Episode Statistic Recent Count 0    Episode Statistic Type Category VT    Episode Statistic Recent Count 0    Episode Statistic Type Category VT    Episode Statistic Recent Count 0    Episode Statistic Type Category VT    Episode Statistic Recent Date Time Start 86734372071123    Episode Statistic Recent Date Time End 86181361986101    Episode Statistic Recent Date Time Start 90668765833704    Episode Statistic Recent Date Time End 70844685352528    Episode Statistic Recent Date Time  Start 19831802239564    Episode Statistic Recent Date Time End 46314706215758    Episode Statistic Recent Date Time Start 78681254259360    Episode Statistic Recent Date Time End 55783849443598    Episode Statistic Recent Date Time Start 2819482573    Episode Statistic Recent Date Time End 83385020621389    Episode Statistic Recent Date Time Start 35356184208871    Episode Statistic Recent Date Time End 26630224977546    Episode Statistic Recent Date Time Start 39624513255522    Episode Statistic Recent Date Time End 56302523893923    Episode Statistic Total Count 0    Episode Statistic Type Category AT/AF    Episode Statistic Total Count 0    Episode Statistic Type Category SVT    Episode Statistic Total Count 3    Episode Statistic Type Category VT    Episode Statistic Total Count 0    Episode Statistic Type Category VF    Episode Statistic Total Count 0    Episode Statistic Type Category VT    Episode Statistic Total Count 0    Episode Statistic Type Category VT    Episode Statistic Total Count 0    Episode Statistic Type Category VT    Episode Statistic Total Date Time Start 99235721266229    Episode Statistic Total Date Time End 83139013310229    Episode Statistic Total Date Time Start 18213666226239    Episode Statistic Total Date Time End 59158955198190    Episode Statistic Total Date Time Start 51225328267071    Episode Statistic Total Date Time End 07713224422510    Episode Statistic Total Date Time Start 38189051442317    Episode Statistic Total Date Time End 60847242376739    Episode Statistic Total Date Time Start 82321570799638    Episode Statistic Total Date Time End 79875352626359    Episode Statistic Total Date Time Start 43845078162017    Episode Statistic Total Date Time End 06551662348236    Episode Statistic Total Date Time Start 69107078953643    Episode Statistic Total Date Time End 20286428892367    Narrative    Patient seen on [address] for evaluation and iterative programming of their  ICD per MD orders.     Device: Medtronic [id number] Evera MRI XT DR  Normal device function.  Mode: AAIR<>DDDR  bpm  AP: 96.8%  : 1.5%  Intrinsic rhythm: AS-VS 50's bpm  Thoracic Impedance:  Near reference line.   Short V-V intervals: 0  Lead Trends Appear Stable.  Estimated battery longevity to RRT = 4.7 years. Battery voltage = 2.94 V.   Atrial Arrhythmia: None  AF [place]: 0%  Anticoagulant: None  Ventricular Arrhythmia: None  Setting Changes: None  ICD beeping due to VF detection turned OFF, interrogation should reset alert.  Plan: Device follow-up every 3 months.    Tere Stanton RN    Dual lead ICD    I have reviewed and interpreted the device interrogation, settings, programming and nurse's summary. The device is functioning within normal device parameters. I agree with the current findings, assessment and plan.       PROCEDURES:  ICD tachytherapies turned off, low rate limit increased to 80    CONSULTATIONS:   Palliative care  RN care coordinator  Social work  [facility] referral    DISCHARGE MEDICATIONS:  Current Discharge Medication List        START taking these medications    Details   bisacodyl (DULCOLAX) 10 MG suppository Place 1 suppository (10 mg) rectally daily as needed for constipation  Qty: 2 suppository, Refills: 0    Associated Diagnoses: End stage heart failure (H)      HYDROmorphone (DILAUDID) 0.5 MG solutab Take 1 tablet (0.5 mg) by mouth or place under tongue every 2 hours as needed for pain or shortness of breath  Qty: 30 tablet, Refills: 0    Comments: Hospice patient. Dispense in quantities of 30 tablets.  Associated Diagnoses: End stage heart failure (H)      LORazepam (ATIVAN) 0.5 MG tablet Take 1 tablet (0.5 mg) by mouth or place under tongue every 4 hours as needed for anxiety (restlessness)  Qty: 30 tablet, Refills: 0    Associated Diagnoses: End stage heart failure (H)      midodrine (PROAMATINE) 10 MG tablet Take 1 tablet (10 mg) by mouth 3 times daily (with  meals)  Qty: 90 tablet, Refills: 3    Associated Diagnoses: Low output heart failure (H)      nicotine (COMMIT) 2 MG lozenge Place 1 lozenge (2 mg) inside cheek every hour as needed for other (nicotine withdrawal symptoms)  Qty: 30 lozenge, Refills: 0    Associated Diagnoses: Tobacco use      nicotine (NICODERM CQ) 21 MG/24HR 24 hr patch Place 1 patch onto the skin daily  Qty: 30 patch, Refills: 0    Associated Diagnoses: Tobacco use      senna (SENNA LAX) 8.6 MG tablet Take 2 tablets by mouth 2 times daily as needed for constipation  Qty: 100 tablet, Refills: 0    Associated Diagnoses: End stage heart failure (H)           CONTINUE these medications which have CHANGED    Details   acetaminophen (TYLENOL) 500 MG tablet Take 2 tablets (1,000 mg) by mouth every 8 hours as needed for mild pain      DOBUTamine 500 mg in dextrose 5% 250 mL (adult std conc) premix Inject 207.9 mcg/min into the vein continuous  Qty: 500 mL, Refills: 0    Associated Diagnoses: Acute on chronic systolic congestive heart failure (H); Low output heart failure (H)      metolazone (ZAROXOLYN) 2.5 MG tablet Take 1 tablet (2.5 mg) by mouth daily as needed (for weight gain or 2 lb in one day, take 40 meq extra potassium with each dose)  Qty: 5 tablet, Refills: 0    Associated Diagnoses: Chronic systolic heart failure (H)      potassium chloride ER (KLOR-CON M) 20 MEQ CR tablet Take 2 tablets (40 mEq) by mouth 3 times daily  Qty: 180 tablet, Refills: 3    Associated Diagnoses: Chronic systolic heart failure (H)      torsemide (DEMADEX) 100 MG tablet Take 1 tablet (100 mg) by mouth 3 times daily  Qty: 90 tablet, Refills: 3    Associated Diagnoses: Acute on chronic systolic congestive heart failure (H)           CONTINUE these medications which have NOT CHANGED    Details   albuterol (PROAIR HFA) 108 (90 Base) MCG/ACT Inhaler Inhale 2 puffs into the lungs every 4 hours as needed for shortness of breath / dyspnea  Qty: 1 Inhaler, Refills: 0       amylase-lipase-protease (CREON 12) 84237-34446-58741 units CPEP Take 2 capsules by mouth 3 times daily (with meals)   Qty: 120 capsule, Refills: 0    Associated Diagnoses: Pancreatic disease      calcium carbonate (OS-MARVA) 500 MG tablet Take 1 tablet by mouth 2 times daily One tablet in the morning, two at noon and one at bedtime      Carboxymethylcellulose Sod PF (THERATEARS PF) 0.25 % SOLN Apply 1 drop to eye 4 times daily as needed      clopidogrel (PLAVIX) 75 MG tablet Take 1 tablet (75 mg) by mouth daily  Qty: 90 tablet, Refills: 3    Associated Diagnoses: PAD (peripheral artery disease) (H)      gabapentin (NEURONTIN) 100 MG capsule Take 300 mg by mouth At Bedtime  Qty: 1 capsule, Refills: 0      insulin aspart (NOVOLOG PEN) 100 UNIT/ML pen Inject 4 Units Subcutaneous daily Per previous dosing    Associated Diagnoses: Type 2 diabetes mellitus with other specified complication, with long-term current use of insulin (H)      insulin glargine (LANTUS PEN) 100 UNIT/ML pen Inject 26 Units Subcutaneous every morning Increase by 2 units every 2-3 days until morning blood sugar is .  Max dose 40 units/day    Comments: If Lantus is not covered by insurance, may substitute Basaglar or Semglee or other insulin glargine product per insurance preference at same dose and frequency.    Associated Diagnoses: Type 2 diabetes mellitus with other specified complication, with long-term current use of insulin (H)      mometasone (ASMANEX TWISTHALER) 220 MCG/INH inhaler Inhale 1 puff into the lungs 2 times daily      pantoprazole (PROTONIX) 40 MG EC tablet Take 40 mg by mouth 2 times daily   Qty: 30 tablet      Insulin Pen Needle (PEN NEEDLES) 32G X 4 MM MISC            STOP taking these medications       atorvastatin (LIPITOR) 80 MG tablet Comments:   Reason for Stopping:         Glycerin (OASIS MOISTURIZING MOUTH SPRAY) 35 % LIQD Comments:   Reason for Stopping:         loperamide (IMODIUM) 2 MG capsule Comments:   Reason  for Stopping:         magnesium oxide (MAG-OX) 400 MG tablet Comments:   Reason for Stopping:         metFORMIN (GLUCOPHAGE-XR) 500 MG 24 hr tablet Comments:   Reason for Stopping:         nitroGLYcerin (NITROSTAT) 0.4 MG sublingual tablet Comments:   Reason for Stopping:         vitamin D3 (CHOLECALCIFEROL) 50 mcg (2000 units) tablet Comments:   Reason for Stopping:               DISCHARGE DISPOSITION: Delbert Tilley will discharge to home in end stage condition.     DISCHARGE INSTRUCTIONS:  Discharge Procedure Orders   Home Infusion Referral   Referral Priority: Routine: Next available opening Referral Type: Consultation   Number of Visits Requested: 1     Home Care Referral   Referral Priority: Routine: Next available opening Referral Type: Home Health Therapies & Aides   Number of Visits Requested: 1     Medication Therapy Management Referral   Referral Priority: Routine Referral Type: Med Therapy Management   Requested Specialty: Pharmacist   Number of Visits Requested: 1     Reason for your hospital stay   Order Comments: End stage heart failure     Activity   Order Comments: Your activity upon discharge: activity as tolerated     Order Specific Question Answer Comments   Is discharge order? Yes      Follow Up ([facility])   Order Comments: Follow up with Dr. Headley on 6/9/23 as scheduled    Appointments on [place] and/or [facility] (with [facility] or [facility] provider or service). Call [phone] if you haven't heard regarding these appointments within 7 days of discharge.     Diet   Order Comments: Follow this diet upon discharge: Orders Placed This Encounter      Fluid restriction 2000 ML FLUID      Regular Diet Adult     Order Specific Question Answer Comments   Is discharge order? Yes        75 minutes spent in discharge, including >50% in counseling and coordination of care, medication review and plan of care recommended on follow up. Questions were answered, patient agrees to plan.

## 2023-05-31 NOTE — PLAN OF CARE
Goal Outcome Evaluation:      Plan of Care Reviewed With: patient    Overall Patient Progress: no changeOverall Patient Progress: no change    Outcome Evaluation: Good urine output, care conference planned      Patient admitted for heart failure exacerbation.  PMH of CAD with subsequent CABG, ICM with EF 20-25%, ICD, tobacco use, DM2, and CKD.     Neuro: A&O x4, denied pain and dizziness.  Respiratory:  Had dyspnea on exertion, lung sounds diminished in bases.  Cardiac: Faint heart murmur noted.  Had 1-2+ edema in legs and feet.  Bumetanide gtt continued at 1 mg/hr (4 ml/hr).    GI: Denied nausea, bowel sounds normo- to hyperactive.  Reported having two BMs today, requested additional prn milk of magnesia.  : Had good urine output, see I&O flowsheet.  Skin: See PCS for assessment and treatment of wounds and surgical incisions.   VS: In atrially paced rhythm with frequent PVCs, HR 80s, SBP 90s, SaO2 98% on room air.    Drips:  Dobutamine gtt continued at 3 mcg/kg/min (3.1 ml/hr).    Electrolytes: No replacements required.  Pain: Denied.  Tests/procedures: None performed during shift.    Mobility: Ambulated in room independently with steady gait.    Plan:  Continue to monitor pain, VS, heart rhythm, skin integrity, fluid status, bowel status, cardiac and respiratory status.  Notify care team of changes in patient condition or other concerns.  Continue diuresis.  Expected to discharge to hospice when closer to euvolemia.  Care conference planned for tomorrow.

## 2023-05-31 NOTE — PLAN OF CARE
Goal Outcome Evaluation:      Plan of Care Reviewed With: patient, child    Overall Patient Progress: no changeOverall Patient Progress: no change    Outcome Evaluation: good urine output, plans made with hospice facility    Patient admitted for heart failure exacerbation.  PMH of CAD with subsequent CABG, ICM with EF 20-25%, ICD, tobacco use, DM2, and CKD.     Neuro: A&O x4, denied pain and dizziness.  Respiratory:  Had dyspnea on exertion, expiratory wheezes auscultated in middle and lower lung lobes.  Cardiac: Faint heart murmur noted.  Had 2+ edema in legs and feet.  Bumetanide gtt increased to 2 mg/hr (8 ml/hr).    GI: Denied nausea, bowel sounds hyperactive.  Reported having a BM today.  : Had good urine output, see I&O flowsheet.  Skin: See PCS for assessment and treatment of wounds and surgical incisions.   VS: In atrially paced rhythm with occasional PVCs, HR 80s, SBP 90s-100s, SaO2 95-98% on room air.    Drips:  Dobutamine gtt continued at 3 mcg/kg/min (3.1 ml/hr).    Electrolytes: No replacements required.  Pain: Denied.  Tests/procedures: None performed during shift.    Mobility: Ambulated in hallway independently with steady gait.  Care conference held today, see Care Coordinator and Palliative note for greater detail.    Plan:  Discontinue bumetanide drip.  Prepare for discharge.  Ensure patient understands follow-up cares and appointments.  Ensure patient understands and recognizes signs/symptoms that indicate a need for further medical consultation.

## 2023-05-31 NOTE — PROGRESS NOTES
Care Management Discharge Note    Discharge Date: 05/31/2023     Discharge Disposition: Home    Discharge Services: Home Infusion (resumption)    Discharge DME: None    Discharge Transportation: family or friend will provide    Education Provided on the Discharge Plan: Yes    Persons Notified of Discharge Plans: Pt, daughter Sara, son Ghassan  Patient/Family in Agreement with the Plan: Yes    Handoff Referral Completed: Yes    Additional Information:  Received update from NP that pt would like to discharge home today after the care conference. Updated [facility] liason and provider placed dobutamine order for home.    1140: This writer took part in care conference along with pt, pt's daughter Sara, norberto Ferrell, Cards 2 NP Darlene Bryant, Palliative MD Jacqueline Roach, REHAN and [facility] liason Kim. NP provided medical update that pt is end stage heart failure and despite dobutamine and diuretics pt was not able to remain stable as an outpatient for very long. NP described how dobutamine's affect can decrease over time and that is not life prolonging/a cure. Pt very understanding of his limited prognosis and really focused on spending time outside of the hospital with family and friends. Pt very clear that he does not want to come back to the hospital. Hospice liason discussed that they are unable to take pt formally on service until pt is off dobutamine. Hospice liason described coordinating with pt/family for the transition once pt is ready to come off the drip. She will plan to check in with pt on Friday 6/2. Pt and family in agreement with plan. Palliative discussed POLST form and will assist pt in completing today. Ex-spouse plans to stay with pt to assist him as needed and stay with him while on hospice.     [facility] (IV dobutamine)  Phone: [phone]     [facility] (skilled nursing)  Placed orders for 5/22 start of care.     CC will continue to monitor patient's medical  condition and progress towards discharge.  Brianna Larsen RN BSN  6C Unit Care Coordinator  Phone number: [phone]  Pager: [phone]

## 2023-05-31 NOTE — PROGRESS NOTES
[facility]  Palliative Care Daily Progress Note       Recommendations & Counseling       Care conference today with primary team, SW/RNCC team, Palliative team and [facility] hospice representative, patient in person and patient's two adult children by phone (son Ghassan and daughter Sara). Clinical course reviewed by primary team and patient again voiced clearly that he wishes to depart the hospital and not return to be re-hospitalized, and updated his teams today that he wishes to discharge today. [facility] providing their contact information for patient and [facility] will call patient on Friday to check in on him, they noted today that [facility] hospice cannot enroll patient until he stops his dobutamine infusion (even if he uses an existing supply). Pt notes he feels well today and does not endorse symptoms today. Pt agreeable with discharge to home with [facility] homecare and continuing dobutamine infusion while it helps him feel better, high dose diuretics and continued check ins with [facility] with plan to enroll when he stops the dobutamine infusion. Pt's children and ex-spouse will help patient with cares at home when this is needed.     POLST form was completed today and patient given original and copies for family, copy placed in hard chart, and copy provided to SW to fax to [facility] hospice agency.     Comfort kit medications already ordered by primary team ahead of care conference.     Total time spent was 60 minutes,  >50% of time was spent counseling and/or coordination of care regarding goals of care, discharge planning, decision support. Recommendations communicated with primary team by note, in person.    Jacqueline Roach -[phone]   Team Consult Pager [phone] (answered 8am-430pm M-F) - ok to text page via [employer] / After-Hours Answering Service [phone] / Palliative Clinic in the [facility] -  "[phone] (scheduling); [phone] (triage).      Assessments          Delbert Tilley is a 72 year old male with PMH that includes CAD s/p CABG in early 2000s c/b ischemic cardiomyopathy and HFrEF 20-25% with ICD in place (turned off now per primary team) admitted with worsening weight gain, poor diuretic response and RADHA on home inotropes started just this month. Palliative Care consulted to assist with goals of care discussions. Care conference today, see above for details.     Today, the patient was seen for:  CAD s/p CABG in early 2000s c/b ischemic cardiomyopathy and HFrEF   IV ionotrope dependence  Goals of care  Support with coping  Decisions support    Prognosis, Goals, or Advance Care Planning was addressed today with: Yes.  Mood, coping, and/or meaning in the context of serious illness were addressed today: Yes.  Summary/Comments:            Interval History:     Chart review/discussion with unit or clinical team members:   No acute events overnight. Pt's weight up today despite high dose diuretics IV and IV dobutamine dosing adjustment.     Per patient or family/caregivers today:  Care conference today, see above for details. Pt does not report dyspnea, pain or nausea, notes he \"feels great\" today and he wishes to depart the hospital.             Review of Systems:     Besides above, an additional 1 system ROS was reviewed and is unremarkable          Medications:     I have reviewed this patient's medication profile and medications during this hospitalization.           Physical Exam:   Vitals were reviewed  Temp: 98  F (36.7  C) Temp src: Oral BP: 94/61 Pulse: 83   Resp: 16 SpO2: 98 % O2 Device: None (Room air)    Gen: NAD, sitting comfortably at side of bed, pleasant and cooperative with interview and exam  HEENT: normocephalic, no scleral icterus, no perioral lesions, oral mucosa moist  CV: no mottling on UE bilaterally  Pulm: no increased work of breathing, no tachypnea  Abd: soft, +bs, nontender " to palpation diffusely, nondistended  Skin: no rash or jaundice on limited exam  Neuro: AOx3, no tremor, gait stable  Psych: mood-affect congruent             Data Reviewed:     Reviewed recent pertinent imaging.    Reviewed recent labs.

## 2023-05-31 NOTE — PROGRESS NOTES
Care Management Follow Up    Length of Stay (days): 6    Expected Discharge Date: 05/31/2023     Concerns to be Addressed: discharge planning  Patient plan of care discussed at interdisciplinary rounds: Yes    Anticipated Discharge Disposition: Home with dobutamine and at some point when ready, pt to transition to home hospice with [facility] (with a discontinuation/stopping dobutamine as pt wouldn't be able to be on it with [facility], pt aware and is agreeable)     Anticipated Discharge Services:  Hospice services when ready  Anticipated Discharge DME:  n/a    Patient/family educated on Medicare website which has current facility and service quality ratings:  n/a  Education Provided on the Discharge Plan:   yes  Patient/Family in Agreement with the Plan:  yes    Referrals Placed by CM/SW:      [facility]  Phone: [phone] (Kim, care transition coordinator)  Fax: [phone]  Main line: [phone]  - 5/31: REHAN faxed Kim the completed POLST form and left her a VM informing her of the fax. REHAN gave the copy to the unit clerk so it can be in the pt's discharge packet of paperwork. Per Kim, they wouldn't be able to take the pt on hospice while he's on the IV dobutamine so when the pt reaches that point, the dobutamine would be turned off.     Private pay costs discussed: Not applicable    Additional Information:  REHAN is following for discharge planning.    Attendees of care conference--- REHAN, MARY, Kim from [facility], Cards 2 MARISOL, Palliative, pt, and pt's daughter & son over the phone    Cards 2 spoke on the pt's current medical status and what the pt wants for discharge. Pt told Cards 2 before this care conference and to the attendees that he is wanting to discharge home with the dobutamine to spend time with family/friends and doesn't want to return to the hospital at all. Palliative, Cards 2, RNYOUSUF, and SW spoke about what the discharge plan would look like and the supports for the pt.    See RNCC  note for details.     PLAN: Pt will discharge home on dobutamine (see provider note for details) and [facility] will continue to follow the pt/check in and once the pt reaches the point, follow for hospice.     ___________________    YAMILETH Post, LGSW  6C   [facility]  Phone: [phone]  Pager: [phone]

## 2023-05-31 NOTE — PROGRESS NOTES
Home Infusion  Delbert is discharging today and his continuous home dobutamine therapy.   He requires a hook up of home medication and pump prior to discharge.    Met with Delbert at bedside once supplies arrived.  Provided hook up of his home dobutamine after reviewing pump settings and verifying with orders. Delbert is independent with his daily bag changes.   His HC agency [facility] will be resuming services upon discharge.    Delbert's home dobutamine infusion is running and he is ready for discharge from [facility] perspective.     Oneyda MIRAMONTES  Nurse Liaison  [facility] I [email or web address]  [address] [place] [address]  [email or web address]  [phone] M-F I [facility] main: [phone]

## 2023-05-31 NOTE — PROGRESS NOTES
DISCHARGE   Discharged to: Home  Via: Automobile  Accompanied by: Daughter  Discharge Instructions: principal diagnosis, major findings/procedures, diet, activity, medications, follow up appointments, when to call the MD, and what to watchout for (i.e. s/s of infection, increasing SOB, palpitations, Angina). Pt states understanding of all discharge instructions.   Prescriptions: To be filled at discharge pharmacy per pt's request; scripts sent to pharmacy.  Follow Up Appointments: with Dr. Headley (Cardiology) on 6/9.  Belongings: All sent with pt. Pt verifies that they are taking all belongings with them.   IV: PICC maintained for home dobutamine infusion.   Telemetry: discontinued.   Pt exhibits understanding of above discharge instructions; all questions answered.  Discharge Paperwork: faxed to discharge planner.

## 2023-06-01 NOTE — PROGRESS NOTES
Clinic Care Coordination Contact    5/25/2023 - 5/31/2023 (6 days)  [facility]  DISCHARGE DIAGNOSES:     Acute on chronic stage D systolic heart failure, inotrope dependent    Worsening CKD due to cardiorenal syndrome    Hypervolemia hyponatremia, improved      Patient reports he is fine and he is all done with [facility] and goodbye     No further outreaches will be made     [facility]   Marizol Davis RN, Care Coordinator   [facility]'s   E-mail [email or web address]   [phone]

## 2023-06-02 NOTE — TELEPHONE ENCOUNTER
MTM referral from: Transitions of Care (recent hospital discharge or ED visit)    MTM referral outreach attempt #2 on June 2, 2023 at 12:57 PM      Outcome: Patient is not interested at this time , will route to MTM Pharmacist/Provider as an FYI. Thank you for the referral.     Use MEDICA PART D MAP([place]) for the carrier/Plan on the flowsheet    Promise Nguyen - [facility]

## 2023-06-07 NOTE — TELEPHONE ENCOUNTER
Health Call Center    Phone Message    May a detailed message be left on voicemail: yes     Reason for Call: Other: patients home infusion person, is calling stating that the pump never ran for his DOBUTamine 500 mg in dextrose 5% 250 mL (adult std conc) premix so pt has went 24 hours with out this, breann stated he told pt he needs to go be seen due to this and pt is refusing, breann would like a call to further duscuss this, please advise, thank you.     Action Taken: Other: cardiology    Travel Screening: Not Applicable   Thank you!  [facility]

## 2023-06-07 NOTE — TELEPHONE ENCOUNTER
"Called back Lonnie from ; he states that pt Dobutamine was turned off for a period of time, \"probobly less than 24 hours\" but unsure exactly how long. Lonnie states that he got pt new bag of Dobutamine and it is currently infusing.     Called pt; he state he is feeling fine, no reports of increased SOB, weight gain or swelling. Pt cannot say how long pump was off but confirms that Dobutamine is currently infusing.     Discussed with Dr. Headley; okay for pt to keep clinic appt on Friday 6/9 with Dr. Headley. Pt confirmed he will be at appt.     Ellie Cook RN    "

## 2023-06-07 NOTE — TELEPHONE ENCOUNTER
Heart failure patient of Dr. Kate.  Will route to correct pool.    Sara Bell, RN  Cardiology Care Coordinator  [facility]  [phone] option 1

## 2023-06-09 NOTE — NURSING NOTE
Chief Complaint   Patient presents with     Follow Up     6/9/23 Dr. Headley, [facility]- 72 year old male with end stage HF; discharged on palliative Dobutamine, ICD off, Hospice when pt is ready. Labs prior.      Vitals were taken and medications reconciled.    Jaylen Armas, EMT  3:58 PM

## 2023-06-09 NOTE — LETTER
6/9/2023      RE: Delbert Tilley  [id number]  [id number]  [facility] [id number]       Dear Colleague,    Thank you for the opportunity to participate in the care of your patient, Delbert Tilley, at the [facility] [place] at [facility]. Please see a copy of my visit note below.    Cardiology Clinic Note    HPI    Dear colleagues,     I had the pleasure of seeing . Delbert Tilley in the Cardiology clinic.  As you know, Mr. Delbert Tilley is a pleasant 72 year old male who is here to follow-up of his end-stage heart failure on palliative inotropes.    I last met the patient a few months ago and was considering a destination LVAD for him.  He was admitted to the hospital and went through some of the testing but ultimately decided that he did not want LVAD therapy.  Because of his hemodynamics, worsening symptoms of volume overload, and low cardiac output, he was started on dobutamine and eventually decided to be on palliative inotropes with plan for eventual hospice.  He was discharged approximately 10 days ago.  He presents today with his daughter.  He currently feels pretty well actually.  He does have some mild lower extremity edema.  He does have as needed metolazone but is only taking this once so far post discharge.  He does have the contact information for the palliative care team and the hospice care team and has everything he needs for when he officially commences hospice therapy and turns off dobutamine.  There was an issue with his pump approximately a week ago where he was not actually getting dobutamine and patient did feel little symptomatic but he had a subsequent pump that worked.  He does have a nurse that comes out to the house once a week for PICC line dressing cares.    PAST MEDICAL HISTORY:  Patient Active Problem List   Diagnosis    Cardiomyopathy (H)    Atrial fibrillation/flutter    Coronary artery disease involving native coronary  artery of native heart without angina pectoris    Alcohol abuse, in remission    GERD (gastroesophageal reflux disease)    Alcohol-induced chronic pancreatitis (H)    Diabetes mellitus, type 2 (H)    Type 2 diabetes mellitus with other specified complication, with long-term current use of insulin (H)    CARDIOVASCULAR SCREENING; LDL GOAL LESS THAN 100    Hyperlipidemia LDL goal <70    ACS (acute coronary syndrome) (H)    NSTEMI (non-ST elevated myocardial infarction) (H)    Carotid stenosis    Carpal tunnel syndrome of right wrist    Left carotid stenosis    Tobacco use    NSVT (nonsustained ventricular tachycardia) (H)    Chronic systolic heart failure (H)    Paroxysmal atrial fibrillation (H)    Chronic obstructive pulmonary disease, unspecified COPD type (H)    Hypotension, unspecified hypotension type    Chronic kidney disease, stage 3 (H)    Pleural plaque    Atherosclerosis of artery of extremity with intermittent claudication (H)    Hyperparathyroidism (H)    Thrombocytopenia (H)    Other idiopathic peripheral autonomic neuropathy    Venous insufficiency (chronic) (peripheral)    Actinic keratosis    Nail dystrophy    Generalized muscle weakness    Vitamin D deficiency    Hypocalcemia    Essential hypertension    Ischemic cardiomyopathy    Other proteinuria    Hypomagnesemia    Anemia in chronic kidney disease    Diabetic nephropathy associated with type 2 diabetes mellitus (H)    PAD (peripheral artery disease) (H)    CHF (congestive heart failure) (H)    Dyspnea on exertion    Platypnea-orthodeoxia syndrome    Encounter for screening laboratory testing for severe acute respiratory syndrome coronavirus 2 (SARS-CoV-2)    Tinnitus    Pseudophakia    Presbyopia    Nonproliferative diabetic retinopathy (H)    Obesity    Mononeuritis    Neck pain    Lack of housing    Long term current use of anticoagulant therapy    Epidermoid cyst of skin    Glaucoma suspect    History of non-ST elevation myocardial infarction  (NSTEMI)    Hearing loss    History of retinal detachment    Back pain    Cardiac defibrillator in place    Cellulitis and abscess of other specified site    Acute pancreatitis    Chronic pancreatitis (H)    Dry eye syndrome of bilateral lacrimal glands    Disorder of skin or subcutaneous tissue    Dry eyes    Enthesopathy of ankle and tarsus    Atherosclerosis of native arteries of extremities with intermittent claudication, bilateral legs (H)    Alcoholic cardiomyopathy (H)    Artificial lens present    Acute on chronic systolic congestive heart failure (H)    PARDO (dyspnea on exertion)    Acute kidney failure, unspecified (H)        FAMILY HISTORY:  Family History   Adopted: Yes   Problem Relation Age of Onset    Unknown/Adopted No family hx of        SOCIAL HISTORY:  Social History     Tobacco Use    Smoking status: Every Day     Packs/day: 1.50     Years: 50.00     Pack years: 75.00     Types: Cigarettes    Smokeless tobacco: Never    Tobacco comments:     1 1/2 PPD 05/18/22   Vaping Use    Vaping status: Never Used     Passive vaping exposure: Yes   Substance Use Topics    Alcohol use: No    Drug use: No        CURRENT MEDICATIONS:  Current Outpatient Medications   Medication Sig Dispense Refill    acetaminophen (TYLENOL) 500 MG tablet Take 2 tablets (1,000 mg) by mouth every 8 hours as needed for mild pain      albuterol (PROAIR HFA) 108 (90 Base) MCG/ACT Inhaler Inhale 2 puffs into the lungs every 4 hours as needed for shortness of breath / dyspnea 1 Inhaler 0    amylase-lipase-protease (CREON 12) 59312-224[address]-13024 units CPEP Take 2 capsules by mouth 3 times daily (with meals)  120 capsule 0    bisacodyl (DULCOLAX) 10 MG suppository Place 1 suppository (10 mg) rectally daily as needed for constipation 2 suppository 0    calcium carbonate (OS-MARVA) 500 MG tablet Take 1 tablet by mouth 2 times daily One tablet in the morning, two at noon and one at bedtime      Carboxymethylcellulose Sod PF (THERATEARS PF) 0.25  % SOLN Apply 1 drop to eye 4 times daily as needed      clopidogrel (PLAVIX) 75 MG tablet Take 1 tablet (75 mg) by mouth daily 90 tablet 3    DOBUTamine 500 mg in dextrose 5% 250 mL (adult std conc) premix Inject 207.9 mcg/min into the vein continuous 500 mL 0    gabapentin (NEURONTIN) 100 MG capsule Take 300 mg by mouth At Bedtime 1 capsule 0    HYDROmorphone (DILAUDID) 0.5 MG solutab Take 1 tablet (0.5 mg) by mouth or place under tongue every 2 hours as needed for pain or shortness of breath 30 tablet 0    insulin aspart (NOVOLOG PEN) 100 UNIT/ML pen Inject 4 Units Subcutaneous daily Per previous dosing      insulin glargine (LANTUS PEN) 100 UNIT/ML pen Inject 20 Units Subcutaneous every morning Increase by 2 units every 2-3 days until morning blood sugar is .  Max dose 40 units/day 15 mL     Insulin Pen Needle (PEN NEEDLES) 32G X 4 MM MISC       LORazepam (ATIVAN) 0.5 MG tablet Take 1 tablet (0.5 mg) by mouth or place under tongue every 4 hours as needed for anxiety (restlessness) 30 tablet 0    metolazone (ZAROXOLYN) 2.5 MG tablet Take 1 tablet (2.5 mg) by mouth daily as needed (for weight gain or 2 lb in one day, take 40 meq extra potassium with each dose) 5 tablet 0    midodrine (PROAMATINE) 10 MG tablet Take 1 tablet (10 mg) by mouth 3 times daily (with meals) 90 tablet 3    mometasone (ASMANEX TWISTHALER) 220 MCG/INH inhaler Inhale 1 puff into the lungs 2 times daily      nicotine (COMMIT) 2 MG lozenge Place 1 lozenge (2 mg) inside cheek every hour as needed for other (nicotine withdrawal symptoms) 30 lozenge 0    nicotine (NICODERM CQ) 21 MG/24HR 24 hr patch Place 1 patch onto the skin daily 30 patch 0    pantoprazole (PROTONIX) 40 MG EC tablet Take 40 mg by mouth 2 times daily  30 tablet     potassium chloride ER (KLOR-CON M) 20 MEQ CR tablet Take 2 tablets (40 mEq) by mouth 3 times daily 180 tablet 3    senna (SENNA LAX) 8.6 MG tablet Take 2 tablets by mouth 2 times daily as needed for constipation  100 tablet 0    torsemide (DEMADEX) 100 MG tablet Take 1 tablet (100 mg) by mouth 3 times daily 90 tablet 3       EXAM:  BP 95/60 (BP Location: Left arm, Patient Position: Chair, Cuff Size: Adult Small)   Pulse 82   Wt 71.3 kg (157 lb 3.2 oz)   SpO2 98%   BMI 21.32 kg/m      General: appears comfortable, alert and articulate, thin and cachectic  Heart: regular, no murmur, estimated JVP 12  Lungs: clear, no rales or wheezing  Extremities: 2+ bilateral lower extremity edema  Neurological: normal speech and affect, no gross motor deficits    Weight  Wt Readings from Last 10 Encounters:   06/09/23 71.3 kg (157 lb 3.2 oz)   05/31/23 69.3 kg (152 lb 12.8 oz)   05/17/23 66.2 kg (146 lb)   04/27/23 71.3 kg (157 lb 3.2 oz)   03/17/23 70.3 kg (155 lb)   02/22/23 70.1 kg (154 lb 8 oz)   02/16/23 68.9 kg (152 lb)   01/25/23 68.9 kg (152 lb)   01/16/23 69.4 kg (153 lb)   01/05/23 69.9 kg (154 lb)       Testing/Procedures:  I personally visualized and interpreted:  I reviewed his hospital records    Assessment and Plan:     In summary, very pleasant 72-year-old male with end-stage heart failure with reduced ejection fraction secondary to ischemic cardiomyopathy.    ACC/AHA stage D, NYHA class IIIb  Currently on palliative dobutamine, 5 mcg/kg/min  Requires high-dose diuretics to try and maintain euvolemia, this includes torsemide 100 mg 3 times a day and as needed metolazone  Has been on midodrine as well to maintain blood pressure  He has been set up with palliative care and when he and his family feel necessary, we will officially initiate hospice and turn off dobutamine  His ICD therapies have been turned off  I think it is reasonable to stop his Plavix that he has been on for his prior bypass surgery.  We reviewed all his medications in discontinued when he was not using and kept what he feels like is helpful.  Unfortunately I think the potassium is necessary despite how large the tablets are.  He is pretty adamant about  not returning to the hospital or to the clinic thus I do not think a specific follow-up is needed.  He does have our phone number and contact information and we can use my chart if needed  Is a pleasure to take care of him    The patient states understanding and is agreeable with plan.   Feel free to contact myself regarding questions or concerns.  It was a pleasure to see this patient today.    Humble Headley MD   of Medicine  Advanced Heart Failure and Transplant Cardiology    Today's clinic visit entailed:  40 minutes spent on the date of the encounter doing chart review, history and exam, documentation and further activities per the note    The level of medical decision making during this visit was of high complexity.          Please do not hesitate to contact me if you have any questions/concerns.     Sincerely,     Humble Headley MD

## 2023-06-09 NOTE — PROGRESS NOTES
Cardiology Clinic Note    HPI    Dear colleagues,     I had the pleasure of seeing Mr. Delbert Tilley in the Cardiology clinic.  As you know, Mr. Delbert Tilley is a pleasant 72 year old male who is here to follow-up of his end-stage heart failure on palliative inotropes.    I last met the patient a few months ago and was considering a destination LVAD for him.  He was admitted to the hospital and went through some of the testing but ultimately decided that he did not want LVAD therapy.  Because of his hemodynamics, worsening symptoms of volume overload, and low cardiac output, he was started on dobutamine and eventually decided to be on palliative inotropes with plan for eventual hospice.  He was discharged approximately 10 days ago.  He presents today with his daughter.  He currently feels pretty well actually.  He does have some mild lower extremity edema.  He does have as needed metolazone but is only taking this once so far post discharge.  He does have the contact information for the palliative care team and the hospice care team and has everything he needs for when he officially commences hospice therapy and turns off dobutamine.  There was an issue with his pump approximately a week ago where he was not actually getting dobutamine and patient did feel little symptomatic but he had a subsequent pump that worked.  He does have a nurse that comes out to the house once a week for PICC line dressing cares.    PAST MEDICAL HISTORY:  Patient Active Problem List   Diagnosis     Cardiomyopathy (H)     Atrial fibrillation/flutter     Coronary artery disease involving native coronary artery of native heart without angina pectoris     Alcohol abuse, in remission     GERD (gastroesophageal reflux disease)     Alcohol-induced chronic pancreatitis (H)     Diabetes mellitus, type 2 (H)     Type 2 diabetes mellitus with other specified complication, with long-term current use of insulin (H)     CARDIOVASCULAR SCREENING; LDL  GOAL LESS THAN 100     Hyperlipidemia LDL goal <70     ACS (acute coronary syndrome) (H)     NSTEMI (non-ST elevated myocardial infarction) (H)     Carotid stenosis     Carpal tunnel syndrome of right wrist     Left carotid stenosis     Tobacco use     NSVT (nonsustained ventricular tachycardia) (H)     Chronic systolic heart failure (H)     Paroxysmal atrial fibrillation (H)     Chronic obstructive pulmonary disease, unspecified COPD type (H)     Hypotension, unspecified hypotension type     Chronic kidney disease, stage 3 (H)     Pleural plaque     Atherosclerosis of artery of extremity with intermittent claudication (H)     Hyperparathyroidism (H)     Thrombocytopenia (H)     Other idiopathic peripheral autonomic neuropathy     Venous insufficiency (chronic) (peripheral)     Actinic keratosis     Nail dystrophy     Generalized muscle weakness     Vitamin D deficiency     Hypocalcemia     Essential hypertension     Ischemic cardiomyopathy     Other proteinuria     Hypomagnesemia     Anemia in chronic kidney disease     Diabetic nephropathy associated with type 2 diabetes mellitus (H)     PAD (peripheral artery disease) (H)     CHF (congestive heart failure) (H)     Dyspnea on exertion     Platypnea-orthodeoxia syndrome     Encounter for screening laboratory testing for severe acute respiratory syndrome coronavirus 2 (SARS-CoV-2)     Tinnitus     Pseudophakia     Presbyopia     Nonproliferative diabetic retinopathy (H)     Obesity     Mononeuritis     Neck pain     Lack of housing     Long term current use of anticoagulant therapy     Epidermoid cyst of skin     Glaucoma suspect     History of non-ST elevation myocardial infarction (NSTEMI)     Hearing loss     History of retinal detachment     Back pain     Cardiac defibrillator in place     Cellulitis and abscess of other specified site     Acute pancreatitis     Chronic pancreatitis (H)     Dry eye syndrome of bilateral lacrimal glands     Disorder of skin or  subcutaneous tissue     Dry eyes     Enthesopathy of ankle and tarsus     Atherosclerosis of native arteries of extremities with intermittent claudication, bilateral legs (H)     Alcoholic cardiomyopathy (H)     Artificial lens present     Acute on chronic systolic congestive heart failure (H)     PARDO (dyspnea on exertion)     Acute kidney failure, unspecified (H)        FAMILY HISTORY:  Family History   Adopted: Yes   Problem Relation Age of Onset     Unknown/Adopted No family hx of        SOCIAL HISTORY:  Social History     Tobacco Use     Smoking status: Every Day     Packs/day: 1.50     Years: 50.00     Pack years: 75.00     Types: Cigarettes     Smokeless tobacco: Never     Tobacco comments:     1 1/2 PPD 05/18/22   Vaping Use     Vaping status: Never Used     Passive vaping exposure: Yes   Substance Use Topics     Alcohol use: No     Drug use: No        CURRENT MEDICATIONS:  Current Outpatient Medications   Medication Sig Dispense Refill     acetaminophen (TYLENOL) 500 MG tablet Take 2 tablets (1,000 mg) by mouth every 8 hours as needed for mild pain       albuterol (PROAIR HFA) 108 (90 Base) MCG/ACT Inhaler Inhale 2 puffs into the lungs every 4 hours as needed for shortness of breath / dyspnea 1 Inhaler 0     amylase-lipase-protease (CREON 12) 60120-97[address]-73051 units CPEP Take 2 capsules by mouth 3 times daily (with meals)  120 capsule 0     bisacodyl (DULCOLAX) 10 MG suppository Place 1 suppository (10 mg) rectally daily as needed for constipation 2 suppository 0     calcium carbonate (OS-MARVA) 500 MG tablet Take 1 tablet by mouth 2 times daily One tablet in the morning, two at noon and one at bedtime       Carboxymethylcellulose Sod PF (THERATEARS PF) 0.25 % SOLN Apply 1 drop to eye 4 times daily as needed       clopidogrel (PLAVIX) 75 MG tablet Take 1 tablet (75 mg) by mouth daily 90 tablet 3     DOBUTamine 500 mg in dextrose 5% 250 mL (adult std conc) premix Inject 207.9 mcg/min into the vein continuous  500 mL 0     gabapentin (NEURONTIN) 100 MG capsule Take 300 mg by mouth At Bedtime 1 capsule 0     HYDROmorphone (DILAUDID) 0.5 MG solutab Take 1 tablet (0.5 mg) by mouth or place under tongue every 2 hours as needed for pain or shortness of breath 30 tablet 0     insulin aspart (NOVOLOG PEN) 100 UNIT/ML pen Inject 4 Units Subcutaneous daily Per previous dosing       insulin glargine (LANTUS PEN) 100 UNIT/ML pen Inject 20 Units Subcutaneous every morning Increase by 2 units every 2-3 days until morning blood sugar is .  Max dose 40 units/day 15 mL      Insulin Pen Needle (PEN NEEDLES) 32G X 4 MM MISC        LORazepam (ATIVAN) 0.5 MG tablet Take 1 tablet (0.5 mg) by mouth or place under tongue every 4 hours as needed for anxiety (restlessness) 30 tablet 0     metolazone (ZAROXOLYN) 2.5 MG tablet Take 1 tablet (2.5 mg) by mouth daily as needed (for weight gain or 2 lb in one day, take 40 meq extra potassium with each dose) 5 tablet 0     midodrine (PROAMATINE) 10 MG tablet Take 1 tablet (10 mg) by mouth 3 times daily (with meals) 90 tablet 3     mometasone (ASMANEX TWISTHALER) 220 MCG/INH inhaler Inhale 1 puff into the lungs 2 times daily       nicotine (COMMIT) 2 MG lozenge Place 1 lozenge (2 mg) inside cheek every hour as needed for other (nicotine withdrawal symptoms) 30 lozenge 0     nicotine (NICODERM CQ) 21 MG/24HR 24 hr patch Place 1 patch onto the skin daily 30 patch 0     pantoprazole (PROTONIX) 40 MG EC tablet Take 40 mg by mouth 2 times daily  30 tablet      potassium chloride ER (KLOR-CON M) 20 MEQ CR tablet Take 2 tablets (40 mEq) by mouth 3 times daily 180 tablet 3     senna (SENNA LAX) 8.6 MG tablet Take 2 tablets by mouth 2 times daily as needed for constipation 100 tablet 0     torsemide (DEMADEX) 100 MG tablet Take 1 tablet (100 mg) by mouth 3 times daily 90 tablet 3       EXAM:  BP 95/60 (BP Location: Left arm, Patient Position: Chair, Cuff Size: Adult Small)   Pulse 82   Wt 71.3 kg (157 lb  3.2 oz)   SpO2 98%   BMI 21.32 kg/m      General: appears comfortable, alert and articulate, thin and cachectic  Heart: regular, no murmur, estimated JVP 12  Lungs: clear, no rales or wheezing  Extremities: 2+ bilateral lower extremity edema  Neurological: normal speech and affect, no gross motor deficits    Weight  Wt Readings from Last 10 Encounters:   06/09/23 71.3 kg (157 lb 3.2 oz)   05/31/23 69.3 kg (152 lb 12.8 oz)   05/17/23 66.2 kg (146 lb)   04/27/23 71.3 kg (157 lb 3.2 oz)   03/17/23 70.3 kg (155 lb)   02/22/23 70.1 kg (154 lb 8 oz)   02/16/23 68.9 kg (152 lb)   01/25/23 68.9 kg (152 lb)   01/16/23 69.4 kg (153 lb)   01/05/23 69.9 kg (154 lb)       Testing/Procedures:  I personally visualized and interpreted:  I reviewed his hospital records    Assessment and Plan:     In summary, very pleasant 72-year-old male with end-stage heart failure with reduced ejection fraction secondary to ischemic cardiomyopathy.    ACC/AHA stage D, NYHA class IIIb  Currently on palliative dobutamine, 5 mcg/kg/min  Requires high-dose diuretics to try and maintain euvolemia, this includes torsemide 100 mg 3 times a day and as needed metolazone  Has been on midodrine as well to maintain blood pressure  He has been set up with palliative care and when he and his family feel necessary, we will officially initiate hospice and turn off dobutamine  His ICD therapies have been turned off  I think it is reasonable to stop his Plavix that he has been on for his prior bypass surgery.  We reviewed all his medications in discontinued when he was not using and kept what he feels like is helpful.  Unfortunately I think the potassium is necessary despite how large the tablets are.  He is pretty adamant about not returning to the hospital or to the clinic thus I do not think a specific follow-up is needed.  He does have our phone number and contact information and we can use my chart if needed  Is a pleasure to take care of him    The patient  states understanding and is agreeable with plan.   Feel free to contact myself regarding questions or concerns.  It was a pleasure to see this patient today.    Humble Headley MD   of Medicine  Advanced Heart Failure and Transplant Cardiology    Today's clinic visit entailed:  40 minutes spent on the date of the encounter doing chart review, history and exam, documentation and further activities per the note    The level of medical decision making during this visit was of high complexity.

## 2023-06-09 NOTE — PATIENT INSTRUCTIONS
"Cardiology Providers you saw during your visit: Dr. Headley     Medication changes:   -No Changes     Follow up:   -No Follow-up needed.     Please call if you have :  1. Weight gain of more than 2 pounds in a day or 5 pounds in a week  2. Increased shortness of breath, swelling or bloating  3. Dizziness, lightheadedness   4. Any questions or concerns.       Follow the American Heart Association Diet and Lifestyle recommendations:  Limit saturated fat, trans fat, sodium, red meat, sweets and sugar-sweetened beverages. If you choose to eat red meat, compare labels and select the leanest cuts available.  Aim for at least 150 minutes of moderate physical activity or 75 minutes of vigorous physical activity - or an equal combination of both - each week.      During business hours: [phone], press option # 1 to schedule or leave a message for your care team      After hours, weekends or holidays: On Call Cardiologist- [phone]   option #4 and ask to speak to the on-call Cardiologist. Inform them you are a CORE/heart failure patient at [facility].      Heart Failure Support Group  Virtual meetings 2023,  1-2 p.m.  Monday,  1-2 p.m.  Monday, , 1-2 p.m.  Monday,  1-2 p.m.  Monday, May 1 st, 1-2 p.m.  Monday, , 1-2 p.m,  , 1-2 p.m.  Monday, , 1-2 p.m.  Monday, , 1-2 pm  Monday, , 1-2pm  Monday, , 1-2pm      Ellie Cook RN   Cardiology Care Coordinator - Heart Failure/ [facility] Clinic  [facility]   Questions and [phone]   First press #1 for [employer] [place] \"To send a message to your care team\"    "

## 2023-06-20 ENCOUNTER — MEDICAL CORRESPONDENCE (OUTPATIENT)
Dept: HEALTH INFORMATION MANAGEMENT | Facility: CLINIC | Age: 73
End: 2023-06-20
Payer: COMMERCIAL

## 2023-06-23 ENCOUNTER — MEDICAL CORRESPONDENCE (OUTPATIENT)
Dept: HEALTH INFORMATION MANAGEMENT | Facility: CLINIC | Age: 73
End: 2023-06-23
Payer: COMMERCIAL

## 2024-04-01 NOTE — TELEPHONE ENCOUNTER
Anshu Bryan Reason for Call:  Request for results:    Name of test or procedure: Multiple labs, particularly Magnesium    Date of test of procedure: 12/7/2020    Location of the test or procedure: [place]    OK to leave the message on voice mail or with a family member? YES    Phone number Caller can be reached at:  Other phone number:  Caller is Divine RN with Cardiology Clinic,[phone]    Additional comments: [facility] would like Dr. Celine da silva aware of patient's Magnesium lab he had on 12/7/2020. Patient has appt with Dr. Berger on 12/9/2020    Call taken on 12/7/2020 at 11:59 AM by Dominique Loredo

## 2024-09-09 NOTE — PATIENT INSTRUCTIONS
Medication Changes:  None     Recommendations:  Call if blood pressure is less than 90 on top or less than 100 with lightheadedness.    Check blood pressure at least 1 hour after medications. Call the clinic if your blood pressure is consistently greater than 130/80.   Check daily weights and call the clinic if your weight has increased more than 2 lbs in one day or 5 lbs in one week; if you feel more short of breath or have worsening swelling in your legs or abdomen.  2000 mg sodium diet   4-8 8 ounce glasses of fluids per day    Follow-up:  See [facility] for cardiology follow up at [place]: 1-2 months. Call to schedule.     Cardiology Scheduling~[phone]  Cardiology Clinic RN~[phone] (Lissett Sandoval RN)      
09-Sep-2024 02:06

## (undated) DEVICE — CATH TRAY FOLEY 16FR W/METER A800365

## (undated) DEVICE — Device

## (undated) DEVICE — PREP CHLORHEXIDINE 4% 4OZ (HIBICLENS) 57504

## (undated) DEVICE — SU MONOCRYL+ 4-0 18IN PS2 UND MCP496G

## (undated) DEVICE — SU VICRYL 3-0 CT-1 CR 8X18" J738D

## (undated) DEVICE — CUSTOM PACK GEN MAJOR SBA5BGMHEA

## (undated) DEVICE — GOWN XXL 9575

## (undated) DEVICE — GLOVE PROTEXIS W/NEU-THERA 7.5  2D73TE75

## (undated) DEVICE — DECANTER VIAL 2006S

## (undated) DEVICE — SUCTION MANIFOLD NEPTUNE 2 SYS 1 PORT 702-025-000

## (undated) DEVICE — BANDAGE ELASTIC VELCRO 6IN REB3016

## (undated) DEVICE — GEL ULTRASOUND AQUASONIC 20GM 01-01

## (undated) DEVICE — SYR 03ML LL W/O NDL 309657

## (undated) DEVICE — GUIDEWIRE TERUMO ADVANTAGE .035X260CM ANG GA3502

## (undated) DEVICE — LIGACLIP SMALL AESCULAP J1180-1

## (undated) DEVICE — SOL NACL 0.9% IRRIG 1000ML BOTTLE 07138-09

## (undated) DEVICE — SU FIBERWIRE 4-0 T-13 18"  AR-7230-01

## (undated) DEVICE — ADH SKIN CLOSURE PREMIERPRO EXOFIN 1.0ML 3470

## (undated) DEVICE — SOL ADH LIQUID BENZOIN SWAB 0.6ML C1544

## (undated) DEVICE — SUTURE VICRYL+ 3-0 27IN CT-1 UND VCP258H

## (undated) DEVICE — GLOVE PROTEXIS BLUE W/NEU-THERA 7.0  2D73EB70

## (undated) DEVICE — SUTURE SILK 2-0 TIES 30IN SA85H

## (undated) DEVICE — DRAPE C-ARM 60X42" 1013

## (undated) DEVICE — RAD INFLATOR BASIC COMPAK  IN4130

## (undated) DEVICE — SUCTION CANISTER MEDIVAC LINER 3000ML W/LID 65651-530

## (undated) DEVICE — BLADE KNIFE SURG 15 371115

## (undated) DEVICE — SUTURE VICRYL+ 2-0 27IN CT-1 UND VCP259H

## (undated) DEVICE — TOWEL SURG 16INW X 26INL WHITE STRL XRAY DETECTABLE X8314W

## (undated) DEVICE — BNDG ELASTIC 4"X5YDS UNSTERILE 6611-40

## (undated) DEVICE — SOL WATER IRRIG 1000ML BOTTLE 07139-09

## (undated) DEVICE — CATH TRAY FOLEY SURESTEP 16FR DRAIN BAG STATOCK A899916

## (undated) DEVICE — DRESSING TEGASORB 4X4-3/4 3M 90001

## (undated) DEVICE — CAST PADDING 4" STERILE 9044S

## (undated) DEVICE — DRAPE STERI TOWEL LG 1010

## (undated) DEVICE — CAST PLASTER SPLINT 4X15" 7394

## (undated) DEVICE — HOLDER LIMB VELCRO OR 0814-1533

## (undated) DEVICE — INTRO TERUMO 6FRX10CM PINNACLE W/MARKER RSB602

## (undated) DEVICE — SURGICEL HEMOSTAT 2X14" 1951

## (undated) DEVICE — SYR 05ML SLIP TIP W/O NDL

## (undated) DEVICE — VESSEL LOOP WHITE MAXI 30-721

## (undated) DEVICE — SU PROLENE 6-0 BV-1 DA 24" 8805H

## (undated) DEVICE — SU PROLENE 6-0 BV-1 18" 8806H

## (undated) DEVICE — PACK HEART RIGHT CUSTOM SAN32RHF18

## (undated) DEVICE — DRAPE U SPLIT 74X120" 29440

## (undated) DEVICE — SUTURE BOOTS 051003PBX

## (undated) DEVICE — SU SILK 3-0 SH CR 8X18" C013D

## (undated) DEVICE — RX SURGIFLO HEMOSTATIC MATRIX W/THROMBIN 8ML 2994

## (undated) DEVICE — SU SILK 3-0 FS-1 18" 684H

## (undated) DEVICE — TUBING SUCTION MEDI-VAC 1/4"X20' N620A

## (undated) DEVICE — NEEDLE HYPO 22X1-1/2 SAFETY 305900

## (undated) DEVICE — SU ETHILON 4-0 PS-2 18" 1667G

## (undated) DEVICE — SU PROLENE 5-0 RB-1DA 36"  8556H

## (undated) DEVICE — WIPE MICRO-TIP GRAPHIC CONTROL 3300

## (undated) DEVICE — CAST PADDING 6" UNSTERILE 9046

## (undated) DEVICE — KIT ARTHREX BIO-TENODESIS AR-1676DS

## (undated) DEVICE — DECANTER BAG 2002S

## (undated) DEVICE — SUTURE SILK 3-0 TIES 30IN SA84H

## (undated) DEVICE — NDL 19GA 1.5"

## (undated) DEVICE — BLADE KNIFE BEAVER 376700

## (undated) DEVICE — DRSG TEGADERM 4X4 3/4" 1626W

## (undated) DEVICE — GOWN LG DISP 9515

## (undated) DEVICE — SU PROLENE 7-0 BV-1DA 4X30" M8703

## (undated) DEVICE — GOWN SURGICAL SMARTGOWN 2XL 89075

## (undated) DEVICE — SOL NACL 0.9% INJ 250ML BAG 2B1322Q

## (undated) DEVICE — DRSG STERI STRIP 1/2X4" R1547

## (undated) DEVICE — PREP CHLORAPREP 26ML TINTED HI-LITE ORANGE 930815

## (undated) DEVICE — DRAPE CV SPLIT 110X36" 89452

## (undated) DEVICE — SHUNT SUNDT 3X4X10CM NL850-5060

## (undated) DEVICE — GLOVE BIOGEL PI SZ 8.0 40880

## (undated) DEVICE — PACK VASCULAR SCV15VAFSB

## (undated) DEVICE — DRSG TELFA 3X4" 1050

## (undated) DEVICE — PLATE GROUNDING ADULT W/CORD 9165L

## (undated) DEVICE — SOL WATER IRRIG 1000ML BOTTLE 2F7114

## (undated) DEVICE — LIGACLIP MEDIUM AESCULAP B2180-1

## (undated) DEVICE — DRSG GAUZE 4X4" 3033

## (undated) DEVICE — SOL NACL 0.9% IRRIG 1000ML BOTTLE 2F7124

## (undated) DEVICE — CUSTOM PACK PERIPH VASCULAR SCV5BPVHEA

## (undated) DEVICE — DRSG KERLIX 4 1/2"X4YDS ROLL 6715

## (undated) DEVICE — NEEDLE HYPO 18X1-1/2 SAFETY 305918

## (undated) DEVICE — BNDG ELASTIC 2"X5YDS UNSTERILE 6611-20

## (undated) DEVICE — KIT RIGHT HEART CATH 60130719

## (undated) DEVICE — NDL PERC ENTRY 19GA 7CM G04876

## (undated) DEVICE — PACK HAND

## (undated) DEVICE — DRAPE ISOLATION BAG 1003

## (undated) DEVICE — DRAPE IOBAN INCISE 23X17" 6650EZ

## (undated) DEVICE — SU FIBERWIRE 3-0 18" AR-7227-01

## (undated) DEVICE — CATH OMNIFLUSH 5FRX70CM SIZING 13709702

## (undated) DEVICE — SUTURE VICRYL+ 4-0 UNDYED PS-2 VCP496H

## (undated) DEVICE — SU SILK 3-0 TIE 24" SA74H

## (undated) DEVICE — ESU GROUND PAD UNIVERSAL W/O CORD

## (undated) DEVICE — INTRO SHEATH 7FRX10CM PINNACLE RSS702

## (undated) DEVICE — SLING ARM LG 79-99157

## (undated) DEVICE — SU PROLENE 6-0 C-1DA 30" M8706

## (undated) DEVICE — PREP CHLORAPREP W/ORANGE TINT 10.5ML 260715

## (undated) DEVICE — GOWN IMPERVIOUS BREATHABLE SMART LG 89015

## (undated) DEVICE — LINEN TOWEL PACK X5 5464

## (undated) DEVICE — PREP SKIN SCRUB TRAY 4461A

## (undated) DEVICE — ESU PENCIL SMOKE EVAC W/ROCKER SWITCH 0703-047-000

## (undated) DEVICE — SU SILK 2-0 TIE 24" SA75H

## (undated) DEVICE — PITCHER STERILE 1000ML  SSK9004A

## (undated) DEVICE — SUTURE PROLENE 5-0 RB-2 8555H

## (undated) DEVICE — SU VICRYL 4-0 PS-2 18" UND J496H

## (undated) DEVICE — SHEATH PINNACLE 8FR 10CM R/O II RSB802

## (undated) DEVICE — SYR KIT ANGIO YELLOW CONTRAST 10ML  K01-60102

## (undated) DEVICE — MANIFOLD KIT ANGIO AUTOMATED 014613

## (undated) DEVICE — DRAPE SHEET REV FOLD 3/4 9349

## (undated) DEVICE — SU VICRYL+ 3-0 27IN SH UND VCP416H

## (undated) RX ORDER — VERAPAMIL HYDROCHLORIDE 2.5 MG/ML
INJECTION, SOLUTION INTRAVENOUS
Status: DISPENSED
Start: 2017-12-22

## (undated) RX ORDER — FENTANYL CITRATE 50 UG/ML
INJECTION, SOLUTION INTRAMUSCULAR; INTRAVENOUS
Status: DISPENSED
Start: 2020-06-09

## (undated) RX ORDER — DEXAMETHASONE SODIUM PHOSPHATE 4 MG/ML
INJECTION, SOLUTION INTRA-ARTICULAR; INTRALESIONAL; INTRAMUSCULAR; INTRAVENOUS; SOFT TISSUE
Status: DISPENSED
Start: 2020-06-09

## (undated) RX ORDER — ACETAMINOPHEN 325 MG/1
TABLET ORAL
Status: DISPENSED
Start: 2020-06-09

## (undated) RX ORDER — LIDOCAINE HYDROCHLORIDE 10 MG/ML
INJECTION, SOLUTION EPIDURAL; INFILTRATION; INTRACAUDAL; PERINEURAL
Status: DISPENSED
Start: 2022-01-06

## (undated) RX ORDER — LIDOCAINE HYDROCHLORIDE 20 MG/ML
INJECTION, SOLUTION EPIDURAL; INFILTRATION; INTRACAUDAL; PERINEURAL
Status: DISPENSED
Start: 2017-09-08

## (undated) RX ORDER — ALBUMIN, HUMAN INJ 5% 5 %
SOLUTION INTRAVENOUS
Status: DISPENSED
Start: 2017-09-08

## (undated) RX ORDER — KETAMINE HYDROCHLORIDE 10 MG/ML
INJECTION INTRAMUSCULAR; INTRAVENOUS
Status: DISPENSED
Start: 2022-02-13

## (undated) RX ORDER — FENTANYL CITRATE 50 UG/ML
INJECTION, SOLUTION INTRAMUSCULAR; INTRAVENOUS
Status: DISPENSED
Start: 2017-09-08

## (undated) RX ORDER — HEPARIN SODIUM 1000 [USP'U]/ML
INJECTION, SOLUTION INTRAVENOUS; SUBCUTANEOUS
Status: DISPENSED
Start: 2022-02-09

## (undated) RX ORDER — CEFAZOLIN SODIUM 2 G/100ML
INJECTION, SOLUTION INTRAVENOUS
Status: DISPENSED
Start: 2020-06-09

## (undated) RX ORDER — PROPOFOL 10 MG/ML
INJECTION, EMULSION INTRAVENOUS
Status: DISPENSED
Start: 2022-02-09

## (undated) RX ORDER — FENTANYL CITRATE-0.9 % NACL/PF 10 MCG/ML
PLASTIC BAG, INJECTION (ML) INTRAVENOUS
Status: DISPENSED
Start: 2022-02-13

## (undated) RX ORDER — CELECOXIB 200 MG/1
CAPSULE ORAL
Status: DISPENSED
Start: 2020-06-09

## (undated) RX ORDER — GLYCOPYRROLATE 0.2 MG/ML
INJECTION, SOLUTION INTRAMUSCULAR; INTRAVENOUS
Status: DISPENSED
Start: 2017-09-08

## (undated) RX ORDER — LIDOCAINE HYDROCHLORIDE 10 MG/ML
INJECTION, SOLUTION INFILTRATION; PERINEURAL
Status: DISPENSED
Start: 2017-09-08

## (undated) RX ORDER — ASPIRIN 600 MG/1
SUPPOSITORY RECTAL
Status: DISPENSED
Start: 2017-09-08

## (undated) RX ORDER — BUPIVACAINE HYDROCHLORIDE 5 MG/ML
INJECTION, SOLUTION EPIDURAL; INTRACAUDAL
Status: DISPENSED
Start: 2022-02-13

## (undated) RX ORDER — LIDOCAINE HYDROCHLORIDE 10 MG/ML
INJECTION, SOLUTION EPIDURAL; INFILTRATION; INTRACAUDAL; PERINEURAL
Status: DISPENSED
Start: 2020-06-09

## (undated) RX ORDER — KETAMINE HYDROCHLORIDE 10 MG/ML
INJECTION INTRAMUSCULAR; INTRAVENOUS
Status: DISPENSED
Start: 2022-02-09

## (undated) RX ORDER — GINSENG 100 MG
CAPSULE ORAL
Status: DISPENSED
Start: 2017-12-22

## (undated) RX ORDER — DEXAMETHASONE SODIUM PHOSPHATE 10 MG/ML
INJECTION, EMULSION INTRAMUSCULAR; INTRAVENOUS
Status: DISPENSED
Start: 2022-02-13

## (undated) RX ORDER — BUPIVACAINE HYDROCHLORIDE 5 MG/ML
INJECTION, SOLUTION PERINEURAL
Status: DISPENSED
Start: 2020-06-09

## (undated) RX ORDER — ONDANSETRON 2 MG/ML
INJECTION INTRAMUSCULAR; INTRAVENOUS
Status: DISPENSED
Start: 2022-02-13

## (undated) RX ORDER — FENTANYL CITRATE 50 UG/ML
INJECTION, SOLUTION INTRAMUSCULAR; INTRAVENOUS
Status: DISPENSED
Start: 2022-02-09

## (undated) RX ORDER — EPHEDRINE SULFATE 50 MG/ML
INJECTION, SOLUTION INTRAMUSCULAR; INTRAVENOUS; SUBCUTANEOUS
Status: DISPENSED
Start: 2020-06-09

## (undated) RX ORDER — ONDANSETRON 2 MG/ML
INJECTION INTRAMUSCULAR; INTRAVENOUS
Status: DISPENSED
Start: 2017-09-08

## (undated) RX ORDER — LIDOCAINE HYDROCHLORIDE 10 MG/ML
INJECTION, SOLUTION EPIDURAL; INFILTRATION; INTRACAUDAL; PERINEURAL
Status: DISPENSED
Start: 2018-04-18

## (undated) RX ORDER — FENTANYL CITRATE-0.9 % NACL/PF 10 MCG/ML
PLASTIC BAG, INJECTION (ML) INTRAVENOUS
Status: DISPENSED
Start: 2022-01-06

## (undated) RX ORDER — REGADENOSON 0.08 MG/ML
INJECTION, SOLUTION INTRAVENOUS
Status: DISPENSED
Start: 2020-03-16

## (undated) RX ORDER — EPHEDRINE SULFATE 50 MG/ML
INJECTION, SOLUTION INTRAMUSCULAR; INTRAVENOUS; SUBCUTANEOUS
Status: DISPENSED
Start: 2022-02-09

## (undated) RX ORDER — ONDANSETRON 2 MG/ML
INJECTION INTRAMUSCULAR; INTRAVENOUS
Status: DISPENSED
Start: 2020-06-09

## (undated) RX ORDER — CEFAZOLIN SODIUM 1 G/3ML
INJECTION, POWDER, FOR SOLUTION INTRAMUSCULAR; INTRAVENOUS
Status: DISPENSED
Start: 2017-09-08

## (undated) RX ORDER — CEFAZOLIN SODIUM 1 G/3ML
INJECTION, POWDER, FOR SOLUTION INTRAMUSCULAR; INTRAVENOUS
Status: DISPENSED
Start: 2022-02-13

## (undated) RX ORDER — LIDOCAINE HYDROCHLORIDE 10 MG/ML
INJECTION, SOLUTION INFILTRATION; PERINEURAL
Status: DISPENSED
Start: 2020-06-09

## (undated) RX ORDER — PROPOFOL 10 MG/ML
INJECTION, EMULSION INTRAVENOUS
Status: DISPENSED
Start: 2022-01-06

## (undated) RX ORDER — PHENYLEPHRINE HCL IN 0.9% NACL 1 MG/10 ML
SYRINGE (ML) INTRAVENOUS
Status: DISPENSED
Start: 2020-06-09

## (undated) RX ORDER — PROPOFOL 10 MG/ML
INJECTION, EMULSION INTRAVENOUS
Status: DISPENSED
Start: 2022-02-13

## (undated) RX ORDER — NITROGLYCERIN 5 MG/ML
VIAL (ML) INTRAVENOUS
Status: DISPENSED
Start: 2017-12-22

## (undated) RX ORDER — HEPARIN SODIUM 1000 [USP'U]/ML
INJECTION, SOLUTION INTRAVENOUS; SUBCUTANEOUS
Status: DISPENSED
Start: 2017-12-22

## (undated) RX ORDER — PROPOFOL 10 MG/ML
INJECTION, EMULSION INTRAVENOUS
Status: DISPENSED
Start: 2020-06-09

## (undated) RX ORDER — PROPOFOL 10 MG/ML
INJECTION, EMULSION INTRAVENOUS
Status: DISPENSED
Start: 2017-09-08

## (undated) RX ORDER — CEFAZOLIN SODIUM 2 G/100ML
INJECTION, SOLUTION INTRAVENOUS
Status: DISPENSED
Start: 2018-04-18

## (undated) RX ORDER — LIDOCAINE HYDROCHLORIDE 10 MG/ML
INJECTION, SOLUTION EPIDURAL; INFILTRATION; INTRACAUDAL; PERINEURAL
Status: DISPENSED
Start: 2017-12-22

## (undated) RX ORDER — BUPIVACAINE HYDROCHLORIDE 2.5 MG/ML
INJECTION, SOLUTION EPIDURAL; INFILTRATION; INTRACAUDAL
Status: DISPENSED
Start: 2018-04-18

## (undated) RX ORDER — DEXTROSE MONOHYDRATE 25 G/50ML
INJECTION, SOLUTION INTRAVENOUS
Status: DISPENSED
Start: 2022-02-09

## (undated) RX ORDER — DEXTROSE MONOHYDRATE 25 G/50ML
INJECTION, SOLUTION INTRAVENOUS
Status: DISPENSED
Start: 2020-06-09

## (undated) RX ORDER — DEXAMETHASONE SODIUM PHOSPHATE 10 MG/ML
INJECTION, EMULSION INTRAMUSCULAR; INTRAVENOUS
Status: DISPENSED
Start: 2022-02-09

## (undated) RX ORDER — PROTAMINE SULFATE 10 MG/ML
INJECTION, SOLUTION INTRAVENOUS
Status: DISPENSED
Start: 2022-02-09

## (undated) RX ORDER — ONDANSETRON 2 MG/ML
INJECTION INTRAMUSCULAR; INTRAVENOUS
Status: DISPENSED
Start: 2022-02-09

## (undated) RX ORDER — FENTANYL CITRATE 50 UG/ML
INJECTION, SOLUTION INTRAMUSCULAR; INTRAVENOUS
Status: DISPENSED
Start: 2018-04-18

## (undated) RX ORDER — KETOROLAC TROMETHAMINE 15 MG/ML
INJECTION, SOLUTION INTRAMUSCULAR; INTRAVENOUS
Status: DISPENSED
Start: 2017-09-08

## (undated) RX ORDER — CEFAZOLIN SODIUM 2 G/100ML
INJECTION, SOLUTION INTRAVENOUS
Status: DISPENSED
Start: 2017-09-08

## (undated) RX ORDER — CEFAZOLIN SODIUM 1 G/3ML
INJECTION, POWDER, FOR SOLUTION INTRAMUSCULAR; INTRAVENOUS
Status: DISPENSED
Start: 2022-02-09

## (undated) RX ORDER — ALBUTEROL SULFATE 90 UG/1
AEROSOL, METERED RESPIRATORY (INHALATION)
Status: DISPENSED
Start: 2020-06-09

## (undated) RX ORDER — OXYCODONE HYDROCHLORIDE 5 MG/1
TABLET ORAL
Status: DISPENSED
Start: 2020-06-09

## (undated) RX ORDER — GABAPENTIN 300 MG/1
CAPSULE ORAL
Status: DISPENSED
Start: 2020-06-09

## (undated) RX ORDER — FENTANYL CITRATE 50 UG/ML
INJECTION, SOLUTION INTRAMUSCULAR; INTRAVENOUS
Status: DISPENSED
Start: 2017-12-22

## (undated) RX ORDER — PHENYLEPHRINE HYDROCHLORIDE 10 MG/ML
INJECTION INTRAVENOUS
Status: DISPENSED
Start: 2022-02-09

## (undated) RX ORDER — FENTANYL CITRATE-0.9 % NACL/PF 10 MCG/ML
PLASTIC BAG, INJECTION (ML) INTRAVENOUS
Status: DISPENSED
Start: 2022-02-09